# Patient Record
Sex: FEMALE | Race: BLACK OR AFRICAN AMERICAN | Employment: OTHER
[De-identification: names, ages, dates, MRNs, and addresses within clinical notes are randomized per-mention and may not be internally consistent; named-entity substitution may affect disease eponyms.]

---

## 2017-01-18 ENCOUNTER — OFFICE VISIT (OUTPATIENT)
Dept: ORTHOPEDIC SURGERY | Facility: CLINIC | Age: 57
End: 2017-01-18

## 2017-01-18 VITALS — WEIGHT: 198.41 LBS | HEIGHT: 66 IN | BODY MASS INDEX: 31.89 KG/M2

## 2017-01-18 DIAGNOSIS — M17.11 PRIMARY OSTEOARTHRITIS OF RIGHT KNEE: Primary | ICD-10-CM

## 2017-01-19 ENCOUNTER — TELEPHONE (OUTPATIENT)
Dept: ORTHOPEDIC SURGERY | Facility: CLINIC | Age: 57
End: 2017-01-19

## 2017-01-23 DIAGNOSIS — M17.0 PRIMARY OSTEOARTHRITIS OF BOTH KNEES: ICD-10-CM

## 2017-01-23 RX ORDER — MELOXICAM 15 MG/1
15 TABLET ORAL DAILY
Qty: 30 TABLET | Refills: 0 | Status: SHIPPED | OUTPATIENT
Start: 2017-01-26 | End: 2017-02-24 | Stop reason: SDUPTHER

## 2017-01-24 ENCOUNTER — TELEPHONE (OUTPATIENT)
Dept: INTERNAL MEDICINE | Facility: CLINIC | Age: 57
End: 2017-01-24

## 2017-01-25 ENCOUNTER — OFFICE VISIT (OUTPATIENT)
Dept: ORTHOPEDIC SURGERY | Facility: CLINIC | Age: 57
End: 2017-01-25

## 2017-01-25 VITALS — HEIGHT: 66 IN | WEIGHT: 202.6 LBS | BODY MASS INDEX: 32.56 KG/M2

## 2017-01-25 DIAGNOSIS — M17.11 PRIMARY OSTEOARTHRITIS OF RIGHT KNEE: Primary | ICD-10-CM

## 2017-01-25 PROCEDURE — 3014F SCREEN MAMMO DOC REV: CPT | Performed by: ORTHOPAEDIC SURGERY

## 2017-01-25 PROCEDURE — 3017F COLORECTAL CA SCREEN DOC REV: CPT | Performed by: ORTHOPAEDIC SURGERY

## 2017-01-25 PROCEDURE — G8427 DOCREV CUR MEDS BY ELIG CLIN: HCPCS | Performed by: ORTHOPAEDIC SURGERY

## 2017-01-25 PROCEDURE — G8484 FLU IMMUNIZE NO ADMIN: HCPCS | Performed by: ORTHOPAEDIC SURGERY

## 2017-01-25 PROCEDURE — 99213 OFFICE O/P EST LOW 20 MIN: CPT | Performed by: ORTHOPAEDIC SURGERY

## 2017-01-25 PROCEDURE — G8419 CALC BMI OUT NRM PARAM NOF/U: HCPCS | Performed by: ORTHOPAEDIC SURGERY

## 2017-01-25 PROCEDURE — 4004F PT TOBACCO SCREEN RCVD TLK: CPT | Performed by: ORTHOPAEDIC SURGERY

## 2017-01-25 PROCEDURE — 20610 DRAIN/INJ JOINT/BURSA W/O US: CPT | Performed by: ORTHOPAEDIC SURGERY

## 2017-01-31 ENCOUNTER — CARE COORDINATION (OUTPATIENT)
Dept: CARE COORDINATION | Facility: CLINIC | Age: 57
End: 2017-01-31

## 2017-01-31 DIAGNOSIS — J30.9 CHRONIC ALLERGIC RHINITIS: ICD-10-CM

## 2017-02-01 ENCOUNTER — OFFICE VISIT (OUTPATIENT)
Dept: ORTHOPEDIC SURGERY | Facility: CLINIC | Age: 57
End: 2017-02-01

## 2017-02-01 VITALS — WEIGHT: 202 LBS | BODY MASS INDEX: 32.47 KG/M2 | HEIGHT: 66 IN

## 2017-02-01 DIAGNOSIS — G89.29 CHRONIC PAIN OF RIGHT KNEE: Primary | ICD-10-CM

## 2017-02-01 DIAGNOSIS — M25.561 CHRONIC PAIN OF RIGHT KNEE: Primary | ICD-10-CM

## 2017-02-01 PROCEDURE — 3017F COLORECTAL CA SCREEN DOC REV: CPT | Performed by: ORTHOPAEDIC SURGERY

## 2017-02-01 PROCEDURE — G8484 FLU IMMUNIZE NO ADMIN: HCPCS | Performed by: ORTHOPAEDIC SURGERY

## 2017-02-01 PROCEDURE — 4004F PT TOBACCO SCREEN RCVD TLK: CPT | Performed by: ORTHOPAEDIC SURGERY

## 2017-02-01 PROCEDURE — 99213 OFFICE O/P EST LOW 20 MIN: CPT | Performed by: ORTHOPAEDIC SURGERY

## 2017-02-01 PROCEDURE — G8419 CALC BMI OUT NRM PARAM NOF/U: HCPCS | Performed by: ORTHOPAEDIC SURGERY

## 2017-02-01 PROCEDURE — 3014F SCREEN MAMMO DOC REV: CPT | Performed by: ORTHOPAEDIC SURGERY

## 2017-02-01 PROCEDURE — G8427 DOCREV CUR MEDS BY ELIG CLIN: HCPCS | Performed by: ORTHOPAEDIC SURGERY

## 2017-02-01 RX ORDER — CETIRIZINE HYDROCHLORIDE 10 MG/1
TABLET ORAL
Qty: 30 TABLET | Refills: 3 | Status: SHIPPED | OUTPATIENT
Start: 2017-02-01 | End: 2017-07-03 | Stop reason: SDUPTHER

## 2017-02-14 ENCOUNTER — TELEPHONE (OUTPATIENT)
Dept: ORTHOPEDIC SURGERY | Facility: CLINIC | Age: 57
End: 2017-02-14

## 2017-02-22 ENCOUNTER — OFFICE VISIT (OUTPATIENT)
Dept: ORTHOPEDIC SURGERY | Facility: CLINIC | Age: 57
End: 2017-02-22

## 2017-02-22 DIAGNOSIS — M17.11 PRIMARY OSTEOARTHRITIS OF RIGHT KNEE: Primary | ICD-10-CM

## 2017-02-22 PROCEDURE — 3017F COLORECTAL CA SCREEN DOC REV: CPT | Performed by: ORTHOPAEDIC SURGERY

## 2017-02-22 PROCEDURE — 99213 OFFICE O/P EST LOW 20 MIN: CPT | Performed by: ORTHOPAEDIC SURGERY

## 2017-02-22 PROCEDURE — G8428 CUR MEDS NOT DOCUMENT: HCPCS | Performed by: ORTHOPAEDIC SURGERY

## 2017-02-22 PROCEDURE — 4004F PT TOBACCO SCREEN RCVD TLK: CPT | Performed by: ORTHOPAEDIC SURGERY

## 2017-02-22 PROCEDURE — G8419 CALC BMI OUT NRM PARAM NOF/U: HCPCS | Performed by: ORTHOPAEDIC SURGERY

## 2017-02-22 PROCEDURE — 3014F SCREEN MAMMO DOC REV: CPT | Performed by: ORTHOPAEDIC SURGERY

## 2017-02-22 PROCEDURE — G8484 FLU IMMUNIZE NO ADMIN: HCPCS | Performed by: ORTHOPAEDIC SURGERY

## 2017-02-24 ENCOUNTER — HOSPITAL ENCOUNTER (OUTPATIENT)
Dept: PAIN MANAGEMENT | Age: 57
Discharge: HOME | End: 2017-02-24
Payer: MEDICARE

## 2017-02-24 VITALS — DIASTOLIC BLOOD PRESSURE: 60 MMHG | SYSTOLIC BLOOD PRESSURE: 101 MMHG | RESPIRATION RATE: 16 BRPM | HEART RATE: 91 BPM

## 2017-02-24 DIAGNOSIS — M48.061 SPINAL STENOSIS, LUMBAR REGION, WITHOUT NEUROGENIC CLAUDICATION: Chronic | ICD-10-CM

## 2017-02-24 DIAGNOSIS — M17.0 PRIMARY OSTEOARTHRITIS OF BOTH KNEES: ICD-10-CM

## 2017-02-24 PROCEDURE — 99214 OFFICE O/P EST MOD 30 MIN: CPT

## 2017-02-24 RX ORDER — HYDROCODONE BITARTRATE AND ACETAMINOPHEN 5; 325 MG/1; MG/1
1 TABLET ORAL EVERY 8 HOURS PRN
Qty: 42 TABLET | Refills: 0 | Status: SHIPPED | OUTPATIENT
Start: 2017-03-04 | End: 2017-03-14 | Stop reason: SDUPTHER

## 2017-02-24 RX ORDER — MELOXICAM 15 MG/1
15 TABLET ORAL DAILY
Qty: 30 TABLET | Refills: 0 | Status: SHIPPED | OUTPATIENT
Start: 2017-02-24 | End: 2017-03-14 | Stop reason: SDUPTHER

## 2017-02-24 ASSESSMENT — PAIN DESCRIPTION - DESCRIPTORS: DESCRIPTORS: ACHING;CONSTANT;DISCOMFORT;DULL

## 2017-02-24 ASSESSMENT — PAIN DESCRIPTION - FREQUENCY: FREQUENCY: CONTINUOUS

## 2017-02-24 ASSESSMENT — PAIN DESCRIPTION - PAIN TYPE: TYPE: CHRONIC PAIN

## 2017-02-24 ASSESSMENT — PAIN DESCRIPTION - LOCATION: LOCATION: BACK;KNEE

## 2017-02-24 ASSESSMENT — PAIN SCALES - GENERAL: PAINLEVEL_OUTOF10: 7

## 2017-02-24 ASSESSMENT — PAIN DESCRIPTION - ONSET: ONSET: ON-GOING

## 2017-02-24 ASSESSMENT — PAIN DESCRIPTION - PROGRESSION: CLINICAL_PROGRESSION: GRADUALLY WORSENING

## 2017-02-24 ASSESSMENT — PAIN DESCRIPTION - ORIENTATION: ORIENTATION: RIGHT;LOWER

## 2017-02-27 ENCOUNTER — CARE COORDINATION (OUTPATIENT)
Dept: CARE COORDINATION | Facility: CLINIC | Age: 57
End: 2017-02-27

## 2017-03-06 ENCOUNTER — HOSPITAL ENCOUNTER (OUTPATIENT)
Age: 57
Discharge: HOME | End: 2017-03-06
Payer: MEDICARE

## 2017-03-06 ENCOUNTER — OFFICE VISIT (OUTPATIENT)
Dept: INTERNAL MEDICINE | Facility: CLINIC | Age: 57
End: 2017-03-06

## 2017-03-06 ENCOUNTER — TELEPHONE (OUTPATIENT)
Dept: ORTHOPEDIC SURGERY | Facility: CLINIC | Age: 57
End: 2017-03-06

## 2017-03-06 ENCOUNTER — CARE COORDINATOR VISIT (OUTPATIENT)
Dept: CARE COORDINATION | Facility: CLINIC | Age: 57
End: 2017-03-06

## 2017-03-06 VITALS
BODY MASS INDEX: 32.12 KG/M2 | HEART RATE: 83 BPM | SYSTOLIC BLOOD PRESSURE: 127 MMHG | DIASTOLIC BLOOD PRESSURE: 77 MMHG | WEIGHT: 196 LBS

## 2017-03-06 DIAGNOSIS — M17.11 PRIMARY OSTEOARTHRITIS OF RIGHT KNEE: ICD-10-CM

## 2017-03-06 DIAGNOSIS — J43.9 PULMONARY EMPHYSEMA, UNSPECIFIED EMPHYSEMA TYPE (HCC): ICD-10-CM

## 2017-03-06 DIAGNOSIS — E11.9 TYPE 2 DIABETES MELLITUS WITHOUT COMPLICATION, WITHOUT LONG-TERM CURRENT USE OF INSULIN (HCC): Primary | ICD-10-CM

## 2017-03-06 DIAGNOSIS — J43.8 OTHER EMPHYSEMA (HCC): ICD-10-CM

## 2017-03-06 DIAGNOSIS — I10 ESSENTIAL HYPERTENSION: ICD-10-CM

## 2017-03-06 DIAGNOSIS — Z71.6 ENCOUNTER FOR SMOKING CESSATION COUNSELING: ICD-10-CM

## 2017-03-06 DIAGNOSIS — M47.816 SPONDYLOSIS OF LUMBAR REGION WITHOUT MYELOPATHY OR RADICULOPATHY: ICD-10-CM

## 2017-03-06 LAB — HBA1C MFR BLD: 6.1 %

## 2017-03-06 PROCEDURE — 99214 OFFICE O/P EST MOD 30 MIN: CPT | Performed by: INTERNAL MEDICINE

## 2017-03-06 PROCEDURE — G8926 SPIRO NO PERF OR DOC: HCPCS | Performed by: INTERNAL MEDICINE

## 2017-03-06 PROCEDURE — 3014F SCREEN MAMMO DOC REV: CPT | Performed by: INTERNAL MEDICINE

## 2017-03-06 PROCEDURE — 83036 HEMOGLOBIN GLYCOSYLATED A1C: CPT | Performed by: INTERNAL MEDICINE

## 2017-03-06 PROCEDURE — G0444 DEPRESSION SCREEN ANNUAL: HCPCS | Performed by: INTERNAL MEDICINE

## 2017-03-06 PROCEDURE — G8417 CALC BMI ABV UP PARAM F/U: HCPCS | Performed by: INTERNAL MEDICINE

## 2017-03-06 PROCEDURE — G8427 DOCREV CUR MEDS BY ELIG CLIN: HCPCS | Performed by: INTERNAL MEDICINE

## 2017-03-06 PROCEDURE — 3023F SPIROM DOC REV: CPT | Performed by: INTERNAL MEDICINE

## 2017-03-06 PROCEDURE — G8484 FLU IMMUNIZE NO ADMIN: HCPCS | Performed by: INTERNAL MEDICINE

## 2017-03-06 PROCEDURE — 3017F COLORECTAL CA SCREEN DOC REV: CPT | Performed by: INTERNAL MEDICINE

## 2017-03-06 PROCEDURE — 3044F HG A1C LEVEL LT 7.0%: CPT | Performed by: INTERNAL MEDICINE

## 2017-03-06 PROCEDURE — 99213 OFFICE O/P EST LOW 20 MIN: CPT | Performed by: INTERNAL MEDICINE

## 2017-03-06 PROCEDURE — 4004F PT TOBACCO SCREEN RCVD TLK: CPT | Performed by: INTERNAL MEDICINE

## 2017-03-06 RX ORDER — NICOTINE 21 MG/24HR
1 PATCH, TRANSDERMAL 24 HOURS TRANSDERMAL EVERY 24 HOURS
Qty: 30 PATCH | Refills: 0 | Status: SHIPPED | OUTPATIENT
Start: 2017-03-06 | End: 2017-10-02

## 2017-03-06 RX ORDER — GABAPENTIN 300 MG/1
CAPSULE ORAL
Qty: 120 CAPSULE | Refills: 6 | Status: SHIPPED | OUTPATIENT
Start: 2017-03-06 | End: 2018-05-01 | Stop reason: SDUPTHER

## 2017-03-06 RX ORDER — TIZANIDINE 4 MG/1
TABLET ORAL
Qty: 60 TABLET | Refills: 3 | Status: SHIPPED | OUTPATIENT
Start: 2017-03-06 | End: 2017-08-31 | Stop reason: SDUPTHER

## 2017-03-06 RX ORDER — ALBUTEROL SULFATE 90 UG/1
2 AEROSOL, METERED RESPIRATORY (INHALATION) EVERY 6 HOURS PRN
Qty: 1 INHALER | Refills: 3 | Status: SHIPPED | OUTPATIENT
Start: 2017-03-06 | End: 2017-10-02 | Stop reason: SDUPTHER

## 2017-03-06 ASSESSMENT — ENCOUNTER SYMPTOMS
SORE THROAT: 0
CONSTIPATION: 0
COUGH: 1
EYE REDNESS: 0
SHORTNESS OF BREATH: 0
ABDOMINAL PAIN: 0
BLOOD IN STOOL: 0
SPUTUM PRODUCTION: 0
BACK PAIN: 1
BLURRED VISION: 0
NAUSEA: 0
WHEEZING: 0

## 2017-03-06 ASSESSMENT — PATIENT HEALTH QUESTIONNAIRE - PHQ9
7. TROUBLE CONCENTRATING ON THINGS, SUCH AS READING THE NEWSPAPER OR WATCHING TELEVISION: 0
10. IF YOU CHECKED OFF ANY PROBLEMS, HOW DIFFICULT HAVE THESE PROBLEMS MADE IT FOR YOU TO DO YOUR WORK, TAKE CARE OF THINGS AT HOME, OR GET ALONG WITH OTHER PEOPLE: 1
SUM OF ALL RESPONSES TO PHQ9 QUESTIONS 1 & 2: 3
5. POOR APPETITE OR OVEREATING: 0
4. FEELING TIRED OR HAVING LITTLE ENERGY: 0
9. THOUGHTS THAT YOU WOULD BE BETTER OFF DEAD, OR OF HURTING YOURSELF: 0
3. TROUBLE FALLING OR STAYING ASLEEP: 1
1. LITTLE INTEREST OR PLEASURE IN DOING THINGS: 1
2. FEELING DOWN, DEPRESSED OR HOPELESS: 2
SUM OF ALL RESPONSES TO PHQ QUESTIONS 1-9: 5
8. MOVING OR SPEAKING SO SLOWLY THAT OTHER PEOPLE COULD HAVE NOTICED. OR THE OPPOSITE, BEING SO FIGETY OR RESTLESS THAT YOU HAVE BEEN MOVING AROUND A LOT MORE THAN USUAL: 0
6. FEELING BAD ABOUT YOURSELF - OR THAT YOU ARE A FAILURE OR HAVE LET YOURSELF OR YOUR FAMILY DOWN: 1

## 2017-03-17 ENCOUNTER — HOSPITAL ENCOUNTER (OUTPATIENT)
Dept: PREADMISSION TESTING | Age: 57
Discharge: HOME | End: 2017-03-17
Payer: MEDICARE

## 2017-03-17 VITALS
OXYGEN SATURATION: 95 % | HEART RATE: 92 BPM | HEIGHT: 66 IN | TEMPERATURE: 97.7 F | SYSTOLIC BLOOD PRESSURE: 113 MMHG | BODY MASS INDEX: 30.98 KG/M2 | WEIGHT: 192.8 LBS | RESPIRATION RATE: 16 BRPM | DIASTOLIC BLOOD PRESSURE: 73 MMHG

## 2017-03-17 LAB
ANION GAP SERPL CALCULATED.3IONS-SCNC: 14 MMOL/L (ref 9–17)
BUN BLDV-MCNC: 13 MG/DL (ref 6–20)
CHLORIDE BLD-SCNC: 103 MMOL/L (ref 98–107)
CO2: 24 MMOL/L (ref 20–31)
CREAT SERPL-MCNC: 0.86 MG/DL (ref 0.5–0.9)
GFR AFRICAN AMERICAN: >60 ML/MIN
GFR NON-AFRICAN AMERICAN: >60 ML/MIN
GFR SERPL CREATININE-BSD FRML MDRD: NORMAL ML/MIN/{1.73_M2}
GFR SERPL CREATININE-BSD FRML MDRD: NORMAL ML/MIN/{1.73_M2}
GLUCOSE BLD-MCNC: 104 MG/DL (ref 70–99)
POTASSIUM SERPL-SCNC: 3.6 MMOL/L (ref 3.7–5.3)
SODIUM BLD-SCNC: 141 MMOL/L (ref 135–144)

## 2017-03-17 PROCEDURE — 36415 COLL VENOUS BLD VENIPUNCTURE: CPT

## 2017-03-17 PROCEDURE — 93005 ELECTROCARDIOGRAM TRACING: CPT

## 2017-03-17 PROCEDURE — 82565 ASSAY OF CREATININE: CPT

## 2017-03-17 PROCEDURE — 82947 ASSAY GLUCOSE BLOOD QUANT: CPT

## 2017-03-17 PROCEDURE — 84520 ASSAY OF UREA NITROGEN: CPT

## 2017-03-17 PROCEDURE — 80051 ELECTROLYTE PANEL: CPT

## 2017-03-17 RX ORDER — SODIUM CHLORIDE, SODIUM LACTATE, POTASSIUM CHLORIDE, CALCIUM CHLORIDE 600; 310; 30; 20 MG/100ML; MG/100ML; MG/100ML; MG/100ML
1000 INJECTION, SOLUTION INTRAVENOUS CONTINUOUS
Status: CANCELLED | OUTPATIENT
Start: 2017-03-17

## 2017-03-18 LAB
EKG ATRIAL RATE: 85 BPM
EKG P AXIS: 59 DEGREES
EKG P-R INTERVAL: 168 MS
EKG Q-T INTERVAL: 398 MS
EKG QRS DURATION: 82 MS
EKG QTC CALCULATION (BAZETT): 473 MS
EKG R AXIS: 13 DEGREES
EKG T AXIS: 55 DEGREES
EKG VENTRICULAR RATE: 85 BPM

## 2017-03-22 ENCOUNTER — CARE COORDINATION (OUTPATIENT)
Dept: CARE COORDINATION | Age: 57
End: 2017-03-22

## 2017-03-23 ENCOUNTER — TELEPHONE (OUTPATIENT)
Dept: INTERNAL MEDICINE | Age: 57
End: 2017-03-23

## 2017-03-24 ENCOUNTER — HOSPITAL ENCOUNTER (OUTPATIENT)
Age: 57
Setting detail: OUTPATIENT SURGERY
Discharge: HOME | End: 2017-03-24
Attending: ORTHOPAEDIC SURGERY | Admitting: ORTHOPAEDIC SURGERY
Payer: MEDICARE

## 2017-03-24 ENCOUNTER — ANESTHESIA EVENT (OUTPATIENT)
Dept: OPERATING ROOM | Age: 57
End: 2017-03-24
Payer: MEDICARE

## 2017-03-24 ENCOUNTER — ANESTHESIA (OUTPATIENT)
Dept: OPERATING ROOM | Age: 57
End: 2017-03-24
Payer: MEDICARE

## 2017-03-24 VITALS
RESPIRATION RATE: 19 BRPM | BODY MASS INDEX: 31.36 KG/M2 | DIASTOLIC BLOOD PRESSURE: 77 MMHG | TEMPERATURE: 97 F | HEIGHT: 66 IN | HEART RATE: 90 BPM | WEIGHT: 195.11 LBS | OXYGEN SATURATION: 99 % | SYSTOLIC BLOOD PRESSURE: 134 MMHG

## 2017-03-24 VITALS — OXYGEN SATURATION: 94 % | TEMPERATURE: 96.2 F | SYSTOLIC BLOOD PRESSURE: 114 MMHG | DIASTOLIC BLOOD PRESSURE: 80 MMHG

## 2017-03-24 LAB
GLUCOSE BLD-MCNC: 108 MG/DL (ref 74–106)
GLUCOSE BLD-MCNC: 157 MG/DL (ref 65–105)
POC POTASSIUM: 4.1 MMOL/L (ref 3.5–5.1)

## 2017-03-24 PROCEDURE — 2580000003 HC RX 258: Performed by: ORTHOPAEDIC SURGERY

## 2017-03-24 PROCEDURE — 2580000003 HC RX 258: Performed by: ANESTHESIOLOGY

## 2017-03-24 PROCEDURE — 6360000002 HC RX W HCPCS: Performed by: ORTHOPAEDIC SURGERY

## 2017-03-24 PROCEDURE — 2720000010 HC SURG SUPPLY STERILE: Performed by: ORTHOPAEDIC SURGERY

## 2017-03-24 PROCEDURE — 3700000001 HC ADD 15 MINUTES (ANESTHESIA): Performed by: ORTHOPAEDIC SURGERY

## 2017-03-24 PROCEDURE — 3600000004 HC SURGERY LEVEL 4 BASE: Performed by: ORTHOPAEDIC SURGERY

## 2017-03-24 PROCEDURE — 7100000001 HC PACU RECOVERY - ADDTL 15 MIN: Performed by: ORTHOPAEDIC SURGERY

## 2017-03-24 PROCEDURE — 7100000000 HC PACU RECOVERY - FIRST 15 MIN: Performed by: ORTHOPAEDIC SURGERY

## 2017-03-24 PROCEDURE — 82947 ASSAY GLUCOSE BLOOD QUANT: CPT

## 2017-03-24 PROCEDURE — 84132 ASSAY OF SERUM POTASSIUM: CPT

## 2017-03-24 PROCEDURE — 6360000002 HC RX W HCPCS: Performed by: NURSE ANESTHETIST, CERTIFIED REGISTERED

## 2017-03-24 PROCEDURE — 6360000002 HC RX W HCPCS: Performed by: ANESTHESIOLOGY

## 2017-03-24 PROCEDURE — 6370000000 HC RX 637 (ALT 250 FOR IP): Performed by: ANESTHESIOLOGY

## 2017-03-24 PROCEDURE — 2500000003 HC RX 250 WO HCPCS: Performed by: NURSE ANESTHETIST, CERTIFIED REGISTERED

## 2017-03-24 PROCEDURE — 3700000000 HC ANESTHESIA ATTENDED CARE: Performed by: ORTHOPAEDIC SURGERY

## 2017-03-24 PROCEDURE — 2500000003 HC RX 250 WO HCPCS: Performed by: ORTHOPAEDIC SURGERY

## 2017-03-24 PROCEDURE — 3600000014 HC SURGERY LEVEL 4 ADDTL 15MIN: Performed by: ORTHOPAEDIC SURGERY

## 2017-03-24 PROCEDURE — 7100000010 HC PHASE II RECOVERY - FIRST 15 MIN: Performed by: ORTHOPAEDIC SURGERY

## 2017-03-24 RX ORDER — OXYCODONE HYDROCHLORIDE AND ACETAMINOPHEN 5; 325 MG/1; MG/1
2 TABLET ORAL PRN
Status: DISCONTINUED | OUTPATIENT
Start: 2017-03-24 | End: 2017-03-24 | Stop reason: HOSPADM

## 2017-03-24 RX ORDER — LIDOCAINE HYDROCHLORIDE 10 MG/ML
INJECTION, SOLUTION INFILTRATION; PERINEURAL PRN
Status: DISCONTINUED | OUTPATIENT
Start: 2017-03-24 | End: 2017-03-24 | Stop reason: SDUPTHER

## 2017-03-24 RX ORDER — MORPHINE SULFATE 2 MG/ML
2 INJECTION, SOLUTION INTRAMUSCULAR; INTRAVENOUS EVERY 5 MIN PRN
Status: DISCONTINUED | OUTPATIENT
Start: 2017-03-24 | End: 2017-03-24 | Stop reason: HOSPADM

## 2017-03-24 RX ORDER — LABETALOL HYDROCHLORIDE 5 MG/ML
5 INJECTION, SOLUTION INTRAVENOUS EVERY 10 MIN PRN
Status: DISCONTINUED | OUTPATIENT
Start: 2017-03-24 | End: 2017-03-24 | Stop reason: HOSPADM

## 2017-03-24 RX ORDER — MAGNESIUM HYDROXIDE 1200 MG/15ML
LIQUID ORAL CONTINUOUS PRN
Status: DISCONTINUED | OUTPATIENT
Start: 2017-03-24 | End: 2017-03-24 | Stop reason: HOSPADM

## 2017-03-24 RX ORDER — ONDANSETRON 2 MG/ML
INJECTION INTRAMUSCULAR; INTRAVENOUS PRN
Status: DISCONTINUED | OUTPATIENT
Start: 2017-03-24 | End: 2017-03-24 | Stop reason: SDUPTHER

## 2017-03-24 RX ORDER — OXYCODONE HYDROCHLORIDE AND ACETAMINOPHEN 5; 325 MG/1; MG/1
1 TABLET ORAL PRN
Status: DISCONTINUED | OUTPATIENT
Start: 2017-03-24 | End: 2017-03-24 | Stop reason: HOSPADM

## 2017-03-24 RX ORDER — FENTANYL CITRATE 50 UG/ML
25 INJECTION, SOLUTION INTRAMUSCULAR; INTRAVENOUS EVERY 5 MIN PRN
Status: COMPLETED | OUTPATIENT
Start: 2017-03-24 | End: 2017-03-24

## 2017-03-24 RX ORDER — HYDROCODONE BITARTRATE AND ACETAMINOPHEN 5; 325 MG/1; MG/1
1 TABLET ORAL EVERY 4 HOURS PRN
Qty: 45 TABLET | Refills: 0 | Status: SHIPPED | OUTPATIENT
Start: 2017-03-24 | End: 2017-03-31

## 2017-03-24 RX ORDER — DIPHENHYDRAMINE HYDROCHLORIDE 50 MG/ML
12.5 INJECTION INTRAMUSCULAR; INTRAVENOUS
Status: DISCONTINUED | OUTPATIENT
Start: 2017-03-24 | End: 2017-03-24 | Stop reason: HOSPADM

## 2017-03-24 RX ORDER — ONDANSETRON 2 MG/ML
4 INJECTION INTRAMUSCULAR; INTRAVENOUS
Status: DISCONTINUED | OUTPATIENT
Start: 2017-03-24 | End: 2017-03-24 | Stop reason: HOSPADM

## 2017-03-24 RX ORDER — PROPOFOL 10 MG/ML
INJECTION, EMULSION INTRAVENOUS PRN
Status: DISCONTINUED | OUTPATIENT
Start: 2017-03-24 | End: 2017-03-24 | Stop reason: SDUPTHER

## 2017-03-24 RX ORDER — MIDAZOLAM HYDROCHLORIDE 1 MG/ML
INJECTION INTRAMUSCULAR; INTRAVENOUS PRN
Status: DISCONTINUED | OUTPATIENT
Start: 2017-03-24 | End: 2017-03-24 | Stop reason: SDUPTHER

## 2017-03-24 RX ORDER — BUPIVACAINE HYDROCHLORIDE AND EPINEPHRINE 5; 5 MG/ML; UG/ML
INJECTION, SOLUTION PERINEURAL PRN
Status: DISCONTINUED | OUTPATIENT
Start: 2017-03-24 | End: 2017-03-24 | Stop reason: HOSPADM

## 2017-03-24 RX ORDER — SODIUM CHLORIDE, SODIUM LACTATE, POTASSIUM CHLORIDE, CALCIUM CHLORIDE 600; 310; 30; 20 MG/100ML; MG/100ML; MG/100ML; MG/100ML
1000 INJECTION, SOLUTION INTRAVENOUS CONTINUOUS
Status: DISCONTINUED | OUTPATIENT
Start: 2017-03-24 | End: 2017-03-24 | Stop reason: HOSPADM

## 2017-03-24 RX ORDER — HYDROCODONE BITARTRATE AND ACETAMINOPHEN 5; 325 MG/1; MG/1
1 TABLET ORAL ONCE
Status: COMPLETED | OUTPATIENT
Start: 2017-03-24 | End: 2017-03-24

## 2017-03-24 RX ORDER — HYDROCODONE BITARTRATE AND ACETAMINOPHEN 5; 325 MG/1; MG/1
2 TABLET ORAL ONCE
Status: COMPLETED | OUTPATIENT
Start: 2017-03-24 | End: 2017-03-24

## 2017-03-24 RX ORDER — KETOROLAC TROMETHAMINE 30 MG/ML
INJECTION, SOLUTION INTRAMUSCULAR; INTRAVENOUS PRN
Status: DISCONTINUED | OUTPATIENT
Start: 2017-03-24 | End: 2017-03-24 | Stop reason: SDUPTHER

## 2017-03-24 RX ADMIN — LIDOCAINE HYDROCHLORIDE 50 MG: 10 INJECTION, SOLUTION INFILTRATION; PERINEURAL at 11:58

## 2017-03-24 RX ADMIN — MIDAZOLAM HYDROCHLORIDE 300 MG: 1 INJECTION, SOLUTION INTRAMUSCULAR; INTRAVENOUS at 12:30

## 2017-03-24 RX ADMIN — Medication 2 G: at 11:58

## 2017-03-24 RX ADMIN — FENTANYL CITRATE 50 MCG: 50 INJECTION INTRAMUSCULAR; INTRAVENOUS at 11:58

## 2017-03-24 RX ADMIN — FENTANYL CITRATE 50 MCG: 50 INJECTION INTRAMUSCULAR; INTRAVENOUS at 12:08

## 2017-03-24 RX ADMIN — ONDANSETRON 4 MG: 2 INJECTION, SOLUTION INTRAMUSCULAR; INTRAVENOUS at 13:24

## 2017-03-24 RX ADMIN — FENTANYL CITRATE 25 MCG: 50 INJECTION INTRAMUSCULAR; INTRAVENOUS at 13:47

## 2017-03-24 RX ADMIN — FENTANYL CITRATE 50 MCG: 50 INJECTION INTRAMUSCULAR; INTRAVENOUS at 12:30

## 2017-03-24 RX ADMIN — HYDROCODONE BITARTRATE AND ACETAMINOPHEN 2 TABLET: 5; 325 TABLET ORAL at 14:26

## 2017-03-24 RX ADMIN — MIDAZOLAM HYDROCHLORIDE 2 MG: 1 INJECTION, SOLUTION INTRAMUSCULAR; INTRAVENOUS at 12:08

## 2017-03-24 RX ADMIN — SODIUM CHLORIDE, POTASSIUM CHLORIDE, SODIUM LACTATE AND CALCIUM CHLORIDE 1000 ML: 600; 310; 30; 20 INJECTION, SOLUTION INTRAVENOUS at 10:46

## 2017-03-24 RX ADMIN — SODIUM CHLORIDE, POTASSIUM CHLORIDE, SODIUM LACTATE AND CALCIUM CHLORIDE: 600; 310; 30; 20 INJECTION, SOLUTION INTRAVENOUS at 12:55

## 2017-03-24 RX ADMIN — PROPOFOL 150 MG: 10 INJECTION, EMULSION INTRAVENOUS at 11:58

## 2017-03-24 RX ADMIN — KETOROLAC TROMETHAMINE 30 MG: 30 INJECTION, SOLUTION INTRAMUSCULAR; INTRAVENOUS at 12:58

## 2017-03-24 RX ADMIN — FENTANYL CITRATE 25 MCG: 50 INJECTION INTRAMUSCULAR; INTRAVENOUS at 13:30

## 2017-03-24 RX ADMIN — FENTANYL CITRATE 50 MCG: 50 INJECTION INTRAMUSCULAR; INTRAVENOUS at 12:35

## 2017-03-24 RX ADMIN — FENTANYL CITRATE 50 MCG: 50 INJECTION INTRAMUSCULAR; INTRAVENOUS at 12:54

## 2017-03-24 ASSESSMENT — PAIN SCALES - WONG BAKER
WONGBAKER_NUMERICALRESPONSE: 0

## 2017-03-24 ASSESSMENT — COPD QUESTIONNAIRES: CAT_SEVERITY: MODERATE

## 2017-03-24 ASSESSMENT — PAIN SCALES - GENERAL
PAINLEVEL_OUTOF10: 7

## 2017-03-24 ASSESSMENT — PAIN - FUNCTIONAL ASSESSMENT: PAIN_FUNCTIONAL_ASSESSMENT: 0-10

## 2017-03-29 PROBLEM — M94.261 CHONDROMALACIA OF MEDIAL CONDYLE OF RIGHT FEMUR: Status: ACTIVE | Noted: 2017-03-24

## 2017-03-30 ENCOUNTER — CARE COORDINATION (OUTPATIENT)
Dept: CARE COORDINATION | Age: 57
End: 2017-03-30

## 2017-03-30 ENCOUNTER — HOSPITAL ENCOUNTER (OUTPATIENT)
Dept: PAIN MANAGEMENT | Age: 57
Discharge: HOME | End: 2017-03-30
Payer: MEDICARE

## 2017-03-30 VITALS
HEIGHT: 66 IN | OXYGEN SATURATION: 98 % | RESPIRATION RATE: 20 BRPM | BODY MASS INDEX: 31.34 KG/M2 | SYSTOLIC BLOOD PRESSURE: 131 MMHG | WEIGHT: 195 LBS | TEMPERATURE: 98 F | DIASTOLIC BLOOD PRESSURE: 85 MMHG | HEART RATE: 89 BPM

## 2017-03-30 PROCEDURE — 99214 OFFICE O/P EST MOD 30 MIN: CPT

## 2017-03-30 PROCEDURE — 99213 OFFICE O/P EST LOW 20 MIN: CPT

## 2017-03-30 ASSESSMENT — PAIN DESCRIPTION - LOCATION: LOCATION: BACK;BUTTOCKS;HIP;KNEE

## 2017-03-30 ASSESSMENT — PAIN DESCRIPTION - DESCRIPTORS: DESCRIPTORS: ACHING;CONSTANT;DULL

## 2017-03-30 ASSESSMENT — ENCOUNTER SYMPTOMS
BLURRED VISION: 0
EYE DISCHARGE: 0
SUSPICIOUS LESIONS: 0
UNUSUAL HAIR DISTRIBUTION: 0

## 2017-03-30 ASSESSMENT — PAIN DESCRIPTION - ORIENTATION: ORIENTATION: RIGHT;LEFT;LOWER

## 2017-03-30 ASSESSMENT — PAIN DESCRIPTION - FREQUENCY: FREQUENCY: CONTINUOUS

## 2017-03-30 ASSESSMENT — PAIN DESCRIPTION - PROGRESSION: CLINICAL_PROGRESSION: NOT CHANGED

## 2017-03-30 ASSESSMENT — PAIN SCALES - GENERAL: PAINLEVEL_OUTOF10: 6

## 2017-03-30 ASSESSMENT — PAIN DESCRIPTION - PAIN TYPE: TYPE: CHRONIC PAIN

## 2017-04-05 ENCOUNTER — OFFICE VISIT (OUTPATIENT)
Dept: ORTHOPEDIC SURGERY | Age: 57
End: 2017-04-05

## 2017-04-05 VITALS — BODY MASS INDEX: 32.51 KG/M2 | HEIGHT: 65 IN | WEIGHT: 195.11 LBS

## 2017-04-05 DIAGNOSIS — M17.11 PRIMARY OSTEOARTHRITIS OF RIGHT KNEE: Primary | ICD-10-CM

## 2017-04-05 PROCEDURE — 99024 POSTOP FOLLOW-UP VISIT: CPT | Performed by: ORTHOPAEDIC SURGERY

## 2017-04-05 RX ORDER — HYDROCODONE BITARTRATE AND ACETAMINOPHEN 5; 325 MG/1; MG/1
1 TABLET ORAL EVERY 6 HOURS PRN
Qty: 40 TABLET | Refills: 0 | Status: SHIPPED | OUTPATIENT
Start: 2017-04-05 | End: 2017-04-28

## 2017-04-05 RX ORDER — HYDROCODONE BITARTRATE AND ACETAMINOPHEN 5; 325 MG/1; MG/1
1 TABLET ORAL EVERY 6 HOURS PRN
COMMUNITY
End: 2017-04-05 | Stop reason: SDUPTHER

## 2017-04-17 ENCOUNTER — TELEPHONE (OUTPATIENT)
Dept: INTERNAL MEDICINE | Age: 57
End: 2017-04-17

## 2017-04-19 ENCOUNTER — OFFICE VISIT (OUTPATIENT)
Dept: ORTHOPEDIC SURGERY | Age: 57
End: 2017-04-19
Payer: MEDICARE

## 2017-04-19 VITALS — BODY MASS INDEX: 30.98 KG/M2 | HEIGHT: 66 IN | WEIGHT: 192.79 LBS

## 2017-04-19 DIAGNOSIS — S83.411A SPRAIN OF MEDIAL COLLATERAL LIGAMENT OF RIGHT KNEE, INITIAL ENCOUNTER: Primary | ICD-10-CM

## 2017-04-19 PROCEDURE — 3014F SCREEN MAMMO DOC REV: CPT | Performed by: ORTHOPAEDIC SURGERY

## 2017-04-19 PROCEDURE — 99212 OFFICE O/P EST SF 10 MIN: CPT | Performed by: ORTHOPAEDIC SURGERY

## 2017-04-19 PROCEDURE — 3017F COLORECTAL CA SCREEN DOC REV: CPT | Performed by: ORTHOPAEDIC SURGERY

## 2017-04-19 PROCEDURE — 4004F PT TOBACCO SCREEN RCVD TLK: CPT | Performed by: ORTHOPAEDIC SURGERY

## 2017-04-19 PROCEDURE — G8417 CALC BMI ABV UP PARAM F/U: HCPCS | Performed by: ORTHOPAEDIC SURGERY

## 2017-04-19 PROCEDURE — G8427 DOCREV CUR MEDS BY ELIG CLIN: HCPCS | Performed by: ORTHOPAEDIC SURGERY

## 2017-04-19 RX ORDER — HYDROCODONE BITARTRATE AND ACETAMINOPHEN 5; 325 MG/1; MG/1
1 TABLET ORAL EVERY 8 HOURS PRN
Qty: 40 TABLET | Refills: 0 | Status: SHIPPED | OUTPATIENT
Start: 2017-04-19 | End: 2017-04-28 | Stop reason: SDUPTHER

## 2017-04-28 ENCOUNTER — HOSPITAL ENCOUNTER (OUTPATIENT)
Dept: PAIN MANAGEMENT | Age: 57
Discharge: HOME | End: 2017-04-28
Payer: MEDICARE

## 2017-04-28 VITALS — RESPIRATION RATE: 16 BRPM | HEART RATE: 83 BPM | TEMPERATURE: 97.2 F

## 2017-04-28 DIAGNOSIS — M47.26 OSTEOARTHRITIS OF SPINE WITH RADICULOPATHY, LUMBAR REGION: Primary | ICD-10-CM

## 2017-04-28 DIAGNOSIS — K21.9 GASTROESOPHAGEAL REFLUX DISEASE, ESOPHAGITIS PRESENCE NOT SPECIFIED: ICD-10-CM

## 2017-04-28 DIAGNOSIS — M17.0 PRIMARY OSTEOARTHRITIS OF BOTH KNEES: ICD-10-CM

## 2017-04-28 PROCEDURE — 80307 DRUG TEST PRSMV CHEM ANLYZR: CPT

## 2017-04-28 PROCEDURE — 99214 OFFICE O/P EST MOD 30 MIN: CPT

## 2017-04-28 RX ORDER — MELOXICAM 15 MG/1
15 TABLET ORAL DAILY
Qty: 30 TABLET | Refills: 0 | Status: SHIPPED | OUTPATIENT
Start: 2017-04-28 | End: 2017-05-30 | Stop reason: SDUPTHER

## 2017-04-28 RX ORDER — HYDROCODONE BITARTRATE AND ACETAMINOPHEN 5; 325 MG/1; MG/1
1 TABLET ORAL EVERY 8 HOURS PRN
Qty: 90 TABLET | Refills: 0 | Status: SHIPPED | OUTPATIENT
Start: 2017-05-02 | End: 2017-05-30 | Stop reason: SDUPTHER

## 2017-04-28 RX ORDER — OMEPRAZOLE 20 MG/1
20 CAPSULE, DELAYED RELEASE ORAL DAILY
Qty: 30 CAPSULE | Refills: 11 | Status: SHIPPED | OUTPATIENT
Start: 2017-04-28 | End: 2018-01-05 | Stop reason: SDUPTHER

## 2017-04-28 ASSESSMENT — ENCOUNTER SYMPTOMS
COUGH: 0
CONSTIPATION: 0
SHORTNESS OF BREATH: 0
BACK PAIN: 1

## 2017-04-28 ASSESSMENT — PAIN DESCRIPTION - FREQUENCY: FREQUENCY: CONTINUOUS

## 2017-04-28 ASSESSMENT — PAIN SCALES - GENERAL: PAINLEVEL_OUTOF10: 7

## 2017-04-28 ASSESSMENT — PAIN DESCRIPTION - ORIENTATION: ORIENTATION: RIGHT;LOWER

## 2017-04-28 ASSESSMENT — PAIN DESCRIPTION - PAIN TYPE: TYPE: CHRONIC PAIN

## 2017-04-28 ASSESSMENT — PAIN DESCRIPTION - PROGRESSION: CLINICAL_PROGRESSION: GRADUALLY IMPROVING

## 2017-04-28 ASSESSMENT — PAIN DESCRIPTION - DESCRIPTORS: DESCRIPTORS: ACHING;DISCOMFORT;DULL

## 2017-04-28 ASSESSMENT — PAIN DESCRIPTION - LOCATION: LOCATION: BACK;KNEE

## 2017-05-01 LAB
6-ACETYLMORPHINE, UR: NOT DETECTED
7-AMINOCLONAZEPAM, URINE: NOT DETECTED
ALPHA-OH-ALPRAZ, URINE: NOT DETECTED
ALPRAZOLAM, URINE: NOT DETECTED
AMPHETAMINES, URINE: NOT DETECTED
BARBITURATES, URINE: NOT DETECTED
BENZOYLECGONINE, UR: NOT DETECTED
BUPRENORPHINE URINE: NOT DETECTED
CARISOPRODOL, UR: NOT DETECTED
CLONAZEPAM, URINE: NOT DETECTED
CODEINE, URINE: NOT DETECTED
CREATININE URINE: 48.3 MG/DL (ref 20–400)
DIAZEPAM, URINE: NOT DETECTED
EER PAIN MGT DRUG PANEL, HIGH RES/EMIT U: NORMAL
ETHYL GLUCURONIDE UR: NOT DETECTED
FENTANYL URINE: NOT DETECTED
HYDROCODONE, URINE: PRESENT
HYDROMORPHONE, URINE: NOT DETECTED
LORAZEPAM, URINE: NOT DETECTED
MARIJUANA METAB, UR: NOT DETECTED
MDA, UR: NOT DETECTED
MDEA, EVE, UR: NOT DETECTED
MDMA URINE: NOT DETECTED
MEPERIDINE METAB, UR: NOT DETECTED
METHADONE, URINE: NOT DETECTED
METHAMPHETAMINE, URINE: NOT DETECTED
METHYLPHENIDATE: NOT DETECTED
MIDAZOLAM, URINE: NOT DETECTED
MORPHINE URINE: NOT DETECTED
NORBUPRENORPHINE, URINE: NOT DETECTED
NORDIAZEPAM, URINE: NOT DETECTED
NORFENTANYL, URINE: NOT DETECTED
NORHYDROCODONE, URINE: PRESENT
NOROXYCODONE, URINE: NOT DETECTED
NOROXYMORPHONE, URINE: NOT DETECTED
OXAZEPAM, URINE: NOT DETECTED
OXYCODONE URINE: NOT DETECTED
OXYMORPHONE, URINE: NOT DETECTED
PAIN MGT DRUG PANEL, HI RES, UR: NORMAL
PCP,URINE: NOT DETECTED
PHENTERMINE, UR: NOT DETECTED
PROPOXYPHENE, URINE: NOT DETECTED
TAPENTADOL, URINE: NOT DETECTED
TAPENTADOL-O-SULFATE, URINE: NOT DETECTED
TEMAZEPAM, URINE: NOT DETECTED
TRAMADOL, URINE: NOT DETECTED
ZOLPIDEM, URINE: NOT DETECTED

## 2017-05-17 ENCOUNTER — TELEPHONE (OUTPATIENT)
Dept: INTERNAL MEDICINE | Age: 57
End: 2017-05-17

## 2017-05-17 ENCOUNTER — OFFICE VISIT (OUTPATIENT)
Dept: ORTHOPEDIC SURGERY | Age: 57
End: 2017-05-17

## 2017-05-17 VITALS — HEIGHT: 64 IN | WEIGHT: 198 LBS | BODY MASS INDEX: 33.8 KG/M2

## 2017-05-17 DIAGNOSIS — M17.11 PRIMARY OSTEOARTHRITIS OF RIGHT KNEE: Primary | ICD-10-CM

## 2017-05-17 PROCEDURE — 99024 POSTOP FOLLOW-UP VISIT: CPT | Performed by: ORTHOPAEDIC SURGERY

## 2017-05-18 RX ORDER — AZITHROMYCIN 250 MG/1
TABLET, FILM COATED ORAL
Qty: 1 PACKET | Refills: 0 | Status: SHIPPED | OUTPATIENT
Start: 2017-05-18 | End: 2017-05-28

## 2017-05-26 DIAGNOSIS — E11.9 TYPE 2 DIABETES MELLITUS WITHOUT COMPLICATION, WITHOUT LONG-TERM CURRENT USE OF INSULIN (HCC): ICD-10-CM

## 2017-05-26 DIAGNOSIS — E78.00 PURE HYPERCHOLESTEROLEMIA: ICD-10-CM

## 2017-05-30 ENCOUNTER — HOSPITAL ENCOUNTER (OUTPATIENT)
Dept: PAIN MANAGEMENT | Age: 57
Discharge: HOME | End: 2017-05-30
Payer: MEDICARE

## 2017-05-30 VITALS
HEIGHT: 64 IN | SYSTOLIC BLOOD PRESSURE: 130 MMHG | BODY MASS INDEX: 32.78 KG/M2 | DIASTOLIC BLOOD PRESSURE: 59 MMHG | WEIGHT: 192 LBS | HEART RATE: 83 BPM | TEMPERATURE: 98.1 F | RESPIRATION RATE: 16 BRPM

## 2017-05-30 DIAGNOSIS — M47.896 OTHER OSTEOARTHRITIS OF SPINE, LUMBAR REGION: Primary | ICD-10-CM

## 2017-05-30 DIAGNOSIS — M17.0 PRIMARY OSTEOARTHRITIS OF BOTH KNEES: ICD-10-CM

## 2017-05-30 PROCEDURE — 99214 OFFICE O/P EST MOD 30 MIN: CPT

## 2017-05-30 RX ORDER — HYDROCODONE BITARTRATE AND ACETAMINOPHEN 5; 325 MG/1; MG/1
1 TABLET ORAL EVERY 8 HOURS PRN
Qty: 90 TABLET | Refills: 0 | Status: SHIPPED | OUTPATIENT
Start: 2017-06-01 | End: 2017-06-30 | Stop reason: SDUPTHER

## 2017-05-30 RX ORDER — MELOXICAM 15 MG/1
15 TABLET ORAL DAILY
Qty: 30 TABLET | Refills: 0 | Status: SHIPPED | OUTPATIENT
Start: 2017-06-01 | End: 2017-06-30 | Stop reason: SDUPTHER

## 2017-05-30 ASSESSMENT — PAIN DESCRIPTION - PAIN TYPE: TYPE: CHRONIC PAIN

## 2017-05-30 ASSESSMENT — PAIN DESCRIPTION - DESCRIPTORS: DESCRIPTORS: ACHING;CONSTANT;BURNING

## 2017-05-30 ASSESSMENT — ENCOUNTER SYMPTOMS
BACK PAIN: 1
CONSTIPATION: 0
COUGH: 0
SHORTNESS OF BREATH: 0

## 2017-05-30 ASSESSMENT — PAIN DESCRIPTION - FREQUENCY: FREQUENCY: CONTINUOUS

## 2017-05-30 ASSESSMENT — PAIN SCALES - GENERAL: PAINLEVEL_OUTOF10: 7

## 2017-05-30 ASSESSMENT — PAIN DESCRIPTION - PROGRESSION: CLINICAL_PROGRESSION: NOT CHANGED

## 2017-05-30 ASSESSMENT — PAIN DESCRIPTION - LOCATION: LOCATION: BACK;KNEE

## 2017-05-30 ASSESSMENT — PAIN DESCRIPTION - ORIENTATION: ORIENTATION: RIGHT;LOWER

## 2017-05-31 ENCOUNTER — OFFICE VISIT (OUTPATIENT)
Dept: ORTHOPEDIC SURGERY | Age: 57
End: 2017-05-31

## 2017-05-31 VITALS — WEIGHT: 192.02 LBS | HEIGHT: 64 IN | BODY MASS INDEX: 32.78 KG/M2

## 2017-05-31 DIAGNOSIS — M17.11 PRIMARY OSTEOARTHRITIS OF RIGHT KNEE: Primary | ICD-10-CM

## 2017-05-31 PROCEDURE — 99024 POSTOP FOLLOW-UP VISIT: CPT | Performed by: ORTHOPAEDIC SURGERY

## 2017-05-31 RX ORDER — ATORVASTATIN CALCIUM 40 MG/1
40 TABLET, FILM COATED ORAL DAILY
Qty: 90 TABLET | Refills: 1 | Status: SHIPPED | OUTPATIENT
Start: 2017-05-31 | End: 2017-10-02 | Stop reason: SDUPTHER

## 2017-06-06 ENCOUNTER — OFFICE VISIT (OUTPATIENT)
Dept: INTERNAL MEDICINE | Age: 57
End: 2017-06-06
Payer: MEDICARE

## 2017-06-06 VITALS
SYSTOLIC BLOOD PRESSURE: 114 MMHG | DIASTOLIC BLOOD PRESSURE: 78 MMHG | HEART RATE: 77 BPM | HEIGHT: 64 IN | WEIGHT: 197.6 LBS | BODY MASS INDEX: 33.73 KG/M2

## 2017-06-06 DIAGNOSIS — M17.11 PRIMARY OSTEOARTHRITIS OF RIGHT KNEE: ICD-10-CM

## 2017-06-06 DIAGNOSIS — M47.816 SPONDYLOSIS OF LUMBAR REGION WITHOUT MYELOPATHY OR RADICULOPATHY: ICD-10-CM

## 2017-06-06 DIAGNOSIS — N39.3 STRESS INCONTINENCE: ICD-10-CM

## 2017-06-06 DIAGNOSIS — J43.8 OTHER EMPHYSEMA (HCC): ICD-10-CM

## 2017-06-06 DIAGNOSIS — E11.9 TYPE 2 DIABETES MELLITUS WITHOUT COMPLICATION, WITHOUT LONG-TERM CURRENT USE OF INSULIN (HCC): Primary | ICD-10-CM

## 2017-06-06 DIAGNOSIS — I10 ESSENTIAL HYPERTENSION: ICD-10-CM

## 2017-06-06 PROCEDURE — 99213 OFFICE O/P EST LOW 20 MIN: CPT | Performed by: INTERNAL MEDICINE

## 2017-06-06 ASSESSMENT — ENCOUNTER SYMPTOMS
CONSTIPATION: 0
ABDOMINAL PAIN: 0
EYE REDNESS: 0
SHORTNESS OF BREATH: 0
COUGH: 0
SORE THROAT: 0
BLURRED VISION: 0
BACK PAIN: 1
HEARTBURN: 0
NAUSEA: 0

## 2017-06-30 ENCOUNTER — HOSPITAL ENCOUNTER (OUTPATIENT)
Dept: PAIN MANAGEMENT | Age: 57
Discharge: HOME | End: 2017-06-30
Payer: MEDICARE

## 2017-06-30 VITALS
RESPIRATION RATE: 18 BRPM | WEIGHT: 197 LBS | TEMPERATURE: 98.1 F | HEART RATE: 91 BPM | SYSTOLIC BLOOD PRESSURE: 130 MMHG | HEIGHT: 64 IN | DIASTOLIC BLOOD PRESSURE: 77 MMHG | BODY MASS INDEX: 33.63 KG/M2

## 2017-06-30 DIAGNOSIS — M47.26 OSTEOARTHRITIS OF SPINE WITH RADICULOPATHY, LUMBAR REGION: Primary | ICD-10-CM

## 2017-06-30 DIAGNOSIS — M17.0 PRIMARY OSTEOARTHRITIS OF BOTH KNEES: ICD-10-CM

## 2017-06-30 PROCEDURE — 99213 OFFICE O/P EST LOW 20 MIN: CPT

## 2017-06-30 PROCEDURE — 99214 OFFICE O/P EST MOD 30 MIN: CPT

## 2017-06-30 PROCEDURE — G0463 HOSPITAL OUTPT CLINIC VISIT: HCPCS

## 2017-06-30 RX ORDER — MELOXICAM 15 MG/1
15 TABLET ORAL DAILY
Qty: 30 TABLET | Refills: 0 | Status: SHIPPED | OUTPATIENT
Start: 2017-07-01 | End: 2017-07-28 | Stop reason: SDUPTHER

## 2017-06-30 RX ORDER — HYDROCODONE BITARTRATE AND ACETAMINOPHEN 5; 325 MG/1; MG/1
1 TABLET ORAL EVERY 8 HOURS PRN
Qty: 90 TABLET | Refills: 0 | Status: SHIPPED | OUTPATIENT
Start: 2017-07-01 | End: 2017-07-28 | Stop reason: SDUPTHER

## 2017-06-30 ASSESSMENT — ENCOUNTER SYMPTOMS
CONSTIPATION: 0
COUGH: 0
SHORTNESS OF BREATH: 0
BACK PAIN: 1

## 2017-06-30 ASSESSMENT — PAIN SCALES - GENERAL: PAINLEVEL_OUTOF10: 7

## 2017-06-30 ASSESSMENT — PAIN DESCRIPTION - ORIENTATION: ORIENTATION: RIGHT;LOWER

## 2017-06-30 ASSESSMENT — PAIN DESCRIPTION - PROGRESSION: CLINICAL_PROGRESSION: NOT CHANGED

## 2017-06-30 ASSESSMENT — PAIN DESCRIPTION - FREQUENCY: FREQUENCY: CONTINUOUS

## 2017-06-30 ASSESSMENT — PAIN DESCRIPTION - LOCATION: LOCATION: BACK;KNEE

## 2017-06-30 ASSESSMENT — PAIN DESCRIPTION - DESCRIPTORS: DESCRIPTORS: ACHING;BURNING

## 2017-06-30 ASSESSMENT — PAIN DESCRIPTION - PAIN TYPE: TYPE: CHRONIC PAIN

## 2017-07-03 DIAGNOSIS — J30.9 CHRONIC ALLERGIC RHINITIS: ICD-10-CM

## 2017-07-03 DIAGNOSIS — E11.9 TYPE 2 DIABETES MELLITUS WITHOUT COMPLICATION, WITHOUT LONG-TERM CURRENT USE OF INSULIN (HCC): Primary | ICD-10-CM

## 2017-07-03 RX ORDER — LANCETS 30 GAUGE
1 EACH MISCELLANEOUS DAILY
Qty: 100 EACH | Refills: 11 | Status: SHIPPED | OUTPATIENT
Start: 2017-07-03 | End: 2018-01-05 | Stop reason: SDUPTHER

## 2017-07-03 RX ORDER — CETIRIZINE HYDROCHLORIDE 10 MG/1
TABLET ORAL
Qty: 30 TABLET | Refills: 3 | Status: SHIPPED | OUTPATIENT
Start: 2017-07-03 | End: 2017-10-02 | Stop reason: SDUPTHER

## 2017-07-19 ENCOUNTER — HOSPITAL ENCOUNTER (OUTPATIENT)
Age: 57
Setting detail: SPECIMEN
Discharge: HOME | End: 2017-07-19
Payer: MEDICARE

## 2017-07-19 LAB
-: ABNORMAL
ABSOLUTE EOS #: 0.2 K/UL (ref 0–0.4)
ABSOLUTE LYMPH #: 2.1 K/UL (ref 1–4.8)
ABSOLUTE MONO #: 0.4 K/UL (ref 0.1–1.2)
ALBUMIN SERPL-MCNC: 4.3 G/DL (ref 3.5–5.2)
ALBUMIN/GLOBULIN RATIO: 1.2 (ref 1–2.5)
ALP BLD-CCNC: 68 U/L (ref 35–104)
ALT SERPL-CCNC: 14 U/L (ref 5–33)
AMORPHOUS: ABNORMAL
ANION GAP SERPL CALCULATED.3IONS-SCNC: 15 MMOL/L (ref 9–17)
AST SERPL-CCNC: 16 U/L
BACTERIA: ABNORMAL
BASOPHILS # BLD: 1 %
BASOPHILS ABSOLUTE: 0.1 K/UL (ref 0–0.2)
BILIRUB SERPL-MCNC: 0.36 MG/DL (ref 0.3–1.2)
BILIRUBIN URINE: ABNORMAL
BUN BLDV-MCNC: 16 MG/DL (ref 6–20)
BUN/CREAT BLD: ABNORMAL (ref 9–20)
CALCIUM SERPL-MCNC: 9.2 MG/DL (ref 8.6–10.4)
CASTS UA: ABNORMAL /LPF (ref 0–2)
CHLORIDE BLD-SCNC: 100 MMOL/L (ref 98–107)
CHOLESTEROL/HDL RATIO: 3.6
CHOLESTEROL: 157 MG/DL
CO2: 25 MMOL/L (ref 20–31)
COLOR: ABNORMAL
COMMENT UA: ABNORMAL
CREAT SERPL-MCNC: 0.83 MG/DL (ref 0.5–0.9)
CRYSTALS, UA: ABNORMAL /HPF
DIFFERENTIAL TYPE: ABNORMAL
EOSINOPHILS RELATIVE PERCENT: 4 %
EPITHELIAL CELLS UA: ABNORMAL /HPF (ref 0–5)
ESTIMATED AVERAGE GLUCOSE: 120 MG/DL
GFR AFRICAN AMERICAN: >60 ML/MIN
GFR NON-AFRICAN AMERICAN: >60 ML/MIN
GFR SERPL CREATININE-BSD FRML MDRD: ABNORMAL ML/MIN/{1.73_M2}
GFR SERPL CREATININE-BSD FRML MDRD: ABNORMAL ML/MIN/{1.73_M2}
GLUCOSE BLD-MCNC: 100 MG/DL (ref 70–99)
GLUCOSE URINE: NEGATIVE
HBA1C MFR BLD: 5.8 % (ref 4–6)
HCT VFR BLD CALC: 44.6 % (ref 36–46)
HDLC SERPL-MCNC: 44 MG/DL
HEMOGLOBIN: 14.9 G/DL (ref 12–16)
KETONES, URINE: ABNORMAL
LDL CHOLESTEROL: 84 MG/DL (ref 0–130)
LEUKOCYTE ESTERASE, URINE: ABNORMAL
LYMPHOCYTES # BLD: 38 %
MCH RBC QN AUTO: 28.6 PG (ref 26–34)
MCHC RBC AUTO-ENTMCNC: 33.5 G/DL (ref 31–37)
MCV RBC AUTO: 85.4 FL (ref 80–100)
MONOCYTES # BLD: 7 %
MUCUS: ABNORMAL
NITRITE, URINE: NEGATIVE
OTHER OBSERVATIONS UA: ABNORMAL
PDW BLD-RTO: 14.7 % (ref 12.5–15.4)
PH UA: 5.5 (ref 5–8)
PLATELET # BLD: 247 K/UL (ref 140–450)
PLATELET ESTIMATE: ABNORMAL
PMV BLD AUTO: 9.8 FL (ref 6–12)
POTASSIUM SERPL-SCNC: 4 MMOL/L (ref 3.7–5.3)
PROTEIN UA: ABNORMAL
RBC # BLD: 5.22 M/UL (ref 4–5.2)
RBC # BLD: ABNORMAL 10*6/UL
RBC UA: ABNORMAL /HPF (ref 0–2)
RENAL EPITHELIAL, UA: ABNORMAL /HPF
SEG NEUTROPHILS: 50 %
SEGMENTED NEUTROPHILS ABSOLUTE COUNT: 2.7 K/UL (ref 1.8–7.7)
SODIUM BLD-SCNC: 140 MMOL/L (ref 135–144)
SPECIFIC GRAVITY UA: 1.03 (ref 1–1.03)
TOTAL PROTEIN: 7.8 G/DL (ref 6.4–8.3)
TRICHOMONAS: ABNORMAL
TRIGL SERPL-MCNC: 145 MG/DL
TSH SERPL DL<=0.05 MIU/L-ACNC: 0.94 MIU/L (ref 0.3–5)
TURBIDITY: ABNORMAL
URINE HGB: NEGATIVE
UROBILINOGEN, URINE: NORMAL
VLDLC SERPL CALC-MCNC: NORMAL MG/DL (ref 1–30)
WBC # BLD: 5.5 K/UL (ref 3.5–11)
WBC # BLD: ABNORMAL 10*3/UL
WBC UA: ABNORMAL /HPF (ref 0–5)
YEAST: ABNORMAL

## 2017-07-19 PROCEDURE — 84443 ASSAY THYROID STIM HORMONE: CPT

## 2017-07-19 PROCEDURE — 80053 COMPREHEN METABOLIC PANEL: CPT

## 2017-07-19 PROCEDURE — 83036 HEMOGLOBIN GLYCOSYLATED A1C: CPT

## 2017-07-19 PROCEDURE — 81001 URINALYSIS AUTO W/SCOPE: CPT

## 2017-07-19 PROCEDURE — 85025 COMPLETE CBC W/AUTO DIFF WBC: CPT

## 2017-07-19 PROCEDURE — 80061 LIPID PANEL: CPT

## 2017-07-19 PROCEDURE — 36415 COLL VENOUS BLD VENIPUNCTURE: CPT

## 2017-07-25 ENCOUNTER — TELEPHONE (OUTPATIENT)
Dept: INTERNAL MEDICINE | Age: 57
End: 2017-07-25

## 2017-07-26 ENCOUNTER — NURSE ONLY (OUTPATIENT)
Dept: INTERNAL MEDICINE | Age: 57
End: 2017-07-26
Payer: MEDICARE

## 2017-07-26 DIAGNOSIS — R30.0 DYSURIA: Primary | ICD-10-CM

## 2017-07-26 DIAGNOSIS — R82.90 ABNORMAL URINALYSIS: Primary | ICD-10-CM

## 2017-07-26 LAB
BILIRUBIN, POC: NEGATIVE
BLOOD URINE, POC: ABNORMAL
CLARITY, POC: CLEAR
COLOR, POC: YELLOW
GLUCOSE URINE, POC: NEGATIVE
KETONES, POC: NEGATIVE
LEUKOCYTE EST, POC: NEGATIVE
NITRITE, POC: NEGATIVE
PH, POC: 5
PROTEIN, POC: NEGATIVE
SPECIFIC GRAVITY, POC: 1.01
UROBILINOGEN, POC: NEGATIVE

## 2017-07-26 PROCEDURE — 81002 URINALYSIS NONAUTO W/O SCOPE: CPT | Performed by: INTERNAL MEDICINE

## 2017-07-26 PROCEDURE — 99211 OFF/OP EST MAY X REQ PHY/QHP: CPT | Performed by: INTERNAL MEDICINE

## 2017-07-28 ENCOUNTER — TELEPHONE (OUTPATIENT)
Dept: INTERNAL MEDICINE | Age: 57
End: 2017-07-28

## 2017-07-28 ENCOUNTER — HOSPITAL ENCOUNTER (OUTPATIENT)
Dept: PAIN MANAGEMENT | Age: 57
Discharge: HOME | End: 2017-07-28
Payer: MEDICARE

## 2017-07-28 VITALS
RESPIRATION RATE: 20 BRPM | TEMPERATURE: 98 F | WEIGHT: 197 LBS | SYSTOLIC BLOOD PRESSURE: 124 MMHG | HEART RATE: 87 BPM | BODY MASS INDEX: 33.63 KG/M2 | DIASTOLIC BLOOD PRESSURE: 78 MMHG | HEIGHT: 64 IN

## 2017-07-28 DIAGNOSIS — M17.0 PRIMARY OSTEOARTHRITIS OF BOTH KNEES: ICD-10-CM

## 2017-07-28 DIAGNOSIS — M47.816 SPONDYLOSIS OF LUMBAR REGION WITHOUT MYELOPATHY OR RADICULOPATHY: Primary | ICD-10-CM

## 2017-07-28 DIAGNOSIS — Z51.81 MEDICATION MONITORING ENCOUNTER: ICD-10-CM

## 2017-07-28 PROCEDURE — 99214 OFFICE O/P EST MOD 30 MIN: CPT

## 2017-07-28 RX ORDER — MELOXICAM 15 MG/1
15 TABLET ORAL DAILY
Qty: 30 TABLET | Refills: 0 | Status: SHIPPED | OUTPATIENT
Start: 2017-07-28 | End: 2017-08-24 | Stop reason: SDUPTHER

## 2017-07-28 RX ORDER — HYDROCODONE BITARTRATE AND ACETAMINOPHEN 5; 325 MG/1; MG/1
1 TABLET ORAL EVERY 8 HOURS PRN
Qty: 90 TABLET | Refills: 0 | Status: SHIPPED | OUTPATIENT
Start: 2017-07-31 | End: 2017-08-24 | Stop reason: SDUPTHER

## 2017-07-28 ASSESSMENT — PAIN DESCRIPTION - FREQUENCY: FREQUENCY: CONTINUOUS

## 2017-07-28 ASSESSMENT — ENCOUNTER SYMPTOMS
BACK PAIN: 1
COUGH: 0
CONSTIPATION: 0
SHORTNESS OF BREATH: 0

## 2017-07-28 ASSESSMENT — PAIN DESCRIPTION - PROGRESSION: CLINICAL_PROGRESSION: NOT CHANGED

## 2017-07-28 ASSESSMENT — PAIN DESCRIPTION - ORIENTATION: ORIENTATION: RIGHT;LOWER

## 2017-07-28 ASSESSMENT — PAIN DESCRIPTION - DESCRIPTORS: DESCRIPTORS: ACHING;CONSTANT;BURNING

## 2017-07-28 ASSESSMENT — PAIN DESCRIPTION - PAIN TYPE: TYPE: CHRONIC PAIN

## 2017-07-28 ASSESSMENT — PAIN SCALES - GENERAL: PAINLEVEL_OUTOF10: 8

## 2017-07-28 ASSESSMENT — PAIN DESCRIPTION - ONSET: ONSET: ON-GOING

## 2017-07-28 ASSESSMENT — PAIN DESCRIPTION - LOCATION: LOCATION: BACK;KNEE

## 2017-08-08 RX ORDER — DIAPER,BRIEF,ADULT, DISPOSABLE
EACH MISCELLANEOUS
Qty: 50 STRIP | Refills: 11 | Status: SHIPPED | OUTPATIENT
Start: 2017-08-08 | End: 2017-10-02 | Stop reason: SDUPTHER

## 2017-08-24 ENCOUNTER — HOSPITAL ENCOUNTER (OUTPATIENT)
Dept: PAIN MANAGEMENT | Age: 57
Discharge: HOME | End: 2017-08-24
Payer: MEDICARE

## 2017-08-24 VITALS
HEIGHT: 64 IN | HEART RATE: 80 BPM | DIASTOLIC BLOOD PRESSURE: 85 MMHG | TEMPERATURE: 97.9 F | WEIGHT: 201 LBS | BODY MASS INDEX: 34.31 KG/M2 | RESPIRATION RATE: 18 BRPM | SYSTOLIC BLOOD PRESSURE: 153 MMHG

## 2017-08-24 DIAGNOSIS — G89.29 CHRONIC MIDLINE LOW BACK PAIN WITHOUT SCIATICA: ICD-10-CM

## 2017-08-24 DIAGNOSIS — M54.17 LUMBOSACRAL NEURITIS: ICD-10-CM

## 2017-08-24 DIAGNOSIS — M96.1 POSTLAMINECTOMY SYNDROME, UNSPECIFIED REGION: Primary | ICD-10-CM

## 2017-08-24 DIAGNOSIS — M17.0 PRIMARY OSTEOARTHRITIS OF BOTH KNEES: ICD-10-CM

## 2017-08-24 DIAGNOSIS — M54.50 CHRONIC MIDLINE LOW BACK PAIN WITHOUT SCIATICA: ICD-10-CM

## 2017-08-24 PROCEDURE — 99214 OFFICE O/P EST MOD 30 MIN: CPT

## 2017-08-24 RX ORDER — MELOXICAM 15 MG/1
15 TABLET ORAL DAILY
Qty: 30 TABLET | Refills: 0 | Status: SHIPPED | OUTPATIENT
Start: 2017-08-27 | End: 2017-09-22 | Stop reason: SDUPTHER

## 2017-08-24 RX ORDER — HYDROCODONE BITARTRATE AND ACETAMINOPHEN 5; 325 MG/1; MG/1
1 TABLET ORAL EVERY 8 HOURS PRN
Qty: 90 TABLET | Refills: 0 | Status: SHIPPED | OUTPATIENT
Start: 2017-08-30 | End: 2017-09-22 | Stop reason: SDUPTHER

## 2017-08-24 ASSESSMENT — PAIN SCALES - GENERAL: PAINLEVEL_OUTOF10: 8

## 2017-08-24 ASSESSMENT — ENCOUNTER SYMPTOMS
BACK PAIN: 1
UNUSUAL HAIR DISTRIBUTION: 0
HEMOPTYSIS: 0
SUSPICIOUS LESIONS: 0
BLURRED VISION: 0
WHEEZING: 0
EYE DISCHARGE: 0

## 2017-08-24 ASSESSMENT — PAIN DESCRIPTION - ORIENTATION: ORIENTATION: LOWER

## 2017-08-24 ASSESSMENT — PAIN DESCRIPTION - PAIN TYPE: TYPE: CHRONIC PAIN

## 2017-08-24 ASSESSMENT — PAIN DESCRIPTION - DIRECTION: RADIATING_TOWARDS: LOW BACK AND RIGHT KNEE

## 2017-08-24 ASSESSMENT — PAIN DESCRIPTION - FREQUENCY: FREQUENCY: CONTINUOUS

## 2017-08-24 ASSESSMENT — PAIN DESCRIPTION - PROGRESSION: CLINICAL_PROGRESSION: NOT CHANGED

## 2017-08-24 ASSESSMENT — PAIN DESCRIPTION - LOCATION: LOCATION: BACK;KNEE

## 2017-08-24 ASSESSMENT — PAIN DESCRIPTION - ONSET: ONSET: ON-GOING

## 2017-08-24 ASSESSMENT — PAIN DESCRIPTION - DESCRIPTORS: DESCRIPTORS: ACHING;CONSTANT;BURNING

## 2017-08-31 DIAGNOSIS — M47.816 SPONDYLOSIS OF LUMBAR REGION WITHOUT MYELOPATHY OR RADICULOPATHY: ICD-10-CM

## 2017-08-31 RX ORDER — TIZANIDINE 4 MG/1
TABLET ORAL
Qty: 60 TABLET | Refills: 3 | Status: SHIPPED | OUTPATIENT
Start: 2017-08-31 | End: 2018-02-17 | Stop reason: SDUPTHER

## 2017-09-06 DIAGNOSIS — J43.2 CENTRILOBULAR EMPHYSEMA (HCC): ICD-10-CM

## 2017-09-06 DIAGNOSIS — J44.1 CHRONIC OBSTRUCTIVE PULMONARY DISEASE WITH ACUTE EXACERBATION (HCC): ICD-10-CM

## 2017-09-06 RX ORDER — FLUTICASONE PROPIONATE AND SALMETEROL XINAFOATE 115; 21 UG/1; UG/1
AEROSOL, METERED RESPIRATORY (INHALATION)
Qty: 12 G | Refills: 11 | Status: SHIPPED | OUTPATIENT
Start: 2017-09-06 | End: 2018-06-28 | Stop reason: ALTCHOICE

## 2017-09-06 RX ORDER — TIOTROPIUM BROMIDE 18 UG/1
CAPSULE ORAL; RESPIRATORY (INHALATION)
Qty: 30 CAPSULE | Refills: 11 | Status: SHIPPED | OUTPATIENT
Start: 2017-09-06 | End: 2018-01-05 | Stop reason: SDUPTHER

## 2017-09-22 ENCOUNTER — HOSPITAL ENCOUNTER (OUTPATIENT)
Dept: PAIN MANAGEMENT | Age: 57
Discharge: HOME | End: 2017-09-22
Payer: MEDICARE

## 2017-09-22 VITALS
SYSTOLIC BLOOD PRESSURE: 115 MMHG | RESPIRATION RATE: 16 BRPM | DIASTOLIC BLOOD PRESSURE: 70 MMHG | HEART RATE: 91 BPM | TEMPERATURE: 98.3 F

## 2017-09-22 DIAGNOSIS — M17.0 PRIMARY OSTEOARTHRITIS OF BOTH KNEES: ICD-10-CM

## 2017-09-22 DIAGNOSIS — M47.26 OSTEOARTHRITIS OF SPINE WITH RADICULOPATHY, LUMBAR REGION: Primary | ICD-10-CM

## 2017-09-22 PROCEDURE — 80307 DRUG TEST PRSMV CHEM ANLYZR: CPT

## 2017-09-22 PROCEDURE — 99213 OFFICE O/P EST LOW 20 MIN: CPT

## 2017-09-22 PROCEDURE — 99214 OFFICE O/P EST MOD 30 MIN: CPT

## 2017-09-22 PROCEDURE — 99214 OFFICE O/P EST MOD 30 MIN: CPT | Performed by: NURSE PRACTITIONER

## 2017-09-22 RX ORDER — MELOXICAM 15 MG/1
15 TABLET ORAL DAILY
Qty: 30 TABLET | Refills: 0 | Status: SHIPPED | OUTPATIENT
Start: 2017-09-30 | End: 2017-10-02 | Stop reason: SDUPTHER

## 2017-09-22 RX ORDER — HYDROCODONE BITARTRATE AND ACETAMINOPHEN 5; 325 MG/1; MG/1
1 TABLET ORAL EVERY 8 HOURS PRN
Qty: 90 TABLET | Refills: 0 | Status: SHIPPED | OUTPATIENT
Start: 2017-09-30 | End: 2017-10-20 | Stop reason: SDUPTHER

## 2017-09-22 ASSESSMENT — PAIN DESCRIPTION - PAIN TYPE: TYPE: CHRONIC PAIN

## 2017-09-22 ASSESSMENT — PAIN DESCRIPTION - LOCATION: LOCATION: BACK

## 2017-09-22 ASSESSMENT — PAIN DESCRIPTION - DESCRIPTORS: DESCRIPTORS: ACHING;CONSTANT;BURNING

## 2017-09-22 ASSESSMENT — ENCOUNTER SYMPTOMS
COUGH: 0
SHORTNESS OF BREATH: 0
BACK PAIN: 1
BOWEL INCONTINENCE: 0
CONSTIPATION: 0

## 2017-09-22 ASSESSMENT — PAIN DESCRIPTION - FREQUENCY: FREQUENCY: CONTINUOUS

## 2017-09-22 ASSESSMENT — PAIN DESCRIPTION - ONSET: ONSET: ON-GOING

## 2017-09-22 ASSESSMENT — PAIN SCALES - GENERAL: PAINLEVEL_OUTOF10: 8

## 2017-09-22 ASSESSMENT — PAIN DESCRIPTION - ORIENTATION: ORIENTATION: LOWER;RIGHT

## 2017-09-22 ASSESSMENT — PAIN DESCRIPTION - PROGRESSION: CLINICAL_PROGRESSION: NOT CHANGED

## 2017-09-25 LAB
6-ACETYLMORPHINE, UR: NOT DETECTED
7-AMINOCLONAZEPAM, URINE: NOT DETECTED
ALPHA-OH-ALPRAZ, URINE: NOT DETECTED
ALPRAZOLAM, URINE: NOT DETECTED
AMPHETAMINES, URINE: NOT DETECTED
BARBITURATES, URINE: NOT DETECTED
BENZOYLECGONINE, UR: NOT DETECTED
BUPRENORPHINE URINE: NOT DETECTED
CARISOPRODOL, UR: NOT DETECTED
CLONAZEPAM, URINE: NOT DETECTED
CODEINE, URINE: NOT DETECTED
CREATININE URINE: 153.5 MG/DL (ref 20–400)
DIAZEPAM, URINE: NOT DETECTED
EER PAIN MGT DRUG PANEL, HIGH RES/EMIT U: NORMAL
ETHYL GLUCURONIDE UR: NOT DETECTED
FENTANYL URINE: NOT DETECTED
HYDROCODONE, URINE: PRESENT
HYDROMORPHONE, URINE: NOT DETECTED
LORAZEPAM, URINE: NOT DETECTED
MARIJUANA METAB, UR: NOT DETECTED
MDA, UR: NOT DETECTED
MDEA, EVE, UR: NOT DETECTED
MDMA URINE: NOT DETECTED
MEPERIDINE METAB, UR: NOT DETECTED
METHADONE, URINE: NOT DETECTED
METHAMPHETAMINE, URINE: NOT DETECTED
METHYLPHENIDATE: NOT DETECTED
MIDAZOLAM, URINE: NOT DETECTED
MORPHINE URINE: NOT DETECTED
NORBUPRENORPHINE, URINE: NOT DETECTED
NORDIAZEPAM, URINE: NOT DETECTED
NORFENTANYL, URINE: NOT DETECTED
NORHYDROCODONE, URINE: PRESENT
NOROXYCODONE, URINE: NOT DETECTED
NOROXYMORPHONE, URINE: NOT DETECTED
OXAZEPAM, URINE: NOT DETECTED
OXYCODONE URINE: NOT DETECTED
OXYMORPHONE, URINE: NOT DETECTED
PAIN MGT DRUG PANEL, HI RES, UR: NORMAL
PCP,URINE: NOT DETECTED
PHENTERMINE, UR: NOT DETECTED
PROPOXYPHENE, URINE: NOT DETECTED
TAPENTADOL, URINE: NOT DETECTED
TAPENTADOL-O-SULFATE, URINE: NOT DETECTED
TEMAZEPAM, URINE: NOT DETECTED
TRAMADOL, URINE: NOT DETECTED
ZOLPIDEM, URINE: NOT DETECTED

## 2017-10-02 ENCOUNTER — OFFICE VISIT (OUTPATIENT)
Dept: INTERNAL MEDICINE | Age: 57
End: 2017-10-02
Payer: MEDICARE

## 2017-10-02 VITALS
SYSTOLIC BLOOD PRESSURE: 114 MMHG | HEART RATE: 89 BPM | WEIGHT: 199 LBS | BODY MASS INDEX: 34.16 KG/M2 | DIASTOLIC BLOOD PRESSURE: 72 MMHG

## 2017-10-02 DIAGNOSIS — Z01.419 WELL WOMAN EXAM WITH ROUTINE GYNECOLOGICAL EXAM: Primary | ICD-10-CM

## 2017-10-02 DIAGNOSIS — M17.0 PRIMARY OSTEOARTHRITIS OF BOTH KNEES: ICD-10-CM

## 2017-10-02 DIAGNOSIS — E11.9 TYPE 2 DIABETES MELLITUS WITHOUT COMPLICATION, WITHOUT LONG-TERM CURRENT USE OF INSULIN (HCC): ICD-10-CM

## 2017-10-02 DIAGNOSIS — Z12.39 BREAST CANCER SCREENING: ICD-10-CM

## 2017-10-02 DIAGNOSIS — Z23 NEEDS FLU SHOT: ICD-10-CM

## 2017-10-02 DIAGNOSIS — I10 ESSENTIAL HYPERTENSION: ICD-10-CM

## 2017-10-02 DIAGNOSIS — J30.9 CHRONIC ALLERGIC RHINITIS: ICD-10-CM

## 2017-10-02 DIAGNOSIS — J43.8 OTHER EMPHYSEMA (HCC): ICD-10-CM

## 2017-10-02 DIAGNOSIS — E78.00 PURE HYPERCHOLESTEROLEMIA: ICD-10-CM

## 2017-10-02 DIAGNOSIS — N32.81 OAB (OVERACTIVE BLADDER): ICD-10-CM

## 2017-10-02 PROCEDURE — 90688 IIV4 VACCINE SPLT 0.5 ML IM: CPT | Performed by: INTERNAL MEDICINE

## 2017-10-02 PROCEDURE — G0438 PPPS, INITIAL VISIT: HCPCS | Performed by: INTERNAL MEDICINE

## 2017-10-02 PROCEDURE — G0008 ADMIN INFLUENZA VIRUS VAC: HCPCS | Performed by: INTERNAL MEDICINE

## 2017-10-02 RX ORDER — DULOXETIN HYDROCHLORIDE 60 MG/1
60 CAPSULE, DELAYED RELEASE ORAL DAILY
Qty: 30 CAPSULE | Refills: 3 | Status: SHIPPED | OUTPATIENT
Start: 2017-10-02 | End: 2018-02-01 | Stop reason: SDUPTHER

## 2017-10-02 RX ORDER — ALBUTEROL SULFATE 90 UG/1
2 AEROSOL, METERED RESPIRATORY (INHALATION) EVERY 6 HOURS PRN
Qty: 1 INHALER | Refills: 3 | Status: SHIPPED | OUTPATIENT
Start: 2017-10-02 | End: 2018-03-16 | Stop reason: SDUPTHER

## 2017-10-02 RX ORDER — MELOXICAM 15 MG/1
15 TABLET ORAL DAILY
Qty: 30 TABLET | Refills: 3 | Status: SHIPPED | OUTPATIENT
Start: 2017-10-02 | End: 2017-10-20 | Stop reason: SDUPTHER

## 2017-10-02 RX ORDER — AMLODIPINE BESYLATE 10 MG/1
10 TABLET ORAL DAILY
Qty: 90 TABLET | Refills: 3 | Status: SHIPPED | OUTPATIENT
Start: 2017-10-02 | End: 2018-03-16 | Stop reason: SDUPTHER

## 2017-10-02 RX ORDER — ATORVASTATIN CALCIUM 40 MG/1
40 TABLET, FILM COATED ORAL DAILY
Qty: 90 TABLET | Refills: 1 | Status: SHIPPED | OUTPATIENT
Start: 2017-10-02 | End: 2018-03-16 | Stop reason: SDUPTHER

## 2017-10-02 RX ORDER — LISINOPRIL AND HYDROCHLOROTHIAZIDE 25; 20 MG/1; MG/1
TABLET ORAL
Qty: 90 TABLET | Refills: 3 | Status: SHIPPED | OUTPATIENT
Start: 2017-10-02 | End: 2018-01-05 | Stop reason: SDUPTHER

## 2017-10-02 RX ORDER — MIRTAZAPINE 45 MG/1
45 TABLET, FILM COATED ORAL NIGHTLY
Qty: 30 TABLET | Refills: 3 | Status: SHIPPED | OUTPATIENT
Start: 2017-10-02 | End: 2018-02-01 | Stop reason: SDUPTHER

## 2017-10-02 RX ORDER — CETIRIZINE HYDROCHLORIDE 10 MG/1
TABLET ORAL
Qty: 30 TABLET | Refills: 3 | Status: SHIPPED | OUTPATIENT
Start: 2017-10-02 | End: 2017-11-27

## 2017-10-02 RX ORDER — NICOTINE 21 MG/24HR
1 PATCH, TRANSDERMAL 24 HOURS TRANSDERMAL EVERY 24 HOURS
Qty: 30 PATCH | Refills: 3 | Status: CANCELLED | OUTPATIENT
Start: 2017-10-02 | End: 2018-10-02

## 2017-10-02 RX ORDER — SOLIFENACIN SUCCINATE 5 MG/1
5 TABLET, FILM COATED ORAL DAILY
Qty: 30 TABLET | Refills: 3 | Status: SHIPPED | OUTPATIENT
Start: 2017-10-02 | End: 2018-05-24 | Stop reason: SDUPTHER

## 2017-10-02 ASSESSMENT — ENCOUNTER SYMPTOMS
BACK PAIN: 1
SPUTUM PRODUCTION: 1
COUGH: 1

## 2017-10-02 NOTE — PATIENT INSTRUCTIONS
-Follow-up appointment scheduled for 1/5/18, AVS given to patient.     It is very important that you keep your appointments, please contact us if you are unable to keep your scheduled appt     -Order for mammogram faxed to scheduling, they will contact patient with an appt original order given to patient along with scheduling phone number   -PAP Specimen sent to lab----Gisele

## 2017-10-02 NOTE — MR AVS SNAPSHOT
estimate of body fat, calculated from your height and weight. The higher your BMI, the greater your risk of heart disease, high blood pressure, type 2 diabetes, stroke, gallstones, arthritis, sleep apnea, and certain cancers. BMI is not perfect. It may overestimate body fat in athletes and people who are more muscular. Even a small weight loss (between 5 and 10 percent of your current weight) by decreasing your calorie intake and becoming more physically active will help lower your risk of developing or worsening diseases associated with obesity. Learn more at: [employer]          Instructions    -Follow-up appointment scheduled for 1/5/18, AVS given to patient. It is very important that you keep your appointments, please contact us if you are unable to keep your scheduled appt     -Order for mammogram faxed to scheduling, they will contact patient with an appt original order given to patient along with scheduling phone number   -PAP Specimen sent to lab----Gisele              Today's Medication Changes          These changes are accurate as of: 10/2/17 10:28 AM.  If you have any questions, ask your nurse or doctor. START taking these medications           solifenacin 5 MG tablet   Commonly known as:  VESICARE   Instructions: Take 1 tablet by mouth daily   Quantity:  30 tablet   Refills:  3         CHANGE how you take these medications           cetirizine 10 MG tablet   Commonly known as:  ZYRTEC   Instructions:  TAKE 1 TABLET BY MOUTH DAILY   Quantity:  30 tablet   Refills:  3   What changed:  See the new instructions. glucose blood VI test strips strip   Commonly known as:  TRUETRACK TEST   Instructions:  USE 1 DAILY NON-INSULIN DEPENDENT   Quantity:  50 strip   Refills:  11   What changed:  See the new instructions.          STOP taking these medications           ammonium lactate 12 % cream   Commonly known as:  AMLACTIN DAILY USING HANDIHALER DEVICE    tiZANidine (ZANAFLEX) 4 MG tablet TAKE 1 TABLET TWO TIMES DAILY    Lancets MISC 1 each by Does not apply route daily    omeprazole (PRILOSEC) 20 MG delayed release capsule Take 1 capsule by mouth daily    gabapentin (NEURONTIN) 300 MG capsule TAKE 1 CAPSULE in morning and afternoon and 2 at night     MG capsule TAKE 1 CAPSULE EVERY DAY    diphenhydrAMINE (BENADRYL) 25 MG tablet Take 25 mg by mouth every 6 hours as needed for Itching    Alcohol Swabs (EASY TOUCH ALCOHOL PREP MEDIUM) 70 % PADS     azelastine (ASTELIN) 0.1 % nasal spray 2 sprays by Nasal route 2 times daily Use in each nostril as directed    ipratropium-albuterol (DUONEB) 0.5-2.5 (3) MG/3ML SOLN nebulizer solution Inhale 3 mLs into the lungs every 6 hours as needed for Shortness of Breath      Allergies              Aspirin     Claritin [Loratadine] Other (See Comments)    coughing    Flonase [Fluticasone Propionate]       We Ordered/Performed the following           INFLUENZA, QUADV, 6 MO AND OLDER, IM, MDV, 0.5ML (FLULAVAL QUADV)     PAP Smear     Comments:    Patient History:    Patient's last menstrual period was 04/19/2003.   OBGYN Status: Postmenopausal  Past Surgical History:  2/12/2009: COLONOSCOPY      Comment: normal  No date: DILATION AND CURETTAGE OF UTERUS  No date: GASTRECTOMY      Comment: partial  4/30/13: NERVE BLOCK Right      Comment: Lumbar Diagnostic Block,  Kenalog 40 mg  5/23/13: NERVE BLOCK      Comment: Lumbar Radiofrequency, Kenalog 40mg  8/12/13: NERVE BLOCK      Comment: Lt MBNB  celestone 6mg  8-28-13: NERVE BLOCK Left      Comment: left lumbar diagnostic block #2 decadron 10 mg  9-24-13: NERVE BLOCK Left      Comment: left lumbar median branch radiofrequency  07-02-14: NERVE BLOCK      Comment: caudal, celestone 9 mg  7-16-14: NERVE BLOCK      Comment: caudal epidural #2, celestone 9mg, fentanyl                25mcg  7/30/14: NERVE BLOCK      Comment: caudal #3 decadron 10mg 11-6-14: NERVE BLOCK      Comment: duramorph epidural steroid block  duramorph 1                mg celestone 9 mg  11/20/15: NERVE BLOCK      Comment: TENS- Empi Select  3/24/2017: WA KNEE SCOPE,DIAGNOSTIC Right      Comment: KNEE ARTHROSCOPY WITH PARTIAL MEDIAL MENISECAL               DEBRIDMENT  [facility]  01/2015: [address]: lumbar diskectomy  9 20 2007: UPPER GASTROINTESTINAL ENDOSCOPY  4 21 2009,04/2011: UPPER GASTROINTESTINAL ENDOSCOPY      Comment: gastritis, esophagitis      Smoking status: Current Every Day Smoker                                                   Packs/day: 0.25      Years: 36.00        Types: Cigarettes     Last attempt to quit: 8/2/2015  Smokeless status: Never Used                      Comment: pt currently using nicotine patches      WA ADMIN INFLUENZA VIRUS VAC          Additional Information        Basic Information     Date Of Birth Sex Race Ethnicity Preferred Language    1960 Female Black Non-/Non  English      Problem List as of 10/2/2017  Date Reviewed: 9/22/2017                DM (diabetes mellitus)    COPD (chronic obstructive pulmonary disease)    HTN (hypertension)    DJD (degenerative joint disease) of knee    Osteoarthritis of spine with radiculopathy, lumbar region    GERD (gastroesophageal reflux disease)    Allergic rhinitis    Lipoma of shoulder s/p excision right posterior 11 17 2008    Primary osteoarthritis of both knees    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      Your Goals as of 10/2/2017 at 10:28 AM                 General    Care Coordination Self Management     Notes    CC Self Management Goal  Patient Goal (What steps will patient take to achieve goal?): medications as prescribed- montior blood sugar at least daily  Patient is able to discuss self-management of condition(s):  Pt demonstrates adherence to medications  Pt demonstrates understanding of self-monitoring Patient is able to identify Red Flags:  Alert to potential adverse drug reactions(s) or side effects and actions to take should they arise  Discuss target symptoms and actions to take should they arise  Identify problems that require immediate PCP or specialist visit  Patient demonstrates understanding of access to PCP/Specialist:  Understands about scheduling routine Follow Up appointments   Understands about sick day appointment options for worsening of symptoms/progression (Same Day, E Visits)         Lifestyle    Stop Cigarette/Tobacco use       Immunizations as of 10/2/2017     Name Date    Influenza, Bienvenido Buttery, 3 Years and older, IM 10/6/2016    Influenza, Quadv, 6 mo and older, IM (FLULAVAL QUADV) 10/2/2017    Pneumococcal Polysaccharide ([id number]) 1/26/2016      Preventive Care        Date Due    Pap Smear 4/19/2016    Eye Exam By An Eye Doctor 9/26/2017    Urine Check For Kidney Problems 10/6/2017    Tetanus Combination Vaccine (1 - Tdap) 12/14/2017 (Originally 7/8/1977)    Diabetic Foot Exam 5/1/2018    Hemoglobin A1C (Test For Long-Term Glucose Control) 7/19/2018    Cholesterol Screening 7/19/2018    Mammograms are recommended every 2 years for low/average risk patients aged 48 - 69, and every year for high risk patients per updated national guidelines. However these guidelines can be individualized by your provider. 9/19/2018    Colonoscopy 2/12/2019            MyChart Signup           Our records indicate that you have declined MyChart signup.

## 2017-10-02 NOTE — PROGRESS NOTES
Visit Information    Have you changed or started any medications since your last visit including any over-the-counter medicines, vitamins, or herbal medicines? no   Are you having any side effects from any of your medications? -  no  Have you stopped taking any of your medications? Is so, why? -  no    Have you seen any other physician or provider since your last visit? No  Have you had any other diagnostic tests since your last visit? No  Have you been seen in the emergency room and/or had an admission to a hospital since we last saw you? No  Have you had your routine dental cleaning in the past 6 months? yes -     Have you activated your [employer] account? If not, what are your barriers?  No: declined      Patient Care Team:  Brittney Almeida MD as PCP - General (Internal Medicine)  Gia Cisneros DPM as Consulting Physician (Podiatry)  Umang Roberto ([address])  Herrera Navarro MD as Consulting Physician (Orthopedic Surgery)  Sue Zepeda MD as Anesthesiologist (Pain Management)  Ruth Runner, OD (Optometry)    Medical History Review  Past Medical, Family, and Social History reviewed and does contribute to the patient presenting condition    Health Maintenance   Topic Date Due    Cervical cancer screen  04/19/2016    Flu vaccine (1) 09/01/2017    Diabetic retinal exam  09/26/2017    Diabetic microalbuminuria test  10/06/2017    DTaP/Tdap/Td vaccine (1 - Tdap) 12/14/2017 (Originally 7/8/1977)    Diabetic foot exam  05/01/2018    Diabetic hemoglobin A1C test  07/19/2018    Lipid screen  07/19/2018    Breast cancer screen  09/19/2018    Colon cancer screen colonoscopy  02/12/2019    Pneumococcal med risk  Completed    Hepatitis C screen  Completed    HIV screen  Completed

## 2017-10-02 NOTE — PROGRESS NOTES
depression. Negative for substance abuse and suicidal ideas. The patient is not nervous/anxious and does not have insomnia. PHYSICAL EXAM:      Vitals:    10/02/17 0943   BP: 114/72   Site: Right Arm   Position: Sitting   Cuff Size: Large Adult   Pulse: 89   Weight: 199 lb (90.3 kg)     Body mass index is 34.16 kg/(m^2). BP Readings from Last 3 Encounters:   10/02/17 114/72   09/22/17 115/70   08/24/17 (!) 153/85        Wt Readings from Last 3 Encounters:   10/02/17 199 lb (90.3 kg)   08/24/17 201 lb (91.2 kg)   07/28/17 197 lb (89.4 kg)       Physical Exam   Constitutional: She is oriented to person, place, and time and well-developed, well-nourished, and in no distress. No distress. HENT:   Head: Normocephalic and atraumatic. Mouth/Throat: Oropharynx is clear and moist.   Eyes: Conjunctivae and EOM are normal. Pupils are equal, round, and reactive to light. No scleral icterus. Neck: Normal range of motion. Neck supple. No thyromegaly present. Cardiovascular: Normal rate, regular rhythm and normal heart sounds. Pulmonary/Chest: Effort normal and breath sounds normal. She has no wheezes. Abdominal: Soft. Bowel sounds are normal. There is no tenderness. Genitourinary: Vagina normal, cervix normal, right adnexa normal, left adnexa normal and vulva normal. Cervix exhibits no tenderness. No vaginal discharge found. Musculoskeletal: She exhibits no edema or tenderness. Neurological: She is alert and oriented to person, place, and time. Skin: She is not diaphoretic. Nursing note and vitals reviewed.             LABORATORY FINDINGS:    CBC:  Lab Results   Component Value Date    WBC 5.5 07/19/2017    HGB 14.9 07/19/2017     07/19/2017     02/16/2012     BMP:    Lab Results   Component Value Date     07/19/2017    K 4.0 07/19/2017     07/19/2017    CO2 25 07/19/2017    BUN 16 07/19/2017    CREATININE 0.83 07/19/2017    GLUCOSE 100 07/19/2017     HEMOGLOBIN A1C: Lab Results   Component Value Date    LABA1C 5.8 07/19/2017     MICROALBUMIN URINE:   Lab Results   Component Value Date    MICROALBUR 14 10/06/2016     FASTING LIPID PANEL:  Lab Results   Component Value Date    CHOL 157 07/19/2017    HDL 44 07/19/2017    TRIG 145 07/19/2017     Lab Results   Component Value Date    LDLCHOLESTEROL 84 07/19/2017       LIVER PROFILE:  Lab Results   Component Value Date    ALT 14 07/19/2017    AST 16 07/19/2017    PROT 7.8 07/19/2017    BILITOT 0.36 07/19/2017    BILIDIR <0.20 12/23/2014    LABALBU 4.3 07/19/2017      THYROID FUNCTION:   Lab Results   Component Value Date    TSH 0.94 07/19/2017      URINE ANALYSIS: No results found for: LABURIN  ASSESSMENT AND PLAN:    1. Well woman exam with routine gynecological exam    - PAP Smear  - GE Screen Routine; Future    2. Primary osteoarthritis of both knees    - meloxicam (MOBIC) 15 MG tablet; Take 1 tablet by mouth daily  Dispense: 30 tablet; Refill: 3    3. Type 2 diabetes mellitus without complication, without long-term current use of insulin (HCC)    - metFORMIN (GLUCOPHAGE) 500 MG tablet; Take 1 tablet by mouth 2 times daily (with meals)  Dispense: 180 tablet; Refill: 1  - atorvastatin (LIPITOR) 40 MG tablet; Take 1 tablet by mouth daily  Dispense: 90 tablet; Refill: 1    4. Essential hypertension    - amLODIPine (NORVASC) 10 MG tablet; Take 1 tablet by mouth daily  Dispense: 90 tablet; Refill: 3  - lisinopril-hydrochlorothiazide (PRINZIDE;ZESTORETIC) 20-25 MG per tablet; TAKE 1 TABLET EVERY DAY  Dispense: 90 tablet; Refill: 3    5. Pure hypercholesterolemia    - atorvastatin (LIPITOR) 40 MG tablet; Take 1 tablet by mouth daily  Dispense: 90 tablet; Refill: 1    6. Other emphysema (HCC)    - albuterol sulfate HFA (VENTOLIN HFA) 108 (90 Base) MCG/ACT inhaler; Inhale 2 puffs into the lungs every 6 hours as needed for Wheezing  Dispense: 1 Inhaler; Refill: 3    7.  OAB (overactive bladder)    - solifenacin (VESICARE) 5 MG tablet;

## 2017-10-06 ENCOUNTER — TELEPHONE (OUTPATIENT)
Dept: INTERNAL MEDICINE | Age: 57
End: 2017-10-06

## 2017-10-06 NOTE — TELEPHONE ENCOUNTER
[address] calling wanting clarification on cymbalta dosage and direction / saul advise roland pharmacy tech

## 2017-10-08 ASSESSMENT — ENCOUNTER SYMPTOMS
BLURRED VISION: 0
CONSTIPATION: 0
BLOOD IN STOOL: 0
ABDOMINAL PAIN: 0
EYE PAIN: 0
SHORTNESS OF BREATH: 0
NAUSEA: 0
PHOTOPHOBIA: 0
SINUS PAIN: 0
SORE THROAT: 0
WHEEZING: 0

## 2017-10-10 ENCOUNTER — TELEPHONE (OUTPATIENT)
Dept: INTERNAL MEDICINE | Age: 57
End: 2017-10-10

## 2017-10-10 RX ORDER — AZITHROMYCIN 250 MG/1
TABLET, FILM COATED ORAL
Qty: 1 PACKET | Refills: 0 | Status: SHIPPED | OUTPATIENT
Start: 2017-10-10 | End: 2017-10-20

## 2017-10-20 ENCOUNTER — HOSPITAL ENCOUNTER (OUTPATIENT)
Dept: PAIN MANAGEMENT | Age: 57
Discharge: HOME | End: 2017-10-20
Payer: MEDICARE

## 2017-10-20 VITALS — HEART RATE: 93 BPM | TEMPERATURE: 98.1 F

## 2017-10-20 DIAGNOSIS — M17.0 PRIMARY OSTEOARTHRITIS OF BOTH KNEES: ICD-10-CM

## 2017-10-20 DIAGNOSIS — M47.26 OSTEOARTHRITIS OF SPINE WITH RADICULOPATHY, LUMBAR REGION: Primary | ICD-10-CM

## 2017-10-20 PROCEDURE — 99213 OFFICE O/P EST LOW 20 MIN: CPT | Performed by: NURSE PRACTITIONER

## 2017-10-20 PROCEDURE — 99214 OFFICE O/P EST MOD 30 MIN: CPT

## 2017-10-20 RX ORDER — HYDROCODONE BITARTRATE AND ACETAMINOPHEN 5; 325 MG/1; MG/1
1 TABLET ORAL EVERY 8 HOURS PRN
Qty: 90 TABLET | Refills: 0 | Status: SHIPPED | OUTPATIENT
Start: 2017-10-30 | End: 2017-11-28 | Stop reason: SDUPTHER

## 2017-10-20 RX ORDER — MELOXICAM 15 MG/1
15 TABLET ORAL DAILY
Qty: 30 TABLET | Refills: 3 | Status: SHIPPED | OUTPATIENT
Start: 2017-10-30 | End: 2017-11-28 | Stop reason: SDUPTHER

## 2017-10-20 ASSESSMENT — PAIN DESCRIPTION - PAIN TYPE: TYPE: CHRONIC PAIN

## 2017-10-20 ASSESSMENT — ENCOUNTER SYMPTOMS
COUGH: 0
CONSTIPATION: 0
BOWEL INCONTINENCE: 0
BACK PAIN: 1
SHORTNESS OF BREATH: 0

## 2017-10-20 ASSESSMENT — PAIN SCALES - GENERAL: PAINLEVEL_OUTOF10: 8

## 2017-10-20 ASSESSMENT — PAIN DESCRIPTION - PROGRESSION: CLINICAL_PROGRESSION: NOT CHANGED

## 2017-10-20 ASSESSMENT — PAIN DESCRIPTION - ORIENTATION: ORIENTATION: RIGHT

## 2017-10-20 ASSESSMENT — PAIN DESCRIPTION - LOCATION: LOCATION: BACK

## 2017-10-20 ASSESSMENT — PAIN DESCRIPTION - ONSET: ONSET: ON-GOING

## 2017-10-20 ASSESSMENT — PAIN DESCRIPTION - FREQUENCY: FREQUENCY: CONTINUOUS

## 2017-10-20 ASSESSMENT — PAIN DESCRIPTION - DESCRIPTORS: DESCRIPTORS: ACHING;CONSTANT;SHOOTING;SHARP

## 2017-10-20 NOTE — PROGRESS NOTES
Patient denies any new neurological symptoms. No bowel or bladder incontinence, no weakness, and no falling. Pill count: appointment    Morphine equivalent: 15    Controlled Substances Monitoring:     Attestation: The Prescription Monitoring Report for this patient was reviewed today.  (Oscar Matias CNP)  Documentation: Possible medication side effects, risk of tolerance and/or dependence, and alternative treatments discussed., No signs of potential drug abuse or diversion identified., Existing medication contract. Oscar Matias CNP)    Past Medical History:   Diagnosis Date    Allergic rhinitis     Arthropathy, unspecified, other specified sites 4/30/2013    Bronchitis     Chronic back pain     [facility]     Depression     DM (diabetes mellitus) ([facility] 12/18/2012    [facility]     GERD (gastroesophageal reflux disease)     Hyperlipidemia     Hypertension     Knee pain     right knee mostly    Leg pain, right     Obesity     Osteoarthritis     Peripheral vascular disease (HCC)     Radicular pain of lumbosacral region     Spinal stenosis, lumbar region, without neurogenic claudication 4/30/2013    Type II or unspecified type diabetes mellitus without mention of complication, not stated as uncontrolled     Unspecified sleep apnea     URI (upper respiratory infection)        Past Surgical History:   Procedure Laterality Date    COLONOSCOPY  2/12/2009    normal    DILATION AND CURETTAGE OF UTERUS      GASTRECTOMY      partial    NERVE BLOCK Right 4/30/13    Lumbar Diagnostic Block,  Kenalog 40 mg    NERVE BLOCK  5/23/13    Lumbar Radiofrequency, Kenalog 40mg    NERVE BLOCK  8/12/13    Lt MBNB  celestone 6mg    NERVE BLOCK Left 8-28-13    left lumbar diagnostic block #2 decadron 10 mg    NERVE BLOCK Left 9-24-13    left lumbar median branch radiofrequency    NERVE BLOCK  07-02-14    caudal, celestone 9 mg    Spont Abortions Neg Hx     Stroke Neg Hx        Social History     Social History    Marital status: Single     Spouse name: N/A    Number of children: N/A    Years of education: N/A     Occupational History    Not on file. Social History Main Topics    Smoking status: Current Every Day Smoker     Packs/day: 0.25     Years: 36.00     Types: Cigarettes     Last attempt to quit: 8/2/2015    Smokeless tobacco: Never Used      Comment: pt currently using nicotine patches    Alcohol use No    Drug use: No      Comment: 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 7 yrs    Sexual activity: No     Other Topics Concern    Not on file     Social History Narrative    No narrative on file       Review of Systems:  Review of Systems   Constitution: Negative for chills and fever. Cardiovascular: Negative for chest pain and irregular heartbeat. Respiratory: Negative for cough and shortness of breath. Musculoskeletal: Positive for back pain. Negative for falls. Gastrointestinal: Negative for bowel incontinence and constipation. Genitourinary: Negative for bladder incontinence. Neurological: Negative for disturbances in coordination, loss of balance, numbness and tingling. Physical Exam:  Pulse 93   Temp 98.1 °F (36.7 °C)   LMP 04/19/2003 /80    Physical Exam   Constitutional: She is oriented to person, place, and time and well-developed, well-nourished, and in no distress. HENT:   Head: Normocephalic. Eyes: EOM are normal.   Neck: Normal range of motion. Pulmonary/Chest: Effort normal.   Musculoskeletal:        Right knee: She exhibits decreased range of motion. Tenderness found. Left knee: She exhibits decreased range of motion. Tenderness found. Lumbar back: She exhibits decreased range of motion, tenderness and pain. Neurological: She is alert and oriented to person, place, and time. Skin: Skin is warm and dry.    Psychiatric: Affect normal. Medications    meloxicam (MOBIC) 15 MG tablet (Start on 10/30/2017)      Other Visit Diagnoses    None. Treatment Plan:  DISCUSSION: Treatment options discussed with patient and all questions answered to patient's satisfaction. .  TREATMENT OPTIONS:   Continue PT  Continue current medication  Contract compliance requirements are met. Satisfactory pain management plan. Medications help with personal goals and self-care needs. Follow up appointment scheduled.

## 2017-11-02 ENCOUNTER — TELEPHONE (OUTPATIENT)
Dept: INTERNAL MEDICINE | Age: 57
End: 2017-11-02

## 2017-11-02 NOTE — TELEPHONE ENCOUNTER
Naomy with [facility] home care calling for pt. Pt is starting to cough again. Lungs sound clear but she sounds wheezy. Acute respiratory issue     Patient complains of Cough  Symptoms for how long couple of  days  What meds has pt tried using inhalers, robitussin,    Does patient have asthma No COPD  Is patient on inhalers Yes  Other symptoms include wheezing but lungs are clear   Is this sinus, cold or cough related No    *This condition is eligible for an eVisit. If not already active, sign patient up for MyChart to improve access and communication with the provider. *

## 2017-11-03 ENCOUNTER — OFFICE VISIT (OUTPATIENT)
Dept: INTERNAL MEDICINE | Age: 57
End: 2017-11-03
Payer: MEDICARE

## 2017-11-03 VITALS
BODY MASS INDEX: 34.66 KG/M2 | WEIGHT: 203 LBS | DIASTOLIC BLOOD PRESSURE: 86 MMHG | HEART RATE: 91 BPM | TEMPERATURE: 97.9 F | SYSTOLIC BLOOD PRESSURE: 120 MMHG | HEIGHT: 64 IN

## 2017-11-03 DIAGNOSIS — F17.200 SMOKING: ICD-10-CM

## 2017-11-03 DIAGNOSIS — J01.00 ACUTE MAXILLARY SINUSITIS, RECURRENCE NOT SPECIFIED: Primary | ICD-10-CM

## 2017-11-03 DIAGNOSIS — J43.8 OTHER EMPHYSEMA (HCC): ICD-10-CM

## 2017-11-03 PROCEDURE — 99213 OFFICE O/P EST LOW 20 MIN: CPT | Performed by: INTERNAL MEDICINE

## 2017-11-03 RX ORDER — AMOXICILLIN AND CLAVULANATE POTASSIUM 500; 125 MG/1; MG/1
1 TABLET, FILM COATED ORAL 3 TIMES DAILY
Qty: 21 TABLET | Refills: 0 | Status: SHIPPED | OUTPATIENT
Start: 2017-11-03 | End: 2017-11-10

## 2017-11-03 NOTE — PROGRESS NOTES
Visit Information    Have you changed or started any medications since your last visit including any over-the-counter medicines, vitamins, or herbal medicines? no   Are you having any side effects from any of your medications? -  no  Have you stopped taking any of your medications? Is so, why? -  no    Have you seen any other physician or provider since your last visit? No  Have you had any other diagnostic tests since your last visit? No  Have you been seen in the emergency room and/or had an admission to a hospital since we last saw you? No  Have you had your routine dental cleaning in the past 6 months? no    Have you activated your [employer] account? If not, what are your barriers?  No: declined     Patient Care Team:  Robby Lagos MD as PCP - General (Internal Medicine)  Alyce Villaseñor DPM as Consulting Physician (Podiatry)  Kj Hassan ([address])  Luna Cabrera MD as Consulting Physician (Orthopedic Surgery)  Terry Cortez MD as Anesthesiologist (Pain Management)  Adri Mata OD (Optometry)    Medical History Review  Past Medical, Family, and Social History reviewed and does contribute to the patient presenting condition    Health Maintenance   Topic Date Due    Cervical cancer screen  04/19/2016    Diabetic retinal exam  09/26/2017    Diabetic microalbuminuria test  10/06/2017    DTaP/Tdap/Td vaccine (1 - Tdap) 12/14/2017 (Originally 7/8/1977)    Diabetic foot exam  05/01/2018    Diabetic hemoglobin A1C test  07/19/2018    Lipid screen  07/19/2018    Breast cancer screen  09/19/2018    Colon cancer screen colonoscopy  02/12/2019    Flu vaccine  Completed    Pneumococcal med risk  Completed    Hepatitis C screen  Completed    HIV screen  Completed

## 2017-11-03 NOTE — PROGRESS NOTES
Attending Physician Statement (Obi Seo)  Internal Medicine Clinic Progress Note      I have discussed the care of Bonnie Husbands, including pertinent history and exam findings,  with the resident. I have reviewed the key elements of all parts of the encounter with the resident. I agree with the assessment, plan and orders as documented by the resident.      Mbonu Dante Booth MD, SKY  Attending, Internal Medicine  [address] [place] [address]  11/3/2017, 11:48 AM

## 2017-11-03 NOTE — PROGRESS NOTES
[facility]    Progress Note    Date of patient's visit: 11/3/2017  YOB: 1960  Patient's Name:  Janiya Galaviz    Patient Care Team:  Charity Agosto MD as PCP - General (Internal Medicine)  Johann Mccain DPM as Consulting Physician (Podiatry)  Gabino Haro (Nayla Shell)  Marina Esparza MD as Consulting Physician (Orthopedic Surgery)  Alicja Thomas MD as Anesthesiologist (Pain Management)  Ming Mott OD (Optometry)    REASON FOR VISIT: Routine outpatient follow     HISTORY OF PRESENT ILLNESS:    History was obtained from the patient. Janiya Galaviz is a 59 y.o. is here for a  Cough and congestion. Pt says it started 1 week ago. She was treated with Zithromax. No relief. Pt has history of sinus infection. Currenlty smoking. Sinus tenderness present with rhinorrhea and headaches. BP stable. Patient Active Problem List   Diagnosis    DJD (degenerative joint disease) of knee    Osteoarthritis of spine with radiculopathy, lumbar region    GERD (gastroesophageal reflux disease)    COPD (chronic obstructive pulmonary disease)    HTN (hypertension)    Allergic rhinitis    Lipoma of shoulder s/p excision right posterior 11 17 2008    DM (diabetes mellitus)    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    Primary osteoarthritis of both knees       ALLERGIES      Allergies   Allergen Reactions    Aspirin     Claritin [Loratadine] Other (See Comments)     coughing    Flonase [Fluticasone Propionate]          MEDICATIONS:      Current Outpatient Prescriptions on File Prior to Visit   Medication Sig Dispense Refill    HYDROcodone-acetaminophen (NORCO) 5-325 MG per tablet Take 1 tablet by mouth every 8 hours as needed for Pain .  Earliest Fill Date: 10/30/17 90 tablet 0    meloxicam (MOBIC) 15 MG tablet Take 1 tablet by mouth daily 30 tablet 3    DULoxetine (CYMBALTA) 60 MG extended release capsule Take 1 capsule by mouth daily Indications: 2 tab daily 30 capsule 3    mirtazapine (REMERON) 45 MG tablet Take 1 tablet by mouth nightly 30 tablet 3    metFORMIN (GLUCOPHAGE) 500 MG tablet Take 1 tablet by mouth 2 times daily (with meals) 180 tablet 1    cetirizine (ZYRTEC) 10 MG tablet TAKE 1 TABLET BY MOUTH DAILY 30 tablet 3    atorvastatin (LIPITOR) 40 MG tablet Take 1 tablet by mouth daily 90 tablet 1    amLODIPine (NORVASC) 10 MG tablet Take 1 tablet by mouth daily 90 tablet 3    lisinopril-hydrochlorothiazide (PRINZIDE;ZESTORETIC) 20-25 MG per tablet TAKE 1 TABLET EVERY DAY 90 tablet 3    albuterol sulfate HFA (VENTOLIN HFA) 108 (90 Base) MCG/ACT inhaler Inhale 2 puffs into the lungs every 6 hours as needed for Wheezing 1 Inhaler 3    solifenacin (VESICARE) 5 MG tablet Take 1 tablet by mouth daily 30 tablet 3    glucose blood VI test strips (TRUETRACK TEST) strip USE 1 DAILY NON-INSULIN DEPENDENT 50 strip 11    ADVAIR -21 MCG/ACT inhaler INHALE 2 PUFFS INTO THE LUNGS 2 TIMES DAILY 12 g 11    SPIRIVA HANDIHALER 18 MCG inhalation capsule INHALE CONTENTS OF 1 CAPSULE DAILY USING HANDIHALER DEVICE 30 capsule 11    tiZANidine (ZANAFLEX) 4 MG tablet TAKE 1 TABLET TWO TIMES DAILY 60 tablet 3    Lancets MISC 1 each by Does not apply route daily 100 each 11    omeprazole (PRILOSEC) 20 MG delayed release capsule Take 1 capsule by mouth daily 30 capsule 11    gabapentin (NEURONTIN) 300 MG capsule TAKE 1 CAPSULE in morning and afternoon and 2 at night 120 capsule 6     MG capsule TAKE 1 CAPSULE EVERY DAY 30 capsule 11    diphenhydrAMINE (BENADRYL) 25 MG tablet Take 25 mg by mouth every 6 hours as needed for Itching      Alcohol Swabs (EASY TOUCH ALCOHOL PREP MEDIUM) 70 % PADS       azelastine (ASTELIN) 0.1 % nasal spray 2 sprays by Nasal route 2 times daily Use in each nostril as directed 1 Bottle 3    ipratropium-albuterol (DUONEB) 0.5-2.5 (3) MG/3ML SOLN nebulizer solution Inhale 3 mLs into the lungs

## 2017-11-22 ENCOUNTER — TELEPHONE (OUTPATIENT)
Dept: INTERNAL MEDICINE | Age: 57
End: 2017-11-22

## 2017-11-24 ENCOUNTER — TELEPHONE (OUTPATIENT)
Dept: INTERNAL MEDICINE | Age: 57
End: 2017-11-24

## 2017-11-24 NOTE — TELEPHONE ENCOUNTER
Pt asking for her allergy medication to be switched back to allegra, pt stated that zyrtec is not working / please advise

## 2017-11-27 RX ORDER — FEXOFENADINE HCL 180 MG/1
180 TABLET ORAL DAILY
Qty: 30 TABLET | Refills: 1 | Status: SHIPPED | OUTPATIENT
Start: 2017-11-27 | End: 2018-06-18

## 2017-11-28 ENCOUNTER — HOSPITAL ENCOUNTER (OUTPATIENT)
Dept: PAIN MANAGEMENT | Age: 57
Discharge: HOME | End: 2017-11-28
Payer: MEDICARE

## 2017-11-28 VITALS
RESPIRATION RATE: 16 BRPM | TEMPERATURE: 97.9 F | DIASTOLIC BLOOD PRESSURE: 77 MMHG | HEART RATE: 88 BPM | SYSTOLIC BLOOD PRESSURE: 112 MMHG

## 2017-11-28 DIAGNOSIS — M47.26 OSTEOARTHRITIS OF SPINE WITH RADICULOPATHY, LUMBAR REGION: Primary | ICD-10-CM

## 2017-11-28 DIAGNOSIS — M17.0 PRIMARY OSTEOARTHRITIS OF BOTH KNEES: ICD-10-CM

## 2017-11-28 DIAGNOSIS — Z79.899 ENCOUNTER FOR MEDICATION MANAGEMENT: ICD-10-CM

## 2017-11-28 PROCEDURE — 99213 OFFICE O/P EST LOW 20 MIN: CPT | Performed by: NURSE PRACTITIONER

## 2017-11-28 PROCEDURE — 99214 OFFICE O/P EST MOD 30 MIN: CPT

## 2017-11-28 RX ORDER — HYDROCODONE BITARTRATE AND ACETAMINOPHEN 5; 325 MG/1; MG/1
1 TABLET ORAL EVERY 8 HOURS PRN
Qty: 90 TABLET | Refills: 0 | Status: SHIPPED | OUTPATIENT
Start: 2017-11-29 | End: 2017-12-20 | Stop reason: SDUPTHER

## 2017-11-28 RX ORDER — MELOXICAM 15 MG/1
15 TABLET ORAL DAILY
Qty: 30 TABLET | Refills: 3 | Status: SHIPPED | OUTPATIENT
Start: 2017-11-29 | End: 2018-02-22 | Stop reason: SDUPTHER

## 2017-11-28 ASSESSMENT — ENCOUNTER SYMPTOMS
BACK PAIN: 1
COUGH: 0
SHORTNESS OF BREATH: 0
CONSTIPATION: 0

## 2017-11-28 ASSESSMENT — PAIN - FUNCTIONAL ASSESSMENT: PAIN_FUNCTIONAL_ASSESSMENT: 0-10

## 2017-11-28 NOTE — PROGRESS NOTES
James Queen, CNP)    Past Medical History:   Diagnosis Date    Allergic rhinitis     Arthropathy, unspecified, other specified sites 4/30/2013    Bronchitis     Chronic back pain     [facility]ch[facility] obstructive [facility] disease) ([facility])     Depression     [facility]di[facility] 12/18/2012    Emphysema of lung ([facility])     GERD (gastroesophageal reflux disease)     Hyperlipidemia     Hypertension     Knee pain     right knee mostly    Leg pain, right     Obesity     Osteoarthritis     Peripheral vascular disease ([facility])     Radicular pain of lumbosacral region     Spinal stenosis, lumbar region, without neurogenic claudication 4/30/2013    Type II or unspecified type diabetes mellitus without mention of complication, not stated as uncontrolled     Unspecified sleep apnea     URI (upper respiratory infection)        Past Surgical History:   Procedure Laterality Date    COLONOSCOPY  2/12/2009    normal    DILATION AND CURETTAGE OF UTERUS      GASTRECTOMY      partial    NERVE BLOCK Right 4/30/13    Lumbar Diagnostic Block,  Kenalog 40 mg    NERVE BLOCK  5/23/13    Lumbar Radiofrequency, Kenalog 40mg    NERVE BLOCK  8/12/13    Lt MBNB  celestone 6mg    NERVE BLOCK Left 8-28-13    left lumbar diagnostic block #2 decadron 10 mg    NERVE BLOCK Left 9-24-13    left lumbar median branch radiofrequency    NERVE BLOCK  07-02-14    caudal, celestone 9 mg    NERVE BLOCK  7-16-14    caudal epidural #2, celestone 9mg, fentanyl 25mcg    NERVE BLOCK  7/30/14    caudal #3 decadron 10mg    NERVE BLOCK  11-6-14    duramorph epidural steroid block  duramorph 1 mg celestone 9 mg    NERVE BLOCK  11/20/15    TENS- Empi Select    [place] KNEE SCOPE,DIAGNOSTIC Right 3/24/2017    KNEE ARTHROSCOPY WITH PARTIAL MEDIAL MENISECAL DEBRIDMENT  performed by Chloé Aguayo MD at [address]  01/2015    lumbar diskectomy    UPPER GASTROINTESTINAL ENDOSCOPY  9 20 2007    UPPER GASTROINTESTINAL DAILY, Disp: 60 tablet, Rfl: 3    Lancets MISC, 1 each by Does not apply route daily, Disp: 100 each, Rfl: 11    omeprazole (PRILOSEC) 20 MG delayed release capsule, Take 1 capsule by mouth daily, Disp: 30 capsule, Rfl: 11    gabapentin (NEURONTIN) 300 MG capsule, TAKE 1 CAPSULE in morning and afternoon and 2 at night, Disp: 120 capsule, Rfl: 6     MG capsule, TAKE 1 CAPSULE EVERY DAY, Disp: 30 capsule, Rfl: 11    diphenhydrAMINE (BENADRYL) 25 MG tablet, Take 25 mg by mouth every 6 hours as needed for Itching, Disp: , Rfl:     Alcohol Swabs (EASY TOUCH ALCOHOL PREP MEDIUM) 70 % PADS, , Disp: , Rfl:     azelastine (ASTELIN) 0.1 % nasal spray, 2 sprays by Nasal route 2 times daily Use in each nostril as directed, Disp: 1 Bottle, Rfl: 3    ipratropium-albuterol (DUONEB) 0.5-2.5 (3) MG/3ML SOLN nebulizer solution, Inhale 3 mLs into the lungs every 6 hours as needed for Shortness of Breath, Disp: 360 mL, Rfl: 11    Family History   Problem Relation Age of Onset    Diabetes Mother     Heart Disease Mother     Cirrhosis Father     Breast Cancer Neg Hx     Cancer Neg Hx     Colon Cancer Neg Hx     Eclampsia Neg Hx     Hypertension Neg Hx     Ovarian Cancer Neg Hx      Labor Neg Hx     Spont Abortions Neg Hx     Stroke Neg Hx        Social History     Social History    Marital status: Single     Spouse name: N/A    Number of children: N/A    Years of education: N/A     Occupational History    Not on file.      Social History Main Topics    Smoking status: Current Every Day Smoker     Packs/day: 0.25     Years: 36.00     Types: Cigarettes     Last attempt to quit: 2015    Smokeless tobacco: Never Used      Comment: pt currently using nicotine patches    Alcohol use No    Drug use: No      Comment: 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 7 yrs    Sexual activity: No     Other Topics Concern    Not on file     Social History Narrative    No narrative on file       Review of Systems:  Review of Systems   Constitution: Negative for chills and fever. Cardiovascular: Negative for chest pain and irregular heartbeat. Respiratory: Negative for cough and shortness of breath. Musculoskeletal: Positive for back pain. Gastrointestinal: Negative for constipation. Neurological: Negative for disturbances in coordination and loss of balance. Physical Exam:  /77   Pulse 88   Temp 97.9 °F (36.6 °C) (Oral)   Resp 16   LMP 04/19/2003     Physical Exam   Constitutional: She is oriented to person, place, and time and well-developed, well-nourished, and in no distress. HENT:   Head: Normocephalic. Eyes: EOM are normal.   Neck: Normal range of motion. Pulmonary/Chest: Effort normal.   Musculoskeletal: Normal range of motion. Lumbar back: She exhibits tenderness and pain. Neurological: She is alert and oriented to person, place, and time. Skin: Skin is warm and dry. Psychiatric: Affect normal.       Record/Diagnostics Review:    MRI Lumbar 2017 -     Findings:  There is 3 mm anterolisthesis L4 on L5 as well as disc space narrowing at this level.  Alignment is otherwise within normal limits.  Marrow signal is within normal limits.  Vertebral body heights are maintained.  The conus terminates at the L1-2 level. L1-2: Mild circumferential disc bulge without central canal stenosis.  Neural foramina are patent. L2-3: Broad-based disc bulge with flattening of anterior thecal sac and mild central canal narrowing.  Facet and ligamentum flavum hypertrophic changes.  Neural foramina are patent. L3-4: Circumferential disc bulge without central canal stenosis.  Facet and ligamentum flavum hypertrophic changes.  Neural foramina are patent.     L4-5: Broad-based disc bulge without significant central canal stenosis.  Disc material abuts the exiting right L4 nerve root on the right.  Evidence of prior right hemilaminectomy.  Severe facet hypertrophic changes on the left

## 2017-11-29 NOTE — TELEPHONE ENCOUNTER
Allegra PA denied, phone call to patient she states that she will buy a generic brand over the counter.

## 2017-12-08 RX ORDER — DOCUSATE SODIUM 100 MG/1
CAPSULE, LIQUID FILLED ORAL
Qty: 30 CAPSULE | Refills: 11 | Status: SHIPPED | OUTPATIENT
Start: 2017-12-08 | End: 2018-12-10 | Stop reason: SDUPTHER

## 2017-12-08 NOTE — TELEPHONE ENCOUNTER
E-scribe request for DOCUSATE SOD 100MG SOFTGEL AYE. Please review and e-scribe if applicable. Last Visit Date: 11/3/17  Next Visit Date:  Future Appointments  Date Time Provider Claudy Schaeffer   12/20/2017 8:20 AM Jose Manuel Samayoa CNP [facility]   12/20/2017 9:00 AM Lizbet Stark PhD [facility]   1/5/2018 1:45 PM Joyce Harrell MD [facility]   Topic Date Due    Cervical cancer screen  04/19/2016    Diabetic retinal exam  09/26/2017    Diabetic microalbuminuria test  10/06/2017    DTaP/Tdap/Td vaccine (1 - Tdap) 12/14/2017 (Originally 7/8/1977)    Diabetic foot exam  05/01/2018    Diabetic hemoglobin A1C test  07/19/2018    Lipid screen  07/19/2018    Breast cancer screen  09/19/2018    Colon cancer screen colonoscopy  02/12/2019    Flu vaccine  Completed    Pneumococcal med risk  Completed    Hepatitis C screen  Completed    HIV screen  Completed               (applicable per patient's age: Cancer Screenings, Depression Screening, Fall Risk Screening, Immunizations)    Hemoglobin A1C (%)   Date Value   07/19/2017 5.8   03/06/2017 6.1   09/15/2016 5.7     Microalb/Crt.  Ratio (mcg/mg creat)   Date Value   10/06/2016 6     LDL Cholesterol (mg/dL)   Date Value   07/19/2017 84     AST (U/L)   Date Value   07/19/2017 16     ALT (U/L)   Date Value   07/19/2017 14     BUN (mg/dL)   Date Value   07/19/2017 16      (goal A1C is < 7)   (goal LDL is <100) need 30-50% reduction from baseline     BP Readings from Last 3 Encounters:   11/28/17 112/77   11/03/17 120/86   10/02/17 114/72    (goal /80)      All Future Testing planned in [facility]:  Lab Frequency Next Occurrence   MRI Lumbar Spine Wo Contrast Once 08/24/2017   GE Screen Routine Once 01/01/2018            Patient Active Problem List:     DJD (degenerative joint disease) of knee     Osteoarthritis of spine with radiculopathy, lumbar region     GERD (gastroesophageal reflux

## 2017-12-20 ENCOUNTER — HOSPITAL ENCOUNTER (OUTPATIENT)
Dept: PAIN MANAGEMENT | Age: 57
Discharge: HOME | End: 2017-12-20
Payer: MEDICARE

## 2017-12-20 VITALS
SYSTOLIC BLOOD PRESSURE: 115 MMHG | RESPIRATION RATE: 16 BRPM | TEMPERATURE: 97.9 F | DIASTOLIC BLOOD PRESSURE: 70 MMHG | HEART RATE: 96 BPM

## 2017-12-20 DIAGNOSIS — M47.26 OSTEOARTHRITIS OF SPINE WITH RADICULOPATHY, LUMBAR REGION: ICD-10-CM

## 2017-12-20 DIAGNOSIS — Z79.899 ENCOUNTER FOR MEDICATION MANAGEMENT: Primary | ICD-10-CM

## 2017-12-20 DIAGNOSIS — M17.0 PRIMARY OSTEOARTHRITIS OF BOTH KNEES: ICD-10-CM

## 2017-12-20 PROCEDURE — 99214 OFFICE O/P EST MOD 30 MIN: CPT

## 2017-12-20 PROCEDURE — 99213 OFFICE O/P EST LOW 20 MIN: CPT

## 2017-12-20 PROCEDURE — 99213 OFFICE O/P EST LOW 20 MIN: CPT | Performed by: NURSE PRACTITIONER

## 2017-12-20 RX ORDER — HYDROCODONE BITARTRATE AND ACETAMINOPHEN 5; 325 MG/1; MG/1
1 TABLET ORAL EVERY 8 HOURS PRN
Qty: 90 TABLET | Refills: 0 | Status: SHIPPED | OUTPATIENT
Start: 2017-12-29 | End: 2018-01-25 | Stop reason: SDUPTHER

## 2017-12-20 ASSESSMENT — ENCOUNTER SYMPTOMS
COUGH: 0
BACK PAIN: 1
SHORTNESS OF BREATH: 0
CONSTIPATION: 0

## 2017-12-20 ASSESSMENT — PAIN - FUNCTIONAL ASSESSMENT: PAIN_FUNCTIONAL_ASSESSMENT: 0-10

## 2017-12-20 NOTE — BH NOTE
Charlotte King was seen today for a follow-up appointment as part of the protocol [facility] for [facility]. The focus of today's session was an evaluation of mood disorder symptoms, potential for suicide and potential for chemical dependency or abuse. Diagnosis code: M47.26    Psych code: F32.9    FINDINGS:     1) The patient arrived on time for appointment and was cooperative and good eye contact. Overall affect was euthymic and she was talkative and hard to redirect at times. The patient did demonstrate pain behaviors during the session. She was A & O x3.      2) Patient reports current pain level to be 9/10 pain . Patient reports usual pain level is 9/10. Pain is exacerbated on an ascending scale of 0-10, by cold weather, walking, bending. She states pain is relieved by : heat, mild activity. 3) Patient states current mood is:  Variable, has had some recent losses and \"I am going through menopause\". Patient reported fleeting feelings of helplessness, hopelessness, and worthlessness; \"but then I get up. \" Patient denied current suicidal thoughts and does not suicidal plan or intent. She does not report issues related to sleep, has improved. The patient does not report issues related to appetite. Further stated that they receive social support from family and aide and that they find enjoyment in \"on the resident [place]\", staying active, socializing. Pain medication is  helpful. Patient reports functioning is improved by medication. 4) Current Medications:   Prior to Admission medications    Medication Sig Start Date End Date Taking? Authorizing Provider    MG capsule TAKE 1 CAPSULE EVERY DAY 12/8/17   Santiago Gutiérrez MD   HYDROcodone-acetaminophen (NORCO) 5-325 MG per tablet Take 1 tablet by mouth every 8 hours as needed for Pain .  Earliest Fill Date: 11/29/17 11/29/17 12/29/17  Portia Prado CNP   meloxicam (MOBIC) 15 MG tablet Take 1 tablet by mouth daily 11/29/17 12/29/17  Milo Enciso CNP   fexofenadine (ALLEGRA) 180 MG tablet Take 1 tablet by mouth daily 11/27/17   Maggy Allen MD   DULoxetine (CYMBALTA) 60 MG extended release capsule Take 1 capsule by mouth daily Indications: 2 tab daily 10/2/17   Maggy Allen MD   mirtazapine (REMERON) 45 MG tablet Take 1 tablet by mouth nightly 10/2/17   Maggy Allen MD   metFORMIN (GLUCOPHAGE) 500 MG tablet Take 1 tablet by mouth 2 times daily (with meals) 10/2/17   Maggy Allen MD   atorvastatin (LIPITOR) 40 MG tablet Take 1 tablet by mouth daily 10/2/17   Maggy Allen MD   amLODIPine (NORVASC) 10 MG tablet Take 1 tablet by mouth daily 10/2/17   Maggy Allen MD   lisinopril-hydrochlorothiazide (PRINZIDE;ZESTORETIC) 20-25 MG per tablet TAKE 1 TABLET EVERY DAY 10/2/17   Maggy Allen MD   albuterol sulfate HFA (VENTOLIN HFA) 108 (90 Base) MCG/ACT inhaler Inhale 2 puffs into the lungs every 6 hours as needed for Wheezing 10/2/17   Maggy Allen MD   solifenacin (VESICARE) 5 MG tablet Take 1 tablet by mouth daily 10/2/17 10/2/18  Maggy Allen MD   glucose blood VI test strips (TRUETRACK TEST) strip USE 1 DAILY NON-INSULIN DEPENDENT 10/2/17   Maggy Allen MD   [facility] 115-21 MCG/ACT inhaler INHALE 2 PUFFS INTO THE LUNGS 2 TIMES DAILY 9/6/17   Maggy Allen MD   SPIRIVA HANDIHALER 18 MCG inhalation capsule INHALE CONTENTS OF 1 CAPSULE DAILY USING HANDIHALER DEVICE 9/6/17   Maggy Allen MD   tiZANidine (ZANAFLEX) 4 MG tablet TAKE 1 TABLET TWO TIMES DAILY 8/31/17   Maggy Allen MD   Lancets MISC 1 each by Does not apply route daily 7/3/17   Maggy Allen MD   omeprazole (PRILOSEC) 20 MG delayed release capsule Take 1 capsule by mouth daily 4/28/17   Maggy Allen MD   gabapentin (NEURONTIN) 300 MG capsule TAKE 1 CAPSULE in morning and afternoon and 2 at night 3/6/17   Maggy Allen MD   diphenhydrAMINE (BENADRYL) 25 MG tablet Take 25 mg by mouth every 6 hours as needed for Itching    Historical Provider, MD   Alcohol Swabs (EASY TOUCH ALCOHOL PREP MEDIUM) 70 % PADS  6/22/16   Historical Provider, MD   azelastine (ASTELIN) 0.1 % nasal spray 2 sprays by Nasal route 2 times daily Use in each nostril as directed 6/15/16   Santiago Gutiérrez MD   ipratropium-albuterol (DUONEB) 0.5-2.5 (3) MG/3ML SOLN nebulizer solution Inhale 3 mLs into the lungs every 6 hours as needed for Shortness of Breath 8/4/15   Santiago Gutiérrez MD       5) Other substance Use:  Use in file:  reports that she has been smoking Cigarettes. She has a 9.00 pack-year smoking history. She has never used smokeless tobacco. She reports that she does not drink alcohol or use drugs. History   Alcohol Use No     History   Smoking Status    Current Every Day Smoker    Packs/day: 0.25    Years: 36.00    Types: Cigarettes    Last attempt to quit: 8/2/2015   Smokeless Tobacco    Never Used     Comment: pt currently using nicotine patches     History   Drug Use No     Comment: 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 7 yrs     Reported today: Described alcohol consumption as occurring never. Reported that last alcohol consumption was 10-15 years ago. Patient reported use of nicotine products, current use is: has significantly decreased, 7 per day. Caffeine use is 2 cups per day. Patient denied current illicit drug use. Patient reported history of alcohol or drug-related issues. Stated she has been clean from drugs for 15 years. Patient denied history of alcohol or drug-related treatment. The patient states that they do not run out of pain medication early at the end of the month. Patient does not report a desire to take medication in higher doses or more frequently than prescribed, compulsive use, loss of control, and continued use despite harm. Review of file shows two short pill counts (febuary and august) and appropriate RYAN for the past 12 months.      6) She reports that they do currently engage in counseling services, goes to [facility] and finds it helpful. Goes there monthly for medication and therapy. Further they reported a history of mental health treatment and denied a history of psychiatric hospitalization. 7) Given the information at hand and it should be noted that this evaluation is based only on the patient's self-report. She will be assigned at a care level of medium (yellow) due to remote history of drug use and some short pill counts. She has been stable/compliant for several months. If compliance continues, consider lowering level of care. It should be noted that the above is provided by Pt. Self report. RECOMMENDATIONS:      1)  She should be scheduled for their next followup appointment in 3 months.   2) Continue at [employer] signed by Vimal Nowak, PhD on 12/20/2017 at 8:33 AM

## 2017-12-20 NOTE — PLAN OF CARE
Yadiel Alvarado was seen for  a follow-up evaluation to determine level of care and assess for mental health concerns. 1. Level of Care will  remain at medium (yellow) due to remote history of drug use and some short pill counts. She has been stable/compliant for several months. If compliance continues, consider lowering level of care. 2.She should be scheduled for their next followup appointment in 3 months.   3. Continue at [facility]

## 2017-12-20 NOTE — PROGRESS NOTES
Patient is here today to review medication contract. Chief Complaint: back and knee pain    PMH: Patient has had low back with no known injury and bilateral knee pain for many years. Lately her right knee has been worse with swelling noted with prolonged standing, walking. She had a knee arthroscopy in March and currently in PT with no plans for replacement at this time. She sees Dr. Means Early and has injections with some benefit. Genicular blocks discussed but patient not interested at this time. She is s/p lumbar diskectomy in 2015. She has had PENNY and RF in the past with some benefit. The last injection was a duramorph 2014. She also uses TENS daily with some relief. Patient is not interested in repeating injections - feels they don't work. She states she would only consider another back surgery if she was \"unable to walk\". She is stable and compliant on norco 5/325 TID- also taking mobic with benefit. She is trying to quit smoking. Back Pain   This is a chronic problem. The current episode started more than 1 year ago. The problem occurs constantly. The problem is unchanged. The pain is present in the lumbar spine and thoracic spine. The quality of the pain is described as aching, burning and stabbing. The pain radiates to the left knee and right knee. The pain is at a severity of 7/10. The pain is moderate. The symptoms are aggravated by sitting, standing, position and bending. Stiffness is present in the morning. Pertinent negatives include no chest pain or fever. She has tried analgesics, heat, bed rest, muscle relaxant and ice for the symptoms. The treatment provided mild relief. Knee Pain    Incident onset: chronic. There was no injury mechanism. The pain is present in the right knee and left knee. The quality of the pain is described as aching. The pain is at a severity of 9/10. The pain is moderate. The pain has been constant since onset. The symptoms are aggravated by weight bearing and movement. She has tried rest, NSAIDs and ice for the symptoms. The treatment provided mild relief. Patient denies any new neurological symptoms. No bowel or bladder incontinence, no weakness, and no falling. Pill count: appropriate    Morphine equivalent: 15    Controlled Substances Monitoring:     Attestation: The Prescription Monitoring Report for this patient was reviewed today.  (Jarred Churchill CNP)  Documentation: Possible medication side effects, risk of tolerance and/or dependence, and alternative treatments discussed., No signs of potential drug abuse or diversion identified., Existing medication contract. Jarred Churchill CNP)    Past Medical History:   Diagnosis Date    Allergic rhinitis     Arthropathy, unspecified, other specified sites 4/30/2013    Bronchitis     Chronic back pain     [facility]     Depression     DM (diabetes mellitus) ([facility] 12/18/2012    Emphysema of lung ([facility]     GERD (gastroesophageal reflux disease)     Hyperlipidemia     Hypertension     Knee pain     right knee mostly    Leg pain, right     Obesity     Osteoarthritis     Peripheral vascular disease (HCC)     Primary osteoarthritis of both knees     Radicular pain of lumbosacral region     Spinal stenosis, lumbar region, without neurogenic claudication 4/30/2013    Type II or unspecified type diabetes mellitus without mention of complication, not stated as uncontrolled     Unspecified sleep apnea     URI (upper respiratory infection)        Past Surgical History:   Procedure Laterality Date    COLONOSCOPY  2/12/2009    normal    DILATION AND CURETTAGE OF UTERUS      GASTRECTOMY      partial    NERVE BLOCK Right 4/30/13    Lumbar Diagnostic Block,  Kenalog 40 mg    NERVE BLOCK  5/23/13    Lumbar Radiofrequency, Kenalog 40mg    NERVE BLOCK  8/12/13    Lt MBNB  celestone 6mg    NERVE BLOCK Left 8-28-13    left lumbar diagnostic block #2 decadron 10 mg  NERVE BLOCK Left 9-24-13    left lumbar median branch radiofrequency    NERVE BLOCK  07-02-14    caudal, celestone 9 mg    NERVE BLOCK  7-16-14    caudal epidural #2, celestone 9mg, fentanyl 25mcg    NERVE BLOCK  7/30/14    caudal #3 decadron 10mg    NERVE BLOCK  11-6-14    duramorph epidural steroid block  duramorph 1 mg celestone 9 mg    NERVE BLOCK  11/20/15    TENS- Empi Select    [place] KNEE SCOPE,DIAGNOSTIC Right 3/24/2017    KNEE ARTHROSCOPY WITH PARTIAL MEDIAL MENISECAL DEBRIDMENT  performed by Antoinette Mcmahan MD at [address]  01/2015    lumbar diskectomy    UPPER GASTROINTESTINAL ENDOSCOPY  9 20 2007    UPPER GASTROINTESTINAL ENDOSCOPY  4 21 2009,04/2011    gastritis, esophagitis       Allergies   Allergen Reactions    Aspirin     Claritin [Loratadine] Other (See Comments)     coughing    Flonase [Fluticasone Propionate]          Current Outpatient Prescriptions:      MG capsule, TAKE 1 CAPSULE EVERY DAY, Disp: 30 capsule, Rfl: 11    HYDROcodone-acetaminophen (NORCO) 5-325 MG per tablet, Take 1 tablet by mouth every 8 hours as needed for Pain .  Earliest Fill Date: 11/29/17, Disp: 90 tablet, Rfl: 0    meloxicam (MOBIC) 15 MG tablet, Take 1 tablet by mouth daily, Disp: 30 tablet, Rfl: 3    fexofenadine (ALLEGRA) 180 MG tablet, Take 1 tablet by mouth daily, Disp: 30 tablet, Rfl: 1    DULoxetine (CYMBALTA) 60 MG extended release capsule, Take 1 capsule by mouth daily Indications: 2 tab daily, Disp: 30 capsule, Rfl: 3    mirtazapine (REMERON) 45 MG tablet, Take 1 tablet by mouth nightly, Disp: 30 tablet, Rfl: 3    metFORMIN (GLUCOPHAGE) 500 MG tablet, Take 1 tablet by mouth 2 times daily (with meals), Disp: 180 tablet, Rfl: 1    atorvastatin (LIPITOR) 40 MG tablet, Take 1 tablet by mouth daily, Disp: 90 tablet, Rfl: 1    amLODIPine (NORVASC) 10 MG tablet, Take 1 tablet by mouth daily, Disp: 90 tablet, Rfl: 3    lisinopril-hydrochlorothiazide (PRINZIDE;ZESTORETIC) 20-25 MG History     Social History    Marital status: Single     Spouse name: N/A    Number of children: N/A    Years of education: N/A     Occupational History    Not on file. Social History Main Topics    Smoking status: Current Every Day Smoker     Packs/day: 0.25     Years: 36.00     Types: Cigarettes     Last attempt to quit: 8/2/2015    Smokeless tobacco: Never Used      Comment: pt currently using nicotine patches    Alcohol use No    Drug use: No      Comment: 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 7 yrs    Sexual activity: No     Other Topics Concern    Not on file     Social History Narrative    No narrative on file       Review of Systems:  Review of Systems   Constitution: Negative for chills and fever. Cardiovascular: Negative for chest pain and irregular heartbeat. Respiratory: Negative for cough and shortness of breath. Musculoskeletal: Positive for back pain, joint pain and stiffness. Gastrointestinal: Negative for constipation. Neurological: Negative for disturbances in coordination and loss of balance. Using cane    Physical Exam:  /70   Pulse 96   Temp 97.9 °F (36.6 °C) (Oral)   Resp 16   LMP 04/19/2003     Physical Exam   Constitutional: She is oriented to person, place, and time and well-developed, well-nourished, and in no distress. HENT:   Head: Normocephalic. Eyes: EOM are normal.   Neck: Normal range of motion. Pulmonary/Chest: Effort normal.   Musculoskeletal:        Right knee: She exhibits decreased range of motion. Tenderness found. Left knee: Tenderness found. Lumbar back: She exhibits tenderness and pain. Neurological: She is alert and oriented to person, place, and time. Skin: Skin is warm and dry.    Psychiatric: Affect normal.       Record/Diagnostics Review:    MRI Lumbar 2017 -     Findings:  There is 3 mm anterolisthesis L4 on L5 as well as disc space narrowing at this level.  Alignment is otherwise within normal limits.  Marrow signal is within normal limits.  Vertebral body heights are maintained.  The conus terminates at the L1-2 level. L1-2: Mild circumferential disc bulge without central canal stenosis.  Neural foramina are patent. L2-3: Broad-based disc bulge with flattening of anterior thecal sac and mild central canal narrowing.  Facet and ligamentum flavum hypertrophic changes.  Neural foramina are patent. L3-4: Circumferential disc bulge without central canal stenosis.  Facet and ligamentum flavum hypertrophic changes.  Neural foramina are patent. L4-5: Broad-based disc bulge without significant central canal stenosis.  Disc material abuts the exiting right L4 nerve root on the right.  Evidence of prior right hemilaminectomy.  Severe facet hypertrophic changes on the left with mild to moderate left neural foraminal narrowing. L5-S1 broad-based disc bulge with small central protrusion without central canal stenosis.  Facet hypertrophic changes, greater on the left.  Mild left neural foraminal narrowing. IMPRESSION:    *  Status post left hemilaminectomy at L4-5 with mild anterolisthesis, disc space narrowing and broad-based disc bulge.  Moderate neural foraminal narrowing at this level with disc material abutting the exiting right L4 nerve. *  Multilevel degenerative changes without significant central canal stenosis or other neural foraminal narrowing. MRI right knee 2017 -     1. Radial type tear involving the free edge and both articular surfaces in  the posterior horn and junction of the body/posterior horn of the medial  meniscus with outward extrusion of a degenerated medial meniscus body. Moderate to large joint effusion. 2. Moderate to severe chondromalacia of the medial patellofemoral compartment  as detailed above. 3. Moderate chondromalacia of the outer weight-bearing surface of the medial  femoral condyle. 4. Grade 1 MCL sprain in the setting of trauma.   5. Small debris containing Baker's cyst.  6. Mild nonspecific edema in the subcutaneous fat anterior to the patellar  tendon. Assessment:  Problem List Items Addressed This Visit     Osteoarthritis of spine with radiculopathy, lumbar region    Relevant Medications    HYDROcodone-acetaminophen ([address]) 5-325 MG per tablet (Start on 12/29/2017)    Primary osteoarthritis of both knees    Encounter for medication management - Primary      Other Visit Diagnoses    None. Treatment Plan:  DISCUSSION: Treatment options discussed with patient and all questions answered to patient's satisfaction. TREATMENT OPTIONS:   Continue current medication  Continue PT for right knee  Encouraged to continue smoking cessation efforts  Contract compliance requirements are met. Satisfactory pain management plan. Medications help with personal goals and self-care needs. Follow up appointment scheduled.

## 2018-01-05 ENCOUNTER — HOSPITAL ENCOUNTER (OUTPATIENT)
Age: 58
Setting detail: SPECIMEN
Discharge: HOME | End: 2018-01-05
Payer: MEDICARE

## 2018-01-05 ENCOUNTER — OFFICE VISIT (OUTPATIENT)
Dept: INTERNAL MEDICINE | Age: 58
End: 2018-01-05
Payer: MEDICARE

## 2018-01-05 VITALS
SYSTOLIC BLOOD PRESSURE: 130 MMHG | DIASTOLIC BLOOD PRESSURE: 80 MMHG | BODY MASS INDEX: 39.85 KG/M2 | WEIGHT: 203 LBS | HEART RATE: 85 BPM | HEIGHT: 60 IN

## 2018-01-05 DIAGNOSIS — E11.9 TYPE 2 DIABETES MELLITUS WITHOUT COMPLICATION, WITHOUT LONG-TERM CURRENT USE OF INSULIN (HCC): Primary | ICD-10-CM

## 2018-01-05 DIAGNOSIS — J43.9 PULMONARY EMPHYSEMA, UNSPECIFIED EMPHYSEMA TYPE (HCC): ICD-10-CM

## 2018-01-05 DIAGNOSIS — K21.9 GASTROESOPHAGEAL REFLUX DISEASE, ESOPHAGITIS PRESENCE NOT SPECIFIED: ICD-10-CM

## 2018-01-05 DIAGNOSIS — I10 ESSENTIAL HYPERTENSION: ICD-10-CM

## 2018-01-05 LAB
CREATININE URINE: 92.2 MG/DL (ref 28–217)
HBA1C MFR BLD: 5.9 %
MICROALBUMIN/CREAT 24H UR: <12 MG/L
MICROALBUMIN/CREAT UR-RTO: NORMAL MCG/MG CREAT

## 2018-01-05 PROCEDURE — 82570 ASSAY OF URINE CREATININE: CPT

## 2018-01-05 PROCEDURE — 99214 OFFICE O/P EST MOD 30 MIN: CPT | Performed by: INTERNAL MEDICINE

## 2018-01-05 PROCEDURE — 82043 UR ALBUMIN QUANTITATIVE: CPT

## 2018-01-05 PROCEDURE — 83036 HEMOGLOBIN GLYCOSYLATED A1C: CPT | Performed by: INTERNAL MEDICINE

## 2018-01-05 RX ORDER — OMEPRAZOLE 20 MG/1
20 CAPSULE, DELAYED RELEASE ORAL DAILY
Qty: 30 CAPSULE | Refills: 11 | Status: SHIPPED | OUTPATIENT
Start: 2018-01-05 | End: 2019-01-08 | Stop reason: SDUPTHER

## 2018-01-05 RX ORDER — LISINOPRIL AND HYDROCHLOROTHIAZIDE 25; 20 MG/1; MG/1
TABLET ORAL
Qty: 90 TABLET | Refills: 3 | Status: SHIPPED | OUTPATIENT
Start: 2018-01-05 | End: 2018-03-16 | Stop reason: SDUPTHER

## 2018-01-05 RX ORDER — LANCETS 30 GAUGE
1 EACH MISCELLANEOUS DAILY
Qty: 100 EACH | Refills: 11 | Status: SHIPPED | OUTPATIENT
Start: 2018-01-05 | End: 2021-03-10

## 2018-01-05 NOTE — PROGRESS NOTES
[facility]    Progress Note    Date of patient's visit: 1/5/2018    Patient's Name:  Valerie Padron    YOB: 1960            Patient Care Team:  Kinsey Nation MD as PCP - General (Internal Medicine)  Reilly Ray DPM as Consulting Physician (Podiatry)  Tonya Hoffman (Reymundo Boykin)  Nayely Perdomo MD as Consulting Physician (Orthopedic Surgery)  Brandon Casarez MD as Anesthesiologist (Pain Management)  Francisca Pendleton, OD (Optometry)    REASON FOR VISIT: Routine outpatient follow     Chief Complaint   Patient presents with    Hypertension     pt states no concerns    Diabetes     pt states she has no concerns   [facility] Maintenance     pt states she got eye exam in September 2017         HISTORY OF PRESENT ILLNESS:    History was obtained from the patient. Valerie Padron is a 59 y.o. is here for Follow-up on her chronic medical problems. She would like to donate plasma. She needs a letter for it. She has well-controlled hypertension and diabetes. She has COPD. She has chronic nasal congestion and rhinitis. She had a Pap smear done last time. Unfortunately results are not to be found. Lab has been called but they have no record of the Pap smear. This will need to be repeated again. She has chronic low back pain. She is following up with pain management. She had another MRI done. She was recently treated for acute exacerbation of COPD. Is feeling better today.     Results for POC orders placed in visit on 01/05/18   POCT glycosylated hemoglobin (Hb A1C)   Result Value Ref Range    Hemoglobin A1C 5.9 %         Procedure Note   Interface - Rad Results/Orders In 1 - 10/23/2017 9:15 AM EDT      - MAMM SCREENING BILATERAL W CAD  BILATERAL DIGITAL SCREENING MAMMOGRAM TOMOSYNTHESIS AND 2D C-VIEW WITH  CAD: 10/16/2017  CLINICAL: Routine Screening Exam.     Current study was also evaluated with a Computer Aided Detection stenosis.  Facet hypertrophic changes, greater on the left.  Mild left neural foraminal narrowing. IMPRESSION:    *  Status post left hemilaminectomy at L4-5 with mild anterolisthesis, disc space narrowing and broad-based disc bulge.  Moderate neural foraminal narrowing at this level with disc material abutting the exiting right L4 nerve. *  Multilevel degenerative changes without significant central canal stenosis or other neural foraminal narrowing.        Past Medical History:   Diagnosis Date    Allergic rhinitis     Arthropathy, unspecified, other specified sites 4/30/2013    Bronchitis     Chronic back pain     [facility]     Depression     [facility]di[facility] 12[facility]2012    [facility] lung [facility]     GERD (gastroesophageal reflux disease)     Hyperlipidemia     Hypertension     Knee pain     right knee mostly    Leg pain, right     Obesity     Osteoarthritis     Peripheral vascular disease ([facility]     Primary osteoarthritis of both knees     Radicular pain of lumbosacral region     Spinal stenosis, lumbar region, without neurogenic claudication 4/30/2013    Type II or unspecified type diabetes mellitus without mention of complication, not stated as uncontrolled     Unspecified sleep apnea     URI (upper respiratory infection)        Past Surgical History:   Procedure Laterality Date    COLONOSCOPY  2/12/2009    normal    DILATION AND CURETTAGE OF UTERUS      GASTRECTOMY      partial    NERVE BLOCK Right 4/30/13    Lumbar Diagnostic Block,  Kenalog 40 mg    NERVE BLOCK  5/23/13    Lumbar Radiofrequency, Kenalog 40mg    NERVE BLOCK  8/12/13    Lt MBNB  celestone 6mg    NERVE BLOCK Left 8-28-13    left lumbar diagnostic block #2 decadron 10 mg    NERVE BLOCK Left 9-24-13    left lumbar median branch radiofrequency    NERVE BLOCK  07-02-14    caudal, celestone 9 mg    NERVE BLOCK  7-16-14    caudal epidural #2, celestone 9mg, fentanyl 25mcg    NERVE BLOCK  7/30/14    caudal #3 decadron 10mg    NERVE BLOCK  11-6-14    duramorph epidural steroid block  duramorph 1 mg celestone 9 mg    NERVE BLOCK  11/20/15    TENS- Empi Select    [place] KNEE SCOPE,DIAGNOSTIC Right 3/24/2017    KNEE ARTHROSCOPY WITH PARTIAL MEDIAL MENISECAL DEBRIDMENT  performed by Marylen Cowman, MD at [address]  01/2015    lumbar diskectomy    UPPER GASTROINTESTINAL ENDOSCOPY  9 20 2007    UPPER GASTROINTESTINAL ENDOSCOPY  4 21 2009,04/2011    gastritis, esophagitis         ALLERGIES      Allergies   Allergen Reactions    Aspirin     Claritin [Loratadine] Other (See Comments)     coughing    Flonase [Fluticasone Propionate]        MEDICATIONS:      Current Outpatient Prescriptions on File Prior to Visit   Medication Sig Dispense Refill    HYDROcodone-acetaminophen (NORCO) 5-325 MG per tablet Take 1 tablet by mouth every 8 hours as needed for Pain .  Earliest Fill Date: 12/29/17 90 tablet 0     MG capsule TAKE 1 CAPSULE EVERY DAY 30 capsule 11    DULoxetine (CYMBALTA) 60 MG extended release capsule Take 1 capsule by mouth daily Indications: 2 tab daily 30 capsule 3    mirtazapine (REMERON) 45 MG tablet Take 1 tablet by mouth nightly 30 tablet 3    metFORMIN (GLUCOPHAGE) 500 MG tablet Take 1 tablet by mouth 2 times daily (with meals) 180 tablet 1    atorvastatin (LIPITOR) 40 MG tablet Take 1 tablet by mouth daily 90 tablet 1    amLODIPine (NORVASC) 10 MG tablet Take 1 tablet by mouth daily 90 tablet 3    lisinopril-hydrochlorothiazide (PRINZIDE;ZESTORETIC) 20-25 MG per tablet TAKE 1 TABLET EVERY DAY 90 tablet 3    albuterol sulfate HFA (VENTOLIN HFA) 108 (90 Base) MCG/ACT inhaler Inhale 2 puffs into the lungs every 6 hours as needed for Wheezing 1 Inhaler 3    solifenacin (VESICARE) 5 MG tablet Take 1 tablet by mouth daily 30 tablet 3    glucose blood VI test strips (TRUETRACK TEST) strip USE 1 DAILY NON-INSULIN DEPENDENT 50 strip 11    Eyes: Negative for blurred vision and redness. Respiratory: Positive for cough. Negative for sputum production, shortness of breath and wheezing. Cardiovascular: Negative for chest pain, palpitations and leg swelling. Gastrointestinal: Positive for heartburn. Negative for abdominal pain, constipation and nausea. Musculoskeletal: Positive for back pain and joint pain. Neurological: Negative for dizziness, seizures, loss of consciousness and headaches. Endo/Heme/Allergies: Positive for environmental allergies. Negative for polydipsia. Does not bruise/bleed easily. Psychiatric/Behavioral: Negative for depression. The patient is nervous/anxious. PHYSICAL EXAM:      Vitals:    01/05/18 1432 01/05/18 1524   BP: (!) 140/82 130/80   Site: Left Arm    Position: Sitting    Cuff Size: Medium Adult    Pulse: 85    Weight: 203 lb (92.1 kg)    Height: 5' (1.524 m)      Body mass index is 39.65 kg/m². BP Readings from Last 3 Encounters:   01/05/18 130/80   12/20/17 115/70   11/28/17 112/77        Wt Readings from Last 3 Encounters:   01/05/18 203 lb (92.1 kg)   11/03/17 203 lb (92.1 kg)   10/02/17 199 lb (90.3 kg)       Physical Exam      HENT:  Normocephalic, Atraumatic, Bilateral external ears normal, Oropharynx moist, No oral exudates, Nose congested mucosa. Neck- Normal range of motion, No tenderness, Supple, No stridor. Eyes:  PERRL, EOMI, Conjunctiva normal, No discharge. Respiratory:  Normal breath sounds, No respiratory distress, No wheezing, No chest tenderness. Cardiovascular:  Normal heart rate, Normal rhythm, No murmurs, No rubs, No gallops. GI:  Bowel sounds normal, Soft, No tenderness, No masses,  No CVA tenderness. Musculoskeletal:  Intact distal pulses, No edema, No tenderness  Integument:  Warm, Dry, No erythema, No rash. Lymphatic:  No lymphadenopathy noted. Neurologic:  Alert & oriented x 3, Normal motor function, Normal sensory function, No focal deficits noted.

## 2018-01-05 NOTE — PATIENT INSTRUCTIONS
You have been given a lab order no need to schedule no need to fast   Your note to donate plasma was printed and given to you. You have been given an order and instructions for a Mammogram. The order was faxed to the scheduling department and they will contact you with an appointment. Please bring order with you to that appointment. The scheduling number is [phone] if you have scheduling problems. Your medications for this visit were escribed to your preferred pharmacy. Avs was given and reviewed appt card given with next appt.  MM

## 2018-01-07 ASSESSMENT — ENCOUNTER SYMPTOMS
NAUSEA: 0
SORE THROAT: 0
BACK PAIN: 1
BLURRED VISION: 0
SPUTUM PRODUCTION: 0
COUGH: 1
ABDOMINAL PAIN: 0
EYE REDNESS: 0
HEARTBURN: 1
WHEEZING: 0
CONSTIPATION: 0
SHORTNESS OF BREATH: 0
SINUS PAIN: 0

## 2018-01-12 ENCOUNTER — OFFICE VISIT (OUTPATIENT)
Dept: INTERNAL MEDICINE | Age: 58
End: 2018-01-12
Payer: MEDICARE

## 2018-01-12 VITALS
WEIGHT: 197 LBS | HEART RATE: 86 BPM | DIASTOLIC BLOOD PRESSURE: 61 MMHG | BODY MASS INDEX: 32.82 KG/M2 | TEMPERATURE: 98.3 F | SYSTOLIC BLOOD PRESSURE: 89 MMHG | HEIGHT: 65 IN | OXYGEN SATURATION: 95 %

## 2018-01-12 DIAGNOSIS — R68.89 FLU-LIKE SYMPTOMS: Primary | ICD-10-CM

## 2018-01-12 DIAGNOSIS — J06.9 VIRAL UPPER RESPIRATORY ILLNESS: ICD-10-CM

## 2018-01-12 DIAGNOSIS — Z72.0 TOBACCO ABUSE: ICD-10-CM

## 2018-01-12 LAB
INFLUENZA A ANTIBODY: NORMAL
INFLUENZA B ANTIBODY: NORMAL

## 2018-01-12 PROCEDURE — 87804 INFLUENZA ASSAY W/OPTIC: CPT | Performed by: INTERNAL MEDICINE

## 2018-01-12 PROCEDURE — 99213 OFFICE O/P EST LOW 20 MIN: CPT | Performed by: INTERNAL MEDICINE

## 2018-01-12 RX ORDER — NICOTINE 21 MG/24HR
1 PATCH, TRANSDERMAL 24 HOURS TRANSDERMAL EVERY 24 HOURS
Qty: 30 PATCH | Refills: 3 | Status: SHIPPED | OUTPATIENT
Start: 2018-01-12 | End: 2019-01-12

## 2018-01-12 RX ORDER — BENZONATATE 100 MG/1
100 CAPSULE ORAL 3 TIMES DAILY PRN
Qty: 30 CAPSULE | Refills: 0 | Status: SHIPPED | OUTPATIENT
Start: 2018-01-12 | End: 2018-01-19

## 2018-01-12 NOTE — PATIENT INSTRUCTIONS
Patient Education        Viral Infections: Care Instructions  Your Care Instructions    You don't feel well, but it's not clear what's causing it. You may have a viral infection. Viruses cause many illnesses, such as the common cold, influenza, fever, rashes, and the diarrhea, nausea, and vomiting that are often called \"stomach flu. \" You may wonder if antibiotic medicines could make you feel better. But antibiotics only treat infections caused by bacteria. They don't work on viruses. The good news is that viral infections usually aren't serious. Most will go away in a few days without medical treatment. In the meantime, there are a few things you can do to make yourself more comfortable. Follow-up care is a key part of your treatment and safety. Be sure to make and go to all appointments, and call your doctor if you are having problems. It's also a good idea to know your test results and keep a list of the medicines you take. How can you care for yourself at home? · Get plenty of rest if you feel tired. · Take an over-the-counter pain medicine if needed, such as acetaminophen (Tylenol), ibuprofen (Advil, Motrin), or naproxen (Aleve). Read and follow all instructions on the label. · Be careful when taking over-the-counter cold or flu medicines and Tylenol at the same time. Many of these medicines have acetaminophen, which is Tylenol. Read the labels to make sure that you are not taking more than the recommended dose. Too much acetaminophen (Tylenol) can be harmful. · Drink plenty of fluids, enough so that your urine is light yellow or clear like water. If you have kidney, heart, or liver disease and have to limit fluids, talk with your doctor before you increase the amount of fluids you drink. · Stay home from work, school, and other public places while you have a fever. When should you call for help? Call 911 anytime you think you may need emergency care.  For example, call if:  ? · You have severe trouble breathing. ? · You passed out (lost consciousness). ?Call your doctor now or seek immediate medical care if:  ? · You seem to be getting much sicker. ? · You have a new or higher fever. ? · You have blood in your stools. ? · You have new belly pain, or your pain gets worse. ? · You have a new rash. ? Watch closely for changes in your health, and be sure to contact your doctor if:  ? · You start to get better and then get worse. ? · You do not get better as expected. Where can you learn more? Go to [email or web address] and sign in to your [employer] account. Enter Z427 in the mapp2link box to learn more about \"Viral Infections: Care Instructions. \"     If you do not have an account, please click on the \"Sign Up Now\" link. Current as of: March 3, 2017  Content Version: 11.5  © 9611-1901 [employer]. Care instructions adapted under license by [facility] ([facility]). If you have questions about a medical condition or this instruction, always ask your healthcare professional. [place] [address] any warranty or liability for your use of this information. Your medications for this visit were escribed to your preferred pharmacy. Today we ran a rapid flu test on you which came back negative. Avs was given and reviewed appt card given with future appointment scheduled with Dr. Ceci Victoria on 4/10/18 .      MS

## 2018-01-12 NOTE — PROGRESS NOTES
DEPENDENT [address]     No current facility-administered medications on file prior to visit. SOCIAL HISTORY    Reviewed and no change from previous record. Carolina  reports that she has been smoking Cigarettes. She has a 9.00 pack-year smoking history.  She has never used smokeless tobacco.    FAMILY HISTORY:    Reviewed and No change from previous visit    REVIEW OF SYSTEMS:    Constitutional: generalized fatigue    ENT: negative  Respiratory: +congestion, dry cough, no shortness of breath, or wheezing  Cardiovascular: no chest pain or dyspnea on exertion  Gastrointestinal: no abdominal pain, black or bloody stools  Neurological: no TIA or stroke symptoms  Endocrine: negative  Genito-Urinary: no dysuria, trouble voiding, or hematuria  Musculoskeletal: + myalgias  Dermatological: negative    PHYSICAL EXAM:      Vitals:    01/12/18 1106   BP: 92/63   Pulse: 88   Temp: 98.3 °F (36.8 °C)   SpO2: 95%     General appearance - alert, well appearing, and in no distress  Mental status - alert, oriented to person, place, and time, normal mood, behavior, speech, dress, motor activity, and thought processes  Chest - clear to auscultation, no wheezes, rales or rhonchi, symmetric air entry  Heart - normal rate and regular rhythm, S1 and S2 normal  Skin - normal coloration and turgor, no rashes, no suspicious skin lesions noted    LABORATORY FINDINGS:    CBC:  Lab Results   Component Value Date    WBC 5.5 07/19/2017    HGB 14.9 07/19/2017     07/19/2017     02/16/2012     BMP:    Lab Results   Component Value Date     07/19/2017    K 4.0 07/19/2017     07/19/2017    CO2 25 07/19/2017    BUN 16 07/19/2017    CREATININE 0.83 07/19/2017    GLUCOSE 100 07/19/2017     HEMOGLOBIN A1C:   Lab Results   Component Value Date    LABA1C 5.9 01/05/2018     MICROALBUMIN URINE:   Lab Results   Component Value Date    MICROALBUR <12 01/05/2018     FASTING LIPID PANEL:  Lab Results   Component Value Date    CHOL 157 07/19/2017    HDL 44 07/19/2017    TRIG 145 07/19/2017     LIVER PROFILE:  Lab Results   Component Value Date    ALT 14 07/19/2017    AST 16 07/19/2017    PROT 7.8 07/19/2017    BILITOT 0.36 07/19/2017    BILIDIR <0.20 12/23/2014    LABALBU 4.3 07/19/2017      THYROID FUNCTION:   Lab Results   Component Value Date    TSH 0.94 07/19/2017      URINE ANALYSIS: No results found for: LABURIN  ASSESSMENT AND PLAN:    1. Flu-like symptoms  - POCT Influenza A/B negative    2. Viral upper respiratory illness  - symptomatic management  - encourage fluid intake  - benzonatate (TESSALON PERLES) 100 MG capsule; Take 1 capsule by mouth 3 times daily as needed for Cough  Dispense: 15 capsule; Refill: 0  - hypotension--Advised to not take antihypertensives if SBP under 308 or diastolic blood pressure under 60    3. Tobacco abuse  Smoking cessation    FOLLOW UP:   RV per regular scheduled follow up or if persistent/worsening symptoms    1. Carolina received counseling on the following healthy behaviors: encourage fluid intake   2. Patient given educational materials - see patient instructions  3. Discussed use, benefit, and side effects of prescribed medications. Barriers to medication compliance addressed. All patient questions answered. Pt voiced understanding. 4.  Reviewed prior labs and health maintenance  5. Continue current medications, diet and exercise.       Yue Vincent MD  Internal Medicine, PGY2

## 2018-01-12 NOTE — PROGRESS NOTES
HYPERTENSION visit     BP Readings from Last 3 Encounters:   01/05/18 130/80   12/20/17 115/70   11/28/17 112/77       LDL Cholesterol (mg/dL)   Date Value   07/19/2017 84     HDL (mg/dL)   Date Value   07/19/2017 44     BUN (mg/dL)   Date Value   07/19/2017 16     CREATININE (mg/dL)   Date Value   07/19/2017 0.83     Glucose (mg/dL)   Date Value   07/19/2017 100 (H)              Have you changed or started any medications since your last visit including any over-the-counter medicines, vitamins, or herbal medicines? no   Have you stopped taking any of your medications? Is so, why? -  no  Are you having any side effects from any of your medications? - no    Have you seen any other physician or provider since your last visit?  no   Have you had any other diagnostic tests since your last visit?  no   Have you been seen in the emergency room and/or had an admission in a hospital since we last saw you?  no   Have you had your routine dental cleaning in the past 6 months?  no     Do you have an active MyChart account? If no, what is the barrier?   No:     Patient Care Team:  Cecil Denton MD as PCP - General (Internal Medicine)  Mele Flor DPM as Consulting Physician (Podiatry)  Kern Hashimoto ([facility])  Lafonda Leyden, MD as Consulting Physician (Orthopedic Surgery)  Randa Jane MD as Anesthesiologist (Pain Management)  William Doe OD (Optometry)    Medical History Review  Past Medical, Family, and Social History reviewed and does not contribute to the patient presenting condition    Health Maintenance   Topic Date Due    Cervical cancer screen  04/19/2016    Diabetic retinal exam  09/26/2017    DTaP/Tdap/Td vaccine (1 - Tdap) 07/05/2018 (Originally 7/8/1977)    Diabetic foot exam  05/01/2018    Lipid screen  07/19/2018    Potassium monitoring  07/19/2018    Creatinine monitoring  07/19/2018    Breast cancer screen  09/19/2018    A1C test (Diabetic or Prediabetic)  01/05/2019   

## 2018-01-19 ENCOUNTER — TELEPHONE (OUTPATIENT)
Dept: INTERNAL MEDICINE | Age: 58
End: 2018-01-19

## 2018-01-25 ENCOUNTER — HOSPITAL ENCOUNTER (OUTPATIENT)
Dept: PAIN MANAGEMENT | Age: 58
Discharge: HOME | End: 2018-01-25
Payer: MEDICARE

## 2018-01-25 VITALS
RESPIRATION RATE: 16 BRPM | SYSTOLIC BLOOD PRESSURE: 109 MMHG | OXYGEN SATURATION: 92 % | HEART RATE: 72 BPM | DIASTOLIC BLOOD PRESSURE: 89 MMHG | TEMPERATURE: 97.6 F

## 2018-01-25 DIAGNOSIS — M54.5 CHRONIC LOW BACK PAIN, UNSPECIFIED BACK PAIN LATERALITY, WITH SCIATICA PRESENCE UNSPECIFIED: Primary | ICD-10-CM

## 2018-01-25 DIAGNOSIS — G89.29 CHRONIC LOW BACK PAIN, UNSPECIFIED BACK PAIN LATERALITY, WITH SCIATICA PRESENCE UNSPECIFIED: Primary | ICD-10-CM

## 2018-01-25 PROCEDURE — 99214 OFFICE O/P EST MOD 30 MIN: CPT

## 2018-01-25 PROCEDURE — 99214 OFFICE O/P EST MOD 30 MIN: CPT | Performed by: PAIN MEDICINE

## 2018-01-25 RX ORDER — HYDROCODONE BITARTRATE AND ACETAMINOPHEN 5; 325 MG/1; MG/1
1 TABLET ORAL EVERY 8 HOURS PRN
Qty: 90 TABLET | Refills: 0 | Status: SHIPPED | OUTPATIENT
Start: 2018-01-28 | End: 2018-02-22 | Stop reason: SDUPTHER

## 2018-01-25 ASSESSMENT — ENCOUNTER SYMPTOMS
GASTROINTESTINAL NEGATIVE: 1
COUGH: 1
BACK PAIN: 1
EYES NEGATIVE: 1

## 2018-01-25 NOTE — PROGRESS NOTES
INHALE 2 PUFFS INTO THE LUNGS 2 TIMES DAILY, Disp: 12 g, Rfl: 11    tiZANidine (ZANAFLEX) 4 MG tablet, TAKE 1 TABLET TWO TIMES DAILY, Disp: 60 tablet, Rfl: 3    gabapentin (NEURONTIN) 300 MG capsule, TAKE 1 CAPSULE in morning and afternoon and 2 at night, Disp: 120 capsule, Rfl: 6    diphenhydrAMINE (BENADRYL) 25 MG tablet, Take by mouth every 6 hours as needed for Itching , Disp: , Rfl:     Alcohol Swabs (EASY TOUCH ALCOHOL PREP MEDIUM) 70 % PADS, , Disp: , Rfl:     azelastine (ASTELIN) 0.1 % nasal spray, 2 sprays by Nasal route 2 times daily Use in each nostril as directed, Disp: 1 Bottle, Rfl: 3    ipratropium-albuterol (DUONEB) 0.5-2.5 (3) MG/3ML SOLN nebulizer solution, Inhale 3 mLs into the lungs every 6 hours as needed for Shortness of Breath, Disp: 360 mL, Rfl: 11    Family History   Problem Relation Age of Onset    Diabetes Mother     Heart Disease Mother     Cirrhosis Father     Breast Cancer Neg Hx     Cancer Neg Hx     Colon Cancer Neg Hx     Eclampsia Neg Hx     Hypertension Neg Hx     Ovarian Cancer Neg Hx      Labor Neg Hx     Spont Abortions Neg Hx     Stroke Neg Hx        Social History     Social History    Marital status: Single     Spouse name: N/A    Number of children: N/A    Years of education: N/A     Occupational History    Not on file. Social History Main Topics    Smoking status: Current Every Day Smoker     Packs/day: 0.25     Years: 36.00     Types: Cigarettes     Last attempt to quit: 2015    Smokeless tobacco: Never Used      Comment: pt currently using nicotine patches    Alcohol use No    Drug use: No      Comment: 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 7 yrs    Sexual activity: No     Other Topics Concern    Not on file     Social History Narrative    No narrative on file       Review of Systems:  Review of Systems   Constitution: Positive for weakness. Eyes: Negative. Cardiovascular: Positive for dyspnea on exertion. Respiratory: Positive for cough. Musculoskeletal: Positive for back pain. Gastrointestinal: Negative. Genitourinary: Negative. Neurological: Positive for tingling. Physical Exam:  /89   Pulse 72   Temp 97.6 °F (36.4 °C) (Oral)   Resp 16   LMP 04/19/2003   SpO2 92%     Physical Exam   Constitutional: She is well-developed, well-nourished, and in no distress. Musculoskeletal:        Lumbar back: She exhibits tenderness. She exhibits no edema and no deformity. Nursing note and vitals reviewed. Record/Diagnostics Review:    As above, I did review the imaging    Assessment:  Low back pain  Lumbar Radiculopathy  More postlaminectomy syndrome  Right knee pain  Chronic opioid therapy      Treatment Plan:  DISCUSSION: Treatment options discussed with patient and all questions answered to patient's satisfaction. OARRS Review: Reviewed and acceptable for medications prescribed. TREATMENT OPTIONS:     Continue current medication management, has been stable and compliant. Norco 3 times a day when necessary, morphine equivalent dose 15, low dose opioid therapy. Continues to have low back pain MRI with no significant stenosis with previous laminectomy. Has had multiple interventions with no improvement, maybe candidate for neuromodulation the form of spinal cord stimulation and information was provided.       Lilly Merlin, M.D.

## 2018-01-29 ENCOUNTER — OFFICE VISIT (OUTPATIENT)
Dept: INTERNAL MEDICINE | Age: 58
End: 2018-01-29
Payer: MEDICARE

## 2018-01-29 VITALS
BODY MASS INDEX: 32.49 KG/M2 | HEIGHT: 65 IN | DIASTOLIC BLOOD PRESSURE: 88 MMHG | WEIGHT: 195 LBS | TEMPERATURE: 98.7 F | OXYGEN SATURATION: 96 % | SYSTOLIC BLOOD PRESSURE: 142 MMHG | HEART RATE: 84 BPM

## 2018-01-29 DIAGNOSIS — G47.33 OSA (OBSTRUCTIVE SLEEP APNEA): ICD-10-CM

## 2018-01-29 DIAGNOSIS — J96.01 ACUTE RESPIRATORY FAILURE WITH HYPOXEMIA (HCC): ICD-10-CM

## 2018-01-29 DIAGNOSIS — I10 ESSENTIAL HYPERTENSION: ICD-10-CM

## 2018-01-29 DIAGNOSIS — J18.9 COMMUNITY ACQUIRED PNEUMONIA OF LEFT LOWER LOBE OF LUNG: Primary | ICD-10-CM

## 2018-01-29 DIAGNOSIS — J43.8 OTHER EMPHYSEMA (HCC): ICD-10-CM

## 2018-01-29 PROCEDURE — 99214 OFFICE O/P EST MOD 30 MIN: CPT | Performed by: INTERNAL MEDICINE

## 2018-01-29 NOTE — PROGRESS NOTES
[facility]    Progress Note    Date of patient's visit: 1/29/2018    Patient's Name:  Everette Quiroga    YOB: 1960            Patient Care Team:  Alexander Arnold MD as PCP - General (Internal Medicine)  Efrain Joe DPM as Consulting Physician (Podiatry)  Johnny Daniel ([address])  Wendy Lockhart MD as Consulting Physician (Orthopedic Surgery)  Sean Lowry MD as Anesthesiologist (Pain Management)  Jewel Rodriges OD (Optometry)    REASON FOR VISIT: Routine outpatient follow     Chief Complaint   Patient presents with    Follow-Up from Hospital     patient was seen at [address] 1/19-1/23     Pneumonia     patient states that she feels better was sent home on oxygen was told to ask for sleep study and pulmnologist    Health Maintenance     patient states that she had eye exam back in oct due for pap     Hypertension     was told to stop lisinopril with htcz and take plain lisinopril patient has not stopped meds waiting for ok from pcp         HISTORY OF PRESENT ILLNESS:    History was obtained from the patient. Everette Quiroga is a 59 y.o. is here for Follow-up after hospitalization at [facility] for left lower lobe community-acquired pneumonia. She was also in acute respiratory failure. She was sent home on oxygen. She says she is currently not using it in the day. Cough is better. She has symptoms of obstructive sleep apnea including daytime fatigue. She has headaches. She has poor sleep pattern. In the hospital she was dehydrated initially and her lisinopril and hydrochlorothiazide were held. She was advised to restart it after leaving the hospital.  She has not taken it yet. Her creatinine had improved on discharge. Today her pulse ox is 96% on room air. She is not using oxygen currently.     CXR:  2018 January  X-ray chest 1 view1/19/2018  [address]  Result Narrative   History: 53-year-old female, cough    Comparison: Chest x-ray on 1/10/2015    Findings: Portable AP upright view of the chest was obtained.  Cardiac silhouette is within normal limits.  Left lower lobe airspace disease with likely associated pleural effusion.  Pulmonary vasculature is mildly prominent.  No pneumothorax. IMPRESSION:  *  Left lower lobe airspace disease most likely representing pneumonia. *  Small left pleural effusion. PFT  INTERPRETATION:  This study demonstrates a very mild restrictive ventilatory defect. No  significant bronchospastic component is identified. Lung volumes  demonstrate an increase in RV. FRC and TLC are normal.  Airway resistance  is increased.   Diffusion capacity is normal.     IMPRESSION:  This study is most consistent with a restrictive defect of the  extrapulmonic type and clinical correlation is required.       Past Medical History:   Diagnosis Date    Allergic rhinitis     Arthropathy, unspecified, other specified sites 4/30/2013    Bronchitis     Chronic back pain     [facility]     Depression     DM (diabetes mellitus) [facility] 12/18/2012    Emphysema of lung (HCC)     GERD (gastroesophageal reflux disease)     Hyperlipidemia     Hypertension     Knee pain     right knee mostly    Leg pain, right     Obesity     Osteoarthritis     Peripheral vascular disease (HCC)     Primary osteoarthritis of both knees     Radicular pain of lumbosacral region     Spinal stenosis, lumbar region, without neurogenic claudication 4/30/2013    Type II or unspecified type diabetes mellitus without mention of complication, not stated as uncontrolled     Unspecified sleep apnea     URI (upper respiratory infection)        Past Surgical History:   Procedure Laterality Date    COLONOSCOPY  2/12/2009    normal    DILATION AND CURETTAGE OF UTERUS      GASTRECTOMY      partial    NERVE BLOCK Right 4/30/13    Lumbar Diagnostic Block, 2 times daily (with meals) 180 tablet 1    omeprazole (PRILOSEC) 20 MG delayed release capsule Take 1 capsule by mouth daily 30 capsule 11     MG capsule TAKE 1 CAPSULE EVERY DAY 30 capsule 11    fexofenadine (ALLEGRA) 180 MG tablet Take 1 tablet by mouth daily 30 tablet 1    DULoxetine (CYMBALTA) 60 MG extended release capsule Take 1 capsule by mouth daily Indications: 2 tab daily 30 capsule 3    mirtazapine (REMERON) 45 MG tablet Take 1 tablet by mouth nightly 30 tablet 3    atorvastatin (LIPITOR) 40 MG tablet Take 1 tablet by mouth daily 90 tablet 1    amLODIPine (NORVASC) 10 MG tablet Take 1 tablet by mouth daily 90 tablet 3    albuterol sulfate HFA (VENTOLIN HFA) 108 (90 Base) MCG/ACT inhaler Inhale 2 puffs into the lungs every 6 hours as needed for Wheezing 1 Inhaler 3    solifenacin (VESICARE) 5 MG tablet Take 1 tablet by mouth daily 30 tablet 3    ADVAIR -21 MCG/ACT inhaler INHALE 2 PUFFS INTO THE LUNGS 2 TIMES DAILY 12 g 11    tiZANidine (ZANAFLEX) 4 MG tablet TAKE 1 TABLET TWO TIMES DAILY 60 tablet 3    gabapentin (NEURONTIN) 300 MG capsule TAKE 1 CAPSULE in morning and afternoon and 2 at night 120 capsule 6    diphenhydrAMINE (BENADRYL) 25 MG tablet Take by mouth every 6 hours as needed for Itching       Alcohol Swabs (EASY TOUCH ALCOHOL PREP MEDIUM) 70 % PADS       azelastine (ASTELIN) 0.1 % nasal spray 2 sprays by Nasal route 2 times daily Use in each nostril as directed 1 Bottle 3    ipratropium-albuterol (DUONEB) 0.5-2.5 (3) MG/3ML SOLN nebulizer solution Inhale 3 mLs into the lungs every 6 hours as needed for Shortness of Breath 360 mL 11     No current facility-administered medications on file prior to visit. SOCIAL HISTORY    Reviewed and no change from previous record. Carolina  reports that she has been smoking Cigarettes. She has a 9.00 pack-year smoking history.  She has never used smokeless tobacco.    FAMILY HISTORY:    Reviewed and No change from previous visit    HEALTH MAINTENANCE DUE:      Health Maintenance Due   Topic Date Due    Cervical cancer screen  04/19/2016    Diabetic retinal exam  09/26/2017       REVIEW OF SYSTEMS:    12 point review of symptoms completed and found to be normal except noted in the HPI    Review of Systems   Constitutional: Positive for malaise/fatigue. Negative for chills, diaphoresis and fever. HENT: Positive for congestion. Negative for ear pain and sore throat. Eyes: Negative for blurred vision and redness. Respiratory: Positive for cough. Negative for sputum production, shortness of breath and wheezing. Cardiovascular: Negative for chest pain, palpitations and leg swelling. Gastrointestinal: Negative for abdominal pain, constipation and nausea. Genitourinary: Negative for dysuria and frequency. Skin: Negative for itching and rash. Neurological: Positive for headaches. Negative for dizziness, tremors and loss of consciousness. Endo/Heme/Allergies: Negative for polydipsia. Does not bruise/bleed easily. PHYSICAL EXAM:      Vitals:    01/29/18 1442 01/29/18 1449   BP: (!) 151/135 (!) 142/88   Site: Left Arm    Position: Sitting    Cuff Size: Large Adult    Pulse: 84    Temp: 98.7 °F (37.1 °C)    SpO2: 96%    Weight: 195 lb (88.5 kg)    Height: 5' 5\" (1.651 m)      Body mass index is 32.45 kg/m². BP Readings from Last 3 Encounters:   01/29/18 (!) 142/88   01/25/18 109/89   01/12/18 89/61        Wt Readings from Last 3 Encounters:   01/29/18 195 lb (88.5 kg)   01/12/18 197 lb (89.4 kg)   01/05/18 203 lb (92.1 kg)       Physical Exam      HENT:  Normocephalic, Atraumatic, Bilateral TM normal, Oropharynx moist, No oral exudates, Nose congested. Neck- Normal range of motion, No tenderness, Supple, No stridor. Eyes:  PERRL, EOMI, Conjunctiva normal, No discharge. Respiratory:  distant breath sounds, No respiratory distress, No wheezing, No chest tenderness.    Cardiovascular:  Normal heart rate, Normal oxygen eval again after acute process better    5. JOAN (obstructive sleep apnea)    - Baseline Diagnostic Sleep Study; Future  - Sleep Study with PAP Titration; Future          FOLLOW UP AND INSTRUCTIONS:   Return in about 2 weeks (around 2/12/2018). 1. Carolina received counseling on the following healthy behaviors: nutrition, exercise and medication adherence    2. Reviewed prior labs and health maintenance. 3. Discussed use, benefit, and side effects of prescribed medications. Barriers to medication compliance addressed. All patient questions answered. Pt voiced understanding.      4. Patient given educational materials - see patient instructions    Keyla Moss  Attending Physician, [address] [place] [address], Internal Medicine Residency Program  [address] [place] [address]  1/29/2018, 2:57 PM

## 2018-01-31 ENCOUNTER — HOSPITAL ENCOUNTER (OUTPATIENT)
Age: 58
Discharge: HOME | End: 2018-02-02
Payer: MEDICARE

## 2018-01-31 ENCOUNTER — HOSPITAL ENCOUNTER (OUTPATIENT)
Dept: GENERAL RADIOLOGY | Age: 58
Discharge: HOME | End: 2018-02-02
Payer: MEDICARE

## 2018-01-31 ENCOUNTER — HOSPITAL ENCOUNTER (OUTPATIENT)
Dept: PULMONOLOGY | Age: 58
Discharge: HOME | End: 2018-01-31
Payer: MEDICARE

## 2018-01-31 DIAGNOSIS — J18.9 COMMUNITY ACQUIRED PNEUMONIA OF LEFT LOWER LOBE OF LUNG: ICD-10-CM

## 2018-01-31 DIAGNOSIS — J43.8 OTHER EMPHYSEMA (HCC): ICD-10-CM

## 2018-01-31 PROCEDURE — 94729 DIFFUSING CAPACITY: CPT

## 2018-01-31 PROCEDURE — 71046 X-RAY EXAM CHEST 2 VIEWS: CPT

## 2018-01-31 PROCEDURE — 94760 N-INVAS EAR/PLS OXIMETRY 1: CPT

## 2018-01-31 PROCEDURE — 94618 PULMONARY STRESS TESTING: CPT

## 2018-01-31 PROCEDURE — 94664 DEMO&/EVAL PT USE INHALER: CPT

## 2018-01-31 PROCEDURE — 94060 EVALUATION OF WHEEZING: CPT

## 2018-01-31 PROCEDURE — 88738 HGB QUANT TRANSCUTANEOUS: CPT

## 2018-01-31 PROCEDURE — 94640 AIRWAY INHALATION TREATMENT: CPT

## 2018-01-31 PROCEDURE — 94726 PLETHYSMOGRAPHY LUNG VOLUMES: CPT

## 2018-02-01 RX ORDER — MIRTAZAPINE 45 MG/1
45 TABLET, FILM COATED ORAL NIGHTLY
Qty: 30 TABLET | Refills: 3 | Status: SHIPPED | OUTPATIENT
Start: 2018-02-01 | End: 2018-06-18 | Stop reason: SDUPTHER

## 2018-02-01 RX ORDER — DULOXETIN HYDROCHLORIDE 60 MG/1
60 CAPSULE, DELAYED RELEASE ORAL DAILY
Qty: 30 CAPSULE | Refills: 3 | Status: SHIPPED | OUTPATIENT
Start: 2018-02-01

## 2018-02-05 ASSESSMENT — ENCOUNTER SYMPTOMS
NAUSEA: 0
ABDOMINAL PAIN: 0
SPUTUM PRODUCTION: 0
EYE REDNESS: 0
COUGH: 1
SORE THROAT: 0
SHORTNESS OF BREATH: 0
CONSTIPATION: 0
BLURRED VISION: 0
WHEEZING: 0

## 2018-02-09 ENCOUNTER — TELEPHONE (OUTPATIENT)
Dept: INTERNAL MEDICINE | Age: 58
End: 2018-02-09

## 2018-02-15 ENCOUNTER — OFFICE VISIT (OUTPATIENT)
Dept: INTERNAL MEDICINE | Age: 58
End: 2018-02-15
Payer: MEDICARE

## 2018-02-15 ENCOUNTER — HOSPITAL ENCOUNTER (OUTPATIENT)
Age: 58
Setting detail: SPECIMEN
Discharge: HOME | End: 2018-02-15
Payer: MEDICARE

## 2018-02-15 VITALS
SYSTOLIC BLOOD PRESSURE: 154 MMHG | OXYGEN SATURATION: 96 % | HEART RATE: 104 BPM | BODY MASS INDEX: 32.99 KG/M2 | WEIGHT: 198 LBS | DIASTOLIC BLOOD PRESSURE: 82 MMHG | HEIGHT: 65 IN

## 2018-02-15 DIAGNOSIS — Z12.4 CERVICAL CANCER SCREENING: ICD-10-CM

## 2018-02-15 DIAGNOSIS — E11.9 TYPE 2 DIABETES MELLITUS WITHOUT COMPLICATION, WITHOUT LONG-TERM CURRENT USE OF INSULIN (HCC): ICD-10-CM

## 2018-02-15 DIAGNOSIS — E78.00 PURE HYPERCHOLESTEROLEMIA: ICD-10-CM

## 2018-02-15 DIAGNOSIS — J43.8 OTHER EMPHYSEMA (HCC): ICD-10-CM

## 2018-02-15 DIAGNOSIS — I10 ESSENTIAL HYPERTENSION: Primary | ICD-10-CM

## 2018-02-15 PROCEDURE — 99213 OFFICE O/P EST LOW 20 MIN: CPT | Performed by: INTERNAL MEDICINE

## 2018-02-15 RX ORDER — CARVEDILOL 6.25 MG/1
6.25 TABLET ORAL 2 TIMES DAILY
Qty: 60 TABLET | Refills: 3 | Status: SHIPPED | OUTPATIENT
Start: 2018-02-15 | End: 2018-06-18 | Stop reason: SDUPTHER

## 2018-02-15 NOTE — PROGRESS NOTES
[facility]    Progress Note    Date of patient's visit: 2/15/2018    Patient's Name:  Tiffanie Ho    YOB: 1960            Patient Care Team:  Jake Snider MD as PCP - General (Internal Medicine)  Soham Troncoso DPM as Consulting Physician (Podiatry)  Fantasma Reason ([address])  Marry Bertrand MD as Consulting Physician (Orthopedic Surgery)  Jennifer Vázquez MD as Anesthesiologist (Pain Management)  Noel Alanis OD (Optometry)    REASON FOR VISIT: Routine outpatient follow     Chief Complaint   Patient presents with    Gynecologic Exam     Pt is here for PAP         HISTORY OF PRESENT ILLNESS:    History was obtained from the patient. Tiffanie Ho is a 59 y.o. is here for Follow-up on her hypertension and diabetes. She was recently admitted for pneumonia. She was sent home on oxygen. She is not sure if she needs to keep using it. She did not bring it with her today. She also needs a repeat Pap smear. She had one done last time but the specimen was lost  Blood pressure is elevated today. It was also elevated last visit. She says she's taking her lisinopril/hydrochlorothiazide and Norvasc. She did not bring her medication bottles. Her blood pressure medications were decreased in the hospital as she was hypotensive. Advised her to start a beta blocker and to come back in a month with all her blood pressure medications. Blood sugars have been well controlled.         Past Medical History:   Diagnosis Date    Allergic rhinitis     Arthropathy, unspecified, other specified sites 4/30/2013    Bronchitis     Chronic back pain     [facility]     Depression     [facility]di[facility] 12/18/2012    Emphysema of lung ([facility])     GERD (gastroesophageal reflux disease)     Hyperlipidemia     Hypertension     Knee pain     right knee mostly    Leg pain, right     Obesity     Osteoarthritis     Peripheral vascular disease (HCC)     Primary osteoarthritis of both knees     Radicular pain of lumbosacral region     Spinal stenosis, lumbar region, without neurogenic claudication 4/30/2013    Type II or unspecified type diabetes mellitus without mention of complication, not stated as uncontrolled     Unspecified sleep apnea     URI (upper respiratory infection)        Past Surgical History:   Procedure Laterality Date    COLONOSCOPY  2/12/2009    normal    DILATION AND CURETTAGE OF UTERUS      GASTRECTOMY      partial    NERVE BLOCK Right 4/30/13    Lumbar Diagnostic Block,  Kenalog 40 mg    NERVE BLOCK  5/23/13    Lumbar Radiofrequency, Kenalog 40mg    NERVE BLOCK  8/12/13    Lt MBNB  celestone 6mg    NERVE BLOCK Left 8-28-13    left lumbar diagnostic block #2 decadron 10 mg    NERVE BLOCK Left 9-24-13    left lumbar median branch radiofrequency    NERVE BLOCK  07-02-14    caudal, celestone 9 mg    NERVE BLOCK  7-16-14    caudal epidural #2, celestone 9mg, fentanyl 25mcg    NERVE BLOCK  7/30/14    caudal #3 decadron 10mg    NERVE BLOCK  11-6-14    duramorph epidural steroid block  duramorph 1 mg celestone 9 mg    NERVE BLOCK  11/20/15    TENS- Empi Select    [place] KNEE SCOPE,DIAGNOSTIC Right 3/24/2017    KNEE ARTHROSCOPY WITH PARTIAL MEDIAL MENISECAL DEBRIDMENT  performed by Pop Gonzalez MD at [address] [place] [address]  01/2015    lumbar diskectomy    UPPER GASTROINTESTINAL ENDOSCOPY  9 20 2007    UPPER GASTROINTESTINAL ENDOSCOPY  4 21 2009,04/2011    gastritis, esophagitis         ALLERGIES      Allergies   Allergen Reactions    Aspirin     Claritin [Loratadine] Other (See Comments)     coughing    Flonase [Fluticasone Propionate]        MEDICATIONS:      Current Outpatient Prescriptions on File Prior to Visit   Medication Sig Dispense Refill    DULoxetine (CYMBALTA) 60 MG extended release capsule Take 1 capsule by mouth daily 30 capsule 3    mirtazapine 07/19/2017    BILITOT 0.36 07/19/2017    BILIDIR <0.20 12/23/2014    LABALBU 4.3 07/19/2017      THYROID FUNCTION:   Lab Results   Component Value Date    TSH 0.94 07/19/2017      URINE ANALYSIS: No results found for: LABURIN  ASSESSMENT AND PLAN:    1. Essential hypertension  Add Coreg  Continue Lisinopril/HCTZ  norvasc    - carvedilol (COREG) 6.25 MG tablet; Take 1 tablet by mouth 2 times daily  Dispense: 60 tablet; Refill: 3    2. Cervical cancer screening    - PAP Smear; Future    3. Other emphysema (HCC)  Stable  inhalers    4. Type 2 diabetes mellitus without complication, without long-term current use of insulin (HCC)  Controlled  Metformin  asa  Statins      5. Pure hypercholesterolemia            FOLLOW UP AND INSTRUCTIONS:   1. Return in about 3 months (around 5/15/2018). 2. Carolina received counseling on the following healthy behaviors: nutrition, exercise and medication adherence    3. Reviewed prior labs and health maintenance. 4. Discussed use, benefit, and side effects of prescribed medications. Barriers to medication compliance addressed. All patient questions answered. Pt voiced understanding.          Zachary Jamil  Attending Physician, [facility], Internal Medicine Residency Program  [address] [place] [address]  2/15/2018, 3:51 PM

## 2018-02-17 DIAGNOSIS — M47.816 SPONDYLOSIS OF LUMBAR REGION WITHOUT MYELOPATHY OR RADICULOPATHY: ICD-10-CM

## 2018-02-18 ASSESSMENT — ENCOUNTER SYMPTOMS
SINUS PAIN: 0
NAUSEA: 0
CONSTIPATION: 0
BACK PAIN: 0
SHORTNESS OF BREATH: 0
COUGH: 1
SPUTUM PRODUCTION: 0
ABDOMINAL PAIN: 0

## 2018-02-19 RX ORDER — TIZANIDINE 4 MG/1
TABLET ORAL
Qty: 60 TABLET | Refills: 3 | Status: SHIPPED | OUTPATIENT
Start: 2018-02-19 | End: 2018-07-24 | Stop reason: SDUPTHER

## 2018-02-20 ENCOUNTER — HOSPITAL ENCOUNTER (OUTPATIENT)
Dept: SLEEP CENTER | Age: 58
Discharge: HOME | End: 2018-02-22
Payer: MEDICARE

## 2018-02-20 DIAGNOSIS — G47.33 OSA (OBSTRUCTIVE SLEEP APNEA): ICD-10-CM

## 2018-02-20 PROCEDURE — 95810 POLYSOM 6/> YRS 4/> PARAM: CPT

## 2018-02-21 VITALS — BODY MASS INDEX: 31.34 KG/M2 | WEIGHT: 195 LBS | HEIGHT: 66 IN | RESPIRATION RATE: 22 BRPM | HEART RATE: 99 BPM

## 2018-02-22 ENCOUNTER — HOSPITAL ENCOUNTER (OUTPATIENT)
Dept: PAIN MANAGEMENT | Age: 58
Discharge: HOME | End: 2018-02-22
Payer: MEDICARE

## 2018-02-22 VITALS
TEMPERATURE: 98.1 F | SYSTOLIC BLOOD PRESSURE: 131 MMHG | RESPIRATION RATE: 16 BRPM | HEART RATE: 94 BPM | DIASTOLIC BLOOD PRESSURE: 100 MMHG

## 2018-02-22 DIAGNOSIS — G89.29 CHRONIC LOW BACK PAIN, UNSPECIFIED BACK PAIN LATERALITY, WITH SCIATICA PRESENCE UNSPECIFIED: ICD-10-CM

## 2018-02-22 DIAGNOSIS — Z79.899 ENCOUNTER FOR MEDICATION MANAGEMENT: Primary | ICD-10-CM

## 2018-02-22 DIAGNOSIS — M54.5 CHRONIC LOW BACK PAIN, UNSPECIFIED BACK PAIN LATERALITY, WITH SCIATICA PRESENCE UNSPECIFIED: ICD-10-CM

## 2018-02-22 DIAGNOSIS — M47.26 OSTEOARTHRITIS OF SPINE WITH RADICULOPATHY, LUMBAR REGION: ICD-10-CM

## 2018-02-22 DIAGNOSIS — M17.0 PRIMARY OSTEOARTHRITIS OF BOTH KNEES: ICD-10-CM

## 2018-02-22 LAB — CYTOLOGY REPORT: NORMAL

## 2018-02-22 PROCEDURE — 99213 OFFICE O/P EST LOW 20 MIN: CPT | Performed by: NURSE PRACTITIONER

## 2018-02-22 PROCEDURE — 99214 OFFICE O/P EST MOD 30 MIN: CPT

## 2018-02-22 RX ORDER — MELOXICAM 15 MG/1
15 TABLET ORAL DAILY
Qty: 30 TABLET | Refills: 3 | Status: SHIPPED | OUTPATIENT
Start: 2018-02-22 | End: 2018-05-29 | Stop reason: SDUPTHER

## 2018-02-22 RX ORDER — HYDROCODONE BITARTRATE AND ACETAMINOPHEN 5; 325 MG/1; MG/1
1 TABLET ORAL EVERY 8 HOURS PRN
Qty: 90 TABLET | Refills: 0 | Status: SHIPPED | OUTPATIENT
Start: 2018-02-28 | End: 2018-03-20 | Stop reason: SDUPTHER

## 2018-02-22 ASSESSMENT — ENCOUNTER SYMPTOMS
BACK PAIN: 1
BOWEL INCONTINENCE: 0

## 2018-02-22 NOTE — PROGRESS NOTES
Patient is here today to review medication contract. Chief Complaint:  Low back pain    [facility]    Patient has had low back with no known injury and bilateral knee pain for many years. She had a knee arthroscopy in March and currently in PT with no plans for replacement at this time. She sees Dr. Delaney Day and has injections with some benefit. Genicular blocks discussed but patient not interested at this time. She is s/p lumbar diskectomy in 2015. She has had PENNY and RF in the past with some benefit. The last injection was a duramorph 2014. She also uses TENS daily with some relief.  Patient is not interested in repeating injections - feels they don't work. She states she would only consider another back surgery if she was \"unable to walk\". She is stable and compliant on norco 5/325 TID- also taking mobic with benefit. She is trying to quit smoking currently on O2 ATC due to exacerbation of COPD and awaiting sleep study results.     HPI:     Back Pain   This is a chronic problem. The current episode started more than 1 year ago. The problem occurs constantly. The problem is unchanged. The pain is present in the lumbar spine (pain low back and knees, doesnt radiate from back). The quality of the pain is described as stabbing (throbbing). The pain does not radiate. The pain is at a severity of 7/10. The pain is moderate. The symptoms are aggravated by bending and standing. Pertinent negatives include no bladder incontinence, bowel incontinence, chest pain, dysuria, leg pain, numbness or tingling. Risk factors include lack of exercise and sedentary lifestyle. She has tried analgesics, chiropractic manipulation, heat, muscle relaxant and walking for the symptoms. The treatment provided moderate relief. Patient denies any new neurological symptoms. No bowel or bladder incontinence, no weakness, and no falling.     Pill count: appropriate    Morphine equivalent dose as reported on [place]:15    Controlled Substances (PRINZIDE;ZESTORETIC) 20-25 MG per tablet, TAKE 1 TABLET EVERY DAY, Disp: 90 tablet, Rfl: 3    metFORMIN (GLUCOPHAGE) 500 MG tablet, Take 1 tablet by mouth 2 times daily (with meals), Disp: 180 tablet, Rfl: 1    omeprazole (PRILOSEC) 20 MG delayed release capsule, Take 1 capsule by mouth daily, Disp: 30 capsule, Rfl: 11     MG capsule, TAKE 1 CAPSULE EVERY DAY, Disp: 30 capsule, Rfl: 11    fexofenadine (ALLEGRA) 180 MG tablet, Take 1 tablet by mouth daily, Disp: 30 tablet, Rfl: 1    atorvastatin (LIPITOR) 40 MG tablet, Take 1 tablet by mouth daily, Disp: 90 tablet, Rfl: 1    amLODIPine (NORVASC) 10 MG tablet, Take 1 tablet by mouth daily, Disp: 90 tablet, Rfl: 3    albuterol sulfate HFA (VENTOLIN HFA) 108 (90 Base) MCG/ACT inhaler, Inhale 2 puffs into the lungs every 6 hours as needed for Wheezing, Disp: 1 Inhaler, Rfl: 3    solifenacin (VESICARE) 5 MG tablet, Take 1 tablet by mouth daily, Disp: 30 tablet, Rfl: 3    ADVAIR -21 MCG/ACT inhaler, INHALE 2 PUFFS INTO THE LUNGS 2 TIMES DAILY, Disp: 12 g, Rfl: 11    gabapentin (NEURONTIN) 300 MG capsule, TAKE 1 CAPSULE in morning and afternoon and 2 at night, Disp: 120 capsule, Rfl: 6    diphenhydrAMINE (BENADRYL) 25 MG tablet, Take by mouth every 6 hours as needed for Itching , Disp: , Rfl:     Alcohol Swabs (EASY TOUCH ALCOHOL PREP MEDIUM) 70 % PADS, , Disp: , Rfl:     azelastine (ASTELIN) 0.1 % nasal spray, 2 sprays by Nasal route 2 times daily Use in each nostril as directed, Disp: 1 Bottle, Rfl: 3    ipratropium-albuterol (DUONEB) 0.5-2.5 (3) MG/3ML SOLN nebulizer solution, Inhale 3 mLs into the lungs every 6 hours as needed for Shortness of Breath, Disp: 360 mL, Rfl: 11    Family History   Problem Relation Age of Onset    Diabetes Mother     Heart Disease Mother     Cirrhosis Father     Breast Cancer Neg Hx     Cancer Neg Hx     Colon Cancer Neg Hx     Eclampsia Neg Hx     Hypertension Neg Hx     Ovarian Cancer Neg Hx    

## 2018-03-02 ENCOUNTER — TELEPHONE (OUTPATIENT)
Dept: INTERNAL MEDICINE | Age: 58
End: 2018-03-02

## 2018-03-02 NOTE — TELEPHONE ENCOUNTER
PC to [facility], they stated that per the preliminary report the patient does not need CPAP and does not need to complete 2nd night of sleep study. Spoke with [address] [place] [address] they are not sure who called the patient because they have no records since 2009 for her. Pc to pt-- spoke with her and she states that she just assumed that it was [facility] but it was actually the sleep center that called to let her know that she does not have to complete her 2nd night.   She voiced understanding    Preliminary results of sleep study being sent to office will scan into chart once we receive them

## 2018-03-16 ENCOUNTER — OFFICE VISIT (OUTPATIENT)
Dept: INTERNAL MEDICINE | Age: 58
End: 2018-03-16
Payer: MEDICARE

## 2018-03-16 VITALS
HEART RATE: 82 BPM | WEIGHT: 202 LBS | OXYGEN SATURATION: 95 % | HEIGHT: 66 IN | BODY MASS INDEX: 32.47 KG/M2 | DIASTOLIC BLOOD PRESSURE: 80 MMHG | SYSTOLIC BLOOD PRESSURE: 124 MMHG

## 2018-03-16 DIAGNOSIS — J43.2 CENTRILOBULAR EMPHYSEMA (HCC): ICD-10-CM

## 2018-03-16 DIAGNOSIS — E78.00 PURE HYPERCHOLESTEROLEMIA: ICD-10-CM

## 2018-03-16 DIAGNOSIS — E11.9 TYPE 2 DIABETES MELLITUS WITHOUT COMPLICATION, WITHOUT LONG-TERM CURRENT USE OF INSULIN (HCC): ICD-10-CM

## 2018-03-16 DIAGNOSIS — I10 ESSENTIAL HYPERTENSION: Primary | ICD-10-CM

## 2018-03-16 PROCEDURE — 99213 OFFICE O/P EST LOW 20 MIN: CPT

## 2018-03-16 PROCEDURE — 99214 OFFICE O/P EST MOD 30 MIN: CPT | Performed by: INTERNAL MEDICINE

## 2018-03-16 RX ORDER — ATORVASTATIN CALCIUM 40 MG/1
40 TABLET, FILM COATED ORAL DAILY
Qty: 90 TABLET | Refills: 1 | Status: SHIPPED | OUTPATIENT
Start: 2018-03-16 | End: 2018-06-18 | Stop reason: SDUPTHER

## 2018-03-16 RX ORDER — AMLODIPINE BESYLATE 10 MG/1
10 TABLET ORAL DAILY
Qty: 90 TABLET | Refills: 3 | Status: SHIPPED | OUTPATIENT
Start: 2018-03-16 | End: 2019-03-05 | Stop reason: SDUPTHER

## 2018-03-16 RX ORDER — ALBUTEROL SULFATE 90 UG/1
2 AEROSOL, METERED RESPIRATORY (INHALATION) EVERY 6 HOURS PRN
Qty: 1 INHALER | Refills: 3 | Status: SHIPPED | OUTPATIENT
Start: 2018-03-16 | End: 2018-11-20 | Stop reason: SDUPTHER

## 2018-03-16 RX ORDER — LISINOPRIL AND HYDROCHLOROTHIAZIDE 25; 20 MG/1; MG/1
TABLET ORAL
Qty: 90 TABLET | Refills: 3 | Status: SHIPPED | OUTPATIENT
Start: 2018-03-16 | End: 2018-10-29 | Stop reason: SDUPTHER

## 2018-03-16 ASSESSMENT — PATIENT HEALTH QUESTIONNAIRE - PHQ9
2. FEELING DOWN, DEPRESSED OR HOPELESS: 1
SUM OF ALL RESPONSES TO PHQ9 QUESTIONS 1 & 2: 1
SUM OF ALL RESPONSES TO PHQ QUESTIONS 1-9: 1
1. LITTLE INTEREST OR PLEASURE IN DOING THINGS: 0

## 2018-03-16 NOTE — PROGRESS NOTES
Dose Series) 07/08/2008    Diabetic retinal exam  09/26/2017       REVIEW OF SYSTEMS:    12 point review of symptoms completed and found to be normal except noted in the HPI    Review of Systems   Constitutional: Negative for fever, malaise/fatigue and weight loss. HENT: Positive for congestion. Negative for hearing loss and sinus pain. Eyes: Negative for blurred vision and redness. Respiratory: Positive for cough and shortness of breath. Negative for sputum production. Cardiovascular: Negative for chest pain, palpitations and leg swelling. Gastrointestinal: Negative for abdominal pain, constipation and nausea. Musculoskeletal: Positive for back pain. Negative for falls and joint pain. Skin: Negative for itching and rash. Neurological: Negative for dizziness, focal weakness and loss of consciousness. Psychiatric/Behavioral: Positive for depression. Negative for substance abuse. The patient is not nervous/anxious. PHYSICAL EXAM:      Vitals:    03/16/18 1357 03/16/18 1411   BP: 124/80    Site: Right Arm    Position: Sitting    Cuff Size: Medium Adult    Pulse: 82    SpO2: 90% 95%   Weight: 202 lb (91.6 kg)    Height: 5' 6\" (1.676 m)      Body mass index is 32.6 kg/m². BP Readings from Last 3 Encounters:   03/16/18 124/80   02/22/18 (!) 131/100   02/15/18 (!) 154/82        Wt Readings from Last 3 Encounters:   03/16/18 202 lb (91.6 kg)   02/20/18 195 lb (88.5 kg)   02/15/18 198 lb (89.8 kg)       Physical Exam      HENT:  Normocephalic, Atraumatic,  Neck- Normal range of motion, No tenderness, Supple, No stridor. Eyes:  PERRL, EOMI, Conjunctiva normal, No discharge. Respiratory:  Distant breath sounds, No respiratory distress, No wheezing, No chest tenderness. Cardiovascular:  Normal heart rate, Normal rhythm, No murmurs, No rubs, No gallops. GI:  Bowel sounds normal, Soft, No tenderness, No masses,  No CVA tenderness.   Musculoskeletal:  Intact distal pulses, No edema, No tenderness  Integument:  Warm, Dry, No erythema, No rash. Lymphatic:  No lymphadenopathy noted. Neurologic:  Alert & oriented x 3, Normal motor function, Normal sensory function, No focal deficits noted. Psychiatric:  Affect normal    LABORATORY FINDINGS:    CBC:  Lab Results   Component Value Date    WBC 5.5 07/19/2017    HGB 14.9 07/19/2017     07/19/2017     02/16/2012     BMP:    Lab Results   Component Value Date     07/19/2017    K 4.0 07/19/2017     07/19/2017    CO2 25 07/19/2017    BUN 16 07/19/2017    CREATININE 0.83 07/19/2017    GLUCOSE 100 07/19/2017     HEMOGLOBIN A1C:   Lab Results   Component Value Date    LABA1C 5.9 01/05/2018     MICROALBUMIN URINE:   Lab Results   Component Value Date    MICROALBUR <12 01/05/2018     FASTING LIPID PANEL:  Lab Results   Component Value Date    CHOL 157 07/19/2017    HDL 44 07/19/2017    TRIG 145 07/19/2017     Lab Results   Component Value Date    LDLCHOLESTEROL 84 07/19/2017       LIVER PROFILE:  Lab Results   Component Value Date    ALT 14 07/19/2017    AST 16 07/19/2017    PROT 7.8 07/19/2017    BILITOT 0.36 07/19/2017    BILIDIR <0.20 12/23/2014    LABALBU 4.3 07/19/2017      THYROID FUNCTION:   Lab Results   Component Value Date    TSH 0.94 07/19/2017      URINE ANALYSIS: No results found for: LABURIN  ASSESSMENT AND PLAN:    1. Essential hypertension    - lisinopril-hydrochlorothiazide (PRINZIDE;ZESTORETIC) 20-25 MG per tablet; TAKE 1 TABLET EVERY DAY  Dispense: 90 tablet; Refill: 3  - amLODIPine (NORVASC) 10 MG tablet; Take 1 tablet by mouth daily  Dispense: 90 tablet; Refill: 3    2. Centrilobular emphysema ([address]    - [facility], Carlos Alberto Lynne MD  - albuterol sulfate HFA (VENTOLIN HFA) 108 (90 Base) MCG/ACT inhaler; Inhale 2 puffs into the lungs every 6 hours as needed for Wheezing  Dispense: 1 Inhaler; Refill: 3    3.  Type 2 diabetes mellitus without complication,

## 2018-03-18 ASSESSMENT — ENCOUNTER SYMPTOMS
EYE REDNESS: 0
BLURRED VISION: 0
ABDOMINAL PAIN: 0
SPUTUM PRODUCTION: 0
SHORTNESS OF BREATH: 1
SINUS PAIN: 0
BACK PAIN: 1
COUGH: 1
CONSTIPATION: 0
NAUSEA: 0

## 2018-03-20 ENCOUNTER — HOSPITAL ENCOUNTER (OUTPATIENT)
Dept: PAIN MANAGEMENT | Age: 58
Discharge: HOME | End: 2018-03-20
Payer: MEDICARE

## 2018-03-20 VITALS
DIASTOLIC BLOOD PRESSURE: 65 MMHG | SYSTOLIC BLOOD PRESSURE: 126 MMHG | RESPIRATION RATE: 16 BRPM | HEART RATE: 84 BPM | TEMPERATURE: 97.8 F

## 2018-03-20 DIAGNOSIS — M54.5 CHRONIC LOW BACK PAIN, UNSPECIFIED BACK PAIN LATERALITY, WITH SCIATICA PRESENCE UNSPECIFIED: ICD-10-CM

## 2018-03-20 DIAGNOSIS — M47.26 OSTEOARTHRITIS OF SPINE WITH RADICULOPATHY, LUMBAR REGION: ICD-10-CM

## 2018-03-20 DIAGNOSIS — Z79.899 ENCOUNTER FOR MEDICATION MANAGEMENT: Primary | ICD-10-CM

## 2018-03-20 DIAGNOSIS — G89.29 CHRONIC LOW BACK PAIN, UNSPECIFIED BACK PAIN LATERALITY, WITH SCIATICA PRESENCE UNSPECIFIED: ICD-10-CM

## 2018-03-20 PROCEDURE — 99213 OFFICE O/P EST LOW 20 MIN: CPT

## 2018-03-20 PROCEDURE — 99213 OFFICE O/P EST LOW 20 MIN: CPT | Performed by: NURSE PRACTITIONER

## 2018-03-20 PROCEDURE — 99214 OFFICE O/P EST MOD 30 MIN: CPT

## 2018-03-20 RX ORDER — HYDROCODONE BITARTRATE AND ACETAMINOPHEN 5; 325 MG/1; MG/1
1 TABLET ORAL EVERY 8 HOURS PRN
Qty: 90 TABLET | Refills: 0 | Status: SHIPPED | OUTPATIENT
Start: 2018-03-30 | End: 2018-04-27 | Stop reason: SDUPTHER

## 2018-03-20 ASSESSMENT — ENCOUNTER SYMPTOMS
CONSTIPATION: 0
BACK PAIN: 1
SHORTNESS OF BREATH: 0
COUGH: 0
BOWEL INCONTINENCE: 0

## 2018-03-20 NOTE — PROGRESS NOTES
18 MCG inhalation capsule, INHALE CONTENTS OF 1 CAPSULE DAILY USING HANDIHALER DEVICE, Disp: 30 capsule, Rfl: 11    glucose blood VI test strips (TRUETRACK TEST) strip, USE 1 DAILY NON-INSULIN DEPENDENT, Disp: 50 strip, Rfl: 11    omeprazole (PRILOSEC) 20 MG delayed release capsule, Take 1 capsule by mouth daily, Disp: 30 capsule, Rfl: 11     MG capsule, TAKE 1 CAPSULE EVERY DAY, Disp: 30 capsule, Rfl: 11    fexofenadine (ALLEGRA) 180 MG tablet, Take 1 tablet by mouth daily, Disp: 30 tablet, Rfl: 1    solifenacin (VESICARE) 5 MG tablet, Take 1 tablet by mouth daily, Disp: 30 tablet, Rfl: 3    ADVAIR -21 MCG/ACT inhaler, INHALE 2 PUFFS INTO THE LUNGS 2 TIMES DAILY, Disp: 12 g, Rfl: 11    gabapentin (NEURONTIN) 300 MG capsule, TAKE 1 CAPSULE in morning and afternoon and 2 at night, Disp: 120 capsule, Rfl: 6    diphenhydrAMINE (BENADRYL) 25 MG tablet, Take by mouth every 6 hours as needed for Itching , Disp: , Rfl:     Alcohol Swabs (EASY TOUCH ALCOHOL PREP MEDIUM) 70 % PADS, , Disp: , Rfl:     azelastine (ASTELIN) 0.1 % nasal spray, 2 sprays by Nasal route 2 times daily Use in each nostril as directed, Disp: 1 Bottle, Rfl: 3    ipratropium-albuterol (DUONEB) 0.5-2.5 (3) MG/3ML SOLN nebulizer solution, Inhale 3 mLs into the lungs every 6 hours as needed for Shortness of Breath, Disp: 360 mL, Rfl: 11    Family History   Problem Relation Age of Onset    Diabetes Mother     Heart Disease Mother     Cirrhosis Father     Breast Cancer Neg Hx     Cancer Neg Hx     Colon Cancer Neg Hx     Eclampsia Neg Hx     Hypertension Neg Hx     Ovarian Cancer Neg Hx      Labor Neg Hx     Spont Abortions Neg Hx     Stroke Neg Hx        Social History     Social History    Marital status: Single     Spouse name: N/A    Number of children: N/A    Years of education: N/A     Occupational History    Not on file.      Social History Main Topics    Smoking status: Current Every Day Smoker Osteoarthritis of spine with radiculopathy, lumbar region    Relevant Medications    HYDROcodone-acetaminophen (NORCO) 5-325 MG per tablet (Start on 3/30/2018)    Encounter for medication management - Primary    Chronic low back pain    Relevant Medications    HYDROcodone-acetaminophen (NORCO) 5-325 MG per tablet (Start on 3/30/2018)            Treatment Plan:  DISCUSSION: Treatment options discussed with patient and all questions answered to patient's satisfaction. TREATMENT OPTIONS:   Contract requirements met. Continue opioid therapy. Script written for tramadol  Patient is stable on current regimen of meds and medication is effectively managing pain, we will continue current medications without changes. As always, we encourage daily stretching and strengthening exercises, and recommend minimizing use of pain medications unless patient cannot get through daily activities due to pain.   Follow up appointment made for 4 weeks

## 2018-04-05 ENCOUNTER — TELEPHONE (OUTPATIENT)
Dept: INTERNAL MEDICINE | Age: 58
End: 2018-04-05

## 2018-04-12 LAB — STATUS: NORMAL

## 2018-04-25 DIAGNOSIS — J30.9 CHRONIC ALLERGIC RHINITIS: ICD-10-CM

## 2018-04-25 RX ORDER — CETIRIZINE HYDROCHLORIDE 10 MG/1
TABLET ORAL
Qty: 30 TABLET | Refills: 3 | Status: SHIPPED | OUTPATIENT
Start: 2018-04-25 | End: 2018-09-04 | Stop reason: SDUPTHER

## 2018-04-26 ENCOUNTER — TELEPHONE (OUTPATIENT)
Dept: INTERNAL MEDICINE | Age: 58
End: 2018-04-26

## 2018-04-27 ENCOUNTER — HOSPITAL ENCOUNTER (OUTPATIENT)
Dept: PAIN MANAGEMENT | Age: 58
Discharge: HOME | End: 2018-04-27
Payer: MEDICARE

## 2018-04-27 VITALS
BODY MASS INDEX: 34.15 KG/M2 | RESPIRATION RATE: 16 BRPM | WEIGHT: 200 LBS | HEIGHT: 64 IN | SYSTOLIC BLOOD PRESSURE: 142 MMHG | HEART RATE: 77 BPM | DIASTOLIC BLOOD PRESSURE: 92 MMHG

## 2018-04-27 DIAGNOSIS — Z79.899 ENCOUNTER FOR MEDICATION MANAGEMENT: ICD-10-CM

## 2018-04-27 DIAGNOSIS — M47.26 OSTEOARTHRITIS OF SPINE WITH RADICULOPATHY, LUMBAR REGION: Primary | ICD-10-CM

## 2018-04-27 DIAGNOSIS — M17.0 PRIMARY OSTEOARTHRITIS OF BOTH KNEES: ICD-10-CM

## 2018-04-27 DIAGNOSIS — G89.29 CHRONIC LOW BACK PAIN, UNSPECIFIED BACK PAIN LATERALITY, WITH SCIATICA PRESENCE UNSPECIFIED: ICD-10-CM

## 2018-04-27 DIAGNOSIS — M54.5 CHRONIC LOW BACK PAIN, UNSPECIFIED BACK PAIN LATERALITY, WITH SCIATICA PRESENCE UNSPECIFIED: ICD-10-CM

## 2018-04-27 PROCEDURE — 99214 OFFICE O/P EST MOD 30 MIN: CPT

## 2018-04-27 PROCEDURE — G0480 DRUG TEST DEF 1-7 CLASSES: HCPCS

## 2018-04-27 PROCEDURE — 99214 OFFICE O/P EST MOD 30 MIN: CPT | Performed by: NURSE PRACTITIONER

## 2018-04-27 PROCEDURE — 80307 DRUG TEST PRSMV CHEM ANLYZR: CPT

## 2018-04-27 RX ORDER — HYDROCODONE BITARTRATE AND ACETAMINOPHEN 5; 325 MG/1; MG/1
1 TABLET ORAL EVERY 8 HOURS PRN
Qty: 90 TABLET | Refills: 0 | Status: SHIPPED | OUTPATIENT
Start: 2018-04-30 | End: 2018-05-29 | Stop reason: SDUPTHER

## 2018-04-27 ASSESSMENT — ENCOUNTER SYMPTOMS
BACK PAIN: 1
SHORTNESS OF BREATH: 1
CONSTIPATION: 0
COUGH: 0

## 2018-05-01 DIAGNOSIS — M47.816 SPONDYLOSIS OF LUMBAR REGION WITHOUT MYELOPATHY OR RADICULOPATHY: ICD-10-CM

## 2018-05-01 RX ORDER — GABAPENTIN 300 MG/1
CAPSULE ORAL
Qty: 120 CAPSULE | Refills: 6 | Status: SHIPPED | OUTPATIENT
Start: 2018-05-01 | End: 2018-06-01

## 2018-05-02 ENCOUNTER — HOSPITAL ENCOUNTER (OUTPATIENT)
Dept: NON INVASIVE DIAGNOSTICS | Age: 58
Discharge: HOME | End: 2018-05-02
Payer: MEDICARE

## 2018-05-02 LAB
LV EF: 65 %
LVEF MODALITY: NORMAL

## 2018-05-02 PROCEDURE — 93306 TTE W/DOPPLER COMPLETE: CPT

## 2018-05-04 LAB
6-ACETYLMORPHINE, UR: NOT DETECTED
7-AMINOCLONAZEPAM, URINE: NOT DETECTED
ALPHA-OH-ALPRAZ, URINE: NOT DETECTED
ALPRAZOLAM, URINE: NOT DETECTED
AMPHETAMINES, URINE: NOT DETECTED
BARBITURATES, URINE: NOT DETECTED
BENZOYLECGONINE, UR: NOT DETECTED
BUPRENORPHINE URINE: NOT DETECTED
CARISOPRODOL, UR: NOT DETECTED
CLONAZEPAM, URINE: NOT DETECTED
CODEINE, URINE: NOT DETECTED
CREATININE URINE: <20 MG/DL (ref 20–400)
DIAZEPAM, URINE: NOT DETECTED
EER PAIN MGT DRUG PANEL, HIGH RES/EMIT U: ABNORMAL
ETHYL GLUCURONIDE UR: PRESENT
FENTANYL URINE: NOT DETECTED
HYDROCODONE, URINE: PRESENT
HYDROMORPHONE, URINE: NOT DETECTED
LORAZEPAM, URINE: NOT DETECTED
MARIJUANA METAB, UR: NOT DETECTED
MDA, UR: NOT DETECTED
MDEA, EVE, UR: NOT DETECTED
MDMA URINE: NOT DETECTED
MEPERIDINE METAB, UR: NOT DETECTED
METHADONE, URINE: NOT DETECTED
METHAMPHETAMINE, URINE: NOT DETECTED
METHYLPHENIDATE: NOT DETECTED
MIDAZOLAM, URINE: NOT DETECTED
MORPHINE URINE: NOT DETECTED
NORBUPRENORPHINE, URINE: NOT DETECTED
NORDIAZEPAM, URINE: NOT DETECTED
NORFENTANYL, URINE: NOT DETECTED
NORHYDROCODONE, URINE: PRESENT
NOROXYCODONE, URINE: NOT DETECTED
NOROXYMORPHONE, URINE: NOT DETECTED
OXAZEPAM, URINE: NOT DETECTED
OXYCODONE URINE: NOT DETECTED
OXYMORPHONE, URINE: NOT DETECTED
PAIN MGT DRUG PANEL, HI RES, UR: ABNORMAL
PCP,URINE: NOT DETECTED
PHENTERMINE, UR: NOT DETECTED
PROPOXYPHENE, URINE: NOT DETECTED
TAPENTADOL, URINE: NOT DETECTED
TAPENTADOL-O-SULFATE, URINE: NOT DETECTED
TEMAZEPAM, URINE: NOT DETECTED
TRAMADOL, URINE: NOT DETECTED
ZOLPIDEM, URINE: NOT DETECTED

## 2018-05-08 LAB — ALCOHOL CONFIRMATION URINE: <5 MG/DL

## 2018-05-24 DIAGNOSIS — N32.81 OAB (OVERACTIVE BLADDER): ICD-10-CM

## 2018-05-24 RX ORDER — SOLIFENACIN SUCCINATE 5 MG/1
5 TABLET, FILM COATED ORAL DAILY
Qty: 30 TABLET | Refills: 3 | Status: SHIPPED | OUTPATIENT
Start: 2018-05-24 | End: 2018-06-18 | Stop reason: SDUPTHER

## 2018-05-29 ENCOUNTER — HOSPITAL ENCOUNTER (OUTPATIENT)
Dept: PAIN MANAGEMENT | Age: 58
Discharge: HOME | End: 2018-05-29
Payer: MEDICARE

## 2018-05-29 VITALS
SYSTOLIC BLOOD PRESSURE: 133 MMHG | DIASTOLIC BLOOD PRESSURE: 64 MMHG | RESPIRATION RATE: 16 BRPM | TEMPERATURE: 97.8 F | HEART RATE: 78 BPM

## 2018-05-29 DIAGNOSIS — Z79.899 ENCOUNTER FOR MEDICATION MANAGEMENT: Primary | ICD-10-CM

## 2018-05-29 DIAGNOSIS — M17.0 PRIMARY OSTEOARTHRITIS OF BOTH KNEES: ICD-10-CM

## 2018-05-29 DIAGNOSIS — M54.5 CHRONIC LOW BACK PAIN, UNSPECIFIED BACK PAIN LATERALITY, WITH SCIATICA PRESENCE UNSPECIFIED: ICD-10-CM

## 2018-05-29 DIAGNOSIS — M47.26 OSTEOARTHRITIS OF SPINE WITH RADICULOPATHY, LUMBAR REGION: ICD-10-CM

## 2018-05-29 DIAGNOSIS — G89.29 CHRONIC LOW BACK PAIN, UNSPECIFIED BACK PAIN LATERALITY, WITH SCIATICA PRESENCE UNSPECIFIED: ICD-10-CM

## 2018-05-29 PROCEDURE — 99213 OFFICE O/P EST LOW 20 MIN: CPT | Performed by: NURSE PRACTITIONER

## 2018-05-29 PROCEDURE — 99214 OFFICE O/P EST MOD 30 MIN: CPT

## 2018-05-29 RX ORDER — MELOXICAM 15 MG/1
15 TABLET ORAL DAILY
Qty: 30 TABLET | Refills: 3 | Status: SHIPPED | OUTPATIENT
Start: 2018-05-30 | End: 2018-06-28 | Stop reason: SDUPTHER

## 2018-05-29 RX ORDER — HYDROCODONE BITARTRATE AND ACETAMINOPHEN 5; 325 MG/1; MG/1
1 TABLET ORAL EVERY 8 HOURS PRN
Qty: 90 TABLET | Refills: 0 | Status: SHIPPED | OUTPATIENT
Start: 2018-05-30 | End: 2018-06-28 | Stop reason: SDUPTHER

## 2018-05-29 ASSESSMENT — ENCOUNTER SYMPTOMS
BACK PAIN: 1
SHORTNESS OF BREATH: 0
COUGH: 0
CONSTIPATION: 0

## 2018-05-29 ASSESSMENT — PAIN - FUNCTIONAL ASSESSMENT: PAIN_FUNCTIONAL_ASSESSMENT: 0-10

## 2018-06-18 ENCOUNTER — OFFICE VISIT (OUTPATIENT)
Dept: INTERNAL MEDICINE | Age: 58
End: 2018-06-18
Payer: MEDICARE

## 2018-06-18 ENCOUNTER — HOSPITAL ENCOUNTER (OUTPATIENT)
Age: 58
Setting detail: SPECIMEN
Discharge: HOME | End: 2018-06-18
Payer: MEDICARE

## 2018-06-18 VITALS
SYSTOLIC BLOOD PRESSURE: 109 MMHG | BODY MASS INDEX: 31.02 KG/M2 | WEIGHT: 193 LBS | OXYGEN SATURATION: 94 % | HEIGHT: 66 IN | HEART RATE: 93 BPM | DIASTOLIC BLOOD PRESSURE: 75 MMHG

## 2018-06-18 DIAGNOSIS — J30.89 CHRONIC NONSEASONAL ALLERGIC RHINITIS DUE TO OTHER ALLERGEN: ICD-10-CM

## 2018-06-18 DIAGNOSIS — F32.9 MAJOR DEPRESSION, CHRONIC: ICD-10-CM

## 2018-06-18 DIAGNOSIS — M54.41 CHRONIC RIGHT-SIDED LOW BACK PAIN WITH RIGHT-SIDED SCIATICA: ICD-10-CM

## 2018-06-18 DIAGNOSIS — J43.2 CENTRILOBULAR EMPHYSEMA (HCC): ICD-10-CM

## 2018-06-18 DIAGNOSIS — G89.29 CHRONIC RIGHT-SIDED LOW BACK PAIN WITH RIGHT-SIDED SCIATICA: ICD-10-CM

## 2018-06-18 DIAGNOSIS — E11.9 TYPE 2 DIABETES MELLITUS WITHOUT COMPLICATION, WITHOUT LONG-TERM CURRENT USE OF INSULIN (HCC): Primary | ICD-10-CM

## 2018-06-18 DIAGNOSIS — E78.00 PURE HYPERCHOLESTEROLEMIA: ICD-10-CM

## 2018-06-18 DIAGNOSIS — N32.81 OAB (OVERACTIVE BLADDER): ICD-10-CM

## 2018-06-18 DIAGNOSIS — I10 ESSENTIAL HYPERTENSION: ICD-10-CM

## 2018-06-18 DIAGNOSIS — J96.11 CHRONIC RESPIRATORY FAILURE WITH HYPOXIA (HCC): ICD-10-CM

## 2018-06-18 DIAGNOSIS — E11.9 TYPE 2 DIABETES MELLITUS WITHOUT COMPLICATION, WITHOUT LONG-TERM CURRENT USE OF INSULIN (HCC): ICD-10-CM

## 2018-06-18 PROBLEM — Z79.899 ENCOUNTER FOR MEDICATION MANAGEMENT: Status: RESOLVED | Noted: 2017-11-28 | Resolved: 2018-06-18

## 2018-06-18 LAB
ALBUMIN SERPL-MCNC: 4.3 G/DL (ref 3.5–5.2)
ALBUMIN/GLOBULIN RATIO: 1.4 (ref 1–2.5)
ALP BLD-CCNC: 66 U/L (ref 35–104)
ALT SERPL-CCNC: 13 U/L (ref 5–33)
ANION GAP SERPL CALCULATED.3IONS-SCNC: 15 MMOL/L (ref 9–17)
AST SERPL-CCNC: 15 U/L
BILIRUB SERPL-MCNC: 0.44 MG/DL (ref 0.3–1.2)
BILIRUBIN DIRECT: 0.11 MG/DL
BILIRUBIN, INDIRECT: 0.33 MG/DL (ref 0–1)
BUN BLDV-MCNC: 21 MG/DL (ref 6–20)
BUN/CREAT BLD: ABNORMAL (ref 9–20)
CALCIUM SERPL-MCNC: 9.1 MG/DL (ref 8.6–10.4)
CHLORIDE BLD-SCNC: 102 MMOL/L (ref 98–107)
CHOLESTEROL/HDL RATIO: 3.8
CHOLESTEROL: 150 MG/DL
CO2: 24 MMOL/L (ref 20–31)
CREAT SERPL-MCNC: 0.9 MG/DL (ref 0.5–0.9)
ESTIMATED AVERAGE GLUCOSE: 120 MG/DL
GFR AFRICAN AMERICAN: >60 ML/MIN
GFR NON-AFRICAN AMERICAN: >60 ML/MIN
GFR SERPL CREATININE-BSD FRML MDRD: ABNORMAL ML/MIN/{1.73_M2}
GFR SERPL CREATININE-BSD FRML MDRD: ABNORMAL ML/MIN/{1.73_M2}
GLOBULIN: NORMAL G/DL (ref 1.5–3.8)
GLUCOSE BLD-MCNC: 116 MG/DL (ref 70–99)
HBA1C MFR BLD: 5.8 % (ref 4–6)
HCT VFR BLD CALC: 44.6 % (ref 36.3–47.1)
HDLC SERPL-MCNC: 40 MG/DL
HEMOGLOBIN: 14.4 G/DL (ref 11.9–15.1)
LDL CHOLESTEROL: 84 MG/DL (ref 0–130)
MCH RBC QN AUTO: 28.4 PG (ref 25.2–33.5)
MCHC RBC AUTO-ENTMCNC: 32.3 G/DL (ref 28.4–34.8)
MCV RBC AUTO: 88 FL (ref 82.6–102.9)
NRBC AUTOMATED: 0 PER 100 WBC
PDW BLD-RTO: 13.3 % (ref 11.8–14.4)
PLATELET # BLD: 242 K/UL (ref 138–453)
PMV BLD AUTO: 11.3 FL (ref 8.1–13.5)
POTASSIUM SERPL-SCNC: 3.4 MMOL/L (ref 3.7–5.3)
RBC # BLD: 5.07 M/UL (ref 3.95–5.11)
SODIUM BLD-SCNC: 141 MMOL/L (ref 135–144)
TOTAL PROTEIN: 7.3 G/DL (ref 6.4–8.3)
TRIGL SERPL-MCNC: 132 MG/DL
VLDLC SERPL CALC-MCNC: ABNORMAL MG/DL (ref 1–30)
WBC # BLD: 5.5 K/UL (ref 3.5–11.3)

## 2018-06-18 PROCEDURE — 99213 OFFICE O/P EST LOW 20 MIN: CPT | Performed by: INTERNAL MEDICINE

## 2018-06-18 PROCEDURE — 80048 BASIC METABOLIC PNL TOTAL CA: CPT

## 2018-06-18 PROCEDURE — 85027 COMPLETE CBC AUTOMATED: CPT

## 2018-06-18 PROCEDURE — 36415 COLL VENOUS BLD VENIPUNCTURE: CPT

## 2018-06-18 PROCEDURE — 80061 LIPID PANEL: CPT

## 2018-06-18 PROCEDURE — 99214 OFFICE O/P EST MOD 30 MIN: CPT | Performed by: INTERNAL MEDICINE

## 2018-06-18 PROCEDURE — 80076 HEPATIC FUNCTION PANEL: CPT

## 2018-06-18 PROCEDURE — 83036 HEMOGLOBIN GLYCOSYLATED A1C: CPT

## 2018-06-18 RX ORDER — MIRTAZAPINE 15 MG/1
15 TABLET, FILM COATED ORAL NIGHTLY
COMMUNITY
Start: 2018-05-22

## 2018-06-18 RX ORDER — MIRTAZAPINE 30 MG/1
30 TABLET, FILM COATED ORAL NIGHTLY
COMMUNITY
Start: 2018-05-22 | End: 2021-10-26

## 2018-06-18 RX ORDER — ATORVASTATIN CALCIUM 40 MG/1
40 TABLET, FILM COATED ORAL DAILY
Qty: 90 TABLET | Refills: 1 | Status: SHIPPED | OUTPATIENT
Start: 2018-06-18 | End: 2018-10-29 | Stop reason: SDUPTHER

## 2018-06-18 RX ORDER — SOLIFENACIN SUCCINATE 5 MG/1
5 TABLET, FILM COATED ORAL DAILY
Qty: 90 TABLET | Refills: 1 | Status: SHIPPED | OUTPATIENT
Start: 2018-06-18 | End: 2019-06-10 | Stop reason: SDUPTHER

## 2018-06-18 RX ORDER — CARVEDILOL 6.25 MG/1
6.25 TABLET ORAL 2 TIMES DAILY
Qty: 180 TABLET | Refills: 1 | Status: SHIPPED | OUTPATIENT
Start: 2018-06-18 | End: 2018-10-29 | Stop reason: SDUPTHER

## 2018-06-18 ASSESSMENT — ENCOUNTER SYMPTOMS
SHORTNESS OF BREATH: 1
ABDOMINAL PAIN: 0
EYE REDNESS: 0
COUGH: 1
BACK PAIN: 1
CONSTIPATION: 0
SINUS PAIN: 0
BLURRED VISION: 0
NAUSEA: 0
SPUTUM PRODUCTION: 0

## 2018-06-20 RX ORDER — POTASSIUM CHLORIDE 750 MG/1
20 TABLET, EXTENDED RELEASE ORAL DAILY
Qty: 60 TABLET | Refills: 3 | Status: SHIPPED | OUTPATIENT
Start: 2018-06-20 | End: 2018-10-29 | Stop reason: SDUPTHER

## 2018-06-28 ENCOUNTER — HOSPITAL ENCOUNTER (OUTPATIENT)
Dept: PAIN MANAGEMENT | Age: 58
Discharge: HOME | End: 2018-06-28
Payer: MEDICARE

## 2018-06-28 VITALS
HEART RATE: 83 BPM | TEMPERATURE: 97.8 F | RESPIRATION RATE: 18 BRPM | SYSTOLIC BLOOD PRESSURE: 124 MMHG | DIASTOLIC BLOOD PRESSURE: 93 MMHG | OXYGEN SATURATION: 95 %

## 2018-06-28 DIAGNOSIS — M54.5 CHRONIC LOW BACK PAIN, UNSPECIFIED BACK PAIN LATERALITY, WITH SCIATICA PRESENCE UNSPECIFIED: ICD-10-CM

## 2018-06-28 DIAGNOSIS — M17.0 PRIMARY OSTEOARTHRITIS OF BOTH KNEES: ICD-10-CM

## 2018-06-28 DIAGNOSIS — G89.29 CHRONIC LOW BACK PAIN, UNSPECIFIED BACK PAIN LATERALITY, WITH SCIATICA PRESENCE UNSPECIFIED: ICD-10-CM

## 2018-06-28 DIAGNOSIS — M17.11 PRIMARY OSTEOARTHRITIS OF RIGHT KNEE: Primary | ICD-10-CM

## 2018-06-28 PROCEDURE — 80307 DRUG TEST PRSMV CHEM ANLYZR: CPT

## 2018-06-28 PROCEDURE — 99215 OFFICE O/P EST HI 40 MIN: CPT

## 2018-06-28 PROCEDURE — 99214 OFFICE O/P EST MOD 30 MIN: CPT | Performed by: PAIN MEDICINE

## 2018-06-28 RX ORDER — MELOXICAM 15 MG/1
15 TABLET ORAL DAILY
Qty: 30 TABLET | Refills: 0 | Status: SHIPPED | OUTPATIENT
Start: 2018-06-30 | End: 2018-07-27 | Stop reason: SDUPTHER

## 2018-06-28 RX ORDER — GABAPENTIN 300 MG/1
300 CAPSULE ORAL 4 TIMES DAILY
COMMUNITY
End: 2019-06-18 | Stop reason: SDUPTHER

## 2018-06-28 RX ORDER — HYDROCODONE BITARTRATE AND ACETAMINOPHEN 5; 325 MG/1; MG/1
1 TABLET ORAL EVERY 8 HOURS PRN
Qty: 90 TABLET | Refills: 0 | Status: SHIPPED | OUTPATIENT
Start: 2018-06-30 | End: 2018-07-27 | Stop reason: SDUPTHER

## 2018-06-28 ASSESSMENT — ENCOUNTER SYMPTOMS
BOWEL INCONTINENCE: 0
BACK PAIN: 1

## 2018-07-01 LAB
6-ACETYLMORPHINE, UR: NOT DETECTED
7-AMINOCLONAZEPAM, URINE: NOT DETECTED
ALPHA-OH-ALPRAZ, URINE: NOT DETECTED
ALPRAZOLAM, URINE: NOT DETECTED
AMPHETAMINES, URINE: NOT DETECTED
BARBITURATES, URINE: NOT DETECTED
BENZOYLECGONINE, UR: NOT DETECTED
BUPRENORPHINE URINE: NOT DETECTED
CARISOPRODOL, UR: NOT DETECTED
CLONAZEPAM, URINE: NOT DETECTED
CODEINE, URINE: NOT DETECTED
CREATININE URINE: 239.9 MG/DL (ref 20–400)
DIAZEPAM, URINE: NOT DETECTED
EER PAIN MGT DRUG PANEL, HIGH RES/EMIT U: NORMAL
ETHYL GLUCURONIDE UR: NOT DETECTED
FENTANYL URINE: NOT DETECTED
HYDROCODONE, URINE: PRESENT
HYDROMORPHONE, URINE: NOT DETECTED
LORAZEPAM, URINE: NOT DETECTED
MARIJUANA METAB, UR: NOT DETECTED
MDA, UR: NOT DETECTED
MDEA, EVE, UR: NOT DETECTED
MDMA URINE: NOT DETECTED
MEPERIDINE METAB, UR: NOT DETECTED
METHADONE, URINE: NOT DETECTED
METHAMPHETAMINE, URINE: NOT DETECTED
METHYLPHENIDATE: NOT DETECTED
MIDAZOLAM, URINE: NOT DETECTED
MORPHINE URINE: NOT DETECTED
NORBUPRENORPHINE, URINE: NOT DETECTED
NORDIAZEPAM, URINE: NOT DETECTED
NORFENTANYL, URINE: NOT DETECTED
NORHYDROCODONE, URINE: PRESENT
NOROXYCODONE, URINE: NOT DETECTED
NOROXYMORPHONE, URINE: NOT DETECTED
OXAZEPAM, URINE: NOT DETECTED
OXYCODONE URINE: NOT DETECTED
OXYMORPHONE, URINE: NOT DETECTED
PAIN MGT DRUG PANEL, HI RES, UR: NORMAL
PCP,URINE: NOT DETECTED
PHENTERMINE, UR: NOT DETECTED
PROPOXYPHENE, URINE: NOT DETECTED
TAPENTADOL, URINE: NOT DETECTED
TAPENTADOL-O-SULFATE, URINE: NOT DETECTED
TEMAZEPAM, URINE: NOT DETECTED
TRAMADOL, URINE: NOT DETECTED
ZOLPIDEM, URINE: NOT DETECTED

## 2018-07-11 ENCOUNTER — HOSPITAL ENCOUNTER (OUTPATIENT)
Age: 58
Discharge: HOME | End: 2018-07-13
Payer: MEDICARE

## 2018-07-11 ENCOUNTER — HOSPITAL ENCOUNTER (OUTPATIENT)
Dept: GENERAL RADIOLOGY | Age: 58
Discharge: HOME | End: 2018-07-13
Payer: MEDICARE

## 2018-07-11 DIAGNOSIS — M17.11 PRIMARY OSTEOARTHRITIS OF RIGHT KNEE: ICD-10-CM

## 2018-07-11 PROCEDURE — 73560 X-RAY EXAM OF KNEE 1 OR 2: CPT

## 2018-07-20 ENCOUNTER — HOSPITAL ENCOUNTER (OUTPATIENT)
Dept: PAIN MANAGEMENT | Age: 58
Discharge: HOME | End: 2018-07-20
Payer: MEDICARE

## 2018-07-20 VITALS
SYSTOLIC BLOOD PRESSURE: 111 MMHG | DIASTOLIC BLOOD PRESSURE: 66 MMHG | TEMPERATURE: 98 F | HEART RATE: 84 BPM | OXYGEN SATURATION: 94 % | RESPIRATION RATE: 16 BRPM

## 2018-07-20 LAB — GLUCOSE BLD-MCNC: 104 MG/DL (ref 65–105)

## 2018-07-20 PROCEDURE — 64484 NJX AA&/STRD TFRM EPI L/S EA: CPT | Performed by: PAIN MEDICINE

## 2018-07-20 PROCEDURE — 64484 NJX AA&/STRD TFRM EPI L/S EA: CPT

## 2018-07-20 PROCEDURE — 82947 ASSAY GLUCOSE BLOOD QUANT: CPT

## 2018-07-20 PROCEDURE — 6360000002 HC RX W HCPCS: Performed by: PAIN MEDICINE

## 2018-07-20 PROCEDURE — 64483 NJX AA&/STRD TFRM EPI L/S 1: CPT

## 2018-07-20 PROCEDURE — 64483 NJX AA&/STRD TFRM EPI L/S 1: CPT | Performed by: PAIN MEDICINE

## 2018-07-20 PROCEDURE — 2580000003 HC RX 258: Performed by: PAIN MEDICINE

## 2018-07-20 PROCEDURE — 6360000002 HC RX W HCPCS

## 2018-07-20 PROCEDURE — 6360000004 HC RX CONTRAST MEDICATION

## 2018-07-20 RX ORDER — MIDAZOLAM HYDROCHLORIDE 1 MG/ML
INJECTION INTRAMUSCULAR; INTRAVENOUS
Status: COMPLETED | OUTPATIENT
Start: 2018-07-20 | End: 2018-07-20

## 2018-07-20 RX ORDER — BUPIVACAINE HYDROCHLORIDE 2.5 MG/ML
30 INJECTION, SOLUTION EPIDURAL; INFILTRATION; INTRACAUDAL
Status: ACTIVE | OUTPATIENT
Start: 2018-07-20 | End: 2018-07-20

## 2018-07-20 RX ORDER — SODIUM CHLORIDE, SODIUM LACTATE, POTASSIUM CHLORIDE, CALCIUM CHLORIDE 600; 310; 30; 20 MG/100ML; MG/100ML; MG/100ML; MG/100ML
INJECTION, SOLUTION INTRAVENOUS CONTINUOUS
Status: DISCONTINUED | OUTPATIENT
Start: 2018-07-20 | End: 2018-07-21 | Stop reason: HOSPADM

## 2018-07-20 RX ORDER — FENTANYL CITRATE 50 UG/ML
INJECTION, SOLUTION INTRAMUSCULAR; INTRAVENOUS
Status: COMPLETED | OUTPATIENT
Start: 2018-07-20 | End: 2018-07-20

## 2018-07-20 RX ORDER — FENTANYL CITRATE 50 UG/ML
100 INJECTION, SOLUTION INTRAMUSCULAR; INTRAVENOUS
Status: ACTIVE | OUTPATIENT
Start: 2018-07-20 | End: 2018-07-20

## 2018-07-20 RX ORDER — MIDAZOLAM HYDROCHLORIDE 1 MG/ML
2 INJECTION INTRAMUSCULAR; INTRAVENOUS
Status: ACTIVE | OUTPATIENT
Start: 2018-07-20 | End: 2018-07-20

## 2018-07-20 RX ADMIN — SODIUM CHLORIDE, POTASSIUM CHLORIDE, SODIUM LACTATE AND CALCIUM CHLORIDE: 600; 310; 30; 20 INJECTION, SOLUTION INTRAVENOUS at 09:56

## 2018-07-20 RX ADMIN — FENTANYL CITRATE 50 MCG: 50 INJECTION, SOLUTION INTRAMUSCULAR; INTRAVENOUS at 10:08

## 2018-07-20 RX ADMIN — MIDAZOLAM HYDROCHLORIDE 2 MG: 1 INJECTION, SOLUTION INTRAMUSCULAR; INTRAVENOUS at 10:08

## 2018-07-20 ASSESSMENT — PAIN DESCRIPTION - DESCRIPTORS: DESCRIPTORS: THROBBING

## 2018-07-20 ASSESSMENT — PAIN - FUNCTIONAL ASSESSMENT: PAIN_FUNCTIONAL_ASSESSMENT: 0-10

## 2018-07-20 NOTE — H&P
Pain Pre-Op H&P Note    Bety Mortensen    HPI: Sudeep Hogue is a 59 y.o. female that presents with low back and right leg pain, here for epidural steroid injection.     Past Medical History:   Diagnosis Date    Allergic rhinitis     Arthropathy, unspecified, other specified sites 4/30/2013    Bronchitis     Chronic back pain     [facility] obstructive [facility]) [facility]     Depression     DM (diabetes mellitus) ([facility] 12/18/2012    Emphysema of lung ([facility])     GERD (gastroesophageal reflux disease)     Hyperlipidemia     Hypertension     Knee pain     right knee mostly    Leg pain, right     Obesity     Osteoarthritis     Peripheral vascular disease ([facility])     Primary osteoarthritis of both knees     Radicular pain of lumbosacral region     Spinal stenosis, lumbar region, without neurogenic claudication 4/30/2013    Type II or unspecified type diabetes mellitus without mention of complication, not stated as uncontrolled     Unspecified sleep apnea     URI (upper respiratory infection)        Past Surgical History:   Procedure Laterality Date    COLONOSCOPY  2/12/2009    normal    DILATION AND CURETTAGE OF UTERUS      GASTRECTOMY      partial    NERVE BLOCK Right 4/30/13    Lumbar Diagnostic Block,  Kenalog 40 mg    NERVE BLOCK  5/23/13    Lumbar Radiofrequency, Kenalog 40mg    NERVE BLOCK  8/12/13    Lt MBNB  celestone 6mg    NERVE BLOCK Left 8-28-13    left lumbar diagnostic block #2 decadron 10 mg    NERVE BLOCK Left 9-24-13    left lumbar median branch radiofrequency    NERVE BLOCK  07-02-14    caudal, celestone 9 mg    NERVE BLOCK  7-16-14    caudal epidural #2, celestone 9mg, fentanyl 25mcg    NERVE BLOCK  7/30/14    caudal #3 decadron 10mg    NERVE BLOCK  11-6-14    duramorph epidural steroid block  duramorph 1 mg celestone 9 mg    NERVE BLOCK  11/20/15    TENS- Empi Select    MA KNEE SCOPE,DIAGNOSTIC Right 3/24/2017    KNEE ARTHROSCOPY WITH PARTIAL MEDIAL 2 puffs into the lungs every 6 hours as needed for Wheezing, Disp: 1 Inhaler, Rfl: 3    tiZANidine (ZANAFLEX) 4 MG tablet, TAKE 1 TABLET TWICE DAILY, Disp: 60 tablet, Rfl: 3    DULoxetine (CYMBALTA) 60 MG extended release capsule, Take 1 capsule by mouth daily, Disp: 30 capsule, Rfl: 3    nicotine (NICODERM CQ) 21 MG/24HR, Place 1 patch onto the skin every 24 hours, Disp: 30 patch, Rfl: 3    Lancets MISC, 1 each by Does not apply route daily, Disp: 100 each, Rfl: 11    glucose blood VI test strips (TRUETRACK TEST) strip, USE 1 DAILY NON-INSULIN DEPENDENT, Disp: 50 strip, Rfl: 11    omeprazole (PRILOSEC) 20 MG delayed release capsule, Take 1 capsule by mouth daily, Disp: 30 capsule, Rfl: 11     MG capsule, TAKE 1 CAPSULE EVERY DAY, Disp: 30 capsule, Rfl: 11    Alcohol Swabs (EASY TOUCH ALCOHOL PREP MEDIUM) 70 % PADS, , Disp: , Rfl:     azelastine (ASTELIN) 0.1 % nasal spray, 2 sprays by Nasal route 2 times daily Use in each nostril as directed, Disp: 1 Bottle, Rfl: 3    ipratropium-albuterol (DUONEB) 0.5-2.5 (3) MG/3ML SOLN nebulizer solution, Inhale 3 mLs into the lungs every 6 hours as needed for Shortness of Breath, Disp: 360 mL, Rfl: 11    Social History   Substance Use Topics    Smoking status: Current Every Day Smoker     Packs/day: 0.25     Years: 36.00     Types: Cigarettes     Last attempt to quit: 8/2/2015    Smokeless tobacco: Never Used      Comment: pt currently using nicotine patches    Alcohol use No       Review of Systems:   Focused review of systems was performed, and negative as pertinent to diagnosis, except as stated in HPI. Physical Exam:     LMP 04/19/2003     Physical Exam   Constitutional: She is oriented to person, place, and time and well-developed, well-nourished, and in no distress. Musculoskeletal:        Lumbar back: She exhibits tenderness. She exhibits no edema and no deformity. Neurological: She is alert and oriented to person, place, and time.  She has normal strength. She displays no weakness. Gait normal.   Vitals reviewed. Patient's current physical status, medications, medical history, and HPI have been reviewed and updated as appropriate on this date: 07/20/18    Risk/Benefit(s): The risks, benefits, alternatives, and potential complications have been discussed with the patient/family and informed consent has been obtained for the procedure/sedation.     Diagnosis:   Lumbar Radiculopathy  Low back pain      Plan: Right L4 and L5 transforaminal epidural steroid injection        [address]

## 2018-07-24 DIAGNOSIS — M47.816 SPONDYLOSIS OF LUMBAR REGION WITHOUT MYELOPATHY OR RADICULOPATHY: ICD-10-CM

## 2018-07-25 RX ORDER — TIZANIDINE 4 MG/1
TABLET ORAL
Qty: 60 TABLET | Refills: 3 | Status: SHIPPED | OUTPATIENT
Start: 2018-07-25 | End: 2019-01-15 | Stop reason: SDUPTHER

## 2018-07-27 ENCOUNTER — TELEPHONE (OUTPATIENT)
Dept: INTERNAL MEDICINE | Age: 58
End: 2018-07-27

## 2018-07-27 ENCOUNTER — HOSPITAL ENCOUNTER (OUTPATIENT)
Dept: PAIN MANAGEMENT | Age: 58
Discharge: HOME | End: 2018-07-27
Payer: MEDICARE

## 2018-07-27 DIAGNOSIS — M54.5 CHRONIC LOW BACK PAIN, UNSPECIFIED BACK PAIN LATERALITY, WITH SCIATICA PRESENCE UNSPECIFIED: ICD-10-CM

## 2018-07-27 DIAGNOSIS — M54.40 CHRONIC MIDLINE LOW BACK PAIN WITH SCIATICA, SCIATICA LATERALITY UNSPECIFIED: Primary | ICD-10-CM

## 2018-07-27 DIAGNOSIS — G89.29 CHRONIC LOW BACK PAIN, UNSPECIFIED BACK PAIN LATERALITY, WITH SCIATICA PRESENCE UNSPECIFIED: ICD-10-CM

## 2018-07-27 DIAGNOSIS — G89.29 CHRONIC MIDLINE LOW BACK PAIN WITH SCIATICA, SCIATICA LATERALITY UNSPECIFIED: Primary | ICD-10-CM

## 2018-07-27 DIAGNOSIS — Z51.81 MEDICATION MONITORING ENCOUNTER: ICD-10-CM

## 2018-07-27 DIAGNOSIS — M47.26 OSTEOARTHRITIS OF SPINE WITH RADICULOPATHY, LUMBAR REGION: ICD-10-CM

## 2018-07-27 DIAGNOSIS — M17.0 PRIMARY OSTEOARTHRITIS OF BOTH KNEES: ICD-10-CM

## 2018-07-27 PROCEDURE — 99214 OFFICE O/P EST MOD 30 MIN: CPT

## 2018-07-27 PROCEDURE — 99213 OFFICE O/P EST LOW 20 MIN: CPT | Performed by: NURSE PRACTITIONER

## 2018-07-27 RX ORDER — HYDROCODONE BITARTRATE AND ACETAMINOPHEN 5; 325 MG/1; MG/1
1 TABLET ORAL EVERY 8 HOURS PRN
Qty: 90 TABLET | Refills: 0 | Status: SHIPPED | OUTPATIENT
Start: 2018-07-30 | End: 2018-08-27 | Stop reason: SDUPTHER

## 2018-07-27 RX ORDER — MELOXICAM 15 MG/1
15 TABLET ORAL DAILY
Qty: 30 TABLET | Refills: 0 | Status: SHIPPED | OUTPATIENT
Start: 2018-07-30 | End: 2018-09-19 | Stop reason: SDUPTHER

## 2018-07-27 ASSESSMENT — ENCOUNTER SYMPTOMS: BACK PAIN: 1

## 2018-07-27 NOTE — PROGRESS NOTES
Patient is here today to review medication contract. Chief Complaint: back pain    PMH: Patient has had low back pain with no known injury and bilateral knee pain for many years. Neha Patel had a knee arthroscopy in March and currently in PT with no plans for replacement at this time. She sees Dr. Franca Palm and has injections with mild benefit. She is s/p lumbar diskectomy in 2015. She has had PENNY and RF in the past with some benefit. She had Right L4 and L5  Lumbar Transforaminal PENNY selective nerve root block on 7/20/18 and reports an increase in muscle spasms since the injection. She states her blood glucose was \"out of control\" until yesterday and she hasn't felt well since the injection. BS was 165-175 and today is 121. PS is 5/10 today, before injection it was 7/10. Discussed knee and facet injections. She states she is not interested in more injections at this time. She also uses TENS daily with some relief. She is stable and compliant on norco 5/325 TID- also taking mobic with benefit. She is trying to quit smoking. Currently on O2 at night and on humid days due to exacerbation of COPD. Back Pain   This is a chronic problem. The current episode started more than 1 year ago. The problem occurs constantly. The problem is unchanged. The pain is present in the lumbar spine. The quality of the pain is described as aching. Radiates to: down right leg to the knee. The pain is at a severity of 5/10. The pain is moderate. The symptoms are aggravated by position and standing. She has tried analgesics, bed rest, heat and ice for the symptoms. The treatment provided mild relief. Patient denies any new neurological symptoms. No bowel or bladder incontinence, no weakness, and no falling. Pill count: appropriate    Morphine equivalent: 15    Attestation: The Prescription Monitoring Report for this patient was reviewed today.  ELVIRA Negrete - CNP)  Documentation: Possible medication side effects, lumbar region    Relevant Medications    HYDROcodone-acetaminophen (NORCO) 5-325 MG per tablet (Start on 7/30/2018)    meloxicam (MOBIC) 15 MG tablet (Start on 7/30/2018)    Primary osteoarthritis of both knees    Relevant Medications    meloxicam (MOBIC) 15 MG tablet (Start on 7/30/2018)    Medication monitoring encounter    Chronic low back pain - Primary    Relevant Medications    HYDROcodone-acetaminophen (NORCO) 5-325 MG per tablet (Start on 7/30/2018)    meloxicam (MOBIC) 15 MG tablet (Start on 7/30/2018)            Treatment Plan:  DISCUSSION: Treatment options discussed with patient and all questions answered to patient's satisfaction. Patient relates current medications are helping the pain. Patient reports taking pain medications as prescribed, denies obtaining medications from different sources and denies use of illegal drugs. Patient denies side effects from medications like nausea, vomiting, constipation or drowsiness. Patient reports current activities of daily living are possible due to medications and would like to continue them. As always, we encourage daily stretching and strengthening exercises, and recommend minimizing use of pain medications unless patient cannot get through daily activities due to pain. TREATMENT OPTIONS:   Contract requirements met. Continue opioid therapy. Script written for norco  Pt is low risk with stress care.  Due 3/2019 for next evaluation  Follow up appointment made for 4 weeks

## 2018-07-27 NOTE — TELEPHONE ENCOUNTER
Received call from Ciara Morrison 89 with [facility] requesting a verbal order for a prn visit as patient had an epidural injection on 07/20/18 at the pain clinic and has reported since then her BS has been up and down. Also states she becomes SOB and diaphoretic with ambulation. VSS:  128/90   HR  92 SPo2 90%  . Patient is reporting muscle spasms in the sternum as well that used to be in the back. Verbal given for the prn visit. Per Pierre Alvarez, she has tried to reach the pain clinic regarding these issues as they did not start until after her injection but was only able to leave voice mail. Advised Naomy to have patient go to ER to r/o any cardiac etiology.

## 2018-07-30 ENCOUNTER — OFFICE VISIT (OUTPATIENT)
Dept: INTERNAL MEDICINE | Age: 58
End: 2018-07-30
Payer: MEDICARE

## 2018-07-30 ENCOUNTER — TELEPHONE (OUTPATIENT)
Dept: PAIN MANAGEMENT | Age: 58
End: 2018-07-30

## 2018-07-30 VITALS
DIASTOLIC BLOOD PRESSURE: 80 MMHG | HEART RATE: 86 BPM | BODY MASS INDEX: 31.5 KG/M2 | WEIGHT: 196 LBS | HEIGHT: 66 IN | OXYGEN SATURATION: 95 % | SYSTOLIC BLOOD PRESSURE: 124 MMHG

## 2018-07-30 DIAGNOSIS — J96.11 CHRONIC RESPIRATORY FAILURE WITH HYPOXIA (HCC): ICD-10-CM

## 2018-07-30 DIAGNOSIS — F32.9 MAJOR DEPRESSION, CHRONIC: ICD-10-CM

## 2018-07-30 DIAGNOSIS — I10 ESSENTIAL HYPERTENSION: ICD-10-CM

## 2018-07-30 DIAGNOSIS — M47.26 OSTEOARTHRITIS OF SPINE WITH RADICULOPATHY, LUMBAR REGION: ICD-10-CM

## 2018-07-30 DIAGNOSIS — F17.200 SMOKER: ICD-10-CM

## 2018-07-30 DIAGNOSIS — I08.0 MITRAL AND AORTIC INSUFFICIENCY: ICD-10-CM

## 2018-07-30 DIAGNOSIS — J43.2 CENTRILOBULAR EMPHYSEMA (HCC): Primary | ICD-10-CM

## 2018-07-30 DIAGNOSIS — E11.9 TYPE 2 DIABETES MELLITUS WITHOUT COMPLICATION, WITHOUT LONG-TERM CURRENT USE OF INSULIN (HCC): ICD-10-CM

## 2018-07-30 PROCEDURE — 99214 OFFICE O/P EST MOD 30 MIN: CPT | Performed by: INTERNAL MEDICINE

## 2018-07-30 PROCEDURE — 99212 OFFICE O/P EST SF 10 MIN: CPT | Performed by: INTERNAL MEDICINE

## 2018-07-30 RX ORDER — BLOOD PRESSURE TEST KIT
KIT MISCELLANEOUS
Qty: 1 DEVICE | Refills: 0 | Status: SHIPPED | OUTPATIENT
Start: 2018-07-30 | End: 2018-10-29

## 2018-07-30 ASSESSMENT — ENCOUNTER SYMPTOMS
SPUTUM PRODUCTION: 1
SHORTNESS OF BREATH: 0
COUGH: 1
NAUSEA: 0
CONSTIPATION: 0
BACK PAIN: 1
ABDOMINAL PAIN: 0
HEARTBURN: 0
WHEEZING: 0
BLURRED VISION: 0

## 2018-07-30 NOTE — PATIENT INSTRUCTIONS
RTC on 10/30/18  Canceled appt on 8/29/18. Printed script for BP kit given to pt with signed office notes   An After Visit Summary was printed and given to the patient.  CB

## 2018-07-30 NOTE — PROGRESS NOTES
ventricular systolic function is normal. Estimated ejection  fraction is 65 % . Mild to moderate aortic insufficiency. Mild mitral regurgitation. Mild to moderate tricuspid regurgitation. No pulmonary hypertension. No pericardial effusion seen. Normal aortic root dimension. NM stress test 2018  Result Narrative   · There is no ST segment deviation noted during stress. · Overall, the patient's functional capacity was below average. · The calculated Duke Treadmill Score of 6 represents a low risk only with   regards to the exercise findings. · Nuclear stress results reported separately. Nuc stress exercise7/28/2018 [address]  Result Narrative   Clinical indication    Chest pain, assess for cardiac ischemia. An exercise stress radionuclide cardiac imaging study was performed in the routine fashion.  Resting images were obtained utilizing 19.4 mCi of technetium 99m sestamibi intravenously.  Patient was exercised for 6 minutes reaching 98% of the patient's predicted maximum heart rate at this point the   patient received an additional 35 mCi of technetium 99m sestamibi intravenously. FINDINGS: There is no convincing scintigraphic evidence of stress-induced ischemia.  There is normal left ventricular ejection fraction calculated at 66%. Some CT images obtained for the purposes of attenuation correction are reviewed.  There are no coronary artery calcification seen.  No other significant pathology is seen on the CT images. IMPRESSION: Unremarkable examination, this is a low probability for ischemia study. [id number]     CXR  X-ray chest 1 view7/27/2018 [address]  Result Narrative     History:  Chest pain and shortness of breath    Exam/Technique:  Single AP view of the chest was obtained    Comparison:  Chest x-ray 3/29/2018    Findings:  There is mild degree of diffuse interstitial and septal thickening with no focal area of consolidation, pleural ng/mL     Troponin I (07/27/2018 11:41 PM)   Specimen Performing Laboratory     [address], [place] [address]     Back to top of Lab Results      Bedside glucose (07/27/2018 8:45 PM)  Bedside glucose (07/27/2018 8:45 PM)   Component Value Ref Range   Bedside glucose 153 (H) 65 - 99 mg/dL     Bedside glucose (07/27/2018 8:45 PM)   Specimen Performing Laboratory     [facility]     Back to top of Lab Results      Bedside glucose (07/27/2018 6:23 PM)  Bedside glucose (07/27/2018 6:23 PM)   Component Value Ref Range   Bedside glucose 90 65 - 99 mg/dL     Bedside glucose (07/27/2018 6:23 PM)   Specimen Performing Laboratory     [facility]     Back to top of Lab Results      POCT Troponin (07/27/2018 3:06 PM)  POCT Troponin (07/27/2018 3:06 PM)   Component Value Ref Range   Portable troponin <0.01 0.00 - 0.09 ng/mL     POCT Troponin (07/27/2018 3:06 PM)   Specimen Performing Laboratory     [facility]     Back to top of Lab Results      CBC auto differential (07/27/2018 2:48 PM)  CBC auto differential (07/27/2018 2:48 PM)   Component Value Ref Range   White Blood Cells 5.8 4.8 - 10.8 X10E9/L   RBC count 5.23 (H) 3.80 - 5.20 X10E12/L   Hemoglobin 15.0 11.7 - 16.0 g/dL   Hematocrit 44.4 35 - 47 %   MCV 85 81 - 100 fL   MCH 28.7 26 - 33.5 pg   MCHC 33.8 32 - 36 g/dL   RDW 13.6 11.5 - 14.7 %   Platelets 036 311 - 775 X10E9/L   MPV 8.9 7 - 12 fL   % neutrophils 50.7 %   % lymphocytes 31.4 %   % monocytes 10.0 %   % eosinophils 5.7 %   % basophils 2.2 %   Neutrophils Absolute 3.0 1.5 - 6.6 X10E9/L   Lymphocytes Absolute 1.8 1.0 - 3.5 X10E9/L   Monocytes Absolute 0.6 0 - 0.9 X10E9/L   Eosinophils Absolute 0.3 0.0 - 0.4 X10E9/L   Basophils Absolute 0.1 0 - 0.9 X10E9/L     CBC auto differential (07/27/2018 2:48 PM)   Specimen Performing Laboratory   Blood [facility] [address]   [address], [place] [address]     Back to top of Lab Results      Basic metabolic panel (07/27/2018 2:48 PM)  Basic metabolic panel (42/45/9246 2:48 PM)   Component Value Ref Range   Sodium 139 134 - 146 mmol/L   Potassium, Bld 3.7 3.5 - 5.0 mmol/L   Chloride 104 98 - 109 mmol/L   CO2 25 22 - 32 mmol/L   Anion gap 10  Comment:   ANION GAP CALCULATION DOES NOT  INCLUDE K, NORMAL RANGES  REFLECT THIS CHANGE   4 - 12 mmol/L   BUN, Bld 16 5 - 23 mg/dL   Creatinine 0.87Comment: METHOD TRACEABLE TO IDMS STANDARD 0.40 - 1.00 mg/dL   Glucose 120 (H) 65 - 99 mg/dL   Calcium 9.6 8.5 - 10.5 mg/dL   GFR MDRD Non Af Amer >60 >59 ml/min/1.73sq. m   GFR MDRD Af Amer >60 >59 ml/min/1.73sq. m           Past Medical History:   Diagnosis Date    Allergic rhinitis     Arthropathy, unspecified, other specified sites 4/30/2013    Bronchitis     Chronic back pain     [facility]     Depression     DM (diabetes mellitus) ([facility] 12/18/2012    Emphysema of lung ([facility])     GERD (gastroesophageal reflux disease)     Hyperlipidemia     Hypertension     Knee pain     right knee mostly    Leg pain, right     Obesity     Osteoarthritis     Peripheral vascular disease (HCC)     Primary osteoarthritis of both knees     Radicular pain of lumbosacral region     Spinal stenosis, lumbar region, without neurogenic claudication 4/30/2013    Type II or unspecified type diabetes mellitus without mention of complication, not stated as uncontrolled     Unspecified sleep apnea     URI (upper respiratory infection)        Past Surgical History:   Procedure Laterality Date    COLONOSCOPY  2/12/2009    normal    DILATION AND CURETTAGE OF UTERUS      GASTRECTOMY      partial    NERVE BLOCK Right 4/30/13    Lumbar Diagnostic Block,  Kenalog 40 mg    NERVE BLOCK  5/23/13    Lumbar Radiofrequency, Kenalog 40mg    NERVE BLOCK  8/12/13    Lt MBNB  celestone 6mg    NERVE BLOCK Left 8-28-13    left lumbar diagnostic block #2 decadron 10 mg    NERVE BLOCK Left 9-24-13    left lumbar median exam  09/26/2017       REVIEW OF SYSTEMS:    12 point review of symptoms completed and found to be normal except noted in the HPI    Review of Systems   Constitutional: Negative for chills, fever, malaise/fatigue and weight loss. HENT: Positive for congestion. Negative for ear pain and sinus pain. Eyes: Negative for blurred vision. Respiratory: Positive for cough and sputum production. Negative for shortness of breath and wheezing. Cardiovascular: Negative for chest pain, palpitations and leg swelling. Gastrointestinal: Negative for abdominal pain, constipation, heartburn and nausea. Genitourinary: Negative for dysuria, frequency and urgency. Musculoskeletal: Positive for back pain and joint pain. Skin: Negative for itching and rash. Neurological: Negative for dizziness, seizures, loss of consciousness and headaches. Endo/Heme/Allergies: Positive for environmental allergies. Negative for polydipsia. Does not bruise/bleed easily. Psychiatric/Behavioral: Positive for depression. Negative for substance abuse and suicidal ideas. The patient is nervous/anxious. The patient does not have insomnia. PHYSICAL EXAM:      Vitals:    07/30/18 0920   BP: 124/80   Site: Right Arm   Position: Sitting   Cuff Size: Medium Adult   Pulse: 86   SpO2: 95%   Weight: 196 lb (88.9 kg)   Height: 5' 6\" (1.676 m)     Body mass index is 31.64 kg/m². BP Readings from Last 3 Encounters:   07/30/18 124/80   07/20/18 111/66   06/28/18 (!) 124/93        Wt Readings from Last 3 Encounters:   07/30/18 196 lb (88.9 kg)   06/18/18 193 lb (87.5 kg)   04/27/18 200 lb (90.7 kg)       Physical Exam   Constitutional: She is oriented to person, place, and time and well-developed, well-nourished, and in no distress. No distress. HENT:   Head: Normocephalic and atraumatic. Mouth/Throat: Oropharynx is clear and moist.   Eyes: Conjunctivae and EOM are normal. Pupils are equal, round, and reactive to light.  No scleral

## 2018-07-31 ASSESSMENT — ENCOUNTER SYMPTOMS: SINUS PAIN: 0

## 2018-08-27 ENCOUNTER — HOSPITAL ENCOUNTER (OUTPATIENT)
Dept: PAIN MANAGEMENT | Age: 58
Discharge: HOME | End: 2018-08-27
Payer: MEDICARE

## 2018-08-27 VITALS
RESPIRATION RATE: 20 BRPM | SYSTOLIC BLOOD PRESSURE: 105 MMHG | HEART RATE: 75 BPM | WEIGHT: 196 LBS | DIASTOLIC BLOOD PRESSURE: 57 MMHG | TEMPERATURE: 98 F | HEIGHT: 66 IN | BODY MASS INDEX: 31.5 KG/M2

## 2018-08-27 DIAGNOSIS — G89.29 CHRONIC MIDLINE LOW BACK PAIN WITH SCIATICA, SCIATICA LATERALITY UNSPECIFIED: ICD-10-CM

## 2018-08-27 DIAGNOSIS — M54.40 CHRONIC MIDLINE LOW BACK PAIN WITH SCIATICA, SCIATICA LATERALITY UNSPECIFIED: ICD-10-CM

## 2018-08-27 DIAGNOSIS — M54.5 CHRONIC LOW BACK PAIN, UNSPECIFIED BACK PAIN LATERALITY, WITH SCIATICA PRESENCE UNSPECIFIED: ICD-10-CM

## 2018-08-27 DIAGNOSIS — Z51.81 MEDICATION MONITORING ENCOUNTER: ICD-10-CM

## 2018-08-27 DIAGNOSIS — M47.26 OSTEOARTHRITIS OF SPINE WITH RADICULOPATHY, LUMBAR REGION: Primary | ICD-10-CM

## 2018-08-27 DIAGNOSIS — G89.29 CHRONIC LOW BACK PAIN, UNSPECIFIED BACK PAIN LATERALITY, WITH SCIATICA PRESENCE UNSPECIFIED: ICD-10-CM

## 2018-08-27 PROCEDURE — 99214 OFFICE O/P EST MOD 30 MIN: CPT

## 2018-08-27 PROCEDURE — 99213 OFFICE O/P EST LOW 20 MIN: CPT | Performed by: NURSE PRACTITIONER

## 2018-08-27 RX ORDER — HYDROCODONE BITARTRATE AND ACETAMINOPHEN 5; 325 MG/1; MG/1
1 TABLET ORAL EVERY 8 HOURS PRN
Qty: 90 TABLET | Refills: 0 | Status: SHIPPED | OUTPATIENT
Start: 2018-08-29 | End: 2018-09-19 | Stop reason: SDUPTHER

## 2018-08-27 ASSESSMENT — ENCOUNTER SYMPTOMS
BACK PAIN: 1
CONSTIPATION: 0
SHORTNESS OF BREATH: 1
COUGH: 0

## 2018-08-27 NOTE — PROGRESS NOTES
and no falling. Pill count: appropriate    Morphine equivalent dose as reported on OARRS:15    Attestation: The Prescription Monitoring Report for this patient was reviewed today. ELVIRA Rosenberg CNP)  Documentation: Possible medication side effects, risk of tolerance/dependence & alternative treatments discussed., Obtaining appropriate analgesic effect of treatment., No signs of potential drug abuse or diversion identified. ELVIRA Rosenberg CNP)  Medication Contracts: Existing medication contract. ELVIRA Rosenberg CNP)  Review of OARRS does not show any aberrant prescription behavior. Medication is helping the patient stay active. Patient denies any side effects and reports adequate analgesia. No sign of misuse/abuse.     Past Medical History:   Diagnosis Date    Allergic rhinitis     Arthropathy, unspecified, other specified sites 4/30/2013    Bronchitis     Chronic back pain     [facility]     Depression     [facility] 12[facility]18/2012    [facility]physema [facility] lung ([facility])     GERD (gastroesophageal reflux disease)     Hyperlipidemia     Hypertension     Knee pain     right knee mostly    Leg pain, right     Obesity     Osteoarthritis     Peripheral vascular disease ([facility])     Primary osteoarthritis of both knees     Radicular pain of lumbosacral region     Spinal stenosis, lumbar region, without neurogenic claudication 4/30/2013    Type II or unspecified type diabetes mellitus without mention of complication, not stated as uncontrolled     Unspecified sleep apnea     URI (upper respiratory infection)        Past Surgical History:   Procedure Laterality Date    COLONOSCOPY  2/12/2009    normal    DILATION AND CURETTAGE OF UTERUS      GASTRECTOMY      partial    NERVE BLOCK Right 4/30/13    Lumbar Diagnostic Block,  Kenalog 40 mg    NERVE BLOCK  5/23/13    Lumbar Radiofrequency, Kenalog 40mg    NERVE BLOCK  8/12/13    Lt MBNB  Diabetes Mother     Heart Disease Mother     Cirrhosis Father     Breast Cancer Neg Hx     Cancer Neg Hx     Colon Cancer Neg Hx     Eclampsia Neg Hx     Hypertension Neg Hx     Ovarian Cancer Neg Hx      Labor Neg Hx     Spont Abortions Neg Hx     Stroke Neg Hx        Social History     Social History    Marital status: Single     Spouse name: N/A    Number of children: N/A    Years of education: N/A     Occupational History    Not on file. Social History Main Topics    Smoking status: Current Every Day Smoker     Packs/day: 0.25     Years: 36.00     Types: Cigarettes     Last attempt to quit: 2015    Smokeless tobacco: Never Used      Comment: pt currently using nicotine patches    Alcohol use No    Drug use: No      Comment: 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 7 yrs    Sexual activity: No     Other Topics Concern    Not on file     Social History Narrative    No narrative on file       Review of Systems:  Review of Systems   Constitution: Positive for weakness. Negative for chills and fever. Cardiovascular: Negative for chest pain. Respiratory: Positive for shortness of breath. Negative for cough. With activity due to warm weather   Musculoskeletal: Positive for back pain. Gastrointestinal: Negative for constipation. Physical Exam:  BP (!) 105/57   Pulse 75   Temp 98 °F (36.7 °C) (Oral)   Resp 20   Ht 5' 6\" (1.676 m)   Wt 196 lb (88.9 kg)   LMP 2003   BMI 31.64 kg/m²     Physical Exam   Constitutional: She is oriented to person, place, and time and well-developed, well-nourished, and in no distress. Cardiovascular: Normal rate. Pulmonary/Chest: Effort normal.   Wearing O2   Musculoskeletal: Normal range of motion. Neurological: She is alert and oriented to person, place, and time. Gait normal.   Skin: Skin is warm and dry.    Psychiatric: Affect normal.       Record/Diagnostics Review:    Last  and was appropriate    XR Right knee 2018 -      FINDINGS:  Moderate degenerative changes present in the medial compartment with  subchondral sclerosis and marginal spurring.  There is mild degenerative  change in the medial and patellofemoral compartments.  No joint effusion or  acute osseous abnormality.     Lumbar MRI 2014 -      IMPRESSION:        L4-L5 large right paracentric extruded disc with marked compromise of   right lateral recess and right neuroforamina. There is mild to moderate   spinal canal stenosis present as well.     L3-L4 annular disc bulging accompanied with facet arthropathy and   ligamentum flavum hypertrophy. There is mild to moderate bilateral   neuroforaminal stenosis. The spinal canal is relatively patent.       Assessment:  Problem List Items Addressed This Visit     Osteoarthritis of spine with radiculopathy, lumbar region - Primary    Medication monitoring encounter    Chronic low back pain            Treatment Plan:  DISCUSSION: Treatment options discussed with patient and all questions answered to patient's satisfaction. Patient relates current medications are helping the pain. Patient reports taking pain medications as prescribed, denies obtaining medications from different sources and denies use of illegal drugs. Patient denies side effects from medications like nausea, vomiting, constipation or drowsiness. Patient reports current activities of daily living are possible due to medications and would like to continue them. As always, we encourage daily stretching and strengthening exercises, and recommend minimizing use of pain medications unless patient cannot get through daily activities due to pain. Discussed with the patient the effect the patients medical condition and opioid medication may have on the patients ability to safely operate a vehicle. Pt verbalized understanding. TREATMENT OPTIONS:     Contract requirements met. Continue opioid therapy.  Script written for

## 2018-09-04 DIAGNOSIS — J30.9 CHRONIC ALLERGIC RHINITIS: ICD-10-CM

## 2018-09-04 RX ORDER — CETIRIZINE HYDROCHLORIDE 10 MG/1
TABLET ORAL
Qty: 30 TABLET | Refills: 3 | Status: SHIPPED | OUTPATIENT
Start: 2018-09-04 | End: 2019-01-08 | Stop reason: SDUPTHER

## 2018-09-04 NOTE — TELEPHONE ENCOUNTER
E-scribe request for *CETIRIZINE HCL 10 MG TAB. Please review and e-scribe if applicable. Last Visit Date:  7/30/18  Next Visit Date:  10/29/18    Hemoglobin A1C (%)   Date Value   06/18/2018 5.8   01/05/2018 5.9   07/19/2017 5.8             ( goal A1C is < 7)   Microalb/Crt.  Ratio (mcg/mg creat)   Date Value   01/05/2018 CANNOT BE CALCULATED     LDL Cholesterol (mg/dL)   Date Value   06/18/2018 84       (goal LDL is <100)   AST (U/L)   Date Value   06/18/2018 15     ALT (U/L)   Date Value   06/18/2018 13     BUN (mg/dL)   Date Value   06/18/2018 21 (H)     BP Readings from Last 3 Encounters:   08/27/18 (!) 105/57   07/30/18 124/80   07/20/18 111/66          (goal 120/80)        Patient Active Problem List:     DJD (degenerative joint disease) of knee     Osteoarthritis of spine with radiculopathy, lumbar region     GERD (gastroesophageal reflux disease)     COPD (chronic obstructive pulmonary disease)     HTN (hypertension)     Allergic rhinitis     Lipoma of shoulder s/p excision right posterior 11 17 2008     DM (diabetes mellitus)     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     Primary osteoarthritis of both knees     Medication monitoring encounter     Chronic low back pain     Major depression, chronic     Chronic respiratory failure with hypoxia (HCC)     Mitral and aortic insufficiency      ----JF

## 2018-09-19 ENCOUNTER — HOSPITAL ENCOUNTER (OUTPATIENT)
Dept: PAIN MANAGEMENT | Age: 58
Discharge: HOME | End: 2018-09-19
Payer: MEDICARE

## 2018-09-19 DIAGNOSIS — G89.29 CHRONIC LOW BACK PAIN, UNSPECIFIED BACK PAIN LATERALITY, WITH SCIATICA PRESENCE UNSPECIFIED: ICD-10-CM

## 2018-09-19 DIAGNOSIS — M17.0 PRIMARY OSTEOARTHRITIS OF BOTH KNEES: ICD-10-CM

## 2018-09-19 DIAGNOSIS — M54.5 CHRONIC LOW BACK PAIN, UNSPECIFIED BACK PAIN LATERALITY, WITH SCIATICA PRESENCE UNSPECIFIED: ICD-10-CM

## 2018-09-19 PROCEDURE — 99213 OFFICE O/P EST LOW 20 MIN: CPT | Performed by: ANESTHESIOLOGY

## 2018-09-19 PROCEDURE — 99214 OFFICE O/P EST MOD 30 MIN: CPT

## 2018-09-19 RX ORDER — HYDROCODONE BITARTRATE AND ACETAMINOPHEN 5; 325 MG/1; MG/1
1 TABLET ORAL EVERY 8 HOURS PRN
Qty: 90 TABLET | Refills: 0 | Status: SHIPPED | OUTPATIENT
Start: 2018-09-30 | End: 2018-10-23 | Stop reason: SDUPTHER

## 2018-09-19 RX ORDER — MELOXICAM 15 MG/1
15 TABLET ORAL DAILY
Qty: 30 TABLET | Refills: 0 | Status: SHIPPED | OUTPATIENT
Start: 2018-09-19 | End: 2018-10-23 | Stop reason: SDUPTHER

## 2018-09-19 NOTE — PROGRESS NOTES
LMP 04/19/2003      Controlled Substances Monitoring:     RX Monitoring 8/27/2018   Attestation The Prescription Monitoring Report for this patient was reviewed today. Documentation Possible medication side effects, risk of tolerance/dependence & alternative treatments discussed. ;Obtaining appropriate analgesic effect of treatment. ;No signs of potential drug abuse or diversion identified. Chronic Pain -   Medication Contracts Existing medication contract. General Appearance: in no acute distress  Musculoskeletal: no joint instability  Neurologic: gait and coordination normal and speech normal  Mood and affect: alert    Impression  Patient Active Problem List   Diagnosis    DJD (degenerative joint disease) of knee    Osteoarthritis of spine with radiculopathy, lumbar region    GERD (gastroesophageal reflux disease)    COPD (chronic obstructive pulmonary disease)    HTN (hypertension)    Allergic rhinitis    Lipoma of shoulder s/p excision right posterior 11 17 2008    DM (diabetes mellitus)    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    Primary osteoarthritis of both knees    Medication monitoring encounter    Chronic low back pain    Major depression, chronic    Chronic respiratory failure with hypoxia (HCC)    Mitral and aortic insufficiency       Plan of Care    Maintain, on home O2 at night 99%  Sat on RA today      I have made any additions and/or corrections, if necessary, to the above scribed information. I have reviewed and agree with the above information. Patient is here today to review medication contract.     The patient is a [facility] patient : No     The [facility] allowed levels are: na    Type of last Diagnostics: lumbar MRI    Date of last diagnostics: 2017         RYAN  Today  No    OARRS today  Yes     Result of OARRS - reviewed  No outside fills    Morphine equivalent dose as reported on OARRS: 15    Pill count today   Yes  Comments compliant    Last dose taken : this am    Sleep Pattern, 5 hours per night   generally restful sleep    ER visits related to Pain? No  Date of ER visit   N/A    Other Hospitalization  No    Reason for Hospitalization na    Date of last Pain Intervention  July / 2018    Type of Pain Intervention Rt TF    Was there pain relief from Pain Intervention: Yes    How long was your pain relief from the Pain Intervention 2 days    What percentage of your pain was relieved  20%    But caused BS to go up and felt SOB    Working  disability    Home Exercises daily walking   And stretches    When was your last Physical Therapy:   PT therapist comes to her house 2x a week        Physical Therapy Results helpful for short time     Medication Side Effects:  Constipation  No      Sedation  No    Urinary Retention  No  Other Medication Side Effect no    Are you on a Bowel Regime  :  Diet  No   Medication No    Are you having any of these Emotional Issues? anxiety/ nervousness  depression    Are you seeing a Psychiatrist or Psychologist  Yes if yes [facility]   And stress care level green    Have you ever had thoughts of hurting yourself  No    Outpatient Prescriptions Marked as Taking for the 9/19/18 encounter [facility] Encounter) with [facility] PAIN FLUORO RM 1   Medication Sig Dispense Refill    cetirizine (ZYRTEC) 10 MG tablet TAKE 1 TABLET BY MOUTH DAILY 30 tablet 3    HYDROcodone-acetaminophen (NORCO) 5-325 MG per tablet Take 1 tablet by mouth every 8 hours as needed for Pain for up to 30 days. . 90 tablet 0    Blood Pressure Monitoring (3 SERIES BP MONITOR/UPPER ARM) SIMEON Check daily 1 Device 0    meloxicam (MOBIC) 15 MG tablet Take 1 tablet by mouth daily 30 tablet 0    tiZANidine (ZANAFLEX) 4 MG tablet TAKE 1 TABLET TWICE DAILY 60 tablet 3    gabapentin (NEURONTIN) 300 MG capsule Take 300 mg by mouth 4 times daily. 1 in AM, 1 in PM, 2 at HS .       potassium chloride (KLOR-CON M) 10 MEQ extended release tablet Take 2 tablets by mouth daily 60 tablet 3    mirtazapine

## 2018-10-23 ENCOUNTER — HOSPITAL ENCOUNTER (OUTPATIENT)
Dept: PAIN MANAGEMENT | Age: 58
Discharge: HOME | End: 2018-10-23
Payer: MEDICARE

## 2018-10-23 DIAGNOSIS — Z51.81 MEDICATION MONITORING ENCOUNTER: ICD-10-CM

## 2018-10-23 DIAGNOSIS — M54.5 CHRONIC LOW BACK PAIN, UNSPECIFIED BACK PAIN LATERALITY, WITH SCIATICA PRESENCE UNSPECIFIED: ICD-10-CM

## 2018-10-23 DIAGNOSIS — M17.0 PRIMARY OSTEOARTHRITIS OF BOTH KNEES: Primary | ICD-10-CM

## 2018-10-23 DIAGNOSIS — M47.26 OSTEOARTHRITIS OF SPINE WITH RADICULOPATHY, LUMBAR REGION: ICD-10-CM

## 2018-10-23 DIAGNOSIS — G89.29 CHRONIC LOW BACK PAIN, UNSPECIFIED BACK PAIN LATERALITY, WITH SCIATICA PRESENCE UNSPECIFIED: ICD-10-CM

## 2018-10-23 PROCEDURE — 99213 OFFICE O/P EST LOW 20 MIN: CPT | Performed by: NURSE PRACTITIONER

## 2018-10-23 PROCEDURE — 99214 OFFICE O/P EST MOD 30 MIN: CPT

## 2018-10-23 RX ORDER — MELOXICAM 15 MG/1
15 TABLET ORAL DAILY
Qty: 30 TABLET | Refills: 0 | Status: SHIPPED | OUTPATIENT
Start: 2018-10-29 | End: 2018-11-27 | Stop reason: SDUPTHER

## 2018-10-23 RX ORDER — HYDROCODONE BITARTRATE AND ACETAMINOPHEN 5; 325 MG/1; MG/1
1 TABLET ORAL EVERY 8 HOURS PRN
Qty: 90 TABLET | Refills: 0 | Status: SHIPPED | OUTPATIENT
Start: 2018-10-29 | End: 2018-10-23 | Stop reason: SDUPTHER

## 2018-10-23 RX ORDER — HYDROCODONE BITARTRATE AND ACETAMINOPHEN 5; 325 MG/1; MG/1
1 TABLET ORAL EVERY 8 HOURS PRN
Qty: 90 TABLET | Refills: 0 | Status: SHIPPED | OUTPATIENT
Start: 2018-10-29 | End: 2018-11-27 | Stop reason: SDUPTHER

## 2018-10-23 ASSESSMENT — ENCOUNTER SYMPTOMS
COUGH: 0
SHORTNESS OF BREATH: 0
CONSTIPATION: 0
BACK PAIN: 1

## 2018-10-23 NOTE — PROGRESS NOTES
tablet, Rfl: 1    solifenacin (VESICARE) 5 MG tablet, Take 1 tablet by mouth daily, Disp: 90 tablet, Rfl: 1    lisinopril-hydrochlorothiazide (PRINZIDE;ZESTORETIC) 20-25 MG per tablet, TAKE 1 TABLET EVERY DAY, Disp: 90 tablet, Rfl: 3    amLODIPine (NORVASC) 10 MG tablet, Take 1 tablet by mouth daily, Disp: 90 tablet, Rfl: 3    albuterol sulfate HFA (VENTOLIN HFA) 108 (90 Base) MCG/ACT inhaler, Inhale 2 puffs into the lungs every 6 hours as needed for Wheezing, Disp: 1 Inhaler, Rfl: 3    DULoxetine (CYMBALTA) 60 MG extended release capsule, Take 1 capsule by mouth daily, Disp: 30 capsule, Rfl: 3    nicotine (NICODERM CQ) 21 MG/24HR, Place 1 patch onto the skin every 24 hours, Disp: 30 patch, Rfl: 3    Lancets MISC, 1 each by Does not apply route daily, Disp: 100 each, Rfl: 11    glucose blood VI test strips (TRUETRACK TEST) strip, USE 1 DAILY NON-INSULIN DEPENDENT, Disp: 50 strip, Rfl: 11    omeprazole (PRILOSEC) 20 MG delayed release capsule, Take 1 capsule by mouth daily, Disp: 30 capsule, Rfl: 11     MG capsule, TAKE 1 CAPSULE EVERY DAY, Disp: 30 capsule, Rfl: 11    Alcohol Swabs (EASY TOUCH ALCOHOL PREP MEDIUM) 70 % PADS, , Disp: , Rfl:     azelastine (ASTELIN) 0.1 % nasal spray, 2 sprays by Nasal route 2 times daily Use in each nostril as directed, Disp: 1 Bottle, Rfl: 3    ipratropium-albuterol (DUONEB) 0.5-2.5 (3) MG/3ML SOLN nebulizer solution, Inhale 3 mLs into the lungs every 6 hours as needed for Shortness of Breath, Disp: 360 mL, Rfl: 11    Family History   Problem Relation Age of Onset    Diabetes Mother     Heart Disease Mother     Cirrhosis Father     Breast Cancer Neg Hx     Cancer Neg Hx     Colon Cancer Neg Hx     Eclampsia Neg Hx     Hypertension Neg Hx     Ovarian Cancer Neg Hx      Labor Neg Hx     Spont Abortions Neg Hx     Stroke Neg Hx        Social History     Social History    Marital status: Single     Spouse name: N/A    Number of children: N/A   

## 2018-10-29 ENCOUNTER — OFFICE VISIT (OUTPATIENT)
Dept: INTERNAL MEDICINE | Age: 58
End: 2018-10-29
Payer: MEDICARE

## 2018-10-29 VITALS
SYSTOLIC BLOOD PRESSURE: 119 MMHG | HEART RATE: 86 BPM | BODY MASS INDEX: 31.8 KG/M2 | WEIGHT: 197 LBS | DIASTOLIC BLOOD PRESSURE: 76 MMHG

## 2018-10-29 DIAGNOSIS — F32.9 MAJOR DEPRESSION, CHRONIC: ICD-10-CM

## 2018-10-29 DIAGNOSIS — J96.11 CHRONIC RESPIRATORY FAILURE WITH HYPOXIA (HCC): ICD-10-CM

## 2018-10-29 DIAGNOSIS — Z23 NEED FOR PROPHYLACTIC VACCINATION AND INOCULATION AGAINST VARICELLA: ICD-10-CM

## 2018-10-29 DIAGNOSIS — E78.00 PURE HYPERCHOLESTEROLEMIA: ICD-10-CM

## 2018-10-29 DIAGNOSIS — Z23 NEEDS FLU SHOT: ICD-10-CM

## 2018-10-29 DIAGNOSIS — I08.0 MITRAL AND AORTIC INSUFFICIENCY: ICD-10-CM

## 2018-10-29 DIAGNOSIS — Z12.39 BREAST CANCER SCREENING: ICD-10-CM

## 2018-10-29 DIAGNOSIS — E11.9 TYPE 2 DIABETES MELLITUS WITHOUT COMPLICATION, WITHOUT LONG-TERM CURRENT USE OF INSULIN (HCC): Primary | ICD-10-CM

## 2018-10-29 DIAGNOSIS — M17.0 PRIMARY OSTEOARTHRITIS OF BOTH KNEES: ICD-10-CM

## 2018-10-29 DIAGNOSIS — J43.2 CENTRILOBULAR EMPHYSEMA (HCC): ICD-10-CM

## 2018-10-29 DIAGNOSIS — I10 ESSENTIAL HYPERTENSION: ICD-10-CM

## 2018-10-29 LAB — GLUCOSE BLD-MCNC: 100 MG/DL

## 2018-10-29 PROCEDURE — 99211 OFF/OP EST MAY X REQ PHY/QHP: CPT | Performed by: INTERNAL MEDICINE

## 2018-10-29 PROCEDURE — 90686 IIV4 VACC NO PRSV 0.5 ML IM: CPT | Performed by: INTERNAL MEDICINE

## 2018-10-29 PROCEDURE — 99214 OFFICE O/P EST MOD 30 MIN: CPT | Performed by: INTERNAL MEDICINE

## 2018-10-29 PROCEDURE — 82962 GLUCOSE BLOOD TEST: CPT | Performed by: INTERNAL MEDICINE

## 2018-10-29 RX ORDER — POTASSIUM CHLORIDE 750 MG/1
20 TABLET, EXTENDED RELEASE ORAL DAILY
Qty: 60 TABLET | Refills: 3 | Status: SHIPPED | OUTPATIENT
Start: 2018-10-29 | End: 2019-12-10 | Stop reason: SDUPTHER

## 2018-10-29 RX ORDER — ATORVASTATIN CALCIUM 40 MG/1
40 TABLET, FILM COATED ORAL DAILY
Qty: 90 TABLET | Refills: 1 | Status: SHIPPED | OUTPATIENT
Start: 2018-10-29 | End: 2019-03-05 | Stop reason: SDUPTHER

## 2018-10-29 RX ORDER — CARVEDILOL 6.25 MG/1
6.25 TABLET ORAL 2 TIMES DAILY
Qty: 180 TABLET | Refills: 1 | Status: SHIPPED | OUTPATIENT
Start: 2018-10-29 | End: 2019-03-05 | Stop reason: SDUPTHER

## 2018-10-29 RX ORDER — LISINOPRIL AND HYDROCHLOROTHIAZIDE 25; 20 MG/1; MG/1
TABLET ORAL
Qty: 90 TABLET | Refills: 3 | Status: SHIPPED | OUTPATIENT
Start: 2018-10-29 | End: 2019-09-17 | Stop reason: SDUPTHER

## 2018-10-29 ASSESSMENT — ENCOUNTER SYMPTOMS
SHORTNESS OF BREATH: 0
DIARRHEA: 0
CONSTIPATION: 0
ABDOMINAL PAIN: 0
WHEEZING: 0
PHOTOPHOBIA: 0
BACK PAIN: 1
COUGH: 1
BLOOD IN STOOL: 0

## 2018-10-29 NOTE — PROGRESS NOTES
FVC both were normal without evidence of  obstruction. Lung volumes are consistent with hyperinflation and airway  trapping, and there is moderate reduction in diffusion capacity, which is  likely secondary to emphysema. Other etiology like pulmonary vascular  disease or anemia could be considered. Clinical correlation is  Recommended. Echo 2018  Summary  Left ventricle is normal in size  Global left ventricular systolic function is normal. Estimated ejection  fraction is 65 % . Mild to moderate aortic insufficiency. Mild mitral regurgitation. Mild to moderate tricuspid regurgitation. No pulmonary hypertension. No pericardial effusion seen. Normal aortic root dimension.     NM stress test 2018  Result Narrative   · There is no ST segment deviation noted during stress. · Overall, the patient's functional capacity was below average. · The calculated Duke Treadmill Score of 6 represents a low risk only with   regards to the exercise findings. · Nuclear stress results reported separately.      Nuc stress exercise7/28/2018  [address]  Result Narrative   Clinical indication    Chest pain, assess for cardiac ischemia. An exercise stress radionuclide cardiac imaging study was performed in the routine fashion.  Resting images were obtained utilizing 19.4 mCi of technetium 99m sestamibi intravenously.  Patient was exercised for 6 minutes reaching 98% of the patient's predicted maximum heart rate at this point the   patient received an additional 35 mCi of technetium 99m sestamibi intravenously. FINDINGS: There is no convincing scintigraphic evidence of stress-induced ischemia.  There is normal left ventricular ejection fraction calculated at 66%. Some CT images obtained for the purposes of attenuation correction are reviewed.  There are no coronary artery calcification seen.  No other significant pathology is seen on the CT images.     IMPRESSION: Unremarkable examination, this is a low capsule by mouth daily 30 capsule 11     MG capsule TAKE 1 CAPSULE EVERY DAY 30 capsule 11    Alcohol Swabs (EASY TOUCH ALCOHOL PREP MEDIUM) 70 % PADS       azelastine (ASTELIN) 0.1 % nasal spray 2 sprays by Nasal route 2 times daily Use in each nostril as directed 1 Bottle 3    ipratropium-albuterol (DUONEB) 0.5-2.5 (3) MG/3ML SOLN nebulizer solution Inhale 3 mLs into the lungs every 6 hours as needed for Shortness of Breath 360 mL 11    atorvastatin (LIPITOR) 40 MG tablet Take 1 tablet by mouth daily 90 tablet 1     No current facility-administered medications on file prior to visit. SOCIAL HISTORY    Reviewed and no change from previous record. Carolina  reports that she has been smoking Cigarettes. She has a 9.00 pack-year smoking history. She has never used smokeless tobacco.    FAMILY HISTORY:    Reviewed and No change from previous visit    HEALTH MAINTENANCE DUE:      Health Maintenance Due   Topic Date Due    DTaP/Tdap/Td vaccine (1 - Tdap) 07/08/1977    Shingles Vaccine (1 of 2 - 2 Dose Series) 07/08/2008    Diabetic retinal exam  09/26/2017    Flu vaccine (1) 09/01/2018       REVIEW OF SYSTEMS:    12 point review of symptoms completed and found to be normal except noted in the HPI    Review of Systems   Constitutional: Negative for chills, fatigue and fever. HENT: Positive for congestion. Eyes: Negative for photophobia and visual disturbance. Respiratory: Positive for cough. Negative for shortness of breath and wheezing. Cardiovascular: Negative for chest pain, palpitations and leg swelling. Gastrointestinal: Negative for abdominal pain, blood in stool, constipation and diarrhea. Endocrine: Negative for polydipsia and polyuria. Genitourinary: Positive for urgency. Negative for dysuria and frequency. Musculoskeletal: Positive for arthralgias, back pain and neck pain. Negative for neck stiffness. Allergic/Immunologic: Positive for environmental allergies. 3 months (around 1/29/2019). 1. Carolina received counseling on the following healthy behaviors: nutrition, exercise and medication adherence    2. Reviewed prior labs and health maintenance. 3. Discussed use, benefit, and side effects of prescribed medications. Barriers to medication compliance addressed. All patient questions answered. Pt voiced understanding.        Asher Henning  Attending Physician, [address] [place] [address], [facility]  [address] [place] [address]  10/29/2018, 9:44 AM

## 2018-10-30 ENCOUNTER — TELEPHONE (OUTPATIENT)
Dept: INTERNAL MEDICINE | Age: 58
End: 2018-10-30

## 2018-11-15 ENCOUNTER — TELEPHONE (OUTPATIENT)
Dept: INTERNAL MEDICINE | Age: 58
End: 2018-11-15

## 2018-11-16 RX ORDER — GUAIFENESIN 100 MG/5ML
10 SYRUP ORAL EVERY 4 HOURS PRN
Qty: 120 ML | Refills: 1 | Status: SHIPPED | OUTPATIENT
Start: 2018-11-16 | End: 2019-03-05

## 2018-11-20 ENCOUNTER — OFFICE VISIT (OUTPATIENT)
Dept: INTERNAL MEDICINE | Age: 58
End: 2018-11-20
Payer: MEDICARE

## 2018-11-20 ENCOUNTER — HOSPITAL ENCOUNTER (OUTPATIENT)
Dept: MAMMOGRAPHY | Age: 58
Discharge: HOME | End: 2018-11-22
Payer: MEDICARE

## 2018-11-20 VITALS
DIASTOLIC BLOOD PRESSURE: 78 MMHG | TEMPERATURE: 97.9 F | OXYGEN SATURATION: 97 % | SYSTOLIC BLOOD PRESSURE: 123 MMHG | WEIGHT: 196 LBS | HEART RATE: 79 BPM | BODY MASS INDEX: 31.64 KG/M2

## 2018-11-20 DIAGNOSIS — J06.9 VIRAL URI WITH COUGH: Primary | ICD-10-CM

## 2018-11-20 DIAGNOSIS — J43.2 CENTRILOBULAR EMPHYSEMA (HCC): ICD-10-CM

## 2018-11-20 DIAGNOSIS — Z12.39 BREAST CANCER SCREENING: ICD-10-CM

## 2018-11-20 PROCEDURE — 99213 OFFICE O/P EST LOW 20 MIN: CPT | Performed by: INTERNAL MEDICINE

## 2018-11-20 PROCEDURE — 77067 SCR MAMMO BI INCL CAD: CPT

## 2018-11-20 PROCEDURE — 99211 OFF/OP EST MAY X REQ PHY/QHP: CPT | Performed by: INTERNAL MEDICINE

## 2018-11-20 RX ORDER — ALBUTEROL SULFATE 90 UG/1
2 AEROSOL, METERED RESPIRATORY (INHALATION) EVERY 6 HOURS PRN
Qty: 1 INHALER | Refills: 3 | Status: SHIPPED | OUTPATIENT
Start: 2018-11-20 | End: 2020-04-02 | Stop reason: SDUPTHER

## 2018-11-20 ASSESSMENT — ENCOUNTER SYMPTOMS
WHEEZING: 0
SINUS PAIN: 0
COUGH: 1
RHINORRHEA: 1
SORE THROAT: 0
SHORTNESS OF BREATH: 0
SINUS PRESSURE: 1
VOICE CHANGE: 0

## 2018-11-20 NOTE — PROGRESS NOTES
CURETTAGE OF UTERUS      GASTRECTOMY      partial    NERVE BLOCK Right 4/30/13    Lumbar Diagnostic Block,  Kenalog 40 mg    NERVE BLOCK  5/23/13    Lumbar Radiofrequency, Kenalog 40mg    NERVE BLOCK  8/12/13    Lt MBNB  celestone 6mg    NERVE BLOCK Left 8-28-13    left lumbar diagnostic block #2 decadron 10 mg    NERVE BLOCK Left 9-24-13    left lumbar median branch radiofrequency    NERVE BLOCK  07-02-14    caudal, celestone 9 mg    NERVE BLOCK  7-16-14    caudal epidural #2, celestone 9mg, fentanyl 25mcg    NERVE BLOCK  7/30/14    caudal #3 decadron 10mg    NERVE BLOCK  11-6-14    duramorph epidural steroid block  duramorph 1 mg celestone 9 mg    NERVE BLOCK  11/20/15    TENS- Empi Select    NERVE BLOCK  07/20/2018    right transforminal # 1 decadron 10mg,isovue    SD KNEE SCOPE,DIAGNOSTIC Right 3/24/2017    KNEE ARTHROSCOPY WITH PARTIAL MEDIAL MENISECAL DEBRIDMENT  performed by Ro Alcazar MD at [address]  01/2015    lumbar diskectomy    UPPER GASTROINTESTINAL ENDOSCOPY  9 20 2007    UPPER GASTROINTESTINAL ENDOSCOPY  4 21 2009,04/2011    gastritis, esophagitis         ALLERGIES      Allergies   Allergen Reactions    Aspirin      DUE TO ULCER    Claritin [Loratadine] Other (See Comments)     coughing    Flonase [Fluticasone Propionate]        MEDICATIONS:      Current Outpatient Prescriptions on File Prior to Visit   Medication Sig Dispense Refill    guaiFENesin (ALTARUSSIN) 100 MG/5ML syrup Take 10 mLs by mouth every 4 hours as needed for Cough 120 mL 1    potassium chloride (KLOR-CON M) 10 MEQ extended release tablet Take 2 tablets by mouth daily 60 tablet 3    carvedilol (COREG) 6.25 MG tablet Take 1 tablet by mouth 2 times daily 180 tablet 1    lisinopril-hydrochlorothiazide (PRINZIDE;ZESTORETIC) 20-25 MG per tablet TAKE 1 TABLET EVERY DAY 90 tablet 3    atorvastatin (LIPITOR) 40 MG tablet Take 1 tablet by mouth daily 90 tablet 1    meloxicam (MOBIC) 15 MG tablet Take 1 emphysema (HCC)  No worsening  Continue inhalers    - albuterol sulfate HFA (VENTOLIN HFA) 108 (90 Base) MCG/ACT inhaler; Inhale 2 puffs into the lungs every 6 hours as needed for Wheezing  Dispense: 1 Inhaler; Refill: 3          FOLLOW UP AND INSTRUCTIONS:   Return if symptoms worsen or fail to improve. 1. Carolina received counseling on the following healthy behaviors: nutrition, exercise and medication adherence    2. Reviewed prior labs and health maintenance. 3. Discussed use, benefit, and side effects of prescribed medications. Barriers to medication compliance addressed. All patient questions answered. Pt voiced understanding.          Miguel Kothari  Attending Physician, [address] [place] [address], [facility]  [address] [place] [address]  11/20/2018, 1:06 PM

## 2018-11-26 ASSESSMENT — ENCOUNTER SYMPTOMS
ABDOMINAL PAIN: 0
DIARRHEA: 0
FACIAL SWELLING: 0
EYE DISCHARGE: 0
EYE REDNESS: 0

## 2018-11-27 ENCOUNTER — HOSPITAL ENCOUNTER (OUTPATIENT)
Dept: PAIN MANAGEMENT | Age: 58
Discharge: HOME | End: 2018-11-27
Payer: MEDICARE

## 2018-11-27 VITALS
DIASTOLIC BLOOD PRESSURE: 61 MMHG | SYSTOLIC BLOOD PRESSURE: 130 MMHG | TEMPERATURE: 97.8 F | HEART RATE: 90 BPM | RESPIRATION RATE: 14 BRPM

## 2018-11-27 DIAGNOSIS — M17.0 PRIMARY OSTEOARTHRITIS OF BOTH KNEES: ICD-10-CM

## 2018-11-27 DIAGNOSIS — Z51.81 MEDICATION MONITORING ENCOUNTER: ICD-10-CM

## 2018-11-27 DIAGNOSIS — G89.29 CHRONIC LOW BACK PAIN, UNSPECIFIED BACK PAIN LATERALITY, WITH SCIATICA PRESENCE UNSPECIFIED: ICD-10-CM

## 2018-11-27 DIAGNOSIS — M47.26 OSTEOARTHRITIS OF SPINE WITH RADICULOPATHY, LUMBAR REGION: Primary | ICD-10-CM

## 2018-11-27 DIAGNOSIS — M54.5 CHRONIC LOW BACK PAIN, UNSPECIFIED BACK PAIN LATERALITY, WITH SCIATICA PRESENCE UNSPECIFIED: ICD-10-CM

## 2018-11-27 PROCEDURE — 99213 OFFICE O/P EST LOW 20 MIN: CPT | Performed by: NURSE PRACTITIONER

## 2018-11-27 PROCEDURE — 99214 OFFICE O/P EST MOD 30 MIN: CPT

## 2018-11-27 RX ORDER — MELOXICAM 15 MG/1
15 TABLET ORAL DAILY
Qty: 30 TABLET | Refills: 0 | Status: SHIPPED | OUTPATIENT
Start: 2018-11-27 | End: 2019-01-08 | Stop reason: SDUPTHER

## 2018-11-27 RX ORDER — HYDROCODONE BITARTRATE AND ACETAMINOPHEN 5; 325 MG/1; MG/1
1 TABLET ORAL EVERY 8 HOURS PRN
Qty: 90 TABLET | Refills: 0 | Status: SHIPPED | OUTPATIENT
Start: 2018-11-28 | End: 2018-12-21 | Stop reason: SDUPTHER

## 2018-11-27 ASSESSMENT — ENCOUNTER SYMPTOMS
BOWEL INCONTINENCE: 0
COUGH: 0
BACK PAIN: 1
CONSTIPATION: 0
SHORTNESS OF BREATH: 0

## 2018-11-27 NOTE — PROGRESS NOTES
without mention of complication, not stated as uncontrolled     Unspecified sleep apnea     URI (upper respiratory infection)        Past Surgical History:   Procedure Laterality Date    COLONOSCOPY  2/12/2009    normal    DILATION AND CURETTAGE OF UTERUS      GASTRECTOMY      partial    NERVE BLOCK Right 4/30/13    Lumbar Diagnostic Block,  Kenalog 40 mg    NERVE BLOCK  5/23/13    Lumbar Radiofrequency, Kenalog 40mg    NERVE BLOCK  8/12/13    Lt MBNB  celestone 6mg    NERVE BLOCK Left 8-28-13    left lumbar diagnostic block #2 decadron 10 mg    NERVE BLOCK Left 9-24-13    left lumbar median branch radiofrequency    NERVE BLOCK  07-02-14    caudal, celestone 9 mg    NERVE BLOCK  7-16-14    caudal epidural #2, celestone 9mg, fentanyl 25mcg    NERVE BLOCK  7/30/14    caudal #3 decadron 10mg    NERVE BLOCK  11-6-14    duramorph epidural steroid block  duramorph 1 mg celestone 9 mg    NERVE BLOCK  11/20/15    TENS- Empi Select    NERVE BLOCK  07/20/2018    right transforminal # 1 decadron 10mg,isovue    AL KNEE SCOPE,DIAGNOSTIC Right 3/24/2017    KNEE ARTHROSCOPY WITH PARTIAL MEDIAL MENISECAL DEBRIDMENT  performed by Gatito Raymond MD at [facility]  01/2015    lumbar diskectomy    UPPER GASTROINTESTINAL ENDOSCOPY  9 20 2007    UPPER GASTROINTESTINAL ENDOSCOPY  4 21 2009,04/2011    gastritis, esophagitis       Allergies   Allergen Reactions    Aspirin      DUE TO ULCER    Claritin [Loratadine] Other (See Comments)     coughing    Flonase [Fluticasone Propionate]          Current Outpatient Prescriptions:     albuterol sulfate HFA (VENTOLIN HFA) 108 (90 Base) MCG/ACT inhaler, Inhale 2 puffs into the lungs every 6 hours as needed for Wheezing, Disp: 1 Inhaler, Rfl: 3    guaiFENesin (ALTARUSSIN) 100 MG/5ML syrup, Take 10 mLs by mouth every 4 hours as needed for Cough, Disp: 120 mL, Rfl: 1    potassium chloride (KLOR-CON M) 10 MEQ extended release tablet, Take 2 tablets by mouth daily, Physical Exam   Constitutional: She is oriented to person, place, and time. She appears well-developed. Cardiovascular: Normal rate. Pulmonary/Chest: Effort normal.   Abdominal: Normal appearance. Neurological: She is alert and oriented to person, place, and time. Skin: Skin is warm. Psychiatric: She has a normal mood and affect. Her behavior is normal. Thought content normal.       Record/Diagnostics Review:    Last óscar June  and was appropriate    MRI Lumbar 2017 -      IMPRESSION:    *  Status post left hemilaminectomy at L4-5 with mild anterolisthesis, disc space narrowing and broad-based disc bulge.  Moderate neural foraminal narrowing at this level with disc material abutting the exiting right L4 nerve. *  Multilevel degenerative changes without significant central canal stenosis or other neural foraminal narrowing.     Assessment:  Problem List Items Addressed This Visit     Osteoarthritis of spine with radiculopathy, lumbar region - Primary    Primary osteoarthritis of both knees    Medication monitoring encounter            Treatment Plan:  DISCUSSION: Treatment options discussed with patient and allquestions answered to patient's satisfaction. Patient relates current medications are helping the pain. Patient reports taking pain medications as prescribed, denies obtaining medications from different sources and denies use of illegal drugs. Patient denies side effects from medications like nausea, vomiting, constipation or drowsiness. Patient reports current activities of daily living are possible due to medications and would like to continue them. As always, we encourage daily stretching and strengthening exercises, and recommend minimizing use of pain medications unless patient cannot get through daily activities due to pain. Discussed with the patient the effect the patients medical condition and opioid medication may have on the patients ability to safely operate a vehicle.  Pt

## 2018-12-10 RX ORDER — DOCUSATE SODIUM 100 MG/1
CAPSULE, LIQUID FILLED ORAL
Qty: 30 CAPSULE | Refills: 11 | Status: SHIPPED | OUTPATIENT
Start: 2018-12-10 | End: 2019-06-18 | Stop reason: SDUPTHER

## 2018-12-21 ENCOUNTER — HOSPITAL ENCOUNTER (OUTPATIENT)
Dept: PAIN MANAGEMENT | Age: 58
Discharge: HOME | End: 2018-12-21
Payer: MEDICARE

## 2018-12-21 VITALS
DIASTOLIC BLOOD PRESSURE: 71 MMHG | RESPIRATION RATE: 18 BRPM | TEMPERATURE: 97.7 F | SYSTOLIC BLOOD PRESSURE: 134 MMHG | HEART RATE: 87 BPM

## 2018-12-21 DIAGNOSIS — M17.0 PRIMARY OSTEOARTHRITIS OF BOTH KNEES: ICD-10-CM

## 2018-12-21 DIAGNOSIS — M47.26 OSTEOARTHRITIS OF SPINE WITH RADICULOPATHY, LUMBAR REGION: Primary | ICD-10-CM

## 2018-12-21 DIAGNOSIS — Z51.81 MEDICATION MONITORING ENCOUNTER: ICD-10-CM

## 2018-12-21 DIAGNOSIS — G89.29 CHRONIC LOW BACK PAIN, UNSPECIFIED BACK PAIN LATERALITY, WITH SCIATICA PRESENCE UNSPECIFIED: ICD-10-CM

## 2018-12-21 DIAGNOSIS — M54.5 CHRONIC LOW BACK PAIN, UNSPECIFIED BACK PAIN LATERALITY, WITH SCIATICA PRESENCE UNSPECIFIED: ICD-10-CM

## 2018-12-21 PROCEDURE — 99213 OFFICE O/P EST LOW 20 MIN: CPT | Performed by: NURSE PRACTITIONER

## 2018-12-21 PROCEDURE — 99214 OFFICE O/P EST MOD 30 MIN: CPT

## 2018-12-21 RX ORDER — HYDROCODONE BITARTRATE AND ACETAMINOPHEN 5; 325 MG/1; MG/1
1 TABLET ORAL EVERY 8 HOURS PRN
Qty: 90 TABLET | Refills: 0 | Status: SHIPPED | OUTPATIENT
Start: 2018-12-28 | End: 2019-01-22 | Stop reason: SDUPTHER

## 2018-12-21 ASSESSMENT — PAIN SCALES - GENERAL: PAINLEVEL_OUTOF10: 7

## 2018-12-21 ASSESSMENT — ENCOUNTER SYMPTOMS
COUGH: 0
BACK PAIN: 1
CONSTIPATION: 0
SHORTNESS OF BREATH: 0

## 2018-12-21 ASSESSMENT — PAIN DESCRIPTION - ORIENTATION: ORIENTATION: LOWER

## 2018-12-21 ASSESSMENT — PAIN DESCRIPTION - PAIN TYPE: TYPE: CHRONIC PAIN

## 2018-12-21 ASSESSMENT — PAIN DESCRIPTION - PROGRESSION: CLINICAL_PROGRESSION: NOT CHANGED

## 2018-12-21 ASSESSMENT — PAIN DESCRIPTION - LOCATION: LOCATION: BACK

## 2018-12-21 ASSESSMENT — PAIN DESCRIPTION - DESCRIPTORS: DESCRIPTORS: THROBBING;CONSTANT

## 2018-12-21 ASSESSMENT — PAIN DESCRIPTION - FREQUENCY: FREQUENCY: CONTINUOUS

## 2018-12-21 ASSESSMENT — PAIN DESCRIPTION - ONSET: ONSET: ON-GOING

## 2018-12-21 NOTE — PROGRESS NOTES
Patient is here today to review medication contract. Chief Complaint: back pain    PMH: Patient has had low back pain with no known injury and bilateral knee pain for many years. Ileana Aguilar had a knee arthroscopy in March and currently in PT with no plans for replacement at this time. She sees Dr. Kenna Homans and has injections with mild benefit. She is s/p lumbar diskectomy in 2015. She has had PENNY and RF in the past with some benefit. She had Right L4 and L5  Lumbar Transforaminal PENNY selective nerve root block on 7/20/18 and reported an increase in muscle spasms after the injection. She stated her blood glucose was \"out of control\"  After the injection. Discussed knee and facet injections. She states she is not interested in more injections at this time, states had CP after last injection and is too scared to repeat. Stress test was done and pt told she had \"leaky valve\" and start baby ASA. Ileana Aguilar is trying to quit smoking. Currently on O2 at night due to exacerbation of COPD. She also uses TENS daily with some relief. She is stable and compliant on norco 5/325 TID- also taking mobic with benefit. Back Pain   The current episode started more than 1 year ago. The problem occurs constantly. The problem is unchanged. The pain is present in the lumbar spine. The pain radiates to the right knee. The pain is at a severity of 7/10. The pain is moderate. The pain is worse during the day. The symptoms are aggravated by standing (walking, sitting too long). Stiffness is present in the morning. Pertinent negatives include no chest pain or fever. Risk factors include sedentary lifestyle. She has tried analgesics, NSAIDs and heat for the symptoms. The treatment provided significant relief. Patient denies any new neurological symptoms. No bowel or bladder incontinence, no weakness, and no falling.     Pill count: appropriate    Morphine equivalent: 15    Controlled Substances Monitoring:     RX Monitoring 12/21/2018 N/A    Years of education: N/A     Occupational History    Not on file. Social History Main Topics    Smoking status: Current Every Day Smoker     Packs/day: 0.25     Years: 36.00     Types: Cigarettes     Last attempt to quit: 8/2/2015    Smokeless tobacco: Never Used      Comment: pt currently using nicotine patches    Alcohol use No    Drug use: No      Comment: 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 7 yrs    Sexual activity: No     Other Topics Concern    Not on file     Social History Narrative    No narrative on file       Review of Systems:  Review of Systems   Constitution: Negative for chills and fever. Cardiovascular: Negative for chest pain and irregular heartbeat. Respiratory: Negative for cough and shortness of breath. Musculoskeletal: Positive for back pain. Gastrointestinal: Negative for constipation. Neurological: Negative for disturbances in coordination and loss of balance. Physical Exam:  /71   Pulse 87   Temp 97.7 °F (36.5 °C) (Oral)   Resp 18   LMP 04/19/2003     Physical Exam   Constitutional: She is oriented to person, place, and time. HENT:   Head: Normocephalic. Eyes: EOM are normal.   Neck: Normal range of motion. Pulmonary/Chest: Effort normal.   Musculoskeletal: Normal range of motion. Lumbar back: She exhibits tenderness and pain. Neurological: She is alert and oriented to person, place, and time. Skin: Skin is warm and dry. Record/Diagnostics Review:    MRI Lumbar 2017 -      IMPRESSION:    *  Status post left hemilaminectomy at L4-5 with mild anterolisthesis, disc space narrowing and broad-based disc bulge.  Moderate neural foraminal narrowing at this level with disc material abutting the exiting right L4 nerve. *  Multilevel degenerative changes without significant central canal stenosis or other neural foraminal narrowing.     Last RYAN 6/2018 - appropriate    Assessment:  Problem List Items Addressed This Visit

## 2019-01-08 DIAGNOSIS — J30.9 CHRONIC ALLERGIC RHINITIS: ICD-10-CM

## 2019-01-08 DIAGNOSIS — K21.9 GASTROESOPHAGEAL REFLUX DISEASE, ESOPHAGITIS PRESENCE NOT SPECIFIED: ICD-10-CM

## 2019-01-08 RX ORDER — CETIRIZINE HYDROCHLORIDE 10 MG/1
TABLET ORAL
Qty: 30 TABLET | Refills: 3 | Status: SHIPPED | OUTPATIENT
Start: 2019-01-08 | End: 2019-05-17 | Stop reason: SDUPTHER

## 2019-01-08 RX ORDER — OMEPRAZOLE 20 MG/1
CAPSULE, DELAYED RELEASE ORAL
Qty: 30 CAPSULE | Refills: 11 | Status: SHIPPED | OUTPATIENT
Start: 2019-01-08 | End: 2020-01-10

## 2019-01-15 DIAGNOSIS — M47.816 SPONDYLOSIS OF LUMBAR REGION WITHOUT MYELOPATHY OR RADICULOPATHY: ICD-10-CM

## 2019-01-16 RX ORDER — TIZANIDINE 4 MG/1
TABLET ORAL
Qty: 60 TABLET | Refills: 3 | Status: SHIPPED | OUTPATIENT
Start: 2019-01-16 | End: 2019-06-10 | Stop reason: SDUPTHER

## 2019-01-17 ENCOUNTER — HOSPITAL ENCOUNTER (OUTPATIENT)
Dept: PAIN MANAGEMENT | Age: 59
Discharge: HOME | End: 2019-01-17
Payer: MEDICARE

## 2019-01-17 VITALS
TEMPERATURE: 98 F | HEART RATE: 89 BPM | RESPIRATION RATE: 20 BRPM | SYSTOLIC BLOOD PRESSURE: 114 MMHG | HEIGHT: 66 IN | BODY MASS INDEX: 31.5 KG/M2 | DIASTOLIC BLOOD PRESSURE: 79 MMHG | WEIGHT: 196 LBS

## 2019-01-17 DIAGNOSIS — M54.5 CHRONIC LOW BACK PAIN, UNSPECIFIED BACK PAIN LATERALITY, WITH SCIATICA PRESENCE UNSPECIFIED: ICD-10-CM

## 2019-01-17 DIAGNOSIS — G89.29 CHRONIC MIDLINE LOW BACK PAIN WITH SCIATICA, SCIATICA LATERALITY UNSPECIFIED: Primary | ICD-10-CM

## 2019-01-17 DIAGNOSIS — G89.29 CHRONIC LOW BACK PAIN, UNSPECIFIED BACK PAIN LATERALITY, WITH SCIATICA PRESENCE UNSPECIFIED: ICD-10-CM

## 2019-01-17 DIAGNOSIS — M54.40 CHRONIC MIDLINE LOW BACK PAIN WITH SCIATICA, SCIATICA LATERALITY UNSPECIFIED: Primary | ICD-10-CM

## 2019-01-17 PROCEDURE — 99215 OFFICE O/P EST HI 40 MIN: CPT

## 2019-01-17 PROCEDURE — 80307 DRUG TEST PRSMV CHEM ANLYZR: CPT

## 2019-01-17 PROCEDURE — 99214 OFFICE O/P EST MOD 30 MIN: CPT | Performed by: PAIN MEDICINE

## 2019-01-17 ASSESSMENT — ENCOUNTER SYMPTOMS
BOWEL INCONTINENCE: 1
BACK PAIN: 1

## 2019-01-20 LAB
6-ACETYLMORPHINE, UR: NOT DETECTED
7-AMINOCLONAZEPAM, URINE: NOT DETECTED
ALPHA-OH-ALPRAZ, URINE: NOT DETECTED
ALPRAZOLAM, URINE: NOT DETECTED
AMPHETAMINES, URINE: NOT DETECTED
BARBITURATES, URINE: NOT DETECTED
BENZOYLECGONINE, UR: NOT DETECTED
BUPRENORPHINE URINE: NOT DETECTED
CARISOPRODOL, UR: NOT DETECTED
CLONAZEPAM, URINE: NOT DETECTED
CODEINE, URINE: NOT DETECTED
CREATININE URINE: 235.6 MG/DL (ref 20–400)
DIAZEPAM, URINE: NOT DETECTED
EER PAIN MGT DRUG PANEL, HIGH RES/EMIT U: NORMAL
ETHYL GLUCURONIDE UR: NOT DETECTED
FENTANYL URINE: NOT DETECTED
HYDROCODONE, URINE: PRESENT
HYDROMORPHONE, URINE: NOT DETECTED
LORAZEPAM, URINE: NOT DETECTED
MARIJUANA METAB, UR: NOT DETECTED
MDA, UR: NOT DETECTED
MDEA, EVE, UR: NOT DETECTED
MDMA URINE: NOT DETECTED
MEPERIDINE METAB, UR: NOT DETECTED
METHADONE, URINE: NOT DETECTED
METHAMPHETAMINE, URINE: NOT DETECTED
METHYLPHENIDATE: NOT DETECTED
MIDAZOLAM, URINE: NOT DETECTED
MORPHINE URINE: NOT DETECTED
NORBUPRENORPHINE, URINE: NOT DETECTED
NORDIAZEPAM, URINE: NOT DETECTED
NORFENTANYL, URINE: NOT DETECTED
NORHYDROCODONE, URINE: PRESENT
NOROXYCODONE, URINE: NOT DETECTED
NOROXYMORPHONE, URINE: NOT DETECTED
OXAZEPAM, URINE: NOT DETECTED
OXYCODONE URINE: NOT DETECTED
OXYMORPHONE, URINE: NOT DETECTED
PAIN MGT DRUG PANEL, HI RES, UR: NORMAL
PCP,URINE: NOT DETECTED
PHENTERMINE, UR: NOT DETECTED
PROPOXYPHENE, URINE: NOT DETECTED
TAPENTADOL, URINE: NOT DETECTED
TAPENTADOL-O-SULFATE, URINE: NOT DETECTED
TEMAZEPAM, URINE: NOT DETECTED
TRAMADOL, URINE: NOT DETECTED
ZOLPIDEM, URINE: NOT DETECTED

## 2019-01-22 RX ORDER — HYDROCODONE BITARTRATE AND ACETAMINOPHEN 5; 325 MG/1; MG/1
1 TABLET ORAL EVERY 8 HOURS PRN
Qty: 87 TABLET | Refills: 0 | Status: SHIPPED | OUTPATIENT
Start: 2019-01-26 | End: 2019-02-15 | Stop reason: SDUPTHER

## 2019-02-01 ENCOUNTER — HOSPITAL ENCOUNTER (OUTPATIENT)
Dept: PAIN MANAGEMENT | Age: 59
Discharge: HOME | End: 2019-02-01
Payer: MEDICARE

## 2019-02-01 VITALS
SYSTOLIC BLOOD PRESSURE: 119 MMHG | DIASTOLIC BLOOD PRESSURE: 74 MMHG | TEMPERATURE: 98 F | BODY MASS INDEX: 31.5 KG/M2 | HEART RATE: 75 BPM | OXYGEN SATURATION: 94 % | RESPIRATION RATE: 14 BRPM | WEIGHT: 196 LBS | HEIGHT: 66 IN

## 2019-02-01 LAB — GLUCOSE BLD-MCNC: 149 MG/DL (ref 65–105)

## 2019-02-01 PROCEDURE — 64494 INJ PARAVERT F JNT L/S 2 LEV: CPT

## 2019-02-01 PROCEDURE — 2500000003 HC RX 250 WO HCPCS

## 2019-02-01 PROCEDURE — 64493 INJ PARAVERT F JNT L/S 1 LEV: CPT | Performed by: PAIN MEDICINE

## 2019-02-01 PROCEDURE — 82947 ASSAY GLUCOSE BLOOD QUANT: CPT

## 2019-02-01 PROCEDURE — 64494 INJ PARAVERT F JNT L/S 2 LEV: CPT | Performed by: PAIN MEDICINE

## 2019-02-01 PROCEDURE — 64493 INJ PARAVERT F JNT L/S 1 LEV: CPT

## 2019-02-01 PROCEDURE — 2580000003 HC RX 258: Performed by: PAIN MEDICINE

## 2019-02-01 PROCEDURE — 6360000002 HC RX W HCPCS: Performed by: PAIN MEDICINE

## 2019-02-01 RX ORDER — SODIUM CHLORIDE, SODIUM LACTATE, POTASSIUM CHLORIDE, CALCIUM CHLORIDE 600; 310; 30; 20 MG/100ML; MG/100ML; MG/100ML; MG/100ML
INJECTION, SOLUTION INTRAVENOUS CONTINUOUS
Status: DISCONTINUED | OUTPATIENT
Start: 2019-02-01 | End: 2019-02-02 | Stop reason: HOSPADM

## 2019-02-01 RX ORDER — BUPIVACAINE HYDROCHLORIDE 2.5 MG/ML
30 INJECTION, SOLUTION EPIDURAL; INFILTRATION; INTRACAUDAL
Status: ACTIVE | OUTPATIENT
Start: 2019-02-01 | End: 2019-02-01

## 2019-02-01 RX ORDER — MIDAZOLAM HYDROCHLORIDE 1 MG/ML
2 INJECTION INTRAMUSCULAR; INTRAVENOUS
Status: ACTIVE | OUTPATIENT
Start: 2019-02-01 | End: 2019-02-01

## 2019-02-01 RX ORDER — FENTANYL CITRATE 50 UG/ML
INJECTION, SOLUTION INTRAMUSCULAR; INTRAVENOUS
Status: COMPLETED | OUTPATIENT
Start: 2019-02-01 | End: 2019-02-01

## 2019-02-01 RX ORDER — MIDAZOLAM HYDROCHLORIDE 1 MG/ML
INJECTION INTRAMUSCULAR; INTRAVENOUS
Status: COMPLETED | OUTPATIENT
Start: 2019-02-01 | End: 2019-02-01

## 2019-02-01 RX ORDER — FENTANYL CITRATE 50 UG/ML
100 INJECTION, SOLUTION INTRAMUSCULAR; INTRAVENOUS
Status: ACTIVE | OUTPATIENT
Start: 2019-02-01 | End: 2019-02-01

## 2019-02-01 RX ADMIN — FENTANYL CITRATE 50 MCG: 50 INJECTION INTRAMUSCULAR; INTRAVENOUS at 09:04

## 2019-02-01 RX ADMIN — MIDAZOLAM HYDROCHLORIDE 2 MG: 1 INJECTION, SOLUTION INTRAMUSCULAR; INTRAVENOUS at 09:02

## 2019-02-01 RX ADMIN — FENTANYL CITRATE 50 MCG: 50 INJECTION INTRAMUSCULAR; INTRAVENOUS at 09:05

## 2019-02-01 RX ADMIN — SODIUM CHLORIDE, POTASSIUM CHLORIDE, SODIUM LACTATE AND CALCIUM CHLORIDE: 600; 310; 30; 20 INJECTION, SOLUTION INTRAVENOUS at 08:58

## 2019-02-01 ASSESSMENT — PAIN SCALES - GENERAL: PAINLEVEL_OUTOF10: 6

## 2019-02-01 ASSESSMENT — PAIN - FUNCTIONAL ASSESSMENT
PAIN_FUNCTIONAL_ASSESSMENT: 0-10
PAIN_FUNCTIONAL_ASSESSMENT: 0-10

## 2019-02-01 ASSESSMENT — PAIN DESCRIPTION - DESCRIPTORS: DESCRIPTORS: CONSTANT

## 2019-02-08 ENCOUNTER — HOSPITAL ENCOUNTER (OUTPATIENT)
Dept: PAIN MANAGEMENT | Age: 59
Discharge: HOME | End: 2019-02-08
Payer: MEDICARE

## 2019-02-08 VITALS
RESPIRATION RATE: 20 BRPM | DIASTOLIC BLOOD PRESSURE: 74 MMHG | SYSTOLIC BLOOD PRESSURE: 134 MMHG | HEART RATE: 75 BPM | TEMPERATURE: 97.7 F | OXYGEN SATURATION: 94 %

## 2019-02-08 LAB — GLUCOSE BLD-MCNC: 130 MG/DL (ref 65–105)

## 2019-02-08 PROCEDURE — 6360000002 HC RX W HCPCS: Performed by: PAIN MEDICINE

## 2019-02-08 PROCEDURE — 2500000003 HC RX 250 WO HCPCS

## 2019-02-08 PROCEDURE — 64494 INJ PARAVERT F JNT L/S 2 LEV: CPT | Performed by: PAIN MEDICINE

## 2019-02-08 PROCEDURE — 82947 ASSAY GLUCOSE BLOOD QUANT: CPT

## 2019-02-08 PROCEDURE — 2580000003 HC RX 258: Performed by: PAIN MEDICINE

## 2019-02-08 PROCEDURE — 64494 INJ PARAVERT F JNT L/S 2 LEV: CPT

## 2019-02-08 PROCEDURE — 64493 INJ PARAVERT F JNT L/S 1 LEV: CPT | Performed by: PAIN MEDICINE

## 2019-02-08 PROCEDURE — 64493 INJ PARAVERT F JNT L/S 1 LEV: CPT

## 2019-02-08 RX ORDER — DEXAMETHASONE SODIUM PHOSPHATE 10 MG/ML
10 INJECTION, SOLUTION INTRAMUSCULAR; INTRAVENOUS
Status: ACTIVE | OUTPATIENT
Start: 2019-02-08 | End: 2019-02-08

## 2019-02-08 RX ORDER — FENTANYL CITRATE 50 UG/ML
INJECTION, SOLUTION INTRAMUSCULAR; INTRAVENOUS
Status: COMPLETED | OUTPATIENT
Start: 2019-02-08 | End: 2019-02-08

## 2019-02-08 RX ORDER — FENTANYL CITRATE 50 UG/ML
100 INJECTION, SOLUTION INTRAMUSCULAR; INTRAVENOUS
Status: ACTIVE | OUTPATIENT
Start: 2019-02-08 | End: 2019-02-08

## 2019-02-08 RX ORDER — MIDAZOLAM HYDROCHLORIDE 1 MG/ML
2 INJECTION INTRAMUSCULAR; INTRAVENOUS
Status: ACTIVE | OUTPATIENT
Start: 2019-02-08 | End: 2019-02-08

## 2019-02-08 RX ORDER — BUPIVACAINE HYDROCHLORIDE 2.5 MG/ML
30 INJECTION, SOLUTION EPIDURAL; INFILTRATION; INTRACAUDAL
Status: ACTIVE | OUTPATIENT
Start: 2019-02-08 | End: 2019-02-08

## 2019-02-08 RX ORDER — MIDAZOLAM HYDROCHLORIDE 1 MG/ML
INJECTION INTRAMUSCULAR; INTRAVENOUS
Status: COMPLETED | OUTPATIENT
Start: 2019-02-08 | End: 2019-02-08

## 2019-02-08 RX ORDER — SODIUM CHLORIDE, SODIUM LACTATE, POTASSIUM CHLORIDE, CALCIUM CHLORIDE 600; 310; 30; 20 MG/100ML; MG/100ML; MG/100ML; MG/100ML
INJECTION, SOLUTION INTRAVENOUS CONTINUOUS
Status: DISCONTINUED | OUTPATIENT
Start: 2019-02-08 | End: 2019-02-09 | Stop reason: HOSPADM

## 2019-02-08 RX ADMIN — MIDAZOLAM HYDROCHLORIDE 2 MG: 1 INJECTION, SOLUTION INTRAMUSCULAR; INTRAVENOUS at 08:58

## 2019-02-08 RX ADMIN — FENTANYL CITRATE 50 MCG: 50 INJECTION INTRAMUSCULAR; INTRAVENOUS at 09:00

## 2019-02-08 RX ADMIN — SODIUM CHLORIDE, POTASSIUM CHLORIDE, SODIUM LACTATE AND CALCIUM CHLORIDE: 600; 310; 30; 20 INJECTION, SOLUTION INTRAVENOUS at 08:50

## 2019-02-08 ASSESSMENT — PAIN - FUNCTIONAL ASSESSMENT: PAIN_FUNCTIONAL_ASSESSMENT: 0-10

## 2019-02-08 ASSESSMENT — PAIN DESCRIPTION - DESCRIPTORS: DESCRIPTORS: CONSTANT

## 2019-02-12 DIAGNOSIS — M17.0 PRIMARY OSTEOARTHRITIS OF BOTH KNEES: ICD-10-CM

## 2019-02-12 RX ORDER — MELOXICAM 15 MG/1
15 TABLET ORAL DAILY
Qty: 30 TABLET | Refills: 0 | Status: SHIPPED | OUTPATIENT
Start: 2019-02-12 | End: 2019-03-19 | Stop reason: SDUPTHER

## 2019-02-15 ENCOUNTER — HOSPITAL ENCOUNTER (OUTPATIENT)
Dept: PAIN MANAGEMENT | Age: 59
Discharge: HOME | End: 2019-02-15
Payer: MEDICARE

## 2019-02-15 VITALS
DIASTOLIC BLOOD PRESSURE: 71 MMHG | TEMPERATURE: 97.8 F | RESPIRATION RATE: 14 BRPM | SYSTOLIC BLOOD PRESSURE: 114 MMHG | HEART RATE: 98 BPM

## 2019-02-15 DIAGNOSIS — G89.29 CHRONIC LOW BACK PAIN, UNSPECIFIED BACK PAIN LATERALITY, WITH SCIATICA PRESENCE UNSPECIFIED: ICD-10-CM

## 2019-02-15 DIAGNOSIS — M47.26 OSTEOARTHRITIS OF SPINE WITH RADICULOPATHY, LUMBAR REGION: ICD-10-CM

## 2019-02-15 DIAGNOSIS — Z51.81 MEDICATION MONITORING ENCOUNTER: Primary | ICD-10-CM

## 2019-02-15 DIAGNOSIS — M54.5 CHRONIC LOW BACK PAIN, UNSPECIFIED BACK PAIN LATERALITY, WITH SCIATICA PRESENCE UNSPECIFIED: ICD-10-CM

## 2019-02-15 DIAGNOSIS — M17.0 PRIMARY OSTEOARTHRITIS OF BOTH KNEES: ICD-10-CM

## 2019-02-15 PROCEDURE — 99213 OFFICE O/P EST LOW 20 MIN: CPT | Performed by: NURSE PRACTITIONER

## 2019-02-15 PROCEDURE — 99214 OFFICE O/P EST MOD 30 MIN: CPT

## 2019-02-15 RX ORDER — HYDROCODONE BITARTRATE AND ACETAMINOPHEN 5; 325 MG/1; MG/1
1 TABLET ORAL EVERY 8 HOURS PRN
Qty: 90 TABLET | Refills: 0 | Status: SHIPPED | OUTPATIENT
Start: 2019-02-25 | End: 2019-03-21 | Stop reason: SDUPTHER

## 2019-02-15 ASSESSMENT — ENCOUNTER SYMPTOMS
SHORTNESS OF BREATH: 0
CONSTIPATION: 0
BOWEL INCONTINENCE: 0
COUGH: 0
BACK PAIN: 1

## 2019-03-04 DIAGNOSIS — I10 ESSENTIAL HYPERTENSION: ICD-10-CM

## 2019-03-05 ENCOUNTER — HOSPITAL ENCOUNTER (OUTPATIENT)
Age: 59
Setting detail: SPECIMEN
Discharge: HOME | End: 2019-03-05
Payer: MEDICARE

## 2019-03-05 ENCOUNTER — OFFICE VISIT (OUTPATIENT)
Dept: INTERNAL MEDICINE | Age: 59
End: 2019-03-05
Payer: MEDICARE

## 2019-03-05 VITALS
DIASTOLIC BLOOD PRESSURE: 74 MMHG | HEART RATE: 85 BPM | SYSTOLIC BLOOD PRESSURE: 120 MMHG | WEIGHT: 197.8 LBS | BODY MASS INDEX: 31.93 KG/M2

## 2019-03-05 DIAGNOSIS — G89.29 CHRONIC MIDLINE LOW BACK PAIN WITH SCIATICA, SCIATICA LATERALITY UNSPECIFIED: ICD-10-CM

## 2019-03-05 DIAGNOSIS — E11.9 TYPE 2 DIABETES MELLITUS WITHOUT COMPLICATION, WITHOUT LONG-TERM CURRENT USE OF INSULIN (HCC): Primary | ICD-10-CM

## 2019-03-05 DIAGNOSIS — M54.40 CHRONIC MIDLINE LOW BACK PAIN WITH SCIATICA, SCIATICA LATERALITY UNSPECIFIED: ICD-10-CM

## 2019-03-05 DIAGNOSIS — E78.00 PURE HYPERCHOLESTEROLEMIA: ICD-10-CM

## 2019-03-05 DIAGNOSIS — I08.0 MITRAL AND AORTIC INSUFFICIENCY: ICD-10-CM

## 2019-03-05 DIAGNOSIS — E11.9 TYPE 2 DIABETES MELLITUS WITHOUT COMPLICATION, WITHOUT LONG-TERM CURRENT USE OF INSULIN (HCC): ICD-10-CM

## 2019-03-05 DIAGNOSIS — J96.11 CHRONIC RESPIRATORY FAILURE WITH HYPOXIA (HCC): ICD-10-CM

## 2019-03-05 DIAGNOSIS — I10 ESSENTIAL HYPERTENSION: ICD-10-CM

## 2019-03-05 DIAGNOSIS — J43.2 CENTRILOBULAR EMPHYSEMA (HCC): ICD-10-CM

## 2019-03-05 LAB
CREATININE URINE: 136.9 MG/DL (ref 28–217)
HBA1C MFR BLD: 5.8 %
MICROALBUMIN/CREAT 24H UR: <12 MG/L
MICROALBUMIN/CREAT UR-RTO: NORMAL MCG/MG CREAT

## 2019-03-05 PROCEDURE — 99214 OFFICE O/P EST MOD 30 MIN: CPT | Performed by: INTERNAL MEDICINE

## 2019-03-05 PROCEDURE — 83036 HEMOGLOBIN GLYCOSYLATED A1C: CPT | Performed by: INTERNAL MEDICINE

## 2019-03-05 PROCEDURE — 82570 ASSAY OF URINE CREATININE: CPT

## 2019-03-05 PROCEDURE — 99211 OFF/OP EST MAY X REQ PHY/QHP: CPT | Performed by: INTERNAL MEDICINE

## 2019-03-05 PROCEDURE — 82043 UR ALBUMIN QUANTITATIVE: CPT

## 2019-03-05 RX ORDER — CARVEDILOL 6.25 MG/1
6.25 TABLET ORAL 2 TIMES DAILY
Qty: 180 TABLET | Refills: 1 | Status: SHIPPED | OUTPATIENT
Start: 2019-03-05 | End: 2019-04-24

## 2019-03-05 RX ORDER — ATORVASTATIN CALCIUM 40 MG/1
40 TABLET, FILM COATED ORAL DAILY
Qty: 90 TABLET | Refills: 1 | Status: SHIPPED | OUTPATIENT
Start: 2019-03-05 | End: 2019-09-17 | Stop reason: SDUPTHER

## 2019-03-05 RX ORDER — AMLODIPINE BESYLATE 10 MG/1
10 TABLET ORAL DAILY
Qty: 90 TABLET | Refills: 3 | Status: SHIPPED | OUTPATIENT
Start: 2019-03-05 | End: 2019-05-17 | Stop reason: SDUPTHER

## 2019-03-05 RX ORDER — CARVEDILOL 6.25 MG/1
6.25 TABLET ORAL 2 TIMES DAILY
Qty: 180 TABLET | Refills: 1 | Status: SHIPPED | OUTPATIENT
Start: 2019-03-05 | End: 2019-09-17 | Stop reason: SDUPTHER

## 2019-03-19 DIAGNOSIS — M17.0 PRIMARY OSTEOARTHRITIS OF BOTH KNEES: ICD-10-CM

## 2019-03-19 RX ORDER — MELOXICAM 15 MG/1
15 TABLET ORAL DAILY
Qty: 30 TABLET | Refills: 0 | Status: SHIPPED | OUTPATIENT
Start: 2019-03-19 | End: 2019-04-24 | Stop reason: SDUPTHER

## 2019-03-21 ENCOUNTER — HOSPITAL ENCOUNTER (OUTPATIENT)
Dept: PAIN MANAGEMENT | Age: 59
Discharge: HOME | End: 2019-03-21
Payer: MEDICARE

## 2019-03-21 VITALS
BODY MASS INDEX: 31.66 KG/M2 | HEIGHT: 66 IN | WEIGHT: 197 LBS | RESPIRATION RATE: 16 BRPM | DIASTOLIC BLOOD PRESSURE: 78 MMHG | SYSTOLIC BLOOD PRESSURE: 121 MMHG | HEART RATE: 88 BPM

## 2019-03-21 DIAGNOSIS — Z51.81 MEDICATION MONITORING ENCOUNTER: ICD-10-CM

## 2019-03-21 DIAGNOSIS — M47.26 OSTEOARTHRITIS OF SPINE WITH RADICULOPATHY, LUMBAR REGION: Primary | ICD-10-CM

## 2019-03-21 DIAGNOSIS — M54.5 CHRONIC LOW BACK PAIN, UNSPECIFIED BACK PAIN LATERALITY, WITH SCIATICA PRESENCE UNSPECIFIED: ICD-10-CM

## 2019-03-21 DIAGNOSIS — G89.29 CHRONIC LOW BACK PAIN, UNSPECIFIED BACK PAIN LATERALITY, WITH SCIATICA PRESENCE UNSPECIFIED: ICD-10-CM

## 2019-03-21 PROCEDURE — 99213 OFFICE O/P EST LOW 20 MIN: CPT | Performed by: NURSE PRACTITIONER

## 2019-03-21 PROCEDURE — 99214 OFFICE O/P EST MOD 30 MIN: CPT

## 2019-03-21 RX ORDER — HYDROCODONE BITARTRATE AND ACETAMINOPHEN 5; 325 MG/1; MG/1
1 TABLET ORAL EVERY 8 HOURS PRN
Qty: 90 TABLET | Refills: 0 | Status: SHIPPED | OUTPATIENT
Start: 2019-03-27 | End: 2019-04-24 | Stop reason: SDUPTHER

## 2019-03-21 ASSESSMENT — ENCOUNTER SYMPTOMS
CONSTIPATION: 0
COUGH: 0
SHORTNESS OF BREATH: 0
BACK PAIN: 1

## 2019-04-22 ENCOUNTER — HOSPITAL ENCOUNTER (OUTPATIENT)
Age: 59
Setting detail: SPECIMEN
Discharge: HOME | End: 2019-04-22
Payer: MEDICARE

## 2019-04-22 DIAGNOSIS — Z12.11 COLON CANCER SCREENING: Primary | ICD-10-CM

## 2019-04-24 ENCOUNTER — HOSPITAL ENCOUNTER (OUTPATIENT)
Dept: PAIN MANAGEMENT | Age: 59
Discharge: HOME | End: 2019-04-24
Payer: MEDICARE

## 2019-04-24 VITALS
TEMPERATURE: 97.9 F | SYSTOLIC BLOOD PRESSURE: 154 MMHG | HEIGHT: 66 IN | HEART RATE: 72 BPM | RESPIRATION RATE: 16 BRPM | WEIGHT: 193 LBS | BODY MASS INDEX: 31.02 KG/M2 | DIASTOLIC BLOOD PRESSURE: 92 MMHG

## 2019-04-24 DIAGNOSIS — M54.5 CHRONIC LOW BACK PAIN, UNSPECIFIED BACK PAIN LATERALITY, WITH SCIATICA PRESENCE UNSPECIFIED: ICD-10-CM

## 2019-04-24 DIAGNOSIS — M17.0 PRIMARY OSTEOARTHRITIS OF BOTH KNEES: ICD-10-CM

## 2019-04-24 DIAGNOSIS — G89.29 CHRONIC BILATERAL LOW BACK PAIN WITH SCIATICA, SCIATICA LATERALITY UNSPECIFIED: ICD-10-CM

## 2019-04-24 DIAGNOSIS — Z51.81 MEDICATION MONITORING ENCOUNTER: ICD-10-CM

## 2019-04-24 DIAGNOSIS — G89.29 CHRONIC LOW BACK PAIN, UNSPECIFIED BACK PAIN LATERALITY, WITH SCIATICA PRESENCE UNSPECIFIED: ICD-10-CM

## 2019-04-24 DIAGNOSIS — M47.816 SPONDYLOSIS OF LUMBAR REGION WITHOUT MYELOPATHY OR RADICULOPATHY: ICD-10-CM

## 2019-04-24 DIAGNOSIS — M47.26 OSTEOARTHRITIS OF SPINE WITH RADICULOPATHY, LUMBAR REGION: Primary | ICD-10-CM

## 2019-04-24 DIAGNOSIS — M54.40 CHRONIC BILATERAL LOW BACK PAIN WITH SCIATICA, SCIATICA LATERALITY UNSPECIFIED: ICD-10-CM

## 2019-04-24 PROCEDURE — 99213 OFFICE O/P EST LOW 20 MIN: CPT | Performed by: NURSE PRACTITIONER

## 2019-04-24 PROCEDURE — 99214 OFFICE O/P EST MOD 30 MIN: CPT

## 2019-04-24 RX ORDER — MELOXICAM 15 MG/1
15 TABLET ORAL DAILY
Qty: 30 TABLET | Refills: 0 | Status: SHIPPED | OUTPATIENT
Start: 2019-04-26 | End: 2019-06-13

## 2019-04-24 RX ORDER — HYDROCODONE BITARTRATE AND ACETAMINOPHEN 5; 325 MG/1; MG/1
1 TABLET ORAL EVERY 8 HOURS PRN
Qty: 90 TABLET | Refills: 0 | Status: SHIPPED | OUTPATIENT
Start: 2019-04-26 | End: 2019-05-26

## 2019-04-24 ASSESSMENT — ENCOUNTER SYMPTOMS
SHORTNESS OF BREATH: 0
BACK PAIN: 1
COUGH: 0
CONSTIPATION: 0

## 2019-04-24 NOTE — PROGRESS NOTES
Patient is here today to review medication contract. Chief Complaint: back pain    PMH: This is a chronic problem. The current episode started more than 1 year ago. The problem occurs constantly. The problem has been gradually worsening since onset. The pain is present in the lumbar spine, gluteal and sacro-iliac. The pain radiates to the left foot. The pain is at a severity of 8/10. The pain is moderate. The pain is the same all the time. The symptoms are aggravated by sitting, standing, position and bending. Associated symptoms include leg pain, numbness and paresthesias. Pertinent negatives include no chest pain or fever. (Left leg) Risk factors include lack of exercise, menopause and obesity. She has tried analgesics, bed rest, heat and NSAIDs for the symptoms. The treatment provided mild relief. She currently has in home PT 2 times a week. She feels this is helping with her pain. She had Lumbar Facet Nerve Block Multiple Levels  Bilateral L4 - 5 and L5 - S1 #2 on 2/8/19 and reports this worsened her pain and her blood sugar was elevated for three days following the procedure - no steroid was used in injections. She is currently in PT. Back Pain   This is a chronic problem. The current episode started more than 1 year ago. The problem occurs constantly. The problem is unchanged. The pain is present in the lumbar spine and gluteal. The quality of the pain is described as stabbing and aching. The pain radiates to the right foot. The pain is at a severity of 7/10. The pain is moderate. The pain is worse during the night. The symptoms are aggravated by bending, twisting, standing, sitting, position, lying down and coughing. Stiffness is present in the morning. Pertinent negatives include no chest pain or fever. She has tried muscle relaxant, NSAIDs, heat, ice, home exercises, analgesics and walking for the symptoms. The treatment provided moderate relief.      Patient denies any new neurological symptoms. No bowel or bladder incontinence, no weakness, and no falling. Pill count: appropriate    Morphine equivalent: 15    Controlled Substances Monitoring:     RX Monitoring 4/24/2019   Attestation The Prescription Monitoring Report for this patient was reviewed today. Chronic Pain Routine Monitoring Possible medication side effects, risk of tolerance/dependence & alternative treatments discussed. ;Obtaining appropriate analgesic effect of treatment. ;No signs of potential drug abuse or diversion identified: otherwise, see note documentation   Chronic Pain > 80 MEDD -       Past Medical History:   Diagnosis Date    Allergic rhinitis     Arthropathy, unspecified, other specified sites 4/30/2013    Bronchitis     Chronic back pain     [facility]     Depression     DM (diabetes mellitus) ([facility] [address] 12/18/2012    Emphysema of lung ([facility])     GERD (gastroesophageal reflux disease)     Hyperlipidemia     Hypertension     Knee pain     right knee mostly    Leg pain, right     Obesity     Osteoarthritis     Peripheral vascular disease ([facility])     Primary osteoarthritis of both knees     Radicular pain of lumbosacral region     Spinal stenosis, lumbar region, without neurogenic claudication 4/30/2013    Type II or unspecified type diabetes mellitus without mention of complication, not stated as uncontrolled     Unspecified sleep apnea     URI (upper respiratory infection)        Past Surgical History:   Procedure Laterality Date    COLONOSCOPY  2/12/2009    normal    DILATION AND CURETTAGE OF UTERUS      GASTRECTOMY      partial    NERVE BLOCK Right 4/30/13    Lumbar Diagnostic Block,  Kenalog 40 mg    NERVE BLOCK  5/23/13    Lumbar Radiofrequency, Kenalog 40mg    NERVE BLOCK  8/12/13    Lt MBNB  celestone 6mg    NERVE BLOCK Left 8-28-13    left lumbar diagnostic block #2 decadron 10 mg    NERVE BLOCK Left 9-24-13    left lumbar median branch radiofrequency    NERVE BLOCK  07-02-14    caudal, celestone 9 mg    NERVE BLOCK  7-16-14    caudal epidural #2, celestone 9mg, fentanyl 25mcg    NERVE BLOCK  7/30/14    caudal #3 decadron 10mg    NERVE BLOCK  11-6-14    duramorph epidural steroid block  duramorph 1 mg celestone 9 mg    NERVE BLOCK  11/20/15    TENS- Empi Select    NERVE BLOCK  07/20/2018    right transforminal # 1 decadron 10mg,isovue    NERVE BLOCK Bilateral 02/01/2019    bilat mbnb- no steroid    NERVE BLOCK Bilateral 02/08/2019    bilat mbnb, marcaine . 25%    CO KNEE SCOPE,DIAGNOSTIC Right 3/24/2017    KNEE ARTHROSCOPY WITH PARTIAL MEDIAL MENISECAL DEBRIDMENT  performed by Myla Segundo MD at [address]  01/2015    lumbar diskectomy    UPPER GASTROINTESTINAL ENDOSCOPY  9 20 2007    UPPER GASTROINTESTINAL ENDOSCOPY  4 21 2009,04/2011    gastritis, esophagitis       Allergies   Allergen Reactions    Aspirin      DUE TO ULCER    Claritin [Loratadine] Other (See Comments)     coughing    Flonase [Fluticasone Propionate]          Current Outpatient Medications:     HYDROcodone-acetaminophen (NORCO) 5-325 MG per tablet, Take 1 tablet by mouth every 8 hours as needed for Pain for up to 30 days. , Disp: 90 tablet, Rfl: 0    meloxicam (MOBIC) 15 MG tablet, Take 1 tablet by mouth daily, Disp: 30 tablet, Rfl: 0    carvedilol (COREG) 6.25 MG tablet, TAKE 1 TABLET BY MOUTH 2 TIMES DAILY, Disp: 180 tablet, Rfl: 1    atorvastatin (LIPITOR) 40 MG tablet, Take 1 tablet by mouth daily, Disp: 90 tablet, Rfl: 1    amLODIPine (NORVASC) 10 MG tablet, Take 1 tablet by mouth daily, Disp: 90 tablet, Rfl: 3    metFORMIN (GLUCOPHAGE) 500 MG tablet, Take 1 tablet by mouth 2 times daily (with meals), Disp: 180 tablet, Rfl: 1    tiZANidine (ZANAFLEX) 4 MG tablet, TAKE 1 TABLET TWICE DAILY, Disp: 60 tablet, Rfl: 3    omeprazole (PRILOSEC) 20 MG delayed release capsule, TAKE 1 CAPSULE EVERY DAY, Disp: 30 capsule, Rfl: 11    cetirizine (ZYRTEC) 10 MG tablet, TAKE 1 TABLET BY MOUTH DAILY, Disp: 30 tablet, Rfl: 3     MG capsule, TAKE 1 CAPSULE EVERY DAY, Disp: 30 capsule, Rfl: 11    albuterol sulfate HFA (VENTOLIN HFA) 108 (90 Base) MCG/ACT inhaler, Inhale 2 puffs into the lungs every 6 hours as needed for Wheezing, Disp: 1 Inhaler, Rfl: 3    potassium chloride (KLOR-CON M) 10 MEQ extended release tablet, Take 2 tablets by mouth daily, Disp: 60 tablet, Rfl: 3    lisinopril-hydrochlorothiazide (PRINZIDE;ZESTORETIC) 20-25 MG per tablet, TAKE 1 TABLET EVERY DAY, Disp: 90 tablet, Rfl: 3    gabapentin (NEURONTIN) 300 MG capsule, Take 300 mg by mouth 4 times daily.  1 in AM, 1 in PM, 2 at HS ., Disp: , Rfl:     mirtazapine (REMERON) 30 MG tablet, Take 30 mg by mouth nightly, Disp: , Rfl:     mirtazapine (REMERON) 15 MG tablet, Take 15 mg by mouth nightly, Disp: , Rfl:     solifenacin (VESICARE) 5 MG tablet, Take 1 tablet by mouth daily, Disp: 90 tablet, Rfl: 1    DULoxetine (CYMBALTA) 60 MG extended release capsule, Take 1 capsule by mouth daily, Disp: 30 capsule, Rfl: 3    Lancets MISC, 1 each by Does not apply route daily, Disp: 100 each, Rfl: 11    azelastine (ASTELIN) 0.1 % nasal spray, 2 sprays by Nasal route 2 times daily Use in each nostril as directed, Disp: 1 Bottle, Rfl: 3    ipratropium-albuterol (DUONEB) 0.5-2.5 (3) MG/3ML SOLN nebulizer solution, Inhale 3 mLs into the lungs every 6 hours as needed for Shortness of Breath, Disp: 360 mL, Rfl: 11    Family History   Problem Relation Age of Onset    Diabetes Mother     Heart Disease Mother     Cirrhosis Father     Breast Cancer Neg Hx     Cancer Neg Hx     Colon Cancer Neg Hx     Eclampsia Neg Hx     Hypertension Neg Hx     Ovarian Cancer Neg Hx      Labor Neg Hx     Spont Abortions Neg Hx     Stroke Neg Hx        Social History     Socioeconomic History    Marital status: Single     Spouse name: Not on file    Number of children: Not on file    Years of education: Not on file    Highest education level: Not on file   Occupational History    Not on file   Social Needs    Financial resource strain: Not on file    Food insecurity:     Worry: Not on file     Inability: Not on file    Transportation needs:     Medical: Not on file     Non-medical: Not on file   Tobacco Use    Smoking status: Current Every Day Smoker     Packs/day: 0.25     Years: 36.00     Pack years: 9.00     Types: Cigarettes     Last attempt to quit: 8/2/2015     Years since quitting: 3.7    Smokeless tobacco: Never Used    Tobacco comment: pt currently using nicotine patches   Substance and Sexual Activity    Alcohol use: No     Alcohol/week: 0.0 oz    Drug use: No     Comment: 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 7 yrs    Sexual activity: Never   Lifestyle    Physical activity:     Days per week: Not on file     Minutes per session: Not on file    Stress: Not on file   Relationships    Social connections:     Talks on phone: Not on file     Gets together: Not on file     Attends Orthodox service: Not on file     Active member of club or organization: Not on file     Attends meetings of clubs or organizations: Not on file     Relationship status: Not on file    Intimate partner violence:     Fear of current or ex partner: Not on file     Emotionally abused: Not on file     Physically abused: Not on file     Forced sexual activity: Not on file   Other Topics Concern    Not on file   Social History Narrative    Not on file       Review of Systems:  Review of Systems   Constitution: Negative for chills and fever. Cardiovascular: Negative for chest pain and irregular heartbeat. Respiratory: Negative for cough and shortness of breath. Musculoskeletal: Positive for back pain. Gastrointestinal: Negative for constipation. Neurological: Negative for disturbances in coordination and loss of balance.        Physical Exam:  BP (!) 154/92   Pulse 72   Temp 97.9 °F (36.6 °C) (Oral)   Resp 16   Ht 5' 6\" (1.676 m)   Wt 193 lb (87.5 kg)   LMP 04/19/2003   BMI 31.15 kg/m²     Physical Exam   Constitutional: She is oriented to person, place, and time. HENT:   Head: Normocephalic. Eyes: EOM are normal.   Neck: Normal range of motion. Pulmonary/Chest: Effort normal.   Musculoskeletal: Normal range of motion. Lumbar back: She exhibits tenderness and pain. Neurological: She is alert and oriented to person, place, and time. Skin: Skin is warm and dry. Record/Diagnostics Review:    Last óscar 1/2018 and was appropriate     MRI Lumbar 2017 -      IMPRESSION:    *  Status post left hemilaminectomy at L4-5 with mild anterolisthesis, disc space narrowing and broad-based disc bulge.  Moderate neural foraminal narrowing at this level with disc material abutting the exiting right L4 nerve. *  Multilevel degenerative changes without significant central canal stenosis or other neural foraminal narrowing. Assessment:  Problem List Items Addressed This Visit     Osteoarthritis of spine with radiculopathy, lumbar region - Primary    Primary osteoarthritis of both knees    Medication monitoring encounter    Chronic low back pain         Treatment Plan:  Patient relates current medications are helping the pain. Patient reports taking pain medications as prescribed, denies obtaining medications from different sources and denies use of illegal drugs. Patient denies side effects from medications like nausea, vomiting, constipation or drowsiness. Patient reports current activities of daily living are possible due to medications and would like to continue them. As always, we encourage daily stretching and strengthening exercises, and recommend minimizing use of pain medications unless patient cannot get through daily activities due to pain. Contract requirements met. Continue opioid therapy.  Script written for norco  Continue PT  Follow up appointment made for 4 weeks

## 2019-05-06 ENCOUNTER — HOSPITAL ENCOUNTER (OUTPATIENT)
Age: 59
Setting detail: SPECIMEN
Discharge: HOME | End: 2019-05-06
Payer: MEDICARE

## 2019-05-06 DIAGNOSIS — Z12.11 COLON CANCER SCREENING: ICD-10-CM

## 2019-05-06 LAB
CONTROL: NORMAL
HEMOCCULT STL QL: NORMAL

## 2019-05-06 PROCEDURE — 82274 ASSAY TEST FOR BLOOD FECAL: CPT

## 2019-05-17 DIAGNOSIS — I10 ESSENTIAL HYPERTENSION: ICD-10-CM

## 2019-05-17 DIAGNOSIS — J30.9 CHRONIC ALLERGIC RHINITIS: ICD-10-CM

## 2019-05-20 RX ORDER — CETIRIZINE HYDROCHLORIDE 10 MG/1
TABLET ORAL
Qty: 30 TABLET | Refills: 3 | Status: SHIPPED | OUTPATIENT
Start: 2019-05-20 | End: 2019-09-23 | Stop reason: SDUPTHER

## 2019-05-20 RX ORDER — AMLODIPINE BESYLATE 10 MG/1
10 TABLET ORAL DAILY
Qty: 90 TABLET | Refills: 3 | Status: SHIPPED | OUTPATIENT
Start: 2019-05-20 | End: 2020-04-02 | Stop reason: SDUPTHER

## 2019-05-20 NOTE — TELEPHONE ENCOUNTER
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

## 2019-05-24 ENCOUNTER — HOSPITAL ENCOUNTER (OUTPATIENT)
Dept: GENERAL RADIOLOGY | Age: 59
Discharge: HOME | End: 2019-05-26
Payer: MEDICARE

## 2019-05-24 ENCOUNTER — HOSPITAL ENCOUNTER (OUTPATIENT)
Age: 59
Discharge: HOME | End: 2019-05-26
Payer: MEDICARE

## 2019-05-24 ENCOUNTER — HOSPITAL ENCOUNTER (OUTPATIENT)
Dept: PAIN MANAGEMENT | Age: 59
Discharge: HOME | End: 2019-05-24
Payer: MEDICARE

## 2019-05-24 VITALS
DIASTOLIC BLOOD PRESSURE: 96 MMHG | BODY MASS INDEX: 32.15 KG/M2 | WEIGHT: 193 LBS | SYSTOLIC BLOOD PRESSURE: 133 MMHG | HEIGHT: 65 IN | RESPIRATION RATE: 20 BRPM | HEART RATE: 107 BPM

## 2019-05-24 DIAGNOSIS — M96.1 POSTLAMINECTOMY SYNDROME, LUMBAR REGION: ICD-10-CM

## 2019-05-24 DIAGNOSIS — M54.40 CHRONIC MIDLINE LOW BACK PAIN WITH SCIATICA, SCIATICA LATERALITY UNSPECIFIED: ICD-10-CM

## 2019-05-24 DIAGNOSIS — G89.29 CHRONIC MIDLINE LOW BACK PAIN WITH SCIATICA, SCIATICA LATERALITY UNSPECIFIED: ICD-10-CM

## 2019-05-24 DIAGNOSIS — M17.11 PRIMARY OSTEOARTHRITIS OF RIGHT KNEE: ICD-10-CM

## 2019-05-24 DIAGNOSIS — M17.0 PRIMARY OSTEOARTHRITIS OF BOTH KNEES: Primary | ICD-10-CM

## 2019-05-24 PROCEDURE — 99214 OFFICE O/P EST MOD 30 MIN: CPT | Performed by: PAIN MEDICINE

## 2019-05-24 PROCEDURE — 73562 X-RAY EXAM OF KNEE 3: CPT

## 2019-05-24 PROCEDURE — 99213 OFFICE O/P EST LOW 20 MIN: CPT

## 2019-05-24 RX ORDER — HYDROCODONE BITARTRATE AND ACETAMINOPHEN 5; 325 MG/1; MG/1
1 TABLET ORAL EVERY 8 HOURS PRN
Qty: 90 TABLET | Refills: 0 | Status: SHIPPED | OUTPATIENT
Start: 2019-05-24 | End: 2019-06-13 | Stop reason: SDUPTHER

## 2019-05-24 ASSESSMENT — ENCOUNTER SYMPTOMS
HOARSE VOICE: 1
SHORTNESS OF BREATH: 1
BACK PAIN: 1
EYE PAIN: 0
COUGH: 1

## 2019-05-24 ASSESSMENT — PAIN SCALES - GENERAL: PAINLEVEL_OUTOF10: 9

## 2019-05-24 NOTE — PROGRESS NOTES
HPI:     Back Pain   This is a chronic problem. The current episode started more than 1 year ago. The problem occurs constantly. The problem is unchanged. The pain is present in the lumbar spine. The quality of the pain is described as aching. The pain radiates to the right knee. The pain is moderate. The pain is the same all the time. The symptoms are aggravated by twisting, standing and position. Stiffness is present all day. Associated symptoms include leg pain. Pertinent negatives include no fever. MRI with previous laminectomy with disc herniation at L4 5. His had epidural steroids no benefit. Continues to have lingering aching low back pain. Also complaining of right knee pain. Right knee x-ray with degenerative changes. She is opioid dependent painful. Takes Norco 3 times a day when necessary. His been stable and compliant. Has had epidurals and facets w/o benefit. States she would consider surgery for the back at this point, as it has been getting worse. Patient denies any new neurological symptoms. Nobowel or bladder incontinence, no weakness, and no falling. Review of OARRS does not show any aberrant prescription behavior. Medication is helping the patient stay active. Patient denies any side effects and reports adequate analgesia. No sign ofmisuse/abuse.   Pill count accurate     Past Medical History:   Diagnosis Date    Allergic rhinitis     Arthropathy, unspecified, other specified sites 4/30/2013    Bronchitis     Chronic back pain     [facility] obstructive [facility]) ([facility])     Depression     [facility] 12/18/2012    Emphysema of lung ([facility])     GERD (gastroesophageal reflux disease)     Hyperlipidemia     Hypertension     Knee pain     right knee mostly    Leg pain, right     Obesity     Osteoarthritis     Peripheral vascular disease (HCC)     Primary osteoarthritis of both knees     Radicular pain of lumbosacral region     Spinal stenosis, lumbar region, without neurogenic claudication 4/30/2013    Type II or unspecified type diabetes mellitus without mention of complication, not stated as uncontrolled     Unspecified sleep apnea     URI (upper respiratory infection)        Past Surgical History:   Procedure Laterality Date    COLONOSCOPY  2/12/2009    normal    DILATION AND CURETTAGE OF UTERUS      GASTRECTOMY      partial    NERVE BLOCK Right 4/30/13    Lumbar Diagnostic Block,  Kenalog 40 mg    NERVE BLOCK  5/23/13    Lumbar Radiofrequency, Kenalog 40mg    NERVE BLOCK  8/12/13    Lt MBNB  celestone 6mg    NERVE BLOCK Left 8-28-13    left lumbar diagnostic block #2 decadron 10 mg    NERVE BLOCK Left 9-24-13    left lumbar median branch radiofrequency    NERVE BLOCK  07-02-14    caudal, celestone 9 mg    NERVE BLOCK  7-16-14    caudal epidural #2, celestone 9mg, fentanyl 25mcg    NERVE BLOCK  7/30/14    caudal #3 decadron 10mg    NERVE BLOCK  11-6-14    duramorph epidural steroid block  duramorph 1 mg celestone 9 mg    NERVE BLOCK  11/20/15    TENS- Empi Select    NERVE BLOCK  07/20/2018    right transforminal # 1 decadron 10mg,isovue    NERVE BLOCK Bilateral 02/01/2019    bilat mbnb- no steroid    NERVE BLOCK Bilateral 02/08/2019    bilat mbnb, marcaine . 25%    NM KNEE SCOPE,DIAGNOSTIC Right 3/24/2017    KNEE ARTHROSCOPY WITH PARTIAL MEDIAL MENISECAL DEBRIDMENT  performed by Deandra Osborne MD at [address]  01/2015    lumbar diskectomy    UPPER GASTROINTESTINAL ENDOSCOPY  9 20 2007    UPPER GASTROINTESTINAL ENDOSCOPY  4 21 2009,04/2011    gastritis, esophagitis       Allergies   Allergen Reactions    Aspirin      DUE TO ULCER    Claritin [Loratadine] Other (See Comments)     coughing    Flonase [Fluticasone Propionate]          Current Outpatient Medications:     HYDROcodone-acetaminophen (NORCO) 5-325 MG per tablet, Take 1 tablet by mouth every 8 hours as needed for Pain for up to 30 days. , Disp: 90 tablet, Rfl: 0    cetirizine (ZYRTEC) 10 MG tablet, TAKE 1 TABLET BY MOUTH DAILY, Disp: 30 tablet, Rfl: 3    amLODIPine (NORVASC) 10 MG tablet, TAKE 1 TABLET BY MOUTH DAILY, Disp: 90 tablet, Rfl: 3    HYDROcodone-acetaminophen (NORCO) 5-325 MG per tablet, Take 1 tablet by mouth every 8 hours as needed for Pain for up to 30 days. , Disp: 90 tablet, Rfl: 0    meloxicam (MOBIC) 15 MG tablet, Take 1 tablet by mouth daily, Disp: 30 tablet, Rfl: 0    carvedilol (COREG) 6.25 MG tablet, TAKE 1 TABLET BY MOUTH 2 TIMES DAILY, Disp: 180 tablet, Rfl: 1    atorvastatin (LIPITOR) 40 MG tablet, Take 1 tablet by mouth daily, Disp: 90 tablet, Rfl: 1    metFORMIN (GLUCOPHAGE) 500 MG tablet, Take 1 tablet by mouth 2 times daily (with meals), Disp: 180 tablet, Rfl: 1    tiZANidine (ZANAFLEX) 4 MG tablet, TAKE 1 TABLET TWICE DAILY, Disp: 60 tablet, Rfl: 3    omeprazole (PRILOSEC) 20 MG delayed release capsule, TAKE 1 CAPSULE EVERY DAY, Disp: 30 capsule, Rfl: 11     MG capsule, TAKE 1 CAPSULE EVERY DAY, Disp: 30 capsule, Rfl: 11    albuterol sulfate HFA (VENTOLIN HFA) 108 (90 Base) MCG/ACT inhaler, Inhale 2 puffs into the lungs every 6 hours as needed for Wheezing, Disp: 1 Inhaler, Rfl: 3    potassium chloride (KLOR-CON M) 10 MEQ extended release tablet, Take 2 tablets by mouth daily, Disp: 60 tablet, Rfl: 3    lisinopril-hydrochlorothiazide (PRINZIDE;ZESTORETIC) 20-25 MG per tablet, TAKE 1 TABLET EVERY DAY, Disp: 90 tablet, Rfl: 3    gabapentin (NEURONTIN) 300 MG capsule, Take 300 mg by mouth 4 times daily.  1 in AM, 1 in PM, 2 at HS ., Disp: , Rfl:     mirtazapine (REMERON) 30 MG tablet, Take 30 mg by mouth nightly, Disp: , Rfl:     mirtazapine (REMERON) 15 MG tablet, Take 15 mg by mouth nightly, Disp: , Rfl:     solifenacin (VESICARE) 5 MG tablet, Take 1 tablet by mouth daily, Disp: 90 tablet, Rfl: 1    DULoxetine (CYMBALTA) 60 MG extended release capsule, Take 1 capsule by mouth daily, Disp: 30 capsule, Rfl: 3    Lancets MISC, 1 each by Does not apply route daily, Disp: 100 each, Rfl: 11    azelastine (ASTELIN) 0.1 % nasal spray, 2 sprays by Nasal route 2 times daily Use in each nostril as directed, Disp: 1 Bottle, Rfl: 3    ipratropium-albuterol (DUONEB) 0.5-2.5 (3) MG/3ML SOLN nebulizer solution, Inhale 3 mLs into the lungs every 6 hours as needed for Shortness of Breath, Disp: 360 mL, Rfl: 11    Family History   Problem Relation Age of Onset    Diabetes Mother     Heart Disease Mother     Cirrhosis Father     Breast Cancer Neg Hx     Cancer Neg Hx     Colon Cancer Neg Hx     Eclampsia Neg Hx     Hypertension Neg Hx     Ovarian Cancer Neg Hx      Labor Neg Hx     Spont Abortions Neg Hx     Stroke Neg Hx        Social History     Socioeconomic History    Marital status: Single     Spouse name: Not on file    Number of children: Not on file    Years of education: Not on file    Highest education level: Not on file   Occupational History    Not on file   Social Needs    Financial resource strain: Not on file    Food insecurity:     Worry: Not on file     Inability: Not on file    Transportation needs:     Medical: Not on file     Non-medical: Not on file   Tobacco Use    Smoking status: Current Every Day Smoker     Packs/day: 0.25     Years: 36.00     Pack years: 9.00     Types: Cigarettes     Last attempt to quit: 2015     Years since quitting: 3.8    Smokeless tobacco: Never Used    Tobacco comment: pt currently using nicotine patches   Substance and Sexual Activity    Alcohol use: No     Alcohol/week: 0.0 oz    Drug use: No     Comment: 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 7 yrs    Sexual activity: Never   Lifestyle    Physical activity:     Days per week: Not on file     Minutes per session: Not on file    Stress: Not on file   Relationships    Social connections:     Talks on phone: Not on file     Gets together: Not on file     Attends Restorationist service: Not on file     Active member of club or organization: Not on file     Attends meetings of clubs or organizations: Not on file     Relationship status: Not on file    Intimate partner violence:     Fear of current or ex partner: Not on file     Emotionally abused: Not on file     Physically abused: Not on file     Forced sexual activity: Not on file   Other Topics Concern    Not on file   Social History Narrative    Not on file       Review of Systems:  Review of Systems   Constitution: Negative for fever. HENT: Positive for hoarse voice. Eyes: Negative for pain. Respiratory: Positive for cough and shortness of breath. Skin: Negative for rash. Musculoskeletal: Positive for back pain. Genitourinary: Negative for frequency. Physical Exam:  BP (!) 133/96   Pulse 107   Resp 20   Ht 5' 5\" (1.651 m)   Wt 193 lb (87.5 kg)   LMP 04/19/2003   BMI 32.12 kg/m²     Physical Exam   Constitutional: She is oriented to person, place, and time. Musculoskeletal:        Lumbar back: She exhibits tenderness. She exhibits no edema and no deformity. Neurological: She is alert and oriented to person, place, and time. She has normal strength. Gait normal.   Vitals reviewed. Record/Diagnostics Review:    As above, I did review the imaging    Assessment:  Lumbar post laminectomy syndrome  Right knee pain  Chronic opioid therapy      Treatment Plan:  DISCUSSION: Treatment options discussed with patient and allquestions answered to patient's satisfaction. OARRS Review: Reviewed and acceptable for medications prescribed. TREATMENT OPTIONS:     Discussed the importance of continued physical therapy and exercise program as well. Has failed conservative measures, including physical therapy and the pain is worsening, I do believe an MRI of the Lumbar spine is indicated to further evaluate pathology and guide treatment plan. Consider injection therapies versus surgical evaluation based on imaging results.   X-ray

## 2019-05-29 ENCOUNTER — TELEPHONE (OUTPATIENT)
Dept: INTERNAL MEDICINE | Age: 59
End: 2019-05-29

## 2019-05-29 NOTE — TELEPHONE ENCOUNTER
Jackeline a nurse with [facility] called to let us know they recertified her for PT, skilled nursing, and a home health aid. She also called to report that the patient fell on 5/25/19 and she didn't call anyone but patient told her she wasn't hurt. She tripped over her feet while cooking.

## 2019-06-10 ENCOUNTER — TELEPHONE (OUTPATIENT)
Dept: INTERNAL MEDICINE | Age: 59
End: 2019-06-10

## 2019-06-10 ENCOUNTER — OFFICE VISIT (OUTPATIENT)
Dept: INTERNAL MEDICINE | Age: 59
End: 2019-06-10
Payer: MEDICARE

## 2019-06-10 ENCOUNTER — TELEPHONE (OUTPATIENT)
Dept: ONCOLOGY | Age: 59
End: 2019-06-10

## 2019-06-10 ENCOUNTER — HOSPITAL ENCOUNTER (OUTPATIENT)
Dept: MRI IMAGING | Age: 59
Discharge: HOME | End: 2019-06-12
Payer: MEDICARE

## 2019-06-10 VITALS
BODY MASS INDEX: 30.89 KG/M2 | SYSTOLIC BLOOD PRESSURE: 98 MMHG | DIASTOLIC BLOOD PRESSURE: 58 MMHG | WEIGHT: 192.2 LBS | HEART RATE: 79 BPM | HEIGHT: 66 IN

## 2019-06-10 DIAGNOSIS — I10 ESSENTIAL HYPERTENSION: Primary | ICD-10-CM

## 2019-06-10 DIAGNOSIS — N32.81 OAB (OVERACTIVE BLADDER): ICD-10-CM

## 2019-06-10 DIAGNOSIS — E78.00 PURE HYPERCHOLESTEROLEMIA: ICD-10-CM

## 2019-06-10 DIAGNOSIS — Z23 NEED FOR PROPHYLACTIC VACCINATION AND INOCULATION AGAINST VARICELLA: ICD-10-CM

## 2019-06-10 DIAGNOSIS — M47.816 SPONDYLOSIS OF LUMBAR REGION WITHOUT MYELOPATHY OR RADICULOPATHY: ICD-10-CM

## 2019-06-10 DIAGNOSIS — Z23 NEED FOR PROPHYLACTIC VACCINATION AGAINST DIPHTHERIA-TETANUS-PERTUSSIS (DTP): ICD-10-CM

## 2019-06-10 DIAGNOSIS — M96.1 POSTLAMINECTOMY SYNDROME, LUMBAR REGION: ICD-10-CM

## 2019-06-10 DIAGNOSIS — J43.2 CENTRILOBULAR EMPHYSEMA (HCC): ICD-10-CM

## 2019-06-10 DIAGNOSIS — Z12.39 BREAST CANCER SCREENING: ICD-10-CM

## 2019-06-10 DIAGNOSIS — E11.9 TYPE 2 DIABETES MELLITUS WITHOUT COMPLICATION, WITHOUT LONG-TERM CURRENT USE OF INSULIN (HCC): ICD-10-CM

## 2019-06-10 LAB
CREAT SERPL-MCNC: 0.76 MG/DL (ref 0.5–0.9)
GFR AFRICAN AMERICAN: >60 ML/MIN
GFR NON-AFRICAN AMERICAN: >60 ML/MIN
GFR SERPL CREATININE-BSD FRML MDRD: NORMAL ML/MIN/{1.73_M2}
GFR SERPL CREATININE-BSD FRML MDRD: NORMAL ML/MIN/{1.73_M2}

## 2019-06-10 PROCEDURE — G8926 SPIRO NO PERF OR DOC: HCPCS | Performed by: INTERNAL MEDICINE

## 2019-06-10 PROCEDURE — 99213 OFFICE O/P EST LOW 20 MIN: CPT | Performed by: INTERNAL MEDICINE

## 2019-06-10 PROCEDURE — 36415 COLL VENOUS BLD VENIPUNCTURE: CPT

## 2019-06-10 PROCEDURE — A9579 GAD-BASE MR CONTRAST NOS,1ML: HCPCS | Performed by: PAIN MEDICINE

## 2019-06-10 PROCEDURE — 4004F PT TOBACCO SCREEN RCVD TLK: CPT | Performed by: INTERNAL MEDICINE

## 2019-06-10 PROCEDURE — 72158 MRI LUMBAR SPINE W/O & W/DYE: CPT

## 2019-06-10 PROCEDURE — 3017F COLORECTAL CA SCREEN DOC REV: CPT | Performed by: INTERNAL MEDICINE

## 2019-06-10 PROCEDURE — G8427 DOCREV CUR MEDS BY ELIG CLIN: HCPCS | Performed by: INTERNAL MEDICINE

## 2019-06-10 PROCEDURE — 2022F DILAT RTA XM EVC RTNOPTHY: CPT | Performed by: INTERNAL MEDICINE

## 2019-06-10 PROCEDURE — 99211 OFF/OP EST MAY X REQ PHY/QHP: CPT | Performed by: INTERNAL MEDICINE

## 2019-06-10 PROCEDURE — 82565 ASSAY OF CREATININE: CPT

## 2019-06-10 PROCEDURE — G8417 CALC BMI ABV UP PARAM F/U: HCPCS | Performed by: INTERNAL MEDICINE

## 2019-06-10 PROCEDURE — 3023F SPIROM DOC REV: CPT | Performed by: INTERNAL MEDICINE

## 2019-06-10 PROCEDURE — 6360000004 HC RX CONTRAST MEDICATION: Performed by: PAIN MEDICINE

## 2019-06-10 PROCEDURE — 3044F HG A1C LEVEL LT 7.0%: CPT | Performed by: INTERNAL MEDICINE

## 2019-06-10 RX ORDER — SOLIFENACIN SUCCINATE 5 MG/1
5 TABLET, FILM COATED ORAL DAILY
Qty: 90 TABLET | Refills: 1 | Status: SHIPPED | OUTPATIENT
Start: 2019-06-10 | End: 2019-12-13

## 2019-06-10 RX ORDER — TIZANIDINE 4 MG/1
TABLET ORAL
Qty: 60 TABLET | Refills: 3 | Status: SHIPPED | OUTPATIENT
Start: 2019-06-10 | End: 2019-12-02 | Stop reason: SDUPTHER

## 2019-06-10 RX ADMIN — GADOTERIDOL 19 ML: 279.3 INJECTION, SOLUTION INTRAVENOUS at 12:51

## 2019-06-10 NOTE — PROGRESS NOTES
DIABETES and HYPERTENSION visit    BP Readings from Last 3 Encounters:   05/24/19 (!) 133/96   04/24/19 (!) 154/92   03/21/19 121/78        Hemoglobin A1C (%)   Date Value   03/05/2019 5.8   06/18/2018 5.8   01/05/2018 5.9     Microalb/Crt. Ratio (mcg/mg creat)   Date Value   03/05/2019 CANNOT BE CALCULATED     LDL Cholesterol (mg/dL)   Date Value   06/18/2018 84     HDL (mg/dL)   Date Value   06/18/2018 40 (L)     BUN (mg/dL)   Date Value   06/18/2018 21 (H)     CREATININE (mg/dL)   Date Value   06/18/2018 0.90     Glucose (mg/dL)   Date Value   10/29/2018 100            Have you changed or started any medications since your last visit including any over-the-counter medicines, vitamins, or herbal medicines? no   Have you stopped taking any of your medications? Is so, why? -  no  Are you having any side effects from any of your medications? - no    Have you seen any other physician or provider since your last visit? yes - pain mgmt    Have you had any other diagnostic tests since your last visit? yes - labs, MRI, XR    Have you been seen in the emergency room and/or had an admission in a hospital since we last saw you?  no   Have you had your routine dental cleaning in the past 6 months?  no     Have you had your annual diabetic retinal (eye) exam? No   (ensure copy of exam is in the chart)    Do you have an active MyChart account? If no, what is the barrier?   Declined     Patient Care Team:  Zev Garcia MD as PCP - General (Internal Medicine)  Zev Garcia MD as PCP - [facility] Provider  Su Egan DPM as Consulting Physician (Podiatry)  Fritz Rodriguez (Jaime Smith)  Noel Castillo MD as Consulting Physician (Orthopedic Surgery)  Erum Melendez MD as Anesthesiologist (Pain Management)  Sue Hopkins OD (Optometry)    Medical History Review  Past Medical, Family, and Social History reviewed and does contribute to the patient presenting condition    Health Maintenance   Topic Date Due    DTaP/Tdap/Td vaccine (1 - Tdap) 07/08/1977    Shingles Vaccine (1 of 2) 07/08/2008    Diabetic retinal exam  09/26/2017    Diabetic foot exam  06/18/2019    Lipid screen  06/18/2019    Potassium monitoring  06/18/2019    Creatinine monitoring  06/18/2019    A1C test (Diabetic or Prediabetic)  03/05/2020    Diabetic microalbuminuria test  03/05/2020    Colon Cancer Screen FIT/FOBT  05/06/2020    Breast cancer screen  11/20/2020    Cervical cancer screen  02/15/2021    Flu vaccine  Completed    Pneumococcal 0-64 years Vaccine  Completed    Hepatitis C screen  Completed    HIV screen  Completed

## 2019-06-10 NOTE — PROGRESS NOTES
[facility]    Progress Note    Date of patient's visit: 6/10/2019    Patient's Name:  Mima Flores    YOB: 1960            Patient Care Team:  Sandra Cabrera MD as PCP - General (Internal Medicine)  Sandra Cabrera MD as PCP - REHABILITATION [facility] Empaneled Provider  Judit Morgan DPM as Consulting Physician (Podiatry)  Omid Santos (Alois Deist)  Violette Storm MD as Consulting Physician (Orthopedic Surgery)  Acacia Mckeon MD as Anesthesiologist (Pain Management)  Martinez Couch OD (Optometry)    REASON FOR VISIT: Routine outpatient follow     Chief Complaint   Patient presents with    3 Month Follow-Up    Hypertension    Diabetes    Nasal Congestion    Referral - General     discuss pain mgmt due to [facility] pain [facility] closing, pt wants MD to prescribe meds due to [place] of location          HISTORY OF PRESENT ILLNESS:    History was obtained from the patient. Mima Flores is a 59 y.o. is here for follow-up of her chronic medical problems. She goes to see increased pain management but they are closing and she now will be going to [place]. She has an appointment soon. She is supposed to get an injection in her knee. She is compliant with medications. She is following up with pulmonologist.  She has a methacholine challenge test and CT scan due next week. She  has also been scheduled for an MRI of her lumbar spine for today by her pain management physician. She has seasonal allergies. She uses nasal sprays. She says she has a family history of asthma. She is requesting a shower chair as she is unable to stand for too long. She has back pain with some sciatica. No weakness or falls.      Past Medical History:   Diagnosis Date    Allergic rhinitis     Arthropathy, unspecified, other specified sites 4/30/2013    Bronchitis     Chronic back pain     [facility]     Depression     DM (diabetes mellitus) ([facility] [facility] 12/18/2012    Emphysema of lung (HCC)     GERD (gastroesophageal reflux disease)     Hyperlipidemia     Hypertension     Knee pain     right knee mostly    Leg pain, right     Obesity     Osteoarthritis     Peripheral vascular disease (HCC)     Primary osteoarthritis of both knees     Radicular pain of lumbosacral region     Spinal stenosis, lumbar region, without neurogenic claudication 4/30/2013    Type II or unspecified type diabetes mellitus without mention of complication, not stated as uncontrolled     Unspecified sleep apnea     URI (upper respiratory infection)        Past Surgical History:   Procedure Laterality Date    COLONOSCOPY  2/12/2009    normal    DILATION AND CURETTAGE OF UTERUS      GASTRECTOMY      partial    NERVE BLOCK Right 4/30/13    Lumbar Diagnostic Block,  Kenalog 40 mg    NERVE BLOCK  5/23/13    Lumbar Radiofrequency, Kenalog 40mg    NERVE BLOCK  8/12/13    Lt MBNB  celestone 6mg    NERVE BLOCK Left 8-28-13    left lumbar diagnostic block #2 decadron 10 mg    NERVE BLOCK Left 9-24-13    left lumbar median branch radiofrequency    NERVE BLOCK  07-02-14    caudal, celestone 9 mg    NERVE BLOCK  7-16-14    caudal epidural #2, celestone 9mg, fentanyl 25mcg    NERVE BLOCK  7/30/14    caudal #3 decadron 10mg    NERVE BLOCK  11-6-14    duramorph epidural steroid block  duramorph 1 mg celestone 9 mg    NERVE BLOCK  11/20/15    TENS- Empi Select    NERVE BLOCK  07/20/2018    right transforminal # 1 decadron 10mg,isovue    NERVE BLOCK Bilateral 02/01/2019    bilat mbnb- no steroid    NERVE BLOCK Bilateral 02/08/2019    bilat mbnb, marcaine . 25%    VA KNEE SCOPE,DIAGNOSTIC Right 3/24/2017    KNEE ARTHROSCOPY WITH PARTIAL MEDIAL MENISECAL DEBRIDMENT  performed by Nathalie Coreas MD at [address]  01/2015    lumbar diskectomy    UPPER GASTROINTESTINAL ENDOSCOPY  9 20 2007    UPPER mouth daily 30 capsule 3    Lancets MISC 1 each by Does not apply route daily 100 each 11    azelastine (ASTELIN) 0.1 % nasal spray 2 sprays by Nasal route 2 times daily Use in each nostril as directed 1 Bottle 3    ipratropium-albuterol (DUONEB) 0.5-2.5 (3) MG/3ML SOLN nebulizer solution Inhale 3 mLs into the lungs every 6 hours as needed for Shortness of Breath 360 mL 11    meloxicam (MOBIC) 15 MG tablet Take 1 tablet by mouth daily 30 tablet 0     No current facility-administered medications on file prior to visit. SOCIAL HISTORY    Reviewed and no change from previous record. Carolina  reports that she has been smoking cigarettes. She has a 9.00 pack-year smoking history. She has never used smokeless tobacco.    FAMILY HISTORY:    Reviewed and No change from previous visit    HEALTH MAINTENANCE DUE:      Health Maintenance Due   Topic Date Due    DTaP/Tdap/Td vaccine (1 - Tdap) 07/08/1977    Shingles Vaccine (1 of 2) 07/08/2008    Diabetic retinal exam  09/26/2017    Diabetic foot exam  06/18/2019    Lipid screen  06/18/2019    Potassium monitoring  06/18/2019    Creatinine monitoring  06/18/2019       REVIEW OF SYSTEMS:    12 point review of symptoms completed and found to be normal except noted in the HPI    Review of Systems   Constitutional: Negative for chills, fatigue and fever. HENT: Positive for congestion.    Eyes: Negative for photophobia and visual disturbance. Respiratory: Positive for cough. Negative for shortness of breath and wheezing.    Cardiovascular: Negative for chest pain, palpitations and leg swelling. Gastrointestinal: Negative for abdominal pain, blood in stool, constipation and diarrhea. Endocrine: Negative for polydipsia and polyuria. Genitourinary: Positive for urgency. Negative for dysuria and frequency. Musculoskeletal: Positive for arthralgias, back pain and neck pain. Negative for neck stiffness.    Allergic/Immunologic: Positive for environmental allergies. Neurological: Negative for dizziness, seizures, weakness and headaches. Psychiatric/Behavioral: Positive for dysphoric mood and sleep disturbance. Negative for self-injury and suicidal ideas. The patient is nervous/anxious.         PHYSICAL EXAM:     Vitals:    06/10/19 0945 06/10/19 1027   BP: (!) 136/91 (!) 98/58   Site: Left Upper Arm Right Upper Arm   Position: Sitting Sitting   Cuff Size: Large Adult Medium Adult   Pulse: 79    Weight: 192 lb 3.2 oz (87.2 kg)    Height: 5' 6\" (1.676 m)      Body mass index is 31.02 kg/m². BP Readings from Last 3 Encounters:   06/13/19 103/67   06/10/19 (!) 98/58   05/24/19 (!) 133/96        Wt Readings from Last 3 Encounters:   06/10/19 192 lb 3.2 oz (87.2 kg)   05/24/19 193 lb (87.5 kg)   04/24/19 193 lb (87.5 kg)       Physical Exam    HENT: Normocephalic, Atraumatic,  Neck- Normal range of motion, No tenderness, Supple, No stridor. Eyes:  PERRL, EOMI, Conjunctiva normal, No discharge. Respiratory:  Distant breath sounds, No respiratory distress, No wheezing. Cardiovascular:  Normal heart rate, Normal rhythm, systolic  Murmur heard. GI:  Bowel sounds normal, Soft, No tenderness, No mass  :   No CVA tenderness. Musculoskeletal:  Intact distal pulses, No edema, No tenderness,  Back- Notenderness. Integument:  Warm, Dry, No erythema, No rash. Lymphatic:  No lymphadenopathy noted. Neurologic:  Alert & oriented x 3, Normal motor function, Normal sensory function, No focal deficits noted.    Psychiatric:  Affect normal      LABORATORY FINDINGS:    CBC:  Lab Results   Component Value Date    WBC 5.5 06/18/2018    HGB 14.4 06/18/2018     06/18/2018     02/16/2012     BMP:    Lab Results   Component Value Date     06/18/2018    K 3.4 06/18/2018     06/18/2018    CO2 24 06/18/2018    BUN 21 06/18/2018    CREATININE 0.90 06/18/2018    GLUCOSE 100 10/29/2018     HEMOGLOBIN A1C:   Lab Results   Component Value Date    LABA1C 5.8 03/05/2019     MICROALBUMIN URINE:   Lab Results   Component Value Date    MICROALBUR <12 03/05/2019     FASTING LIPID [email or web address]  Lab Results   Component Value Date    LDLCHOLESTEROL 84 06/18/2018       LIVER PROFILE:  Lab Results   Component Value Date    ALT 13 06/18/2018    AST 15 06/18/2018    PROT 7.3 06/18/2018    BILITOT 0.44 06/18/2018    BILIDIR 0.11 06/18/2018    LABALBU 4.3 06/18/2018      THYROID FUNCTION:   Lab Results   Component Value Date    TSH 0.94 07/19/2017      URINEANALYSIS: No results found for: LABURIN  ASSESSMENT AND PLAN:    1. Essential hypertension  Controlled  On amlodipine/coreg    - Comprehensive Metabolic Panel; Future    2. Type 2 diabetes mellitus without complication, without long-term current use of insulin (HCC)  On metformin    - Comprehensive Metabolic Panel; Future  - Lipid Panel; Future    3. Spondylosis of lumbar region without myelopathy or radiculopathy    - tiZANidine (ZANAFLEX) 4 MG tablet; TAKE 1 TABLET TWICE DAILY  Dispense: 60 tablet; Refill: 3  - DME Order for Bath/Shower Seat as OP    4. OAB (overactive bladder)    - solifenacin (VESICARE) 5 MG tablet; Take 1 tablet by mouth daily  Dispense: 90 tablet; Refill: 1    5. Centrilobular emphysema ([address]  Same inhalers  Follow up with pulmonology    6. Pure hypercholesterolemia  On lipitor    - Comprehensive Metabolic Panel; Future  - Lipid Panel; Future    7. Breast cancer screening    - [facility] DIGITAL SCREEN W CAD BILATERAL; Future    8. Need for prophylactic vaccination against diphtheria-tetanus-pertussis (DTP)    - Tdap (ADACEL) 5-2-15.5 LF-MCG/0.5 injection; Inject 0.5 mLs into the muscle once for 1 dose  Dispense: 0.5 mL; Refill: 0    9. Need for prophylactic vaccination and inoculation against varicella    - zoster recombinant adjuvanted vaccine [facility]) 50 MCG/0.5ML SUSR injection; Inject 0.5 mLs into the muscle once for 1 dose 50 MCG IM then repeat 2-6 months.   Dispense: 1

## 2019-06-10 NOTE — TELEPHONE ENCOUNTER
Pt called said dr David Smith was going to send over a z-pack to pharmacy for her , please advise            Next Visit Date:  Future Appointments   Date Time Provider Claudy Schaeffer   6/17/2019 11:45 AM ST CT RM 1 [facility] CT SCAN ST Radiolog   6/17/2019  1:30 PM [facility] RESP THERAPY  [facility] PFT [facility] Maintenance   Topic Date Due    DTaP/Tdap/Td vaccine (1 - Tdap) 07/08/1977    Shingles Vaccine (1 of 2) 07/08/2008    Low dose CT lung screening  07/08/2013    Diabetic retinal exam  09/26/2017    Diabetic foot exam  06/18/2019    Lipid screen  06/18/2019    Potassium monitoring  06/18/2019    Creatinine monitoring  06/18/2019    A1C test (Diabetic or Prediabetic)  03/05/2020    Diabetic microalbuminuria test  03/05/2020    Colon Cancer Screen FIT/FOBT  05/06/2020    Breast cancer screen  11/20/2020    Cervical cancer screen  02/15/2021    Flu vaccine  Completed    Pneumococcal 0-64 years Vaccine  Completed    Hepatitis C screen  Completed    HIV screen  Completed       Hemoglobin A1C (%)   Date Value   03/05/2019 5.8   06/18/2018 5.8   01/05/2018 5.9             ( goal A1C is < 7)   Microalb/Crt.  Ratio (mcg/mg creat)   Date Value   03/05/2019 CANNOT BE CALCULATED     LDL Cholesterol (mg/dL)   Date Value   06/18/2018 84   07/19/2017 84       (goal LDL is <100)   AST (U/L)   Date Value   06/18/2018 15     ALT (U/L)   Date Value   06/18/2018 13     BUN (mg/dL)   Date Value   06/18/2018 21 (H)     BP Readings from Last 3 Encounters:   06/10/19 (!) 98/58   05/24/19 (!) 133/96   04/24/19 (!) 154/92          (goal 120/80)    All Future Testing planned in [facility]  Lab Frequency Next Occurrence   Pain Management Drug Screen Once 01/17/2019   Comprehensive Metabolic Panel Once 77/35/7214   Lipid Panel Once 09/11/2019   GE DIGITAL SCREEN W CAD BILATERAL Once 06/10/2019               Patient Active Problem List:     DJD (degenerative joint disease) of knee     Osteoarthritis of spine with radiculopathy, lumbar region     GERD (gastroesophageal reflux disease)     COPD (chronic obstructive pulmonary disease)     HTN (hypertension)     Allergic rhinitis     Lipoma of shoulder s/p excision right posterior 11 17 2008     DM (diabetes mellitus)     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     Primary osteoarthritis of both knees     Medication monitoring encounter     Chronic low back pain     Major depression, chronic     Chronic respiratory failure with hypoxia (HCC)     Mitral and aortic insufficiency     Pure hypercholesterolemia     Spondylosis of lumbar region without myelopathy or radiculopathy

## 2019-06-10 NOTE — PATIENT INSTRUCTIONS
Medications e-scribe to pharmacy of pt's choice. Patient was given a mammogram order with instructions. Patient needs to call [phone] to set up the mammogram appointment date and time. Labs given to patient, they will have them done before their next visit. Your doctor has ordered fasting blood work. It can be done at [facility] or at the hospital. Sarah Bashir will need to fast for 12 hours and bring order with you to registration department. Patient was given a printed script for [employer] along with a printed list of places that the script may be filled at. Printed script for Tdap Vaccination signed and given to pt    Printed script for Shingles Vaccination signed and given to pt    Patient will be contacted to schedule their next appointment.

## 2019-06-12 ENCOUNTER — TELEPHONE (OUTPATIENT)
Dept: INTERNAL MEDICINE | Age: 59
End: 2019-06-12

## 2019-06-12 DIAGNOSIS — M47.26 OSTEOARTHRITIS OF SPINE WITH RADICULOPATHY, LUMBAR REGION: Primary | ICD-10-CM

## 2019-06-12 NOTE — TELEPHONE ENCOUNTER
Patient is requesting a new referral for  [facility]. She is unable to go to [address] [place] [address] is too far away per patient.       Please see 6/10/19 encounter regarding zpak

## 2019-06-13 ENCOUNTER — HOSPITAL ENCOUNTER (OUTPATIENT)
Dept: PAIN MANAGEMENT | Age: 59
Discharge: HOME | End: 2019-06-13
Payer: MEDICARE

## 2019-06-13 ENCOUNTER — TELEPHONE (OUTPATIENT)
Dept: INTERNAL MEDICINE | Age: 59
End: 2019-06-13

## 2019-06-13 VITALS — TEMPERATURE: 99 F | SYSTOLIC BLOOD PRESSURE: 103 MMHG | HEART RATE: 88 BPM | DIASTOLIC BLOOD PRESSURE: 67 MMHG

## 2019-06-13 DIAGNOSIS — N32.81 OAB (OVERACTIVE BLADDER): ICD-10-CM

## 2019-06-13 DIAGNOSIS — G89.29 CHRONIC MIDLINE LOW BACK PAIN WITH SCIATICA, SCIATICA LATERALITY UNSPECIFIED: ICD-10-CM

## 2019-06-13 DIAGNOSIS — M47.26 OSTEOARTHRITIS OF SPINE WITH RADICULOPATHY, LUMBAR REGION: Primary | ICD-10-CM

## 2019-06-13 DIAGNOSIS — M47.816 SPONDYLOSIS OF LUMBAR REGION WITHOUT MYELOPATHY OR RADICULOPATHY: ICD-10-CM

## 2019-06-13 DIAGNOSIS — Z51.81 MEDICATION MONITORING ENCOUNTER: ICD-10-CM

## 2019-06-13 DIAGNOSIS — M17.0 PRIMARY OSTEOARTHRITIS OF BOTH KNEES: ICD-10-CM

## 2019-06-13 DIAGNOSIS — M54.40 CHRONIC MIDLINE LOW BACK PAIN WITH SCIATICA, SCIATICA LATERALITY UNSPECIFIED: ICD-10-CM

## 2019-06-13 PROCEDURE — 99214 OFFICE O/P EST MOD 30 MIN: CPT

## 2019-06-13 PROCEDURE — 99214 OFFICE O/P EST MOD 30 MIN: CPT | Performed by: NURSE PRACTITIONER

## 2019-06-13 RX ORDER — HYDROCODONE BITARTRATE AND ACETAMINOPHEN 5; 325 MG/1; MG/1
1 TABLET ORAL EVERY 8 HOURS PRN
Qty: 84 TABLET | Refills: 0 | Status: SHIPPED | OUTPATIENT
Start: 2019-06-13 | End: 2019-07-11 | Stop reason: SDUPTHER

## 2019-06-13 RX ORDER — MELOXICAM 15 MG/1
15 TABLET ORAL DAILY
Qty: 30 TABLET | Refills: 3 | Status: SHIPPED | OUTPATIENT
Start: 2019-06-13 | End: 2019-07-11 | Stop reason: SDUPTHER

## 2019-06-13 ASSESSMENT — ENCOUNTER SYMPTOMS
COUGH: 0
ABDOMINAL PAIN: 0
BACK PAIN: 1
CONSTIPATION: 0
SHORTNESS OF BREATH: 0

## 2019-06-13 NOTE — PROGRESS NOTES
Patient is here today to review medication contract. Chief Complaint:  Back and knee pain    [facility]      Patient has had low back pain with no known injury and bilateral knee pain for many years. Dalton Oliva had a knee arthroscopy in March and currently in PT with no plans for replacement at this time. She sees Dr. Blanka Dodd and has injections with mild benefit. Genicular block was discussed at last visit but pt would like to wait. She is s/p lumbar diskectomy in 2015.  She had Lumbar Facet Nerve Block Multiple Levels  Bilateral L4 - 5 and L5 - S1 #2 on 2/8/19 and reports this worsened her pain and her blood sugar was elevated for three days following the procedure - no steroid was used in injections.   She is trying to quit smoking. Currently on O2 at night due to exacerbation of COPD. She also uses TENS daily with some relief. She is stable and compliant on norco 5/325 TID- also taking mobic with benefit.  MRI was ordered at last visit and results reviewed today. NS consult discussed but pt would like to wait and see where her next pain clinic visit will be. Back Pain   This is a chronic problem. The current episode started more than 1 year ago. The problem occurs constantly. The problem has been gradually worsening (rt. side getting worse) since onset. The pain is present in the lumbar spine and gluteal. The quality of the pain is described as aching, burning and stabbing. The pain radiates to the right thigh and right knee. The pain is at a severity of 7/10. The pain is moderate. Worse during: worse in the morning. The symptoms are aggravated by twisting, standing, bending, position and sitting. Stiffness is present in the morning. Associated symptoms include leg pain and weakness. Pertinent negatives include no abdominal pain, chest pain, fever or numbness. Risk factors include obesity. She has tried NSAIDs, muscle relaxant, heat and walking for the symptoms. The treatment provided moderate relief. Patient denies any new neurological symptoms. No bowel or bladder incontinence, no weakness, and no falling. Pill count: appropriate    Morphine equivalent: 15    Periodic Controlled Substance Monitoring: Possible medication side effects, risk of tolerance/dependence & alternative treatments discussed., No signs of potential drug abuse or diversion identified. , Assessed functional status., Obtaining appropriate analgesic effect of treatment.  Kvng Escobar, APRN - CNP)      Past Medical History:   Diagnosis Date    Allergic rhinitis     Arthropathy, unspecified, other specified sites 4/30/2013    Bronchitis     Chronic back pain     COPD [facility]chronic obstructive [facility] disease) [facility] [address]     Depression     DM (diabetes mellitus) ([facility] [address] 12/18/2012    Emphysema of lung (HCC)     GERD (gastroesophageal reflux disease)     Hyperlipidemia     Hypertension     Knee pain     right knee mostly    Leg pain, right     Obesity     Osteoarthritis     Peripheral vascular disease (HCC)     Primary osteoarthritis of both knees     Radicular pain of lumbosacral region     Spinal stenosis, lumbar region, without neurogenic claudication 4/30/2013    Type II or unspecified type diabetes mellitus without mention of complication, not stated as uncontrolled     Unspecified sleep apnea     URI (upper respiratory infection)        Past Surgical History:   Procedure Laterality Date    COLONOSCOPY  2/12/2009    normal    DILATION AND CURETTAGE OF UTERUS      GASTRECTOMY      partial    NERVE BLOCK Right 4/30/13    Lumbar Diagnostic Block,  Kenalog 40 mg    NERVE BLOCK  5/23/13    Lumbar Radiofrequency, Kenalog 40mg    NERVE BLOCK  8/12/13    Lt MBNB  celestone 6mg    NERVE BLOCK Left 8-28-13    left lumbar diagnostic block #2 decadron 10 mg    NERVE BLOCK Left 9-24-13    left lumbar median branch radiofrequency    NERVE BLOCK  07-02-14    caudal, celestone 9 mg    NERVE BLOCK  7-16-14    caudal epidural #2, celestone 9mg, fentanyl 25mcg    NERVE BLOCK  7/30/14    caudal #3 decadron 10mg    NERVE BLOCK  11-6-14    duramorph epidural steroid block  duramorph 1 mg celestone 9 mg    NERVE BLOCK  11/20/15    TENS- [employer]    NERVE BLOCK  07/20/2018    right transforminal # 1 decadron 10mg,isovue    NERVE BLOCK Bilateral 02/01/2019    bilat mbnb- no steroid    NERVE BLOCK Bilateral 02/08/2019    bilat mbnb, marcaine . 25%    NH KNEE SCOPE,DIAGNOSTIC Right 3/24/2017    KNEE ARTHROSCOPY WITH PARTIAL MEDIAL MENISECAL DEBRIDMENT  performed by Deandra Osborne MD at [address]  01/2015    lumbar diskectomy    UPPER GASTROINTESTINAL ENDOSCOPY  9 20 2007    UPPER GASTROINTESTINAL ENDOSCOPY  4 21 2009,04/2011    gastritis, esophagitis       Allergies   Allergen Reactions    Aspirin      DUE TO ULCER    Claritin [Loratadine] Other (See Comments)     coughing    Flonase [Fluticasone Propionate]          Current Outpatient Medications:     HYDROcodone-acetaminophen (NORCO) 5-325 MG per tablet, Take 1 tablet by mouth every 8 hours as needed for Pain for up to 28 days. , Disp: 84 tablet, Rfl: 0    meloxicam (MOBIC) 15 MG tablet, Take 1 tablet by mouth daily, Disp: 30 tablet, Rfl: 3    tiZANidine (ZANAFLEX) 4 MG tablet, TAKE 1 TABLET TWICE DAILY, Disp: 60 tablet, Rfl: 3    solifenacin (VESICARE) 5 MG tablet, Take 1 tablet by mouth daily, Disp: 90 tablet, Rfl: 1    triamcinolone (KENALOG) 0.1 % ointment, Apply topically 2 times daily for 7 days, Disp: 30 g, Rfl: 0    cetirizine (ZYRTEC) 10 MG tablet, TAKE 1 TABLET BY MOUTH DAILY, Disp: 30 tablet, Rfl: 3    amLODIPine (NORVASC) 10 MG tablet, TAKE 1 TABLET BY MOUTH DAILY, Disp: 90 tablet, Rfl: 3    carvedilol (COREG) 6.25 MG tablet, TAKE 1 TABLET BY MOUTH 2 TIMES DAILY, Disp: 180 tablet, Rfl: 1    atorvastatin (LIPITOR) 40 MG tablet, Take 1 tablet by mouth daily, Disp: 90 tablet, Rfl: 1    metFORMIN (GLUCOPHAGE) 500 MG tablet, Take 1 tablet by mouth 2 times daily (with meals), Disp: 180 tablet, Rfl: 1    omeprazole (PRILOSEC) 20 MG delayed release capsule, TAKE 1 CAPSULE EVERY DAY, Disp: 30 capsule, Rfl: 11     MG capsule, TAKE 1 CAPSULE EVERY DAY, Disp: 30 capsule, Rfl: 11    albuterol sulfate HFA (VENTOLIN HFA) 108 (90 Base) MCG/ACT inhaler, Inhale 2 puffs into the lungs every 6 hours as needed for Wheezing, Disp: 1 Inhaler, Rfl: 3    potassium chloride (KLOR-CON M) 10 MEQ extended release tablet, Take 2 tablets by mouth daily, Disp: 60 tablet, Rfl: 3    lisinopril-hydrochlorothiazide (PRINZIDE;ZESTORETIC) 20-25 MG per tablet, TAKE 1 TABLET EVERY DAY, Disp: 90 tablet, Rfl: 3    gabapentin (NEURONTIN) 300 MG capsule, Take 300 mg by mouth 4 times daily.  1 in AM, 1 in PM, 2 at HS ., Disp: , Rfl:     mirtazapine (REMERON) 30 MG tablet, Take 30 mg by mouth nightly, Disp: , Rfl:     mirtazapine (REMERON) 15 MG tablet, Take 15 mg by mouth nightly, Disp: , Rfl:     DULoxetine (CYMBALTA) 60 MG extended release capsule, Take 1 capsule by mouth daily, Disp: 30 capsule, Rfl: 3    Lancets MISC, 1 each by Does not apply route daily, Disp: 100 each, Rfl: 11    azelastine (ASTELIN) 0.1 % nasal spray, 2 sprays by Nasal route 2 times daily Use in each nostril as directed, Disp: 1 Bottle, Rfl: 3    ipratropium-albuterol (DUONEB) 0.5-2.5 (3) MG/3ML SOLN nebulizer solution, Inhale 3 mLs into the lungs every 6 hours as needed for Shortness of Breath, Disp: 360 mL, Rfl: 11    Family History   Problem Relation Age of Onset    Diabetes Mother     Heart Disease Mother     Cirrhosis Father     Breast Cancer Neg Hx     Cancer Neg Hx     Colon Cancer Neg Hx     Eclampsia Neg Hx     Hypertension Neg Hx     Ovarian Cancer Neg Hx      Labor Neg Hx     Spont Abortions Neg Hx     Stroke Neg Hx        Social History     Socioeconomic History    Marital status: Single     Spouse name: Not on file    Number of children: Not on file    Years of education: Not on file    Highest education level: Not on file   Occupational History    Not on file   Social Needs    Financial resource strain: Not on file    Food insecurity:     Worry: Not on file     Inability: Not on file    Transportation needs:     Medical: Not on file     Non-medical: Not on file   Tobacco Use    Smoking status: Current Every Day Smoker     Packs/day: 1.00     Years: 36.00     Pack years: 36.00     Types: Cigarettes     Last attempt to quit: 8/2/2015     Years since quitting: 3.8    Smokeless tobacco: Never Used    Tobacco comment: pt currently using nicotine patches   Substance and Sexual Activity    Alcohol use: No     Alcohol/week: 0.0 oz    Drug use: No     Comment: 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 7 yrs    Sexual activity: Never   Lifestyle    Physical activity:     Days per week: Not on file     Minutes per session: Not on file    Stress: Not on file   Relationships    Social connections:     Talks on phone: Not on file     Gets together: Not on file     Attends Congregational service: Not on file     Active member of club or organization: Not on file     Attends meetings of clubs or organizations: Not on file     Relationship status: Not on file    Intimate partner violence:     Fear of current or ex partner: Not on file     Emotionally abused: Not on file     Physically abused: Not on file     Forced sexual activity: Not on file   Other Topics Concern    Not on file   Social History Narrative    Not on file       Review of Systems:  Review of Systems   Constitution: Negative for chills and fever. Cardiovascular: Negative for chest pain. Respiratory: Negative for cough and shortness of breath. Musculoskeletal: Positive for back pain and joint pain. Gastrointestinal: Negative for abdominal pain and constipation. Neurological: Positive for weakness. Negative for numbness.        Physical Exam:  /67   Pulse 88   Temp 99 °F (37.2 °C) (Oral)   LMP 04/19/2003 Physical Exam   Constitutional: She is oriented to person, place, and time. Cardiovascular: Normal rate. Pulmonary/Chest: Effort normal.   Musculoskeletal:        Lumbar back: She exhibits decreased range of motion and tenderness. Neurological: She is alert and oriented to person, place, and time. Skin: Skin is warm and dry. Record/Diagnostics Review:    Last dau Jan and was appropriate     FINDINGS:   BONES/ALIGNMENT: Grade 1 anterolisthesis of L4 on L5 measures 3 mm.  Lumbar   vertebral bodies are normal in height.  No acute lumbar spine fracture. Minimal bone marrow edema about L4-L5.  Remaining marrow signal pattern is   within normal limits.       SPINAL CORD:  The conus terminates normally.       SOFT TISSUES: No abnormal enhancement is seen of the lumbar spine.  No   paraspinal mass identified.       L1-L2: Mild DDD.  Posterior disc bulge measures 3 mm with central annular   fissure.  No significant spinal canal stenosis or significant neural   foraminal stenosis.       L2-L3: Mild DDD.  Disc bulge eccentric to the left measures 5 mm.  Effacement   of the left ventral thecal sac.  Mild spinal canal stenosis.  Bilateral facet   joint DJD and ligamentum flavum thickening.  Mild left neural foraminal   stenosis.       L3-L4: Mild DDD.  Disc bulge measures 4 mm.  Bilateral facet joint DJD.  Mild   spinal canal stenosis.  Mild left neural foraminal stenosis.       L4-L5: Moderate DDD.  Status post right hemilaminectomy.  Uncovered disc   material secondary to anterolisthesis of L4 on L5.  Superimposed diffuse disc   bulge measures 3 mm.  No significant spinal canal stenosis.  Flattening of   the ventral thecal sac.  Severe right neural foraminal stenosis with   compression of the exiting right L4 nerve root.  Mild left neural foraminal   stenosis.       L5-S1: Mild DDD.  Central/left paracentral disc bulge measures 3 mm.    Bilateral facet joint DJD.  Disc bulge contacts the traversing left S1 nerve   root in the lateral recess without sally nerve root compression.  Mild   bilateral neural foraminal stenosis.         Impression   1. Status post interval right hemilaminectomy at L4-L5.  Severe right neural   foraminal stenosis at this level with compression of the exiting right L4   nerve root. 2. At L5-S1, central/left paracentral disc bulge contacts the traversing left   S1 nerve root in the lateral recess. 3. Multilevel mild bilateral neural foraminal stenosis, as detailed above.               Assessment:  Problem List Items Addressed This Visit     Osteoarthritis of spine with radiculopathy, lumbar region - Primary    Relevant Medications    HYDROcodone-acetaminophen (NORCO) 5-325 MG per tablet    meloxicam (MOBIC) 15 MG tablet    Primary osteoarthritis of both knees    Relevant Medications    HYDROcodone-acetaminophen (NORCO) 5-325 MG per tablet    meloxicam (MOBIC) 15 MG tablet    Medication monitoring encounter    Chronic low back pain    Relevant Medications    HYDROcodone-acetaminophen (NORCO) 5-325 MG per tablet    meloxicam (MOBIC) 15 MG tablet    Spondylosis of lumbar region without myelopathy or radiculopathy    Relevant Medications    HYDROcodone-acetaminophen (NORCO) 5-325 MG per tablet    meloxicam (MOBIC) 15 MG tablet             Treatment Plan:  Patient relates current medications are helping the pain. Patient reports taking pain medications as prescribed, denies obtaining medications from different sources and denies use of illegal drugs. Patient denies side effects from medications like nausea, vomiting, constipation or drowsiness. Patient reports current activities of daily living are possible due to medications and would like to continue them. As always, we encourage daily stretching and strengthening exercises, and recommend minimizing use of pain medications unless patient cannot get through daily activities due to pain. Contract requirements met.   Continue opioid

## 2019-06-13 NOTE — TELEPHONE ENCOUNTER
Patient notified that zpak was not ordered because Dr. Abhijeet Gerard feels her symptoms are from allergies. [facility] referral placed and they should contact patient with appt.   She was also given the number for scheduling

## 2019-06-13 NOTE — TELEPHONE ENCOUNTER
PA request for Solifenacin Succ.     PA processed and submitted to pt insurance, waiting for response in regards to coverage

## 2019-06-17 ENCOUNTER — HOSPITAL ENCOUNTER (OUTPATIENT)
Dept: PULMONOLOGY | Age: 59
Discharge: HOME | End: 2019-06-17
Payer: MEDICARE

## 2019-06-17 ENCOUNTER — APPOINTMENT (OUTPATIENT)
Dept: CT IMAGING | Age: 59
End: 2019-06-17
Payer: MEDICARE

## 2019-06-17 PROCEDURE — 94761 N-INVAS EAR/PLS OXIMETRY MLT: CPT

## 2019-06-17 PROCEDURE — 94060 EVALUATION OF WHEEZING: CPT

## 2019-06-17 PROCEDURE — 94070 EVALUATION OF WHEEZING: CPT

## 2019-06-17 PROCEDURE — 6360000002 HC RX W HCPCS: Performed by: INTERNAL MEDICINE

## 2019-06-17 PROCEDURE — 6370000000 HC RX 637 (ALT 250 FOR IP): Performed by: INTERNAL MEDICINE

## 2019-06-17 PROCEDURE — 94664 DEMO&/EVAL PT USE INHALER: CPT

## 2019-06-17 RX ORDER — IPRATROPIUM BROMIDE AND ALBUTEROL SULFATE 2.5; .5 MG/3ML; MG/3ML
1 SOLUTION RESPIRATORY (INHALATION) ONCE
Status: COMPLETED | OUTPATIENT
Start: 2019-06-17 | End: 2019-06-17

## 2019-06-17 RX ORDER — ALBUTEROL SULFATE 2.5 MG/3ML
2.5 SOLUTION RESPIRATORY (INHALATION) ONCE
Status: COMPLETED | OUTPATIENT
Start: 2019-06-17 | End: 2019-06-17

## 2019-06-17 RX ADMIN — IPRATROPIUM BROMIDE AND ALBUTEROL SULFATE 1 AMPULE: .5; 3 SOLUTION RESPIRATORY (INHALATION) at 14:01

## 2019-06-17 RX ADMIN — ALBUTEROL SULFATE 2.5 MG: 2.5 SOLUTION RESPIRATORY (INHALATION) at 14:34

## 2019-06-17 RX ADMIN — METHACHOLINE CHLORIDE 100 MG: 100 POWDER, FOR SOLUTION RESPIRATORY (INHALATION) at 13:39

## 2019-06-19 RX ORDER — TROSPIUM CHLORIDE 20 MG/1
20 TABLET, FILM COATED ORAL 2 TIMES DAILY
Qty: 60 TABLET | Refills: 3 | Status: SHIPPED | OUTPATIENT
Start: 2019-06-19 | End: 2020-06-26

## 2019-06-19 NOTE — TELEPHONE ENCOUNTER
LM for patient to notify her that medication change was sent to her pharmacy and to call the office with any questions

## 2019-06-24 ENCOUNTER — HOSPITAL ENCOUNTER (OUTPATIENT)
Age: 59
Setting detail: SPECIMEN
Discharge: HOME | End: 2019-06-24
Payer: MEDICARE

## 2019-06-24 ENCOUNTER — HOSPITAL ENCOUNTER (OUTPATIENT)
Dept: CT IMAGING | Age: 59
Discharge: HOME | End: 2019-06-26
Payer: MEDICARE

## 2019-06-24 DIAGNOSIS — Z87.891 HISTORY OF TOBACCO USE: ICD-10-CM

## 2019-06-24 DIAGNOSIS — E78.00 PURE HYPERCHOLESTEROLEMIA: ICD-10-CM

## 2019-06-24 DIAGNOSIS — E11.9 TYPE 2 DIABETES MELLITUS WITHOUT COMPLICATION, WITHOUT LONG-TERM CURRENT USE OF INSULIN (HCC): ICD-10-CM

## 2019-06-24 DIAGNOSIS — I10 ESSENTIAL HYPERTENSION: ICD-10-CM

## 2019-06-24 LAB
ALBUMIN SERPL-MCNC: 4.1 G/DL (ref 3.5–5.2)
ALBUMIN/GLOBULIN RATIO: 1.1 (ref 1–2.5)
ALP BLD-CCNC: 62 U/L (ref 35–104)
ALT SERPL-CCNC: 12 U/L (ref 5–33)
ANION GAP SERPL CALCULATED.3IONS-SCNC: 14 MMOL/L (ref 9–17)
AST SERPL-CCNC: 14 U/L
BILIRUB SERPL-MCNC: 0.45 MG/DL (ref 0.3–1.2)
BUN BLDV-MCNC: 14 MG/DL (ref 8–23)
BUN/CREAT BLD: ABNORMAL (ref 9–20)
CALCIUM SERPL-MCNC: 9.1 MG/DL (ref 8.6–10.4)
CHLORIDE BLD-SCNC: 107 MMOL/L (ref 98–107)
CHOLESTEROL/HDL RATIO: 3.8
CHOLESTEROL: 173 MG/DL
CO2: 22 MMOL/L (ref 20–31)
CREAT SERPL-MCNC: 0.55 MG/DL (ref 0.5–0.9)
GFR AFRICAN AMERICAN: >60 ML/MIN
GFR NON-AFRICAN AMERICAN: >60 ML/MIN
GFR SERPL CREATININE-BSD FRML MDRD: ABNORMAL ML/MIN/{1.73_M2}
GFR SERPL CREATININE-BSD FRML MDRD: ABNORMAL ML/MIN/{1.73_M2}
GLUCOSE BLD-MCNC: 108 MG/DL (ref 70–99)
HDLC SERPL-MCNC: 45 MG/DL
LDL CHOLESTEROL: 111 MG/DL (ref 0–130)
POTASSIUM SERPL-SCNC: 3.9 MMOL/L (ref 3.7–5.3)
SODIUM BLD-SCNC: 143 MMOL/L (ref 135–144)
TOTAL PROTEIN: 7.8 G/DL (ref 6.4–8.3)
TRIGL SERPL-MCNC: 87 MG/DL
VLDLC SERPL CALC-MCNC: NORMAL MG/DL (ref 1–30)

## 2019-06-24 PROCEDURE — 80053 COMPREHEN METABOLIC PANEL: CPT

## 2019-06-24 PROCEDURE — G0297 LDCT FOR LUNG CA SCREEN: HCPCS

## 2019-06-24 PROCEDURE — 80061 LIPID PANEL: CPT

## 2019-06-24 PROCEDURE — 36415 COLL VENOUS BLD VENIPUNCTURE: CPT

## 2019-07-11 ENCOUNTER — HOSPITAL ENCOUNTER (OUTPATIENT)
Dept: PAIN MANAGEMENT | Age: 59
Discharge: HOME | End: 2019-07-11
Payer: MEDICARE

## 2019-07-11 VITALS
OXYGEN SATURATION: 95 % | TEMPERATURE: 98.4 F | BODY MASS INDEX: 31.99 KG/M2 | HEIGHT: 65 IN | WEIGHT: 192 LBS | DIASTOLIC BLOOD PRESSURE: 71 MMHG | HEART RATE: 77 BPM | SYSTOLIC BLOOD PRESSURE: 144 MMHG | RESPIRATION RATE: 18 BRPM

## 2019-07-11 DIAGNOSIS — M54.40 CHRONIC MIDLINE LOW BACK PAIN WITH SCIATICA, SCIATICA LATERALITY UNSPECIFIED: ICD-10-CM

## 2019-07-11 DIAGNOSIS — Z51.81 MEDICATION MONITORING ENCOUNTER: ICD-10-CM

## 2019-07-11 DIAGNOSIS — M51.36 DDD (DEGENERATIVE DISC DISEASE), LUMBAR: ICD-10-CM

## 2019-07-11 DIAGNOSIS — M96.1 POSTLAMINECTOMY SYNDROME, LUMBAR REGION: ICD-10-CM

## 2019-07-11 DIAGNOSIS — G89.29 CHRONIC MIDLINE LOW BACK PAIN WITH SCIATICA, SCIATICA LATERALITY UNSPECIFIED: ICD-10-CM

## 2019-07-11 DIAGNOSIS — M47.26 OSTEOARTHRITIS OF SPINE WITH RADICULOPATHY, LUMBAR REGION: ICD-10-CM

## 2019-07-11 DIAGNOSIS — M47.816 SPONDYLOSIS OF LUMBAR REGION WITHOUT MYELOPATHY OR RADICULOPATHY: ICD-10-CM

## 2019-07-11 DIAGNOSIS — M17.0 PRIMARY OSTEOARTHRITIS OF BOTH KNEES: Primary | ICD-10-CM

## 2019-07-11 PROCEDURE — 99215 OFFICE O/P EST HI 40 MIN: CPT | Performed by: PAIN MEDICINE

## 2019-07-11 PROCEDURE — 99215 OFFICE O/P EST HI 40 MIN: CPT

## 2019-07-11 RX ORDER — HYDROCODONE BITARTRATE AND ACETAMINOPHEN 5; 325 MG/1; MG/1
1 TABLET ORAL EVERY 8 HOURS PRN
Qty: 90 TABLET | Refills: 0 | Status: SHIPPED | OUTPATIENT
Start: 2019-07-21 | End: 2019-08-16 | Stop reason: SDUPTHER

## 2019-07-11 RX ORDER — MELOXICAM 15 MG/1
15 TABLET ORAL DAILY
Qty: 30 TABLET | Refills: 3 | Status: SHIPPED | OUTPATIENT
Start: 2019-07-11 | End: 2019-12-13 | Stop reason: SDUPTHER

## 2019-07-11 ASSESSMENT — PAIN DESCRIPTION - FREQUENCY: FREQUENCY: CONTINUOUS

## 2019-07-11 ASSESSMENT — ENCOUNTER SYMPTOMS
ALLERGIC/IMMUNOLOGIC NEGATIVE: 1
BACK PAIN: 1
EYES NEGATIVE: 1
BOWEL INCONTINENCE: 0
GASTROINTESTINAL NEGATIVE: 1
RESPIRATORY NEGATIVE: 1

## 2019-07-11 ASSESSMENT — PAIN SCALES - GENERAL: PAINLEVEL_OUTOF10: 8

## 2019-07-11 ASSESSMENT — PAIN DESCRIPTION - LOCATION: LOCATION: BACK;KNEE

## 2019-07-11 ASSESSMENT — PAIN DESCRIPTION - DESCRIPTORS: DESCRIPTORS: ACHING;SHOOTING;SHARP;BURNING

## 2019-07-11 ASSESSMENT — PAIN DESCRIPTION - ONSET: ONSET: ON-GOING

## 2019-07-11 ASSESSMENT — PAIN DESCRIPTION - ORIENTATION: ORIENTATION: LOWER;RIGHT

## 2019-07-11 ASSESSMENT — PAIN - FUNCTIONAL ASSESSMENT: PAIN_FUNCTIONAL_ASSESSMENT: PREVENTS OR INTERFERES WITH MANY ACTIVE NOT PASSIVE ACTIVITIES

## 2019-07-11 ASSESSMENT — PAIN DESCRIPTION - DIRECTION: RADIATING_TOWARDS: DOWN THE RIGHT LEG

## 2019-07-11 ASSESSMENT — PAIN DESCRIPTION - PAIN TYPE: TYPE: CHRONIC PAIN

## 2019-07-11 ASSESSMENT — PAIN DESCRIPTION - PROGRESSION: CLINICAL_PROGRESSION: NOT CHANGED

## 2019-07-11 NOTE — PROGRESS NOTES
Pain    Incident onset: 1 yr since [facility]. Injury mechanism: Worse since arthroscopic surgery. The pain is present in the right knee. The quality of the pain is described as burning, aching and stabbing. The pain is at a severity of 7/10. The pain is severe. The pain has been constant since onset. Associated symptoms include an inability to bear weight, numbness and tingling. The symptoms are aggravated by movement and weight bearing. RX Monitoring 7/11/2019   Attestation -   Acute Pain Prescriptions Severe pain not adequately treated with lower dose. Periodic Controlled Substance Monitoring Possible medication side effects, risk of tolerance/dependence & alternative treatments discussed. ;No signs of potential drug abuse or diversion identified. ;Assessed functional status. ;Obtaining appropriate analgesic effect of treatment. Chronic Pain > 50 MEDD Re-evaluated the status of the patient's underlying condition causing pain.;Obtained or confirmed \"Consent for Opioid Use\" on file.    Chronic Pain > 80 MEDD -       Current Pain Assessment  Pain Assessment  Pain Assessment: 0-10  Pain Level: 8  Patient's Stated Pain Goal: 2(to decrease pain with increased activity)  Pain Type: Chronic pain  Pain Location: Back, Knee  Pain Orientation: Lower, Right  Pain Radiating Towards: down the right leg  Pain Descriptors: Aching, Shooting, Sharp, Burning  Pain Frequency: Continuous  Pain Onset: On-going  Clinical Progression: Not changed  Functional Pain Assessment: Prevents or interferes with many active not passive activities  Non-Pharmaceutical Pain Intervention(s): Rest, Cold applied, Shower, Prayer, Heat applied                    ADVERSE MEDICATION EFFECTS:   Constipation: no  Bowel Regimen: Yes  Diet: common adult  Appetite:  ok  Sedation:  no  Urinary Retention: no    FOCUSED PAINSCALE:  Highest : 10  Lowest :5  Average: Range-7  When and What  was your last procedure:    2/8/19, Lumbar Facet Nerve Block Multiple Levels  Bilateral L4 - 5 and L5 - S1. Dr. Mejia Mcconnell    Was your procedure effective:  no    ACTIVITY/SOCIAL/EMOTIONAL:  Sleep Pattern: 4 hours per night. difficulty falling asleep, nightime awakenings and difficulty falling back asleep if awakened  Energy Level:  Normal  Currently attending Physical Therapy:  Yes- wants more  Home Exercises: daily  stretching  Mobility: painful to walk  Do you use assistive devices? Yes, cane  Have you fallen in the last 30 days?:  No  Currently seeing a Psychiatrist or Psychologist:  Yes, [facility]   Emotional Issues: anxiety/ nervousness, depression  Mood: appropriate     ABERRANT BEHAVIORS SINCE LAST VISIT:  Have you ever been treated in another Pain Clinic yes, [facility]  Refills for prescriptions appropriate: yes  Lost rx/pills:no  Taking more medication than prescribed:  yes  Are you receiving PAIN medications from  other doctors: Dr. Mejia Powell Block- ended  Last Urine/Serum Drug Screen : 1/17/19  Was Serum/UDS as anticipated?   yes  Brought pill bottles in :yes   Was Pill count appropriate? :yes   Are currently pregnant?not applicable  Recent ER visits: No             Past Medical History      Diagnosis Date    Allergic rhinitis     Arthropathy, unspecified, other specified sites 4/30/2013    Bronchitis     Chronic back pain     COPD (chronic obstructive pulmonary disease) (HCC)     Depression     DM (diabetes mellitus) ([facility] [address] 12/18/2012    [facility]physema of lung ([facility])     GERD (gastroesophageal reflux disease)     Hyperlipidemia     Hypertension     Knee pain     right knee mostly    Leg pain, right     Obesity     Osteoarthritis     Peripheral vascular disease (HCC)     Primary osteoarthritis of both knees     Radicular pain of lumbosacral region     Spinal stenosis, lumbar region, without neurogenic claudication 4/30/2013    Type II or unspecified type diabetes mellitus without mention of complication, not stated as uncontrolled     Unspecified sleep apnea     tablet by mouth 2 times daily 60 tablet 3    tiZANidine (ZANAFLEX) 4 MG tablet TAKE 1 TABLET TWICE DAILY 60 tablet 3    solifenacin (VESICARE) 5 MG tablet Take 1 tablet by mouth daily 90 tablet 1    cetirizine (ZYRTEC) 10 MG tablet TAKE 1 TABLET BY MOUTH DAILY 30 tablet 3    amLODIPine (NORVASC) 10 MG tablet TAKE 1 TABLET BY MOUTH DAILY 90 tablet 3    carvedilol (COREG) 6.25 MG tablet TAKE 1 TABLET BY MOUTH 2 TIMES DAILY 180 tablet 1    atorvastatin (LIPITOR) 40 MG tablet Take 1 tablet by mouth daily 90 tablet 1    metFORMIN (GLUCOPHAGE) 500 MG tablet Take 1 tablet by mouth 2 times daily (with meals) 180 tablet 1    omeprazole (PRILOSEC) 20 MG delayed release capsule TAKE 1 CAPSULE EVERY DAY 30 capsule 11    albuterol sulfate HFA (VENTOLIN HFA) 108 (90 Base) MCG/ACT inhaler Inhale 2 puffs into the lungs every 6 hours as needed for Wheezing 1 Inhaler 3    potassium chloride (KLOR-CON M) 10 MEQ extended release tablet Take 2 tablets by mouth daily 60 tablet 3    lisinopril-hydrochlorothiazide (PRINZIDE;ZESTORETIC) 20-25 MG per tablet TAKE 1 TABLET EVERY DAY 90 tablet 3    mirtazapine (REMERON) 30 MG tablet Take 30 mg by mouth nightly      mirtazapine (REMERON) 15 MG tablet Take 15 mg by mouth nightly      DULoxetine (CYMBALTA) 60 MG extended release capsule Take 1 capsule by mouth daily 30 capsule 3    Lancets MISC 1 each by Does not apply route daily 100 each 11    azelastine (ASTELIN) 0.1 % nasal spray 2 sprays by Nasal route 2 times daily Use in each nostril as directed 1 Bottle 3    ipratropium-albuterol (DUONEB) 0.5-2.5 (3) MG/3ML SOLN nebulizer solution Inhale 3 mLs into the lungs every 6 hours as needed for Shortness of Breath 360 mL 11     No current facility-administered medications for this encounter.         Allergies  Aspirin; Claritin [loratadine]; and Flonase [fluticasone propionate]    Family History  family history includes Cirrhosis in her father; Diabetes in her mother; Heart Disease Range Status   07/31/2014 NEGATIVE NEG Final     Comment:           (Positive cutoff 200 ng/mL)                    1/20/2019  3:29 AM - Juan, Lbiby Incoming Lab Results From [facility]     Component Value Ref Range & Units Status Collected Lab   6-Acetylmorphine, Ur Not Detected   Final 01/17/2019  4:27 PM [facility]   7-Aminoclonazepam, Urine Not Detected   Final 01/17/2019  4:27 PM [facility]   Alpha-OH-Alpraz, Urine Not Detected   Final 01/17/2019  4:27 PM [facility]   Alprazolam, Urine Not Detected   Final 01/17/2019  4:27 PM [facility]   Amphetamines, urine Not Detected   Final 01/17/2019  4:27 PM [facility]   Barbiturates, Ur Not Detected   Final 01/17/2019  4:27 PM [facility]   Benzoylecgonine, Ur Not Detected   Final 01/17/2019  4:27 PM [facility]   Buprenorphine Urine Not Detected   Final 01/17/2019  4:27 PM [facility]   Carisoprodol, Ur Not Detected   Final 01/17/2019  4:27 PM [facility]   (NOTE)   The carisoprodol immunoassay has cross-reactivity to carisoprodol   and meprobamate.     Clonazepam, Urine Not Detected   Final 01/17/2019  4:27 PM [facility]   Codeine, Urine Not Detected   Final 01/17/2019  4:27 PM [facility]   MDA, Ur Not Detected   Final 01/17/2019  4:27 PM [facility]   Diazepam, Urine Not Detected   Final 01/17/2019  4:27 PM [facility]   Ethyl Glucuronide Ur Not Detected   Final 01/17/2019  4:27 PM [facility]   Fentanyl, Ur Not Detected   Final 01/17/2019  4:27 PM [facility]   Hydrocodone, Urine Present   Final 01/17/2019  4:27 PM [facility]   Hydromorphone, Urine Not Detected   Final 01/17/2019  4:27 PM [facility]   Lorazepam, Urine Not Detected   Final 01/17/2019  4:27 PM [facility]   Marijuana Metab, Ur Not Detected   Final 01/17/2019  4:27 PM [facility]   MDEA, JOSE, Ur Not Detected   Final 01/17/2019  4:27 PM [facility]   MDMA, Urine Not Detected   Final 01/17/2019  4:27 PM [facility]   Meperidine Metab, Ur Not Detected   Final 01/17/2019  4:27 PM [facility]   Methadone, Urine Not Detected   Final 01/17/2019  4:27 PM [facility]   Methamphetamine, Urine Not Detected   Final 01/17/2019  4:27 PM [facility]   Methylphenidate Not reduce their risk of having a low birth weight baby. Reduced risk of bone and joint damage. Methods to Quit Smoking  The majority of cigarette smokers quit without using evidence-based   Counseling and medication are both effective for treating tobacco dependence, and using them together is more effective than using either one alone. Patient is advised to follow up with his physician for further management. Patient is experiencing severe pain in her right knee which is not responding well to the current medications. She is participating in physical therapy. She previously had undergone left knee joint injection with great improvement in her pain patient is requesting a right knee joint injection. Hence at this time we will try to do right knee joint injection to help her pain so that she can participate better in physical therapy and doing home exercises. We will do intra-articular injections initially and then depending on the results plan further. The procedure risks and alternative therapies were discussed with the patient and patient would like to proceed with the injection once we have approval from her insurance company. Orders Placed This Encounter   Procedures    Fluoro For Surgical Procedures     Standing Status:   Future     Standing Expiration Date:   7/11/2020    PT eval and treat     TBD by Pain Clinic MD     Standing Status:   Future     Standing Expiration Date:   7/10/2020    PT aquatic therapy     TBD by Pain Clinic MD     Standing Status:   Future     Standing Expiration Date:   7/10/2020    ID ARTHROCENTESIS ASPIR&/INJ MAJOR JT/BURSA W/O US       Decision Making Process : Patient's health history and referral records thoroughly reviewed before focused physical examination and discussion with patient. Over 50% of today's visit is spent on examining the patient and counseling. Level of complexity of date to be reviewed is Moderate.  The chart date reviewed include the following: Imaging Reports. Summary of Care. Time spent reviewing with patient the below reports:   Medication safety, Treatment options. Level of diagnosis and management options of this case is multiple: involving the following management options: Interventions as needed, medication management as appropriate, future visits, activity modification, heat/ice as needed, Urine drug screen as required. [x]The patient's questions were answered to the best of my abilities. Return in  4 weeks  with CNP  for further plan of treatment. This note was created using voice recognition software. There may be inaccuracies of transcription  that are inadvertently overlooked prior to the signature. There is any questions about the transcription please contact me.     Electronically signed by Edith Beckford MD on 7/11/2019 at 11:46 AM               Electronically signed by Edith Beckford MD on 7/11/2019 at 11:46 AM

## 2019-08-16 ENCOUNTER — HOSPITAL ENCOUNTER (OUTPATIENT)
Dept: PAIN MANAGEMENT | Age: 59
Discharge: HOME | End: 2019-08-16
Payer: MEDICARE

## 2019-08-16 VITALS
RESPIRATION RATE: 18 BRPM | SYSTOLIC BLOOD PRESSURE: 111 MMHG | TEMPERATURE: 97.9 F | OXYGEN SATURATION: 96 % | DIASTOLIC BLOOD PRESSURE: 80 MMHG | HEART RATE: 87 BPM

## 2019-08-16 DIAGNOSIS — M47.816 SPONDYLOSIS OF LUMBAR REGION WITHOUT MYELOPATHY OR RADICULOPATHY: ICD-10-CM

## 2019-08-16 DIAGNOSIS — M47.26 OSTEOARTHRITIS OF SPINE WITH RADICULOPATHY, LUMBAR REGION: ICD-10-CM

## 2019-08-16 DIAGNOSIS — M54.40 CHRONIC MIDLINE LOW BACK PAIN WITH SCIATICA, SCIATICA LATERALITY UNSPECIFIED: ICD-10-CM

## 2019-08-16 DIAGNOSIS — M17.0 PRIMARY OSTEOARTHRITIS OF BOTH KNEES: Primary | ICD-10-CM

## 2019-08-16 DIAGNOSIS — G89.29 CHRONIC MIDLINE LOW BACK PAIN WITH SCIATICA, SCIATICA LATERALITY UNSPECIFIED: ICD-10-CM

## 2019-08-16 DIAGNOSIS — Z51.81 MEDICATION MONITORING ENCOUNTER: ICD-10-CM

## 2019-08-16 PROCEDURE — 99213 OFFICE O/P EST LOW 20 MIN: CPT

## 2019-08-16 PROCEDURE — 99213 OFFICE O/P EST LOW 20 MIN: CPT | Performed by: NURSE PRACTITIONER

## 2019-08-16 RX ORDER — HYDROCODONE BITARTRATE AND ACETAMINOPHEN 5; 325 MG/1; MG/1
1 TABLET ORAL EVERY 8 HOURS PRN
Qty: 90 TABLET | Refills: 0 | Status: SHIPPED | OUTPATIENT
Start: 2019-08-21 | End: 2019-09-16 | Stop reason: SDUPTHER

## 2019-08-16 ASSESSMENT — ENCOUNTER SYMPTOMS
BACK PAIN: 1
CONSTIPATION: 0
COUGH: 0
SHORTNESS OF BREATH: 0

## 2019-08-16 NOTE — PROGRESS NOTES
Patient is here today to review medication contract. Chief Complaint: back pain    PMH: Patient has had low back pain with no known injury and bilateral knee pain for many years. Susanna Ang had a knee arthroscopy in March. Genicular block was discussed but pt would like to wait. She is s/p lumbar diskectomy in 2015.  She had Lumbar Facet Nerve Block Multiple Levels  Bilateral L4 - 5 and L5 - S1 #2 on 2/8/19 and reports this worsened her pain and her blood sugar was elevated for three days following the procedure - no steroid was used in injections.   She is trying to quit smoking. Currently on O2 at night due to exacerbation of COPD. She also uses TENS daily with some relief. She is stable and compliant on norco 5/325 TID- also taking mobic with benefit.  MRI reviewed at recent visit. NS consult discussed but pt would like to wait - she does not want surgery. Back Pain   This is a chronic problem. The current episode started more than 1 year ago. The problem occurs constantly. The problem is unchanged. The pain is present in the lumbar spine. The quality of the pain is described as aching. Radiates to: down right leg to ankle. The pain is at a severity of 8/10. The pain is moderate. The symptoms are aggravated by position, standing and sitting (walking). Pertinent negatives include no chest pain or fever. She has tried analgesics, bed rest, heat and NSAIDs for the symptoms. The treatment provided mild relief. Patient denies any new neurological symptoms. No bowel or bladder incontinence, no weakness, and no falling. Pill count: appropriate    Morphine equivalent: 15    Periodic Controlled Substance Monitoring: Possible medication side effects, risk of tolerance/dependence & alternative treatments discussed., No signs of potential drug abuse or diversion identified. , Assessed functional status., Obtaining appropriate analgesic effect of treatment.  Moy Garcia, APRN - CNP)      Past Medical Inhaler, Rfl: 3    potassium chloride (KLOR-CON M) 10 MEQ extended release tablet, Take 2 tablets by mouth daily, Disp: 60 tablet, Rfl: 3    lisinopril-hydrochlorothiazide (PRINZIDE;ZESTORETIC) 20-25 MG per tablet, TAKE 1 TABLET EVERY DAY, Disp: 90 tablet, Rfl: 3    mirtazapine (REMERON) 30 MG tablet, Take 30 mg by mouth nightly, Disp: , Rfl:     mirtazapine (REMERON) 15 MG tablet, Take 15 mg by mouth nightly, Disp: , Rfl:     DULoxetine (CYMBALTA) 60 MG extended release capsule, Take 1 capsule by mouth daily, Disp: 30 capsule, Rfl: 3    Lancets MISC, 1 each by Does not apply route daily, Disp: 100 each, Rfl: 11    azelastine (ASTELIN) 0.1 % nasal spray, 2 sprays by Nasal route 2 times daily Use in each nostril as directed, Disp: 1 Bottle, Rfl: 3    ipratropium-albuterol (DUONEB) 0.5-2.5 (3) MG/3ML SOLN nebulizer solution, Inhale 3 mLs into the lungs every 6 hours as needed for Shortness of Breath, Disp: 360 mL, Rfl: 11    Family History   Problem Relation Age of Onset    Diabetes Mother     Heart Disease Mother     Cirrhosis Father     Breast Cancer Neg Hx     Cancer Neg Hx     Colon Cancer Neg Hx     Eclampsia Neg Hx     Hypertension Neg Hx     Ovarian Cancer Neg Hx      Labor Neg Hx     Spont Abortions Neg Hx     Stroke Neg Hx        Social History     Socioeconomic History    Marital status: Single     Spouse name: Not on file    Number of children: Not on file    Years of education: Not on file    Highest education level: Not on file   Occupational History    Not on file   Social Needs    Financial resource strain: Not on file    Food insecurity:     Worry: Not on file     Inability: Not on file    Transportation needs:     Medical: Not on file     Non-medical: Not on file   Tobacco Use    Smoking status: Current Every Day Smoker     Packs/day: 1.00     Years: 36.00     Pack years: 36.00     Types: Cigarettes     Last attempt to quit: 2015     Years since quittin.0

## 2019-08-23 ENCOUNTER — TELEPHONE (OUTPATIENT)
Dept: INTERNAL MEDICINE | Age: 59
End: 2019-08-23

## 2019-08-28 ENCOUNTER — TELEPHONE (OUTPATIENT)
Dept: INTERNAL MEDICINE | Age: 59
End: 2019-08-28

## 2019-09-12 ENCOUNTER — HOSPITAL ENCOUNTER (EMERGENCY)
Age: 59
Discharge: HOME | End: 2019-09-12
Attending: EMERGENCY MEDICINE
Payer: MEDICARE

## 2019-09-12 VITALS
WEIGHT: 189 LBS | BODY MASS INDEX: 31.49 KG/M2 | HEART RATE: 78 BPM | TEMPERATURE: 98.1 F | DIASTOLIC BLOOD PRESSURE: 80 MMHG | RESPIRATION RATE: 18 BRPM | HEIGHT: 65 IN | OXYGEN SATURATION: 94 % | SYSTOLIC BLOOD PRESSURE: 155 MMHG

## 2019-09-12 DIAGNOSIS — M79.604 CHRONIC PAIN OF RIGHT LOWER EXTREMITY: Primary | ICD-10-CM

## 2019-09-12 DIAGNOSIS — G89.29 CHRONIC PAIN OF RIGHT LOWER EXTREMITY: Primary | ICD-10-CM

## 2019-09-12 PROCEDURE — 6370000000 HC RX 637 (ALT 250 FOR IP): Performed by: NURSE PRACTITIONER

## 2019-09-12 PROCEDURE — 99282 EMERGENCY DEPT VISIT SF MDM: CPT

## 2019-09-12 RX ORDER — ACETAMINOPHEN 500 MG
500 TABLET ORAL 4 TIMES DAILY PRN
Qty: 30 TABLET | Refills: 0 | Status: SHIPPED | OUTPATIENT
Start: 2019-09-12 | End: 2021-10-26 | Stop reason: SDUPTHER

## 2019-09-12 RX ORDER — OXYCODONE HYDROCHLORIDE AND ACETAMINOPHEN 5; 325 MG/1; MG/1
2 TABLET ORAL ONCE
Status: COMPLETED | OUTPATIENT
Start: 2019-09-12 | End: 2019-09-12

## 2019-09-12 RX ADMIN — OXYCODONE HYDROCHLORIDE AND ACETAMINOPHEN 2 TABLET: 5; 325 TABLET ORAL at 20:12

## 2019-09-12 ASSESSMENT — ENCOUNTER SYMPTOMS
SHORTNESS OF BREATH: 0
CHEST TIGHTNESS: 0
NAUSEA: 0
VOICE CHANGE: 0
PHOTOPHOBIA: 0
VOMITING: 0
DIARRHEA: 0
CONSTIPATION: 0
FACIAL SWELLING: 0
BACK PAIN: 1
ABDOMINAL PAIN: 0
TROUBLE SWALLOWING: 0

## 2019-09-12 ASSESSMENT — PAIN DESCRIPTION - LOCATION: LOCATION: LEG

## 2019-09-12 ASSESSMENT — PAIN SCALES - GENERAL
PAINLEVEL_OUTOF10: 10
PAINLEVEL_OUTOF10: 10

## 2019-09-12 ASSESSMENT — PAIN DESCRIPTION - PAIN TYPE: TYPE: CHRONIC PAIN

## 2019-09-12 ASSESSMENT — PAIN DESCRIPTION - ORIENTATION: ORIENTATION: RIGHT

## 2019-09-12 NOTE — ED PROVIDER NOTES
of Breath 8/4/15   [place] MD Chris       patient's medication list has been reviewed as entered by the nursing staff. REVIEW OF SYSTEMS    (2-9 systems for level 4, 10 or more for level 5)      Review of Systems   Constitutional: Negative for chills, diaphoresis, fatigue and fever. HENT: Negative for facial swelling, trouble swallowing and voice change. Eyes: Negative for photophobia and visual disturbance. Respiratory: Negative for chest tightness and shortness of breath. Cardiovascular: Negative for chest pain, palpitations and leg swelling. Gastrointestinal: Negative for abdominal pain, constipation, diarrhea, nausea and vomiting. Genitourinary: Negative for flank pain and pelvic pain. Musculoskeletal: Positive for arthralgias, back pain and myalgias. Negative for joint swelling, neck pain and neck stiffness. Skin: Negative for pallor, rash and wound. Allergic/Immunologic: Negative for immunocompromised state. Neurological: Negative for dizziness, syncope, weakness, light-headedness, numbness and headaches. Hematological: Negative for adenopathy. Psychiatric/Behavioral: Negative for confusion. PHYSICAL EXAM  (up to 7 for level 4, 8 or more for level 5)      INITIAL VITALS:  height is 5' 5\" (1.651 m) and weight is 189 lb (85.7 kg). Her oral temperature is 98.1 °F (36.7 °C). Her blood pressure is 155/80 (abnormal) and her pulse is 78. Her respiration is 18 and oxygen saturation is 94%. Physical Exam   Constitutional: Vital signs are normal. She appears well-developed and well-nourished. She is active and cooperative. She is easily aroused. Non-toxic appearance. She does not have a sickly appearance. She does not appear ill. No distress. HENT:   Head: Normocephalic and atraumatic.    Right Ear: Hearing normal.   Left Ear: Hearing normal.   Mouth/Throat: Uvula is midline and oropharynx is clear and moist.   Eyes: Lids are normal.   Neck: Trachea normal, normal range of motion and full passive range of motion without pain. Neck supple. No spinous process tenderness and no muscular tenderness present. No tracheal deviation and normal range of motion present. Cardiovascular: Normal rate and normal pulses. Pulmonary/Chest: Effort normal and breath sounds normal.   Abdominal: Soft. Normal appearance. Musculoskeletal:        Left knee: She exhibits normal range of motion, no swelling, no effusion and no deformity. No tenderness found. Lumbar back: She exhibits tenderness, pain and spasm. She exhibits normal range of motion and no bony tenderness. Right foot: There is tenderness. There is normal range of motion, no bony tenderness and normal capillary refill. Neurological: She is alert and easily aroused. She has normal strength. She displays no atrophy. No sensory deficit. She exhibits normal muscle tone. Coordination and gait normal.   Skin: Skin is warm, dry and intact. Capillary refill takes less than 2 seconds. No bruising, no ecchymosis and no rash noted. She is not diaphoretic. Psychiatric: Her mood appears anxious. Nursing note and vitals reviewed. PLAN (LABS / IMAGING / EKG):  No orders of the defined types were placed in this encounter. MEDICATIONS ORDERED:  Orders Placed This Encounter   Medications    oxyCODONE-acetaminophen (PERCOCET) 5-325 MG per tablet 2 tablet    acetaminophen (TYLENOL) 500 MG tablet     Sig: Take 1 tablet by mouth 4 times daily as needed for Pain     Dispense:  30 tablet     Refill:  0       Controlled Substances Monitoring:       Differential Diagnoses:  Chronic Back Pain  Chronic Knee Pain  Sciatica  Cauda Equina  Epidural Abscess    DIAGNOSTIC RESULTS / EMERGENCY DEPARTMENT COURSE / MDM     After speaking with the patient and assessment her pain today is all chronic. She is already on Norco for this pain. She states she cannot take NSAIDs due to an ulcer.   I told her due to her contract with pain management we

## 2019-09-13 NOTE — ED NOTES
Pt presents to the ED c/o right leg pain. Pt reports pain to the entire right leg. Pt states that this pain has been chronic since February. Pt denies any recent injury or trauma. Pt states that pain is worse when she is up and moving around, states that she was walking a lot today. Pt states that she took her Gabapentin at home today and other medications, without relief of pain.   On exam, pt ambulatory with steady gait, pt awake and oriented x 4, pt with full ROM and PMS intact to the RLE- no edema or errythema noted      Abdirahman Aparicio RN  09/12/19 2015

## 2019-09-16 ENCOUNTER — HOSPITAL ENCOUNTER (OUTPATIENT)
Dept: PAIN MANAGEMENT | Age: 59
Discharge: HOME | End: 2019-09-16
Payer: MEDICARE

## 2019-09-16 VITALS
DIASTOLIC BLOOD PRESSURE: 74 MMHG | TEMPERATURE: 98.5 F | RESPIRATION RATE: 16 BRPM | SYSTOLIC BLOOD PRESSURE: 116 MMHG | OXYGEN SATURATION: 95 % | HEART RATE: 87 BPM

## 2019-09-16 DIAGNOSIS — M17.0 PRIMARY OSTEOARTHRITIS OF BOTH KNEES: ICD-10-CM

## 2019-09-16 DIAGNOSIS — Z51.81 MEDICATION MONITORING ENCOUNTER: ICD-10-CM

## 2019-09-16 DIAGNOSIS — M54.40 CHRONIC MIDLINE LOW BACK PAIN WITH SCIATICA, SCIATICA LATERALITY UNSPECIFIED: ICD-10-CM

## 2019-09-16 DIAGNOSIS — M47.26 OSTEOARTHRITIS OF SPINE WITH RADICULOPATHY, LUMBAR REGION: ICD-10-CM

## 2019-09-16 DIAGNOSIS — M47.816 SPONDYLOSIS OF LUMBAR REGION WITHOUT MYELOPATHY OR RADICULOPATHY: Primary | ICD-10-CM

## 2019-09-16 DIAGNOSIS — G89.29 CHRONIC MIDLINE LOW BACK PAIN WITH SCIATICA, SCIATICA LATERALITY UNSPECIFIED: ICD-10-CM

## 2019-09-16 PROCEDURE — 99213 OFFICE O/P EST LOW 20 MIN: CPT | Performed by: NURSE PRACTITIONER

## 2019-09-16 PROCEDURE — 99213 OFFICE O/P EST LOW 20 MIN: CPT

## 2019-09-16 RX ORDER — HYDROCODONE BITARTRATE AND ACETAMINOPHEN 5; 325 MG/1; MG/1
1 TABLET ORAL EVERY 8 HOURS PRN
Qty: 90 TABLET | Refills: 0 | Status: SHIPPED | OUTPATIENT
Start: 2019-09-20 | End: 2019-10-15 | Stop reason: SDUPTHER

## 2019-09-16 ASSESSMENT — ENCOUNTER SYMPTOMS
COUGH: 0
SHORTNESS OF BREATH: 0
CONSTIPATION: 0
BACK PAIN: 1

## 2019-09-16 NOTE — PROGRESS NOTES
Lumbar back: She exhibits tenderness and pain. Neurological: She is alert and oriented to person, place, and time. Skin: Skin is warm and dry. Record/Diagnostics Review:    Last óscar 1/2019 and was appropriate     Assessment:  Problem List Items Addressed This Visit     Osteoarthritis of spine with radiculopathy, lumbar region    Primary osteoarthritis of both knees    Medication monitoring encounter    Spondylosis of lumbar region without myelopathy or radiculopathy - Primary             Treatment Plan:  Patient relates current medications are helping the pain. Patient reports taking pain medications as prescribed, denies obtaining medications from different sources and denies use of illegal drugs. Patient denies side effects from medications like nausea, vomiting, constipation or drowsiness. Patient reports current activities of daily living are possible due to medications and would like to continue them. As always, we encourage daily stretching and strengthening exercises, and recommend minimizing use of pain medications unless patient cannot get through daily activities due to pain. Contract requirements met. Continue opioid therapy.  Script written for norco  Follow up appointment made for 4 weeks

## 2019-09-17 ENCOUNTER — OFFICE VISIT (OUTPATIENT)
Dept: INTERNAL MEDICINE | Age: 59
End: 2019-09-17
Payer: MEDICARE

## 2019-09-17 VITALS
SYSTOLIC BLOOD PRESSURE: 115 MMHG | WEIGHT: 188 LBS | DIASTOLIC BLOOD PRESSURE: 71 MMHG | HEART RATE: 86 BPM | BODY MASS INDEX: 31.28 KG/M2

## 2019-09-17 DIAGNOSIS — M17.11 PRIMARY OSTEOARTHRITIS OF RIGHT KNEE: ICD-10-CM

## 2019-09-17 DIAGNOSIS — I10 ESSENTIAL HYPERTENSION: Primary | ICD-10-CM

## 2019-09-17 DIAGNOSIS — E11.9 TYPE 2 DIABETES MELLITUS WITHOUT COMPLICATION, WITHOUT LONG-TERM CURRENT USE OF INSULIN (HCC): ICD-10-CM

## 2019-09-17 DIAGNOSIS — Z23 NEEDS FLU SHOT: ICD-10-CM

## 2019-09-17 DIAGNOSIS — G89.29 CHRONIC MIDLINE LOW BACK PAIN WITH SCIATICA, SCIATICA LATERALITY UNSPECIFIED: ICD-10-CM

## 2019-09-17 DIAGNOSIS — M54.40 CHRONIC MIDLINE LOW BACK PAIN WITH SCIATICA, SCIATICA LATERALITY UNSPECIFIED: ICD-10-CM

## 2019-09-17 DIAGNOSIS — E78.00 PURE HYPERCHOLESTEROLEMIA: ICD-10-CM

## 2019-09-17 DIAGNOSIS — J43.2 CENTRILOBULAR EMPHYSEMA (HCC): ICD-10-CM

## 2019-09-17 PROCEDURE — G8926 SPIRO NO PERF OR DOC: HCPCS | Performed by: INTERNAL MEDICINE

## 2019-09-17 PROCEDURE — G8417 CALC BMI ABV UP PARAM F/U: HCPCS | Performed by: INTERNAL MEDICINE

## 2019-09-17 PROCEDURE — 3044F HG A1C LEVEL LT 7.0%: CPT | Performed by: INTERNAL MEDICINE

## 2019-09-17 PROCEDURE — 3023F SPIROM DOC REV: CPT | Performed by: INTERNAL MEDICINE

## 2019-09-17 PROCEDURE — G8427 DOCREV CUR MEDS BY ELIG CLIN: HCPCS | Performed by: INTERNAL MEDICINE

## 2019-09-17 PROCEDURE — 3017F COLORECTAL CA SCREEN DOC REV: CPT | Performed by: INTERNAL MEDICINE

## 2019-09-17 PROCEDURE — 99211 OFF/OP EST MAY X REQ PHY/QHP: CPT | Performed by: INTERNAL MEDICINE

## 2019-09-17 PROCEDURE — 4004F PT TOBACCO SCREEN RCVD TLK: CPT | Performed by: INTERNAL MEDICINE

## 2019-09-17 PROCEDURE — 2022F DILAT RTA XM EVC RTNOPTHY: CPT | Performed by: INTERNAL MEDICINE

## 2019-09-17 PROCEDURE — 90688 IIV4 VACCINE SPLT 0.5 ML IM: CPT | Performed by: INTERNAL MEDICINE

## 2019-09-17 PROCEDURE — 99213 OFFICE O/P EST LOW 20 MIN: CPT | Performed by: INTERNAL MEDICINE

## 2019-09-17 RX ORDER — GLUCOSAMINE HCL/CHONDROITIN SU 500-400 MG
CAPSULE ORAL
Qty: 50 STRIP | Refills: 11 | Status: CANCELLED | OUTPATIENT
Start: 2019-09-17

## 2019-09-17 RX ORDER — CARVEDILOL 6.25 MG/1
6.25 TABLET ORAL 2 TIMES DAILY
Qty: 180 TABLET | Refills: 1 | Status: SHIPPED | OUTPATIENT
Start: 2019-09-17 | End: 2020-04-02 | Stop reason: SDUPTHER

## 2019-09-17 RX ORDER — LISINOPRIL AND HYDROCHLOROTHIAZIDE 25; 20 MG/1; MG/1
TABLET ORAL
Qty: 90 TABLET | Refills: 3 | Status: SHIPPED | OUTPATIENT
Start: 2019-09-17 | End: 2020-04-02 | Stop reason: SDUPTHER

## 2019-09-17 RX ORDER — GABAPENTIN 300 MG/1
CAPSULE ORAL
Qty: 120 CAPSULE | Refills: 2 | Status: SHIPPED | OUTPATIENT
Start: 2019-09-17 | End: 2020-03-13

## 2019-09-17 RX ORDER — ATORVASTATIN CALCIUM 40 MG/1
40 TABLET, FILM COATED ORAL DAILY
Qty: 90 TABLET | Refills: 1 | Status: SHIPPED | OUTPATIENT
Start: 2019-09-17 | End: 2020-04-02 | Stop reason: SDUPTHER

## 2019-09-17 ASSESSMENT — ENCOUNTER SYMPTOMS
PHOTOPHOBIA: 0
RHINORRHEA: 0
BLOOD IN STOOL: 0
SORE THROAT: 0
WHEEZING: 0
COUGH: 1
CONSTIPATION: 0
SHORTNESS OF BREATH: 0
ABDOMINAL PAIN: 0
BACK PAIN: 1

## 2019-09-17 NOTE — PROGRESS NOTES
Visit Information    Have you changed or started any medications since your last visit including any over-the-counter medicines, vitamins, or herbal medicines? no   Are you having any side effects from any of your medications? -  no  Have you stopped taking any of your medications? Is so, why? -  no    Have you seen any other physician or provider since your last visit? Yes - Records Obtained  Have you had any other diagnostic tests since your last visit? Yes - Records Obtained  Have you been seen in the emergency room and/or had an admission to a hospital since we last saw you? No  Have you had your routine dental cleaning in the past 6 months? no    Have you activated your [employer] account? If not, what are your barriers?  Yes     Patient Care Team:  Melinda Rosario MD as PCP - General (Internal Medicine)  Melinda Rosario MD as PCP - [facility]  Sharita Rodriguez DPM as Consulting Physician (Podiatry)  Kameron High ([address]  Bhaskar Matos MD as Consulting Physician (Orthopedic Surgery)  Zoë Bowman MD as Anesthesiologist (Pain Management)  Cyndie Grajeda OD (Optometry)  Gildardo Goff RN as Nurse Navigator    Medical History Review  Past Medical, Family, and Social History reviewed and does contribute to the patient presenting condition    Health Maintenance   Topic Date Due    Diabetic retinal exam  09/26/2017    Annual Wellness Visit (AWV)  05/29/2019    Diabetic foot exam  06/18/2019    Shingles Vaccine (2 of 2) 08/19/2019    Flu vaccine (1) 09/01/2019    A1C test (Diabetic or Prediabetic)  03/05/2020    Diabetic microalbuminuria test  03/05/2020    Colon Cancer Screen FIT/FOBT  05/06/2020    Lipid screen  06/24/2020    Potassium monitoring  06/24/2020    Creatinine monitoring  06/24/2020    Low dose CT lung screening  06/24/2020    Breast cancer screen  11/20/2020    Cervical cancer screen  02/15/2021    DTaP/Tdap/Td vaccine (2 - Td) 06/24/2029    Pneumococcal

## 2019-09-17 NOTE — PROGRESS NOTES
emphysema. 2. No lung nodules or masses. 3. Thick and thin bandlike parenchymal densities noted in the medial segment   middle lobe, bilateral posterior basal lower lobes most pronounced in the   left lower lobe.  Some show underlying bronchiolectasis.  Findings most   indicative of scarring plus or minus atelectasis.  In the absence of previous   studies a 3 to six-month follow-up is suggested to confirm stability or   resolution.       LUNG RADS:   Per ACR Lung-RADS Version 1.0       Category 1, Negative (No nodules and definitely benign   nodules). Management:Continue annual lung screening with LDCT in 12   months.(probability of malignancy <1%).       Lung-RADs S       Findings: Thick and thin bandlike parenchymal densities in the middle lobe,   bilateral lower lobes likely scarring plus/minus atelectasis.       Management: 3 to six-month follow-up.           MRI Lumbar spine 2019  Impression   1. Status post interval right hemilaminectomy at L4-L5.  Severe right neural   foraminal stenosis at this level with compression of the exiting right L4   nerve root. 2. At L5-S1, central/left paracentral disc bulge contacts the traversing left   S1 nerve root in the lateral recess. 3. Multilevel mild bilateral neural foraminal stenosis, as detailed above.         R.Knee MRI 2017  Impression   1. Radial type tear involving the free edge and both articular surfaces in   the posterior horn and junction of the body/posterior horn of the medial   meniscus with outward extrusion of a degenerated medial meniscus body. Moderate to large joint effusion. 2. Moderate to severe chondromalacia of the medial patellofemoral compartment   as detailed above. 3. Moderate chondromalacia of the outer weight-bearing surface of the medial   femoral condyle. 4. Grade 1 MCL sprain in the setting of trauma.    5. Small debris containing Baker's cyst.   6. Mild nonspecific edema in the subcutaneous fat anterior to the patellar brice         RJacksonKnee xray 2018  COMPARISON:   November 16, 2016       HISTORY:   ORDERING SYSTEM PROVIDED HISTORY: Primary osteoarthritis of right knee       FINDINGS:   Moderate degenerative changes present in the medial compartment with   subchondral sclerosis and marginal spurring.  There is mild degenerative   change in the medial and patellofemoral compartments.  No joint effusion or   acute osseous abnormality.           Impression   Moderately severe medial osteoarthropathy, minimally progressed since 2016         PFT;'s  DATE OF PROCEDURE:  06/17/2019     PROCEDURE:  Methacholine challenge.     RESULTS:  Pre-methacholine spirometry showed a decreased FVC and FEV1. The ratio of FEV1/FVC was normal.  The patient was given methacholine  per protocol. At level 3, the patient had a 23% decline in FEV1.    Bronchodilators were then given and the patient went back to her normal  baseline.     IMPRESSION:  This is a positive methacholine challenge test consistent  with someone who has obstructive lung disease.        Past Medical History:   Diagnosis Date    Allergic rhinitis     Arthropathy, unspecified, other specified sites 4/30/2013    Bronchitis     Chronic back pain     COPD (chronic obstructive pulmonary disease) ([address]     Depression     DM (diabetes mellitus) ([address] 12/18/2012    Emphysema of lung ([address]     GERD (gastroesophageal reflux disease)     Hyperlipidemia     Hypertension     Knee pain     right knee mostly    Leg pain, right     Obesity     Osteoarthritis     Peripheral vascular disease ([address]     Primary osteoarthritis of both knees     Radicular pain of lumbosacral region     Spinal stenosis, lumbar region, without neurogenic claudication 4/30/2013    Type II or unspecified type diabetes mellitus without mention of complication, not stated as uncontrolled     Unspecified sleep apnea     URI (upper respiratory infection)        Past Surgical History:   Procedure

## 2019-09-23 DIAGNOSIS — J30.9 CHRONIC ALLERGIC RHINITIS: ICD-10-CM

## 2019-09-24 RX ORDER — CETIRIZINE HYDROCHLORIDE 10 MG/1
TABLET ORAL
Qty: 30 TABLET | Refills: 3 | Status: SHIPPED | OUTPATIENT
Start: 2019-09-24 | End: 2020-02-10

## 2019-10-09 ENCOUNTER — OFFICE VISIT (OUTPATIENT)
Dept: ORTHOPEDIC SURGERY | Age: 59
End: 2019-10-09
Payer: MEDICARE

## 2019-10-09 VITALS — BODY MASS INDEX: 29.41 KG/M2 | HEIGHT: 66 IN | WEIGHT: 183 LBS

## 2019-10-09 DIAGNOSIS — M70.61 TROCHANTERIC BURSITIS OF RIGHT HIP: Primary | ICD-10-CM

## 2019-10-09 PROCEDURE — G8482 FLU IMMUNIZE ORDER/ADMIN: HCPCS | Performed by: ORTHOPAEDIC SURGERY

## 2019-10-09 PROCEDURE — G8417 CALC BMI ABV UP PARAM F/U: HCPCS | Performed by: ORTHOPAEDIC SURGERY

## 2019-10-09 PROCEDURE — 3017F COLORECTAL CA SCREEN DOC REV: CPT | Performed by: ORTHOPAEDIC SURGERY

## 2019-10-09 PROCEDURE — 4004F PT TOBACCO SCREEN RCVD TLK: CPT | Performed by: ORTHOPAEDIC SURGERY

## 2019-10-09 PROCEDURE — 99203 OFFICE O/P NEW LOW 30 MIN: CPT | Performed by: ORTHOPAEDIC SURGERY

## 2019-10-09 PROCEDURE — G8427 DOCREV CUR MEDS BY ELIG CLIN: HCPCS | Performed by: ORTHOPAEDIC SURGERY

## 2019-10-09 PROCEDURE — 20610 DRAIN/INJ JOINT/BURSA W/O US: CPT | Performed by: ORTHOPAEDIC SURGERY

## 2019-10-09 RX ORDER — LIDOCAINE HYDROCHLORIDE 10 MG/ML
5 INJECTION, SOLUTION INFILTRATION; PERINEURAL ONCE
Status: COMPLETED | OUTPATIENT
Start: 2019-10-09 | End: 2019-10-09

## 2019-10-09 RX ORDER — METHYLPREDNISOLONE ACETATE 40 MG/ML
40 INJECTION, SUSPENSION INTRA-ARTICULAR; INTRALESIONAL; INTRAMUSCULAR; SOFT TISSUE ONCE
Status: COMPLETED | OUTPATIENT
Start: 2019-10-09 | End: 2019-10-09

## 2019-10-09 RX ADMIN — METHYLPREDNISOLONE ACETATE 40 MG: 40 INJECTION, SUSPENSION INTRA-ARTICULAR; INTRALESIONAL; INTRAMUSCULAR; SOFT TISSUE at 15:35

## 2019-10-09 RX ADMIN — LIDOCAINE HYDROCHLORIDE 5 ML: 10 INJECTION, SOLUTION INFILTRATION; PERINEURAL at 15:35

## 2019-10-15 ENCOUNTER — HOSPITAL ENCOUNTER (OUTPATIENT)
Dept: PAIN MANAGEMENT | Age: 59
Discharge: HOME | End: 2019-10-15
Payer: MEDICARE

## 2019-10-15 VITALS
HEIGHT: 66 IN | RESPIRATION RATE: 16 BRPM | DIASTOLIC BLOOD PRESSURE: 89 MMHG | WEIGHT: 183 LBS | HEART RATE: 90 BPM | SYSTOLIC BLOOD PRESSURE: 148 MMHG | BODY MASS INDEX: 29.41 KG/M2

## 2019-10-15 DIAGNOSIS — G89.29 CHRONIC MIDLINE LOW BACK PAIN WITH SCIATICA, SCIATICA LATERALITY UNSPECIFIED: ICD-10-CM

## 2019-10-15 DIAGNOSIS — M54.41 CHRONIC MIDLINE LOW BACK PAIN WITH RIGHT-SIDED SCIATICA: ICD-10-CM

## 2019-10-15 DIAGNOSIS — M47.816 SPONDYLOSIS OF LUMBAR REGION WITHOUT MYELOPATHY OR RADICULOPATHY: ICD-10-CM

## 2019-10-15 DIAGNOSIS — Z51.81 MEDICATION MONITORING ENCOUNTER: ICD-10-CM

## 2019-10-15 DIAGNOSIS — M54.40 CHRONIC MIDLINE LOW BACK PAIN WITH SCIATICA, SCIATICA LATERALITY UNSPECIFIED: ICD-10-CM

## 2019-10-15 DIAGNOSIS — G89.29 CHRONIC MIDLINE LOW BACK PAIN WITH RIGHT-SIDED SCIATICA: ICD-10-CM

## 2019-10-15 DIAGNOSIS — M47.26 OSTEOARTHRITIS OF SPINE WITH RADICULOPATHY, LUMBAR REGION: ICD-10-CM

## 2019-10-15 DIAGNOSIS — M17.0 PRIMARY OSTEOARTHRITIS OF BOTH KNEES: Primary | ICD-10-CM

## 2019-10-15 PROCEDURE — 99214 OFFICE O/P EST MOD 30 MIN: CPT | Performed by: NURSE PRACTITIONER

## 2019-10-15 PROCEDURE — 99213 OFFICE O/P EST LOW 20 MIN: CPT

## 2019-10-15 RX ORDER — POTASSIUM CHLORIDE 750 MG/1
TABLET, FILM COATED, EXTENDED RELEASE ORAL
COMMUNITY
Start: 2019-08-12 | End: 2019-11-12 | Stop reason: SDUPTHER

## 2019-10-15 RX ORDER — HYDROCODONE BITARTRATE AND ACETAMINOPHEN 5; 325 MG/1; MG/1
1 TABLET ORAL EVERY 8 HOURS PRN
Qty: 84 TABLET | Refills: 0 | Status: SHIPPED | OUTPATIENT
Start: 2019-10-18 | End: 2019-11-15 | Stop reason: SDUPTHER

## 2019-10-15 ASSESSMENT — ENCOUNTER SYMPTOMS
COUGH: 0
SHORTNESS OF BREATH: 0
CONSTIPATION: 0
BACK PAIN: 1

## 2019-11-12 ENCOUNTER — OFFICE VISIT (OUTPATIENT)
Dept: NEUROSURGERY | Age: 59
End: 2019-11-12
Payer: MEDICARE

## 2019-11-12 VITALS
DIASTOLIC BLOOD PRESSURE: 82 MMHG | HEART RATE: 93 BPM | BODY MASS INDEX: 30.25 KG/M2 | SYSTOLIC BLOOD PRESSURE: 118 MMHG | WEIGHT: 184.6 LBS

## 2019-11-12 DIAGNOSIS — Z98.890 S/P LUMBAR LAMINECTOMY: ICD-10-CM

## 2019-11-12 DIAGNOSIS — M51.16 LUMBAR DISC HERNIATION WITH RADICULOPATHY: Primary | ICD-10-CM

## 2019-11-12 PROCEDURE — G8427 DOCREV CUR MEDS BY ELIG CLIN: HCPCS | Performed by: NURSE PRACTITIONER

## 2019-11-12 PROCEDURE — 4004F PT TOBACCO SCREEN RCVD TLK: CPT | Performed by: NURSE PRACTITIONER

## 2019-11-12 PROCEDURE — 3017F COLORECTAL CA SCREEN DOC REV: CPT | Performed by: NURSE PRACTITIONER

## 2019-11-12 PROCEDURE — G8482 FLU IMMUNIZE ORDER/ADMIN: HCPCS | Performed by: NURSE PRACTITIONER

## 2019-11-12 PROCEDURE — 99213 OFFICE O/P EST LOW 20 MIN: CPT | Performed by: NURSE PRACTITIONER

## 2019-11-12 PROCEDURE — G8417 CALC BMI ABV UP PARAM F/U: HCPCS | Performed by: NURSE PRACTITIONER

## 2019-11-12 RX ORDER — TIOTROPIUM BROMIDE 18 UG/1
CAPSULE ORAL; RESPIRATORY (INHALATION)
COMMUNITY
Start: 2019-10-16 | End: 2020-12-22 | Stop reason: SDUPTHER

## 2019-11-15 ENCOUNTER — HOSPITAL ENCOUNTER (OUTPATIENT)
Dept: PAIN MANAGEMENT | Age: 59
Discharge: HOME | End: 2019-11-15
Payer: MEDICARE

## 2019-11-15 VITALS
DIASTOLIC BLOOD PRESSURE: 77 MMHG | RESPIRATION RATE: 16 BRPM | SYSTOLIC BLOOD PRESSURE: 132 MMHG | OXYGEN SATURATION: 95 % | HEART RATE: 78 BPM

## 2019-11-15 DIAGNOSIS — M54.40 CHRONIC MIDLINE LOW BACK PAIN WITH SCIATICA, SCIATICA LATERALITY UNSPECIFIED: ICD-10-CM

## 2019-11-15 DIAGNOSIS — G89.29 CHRONIC MIDLINE LOW BACK PAIN WITH SCIATICA, SCIATICA LATERALITY UNSPECIFIED: ICD-10-CM

## 2019-11-15 DIAGNOSIS — Z51.81 MEDICATION MONITORING ENCOUNTER: ICD-10-CM

## 2019-11-15 DIAGNOSIS — M47.26 OSTEOARTHRITIS OF SPINE WITH RADICULOPATHY, LUMBAR REGION: ICD-10-CM

## 2019-11-15 DIAGNOSIS — M47.816 SPONDYLOSIS OF LUMBAR REGION WITHOUT MYELOPATHY OR RADICULOPATHY: Primary | ICD-10-CM

## 2019-11-15 DIAGNOSIS — M17.0 PRIMARY OSTEOARTHRITIS OF BOTH KNEES: ICD-10-CM

## 2019-11-15 PROCEDURE — 99213 OFFICE O/P EST LOW 20 MIN: CPT

## 2019-11-15 PROCEDURE — 99213 OFFICE O/P EST LOW 20 MIN: CPT | Performed by: NURSE PRACTITIONER

## 2019-11-15 RX ORDER — HYDROCODONE BITARTRATE AND ACETAMINOPHEN 5; 325 MG/1; MG/1
1 TABLET ORAL EVERY 8 HOURS PRN
Qty: 84 TABLET | Refills: 0 | Status: SHIPPED | OUTPATIENT
Start: 2019-11-20 | End: 2019-12-13 | Stop reason: SDUPTHER

## 2019-11-15 ASSESSMENT — ENCOUNTER SYMPTOMS
COUGH: 0
BACK PAIN: 1
SHORTNESS OF BREATH: 0
CONSTIPATION: 0

## 2019-11-22 ENCOUNTER — HOSPITAL ENCOUNTER (OUTPATIENT)
Dept: MAMMOGRAPHY | Age: 59
Discharge: HOME | End: 2019-11-24
Payer: MEDICARE

## 2019-11-22 DIAGNOSIS — Z12.39 BREAST CANCER SCREENING: ICD-10-CM

## 2019-11-22 PROCEDURE — 77067 SCR MAMMO BI INCL CAD: CPT

## 2019-11-27 ENCOUNTER — OFFICE VISIT (OUTPATIENT)
Dept: ORTHOPEDIC SURGERY | Age: 59
End: 2019-11-27
Payer: MEDICARE

## 2019-11-27 VITALS — WEIGHT: 183 LBS | BODY MASS INDEX: 30.49 KG/M2 | HEIGHT: 65 IN

## 2019-11-27 DIAGNOSIS — M17.11 LOCALIZED OSTEOARTHRITIS OF RIGHT KNEE: ICD-10-CM

## 2019-11-27 DIAGNOSIS — M70.61 TROCHANTERIC BURSITIS OF RIGHT HIP: Primary | ICD-10-CM

## 2019-11-27 PROCEDURE — 99213 OFFICE O/P EST LOW 20 MIN: CPT | Performed by: STUDENT IN AN ORGANIZED HEALTH CARE EDUCATION/TRAINING PROGRAM

## 2019-11-27 PROCEDURE — G8427 DOCREV CUR MEDS BY ELIG CLIN: HCPCS | Performed by: STUDENT IN AN ORGANIZED HEALTH CARE EDUCATION/TRAINING PROGRAM

## 2019-11-27 PROCEDURE — G8482 FLU IMMUNIZE ORDER/ADMIN: HCPCS | Performed by: STUDENT IN AN ORGANIZED HEALTH CARE EDUCATION/TRAINING PROGRAM

## 2019-11-27 PROCEDURE — 3017F COLORECTAL CA SCREEN DOC REV: CPT | Performed by: STUDENT IN AN ORGANIZED HEALTH CARE EDUCATION/TRAINING PROGRAM

## 2019-11-27 PROCEDURE — 4004F PT TOBACCO SCREEN RCVD TLK: CPT | Performed by: STUDENT IN AN ORGANIZED HEALTH CARE EDUCATION/TRAINING PROGRAM

## 2019-11-27 PROCEDURE — G8417 CALC BMI ABV UP PARAM F/U: HCPCS | Performed by: STUDENT IN AN ORGANIZED HEALTH CARE EDUCATION/TRAINING PROGRAM

## 2019-12-02 DIAGNOSIS — M47.816 SPONDYLOSIS OF LUMBAR REGION WITHOUT MYELOPATHY OR RADICULOPATHY: ICD-10-CM

## 2019-12-03 RX ORDER — TIZANIDINE 4 MG/1
TABLET ORAL
Qty: 60 TABLET | Refills: 3 | Status: SHIPPED | OUTPATIENT
Start: 2019-12-03 | End: 2020-05-12

## 2019-12-10 RX ORDER — POTASSIUM CHLORIDE 750 MG/1
20 TABLET, EXTENDED RELEASE ORAL DAILY
Qty: 60 TABLET | Refills: 3 | Status: SHIPPED | OUTPATIENT
Start: 2019-12-10 | End: 2020-12-08

## 2019-12-13 ENCOUNTER — HOSPITAL ENCOUNTER (OUTPATIENT)
Dept: PAIN MANAGEMENT | Age: 59
Discharge: HOME | End: 2019-12-13
Payer: MEDICARE

## 2019-12-13 VITALS
HEIGHT: 65 IN | WEIGHT: 183 LBS | SYSTOLIC BLOOD PRESSURE: 141 MMHG | BODY MASS INDEX: 30.49 KG/M2 | OXYGEN SATURATION: 94 % | DIASTOLIC BLOOD PRESSURE: 73 MMHG | HEART RATE: 75 BPM | RESPIRATION RATE: 16 BRPM | TEMPERATURE: 97.9 F

## 2019-12-13 DIAGNOSIS — G89.29 CHRONIC MIDLINE LOW BACK PAIN WITH SCIATICA, SCIATICA LATERALITY UNSPECIFIED: ICD-10-CM

## 2019-12-13 DIAGNOSIS — Z51.81 MEDICATION MONITORING ENCOUNTER: ICD-10-CM

## 2019-12-13 DIAGNOSIS — M47.26 OSTEOARTHRITIS OF SPINE WITH RADICULOPATHY, LUMBAR REGION: ICD-10-CM

## 2019-12-13 DIAGNOSIS — M17.0 PRIMARY OSTEOARTHRITIS OF BOTH KNEES: ICD-10-CM

## 2019-12-13 DIAGNOSIS — M54.40 CHRONIC MIDLINE LOW BACK PAIN WITH SCIATICA, SCIATICA LATERALITY UNSPECIFIED: ICD-10-CM

## 2019-12-13 DIAGNOSIS — M47.816 SPONDYLOSIS OF LUMBAR REGION WITHOUT MYELOPATHY OR RADICULOPATHY: Primary | ICD-10-CM

## 2019-12-13 PROCEDURE — 99214 OFFICE O/P EST MOD 30 MIN: CPT | Performed by: PAIN MEDICINE

## 2019-12-13 PROCEDURE — 99213 OFFICE O/P EST LOW 20 MIN: CPT

## 2019-12-13 RX ORDER — HYDROCODONE BITARTRATE AND ACETAMINOPHEN 5; 325 MG/1; MG/1
1 TABLET ORAL EVERY 8 HOURS PRN
Qty: 84 TABLET | Refills: 0 | Status: SHIPPED | OUTPATIENT
Start: 2019-12-18 | End: 2020-01-10 | Stop reason: SDUPTHER

## 2019-12-13 RX ORDER — MELOXICAM 15 MG/1
15 TABLET ORAL DAILY
Qty: 30 TABLET | Refills: 3 | Status: SHIPPED | OUTPATIENT
Start: 2019-12-13 | End: 2020-05-06 | Stop reason: SDUPTHER

## 2019-12-13 ASSESSMENT — PAIN DESCRIPTION - LOCATION: LOCATION: BACK;KNEE

## 2019-12-13 ASSESSMENT — PAIN DESCRIPTION - DESCRIPTORS: DESCRIPTORS: ACHING;BURNING;SHARP;SHOOTING

## 2019-12-13 ASSESSMENT — PAIN DESCRIPTION - PAIN TYPE: TYPE: CHRONIC PAIN

## 2019-12-13 ASSESSMENT — PAIN DESCRIPTION - ONSET: ONSET: ON-GOING

## 2019-12-13 ASSESSMENT — ENCOUNTER SYMPTOMS
BACK PAIN: 1
RESPIRATORY NEGATIVE: 1
GASTROINTESTINAL NEGATIVE: 1
EYES NEGATIVE: 1
ALLERGIC/IMMUNOLOGIC NEGATIVE: 1

## 2019-12-13 ASSESSMENT — PAIN SCALES - GENERAL: PAINLEVEL_OUTOF10: 9

## 2019-12-13 ASSESSMENT — PAIN DESCRIPTION - DIRECTION: RADIATING_TOWARDS: DOWN THE RIGHT LEG

## 2019-12-13 ASSESSMENT — PAIN DESCRIPTION - PROGRESSION: CLINICAL_PROGRESSION: GRADUALLY WORSENING

## 2019-12-13 ASSESSMENT — PAIN - FUNCTIONAL ASSESSMENT: PAIN_FUNCTIONAL_ASSESSMENT: PREVENTS OR INTERFERES SOME ACTIVE ACTIVITIES AND ADLS

## 2019-12-13 ASSESSMENT — PAIN DESCRIPTION - ORIENTATION: ORIENTATION: RIGHT;LOWER

## 2019-12-13 ASSESSMENT — PAIN DESCRIPTION - FREQUENCY: FREQUENCY: CONTINUOUS

## 2019-12-21 ASSESSMENT — ENCOUNTER SYMPTOMS
ABDOMINAL PAIN: 0
NAUSEA: 0
CHOKING: 0
BOWEL INCONTINENCE: 0
COLOR CHANGE: 0
WHEEZING: 0
VOMITING: 0
CONSTIPATION: 0
SHORTNESS OF BREATH: 0

## 2019-12-26 ENCOUNTER — HOSPITAL ENCOUNTER (OUTPATIENT)
Dept: CT IMAGING | Age: 59
Discharge: HOME | End: 2019-12-28
Payer: MEDICARE

## 2019-12-26 DIAGNOSIS — J47.9 BRONCHIECTASIS WITHOUT ACUTE EXACERBATION (HCC): ICD-10-CM

## 2019-12-26 PROCEDURE — 71250 CT THORAX DX C-: CPT

## 2020-01-06 ENCOUNTER — OFFICE VISIT (OUTPATIENT)
Dept: NEUROSURGERY | Age: 60
End: 2020-01-06
Payer: MEDICARE

## 2020-01-06 VITALS — SYSTOLIC BLOOD PRESSURE: 156 MMHG | HEART RATE: 86 BPM | RESPIRATION RATE: 16 BRPM | DIASTOLIC BLOOD PRESSURE: 94 MMHG

## 2020-01-06 PROCEDURE — 3017F COLORECTAL CA SCREEN DOC REV: CPT | Performed by: NURSE PRACTITIONER

## 2020-01-06 PROCEDURE — 99213 OFFICE O/P EST LOW 20 MIN: CPT | Performed by: NURSE PRACTITIONER

## 2020-01-06 PROCEDURE — G8482 FLU IMMUNIZE ORDER/ADMIN: HCPCS | Performed by: NURSE PRACTITIONER

## 2020-01-06 PROCEDURE — 4004F PT TOBACCO SCREEN RCVD TLK: CPT | Performed by: NURSE PRACTITIONER

## 2020-01-06 PROCEDURE — G8417 CALC BMI ABV UP PARAM F/U: HCPCS | Performed by: NURSE PRACTITIONER

## 2020-01-06 PROCEDURE — G8427 DOCREV CUR MEDS BY ELIG CLIN: HCPCS | Performed by: NURSE PRACTITIONER

## 2020-01-06 NOTE — PROGRESS NOTES
[address] [address]  Dept: [phone]    Patient:  Ben Kent  YOB: 1960  Date: 20    The patient is a 62 y.o. female who presents today for consult of the following problems:     Chief Complaint   Patient presents with    Follow-up         HPI:     Ben Kent is a 62 y.o. female on whom neurosurgical consultation was requested by Gardenia Garcia MD for management of back and leg pain. Pt has had low back pain for quite some time. Patient does have a history of multiple injections as well as previous right sided laminectomy in . Does report some improvement in symptoms at that time, however right radicular symptoms have returned in the last several years, worsening in the last few months. She has done multiple rounds of physical therapy including aqua therapy most recently last year. Has also had numerous rounds of injections. Continues to struggle with low back pain and right leg pain. Pain does extend down into right foot. Does follow with pain management, takes pain medication on a daily basis to help manage symptoms. Does require use of a cane for ambulation. Pain is 9/10, mostly in the back at the moment, described as sharp, stabbing, shooting. Pain will wrap around to the front of her thigh, down front of her shin and into the top of her foot. Is not experiencing any bowel or bladder changes, saddle anesthesia. Feels symptoms are progressing. Groin pain: No  Hip Pain: No  Saddle anesthesia: No  Bowel/bladder incontinence: No  Constipation or Urinary retention: No    LIMITATIONS    Pain significantly limiting from a daily standpoint. Assistive devices: cane  Daily pharmacologic pain control include: pain medication, cymbalta  Back versus le/20    MANAGEMENT     Prior Surgery: Yes  Prior to 1yr ago:   In the last year:    Injections: Yes  Improvement: [place], marcaine . 25%    RI KNEE SCOPE,DIAGNOSTIC Right 3/24/2017    KNEE ARTHROSCOPY WITH PARTIAL MEDIAL MENISECAL DEBRIDMENT  performed by Yamel Daniel MD at [address]  2015    lumbar diskectomy    UPPER GASTROINTESTINAL ENDOSCOPY  9 2007    UPPER GASTROINTESTINAL ENDOSCOPY  2009,2011    gastritis, esophagitis     Family History   Problem Relation Age of Onset    Diabetes Mother     Heart Disease Mother     Cirrhosis Father     Breast Cancer Neg Hx     Cancer Neg Hx     Colon Cancer Neg Hx     Eclampsia Neg Hx     Hypertension Neg Hx     Ovarian Cancer Neg Hx      Labor Neg Hx     Spont Abortions Neg Hx     Stroke Neg Hx      Current Outpatient Medications on File Prior to Visit   Medication Sig Dispense Refill    HYDROcodone-acetaminophen (NORCO) 5-325 MG per tablet Take 1 tablet by mouth every 8 hours as needed for Pain for up to 28 days. 84 tablet 0    meloxicam (MOBIC) 15 MG tablet Take 1 tablet by mouth daily 30 tablet 3    potassium chloride (KLOR-CON M) 10 MEQ extended release tablet Take 2 tablets by mouth daily 60 tablet 3    tiZANidine (ZANAFLEX) 4 MG tablet TAKE 1 TABLET TWICE DAILY 60 tablet 3    SPIRIVA HANDIHALER 18 MCG inhalation capsule       cetirizine (ZYRTEC) 10 MG tablet TAKE 1 TABLET BY MOUTH DAILY 30 tablet 3    carvedilol (COREG) 6.25 MG tablet Take 1 tablet by mouth 2 times daily 180 tablet 1    atorvastatin (LIPITOR) 40 MG tablet Take 1 tablet by mouth daily 90 tablet 1    metFORMIN (GLUCOPHAGE) 500 MG tablet Take 1 tablet by mouth 2 times daily (with meals) 180 tablet 1    lisinopril-hydrochlorothiazide (PRINZIDE;ZESTORETIC) 20-25 MG per tablet TAKE 1 TABLET EVERY DAY 90 tablet 3    blood glucose test strips (EXACTECH TEST) strip 1 each by In Vitro route daily As needed.  50 each 11    acetaminophen (TYLENOL) 500 MG tablet Take 1 tablet by mouth 4 times daily as needed for Pain 30 tablet 0     MG capsule TAKE 1 CAPSULE EVERY DAY 30 capsule 11    trospium (SANCTURA) 20 MG tablet Take 1 tablet by mouth 2 times daily 60 tablet 3    amLODIPine (NORVASC) 10 MG tablet TAKE 1 TABLET BY MOUTH DAILY 90 tablet 3    omeprazole (PRILOSEC) 20 MG delayed release capsule TAKE 1 CAPSULE EVERY DAY 30 capsule 11    albuterol sulfate HFA (VENTOLIN HFA) 108 (90 Base) MCG/ACT inhaler Inhale 2 puffs into the lungs every 6 hours as needed for Wheezing 1 Inhaler 3    mirtazapine (REMERON) 30 MG tablet Take 30 mg by mouth nightly      mirtazapine (REMERON) 15 MG tablet Take 15 mg by mouth nightly      DULoxetine (CYMBALTA) 60 MG extended release capsule Take 1 capsule by mouth daily 30 capsule 3    Lancets MISC 1 each by Does not apply route daily 100 each 11    azelastine (ASTELIN) 0.1 % nasal spray 2 sprays by Nasal route 2 times daily Use in each nostril as directed 1 Bottle 3    ipratropium-albuterol (DUONEB) 0.5-2.5 (3) MG/3ML SOLN nebulizer solution Inhale 3 mLs into the lungs every 6 hours as needed for Shortness of Breath 360 mL 11    gabapentin (NEURONTIN) 300 MG capsule TAKE 1 CAPSULE IN MORNINGAND AFTERNOON AND 2 AT NIGHT 120 capsule 2     No current facility-administered medications on file prior to visit.       Social History     Tobacco Use    Smoking status: Current Every Day Smoker     Packs/day: 1.00     Years: 36.00     Pack years: 36.00     Types: Cigarettes     Last attempt to quit: 2015     Years since quittin.4    Smokeless tobacco: Never Used    Tobacco comment: pt currently using nicotine patches   Substance Use Topics    Alcohol use: No     Alcohol/week: 0.0 standard drinks    Drug use: No     Comment: 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 7 yrs       Allergies   Allergen Reactions    Aspirin      DUE TO ULCER    Claritin [Loratadine] Other (See Comments)     coughing    Flonase [Fluticasone Propionate]     Morphine And Related      GI Upset       Review of Systems  Constitutional: Negative for Answer:   evaluate for instability    EMG     Standing Status:   Future     Standing Expiration Date:   3/6/2020     Order Specific Question:   Which body part? Answer:   bilateral lower extremities        Electronically signed by ELVIRA Cheek CNP on 1/6/2020 at 1:06 PM    Please note that this chart was generated using voice recognition Dragon dictation software. Although every effort was made to ensure the accuracy of this automated transcription, some errors in transcription may have occurred.

## 2020-01-09 NOTE — TELEPHONE ENCOUNTER
Request for omeprazole - medication pended. Please fill if appropriate. Next Visit Date:  Future Appointments   Date Time Provider Claudy Schaeffer   1/10/2020 11:00 AM ELVIRA Gutierrez - CNP [facility] [id number]   2/12/2020  2:00 PM Lorie De La Vega Shankar Kang       Health Maintenance   Topic Date Due    Diabetic retinal exam  09/26/2017    Annual Wellness Visit (AWV)  05/29/2019    Diabetic foot exam  06/18/2019    Shingles Vaccine (2 of 2) 08/19/2019    A1C test (Diabetic or Prediabetic)  03/05/2020    Diabetic microalbuminuria test  03/05/2020    Colon Cancer Screen FIT/FOBT  05/06/2020    Lipid screen  06/24/2020    Potassium monitoring  06/24/2020    Creatinine monitoring  06/24/2020    Low dose CT lung screening  12/26/2020    Cervical cancer screen  02/15/2021    Breast cancer screen  11/22/2021    DTaP/Tdap/Td vaccine (2 - Td) 06/24/2029    Flu vaccine  Completed    Pneumococcal 0-64 years Vaccine  Completed    Hepatitis C screen  Completed    HIV screen  Completed       Hemoglobin A1C (%)   Date Value   03/05/2019 5.8   06/18/2018 5.8   01/05/2018 5.9             ( goal A1C is < 7)   Microalb/Crt.  Ratio (mcg/mg creat)   Date Value   03/05/2019 CANNOT BE CALCULATED     LDL Cholesterol (mg/dL)   Date Value   06/24/2019 111       (goal LDL is <100)   AST (U/L)   Date Value   06/24/2019 14     ALT (U/L)   Date Value   06/24/2019 12     BUN (mg/dL)   Date Value   06/24/2019 14     BP Readings from Last 3 Encounters:   01/06/20 (!) 156/94   12/13/19 (!) 141/73   11/15/19 132/77          (goal 120/80)    All Future Testing planned in [facility]  Lab Frequency Next Occurrence   Pain Management Drug Screen Once 01/17/2019   PT eval and treat Once 07/11/2019   PT aquatic therapy Once 07/11/2019   Fluoro For Surgical Procedures Once 01/11/2020   PT eval and treat Once 12/13/2019   PT aquatic therapy Once 12/13/2019   EMG Once 01/06/2020   XR Lumbar Spine Flex and Ext Only Once

## 2020-01-10 ENCOUNTER — HOSPITAL ENCOUNTER (OUTPATIENT)
Dept: PAIN MANAGEMENT | Age: 60
Discharge: HOME | End: 2020-01-10
Payer: MEDICARE

## 2020-01-10 VITALS
DIASTOLIC BLOOD PRESSURE: 76 MMHG | HEIGHT: 65 IN | HEART RATE: 80 BPM | OXYGEN SATURATION: 98 % | BODY MASS INDEX: 30.49 KG/M2 | SYSTOLIC BLOOD PRESSURE: 123 MMHG | RESPIRATION RATE: 20 BRPM | WEIGHT: 183 LBS | TEMPERATURE: 98.2 F

## 2020-01-10 PROCEDURE — 99213 OFFICE O/P EST LOW 20 MIN: CPT

## 2020-01-10 PROCEDURE — 99213 OFFICE O/P EST LOW 20 MIN: CPT | Performed by: NURSE PRACTITIONER

## 2020-01-10 RX ORDER — OMEPRAZOLE 20 MG/1
CAPSULE, DELAYED RELEASE ORAL
Qty: 30 CAPSULE | Refills: 11 | Status: SHIPPED | OUTPATIENT
Start: 2020-01-10 | End: 2021-01-15

## 2020-01-10 RX ORDER — NICOTINE 21 MG/24HR
1 PATCH, TRANSDERMAL 24 HOURS TRANSDERMAL EVERY 24 HOURS
COMMUNITY
End: 2020-12-11 | Stop reason: SDUPTHER

## 2020-01-10 RX ORDER — DIPHENHYDRAMINE HCL 25 MG
25 TABLET ORAL EVERY 6 HOURS PRN
COMMUNITY

## 2020-01-10 RX ORDER — HYDROCODONE BITARTRATE AND ACETAMINOPHEN 5; 325 MG/1; MG/1
1 TABLET ORAL EVERY 8 HOURS PRN
Qty: 90 TABLET | Refills: 0 | Status: SHIPPED | OUTPATIENT
Start: 2020-01-17 | End: 2020-02-13 | Stop reason: SDUPTHER

## 2020-01-10 ASSESSMENT — ENCOUNTER SYMPTOMS
BACK PAIN: 1
GASTROINTESTINAL NEGATIVE: 1
RESPIRATORY NEGATIVE: 1

## 2020-01-10 NOTE — PROGRESS NOTES
imaging    1/20/2019  3:29 AM - Libby Martinez Incoming Lab Results From [facility]     Component Value Ref Range & Units Status Collected Lab   6-Acetylmorphine, Ur Not Detected   Final 01/17/2019  4:27 PM [facility]   7-Aminoclonazepam, Urine Not Detected   Final 01/17/2019  4:27 PM [facility]   Alpha-OH-Alpraz, Urine Not Detected   Final 01/17/2019  4:27 PM [facility]   Alprazolam, Urine Not Detected   Final 01/17/2019  4:27 PM [facility]   Amphetamines, urine Not Detected   Final 01/17/2019  4:27 PM [facility]   Barbiturates, Ur Not Detected   Final 01/17/2019  4:27 PM [facility]   Benzoylecgonine, Ur Not Detected   Final 01/17/2019  4:27 PM [facility]   Buprenorphine Urine Not Detected   Final 01/17/2019  4:27 PM [facility]   Carisoprodol, Ur Not Detected   Final 01/17/2019  4:27 PM [facility]   (NOTE)   The carisoprodol immunoassay has cross-reactivity to carisoprodol   and meprobamate.     Clonazepam, Urine Not Detected   Final 01/17/2019  4:27 PM [facility]   Codeine, Urine Not Detected   Final 01/17/2019  4:27 PM [facility]   MDA, Ur Not Detected   Final 01/17/2019  4:27 PM [facility]   Diazepam, Urine Not Detected   Final 01/17/2019  4:27 PM [facility]   Ethyl Glucuronide Ur Not Detected   Final 01/17/2019  4:27 PM [facility]   Fentanyl, Ur Not Detected   Final 01/17/2019  4:27 PM [facility]   Hydrocodone, Urine Present   Final 01/17/2019  4:27 PM [facility]   Hydromorphone, Urine Not Detected   Final 01/17/2019  4:27 PM [facility]   Lorazepam, Urine Not Detected   Final 01/17/2019  4:27 PM [facility]   Marijuana Metab, Ur Not Detected   Final 01/17/2019  4:27 PM [facility]   MDEA, JOSE, Ur Not Detected   Final 01/17/2019  4:27 PM [facility]   MDMA, Urine Not Detected   Final 01/17/2019  4:27 PM [facility]   Meperidine Metab, Ur Not Detected   Final 01/17/2019  4:27 PM [facility]   Methadone, Urine Not Detected   Final 01/17/2019  4:27 PM [facility]   Methamphetamine, Urine Not Detected   Final 01/17/2019  4:27 PM [facility]   Methylphenidate Not Detected   Final 01/17/2019  4:27 PM [facility]   Midazolam, Urine Not Detected   Final 01/17/2019  4:27 PM [facility] Morphine Urine Not Detected   Final 01/17/2019  4:27 PM [facility]   Norbuprenorphine, Urine Not Detected   Final 01/17/2019  4:27 PM [facility]   Nordiazepam, Urine Not Detected   Final 01/17/2019  4:27 PM [facility]   Norfentanyl, Urine Not Detected   Final 01/17/2019  4:27 PM [facility]   NORHYDROCODONE, URINE Present   Final 01/17/2019  4:27 PM [facility]   Noroxycodone, Urine Not Detected   Final 01/17/2019  4:27 PM [facility]   NOROXYMORPHONE, URINE Not Detected   Final 01/17/2019  4:27 PM [facility]   Oxazepam, Urine Not Detected   Final 01/17/2019  4:27 PM [facility]   Oxycodone Urine Not Detected   Final 01/17/2019  4:27 PM [facility]   Oxymorphone, Urine Not Detected   Final 01/17/2019  4:27 PM [facility]   PCP, Urine Not Detected   Final 01/17/2019  4:27 PM [facility]   Phentermine, Ur Not Detected   Final 01/17/2019  4:27 PM [facility]   Propoxyphene, Urine Not Detected   Final 01/17/2019  4:27 PM [facility]   Tapentadol-O-Sulfate, Urine Not Detected   Final 01/17/2019  4:27 PM [facility]   Tapentadol, Urine Not Detected   Final 01/17/2019  4:27 PM [facility]   Temazepam, Urine Not Detected   Final 01/17/2019  4:27 PM [facility]   Tramadol, Urine Not Detected   Final 01/17/2019  4:27 PM [facility]   Zolpidem, Urine Not Detected   Final 01/17/2019  4:27 PM [facility]   Creatinine, Ur 235.6  20.0 - 400.0 mg/dL Final 01/17/2019  4:27 PM [facility]   Pain Mgt Drug Panel, Hi Res, Ur See Below   Final 01/17/2019  4:27 PM [facility]   (NOTE)   Methodology: Qualitative Enzyme Immunoassay and Qualitative Liquid   Chromatography-Time of Flight-Mass Spectrometry or Tandem Mass   Spectrometry, Quantitative Spectrophotometry   The absence of expected drug(s) and/or drug metabolite(s) may   indicate non-compliance, inappropriate timing of specimen   collection relative to drug administration, poor drug absorption,   diluted/adulterated urine, or limitations of testing.  The   concentration must be greater than or equal to the cutoff to be   reported as present.  If specific drug concentrations are   required, contact the laboratory pain with exertional activities. I advised patient not to self-escalate painmedications without consulting with us. At each of patient's future visits we will try to taper pain medications, while adjusting the adjunct medications, and re-evaluating for Physical Therapy to improve spinal andjoint strength. We will continue to have discussions to decrease pain medications as tolerated. Counseled patient on effects their pain medication and /or their medical condition mayhave on their  ability to drive or operate machinery. Instructed not to drive or operate machinery if drowsy     I also discussed with the patient regarding the dangers of combining narcotic pain medication with tranquilizers, alcohol or illegal drugs or taking the medication any way other than prescribed. The dangers were discussed  including respiratory depression and death. Patient was told to tell  all  physicians regarding the medications he is getting from pain clinic. Patient is warned not to take any unprescribed medications over-the-countermedications that can depress breathing . Patient is advised to talk to the pharmacist or physicians if planning to take any over-the-counter medications before  takeing them. Patient is strongly advised to avoid tranquilizers or  relaxants, illegal drugs  or any medications that can depress breathing  Patient is also advised to tell us if there is any changes in their medications from other physicians.         TREATMENT OPTIONS:     Return in 4 weeks  Medication Agreement Requirements Met  Continue Opioid therapy  Script written for  hydrocodone  Follow up appointment made

## 2020-01-10 NOTE — DISCHARGE INSTR - COC
Continuity of Care Form    Patient Name: Ben Kent   :  1960  MRN:  [id number]    Admit date:  1/10/2020  Discharge date:  ***    Code Status Order: No Order   Advance Directives:     Admitting Physician:  No admitting provider for patient encounter. PCP: Gardenia Garcia MD    Discharging Nurse: [facility] Unit/Room#: No information available for this encounter. Discharging Unit Phone Number: ***    Emergency Contact:   Extended Emergency Contact Information  Primary Emergency Contact: [facility] Phone: [phone]  Work Phone: [phone]  Mobile Phone: [phone]  Relation: Parent  Secondary Emergency Contact: Andrew Cheng  Address: N/A   [address] Phone: [phone]  Work Phone: [phone]  Mobile Phone: [phone]  Relation: Brother/Sister  Hearing or visual needs: None  Other needs: None  Preferred language: English   needed?  No    Past Surgical History:  Past Surgical History:   Procedure Laterality Date    COLONOSCOPY  2009    normal    DILATION AND CURETTAGE OF UTERUS      GASTRECTOMY      partial    NERVE BLOCK Right 13    Lumbar Diagnostic Block,  Kenalog 40 mg    NERVE BLOCK  13    Lumbar Radiofrequency, Kenalog 40mg    NERVE BLOCK  13    Lt MBNB  celestone 6mg    NERVE BLOCK Left 13    left lumbar diagnostic block #2 decadron 10 mg    NERVE BLOCK Left 13    left lumbar median branch radiofrequency    NERVE BLOCK  14    caudal, celestone 9 mg    NERVE BLOCK  14    caudal epidural #2, celestone 9mg, fentanyl 25mcg    NERVE BLOCK  14    caudal #3 decadron 10mg    NERVE BLOCK  14    duramorph epidural steroid block  duramorph 1 mg celestone 9 mg    NERVE BLOCK  11/20/15    TENS- Empi Select    NERVE BLOCK  2018    right transforminal # 1 decadron 10mg,isovue    NERVE BLOCK Bilateral 2019    bilat mbnb- no steroid    NERVE BLOCK Bilateral 2019

## 2020-02-10 RX ORDER — CETIRIZINE HYDROCHLORIDE 10 MG/1
TABLET ORAL
Qty: 30 TABLET | Refills: 2 | Status: SHIPPED | OUTPATIENT
Start: 2020-02-10 | End: 2020-06-11

## 2020-02-10 NOTE — TELEPHONE ENCOUNTER
Request for cetirizine - medication pended. Please fill if appropriate. Next Visit Date:  Future Appointments   Date Time Provider Claudy Schaeffer   2/12/2020  2:00 PM Aida Skelton [facility]   2/13/2020  1:30 PM Danie Zuleta, APRN - CNP [address] Maintenance   Topic Date Due    Hepatitis B vaccine (1 of 3 - Risk 3-dose series) 07/08/1977    Diabetic retinal exam  09/26/2017    Annual Wellness Visit (AWV)  05/29/2019    Diabetic foot exam  06/18/2019    Shingles Vaccine (2 of 2) 08/19/2019    A1C test (Diabetic or Prediabetic)  03/05/2020    Diabetic microalbuminuria test  03/05/2020    Colon Cancer Screen FIT/FOBT  05/06/2020    Lipid screen  06/24/2020    Potassium monitoring  06/24/2020    Creatinine monitoring  06/24/2020    Low dose CT lung screening  12/26/2020    Cervical cancer screen  02/15/2021    Breast cancer screen  11/22/2021    DTaP/Tdap/Td vaccine (2 - Td) 06/24/2029    Flu vaccine  Completed    Pneumococcal 0-64 years Vaccine  Completed    Hepatitis C screen  Completed    HIV screen  Completed    Hepatitis A vaccine  Aged Out    Hib vaccine  Aged Out    Meningococcal (ACWY) vaccine  Aged Out       Hemoglobin A1C (%)   Date Value   03/05/2019 5.8   06/18/2018 5.8   01/05/2018 5.9             ( goal A1C is < 7)   Microalb/Crt.  Ratio (mcg/mg creat)   Date Value   03/05/2019 CANNOT BE CALCULATED     LDL Cholesterol (mg/dL)   Date Value   06/24/2019 111       (goal LDL is <100)   AST (U/L)   Date Value   06/24/2019 14     ALT (U/L)   Date Value   06/24/2019 12     BUN (mg/dL)   Date Value   06/24/2019 14     BP Readings from Last 3 Encounters:   01/10/20 123/76   01/06/20 (!) 156/94   12/13/19 (!) 141/73          (goal 120/80)    All Future Testing planned in [facility]  Lab Frequency Next Occurrence   PT eval and treat Once 07/11/2019   PT aquatic therapy Once 07/11/2019   Fluoro For Surgical Procedures Once 01/11/2020   PT eval and treat Once 12/13/2019   PT aquatic therapy Once 12/13/2019   EMG Once 03/06/2020   XR Lumbar Spine Flex and Ext Only Once 01/06/2021         Patient Active Problem List:     DJD (degenerative joint disease) of knee     Osteoarthritis of spine with radiculopathy, lumbar region     GERD (gastroesophageal reflux disease)     COPD (chronic obstructive pulmonary disease)     HTN (hypertension)     Allergic rhinitis     Lipoma of shoulder s/p excision right posterior 11 17 2008     DM (diabetes mellitus)     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     Primary osteoarthritis of both knees     Medication monitoring encounter     Chronic low back pain     Major depression, chronic     Chronic respiratory failure with hypoxia (HCC)     Mitral and aortic insufficiency     Pure hypercholesterolemia     Spondylosis of lumbar region without myelopathy or radiculopathy

## 2020-02-13 ENCOUNTER — HOSPITAL ENCOUNTER (OUTPATIENT)
Dept: PAIN MANAGEMENT | Age: 60
Discharge: HOME | End: 2020-02-13
Payer: MEDICARE

## 2020-02-13 VITALS — RESPIRATION RATE: 18 BRPM | SYSTOLIC BLOOD PRESSURE: 136 MMHG | HEART RATE: 78 BPM | DIASTOLIC BLOOD PRESSURE: 78 MMHG

## 2020-02-13 PROCEDURE — 99214 OFFICE O/P EST MOD 30 MIN: CPT | Performed by: NURSE PRACTITIONER

## 2020-02-13 PROCEDURE — 99213 OFFICE O/P EST LOW 20 MIN: CPT

## 2020-02-13 PROCEDURE — 80307 DRUG TEST PRSMV CHEM ANLYZR: CPT

## 2020-02-13 RX ORDER — HYDROCODONE BITARTRATE AND ACETAMINOPHEN 5; 325 MG/1; MG/1
1 TABLET ORAL EVERY 8 HOURS PRN
Qty: 84 TABLET | Refills: 0 | Status: SHIPPED | OUTPATIENT
Start: 2020-02-14 | End: 2020-03-13 | Stop reason: SDUPTHER

## 2020-02-13 ASSESSMENT — ENCOUNTER SYMPTOMS
CONSTIPATION: 0
COUGH: 0
SHORTNESS OF BREATH: 0
BACK PAIN: 1

## 2020-02-13 NOTE — PROGRESS NOTES
Patient is here today to review medication contract. Chief Complaint:  Knee and back pain    [facility]     Knee    Patient has had low back pain with no known injury and bilateral knee pain for many years. Dalia Pruett had a knee arthroscopy in March 2017 Genicular block was discussed but pt would like to wait.      Back  She is s/p lumbar diskectomy in 2015.  She had bilat  Lumbar Facet Nerve Block  L4 - 5 and L5 - S1 x2  2/2019 and reported this worsened her pain and her blood sugar was elevated for three days following the procedure - though no steroid was used in injections. And she is not intereseted in following with RFA even after explanation of procedure given.  MRI reviewed with pt at previous visit and. NS consult ordered. f/u with NS in Jan for c/o back pain with EMG and XR ordered and return to discuss results  She is trying to quit smoking. Currently on O2 at night due to exacerbation of COPD. She also uses TENS daily with some relief. She is stable and compliant on norco 5/325 TID- also taking mobic with benefit. Gabapentin prescribed by PCP for neuropathic pain in feet    [facility] ordered at last visit but pt would like to go closer to home - order reprinted today      HPI:     Back Pain   This is a chronic problem. The current episode started more than 1 year ago. The problem occurs constantly. The problem has been gradually worsening since onset. The pain is present in the lumbar spine. The pain radiates to the right foot. The pain is at a severity of 7/10. The pain is moderate. The symptoms are aggravated by bending, position, lying down and standing. Associated symptoms include leg pain, numbness and paresthesias. Pertinent negatives include no chest pain or fever. Risk factors include lack of exercise, menopause and obesity. She has tried analgesics for the symptoms. The treatment provided mild relief. Patient denies any new neurological symptoms.  No bowel or bladder incontinence, no weakness, and no falling. Pill count: appropriate 2/16    Morphine equivalent: 15    Periodic Controlled Substance Monitoring: Possible medication side effects, risk of tolerance/dependence & alternative treatments discussed., No signs of potential drug abuse or diversion identified. , Assessed functional status., Obtaining appropriate analgesic effect of treatment.  Meera Treadwell, APRN - CNP)      Past Medical History:   Diagnosis Date    Allergic rhinitis     Arthropathy, unspecified, other specified sites 4/30/2013    Bronchitis     Chronic back pain     COPD [facility] obstructive [facility]) [facility] [address]     Depression     [facility] 12/18/2012    Emphysema of lung (HCC)     GERD (gastroesophageal reflux disease)     Hyperlipidemia     Hypertension     Knee pain     right knee mostly    Leg pain, right     Obesity     Osteoarthritis     Peripheral vascular disease (HCC)     Primary osteoarthritis of both knees     Radicular pain of lumbosacral region     Spinal stenosis, lumbar region, without neurogenic claudication 4/30/2013    Type II or unspecified type diabetes mellitus without mention of complication, not stated as uncontrolled     Unspecified sleep apnea     URI (upper respiratory infection)        Past Surgical History:   Procedure Laterality Date    COLONOSCOPY  2/12/2009    normal    DILATION AND CURETTAGE OF UTERUS      GASTRECTOMY      partial    NERVE BLOCK Right 4/30/13    Lumbar Diagnostic Block,  Kenalog 40 mg    NERVE BLOCK  5/23/13    Lumbar Radiofrequency, Kenalog 40mg    NERVE BLOCK  8/12/13    Lt MBNB  celestone 6mg    NERVE BLOCK Left 8-28-13    left lumbar diagnostic block #2 decadron 10 mg    NERVE BLOCK Left 9-24-13    left lumbar median branch radiofrequency    NERVE BLOCK  07-02-14    caudal, celestone 9 mg    NERVE BLOCK  7-16-14    caudal epidural #2, celestone 9mg, fentanyl 25mcg    NERVE BLOCK  7/30/14    caudal #3 decadron 10mg    Disp: 180 tablet, Rfl: 1    atorvastatin (LIPITOR) 40 MG tablet, Take 1 tablet by mouth daily, Disp: 90 tablet, Rfl: 1    metFORMIN (GLUCOPHAGE) 500 MG tablet, Take 1 tablet by mouth 2 times daily (with meals), Disp: 180 tablet, Rfl: 1    lisinopril-hydrochlorothiazide (PRINZIDE;ZESTORETIC) 20-25 MG per tablet, TAKE 1 TABLET EVERY DAY, Disp: 90 tablet, Rfl: 3    gabapentin (NEURONTIN) 300 MG capsule, TAKE 1 CAPSULE IN MORNINGAND AFTERNOON AND 2 AT NIGHT, Disp: 120 capsule, Rfl: 2    blood glucose test strips (EXACTECH TEST) strip, 1 each by In Vitro route daily As needed. , Disp: 50 each, Rfl: 11    acetaminophen (TYLENOL) 500 MG tablet, Take 1 tablet by mouth 4 times daily as needed for Pain, Disp: 30 tablet, Rfl: 0     MG capsule, TAKE 1 CAPSULE EVERY DAY, Disp: 30 capsule, Rfl: 11    trospium (SANCTURA) 20 MG tablet, Take 1 tablet by mouth 2 times daily, Disp: 60 tablet, Rfl: 3    amLODIPine (NORVASC) 10 MG tablet, TAKE 1 TABLET BY MOUTH DAILY, Disp: 90 tablet, Rfl: 3    albuterol sulfate HFA (VENTOLIN HFA) 108 (90 Base) MCG/ACT inhaler, Inhale 2 puffs into the lungs every 6 hours as needed for Wheezing, Disp: 1 Inhaler, Rfl: 3    mirtazapine (REMERON) 30 MG tablet, Take 30 mg by mouth nightly, Disp: , Rfl:     mirtazapine (REMERON) 15 MG tablet, Take 15 mg by mouth nightly, Disp: , Rfl:     DULoxetine (CYMBALTA) 60 MG extended release capsule, Take 1 capsule by mouth daily, Disp: 30 capsule, Rfl: 3    Lancets MISC, 1 each by Does not apply route daily, Disp: 100 each, Rfl: 11    azelastine (ASTELIN) 0.1 % nasal spray, 2 sprays by Nasal route 2 times daily Use in each nostril as directed, Disp: 1 Bottle, Rfl: 3    ipratropium-albuterol (DUONEB) 0.5-2.5 (3) MG/3ML SOLN nebulizer solution, Inhale 3 mLs into the lungs every 6 hours as needed for Shortness of Breath, Disp: 360 mL, Rfl: 11    Family History   Problem Relation Age of Onset    Diabetes Mother     Heart Disease Mother    [employer]   Constitution: Negative for chills and fever. Cardiovascular: Negative for chest pain. Respiratory: Negative for cough and shortness of breath. Musculoskeletal: Positive for arthritis, back pain, muscle cramps, muscle weakness, myalgias and stiffness. Negative for falls. Gastrointestinal: Negative for constipation. Neurological: Positive for numbness and paresthesias. Physical Exam:  /78   Pulse 78   Resp 18   LMP 04/19/2003     Physical Exam  Cardiovascular:      Rate and Rhythm: Normal rate. Pulmonary:      Effort: Pulmonary effort is normal.   Musculoskeletal:      Lumbar back: She exhibits decreased range of motion. Comments: Antalgic gait using cane   Skin:     General: Skin is warm and dry. Neurological:      Mental Status: She is alert and oriented to person, place, and time. Record/Diagnostics Review:    Last óscar 1/2019 and was appropriate     Assessment:  Problem List Items Addressed This Visit     Osteoarthritis of spine with radiculopathy, lumbar region - Primary    Relevant Medications    HYDROcodone-acetaminophen ([address]) 5-325 MG per tablet (Start on 2/14/2020)    Other Relevant Orders    Amb External Referral To Physical Therapy    Primary osteoarthritis of both knees    Relevant Medications    HYDROcodone-acetaminophen (NORCO) 5-325 MG per tablet (Start on 2/14/2020)    Chronic low back pain    Relevant Medications    HYDROcodone-acetaminophen (NORCO) 5-325 MG per tablet (Start on 2/14/2020)    Other Relevant Orders    Amb External Referral To Physical Therapy    Spondylosis of lumbar region without myelopathy or radiculopathy    Relevant Medications    HYDROcodone-acetaminophen (NORCO) 5-325 MG per tablet (Start on 2/14/2020)    Other Relevant Orders    Amb External Referral To Physical Therapy             Treatment Plan:  Patient relates current medications are helping the pain.  Patient reports taking pain medications as prescribed, denies

## 2020-02-17 LAB
6-ACETYLMORPHINE, UR: NOT DETECTED
7-AMINOCLONAZEPAM, URINE: NOT DETECTED
ALPHA-OH-ALPRAZ, URINE: NOT DETECTED
ALPRAZOLAM, URINE: NOT DETECTED
AMPHETAMINES, URINE: NOT DETECTED
BARBITURATES, URINE: NOT DETECTED
BENZOYLECGONINE, UR: NOT DETECTED
BUPRENORPHINE URINE: NOT DETECTED
CARISOPRODOL, UR: NOT DETECTED
CLONAZEPAM, URINE: NOT DETECTED
CODEINE, URINE: NOT DETECTED
CREATININE URINE: 54.6 MG/DL (ref 20–400)
DIAZEPAM, URINE: NOT DETECTED
DRUGS EXPECTED, UR: NORMAL
EER HI RES INTERP UR: NORMAL
ETHYL GLUCURONIDE UR: NOT DETECTED
FENTANYL URINE: NOT DETECTED
HYDROCODONE, URINE: NOT DETECTED
HYDROMORPHONE, URINE: NOT DETECTED
LORAZEPAM, URINE: NOT DETECTED
MARIJUANA METAB, UR: NOT DETECTED
MDA, UR: NOT DETECTED
MDEA, EVE, UR: NOT DETECTED
MDMA URINE: NOT DETECTED
MEPERIDINE METAB, UR: NOT DETECTED
METHADONE, URINE: NOT DETECTED
METHAMPHETAMINE, URINE: NOT DETECTED
METHYLPHENIDATE: NOT DETECTED
MIDAZOLAM, URINE: NOT DETECTED
MORPHINE URINE: NOT DETECTED
NORBUPRENORPHINE, URINE: NOT DETECTED
NORDIAZEPAM, URINE: NOT DETECTED
NORFENTANYL, URINE: NOT DETECTED
NORHYDROCODONE, URINE: NOT DETECTED
NOROXYCODONE, URINE: NOT DETECTED
NOROXYMORPHONE, URINE: NOT DETECTED
OXAZEPAM, URINE: NOT DETECTED
OXYCODONE URINE: NOT DETECTED
OXYMORPHONE, URINE: NOT DETECTED
PAIN MANAGEMENT DRUG PANEL INTERP, URINE: NORMAL
PAIN MGT DRUG PANEL, HI RES, UR: NORMAL
PCP,URINE: NOT DETECTED
PHENTERMINE, UR: NOT DETECTED
PROPOXYPHENE, URINE: NOT DETECTED
TAPENTADOL, URINE: NOT DETECTED
TAPENTADOL-O-SULFATE, URINE: NOT DETECTED
TEMAZEPAM, URINE: NOT DETECTED
TRAMADOL, URINE: NOT DETECTED
ZOLPIDEM, URINE: NOT DETECTED

## 2020-03-13 ENCOUNTER — HOSPITAL ENCOUNTER (OUTPATIENT)
Dept: PAIN MANAGEMENT | Age: 60
Discharge: HOME | End: 2020-03-13
Payer: MEDICARE

## 2020-03-13 VITALS
WEIGHT: 183 LBS | DIASTOLIC BLOOD PRESSURE: 110 MMHG | OXYGEN SATURATION: 95 % | HEIGHT: 66 IN | TEMPERATURE: 98.4 F | BODY MASS INDEX: 29.41 KG/M2 | SYSTOLIC BLOOD PRESSURE: 138 MMHG | RESPIRATION RATE: 16 BRPM | HEART RATE: 76 BPM

## 2020-03-13 PROCEDURE — 99213 OFFICE O/P EST LOW 20 MIN: CPT

## 2020-03-13 PROCEDURE — 99213 OFFICE O/P EST LOW 20 MIN: CPT | Performed by: NURSE PRACTITIONER

## 2020-03-13 RX ORDER — HYDROCODONE BITARTRATE AND ACETAMINOPHEN 5; 325 MG/1; MG/1
1 TABLET ORAL EVERY 8 HOURS PRN
Qty: 84 TABLET | Refills: 0 | Status: SHIPPED | OUTPATIENT
Start: 2020-03-13 | End: 2020-04-10 | Stop reason: SDUPTHER

## 2020-03-13 ASSESSMENT — ENCOUNTER SYMPTOMS
ROS SKIN COMMENTS: ECZEMA
BACK PAIN: 1
GASTROINTESTINAL NEGATIVE: 1

## 2020-03-13 NOTE — PROGRESS NOTES
appropriate:  2-    Record/Diagnostics Review:      As above, I did review the imaging     2/17/2020  7:21 PM - Juan, Libby Incoming Lab Results From [facility]     Component Value Ref Range & Units Status Collected Lab   Pain Management Drug Panel Interp, Urine Consistent   Final 02/13/2020  1:30 PM [facility]   (NOTE)   ________________________________________________________________   DRUGS EXPECTED:   [address] (HYDROCODONE) [2/13/20]   ________________________________________________________________   CONSISTENT with medications provided:   [address] (HYDROCODONE) : based on hydrocodone and metabolite detected   below cutoff   ________________________________________________________________   Drugs Not Included in this Assay:   Acetaminophen   ________________________________________________________________   INTERPRETIVE INFORMATION: Pain Mgt [place], [place] Spec/EMIT, Ur,                            Interp   Interpretation depends on accuracy and completeness of patient   medication information submitted by client. 6-Acetylmorphine, Ur Not Detected   Final 02/13/2020  1:30 PM [facility]   7-Aminoclonazepam, Urine Not Detected   Final 02/13/2020  1:30 PM [facility]   Alpha-OH-Alpraz, Urine Not Detected   Final 02/13/2020  1:30 PM [facility]   Alprazolam, Urine Not Detected   Final 02/13/2020  1:30 PM [facility]   Amphetamines, urine Not Detected   Final 02/13/2020  1:30 PM [facility]   Barbiturates, Ur Not Detected   Final 02/13/2020  1:30 PM [facility]   Benzoylecgonine, Ur Not Detected   Final 02/13/2020  1:30 PM [facility]   Buprenorphine Urine Not Detected   Final 02/13/2020  1:30 PM [facility]   Carisoprodol, Ur Not Detected   Final 02/13/2020  1:30 PM [facility]   (NOTE)   The carisoprodol immunoassay has cross-reactivity to carisoprodol   and meprobamate.     Clonazepam, Urine Not Detected   Final 02/13/2020  1:30 PM [facility]   Codeine, Urine Not Detected   Final 02/13/2020  1:30 PM [facility]   MDA, Ur Not Detected   Final 02/13/2020  1:30 PM [facility]   Diazepam, Urine Not Detected Final 02/13/2020  1:30 PM [facility]   Ethyl Glucuronide Ur Not Detected   Final 02/13/2020  1:30 PM [facility]   Fentanyl, Ur Not Detected   Final 02/13/2020  1:30 PM [facility]   Hydrocodone, Urine Not Detected   Final 02/13/2020  1:30 PM [facility]   Hydromorphone, Urine Not Detected   Final 02/13/2020  1:30 PM [facility]   Lorazepam, Urine Not Detected   Final 02/13/2020  1:30 PM [facility]   Marijuana Metab, Ur Not Detected   Final 02/13/2020  1:30 PM [facility]   MDEA, JOSE, Ur Not Detected   Final 02/13/2020  1:30 PM [facility]   MDMA, Urine Not Detected   Final 02/13/2020  1:30 PM [facility]   Meperidine Metab, Ur Not Detected   Final 02/13/2020  1:30 PM [facility]   Methadone, Urine Not Detected   Final 02/13/2020  1:30 PM [facility]   Methamphetamine, Urine Not Detected   Final 02/13/2020  1:30 PM [facility]   Methylphenidate Not Detected   Final 02/13/2020  1:30 PM [facility]   Midazolam, Urine Not Detected   Final 02/13/2020  1:30 PM [facility]   Morphine Urine Not Detected   Final 02/13/2020  1:30 PM [facility]   Norbuprenorphine, Urine Not Detected   Final 02/13/2020  1:30 PM [facility]   Nordiazepam, Urine Not Detected   Final 02/13/2020  1:30 PM [facility]   Norfentanyl, Urine Not Detected   Final 02/13/2020  1:30 PM [facility]   NORHYDROCODONE, URINE Not Detected   Final 02/13/2020  1:30 PM [facility]   Noroxycodone, Urine Not Detected   Final 02/13/2020  1:30 PM [facility]   NOROXYMORPHONE, URINE Not Detected   Final 02/13/2020  1:30 PM [facility]   Oxazepam, Urine Not Detected   Final 02/13/2020  1:30 PM [facility]   Oxycodone Urine Not Detected   Final 02/13/2020  1:30 PM [facility]   Oxymorphone, Urine Not Detected   Final 02/13/2020  1:30 PM [facility]   PCP, Urine Not Detected   Final 02/13/2020  1:30 PM [facility]   Phentermine, Ur Not Detected   Final 02/13/2020  1:30 PM [facility]   Propoxyphene, Urine Not Detected   Final 02/13/2020  1:30 PM [facility]   Tapentadol-O-Sulfate, Urine Not Detected   Final 02/13/2020  1:30 PM [facility]   Tapentadol, Urine Not Detected   Final 02/13/2020  1:30 PM [facility]   Temazepam, Urine Not Detected   Final 02/13/2020  1:30 Cancer Neg Hx     Cancer Neg Hx     Colon Cancer Neg Hx     Eclampsia Neg Hx     Hypertension Neg Hx     Ovarian Cancer Neg Hx      Labor Neg Hx     Spont Abortions Neg Hx     Stroke Neg Hx        Social History     Socioeconomic History    Marital status: Single     Spouse name: Not on file    Number of children: Not on file    Years of education: Not on file    Highest education level: Not on file   Occupational History    Not on file   Social Needs    Financial resource strain: Not on file    Food insecurity     Worry: Not on file     Inability: Not on file    Transportation needs     Medical: Not on file     Non-medical: Not on file   Tobacco Use    Smoking status: Current Every Day Smoker     Packs/day: 1.00     Years: 36.00     Pack years: 36.00     Types: Cigarettes     Last attempt to quit: 2015     Years since quittin.6    Smokeless tobacco: Never Used    Tobacco comment: pt currently using nicotine patches   2 CIGARETTES A DAY   Substance and Sexual Activity    Alcohol use: No     Alcohol/week: 0.0 standard drinks    Drug use: No     Comment: 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 7 yrs    Sexual activity: Never   Lifestyle    Physical activity     Days per week: Not on file     Minutes per session: Not on file    Stress: Not on file   Relationships    Social connections     Talks on phone: Not on file     Gets together: Not on file     Attends [employer] service: Not on file     Active member of club or organization: Not on file     Attends meetings of clubs or organizations: Not on file     Relationship status: Not on file    Intimate partner violence     Fear of current or ex partner: Not on file     Emotionally abused: Not on file     Physically abused: Not on file     Forced sexual activity: Not on file   Other Topics Concern    Not on file   Social History Narrative    Not on file       Travel Screen    Have you been in contact with someone who was normal.         Speech: Speech normal.         Behavior: Behavior normal.         Thought Content: Thought content normal.         Cognition and Memory: Cognition normal.         Judgment: Judgment normal.           Assessment:    Problem List Items Addressed This Visit     Spondylosis of lumbar region without myelopathy or radiculopathy    Relevant Medications    HYDROcodone-acetaminophen (NORCO) 5-325 MG per tablet    Primary osteoarthritis of both knees    Relevant Medications    HYDROcodone-acetaminophen (NORCO) 5-325 MG per tablet    Osteoarthritis of spine with radiculopathy, lumbar region - Primary    Relevant Medications    HYDROcodone-acetaminophen (NORCO) 5-325 MG per tablet    Medication monitoring encounter    Chronic low back pain    Relevant Medications    HYDROcodone-acetaminophen (NORCO) 5-325 MG per tablet              Treatment Plan:  DISCUSSION: Treatment options discussed withpatient and all questions answered to patient's satisfaction. Possible side effects, risk of tolerance and or dependence and alternative treatments discussed    Obtaining appropriate analgesic effect of treatment   No signs of potential drug abuse or diversion identified    [x] Ill effects of being on chronic pain medications such as sleep disturbances, respiratory depression, hormonal changes, withdrawal symptoms, chronic opioid dependence and tolerance as well as risk of taking opioids with Benzodiazepines and taking opioids along with alcohol,  werediscussed with patient. I had asked the patient to minimize medication use and utilize pain medications only for uncontrolled rest pain or pain with exertional activities. I advised patient not to self-escalate painmedications without consulting with us. At each of patient's future visits we will try to taper pain medications, while adjusting the adjunct medications, and re-evaluating for Physical Therapy to improve spinal andjoint strength.  We will continue to have

## 2020-04-02 ENCOUNTER — VIRTUAL VISIT (OUTPATIENT)
Dept: INTERNAL MEDICINE | Age: 60
End: 2020-04-02
Payer: MEDICARE

## 2020-04-02 PROCEDURE — 99442 PR PHYS/QHP TELEPHONE EVALUATION 11-20 MIN: CPT | Performed by: INTERNAL MEDICINE

## 2020-04-02 RX ORDER — CARVEDILOL 6.25 MG/1
6.25 TABLET ORAL 2 TIMES DAILY
Qty: 180 TABLET | Refills: 1 | Status: SHIPPED | OUTPATIENT
Start: 2020-04-02 | End: 2020-09-22 | Stop reason: SDUPTHER

## 2020-04-02 RX ORDER — LISINOPRIL AND HYDROCHLOROTHIAZIDE 25; 20 MG/1; MG/1
TABLET ORAL
Qty: 90 TABLET | Refills: 3 | Status: SHIPPED | OUTPATIENT
Start: 2020-04-02 | End: 2020-09-22 | Stop reason: SDUPTHER

## 2020-04-02 RX ORDER — AMLODIPINE BESYLATE 10 MG/1
10 TABLET ORAL DAILY
Qty: 90 TABLET | Refills: 3 | Status: ON HOLD | OUTPATIENT
Start: 2020-04-02 | End: 2020-12-02 | Stop reason: HOSPADM

## 2020-04-02 RX ORDER — ATORVASTATIN CALCIUM 40 MG/1
40 TABLET, FILM COATED ORAL DAILY
Qty: 90 TABLET | Refills: 1 | Status: SHIPPED | OUTPATIENT
Start: 2020-04-02 | End: 2020-09-22 | Stop reason: SDUPTHER

## 2020-04-02 RX ORDER — ALBUTEROL SULFATE 90 UG/1
2 AEROSOL, METERED RESPIRATORY (INHALATION) EVERY 6 HOURS PRN
Qty: 1 INHALER | Refills: 3 | Status: SHIPPED | OUTPATIENT
Start: 2020-04-02 | End: 2021-10-26 | Stop reason: SDUPTHER

## 2020-04-02 NOTE — PROGRESS NOTES
next 24 hours.     Total Time: minutes: 5-10 minutes    Note: not billable if this call serves to triage the patient into an appointment for the relevant concern      Amy Romero

## 2020-04-10 RX ORDER — HYDROCODONE BITARTRATE AND ACETAMINOPHEN 5; 325 MG/1; MG/1
1 TABLET ORAL EVERY 8 HOURS PRN
Qty: 90 TABLET | Refills: 0 | Status: SHIPPED | OUTPATIENT
Start: 2020-04-10 | End: 2020-05-06 | Stop reason: SDUPTHER

## 2020-04-21 ENCOUNTER — HOSPITAL ENCOUNTER (OUTPATIENT)
Age: 60
Setting detail: SPECIMEN
Discharge: HOME | End: 2020-04-21
Payer: MEDICARE

## 2020-04-21 LAB
ALBUMIN SERPL-MCNC: 4 G/DL (ref 3.5–5.2)
ALBUMIN/GLOBULIN RATIO: 1.1 (ref 1–2.5)
ALP BLD-CCNC: 66 U/L (ref 35–104)
ALT SERPL-CCNC: 21 U/L (ref 5–33)
ANION GAP SERPL CALCULATED.3IONS-SCNC: 12 MMOL/L (ref 9–17)
AST SERPL-CCNC: 18 U/L
BILIRUB SERPL-MCNC: 0.31 MG/DL (ref 0.3–1.2)
BUN BLDV-MCNC: 15 MG/DL (ref 8–23)
BUN/CREAT BLD: NORMAL (ref 9–20)
CALCIUM SERPL-MCNC: 9.5 MG/DL (ref 8.6–10.4)
CHLORIDE BLD-SCNC: 102 MMOL/L (ref 98–107)
CHOLESTEROL/HDL RATIO: 3.7
CHOLESTEROL: 201 MG/DL
CO2: 23 MMOL/L (ref 20–31)
CREAT SERPL-MCNC: 0.59 MG/DL (ref 0.5–0.9)
CREATININE URINE: 57.8 MG/DL (ref 28–217)
GFR AFRICAN AMERICAN: >60 ML/MIN
GFR NON-AFRICAN AMERICAN: >60 ML/MIN
GFR SERPL CREATININE-BSD FRML MDRD: NORMAL ML/MIN/{1.73_M2}
GFR SERPL CREATININE-BSD FRML MDRD: NORMAL ML/MIN/{1.73_M2}
GLUCOSE BLD-MCNC: 91 MG/DL (ref 70–99)
HDLC SERPL-MCNC: 54 MG/DL
LDL CHOLESTEROL: 116 MG/DL (ref 0–130)
MICROALBUMIN/CREAT 24H UR: <12 MG/L
MICROALBUMIN/CREAT UR-RTO: NORMAL MCG/MG CREAT
POTASSIUM SERPL-SCNC: 4.1 MMOL/L (ref 3.7–5.3)
SODIUM BLD-SCNC: 137 MMOL/L (ref 135–144)
TOTAL PROTEIN: 7.7 G/DL (ref 6.4–8.3)
TRIGL SERPL-MCNC: 155 MG/DL
VLDLC SERPL CALC-MCNC: ABNORMAL MG/DL (ref 1–30)

## 2020-04-22 LAB
ESTIMATED AVERAGE GLUCOSE: 120 MG/DL
HBA1C MFR BLD: 5.8 % (ref 4–6)

## 2020-04-30 ENCOUNTER — TELEPHONE (OUTPATIENT)
Dept: PAIN MANAGEMENT | Age: 60
End: 2020-04-30

## 2020-04-30 NOTE — TELEPHONE ENCOUNTER
Left message requesting return call to discuss Virtual Visit instructions and change of appointment time on 5/6/2020

## 2020-05-06 ENCOUNTER — HOSPITAL ENCOUNTER (OUTPATIENT)
Dept: PAIN MANAGEMENT | Age: 60
Discharge: HOME | End: 2020-05-06
Payer: MEDICARE

## 2020-05-06 PROCEDURE — 99442 PR PHYS/QHP TELEPHONE EVALUATION 11-20 MIN: CPT | Performed by: NURSE PRACTITIONER

## 2020-05-06 PROCEDURE — 99213 OFFICE O/P EST LOW 20 MIN: CPT

## 2020-05-06 RX ORDER — MELOXICAM 15 MG/1
15 TABLET ORAL DAILY
Qty: 30 TABLET | Refills: 3 | Status: ON HOLD | OUTPATIENT
Start: 2020-05-06 | End: 2020-12-02 | Stop reason: HOSPADM

## 2020-05-06 RX ORDER — HYDROCODONE BITARTRATE AND ACETAMINOPHEN 5; 325 MG/1; MG/1
1 TABLET ORAL EVERY 8 HOURS PRN
Qty: 90 TABLET | Refills: 0 | Status: SHIPPED | OUTPATIENT
Start: 2020-05-08 | End: 2020-06-05 | Stop reason: SDUPTHER

## 2020-05-06 ASSESSMENT — ENCOUNTER SYMPTOMS
SHORTNESS OF BREATH: 0
CONSTIPATION: 0
COUGH: 0
BACK PAIN: 1

## 2020-05-06 NOTE — PROGRESS NOTES
include no chest pain or fever. She has tried analgesics and bed rest for the symptoms. The treatment provided mild relief. Leg Pain    The incident occurred more than 1 week ago. There was no injury mechanism. The pain is present in the right knee and right thigh. The quality of the pain is described as aching. The pain is at a severity of 8/10. The pain is moderate. The pain has been constant since onset. Associated symptoms include tingling. The symptoms are aggravated by movement and weight bearing. She has tried non-weight bearing and rest for the symptoms. The treatment provided mild relief. Patient denies any new neurological symptoms. No bowel or bladder incontinence, no weakness, and no falling. Pill count: appropriate    Morphine equivalent: 15     Periodic Controlled Substance Monitoring: Possible medication side effects, risk of tolerance/dependence & alternative treatments discussed., No signs of potential drug abuse or diversion identified. , Assessed functional status., Obtaining appropriate analgesic effect of treatment.  Masha Weldon, APRN - CNP)      Past Medical History:   Diagnosis Date    Allergic rhinitis     Arthropathy, unspecified, other specified sites 4/30/2013    Bronchitis     Chronic back pain     [facility] obstructive [facility] disease) ([facility]     Depression     DM (diabetes mellitus) ([facility] [address] 12/18/2012    Emphysema of lung (HCC)     GERD (gastroesophageal reflux disease)     Hyperlipidemia     Hypertension     Knee pain     right knee mostly    Leg pain, right     Obesity     Osteoarthritis     Peripheral vascular disease (HCC)     Primary osteoarthritis of both knees     Radicular pain of lumbosacral region     Spinal stenosis, lumbar region, without neurogenic claudication 4/30/2013    Type II or unspecified type diabetes mellitus without mention of complication, not stated as uncontrolled     Unspecified sleep apnea     URI (upper respiratory infection)        Past Surgical History:   Procedure Laterality Date    COLONOSCOPY  2/12/2009    normal    DILATION AND CURETTAGE OF UTERUS      GASTRECTOMY      partial    NERVE BLOCK Right 4/30/13    Lumbar Diagnostic Block,  Kenalog 40 mg    NERVE BLOCK  5/23/13    Lumbar Radiofrequency, Kenalog 40mg    NERVE BLOCK  8/12/13    Lt MBNB  celestone 6mg    NERVE BLOCK Left 8-28-13    left lumbar diagnostic block #2 decadron 10 mg    NERVE BLOCK Left 9-24-13    left lumbar median branch radiofrequency    NERVE BLOCK  07-02-14    caudal, celestone 9 mg    NERVE BLOCK  7-16-14    caudal epidural #2, celestone 9mg, fentanyl 25mcg    NERVE BLOCK  7/30/14    caudal #3 decadron 10mg    NERVE BLOCK  11-6-14    duramorph epidural steroid block  duramorph 1 mg celestone 9 mg    NERVE BLOCK  11/20/15    TENS- [employer]    NERVE BLOCK  07/20/2018    right transforminal # 1 decadron 10mg,isovue    NERVE BLOCK Bilateral 02/01/2019    bilat mbnb- no steroid    NERVE BLOCK Bilateral 02/08/2019    bilat mbnb, marcaine . 25%    OH KNEE SCOPE,DIAGNOSTIC Right 3/24/2017    KNEE ARTHROSCOPY WITH PARTIAL MEDIAL MENISECAL DEBRIDMENT  performed by Yash Evans MD at [address]  01/2015    lumbar diskectomy    UPPER GASTROINTESTINAL ENDOSCOPY  9 20 2007    UPPER GASTROINTESTINAL ENDOSCOPY  4 21 2009,04/2011    gastritis, esophagitis       Allergies   Allergen Reactions    Aspirin      DUE TO ULCER    Claritin [Loratadine] Other (See Comments)     coughing    Flonase [Fluticasone Propionate]     Morphine And Related      GI Upset         Current Outpatient Medications:     HYDROcodone-acetaminophen (NORCO) 5-325 MG per tablet, Take 1 tablet by mouth every 8 hours as needed for Pain for up to 30 days. , Disp: 90 tablet, Rfl: 0    carvedilol (COREG) 6.25 MG tablet, Take 1 tablet by mouth 2 times daily, Disp: 180 tablet, Rfl: 1    atorvastatin (LIPITOR) 40 MG tablet, Take 1 tablet by mouth by Does not apply route daily, Disp: 100 each, Rfl: 11    azelastine (ASTELIN) 0.1 % nasal spray, 2 sprays by Nasal route 2 times daily Use in each nostril as directed, Disp: 1 Bottle, Rfl: 3    ipratropium-albuterol (DUONEB) 0.5-2.5 (3) MG/3ML SOLN nebulizer solution, Inhale 3 mLs into the lungs every 6 hours as needed for Shortness of Breath, Disp: 360 mL, Rfl: 11    Family History   Problem Relation Age of Onset    Diabetes Mother     Heart Disease Mother     Cirrhosis Father     Breast Cancer Neg Hx     Cancer Neg Hx     Colon Cancer Neg Hx     Eclampsia Neg Hx     Hypertension Neg Hx     Ovarian Cancer Neg Hx      Labor Neg Hx     Spont Abortions Neg Hx     Stroke Neg Hx        Social History     Socioeconomic History    Marital status: Single     Spouse name: Not on file    Number of children: Not on file    Years of education: Not on file    Highest education level: Not on file   Occupational History    Not on file   Social Needs    Financial resource strain: Not on file    Food insecurity     Worry: Not on file     Inability: Not on file    Transportation needs     Medical: Not on file     Non-medical: Not on file   Tobacco Use    Smoking status: Current Every Day Smoker     Packs/day: 1.00     Years: 36.00     Pack years: 36.00     Types: Cigarettes     Last attempt to quit: 2015     Years since quittin.7    Smokeless tobacco: Never Used    Tobacco comment: pt currently using nicotine patches   2 CIGARETTES A DAY   Substance and Sexual Activity    Alcohol use: No     Alcohol/week: 0.0 standard drinks    Drug use: No     Comment: 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 7 yrs    Sexual activity: Never   Lifestyle    Physical activity     Days per week: Not on file     Minutes per session: Not on file    Stress: Not on file   Relationships    Social connections     Talks on phone: Not on file     Gets together: Not on file     Attends [employer] service: Not

## 2020-05-12 RX ORDER — TIZANIDINE 4 MG/1
TABLET ORAL
Qty: 60 TABLET | Refills: 2 | Status: SHIPPED | OUTPATIENT
Start: 2020-05-12 | End: 2020-10-27

## 2020-05-29 ENCOUNTER — VIRTUAL VISIT (OUTPATIENT)
Dept: INTERNAL MEDICINE | Age: 60
End: 2020-05-29
Payer: MEDICARE

## 2020-05-29 PROCEDURE — 99213 OFFICE O/P EST LOW 20 MIN: CPT | Performed by: INTERNAL MEDICINE

## 2020-05-29 NOTE — PROGRESS NOTES
Eric Mckeon is a 62 y.o. female evaluated via telephone on 5/29/2020. Consent:  She and/or health care decision maker is aware that that she may receive a bill for this telephone service, depending on her insurance coverage, and has provided verbal consent to proceed: Yes      Documentation:  I communicated with the patient and/or health care decision maker about her increasing low back pain. She has been following up with pain management. She is on Norco 3 times a day and gabapentin 4 times a day. She says she is looking into getting a new power wheelchair as her last power scooter is not working anymore. She had lumbar surgery a few years ago but pain is getting worse again. She had hemilaminectomy done. She always has pain that goes down to the right leg. She is not able to get physical therapy or referral to neurosurgery at this point because of the COVID crisis. She did have an EMG done. Her last MRI was a year ago. Recent x-rays did not show any acute fractures. She says she has been getting some headaches. She has not had a blood pressure checked as her nurse has not been coming to her. Have advised her to check it at her next physician office or to check it at the pharmacy. I can send her a prescription also for a blood pressure cuff if it is covered by her insurance. If headache persist she is to advised to come in here to have it checked as well. .   Details of this discussion including any medical advice provided:   Patient to continue current medications. Scooter prescription printed for her. She is going to get a wheelchair evaluation next week. BP check in office if headache persists    MRI lumbar 2019      Impression   1. Status post interval right hemilaminectomy at L4-L5.  Severe right neural   foraminal stenosis at this level with compression of the exiting right L4   nerve root.    2. At L5-S1, central/left paracentral disc bulge contacts the traversing left   S1

## 2020-06-01 ENCOUNTER — TELEPHONE (OUTPATIENT)
Dept: INTERNAL MEDICINE | Age: 60
End: 2020-06-01

## 2020-06-01 NOTE — TELEPHONE ENCOUNTER
Dread Corey from [employer] is calling regarding patients wheelchair. It is no longer serviceable as it has been over 5 years & she needs a new one. He has arranged for her to meet with a therapist Thursday at 4 & he is requesting an order be placed for PT/OT Wheelchair Evaluation. He states after that step is completed, the patient will call us to schedule a F2F.       Riya Arce,  at [employer]: [phone]  Fax: [phone]

## 2020-06-05 ENCOUNTER — HOSPITAL ENCOUNTER (OUTPATIENT)
Dept: PAIN MANAGEMENT | Age: 60
Discharge: HOME | End: 2020-06-05
Payer: MEDICARE

## 2020-06-05 PROCEDURE — 99213 OFFICE O/P EST LOW 20 MIN: CPT

## 2020-06-05 PROCEDURE — 99442 PR PHYS/QHP TELEPHONE EVALUATION 11-20 MIN: CPT | Performed by: NURSE PRACTITIONER

## 2020-06-05 RX ORDER — HYDROCODONE BITARTRATE AND ACETAMINOPHEN 5; 325 MG/1; MG/1
1 TABLET ORAL EVERY 8 HOURS PRN
Qty: 90 TABLET | Refills: 0 | Status: SHIPPED | OUTPATIENT
Start: 2020-06-05 | End: 2020-07-02 | Stop reason: SDUPTHER

## 2020-06-05 ASSESSMENT — ENCOUNTER SYMPTOMS
SHORTNESS OF BREATH: 0
BACK PAIN: 1
CONSTIPATION: 0
COUGH: 0

## 2020-06-05 NOTE — PROGRESS NOTES
Patient completed a telephone viisit today to review medication contract. Chief Complaint: Back and leg pain    PMH: Patient has had low back pain with no known injury and bilateral knee pain for many years. Darrel Connors had a knee arthroscopy in March 2017 Genicular block was discussed but pt would like to wait. She is s/p lumbar diskectomy in 2015.  She had bilat  Lumbar Facet Nerve Block  L4 - 5 and L5 - S1 x2  2/2019 and reported this worsened her pain and her blood sugar was elevated for three days following the procedure - though no steroid was used in injections. And she is not intereseted in following with RFA even after explanation of procedure given. She is trying to quit smoking. Currently on O2 at night due to exacerbation of COPD. She also uses TENS daily with some relief. She is stable and compliant on norco 5/325 TID- also taking mobic with benefit. Gabapentin prescribed by PCP for neuropathic pain in feet. Follows with Dr. Osito Bell for knees and plans to have injection in her right knee.     EMG was completed and shows Abnormal study of the right lower extremity and Normal study of the left lower extremity. Cristal Urbina is electrodiagnostic evidence of a very mild chronic right S1 radiculopathy without active denervation.  There is no electrodiagnostic evidence of a generalized large fiber peripheral   Polyneuropathy. She saw NS in January and will follow up in July. Back Pain   This is a chronic problem. The current episode started more than 1 year ago. The problem occurs constantly. The problem is unchanged. The pain is present in the lumbar spine. The quality of the pain is described as aching. The pain does not radiate. The pain is at a severity of 5/10. The pain is moderate. The symptoms are aggravated by position and standing (walking). Pertinent negatives include no chest pain, fever, numbness or tingling. She has tried analgesics and bed rest for the symptoms.  The treatment provided mild tablet, Take 1 tablet by mouth daily, Disp: 90 tablet, Rfl: 1    metFORMIN (GLUCOPHAGE) 500 MG tablet, Take 1 tablet by mouth 2 times daily (with meals), Disp: 180 tablet, Rfl: 1    lisinopril-hydroCHLOROthiazide (PRINZIDE;ZESTORETIC) 20-25 MG per tablet, TAKE 1 TABLET EVERY DAY, Disp: 90 tablet, Rfl: 3    amLODIPine (NORVASC) 10 MG tablet, Take 1 tablet by mouth daily, Disp: 90 tablet, Rfl: 3    albuterol sulfate HFA (VENTOLIN HFA) 108 (90 Base) MCG/ACT inhaler, Inhale 2 puffs into the lungs every 6 hours as needed for Wheezing, Disp: 1 Inhaler, Rfl: 3    cetirizine (ZYRTEC) 10 MG tablet, TAKE 1 TABLET BY MOUTH DAILY, Disp: 30 tablet, Rfl: 2    omeprazole (PRILOSEC) 20 MG delayed release capsule, TAKE 1 CAPSULE EVERY DAY, Disp: 30 capsule, Rfl: 11    diphenhydrAMINE (BENADRYL) 25 MG tablet, Take 25 mg by mouth every 6 hours as needed for Itching, Disp: , Rfl:     nicotine (NICODERM CQ) 21 MG/24HR, Place 1 patch onto the skin every 24 hours, Disp: , Rfl:     potassium chloride (KLOR-CON M) 10 MEQ extended release tablet, Take 2 tablets by mouth daily, Disp: 60 tablet, Rfl: 3    SPIRIVA HANDIHALER 18 MCG inhalation capsule, , Disp: , Rfl:     blood glucose test strips (EXACTECH TEST) strip, 1 each by In Vitro route daily As needed. , Disp: 50 each, Rfl: 11    acetaminophen (TYLENOL) 500 MG tablet, Take 1 tablet by mouth 4 times daily as needed for Pain, Disp: 30 tablet, Rfl: 0     MG capsule, TAKE 1 CAPSULE EVERY DAY, Disp: 30 capsule, Rfl: 11    trospium (SANCTURA) 20 MG tablet, Take 1 tablet by mouth 2 times daily, Disp: 60 tablet, Rfl: 3    mirtazapine (REMERON) 30 MG tablet, Take 30 mg by mouth nightly, Disp: , Rfl:     mirtazapine (REMERON) 15 MG tablet, Take 15 mg by mouth nightly, Disp: , Rfl:     DULoxetine (CYMBALTA) 60 MG extended release capsule, Take 1 capsule by mouth daily, Disp: 30 capsule, Rfl: 3    Lancets MISC, 1 each by Does not apply route daily, Disp: 100 each, Rfl: 11  

## 2020-06-11 RX ORDER — CETIRIZINE HYDROCHLORIDE 10 MG/1
TABLET ORAL
Qty: 30 TABLET | Refills: 2 | Status: SHIPPED | OUTPATIENT
Start: 2020-06-11 | End: 2020-09-18

## 2020-06-11 NOTE — TELEPHONE ENCOUNTER
(degenerative joint disease) of knee     Osteoarthritis of spine with radiculopathy, lumbar region     GERD (gastroesophageal reflux disease)     COPD (chronic obstructive pulmonary disease)     HTN (hypertension)     Allergic rhinitis     Lipoma of shoulder s/p excision right posterior 11 17 2008     DM (diabetes mellitus)     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     Primary osteoarthritis of both knees     Medication monitoring encounter     Chronic low back pain     Major depression, chronic     Chronic respiratory failure with hypoxia (HCC)     Mitral and aortic insufficiency     Pure hypercholesterolemia     Spondylosis of lumbar region without myelopathy or radiculopathy

## 2020-06-26 RX ORDER — TROSPIUM CHLORIDE 20 MG/1
20 TABLET, FILM COATED ORAL 2 TIMES DAILY
Qty: 60 TABLET | Refills: 2 | Status: SHIPPED | OUTPATIENT
Start: 2020-06-26 | End: 2020-10-27

## 2020-06-26 NOTE — TELEPHONE ENCOUNTER
Patient Active Problem List:     DJD (degenerative joint disease) of knee     Osteoarthritis of spine with radiculopathy, lumbar region     GERD (gastroesophageal reflux disease)     COPD (chronic obstructive pulmonary disease)     HTN (hypertension)     Allergic rhinitis     Lipoma of shoulder s/p excision right posterior 11 17 2008     DM (diabetes mellitus)     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     Primary osteoarthritis of both knees     Medication monitoring encounter     Chronic low back pain     Major depression, chronic     Chronic respiratory failure with hypoxia (HCC)     Mitral and aortic insufficiency     Pure hypercholesterolemia     Spondylosis of lumbar region without myelopathy or radiculopathy

## 2020-06-30 ASSESSMENT — ENCOUNTER SYMPTOMS
COLOR CHANGE: 0
RESPIRATORY NEGATIVE: 1
GASTROINTESTINAL NEGATIVE: 1
VOMITING: 0
BOWEL INCONTINENCE: 0
SHORTNESS OF BREATH: 0
EYES NEGATIVE: 1
CHOKING: 0
ABDOMINAL PAIN: 0
WHEEZING: 0
BACK PAIN: 1
CONSTIPATION: 0
NAUSEA: 0
ALLERGIC/IMMUNOLOGIC NEGATIVE: 1

## 2020-06-30 NOTE — PROGRESS NOTES
Coleen Bailey is a 62 y.o. female evaluated on 7/2/2020. Modality of virtual service provided -via  telephone   Consent:  Patient and/or health care decision maker is aware that that patient may receive a bill for this telephone service, depending on one's insurance coverage, and has provided verbal consent to proceed: Yes    Patient identification was verified at the start of the visit: Yes    Chief complaint: Coleen Bailey is 62 y.o., [place] American female, with  No chief complaint on file. .    Pain is not getting better cancelled surg din march can not stand up for 5 mins  Patient is complaining of pain involving the low back as well as in the knees bilaterally. Patient reports that her insurance company denied the injections in the knee joint. Patient reports her pain in the knee is getting worse and the right leg feels like \"breaking off. She also having severe back pain and had surgery about 5 years ago without much improvement. She was scheduled for surgery in March but was canceled due to [place] situation. Patient has not called the surgeon to try to reschedule it. Back Pain   This is a chronic problem. The current episode started more than 1 year ago. The problem occurs constantly. The problem has been gradually worsening since onset. The pain is present in the lumbar spine, sacro-iliac and gluteal. The quality of the pain is described as aching, burning and shooting (sharp, throbbing). The pain radiates to the right foot, right thigh and right knee. The pain is at a severity of 9/10 (8-10). The pain is severe. The pain is worse during the day. The symptoms are aggravated by standing, sitting, position and bending (walking, lifting , ADLs). Pertinent negatives include no abdominal pain, bladder incontinence, bowel incontinence, chest pain, dysuria, fever, numbness, paresis, tingling or weakness. Risk factors include lack of exercise and sedentary lifestyle (smoking).  She has tried ice for the symptoms. Knee Pain    Incident [facility] Injury mechanism: Worse since arthroscopic surgery. The pain is present in the right knee. The quality of the pain is described as burning, aching and stabbing. The pain is at a severity of 8/10. The pain is severe. The pain has been constant since onset. Associated symptoms include an inability to bear weight. Pertinent negatives include no numbness or tingling. Possible foreign bodies include wood. The symptoms are aggravated by movement and weight bearing. Alleviating factors:nothing   Lifestyle changes experienced with pain: Wakes from sleep, Prevents or limits ADLs, Increases w/activity.  , Increases w/prolonged sitting/standing/walking  Mood changes,anxious  Patient currently unemployed. Physical therapy did not help the pain. Are you under psychological counseling at present: No  Goals for treatment include:  Decrease in pain  Enjoy daily and recreational activities, return to previous status. Last procedure was      Patient relates current medications are helping the pain. Patient reports taking pain medications as prescribed, denies obtaining medications from different sources and denies use of illegal drugs. Patient denies side effects from medications like nausea, vomiting, constipation or drowsiness. Patient reports current activities of daily living ar possible due to medications and would like to continue them.       ADVERSE MEDICATION EFFECTS:   Nausea and vomiting: no   Constipation: no-Undercontrol-: yes  Dizziness/drowsy/sleepy--no  Urinary Retention: no    ACTIVITY/SOCIAL/EMOTIONAL:  Sleep Pattern: 7 hours per night. nightime awakenings  Home Exercises: - none  Activity:not significantly changed  Emotional Issues: normal.   Currently seeing a Psychiatrist or Psychologist:  No      ABERRANT BEHAVIORS SINCE LAST VISIT  Lost rx/pills:------------------------------------------ no  Taking  medication as prescribed: ----------- yes  Urine Drug Screen ---------------------------------  yes  Recent ER visits: -------------------------------------No  Pill count is appropriate: ---------------------------yes   Refills for prescriptions appropriate:---------- yes      Past Medical History:   Diagnosis Date    Allergic rhinitis     Arthropathy, unspecified, other specified sites 4/30/2013    Bronchitis     Chronic back pain     [facility]     Depression     [facility]di[facility] 12/18/2012    Emphysema of lung [facility]     GERD (gastroesophageal reflux disease)     Hyperlipidemia     Hypertension     Knee pain     right knee mostly    Leg pain, right     Obesity     Osteoarthritis     Peripheral vascular disease ([facility]     Primary osteoarthritis of both knees     Radicular pain of lumbosacral region     Spinal stenosis, lumbar region, without neurogenic claudication 4/30/2013    Type II or unspecified type diabetes mellitus without mention of complication, not stated as uncontrolled     Unspecified sleep apnea     URI (upper respiratory infection)        Past Surgical History:   Procedure Laterality Date    COLONOSCOPY  2/12/2009    normal    DILATION AND CURETTAGE OF UTERUS      GASTRECTOMY      partial    NERVE BLOCK Right 4/30/13    Lumbar Diagnostic Block,  Kenalog 40 mg    NERVE BLOCK  5/23/13    Lumbar Radiofrequency, Kenalog 40mg    NERVE BLOCK  8/12/13    Lt MBNB  celestone 6mg    NERVE BLOCK Left 8-28-13    left lumbar diagnostic block #2 decadron 10 mg    NERVE BLOCK Left 9-24-13    left lumbar median branch radiofrequency    NERVE BLOCK  07-02-14    caudal, celestone 9 mg    NERVE BLOCK  7-16-14    caudal epidural #2, celestone 9mg, fentanyl 25mcg    NERVE BLOCK  7/30/14    caudal #3 decadron 10mg    NERVE BLOCK  11-6-14    duramorph epidural steroid block  duramorph 1 mg celestone 9 mg    NERVE BLOCK  11/20/15    TENS- Empi Select  NERVE BLOCK  2018    right transforminal # 1 decadron 10mg,isovue    NERVE BLOCK Bilateral 2019    bilat mbnb- no steroid    NERVE BLOCK Bilateral 2019    bilat mbnb, marcaine . 25%    TX KNEE SCOPE,DIAGNOSTIC Right 3/24/2017    KNEE ARTHROSCOPY WITH PARTIAL MEDIAL MENISECAL DEBRIDMENT  performed by Abdiel Riley MD at [address]  2015    lumbar diskectomy    UPPER GASTROINTESTINAL ENDOSCOPY  9 2007    UPPER GASTROINTESTINAL ENDOSCOPY  2009,2011    gastritis, esophagitis       Family History   Problem Relation Age of Onset    Diabetes Mother     Heart Disease Mother     Cirrhosis Father     Breast Cancer Neg Hx     Cancer Neg Hx     Colon Cancer Neg Hx     Eclampsia Neg Hx     Hypertension Neg Hx     Ovarian Cancer Neg Hx      Labor Neg Hx     Spont Abortions Neg Hx     Stroke Neg Hx        Social History     Socioeconomic History    Marital status: Single     Spouse name: None    Number of children: None    Years of education: None    Highest education level: None   Occupational History    None   Social Needs    Financial resource strain: None    Food insecurity     Worry: None     Inability: None    Transportation needs     Medical: None     Non-medical: None   Tobacco Use    Smoking status: Current Every Day Smoker     Packs/day: 1.00     Years: 36.00     Pack years: 36.00     Types: Cigarettes     Last attempt to quit: 2015     Years since quittin.9    Smokeless tobacco: Never Used    Tobacco comment: pt currently using nicotine patches   2 CIGARETTES A DAY   Substance and Sexual Activity    Alcohol use: No     Alcohol/week: 0.0 standard drinks    Drug use: No     Comment: 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 7 yrs    Sexual activity: Never   Lifestyle    Physical activity     Days per week: None     Minutes per session: None    Stress: None   Relationships    Social connections     Talks on phone: None Gets together: None     Attends [employer] service: None     Active member of club or organization: None     Attends meetings of clubs or organizations: None     Relationship status: None    Intimate partner violence     Fear of current or ex partner: None     Emotionally abused: None     Physically abused: None     Forced sexual activity: None   Other Topics Concern    None   Social History Narrative    None       Allergies   Allergen Reactions    Aspirin      DUE TO ULCER    Claritin [Loratadine] Other (See Comments)     coughing    Flonase [Fluticasone Propionate]     Morphine And Related      GI Upset       Current Outpatient Medications on File Prior to Encounter   Medication Sig Dispense Refill    trospium (SANCTURA) 20 MG tablet TAKE 1 TABLET BY MOUTH 2 TIMES DAILY 60 tablet 2    cetirizine (ZYRTEC) 10 MG tablet TAKE 1 TABLET BY MOUTH DAILY 30 tablet 2    tiZANidine (ZANAFLEX) 4 MG tablet TAKE 1 TABLET TWICE DAILY 60 tablet 2    meloxicam (MOBIC) 15 MG tablet Take 1 tablet by mouth daily 30 tablet 3    carvedilol (COREG) 6.25 MG tablet Take 1 tablet by mouth 2 times daily 180 tablet 1    atorvastatin (LIPITOR) 40 MG tablet Take 1 tablet by mouth daily 90 tablet 1    metFORMIN (GLUCOPHAGE) 500 MG tablet Take 1 tablet by mouth 2 times daily (with meals) 180 tablet 1    lisinopril-hydroCHLOROthiazide (PRINZIDE;ZESTORETIC) 20-25 MG per tablet TAKE 1 TABLET EVERY DAY 90 tablet 3    amLODIPine (NORVASC) 10 MG tablet Take 1 tablet by mouth daily 90 tablet 3    albuterol sulfate HFA (VENTOLIN HFA) 108 (90 Base) MCG/ACT inhaler Inhale 2 puffs into the lungs every 6 hours as needed for Wheezing 1 Inhaler 3    omeprazole (PRILOSEC) 20 MG delayed release capsule TAKE 1 CAPSULE EVERY DAY 30 capsule 11    diphenhydrAMINE (BENADRYL) 25 MG tablet Take 25 mg by mouth every 6 hours as needed for Itching      nicotine (NICODERM CQ) 21 MG/24HR Place 1 patch onto the skin every 24 hours      potassium chloride (KLOR-CON M) 10 MEQ extended release tablet Take 2 tablets by mouth daily 60 tablet 3    SPIRIVA HANDIHALER 18 MCG inhalation capsule       blood glucose test strips (EXACTECH TEST) strip 1 each by In Vitro route daily As needed. 50 each 11    acetaminophen (TYLENOL) 500 MG tablet Take 1 tablet by mouth 4 times daily as needed for Pain 30 tablet 0     MG capsule TAKE 1 CAPSULE EVERY DAY 30 capsule 11    mirtazapine (REMERON) 30 MG tablet Take 30 mg by mouth nightly      mirtazapine (REMERON) 15 MG tablet Take 15 mg by mouth nightly      DULoxetine (CYMBALTA) 60 MG extended release capsule Take 1 capsule by mouth daily 30 capsule 3    Lancets MISC 1 each by Does not apply route daily 100 each 11    azelastine (ASTELIN) 0.1 % nasal spray 2 sprays by Nasal route 2 times daily Use in each nostril as directed 1 Bottle 3    ipratropium-albuterol (DUONEB) 0.5-2.5 (3) MG/3ML SOLN nebulizer solution Inhale 3 mLs into the lungs every 6 hours as needed for Shortness of Breath 360 mL 11     No current facility-administered medications on file prior to encounter. Review of Systems   Constitutional: Negative. Negative for activity change, appetite change, fatigue, fever and unexpected weight change. HENT: Negative. Negative for congestion and hearing loss. Eyes: Negative. Negative for photophobia, redness and visual disturbance. Respiratory: Negative. Negative for choking, shortness of breath and wheezing. Cardiovascular: Negative. Negative for chest pain and palpitations. Gastrointestinal: Negative. Negative for abdominal distention, abdominal pain, bowel incontinence, constipation, nausea and vomiting. Endocrine: Negative. Negative for cold intolerance and polyuria. Genitourinary: Negative. Negative for bladder incontinence, dysuria and hematuria. Musculoskeletal: Positive for arthralgias and back pain. Skin: Negative. Negative for color change and rash. There is no electrodiagnostic evidence of a generalized large fiber peripheral polyneuropathy. Clinical correlation is required. Please see full EMG report. Status       X-Ray reports:    EXAMINATION:   MRI OF THE LUMBAR SPINE WITHOUT AND WITH CONTRAST  6/10/2019 12:04 pm       TECHNIQUE:   Multiplanar multisequence MRI of the lumbar spine was performed without and   with the administration of intravenous contrast.       COMPARISON:   06/12/2014       HISTORY:   ORDERING SYSTEM PROVIDED HISTORY: Postlaminectomy syndrome, lumbar region   TECHNOLOGIST PROVIDED HISTORY:   Pain, previous back surgery   Ordering Physician Provided Reason for Exam: chronic low back pain   Acuity: Chronic   Type of Exam: Subsequent/Follow-up   Additional signs and symptoms: right leg pain and numbness to r foot   Relevant Medical/Surgical History: injury in 2006       FINDINGS:   BONES/ALIGNMENT: Grade 1 anterolisthesis of L4 on L5 measures 3 mm.  Lumbar   vertebral bodies are normal in height.  No acute lumbar spine fracture.    Minimal bone marrow edema about L4-L5.  Remaining marrow signal pattern is   within normal limits.       SPINAL CORD:  The conus terminates normally.       SOFT TISSUES: No abnormal enhancement is seen of the lumbar spine.  No   paraspinal mass identified.       L1-L2: Mild DDD.  Posterior disc bulge measures 3 mm with central annular   fissure.  No significant spinal canal stenosis or significant neural   foraminal stenosis.       L2-L3: Mild DDD.  Disc bulge eccentric to the left measures 5 mm.  Effacement   of the left ventral thecal sac.  Mild spinal canal stenosis.  Bilateral facet   joint DJD and ligamentum flavum thickening.  Mild left neural foraminal   stenosis.       L3-L4: Mild DDD.  Disc bulge measures 4 mm.  Bilateral facet joint DJD.  Mild   spinal canal stenosis.  Mild left neural foraminal stenosis.       L4-L5: Moderate DDD.  Status post right hemilaminectomy.  Uncovered disc   material secondary to anterolisthesis of L4 on L5.  Superimposed diffuse disc   bulge measures 3 mm.  No significant spinal canal stenosis.  Flattening of   the ventral thecal sac.  Severe right neural foraminal stenosis with   compression of the exiting right L4 nerve root.  Mild left neural foraminal   stenosis.       L5-S1: Mild DDD.  Central/left paracentral disc bulge measures 3 mm. Bilateral facet joint DJD.  Disc bulge contacts the traversing left S1 nerve   root in the lateral recess without sally nerve root compression.  Mild   bilateral neural foraminal stenosis.         Impression   1. Status post interval right hemilaminectomy at L4-L5.  Severe right neural   foraminal stenosis at this level with compression of the exiting right L4   nerve root. 2. At L5-S1, central/left paracentral disc bulge contacts the traversing left   S1 nerve root in the lateral recess. 3. Multilevel mild bilateral neural foraminal stenosis, as detailed above.           Clinical  impression:  1. Spondylosis of lumbar region without myelopathy or radiculopathy    2. Osteoarthritis of spine with radiculopathy, lumbar region    3. Postlaminectomy syndrome, lumbar region    4. DDD (degenerative disc disease), lumbar    5. Primary osteoarthritis of both knees    6. Chronic obstructive pulmonary disease, unspecified COPD type ([facility]    7. Medication monitoring encounter    8. 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 Chronic midline low back pain with sciatica, sciatica laterality unspecified        Plan of care:  Early refill was given for her medications as her pharmacy is closed on Saturdays and holidays  We will continue current pain medications  Current medications are being tolerated without any Adverse side effects. Orders Placed This Encounter   Medications    HYDROcodone-acetaminophen (NORCO) 5-325 MG per tablet     Sig: Take 1 tablet by mouth every 8 hours as needed for Pain for up to 30 days.  Early refill due to holidays     Dispense:  90 tablet     Refill:  0     Reduce doses taken as pain becomes manageable     Urine drug screens have been appropriate. No aberrant activity noted. Analgesia is achieved. Activities of daily living are possible because of medications. Safe use of medications explained to patient. PDMP Monitoring:    Last PDMP [facility] SYSTEM as Reviewed [facility]):  Review User Review Instant Review Result   Shahzad Bryant 6/30/2020  3:02 PM Reviewed PDMP [1]     Counselling/Preventive measures for pain  Control:    [x]  Spine strengthening exercises are discussed with patient in detail. [x] Ill effects of being on chronic pain medications such as sleep disturbances, hormonal changes, withdrawal symptoms,  chronic opioid dependence and tolerance were discussed with patient. I had asked the patient to minimize medication use and utilize pain medications only for uncontrolled rest pain or pain with exertional activities. I advised patient not to self escalate pain medications without consulting with us. At each of patient's future visits we will try to taper pain medications, while adjusting the adjunct medications, and re-evaluating for Physical Therapy to improve spinal and joint strength. We will continue to have discussions to decrease pain medications as tolerated. I also discussed with the patient regarding the dangers of combining narcotic pain medication with tranquilizers, alcohol or illegal drugs or taking the medication any other than prescribed. The dangers including the respiratory depression and death. Patient was told to tell  to all  physicians regarding the medications he is getting from pain clinic. Patient is warned not to take any unprescribed medications over-the-counter medications that can depress breathing . Patient is advised to talk to the pharmacist or physicians if planning to take any over-the-counter medications before  takeing them.  Patient is strongly advised to avoid Wylene Dessert Act and [facility], 09 waiver authority and the [employer] Act, this Virtual Visit was conducted, with patient's consent, to reduce the patient's risk of exposure to COVID-19 and provide continuity of care for an established patient. Services were provided through a video synchronous discussion virtually to substitute for in-person appointment. \"  Documentation:  I communicated with the patient and/or health care decision maker about plan of care  Details of this discussion including any medical advice provided: Total Time: minutes: 21-30 minutes    I affirm this is a Patient Initiated Episode with an Established Patient who has not had a related appointment within my department in the past 7 days or scheduled within the next 24 hours.     Electronically signed by Jaison Stokes MD on 7/2/2020 at 10:13 AM

## 2020-07-02 ENCOUNTER — HOSPITAL ENCOUNTER (OUTPATIENT)
Dept: PAIN MANAGEMENT | Age: 60
Discharge: HOME | End: 2020-07-02
Payer: MEDICARE

## 2020-07-02 PROCEDURE — 99443 PR PHYS/QHP TELEPHONE EVALUATION 21-30 MIN: CPT | Performed by: PAIN MEDICINE

## 2020-07-02 PROCEDURE — 99213 OFFICE O/P EST LOW 20 MIN: CPT

## 2020-07-02 RX ORDER — HYDROCODONE BITARTRATE AND ACETAMINOPHEN 5; 325 MG/1; MG/1
1 TABLET ORAL EVERY 8 HOURS PRN
Qty: 90 TABLET | Refills: 0 | Status: SHIPPED | OUTPATIENT
Start: 2020-07-03 | End: 2020-08-03 | Stop reason: SDUPTHER

## 2020-07-03 ASSESSMENT — ENCOUNTER SYMPTOMS
ABDOMINAL DISTENTION: 0
EYE REDNESS: 0
PHOTOPHOBIA: 0

## 2020-07-06 NOTE — TELEPHONE ENCOUNTER
Request for gabapentin, docusate - meds pended. Please fill if appropriate. Next Visit Date:  Future Appointments   Date Time Provider Claudy Schaeffer   8/3/2020  9:15 AM Cornelius Zhu MD [address]   8/5/2020  2:20 PM DO India Delgado [id number]       Health Maintenance   Topic Date Due    Diabetic retinal exam  09/26/2017    Annual Wellness Visit (AWV)  05/29/2019    Diabetic foot exam  06/18/2019    Shingles Vaccine (2 of 2) 08/19/2019    Colon Cancer Screen FIT/FOBT  05/06/2020    Flu vaccine (1) 09/01/2020    Low dose CT lung screening  12/26/2020    Cervical cancer screen  02/15/2021    A1C test (Diabetic or Prediabetic)  04/21/2021    Diabetic microalbuminuria test  04/21/2021    Lipid screen  04/21/2021    Potassium monitoring  04/21/2021    Creatinine monitoring  04/21/2021    Breast cancer screen  11/22/2021    DTaP/Tdap/Td vaccine (2 - Td) 06/24/2029    Pneumococcal 0-64 years Vaccine  Completed    Hepatitis C screen  Completed    HIV screen  Completed    Hepatitis A vaccine  Aged Out    Hib vaccine  Aged Out    Meningococcal (ACWY) vaccine  Aged Out       Hemoglobin A1C (%)   Date Value   04/21/2020 5.8   03/05/2019 5.8   06/18/2018 5.8             ( goal A1C is < 7)   Microalb/Crt.  Ratio (mcg/mg creat)   Date Value   04/21/2020 CANNOT BE CALCULATED     LDL Cholesterol (mg/dL)   Date Value   04/21/2020 116       (goal LDL is <100)   AST (U/L)   Date Value   04/21/2020 18     ALT (U/L)   Date Value   04/21/2020 21     BUN (mg/dL)   Date Value   04/21/2020 15     BP Readings from Last 3 Encounters:   03/13/20 (!) 138/110   02/13/20 136/78   01/10/20 123/76          (goal 120/80)    All Future Testing planned in [facility]  Lab Frequency Next Occurrence   PT eval and treat Once 07/11/2019   PT aquatic therapy Once 07/11/2019   Fluoro For Surgical Procedures Once 01/11/2020   PT eval and treat Once 12/13/2019   PT aquatic therapy Once 12/13/2019   EMG

## 2020-07-07 RX ORDER — GABAPENTIN 300 MG/1
CAPSULE ORAL
Qty: 120 CAPSULE | Refills: 1 | Status: SHIPPED | OUTPATIENT
Start: 2020-07-07 | End: 2020-10-09 | Stop reason: SDUPTHER

## 2020-07-07 RX ORDER — DOCUSATE SODIUM 100 MG/1
CAPSULE, LIQUID FILLED ORAL
Qty: 30 CAPSULE | Refills: 10 | Status: SHIPPED | OUTPATIENT
Start: 2020-07-07 | End: 2021-07-20

## 2020-07-14 ENCOUNTER — VIRTUAL VISIT (OUTPATIENT)
Dept: INTERNAL MEDICINE | Age: 60
End: 2020-07-14
Payer: MEDICARE

## 2020-07-14 PROCEDURE — 99441 PR PHYS/QHP TELEPHONE EVALUATION 5-10 MIN: CPT | Performed by: INTERNAL MEDICINE

## 2020-07-14 RX ORDER — AZITHROMYCIN 250 MG/1
250 TABLET, FILM COATED ORAL SEE ADMIN INSTRUCTIONS
Qty: 6 TABLET | Refills: 0 | Status: SHIPPED | OUTPATIENT
Start: 2020-07-14 | End: 2020-07-19

## 2020-07-14 NOTE — PROGRESS NOTES
Visit Information    Have you changed or started any medications since your last visit including any over-the-counter medicines, vitamins, or herbal medicines? no   Are you having any side effects from any of your medications? -  no  Have you stopped taking any of your medications? Is so, why? -  no    Have you seen any other physician or provider since your last visit? No  Have you had any other diagnostic tests since your last visit? No  Have you been seen in the emergency room and/or had an admission to a hospital since we last saw you? No  Have you had your routine dental cleaning in the past 6 months? yes -     Have you activated your [employer] account? If not, what are your barriers?  Yes     Patient Care Team:  Ana Alvarado MD as PCP - General (Internal Medicine)  Ana Alvarado MD as PCP - [facility] Provider  Yoana Hernandez DPM as Consulting Physician (Podiatry)  Suzi Reynoldselor ([address])  Noemi Loza MD as Consulting Physician (Orthopedic Surgery)  Jacquelyn St MD as Anesthesiologist (Pain Management)  Beverly Barbosa OD (Optometry)  Jong Yusuf RN as Nurse Navigator  Delilah Mota RN as Ambulatory Care Manager    Medical History Review  Past Medical, Family, and Social History reviewed and does contribute to the patient presenting condition    Health Maintenance   Topic Date Due    Diabetic retinal exam  09/26/2017    Annual Wellness Visit (AWV)  05/29/2019    Diabetic foot exam  06/18/2019    Shingles Vaccine (2 of 2) 08/19/2019    Colon Cancer Screen FIT/FOBT  05/06/2020    Flu vaccine (1) 09/01/2020    Low dose CT lung screening  12/26/2020    Cervical cancer screen  02/15/2021    A1C test (Diabetic or Prediabetic)  04/21/2021    Diabetic microalbuminuria test  04/21/2021    Lipid screen  04/21/2021    Potassium monitoring  04/21/2021    Creatinine monitoring  04/21/2021    Breast cancer screen  11/22/2021    DTaP/Tdap/Td vaccine (2 - Td) 06/24/2029  Pneumococcal 0-64 years Vaccine  Completed    Hepatitis C screen  Completed    HIV screen  Completed    Hepatitis A vaccine  Aged Out    Hib vaccine  Aged Out    Meningococcal (ACWY) vaccine  Aged Out

## 2020-07-14 NOTE — PROGRESS NOTES
Ana Odom is a 56 y.o. female evaluated via telephone on 7/14/2020. Consent:  She and/or health care decision maker is aware that that she may receive a bill for this telephone service, depending on her insurance coverage, and has provided verbal consent to proceed: Yes      Documentation:  I communicated with the patient and/or health care decision maker about worsening cough. She states she has had a cough with greenish expectoration for the last 6 to 7 days. No fever or chills. She has nasal congestion which is chronic. Her mouth has been feeling very dry but she is having some sinus pressure. She has history of COPD. She is a smoker. She says she has been using her inhalers and nebulizers but use is not any worse than before. No wheezing. She sees a pulmonologist regularly. No wheezing or stridor. No fever or chills. Details of this discussion including any medical advice provided:   Because of her history of COPD will treat her for bronchitis with Zithromax. It does not appear she needs any steroids at this point. Call if worsening. I affirm this is a Patient Initiated Episode with a Patient who has not had a related appointment within my department in the past 7 days or scheduled within the next 24 hours.     Patient identification was verified at the start of the visit: Yes    Total Time: minutes: 5-10 minutes    Note: not billable if this call serves to triage the patient into an appointment for the relevant concern      Christa Bean

## 2020-08-03 ENCOUNTER — HOSPITAL ENCOUNTER (OUTPATIENT)
Dept: PAIN MANAGEMENT | Age: 60
Discharge: HOME | End: 2020-08-03
Payer: COMMERCIAL

## 2020-08-03 PROCEDURE — 99213 OFFICE O/P EST LOW 20 MIN: CPT | Performed by: NURSE PRACTITIONER

## 2020-08-03 PROCEDURE — 99213 OFFICE O/P EST LOW 20 MIN: CPT

## 2020-08-03 RX ORDER — HYDROCODONE BITARTRATE AND ACETAMINOPHEN 5; 325 MG/1; MG/1
1 TABLET ORAL EVERY 8 HOURS PRN
Qty: 90 TABLET | Refills: 0 | Status: SHIPPED | OUTPATIENT
Start: 2020-08-03 | End: 2020-09-01 | Stop reason: SDUPTHER

## 2020-08-03 ASSESSMENT — ENCOUNTER SYMPTOMS
SHORTNESS OF BREATH: 0
COUGH: 0
CONSTIPATION: 0
BACK PAIN: 1

## 2020-08-03 NOTE — PROGRESS NOTES
provided mild relief. Patient denies any new neurological symptoms. No bowel or bladder incontinence, no weakness, and no falling. Pill count: appropriate past due to fill    Morphine equivalent: 15    Periodic Controlled Substance Monitoring: Possible medication side effects, risk of tolerance/dependence & alternative treatments discussed., No signs of potential drug abuse or diversion identified., Obtaining appropriate analgesic effect of treatment.  Laurence Tucker, APRN - CNP)      Past Medical History:   Diagnosis Date    Allergic rhinitis     Arthropathy, unspecified, other specified sites 4/30/2013    Bronchitis     Chronic back pain     [facility]     Depression     DM (diabetes mellitus) ([facility] 12/18/2012    Emphysema of lung (HCC)     GERD (gastroesophageal reflux disease)     Hyperlipidemia     Hypertension     Knee pain     right knee mostly    Leg pain, right     Obesity     Osteoarthritis     Peripheral vascular disease (HCC)     Primary osteoarthritis of both knees     Radicular pain of lumbosacral region     Spinal stenosis, lumbar region, without neurogenic claudication 4/30/2013    Type II or unspecified type diabetes mellitus without mention of complication, not stated as uncontrolled     Unspecified sleep apnea     URI (upper respiratory infection)        Past Surgical History:   Procedure Laterality Date    COLONOSCOPY  2/12/2009    normal    DILATION AND CURETTAGE OF UTERUS      GASTRECTOMY      partial    NERVE BLOCK Right 4/30/13    Lumbar Diagnostic Block,  Kenalog 40 mg    NERVE BLOCK  5/23/13    Lumbar Radiofrequency, Kenalog 40mg    NERVE BLOCK  8/12/13    Lt MBNB  celestone 6mg    NERVE BLOCK Left 8-28-13    left lumbar diagnostic block #2 decadron 10 mg    NERVE BLOCK Left 9-24-13    left lumbar median branch radiofrequency    NERVE BLOCK  07-02-14    caudal, celestone 9 mg    NERVE BLOCK  7-16-14 caudal epidural #2, celestone 9mg, fentanyl 25mcg    NERVE BLOCK  7/30/14    caudal #3 decadron 10mg    NERVE BLOCK  11-6-14    duramorph epidural steroid block  duramorph 1 mg celestone 9 mg    NERVE BLOCK  11/20/15    TENS- Empi Select    NERVE BLOCK  07/20/2018    right transforminal # 1 decadron 10mg,isovue    NERVE BLOCK Bilateral 02/01/2019    bilat mbnb- no steroid    NERVE BLOCK Bilateral 02/08/2019    bilat mbnb, marcaine . 25%    [place] KNEE SCOPE,DIAGNOSTIC Right 3/24/2017    KNEE ARTHROSCOPY WITH PARTIAL MEDIAL MENISECAL DEBRIDMENT  performed by Dolores Pierce MD at [address]  01/2015    lumbar diskectomy    UPPER GASTROINTESTINAL ENDOSCOPY  9 20 2007    UPPER GASTROINTESTINAL ENDOSCOPY  4 21 2009,04/2011    gastritis, esophagitis       Allergies   Allergen Reactions    Aspirin      DUE TO ULCER    Claritin [Loratadine] Other (See Comments)     coughing    Flonase [Fluticasone Propionate]     Morphine And Related      GI Upset         Current Outpatient Medications:      MG capsule, TAKE 1 CAPSULE EVERY DAY, Disp: 30 capsule, Rfl: 10    gabapentin (NEURONTIN) 300 MG capsule, TAKE 1 CAPSULE IN MORNING AND AFTERNOON AND 2 CAPSULES AT NIGHT, Disp: 120 capsule, Rfl: 1    trospium (SANCTURA) 20 MG tablet, TAKE 1 TABLET BY MOUTH 2 TIMES DAILY, Disp: 60 tablet, Rfl: 2    cetirizine (ZYRTEC) 10 MG tablet, TAKE 1 TABLET BY MOUTH DAILY, Disp: 30 tablet, Rfl: 2    tiZANidine (ZANAFLEX) 4 MG tablet, TAKE 1 TABLET TWICE DAILY, Disp: 60 tablet, Rfl: 2    meloxicam (MOBIC) 15 MG tablet, Take 1 tablet by mouth daily, Disp: 30 tablet, Rfl: 3    carvedilol (COREG) 6.25 MG tablet, Take 1 tablet by mouth 2 times daily, Disp: 180 tablet, Rfl: 1    atorvastatin (LIPITOR) 40 MG tablet, Take 1 tablet by mouth daily, Disp: 90 tablet, Rfl: 1    metFORMIN (GLUCOPHAGE) 500 MG tablet, Take 1 tablet by mouth 2 times daily (with meals), Disp: 180 tablet, Rfl: 1    lisinopril-hydroCHLOROthiazide (PRINZIDE;ZESTORETIC) 20-25 MG per tablet, TAKE 1 TABLET EVERY DAY, Disp: 90 tablet, Rfl: 3    amLODIPine (NORVASC) 10 MG tablet, Take 1 tablet by mouth daily, Disp: 90 tablet, Rfl: 3    albuterol sulfate HFA (VENTOLIN HFA) 108 (90 Base) MCG/ACT inhaler, Inhale 2 puffs into the lungs every 6 hours as needed for Wheezing, Disp: 1 Inhaler, Rfl: 3    omeprazole (PRILOSEC) 20 MG delayed release capsule, TAKE 1 CAPSULE EVERY DAY, Disp: 30 capsule, Rfl: 11    diphenhydrAMINE (BENADRYL) 25 MG tablet, Take 25 mg by mouth every 6 hours as needed for Itching, Disp: , Rfl:     nicotine (NICODERM CQ) 21 MG/24HR, Place 1 patch onto the skin every 24 hours, Disp: , Rfl:     potassium chloride (KLOR-CON M) 10 MEQ extended release tablet, Take 2 tablets by mouth daily, Disp: 60 tablet, Rfl: 3    SPIRIVA HANDIHALER 18 MCG inhalation capsule, , Disp: , Rfl:     blood glucose test strips (EXACTECH TEST) strip, 1 each by In Vitro route daily As needed. , Disp: 50 each, Rfl: 11    acetaminophen (TYLENOL) 500 MG tablet, Take 1 tablet by mouth 4 times daily as needed for Pain, Disp: 30 tablet, Rfl: 0    mirtazapine (REMERON) 30 MG tablet, Take 30 mg by mouth nightly, Disp: , Rfl:     mirtazapine (REMERON) 15 MG tablet, Take 15 mg by mouth nightly, Disp: , Rfl:     DULoxetine (CYMBALTA) 60 MG extended release capsule, Take 1 capsule by mouth daily, Disp: 30 capsule, Rfl: 3    Lancets MISC, 1 each by Does not apply route daily, Disp: 100 each, Rfl: 11    azelastine (ASTELIN) 0.1 % nasal spray, 2 sprays by Nasal route 2 times daily Use in each nostril as directed, Disp: 1 Bottle, Rfl: 3    ipratropium-albuterol (DUONEB) 0.5-2.5 (3) MG/3ML SOLN nebulizer solution, Inhale 3 mLs into the lungs every 6 hours as needed for Shortness of Breath, Disp: 360 mL, Rfl: 11    Family History   Problem Relation Age of Onset    Diabetes Mother     Heart Disease Mother     Cirrhosis Father     Breast Cancer Neg Hx     Cancer Neg Hx     Colon Cancer Neg Hx     Eclampsia Neg Hx     Hypertension Neg Hx     Ovarian Cancer Neg Hx      Labor Neg Hx     Spont Abortions Neg Hx     Stroke Neg Hx        Social History     Socioeconomic History    Marital status: Single     Spouse name: Not on file    Number of children: Not on file    Years of education: Not on file    Highest education level: Not on file   Occupational History    Not on file   Social Needs    Financial resource strain: Not on file    Food insecurity     Worry: Not on file     Inability: Not on file    Transportation needs     Medical: Not on file     Non-medical: Not on file   Tobacco Use    Smoking status: Current Every Day Smoker     Packs/day: 1.00     Years: 36.00     Pack years: 36.00     Types: Cigarettes     Last attempt to quit: 2015     Years since quittin.0    Smokeless tobacco: Never Used    Tobacco comment: pt currently using nicotine patches   2 CIGARETTES A DAY   Substance and Sexual Activity    Alcohol use: No     Alcohol/week: 0.0 standard drinks    Drug use: No     Comment: 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 7 yrs    Sexual activity: Never   Lifestyle    Physical activity     Days per week: Not on file     Minutes per session: Not on file    Stress: Not on file   Relationships    Social connections     Talks on phone: Not on file     Gets together: Not on file     Attends [employer] service: Not on file     Active member of club or organization: Not on file     Attends meetings of clubs or organizations: Not on file     Relationship status: Not on file    Intimate partner violence     Fear of current or ex partner: Not on file     Emotionally abused: Not on file     Physically abused: Not on file     Forced sexual activity: Not on file   Other Topics Concern    Not on file   Social History Narrative    Not on file       Review of Systems:  Review of Systems   Constitution: Negative for chills and fever.    Cardiovascular: Negative for chest pain and palpitations. Respiratory: Negative for cough and shortness of breath. Musculoskeletal: Positive for back pain. Gastrointestinal: Negative for constipation. Neurological: Positive for paresthesias. Negative for disturbances in coordination, loss of balance, numbness and tingling. Physical Exam:  LMP 04/19/2003     Physical Exam  Neurological:      Mental Status: She is alert. Psychiatric:         Mood and Affect: Mood normal.         Record/Diagnostics Review:    Last óscar 2/2020 and was appropriate     Assessment:  Problem List Items Addressed This Visit     Osteoarthritis of spine with radiculopathy, lumbar region    Relevant Medications    HYDROcodone-acetaminophen ([address]) 5-325 MG per tablet    Primary osteoarthritis of both knees    Relevant Medications    HYDROcodone-acetaminophen ([address]) 5-325 MG per tablet    Medication monitoring encounter    Chronic low back pain    Relevant Medications    HYDROcodone-acetaminophen (NORCO) 5-325 MG per tablet    Spondylosis of lumbar region without myelopathy or radiculopathy - Primary    Relevant Medications    HYDROcodone-acetaminophen (NORCO) 5-325 MG per tablet             Treatment Plan:  Patient relates current medications are helping the pain. Patient reports taking pain medications as prescribed, denies obtaining medications from different sources and denies use of illegal drugs. Patient denies side effects from medications like nausea, vomiting, constipation or drowsiness. Patient reports current activities of daily living are possible due to medications and would like to continue them. As always, we encourage daily stretching and strengthening exercises, and recommend minimizing use of pain medications unless patient cannot get through daily activities due to pain. Contract requirements met. Continue opioid therapy.  Script written for norco  Follow up appointment made for 4 weeks    Marleen Mcclnedon is a 56 y.o. female being evaluated by a Virtual Visit (telephone visit) encounter to address concerns as mentioned above. A caregiver was present when appropriate. Due to this being a TeleHealth encounter (During HVUCX-22 public health emergency), evaluation of the following organ systems was limited: Vitals/Constitutional/EENT/Resp/CV/GI//MS/Neuro/Skin/Heme-Lymph-Imm. Pursuant to the emergency declaration under the [address] [place] [address] authority and the [employer] Act, this Virtual Visit was conducted with patient's (and/or legal guardian's) consent, to reduce the patient's risk of exposure to COVID-19 and provide necessary medical care. The patient (and/or legal guardian) has also been advised to contact this office for worsening conditions or problems, and seek emergency medical treatment and/or call 911 if deemed necessary. Total time spent for this encounter: 10 minutes    Services were provided through a telephone discussion virtually to substitute for in-person clinic visit. Patient and provider were located at their individual homes. --Frankie Duane, APRN - CNP on 8/3/2020 at 11:47 AM    An electronic signature was used to authenticate this note.

## 2020-08-05 ENCOUNTER — OFFICE VISIT (OUTPATIENT)
Dept: NEUROSURGERY | Age: 60
End: 2020-08-05
Payer: MEDICARE

## 2020-08-05 VITALS
WEIGHT: 189 LBS | HEART RATE: 102 BPM | OXYGEN SATURATION: 90 % | BODY MASS INDEX: 31.49 KG/M2 | SYSTOLIC BLOOD PRESSURE: 157 MMHG | TEMPERATURE: 97.5 F | HEIGHT: 65 IN | DIASTOLIC BLOOD PRESSURE: 87 MMHG

## 2020-08-05 PROCEDURE — 99213 OFFICE O/P EST LOW 20 MIN: CPT | Performed by: NEUROLOGICAL SURGERY

## 2020-08-05 NOTE — PROGRESS NOTES
Joann Gandhi  [facility] [address]  [id number]  Dept: [phone]    Patient:  Konrad Huertas  YOB: 1960  Date: 8/5/20    The patient is a 56 y.o. female who presents today for consult of the following problems:     Chief Complaint   Patient presents with    Follow-up     lumbar herniation             HPI:     Konrad Huertas is a 56 y.o. female on whom neurosurgical consultation was requested by Sheldon Sam MD for management of lumbar spondylosis with radiculopathy. Patient with severe right-sided leg pain in L4 distribution along the anteromedial aspect of the lower leg into the plantar surface. Patient significant debilitated with severe right lower extremity radiating pain on a daily basis preventing her from ambulating moving appropriately and even performing ADLs. Pain appears to be dynamic worsen with standing and she is in position improved slightly with laying down and rest.  No significant left leg pain saddle anesthesia or bowel bladder incontinence. She has been through multiple rounds physical therapy as well as facet blocks and transforaminal injections with minimal to no relief. .      History:     Past Medical History:   Diagnosis Date    Allergic rhinitis     Arthropathy, unspecified, other specified sites 4/30/2013    Bronchitis     Chronic back pain     COPD [facility]     Depression     [facility]di[facility] 12/18/2012    Emphysema of lung ([facility])     GERD (gastroesophageal reflux disease)     Hyperlipidemia     Hypertension     Knee pain     right knee mostly    Leg pain, right     Obesity     Osteoarthritis     Peripheral vascular disease (HCC)     Primary osteoarthritis of both knees     Radicular pain of lumbosacral region     Spinal stenosis, lumbar region, without neurogenic claudication 4/30/2013    Type II or unspecified type diabetes mellitus without mention of complication, not stated as uncontrolled     Unspecified sleep apnea     URI (upper respiratory infection)      Past Surgical History:   Procedure Laterality Date    COLONOSCOPY  2009    normal    DILATION AND CURETTAGE OF UTERUS      GASTRECTOMY      partial    NERVE BLOCK Right 13    Lumbar Diagnostic Block,  Kenalog 40 mg    NERVE BLOCK  13    Lumbar Radiofrequency, Kenalog 40mg    NERVE BLOCK  13    Lt MBNB  celestone 6mg    NERVE BLOCK Left 13    left lumbar diagnostic block #2 decadron 10 mg    NERVE BLOCK Left 13    left lumbar median branch radiofrequency    NERVE BLOCK  14    caudal, celestone 9 mg    NERVE BLOCK  14    caudal epidural #2, celestone 9mg, fentanyl 25mcg    NERVE BLOCK  14    caudal #3 decadron 10mg    NERVE BLOCK  14    duramorph epidural steroid block  duramorph 1 mg celestone 9 mg    NERVE BLOCK  11/20/15    TENS- Empi Select    NERVE BLOCK  2018    right transforminal # 1 decadron 10mg,isovue    NERVE BLOCK Bilateral 2019    bilat mbnb- no steroid    NERVE BLOCK Bilateral 2019    bilat mbnb, marcaine . 25%    [place] KNEE SCOPE,DIAGNOSTIC Right 3/24/2017    KNEE ARTHROSCOPY WITH PARTIAL MEDIAL MENISECAL DEBRIDMENT  performed by Vanna Burden MD at [address] [place] [address]  2015    lumbar diskectomy    UPPER GASTROINTESTINAL ENDOSCOPY  9 20     UPPER GASTROINTESTINAL ENDOSCOPY  4 2009,2011    gastritis, esophagitis     Family History   Problem Relation Age of Onset    Diabetes Mother     Heart Disease Mother     Cirrhosis Father     Breast Cancer Neg Hx     Cancer Neg Hx     Colon Cancer Neg Hx     Eclampsia Neg Hx     Hypertension Neg Hx     Ovarian Cancer Neg Hx      Labor Neg Hx     Spont Abortions Neg Hx     Stroke Neg Hx      Current Outpatient Medications on File Prior to Visit   Medication Sig Dispense Refill    HYDROcodone-acetaminophen (NORCO) 5-325 MG per tablet Take 1 tablet by mouth every 8 hours as needed for Pain for up to 30 days. Early refill due to holidays 90 tablet 0     MG capsule TAKE 1 CAPSULE EVERY DAY 30 capsule 10    gabapentin (NEURONTIN) 300 MG capsule TAKE 1 CAPSULE IN MORNING AND AFTERNOON AND 2 CAPSULES AT NIGHT 120 capsule 1    trospium (SANCTURA) 20 MG tablet TAKE 1 TABLET BY MOUTH 2 TIMES DAILY 60 tablet 2    cetirizine (ZYRTEC) 10 MG tablet TAKE 1 TABLET BY MOUTH DAILY 30 tablet 2    tiZANidine (ZANAFLEX) 4 MG tablet TAKE 1 TABLET TWICE DAILY 60 tablet 2    meloxicam (MOBIC) 15 MG tablet Take 1 tablet by mouth daily 30 tablet 3    carvedilol (COREG) 6.25 MG tablet Take 1 tablet by mouth 2 times daily 180 tablet 1    atorvastatin (LIPITOR) 40 MG tablet Take 1 tablet by mouth daily 90 tablet 1    metFORMIN (GLUCOPHAGE) 500 MG tablet Take 1 tablet by mouth 2 times daily (with meals) 180 tablet 1    lisinopril-hydroCHLOROthiazide (PRINZIDE;ZESTORETIC) 20-25 MG per tablet TAKE 1 TABLET EVERY DAY 90 tablet 3    amLODIPine (NORVASC) 10 MG tablet Take 1 tablet by mouth daily 90 tablet 3    albuterol sulfate HFA (VENTOLIN HFA) 108 (90 Base) MCG/ACT inhaler Inhale 2 puffs into the lungs every 6 hours as needed for Wheezing 1 Inhaler 3    omeprazole (PRILOSEC) 20 MG delayed release capsule TAKE 1 CAPSULE EVERY DAY 30 capsule 11    diphenhydrAMINE (BENADRYL) 25 MG tablet Take 25 mg by mouth every 6 hours as needed for Itching      nicotine (NICODERM CQ) 21 MG/24HR Place 1 patch onto the skin every 24 hours      potassium chloride (KLOR-CON M) 10 MEQ extended release tablet Take 2 tablets by mouth daily 60 tablet 3    SPIRIVA HANDIHALER 18 MCG inhalation capsule       blood glucose test strips (EXACTECH TEST) strip 1 each by In Vitro route daily As needed.  50 each 11    acetaminophen (TYLENOL) 500 MG tablet Take 1 tablet by mouth 4 times daily as needed for Pain 30 tablet 0    mirtazapine (REMERON) 30 MG tablet Take 30 mg by mouth nightly      mirtazapine (REMERON) 15 MG tablet Take 15 mg by mouth nightly      DULoxetine (CYMBALTA) 60 MG extended release capsule Take 1 capsule by mouth daily 30 capsule 3    Lancets MISC 1 each by Does not apply route daily 100 each 11    azelastine (ASTELIN) 0.1 % nasal spray 2 sprays by Nasal route 2 times daily Use in each nostril as directed 1 Bottle 3    ipratropium-albuterol (DUONEB) 0.5-2.5 (3) MG/3ML SOLN nebulizer solution Inhale 3 mLs into the lungs every 6 hours as needed for Shortness of Breath 360 mL 11     No current facility-administered medications on file prior to visit. Social History     Tobacco Use    Smoking status: Current Every Day Smoker     Packs/day: 1.00     Years: 36.00     Pack years: 36.00     Types: Cigarettes     Last attempt to quit: 2015     Years since quittin.0    Smokeless tobacco: Never Used    Tobacco comment: pt currently using nicotine patches   2 CIGARETTES A DAY   Substance Use Topics    Alcohol use: No     Alcohol/week: 0.0 standard drinks    Drug use: No     Comment: 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 7 yrs       Allergies   Allergen Reactions    Aspirin      DUE TO ULCER    Claritin [Loratadine] Other (See Comments)     coughing    Flonase [Fluticasone Propionate]     Morphine And Related      GI Upset       Review of Systems  Constitutional: Negative for activity change and appetite change. HENT: Negative for ear pain and facial swelling. Eyes: Negative for discharge and itching. Respiratory: Negative for choking and chest tightness. Cardiovascular: Negative for chest pain and leg swelling. Gastrointestinal: Negative for nausea and abdominal pain. Endocrine: Negative for cold intolerance and heat intolerance. Genitourinary: Negative for frequency and flank pain. Musculoskeletal: Negative for myalgias and joint swelling.    Skin: Negative for rash and wound. Allergic/Immunologic: Negative for environmental allergies and food allergies. Hematological: Negative for adenopathy. Does not bruise/bleed easily. Psychiatric/Behavioral: Negative for self-injury. The patient is not nervous/anxious. Physical Exam:      BP (!) 157/87 (Site: Left Upper Arm, Position: Sitting, Cuff Size: Medium Adult)   Pulse 102   Temp 97.5 °F (36.4 °C) (Temporal)   Ht 5' 5\" (1.651 m)   Wt 189 lb (85.7 kg)   LMP 04/19/2003   SpO2 90%   BMI 31.45 kg/m²   Estimated body mass index is 31.45 kg/m² as calculated from the following:    Height as of this encounter: 5' 5\" (1.651 m). Weight as of this encounter: 189 lb (85.7 kg). General:  Jessica Reynolds is a 56y.o. year old female who appears her stated age. HEENT: Normocephalic atraumatic. Neck supple. Chest: regular rate; pulses equal  Abdomen: Soft nontender nondistended. Normoactive bowel sounds.   Ext: DP and PT pulses 2+, good cap refill  Neuro    Mentation  Appropriate affect  Registration intact  Orientation intact  3 item recall intact  Judgement intact to situation    Cranial Nerves:   Pupils equal and reactive to light  Extraocular motion intact  Face and shrug symmetric  Tongue midline  No dysarthria  v1-3 sensation symmetric, masseter tone symmetric  Hearing symmetric and intact to finger rub    Sensation:   Diminished right L4    Motor  L deltoid 5/5; R deltoid 5/5  L biceps 5/5; R biceps 5/5  L triceps 5/5; R triceps 5/5  L wrist extension 5/5; R wrist extension 5/5  L intrinsics 5/5; R intrinsics 5/5     L iliopsoas 5/5 , R iliopsoas 5/5  L quadriceps 5/5; R quadriceps 5/5  L Dorsiflexion 5/5; R dorsiflexion 5/5  L Plantarflexion 5/5; R plantarflexion 5/5  L EHL 5/5; R EHL 5/5    Reflexes  L Brachioradialis 2+/4; R brachioradialis 2+/4  L Biceps 2+/4; R Biceps 2+/4  L Triceps 2+/4; R Triceps 2+/4  L Patellar 2+/4: R Patellar 2+/4  L Achilles 2+/4; R Achilles 2+/4    justin L: neg  justin R: neg  Clonus L: neg  Clonus R: neg  Babinski L: up  Babinski R; up    Studies Review:     Lumbar spine MRI reveals anterolisthesis at L4-5 with significant right-sided foraminal stenosis at L4. Left extension x-rays with grade 1 to grade 2 dynamic listhesis on flexion-extension. Assessment and Plan:      1. Spondylolisthesis, lumbar region    2. Lumbar radiculopathy, chronic          Plan: Patient with failed conservative measures including physical therapy and multiple rounds of injection. Currently with significant right-sided radiculopathy at the L4 nerve root with dynamic listhesis on flexion-extension x-rays. Counseled patient on appropriate intervention to include complete facetectomy decompression of the right L4 nerve root with fusion fixation posteriorly. Patient is currently smoking advised her to completely cease tobacco use prior to surgery. Will reinitiate PT and obtain nicotine test in 1 month time to reevaluate. In the interim if she is feeling better we can cancel surgery but we will see her back in that time  Followup: No follow-ups on file. Prescriptions Ordered:  No orders of the defined types were placed in this encounter. Orders Placed:  No orders of the defined types were placed in this encounter. Electronically signed by Kalia Carson DO on 8/5/2020 at 3:35 PM    Please note that this chart was generated using voice recognition Dragon dictation software. Although every effort was made to ensure the accuracy of this automated transcription, some errors in transcription may have occurred.

## 2020-08-17 ENCOUNTER — HOSPITAL ENCOUNTER (OUTPATIENT)
Dept: PHYSICAL THERAPY | Age: 60
Setting detail: THERAPIES SERIES
Discharge: HOME | End: 2020-08-17
Payer: COMMERCIAL

## 2020-08-17 PROCEDURE — 97161 PT EVAL LOW COMPLEX 20 MIN: CPT

## 2020-08-17 NOTE — CONSULTS
[x] [facility]  Outpatient Physical Therapy  [address]  Phone: [phone]  Fax: [phone] [] [facility] at [address] [place] [address]  Phone: [phone]   Fax: [phone]     Physical Therapy Evaluation    Date:  2020  Patient: Artie Smith  : 1960  MRN: [id number]  Physician: Carmelina Estevez DO   Insurance: [employer] Need Cinda Filler after Eval   Medical Diagnosis:  Spondylolisthesis, lumbar region M43.16  Lumbar radiculopathy, chronic M54.16    Rehab Codes: M54.16  Onset Date: 20                                 Next 's appt: 20    Subjective:   CC:Patient reports ongoing low back pain with right sided deficits   HPI: Patient reports she has had ongoing low back pain since  when she was lifting a box and injured her low back. Patient has prior episodes of PT, received several pain injections, and had lumbar discectomy. May need complete facetectomy decompression of the right L4 nerve root with fusion fixation posteriorly per Neurosurgeon. PMHx: [] Unremarkable [x] Diabetes [x] HTN  [] Pacemaker   [] MI/Heart Problems [] Cancer [x] Arthritis [] Asthma                         [x] refer to full medical chart  In [facility]  [] Other:         Comorbidities:   [] Obesity [] Dialysis  [] Other:   [x] Asthma/COPD [] Dementia [] Other:   [] Stroke [] Sleep apnea [] Other:   [] Vascular disease [] Rheumatic disease [] Other:     Tests: [] X-Ray: [x] MRI:  [] Other:  1. Status post interval right hemilaminectomy at L4-L5.  Severe right neural    foraminal stenosis at this level with compression of the exiting right L4    nerve root. 2. At L5-S1, central/left paracentral disc bulge contacts the traversing left    S1 nerve root in the lateral recess. 3. Multilevel mild bilateral neural foraminal stenosis, as detailed above.         Medications: [x] Refer to full medical record [] None [] Other:  Allergies:      [x] Refer to full medical record  [] None [] Other:    Function:    Patient lives with: Alone    In what type of home [x]  One story   [] Two story   [] Split level   Number of stairs to enter Elevator   With handrail on the []  Right to enter   [] Left to enter   Bathroom has a []  Tub only  [x] Tub/shower combo   [] Walk in shower    [x]  Grab bars   Washing machine is on []  Main level   [] Second level   [] Basement     ADL/IADL Previous level of function Current level of function Who currently assists the patient with task   Bathing  [x] Independent  [] Assist [x] Independent  [] Assist    Dress/grooming [x] Independent  [] Assist [x] Independent  [] Assist    Transfer/mobility [x] Independent  [] Assist [x] Independent  [] Assist    Feeding [x] Independent  [] Assist [x] Independent  [] Assist    Toileting [x] Independent  [] Assist [x] Independent  [] Assist    Driving [x] Independent  [] Assist [x] Independent  [] Assist    Housekeeping [x] Independent  [] Assist [x] Independent  [] Assist    Grocery shop/meal prep [x] Independent  [] Assist [x] Independent  [] Assist      Gait Prior level of function Current level of function    [x] Independent  [] Assist [x] Independent  [] Assist   Device: [x] Independent [x] Independent    [] Straight Cane [x] Quad cane [] Straight Cane [x] Quad cane    [] Standard walker [] Rolling walker   [] 4 wheeled walker [] Standard walker [] Rolling walker   [] 4 wheeled walker    [] Wheelchair [] Wheelchair     Working:  [] Full-time  [] Part-time  [] Off d/t condition  [] Retired  [x] Disability  [] N/A  Job/Employer:    Pain:  [x] Yes  [] No Location: low back, R LE  Pain Rating: (0-10 scale) 9/10  Pain altered Tx:  [] Yes  [x] No  Action:    Objective: p = pain, L = Lacks      ROM  ° A/P STRENGTH  ROM    Left Right Left Right Cervical    Shoulder Flex     Flexion    Abduction     Extension    ER     Rotation L R    IR     Side bending L R   Elbow Flex          Ext          Wrist Flex [x] Demo  [x] Written  Comprehension of Education:  [x] Verbalizes understanding. [x] Demonstrates understanding. [x] Needs Review. [] Demonstrates/verbalizes understanding of HEP/Ed previously given. Treatment Plan:  [x] Therapeutic Exercise   07738  [] Vasopneumatic cold with compression  38868    [x] Therapeutic Activity  84207 [x] Cold/hotpack    [x] Gait Training   95126 [] Lumbar/Cervical Traction  [id number]   [x] Neuromuscular Re-education  15096 [x] Electrical Stimulation Unattended  27323   [x] Manual Therapy    32791 [x] Electrical Stimulation Attended  [id number]   [] Iontophoresis: 4 mg/mL Dexamethasone Sodium Phosphate  mAmin  73382 []  Medication allergies reviewed for use of   Dexamethasone Sodium Phosphate 4mg/ml with iontophoresis treatments. Pt is not allergic.        Frequency:  2 x/week for 12 visits    Specific Instructions for next treatment[de-identified] Lumbar ROM and core strengthening       Evaluation Complexity:  History (Personal factors, comorbidities) [] 0 [x] 1-2 [] 3+   Exam (limitations, restrictions) [] 1-2 [x] 3 [] 4+   Clinical presentation (progression) [] Stable [x] Evolving  [] Unstable   Decision Making [x] Low [] Moderate [] High    [x] Low Complexity [] Moderate Complexity [] High Complexity       Treatment Charges: Mins Units   [x] Evaluation       [x]  Low       []  Moderate       []  High 20 1   []  Modalities     []  Ther Exercise     []  Manual Therapy     []  Ther Activities     []  Aquatics     []  Vasocompression     []  Other       TOTAL TREATMENT TIME: 20    Time in:1300   Time out:1325    Electronically signed by: Genet Adams, PT

## 2020-09-01 ENCOUNTER — HOSPITAL ENCOUNTER (OUTPATIENT)
Dept: PAIN MANAGEMENT | Age: 60
Discharge: HOME | End: 2020-09-01
Payer: COMMERCIAL

## 2020-09-01 PROCEDURE — 99443 PR PHYS/QHP TELEPHONE EVALUATION 21-30 MIN: CPT | Performed by: PAIN MEDICINE

## 2020-09-01 PROCEDURE — 99213 OFFICE O/P EST LOW 20 MIN: CPT

## 2020-09-01 RX ORDER — HYDROCODONE BITARTRATE AND ACETAMINOPHEN 5; 325 MG/1; MG/1
1 TABLET ORAL EVERY 8 HOURS PRN
Qty: 90 TABLET | Refills: 0 | Status: SHIPPED | OUTPATIENT
Start: 2020-09-02 | End: 2020-09-30 | Stop reason: SDUPTHER

## 2020-09-01 ASSESSMENT — ENCOUNTER SYMPTOMS
ALLERGIC/IMMUNOLOGIC NEGATIVE: 1
SHORTNESS OF BREATH: 0
ABDOMINAL PAIN: 0
EYE REDNESS: 0
BOWEL INCONTINENCE: 0
BACK PAIN: 1
PHOTOPHOBIA: 0
NAUSEA: 0
COLOR CHANGE: 0
COUGH: 0
CONSTIPATION: 0

## 2020-09-01 NOTE — PROGRESS NOTES
Jessica Reynolds is a 56 y.o. female evaluated on 9/1/2020. Modality of virtual service provided -via  telephone   Consent:  Patient and/or health care decision maker is aware that that patient may receive a bill for this telephone service, depending on one's insurance coverage, and has provided verbal consent to proceed: Yes    Patient identification was verified at the start of the visit: Yes    Chief complaint: Jessica Reynolds is 56 y.o., - female, with complaint of pain involving the low back and knees. Patient is complaining of pain involving the low back as well as in the knees. Patient has history of osteoarthritis of the knees bilaterally. Patient reports her insurance company had denied injections and that she is on oral medication. Patient's pain is at times very severe. She reports her back and leg pain is somewhat better today compared to yesterday. Patient reports she is going to see a neurosurgeon on 16th for possible fusion surgery. She has not started physical therapy yet. She previously had surgery in her low back about 5 years ago without improvement. Back Pain   This is a chronic problem. The current episode started more than 1 year ago. The problem occurs constantly. The problem has been gradually worsening since onset. The pain is present in the lumbar spine, sacro-iliac and gluteal. The quality of the pain is described as aching, burning and shooting (sharp, throbbing). The pain radiates to the right foot, right thigh and right knee. The pain is at a severity of 8/10 (7-10). The pain is severe. The pain is worse during the day. The symptoms are aggravated by standing, sitting, position and bending (walking, lifting , ADLs). Pertinent negatives include no abdominal pain, bladder incontinence, bowel incontinence, chest pain, dysuria, numbness, paresis, tingling or weakness. Risk factors include lack of exercise and sedentary lifestyle (smoking).  She has tried ice for the symptoms. Alleviating factors:rest   Lifestyle changes experienced with pain: Wakes from sleep, Prevents or limits ADLs, Increases w/activity. Mood changes,anxious  Patient currently unemployed. Physical therapy did not start PT    Are you under psychological counseling at present: No  Goals for treatment include:  Decrease in pain  Enjoy daily and recreational activities, return to previous status. Patient relates current medications are helping the pain. Patient reports taking pain medications as prescribed, denies obtaining medications from different sources and denies use of illegal drugs. Patient denies side effects from medications like nausea, vomiting, constipation or drowsiness. Patient reports current activities of daily living ar possible due to medications and would like to continue them. ACTIVITY/SOCIAL/EMOTIONAL:  Sleep Pattern: 6 hours per night. nightime awakenings  Home Exercises:  none  Activity:not significantly changed  Emotional Issues: anxiety/ nervousness.    Currently seeing a Psychiatrist or Psychologist:  No     ADVERSE MEDICATION EFFECTS:   Nausea and vomiting: no   Constipation: no-Undercontrol-: yes  Dizziness/drowsy/sleepy--no  Urinary Retention: no    ABERRANT BEHAVIORS SINCE LAST VISIT  Lost rx/pills:------------------------------------------ no  Taking  medication as prescribed: ----------- yes  Urine Drug Screen ---------------------------------  no  Recent ER visits: -------------------------------------No  Pill count is appropriate: ---------------------------yes   Refills for prescriptions appropriate:---------- yes      Past Medical History:   Diagnosis Date    Allergic rhinitis     Arthropathy, unspecified, other specified sites 4/30/2013    Bronchitis     Chronic back pain     COPD [facility]chronic obstructive pulmonary disease) ([facility]     Depression     DM (diabetes mellitus) ([facility] 12/18/2012    Emphysema of lung ([facility]     GERD (gastroesophageal reflux disease)     Hyperlipidemia     Hypertension     Knee pain     right knee mostly    Leg pain, right     Obesity     Osteoarthritis     Peripheral vascular disease (HCC)     Primary osteoarthritis of both knees     Radicular pain of lumbosacral region     Spinal stenosis, lumbar region, without neurogenic claudication 4/30/2013    Type II or unspecified type diabetes mellitus without mention of complication, not stated as uncontrolled     Unspecified sleep apnea     URI (upper respiratory infection)        Past Surgical History:   Procedure Laterality Date    COLONOSCOPY  2/12/2009    normal    DILATION AND CURETTAGE OF UTERUS      GASTRECTOMY      partial    NERVE BLOCK Right 4/30/13    Lumbar Diagnostic Block,  Kenalog 40 mg    NERVE BLOCK  5/23/13    Lumbar Radiofrequency, Kenalog 40mg    NERVE BLOCK  8/12/13    Lt MBNB  celestone 6mg    NERVE BLOCK Left 8-28-13    left lumbar diagnostic block #2 decadron 10 mg    NERVE BLOCK Left 9-24-13    left lumbar median branch radiofrequency    NERVE BLOCK  07-02-14    caudal, celestone 9 mg    NERVE BLOCK  7-16-14    caudal epidural #2, celestone 9mg, fentanyl 25mcg    NERVE BLOCK  7/30/14    caudal #3 decadron 10mg    NERVE BLOCK  11-6-14    duramorph epidural steroid block  duramorph 1 mg celestone 9 mg    NERVE BLOCK  11/20/15    TENS- Empi Select    NERVE BLOCK  07/20/2018    right transforminal # 1 decadron 10mg,isovue    NERVE BLOCK Bilateral 02/01/2019    bilat mbnb- no steroid    NERVE BLOCK Bilateral 02/08/2019    bilat mbnb, marcaine . 25%    IN KNEE SCOPE,DIAGNOSTIC Right 3/24/2017    KNEE ARTHROSCOPY WITH PARTIAL MEDIAL MENISECAL DEBRIDMENT  performed by Keyanna Nicole MD at [address] [place] [address]  01/2015    lumbar diskectomy    UPPER GASTROINTESTINAL ENDOSCOPY  9 20 2007    UPPER GASTROINTESTINAL ENDOSCOPY  4 21 2009,04/2011    gastritis, esophagitis       Family History   Problem Relation Age of Onset    Diabetes Mother    Magi Arana Heart Disease Mother     Cirrhosis Father     Breast Cancer Neg Hx     Cancer Neg Hx     Colon Cancer Neg Hx     Eclampsia Neg Hx     Hypertension Neg Hx     Ovarian Cancer Neg Hx      Labor Neg Hx     Spont Abortions Neg Hx     Stroke Neg Hx        Social History     Socioeconomic History    Marital status: Single     Spouse name: None    Number of children: None    Years of education: None    Highest education level: None   Occupational History    None   Social Needs    Financial resource strain: None    Food insecurity     Worry: None     Inability: None    Transportation needs     Medical: None     Non-medical: None   Tobacco Use    Smoking status: Current Every Day Smoker     Packs/day: 1.00     Years: 36.00     Pack years: 36.00     Types: Cigarettes     Last attempt to quit: 2015     Years since quittin.0    Smokeless tobacco: Never Used    Tobacco comment: pt currently using nicotine patches   2 CIGARETTES A DAY   Substance and Sexual Activity    Alcohol use: No     Alcohol/week: 0.0 standard drinks    Drug use: No     Comment: 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 7 yrs    Sexual activity: Never   Lifestyle    Physical activity     Days per week: None     Minutes per session: None    Stress: None   Relationships    Social connections     Talks on phone: None     Gets together: None     Attends Taoism service: None     Active member of club or organization: None     Attends meetings of clubs or organizations: None     Relationship status: None    Intimate partner violence     Fear of current or ex partner: None     Emotionally abused: None     Physically abused: None     Forced sexual activity: None   Other Topics Concern    None   Social History Narrative    None       Allergies   Allergen Reactions    Aspirin      DUE TO ULCER    Claritin [Loratadine] Other (See Comments)     coughing    Flonase [Fluticasone Propionate]     Morphine And Related GI Upset       Current Outpatient Medications on File Prior to Encounter   Medication Sig Dispense Refill     MG capsule TAKE 1 CAPSULE EVERY DAY 30 capsule 10    trospium (SANCTURA) 20 MG tablet TAKE 1 TABLET BY MOUTH 2 TIMES DAILY 60 tablet 2    cetirizine (ZYRTEC) 10 MG tablet TAKE 1 TABLET BY MOUTH DAILY 30 tablet 2    tiZANidine (ZANAFLEX) 4 MG tablet TAKE 1 TABLET TWICE DAILY 60 tablet 2    meloxicam (MOBIC) 15 MG tablet Take 1 tablet by mouth daily 30 tablet 3    carvedilol (COREG) 6.25 MG tablet Take 1 tablet by mouth 2 times daily 180 tablet 1    atorvastatin (LIPITOR) 40 MG tablet Take 1 tablet by mouth daily 90 tablet 1    metFORMIN (GLUCOPHAGE) 500 MG tablet Take 1 tablet by mouth 2 times daily (with meals) 180 tablet 1    lisinopril-hydroCHLOROthiazide (PRINZIDE;ZESTORETIC) 20-25 MG per tablet TAKE 1 TABLET EVERY DAY 90 tablet 3    amLODIPine (NORVASC) 10 MG tablet Take 1 tablet by mouth daily 90 tablet 3    albuterol sulfate HFA (VENTOLIN HFA) 108 (90 Base) MCG/ACT inhaler Inhale 2 puffs into the lungs every 6 hours as needed for Wheezing 1 Inhaler 3    omeprazole (PRILOSEC) 20 MG delayed release capsule TAKE 1 CAPSULE EVERY DAY 30 capsule 11    diphenhydrAMINE (BENADRYL) 25 MG tablet Take 25 mg by mouth every 6 hours as needed for Itching      nicotine (NICODERM CQ) 21 MG/24HR Place 1 patch onto the skin every 24 hours      potassium chloride (KLOR-CON M) 10 MEQ extended release tablet Take 2 tablets by mouth daily 60 tablet 3    SPIRIVA HANDIHALER 18 MCG inhalation capsule       blood glucose test strips (EXACTECH TEST) strip 1 each by In Vitro route daily As needed.  50 each 11    acetaminophen (TYLENOL) 500 MG tablet Take 1 tablet by mouth 4 times daily as needed for Pain 30 tablet 0    mirtazapine (REMERON) 30 MG tablet Take 30 mg by mouth nightly      mirtazapine (REMERON) 15 MG tablet Take 15 mg by mouth nightly      DULoxetine (CYMBALTA) 60 MG extended release capsule Take 1 capsule by mouth daily 30 capsule 3    Lancets MISC 1 each by Does not apply route daily 100 each 11    azelastine (ASTELIN) 0.1 % nasal spray 2 sprays by Nasal route 2 times daily Use in each nostril as directed 1 Bottle 3    ipratropium-albuterol (DUONEB) 0.5-2.5 (3) MG/3ML SOLN nebulizer solution Inhale 3 mLs into the lungs every 6 hours as needed for Shortness of Breath 360 mL 11     No current facility-administered medications on file prior to encounter. Review of Systems   Constitutional: Negative. Negative for activity change, appetite change, fatigue and unexpected weight change. HENT: Negative for congestion, ear pain and mouth sores. Eyes: Negative for photophobia, redness and visual disturbance. Respiratory: Negative for cough and shortness of breath. Cardiovascular: Negative for chest pain and palpitations. Gastrointestinal: Negative for abdominal pain, bowel incontinence, constipation and nausea. Endocrine: Negative for cold intolerance and polyuria. Genitourinary: Negative for bladder incontinence, dysuria and vaginal bleeding. Musculoskeletal: Positive for arthralgias and back pain. Skin: Negative for color change and wound. Allergic/Immunologic: Negative. Neurological: Negative for tingling, weakness and numbness. Hematological: Does not bruise/bleed easily. Psychiatric/Behavioral: Negative for sleep disturbance. The patient is not nervous/anxious and is not hyperactive. Physical Exam  Skin:         Neurological:      Mental Status: She is alert and oriented to person, place, and time.    Psychiatric:         Mood and Affect: Mood normal.            DATA:  LAB.:  2/17/2020  7:21 PM - Juan, [facility] Incoming Lab Results From [facility]     Component  Value  Ref Range & Units  Status  Collected  Lab    Pain Management Drug Panel Interp, Urine  Consistent   Final  02/13/2020  1:30 PM  [facility]    (NOTE) ________________________________________________________________   DRUGS EXPECTED:   NORCO (HYDROCODONE) [2/13/20]   ________________________________________________________________   CONSISTENT with medications provided:   [address] (HYDROCODONE) : based on hydrocodone and metabolite detected   below cutoff   ________________________________________________________________       X-Ray reports:  EXAMINATION:   MRI OF THE LUMBAR SPINE WITHOUT AND WITH CONTRAST  6/10/2019 12:04 pm       TECHNIQUE:   Multiplanar multisequence MRI of the lumbar spine was performed without and   with the administration of intravenous contrast.       COMPARISON:   06/12/2014       HISTORY:   ORDERING SYSTEM PROVIDED HISTORY: Postlaminectomy syndrome, lumbar region   TECHNOLOGIST PROVIDED HISTORY:   Pain, previous back surgery   Ordering Physician Provided Reason for Exam: chronic low back pain   Acuity: Chronic   Type of Exam: Subsequent/Follow-up   Additional signs and symptoms: right leg pain and numbness to r foot   Relevant Medical/Surgical History: injury in 2006       FINDINGS:   BONES/ALIGNMENT: Grade 1 anterolisthesis of L4 on L5 measures 3 mm. Lumbar   vertebral bodies are normal in height. No acute lumbar spine fracture. Minimal bone marrow edema about L4-L5. Remaining marrow signal pattern is   within normal limits. SPINAL CORD:  The conus terminates normally. SOFT TISSUES: No abnormal enhancement is seen of the lumbar spine. No   paraspinal mass identified. L1-L2: Mild DDD. Posterior disc bulge measures 3 mm with central annular   fissure. No significant spinal canal stenosis or significant neural   foraminal stenosis. L2-L3: Mild DDD. Disc bulge eccentric to the left measures 5 mm. Effacement   of the left ventral thecal sac. Mild spinal canal stenosis. Bilateral facet   joint DJD and ligamentum flavum thickening. Mild left neural foraminal   stenosis. L3-L4: Mild DDD.   Disc bulge measures 4 mm. Bilateral facet joint DJD. Mild   spinal canal stenosis. Mild left neural foraminal stenosis. L4-L5: Moderate DDD. Status post right hemilaminectomy. Uncovered disc   material secondary to anterolisthesis of L4 on L5. Superimposed diffuse disc   bulge measures 3 mm. No significant spinal canal stenosis. Flattening of   the ventral thecal sac. Severe right neural foraminal stenosis with   compression of the exiting right L4 nerve root. Mild left neural foraminal   stenosis. L5-S1: Mild DDD. Central/left paracentral disc bulge measures 3 mm. Bilateral facet joint DJD. Disc bulge contacts the traversing left S1 nerve   root in the lateral recess without sally nerve root compression. Mild   bilateral neural foraminal stenosis. Impression   1. Status post interval right hemilaminectomy at L4-L5. Severe right neural   foraminal stenosis at this level with compression of the exiting right L4   nerve root. 2. At L5-S1, central/left paracentral disc bulge contacts the traversing left   S1 nerve root in the lateral recess. 3. Multilevel mild bilateral neural foraminal stenosis, as detailed above. Clinical  impression:  1. Spondylosis of lumbar region without myelopathy or radiculopathy    2. Chronic midline low back pain with sciatica, sciatica laterality unspecified    3. Primary osteoarthritis of both knees    4. Postlaminectomy syndrome, lumbar region    5. DDD (degenerative disc disease), lumbar    6. 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 Medication monitoring encounter        Plan of care: We will continue current pain medications  Current medications are being tolerated without any Adverse side effects. Orders Placed This Encounter   Medications    HYDROcodone-acetaminophen (NORCO) 5-325 MG per tablet     Sig: Take 1 tablet by mouth every 8 hours as needed for Pain for up to 30 days.  Early refill due to holidays     Dispense:  90 tablet     Refill:  0     Reduce doses taken as pain becomes manageable     Urine drug screens have been appropriate. No aberrant activity noted. Analgesia is achieved. Activities of daily living are possible because of medications. Safe use of medications explained to patient. PDMP Monitoring:    Last PDMP Mejia Gomezanup as Reviewed [facility]):  Review User Review Instant Review Result   Libby Benavidez 9/1/2020  8:49 PM Reviewed PDMP [1]     Counselling/Preventive measures for pain  Control:    [x]  Spine strengthening exercises are discussed with patient in detail. [x] Ill effects of being on chronic pain medications such as sleep disturbances, hormonal changes, withdrawal symptoms,  chronic opioid dependence and tolerance were discussed with patient. I had asked the patient to minimize medication use and utilize pain medications only for uncontrolled rest pain or pain with exertional activities. I advised patient not to self escalate pain medications without consulting with us. At each of patient's future visits we will try to taper pain medications, while adjusting the adjunct medications, and re-evaluating for Physical Therapy to improve spinal and joint strength. We will continue to have discussions to decrease pain medications as tolerated. I also discussed with the patient regarding the dangers of combining narcotic pain medication with tranquilizers, alcohol or illegal drugs or taking the medication any other than prescribed. The dangers including the respiratory depression and death. Patient was told to tell  to all  physicians regarding the medications he is getting from pain clinic. Patient is warned not to take any unprescribed medications over-the-counter medications that can depress breathing . Patient is advised to talk to the pharmacist or physicians if planning to take any over-the-counter medications before  takeing them.  Patient is strongly advised to avoid tranquilizers or  Relaxants for any medications that can depress breathing or recreational drugs. Patient is also advised to tell us if there is any changes in his medications from other physicians. We discussed the same at today's visit and have not been to implement it, as the patient's pain is not under control with current medications. Decision Making Process : Patient's health history and referral records thoroughly reviewed before focused physical examination and discussion with patient. I have spent 25 mins. Over 50% of today's visit is spent on examining the patient and counseling and coordinating the care. Level of complexity of date to be reviewed is Moderate. The chart date reviewed include the following: Imaging Reports. Summary of Care. Time spent reviewing with patient the below reports:   Medication safety, Treatment options. Level of diagnosis and management options of this case is multiple: involving the following management options: Interventions as needed, medication management as appropriate, future visits, activity modification, heat/ice as needed, Urine drug screen as required. [x]The patient's questions were answered to the best of my abilities. This note was created using voice recognition software. There may be inaccuracies of transcription  that are inadvertently overlooked prior to the signature. There is any questions about the transcription please contact me. Return in  4 weeks  with physician / CNP  for further plan of treatment. Due to the COVID-19 pandemic and the appropriate interventions by Jessy Rocha, our non-urgent pain management patients will not be seen in the office at this time for their protection and the protection of our staff.  To offer continuity of care, their prescriptions will be escribed this month after a careful chart review and review of their OARRS report  Pursuant to the emergency declaration under the [employer] and [employer] Act, 1135 waiver authority and the Coronavirus Preparedness and Response Supplemental Appropriations Act, this Virtual Visit was conducted, with patient's consent, to reduce the patient's risk of exposure to COVID-19 and provide continuity of care for an established patient. Services were provided through a video synchronous discussion virtually to substitute for in-person appointment. \"  Documentation:  I communicated with the patient and/or health care decision maker about plan of care  Details of this discussion including any medical advice provided: Total Time: minutes: 21-30 minutes    I affirm this is a Patient Initiated Episode with an Established Patient who has not had a related appointment within my department in the past 7 days or scheduled within the next 24 hours.     Electronically signed by Rolando De La Rosa MD on 9/1/2020 at 9:15 PM

## 2020-09-02 ENCOUNTER — HOSPITAL ENCOUNTER (OUTPATIENT)
Age: 60
Setting detail: SPECIMEN
Discharge: HOME | End: 2020-09-02
Payer: COMMERCIAL

## 2020-09-05 LAB
3-OH-COTININE: 80 NG/ML
COTININE: 343 NG/ML
NICOTINE: 4 NG/ML

## 2020-09-10 ENCOUNTER — HOSPITAL ENCOUNTER (OUTPATIENT)
Dept: PHYSICAL THERAPY | Age: 60
Setting detail: THERAPIES SERIES
Discharge: HOME | End: 2020-09-10
Payer: COMMERCIAL

## 2020-09-10 PROCEDURE — G0283 ELEC STIM OTHER THAN WOUND: HCPCS

## 2020-09-10 PROCEDURE — 97110 THERAPEUTIC EXERCISES: CPT

## 2020-09-10 NOTE — FLOWSHEET NOTE
[] [facility]  [address]  [phone]  F: [phone] [] [address]  [facility]   [address]  P: [phone]  F: [phone] [] [place]  [address] [facility]  P: [phone]  F: [phone] [] [address]  [facility]   [address]   [place]: [phone]  F: [phone]      Physical Therapy Daily Treatment Note    Date:  9/10/2020  Patient Name:  Ricki Roth  \"Dot\"  :  1960  MRN: [id number]  Physician: Akin Méndez DO                Insurance: Artem Chatterjeeanup Velez after Eval   Medical Diagnosis:  Spondylolisthesis, lumbar region M43.16  Lumbar radiculopathy, chronic M54.16                        Rehab Codes: M54.16  Onset Date: 20                                 Next 's appt: 20  Visit# / total visits: 2/12     Cancels/No Shows: 0/0    Subjective:    Pain:  [x] Yes  [] No Location: Spondylolisthesis, lumbar region  Pain Rating: (0-10 scale) 10/10  Pain altered Tx:  [] No  [] Yes  Action:  Comments: Pain increases with walking. Pain in B knees. LB pain on R side down the R leg and into the shin of the foot.       Objective:  Modalities: IFC/HP to low back in prone x20 min  Precautions:  Exercises:  Exercise Reps/ Time Weight/ Level Comments   NuStep 5' L4          Seated      Hamstring stretch 3x20\"     Marches 10x           Supine      SKTC 3x20\"  Towel   Piriformis 3x20\"     Butterfly stretch 3x20\"     LTR 10x5\"     Iso abd 10x     Marches 10x     Walk outs 10x                             Other: Additional time required for slow movement    Treatment Charges: Mins Units   []  Modalities IFC/HP 20 1   []  Ther Exercise 40 3   []  Manual Therapy     []  Ther Activities     []  Aquatics     []  Vasocompression     []  Other     Total Treatment time 60 4       Assessment: [x] Progressing toward goals. Patient reports 10/10 pain yet is able to complete exercise log with good tolerance. Initiated supine DLS ex with fair carry over from previous physical therapy. Mod cueing for iso abd. Ended with IFC/HP to low back to decrease pain with relief noted by patient. [] No change. [] Other:   [x] Patient would continue to benefit from skilled physical therapy services in order to: decrease pain and reduce radicular symptoms, increase hamstring length and increase strength to LE, hips and core. Problems:    [x]? ? Pain: 9/10 R low back and LE pain   [x]? ? Flexibility: R hamstrings  [x]? ? Strength: R hip flexion, abduction, and extension, Abs  [x]? ? Function:Oswestry score to 84% impaired  []? Other:     STG: (to be met in 8 treatments)  1. ? Pain: 9/10 R LE and low back pain. 2. ? Strength: 4+/5 R hip flexion, abduction, extension to improve mobility. 3. ? Function: Oswestry score to 64% impaired or better to improve ADLs. 4. Independent with Home Exercise Programs     LTG: (to be met in 12 treatments)  1. Patient will be able to ambulate community distances without cane. 2. Patient will report decreased frequency of R LE radicular pain.      Patient goals:  Reduce R LE pain and improve function.      Rehab Potential:  [x]? Good  []? Fair  []? Poor              Suggested Professional Referral:  [x]? No  []? Yes:  Barriers to Goal Achievement:  [x]? No  []? Yes:  Domestic Concerns:  [x]? No  []? Yes:    Pt. Education:  [x] Yes  [] No  [x] Reviewed Prior HEP/Ed  Method of Education: [x] Verbal  [x] Demo  [x] Written  Comprehension of Education:  [x] Verbalizes understanding. [x] Demonstrates understanding. [x] Needs review. [x] Demonstrates/verbalizes HEP/Ed previously given. 9.10.20:   [employer] hamstring, ER, LTR, piriformis stretch, supine bridges, iso abd     Plan: [x] Continue current frequency toward long and short term

## 2020-09-15 ENCOUNTER — HOSPITAL ENCOUNTER (OUTPATIENT)
Dept: PHYSICAL THERAPY | Age: 60
Setting detail: THERAPIES SERIES
Discharge: HOME | End: 2020-09-15
Payer: COMMERCIAL

## 2020-09-15 PROCEDURE — 97110 THERAPEUTIC EXERCISES: CPT

## 2020-09-15 PROCEDURE — G0283 ELEC STIM OTHER THAN WOUND: HCPCS

## 2020-09-15 NOTE — FLOWSHEET NOTE
[] [facility]) [facility] CENTER &  Therapy  [address]  [phone]  F: [phone] [] [address]  [place] [address]   [address]  P: [phone]  F: [phone] [] [address]  Therapy  [address]  P: [phone]  F: [phone] [] [address]  [facility]   [address]   [place]: [phone]  F: [phone]      Physical Therapy Daily Treatment Note    Date:  9/15/2020  Patient Name:  Roycear Punch  \"Dot\"  :  1960  MRN: [id number]  Physician: Nette Hernandez DO                Insurance: [employer] (12 visits cherie'd 20-10.22.20 Aut# [id number])  Medical Diagnosis:  Spondylolisthesis, lumbar region M43.16  Lumbar radiculopathy, chronic M54.16                        Rehab Codes: M54.16  Onset Date: 20                                 Next 's appt: 20  Visit# / total visits: 3/12     Cancels/No Shows: 0/0    Subjective:    Pain:  [x] Yes  [] No Location: Spondylolisthesis, lumbar region  Pain Rating: (0-10 scale) 8/10  Pain altered Tx:  [] No  [] Yes  Action:  Comments: Patient reports a decrease in pain today after taking a pain pill 2 hrs ago. Notes she had a lot of pain after last therapy session. Pain increases with standing and immediately after she stops walking.        Objective:  Modalities: IFC/HP to low back in prone x20 min  Precautions:  Exercises:  Exercise Reps/ Time Weight/ Level Comments   NuStep 5' L4          Seated      Hamstring stretch 3x20\"     Marches 10x           Supine      SKTC 3x20\"  Towel   Piriformis 3x20\"     Butterfly stretch 3x20\"     Fabers 3x20\"     LTR 10x5\"     Iso abd 10x  Max cueing   Marches 10x     Walk outs 10x     Bridges 10x                       Other: Additional time required for slow movement    Treatment Charges: Mins Units hamstring, ER, LTR, piriformis stretch, supine bridges, iso abd     Plan: [x] Continue current frequency toward long and short term goals.      [x] Specific Instructions for subsequent treatments: Lumbar ROM and core strengthening          Time In: 1100           Time Out: 3153    Electronically signed by:  Sary Jaquez PTA

## 2020-09-16 ENCOUNTER — OFFICE VISIT (OUTPATIENT)
Dept: NEUROSURGERY | Age: 60
End: 2020-09-16
Payer: COMMERCIAL

## 2020-09-16 VITALS
RESPIRATION RATE: 16 BRPM | SYSTOLIC BLOOD PRESSURE: 137 MMHG | HEART RATE: 79 BPM | DIASTOLIC BLOOD PRESSURE: 80 MMHG | TEMPERATURE: 97.1 F

## 2020-09-16 PROCEDURE — G8417 CALC BMI ABV UP PARAM F/U: HCPCS | Performed by: NEUROLOGICAL SURGERY

## 2020-09-16 PROCEDURE — 3017F COLORECTAL CA SCREEN DOC REV: CPT | Performed by: NEUROLOGICAL SURGERY

## 2020-09-16 PROCEDURE — 4004F PT TOBACCO SCREEN RCVD TLK: CPT | Performed by: NEUROLOGICAL SURGERY

## 2020-09-16 PROCEDURE — G8427 DOCREV CUR MEDS BY ELIG CLIN: HCPCS | Performed by: NEUROLOGICAL SURGERY

## 2020-09-16 PROCEDURE — 99214 OFFICE O/P EST MOD 30 MIN: CPT | Performed by: NEUROLOGICAL SURGERY

## 2020-09-16 NOTE — PROGRESS NOTES
Joann Gandhi  [facility] [address] [facility] [address]  Dept: [phone]    Patient:  eJnnifer Chun  YOB: 1960  Date: 9/16/20    The patient is a 56 y.o. female who presents today for consult of the following problems:     Chief Complaint   Patient presents with    Follow-up     nicotine test             HPI:     Jennifer Chun is a 56 y.o. female on whom neurosurgical consultation was requested by Shailesh Frost MD for management of significant radiculopathy right side L4 as well as axial back pain ranging anywhere from 7-10 out of 10 axial and spasmodic in nature. Symptoms are worsened with ambulation as well as extension of the legs and leaning forward. No significant finding factors. She did have physical therapy with some heat and did get some mild to moderate relief with the symptoms completely come back every time she stops. This is been her third overall bout of physical therapy in the last 2 years and she has had some relief in the past but nothing appears to be sustained. Injections of action made her worse. She has cut down her cigarette use to approximately half a pack per day for the last few weeks. Denies any bowel bladder incontinence saddle anesthesia of any kind. .      History:     Past Medical History:   Diagnosis Date    Allergic rhinitis     Arthropathy, unspecified, other specified sites 4/30/2013    Bronchitis     Chronic back pain     COPD (chronic obstructive pulmonary disease) ([facility])     Depression     DM (diabetes mellitus) ([facility] 12/18/2012    Emphysema of lung (formerly [facility])     GERD (gastroesophageal reflux disease)     Hyperlipidemia     Hypertension     Knee pain     right knee mostly    Leg pain, right     Obesity     Osteoarthritis     Peripheral vascular disease (formerly [facility])     Primary osteoarthritis of both knees     Radicular pain of lumbosacral region     Spinal stenosis, lumbar Medications on File Prior to Visit   Medication Sig Dispense Refill    HYDROcodone-acetaminophen (NORCO) 5-325 MG per tablet Take 1 tablet by mouth every 8 hours as needed for Pain for up to 30 days. Early refill due to holidays 90 tablet 0     MG capsule TAKE 1 CAPSULE EVERY DAY 30 capsule 10    trospium (SANCTURA) 20 MG tablet TAKE 1 TABLET BY MOUTH 2 TIMES DAILY 60 tablet 2    cetirizine (ZYRTEC) 10 MG tablet TAKE 1 TABLET BY MOUTH DAILY 30 tablet 2    tiZANidine (ZANAFLEX) 4 MG tablet TAKE 1 TABLET TWICE DAILY 60 tablet 2    meloxicam (MOBIC) 15 MG tablet Take 1 tablet by mouth daily 30 tablet 3    carvedilol (COREG) 6.25 MG tablet Take 1 tablet by mouth 2 times daily 180 tablet 1    atorvastatin (LIPITOR) 40 MG tablet Take 1 tablet by mouth daily 90 tablet 1    metFORMIN (GLUCOPHAGE) 500 MG tablet Take 1 tablet by mouth 2 times daily (with meals) 180 tablet 1    lisinopril-hydroCHLOROthiazide (PRINZIDE;ZESTORETIC) 20-25 MG per tablet TAKE 1 TABLET EVERY DAY 90 tablet 3    amLODIPine (NORVASC) 10 MG tablet Take 1 tablet by mouth daily 90 tablet 3    albuterol sulfate HFA (VENTOLIN HFA) 108 (90 Base) MCG/ACT inhaler Inhale 2 puffs into the lungs every 6 hours as needed for Wheezing 1 Inhaler 3    omeprazole (PRILOSEC) 20 MG delayed release capsule TAKE 1 CAPSULE EVERY DAY 30 capsule 11    diphenhydrAMINE (BENADRYL) 25 MG tablet Take 25 mg by mouth every 6 hours as needed for Itching      nicotine (NICODERM CQ) 21 MG/24HR Place 1 patch onto the skin every 24 hours      potassium chloride (KLOR-CON M) 10 MEQ extended release tablet Take 2 tablets by mouth daily 60 tablet 3    SPIRIVA HANDIHALER 18 MCG inhalation capsule       blood glucose test strips (EXACTECH TEST) strip 1 each by In Vitro route daily As needed.  50 each 11    acetaminophen (TYLENOL) 500 MG tablet Take 1 tablet by mouth 4 times daily as needed for Pain 30 tablet 0    mirtazapine (REMERON) 30 MG tablet Take 30 mg by mouth nightly      mirtazapine (REMERON) 15 MG tablet Take 15 mg by mouth nightly      DULoxetine (CYMBALTA) 60 MG extended release capsule Take 1 capsule by mouth daily 30 capsule 3    Lancets MISC 1 each by Does not apply route daily 100 each 11    azelastine (ASTELIN) 0.1 % nasal spray 2 sprays by Nasal route 2 times daily Use in each nostril as directed 1 Bottle 3    ipratropium-albuterol (DUONEB) 0.5-2.5 (3) MG/3ML SOLN nebulizer solution Inhale 3 mLs into the lungs every 6 hours as needed for Shortness of Breath 360 mL 11     No current facility-administered medications on file prior to visit. Social History     Tobacco Use    Smoking status: Current Every Day Smoker     Packs/day: 1.00     Years: 36.00     Pack years: 36.00     Types: Cigarettes     Last attempt to quit: 2015     Years since quittin.1    Smokeless tobacco: Never Used    Tobacco comment: pt currently using nicotine patches   2 CIGARETTES A DAY   Substance Use Topics    Alcohol use: No     Alcohol/week: 0.0 standard drinks    Drug use: No     Comment: 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 7 yrs       Allergies   Allergen Reactions    Aspirin      DUE TO ULCER    Claritin [Loratadine] Other (See Comments)     coughing    Flonase [Fluticasone Propionate]     Morphine And Related      GI Upset       Review of Systems  Constitutional: Negative for activity change and appetite change. HENT: Negative for ear pain and facial swelling. Eyes: Negative for discharge and itching. Respiratory: Negative for choking and chest tightness. Cardiovascular: Negative for chest pain and leg swelling. Gastrointestinal: Negative for nausea and abdominal pain. Endocrine: Negative for cold intolerance and heat intolerance. Genitourinary: Negative for frequency and flank pain. Musculoskeletal: Negative for myalgias and joint swelling. Skin: Negative for rash and wound.    Allergic/Immunologic: Negative for environmental allergies and food allergies. Hematological: Negative for adenopathy. Does not bruise/bleed easily. Psychiatric/Behavioral: Negative for self-injury. The patient is not nervous/anxious. Physical Exam:      /80 (Site: Left Upper Arm, Position: Sitting, Cuff Size: Large Adult)   Pulse 79   Temp 97.1 °F (36.2 °C)   Resp 16   LMP 04/19/2003   Estimated body mass index is 31.45 kg/m² as calculated from the following:    Height as of 8/5/20: 5' 5\" (1.651 m). Weight as of 8/5/20: 189 lb (85.7 kg). General:  Betito Rose is a 56y.o. year old female who appears her stated age. HEENT: Normocephalic atraumatic. Neck supple. Chest: regular rate; pulses equal  Abdomen: Soft nontender nondistended. Normoactive bowel sounds. Ext: DP and PT pulses 2+, good cap refill  Neuro    Mentation  Appropriate affect  Registration intact  Orientation intact  3 item recall intact  Judgement intact to situation    Cranial Nerves:   Pupils equal and reactive to light  Extraocular motion intact  Face and shrug symmetric  Tongue midline  No dysarthria  v1-3 sensation symmetric, masseter tone symmetric  Hearing symmetric and intact to finger rub    Sensation:   Diminished sensation right L4 dermatome.     Motor  L deltoid 5/5; R deltoid 5/5  L biceps 5/5; R biceps 5/5  L triceps 5/5; R triceps 5/5  L wrist extension 5/5; R wrist extension 5/5  L intrinsics 5/5; R intrinsics 5/5     L iliopsoas 5/5 , R iliopsoas 5/5  L quadriceps 5/5; R quadriceps 5/5  L Dorsiflexion 5/5; R dorsiflexion 5/5  L Plantarflexion 5/5; R plantarflexion 5/5  L EHL 5/5; R EHL 5/5    Reflexes  L Brachioradialis 2+/4; R brachioradialis 2+/4  L Biceps 2+/4; R Biceps 2+/4  L Triceps 2+/4; R Triceps 2+/4  L Patellar 2+/4: R Patellar 2+/4  L Achilles 2+/4; R Achilles 2+/4    hoffmans L: neg  hoffmans R: neg  Clonus L: neg  Clonus R: neg  Babinski L: up  Babinski R; up    Studies Review:     MRI lumbar spine reveals significant foraminal stenosis on the right side at L4. In addition prior scarring with evidence of right-sided hemilaminotomy of the right L4-5. Flexion Extension x-rays show gross instability at the right L4-5 level    Assessment and Plan:      1. Spondylolisthesis of lumbar region    2. Lumbosacral radiculopathy at L4    3. Tobacco use          Plan: Extensive discussion with the patient stating that she is had failure of multimodal therapy including injections physical therapy and home exercise program.  At this point I do believe that she warrants decompression with complete facetectomy on the right side at L4-5 with fixation fusion due to dynamic instability. Explained the patient that surgery takes approximate 4 hours with 2 to 3-day hospital stay and risks including CSF leak nerve injury bleeding infection and hardware failure. I did explain in detail that there is no strong indication for axial back pain treatment with surgical intervention. However she does have dynamic instability in the setting of axial back pain and concomitant radiculopathy which is a clear-cut surgical indication in the setting of failed conservative measures    Followup: No follow-ups on file. Prescriptions Ordered:  No orders of the defined types were placed in this encounter. Orders Placed:  No orders of the defined types were placed in this encounter. Electronically signed by Akin Méndez DO on 9/16/2020 at 1:41 PM    Please note that this chart was generated using voice recognition Dragon dictation software. Although every effort was made to ensure the accuracy of this automated transcription, some errors in transcription may have occurred.

## 2020-09-17 ENCOUNTER — HOSPITAL ENCOUNTER (OUTPATIENT)
Dept: PHYSICAL THERAPY | Age: 60
Setting detail: THERAPIES SERIES
Discharge: HOME | End: 2020-09-17
Payer: COMMERCIAL

## 2020-09-17 PROCEDURE — 97110 THERAPEUTIC EXERCISES: CPT

## 2020-09-17 PROCEDURE — G0283 ELEC STIM OTHER THAN WOUND: HCPCS

## 2020-09-17 NOTE — FLOWSHEET NOTE
[] [facility]  [address]  [phone]  F: [phone] [] [address]  [facility]   [address]  P: [phone]  F: [phone] [] [place]  [address] [facility]  P: [phone]  F: [phone] [] [address]  [facility]   [address]   [place]: [phone]  F: [phone]      Physical Therapy Daily Treatment Note    Date:  2020  Patient Name:  Octavio Bhakta  \"Dot\"  :  1960  MRN: [id number]  Physician: Dianna Rose DO                Insurance: [employer] (12 visits anila'd 20-10.22.20 Aut# [id number])  Medical Diagnosis:  Spondylolisthesis, lumbar region M43.16  Lumbar radiculopathy, chronic M54.16                        Rehab Codes: M54.16  Onset Date: 20                                 Next 's appt: 20  Visit# / total visits: 4/12     Cancels/No Shows: 0/0    Subjective:    Pain:  [x] Yes  [] No Location: Spondylolisthesis, lumbar region  Pain Rating: (0-10 scale) 8.5/10  Pain altered Tx:  [] No  [] Yes  Action:  Comments:  Patient reports most of her pain on the R side low back into the buttocks. Scheduled for surgery 20 for lumbar fusion L4-5.     Objective:  Modalities: IFC/HP to low back in prone x20 min  Precautions:  Exercises:  Exercise Reps/ Time Weight/ Level Comments   NuStep 5' L4          Seated      Hamstring stretch 3x20\"     Incline stretch 3x20\"  Standing   Marches 10x     Heel/toe raises 10x     LAQ 10x Ruddy          Supine      SKTC 3x20\"  Towel   Piriformis 3x20\"     Butterfly stretch 3x20\"     Fabers 3x20\"     LTR 10x5\"     Iso abd 10x  Max cueing   Marches 10x     Walk outs 10x     Bridges 10x                       Other: Additional time required for cautious movement    Treatment Charges: Mins Units [x]  Modalities IFC/HP 20 1   [x]  Ther Exercise 39 3   []  Manual Therapy     []  Ther Activities     []  Aquatics     []  Vasocompression     []  Other     Total Treatment time 59 4       Assessment: [] Progressing toward goals. [x] No change:  Posterior R leg pain with N/T increased during incline stretch. Added seated exercises to increase LE strength with good tolerance. Pain centralized with supine exercises. Ended with IFC/HP at end of treatment. [] Other:   [x] Patient would continue to benefit from skilled physical therapy services in order to: decrease pain and reduce radicular symptoms, increase hamstring length and increase strength to LE, hips and core. Problems:    [x]? ? Pain: 9/10 R low back and LE pain   [x]? ? Flexibility: R hamstrings  [x]? ? Strength: R hip flexion, abduction, and extension, Abs  [x]? ? Function:Oswestry score to 84% impaired  []? Other:     STG: (to be met in 8 treatments)  1. ? Pain: 9/10 R LE and low back pain. 2. ? Strength: 4+/5 R hip flexion, abduction, extension to improve mobility. 3. ? Function: Oswestry score to 64% impaired or better to improve ADLs. 4. Independent with Home Exercise Programs     LTG: (to be met in 12 treatments)  1. Patient will be able to ambulate community distances without cane. 2. Patient will report decreased frequency of R LE radicular pain.      Patient goals:  Reduce R LE pain and improve function.      Rehab Potential:  [x]? Good  []? Fair  []? Poor              Suggested Professional Referral:  [x]? No  []? Yes:  Barriers to Goal Achievement:  [x]? No  []? Yes:  Domestic Concerns:  [x]? No  []? Yes:    Pt. Education:  [x] Yes  [] No  [x] Reviewed Prior HEP/Ed  Method of Education: [x] Verbal  [x] Demo  [] Written  Comprehension of Education: Heel/toe raises/LAQ, incline stretch  [x] Verbalizes understanding. [x] Demonstrates understanding. [x] Needs review.    [] Demonstrates/verbalizes HEP/Ed previously given.  9.10.20: Medbridge hamstring, ER, LTR, piriformis stretch, supine bridges, iso abd     Plan: [x] Continue current frequency toward long and short term goals.      [x] Specific Instructions for subsequent treatments: Lumbar ROM and core strengthening          Time In: 1600           Time Out: 8709    Electronically signed by:  Mg Casanova PTA

## 2020-09-18 RX ORDER — CETIRIZINE HYDROCHLORIDE 10 MG/1
TABLET ORAL
Qty: 30 TABLET | Refills: 1 | Status: SHIPPED | OUTPATIENT
Start: 2020-09-18 | End: 2020-11-18

## 2020-09-18 NOTE — TELEPHONE ENCOUNTER
Request for cetirizine - med pended. Please fill if appropriate. Next Visit Date:  Future Appointments   Date Time Provider Claudy Schaeffer   9/21/2020  9:00 AM Maria Del Carmen Million, PTA [facility]   9/22/2020  3:30 PM Ajit Salas MD [facility] IM [facility]   9/24/2020  9:00 AM Maria Del Carmen Million, PTA [facility]   9/28/2020  9:00 AM Maria Del Carmen Million, PTA [facility]   9/30/2020  9:00 AM Tino William, APRN - CNP [facility]   10/1/2020  1:00 PM Maria Del Carmen Million, PTA [facility] [address] Maintenance   Topic Date Due    Diabetic retinal exam  09/26/2017    Annual Wellness Visit (AWV)  05/29/2019    Diabetic foot exam  06/18/2019    Shingles Vaccine (2 of 2) 08/19/2019    Colon Cancer Screen FIT/FOBT  05/06/2020    Flu vaccine (1) 09/01/2020    Low dose CT lung screening  12/26/2020    Cervical cancer screen  02/15/2021    A1C test (Diabetic or Prediabetic)  04/21/2021    Diabetic microalbuminuria test  04/21/2021    Lipid screen  04/21/2021    Potassium monitoring  04/21/2021    Creatinine monitoring  04/21/2021    Breast cancer screen  11/22/2021    DTaP/Tdap/Td vaccine (2 - Td) 06/24/2029    Pneumococcal 0-64 years Vaccine  Completed    Hepatitis C screen  Completed    HIV screen  Completed    Hepatitis A vaccine  Aged Out    Hib vaccine  Aged Out    Meningococcal (ACWY) vaccine  Aged Out       Hemoglobin A1C (%)   Date Value   04/21/2020 5.8   03/05/2019 5.8   06/18/2018 5.8             ( goal A1C is < 7)   Microalb/Crt.  Ratio (mcg/mg creat)   Date Value   04/21/2020 CANNOT BE CALCULATED     LDL Cholesterol (mg/dL)   Date Value   04/21/2020 116       (goal LDL is <100)   AST (U/L)   Date Value   04/21/2020 18     ALT (U/L)   Date Value   04/21/2020 21     BUN (mg/dL)   Date Value   04/21/2020 15     BP Readings from Last 3 Encounters:   09/16/20 137/80   08/05/20 (!) 157/87   03/13/20 (!) 138/110          (goal 120/80)    All Future Testing planned in [facility]  Lab Frequency Next Occurrence   PT eval and treat Once 12/13/2019   PT aquatic therapy Once 12/13/2019   XR Lumbar Spine Flex and Ext Only Once 01/06/2021         Patient Active Problem List:     DJD (degenerative joint disease) of knee     Osteoarthritis of spine with radiculopathy, lumbar region     GERD (gastroesophageal reflux disease)     COPD (chronic obstructive pulmonary disease)     HTN (hypertension)     Allergic rhinitis     Lipoma of shoulder s/p excision right posterior 11 17 2008     DM (diabetes mellitus)     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     Primary osteoarthritis of both knees     Medication monitoring encounter     Chronic low back pain     Major depression, chronic     Chronic respiratory failure with hypoxia (HCC)     Mitral and aortic insufficiency     Pure hypercholesterolemia     Spondylosis of lumbar region without myelopathy or radiculopathy

## 2020-09-21 ENCOUNTER — HOSPITAL ENCOUNTER (OUTPATIENT)
Dept: PHYSICAL THERAPY | Age: 60
Setting detail: THERAPIES SERIES
Discharge: HOME | End: 2020-09-21
Payer: COMMERCIAL

## 2020-09-21 PROCEDURE — G0283 ELEC STIM OTHER THAN WOUND: HCPCS

## 2020-09-21 PROCEDURE — 97110 THERAPEUTIC EXERCISES: CPT

## 2020-09-21 NOTE — FLOWSHEET NOTE
[] [facility]) - [facility] CENTER &  Therapy  [address]  [phone]  F: [phone] [] [address]  [place] [address]   [address]  P: [phone]  F: [phone] [] [address]  Therapy  [address] [facility]  P: [phone]  F: [phone] [] [address]  [facility]   [address]   [place]: [phone]  F: [phone]      Physical Therapy Daily Treatment Note    Date:  2020  Patient Name:  Eduardo Jin  \"Dot\"  :  1960  MRN: [id number]  Physician: Zakia Mercado DO                Insurance: [employer] (12 visits cherie'd 20-10.22.20 Aut# [id number])  Medical Diagnosis:  Spondylolisthesis, lumbar region M43.16  Lumbar radiculopathy, chronic M54.16                        Rehab Codes: M54.16  Onset Date: 20                                 Next 's appt: 20  Visit# / total visits:      Cancels/No Shows: 0/0    Subjective:    Pain:  [x] Yes  [] No Location: Spondylolisthesis, lumbar region  Pain Rating: (0-10 scale) 7/10  Pain altered Tx:  [] No  [] Yes  Action:  Comments: Pt reported that she is at 10/10 when she wakes up due to stiffness. Pt was able to take it easy this weekend, which she attributes to decrease pain level this date.      Objective:  Modalities: IFC/HP to low back in prone x20 min  Precautions:  Exercises:  Exercise Reps/ Time Weight/ Level Comments   NuStep 5' L4          Seated      Hamstring stretch 3x20\"     Calf stretch 3x20\"  Standing with board   Marches 10x ea     Heel/toe raises 10x     LAQ 10x Ruddy          Supine      SKTC 3x20\"  Towel   Piriformis 3x20\"     Butterfly stretch 3x20\"     Fabers 3x20\"     LTR 10x5\"     Iso abd 10x  Max cueing   Marches 10x     Walk outs 10x     Bridges 10x                       Other: Additional time required for cautious movement    Treatment Charges: Mins Units   [x]  Modalities IFC/HP 20 1   [x]  Ther Exercise 38 3   []  Manual Therapy     []  Ther Activities     []  Aquatics     []  Vasocompression     []  Other     Total Treatment time 58 4       Assessment: [x] Progressing toward goals. Pt performed all activities noted above with no additional pain. Pt noted that she really felt the stretch and it was helping. Pt in need of verbal cues for deeper stretch. Pt received HP/IFC to decrease pain and muscle tension. [x] No change: No progressions this date. [] Other:   [x] Patient would continue to benefit from skilled physical therapy services in order to: decrease pain and reduce radicular symptoms, increase hamstring length and increase strength to LE, hips and core. Problems:    [x]? ? Pain: 9/10 R low back and LE pain   [x]? ? Flexibility: R hamstrings  [x]? ? Strength: R hip flexion, abduction, and extension, Abs  [x]? ? Function:Oswestry score to 84% impaired  []? Other:     STG: (to be met in 8 treatments)  1. ? Pain: 9/10 R LE and low back pain. 2. ? Strength: 4+/5 R hip flexion, abduction, extension to improve mobility. 3. ? Function: Oswestry score to 64% impaired or better to improve ADLs. 4. Independent with Home Exercise Programs     LTG: (to be met in 12 treatments)  1. Patient will be able to ambulate community distances without cane. 2. Patient will report decreased frequency of R LE radicular pain.      Patient goals:  Reduce R LE pain and improve function.      Rehab Potential:  [x]? Good  []? Fair  []? Poor              Suggested Professional Referral:  [x]? No  []? Yes:  Barriers to Goal Achievement:  [x]? No  []? Yes:  Domestic Concerns:  [x]? No  []? Yes:    Pt. Education:  [x] Yes  [] No  [x] Reviewed Prior HEP/Ed  Method of Education: [x] Verbal  [x] Demo  [] Written  Comprehension of Education: Heel/toe raises/LAQ, incline stretch  [x] Verbalizes understanding.    [x] Demonstrates understanding. [x] Needs review. [] Demonstrates/verbalizes HEP/Ed previously given. 9.10.20: Medbridge hamstring, ER, LTR, piriformis stretch, supine bridges, iso abd     Plan: [x] Continue current frequency toward long and short term goals.      [x] Specific Instructions for subsequent treatments: Lumbar ROM and core strengthening          Time In: 6672           Time Out: 1007    Electronically signed by:  Chloe Hassan PTA

## 2020-09-22 ENCOUNTER — OFFICE VISIT (OUTPATIENT)
Dept: INTERNAL MEDICINE | Age: 60
End: 2020-09-22
Payer: COMMERCIAL

## 2020-09-22 VITALS
DIASTOLIC BLOOD PRESSURE: 75 MMHG | WEIGHT: 188 LBS | SYSTOLIC BLOOD PRESSURE: 131 MMHG | HEART RATE: 98 BPM | BODY MASS INDEX: 31.28 KG/M2

## 2020-09-22 PROCEDURE — 3017F COLORECTAL CA SCREEN DOC REV: CPT | Performed by: INTERNAL MEDICINE

## 2020-09-22 PROCEDURE — 2022F DILAT RTA XM EVC RTNOPTHY: CPT | Performed by: INTERNAL MEDICINE

## 2020-09-22 PROCEDURE — 99213 OFFICE O/P EST LOW 20 MIN: CPT | Performed by: INTERNAL MEDICINE

## 2020-09-22 PROCEDURE — G8427 DOCREV CUR MEDS BY ELIG CLIN: HCPCS | Performed by: INTERNAL MEDICINE

## 2020-09-22 PROCEDURE — G8926 SPIRO NO PERF OR DOC: HCPCS | Performed by: INTERNAL MEDICINE

## 2020-09-22 PROCEDURE — 3023F SPIROM DOC REV: CPT | Performed by: INTERNAL MEDICINE

## 2020-09-22 PROCEDURE — 1036F TOBACCO NON-USER: CPT | Performed by: INTERNAL MEDICINE

## 2020-09-22 PROCEDURE — 3044F HG A1C LEVEL LT 7.0%: CPT | Performed by: INTERNAL MEDICINE

## 2020-09-22 PROCEDURE — G8417 CALC BMI ABV UP PARAM F/U: HCPCS | Performed by: INTERNAL MEDICINE

## 2020-09-22 RX ORDER — LISINOPRIL AND HYDROCHLOROTHIAZIDE 25; 20 MG/1; MG/1
TABLET ORAL
Qty: 90 TABLET | Refills: 3 | Status: ON HOLD | OUTPATIENT
Start: 2020-09-22 | End: 2020-12-02 | Stop reason: HOSPADM

## 2020-09-22 RX ORDER — CARVEDILOL 6.25 MG/1
6.25 TABLET ORAL 2 TIMES DAILY
Qty: 180 TABLET | Refills: 1 | Status: SHIPPED | OUTPATIENT
Start: 2020-09-22 | End: 2020-11-18

## 2020-09-22 RX ORDER — ATORVASTATIN CALCIUM 40 MG/1
40 TABLET, FILM COATED ORAL DAILY
Qty: 90 TABLET | Refills: 1 | Status: SHIPPED | OUTPATIENT
Start: 2020-09-22 | End: 2021-06-24 | Stop reason: SDUPTHER

## 2020-09-22 ASSESSMENT — ENCOUNTER SYMPTOMS
COUGH: 0
PHOTOPHOBIA: 0
WHEEZING: 0
EYE REDNESS: 0
SHORTNESS OF BREATH: 1
RHINORRHEA: 0
SINUS PAIN: 0
BACK PAIN: 1
ABDOMINAL PAIN: 0
SINUS PRESSURE: 0
CONSTIPATION: 0

## 2020-09-22 NOTE — PROGRESS NOTES
[facility]    Progress Note    Date of patient's visit: 9/22/2020    Patient's Name:  Moo Alfaro    YOB: 1960            Patient Care Team:  Christel Moss MD as PCP - General (Internal Medicine)  Christel Moss MD as PCP - [facility]  Ciarra Bhagat DPM as Consulting Physician (Podiatry)  Sukumar Ruiz ([facility]  Isamar Swartz MD as Consulting Physician (Orthopedic Surgery)  Alyce Jackson MD as Anesthesiologist (Pain Management)  Dolores Viramontes OD (Optometry)  Obi Boston, RN as Nurse Navigator  Samantha Terry RN as Ambulatory Care Manager    REASON FOR VISIT: Routine outpatient follow     Chief Complaint   Patient presents with    Equipment Request     Pt is here for F2F for power wheel chair     Health Maintenance     seen podiatry (dr Juares December), seen [facility] [address] in June , AWV scheduled          HISTORY OF PRESENT ILLNESS:    History was obtained from the patient. Moo Alfaro is a 56 y.o. is here for evaluation for a power wheelchair. She has severe radicular lumbar pain going down her right leg. She has spondylolisthesis. She had an MRI done last year. She has been going to pain management and physical therapy but has failed conservative measures. She has been told she will may require surgery. Plan is to do L4-5 fixation fusion. She is using a 4 pronged cane for support. Pain shoots down her leg and she sometimes feels stiff and feels like the leg is giving out. She used to have a power wheelchair in the past but it is no longer working. She has been through physical therapy and evaluation. She has no pain with her neck or upper extremity weakness or radiculopathy. No shoulder pains. She is trying to quit smoking and is down to 5 cigarettes a day now. She has been told she needs to quit smoking before surgery and she is willing.   She continues with pain management. No results found for this visit on 09/22/20.        Past Medical History:   Diagnosis Date    Allergic rhinitis     Arthropathy, unspecified, other specified sites 4/30/2013    Bronchitis     Chronic back pain     COPD (chronic obstructive pulmonary disease) ([facility])     Depression     DM (diabetes mellitus) ([facility] [address] 12/18/2012    Emphysema of lung ([facility])     GERD (gastroesophageal reflux disease)     Hyperlipidemia     Hypertension     Knee pain     right knee mostly    Leg pain, right     Obesity     Osteoarthritis     Peripheral vascular disease ([facility])     Primary osteoarthritis of both knees     Radicular pain of lumbosacral region     Spinal stenosis, lumbar region, without neurogenic claudication 4/30/2013    Type II or unspecified type diabetes mellitus without mention of complication, not stated as uncontrolled     Unspecified sleep apnea     URI (upper respiratory infection)        Past Surgical History:   Procedure Laterality Date    COLONOSCOPY  2/12/2009    normal    DILATION AND CURETTAGE OF UTERUS      GASTRECTOMY      partial    NERVE BLOCK Right 4/30/13    Lumbar Diagnostic Block,  Kenalog 40 mg    NERVE BLOCK  5/23/13    Lumbar Radiofrequency, Kenalog 40mg    NERVE BLOCK  8/12/13    Lt MBNB  celestone 6mg    NERVE BLOCK Left 8-28-13    left lumbar diagnostic block #2 decadron 10 mg    NERVE BLOCK Left 9-24-13    left lumbar median branch radiofrequency    NERVE BLOCK  07-02-14    caudal, celestone 9 mg    NERVE BLOCK  7-16-14    caudal epidural #2, celestone 9mg, fentanyl 25mcg    NERVE BLOCK  7/30/14    caudal #3 decadron 10mg    NERVE BLOCK  11-6-14    duramorph epidural steroid block  duramorph 1 mg celestone 9 mg    NERVE BLOCK  11/20/15    TENS- Empi Select    NERVE BLOCK  07/20/2018    right transforminal # 1 decadron 10mg,isovue    NERVE BLOCK Bilateral 02/01/2019    bilat mbnb- no steroid    NERVE BLOCK Bilateral 02/08/2019    bilat mbnb, marcaine . 25%    ID KNEE SCOPE,DIAGNOSTIC Right 3/24/2017    KNEE ARTHROSCOPY WITH PARTIAL MEDIAL MENISECAL DEBRIDMENT  performed by Mansoor Garcia MD at [address]  01/2015    lumbar diskectomy    UPPER GASTROINTESTINAL ENDOSCOPY  9 20 2007    UPPER GASTROINTESTINAL ENDOSCOPY  4 21 2009,04/2011    gastritis, esophagitis         ALLERGIES      Allergies   Allergen Reactions    Aspirin      DUE TO ULCER    Claritin [Loratadine] Other (See Comments)     coughing    Flonase [Fluticasone Propionate]     Morphine And Related      GI Upset       MEDICATIONS:      Current Outpatient Medications on File Prior to Visit   Medication Sig Dispense Refill    cetirizine (ZYRTEC) 10 MG tablet TAKE 1 TABLET BY MOUTH DAILY 30 tablet 1    HYDROcodone-acetaminophen (NORCO) 5-325 MG per tablet Take 1 tablet by mouth every 8 hours as needed for Pain for up to 30 days.  Early refill due to holidays 90 tablet 0     MG capsule TAKE 1 CAPSULE EVERY DAY 30 capsule 10    trospium (SANCTURA) 20 MG tablet TAKE 1 TABLET BY MOUTH 2 TIMES DAILY 60 tablet 2    tiZANidine (ZANAFLEX) 4 MG tablet TAKE 1 TABLET TWICE DAILY 60 tablet 2    meloxicam (MOBIC) 15 MG tablet Take 1 tablet by mouth daily 30 tablet 3    amLODIPine (NORVASC) 10 MG tablet Take 1 tablet by mouth daily 90 tablet 3    albuterol sulfate HFA (VENTOLIN HFA) 108 (90 Base) MCG/ACT inhaler Inhale 2 puffs into the lungs every 6 hours as needed for Wheezing 1 Inhaler 3    omeprazole (PRILOSEC) 20 MG delayed release capsule TAKE 1 CAPSULE EVERY DAY 30 capsule 11    diphenhydrAMINE (BENADRYL) 25 MG tablet Take 25 mg by mouth every 6 hours as needed for Itching      nicotine (NICODERM CQ) 21 MG/24HR Place 1 patch onto the skin every 24 hours      potassium chloride (KLOR-CON M) 10 MEQ extended release tablet Take 2 tablets by mouth daily 60 tablet 3    SPIRIVA HANDIHALER 18 MCG inhalation capsule       blood glucose test strips (EXACTECH TEST) strip 1 each by In Vitro route daily As needed. 50 each 11    acetaminophen (TYLENOL) 500 MG tablet Take 1 tablet by mouth 4 times daily as needed for Pain 30 tablet 0    mirtazapine (REMERON) 30 MG tablet Take 30 mg by mouth nightly      mirtazapine (REMERON) 15 MG tablet Take 15 mg by mouth nightly      DULoxetine (CYMBALTA) 60 MG extended release capsule Take 1 capsule by mouth daily 30 capsule 3    Lancets MISC 1 each by Does not apply route daily 100 each 11    azelastine (ASTELIN) 0.1 % nasal spray 2 sprays by Nasal route 2 times daily Use in each nostril as directed 1 Bottle 3    ipratropium-albuterol (DUONEB) 0.5-2.5 (3) MG/3ML SOLN nebulizer solution Inhale 3 mLs into the lungs every 6 hours as needed for Shortness of Breath 360 mL 11     No current facility-administered medications on file prior to visit. SOCIAL HISTORY    Reviewed and no change from previous record. Carolina  reports that she has been smoking cigarettes. She has a 36.00 pack-year smoking history. She has never used smokeless tobacco.    FAMILY HISTORY:    Reviewed and No change from previous visit    HEALTH MAINTENANCE DUE:      Health Maintenance Due   Topic Date Due    Diabetic retinal exam  09/26/2017    Annual Wellness Visit (AWV)  05/29/2019    Diabetic foot exam  06/18/2019    Colon Cancer Screen FIT/FOBT  05/06/2020    Flu vaccine (1) 09/01/2020       REVIEW OF SYSTEMS:    12 point review of symptoms completed and found to be normal except noted in the HPI    Review of Systems   Constitutional: Negative for chills, fatigue and fever. HENT: Positive for congestion. Negative for rhinorrhea, sinus pressure and sinus pain. Eyes: Negative for photophobia, redness and visual disturbance. Respiratory: Positive for shortness of breath. Negative for cough and wheezing. Cardiovascular: Negative for chest pain, palpitations and leg swelling. Gastrointestinal: Negative for abdominal pain and constipation.    Endocrine: Negative for polydipsia and polyuria. Musculoskeletal: Positive for back pain. Negative for arthralgias. Allergic/Immunologic: Positive for environmental allergies. Negative for immunocompromised state. Neurological: Positive for numbness. Negative for dizziness, syncope, weakness and headaches. Hematological: Negative for adenopathy. Does not bruise/bleed easily. PHYSICAL EXAM:     Vitals:    09/22/20 1537   BP: 131/75   Site: Right Upper Arm   Position: Sitting   Cuff Size: Medium Adult   Pulse: 98   Weight: 188 lb (85.3 kg)     Body mass index is 31.28 kg/m². BP Readings from Last 3 Encounters:   09/22/20 131/75   09/16/20 137/80   08/05/20 (!) 157/87        Wt Readings from Last 3 Encounters:   09/22/20 188 lb (85.3 kg)   08/05/20 189 lb (85.7 kg)   03/13/20 183 lb (83 kg)       Physical Exam  Vitals signs and nursing note reviewed. Constitutional:       Appearance: Normal appearance. HENT:      Head: Normocephalic and atraumatic. Eyes:      General: No scleral icterus. Extraocular Movements: Extraocular movements intact. Conjunctiva/sclera: Conjunctivae normal.      Pupils: Pupils are equal, round, and reactive to light. Neck:      Musculoskeletal: Normal range of motion and neck supple. Cardiovascular:      Rate and Rhythm: Normal rate and regular rhythm. Heart sounds: No murmur. Pulmonary:      Effort: Pulmonary effort is normal.      Breath sounds: Normal breath sounds. No wheezing. Musculoskeletal:      Right lower leg: No edema. Left lower leg: No edema. Skin:     General: Skin is warm and dry. Neurological:      General: No focal deficit present. Mental Status: She is alert and oriented to person, place, and time. Cranial Nerves: No cranial nerve deficit. Motor: No weakness.       Deep Tendon Reflexes: Reflexes normal.               LABORATORY FINDINGS:    CBC:  Lab Results   Component Value Date    WBC 5.5 06/18/2018    HGB 14.4 06/18/2018     06/18/2018     02/16/2012     BMP:    Lab Results   Component Value Date     04/21/2020    K 4.1 04/21/2020     04/21/2020    CO2 23 04/21/2020    BUN 15 04/21/2020    CREATININE 0.59 04/21/2020    GLUCOSE 91 04/21/2020     HEMOGLOBIN A1C:   Lab Results   Component Value Date    LABA1C 5.8 04/21/2020     MICROALBUMIN URINE:   Lab Results   Component Value Date    MICROALBUR <12 04/21/2020     FASTING LIPID [employer]  Lab Results   Component Value Date    LDLCHOLESTEROL 116 04/21/2020       LIVER PROFILE:  Lab Results   Component Value Date    ALT 21 04/21/2020    AST 18 04/21/2020    PROT 7.7 04/21/2020    BILITOT 0.31 04/21/2020    BILIDIR 0.11 06/18/2018    LABALBU 4.0 04/21/2020      THYROID FUNCTION:   Lab Results   Component Value Date    TSH 0.94 07/19/2017      URINEANALYSIS: No results found for: LABURIN  ASSESSMENT AND PLAN:    1. Chronic radicular lumbar pain    - DME Order for (Specify) as OP    2. Spondylolisthesis of lumbar region    - DME Order for [facility]) as OP    3. Centrilobular emphysema ([facility] [address]      4. Type 2 diabetes mellitus without complication, without long-term current use of insulin (HCC)    - atorvastatin (LIPITOR) 40 MG tablet; Take 1 tablet by mouth daily  Dispense: 90 tablet; Refill: 1  - metFORMIN (GLUCOPHAGE) 500 MG tablet; Take 1 tablet by mouth 2 times daily (with meals)  Dispense: 180 tablet; Refill: 1    5. Essential hypertension    - carvedilol (COREG) 6.25 MG tablet; Take 1 tablet by mouth 2 times daily  Dispense: 180 tablet; Refill: 1  - lisinopril-hydroCHLOROthiazide (PRINZIDE;ZESTORETIC) 20-25 MG per tablet; TAKE 1 TABLET EVERY DAY  Dispense: 90 tablet; Refill: 3    6. Pure hypercholesterolemia    - atorvastatin (LIPITOR) 40 MG tablet; Take 1 tablet by mouth daily  Dispense: 90 tablet; Refill: 1    7. Colon cancer screening    - Cologuard;  Future          FOLLOW UP AND INSTRUCTIONS:   Return in

## 2020-09-22 NOTE — PATIENT INSTRUCTIONS
-Pt due for 6 month f/u in March-- pt to call in February to set up an appt--reminder in [facility] to contact patient as well--AVS given to patient    -Cologuard ordered for patient    -Power wheel chair ordered for patient, will contact Gardenia Ruby at [place] [phone].    -JANEE Levine

## 2020-09-24 ENCOUNTER — HOSPITAL ENCOUNTER (OUTPATIENT)
Dept: PHYSICAL THERAPY | Age: 60
Setting detail: THERAPIES SERIES
Discharge: HOME | End: 2020-09-24
Payer: COMMERCIAL

## 2020-09-24 PROCEDURE — G0283 ELEC STIM OTHER THAN WOUND: HCPCS

## 2020-09-24 NOTE — FLOWSHEET NOTE
[x] [facility]) [facility] &  Therapy  [address]  [phone]  F: [phone] [] [address]  [address] [place] [facility]\"" [address]   [address]  P: [phone]  F: [phone] [] [address] &  Therapy  [address] [facility]  P: [phone]  F: [phone] [] [address]  [facility]   [address]   [place]: [phone]  F: [phone]      Physical Therapy Daily Treatment Note    Date:  2020  Patient Name:  Melissa Bertrand  \"Dot\"  :  1960  MRN: [id number]  Physician: Fabio Servin DO                Insurance: [employer] (12 visits cherie'd 20-10.22.20 Aut# [id number])  Medical Diagnosis:  Spondylolisthesis, lumbar region M43.16  Lumbar radiculopathy, chronic M54.16                        Rehab Codes: M54.16   Onset Date: 20                                 Next 's appt: 20  Visit# / total visits: 612     Cancels/No Shows: 0/0    Subjective:    Pain:  [x] Yes  [] No Location: Spondylolisthesis, lumbar region  Pain Rating: (0-10 scale) 9/10  Pain altered Tx:  [] No  [x] Yes  Action:  Comments: Patient stated she had to sit down immediately from walking to front lobby to PT clinic. Stated she had tears with 10/10 pain but denies need for ED. After sitting in the lobby, pain decreased a little to 9/10. Unable to take pain pill at 3pm scheduled cherie't. Just took pain pill prior to arrival.  Pain is now in R buttock and down the thigh. Patient agreed to complete physical therapy.       Objective:  Modalities: IFC/HP to low back in prone x20 min  Precautions:  Exercises:  Exercise Reps/ Time Weight/ Level Comments   NuStep 5' L4          Seated      Hamstring stretch 3x20\"     Calf stretch 3x20\"  Standing with board   Marches 10x ea     Heel/toe raises 10x     LAQ 10x Ruddy          Supine      SKTC Heel/toe raises/LAQ, incline stretch  [x] Verbalizes understanding. [] Demonstrates understanding. [x] Needs review. [] Demonstrates/verbalizes HEP/Ed previously given. 9.10.20: Medbridge hamstring, ER, LTR, piriformis stretch, supine bridges, iso abd     Plan: [x] Continue current frequency toward long and short term goals.      [x] Specific Instructions for subsequent treatments: Lumbar ROM and core strengthening          Time In: 1640           Time Out: 9144    Electronically signed by:  Maxime Heredia PTA

## 2020-09-28 ENCOUNTER — HOSPITAL ENCOUNTER (OUTPATIENT)
Dept: PHYSICAL THERAPY | Age: 60
Setting detail: THERAPIES SERIES
Discharge: HOME | End: 2020-09-28
Payer: COMMERCIAL

## 2020-09-28 PROCEDURE — 97110 THERAPEUTIC EXERCISES: CPT

## 2020-09-28 NOTE — FLOWSHEET NOTE
[x] [facility])  CENTER &  Therapy  [address]  [phone]  F: [phone] [] [address]  [facility]   [address]  P: [phone]  F: [phone] [] [place]  [address] [facility]  P: [phone]  F: [phone] [] [address]  [facility]   [address]   [place]: [phone]  F: [phone]      Physical Therapy Daily Treatment Note    Date:  2020  Patient Name:  Betito Rose  \"Dot\"  :  1960  MRN: [id number]  Physician: Wilmer Roman DO                Insurance: [employer] (12 visits cherie'd 20-10.22.20 Aut# [id number])  Medical Diagnosis:  Spondylolisthesis, lumbar region M43.16  Lumbar radiculopathy, chronic M54.16                        Rehab Codes: M54.16   Onset Date: 20                                 Next 's appt: 20  Visit# / total visits:      Cancels/No Shows: 0/0    Subjective:    Pain:  [x] Yes  [] No Location: Spondylolisthesis, lumbar region  Pain Rating: (0-10 scale) 7/10  Pain altered Tx:  [x] No  [] Yes  Action:  Comments:  Patient reports a decrease in pain from last session. Stated she rested over the weekend. Notes pain only in the back today and nothing into the buttocks and legs.      Objective:  Modalities: IFC/HP to low back in prone x20 min  Precautions:  Exercises:  Exercise Reps/ Time Weight/ Level Comments   NuStep  L4    SciFit 5' L3.0          Standing      Heel raises 10x     Marches 20x     Hip abd 10x                 Seated      Hamstring stretch 3x20\"     Calf stretch 3x20\"  Standing with board   Marches 10x ea 2 kb    Heel/toe raises 10x     LAQ 10x 2 lb          Supine      SKTC 3x20\"  Towel   Piriformis 3x20\"     Butterfly stretch 3x20\"     Fabers 3x20\"     LTR 10x5\"     Iso abd 10x  Mod cueing   [facility] Written  Comprehension of Education:   [x] Verbalizes understanding. [] Demonstrates understanding. [x] Needs review. [x] Demonstrates/verbalizes HEP/Ed previously given. 9.10.20: Medbridge hamstring, ER, LTR, piriformis stretch, supine bridges, iso abd     9.28.20 Access Code: [email or web address]   URL: [email or web address]   Date: 09/28/2020   Prepared by: Rick Devine     Exercises   Small Range Straight Leg Raise - 10 reps - 3 sets - 1x daily - 7x weekly   Standing Hip Abduction with Counter Support - 10 reps - 3 sets - 1x daily - 7x weekly   Standing March with Counter Support - 10 reps - 3 sets - 1x daily - 7x weekly   Heel rises with counter support - 10 reps - 3 sets - 1x daily - 7x weekly   Supine Bent Leg Lift with Arm Extension - 10 reps - 3 sets - 1x daily - 7x weekly     Plan: [x] Continue current frequency toward long and short term goals.      [x] Specific Instructions for subsequent treatments: Lumbar ROM and core strengthening           Time In: 2176           Time Out: 3890    Electronically signed by:  Rick Devine PTA

## 2020-09-29 ENCOUNTER — VIRTUAL VISIT (OUTPATIENT)
Dept: INTERNAL MEDICINE | Age: 60
End: 2020-09-29
Payer: COMMERCIAL

## 2020-09-29 PROCEDURE — G0444 DEPRESSION SCREEN ANNUAL: HCPCS | Performed by: NURSE PRACTITIONER

## 2020-09-29 PROCEDURE — G0439 PPPS, SUBSEQ VISIT: HCPCS | Performed by: NURSE PRACTITIONER

## 2020-09-29 PROCEDURE — 99497 ADVNCD CARE PLAN 30 MIN: CPT | Performed by: NURSE PRACTITIONER

## 2020-09-29 ASSESSMENT — PATIENT HEALTH QUESTIONNAIRE - PHQ9
SUM OF ALL RESPONSES TO PHQ9 QUESTIONS 1 & 2: 1
2. FEELING DOWN, DEPRESSED OR HOPELESS: 1
1. LITTLE INTEREST OR PLEASURE IN DOING THINGS: 0
SUM OF ALL RESPONSES TO PHQ QUESTIONS 1-9: 1
SUM OF ALL RESPONSES TO PHQ QUESTIONS 1-9: 1

## 2020-09-29 ASSESSMENT — LIFESTYLE VARIABLES: HOW OFTEN DO YOU HAVE A DRINK CONTAINING ALCOHOL: 0

## 2020-09-29 NOTE — PATIENT INSTRUCTIONS
Advance Directives: Care Instructions  Overview  An advance directive is a legal way to state your wishes at the end of your life. It tells your family and your doctor what to do if you can't say what you want. There are two main types of advance directives. You can change them any time your wishes change. Living will. This form tells your family and your doctor your wishes about life support and other treatment. The form is also called a declaration. Medical power of . This form lets you name a person to make treatment decisions for you when you can't speak for yourself. This person is called a health care agent (health care proxy, health care surrogate). The form is also called a durable power of  for health care. If you do not have an advance directive, decisions about your medical care may be made by a family member, or by a doctor or a  who doesn't know you. It may help to think of an advance directive as a gift to the people who care for you. If you have one, they won't have to make tough decisions by themselves. Follow-up care is a key part of your treatment and safety. Be sure to make and go to all appointments, and call your doctor if you are having problems. It's also a good idea to know your test results and keep a list of the medicines you take. What should you include in an advance directive? Many states have a unique advance directive form. (It may ask you to address specific issues.) Or you might use a universal form that's approved by many states. If your form doesn't tell you what to address, it may be hard to know what to include in your advance directive. Use the questions below to help you get started. · Who do you want to make decisions about your medical care if you are not able to? · What life-support measures do you want if you have a serious illness that gets worse over time or can't be cured? · What are you most afraid of that might happen? understands what makes life meaningful for you. · Understands your Gnosticism and moral values. · Will do what you want, not what he or she wants. · Will be able to make difficult choices at a stressful time. · Will be able to refuse or stop treatment, if that is what you would want, even if you could die. · Will be firm and confident with health professionals if needed. · Will ask questions to get needed information. · Lives near you or agrees to travel to you if needed. Your family may help you make medical decisions while you can still be part of that process. But it's important to choose one person to be your health care agent in case you aren't able to make decisions for yourself. If you don't fill out the legal form and name a health care agent, the decisions your family can make may be limited. A health care agent may be called something else in your state. Who will make decisions for you if you don't have a health care agent? If you don't have a health care agent or a living will, you may not get the care you want. Decisions may be made by family members who disagree about your medical care. Or decisions may be made by a medical professional who doesn't know you well. In some cases, a  makes the decisions. When you name a health care agent, it is very clear who has the power to make health decisions for you. How do you name a health care agent? You name your health care agent on a legal form. This form is usually called a medical power of . Ask your hospital, state bar association, or office on aging where to find these forms. You must sign the form to make it legal. Some states require you to get the form notarized. This means that a person called a  watches you sign the form and then he or she signs the form. Some states also require that two or more witnesses sign the form. Be sure to tell your family members and doctors who your health care agent is.   Where can you learn more? Go to https://chpepiceweb.Acousticeye. org and sign in to your Echograph account. Enter [id number] in the [facility] box to learn more about \"Learning About [address]. \"     If you do not have an account, please click on the \"Sign Up Now\" link. Current as of: December 9, 2019               Content Version: 12.5  © 9558-7269 [employer]. Care instructions adapted under license by [facility] ([facility]). If you have questions about a medical condition or this instruction, always ask your healthcare professional. [address] any warranty or liability for your use of this information. Learning About Living Perroy  What is a living will? A living will, also called a declaration, is a legal form. It tells your family and your doctor your wishes when you can't speak for yourself. It's used by the health professionals who will treat you as you near the end of your life or if you get seriously hurt or ill. If you put your wishes in writing, your loved ones and others will know what kind of care you want. They won't need to guess. This can ease your mind and be helpful to others. And you can change or cancel your living will at any time. A living will is not the same as an estate or property will. An estate will explains what you want to happen with your money and property after you die. How do you use it? A living will is used to describe the kinds of treatment or life support you want as you near the end of your life or if you get seriously hurt or ill. Keep these facts in mind about living littlejohn. · Your living will is used only if you can't speak or make decisions for yourself. Most often, one or more doctors must certify that you can't speak or decide for yourself before your living will takes effect. · If you get better and can speak for yourself again, you can accept or refuse any treatment.  It doesn't matter what you said in your living will. · Some states may limit your right to refuse treatment in certain cases. For example, you may need to clearly state in your living will that you don't want artificial hydration and nutrition, such as being fed through a tube. Is a living will a legal document? A living will is a legal document. Each state has its own laws about living littlejohn. And a living will may be called something else in your state. Here are some things to know about living littlejohn. · You don't need an  to complete a living will. But legal advice can be helpful if your state's laws are unclear. It can also help if your health history is complicated or your family can't agree on what should be in your living will. · You can change your living will at any time. Some people find that their wishes about end-of-life care change as their health changes. If you make big changes to your living will, complete a new form. · If you move to another state, make sure that your living will is legal in the state where you now live. In most cases, doctors will respect your wishes even if you have a form from a different state. · You might use a universal form that has been approved by many states. This kind of form can sometimes be filled out and stored online. Your digital copy will then be available wherever you have a connection to the internet. The doctors and nurses who need to treat you can find it right away. · Your state may offer an online registry. This is another place where you can store your living will online. · It's a good idea to get your living will notarized. This means using a person called a  to watch two people sign, or witness, your living will. What should you know when you create a living will? Here are some questions to ask yourself as you make your living will:  · Do you know enough about life support methods that might be used?  If not, talk to your doctor so you know what might be done if you can't breathe on your own, your heart stops, or you can't swallow. · What things would you still want to be able to do after you receive life-support methods? Would you want to be able to walk? To speak? To eat on your own? To live without the help of machines? · Do you want certain Baptism practices performed if you become very ill? · If you have a choice, where do you want to be cared for? In your home? At a hospital or nursing home? · If you have a choice at the end of your life, where would you prefer to die? At home? In a hospital or nursing home? Somewhere else? · Would you prefer to be buried or cremated? · Do you want your organs to be donated after you die? What should you do with your living will? · Make sure that your family members and your health care agent have copies of your living will (also called a declaration). · Give your doctor a copy of your living will. Ask him or her to keep it as part of your medical record. If you have more than one doctor, make sure that each one has a copy. · Put a copy of your living will where it can be easily found. For example, some people may put a copy on their refrigerator door. If you are using a digital copy, be sure your doctor, family members, and health care agent know how to find and access it. Where can you learn more? Go to https://Talismapepiceweb.Vmedia Research. org and sign in to your Levanta account. Enter C585 in the [facility] box to learn more about \"Learning About Living [place]ami Mont[place]. \"     If you do not have an account, please click on the \"Sign Up Now\" link. Current as of: December 9, 2019               Content Version: 12.5  © 4153-3981 Healthwise, Incorporated. Care instructions adapted under license by [facility] ([facility]). If you have questions about a medical condition or this instruction, always ask your healthcare professional. [address] any warranty or liability for your use of this information. Stopping Smoking: Care Instructions  Your Care Instructions     Cigarette smokers crave the nicotine in cigarettes. Giving it up is much harder than simply changing a habit. Your body has to stop craving the nicotine. It is hard to quit, but you can do it. There are many tools that people use to quit smoking. You may find that combining tools works best for you. There are several steps to quitting. First you get ready to quit. Then you get support to help you. After that, you learn new skills and behaviors to become a nonsmoker. For many people, a necessary step is getting and using medicine. Your doctor will help you set up the plan that best meets your needs. You may want to attend a smoking cessation program to help you quit smoking. When you choose a program, look for one that has proven success. Ask your doctor for ideas. You will greatly increase your chances of success if you take medicine as well as get counseling or join a cessation program.  Some of the changes you feel when you first quit tobacco are uncomfortable. Your body will miss the nicotine at first, and you may feel short-tempered and grumpy. You may have trouble sleeping or concentrating. Medicine can help you deal with these symptoms. You may struggle with changing your smoking habits and rituals. The last step is the tricky one: Be prepared for the smoking urge to continue for a time. This is a lot to deal with, but keep at it. You will feel better. Follow-up care is a key part of your treatment and safety. Be sure to make and go to all appointments, and call your doctor if you are having problems. It's also a good idea to know your test results and keep a list of the medicines you take. How can you care for yourself at home? · Ask your family, friends, and coworkers for support. You have a better chance of quitting if you have help and support.   · Join a support group, such as Nicotine Anonymous, for people who are trying to quit smoking. · Consider signing up for a smoking cessation program, such as the American Lung Association's Freedom from Smoking program.  · Get text messaging support. Go to the website at www.smokefree. gov to sign up for the [facility] program.  · Set a quit date. Pick your date carefully so that it is not right in the middle of a big deadline or stressful time. Once you quit, do not even take a puff. Get rid of all ashtrays and lighters after your last cigarette. Clean your house and your clothes so that they do not smell of smoke. · Learn how to be a nonsmoker. Think about ways you can avoid those things that make you reach for a cigarette. ? Avoid situations that put you at greatest risk for smoking. For some people, it is hard to have a drink with friends without smoking. For others, they might skip a coffee break with coworkers who smoke. ? Change your daily routine. Take a different route to work or eat a meal in a different place. · Cut down on stress. Calm yourself or release tension by doing an activity you enjoy, such as reading a book, taking a hot bath, or gardening. · Talk to your doctor or pharmacist about nicotine replacement therapy, which replaces the nicotine in your body. You still get nicotine but you do not use tobacco. Nicotine replacement products help you slowly reduce the amount of nicotine you need. These products come in several forms, many of them available over-the-counter:  ? Nicotine patches  ? Nicotine gum and lozenges  ? Nicotine inhaler  · Ask your doctor about bupropion (Wellbutrin) or varenicline (Chantix), which are prescription medicines. They do not contain nicotine. They help you by reducing withdrawal symptoms, such as stress and anxiety. · Some people find hypnosis, acupuncture, and massage helpful for ending the smoking habit. · Eat a healthy diet and get regular exercise. Having healthy habits will help your body move past its craving for nicotine.   · Be prepared to keep trying. Most people are not successful the first few times they try to quit. Do not get mad at yourself if you smoke again. Make a list of things you learned and think about when you want to try again, such as next week, next month, or next year. Where can you learn more? Go to https://chpepicewivette.healthKiveda. org and sign in to your Image Space Media account. Enter L375 in the [facility] box to learn more about \"Stopping Smoking: Care Instructions. \"     If you do not have an account, please click on the \"Sign Up Now\" link. Current as of: March 12, 2020               Content Version: 12.5  © 2006-2020 Healthwise, Incorporated. Care instructions adapted under license by [facility] ([facility]). If you have questions about a medical condition or this instruction, always ask your healthcare professional. [place] [address] any warranty or liability for your use of this information. Learning About Benefits From Quitting Smoking  How does quitting smoking make you healthier? If you're thinking about quitting smoking, you may have a few reasons to be smoke-free. Your health may be one of them. · When you quit smoking, you lower your risks for cancer, lung disease, heart attack, stroke, blood vessel disease, and blindness from macular degeneration. · When you're smoke-free, you get sick less often, and you heal faster. You are less likely to get colds, flu, bronchitis, and pneumonia. · As a nonsmoker, you may find that your mood is better and you are less stressed. When and how will you feel healthier? Quitting has real health benefits that start from day 1 of being smoke-free. And the longer you stay smoke-free, the healthier you get and the better you feel. The first hours  · After just 20 minutes, your blood pressure and heart rate go down. That means there's less stress on your heart and blood vessels.   · Within 12 hours, the level of carbon monoxide in your blood drops effects. Why might you choose not to use medicine? · You want to try quitting on your own by stopping all at once (\"cold turkey\"). · You want to cut back slowly on the number of cigarettes you smoke. · You smoke fewer than 5 cigarettes a day. · You do not like using medicine. · You feel the side effects of medicines outweigh the benefits. · You are worried about the cost of medicines. Your decision  Thinking about the facts and your feelings can help you make a decision that is right for you. Be sure you understand the benefits and risks of your options, and think about what else you need to do before you make the decision. Where can you learn more? Go to https://BioBeatspeBattlefyeb.BusyLife Software. org and sign in to your KlickThru account. Enter I734 in the [employer] box to learn more about \"Deciding About Using Medicines To Quit Smoking. \"     If you do not have an account, please click on the \"Sign Up Now\" link. Current as of: March 12, 2020               Content Version: 12.5  © 6445-2127 Healthwise, Incorporated. Care instructions adapted under license by [facility] ([facility]). If you have questions about a medical condition or this instruction, always ask your healthcare professional. [place] [address] any warranty or liability for your use of this information. Personalized Preventive Plan for Paula Love - 9/29/2020  Medicare offers a range of preventive health benefits. Some of the tests and screenings are paid in full while other may be subject to a deductible, co-insurance, and/or copay. Some of these benefits include a comprehensive review of your medical history including lifestyle, illnesses that may run in your family, and various assessments and screenings as appropriate. After reviewing your medical record and screening and assessments performed today your provider may have ordered immunizations, labs, imaging, and/or referrals for you.   A list of these orders (if applicable) as well as your Preventive Care list are included within your After Visit Summary for your review. Other Preventive Recommendations:    · A preventive eye exam performed by an eye specialist is recommended every 1-2 years to screen for glaucoma; cataracts, macular degeneration, and other eye disorders. · A preventive dental visit is recommended every 6 months. · Try to get at least 150 minutes of exercise per week or 10,000 steps per day on a pedometer . · Order or download the FREE \"Exercise & Physical Activity: Your Everyday Guide\" from [employer]. Call 4-185.259.1146 or search [employer] online. · You need 2062-9029 mg of calcium and 9964-1709 IU of vitamin D per day. It is possible to meet your calcium requirement with diet alone, but a vitamin D supplement is usually necessary to meet this goal.  · When exposed to the sun, use a sunscreen that protects against both UVA and UVB radiation with an SPF of 30 or greater. Reapply every 2 to 3 hours or after sweating, drying off with a towel, or swimming. · Always wear a seat belt when traveling in a car. Always wear a helmet when riding a bicycle or motorcycle. Patient was put on a wait list and will be contacted to schedule their next follow up appointment once the schedule is available. If the patient is in need of an appointment before their next visit please call the office at [phone]. After Visit Summary  mailed to patient.     DOLORES

## 2020-09-29 NOTE — PROGRESS NOTES
Medicare Annual Wellness Visit  Are Name: Yisel Pillai Date: 2020   MRN: [id number] Sex: Female   Age: 56 y.o. Ethnicity: Non-/Non    : 1960 Race: Black      Carolina Agee is here for Medicare AWV and Health Maintenance ([facility] on [address]- eye exam, cologuard ordered at last visit)    Screenings for behavioral, psychosocial and functional/safety risks, and cognitive dysfunction are all negative except as indicated below. These results, as well as other patient data from the [address] form, are documented in Flowsheets linked to this Encounter. Allergies   Allergen Reactions    Aspirin      DUE TO ULCER    Claritin [Loratadine] Other (See Comments)     coughing    Flonase [Fluticasone Propionate]     Morphine And Related      GI Upset         Prior to Visit Medications    Medication Sig Taking? Authorizing Provider   carvedilol (COREG) 6.25 MG tablet Take 1 tablet by mouth 2 times daily Yes Celestina Rosales MD   atorvastatin (LIPITOR) 40 MG tablet Take 1 tablet by mouth daily Yes Celestina Rosales MD   metFORMIN (GLUCOPHAGE) 500 MG tablet Take 1 tablet by mouth 2 times daily (with meals) Yes Celestina Rosales MD   lisinopril-hydroCHLOROthiazide (PRINZIDE;ZESTORETIC) 20-25 MG per tablet TAKE 1 TABLET EVERY DAY Yes Celestina Rosales MD   cetirizine (ZYRTEC) 10 MG tablet TAKE 1 TABLET BY MOUTH DAILY Yes Celestina Rosales MD   HYDROcodone-acetaminophen (NORCO) 5-325 MG per tablet Take 1 tablet by mouth every 8 hours as needed for Pain for up to 30 days.  Early refill due to holidays Yes Nikos Pollack MD    MG capsule TAKE 1 CAPSULE EVERY DAY Yes Celestina Rosales MD   trospium (SANCTURA) 20 MG tablet TAKE 1 TABLET BY MOUTH 2 TIMES DAILY Yes Celestina Rosales MD   tiZANidine (ZANAFLEX) 4 MG tablet TAKE 1 TABLET TWICE DAILY Yes Celestina Rosales MD   meloxicam (MOBIC) 15 MG tablet Take 1 tablet by mouth daily Yes Gretel Jade, APRN - CNP amLODIPine (NORVASC) 10 MG tablet Take 1 tablet by mouth daily Yes Kianna Hodge MD   albuterol sulfate HFA (VENTOLIN HFA) 108 (90 Base) MCG/ACT inhaler Inhale 2 puffs into the lungs every 6 hours as needed for Wheezing Yes Kianna Hodge MD   omeprazole (PRILOSEC) 20 MG delayed release capsule TAKE 1 CAPSULE EVERY DAY Yes Kianna Hodge MD   diphenhydrAMINE (BENADRYL) 25 MG tablet Take 25 mg by mouth every 6 hours as needed for Itching Yes Historical Provider, MD   nicotine (NICODERM CQ) 21 MG/24HR Place 1 patch onto the skin every 24 hours Yes Historical Provider, MD   potassium chloride (KLOR-CON M) 10 MEQ extended release tablet Take 2 tablets by mouth daily Yes Kianna Hodge MD   Adwoa Bombard 18 MCG inhalation capsule  Yes Historical Provider, MD   blood glucose test strips (EXACTECH TEST) strip 1 each by In Vitro route daily As needed.  Yes Kianna Hodge MD   acetaminophen (TYLENOL) 500 MG tablet Take 1 tablet by mouth 4 times daily as needed for Pain Yes Edilma Hernandez, APRN - CNP   mirtazapine (REMERON) 30 MG tablet Take 30 mg by mouth nightly Yes Historical Provider, MD   mirtazapine (REMERON) 15 MG tablet Take 15 mg by mouth nightly Yes Historical Provider, MD   DULoxetine (CYMBALTA) 60 MG extended release capsule Take 1 capsule by mouth daily Yes Kianna Hodge MD   Lancets MISC 1 each by Does not apply route daily Yes Kianna Hodge MD   azelastine (ASTELIN) 0.1 % nasal spray 2 sprays by Nasal route 2 times daily Use in each nostril as directed Yes Kianna Hodge MD   ipratropium-albuterol (DUONEB) 0.5-2.5 (3) MG/3ML SOLN nebulizer solution Inhale 3 mLs into the lungs every 6 hours as needed for Shortness of Breath Yes Kianna Hodge MD         Past Medical History:   Diagnosis Date    Allergic rhinitis     Arthropathy, unspecified, other specified sites 4/30/2013    Bronchitis     Chronic back pain     [facility]     Depression  DM [facility]di[facility] 12/18/2012    Emphysema of lung (HCC)     GERD (gastroesophageal reflux disease)     Hyperlipidemia     Hypertension     Knee pain     right knee mostly    Leg pain, right     Obesity     Osteoarthritis     Peripheral vascular disease (HCC)     Primary osteoarthritis of both knees     Radicular pain of lumbosacral region     Spinal stenosis, lumbar region, without neurogenic claudication 4/30/2013    Type II or unspecified type diabetes mellitus without mention of complication, not stated as uncontrolled     Unspecified sleep apnea     URI (upper respiratory infection)        Past Surgical History:   Procedure Laterality Date    COLONOSCOPY  2/12/2009    normal    DILATION AND CURETTAGE OF UTERUS      GASTRECTOMY      partial    NERVE BLOCK Right 4/30/13    Lumbar Diagnostic Block,  Kenalog 40 mg    NERVE BLOCK  5/23/13    Lumbar Radiofrequency, Kenalog 40mg    NERVE BLOCK  8/12/13    Lt MBNB  celestone 6mg    NERVE BLOCK Left 8-28-13    left lumbar diagnostic block #2 decadron 10 mg    NERVE BLOCK Left 9-24-13    left lumbar median branch radiofrequency    NERVE BLOCK  07-02-14    caudal, celestone 9 mg    NERVE BLOCK  7-16-14    caudal epidural #2, celestone 9mg, fentanyl 25mcg    NERVE BLOCK  7/30/14    caudal #3 decadron 10mg    NERVE BLOCK  11-6-14    duramorph epidural steroid block  duramorph 1 mg celestone 9 mg    NERVE BLOCK  11/20/15    TENS- Empi Select    NERVE BLOCK  07/20/2018    right transforminal # 1 decadron 10mg,isovue    NERVE BLOCK Bilateral 02/01/2019    bilat mbnb- no steroid    NERVE BLOCK Bilateral 02/08/2019    bilat mbnb, marcaine . 25%    SD KNEE SCOPE,DIAGNOSTIC Right 3/24/2017    KNEE ARTHROSCOPY WITH PARTIAL MEDIAL MENISECAL DEBRIDMENT  performed by Richard Santana MD at [place] [address]  01/2015    lumbar diskectomy    UPPER GASTROINTESTINAL ENDOSCOPY  9 20 2007    UPPER GASTROINTESTINAL ENDOSCOPY  4 21 ,2011    gastritis, esophagitis         Family History   Problem Relation Age of Onset    Diabetes Mother     Heart Disease Mother     Cirrhosis Father     Breast Cancer Neg Hx     Cancer Neg Hx     Colon Cancer Neg Hx     Eclampsia Neg Hx     Hypertension Neg Hx     Ovarian Cancer Neg Hx      Labor Neg Hx     Spont Abortions Neg Hx     Stroke Neg Hx        CareTeam (Including outside providers/suppliers regularly involved in providing care):   Patient Care Team:  Nora Barry MD as PCP - General (Internal Medicine)  Nora Barry MD as PCP - [facility] Empaneled Provider  Marcia Charles DPM as Consulting Physician (Podiatry)  Madison Mensah ([employer])  Nilay Kan MD as Consulting Physician (Orthopedic Surgery)  Rehana Burnette MD as Anesthesiologist (Pain Management)  Gilbert Hopkins OD (Optometry)  Anupama Stanley RN as Nurse Navigator  Mario Jaime RN as Ambulatory Care Manager    Wt Readings from Last 3 Encounters:   20 188 lb (85.3 kg)   20 189 lb (85.7 kg)   20 183 lb (83 kg)      No flowsheet data found. There is no height or weight on file to calculate BMI. Based upon direct observation of the patient, evaluation of cognition reveals recent and remote memory intact. Wt Readings from Last 3 Encounters:   20 188 lb (85.3 kg)   20 189 lb (85.7 kg)   20 183 lb (83 kg)     Temp Readings from Last 3 Encounters:   20 97.1 °F (36.2 °C)   20 97.5 °F (36.4 °C) (Temporal)   20 98.4 °F (36.9 °C) (Oral)     BP Readings from Last 3 Encounters:   20 131/75   20 137/80   20 (!) 157/87     Pulse Readings from Last 3 Encounters:   20 98   20 79   20 102       Patient's complete Health Risk Assessment and screening values have been reviewed and are found in Flowsheets.  The following problems were reviewed today and where indicated follow up appointments were made and/or referrals weight on file to calculate BMI. Health Habits/Nutrition Interventions:  · Inadequate physical activity:  patient is not ready to increase his/her physical activity level at this time  · Dental exam overdue:  dentures  · n    ADL:  ADLs  In the past 7 days, did you need help from others to perform any of the following everyday activities? Eating, dressing, grooming, bathing, toileting, or walking/balance?: None  In the past 7 days, did you need help from others to take care of any of the following?  Laundry, housekeeping, banking/finances, shopping, telephone use, food preparation, transportation, or taking medications?: (!) Laundry, Housekeeping, Food Preparation, Taking Medications(HAD [place] TO RESTART)  ADL Interventions:  · Patient declines any further evaluation/treatment for this issue    Personalized Preventive Plan   Current Health Maintenance Status  Immunization History   Administered Date(s) Administered    Influenza, Quadv, 6 mo and older, IM (Fluzone, Flulaval) 10/02/2017    Influenza, Quadv, IM, (6 mo and older Fluzone, Flulaval, Fluarix and 3 yrs and older Afluria) 10/06/2016, 09/17/2019    Influenza, Quadv, IM, PF (6 mo and older Fluzone, Flulaval, Fluarix, and 3 yrs and older Afluria) 10/29/2018    Pneumococcal Polysaccharide ([id number]) 01/26/2016    Tdap (Boostrix, Adacel) 06/24/2019    Zoster Recombinant (Shingrix) 06/24/2019        Health Maintenance   Topic Date Due    Diabetic retinal exam  09/26/2017    Annual Wellness Visit (AWV)  05/29/2019    Diabetic foot exam  06/18/2019    Colon Cancer Screen FIT/FOBT  05/06/2020    Flu vaccine (1) 09/01/2020    Shingles Vaccine (2 of 2) 09/22/2021 (Originally 8/19/2019)    Low dose CT lung screening  12/26/2020    Cervical cancer screen  02/15/2021    A1C test (Diabetic or Prediabetic)  04/21/2021    Diabetic microalbuminuria test  04/21/2021    Lipid screen  04/21/2021    Potassium monitoring  04/21/2021    Creatinine

## 2020-09-30 ENCOUNTER — HOSPITAL ENCOUNTER (OUTPATIENT)
Dept: PAIN MANAGEMENT | Age: 60
Discharge: HOME | End: 2020-09-30
Payer: COMMERCIAL

## 2020-09-30 PROCEDURE — 99213 OFFICE O/P EST LOW 20 MIN: CPT

## 2020-09-30 PROCEDURE — 99442 PR PHYS/QHP TELEPHONE EVALUATION 11-20 MIN: CPT | Performed by: NURSE PRACTITIONER

## 2020-09-30 RX ORDER — HYDROCODONE BITARTRATE AND ACETAMINOPHEN 5; 325 MG/1; MG/1
1 TABLET ORAL EVERY 8 HOURS PRN
Qty: 90 TABLET | Refills: 0 | Status: SHIPPED | OUTPATIENT
Start: 2020-10-02 | End: 2020-10-29 | Stop reason: SDUPTHER

## 2020-09-30 ASSESSMENT — ENCOUNTER SYMPTOMS
GASTROINTESTINAL NEGATIVE: 1
EYES NEGATIVE: 1
ROS SKIN COMMENTS: ECZEMA
COUGH: 1
BACK PAIN: 1

## 2020-09-30 NOTE — PROGRESS NOTES
[facility] PROGRESS NOTE      Patient phone call to   review Medication Agreement    Chief Complaint: back pain     She c/o low back pain  Which radiates down her legs. Her pain is unchanged. She has history of lumbar discectomy. She currently is in Physical Therapy which helps while doing it. She saw Dr Jessica Pinedo \nd is scheduled  for lumbar fusion in November. She states has weakness in right leg. She can not be very active due to the pain. . She states she toss and turns at night. She has had no ED visits. She is on a nicoderm patch and is down to 5 cigarettes a day. Back Pain   This is a chronic problem. The problem occurs constantly. The problem is unchanged. The pain is present in the lumbar spine. The quality of the pain is described as aching. Radiates to: legs. The pain is at a severity of 8/10. The pain is severe. The pain is the same all the time. The symptoms are aggravated by standing. Associated symptoms include weakness. (Weakness right leg) Risk factors include menopause and sedentary lifestyle. She has tried analgesics, muscle relaxant and heat for the symptoms. The treatment provided mild relief. Patient denies any new neurological symptoms. No bowel or bladder incontinence, no weakness, and no falling.     Any new diagnostic workup: [x] no  [] yes [] Xray [] CT scan [] MRI [] DEXA scan     [] Other            EXAMINATION:    MRI OF THE LUMBAR SPINE WITHOUT AND WITH CONTRAST  6/10/2019 12:04 pm         TECHNIQUE:    Multiplanar multisequence MRI of the lumbar spine was performed without and    with the administration of intravenous contrast.         COMPARISON:    06/12/2014         HISTORY:    ORDERING SYSTEM PROVIDED HISTORY: Postlaminectomy syndrome, lumbar region    TECHNOLOGIST PROVIDED HISTORY:    Pain, previous back surgery    Ordering Physician Provided Reason for Exam: chronic low back pain    Acuity: Chronic    Type of Exam: Subsequent/Follow-up Additional signs and symptoms: right leg pain and numbness to r foot    Relevant Medical/Surgical History: injury in 2006         FINDINGS:    BONES/ALIGNMENT: Grade 1 anterolisthesis of L4 on L5 measures 3 mm.  Lumbar    vertebral bodies are normal in height.  No acute lumbar spine fracture. Minimal bone marrow edema about L4-L5.  Remaining marrow signal pattern is    within normal limits.         SPINAL CORD:  The conus terminates normally.         SOFT TISSUES: No abnormal enhancement is seen of the lumbar spine.  No    paraspinal mass identified.         L1-L2: Mild DDD.  Posterior disc bulge measures 3 mm with central annular    fissure.  No significant spinal canal stenosis or significant neural    foraminal stenosis.         L2-L3: Mild DDD.  Disc bulge eccentric to the left measures 5 mm.  Effacement    of the left ventral thecal sac.  Mild spinal canal stenosis.  Bilateral facet    joint DJD and ligamentum flavum thickening.  Mild left neural foraminal    stenosis.         L3-L4: Mild DDD.  Disc bulge measures 4 mm.  Bilateral facet joint DJD.  Mild    spinal canal stenosis.  Mild left neural foraminal stenosis.         L4-L5: Moderate DDD.  Status post right hemilaminectomy.  Uncovered disc    material secondary to anterolisthesis of L4 on L5.  Superimposed diffuse disc    bulge measures 3 mm.  No significant spinal canal stenosis.  Flattening of    the ventral thecal sac.  Severe right neural foraminal stenosis with    compression of the exiting right L4 nerve root.  Mild left neural foraminal    stenosis.         L5-S1: Mild DDD.  Central/left paracentral disc bulge measures 3 mm. Bilateral facet joint DJD.  Disc bulge contacts the traversing left S1 nerve    root in the lateral recess without sally nerve root compression.  Mild    bilateral neural foraminal stenosis.           Impression    1.  Status post interval right hemilaminectomy at L4-L5.  Severe right neural    foraminal stenosis at this level with compression of the exiting right L4    nerve root. 2. At L5-S1, central/left paracentral disc bulge contacts the traversing left    S1 nerve root in the lateral recess. 3. Multilevel mild bilateral neural foraminal stenosis, as detailed above.                        Treatment goals:  Functional status: quit smoking, have back surgery      Aberrancy:   Any alcoholic beverages   no    Any illegal drugs  no       Analgesia:8    Adverse  Effects :none  ADL;s :in PT,not active        Pill count: appropriate fill date 10-2-2020 states # 5 hydrocodone tabs left    Morphine equivalent dose as reported on [place]ARRS: 15  Periodic Controlled Substance Monitoring: Possible medication side effects, risk of tolerance/dependence & alternative treatments discussed., No signs of potential drug abuse or diversion identified. , Assessed functional status., Obtaining appropriate analgesic effect of treatment. ELVIRA Hollis - CNP)  Review of[place]ARRS does not show any aberrant prescription behavior. Medication is helping the patient stay active. Patient denies any side effects and reports adequate analgesia. No sign of misuse/abuse.         When was thelast UDS:  2-           Was the UDS appropriate:  Detected below cutoff    Record/Diagnostics Review:      As above, I did review the imaging    2/17/2020  7:21 PM - Juan, Mhpn Incoming Lab Results From [email or web address]     Component  Value  Ref Range & Units  Status  Collected  Lab    Pain Management Drug Panel Interp, Urine  Consistent   Final  02/13/2020  1:30 PM  [facility]    (NOTE)   ________________________________________________________________   DRUGS EXPECTED:   Kris Clark (HYDROCODONE) [2/13/20]   ________________________________________________________________   CONSISTENT with medications provided:   NORCO (HYDROCODONE) : based on hydrocodone and metabolite detected   below cutoff   ________________________________________________________________   Drugs Not Included in this Assay:   Acetaminophen   ________________________________________________________________   INTERPRETIVE INFORMATION: Pain Mgt [place], [place] Spec/EMIT, Ur,                            Interp   Interpretation depends on accuracy and completeness of patient   medication information submitted by client.             Past Medical History:   Diagnosis Date    Allergic rhinitis     Arthropathy, unspecified, other specified sites 4/30/2013    Bronchitis     Chronic back pain     [facility]     Depression     DM [facility]di[facility] 12/18/2012    Emphysema of lung ([facility])     GERD (gastroesophageal reflux disease)     Hyperlipidemia     Hypertension     Knee pain     right knee mostly    Leg pain, right     Obesity     Osteoarthritis     Peripheral vascular disease ([facility])     Primary osteoarthritis of both knees     Radicular pain of lumbosacral region     Spinal stenosis, lumbar region, without neurogenic claudication 4/30/2013    Type II or unspecified type diabetes mellitus without mention of complication, not stated as uncontrolled     Unspecified sleep apnea     URI (upper respiratory infection)        Past Surgical History:   Procedure Laterality Date    COLONOSCOPY  2/12/2009    normal    DILATION AND CURETTAGE OF UTERUS      GASTRECTOMY      partial    NERVE BLOCK Right 4/30/13    Lumbar Diagnostic Block,  Kenalog 40 mg    NERVE BLOCK  5/23/13    Lumbar Radiofrequency, Kenalog 40mg    NERVE BLOCK  8/12/13    Lt MBNB  celestone 6mg    NERVE BLOCK Left 8-28-13    left lumbar diagnostic block #2 decadron 10 mg    NERVE BLOCK Left 9-24-13    left lumbar median branch radiofrequency    NERVE BLOCK  07-02-14    caudal, celestone 9 mg    NERVE BLOCK  7-16-14    caudal epidural #2, celestone 9mg, fentanyl 25mcg    NERVE BLOCK  7/30/14    caudal #3 decadron 10mg    NERVE BLOCK  11-6-14    duramorph epidural steroid block  duramorph 1 mg celestone 9 mg tablet by mouth daily, Disp: 90 tablet, Rfl: 3    omeprazole (PRILOSEC) 20 MG delayed release capsule, TAKE 1 CAPSULE EVERY DAY, Disp: 30 capsule, Rfl: 11    nicotine (NICODERM CQ) 21 MG/24HR, Place 1 patch onto the skin every 24 hours, Disp: , Rfl:     potassium chloride (KLOR-CON M) 10 MEQ extended release tablet, Take 2 tablets by mouth daily, Disp: 60 tablet, Rfl: 3    SPIRIVA HANDIHALER 18 MCG inhalation capsule, , Disp: , Rfl:     mirtazapine (REMERON) 30 MG tablet, Take 30 mg by mouth nightly, Disp: , Rfl:     mirtazapine (REMERON) 15 MG tablet, Take 15 mg by mouth nightly, Disp: , Rfl:     DULoxetine (CYMBALTA) 60 MG extended release capsule, Take 1 capsule by mouth daily, Disp: 30 capsule, Rfl: 3    azelastine (ASTELIN) 0.1 % nasal spray, 2 sprays by Nasal route 2 times daily Use in each nostril as directed, Disp: 1 Bottle, Rfl: 3    albuterol sulfate HFA (VENTOLIN HFA) 108 (90 Base) MCG/ACT inhaler, Inhale 2 puffs into the lungs every 6 hours as needed for Wheezing, Disp: 1 Inhaler, Rfl: 3    diphenhydrAMINE (BENADRYL) 25 MG tablet, Take 25 mg by mouth every 6 hours as needed for Itching, Disp: , Rfl:     blood glucose test strips (EXACTECH TEST) strip, 1 each by In Vitro route daily As needed. , Disp: 50 each, Rfl: 11    acetaminophen (TYLENOL) 500 MG tablet, Take 1 tablet by mouth 4 times daily as needed for Pain, Disp: 30 tablet, Rfl: 0    Lancets MISC, 1 each by Does not apply route daily, Disp: 100 each, Rfl: 11    ipratropium-albuterol (DUONEB) 0.5-2.5 (3) MG/3ML SOLN nebulizer solution, Inhale 3 mLs into the lungs every 6 hours as needed for Shortness of Breath, Disp: 360 mL, Rfl: 11    Family History   Problem Relation Age of Onset    Diabetes Mother     Heart Disease Mother     Cirrhosis Father     Breast Cancer Neg Hx     Cancer Neg Hx     Colon Cancer Neg Hx     Eclampsia Neg Hx     Hypertension Neg Hx     Ovarian Cancer Neg Hx      Labor Neg Hx     Spont Abortions Neg Hx     Stroke Neg Hx        Social History     Socioeconomic History    Marital status: Single     Spouse name: Not on file    Number of children: Not on file    Years of education: Not on file    Highest education level: Not on file   Occupational History    Not on file   Social Needs    Financial resource strain: Not on file    Food insecurity     Worry: Not on file     Inability: Not on file    Transportation needs     Medical: Not on file     Non-medical: Not on file   Tobacco Use    Smoking status: Current Every Day Smoker     Packs/day: 0.25     Years: 36.00     Pack years: 9.00     Types: Cigarettes     Last attempt to quit: 2015     Years since quittin.1    Smokeless tobacco: Never Used    Tobacco comment: pt currently using nicotine patches   2 CIGARETTES A DAY   Substance and Sexual Activity    Alcohol use: No     Alcohol/week: 0.0 standard drinks    Drug use: No     Comment: 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 7 yrs    Sexual activity: Never   Lifestyle    Physical activity     Days per week: Not on file     Minutes per session: Not on file    Stress: Not on file   Relationships    Social connections     Talks on phone: Not on file     Gets together: Not on file     Attends Anabaptism service: Not on file     Active member of club or organization: Not on file     Attends meetings of clubs or organizations: Not on file     Relationship status: Not on file    Intimate partner violence     Fear of current or ex partner: Not on file     Emotionally abused: Not on file     Physically abused: Not on file     Forced sexual activity: Not on file   Other Topics Concern    Not on file   Social History Narrative    Not on file         Review of Systems:  Review of Systems   Constitution: Negative. HENT: Negative. Eyes: Negative. Cardiovascular: Negative. Respiratory: Positive for cough.          Smokes   Endocrine:        Diabetic under control

## 2020-10-01 ENCOUNTER — HOSPITAL ENCOUNTER (OUTPATIENT)
Dept: PHYSICAL THERAPY | Age: 60
Setting detail: THERAPIES SERIES
Discharge: HOME | End: 2020-10-01
Payer: COMMERCIAL

## 2020-10-01 NOTE — FLOWSHEET NOTE
[x] [facility]) [facility] &  Therapy  [address]    [phone]  F: [phone]   [] [address]   [address]  P: [phone]  F: [phone]  [] [address]  [address] [facility]  P: [phone]  F: [phone]  [] [address]  [facility]   [address]   [place]: [phone]  F: [phone]   [] [place] [id number] [facility] [address]  [place]: [phone]   F: [phone]     Physical Therapy Cancel/No Show note    Date: 10/1/2020  Patient: Noemí Rodriguez  : 1960  MRN: [id number]    Cancels/No Shows to date:     For today's appointment patient:    [x]  Cancelled    [] Rescheduled appointment    [] No-show     Reason given by patient:    [x]  Patient ill    []  Conflicting appointment    [] No transportation      [] Conflict with work    [] No reason given    [] Weather related    [] COVID-19    [x] Other:      Comments: Pt is to sore. Confirmed next appt.         [x] Next appointment was confirmed    Electronically signed by: Cristy Joshua PTA

## 2020-10-08 ENCOUNTER — HOSPITAL ENCOUNTER (OUTPATIENT)
Dept: PHYSICAL THERAPY | Age: 60
Setting detail: THERAPIES SERIES
Discharge: HOME | End: 2020-10-08
Payer: COMMERCIAL

## 2020-10-08 PROCEDURE — G0283 ELEC STIM OTHER THAN WOUND: HCPCS

## 2020-10-08 PROCEDURE — 97110 THERAPEUTIC EXERCISES: CPT

## 2020-10-08 NOTE — FLOWSHEET NOTE
[x] [facility]) [facility] &  Therapy  [address]  [phone]  F: [phone] [] [address]  [place]   [address]  P: [phone]  F: [phone] [] [address]  Therapy  [address] [facility]  P: [phone]  F: [phone] [] [address]  [facility]   [address]   [place]: [phone]  F: [phone]      Physical Therapy Daily Treatment Note    Date:  10/8/2020  Patient Name:  Daisy Adan  \"Dot\"  :  1960  MRN: [id number]  Physician: Dayday Day DO                Insurance: Anthony Samuel (12 visits cherie'd 20-10.22.20 Aut# [id number])  Medical Diagnosis:  Spondylolisthesis, lumbar region M43.16  Lumbar radiculopathy, chronic M54.16                        Rehab Codes: M54.16   Onset Date: 20                                 Next 's appt: 20  Visit# / total visits:      Cancels/No Shows:     Subjective:    Pain:  [x] Yes  [] No Location: Spondylolisthesis, lumbar region  Pain Rating: (0-10 scale) 8/10  Pain altered Tx:  [x] No  [] Yes  Action:  Comments:  Patient reported slight increase in pain this date, has had difficulty falling asleep and staying asleep.      Objective:  Modalities: IFC/HP to low back in prone x20 min  Precautions:  Exercises:  Exercise Reps/ Time Weight/ Level Comments   NuStep  L4    SciFit 5' L3.0          Standing      Calf stretch 3x20\"     Heel raises 15x     Marches 10x ea     Hip abd 10x ea     Hip Extension 10x ea     Hamstring curls 10x ea           Seated      Hamstring stretch 3x20\" ea     Marches 10x ea 2 lb    LAQ 10x 2 lb          Supine      SKTC 5x20\"  Towel   Piriformis 5x20\"     Butterfly stretch 3x20\"     Fabers 3x20\"     LTR 10x5\"     Iso abd 10x  Mod cueing   Marches 20x 2 lb    Walk outs 10x 2 lb    UE raises 10x ea 2 lb    Opp arm/opp leg 10x 2 lb    Bridges 20x     SLR 10x ea 2 lb                Other:     Treatment Charges: Mins Units   [x]  Modalities IFC/HP 20 1   [x]  Ther Exercise 50 4   []  Manual Therapy     []  Ther Activities     []  Aquatics     []  Vasocompression     []  Other     Total Treatment time 70 5       Assessment: [x] Progressing toward goals. Added hip extension and hamstring curls to strengthen the hips and low back. Increased reps for heel raises, SKTC and Piriformis stretches to improve the strength and flexibility of the lower extremities. Pt received IFC/HP at the end of the session to decrease pain and muscular tension with positive results noted by pt. Goal check completed this date, results listed below. [] Other:      [x] Patient would continue to benefit from skilled physical therapy services in order to: decrease pain and reduce radicular symptoms, increase hamstring length and increase strength to LE, hips and core. Problems:    [x]? ? Pain: 9/10 R low back and LE pain   [x]? ? Flexibility: R hamstrings  [x]? ? Strength: R hip flexion, abduction, and extension, Abs  [x]? ? Function:Oswestry score to 84% impaired  []? Other:     STG: (to be met in 8 treatments)  1. ? Pain: 9/10 R LE and low back pain. 8/10  2. ? Strength: 4+/5 R hip flexion, abduction, extension to improve mobility. Abduction 5/5, Flexion L 4/5, R4-/5, Extension L 4-/5, R 4/5  3. ? Function: Oswestry score to 64% impaired or better to improve ADLs. 72% impaired   4. Independent with Home Exercise Programs     LTG: (to be met in 12 treatments)  1. Patient will be able to ambulate community distances without cane. 2. Patient will report decreased frequency of R LE radicular pain.      Patient goals:  Reduce R LE pain and improve function.      Rehab Potential:  [x]? Good  []? Fair  []? Poor              Suggested Professional Referral:  [x]? No  []? Yes:  Barriers to Goal Achievement:  [x]? No  []? Yes:  Domestic Concerns:  [x]? No  []? Yes:    Pt. Education:  [x] Yes  [] No  [x] Reviewed Prior HEP/Ed  Method of Education: [x] Verbal  [x] Demo  [x] Written  Comprehension of Education:   [x] Verbalizes understanding. [] Demonstrates understanding. [x] Needs review. [x] Demonstrates/verbalizes HEP/Ed previously given. 9.10.20: Med[employer] hamstring, ER, LTR, piriformis stretch, supine bridges, iso abd     9.28.20 Access Code: [email or web address]   URL: [email or web address]   Date: 09/28/2020   Prepared by: Aye Zamora     Exercises   Small Range Straight Leg Raise - 10 reps - 3 sets - 1x daily - 7x weekly   Standing Hip Abduction with Counter Support - 10 reps - 3 sets - 1x daily - 7x weekly   Standing March with Counter Support - 10 reps - 3 sets - 1x daily - 7x weekly   Heel rises with counter support - 10 reps - 3 sets - 1x daily - 7x weekly   Supine Bent Leg Lift with Arm Extension - 10 reps - 3 sets - 1x daily - 7x weekly     Plan: [x] Continue current frequency toward long and short term goals.      [x] Specific Instructions for subsequent treatments: Lumbar ROM and core strengthening           Time In: 0906           Time Out: 1021    Electronically signed by:  Luanne Bashir PTA

## 2020-10-09 ENCOUNTER — CARE COORDINATION (OUTPATIENT)
Dept: CARE COORDINATION | Age: 60
End: 2020-10-09

## 2020-10-09 SDOH — SOCIAL STABILITY: SOCIAL NETWORK: HOW OFTEN DO YOU ATTEND CHURCH OR RELIGIOUS SERVICES?: MORE THAN 4 TIMES PER YEAR

## 2020-10-09 SDOH — ECONOMIC STABILITY: INCOME INSECURITY: HOW HARD IS IT FOR YOU TO PAY FOR THE VERY BASICS LIKE FOOD, HOUSING, MEDICAL CARE, AND HEATING?: NOT VERY HARD

## 2020-10-09 SDOH — SOCIAL STABILITY: SOCIAL NETWORK: HOW OFTEN DO YOU GET TOGETHER WITH FRIENDS OR RELATIVES?: ONCE A WEEK

## 2020-10-09 SDOH — HEALTH STABILITY: PHYSICAL HEALTH: ON AVERAGE, HOW MANY DAYS PER WEEK DO YOU ENGAGE IN MODERATE TO STRENUOUS EXERCISE (LIKE A BRISK WALK)?: 0 DAYS

## 2020-10-09 SDOH — SOCIAL STABILITY: SOCIAL NETWORK: HOW OFTEN DO YOU ATTENT MEETINGS OF THE CLUB OR ORGANIZATION YOU BELONG TO?: NEVER

## 2020-10-09 SDOH — ECONOMIC STABILITY: FOOD INSECURITY: WITHIN THE PAST 12 MONTHS, THE FOOD YOU BOUGHT JUST DIDN'T LAST AND YOU DIDN'T HAVE MONEY TO GET MORE.: NEVER TRUE

## 2020-10-09 SDOH — ECONOMIC STABILITY: TRANSPORTATION INSECURITY
IN THE PAST 12 MONTHS, HAS LACK OF TRANSPORTATION KEPT YOU FROM MEETINGS, WORK, OR FROM GETTING THINGS NEEDED FOR DAILY LIVING?: NO

## 2020-10-09 SDOH — HEALTH STABILITY: MENTAL HEALTH
STRESS IS WHEN SOMEONE FEELS TENSE, NERVOUS, ANXIOUS, OR CAN'T SLEEP AT NIGHT BECAUSE THEIR MIND IS TROUBLED. HOW STRESSED ARE YOU?: ONLY A LITTLE

## 2020-10-09 SDOH — SOCIAL STABILITY: SOCIAL NETWORK
IN A TYPICAL WEEK, HOW MANY TIMES DO YOU TALK ON THE PHONE WITH FAMILY, FRIENDS, OR NEIGHBORS?: MORE THAN THREE TIMES A WEEK

## 2020-10-09 SDOH — ECONOMIC STABILITY: TRANSPORTATION INSECURITY
IN THE PAST 12 MONTHS, HAS THE LACK OF TRANSPORTATION KEPT YOU FROM MEDICAL APPOINTMENTS OR FROM GETTING MEDICATIONS?: NO

## 2020-10-09 SDOH — SOCIAL STABILITY: SOCIAL NETWORK: ARE YOU MARRIED, WIDOWED, DIVORCED, SEPARATED, NEVER MARRIED, OR LIVING WITH A PARTNER?: NEVER MARRIED

## 2020-10-09 SDOH — ECONOMIC STABILITY: FOOD INSECURITY: WITHIN THE PAST 12 MONTHS, YOU WORRIED THAT YOUR FOOD WOULD RUN OUT BEFORE YOU GOT MONEY TO BUY MORE.: NEVER TRUE

## 2020-10-09 SDOH — HEALTH STABILITY: MENTAL HEALTH: HOW OFTEN DO YOU HAVE A DRINK CONTAINING ALCOHOL?: NEVER

## 2020-10-09 SDOH — SOCIAL STABILITY: SOCIAL NETWORK
DO YOU BELONG TO ANY CLUBS OR ORGANIZATIONS SUCH AS CHURCH GROUPS UNIONS, FRATERNAL OR ATHLETIC GROUPS, OR SCHOOL GROUPS?: NO

## 2020-10-09 SDOH — HEALTH STABILITY: PHYSICAL HEALTH: ON AVERAGE, HOW MANY MINUTES DO YOU ENGAGE IN EXERCISE AT THIS LEVEL?: 0 MIN

## 2020-10-09 ASSESSMENT — PATIENT HEALTH QUESTIONNAIRE - PHQ9
SUM OF ALL RESPONSES TO PHQ QUESTIONS 1-9: 2
SUM OF ALL RESPONSES TO PHQ QUESTIONS 1-9: 2

## 2020-10-09 ASSESSMENT — ENCOUNTER SYMPTOMS: DYSPNEA ASSOCIATED WITH: EXERTION

## 2020-10-09 NOTE — CARE COORDINATION
Ambulatory Care Coordination Note  10/9/2020  CM Risk Score: 6  Charlson 10 Year Mortality Risk Score: 47%     ACC: Kuldip Hester, RN    Summary Note: spoke with patient. She states that her biggest concerns right now are the pain she is in, activity intolerance, motorized scooter and smoking cessation. Plan  COPD  -Review activities that increase SOB  - rest in between activities  -do more strenuous activity in the am to reduce fatigue  Diabetes  - continue to monitor  - report Sx of hypo or hyper glycemia  - maintain log of BS and bring to next PCP appointment  Pain  - continue with pain management  - continue OT and PT  Activity  - encourage increasing activity slowly  Falls  Remove obstacles that increase the risk of falls   Smoking cessation  - Patient agrees to reduce the number of cigarettes smoked to less than 1 per day by 10/23/20  Motorized scooter  Check with company about timeline to receiving. Patient has already been measured for scooter  Will call in about 7 days      Ambulatory Care Coordination Assessment    Care Coordination Protocol  Program Enrollment:  Complex Care  Referral from Primary Care Provider:  No  Week 1 - Initial Assessment     Do you have all of your prescriptions and are they filled?:  Yes  Barriers to medication adherence:  None  How often do you have trouble taking your medications the way you have been told to take them?:  I always take them as prescribed. Do you have Home O2 Therapy?:  No      Ability to seek help/take action for Emergent Urgent situations i.e. fire, crime, inclement weather or health crisis. :  Independent  Ability to ambulate to restroom:  Independent  Ability handle personal hygeine needs (bathing/dressing/grooming):  Needs Assistance  Ability to manage Medications:   Independent  Ability to prepare Food Preparation:  Needs Assistance  Ability to maintain home (clean home, laundry):  Needs Assistance  Ability to drive and/or has transportation: Independent  Ability to do shopping:  Needs Assistance  Ability to manage finances: Independent  Is patient able to live independently?:  Yes     Current Housing:  Apartment        Per the Fall Risk Screening, did the patient have 2 or more falls or 1 fall with injury in the past year?:  Yes  How often do you think you are about to fall and you do NOT fall? For example, you grab something to stabilize yourself or hold onto a wall/furniture?:  Occasionally  Use of a Mobility Aid:  Yes  Difficulty walking/impaired gait:  Yes  Issues with feet or shoes like numbness, edema, shoes not fitting:  No  Changes in vision, poor vision or poor lighting in environment:  No  Dizziness:  No  Other Fall Risk:  Yes  What other fall risks does the patient have?:  Osteoarthritis, Fibromyalgia, DJD     Frequent urination at night?:  No  Do you use rails/bars?:  Yes  Do you have a non-slip tub mat?:  No     Are you experiencing loss of meaning?:  No  Are you experiencing loss of hope and peace?:  No     Thinking about your patient's physical health needs, are there any symptoms or problems (risk indicators) you are unsure about that require further investigation?:  Moderate to severe symptoms or problems that impact on daily life   Are the patients physical health problems impacting on their mental well-being?:  Moderate to severe impact upon mental well-being and preventing enjoyment of usual activities   Are there any problems with your patients lifestyle behaviors (alcohol, drugs, diet, exercise) that are impacting on physical or mental well-being?:  Moderate to severe impact on well-being, preventing enjoyment of usual activities   Do you have any other concerns about your patients mental well-being?  How would you rate their severity and impact on the patient?:  Mild problems - don't interfere with function   How would you rate their home environment in terms of safety and stability (including domestic violence, insecure housing, neighbor harassment)?:  Safety/stability questionable   How do daily activities impact on the patient's well-being? (include current or anticipated unemployment, work, caregiving, access to transportation or other): Contributes to low mood or stress at times   How would you rate their social network (family, work, friends)?:  Good participation with social networks   How would you rate their financial resources (including ability to afford all required medical care)?:  Financially secure, some resource challenges   How wells does the patient now understand their health and well-being (symptoms, signs or risk factors) and what they need to do to manage their health?:  Reasonable to good understanding but do not feel able to engage with advice at this time   How well do you think your patient can engage in healthcare discussions? (Barriers include language, deafness, aphasia, alcohol or drug problems, learning difficulties, concentration): Adequate communication, with or without minor barriers   Do other services need to be involved to help this patient?:  Other care/services not in place and required   Are current services involved with this patient well-coordinated? (Include coordination with other services you are now recommendation):  Required care/services in place and adequately coordinated   Suggested Interventions and [employer] [facility]: In Process    Fall Risk Prevention: In Process Home Health Services: In Process   Other Services or Interventions:  Walk in clinic informaton   Occupational Therapy: In Process   Physical Therapy: In Process   Other Therapy Services:  Declined   Zone Management Tools: In Process         Set up/Review an Education Plan, Set up/Review Goals              Prior to Admission medications    Medication Sig Start Date End Date Taking?  Authorizing Provider   HYDROcodone-acetaminophen (NORCO) 5-325 MG per tablet Take 1 tablet by mouth every 8 hours as needed for Pain for up to 30 days.  Early refill due to holidays 10/2/20 11/1/20 Yes ELVIRA Carrera CNP   carvedilol (COREG) 6.25 MG tablet Take 1 tablet by mouth 2 times daily 9/22/20  Yes Shannen Ordonez MD   atorvastatin (LIPITOR) 40 MG tablet Take 1 tablet by mouth daily 9/22/20  Yes Shannen Ordonez MD   metFORMIN (GLUCOPHAGE) 500 MG tablet Take 1 tablet by mouth 2 times daily (with meals) 9/22/20  Yes Shannen Ordonez MD   lisinopril-hydroCHLOROthiazide (PRINZIDE;ZESTORETIC) 20-25 MG per tablet TAKE 1 TABLET EVERY DAY 9/22/20  Yes Shannen Ordonez MD   cetirizine (ZYRTEC) 10 MG tablet TAKE 1 TABLET BY MOUTH DAILY 9/18/20  Yes Shannen Ordonez MD    MG capsule TAKE 1 CAPSULE EVERY DAY 7/7/20  Yes Shannen Ordonez MD   trospium (SANCTURA) 20 MG tablet TAKE 1 TABLET BY MOUTH 2 TIMES DAILY 6/26/20  Yes Shannen Ordonez MD   tiZANidine (ZANAFLEX) 4 MG tablet TAKE 1 TABLET TWICE DAILY 5/12/20  Yes Shannen Ordonez MD   meloxicam DILMA HICKS [facility]) 15 MG tablet Take 1 tablet by mouth daily 5/6/20 10/14/20 Yes ELVIRA Thompson CNP   amLODIPine (NORVASC) 10 MG tablet Take 1 tablet by mouth daily 4/2/20  Yes Shannen Ordonez MD   albuterol sulfate HFA (VENTOLIN HFA) 108 (90 Base) MCG/ACT inhaler Inhale 2 puffs into the lungs every 6 hours as needed for Wheezing 4/2/20  Yes Shannen Ordonez MD   omeprazole (PRILOSEC) 20 MG delayed release capsule TAKE 1 CAPSULE EVERY DAY 1/10/20  Yes Shannen Ordonez MD   diphenhydrAMINE (BENADRYL) 25 MG tablet Take 25 mg by mouth every 6 hours as needed for Itching   Yes Historical Provider, MD   nicotine (NICODERM CQ) 21 MG/24HR Place 1 patch onto the skin every 24 hours   Yes Historical Provider, MD   potassium chloride (KLOR-CON M) 10 MEQ extended release tablet Take 2 tablets by mouth daily 12/10/19  Yes MD Jose Lopez [place] 18 MCG inhalation capsule  10/16/19  Yes Historical Provider, MD   blood glucose test strips (EXACTECH TEST) strip 1 each by In Vitro route daily As needed. 9/17/19  Yes Hadley Padilla MD   DULoxetine (CYMBALTA) 60 MG extended release capsule Take 1 capsule by mouth daily 2/1/18  Yes Hadley Padilla MD   Lancets MISC 1 each by Does not apply route daily 1/5/18  Yes Hadley Padilla MD   azelastine (ASTELIN) 0.1 % nasal spray 2 sprays by Nasal route 2 times daily Use in each nostril as directed 6/15/16  Yes Hadley Padilla MD   ipratropium-albuterol (DUONEB) 0.5-2.5 (3) MG/3ML SOLN nebulizer solution Inhale 3 mLs into the lungs every 6 hours as needed for Shortness of Breath 8/4/15  Yes Hadley Padilla MD   acetaminophen (TYLENOL) 500 MG tablet Take 1 tablet by mouth 4 times daily as needed for Pain 9/12/19   ELVIRA Ruelas CNP   mirtazapine (REMERON) 30 MG tablet Take 30 mg by mouth nightly 5/22/18   Historical Provider, MD   mirtazapine (REMERON) 15 MG tablet Take 15 mg by mouth nightly 5/22/18   Historical Provider, MD       Future Appointments   Date Time Provider Claudy Schaeffer   10/12/2020  9:00 AM Kiara Finley PTA [facility]   10/15/2020 11:00 AM Kiara Finley PTA [facility]   10/29/2020 10:00 AM ELVIRA Alvarez CNP [facility] [address]   11/5/2020 11:00 AM ST[facility] PAT RM 1 [facility] PAT [facility]   11/15/2020 11:20 AM STA PAT RM 4 [facility] PAT [facility]   12/3/2020  2:00 PM ELVIRA Torres CNP [facility] [address] [facility]   1/13/2021 11:30 AM DO [place] Tam [facility]     ,   Diabetes Assessment    Medic Alert ID:  No  Meal Planning:  Avoidance of concentrated sweets   How often do you test your blood sugar?:  Daily   Do you have barriers with adherence to non-pharmacologic self-management interventions?  (Nutrition/Exercise/Self-Monitoring):  Yes   Have you ever had to go to the ED for symptoms of low blood sugar?:  No       No patient-reported symptoms       and   COPD Assessment    Does the patient understand envrionmental exposure?:  Yes  Is the patient able to verbalize Rescue vs. Long Acting medications?:  Yes  Does the patient have a nebulizer?:  No  Does the patient use a space with inhaled medications?:  No     No patient-reported symptoms         Symptoms:      Breathlessness:  exertion  Increase use of rapid acting/rescue inhaled medications?:  No  Change in chronic cough?:  No/At Baseline  Change in sputum?:  No/At Baseline  Self Monitoring - SaO2:  No  Have you had a recent diagnosis of pneumonia either by PCP or at a hospital?:  No

## 2020-10-09 NOTE — TELEPHONE ENCOUNTER
Request for gabapentin - med pended. Please fill if appropriate. Next Visit Date:  Future Appointments   Date Time Provider Claudy Laury   10/12/2020  9:00 AM Urmila Ceron, PTA [facility]   10/15/2020 11:00 AM [place] Osmany, PTA [facility]   10/29/2020 10:00 AM Ayad Rehman APRN - CNP [facility] [place]   11/5/2020 11:00 AM ST[facility] PAT RM 1 [facility] PAT [facility]   11/15/2020 11:20 AM STA PAT RM 4 [facility] PAT [facility]   12/3/2020  2:00 PM Lazara Wetzel APRN - CNP [place] [facility]   1/13/2021 11:30 AM Savita Kelly [facility]       Health Maintenance   Topic Date Due    Diabetic retinal exam  09/26/2017    Diabetic foot exam  06/18/2019    Colon Cancer Screen FIT/FOBT  05/06/2020    Flu vaccine (1) 09/01/2020    Shingles Vaccine (2 of 2) 09/22/2021 (Originally 8/19/2019)    Cervical cancer screen  02/15/2021    A1C test (Diabetic or Prediabetic)  04/21/2021    Diabetic microalbuminuria test  04/21/2021    Lipid screen  04/21/2021    Potassium monitoring  04/21/2021    Creatinine monitoring  04/21/2021    Annual Wellness Visit (AWV)  09/30/2021    Breast cancer screen  11/22/2021    DTaP/Tdap/Td vaccine (2 - Td) 06/24/2029    Pneumococcal 0-64 years Vaccine  Completed    Hepatitis C screen  Completed    HIV screen  Completed    Hepatitis A vaccine  Aged Out    Hib vaccine  Aged Out    Meningococcal (ACWY) vaccine  Aged Out       Hemoglobin A1C (%)   Date Value   04/21/2020 5.8   03/05/2019 5.8   06/18/2018 5.8             ( goal A1C is < 7)   Microalb/Crt.  Ratio (mcg/mg creat)   Date Value   04/21/2020 CANNOT BE CALCULATED     LDL Cholesterol (mg/dL)   Date Value   04/21/2020 116       (goal LDL is <100)   AST (U/L)   Date Value   04/21/2020 18     ALT (U/L)   Date Value   04/21/2020 21     BUN (mg/dL)   Date Value   04/21/2020 15     BP Readings from Last 3 Encounters:   09/22/20 131/75   09/16/20 137/80   08/05/20 (!) 157/87          (goal 120/80)    All Future Testing planned in [facility]  Lab Frequency Next Occurrence   PT eval and treat Once 12/13/2019   PT aquatic therapy Once 12/13/2019   XR Lumbar Spine Flex and Ext Only Once 01/06/2021   Cologuard Once 12/22/2020         Patient Active Problem List:     DJD (degenerative joint disease) of knee     Osteoarthritis of spine with radiculopathy, lumbar region     GERD (gastroesophageal reflux disease)     COPD (chronic obstructive pulmonary disease)     HTN (hypertension)     Allergic rhinitis     Lipoma of shoulder s/p excision right posterior 11 17 2008     DM (diabetes mellitus)     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     Primary osteoarthritis of both knees     Medication monitoring encounter     Chronic low back pain     Major depression, chronic     Chronic respiratory failure with hypoxia (HCC)     Mitral and aortic insufficiency     Pure hypercholesterolemia     Spondylosis of lumbar region without myelopathy or radiculopathy

## 2020-10-12 RX ORDER — GABAPENTIN 300 MG/1
CAPSULE ORAL
Qty: 120 CAPSULE | Refills: 1 | Status: SHIPPED | OUTPATIENT
Start: 2020-10-12 | End: 2021-02-04

## 2020-10-13 NOTE — TELEPHONE ENCOUNTER
Request for test strips. Next Visit Date:  Future Appointments   Date Time Provider Claudy Schaeffer   10/15/2020 11:00 AM Michel BABIN, PTA [facility]   10/29/2020 10:00 AM ELVIRA Morrow CNP [facility] [address]   11/5/2020 11:00 AM ST[facility] PAT RM 1 [facility] PAT [facility]   11/15/2020 11:20 AM STA PAT RM 4 [facility] PAT [facility]   12/3/2020  2:00 PM Janine Schulte APRN - CNP [place] [facility]   1/13/2021 11:30 AM [place] Ajit Laury Cos[place] [facility]       Health Maintenance   Topic Date Due    Diabetic retinal exam  09/26/2017    Diabetic foot exam  06/18/2019    Colon Cancer Screen FIT/FOBT  05/06/2020    Flu vaccine (1) 09/01/2020    Shingles Vaccine (2 of 2) 09/22/2021 (Originally 8/19/2019)    Cervical cancer screen  02/15/2021    A1C test (Diabetic or Prediabetic)  04/21/2021    Diabetic microalbuminuria test  04/21/2021    Lipid screen  04/21/2021    Potassium monitoring  04/21/2021    Creatinine monitoring  04/21/2021    Annual Wellness Visit (AWV)  09/30/2021    Breast cancer screen  11/22/2021    DTaP/Tdap/Td vaccine (2 - Td) 06/24/2029    Pneumococcal 0-64 years Vaccine  Completed    Hepatitis C screen  Completed    HIV screen  Completed    Hepatitis A vaccine  Aged Out    Hib vaccine  Aged Out    Meningococcal (ACWY) vaccine  Aged Out       Hemoglobin A1C (%)   Date Value   04/21/2020 5.8   03/05/2019 5.8   06/18/2018 5.8             ( goal A1C is < 7)   Microalb/Crt.  Ratio (mcg/mg creat)   Date Value   04/21/2020 CANNOT BE CALCULATED     LDL Cholesterol (mg/dL)   Date Value   04/21/2020 116       (goal LDL is <100)   AST (U/L)   Date Value   04/21/2020 18     ALT (U/L)   Date Value   04/21/2020 21     BUN (mg/dL)   Date Value   04/21/2020 15     BP Readings from Last 3 Encounters:   09/22/20 131/75   09/16/20 137/80   08/05/20 (!) 157/87          (goal 120/80)    All Future Testing planned in [facility]  Lab Frequency Next Occurrence   PT eval and treat Once 12/13/2019 PT aquatic therapy Once 12/13/2019   XR Lumbar Spine Flex and Ext Only Once 01/06/2021   Cologuard Once 12/22/2020         Patient Active Problem List:     DJD (degenerative joint disease) of knee     Osteoarthritis of spine with radiculopathy, lumbar region     GERD (gastroesophageal reflux disease)     COPD (chronic obstructive pulmonary disease)     HTN (hypertension)     Allergic rhinitis     Lipoma of shoulder s/p excision right posterior 11 17 2008     DM (diabetes mellitus)     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     Primary osteoarthritis of both knees     Medication monitoring encounter     Chronic low back pain     Major depression, chronic     Chronic respiratory failure with hypoxia (HCC)     Mitral and aortic insufficiency     Pure hypercholesterolemia     Spondylosis of lumbar region without myelopathy or radiculopathy

## 2020-10-14 ENCOUNTER — CARE COORDINATION (OUTPATIENT)
Dept: CARE COORDINATION | Age: 60
End: 2020-10-14

## 2020-10-14 RX ORDER — BLOOD SUGAR DIAGNOSTIC
1 STRIP MISCELLANEOUS DAILY
Qty: 50 EACH | Refills: 11 | Status: SHIPPED | OUTPATIENT
Start: 2020-10-14 | End: 2022-04-29 | Stop reason: ALTCHOICE

## 2020-10-14 NOTE — CARE COORDINATION
DM, COPD Zone Tools, My Chart Info, Walk In, Right Care, Intro Letter, Smoking Cessation and Fall Precaution info all mailed to patient.

## 2020-10-15 ENCOUNTER — HOSPITAL ENCOUNTER (OUTPATIENT)
Dept: PHYSICAL THERAPY | Age: 60
Setting detail: THERAPIES SERIES
Discharge: HOME | End: 2020-10-15
Payer: COMMERCIAL

## 2020-10-15 PROCEDURE — G0283 ELEC STIM OTHER THAN WOUND: HCPCS

## 2020-10-15 PROCEDURE — 97110 THERAPEUTIC EXERCISES: CPT

## 2020-10-15 NOTE — FLOWSHEET NOTE
[x] [facility]) [facility] CENTER &  Therapy  [address]  [phone]  F: [phone] [] [address]  [place] [address]   [address]  P: [phone]  F: [phone] [] [address]  Therapy  [address]  P: [phone]  F: [phone] [] [address]  [facility]   [address]   [place]: [phone]  F: [phone]      Physical Therapy Daily Treatment Note    Date:  10/15/2020  Patient Name:  Anneliese Duke  \"Dot\"  :  1960  MRN: [id number]  Physician: Devonte Carroll DO                Insurance: [employer] (12 visits cherie'd 20-10.22.20 Aut# [id number])  Medical Diagnosis:  Spondylolisthesis, lumbar region M43.16  Lumbar radiculopathy, chronic M54.16                        Rehab Codes: M54.16   Onset Date: 20                                 Next 's appt: 20  Visit# / total visits:      Cancels/No Shows:     Subjective:    Pain:  [x] Yes  [] No Location: Spondylolisthesis, lumbar region  Pain Rating: (0-10 scale) 9/10  Pain altered Tx:  [x] No  [] Yes  Action:  Comments:  Patient reported another increase in pain this date. Noted she was feeling ok until she got to the hospital.     Objective:  Modalities: IFC/HP to low back in prone x20 min  Precautions:  Exercises:  Add back as much as able  Exercise Reps/ Time Weight/ Level Comments   NuStep  L4    SciFit 5' L3.0          Standing      Calf stretch 3x20\"     Heel raises 15x  10x this date   Marches 10x ea     Hip abd 10x ea     Hip Extension 10x ea  RLE only this date   Hamstring curls 10x ea  RLE only this date         Seated      Hamstring stretch 3x20\" ea     Marches 10x ea 2 lb HELD   LAQ 10x 2 lb          Supine      SKTC 5x20\"  Towel, HELD   Piriformis 5x20\"     Butterfly stretch 3x20\"     Fabers 3x20\"     LTR 10x5\"     Iso abd 10x  Mod cueing   Marches 20x 2 lb    Walk outs 10x 2 lb    UE raises 10x ea 2 lb    Opp arm/opp leg 10x 2 lb    Bridges 20x  10x this date   SLR 10x ea 2 lb                Other:     Treatment Charges: Mins Units   [x]  Modalities IFC/HP 20 1   [x]  Ther Exercise 34 3   []  Manual Therapy     []  Ther Activities     []  Aquatics     []  Vasocompression     []  Other     Total Treatment time 54 4       Assessment: [x] Progressing toward goals. No progressions this date due to high (9/10) pain level. Some activities reduced or held this date due to patient pain tolerance. Held all weights due to pain level. Pt received HP/IFC to low back to decrease pain and muscle tension. [] Other:      [x] Patient would continue to benefit from skilled physical therapy services in order to: decrease pain and reduce radicular symptoms, increase hamstring length and increase strength to LE, hips and core. Problems:    [x]? ? Pain: 9/10 R low back and LE pain   [x]? ? Flexibility: R hamstrings  [x]? ? Strength: R hip flexion, abduction, and extension, Abs  [x]? ? Function:Oswestry score to 84% impaired  []? Other:     STG: (to be met in 8 treatments)  1. ? Pain: 9/10 R LE and low back pain. 8/10  2. ? Strength: 4+/5 R hip flexion, abduction, extension to improve mobility. Abduction 5/5, Flexion L 4/5, R4-/5, Extension L 4-/5, R 4/5  3. ? Function: Oswestry score to 64% impaired or better to improve ADLs. 72% impaired   4. Independent with Home Exercise Programs     LTG: (to be met in 12 treatments)  1. Patient will be able to ambulate community distances without cane. 2. Patient will report decreased frequency of R LE radicular pain.      Patient goals:  Reduce R LE pain and improve function.      Rehab Potential:  [x]? Good  []? Fair  []? Poor              Suggested Professional Referral:  [x]? No  []? Yes:  Barriers to Goal Achievement:  [x]? No  []? Yes:  Domestic Concerns:  [x]? No  []? Yes: Pt. Education:  [x] Yes  [] No  [x] Reviewed Prior HEP/Ed  Method of Education: [x] Verbal  [x] Demo  [x] Written  Comprehension of Education:   [x] Verbalizes understanding. [] Demonstrates understanding. [x] Needs review. [x] Demonstrates/verbalizes HEP/Ed previously given. 9.10.20: Medbridge hamstring, ER, LTR, piriformis stretch, supine bridges, iso abd     9.28.20 Access Code: [email or web address]   URL: [email or web address]   Date: 09/28/2020   Prepared by: Isabel Wright     Exercises   Small Range Straight Leg Raise - 10 reps - 3 sets - 1x daily - 7x weekly   Standing Hip Abduction with Counter Support - 10 reps - 3 sets - 1x daily - 7x weekly   Standing March with Counter Support - 10 reps - 3 sets - 1x daily - 7x weekly   Heel rises with counter support - 10 reps - 3 sets - 1x daily - 7x weekly   Supine Bent Leg Lift with Arm Extension - 10 reps - 3 sets - 1x daily - 7x weekly     Plan: [x] Continue current frequency toward long and short term goals.      [x] Specific Instructions for subsequent treatments: Lumbar ROM and core strengthening           Time In: 0194           Time Out: 3414    Electronically signed by:  Vishal Steinberg PTA

## 2020-10-17 ENCOUNTER — CARE COORDINATION (OUTPATIENT)
Dept: CARE COORDINATION | Age: 60
End: 2020-10-17

## 2020-10-17 ASSESSMENT — PATIENT HEALTH QUESTIONNAIRE - PHQ9
SUM OF ALL RESPONSES TO PHQ QUESTIONS 1-9: 2

## 2020-10-17 NOTE — CARE COORDINATION
ACC: Kuldip Hester RN  CM Risk Score: 6  Charlson 10 Year Mortality Risk Score: 47%     Care Coordination Interventions    Program Enrollment:  Complex Care  Referral from Primary Care Provider:  No  Suggested Interventions and Community Resources  Behavorial Health: In Process  Fall Risk Prevention: In Process  Home Health Services: In Process  Occupational Therapy: In Process  Physical Therapy: In Process  Other Therapy Services:  Declined  Zone Management Tools: In Process (Comment: COPD)  Other Services or Interventions:  Walk in clinic informaton       , Ambulatory Care Coordination Note  10/17/2020  CM Risk Score: 6  Charlson 10 Year Mortality Risk Score: 47%     ACC: Kuldip Hester RN    Summary Note: Spoke with patient who denies any SOB. Using inhalers daily. BS testing once in am. BS running less than 150. Patient's only complain is pain. She states that she feels better after physical therapy but when she gets home the pain returns. Plan  COPD  -Review activities that increase SOB  - rest in between activities  -do more strenuous activity in the am to reduce fatigue  Diabetes  - continue to monitor  - report Sx of hypo or hyper glycemia  - maintain log of BS and bring to next PCP appointment  Pain  - continue with pain management  - continue OT and PT  Activity  - encourage increasing activity slowly  Falls  Remove obstacles that increase the risk of falls   Smoking cessation  - Patient agrees to reduce the number of cigarettes smoked to less than 1 per day by 10/23/20  call in 7-10 days    Care Coordination Interventions    Program Enrollment:  Complex Care  Referral from Primary Care Provider:  No  Suggested Interventions and [facility]: In Process  Fall Risk Prevention: In Process  Home Health Services: In Process  Occupational Therapy: In Process  Physical Therapy: In Process  Other Therapy Services:  Declined  Zone Management Tools:   In Process (Comment: COPD)  Other Services or Interventions:  Walk in clinic informaton         Goals Addressed                 This Visit's Progress    [address]   On track     I will work towards the following [facility] goals: I will continue to follow up with my psychologist /counselor and/or psychiatrist., I will schedule my follow up appointment with my primary care physician when I leave the office today., I will take my medications daily as prescribed. , I will work on improving sleep habits to have consistent sleep/awake time. , and I will decrease caffeine intake. Barriers: lack of support, stress, and lack of education  Plan for overcoming my barriers: PCP, MARILYN, [address] [place] [address] counselor  Confidence: 8/10  Anticipated Goal Completion Date: 1/31/2021      [facility] Coordination Self Management   No change     CC Self Management Goal  Patient Goal (What steps will patient take to achieve goal?): medications as prescribed- montior blood sugar at least daily  Patient is able to discuss self-management of condition(s):  Pt demonstrates adherence to medications  Pt demonstrates understanding of self-monitoring  Patient is able to identify Red Flags:  Alert to potential adverse drug reactions(s) or side effects and actions to take should they arise  Discuss target symptoms and actions to take should they arise  Identify problems that require immediate PCP or specialist visit  Patient demonstrates understanding of access to PCP/Specialist:  Understands about scheduling routine Follow Up appointments   Understands about sick day appointment options for worsening of symptoms/progression (Same Day, E Visits)       Conditions and Symptoms   On track     I will schedule office visits, as directed by my provider. I will keep my appointment or reschedule if I have to cancel. I will notify my provider of any barriers to my plan of care. I will follow my Zone Management tool to seek urgent or emergent care.   I will notify my provider of any symptoms that indicate a worsening of my condition. Barriers: lack of support, stress, and lack of education  Plan for overcoming my barriers: ACM, PCP  Confidence: 9/10  Anticipated Goal Completion Date: 1/31/2021         Reduce Falls    On track     I will reduce my risk of falls by the following: Remove rugs or use non slip rugs  Use walking aids like cane or walker  Follow through on orders for PT    Barriers: lack of motivation, stress, and lack of education  Plan for overcoming my barriers: PT, OT, ACM  Confidence: 8/10  Anticipated Goal Completion Date: 1/31/2021       Stop Cigarette/Tobacco use   On track     10/9/20 I will continue to decrease my smoking weekly. Currently at 5 cigarettes per day  10/9/20 I will stop smoking by 11/1/20            Prior to Admission medications    Medication Sig Start Date End Date Taking? Authorizing Provider   blood glucose test strips (EXACTECH TEST) strip 1 each by In Vitro route daily As needed. 10/14/20  Yes Kvng Choi MD   gabapentin (NEURONTIN) 300 MG capsule TAKE 1 CAPSULE IN MORNING AND AFTERNOON AND 2 CAPSULES AT NIGHT 10/12/20 11/12/20 Yes Kvng Choi MD   HYDROcodone-acetaminophen (NORCO) 5-325 MG per tablet Take 1 tablet by mouth every 8 hours as needed for Pain for up to 30 days.  Early refill due to holidays 10/2/20 11/1/20 Yes ELVIRA Weber - CNP   carvedilol (COREG) 6.25 MG tablet Take 1 tablet by mouth 2 times daily 9/22/20  Yes Kvng Choi MD   atorvastatin (LIPITOR) 40 MG tablet Take 1 tablet by mouth daily 9/22/20  Yes Knvg Choi MD   metFORMIN (GLUCOPHAGE) 500 MG tablet Take 1 tablet by mouth 2 times daily (with meals) 9/22/20  Yes Kvng Choi MD   lisinopril-hydroCHLOROthiazide (PRINZIDE;ZESTORETIC) 20-25 MG per tablet TAKE 1 TABLET EVERY DAY 9/22/20  Yes Kvng Choi MD   cetirizine (ZYRTEC) 10 MG tablet TAKE 1 TABLET BY MOUTH DAILY 9/18/20  Yes Kvng Choi MD    MG capsule TAKE 1 CAPSULE EVERY DAY 7/7/20  Yes Viraj Keller MD   trospium (SANCTURA) 20 MG tablet TAKE 1 TABLET BY MOUTH 2 TIMES DAILY 6/26/20  Yes Viraj Keller MD   tiZANidine (ZANAFLEX) 4 MG tablet TAKE 1 TABLET TWICE DAILY 5/12/20  Yes Viraj Keller MD   amLODIPine (NORVASC) 10 MG tablet Take 1 tablet by mouth daily 4/2/20  Yes Viraj Keller MD   albuterol sulfate HFA (VENTOLIN HFA) 108 (90 Base) MCG/ACT inhaler Inhale 2 puffs into the lungs every 6 hours as needed for Wheezing 4/2/20  Yes Viraj Keller MD   omeprazole (PRILOSEC) 20 MG delayed release capsule TAKE 1 CAPSULE EVERY DAY 1/10/20  Yes Viraj Keller MD   diphenhydrAMINE (BENADRYL) 25 MG tablet Take 25 mg by mouth every 6 hours as needed for Itching   Yes Historical Provider, MD   nicotine (NICODERM CQ) 21 MG/24HR Place 1 patch onto the skin every 24 hours   Yes Historical Provider, MD   potassium chloride (KLOR-CON M) 10 MEQ extended release tablet Take 2 tablets by mouth daily 12/10/19  Yes Viraj Keller MD   Glendy Amass 18 MCG inhalation capsule  10/16/19  Yes Historical Provider, MD   DULoxetine (CYMBALTA) 60 MG extended release capsule Take 1 capsule by mouth daily 2/1/18  Yes Viraj Keller MD   Lancets MISC 1 each by Does not apply route daily 1/5/18  Yes Viraj Keller MD   azelastine (ASTELIN) 0.1 % nasal spray 2 sprays by Nasal route 2 times daily Use in each nostril as directed 6/15/16  Yes Viraj Keller MD   ipratropium-albuterol (DUONEB) 0.5-2.5 (3) MG/3ML SOLN nebulizer solution Inhale 3 mLs into the lungs every 6 hours as needed for Shortness of Breath 8/4/15  Yes Viraj Keller MD   acetaminophen (TYLENOL) 500 MG tablet Take 1 tablet by mouth 4 times daily as needed for Pain  Patient not taking: Reported on 10/17/2020 9/12/19   Sushma Hernandez, APRN - CNP   mirtazapine (REMERON) 30 MG tablet Take 30 mg by mouth nightly 5/22/18   Historical Provider, MD   mirtazapine (REMERON) 15 MG tablet Take 15 mg by mouth

## 2020-10-19 ENCOUNTER — HOSPITAL ENCOUNTER (OUTPATIENT)
Dept: PHYSICAL THERAPY | Age: 60
Setting detail: THERAPIES SERIES
Discharge: HOME | End: 2020-10-19
Payer: COMMERCIAL

## 2020-10-19 PROCEDURE — G0283 ELEC STIM OTHER THAN WOUND: HCPCS

## 2020-10-19 PROCEDURE — 97110 THERAPEUTIC EXERCISES: CPT

## 2020-10-19 NOTE — FLOWSHEET NOTE
[x] [facility]) [facility] &  Therapy  [address]  [phone]  F: [phone] [] [address]  [place] [address]   [address]  P: [phone]  F: [phone] [] [address]  Therapy  [address] [place] [address]  P: [phone]  F: [phone] [] [address]  [facility]   [address]   [place]: [phone]  F: [phone]      Physical Therapy Daily Treatment Note    Date:  10/19/2020  Patient Name:  Radha Lyon  \"Dot\"  :  1960  MRN: [id number]  Physician: Nneka Thorpe DO                Insurance: [facility] (12 visits cherie'd 8.24.20-10.22.20 Aut# [id number])  Medical Diagnosis:  Spondylolisthesis, lumbar region M43.16  Lumbar radiculopathy, chronic M54.16                        Rehab Codes: M54.16   Onset Date: 20                                 Next 's appt: 20  Visit# / total visits: 10/12     Cancels/No Shows:     Subjective:    Pain:  [x] Yes  [] No Location: Spondylolisthesis, lumbar region  Pain Rating: (0-10 scale) 7/10  Pain altered Tx:  [x] No  [] Yes  Action:  Comments:  Patient reported that she is feeling better this date, took pain medication an hour before coming to therapy.      Objective:  Modalities: IFC/HP to low back in prone x20 min  Precautions:  Exercises:  Exercise Reps/ Time Weight/ Level Comments   NuStep  L4    SciFit 5' L3.0          Standing      Calf stretch 3x20\"     Heel raises 20x  10x this date   Marches 10x ea     Hip abd 15x ea     Hip Extension 15x ea  RLE only this date   Hamstring curls 15x ea  RLE only this date         Seated      Hamstring stretch 3x20\" ea     Marches      LAQ 15x 2 lb          Supine      SKTC 5x10\"  Towel,    Piriformis 5x10\"     Butterfly stretch 5x10\"     Fabers 5x10\"     LTR 10x5\"     Iso abd 10x  Mod cueing   [address] 20x 2 lb    Walk outs 10x 2 lb    UE raises 10x ea 2 lb    Opp arm/opp leg 10x 2 lb    Bridges 20x     SLR 15x ea 2 lb          Prone      Press ups 10x     Prone lying 1 min     Hip extension 10x ea     Other:     Treatment Charges: Mins Units   [x]  Modalities IFC/HP 20 1   [x]  Ther Exercise 48 4   []  Manual Therapy     []  Ther Activities     []  Aquatics     []  Vasocompression     []  Other     Total Treatment time 68 4       Assessment: [x] Progressing toward goals. Increased reps for heel raises, hip abduction, hip extension, hamstring curls, LAQ and SLR. Added prone activities to further progress lumbar stability. Pt received HP/IFC at the end of treatment to decrease pain level. [] Other:      [x] Patient would continue to benefit from skilled physical therapy services in order to: decrease pain and reduce radicular symptoms, increase hamstring length and increase strength to LE, hips and core. Problems:    [x]? ? Pain: 9/10 R low back and LE pain   [x]? ? Flexibility: R hamstrings  [x]? ? Strength: R hip flexion, abduction, and extension, Abs  [x]? ? Function:Oswestry score to 84% impaired  []? Other:     STG: (to be met in 8 treatments)  1. ? Pain: 9/10 R LE and low back pain. 8/10  2. ? Strength: 4+/5 R hip flexion, abduction, extension to improve mobility. Abduction 5/5, Flexion L 4/5, R4-/5, Extension L 4-/5, R 4/5  3. ? Function: Oswestry score to 64% impaired or better to improve ADLs. 72% impaired   4. Independent with Home Exercise Programs     LTG: (to be met in 12 treatments)  1. Patient will be able to ambulate community distances without cane. 2. Patient will report decreased frequency of R LE radicular pain.      Patient goals:  Reduce R LE pain and improve function.      Rehab Potential:  [x]? Good  []? Fair  []? Poor              Suggested Professional Referral:  [x]? No  []? Yes:  Barriers to Goal Achievement:  [x]? No  []? Yes:  Domestic Concerns:  [x]? No  []? Yes: Pt. Education:  [x] Yes  [] No  [x] Reviewed Prior HEP/Ed  Method of Education: [x] Verbal  [x] Demo  [x] Written  Comprehension of Education:   [x] Verbalizes understanding. [] Demonstrates understanding. [x] Needs review. [x] Demonstrates/verbalizes HEP/Ed previously given. 9.10.20: Medbridge hamstring, ER, LTR, piriformis stretch, supine bridges, iso abd     9.28.20 Access Code: [email or web address]   URL: [email or web address]   Date: 09/28/2020   Prepared by: Suzie Menezes     Exercises   Small Range Straight Leg Raise - 10 reps - 3 sets - 1x daily - 7x weekly   Standing Hip Abduction with Counter Support - 10 reps - 3 sets - 1x daily - 7x weekly   Standing March with Counter Support - 10 reps - 3 sets - 1x daily - 7x weekly   Heel rises with counter support - 10 reps - 3 sets - 1x daily - 7x weekly   Supine Bent Leg Lift with Arm Extension - 10 reps - 3 sets - 1x daily - 7x weekly     Plan: [x] Continue current frequency toward long and short term goals.      [x] Specific Instructions for subsequent treatments: Lumbar ROM and core strengthening           Time In: 0902           Time Out: 0071    Electronically signed by:  Bj Rosales PTA

## 2020-10-22 ENCOUNTER — HOSPITAL ENCOUNTER (OUTPATIENT)
Dept: PHYSICAL THERAPY | Age: 60
Setting detail: THERAPIES SERIES
Discharge: HOME | End: 2020-10-22
Payer: COMMERCIAL

## 2020-10-22 PROCEDURE — 97110 THERAPEUTIC EXERCISES: CPT

## 2020-10-22 PROCEDURE — G0283 ELEC STIM OTHER THAN WOUND: HCPCS

## 2020-10-22 NOTE — DISCHARGE SUMMARY
[x] [facility]) [facility] &  Therapy  [address]  [phone]  F: [phone] [] [address]  [facility]   [address]  P: [phone]  F: [phone] [] [address] &  Therapy  [address] [facility] [address]  P: [phone]  F: [phone] [] [address]  P: [phone]  F: [phone] [] [address]  [facility]   [address]   [place]: [phone]  F: [phone]      Physical Therapy Discharge Note    Date: 10/22/2020      Patient: Emma Paul  : 1960  MRN: [id number]    [facility]           Insurance: [facility] (12 visits cherie'd 20-10.22.20 Auth# [id number])  Medical Diagnosis:  Spondylolisthesis, lumbar region M43.16  Lumbar radiculopathy, chronic M54.16                        Rehab Codes: M54.16   Onset Date: 20                                 Next 's appt: 20  Visit# / total visits:                                 Cancels/No Shows:   Date of initial visit:  20               Date of final visit: 10/22/20    Subjective:    Pain:  [x]? Yes  []? No   Location: Spondylolisthesis, lumbar region    Pain Rating: (0-10 scale) 8/10  Pain altered Tx:  [x]? No  []? Yes  Action:  Comments:  Patient reported that it is raining this date so it makes her pain worse. Objective:  Test Measurements:   Abduction 5/5, Flexion L 4/5, R4-/5, Extension L 4-/5, R 4/5      Function: Oswestry: 72% impaired     Assessment:   Patient still reporting severe back pain that is impairing her daily function. Lumbar fusion has been scheduled for next month Will discharge to [facility] at this time.      STG: (to be met in 8 treatments)  1. ? Pain: 9/10 R LE and low back pain. 8/10  2. ? Strength: 4+/5 R hip flexion, abduction, extension to improve mobility. Abduction 5/5, Flexion L 4/5, R4-/5, Extension L 4-/5, R 4/5  3. ? Function: Oswestry score to 64% impaired or better to improve ADLs. Met  4. Independent with Home Exercise Programs     LTG: (to be met in 12 treatments)  1. Patient will be able to ambulate community distances without cane. Met  2. Patient will report decreased frequency of R LE radicular pain. Not Met    Treatment to Date:  [x] Therapeutic Exercise    [] Modalities:  [] Therapeutic Activity     [] Ultrasound  [x] Electrical Stimulation  [] Gait Training     [] Massage       [] Lumbar/Cervical Traction  [] Neuromuscular Re-education [x] Cold/hotpack [] Iontophoresis: 4 mg/mL  [] Instruction in Home Exercise Program                     Dexamethasone Sodium  [] Manual Therapy             Phosphate 40-80 mAmin  [] Aquatic Therapy                   [] Vasocompression/    [] Other:             Game Ready    Discharge Status:     [] Pt recovered from conditions. Treatment goals were met. [x] Pt received maximum benefit. No further therapy indicated at this time. [x] Pt to continue exercise/home instructions independently. [] Therapy interrupted due to:    [] Pt has 2 or more no shows/cancels, is discontinued per our policy. [] Pt has completed prescribed number of treatment sessions. [] Other:         Electronically signed by Ana Benton PT on 10/22/2020 at 11:13 AM      If you have any questions or concerns, please don't hesitate to call.   Thank you for your referral.

## 2020-10-22 NOTE — FLOWSHEET NOTE
[x] [facility]) [facility] [place], [place] &  Therapy  [address]  [phone]  F: [phone] [] [address]  [place] [address]   [address]  P: [phone]  F: [phone] [] [address]  Therapy  [address]  P: [phone]  F: [phone] [] [address]  [facility]   [address]   [place]: [phone]  F: [phone]      Physical Therapy Daily Treatment Note    Date:  10/22/2020  Patient Name:  Jaiden Brito  \"Dot\"  :  1960  MRN: [id number]  Physician: Caitlin Resendez DO                Insurance: Ceci Carbo (12 visits cherie'd 20-10.22.20 Aut# [id number])  Medical Diagnosis:  Spondylolisthesis, lumbar region M43.16  Lumbar radiculopathy, chronic M54.16                        Rehab Codes: M54.16   Onset Date: 20                                 Next 's appt: 20  Visit# / total visits:      Cancels/No Shows:     Subjective:    Pain:  [x] Yes  [] No Location: Spondylolisthesis, lumbar region  Pain Rating: (0-10 scale) 8/10  Pain altered Tx:  [x] No  [] Yes  Action:  Comments:  Patient reported that it is raining this date so it makes her pain worse.      Objective:  Modalities: IFC/HP to low back in prone x20 min  Precautions:  Exercises:  Exercise Reps/ Time Weight/ Level Comments   NuStep  L4    SciFit 5' L3.0          Standing      Calf stretch 3x20\"     Heel raises 20x  10x this date   Marches 10x ea     Hip abd 15x ea     Hip Extension 15x ea     Hamstring curls 15x ea           Seated      Hamstring stretch 3x20\" ea     Marches      LAQ 15x 2 lb          Supine      SKTC 3x20\"  Towel,    Piriformis 3x20\"     Butterfly stretch 3x20\"     Fabers 5x10\"     LTR 10x5\"     Iso abd 10x  Mod cueing   Marches 20x 2 lb    Walk outs 10x 2 lb    UE raises 10x ea 2 lb    Opp arm/opp Written  Comprehension of Education:   [x] Verbalizes understanding. [] Demonstrates understanding. [x] Needs review. [x] Demonstrates/verbalizes HEP/Ed previously given. 9.10.20: Medbridge hamstring, ER, LTR, piriformis stretch, supine bridges, iso abd     9.28.20 Access Code: [email or web address]   URL: [email or web address]   Date: 09/28/2020   Prepared by: Zander Martínez     Exercises   Small Range Straight Leg Raise - 10 reps - 3 sets - 1x daily - 7x weekly   Standing Hip Abduction with Counter Support - 10 reps - 3 sets - 1x daily - 7x weekly   Standing March with Counter Support - 10 reps - 3 sets - 1x daily - 7x weekly   Heel rises with counter support - 10 reps - 3 sets - 1x daily - 7x weekly   Supine Bent Leg Lift with Arm Extension - 10 reps - 3 sets - 1x daily - 7x weekly     Plan: [x] Continue current frequency toward long and short term goals.      [x] Specific Instructions for subsequent treatments: Lumbar ROM and core strengthening           Time In: 1110           Time Out: 7047    Electronically signed by:  Tejas Garcia PTA

## 2020-10-27 RX ORDER — TROSPIUM CHLORIDE 20 MG/1
20 TABLET, FILM COATED ORAL 2 TIMES DAILY
Qty: 60 TABLET | Refills: 1 | Status: SHIPPED | OUTPATIENT
Start: 2020-10-27 | End: 2021-04-22

## 2020-10-27 RX ORDER — TIZANIDINE 4 MG/1
TABLET ORAL
Qty: 60 TABLET | Refills: 2 | Status: ON HOLD | OUTPATIENT
Start: 2020-10-27 | End: 2020-12-02 | Stop reason: HOSPADM

## 2020-10-27 NOTE — TELEPHONE ENCOUNTER
Request for tizanidine, trospium - meds pended. Please fill if appropriate. Next Visit Date:  Future Appointments   Date Time Provider Claudy Schaeffer   10/29/2020 10:00 AM ELVIRA Cochran CNP [facility]   11/5/2020 11:00 AM STVZ PAT RM 1 STVZ PAT [facility]   11/15/2020 11:20 AM STA PAT RM 4 STAZ PAT [facility]   12/3/2020  2:00 PM ELVIRA Graham CNP [facility]   1/13/2021 11:30 AM Kimberlycece Freyn [facility]       Health Maintenance   Topic Date Due    Diabetic retinal exam  09/26/2017    Diabetic foot exam  06/18/2019    Flu vaccine (1) 09/01/2020    Shingles Vaccine (2 of 2) 09/22/2021 (Originally 8/19/2019)    Cervical cancer screen  02/15/2021    A1C test (Diabetic or Prediabetic)  04/21/2021    Diabetic microalbuminuria test  04/21/2021    Lipid screen  04/21/2021    Potassium monitoring  04/21/2021    Creatinine monitoring  04/21/2021    Annual Wellness Visit (AWV)  09/30/2021    Breast cancer screen  11/22/2021    Colon cancer screen fecal DNA test (Cologuard)  10/05/2023    DTaP/Tdap/Td vaccine (2 - Td) 06/24/2029    Pneumococcal 0-64 years Vaccine  Completed    Hepatitis C screen  Completed    HIV screen  Completed    Hepatitis A vaccine  Aged Out    Hib vaccine  Aged Out    Meningococcal (ACWY) vaccine  Aged Out       Hemoglobin A1C (%)   Date Value   04/21/2020 5.8   03/05/2019 5.8   06/18/2018 5.8             ( goal A1C is < 7)   Microalb/Crt.  Ratio (mcg/mg creat)   Date Value   04/21/2020 CANNOT BE CALCULATED     LDL Cholesterol (mg/dL)   Date Value   04/21/2020 116       (goal LDL is <100)   AST (U/L)   Date Value   04/21/2020 18     ALT (U/L)   Date Value   04/21/2020 21     BUN (mg/dL)   Date Value   04/21/2020 15     BP Readings from Last 3 Encounters:   09/22/20 131/75   09/16/20 137/80 08/05/20 (!) 157/87          (goal 120/80)    All Future Testing planned in [facility]  Lab Frequency Next Occurrence   PT eval and treat Once 12/13/2019   PT aquatic therapy Once 12/13/2019   XR Lumbar Spine Flex and Ext Only Once 01/06/2021         Patient Active Problem List:     DJD (degenerative joint disease) of knee     Osteoarthritis of spine with radiculopathy, lumbar region     GERD (gastroesophageal reflux disease)     COPD (chronic obstructive pulmonary disease)     HTN (hypertension)     Allergic rhinitis     Lipoma of shoulder s/p excision right posterior 11 17 2008     DM (diabetes mellitus)     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     Primary osteoarthritis of both knees     Medication monitoring encounter     Chronic low back pain     Major depression, chronic     Chronic respiratory failure with hypoxia (HCC)     Mitral and aortic insufficiency     Pure hypercholesterolemia     Spondylosis of lumbar region without myelopathy or radiculopathy

## 2020-10-29 ENCOUNTER — CARE COORDINATION (OUTPATIENT)
Dept: CARE COORDINATION | Age: 60
End: 2020-10-29

## 2020-10-29 ENCOUNTER — HOSPITAL ENCOUNTER (OUTPATIENT)
Dept: PAIN MANAGEMENT | Age: 60
Discharge: HOME | End: 2020-10-29
Payer: COMMERCIAL

## 2020-10-29 PROCEDURE — 99442 PR PHYS/QHP TELEPHONE EVALUATION 11-20 MIN: CPT | Performed by: NURSE PRACTITIONER

## 2020-10-29 PROCEDURE — 99213 OFFICE O/P EST LOW 20 MIN: CPT

## 2020-10-29 RX ORDER — HYDROCODONE BITARTRATE AND ACETAMINOPHEN 5; 325 MG/1; MG/1
1 TABLET ORAL EVERY 8 HOURS PRN
Qty: 90 TABLET | Refills: 0 | Status: ON HOLD | OUTPATIENT
Start: 2020-10-30 | End: 2020-12-02 | Stop reason: HOSPADM

## 2020-10-29 RX ORDER — MELOXICAM 15 MG/1
15 TABLET ORAL DAILY
COMMUNITY
End: 2020-12-11 | Stop reason: SDUPTHER

## 2020-10-29 ASSESSMENT — ENCOUNTER SYMPTOMS
GASTROINTESTINAL NEGATIVE: 1
EYES NEGATIVE: 1
BACK PAIN: 1

## 2020-10-29 ASSESSMENT — PATIENT HEALTH QUESTIONNAIRE - PHQ9
SUM OF ALL RESPONSES TO PHQ QUESTIONS 1-9: 2

## 2020-10-29 NOTE — PROGRESS NOTES
[place] [facility] PROGRESS NOTE      Patient  Phone call to  review Medication Agreement    Chief Complaint:  Back pain    She c/o low back pain radiating down her legs, She can not stand over 5 minutes. She has history of lumbar surgery, She states PT only helped while doing it  And injections did not help, Her sleep varies,  She states will be having back surgery next month by Dr Allie Parry. She has had no Ed visits. Back Pain   This is a chronic problem. The current episode started more than 1 year ago. The problem occurs constantly. The problem is unchanged. The pain is present in the lumbar spine. The quality of the pain is described as aching (stiff). Radiates to: legs. The pain is at a severity of 8/10. The pain is moderate. The pain is the same all the time. The symptoms are aggravated by standing. Associated symptoms include weakness. (Right leg) Risk factors include menopause. She has tried analgesics and heat for the symptoms. The treatment provided mild relief. Patient denies any new neurological symptoms. No bowel or bladder incontinence, no weakness, and no falling.     Any new diagnostic workup: [x] no  [] yes [] Xray [] CT scan [] MRI [] DEXA scan     [] Other            EXAMINATION:    MRI OF THE LUMBAR SPINE WITHOUT AND WITH CONTRAST  6/10/2019 12:04 pm         TECHNIQUE:    Multiplanar multisequence MRI of the lumbar spine was performed without and    with the administration of intravenous contrast.         COMPARISON:    06/12/2014         HISTORY:    ORDERING SYSTEM PROVIDED HISTORY: Postlaminectomy syndrome, lumbar region    TECHNOLOGIST PROVIDED HISTORY:    Pain, previous back surgery    Ordering Physician Provided Reason for Exam: chronic low back pain    Acuity: Chronic    Type of Exam: Subsequent/Follow-up    Additional signs and symptoms: right leg pain and numbness to r foot    Relevant Medical/Surgical History: injury in 2006         FINDINGS:    BONES/ALIGNMENT: Grade 1 anterolisthesis of L4 on L5 measures 3 mm.  Lumbar    vertebral bodies are normal in height.  No acute lumbar spine fracture. Minimal bone marrow edema about L4-L5.  Remaining marrow signal pattern is    within normal limits.         SPINAL CORD:  The conus terminates normally.         SOFT TISSUES: No abnormal enhancement is seen of the lumbar spine.  No    paraspinal mass identified.         L1-L2: Mild DDD.  Posterior disc bulge measures 3 mm with central annular    fissure.  No significant spinal canal stenosis or significant neural    foraminal stenosis.         L2-L3: Mild DDD.  Disc bulge eccentric to the left measures 5 mm.  Effacement    of the left ventral thecal sac.  Mild spinal canal stenosis.  Bilateral facet    joint DJD and ligamentum flavum thickening.  Mild left neural foraminal    stenosis.         L3-L4: Mild DDD.  Disc bulge measures 4 mm.  Bilateral facet joint DJD.  Mild    spinal canal stenosis.  Mild left neural foraminal stenosis.         L4-L5: Moderate DDD.  Status post right hemilaminectomy.  Uncovered disc    material secondary to anterolisthesis of L4 on L5.  Superimposed diffuse disc    bulge measures 3 mm.  No significant spinal canal stenosis.  Flattening of    the ventral thecal sac.  Severe right neural foraminal stenosis with    compression of the exiting right L4 nerve root.  Mild left neural foraminal    stenosis.         L5-S1: Mild DDD.  Central/left paracentral disc bulge measures 3 mm. Bilateral facet joint DJD.  Disc bulge contacts the traversing left S1 nerve    root in the lateral recess without sally nerve root compression.  Mild    bilateral neural foraminal stenosis.           Impression    1. Status post interval right hemilaminectomy at L4-L5.  Severe right neural    foraminal stenosis at this level with compression of the exiting right L4    nerve root.     2. At L5-S1, central/left paracentral disc bulge contacts the traversing left    S1 nerve root in the lateral recess. 3. Multilevel mild bilateral neural foraminal stenosis, as detailed              Treatment goals:  Functional status: walk and stand better      Aberrancy:   Any alcoholic beverages  no     Any illegal drugs   no      Analgesia:8      Adverse  Effects :none  ADL;s :not able to be active        Pill count: appropriate fill date 11-1-2020    Morphine equivalent dose as reported on [place]ARRS:5  Periodic Controlled Substance Monitoring: Possible medication side effects, risk of tolerance/dependence & alternative treatments discussed., No signs of potential drug abuse or diversion identified., Obtaining appropriate analgesic effect of treatment., Assessed functional status. Pollo Hernandez, APRN - CNP)  Review ofOARRS does not show any aberrant prescription behavior. Medication is helping the patient stay active. Patient denies any side effects and reports adequate analgesia. No sign of misuse/abuse.         When was thelast UDS: 2-            Was the UDS appropriate:yes      Record/Diagnostics Review:      As above, I did review the imaging  2/17/2020  7:21 PM - Juan, [facility] Incoming Lab Results From [facility]     Component  Value  Ref Range & Units  Status  Collected  Lab    Pain Management Drug Panel Interp, Urine  Consistent   Final  02/13/2020  1:30 PM  [facility]    (NOTE)   ________________________________________________________________   DRUGS EXPECTED:   Alireza Carne (HYDROCODONE) [2/13/20]   ________________________________________________________________   CONSISTENT with medications provided:   Alireza Carne (HYDROCODONE) : based on hydrocodone and metabolite detected   below cutoff   ________________________________________________________________   Drugs Not Included in this Assay:   Acetaminophen   ________________________________________________________________   INTERPRETIVE INFORMATION: Pain Mgt Bolanos, Mass Spec/EMIT, Ur,                            Interp   Interpretation depends on accuracy and completeness of patient   medication information submitted by c             Past Medical History:   Diagnosis Date    Allergic rhinitis     Arthropathy, unspecified, other specified sites 4/30/2013    Bronchitis     Chronic back pain     COPD [facility] disease) ([facility])     Depression     [facility]di[facility] 12/18/2012    Emphysema of lung ([facility])     GERD (gastroesophageal reflux disease)     Hyperlipidemia     Hypertension     Knee pain     right knee mostly    Leg pain, right     Obesity     Osteoarthritis     Peripheral vascular disease ([facility])     Primary osteoarthritis of both knees     Radicular pain of lumbosacral region     Spinal stenosis, lumbar region, without neurogenic claudication 4/30/2013    Type II or unspecified type diabetes mellitus without mention of complication, not stated as uncontrolled     Unspecified sleep apnea     URI (upper respiratory infection)        Past Surgical History:   Procedure Laterality Date    COLONOSCOPY  2/12/2009    normal    DILATION AND CURETTAGE OF UTERUS      GASTRECTOMY      partial    NERVE BLOCK Right 4/30/13    Lumbar Diagnostic Block,  Kenalog 40 mg    NERVE BLOCK  5/23/13    Lumbar Radiofrequency, Kenalog 40mg    NERVE BLOCK  8/12/13    Lt MBNB  celestone 6mg    NERVE BLOCK Left 8-28-13    left lumbar diagnostic block #2 decadron 10 mg    NERVE BLOCK Left 9-24-13    left lumbar median branch radiofrequency    NERVE BLOCK  07-02-14    caudal, celestone 9 mg    NERVE BLOCK  7-16-14    caudal epidural #2, celestone 9mg, fentanyl 25mcg    NERVE BLOCK  7/30/14    caudal #3 decadron 10mg    NERVE BLOCK  11-6-14    duramorph epidural steroid block  duramorph 1 mg celestone 9 mg    NERVE BLOCK  11/20/15    TENS- Empi Select    NERVE BLOCK  07/20/2018    right transforminal # 1 decadron 10mg,isovue    NERVE BLOCK Bilateral 02/01/2019    bilat mbnb- no steroid    NERVE BLOCK Bilateral 02/08/2019    bilat mbnb, marcaine . 25%    TN KNEE SCOPE,DIAGNOSTIC Right 3/24/2017    KNEE ARTHROSCOPY WITH PARTIAL MEDIAL MENISECAL DEBRIDMENT  performed by Isamar Swartz MD at [address]  01/2015    lumbar diskectomy    UPPER GASTROINTESTINAL ENDOSCOPY  9 20 2007    UPPER GASTROINTESTINAL ENDOSCOPY  4 21 2009,04/2011    gastritis, esophagitis       Allergies   Allergen Reactions    Aspirin      DUE TO ULCER    Claritin [Loratadine] Other (See Comments)     coughing    Flonase [Fluticasone Propionate]     Morphine And Related      GI Upset         Current Outpatient Medications:     meloxicam (MOBIC) 15 MG tablet, Take 15 mg by mouth daily, Disp: , Rfl:     trospium (SANCTURA) 20 MG tablet, TAKE 1 TABLET BY MOUTH 2 TIMES DAILY, Disp: 60 tablet, Rfl: 1    tiZANidine (ZANAFLEX) 4 MG tablet, TAKE 1 TABLET TWICE DAILY, Disp: 60 tablet, Rfl: 2    gabapentin (NEURONTIN) 300 MG capsule, TAKE 1 CAPSULE IN MORNING AND AFTERNOON AND 2 CAPSULES AT NIGHT, Disp: 120 capsule, Rfl: 1    HYDROcodone-acetaminophen (NORCO) 5-325 MG per tablet, Take 1 tablet by mouth every 8 hours as needed for Pain for up to 30 days.  Early refill due to holidays, Disp: 90 tablet, Rfl: 0    carvedilol (COREG) 6.25 MG tablet, Take 1 tablet by mouth 2 times daily, Disp: 180 tablet, Rfl: 1    atorvastatin (LIPITOR) 40 MG tablet, Take 1 tablet by mouth daily, Disp: 90 tablet, Rfl: 1    metFORMIN (GLUCOPHAGE) 500 MG tablet, Take 1 tablet by mouth 2 times daily (with meals), Disp: 180 tablet, Rfl: 1    lisinopril-hydroCHLOROthiazide (PRINZIDE;ZESTORETIC) 20-25 MG per tablet, TAKE 1 TABLET EVERY DAY, Disp: 90 tablet, Rfl: 3    cetirizine (ZYRTEC) 10 MG tablet, TAKE 1 TABLET BY MOUTH DAILY, Disp: 30 tablet, Rfl: 1     MG capsule, TAKE 1 CAPSULE EVERY DAY, Disp: 30 capsule, Rfl: 10    amLODIPine (NORVASC) 10 MG tablet, Take 1 tablet by mouth daily, Disp: 90 tablet, Rfl: 3    omeprazole (PRILOSEC) 20 MG delayed release capsule, TAKE 1 CAPSULE EVERY DAY, Disp: 30 capsule, Rfl: 11    nicotine (NICODERM CQ) 21 MG/24HR, Place 1 patch onto the skin every 24 hours, Disp: , Rfl:     potassium chloride (KLOR-CON M) 10 MEQ extended release tablet, Take 2 tablets by mouth daily, Disp: 60 tablet, Rfl: 3    SPIRIVA HANDIHALER 18 MCG inhalation capsule, , Disp: , Rfl:     mirtazapine (REMERON) 30 MG tablet, Take 30 mg by mouth nightly, Disp: , Rfl:     mirtazapine (REMERON) 15 MG tablet, Take 15 mg by mouth nightly, Disp: , Rfl:     DULoxetine (CYMBALTA) 60 MG extended release capsule, Take 1 capsule by mouth daily, Disp: 30 capsule, Rfl: 3    blood glucose test strips (EXACTECH TEST) strip, 1 each by In Vitro route daily As needed. , Disp: 50 each, Rfl: 11    albuterol sulfate HFA (VENTOLIN HFA) 108 (90 Base) MCG/ACT inhaler, Inhale 2 puffs into the lungs every 6 hours as needed for Wheezing, Disp: 1 Inhaler, Rfl: 3    diphenhydrAMINE (BENADRYL) 25 MG tablet, Take 25 mg by mouth every 6 hours as needed for Itching, Disp: , Rfl:     acetaminophen (TYLENOL) 500 MG tablet, Take 1 tablet by mouth 4 times daily as needed for Pain (Patient not taking: Reported on 10/17/2020), Disp: 30 tablet, Rfl: 0    Lancets MISC, 1 each by Does not apply route daily, Disp: 100 each, Rfl: 11    azelastine (ASTELIN) 0.1 % nasal spray, 2 sprays by Nasal route 2 times daily Use in each nostril as directed, Disp: 1 Bottle, Rfl: 3    ipratropium-albuterol (DUONEB) 0.5-2.5 (3) MG/3ML SOLN nebulizer solution, Inhale 3 mLs into the lungs every 6 hours as needed for Shortness of Breath, Disp: 360 mL, Rfl: 11    Family History   Problem Relation Age of Onset    Diabetes Mother     Heart Disease Mother     Cirrhosis Father     Breast Cancer Neg Hx     Cancer Neg Hx     Colon Cancer Neg Hx     Eclampsia Neg Hx     Hypertension Neg Hx     Ovarian Cancer Neg Hx      Labor Neg Hx     Spont Abortions Neg Hx     Stroke Neg Hx        Social History     Socioeconomic History  Marital status: Single     Spouse name: Not on file    Number of children: Not on file    Years of education: Not on file    Highest education level: Not on file   Occupational History     Employer: DISABLED   Social Needs    Financial resource strain: Not very hard    Food insecurity     Worry: Never true     Inability: Never true    Transportation needs     Medical: No     Non-medical: No   Tobacco Use    Smoking status: Current Every Day Smoker     Packs/day: 0.25     Years: 36.00     Pack years: 9.00     Types: Cigarettes     Last attempt to quit: 2015     Years since quittin.2    Smokeless tobacco: Never Used    Tobacco comment: pt currently using nicotine patches   5 CIGARETTES A DAY   Substance and Sexual Activity    Alcohol use: No     Alcohol/week: 0.0 standard drinks     Frequency: Never     Binge frequency: Never    Drug use: No     Comment: 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 7 yrs    Sexual activity: Never   Lifestyle    Physical activity     Days per week: 0 days     Minutes per session: 0 min    Stress: Only a little   Relationships    Social connections     Talks on phone: More than three times a week     Gets together: Once a week     Attends Voodoo service: More than 4 times per year     Active member of club or organization: No     Attends meetings of clubs or organizations: Never     Relationship status: Never     Intimate partner violence     Fear of current or ex partner: Not on file     Emotionally abused: Not on file     Physically abused: Not on file     Forced sexual activity: Not on file   Other Topics Concern    Not on file   Social History Narrative    10/9/20 Patient keeping contact with others to a minimum due to [employer] Pandemic. 10/9/20 Patient has difficulty with ambulation due to DJD, stenosis and fibromyalgia         Review of Systems:  Review of Systems   Constitution: Negative. HENT: Negative. Eyes: Negative. Respiratory:        Smokes   Endocrine:        Diabetic   Hematologic/Lymphatic: Negative. Musculoskeletal: Positive for back pain and joint pain. Gastrointestinal: Negative. Genitourinary: Negative. Neurological: Positive for weakness. Uses a cane   Psychiatric/Behavioral: Positive for depression. Goes to [place]         Physical Exam:  [facility] 04/19/2003     Physical Exam      Assessment:    Problem List Items Addressed This Visit     Primary osteoarthritis of both knees - Primary    Relevant Medications    meloxicam (MOBIC) 15 MG tablet    HYDROcodone-acetaminophen (NORCO) 5-325 MG per tablet (Start on 10/30/2020)    Osteoarthritis of spine with radiculopathy, lumbar region    Relevant Medications    meloxicam (MOBIC) 15 MG tablet    HYDROcodone-acetaminophen (NORCO) 5-325 MG per tablet (Start on 10/30/2020)    Medication monitoring encounter    Chronic low back pain    Relevant Medications    meloxicam (MOBIC) 15 MG tablet    HYDROcodone-acetaminophen (NORCO) 5-325 MG per tablet (Start on 10/30/2020)            Treatment Plan:  DISCUSSION: Treatment options discussed withpatient and all questions answered to patient's satisfaction. Possible side effects, risk of tolerance and or dependence and alternative treatments discussed    Obtaining appropriate analgesic effect of treatment   No signs of potential drug abuse or diversion identified    [x] Ill effects of being on chronic pain medications such as sleep disturbances, respiratory depression, hormonal changes, withdrawal symptoms, chronic opioid dependence and tolerance as well as risk of taking opioids with Benzodiazepines and taking opioids along with alcohol,  werediscussed with patient. I had asked the patient to minimize medication use and utilize pain medications only for uncontrolled rest pain or pain with exertional activities. I advised patient not to self-escalate painmedications without consulting with us.   At each of patient's future visits we will try to taper pain medications, while adjusting the adjunct medications, and re-evaluating for Physical Therapy to improve spinal andjoint strength. We will continue to have discussions to decrease pain medications as tolerated. Counseled patient on effects their pain medication and /or their medical condition mayhave on their  ability to drive or operate machinery. Instructed not to drive or operate machinery if drowsy     I also discussed with the patient regarding the dangers of combining narcotic pain medication with tranquilizers, alcohol or illegal drugs or taking the medication any way other than prescribed. The dangers were discussed  including respiratory depression and death. Patient was told to tell  all  physicians regarding the medications he is getting from pain clinic. Patient is warned not to take any unprescribed medications over-the-countermedications that can depress breathing . Patient is advised to talk to the pharmacist or physicians if planning to take any over-the-counter medications before  takeing them. Patient is strongly advised to avoid tranquilizers or  relaxants, illegal drugs  or any medications that can depress breathing  Patient is also advised to tell us if there is any changes in their medications from other physicians.     Location:  patient  at    home ,provider working from home  Phone call: 12 minutes    TREATMENT OPTIONS:       Medication Agreement Requirements Met  Continue Opioid therapy  Script written for hydrocodone  Follow up appointment made

## 2020-10-30 ENCOUNTER — TELEPHONE (OUTPATIENT)
Dept: PAIN MANAGEMENT | Age: 60
End: 2020-10-30

## 2020-10-30 NOTE — CARE COORDINATION
Ambulatory Care Coordination Note  10/29/2020  CM Risk Score: 6  Charlson 10 Year Mortality Risk Score: 47%     ACC: Damien Lobato, RN    Summary Note: spoke with patient. She is very upset that no one has gotten back to her about Mobic for her pain. She states that she called yesterday afternoon. Checked pending orders, explained that the order is there and just waiting on approval. Patient states that she is still in a great deal of pain right now. Plan  COPD  -Review activities that increase SOB  - rest in between activities  -do more strenuous activity in the am to reduce fatigue  Diabetes  - continue to monitor  - report Sx of hypo or hyper glycemia  - maintain log of BS and bring to next PCP appointment  Pain  - continue with pain management  - continue OT and PT  Activity  - encourage increasing activity slowly  Falls  Remove obstacles that increase the risk of falls   Smoking cessation  - Patient agrees to reduce the number of cigarettes smoked to less than 1 per day by 11/5/2020  Patient scheduled for back surgery 11/19/20   Call in 1 to 2 weeks        Care Coordination Interventions    Program Enrollment:  Complex Care  Referral from Primary Care Provider:  No  Suggested Interventions and Community Resources  [facility]: In Process  Fall Risk Prevention:  Completed  Home Health Services: In Process  Occupational Therapy: In Process  Physical Therapy: In Process  Smoking Cessation: In Process  Other Therapy Services:  Declined  Zone Management Tools: In Process (Comment: COPD)  Other Services or Interventions:  Walk in clinic informaton         Goals Addressed    None         Prior to Admission medications    Medication Sig Start Date End Date Taking? Authorizing Provider   meloxicam (MOBIC) 15 MG tablet Take 15 mg by mouth daily    Historical Provider, MD   HYDROcodone-acetaminophen (NORCO) 5-325 MG per tablet Take 1 tablet by mouth every 8 hours as needed for Pain for up to 30 days. Early refill due to holidays 10/30/20 11/29/20  ELVIRA Pierce CNP   trospium (SANCTURA) 20 MG tablet TAKE 1 TABLET BY MOUTH 2 TIMES DAILY 10/27/20   Shelbie Parra MD   tiZANidine (ZANAFLEX) 4 MG tablet TAKE 1 TABLET TWICE DAILY 10/27/20   Shelbie Parra MD   blood glucose test strips (EXACTECH TEST) strip 1 each by In Vitro route daily As needed.  10/14/20   Shelbie Parra MD   gabapentin (NEURONTIN) 300 MG capsule TAKE 1 CAPSULE IN MORNING AND AFTERNOON AND 2 CAPSULES AT NIGHT 10/12/20 11/12/20  Shelbie Parra MD   carvedilol (COREG) 6.25 MG tablet Take 1 tablet by mouth 2 times daily 9/22/20   Shelbie Parra MD   atorvastatin (LIPITOR) 40 MG tablet Take 1 tablet by mouth daily 9/22/20   Shelbie Parra MD   metFORMIN (GLUCOPHAGE) 500 MG tablet Take 1 tablet by mouth 2 times daily (with meals) 9/22/20   Shelbie Parra MD   lisinopril-hydroCHLOROthiazide (PRINZIDE;ZESTORETIC) 20-25 MG per tablet TAKE 1 TABLET EVERY DAY 9/22/20   Shelbie Parra MD   cetirizine (ZYRTEC) 10 MG tablet TAKE 1 TABLET BY MOUTH DAILY 9/18/20   Shelbie Parra MD    MG capsule TAKE 1 CAPSULE EVERY DAY 7/7/20   Shelbie Parra MD   meloxicam [facility]ILMA [facility]) 15 MG tablet Take 1 tablet by mouth daily 5/6/20 10/29/21  Juventino Ormond Vriezelaar, APRN - CNP   amLODIPine (NORVASC) 10 MG tablet Take 1 tablet by mouth daily 4/2/20   Shelbie Parra MD   albuterol sulfate HFA (VENTOLIN HFA) 108 (90 Base) MCG/ACT inhaler Inhale 2 puffs into the lungs every 6 hours as needed for Wheezing 4/2/20   Shelbie Parra MD   omeprazole (PRILOSEC) 20 MG delayed release capsule TAKE 1 CAPSULE EVERY DAY 1/10/20   Shelbie Parra MD   diphenhydrAMINE (BENADRYL) 25 MG tablet Take 25 mg by mouth every 6 hours as needed for Itching    Historical Provider, MD   nicotine (NICODERM CQ) 21 MG/24HR Place 1 patch onto the skin every 24 hours    Historical Provider, MD   potassium chloride (KLOR-CON M) 10 MEQ extended release tablet Take 2 tablets by mouth daily 12/10/19   Terry Segovia MD   Jet Zach 18 MCG inhalation capsule  10/16/19   Historical Provider, MD   acetaminophen (TYLENOL) 500 MG tablet Take 1 tablet by mouth 4 times daily as needed for Pain  Patient not taking: Reported on 10/17/2020 9/12/19   ELVIRA Harvey - CNP   mirtazapine (REMERON) 30 MG tablet Take 30 mg by mouth nightly 5/22/18   Historical Provider, MD   mirtazapine (REMERON) 15 MG tablet Take 15 mg by mouth nightly 5/22/18   Historical Provider, MD   DULoxetine (CYMBALTA) 60 MG extended release capsule Take 1 capsule by mouth daily 2/1/18   Terry Segovia MD   Lancets MISC 1 each by Does not apply route daily 1/5/18   Terry Segovia MD   azelastine (ASTELIN) 0.1 % nasal spray 2 sprays by Nasal route 2 times daily Use in each nostril as directed 6/15/16   Terry Segovia MD   ipratropium-albuterol (DUONEB) 0.5-2.5 (3) MG/3ML SOLN nebulizer solution Inhale 3 mLs into the lungs every 6 hours as needed for Shortness of Breath 8/4/15   Terry Segovia MD       Future Appointments   Date Time Provider Claudy Laury   11/5/2020 11:00 AM STVZ PAT RM 1 [facility]VZ PAT [facility]   11/15/2020 11:20 AM STA PAT RM 4 STAZ PAT [facility]   12/1/2020  2:20 PM MD Ricarda Sauceda   12/2/2020  2:00 PM Albaro Mohr APRN - CNP [facility]   1/13/2021 11:30 AM Ramesh Winslow DO [facility]     ,   Diabetes Assessment    Medic Alert ID:  No  Meal Planning:  Avoidance of concentrated sweets   How often do you test your blood sugar?:  Daily   Do you have barriers with adherence to non-pharmacologic self-management interventions?  (Nutrition/Exercise/Self-Monitoring):  No   Have you ever had to go to the ED for symptoms of low blood sugar?:  No           and   COPD Assessment    Does the patient understand envrionmental exposure?:  Yes  Is the patient able to verbalize Rescue vs. Long Acting medications?:  Yes  Does the patient have a

## 2020-11-04 RX ORDER — SODIUM CHLORIDE, SODIUM LACTATE, POTASSIUM CHLORIDE, CALCIUM CHLORIDE 600; 310; 30; 20 MG/100ML; MG/100ML; MG/100ML; MG/100ML
1000 INJECTION, SOLUTION INTRAVENOUS CONTINUOUS
Status: CANCELLED | OUTPATIENT
Start: 2020-11-04

## 2020-11-05 ENCOUNTER — HOSPITAL ENCOUNTER (OUTPATIENT)
Dept: PREADMISSION TESTING | Age: 60
Discharge: HOME | End: 2020-11-09
Payer: COMMERCIAL

## 2020-11-05 VITALS
DIASTOLIC BLOOD PRESSURE: 73 MMHG | HEIGHT: 65 IN | HEART RATE: 80 BPM | RESPIRATION RATE: 24 BRPM | SYSTOLIC BLOOD PRESSURE: 120 MMHG | WEIGHT: 185 LBS | OXYGEN SATURATION: 95 % | TEMPERATURE: 97.4 F | BODY MASS INDEX: 30.82 KG/M2

## 2020-11-05 LAB
ABO/RH: NORMAL
ANION GAP SERPL CALCULATED.3IONS-SCNC: 13 MMOL/L (ref 9–17)
ANTIBODY SCREEN: NEGATIVE
ARM BAND NUMBER: NORMAL
BUN BLDV-MCNC: 15 MG/DL (ref 8–23)
CHLORIDE BLD-SCNC: 102 MMOL/L (ref 98–107)
CO2: 25 MMOL/L (ref 20–31)
CREAT SERPL-MCNC: 0.84 MG/DL (ref 0.5–0.9)
EXPIRATION DATE: NORMAL
GFR AFRICAN AMERICAN: >60 ML/MIN
GFR NON-AFRICAN AMERICAN: >60 ML/MIN
GFR SERPL CREATININE-BSD FRML MDRD: NORMAL ML/MIN/{1.73_M2}
GFR SERPL CREATININE-BSD FRML MDRD: NORMAL ML/MIN/{1.73_M2}
GLUCOSE BLD-MCNC: 81 MG/DL (ref 70–99)
HCT VFR BLD CALC: 48.4 % (ref 36.3–47.1)
HEMOGLOBIN: 15.3 G/DL (ref 11.9–15.1)
MCH RBC QN AUTO: 27.6 PG (ref 25.2–33.5)
MCHC RBC AUTO-ENTMCNC: 31.6 G/DL (ref 28.4–34.8)
MCV RBC AUTO: 87.2 FL (ref 82.6–102.9)
NRBC AUTOMATED: 0 PER 100 WBC
PDW BLD-RTO: 13.6 % (ref 11.8–14.4)
PLATELET # BLD: 268 K/UL (ref 138–453)
PMV BLD AUTO: 10.2 FL (ref 8.1–13.5)
POTASSIUM SERPL-SCNC: 4 MMOL/L (ref 3.7–5.3)
RBC # BLD: 5.55 M/UL (ref 3.95–5.11)
SODIUM BLD-SCNC: 140 MMOL/L (ref 135–144)
WBC # BLD: 4.6 K/UL (ref 3.5–11.3)

## 2020-11-05 PROCEDURE — 86850 RBC ANTIBODY SCREEN: CPT

## 2020-11-05 PROCEDURE — 86900 BLOOD TYPING SEROLOGIC ABO: CPT

## 2020-11-05 PROCEDURE — 84520 ASSAY OF UREA NITROGEN: CPT

## 2020-11-05 PROCEDURE — 85027 COMPLETE CBC AUTOMATED: CPT

## 2020-11-05 PROCEDURE — 82947 ASSAY GLUCOSE BLOOD QUANT: CPT

## 2020-11-05 PROCEDURE — 36415 COLL VENOUS BLD VENIPUNCTURE: CPT

## 2020-11-05 PROCEDURE — 86901 BLOOD TYPING SEROLOGIC RH(D): CPT

## 2020-11-05 PROCEDURE — 93005 ELECTROCARDIOGRAM TRACING: CPT | Performed by: ANESTHESIOLOGY

## 2020-11-05 PROCEDURE — 80051 ELECTROLYTE PANEL: CPT

## 2020-11-05 PROCEDURE — 82565 ASSAY OF CREATININE: CPT

## 2020-11-05 ASSESSMENT — PAIN DESCRIPTION - FREQUENCY: FREQUENCY: CONTINUOUS

## 2020-11-05 ASSESSMENT — PAIN DESCRIPTION - ONSET: ONSET: AWAKENED FROM SLEEP

## 2020-11-05 ASSESSMENT — PAIN - FUNCTIONAL ASSESSMENT: PAIN_FUNCTIONAL_ASSESSMENT: PREVENTS OR INTERFERES SOME ACTIVE ACTIVITIES AND ADLS

## 2020-11-05 ASSESSMENT — PAIN DESCRIPTION - PROGRESSION: CLINICAL_PROGRESSION: GRADUALLY WORSENING

## 2020-11-05 ASSESSMENT — PAIN SCALES - GENERAL: PAINLEVEL_OUTOF10: 7

## 2020-11-05 ASSESSMENT — PAIN DESCRIPTION - LOCATION: LOCATION: BACK

## 2020-11-05 NOTE — H&P (VIEW-ONLY)
History and Physical    Pt Name: Savita Paula  MRN: [id number]  YOB: 1960  Date of evaluation: 11/5/2020    SUBJECTIVE:   History of Chief Complaint:    Patient complains of right lower extremity pain, symptoms from the foot up towards the hip. She occasionally will also have symptoms into the lumbar spine. She has had lumbar spine surgery in the past, as well as nerve blocks at pain management. She has symptoms despite conservative treatment. Patient has been scheduled for lumbar fusion. Past Medical History    has a past medical history of Allergic rhinitis, Aortic insufficiency, Bronchitis, Chronic back pain, COPD (chronic obstructive pulmonary disease) ([facility] [address] Depression, DM (diabetes mellitus) ([facility] [address] GERD (gastroesophageal reflux disease), History of echocardiogram, Hyperlipidemia, Hypertension, Obesity, Osteoarthritis, Peripheral vascular disease ([facility] [address] Primary osteoarthritis of both knees, Radicular pain of lumbosacral region, Spinal stenosis, lumbar region, without neurogenic claudication, Tricuspid regurgitation, Type II or unspecified type diabetes mellitus without mention of complication, not stated as uncontrolled, Unspecified sleep apnea, URI (upper respiratory infection), Wears dentures, Wears glasses, and Wellness examination. Past Surgical History   has a past surgical history that includes Dilation and curettage of uterus; gastrectomy; Nerve Block (Right, 4/30/13); Nerve Block (5/23/13); Colonoscopy (2/12/2009); Upper gastrointestinal endoscopy (9 20 2007); Upper gastrointestinal endoscopy (4 21 2009,04/2011); Nerve Block (8/12/13); Nerve Block (Left, 8-28-13); Nerve Block (Left, 9-24-13); Nerve Block (07-02-14); Nerve Block (7-16-14); Nerve Block (7/30/14); Nerve Block (11-6-14); Nerve Block (11/20/15); pr knee scope,diagnostic (Right, 3/24/2017); Nerve Block (07/20/2018); Nerve Block (Bilateral, 02/01/2019);  Nerve Block (Bilateral, 02/08/2019); and lumbar discectomy (01/2015). Medications  Prior to Admission medications    Medication Sig Start Date End Date Taking? Authorizing Provider   HYDROcodone-acetaminophen (NORCO) 5-325 MG per tablet Take 1 tablet by mouth every 8 hours as needed for Pain for up to 30 days. Early refill due to holidays 10/30/20 11/29/20 Yes ELVIRA Lancaster CNP   trospium (SANCTURA) 20 MG tablet TAKE 1 TABLET BY MOUTH 2 TIMES DAILY 10/27/20  Yes Efrem Tucker MD   tiZANidine (ZANAFLEX) 4 MG tablet TAKE 1 TABLET TWICE DAILY 10/27/20  Yes Efrem Tucker MD   blood glucose test strips (EXACTECH TEST) strip 1 each by In Vitro route daily As needed.  10/14/20  Yes Efrem Tucker MD   gabapentin (NEURONTIN) 300 MG capsule TAKE 1 CAPSULE IN MORNING AND AFTERNOON AND 2 CAPSULES AT NIGHT 10/12/20 11/12/20 Yes Efrem Tucker MD   carvedilol (COREG) 6.25 MG tablet Take 1 tablet by mouth 2 times daily 9/22/20  Yes Efrem Tucker MD   atorvastatin (LIPITOR) 40 MG tablet Take 1 tablet by mouth daily 9/22/20  Yes Efrem Tucker MD   metFORMIN (GLUCOPHAGE) 500 MG tablet Take 1 tablet by mouth 2 times daily (with meals) 9/22/20  Yes Efrem Tucker MD   lisinopril-hydroCHLOROthiazide (PRINZIDE;ZESTORETIC) 20-25 MG per tablet TAKE 1 TABLET EVERY DAY 9/22/20  Yes Efrem Tucker MD   cetirizine (ZYRTEC) 10 MG tablet TAKE 1 TABLET BY MOUTH DAILY 9/18/20  Yes Efrem Tucker MD   amLODIPine (NORVASC) 10 MG tablet Take 1 tablet by mouth daily 4/2/20  Yes Efrem Tucker MD   albuterol sulfate HFA (VENTOLIN HFA) 108 (90 Base) MCG/ACT inhaler Inhale 2 puffs into the lungs every 6 hours as needed for Wheezing 4/2/20  Yes Efrem Tucker MD   omeprazole (PRILOSEC) 20 MG delayed release capsule TAKE 1 CAPSULE EVERY DAY 1/10/20  Yes Efrem Tucker MD   diphenhydrAMINE (BENADRYL) 25 MG tablet Take 25 mg by mouth every 6 hours as needed for Itching   Yes Historical Provider, MD   nicotine (NICODERM CQ) 21 MG/24HR Place 1 patch onto the skin every 24 hours Yes Historical Provider, MD Ivy Zamora 18 MCG inhalation capsule  10/16/19  Yes Historical Provider, MD   acetaminophen (TYLENOL) 500 MG tablet Take 1 tablet by mouth 4 times daily as needed for Pain 9/12/19  Yes ELVIRA Allen CNP   mirtazapine (REMERON) 15 MG tablet Take 15 mg by mouth nightly 5/22/18  Yes Historical Provider, MD   DULoxetine (CYMBALTA) 60 MG extended release capsule Take 1 capsule by mouth daily 2/1/18  Yes Hadley Padilla MD   Lancets MISC 1 each by Does not apply route daily 1/5/18  Yes Hadley Padilla MD   azelastine (ASTELIN) 0.1 % nasal spray 2 sprays by Nasal route 2 times daily Use in each nostril as directed 6/15/16  Yes Hadley Padilla MD   ipratropium-albuterol (DUONEB) 0.5-2.5 (3) MG/3ML SOLN nebulizer solution Inhale 3 mLs into the lungs every 6 hours as needed for Shortness of Breath 8/4/15  Yes Hadley Padilla MD   meloxicam (MOBIC) 15 MG tablet Take 15 mg by mouth daily    Historical Provider, MD    MG capsule TAKE 1 CAPSULE EVERY DAY 7/7/20   Hadley Padilla MD   [facility] 15 MG tablet Take 1 tablet by mouth daily 5/6/20 10/29/21  ELVIRA Ybarra CNP   potassium chloride (KLOR-CON M) 10 MEQ extended release tablet Take 2 tablets by mouth daily 12/10/19   Hadley Padilla MD   mirtazapine (REMERON) 30 MG tablet Take 30 mg by mouth nightly 5/22/18   Historical Provider, MD     Allergies  is allergic to aspirin; claritin [loratadine]; flonase [fluticasone propionate]; and morphine and related. Family History  family history includes Cirrhosis in her father; Diabetes in her mother; Heart Disease in her mother. Social History   reports that she quit smoking 6 days ago. Her smoking use included cigarettes. She has a 9.00 pack-year smoking history. She has never used smokeless tobacco.  1/4 ppd for 36 years. reports no history of alcohol use. reports no history of drug use.   Marital Status single  Occupation disabled    OBJECTIVE:   VITALS:  height is 5' 5\" (1.651 m) and weight is 185 lb (83.9 kg). Her temporal temperature is 97.4 °F (36.3 °C). Her blood pressure is 120/73 and her pulse is 80. Her respiration is 24 and oxygen saturation is 95%. CONSTITUTIONAL:alert & oriented x 3, no acute distress. Very pleasant. Antalgic gait, uses a cane. SKIN:  Warm and dry, no rashes on exposed areas of skin. HEAD:  Normocephalic, atraumatic. EYES: PERRL. EOMs intact. Wearing glasses. EARS:  Hearing grossly WNL. NOSE:  Nares patent. No rhinorrhea   MOUTH/THROAT:  benign  NECK:supple, no lymphadenopathy  LUNGS: Clear to auscultation bilaterally, no wheezes. CARDIOVASCULAR: Heart sounds are normal.  Regular rate and rhythm without murmur. ABDOMEN: soft, non tender, non distended. EXTREMITIES: no edema bilateral lower extremities. Testing:   EK20  Labs pending: drawn 2020     IMPRESSIONS:   Lumbar disc disease   has a past medical history of Allergic rhinitis, Aortic insufficiency (), Bronchitis, Chronic back pain, COPD (chronic obstructive pulmonary disease) ([address] Depression, DM (diabetes mellitus) ([address] (2012), GERD (gastroesophageal reflux disease), History of echocardiogram (2018), Hyperlipidemia, Hypertension, Obesity, Osteoarthritis, Peripheral vascular disease ([address] Primary osteoarthritis of both knees, Radicular pain of lumbosacral region, Spinal stenosis, lumbar region, without neurogenic claudication (2013), Tricuspid regurgitation (), Type II or unspecified type diabetes mellitus without mention of complication, not stated as uncontrolled, Unspecified sleep apnea, URI (upper respiratory infection), Wears dentures, Wears glasses, and Wellness examination.    PLANS:   Lumbar fusion    MISHEL GUTIÉRREZ PA-C  Electronically signed 2020 at 1:04 PM

## 2020-11-05 NOTE — H&P
History and Physical    Pt Name: Nandini Avendano  MRN: [id number]  YOB: 1960  Date of evaluation: 11/5/2020    SUBJECTIVE:   History of Chief Complaint:    Patient complains of right lower extremity pain, symptoms from the foot up towards the hip. She occasionally will also have symptoms into the lumbar spine. She has had lumbar spine surgery in the past, as well as nerve blocks at pain management. She has symptoms despite conservative treatment. Patient has been scheduled for lumbar fusion. Past Medical History    has a past medical history of Allergic rhinitis, Aortic insufficiency, Bronchitis, Chronic back pain, COPD (chronic obstructive pulmonary disease) ([facility] [address] Depression, DM (diabetes mellitus) ([facility] [address] GERD (gastroesophageal reflux disease), History of echocardiogram, Hyperlipidemia, Hypertension, Obesity, Osteoarthritis, Peripheral vascular disease ([facility] [address] Primary osteoarthritis of both knees, Radicular pain of lumbosacral region, Spinal stenosis, lumbar region, without neurogenic claudication, Tricuspid regurgitation, Type II or unspecified type diabetes mellitus without mention of complication, not stated as uncontrolled, Unspecified sleep apnea, URI (upper respiratory infection), Wears dentures, Wears glasses, and Wellness examination. Past Surgical History   has a past surgical history that includes Dilation and curettage of uterus; gastrectomy; Nerve Block (Right, 4/30/13); Nerve Block (5/23/13); Colonoscopy (2/12/2009); Upper gastrointestinal endoscopy (9 20 2007); Upper gastrointestinal endoscopy (4 21 2009,04/2011); Nerve Block (8/12/13); Nerve Block (Left, 8-28-13); Nerve Block (Left, 9-24-13); Nerve Block (07-02-14); Nerve Block (7-16-14); Nerve Block (7/30/14); Nerve Block (11-6-14); Nerve Block (11/20/15); pr knee scope,diagnostic (Right, 3/24/2017); Nerve Block (07/20/2018); Nerve Block (Bilateral, 02/01/2019);  Nerve Block (Bilateral, 02/08/2019); and lumbar discectomy (01/2015). Medications  Prior to Admission medications    Medication Sig Start Date End Date Taking? Authorizing Provider   HYDROcodone-acetaminophen (NORCO) 5-325 MG per tablet Take 1 tablet by mouth every 8 hours as needed for Pain for up to 30 days. Early refill due to holidays 10/30/20 11/29/20 Yes ELVIRA Snell CNP   trospium (SANCTURA) 20 MG tablet TAKE 1 TABLET BY MOUTH 2 TIMES DAILY 10/27/20  Yes Oseas Coombs MD   tiZANidine (ZANAFLEX) 4 MG tablet TAKE 1 TABLET TWICE DAILY 10/27/20  Yes Oseas Coombs MD   blood glucose test strips (EXACTECH TEST) strip 1 each by In Vitro route daily As needed.  10/14/20  Yes Oseas Coombs MD   gabapentin (NEURONTIN) 300 MG capsule TAKE 1 CAPSULE IN MORNING AND AFTERNOON AND 2 CAPSULES AT NIGHT 10/12/20 11/12/20 Yes Oseas Coombs MD   carvedilol (COREG) 6.25 MG tablet Take 1 tablet by mouth 2 times daily 9/22/20  Yes Oseas Coombs MD   atorvastatin (LIPITOR) 40 MG tablet Take 1 tablet by mouth daily 9/22/20  Yes Oseas Coombs MD   metFORMIN (GLUCOPHAGE) 500 MG tablet Take 1 tablet by mouth 2 times daily (with meals) 9/22/20  Yes Oseas Coombs MD   lisinopril-hydroCHLOROthiazide (PRINZIDE;ZESTORETIC) 20-25 MG per tablet TAKE 1 TABLET EVERY DAY 9/22/20  Yes Oseas Coombs MD   cetirizine (ZYRTEC) 10 MG tablet TAKE 1 TABLET BY MOUTH DAILY 9/18/20  Yes Oseas Coombs MD   amLODIPine (NORVASC) 10 MG tablet Take 1 tablet by mouth daily 4/2/20  Yes Oseas Coombs MD   albuterol sulfate HFA (VENTOLIN HFA) 108 (90 Base) MCG/ACT inhaler Inhale 2 puffs into the lungs every 6 hours as needed for Wheezing 4/2/20  Yes Oseas Coombs MD   omeprazole (PRILOSEC) 20 MG delayed release capsule TAKE 1 CAPSULE EVERY DAY 1/10/20  Yes Oseas Coombs MD   diphenhydrAMINE (BENADRYL) 25 MG tablet Take 25 mg by mouth every 6 hours as needed for Itching   Yes Historical Provider, MD   nicotine (NICODERM CQ) 21 MG/24HR Place 1 patch onto the skin every 24 hours Yes Historical Provider, MD Ruddy Domingo 18 MCG inhalation capsule  10/16/19  Yes Historical Provider, MD   acetaminophen (TYLENOL) 500 MG tablet Take 1 tablet by mouth 4 times daily as needed for Pain 9/12/19  Yes ELVIRA Allen CNP   mirtazapine (REMERON) 15 MG tablet Take 15 mg by mouth nightly 5/22/18  Yes Historical Provider, MD   DULoxetine (CYMBALTA) 60 MG extended release capsule Take 1 capsule by mouth daily 2/1/18  Yes Elba Cho MD   Lancets MISC 1 each by Does not apply route daily 1/5/18  Yes Elba Cho MD   azelastine (ASTELIN) 0.1 % nasal spray 2 sprays by Nasal route 2 times daily Use in each nostril as directed 6/15/16  Yes Elba Cho MD   ipratropium-albuterol (DUONEB) 0.5-2.5 (3) MG/3ML SOLN nebulizer solution Inhale 3 mLs into the lungs every 6 hours as needed for Shortness of Breath 8/4/15  Yes Elba Cho MD   meloxicam (MOBIC) 15 MG tablet Take 15 mg by mouth daily    Historical Provider, MD    MG capsule TAKE 1 CAPSULE EVERY DAY 7/7/20   Elba Cho MD   meloxicam [facility]) 15 MG tablet Take 1 tablet by mouth daily 5/6/20 10/29/21  ELVIRA Grijalva CNP   potassium chloride (KLOR-CON M) 10 MEQ extended release tablet Take 2 tablets by mouth daily 12/10/19   Elba Cho MD   mirtazapine (REMERON) 30 MG tablet Take 30 mg by mouth nightly 5/22/18   Historical Provider, MD     Allergies  is allergic to aspirin; claritin [loratadine]; flonase [fluticasone propionate]; and morphine and related. Family History  family history includes Cirrhosis in her father; Diabetes in her mother; Heart Disease in her mother. Social History   reports that she quit smoking 6 days ago. Her smoking use included cigarettes. She has a 9.00 pack-year smoking history. She has never used smokeless tobacco.  1/4 ppd for 36 years. reports no history of alcohol use. reports no history of drug use.   Marital Status single  Occupation disabled    OBJECTIVE:   VITALS:  height is 5' 5\" (1.651 m) and weight is 185 lb (83.9 kg). Her temporal temperature is 97.4 °F (36.3 °C). Her blood pressure is 120/73 and her pulse is 80. Her respiration is 24 and oxygen saturation is 95%. CONSTITUTIONAL:alert & oriented x 3, no acute distress. Very pleasant. Antalgic gait, uses a cane. SKIN:  Warm and dry, no rashes on exposed areas of skin. HEAD:  Normocephalic, atraumatic. EYES: PERRL. EOMs intact. Wearing glasses. EARS:  Hearing grossly WNL. NOSE:  Nares patent. No rhinorrhea   MOUTH/THROAT:  benign  NECK:supple, no lymphadenopathy  LUNGS: Clear to auscultation bilaterally, no wheezes. CARDIOVASCULAR: Heart sounds are normal.  Regular rate and rhythm without murmur. ABDOMEN: soft, non tender, non distended. EXTREMITIES: no edema bilateral lower extremities. Testing:   EK20  Labs pending: drawn 2020     IMPRESSIONS:   Lumbar disc disease   has a past medical history of Allergic rhinitis, Aortic insufficiency (), Bronchitis, Chronic back pain, COPD (chronic obstructive pulmonary disease) ([address] Depression, DM (diabetes mellitus) ([address] (2012), GERD (gastroesophageal reflux disease), History of echocardiogram (2018), Hyperlipidemia, Hypertension, Obesity, Osteoarthritis, Peripheral vascular disease ([address] Primary osteoarthritis of both knees, Radicular pain of lumbosacral region, Spinal stenosis, lumbar region, without neurogenic claudication (2013), Tricuspid regurgitation (), Type II or unspecified type diabetes mellitus without mention of complication, not stated as uncontrolled, Unspecified sleep apnea, URI (upper respiratory infection), Wears dentures, Wears glasses, and Wellness examination.    PLANS:   Lumbar fusion    MISHEL GUTIÉRREZ PA-C  Electronically signed 2020 at 1:04 PM

## 2020-11-05 NOTE — PROGRESS NOTES
Anesthesia Focused Assessment    STOP-BANG Sleep Apnea Questionnaire    SNORE loudly (heard through closed doors)? Yes  TIRED, fatigued, sleepy during daytime? Yes  OBSERVED stopping breathing during sleep? Yes  High blood PRESSURE being treated? Yes    BMI over 35? No  AGE over 48? Yes  NECK circumference over 16\"? No  GENDER (male)? No             Total 5  High risk 5-8  Intermediate risk 3-4  Low risk 0-2    Obstructive Sleep Apnea: yes, used to use a cpap but no longer uses one. If YES, machine used: no cpap used     Type 1 DM:   no  T2DM:  yes    Coronary Artery Disease:  denies  Hypertension:  yes    Active smoker:  1/4 ppd for 36 years. Drinks Alcohol:  no    Dentition: upper and lower full dentures    Defib / AICD / Pacemaker: no      Renal Failure/dialysis:  no    Patient was evaluated in PAT & anesthesia guidelines were applied. NPO guidelines, medication instructions and scheduled arrival time were reviewed with patient. Hx of anesthesia complications:  no  Family hx of anesthesia complications:  no                                                                                                                     Anesthesia contacted:     Medical or cardiac clearance ordered: no    Patient was seen by cardiology in 2018, had had an echo and stress test prior to visit, was discharged to an \"as needed\" basis. She denies any cardiac complaints, records present in Care Everywhere and paper chart.     Delroy Falk PA-C  11/5/20  8:31 AM

## 2020-11-06 LAB
EKG ATRIAL RATE: 70 BPM
EKG P AXIS: 67 DEGREES
EKG P-R INTERVAL: 194 MS
EKG Q-T INTERVAL: 416 MS
EKG QRS DURATION: 84 MS
EKG QTC CALCULATION (BAZETT): 449 MS
EKG R AXIS: 2 DEGREES
EKG T AXIS: 51 DEGREES
EKG VENTRICULAR RATE: 70 BPM

## 2020-11-06 PROCEDURE — 93010 ELECTROCARDIOGRAM REPORT: CPT | Performed by: INTERNAL MEDICINE

## 2020-11-12 ENCOUNTER — TELEPHONE (OUTPATIENT)
Dept: INTERNAL MEDICINE | Age: 60
End: 2020-11-12

## 2020-11-12 ENCOUNTER — NURSE ONLY (OUTPATIENT)
Dept: INTERNAL MEDICINE | Age: 60
End: 2020-11-12
Payer: COMMERCIAL

## 2020-11-12 PROCEDURE — 90688 IIV4 VACCINE SPLT 0.5 ML IM: CPT

## 2020-11-12 NOTE — TELEPHONE ENCOUNTER
Patient requesting order for mammogram. Patient is due as of 11/19/2020 and wants to get it scheduled. Order pended. Please mail order to patient upon completion.

## 2020-11-12 NOTE — PROGRESS NOTES
Pt is here for annual Flu vaccination. Pt presents with no complaints. Flu vaccine given in Right Deltoid. Tolerated immunization well, VIS given to patient. Vaccine Information Sheet, \"Influenza - Inactivated\"  given to Kenyatta Fowler, or parent/legal guardian of  Kenyatta Fowler and verbalized understanding. Patient responses:    Is the person being vaccinated sick today? No    Does the person to be vaccinated have an allergy to a component of the vaccine? No    Has the person to be vaccinated ever had a reaction to the flu vaccine in the past?  No    Have you ever had Guillian China Syndrome? No    Flu vaccine given per order. Please see immunization tab.

## 2020-11-15 ENCOUNTER — HOSPITAL ENCOUNTER (OUTPATIENT)
Dept: PREADMISSION TESTING | Age: 60
Setting detail: SPECIMEN
Discharge: HOME | End: 2020-11-19
Payer: COMMERCIAL

## 2020-11-15 PROCEDURE — U0003 INFECTIOUS AGENT DETECTION BY NUCLEIC ACID (DNA OR RNA); SEVERE ACUTE RESPIRATORY SYNDROME CORONAVIRUS 2 (SARS-COV-2) (CORONAVIRUS DISEASE [COVID-19]), AMPLIFIED PROBE TECHNIQUE, MAKING USE OF HIGH THROUGHPUT TECHNOLOGIES AS DESCRIBED BY CMS-2020-01-R: HCPCS

## 2020-11-18 ENCOUNTER — ANESTHESIA EVENT (OUTPATIENT)
Dept: OPERATING ROOM | Age: 60
DRG: 459 | End: 2020-11-18
Payer: COMMERCIAL

## 2020-11-18 LAB — SARS-COV-2, NAA: NOT DETECTED

## 2020-11-18 RX ORDER — CETIRIZINE HYDROCHLORIDE 10 MG/1
TABLET ORAL
Qty: 30 TABLET | Refills: 0 | Status: SHIPPED | OUTPATIENT
Start: 2020-11-18 | End: 2020-12-08

## 2020-11-18 RX ORDER — CARVEDILOL 6.25 MG/1
6.25 TABLET ORAL 2 TIMES DAILY
Qty: 180 TABLET | Refills: 0 | Status: SHIPPED | OUTPATIENT
Start: 2020-11-18 | End: 2021-10-26 | Stop reason: SDUPTHER

## 2020-11-18 NOTE — TELEPHONE ENCOUNTER
Request for carvedilol - med pended. Please fill if appropriate. Next Visit Date:  Future Appointments   Date Time Provider Claudy Marlowi   12/1/2020  2:20 PM 30 Rachna Merrill MD [address]   12/2/2020  2:00 PM ELVIRA Koch - CNP [place] [facility]   1/13/2021  9:50 AM Griffith Cogan [facility] [address] Maintenance   Topic Date Due    Diabetic retinal exam  09/26/2017    Diabetic foot exam  06/18/2019    Shingles Vaccine (2 of 2) 09/22/2021 (Originally 8/19/2019)    Cervical cancer screen  02/15/2021    A1C test (Diabetic or Prediabetic)  04/21/2021    Diabetic microalbuminuria test  04/21/2021    Lipid screen  04/21/2021    Annual Wellness Visit (AWV)  09/30/2021    Potassium monitoring  11/05/2021    Creatinine monitoring  11/05/2021    Breast cancer screen  11/22/2021    Colon cancer screen fecal DNA test (Cologuard)  10/05/2023    DTaP/Tdap/Td vaccine (2 - Td) 06/24/2029    Flu vaccine  Completed    Pneumococcal 0-64 years Vaccine  Completed    Hepatitis C screen  Completed    HIV screen  Completed    Hepatitis A vaccine  Aged Out    Hib vaccine  Aged Out    Meningococcal (ACWY) vaccine  Aged Out       Hemoglobin A1C (%)   Date Value   04/21/2020 5.8   03/05/2019 5.8   06/18/2018 5.8             ( goal A1C is < 7)   Microalb/Crt.  Ratio (mcg/mg creat)   Date Value   04/21/2020 CANNOT BE CALCULATED     LDL Cholesterol (mg/dL)   Date Value   04/21/2020 116       (goal LDL is <100)   AST (U/L)   Date Value   04/21/2020 18     ALT (U/L)   Date Value   04/21/2020 21     BUN (mg/dL)   Date Value   11/05/2020 15     BP Readings from Last 3 Encounters:   11/05/20 120/73   09/22/20 131/75   09/16/20 137/80          (goal 120/80)    All Future Testing planned in [facility]  Lab Frequency Next Occurrence   PT eval and treat Once 12/13/2019   PT aquatic therapy Once 12/13/2019   XR Lumbar Spine Flex and Ext Only Once 01/06/2021   GE DIGITAL SCREEN W OR WO CAD BILATERAL Once 02/20/2021         Patient Active Problem List:     DJD (degenerative joint disease) of knee     Osteoarthritis of spine with radiculopathy, lumbar region     GERD (gastroesophageal reflux disease)     COPD (chronic obstructive pulmonary disease)     HTN (hypertension)     Allergic rhinitis     Lipoma of shoulder s/p excision right posterior 11 17 2008     DM (diabetes mellitus)     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     Primary osteoarthritis of both knees     Medication monitoring encounter     Chronic low back pain     Major depression, chronic     Chronic respiratory failure with hypoxia (HCC)     Mitral and aortic insufficiency     Pure hypercholesterolemia     Spondylosis of lumbar region without myelopathy or radiculopathy

## 2020-11-18 NOTE — TELEPHONE ENCOUNTER
CAD BILATERAL Once 02/20/2021         Patient Active Problem List:     DJD (degenerative joint disease) of knee     Osteoarthritis of spine with radiculopathy, lumbar region     GERD (gastroesophageal reflux disease)     COPD (chronic obstructive pulmonary disease)     HTN (hypertension)     Allergic rhinitis     Lipoma of shoulder s/p excision right posterior 11 17 2008     DM (diabetes mellitus)     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     Primary osteoarthritis of both knees     Medication monitoring encounter     Chronic low back pain     Major depression, chronic     Chronic respiratory failure with hypoxia (HCC)     Mitral and aortic insufficiency     Pure hypercholesterolemia     Spondylosis of lumbar region without myelopathy or radiculopathy

## 2020-11-19 ENCOUNTER — ANESTHESIA (OUTPATIENT)
Dept: OPERATING ROOM | Age: 60
DRG: 459 | End: 2020-11-19
Payer: COMMERCIAL

## 2020-11-19 ENCOUNTER — APPOINTMENT (OUTPATIENT)
Dept: GENERAL RADIOLOGY | Age: 60
DRG: 459 | End: 2020-11-19
Attending: NEUROLOGICAL SURGERY
Payer: COMMERCIAL

## 2020-11-19 ENCOUNTER — HOSPITAL ENCOUNTER (INPATIENT)
Age: 60
LOS: 13 days | Discharge: HOME HEALTH | DRG: 459 | End: 2020-12-02
Attending: NEUROLOGICAL SURGERY | Admitting: NEUROLOGICAL SURGERY
Payer: COMMERCIAL

## 2020-11-19 VITALS
SYSTOLIC BLOOD PRESSURE: 109 MMHG | OXYGEN SATURATION: 100 % | TEMPERATURE: 97.6 F | DIASTOLIC BLOOD PRESSURE: 73 MMHG | RESPIRATION RATE: 12 BRPM

## 2020-11-19 PROBLEM — M51.9 LUMBAR DISC DISEASE: Status: ACTIVE | Noted: 2020-11-19

## 2020-11-19 LAB
GLUCOSE BLD-MCNC: 133 MG/DL (ref 65–105)
GLUCOSE BLD-MCNC: 151 MG/DL (ref 65–105)
GLUCOSE BLD-MCNC: 190 MG/DL (ref 65–105)
GLUCOSE BLD-MCNC: 95 MG/DL (ref 74–100)
POC POTASSIUM: 3.5 MMOL/L (ref 3.5–4.5)

## 2020-11-19 PROCEDURE — 2500000003 HC RX 250 WO HCPCS: Performed by: NEUROLOGICAL SURGERY

## 2020-11-19 PROCEDURE — 3209999900 FLUORO FOR SURGICAL PROCEDURES

## 2020-11-19 PROCEDURE — 22840 INSERT SPINE FIXATION DEVICE: CPT | Performed by: NEUROLOGICAL SURGERY

## 2020-11-19 PROCEDURE — 6360000002 HC RX W HCPCS

## 2020-11-19 PROCEDURE — 2500000003 HC RX 250 WO HCPCS

## 2020-11-19 PROCEDURE — 7100000000 HC PACU RECOVERY - FIRST 15 MIN: Performed by: NEUROLOGICAL SURGERY

## 2020-11-19 PROCEDURE — C9359 IMPLNT,BON VOID FILLER-PUTTY: HCPCS | Performed by: NEUROLOGICAL SURGERY

## 2020-11-19 PROCEDURE — 82947 ASSAY GLUCOSE BLOOD QUANT: CPT

## 2020-11-19 PROCEDURE — 6370000000 HC RX 637 (ALT 250 FOR IP): Performed by: NURSE PRACTITIONER

## 2020-11-19 PROCEDURE — 87086 URINE CULTURE/COLONY COUNT: CPT

## 2020-11-19 PROCEDURE — 84132 ASSAY OF SERUM POTASSIUM: CPT

## 2020-11-19 PROCEDURE — C1889 IMPLANT/INSERT DEVICE, NOC: HCPCS | Performed by: NEUROLOGICAL SURGERY

## 2020-11-19 PROCEDURE — 3600000014 HC SURGERY LEVEL 4 ADDTL 15MIN: Performed by: NEUROLOGICAL SURGERY

## 2020-11-19 PROCEDURE — 3600000004 HC SURGERY LEVEL 4 BASE: Performed by: NEUROLOGICAL SURGERY

## 2020-11-19 PROCEDURE — C1713 ANCHOR/SCREW BN/BN,TIS/BN: HCPCS | Performed by: NEUROLOGICAL SURGERY

## 2020-11-19 PROCEDURE — 2580000003 HC RX 258: Performed by: ANESTHESIOLOGY

## 2020-11-19 PROCEDURE — 2580000003 HC RX 258: Performed by: NEUROLOGICAL SURGERY

## 2020-11-19 PROCEDURE — 3700000000 HC ANESTHESIA ATTENDED CARE: Performed by: NEUROLOGICAL SURGERY

## 2020-11-19 PROCEDURE — APPSS15 APP SPLIT SHARED TIME 0-15 MINUTES: Performed by: NURSE PRACTITIONER

## 2020-11-19 PROCEDURE — 2580000003 HC RX 258

## 2020-11-19 PROCEDURE — 2709999900 HC NON-CHARGEABLE SUPPLY: Performed by: NEUROLOGICAL SURGERY

## 2020-11-19 PROCEDURE — 6370000000 HC RX 637 (ALT 250 FOR IP): Performed by: NEUROLOGICAL SURGERY

## 2020-11-19 PROCEDURE — 7100000001 HC PACU RECOVERY - ADDTL 15 MIN: Performed by: NEUROLOGICAL SURGERY

## 2020-11-19 PROCEDURE — 01NB0ZZ RELEASE LUMBAR NERVE, OPEN APPROACH: ICD-10-PCS | Performed by: NEUROLOGICAL SURGERY

## 2020-11-19 PROCEDURE — 0SG00AJ FUSION OF LUMBAR VERTEBRAL JOINT WITH INTERBODY FUSION DEVICE, POSTERIOR APPROACH, ANTERIOR COLUMN, OPEN APPROACH: ICD-10-PCS | Performed by: NEUROLOGICAL SURGERY

## 2020-11-19 PROCEDURE — 3700000001 HC ADD 15 MINUTES (ANESTHESIA): Performed by: NEUROLOGICAL SURGERY

## 2020-11-19 PROCEDURE — 6360000002 HC RX W HCPCS: Performed by: ANESTHESIOLOGY

## 2020-11-19 PROCEDURE — C1762 CONN TISS, HUMAN(INC FASCIA): HCPCS | Performed by: NEUROLOGICAL SURGERY

## 2020-11-19 PROCEDURE — 2720000010 HC SURG SUPPLY STERILE: Performed by: NEUROLOGICAL SURGERY

## 2020-11-19 PROCEDURE — 0ST20ZZ RESECTION OF LUMBAR VERTEBRAL DISC, OPEN APPROACH: ICD-10-PCS | Performed by: NEUROLOGICAL SURGERY

## 2020-11-19 PROCEDURE — 22630 ARTHRD PST TQ 1NTRSPC LUM: CPT | Performed by: NEUROLOGICAL SURGERY

## 2020-11-19 PROCEDURE — 22853 INSJ BIOMECHANICAL DEVICE: CPT | Performed by: NEUROLOGICAL SURGERY

## 2020-11-19 PROCEDURE — 2780000010 HC IMPLANT OTHER: Performed by: NEUROLOGICAL SURGERY

## 2020-11-19 PROCEDURE — 6360000002 HC RX W HCPCS: Performed by: NURSE PRACTITIONER

## 2020-11-19 PROCEDURE — 1200000000 HC SEMI PRIVATE

## 2020-11-19 PROCEDURE — 2580000003 HC RX 258: Performed by: NURSE PRACTITIONER

## 2020-11-19 DEVICE — MAS 54850018550 4.75 EXT TAB CANN 8.5X50
Type: IMPLANTABLE DEVICE | Site: SPINE LUMBAR | Status: FUNCTIONAL
Brand: CD HORIZON® SOLERA® SPINAL SYSTEM

## 2020-11-19 DEVICE — SPACER 56301110 12W 30MM X 11MM 10 DG TI
Type: IMPLANTABLE DEVICE | Site: SPINE LUMBAR | Status: FUNCTIONAL
Brand: ARTIC-L™ 3D TI SPINAL SYSTEM WITH TIONIC™ TECHNOLOGY

## 2020-11-19 DEVICE — GRAFT BNE VIABLE 1 CC TIM MTRX FIBERCEL: Type: IMPLANTABLE DEVICE | Site: SPINE LUMBAR | Status: FUNCTIONAL

## 2020-11-19 DEVICE — DBM 006005 PROGENIX PLUS 5CC
Type: IMPLANTABLE DEVICE | Site: SPINE LUMBAR | Status: FUNCTIONAL
Brand: PROGENIX® PUTTY AND PROGENIX® PLUS

## 2020-11-19 DEVICE — SET SCR SPNL DIA4.75MM POST THORLUM SACR TI PERC APPRCH CDH: Type: IMPLANTABLE DEVICE | Site: SPINE LUMBAR | Status: FUNCTIONAL

## 2020-11-19 RX ORDER — FENTANYL CITRATE 50 UG/ML
25 INJECTION, SOLUTION INTRAMUSCULAR; INTRAVENOUS EVERY 5 MIN PRN
Status: DISCONTINUED | OUTPATIENT
Start: 2020-11-19 | End: 2020-11-19 | Stop reason: HOSPADM

## 2020-11-19 RX ORDER — PROPOFOL 10 MG/ML
INJECTION, EMULSION INTRAVENOUS CONTINUOUS PRN
Status: DISCONTINUED | OUTPATIENT
Start: 2020-11-19 | End: 2020-11-19 | Stop reason: SDUPTHER

## 2020-11-19 RX ORDER — MIRTAZAPINE 15 MG/1
15 TABLET, FILM COATED ORAL NIGHTLY
Status: DISCONTINUED | OUTPATIENT
Start: 2020-11-19 | End: 2020-12-02 | Stop reason: HOSPADM

## 2020-11-19 RX ORDER — OXYCODONE HYDROCHLORIDE AND ACETAMINOPHEN 5; 325 MG/1; MG/1
1 TABLET ORAL PRN
Status: DISCONTINUED | OUTPATIENT
Start: 2020-11-19 | End: 2020-11-19 | Stop reason: HOSPADM

## 2020-11-19 RX ORDER — DULOXETIN HYDROCHLORIDE 30 MG/1
60 CAPSULE, DELAYED RELEASE ORAL DAILY
Status: DISCONTINUED | OUTPATIENT
Start: 2020-11-19 | End: 2020-12-02 | Stop reason: HOSPADM

## 2020-11-19 RX ORDER — MORPHINE SULFATE 2 MG/ML
2 INJECTION, SOLUTION INTRAMUSCULAR; INTRAVENOUS
Status: DISCONTINUED | OUTPATIENT
Start: 2020-11-19 | End: 2020-12-02 | Stop reason: HOSPADM

## 2020-11-19 RX ORDER — PROPOFOL 10 MG/ML
INJECTION, EMULSION INTRAVENOUS PRN
Status: DISCONTINUED | OUTPATIENT
Start: 2020-11-19 | End: 2020-11-19 | Stop reason: SDUPTHER

## 2020-11-19 RX ORDER — IPRATROPIUM BROMIDE AND ALBUTEROL SULFATE 2.5; .5 MG/3ML; MG/3ML
1 SOLUTION RESPIRATORY (INHALATION) EVERY 6 HOURS PRN
Status: DISCONTINUED | OUTPATIENT
Start: 2020-11-19 | End: 2020-11-23

## 2020-11-19 RX ORDER — ROCURONIUM BROMIDE 10 MG/ML
INJECTION, SOLUTION INTRAVENOUS PRN
Status: DISCONTINUED | OUTPATIENT
Start: 2020-11-19 | End: 2020-11-19 | Stop reason: SDUPTHER

## 2020-11-19 RX ORDER — MORPHINE SULFATE 4 MG/ML
4 INJECTION, SOLUTION INTRAMUSCULAR; INTRAVENOUS
Status: DISCONTINUED | OUTPATIENT
Start: 2020-11-19 | End: 2020-11-19 | Stop reason: SDUPTHER

## 2020-11-19 RX ORDER — POTASSIUM CHLORIDE 20 MEQ/1
20 TABLET, EXTENDED RELEASE ORAL DAILY
Status: DISCONTINUED | OUTPATIENT
Start: 2020-11-19 | End: 2020-12-02 | Stop reason: HOSPADM

## 2020-11-19 RX ORDER — LIDOCAINE HYDROCHLORIDE 10 MG/ML
INJECTION, SOLUTION EPIDURAL; INFILTRATION; INTRACAUDAL; PERINEURAL PRN
Status: DISCONTINUED | OUTPATIENT
Start: 2020-11-19 | End: 2020-11-19 | Stop reason: SDUPTHER

## 2020-11-19 RX ORDER — SENNA PLUS 8.6 MG/1
1 TABLET ORAL DAILY PRN
Status: DISCONTINUED | OUTPATIENT
Start: 2020-11-19 | End: 2020-12-02 | Stop reason: HOSPADM

## 2020-11-19 RX ORDER — TROSPIUM CHLORIDE 20 MG/1
20 TABLET, FILM COATED ORAL 2 TIMES DAILY
Status: DISCONTINUED | OUTPATIENT
Start: 2020-11-19 | End: 2020-12-02 | Stop reason: HOSPADM

## 2020-11-19 RX ORDER — AZELASTINE 1 MG/ML
2 SPRAY, METERED NASAL 2 TIMES DAILY
Status: DISCONTINUED | OUTPATIENT
Start: 2020-11-19 | End: 2020-12-02 | Stop reason: HOSPADM

## 2020-11-19 RX ORDER — DIPHENHYDRAMINE HCL 25 MG
25 TABLET ORAL EVERY 6 HOURS PRN
Status: DISCONTINUED | OUTPATIENT
Start: 2020-11-19 | End: 2020-12-02 | Stop reason: HOSPADM

## 2020-11-19 RX ORDER — TIZANIDINE 4 MG/1
4 TABLET ORAL 2 TIMES DAILY
Status: DISCONTINUED | OUTPATIENT
Start: 2020-11-19 | End: 2020-12-02 | Stop reason: HOSPADM

## 2020-11-19 RX ORDER — CEFAZOLIN SODIUM 1 G/3ML
INJECTION, POWDER, FOR SOLUTION INTRAMUSCULAR; INTRAVENOUS PRN
Status: DISCONTINUED | OUTPATIENT
Start: 2020-11-19 | End: 2020-11-19 | Stop reason: SDUPTHER

## 2020-11-19 RX ORDER — AMLODIPINE BESYLATE 10 MG/1
10 TABLET ORAL DAILY
Status: DISCONTINUED | OUTPATIENT
Start: 2020-11-19 | End: 2020-12-02 | Stop reason: HOSPADM

## 2020-11-19 RX ORDER — MEPERIDINE HYDROCHLORIDE 50 MG/ML
12.5 INJECTION INTRAMUSCULAR; INTRAVENOUS; SUBCUTANEOUS EVERY 5 MIN PRN
Status: DISCONTINUED | OUTPATIENT
Start: 2020-11-19 | End: 2020-11-19 | Stop reason: HOSPADM

## 2020-11-19 RX ORDER — MAGNESIUM HYDROXIDE 1200 MG/15ML
LIQUID ORAL CONTINUOUS PRN
Status: COMPLETED | OUTPATIENT
Start: 2020-11-19 | End: 2020-11-19

## 2020-11-19 RX ORDER — ALBUTEROL SULFATE 90 UG/1
2 AEROSOL, METERED RESPIRATORY (INHALATION) EVERY 6 HOURS PRN
Status: DISCONTINUED | OUTPATIENT
Start: 2020-11-19 | End: 2020-11-23

## 2020-11-19 RX ORDER — OXYCODONE HYDROCHLORIDE 5 MG/1
10 TABLET ORAL EVERY 4 HOURS PRN
Status: DISCONTINUED | OUTPATIENT
Start: 2020-11-19 | End: 2020-12-02 | Stop reason: HOSPADM

## 2020-11-19 RX ORDER — DEXTROSE MONOHYDRATE 50 MG/ML
100 INJECTION, SOLUTION INTRAVENOUS PRN
Status: DISCONTINUED | OUTPATIENT
Start: 2020-11-19 | End: 2020-12-02 | Stop reason: HOSPADM

## 2020-11-19 RX ORDER — NICOTINE POLACRILEX 4 MG
15 LOZENGE BUCCAL PRN
Status: DISCONTINUED | OUTPATIENT
Start: 2020-11-19 | End: 2020-12-02 | Stop reason: HOSPADM

## 2020-11-19 RX ORDER — OMEPRAZOLE 20 MG/1
20 CAPSULE, DELAYED RELEASE ORAL EVERY MORNING
Status: DISCONTINUED | OUTPATIENT
Start: 2020-11-20 | End: 2020-12-02 | Stop reason: HOSPADM

## 2020-11-19 RX ORDER — SODIUM CHLORIDE, SODIUM LACTATE, POTASSIUM CHLORIDE, CALCIUM CHLORIDE 600; 310; 30; 20 MG/100ML; MG/100ML; MG/100ML; MG/100ML
INJECTION, SOLUTION INTRAVENOUS CONTINUOUS PRN
Status: DISCONTINUED | OUTPATIENT
Start: 2020-11-19 | End: 2020-11-19 | Stop reason: SDUPTHER

## 2020-11-19 RX ORDER — SODIUM CHLORIDE 9 MG/ML
INJECTION INTRAVENOUS PRN
Status: DISCONTINUED | OUTPATIENT
Start: 2020-11-19 | End: 2020-11-19 | Stop reason: SDUPTHER

## 2020-11-19 RX ORDER — MIDAZOLAM HYDROCHLORIDE 1 MG/ML
INJECTION INTRAMUSCULAR; INTRAVENOUS PRN
Status: DISCONTINUED | OUTPATIENT
Start: 2020-11-19 | End: 2020-11-19 | Stop reason: SDUPTHER

## 2020-11-19 RX ORDER — DEXAMETHASONE SODIUM PHOSPHATE 4 MG/ML
INJECTION, SOLUTION INTRA-ARTICULAR; INTRALESIONAL; INTRAMUSCULAR; INTRAVENOUS; SOFT TISSUE PRN
Status: DISCONTINUED | OUTPATIENT
Start: 2020-11-19 | End: 2020-11-19 | Stop reason: SDUPTHER

## 2020-11-19 RX ORDER — SODIUM CHLORIDE 0.9 % (FLUSH) 0.9 %
10 SYRINGE (ML) INJECTION PRN
Status: DISCONTINUED | OUTPATIENT
Start: 2020-11-19 | End: 2020-12-02 | Stop reason: HOSPADM

## 2020-11-19 RX ORDER — LISINOPRIL AND HYDROCHLOROTHIAZIDE 25; 20 MG/1; MG/1
1 TABLET ORAL DAILY
Status: DISCONTINUED | OUTPATIENT
Start: 2020-11-19 | End: 2020-12-02 | Stop reason: HOSPADM

## 2020-11-19 RX ORDER — SODIUM CHLORIDE 9 MG/ML
INJECTION, SOLUTION INTRAVENOUS CONTINUOUS
Status: DISCONTINUED | OUTPATIENT
Start: 2020-11-19 | End: 2020-11-21

## 2020-11-19 RX ORDER — ONDANSETRON 2 MG/ML
INJECTION INTRAMUSCULAR; INTRAVENOUS PRN
Status: DISCONTINUED | OUTPATIENT
Start: 2020-11-19 | End: 2020-11-19 | Stop reason: SDUPTHER

## 2020-11-19 RX ORDER — DIPHENHYDRAMINE HYDROCHLORIDE 50 MG/ML
12.5 INJECTION INTRAMUSCULAR; INTRAVENOUS
Status: DISCONTINUED | OUTPATIENT
Start: 2020-11-19 | End: 2020-11-19 | Stop reason: HOSPADM

## 2020-11-19 RX ORDER — GABAPENTIN 300 MG/1
600 CAPSULE ORAL NIGHTLY
Status: DISCONTINUED | OUTPATIENT
Start: 2020-11-19 | End: 2020-12-02 | Stop reason: HOSPADM

## 2020-11-19 RX ORDER — PHENYLEPHRINE HYDROCHLORIDE 10 MG/ML
INJECTION INTRAVENOUS PRN
Status: DISCONTINUED | OUTPATIENT
Start: 2020-11-19 | End: 2020-11-19 | Stop reason: SDUPTHER

## 2020-11-19 RX ORDER — MORPHINE SULFATE 2 MG/ML
2 INJECTION, SOLUTION INTRAMUSCULAR; INTRAVENOUS
Status: DISCONTINUED | OUTPATIENT
Start: 2020-11-19 | End: 2020-11-19 | Stop reason: SDUPTHER

## 2020-11-19 RX ORDER — CYCLOBENZAPRINE HCL 10 MG
10 TABLET ORAL 3 TIMES DAILY PRN
Status: DISCONTINUED | OUTPATIENT
Start: 2020-11-19 | End: 2020-11-19

## 2020-11-19 RX ORDER — GABAPENTIN 300 MG/1
300 CAPSULE ORAL 2 TIMES DAILY
Status: DISCONTINUED | OUTPATIENT
Start: 2020-11-19 | End: 2020-12-02 | Stop reason: HOSPADM

## 2020-11-19 RX ORDER — CARVEDILOL 6.25 MG/1
6.25 TABLET ORAL 2 TIMES DAILY
Status: DISCONTINUED | OUTPATIENT
Start: 2020-11-19 | End: 2020-12-02 | Stop reason: HOSPADM

## 2020-11-19 RX ORDER — OXYCODONE HYDROCHLORIDE AND ACETAMINOPHEN 5; 325 MG/1; MG/1
2 TABLET ORAL PRN
Status: DISCONTINUED | OUTPATIENT
Start: 2020-11-19 | End: 2020-11-19 | Stop reason: HOSPADM

## 2020-11-19 RX ORDER — OXYCODONE HYDROCHLORIDE 5 MG/1
5 TABLET ORAL EVERY 4 HOURS PRN
Status: DISCONTINUED | OUTPATIENT
Start: 2020-11-19 | End: 2020-12-02 | Stop reason: HOSPADM

## 2020-11-19 RX ORDER — DOCUSATE SODIUM 100 MG/1
100 CAPSULE, LIQUID FILLED ORAL DAILY
Status: DISCONTINUED | OUTPATIENT
Start: 2020-11-19 | End: 2020-12-02 | Stop reason: HOSPADM

## 2020-11-19 RX ORDER — DEXTROSE MONOHYDRATE 25 G/50ML
12.5 INJECTION, SOLUTION INTRAVENOUS PRN
Status: DISCONTINUED | OUTPATIENT
Start: 2020-11-19 | End: 2020-12-02 | Stop reason: HOSPADM

## 2020-11-19 RX ORDER — LIDOCAINE HYDROCHLORIDE AND EPINEPHRINE 10; 10 MG/ML; UG/ML
INJECTION, SOLUTION INFILTRATION; PERINEURAL PRN
Status: DISCONTINUED | OUTPATIENT
Start: 2020-11-19 | End: 2020-11-19 | Stop reason: ALTCHOICE

## 2020-11-19 RX ORDER — SODIUM CHLORIDE 0.9 % (FLUSH) 0.9 %
10 SYRINGE (ML) INJECTION EVERY 12 HOURS SCHEDULED
Status: DISCONTINUED | OUTPATIENT
Start: 2020-11-19 | End: 2020-11-22

## 2020-11-19 RX ORDER — CETIRIZINE HYDROCHLORIDE 10 MG/1
1 TABLET ORAL DAILY
Status: CANCELLED | OUTPATIENT
Start: 2020-11-19

## 2020-11-19 RX ORDER — ONDANSETRON 2 MG/ML
4 INJECTION INTRAMUSCULAR; INTRAVENOUS
Status: DISCONTINUED | OUTPATIENT
Start: 2020-11-19 | End: 2020-11-19 | Stop reason: HOSPADM

## 2020-11-19 RX ORDER — SUFENTANIL CITRATE 50 UG/ML
INJECTION EPIDURAL; INTRAVENOUS PRN
Status: DISCONTINUED | OUTPATIENT
Start: 2020-11-19 | End: 2020-11-19 | Stop reason: SDUPTHER

## 2020-11-19 RX ORDER — MORPHINE SULFATE 4 MG/ML
4 INJECTION, SOLUTION INTRAMUSCULAR; INTRAVENOUS
Status: DISCONTINUED | OUTPATIENT
Start: 2020-11-19 | End: 2020-11-24

## 2020-11-19 RX ORDER — MIRTAZAPINE 30 MG/1
30 TABLET, FILM COATED ORAL NIGHTLY
Status: DISCONTINUED | OUTPATIENT
Start: 2020-11-19 | End: 2020-12-02 | Stop reason: HOSPADM

## 2020-11-19 RX ORDER — HYDRALAZINE HYDROCHLORIDE 20 MG/ML
5 INJECTION INTRAMUSCULAR; INTRAVENOUS EVERY 10 MIN PRN
Status: DISCONTINUED | OUTPATIENT
Start: 2020-11-19 | End: 2020-11-19 | Stop reason: HOSPADM

## 2020-11-19 RX ORDER — ACETAMINOPHEN 325 MG/1
650 TABLET ORAL EVERY 6 HOURS
Status: DISCONTINUED | OUTPATIENT
Start: 2020-11-19 | End: 2020-11-24

## 2020-11-19 RX ORDER — LABETALOL HYDROCHLORIDE 5 MG/ML
5 INJECTION, SOLUTION INTRAVENOUS EVERY 10 MIN PRN
Status: DISCONTINUED | OUTPATIENT
Start: 2020-11-19 | End: 2020-11-19 | Stop reason: HOSPADM

## 2020-11-19 RX ORDER — POLYETHYLENE GLYCOL 3350 17 G/17G
17 POWDER, FOR SOLUTION ORAL DAILY
Status: DISCONTINUED | OUTPATIENT
Start: 2020-11-19 | End: 2020-12-02 | Stop reason: HOSPADM

## 2020-11-19 RX ORDER — SODIUM CHLORIDE, SODIUM LACTATE, POTASSIUM CHLORIDE, CALCIUM CHLORIDE 600; 310; 30; 20 MG/100ML; MG/100ML; MG/100ML; MG/100ML
1000 INJECTION, SOLUTION INTRAVENOUS CONTINUOUS
Status: DISCONTINUED | OUTPATIENT
Start: 2020-11-19 | End: 2020-11-19

## 2020-11-19 RX ORDER — CETIRIZINE HYDROCHLORIDE 10 MG/1
10 TABLET ORAL DAILY
Status: DISCONTINUED | OUTPATIENT
Start: 2020-11-19 | End: 2020-12-02 | Stop reason: HOSPADM

## 2020-11-19 RX ORDER — ATORVASTATIN CALCIUM 40 MG/1
40 TABLET, FILM COATED ORAL DAILY
Status: DISCONTINUED | OUTPATIENT
Start: 2020-11-19 | End: 2020-12-02 | Stop reason: HOSPADM

## 2020-11-19 RX ADMIN — DOCUSATE SODIUM 100 MG: 100 CAPSULE, LIQUID FILLED ORAL at 17:05

## 2020-11-19 RX ADMIN — LIDOCAINE HYDROCHLORIDE 50 MG: 10 INJECTION, SOLUTION EPIDURAL; INFILTRATION; INTRACAUDAL; PERINEURAL at 07:24

## 2020-11-19 RX ADMIN — SUFENTANIL CITRATE 5 MCG: 50 INJECTION EPIDURAL; INTRAVENOUS at 07:24

## 2020-11-19 RX ADMIN — SUFENTANIL CITRATE 5 MCG: 50 INJECTION EPIDURAL; INTRAVENOUS at 12:46

## 2020-11-19 RX ADMIN — MIRTAZAPINE 15 MG: 15 TABLET, FILM COATED ORAL at 20:29

## 2020-11-19 RX ADMIN — PROPOFOL: 10 INJECTION, EMULSION INTRAVENOUS at 10:12

## 2020-11-19 RX ADMIN — ROCURONIUM BROMIDE 25 MG: 10 INJECTION, SOLUTION INTRAVENOUS at 07:24

## 2020-11-19 RX ADMIN — CEFAZOLIN 2000 MG: 1 INJECTION, POWDER, FOR SOLUTION INTRAMUSCULAR; INTRAVENOUS at 07:30

## 2020-11-19 RX ADMIN — PROPOFOL 150 MG: 10 INJECTION, EMULSION INTRAVENOUS at 07:24

## 2020-11-19 RX ADMIN — DEXTROSE MONOHYDRATE 2 G: 50 INJECTION, SOLUTION INTRAVENOUS at 20:30

## 2020-11-19 RX ADMIN — PROPOFOL 50 MG: 10 INJECTION, EMULSION INTRAVENOUS at 07:27

## 2020-11-19 RX ADMIN — ONDANSETRON 4 MG: 2 INJECTION INTRAMUSCULAR; INTRAVENOUS at 12:19

## 2020-11-19 RX ADMIN — PHENYLEPHRINE HYDROCHLORIDE 100 MCG: 10 INJECTION INTRAVENOUS at 08:24

## 2020-11-19 RX ADMIN — TROSPIUM CHLORIDE 20 MG: 20 TABLET, FILM COATED ORAL at 20:27

## 2020-11-19 RX ADMIN — SODIUM CHLORIDE, POTASSIUM CHLORIDE, SODIUM LACTATE AND CALCIUM CHLORIDE 1000 ML: 600; 310; 30; 20 INJECTION, SOLUTION INTRAVENOUS at 06:39

## 2020-11-19 RX ADMIN — SODIUM CHLORIDE, POTASSIUM CHLORIDE, SODIUM LACTATE AND CALCIUM CHLORIDE: 600; 310; 30; 20 INJECTION, SOLUTION INTRAVENOUS at 07:19

## 2020-11-19 RX ADMIN — ACETAMINOPHEN 650 MG: 325 TABLET ORAL at 20:28

## 2020-11-19 RX ADMIN — HYDROMORPHONE HYDROCHLORIDE 0.5 MG: 1 INJECTION, SOLUTION INTRAMUSCULAR; INTRAVENOUS; SUBCUTANEOUS at 13:05

## 2020-11-19 RX ADMIN — DULOXETINE HYDROCHLORIDE 60 MG: 30 CAPSULE, DELAYED RELEASE ORAL at 20:28

## 2020-11-19 RX ADMIN — CEFAZOLIN 2000 MG: 1 INJECTION, POWDER, FOR SOLUTION INTRAMUSCULAR; INTRAVENOUS at 11:26

## 2020-11-19 RX ADMIN — PHENYLEPHRINE HYDROCHLORIDE 100 MCG: 10 INJECTION INTRAVENOUS at 08:08

## 2020-11-19 RX ADMIN — TIZANIDINE 4 MG: 4 TABLET ORAL at 17:07

## 2020-11-19 RX ADMIN — DESMOPRESSIN ACETATE 40 MG: 0.2 TABLET ORAL at 20:28

## 2020-11-19 RX ADMIN — HYDROMORPHONE HYDROCHLORIDE 0.5 MG: 1 INJECTION, SOLUTION INTRAMUSCULAR; INTRAVENOUS; SUBCUTANEOUS at 13:35

## 2020-11-19 RX ADMIN — METFORMIN HYDROCHLORIDE 500 MG: 500 TABLET ORAL at 17:08

## 2020-11-19 RX ADMIN — PHENYLEPHRINE HYDROCHLORIDE 200 MCG: 10 INJECTION INTRAVENOUS at 08:05

## 2020-11-19 RX ADMIN — SUFENTANIL CITRATE 10 MCG: 50 INJECTION EPIDURAL; INTRAVENOUS at 07:54

## 2020-11-19 RX ADMIN — MIRTAZAPINE 30 MG: 15 TABLET, FILM COATED ORAL at 20:29

## 2020-11-19 RX ADMIN — PHENYLEPHRINE HYDROCHLORIDE 100 MCG: 10 INJECTION INTRAVENOUS at 08:11

## 2020-11-19 RX ADMIN — CYCLOBENZAPRINE 10 MG: 10 TABLET, FILM COATED ORAL at 13:17

## 2020-11-19 RX ADMIN — SUGAMMADEX 100 MG: 100 INJECTION, SOLUTION INTRAVENOUS at 12:14

## 2020-11-19 RX ADMIN — SUGAMMADEX 100 MG: 100 INJECTION, SOLUTION INTRAVENOUS at 10:09

## 2020-11-19 RX ADMIN — CARVEDILOL 6.25 MG: 6.25 TABLET, FILM COATED ORAL at 20:28

## 2020-11-19 RX ADMIN — MIDAZOLAM HYDROCHLORIDE 2 MG: 1 INJECTION, SOLUTION INTRAMUSCULAR; INTRAVENOUS at 07:33

## 2020-11-19 RX ADMIN — PHENYLEPHRINE HYDROCHLORIDE 200 MCG: 10 INJECTION INTRAVENOUS at 08:03

## 2020-11-19 RX ADMIN — SODIUM CHLORIDE, POTASSIUM CHLORIDE, SODIUM LACTATE AND CALCIUM CHLORIDE: 600; 310; 30; 20 INJECTION, SOLUTION INTRAVENOUS at 10:34

## 2020-11-19 RX ADMIN — SODIUM CHLORIDE 9 ML: 9 INJECTION INTRAMUSCULAR; INTRAVENOUS; SUBCUTANEOUS at 07:20

## 2020-11-19 RX ADMIN — SODIUM CHLORIDE: 9 INJECTION, SOLUTION INTRAVENOUS at 17:09

## 2020-11-19 RX ADMIN — ONDANSETRON 4 MG: 2 INJECTION INTRAMUSCULAR; INTRAVENOUS at 09:07

## 2020-11-19 RX ADMIN — HYDROMORPHONE HYDROCHLORIDE 0.5 MG: 1 INJECTION, SOLUTION INTRAMUSCULAR; INTRAVENOUS; SUBCUTANEOUS at 13:30

## 2020-11-19 RX ADMIN — DEXAMETHASONE SODIUM PHOSPHATE 10 MG: 4 INJECTION, SOLUTION INTRAMUSCULAR; INTRAVENOUS at 07:27

## 2020-11-19 RX ADMIN — OXYCODONE HYDROCHLORIDE 10 MG: 5 TABLET ORAL at 18:27

## 2020-11-19 RX ADMIN — PHENYLEPHRINE HYDROCHLORIDE 25 MCG/MIN: 10 INJECTION INTRAVENOUS at 08:32

## 2020-11-19 RX ADMIN — SUFENTANIL CITRATE 5 MCG: 50 INJECTION EPIDURAL; INTRAVENOUS at 12:50

## 2020-11-19 RX ADMIN — ROCURONIUM BROMIDE 30 MG: 10 INJECTION, SOLUTION INTRAVENOUS at 08:28

## 2020-11-19 RX ADMIN — HYDROMORPHONE HYDROCHLORIDE 0.5 MG: 1 INJECTION, SOLUTION INTRAMUSCULAR; INTRAVENOUS; SUBCUTANEOUS at 13:00

## 2020-11-19 RX ADMIN — PROPOFOL 100 MCG/KG/MIN: 10 INJECTION, EMULSION INTRAVENOUS at 07:30

## 2020-11-19 RX ADMIN — GABAPENTIN 600 MG: 300 CAPSULE ORAL at 20:27

## 2020-11-19 RX ADMIN — GABAPENTIN 300 MG: 300 CAPSULE ORAL at 17:10

## 2020-11-19 RX ADMIN — SODIUM CHLORIDE 0.5 MCG: 9 INJECTION, SOLUTION INTRAVENOUS at 08:01

## 2020-11-19 RX ADMIN — OXYCODONE HYDROCHLORIDE 10 MG: 5 TABLET ORAL at 13:17

## 2020-11-19 ASSESSMENT — PULMONARY FUNCTION TESTS
PIF_VALUE: 20
PIF_VALUE: 23
PIF_VALUE: 22
PIF_VALUE: 21
PIF_VALUE: 21
PIF_VALUE: 22
PIF_VALUE: 21
PIF_VALUE: 22
PIF_VALUE: 21
PIF_VALUE: 22
PIF_VALUE: 20
PIF_VALUE: 22
PIF_VALUE: 21
PIF_VALUE: 22
PIF_VALUE: 21
PIF_VALUE: 21
PIF_VALUE: 22
PIF_VALUE: 23
PIF_VALUE: 22
PIF_VALUE: 20
PIF_VALUE: 21
PIF_VALUE: 22
PIF_VALUE: 23
PIF_VALUE: 23
PIF_VALUE: 21
PIF_VALUE: 22
PIF_VALUE: 20
PIF_VALUE: 26
PIF_VALUE: 22
PIF_VALUE: 22
PIF_VALUE: 21
PIF_VALUE: 1
PIF_VALUE: 21
PIF_VALUE: 22
PIF_VALUE: 21
PIF_VALUE: 22
PIF_VALUE: 19
PIF_VALUE: 22
PIF_VALUE: 22
PIF_VALUE: 20
PIF_VALUE: 22
PIF_VALUE: 20
PIF_VALUE: 7
PIF_VALUE: 21
PIF_VALUE: 22
PIF_VALUE: 20
PIF_VALUE: 22
PIF_VALUE: 21
PIF_VALUE: 22
PIF_VALUE: 21
PIF_VALUE: 22
PIF_VALUE: 22
PIF_VALUE: 21
PIF_VALUE: 20
PIF_VALUE: 22
PIF_VALUE: 23
PIF_VALUE: 20
PIF_VALUE: 22
PIF_VALUE: 16
PIF_VALUE: 22
PIF_VALUE: 22
PIF_VALUE: 25
PIF_VALUE: 23
PIF_VALUE: 3
PIF_VALUE: 21
PIF_VALUE: 23
PIF_VALUE: 23
PIF_VALUE: 22
PIF_VALUE: 16
PIF_VALUE: 21
PIF_VALUE: 21
PIF_VALUE: 22
PIF_VALUE: 21
PIF_VALUE: 23
PIF_VALUE: 20
PIF_VALUE: 20
PIF_VALUE: 22
PIF_VALUE: 22
PIF_VALUE: 21
PIF_VALUE: 22
PIF_VALUE: 23
PIF_VALUE: 22
PIF_VALUE: 21
PIF_VALUE: 19
PIF_VALUE: 22
PIF_VALUE: 19
PIF_VALUE: 19
PIF_VALUE: 2
PIF_VALUE: 20
PIF_VALUE: 22
PIF_VALUE: 21
PIF_VALUE: 22
PIF_VALUE: 21
PIF_VALUE: 23
PIF_VALUE: 22
PIF_VALUE: 21
PIF_VALUE: 21
PIF_VALUE: 23
PIF_VALUE: 21
PIF_VALUE: 20
PIF_VALUE: 22
PIF_VALUE: 22
PIF_VALUE: 23
PIF_VALUE: 22
PIF_VALUE: 22
PIF_VALUE: 20
PIF_VALUE: 22
PIF_VALUE: 20
PIF_VALUE: 22
PIF_VALUE: 21
PIF_VALUE: 22
PIF_VALUE: 21
PIF_VALUE: 21
PIF_VALUE: 22
PIF_VALUE: 20
PIF_VALUE: 22
PIF_VALUE: 22
PIF_VALUE: 23
PIF_VALUE: 19
PIF_VALUE: 23
PIF_VALUE: 22
PIF_VALUE: 22
PIF_VALUE: 1
PIF_VALUE: 21
PIF_VALUE: 22
PIF_VALUE: 4
PIF_VALUE: 22
PIF_VALUE: 21
PIF_VALUE: 24
PIF_VALUE: 21
PIF_VALUE: 23
PIF_VALUE: 21
PIF_VALUE: 19
PIF_VALUE: 20
PIF_VALUE: 22
PIF_VALUE: 23
PIF_VALUE: 21
PIF_VALUE: 23
PIF_VALUE: 20
PIF_VALUE: 22
PIF_VALUE: 20
PIF_VALUE: 22
PIF_VALUE: 22
PIF_VALUE: 21
PIF_VALUE: 21
PIF_VALUE: 22
PIF_VALUE: 23
PIF_VALUE: 11
PIF_VALUE: 22
PIF_VALUE: 22
PIF_VALUE: 20
PIF_VALUE: 21
PIF_VALUE: 23
PIF_VALUE: 20
PIF_VALUE: 20
PIF_VALUE: 22
PIF_VALUE: 20
PIF_VALUE: 22
PIF_VALUE: 23
PIF_VALUE: 20
PIF_VALUE: 22
PIF_VALUE: 21
PIF_VALUE: 21
PIF_VALUE: 22
PIF_VALUE: 3
PIF_VALUE: 22
PIF_VALUE: 21
PIF_VALUE: 23
PIF_VALUE: 23
PIF_VALUE: 22
PIF_VALUE: 22
PIF_VALUE: 23
PIF_VALUE: 20
PIF_VALUE: 20
PIF_VALUE: 22
PIF_VALUE: 16
PIF_VALUE: 20
PIF_VALUE: 0
PIF_VALUE: 22
PIF_VALUE: 1
PIF_VALUE: 21
PIF_VALUE: 22
PIF_VALUE: 22
PIF_VALUE: 21
PIF_VALUE: 22
PIF_VALUE: 22
PIF_VALUE: 16
PIF_VALUE: 22
PIF_VALUE: 16
PIF_VALUE: 19
PIF_VALUE: 21
PIF_VALUE: 20
PIF_VALUE: 22
PIF_VALUE: 18
PIF_VALUE: 23
PIF_VALUE: 21
PIF_VALUE: 23
PIF_VALUE: 21
PIF_VALUE: 21
PIF_VALUE: 22
PIF_VALUE: 23
PIF_VALUE: 23
PIF_VALUE: 22
PIF_VALUE: 22
PIF_VALUE: 21
PIF_VALUE: 22
PIF_VALUE: 20
PIF_VALUE: 20
PIF_VALUE: 21
PIF_VALUE: 22
PIF_VALUE: 21
PIF_VALUE: 22
PIF_VALUE: 22
PIF_VALUE: 23
PIF_VALUE: 17
PIF_VALUE: 20
PIF_VALUE: 21
PIF_VALUE: 21
PIF_VALUE: 22
PIF_VALUE: 9
PIF_VALUE: 20
PIF_VALUE: 21
PIF_VALUE: 21
PIF_VALUE: 22
PIF_VALUE: 22
PIF_VALUE: 23
PIF_VALUE: 21
PIF_VALUE: 19
PIF_VALUE: 22
PIF_VALUE: 20
PIF_VALUE: 22
PIF_VALUE: 21
PIF_VALUE: 1
PIF_VALUE: 21
PIF_VALUE: 20
PIF_VALUE: 19
PIF_VALUE: 22
PIF_VALUE: 21
PIF_VALUE: 23
PIF_VALUE: 21
PIF_VALUE: 20
PIF_VALUE: 22
PIF_VALUE: 21
PIF_VALUE: 20
PIF_VALUE: 21
PIF_VALUE: 22
PIF_VALUE: 23
PIF_VALUE: 21
PIF_VALUE: 21
PIF_VALUE: 22
PIF_VALUE: 20
PIF_VALUE: 23
PIF_VALUE: 22
PIF_VALUE: 21
PIF_VALUE: 1
PIF_VALUE: 16
PIF_VALUE: 22
PIF_VALUE: 22
PIF_VALUE: 21
PIF_VALUE: 21
PIF_VALUE: 22
PIF_VALUE: 19
PIF_VALUE: 22
PIF_VALUE: 7
PIF_VALUE: 22
PIF_VALUE: 22
PIF_VALUE: 19
PIF_VALUE: 21
PIF_VALUE: 25
PIF_VALUE: 22
PIF_VALUE: 26

## 2020-11-19 ASSESSMENT — PAIN SCALES - GENERAL
PAINLEVEL_OUTOF10: 10
PAINLEVEL_OUTOF10: 9
PAINLEVEL_OUTOF10: 10
PAINLEVEL_OUTOF10: 10
PAINLEVEL_OUTOF10: 8
PAINLEVEL_OUTOF10: 6

## 2020-11-19 ASSESSMENT — PAIN DESCRIPTION - PAIN TYPE
TYPE: SURGICAL PAIN
TYPE: SURGICAL PAIN

## 2020-11-19 ASSESSMENT — PAIN - FUNCTIONAL ASSESSMENT: PAIN_FUNCTIONAL_ASSESSMENT: PREVENTS OR INTERFERES SOME ACTIVE ACTIVITIES AND ADLS

## 2020-11-19 ASSESSMENT — PAIN DESCRIPTION - LOCATION
LOCATION: BACK
LOCATION: BACK

## 2020-11-19 NOTE — CARE COORDINATION
Patient is sitting in chair on non re breather at this time . Will return when patient is able to assist in transition plan.

## 2020-11-19 NOTE — ANESTHESIA PROCEDURE NOTES
Arterial Line:    An arterial line was placed using surface landmarks, in the OR for the following indication(s): continuous blood pressure monitoring. A 20 gauge (size), (length), (type) catheter was placed, Seldinger technique used, into the right radial artery, secured by tape and Tegaderm. Events:  patient tolerated procedure well with no complications. 7/30/2020 7:47 AM  Anesthesiologist: Shweta Arzola MD  Resident/CRNA: ELVIRA Rivera - CRNA  Other anesthesia staff: Duong Roberts  Performed:  Other anesthesia staff   Preanesthetic Checklist  Completed: patient identified, site marked, surgical consent, pre-op evaluation, timeout performed, IV checked, risks and benefits discussed, monitors and equipment checked, anesthesia consent given, oxygen available and patient being monitored

## 2020-11-19 NOTE — ANESTHESIA PRE PROCEDURE
Department of Anesthesiology  Preprocedure Note       Name:  Maggi Mast   Age:  56 y.o.  :  1960                                          MRN:  [id number]         Date:  2020      Surgeon: Judith Ashton):  Angel Griffin DO    Procedure: Procedure(s):  POSTERIOR FUSION L4/5, MEDJUAREZ, Refugio Zavala, EVOKES #[id number] AMINA    Medications prior to admission:   Prior to Admission medications    Medication Sig Start Date End Date Taking? Authorizing Provider   carvedilol (COREG) 6.25 MG tablet TAKE 1 TABLET BY MOUTH 2 TIMES DAILY 20  Yes Dilip Carreon MD   cetirizine (ZYRTEC) 10 MG tablet TAKE 1 TABLET BY MOUTH DAILY 20  Yes Dilip Carreon MD   meloxicam (MOBIC) 15 MG tablet Take 15 mg by mouth daily   Yes Historical Provider, MD   HYDROcodone-acetaminophen (NORCO) 5-325 MG per tablet Take 1 tablet by mouth every 8 hours as needed for Pain for up to 30 days. Early refill due to holidays 10/30/20 11/29/20 Yes ELVIRA Packer - CNP   trospium (SANCTURA) 20 MG tablet TAKE 1 TABLET BY MOUTH 2 TIMES DAILY 10/27/20  Yes Dilip Carreon MD   tiZANidine (ZANAFLEX) 4 MG tablet TAKE 1 TABLET TWICE DAILY 10/27/20  Yes Dilip Carreon MD   blood glucose test strips (EXACTECH TEST) strip 1 each by In Vitro route daily As needed.  10/14/20  Yes Dilip Carreon MD   gabapentin (NEURONTIN) 300 MG capsule TAKE 1 CAPSULE IN MORNING AND AFTERNOON AND 2 CAPSULES AT NIGHT 10/12/20 11/19/20 Yes Dilip Carreon MD   atorvastatin (LIPITOR) 40 MG tablet Take 1 tablet by mouth daily 20  Yes Dilip Carreon MD   metFORMIN (GLUCOPHAGE) 500 MG tablet Take 1 tablet by mouth 2 times daily (with meals) 20  Yes Dilip Carreon MD   lisinopril-hydroCHLOROthiazide (PRINZIDE;ZESTORETIC) 20-25 MG per tablet TAKE 1 TABLET EVERY DAY 20  Yes Dilip Carreon MD    MG capsule TAKE 1 CAPSULE EVERY DAY 20  Yes Dilip Carreon MD   meloxicam (MOBIC) 15 MG tablet Take 1 tablet by mouth daily 5/6/20 10/29/21 Yes Sandor Prado, APRN - CNP   amLODIPine (NORVASC) 10 MG tablet Take 1 tablet by mouth daily 4/2/20  Yes Catrina Scanlon MD   albuterol sulfate HFA (VENTOLIN HFA) 108 (90 Base) MCG/ACT inhaler Inhale 2 puffs into the lungs every 6 hours as needed for Wheezing 4/2/20  Yes Catrina Scanlon MD   omeprazole (PRILOSEC) 20 MG delayed release capsule TAKE 1 CAPSULE EVERY DAY 1/10/20  Yes Catrina Scanlon MD   diphenhydrAMINE (BENADRYL) 25 MG tablet Take 25 mg by mouth every 6 hours as needed for Itching   Yes Historical Provider, MD   nicotine (NICODERM CQ) 21 MG/24HR Place 1 patch onto the skin every 24 hours   Yes Historical Provider, MD   potassium chloride (KLOR-CON M) 10 MEQ extended release tablet Take 2 tablets by mouth daily 12/10/19  Yes MD Aye Perez Hidalgo 18 MCG inhalation capsule  10/16/19  Yes Historical Provider, MD   acetaminophen (TYLENOL) 500 MG tablet Take 1 tablet by mouth 4 times daily as needed for Pain 9/12/19  Yes Edilma Hernandez, APRN - CNP   mirtazapine (REMERON) 30 MG tablet Take 30 mg by mouth nightly 5/22/18  Yes Historical Provider, MD   mirtazapine (REMERON) 15 MG tablet Take 15 mg by mouth nightly 5/22/18  Yes Historical Provider, MD   DULoxetine (CYMBALTA) 60 MG extended release capsule Take 1 capsule by mouth daily 2/1/18  Yes Catrina Scanlon MD   Lancets MISC 1 each by Does not apply route daily 1/5/18  Yes Catrina Scanlon MD   azelastine (ASTELIN) 0.1 % nasal spray 2 sprays by Nasal route 2 times daily Use in each nostril as directed 6/15/16  Yes Catrina Scanlon MD   ipratropium-albuterol (DUONEB) 0.5-2.5 (3) MG/3ML SOLN nebulizer solution Inhale 3 mLs into the lungs every 6 hours as needed for Shortness of Breath 8/4/15   Catrina Scanlon MD       Current medications:    Current Facility-Administered Medications   Medication Dose Route Frequency Provider Last Rate Last Dose    lactated ringers infusion 1,000 mL  1,000 mL Intravenous Continuous Marcos Ordaz MD        SUFentanil citrate 100 mcg in sodium chloride 0.9 % 100 mL infusion  0.25 mcg/kg/hr Intravenous Once Kiara Ash DO           Allergies:     Allergies   Allergen Reactions    Aspirin      DUE TO ULCER    Claritin [Loratadine] Other (See Comments)     coughing    Flonase [Fluticasone Propionate]     Morphine And Related      GI Upset       Problem List:    Patient Active Problem List   Diagnosis Code    DJD (degenerative joint disease) of knee M17.10    Osteoarthritis of spine with radiculopathy, lumbar region M47.26    GERD (gastroesophageal reflux disease) K21.9    COPD (chronic obstructive pulmonary disease) J44.9    HTN (hypertension) I10    Allergic rhinitis J30.9    Lipoma of shoulder s/p excision right posterior 11 17 2008 D17.20    DM (diabetes mellitus) E11.9    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 M94.261    Primary osteoarthritis of both knees M17.0    Medication monitoring encounter Z51.81    Chronic low back pain M54.5, G89.29    Major depression, chronic F32.9    Chronic respiratory failure with hypoxia (HCC) J96.11    Mitral and aortic insufficiency I08.0    Pure hypercholesterolemia E78.00    Spondylosis of lumbar region without myelopathy or radiculopathy M47.816       Past Medical History:        Diagnosis Date    Allergic rhinitis     Aortic insufficiency 2018    mild-moderate on echo (was seen and [facility])    Bronchitis     Chronic back pain     [facility] at [facility]. COPD (chronic obstructive pulmonary disease) ([facility] [address]     Dr. Ada Haynes to see 11/09/2020    Depression     DM (diabetes mellitus) ([facility] [address] 12/18/2012    GERD (gastroesophageal reflux disease)     History of echocardiogram 05/2018    EF 65%, mild-moderate AI and TR    Hyperlipidemia     Dr. Kenyatta Akins Hypertension     Dr. Kenyatta Akins Obesity     Osteoarthritis     Peripheral vascular disease ([facility] [address]  Primary osteoarthritis of both knees     Radicular pain of lumbosacral region     Spinal stenosis, lumbar region, without neurogenic claudication 04/30/2013    Tricuspid regurgitation 2018    mild-moderate on echo    Type II or unspecified type diabetes mellitus without mention of complication, not stated as uncontrolled     Dr. Pura Monae Unspecified sleep apnea     no cpap used anymore    URI (upper respiratory infection)     Wears dentures     upper and lower full dentures    Wears glasses     Wellness examination     Dr. Ciara Gordon -PCP last visit in early Oct. 2020       Past Surgical History:        Procedure Laterality Date    COLONOSCOPY  2/12/2009    normal    DILATION AND CURETTAGE OF UTERUS      GASTRECTOMY      partial    LUMBAR DISCECTOMY  01/2015    lumbar diskectomy    NERVE BLOCK Right 4/30/13    Lumbar Diagnostic Block,  Kenalog 40 mg    NERVE BLOCK  5/23/13    Lumbar Radiofrequency, Kenalog 40mg    NERVE BLOCK  8/12/13    Lt MBNB  celestone 6mg    NERVE BLOCK Left 8-28-13    left lumbar diagnostic block #2 decadron 10 mg    NERVE BLOCK Left 9-24-13    left lumbar median branch radiofrequency    NERVE BLOCK  07-02-14    caudal, celestone 9 mg    NERVE BLOCK  7-16-14    caudal epidural #2, celestone 9mg, fentanyl 25mcg    NERVE BLOCK  7/30/14    caudal #3 decadron 10mg    NERVE BLOCK  11-6-14    duramorph epidural steroid block  duramorph 1 mg celestone 9 mg    NERVE BLOCK  11/20/15    TENS- Empi Select    NERVE BLOCK  07/20/2018    right transforminal # 1 decadron 10mg,isovue    NERVE BLOCK Bilateral 02/01/2019    bilat mbnb- no steroid    NERVE BLOCK Bilateral 02/08/2019    bilat mbnb, marcaine . 25%    RI KNEE SCOPE,DIAGNOSTIC Right 3/24/2017    KNEE ARTHROSCOPY WITH PARTIAL MEDIAL MENISECAL DEBRIDMENT  performed by Isamar Swartz MD at [address]  9 20 2007    UPPER GASTROINTESTINAL ENDOSCOPY  4 21 2009,04/2011    gastritis, esophagitis Social History:    Social History     Tobacco Use    Smoking status: Former Smoker     Packs/day: 0.25     Years: 36.00     Pack years: 9.00     Types: Cigarettes     Last attempt to quit: 10/30/2020     Years since quittin.0    Smokeless tobacco: Never Used    Tobacco comment: pt currently using nicotine patches   5 CIGARETTES A DAY   Substance Use Topics    Alcohol use: No     Alcohol/week: 0.0 standard drinks     Frequency: Never     Binge frequency: Never                                Counseling given: Not Answered  Comment: pt currently using nicotine patches   5 CIGARETTES A DAY      Vital Signs (Current):   Vitals:    20 0605 20 0626   BP:  136/77   Pulse:  90   Resp:  16   Temp:  97 °F (36.1 °C)   TempSrc:  Temporal   SpO2:  96%   Weight: 188 lb (85.3 kg)    Height: 5' 6\" (1.676 m)                                               BP Readings from Last 3 Encounters:   20 136/77   20 120/73   20 131/75       NPO Status: Time of last liquid consumption:                         Time of last solid consumption:                         Date of last liquid consumption: 20                        Date of last solid food consumption: 20    BMI:   Wt Readings from Last 3 Encounters:   20 188 lb (85.3 kg)   20 185 lb (83.9 kg)   20 188 lb (85.3 kg)     Body mass index is 30.34 kg/m².     CBC:   Lab Results   Component Value Date    WBC 4.6 2020    RBC 5.55 2020    RBC 4.57 2012    HGB 15.3 2020    HCT 48.4 2020    MCV 87.2 2020    RDW 13.6 2020     2020     2012       CMP:   Lab Results   Component Value Date     2020    K 4.0 2020     2020    CO2 25 2020    BUN 15 2020    CREATININE 0.84 2020    GFRAA >60 2020    LABGLOM >60 2020    GLUCOSE 81 2020    PROT 7.7 2020    CALCIUM 9.5 2020    BILITOT 0.31 04/21/2020    ALKPHOS 66 04/21/2020    AST 18 04/21/2020    ALT 21 04/21/2020       POC Tests: No results for input(s): POCGLU, POCNA, POCK, POCCL, POCBUN, POCHEMO, POCHCT in the last 72 hours. Coags: No results found for: PROTIME, INR, APTT    HCG (If Applicable): No results found for: PREGTESTUR, PREGSERUM, HCG, HCGQUANT     ABGs: No results found for: PHART, PO2ART, LLC4XHP, JJP7GFJ, BEART, Z7PNRUQH     Type & Screen (If Applicable):  No results found for: LABABO, LABRH    Drug/Infectious Status (If Applicable):  Lab Results   Component Value Date    HEPCAB NONREACTIVE 02/16/2012       COVID-19 Screening (If Applicable):   Lab Results   Component Value Date    COVID19 Not Detected 11/15/2020         Anesthesia Evaluation  Patient summary reviewed and Nursing notes reviewed  Airway: Mallampati: II  TM distance: >3 FB   Neck ROM: full  Mouth opening: > = 3 FB Dental: normal exam         Pulmonary:normal exam  breath sounds clear to auscultation                             Cardiovascular:            Rhythm: regular  Rate: normal                    Neuro/Psych:               GI/Hepatic/Renal:             Endo/Other:                     Abdominal:       Abdomen: soft. Vascular:                                        Anesthesia Plan      general     ASA 3       Induction: intravenous. MIPS: Postoperative opioids intended and Prophylactic antiemetics administered. Anesthetic plan and risks discussed with patient. Use of blood products discussed with patient whom consented to blood products. Plan discussed with attending and CRNA.     Attending anesthesiologist reviewed and agrees with Sae Sage MD   11/19/2020

## 2020-11-19 NOTE — OP NOTE
Operative Note      Patient: Alex Vera  YOB: 1960  MRN: [id number]    Date of Procedure: 11/19/2020    Pre-Op Diagnosis: SPONDYLOLISTHESIS OF LUMBAR REGION, lumbar radiculopathy    Post-Op Diagnosis: Same       Procedure:   L4-5 right-sided laminectomy with Rosas facetectomy of L4  L4-5 discectomy and anterior arthrodesis  Implantation of titanium Medtronic banana cage at L4-5  Use of morselized autograft via same incision  Use of morselized allograft  Segmental pedicle screw fixation at L4 and L5 Medtronic  Use of spinal neuro navigation  Use of fluoroscopy  Use of neuro monitoring  Right L4 and L5 neural lysis and lysis of scar adhesion  Open reduction of spondylolisthesis    Surgeon(s):  Bridget Lucas DO    Assistant:   First Assistant: Tiny Read RN; Jacquelyn Wright    Anesthesia: General    Estimated Blood Loss (mL): 861     Complications: None    Specimens:   ID Type Source Tests Collected by Time Destination   1 : URINE; INITIAL GARCIA INSERTION Urine Urine, indwelling catheter CULTURE, URINE Kiara Ash DO 11/19/2020 0905        Implants:  Implant Name Type Inv.  Item Serial No.  Lot No. LRB No. Used Action   GRAFT BNE VIABLE 1 CC YOUNG MTRX FIBERCEL - [id number]  GRAFT BNE VIABLE 1 CC YOUNG MTRX FIBERCEL 150 [employer]-WD [id number] N/A 1 Implanted   SPACER [id number] 12W 30MM X 11MM 10 DG TI  SPACER [id number] 12W 30MM X 11MM 10 DG TI  [employer]-WD [id number] N/A 1 Implanted   GRAFT BNE SUB 5CC DEMIN BNE MTRX FRZ DRY PROGENIX + - SN/A  GRAFT BNE SUB 5CC DEMIN BNE MTRX FRZ DRY PROGENIX + N/A [employer] SPINALGRAFT TECH-WD [id number] N/A 1 Implanted   SCREW SPNL L45MM OD75MM POST THORACOLUMBOSACRAL SRI  SCREW SPNL L45MM OD75MM POST THORACOLUMBOSACRAL SRI  [employer] [place] [employer]-WD  N/A 1 Implanted   SCREW SPNL L50MM [id number] POST THORACOLUMBOSACRAL MULTIAXIAL  SCREW SPNL L50MM [id number] POST THORACOLUMBOSACRAL MULTIAXIAL  [employer] [place] [employer]  N/A 1 Implanted   SCREW SPNL L40MM OD85MM POST THORACOLUMBOSACRAL SRI  SCREW SPNL L40MM OD85MM POST THORACOLUMBOSACRAL SRI  [employer] [place] [employer]-WD  N/A 1 Implanted   SCREW SPNL L50MM OD85MM POST THORACOLUMBOSACRAL SRI  SCREW SPNL L50MM OD85MM POST THORACOLUMBOSACRAL SRI  [employer] [place] [employer]-WD  N/A 1 Implanted   JODY SPNL L40MM [id number] POST THORACOLUMBOSACRAL CO CHROME  JODY SPNL L40MM [id number] POST THORACOLUMBOSACRAL CO CHROME  [employer] [place] [employer]-WD  N/A 1 Implanted   JODY SPNL L45MM [id number] THORACOLUMBOSACRAL CHROMALOY PERC  JODY SPNL L45MM [id number] THORACOLUMBOSACRAL CHROMALOY PERC  [employer] [place] [employer]-WD  N/A 1 Implanted   SET SCR SPNL DIA4. 75MM POST THORLUM SACR TI PERC APPRCH [address]  SET SCR SPNL DIA4. 75MM POST THORLUM SACR TI PERC APPRCH [address]  MEDTR[employer]IC SOFAMOR DANEK-WD  N/A 4 Implanted   SPACER [id number] 12W 30MM X 11MM 10 DG TI  SPACER [id number] 12W 30MM X 11MM 10 DG TI  [employer]TR[employer] SOFAMOR DANEK-WD  N/A 1 Implanted         Drains:   Closed/Suction Drain Right;Lateral Back Bulb 7 Slov[place] (Active)   Site Description Unable to view 11/19/20 1345   Dressing Status Clean;Dry; Intact 11/19/20 1345   Drainage Appearance Bloody 11/19/20 1345   Status To bulb suction 11/19/20 1345   Output (ml) 20 ml 11/19/20 1337       Urethral Catheter Double-lumen 16 fr (Active)   Catheter Indications Perioperative use in selected surgeries including but not limited to urologic, pelvic or need for intraoperative monitoring of urinary output due to prolonged surgery, large volume infusion or need for diuretic therapy in surgery 11/19/20 1345   Site Assessment No urethral drainage 11/19/20 1345   Urine Color Yellow 11/19/20 1345       Findings: Significant foraminal stenosis    Detailed Description of Procedure:   Patient was consented preoperatively and brought into the operative room. She was placed under general anesthesia and flipped prone onto the Oscar table all pressure points padded arms placed on the arm boards.   Prior midline lumbar incision was marked and performed with DBM. The cage of been filled with a combination of morselized autograft bone as well as cellular graft. I slightly countersunk the L4 screws and withdrew the L5 screws. Rods were placed in MIS technique on the left side and open technique on the right side. Sequential reduction using towers was used to perform a complete reduction of the grade 1 anterolisthesis at L4-5. AP and lateral x-rays showed good alignment on anterior and complete reduction of the listhesis on lateral.  Final tightening of all caps was performed. Retractors removed and AP and lateral fluoroscopy showed good placement of all hardware with erin poking out on either and cephalad and caudad on both sides. Wound was thoroughly irrigated with Betadine and multiple rounds of saline. Final motor and sensory potentials were stable. Hemostasis was obtained with Dorlene Embs as well as bipolar cautery. 7 flat REGINE drain was tunneled subfascial and secured with a drain stitch. Wound was closed with no strategies for fascia and muscle followed by inverted 2-0 Vicryl's for subcutaneous tissue and staples for skin. Left sided percutaneous incisions were made for the L4-5 pedicle screws were closed using a combination of inverted 0 Vicryl followed by 2-0 Vicryl followed by staples for the skin.   Bacitracin island were placed over this wound patient was flipped back supine onto the regular bed extubated in stable condition and returned to the PACU    Electronically signed by Sharyle Bays, DO on 11/19/2020 at 3:47 PM

## 2020-11-19 NOTE — PROGRESS NOTES
Neurosurgery Post op Progress Note      POD# 0    s/p lumbar fusion    SUBJECTIVE:      Patient presents with right lower extremity pain from her foot up towards the hip, came in for elective surgery       OBJECTIVE      Physical exam   VITALS:    Vitals:    11/19/20 1449   BP: (!) 128/92   Pulse: 87   Resp: 20   Temp:    SpO2: 90%     INTAKE:      Intake/Output Summary (Last 24 hours) at 11/19/2020 1538  Last data filed at 11/19/2020 1337  Gross per 24 hour   Intake 1450 ml   Output 720 ml   Net 730 ml            Neurological exam   alert, oriented x3, affect appropriate, no focal neurological deficits, moves all extremities well and no involuntary movements  alert, oriented, normal speech, no focal findings or movement disorder noted, normal muscle tone, no tremors, strength 5/5.   A little drowsy  On 5L nasal cannula oxygen saturations 90      Wound   Post op wound:  Intact, covered with postop dressing   Drain:bloody drainage     ASSESSMENT AND PLAN    56 y.o. female status post lumbar fusion post op day # 0    - Analgesia: Morphine 2-4mg q2hrs PRN and Roxicodone 5-10mg every 4 hours PRN  - Periop Antibiotics: Ancef 2g q8hrs for 3 doses   - Activity: As tolerated  - DVT prophylaxis: SCDs, will start Lovenox tomorrow 11/20  - Diet: clear liquid at this time, Advance as tolerated to carb control   - encourage I.S  - PT and OT   - wean oxygen as patient tolerates   - will follow up with lumbar xray imagine   - monitor REGINE output and record     Electronically signed by ELVIRA Ashton - NP on 11/19/2020 at 3:38 PM

## 2020-11-19 NOTE — INTERVAL H&P NOTE
Pt Name: Radha Lyon  MRN: [id number]  YOB: 1960  Date of evaluation: 11/19/2020    I have reviewed the patient's history and physical examination completed in pre-admission testing on 11/5/20    No changes to history or on examination today, unless noted below. Received flu vaccine on 11/12/20. Negative covid-19 screening on 11/15/20.     RUSTY JOAQUIN APRN-CNP  11/19/20  6:43 AM

## 2020-11-20 PROBLEM — J47.9 BRONCHIECTASIS (HCC): Status: ACTIVE | Noted: 2020-11-20

## 2020-11-20 PROBLEM — E66.9 OBESITY (BMI 30-39.9): Status: ACTIVE | Noted: 2020-11-20

## 2020-11-20 PROBLEM — Z99.81 OXYGEN DEPENDENT: Status: ACTIVE | Noted: 2020-11-20

## 2020-11-20 PROBLEM — J43.2 CENTRILOBULAR EMPHYSEMA (HCC): Status: ACTIVE | Noted: 2020-11-20

## 2020-11-20 PROBLEM — D62 ACUTE POSTOPERATIVE ANEMIA DUE TO EXPECTED BLOOD LOSS: Status: ACTIVE | Noted: 2020-11-20

## 2020-11-20 PROBLEM — R06.89 ALVEOLAR HYPOVENTILATION: Status: ACTIVE | Noted: 2020-11-20

## 2020-11-20 LAB
CULTURE: NO GROWTH
GLUCOSE BLD-MCNC: 108 MG/DL (ref 65–105)
GLUCOSE BLD-MCNC: 131 MG/DL (ref 65–105)
GLUCOSE BLD-MCNC: 142 MG/DL (ref 65–105)
GLUCOSE BLD-MCNC: 159 MG/DL (ref 65–105)
HCT VFR BLD CALC: 37.2 % (ref 36.3–47.1)
HEMOGLOBIN: 11.6 G/DL (ref 11.9–15.1)
Lab: NORMAL
MCH RBC QN AUTO: 27.4 PG (ref 25.2–33.5)
MCHC RBC AUTO-ENTMCNC: 31.2 G/DL (ref 28.4–34.8)
MCV RBC AUTO: 87.7 FL (ref 82.6–102.9)
NRBC AUTOMATED: 0 PER 100 WBC
PDW BLD-RTO: 13.2 % (ref 11.8–14.4)
PLATELET # BLD: 263 K/UL (ref 138–453)
PMV BLD AUTO: 10.4 FL (ref 8.1–13.5)
RBC # BLD: 4.24 M/UL (ref 3.95–5.11)
SPECIMEN DESCRIPTION: NORMAL
WBC # BLD: 9 K/UL (ref 3.5–11.3)

## 2020-11-20 PROCEDURE — 85027 COMPLETE CBC AUTOMATED: CPT

## 2020-11-20 PROCEDURE — 97166 OT EVAL MOD COMPLEX 45 MIN: CPT | Performed by: OCCUPATIONAL THERAPIST

## 2020-11-20 PROCEDURE — 97162 PT EVAL MOD COMPLEX 30 MIN: CPT

## 2020-11-20 PROCEDURE — 97530 THERAPEUTIC ACTIVITIES: CPT

## 2020-11-20 PROCEDURE — 36415 COLL VENOUS BLD VENIPUNCTURE: CPT

## 2020-11-20 PROCEDURE — 94660 CPAP INITIATION&MGMT: CPT

## 2020-11-20 PROCEDURE — APPSS15 APP SPLIT SHARED TIME 0-15 MINUTES: Performed by: NURSE PRACTITIONER

## 2020-11-20 PROCEDURE — 82947 ASSAY GLUCOSE BLOOD QUANT: CPT

## 2020-11-20 PROCEDURE — 2580000003 HC RX 258: Performed by: NURSE PRACTITIONER

## 2020-11-20 PROCEDURE — 6370000000 HC RX 637 (ALT 250 FOR IP): Performed by: NURSE PRACTITIONER

## 2020-11-20 PROCEDURE — 6360000002 HC RX W HCPCS: Performed by: NURSE PRACTITIONER

## 2020-11-20 PROCEDURE — 99221 1ST HOSP IP/OBS SF/LOW 40: CPT | Performed by: INTERNAL MEDICINE

## 2020-11-20 PROCEDURE — 6370000000 HC RX 637 (ALT 250 FOR IP): Performed by: STUDENT IN AN ORGANIZED HEALTH CARE EDUCATION/TRAINING PROGRAM

## 2020-11-20 PROCEDURE — 1200000000 HC SEMI PRIVATE

## 2020-11-20 RX ADMIN — CARVEDILOL 6.25 MG: 6.25 TABLET, FILM COATED ORAL at 09:27

## 2020-11-20 RX ADMIN — SODIUM CHLORIDE: 9 INJECTION, SOLUTION INTRAVENOUS at 09:08

## 2020-11-20 RX ADMIN — ACETAMINOPHEN 650 MG: 325 TABLET ORAL at 09:28

## 2020-11-20 RX ADMIN — METFORMIN HYDROCHLORIDE 500 MG: 500 TABLET ORAL at 09:27

## 2020-11-20 RX ADMIN — GABAPENTIN 300 MG: 300 CAPSULE ORAL at 09:27

## 2020-11-20 RX ADMIN — OMEPRAZOLE 20 MG: 20 CAPSULE, DELAYED RELEASE ORAL at 09:26

## 2020-11-20 RX ADMIN — TROSPIUM CHLORIDE 20 MG: 20 TABLET, FILM COATED ORAL at 20:26

## 2020-11-20 RX ADMIN — ACETAMINOPHEN 650 MG: 325 TABLET ORAL at 15:06

## 2020-11-20 RX ADMIN — ACETAMINOPHEN 650 MG: 325 TABLET ORAL at 04:47

## 2020-11-20 RX ADMIN — METFORMIN HYDROCHLORIDE 500 MG: 500 TABLET ORAL at 17:45

## 2020-11-20 RX ADMIN — ENOXAPARIN SODIUM 40 MG: 40 INJECTION SUBCUTANEOUS at 09:25

## 2020-11-20 RX ADMIN — MIRTAZAPINE 15 MG: 15 TABLET, FILM COATED ORAL at 20:26

## 2020-11-20 RX ADMIN — GABAPENTIN 300 MG: 300 CAPSULE ORAL at 15:06

## 2020-11-20 RX ADMIN — DEXTROSE MONOHYDRATE 2 G: 50 INJECTION, SOLUTION INTRAVENOUS at 06:33

## 2020-11-20 RX ADMIN — DOCUSATE SODIUM 100 MG: 100 CAPSULE, LIQUID FILLED ORAL at 09:27

## 2020-11-20 RX ADMIN — OXYCODONE HYDROCHLORIDE 10 MG: 5 TABLET ORAL at 20:25

## 2020-11-20 RX ADMIN — TROSPIUM CHLORIDE 20 MG: 20 TABLET, FILM COATED ORAL at 09:27

## 2020-11-20 RX ADMIN — POTASSIUM CHLORIDE 20 MEQ: 1500 TABLET, EXTENDED RELEASE ORAL at 09:26

## 2020-11-20 RX ADMIN — DULOXETINE HYDROCHLORIDE 60 MG: 30 CAPSULE, DELAYED RELEASE ORAL at 09:27

## 2020-11-20 RX ADMIN — LISINOPRIL AND HYDROCHLOROTHIAZIDE 1 TABLET: 25; 20 TABLET ORAL at 09:27

## 2020-11-20 RX ADMIN — CARVEDILOL 6.25 MG: 6.25 TABLET, FILM COATED ORAL at 20:26

## 2020-11-20 RX ADMIN — TIZANIDINE 4 MG: 4 TABLET ORAL at 09:26

## 2020-11-20 RX ADMIN — MIRTAZAPINE 30 MG: 15 TABLET, FILM COATED ORAL at 20:27

## 2020-11-20 RX ADMIN — TIZANIDINE 4 MG: 4 TABLET ORAL at 20:26

## 2020-11-20 RX ADMIN — ACETAMINOPHEN 650 MG: 325 TABLET ORAL at 20:25

## 2020-11-20 RX ADMIN — CETIRIZINE HYDROCHLORIDE 10 MG: 10 TABLET ORAL at 09:27

## 2020-11-20 RX ADMIN — AMLODIPINE BESYLATE 10 MG: 10 TABLET ORAL at 09:28

## 2020-11-20 RX ADMIN — GABAPENTIN 600 MG: 300 CAPSULE ORAL at 20:26

## 2020-11-20 RX ADMIN — DESMOPRESSIN ACETATE 40 MG: 0.2 TABLET ORAL at 09:27

## 2020-11-20 RX ADMIN — INSULIN LISPRO 1 UNITS: 100 INJECTION, SOLUTION INTRAVENOUS; SUBCUTANEOUS at 12:59

## 2020-11-20 RX ADMIN — SODIUM CHLORIDE, PRESERVATIVE FREE 10 ML: 5 INJECTION INTRAVENOUS at 09:25

## 2020-11-20 RX ADMIN — INSULIN LISPRO 1 UNITS: 100 INJECTION, SOLUTION INTRAVENOUS; SUBCUTANEOUS at 17:46

## 2020-11-20 ASSESSMENT — PAIN SCALES - GENERAL
PAINLEVEL_OUTOF10: 8
PAINLEVEL_OUTOF10: 10
PAINLEVEL_OUTOF10: 8
PAINLEVEL_OUTOF10: 9
PAINLEVEL_OUTOF10: 8
PAINLEVEL_OUTOF10: 7
PAINLEVEL_OUTOF10: 9
PAINLEVEL_OUTOF10: 9
PAINLEVEL_OUTOF10: 8

## 2020-11-20 ASSESSMENT — PAIN DESCRIPTION - PROGRESSION
CLINICAL_PROGRESSION: GRADUALLY IMPROVING

## 2020-11-20 ASSESSMENT — PAIN DESCRIPTION - LOCATION
LOCATION: BACK

## 2020-11-20 ASSESSMENT — PAIN - FUNCTIONAL ASSESSMENT
PAIN_FUNCTIONAL_ASSESSMENT: PREVENTS OR INTERFERES SOME ACTIVE ACTIVITIES AND ADLS
PAIN_FUNCTIONAL_ASSESSMENT: PREVENTS OR INTERFERES SOME ACTIVE ACTIVITIES AND ADLS
PAIN_FUNCTIONAL_ASSESSMENT: ACTIVITIES ARE NOT PREVENTED

## 2020-11-20 ASSESSMENT — ENCOUNTER SYMPTOMS
PHOTOPHOBIA: 0
APNEA: 1
COUGH: 1
BACK PAIN: 1
VOMITING: 0
NAUSEA: 0
BLOOD IN STOOL: 0
SHORTNESS OF BREATH: 1
WHEEZING: 0
ABDOMINAL DISTENTION: 0

## 2020-11-20 ASSESSMENT — PAIN DESCRIPTION - ONSET
ONSET: ON-GOING

## 2020-11-20 ASSESSMENT — PAIN DESCRIPTION - FREQUENCY
FREQUENCY: INTERMITTENT

## 2020-11-20 ASSESSMENT — PAIN DESCRIPTION - DESCRIPTORS
DESCRIPTORS: ACHING

## 2020-11-20 ASSESSMENT — PAIN DESCRIPTION - PAIN TYPE
TYPE: SURGICAL PAIN

## 2020-11-20 NOTE — PROGRESS NOTES
Nerve Block (Right, 4/30/13); Nerve Block (5/23/13); Colonoscopy (2/12/2009); Upper gastrointestinal endoscopy (9 20 2007); Upper gastrointestinal endoscopy (4 21 2009,04/2011); Nerve Block (8/12/13); Nerve Block (Left, 8-28-13); Nerve Block (Left, 9-24-13); Nerve Block (07-02-14); Nerve Block (7-16-14); Nerve Block (7/30/14); Nerve Block (11-6-14); Nerve Block (11/20/15); pr knee scope,diagnostic (Right, 3/24/2017); Nerve Block (07/20/2018); Nerve Block (Bilateral, 02/01/2019); Nerve Block (Bilateral, 02/08/2019); lumbar discectomy (01/2015); lumbar fusion (11/19/2020); and lumbar fusion (N/A, 11/19/2020).       Restrictions  Restrictions/Precautions  Restrictions/Precautions: General Precautions, Fall Risk, Up as Tolerated  Required Braces or Orthoses?: No  Position Activity Restriction  Other position/activity restrictions: up with assist; Olive Spencer 80  Patient assessed for rehabilitation services?: Yes  Family / Caregiver Present: No  Patient Currently in Pain: Yes  Pain Assessment  Pain Assessment: 0-10  Pain Level: 8  Pain Type: Surgical pain  Pain Location: Back  Non-Pharmaceutical Pain Intervention(s): Ambulation/Increased Activity  Response to Pain Intervention: Patient Satisfied  Vital Signs  Patient Currently in Pain: Yes     Social/Functional History  Social/Functional History  Lives With: Alone(neighbors visit frequently)  Type of Home: Apartment(4th floor apartment)  Home Layout: One level  Home Access: Elevator, Level entry  Bathroom Shower/Tub: Tub/Shower unit  Bathroom Toilet: Standard  Bathroom Equipment: Shower chair, Grab bars in shower, Grab bars around toilet  Home Equipment: Quad cane, Electric scooter, Oxygen(quad cane within home; scooter for out of house activities; 2L O2 typically only at night)  Receives Help From: Home health, Neighbor(used to have an aide who came to help her, but prior to surgery she was not coming anymore--looking to have the aide start coming again)  ADL Assistance: Independent  Homemaking Assistance: Independent  Homemaking Responsibilities: Yes  Ambulation Assistance: Independent  Transfer Assistance: Independent  Active : Yes  Mode of Transportation: Car  Occupation: On [address]: puzzles; pool     Objective   Vision: Impaired  Vision Exceptions: Wears glasses at all times  Hearing: Within functional limits       Observation/Palpation  Posture: Fair  Balance  Sitting Balance: Contact guard assistance  Standing Balance: Contact guard assistance  Standing Balance  Time: ~1 minute  Activity: static standing, functional mobility  Functional Mobility  Functional - Mobility Device: Other(hand held assist)  Activity: Other  Assist Level: Contact guard assistance  ADL  Feeding: Independent;Setup  Grooming: Setup;Minimal assistance; Increased time to complete  UE Bathing: Setup; Moderate assistance; Increased time to complete  LE Bathing: Setup;Maximum assistance; Increased time to complete  UE Dressing: Setup; Moderate assistance; Increased time to complete  LE Dressing: Setup;Maximum assistance; Increased time to complete  Toileting: Setup; Moderate assistance  Tone RUE  RUE Tone: Normotonic  Tone LUE  LUE Tone: Normotonic  Coordination  Movements Are Fluid And Coordinated: Yes     Bed mobility  Supine to Sit: Minimal assistance  Sit to Supine: Stand by assistance  Comment: log roll technique completed post verbal cueing with fair return  Transfers  Sit to stand: Minimal assistance  Stand to sit: Minimal assistance     Perception  Overall Perceptual Status: WFL     Sensation  Overall Sensation Status: WFL      LUE AROM (degrees)  LUE AROM : WFL  Left Hand AROM (degrees)  Left Hand AROM: WFL  RUE AROM (degrees)  RUE AROM : WFL  Right Hand AROM (degrees)  Right Hand AROM: WFL  LUE Strength  Gross LUE Strength: WFL  RUE Strength  Gross RUE Strength: WFL      Plan   Plan  Times per week: 4-5x    AM-PAC Score   AM-PAC Inpatient Daily Activity Raw Score: 14 (11/20/20 1514)  AM-PAC Inpatient ADL T-Scale Score : 33.39 (11/20/20 1514)  ADL Inpatient CMS 0-100% Score: 59.67 (11/20/20 1514)  ADL Inpatient CMS G-Code Modifier : CK (11/20/20 1514)    Goals  Short term goals  Time Frame for Short term goals: by discharge pt will. Gurjit Arce term goal 1: demo mod I for bed mobility  Short term goal 2: demo 5+ minutes standing tolerance to increase participation in ADLs with supervision  Short term goal 3: demo functional transfers with supervision  Short term goal 4: complete UB ADL tasks with SBA  Short term goal 5: complete LB ADL tasks with min A and use of AE prn       Therapy Time   Individual Concurrent Group Co-treatment   Time In 1001         Time Out 1015         Minutes 14         Timed Code Treatment Minutes: 0 Minutes     Loli Carpenter OTR/L

## 2020-11-20 NOTE — PROGRESS NOTES
Physical Therapy    Facility/Department: [facility] NEURO  Initial Assessment    NAME: Kylah Cano  : 1960  MRN: [id number]    Date of Service: 2020  Patient complains of right lower extremity pain, symptoms from the foot up towards the hip. She occasionally will also have symptoms into the lumbar spine. She has had lumbar spine surgery in the past, as well as nerve blocks at pain management. She has symptoms despite conservative treatment. Lumbar fusion on 2020. Discharge Recommendations:    Further therapy recommended at discharge. PT Equipment Recommendations  Other: TBD    Assessment   Body structures, Functions, Activity limitations: Decreased functional mobility ; Decreased safe awareness;Decreased endurance;Decreased balance; Increased pain  Assessment: Patient cooperative to work with Pt during evaluation but was signficantly limited in functional mobility d/t post-op pain. Pt would benefit from continued therapy to increase independence. Prognosis: Good  Decision Making: Medium Complexity  PT Education: Goals; General Safety;PT Role;Plan of Care;Transfer Training  Patient Education: Pt was educated on log-rolling technique. REQUIRES PT FOLLOW UP: Yes  Activity Tolerance  Activity Tolerance: Patient limited by pain       Patient Diagnosis(es): There were no encounter diagnoses.      has a past medical history of Allergic rhinitis, Aortic insufficiency, Bronchitis, Chronic back pain, COPD (chronic obstructive pulmonary disease) ([address] Depression, DM (diabetes mellitus) ([address] GERD (gastroesophageal reflux disease), History of echocardiogram, Hyperlipidemia, Hypertension, Obesity, Osteoarthritis, Peripheral vascular disease ([address] Primary osteoarthritis of both knees, Radicular pain of lumbosacral region, Spinal stenosis, lumbar region, without neurogenic claudication, Tricuspid regurgitation, Type II or unspecified type diabetes mellitus without mention of complication, not stated as Grab bars in shower, Grab bars around toilet  Home Equipment: Quad cane, Electric scooter, Oxygen(quad cane within home; scooter for out of house activities; 2L O2 typically only at night)  Receives Help From: Home health, Neighbor(used to have an aide who came to help her, but prior to surgery she was not coming anymore--looking to have the aide start coming again)  ADL Assistance: [facility]: Independent  Homemaking Responsibilities: Yes  Ambulation Assistance: Independent  Transfer Assistance: Independent  Active : Yes  Mode of Transportation: Car  Occupation: On disability  Leisure & Hobbies: puzzles; pool    Objective     Observation/Palpation  Posture: Fair    AROM RLE (degrees)  RLE AROM: WNL  AROM LLE (degrees)  LLE AROM : WNL  AROM RUE (degrees)  RUE AROM : WFL  AROM LUE (degrees)  LUE AROM : WFL  Strength RLE  Comment: not formally assessed d/t pain--moves antigravity  Strength LLE  Comment: not formally assessed d/t pain--moves antigravity  Strength RUE  Strength RUE: WFL  Strength LUE  Strength LUE: WFL  Tone RLE  RLE Tone: Normotonic  Tone LLE  LLE Tone: Normotonic  Sensation  Overall Sensation Status: WFL  Bed mobility  Supine to Sit: Minimal assistance  Sit to Supine: Stand by assistance  Transfers  Sit to Stand: Contact guard assistance  Stand to sit: Contact guard assistance  Ambulation  Ambulation?: Yes  Ambulation 1  Surface: level tile  Device: No Device  Assistance: Contact guard assistance  Quality of Gait: small steps  Distance: 3 steps to R     Balance  Posture: Fair  Sitting - Static: Good  Sitting - Dynamic: Fair;+  Standing - Static: Fair;+  Standing - Dynamic: Fair;-  Comments: standing balance assessed without AD        Plan   Plan  Times per week: 5-6 visits per week  Times per day: Daily  Current Treatment Recommendations: Balance Training, Strengthening, Functional Mobility Training, Transfer Training, Gait Training, Stair training  Safety Devices  Type of devices: All fall risk precautions in place, Call light within reach, Gait belt, Left in bed, Nurse notified  Restraints  Initially in place: No    AM-PAC Score  AM-PAC Inpatient Mobility Raw Score : 18 (11/20/20 1257)  AM-PAC Inpatient T-Scale Score : 43.63 (11/20/20 1257)  Mobility Inpatient CMS 0-100% Score: 46.58 (11/20/20 1257)  Mobility Inpatient CMS G-Code Modifier : CK (11/20/20 1257)    Goals  Short term goals  Time Frame for Short term goals: 12 visits  Short term goal 1: pt will be independent with bed mobility  Short term goal 2: pt will perform transfers independently  Short term goal 3: pt will ambulate 200' with least restrictive AD mod (I)  Short term goal 4: pt will ascend/descend 2 steps without handrails independently  Patient Goals   Patient goals : to decrease pain       Therapy Time   Individual Concurrent Group Co-treatment   Time In 1001         Time Out 1016         Minutes 15         Timed Code Treatment Minutes: 8 Minutes    This treatment/evaluation completed by signing SPT. Signing PT agrees with treatment and documentation.     Jaja Payton, Student Physical Therapist

## 2020-11-20 NOTE — CARE COORDINATION
Case Management Initial Discharge Plan  Duong Wolfe,             Met with:patient to discuss discharge plans. Information verified: address, contacts, phone number, , insurance Yes    Emergency Contact/Next of Kin name & number: Nathan Mancilla [place] [phone]    PCP: Viraj Keller MD  Date of last visit: Virtual     Insurance Provider: Josefina Taylor    Discharge Planning    Living Arrangements:    alone  Support Systems:   Family    Home has 2 stories  stairs to climb to get into front door, flightstairs to climb to reach second floor  Location of bedroom/bathroom in home main floor     Patient able to perform ADL's:Independent    Current Services (outpatient & in home) none   DME equipment: O2 wears 2 liters, cane and power chair  DME provider:     Receiving oral anticoagulation therapy? No    If indicated:   Physician managing anticoagulation treatment:   Where does patient obtain lab work for ATC treatment? Potential Assistance Needed:       Patient agreeable to home care: Yes  Freedom of choice provided:  yes    Prior SNF/Rehab Placement and Facility:   Agreeable to SNF/Rehab: yes  [place] of choice provided: yes     Evaluation: no    Expected Discharge date:       Patient expects to be discharged to: Follow Up Appointment: Best Day/ Time:      Transportation provider: self/family  Transportation arrangements needed for discharge: Yes    Readmission Risk              Risk of Unplanned Readmission:        10             Does patient have a readmission risk score greater than 14?: No  If yes, follow-up appointment must be made within 7 days of discharge.      Goals of Care:  Safety and pain control      Discharge Plan: home with possible [facility] pending therapy input          Electronically signed by Weston RN on 20 at 1:06 PM EST

## 2020-11-20 NOTE — DISCHARGE INSTR - COC
Continuity of Care Form    Patient Name: Sharri Flor   :  1960  MRN:  [id number]    Admit date:  2020  Discharge date:  2020    Code Status Order: Full Code   Advance Directives:      Admitting Physician:  Christel Cooper DO  PCP: Juany Blankenship MD    Discharging Nurse: [facility] Unit/Room#: 0854/6588-77  Discharging Unit Phone Number: [phone]    Emergency Contact:   Extended Emergency Contact Information  Primary Emergency Contact: [facility] Phone: [phone]  Mobile Phone: [phone]  Relation: Child  Secondary Emergency Contact: Andrew Cheng  Address: N/A   [address] Phone: [phone]  Work Phone: [phone]  Mobile Phone: [phone]  Relation: Brother/Sister  Hearing or visual needs: None  Other needs: None  Preferred language: English   needed?  No    Past Surgical History:  Past Surgical History:   Procedure Laterality Date    COLONOSCOPY  2009    normal    DILATION AND CURETTAGE OF UTERUS      GASTRECTOMY      partial    LUMBAR DISCECTOMY  2015    lumbar diskectomy    LUMBAR FUSION  2020     POSTERIOR FUSION L4/5,     NERVE BLOCK Right 13    Lumbar Diagnostic Block,  Kenalog 40 mg    NERVE BLOCK  13    Lumbar Radiofrequency, Kenalog 40mg    NERVE BLOCK  13    Lt MBNB  celestone 6mg    NERVE BLOCK Left 13    left lumbar diagnostic block #2 decadron 10 mg    NERVE BLOCK Left 13    left lumbar median branch radiofrequency    NERVE BLOCK  14    caudal, celestone 9 mg    NERVE BLOCK  14    caudal epidural #2, celestone 9mg, fentanyl 25mcg    NERVE BLOCK  14    caudal #3 decadron 10mg    NERVE BLOCK  14    duramorph epidural steroid block  duramorph 1 mg celestone 9 mg    NERVE BLOCK  11/20/15    TENS- Empi Select    NERVE BLOCK  2018    right transforminal # 1 decadron 10mg,isovue    NERVE BLOCK Bilateral 2019    bilat mbnb- no steroid    NERVE BLOCK Bilateral 02/08/2019    bilat mbnb, marcaine . 25%    DC KNEE SCOPE,DIAGNOSTIC Right 3/24/2017    KNEE ARTHROSCOPY WITH PARTIAL MEDIAL MENISECAL DEBRIDMENT  performed by Suzanne Ibarra MD at [address]  9 20 2007    UPPER GASTROINTESTINAL ENDOSCOPY  4 21 2009,04/2011    gastritis, esophagitis       Immunization History:   Immunization History   Administered Date(s) Administered    Influenza, Quadv, 6 mo and older, IM (Fluzone, Flulaval) 10/02/2017    Influenza, Quadv, IM, (6 mo and older Fluzone, Flulaval, Fluarix and 3 yrs and older Afluria) 10/06/2016, 09/17/2019, 11/12/2020    Influenza, Eugena Jose D, IM, PF (6 mo and older Fluzone, Flulaval, Fluarix, and 3 yrs and older Afluria) 10/29/2018    Pneumococcal Polysaccharide (Mlodbarfc15) 01/26/2016    Tdap (Boostrix, Adacel) 06/24/2019    Zoster Recombinant (Shingrix) 06/24/2019       Active Problems:  Patient Active Problem List   Diagnosis Code    DJD (degenerative joint disease) of knee M17.10    Osteoarthritis of spine with radiculopathy, lumbar region M47.26    GERD (gastroesophageal reflux disease) K21.9    COPD (chronic obstructive pulmonary disease) J44.9    HTN (hypertension) I10    Allergic rhinitis J30.9    Lipoma of shoulder s/p excision right posterior 11 17 2008 D17.20    DM (diabetes mellitus) E11.9    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 M94.261    Primary osteoarthritis of both knees M17.0    Medication monitoring encounter Z51.81    Chronic low back pain M54.5, G89.29    Major depression, chronic F32.9    Chronic respiratory failure with hypoxia (HCC) J96.11    Mitral and aortic insufficiency I08.0    Pure hypercholesterolemia E78.00    Spondylosis of lumbar region without myelopathy or radiculopathy M47.816    Lumbar disc disease M51.9       Isolation/Infection:   Isolation            No Isolation          Patient Infection Status       Infection Onset Medical Equipment (for information only, NOT a DME order):  cane  Other Treatments: ***    Patient's personal belongings (please select all that are sent with patient):  Glasses, clothing    RN SIGNATURE:  Electronically signed by Patt Villagran RN on 12/2/20 at 3:43 PM EST    CASE MANAGEMENT/SOCIAL WORK SECTION    Inpatient Status Date: 11/19/2020    Readmission Risk Assessment Score:  Readmission Risk              Risk of Unplanned Readmission:        10           Discharging to Facility/ Agency   [facility] [address] [facility]         [address], [address] [place] [address]       Phone: [phone]       Fax: [phone]        ·     Dialysis Facility (if applicable)   · Name:  · Address:  · Dialysis Schedule:  · Phone:  · Fax:    / signature: Electronically signed by Rosi Carrasco RN on 12/2/20 at 5:10 PM EST    PHYSICIAN SECTION    Prognosis: Good    Condition at Discharge: Stable    Rehab Potential (if transferring to Rehab): Good    Recommended Labs or Other Treatments After Discharge: Please see attached discharge instructions    Physician Certification: I certify the above information and transfer of Evelio Holcomb  is necessary for the continuing treatment of the diagnosis listed and that she requires Home Care for greater 30 days.      Update Admission H&P: No change in H&P    PHYSICIAN SIGNATURE:  Electronically signed by Lew Rodarte DO on 12/2/20 at 3:37 PM EST

## 2020-11-20 NOTE — PROGRESS NOTES
Neurosurgery TIAN/Resident    Daily Progress Note   CC:No chief complaint on file. 11/20/2020  7:01 AM    Chart reviewed. No acute events overnight. No new complaints. Afebrile, denies pain in upper extremities and lower extremities. Denies numbness and tingling to BUE.  A little drowsy this morning, when bipap was off patient did drop her oxygen saturations to 70%    Vitals:    11/20/20 0327 11/20/20 0330 11/20/20 0341 11/20/20 0505   BP: (!) 160/88 (!) 160/88     Pulse: 85 84     Resp: 15 19 22 16   Temp: 97.6 °F (36.4 °C)      TempSrc: Axillary      SpO2: 100% 100%     Weight:       Height:           PE:   AOx3     Motor   L deltoid 5/5; R deltoid 5/5  L biceps 5/5; R biceps 5/5  L triceps 5/5; R triceps 5/5  L wrist extension 5/5; R wrist extension 5/5  L intrinsics 5/5; R intrinsics 5/5      L iliopsoas 5/5 , R iliopsoas 5/5  L quadriceps 5/5; R quadriceps 5/5  L Dorsiflexion 5/5; R dorsiflexion 5/5  L Plantarflexion 5/5; R plantarflexion 5/5  L EHL 5/5; R EHL 5/5      Sensation: intact    Drain output: 60ml/ 12 hours   Incision: intact       Lab Results   Component Value Date    WBC 9.0 11/20/2020    HGB 11.6 (L) 11/20/2020    HCT 37.2 11/20/2020     11/20/2020    CHOL 201 (H) 04/21/2020    TRIG 155 (H) 04/21/2020    HDL 54 04/21/2020    ALT 21 04/21/2020    AST 18 04/21/2020     11/05/2020    K 4.0 11/05/2020     11/05/2020    CREATININE 0.84 11/05/2020    BUN 15 11/05/2020    CO2 25 11/05/2020    TSH 0.94 07/19/2017    LABA1C 5.8 04/21/2020    LABMICR CANNOT BE CALCULATED 04/21/2020       Radiology   XR lumbar pending      A/P  56 y.o. female who presents with lumbar radiculopathy   POD#1 s/p L4-5 right sided laminectomy, L4-5 discectomy sand anterior arthrodesis     - PT and OT for mobilization  - Keep REGINE drain at this time  - medicine consulted for hypoxia   - continue current pain and bowel regimen  - eating a little and tolerating regular diet   - started on lovenox this morning  -

## 2020-11-20 NOTE — CONSULTS
[facility]                                                                                                                                                                          Office: 1 Karan Lopez DO, Elise Melendez, DO, Adwoa Regalado DO, Madhavi Gonzalez, DO, Chica Bello MD, Danilo Aparicio MD, Sabina Saeed MD, Elin Bird MD, Zoila Perales MD, Jane Manzano MD, Jeannie Connelly MD, Shoshana Verma MD, Franklin Jean MD, Julianne Browning DO, Alcon Vallejo MD, Irwin David MD, Darwin Escoto, DO, Va Braden MD,  Abelino White, DO, Gamal Bell MD, Neris Mcghee MD, Cody Adhikari, CNP, [facility], CNP, Deana Cho, CNP, Meredith Sabillon, CNS, Homero Miner, CNP, Júnior Avendano, CNP, Nimisha Pérez, CNP, Dionicia Frankel, CNP, Carlos Piedra, CNP, Sharon Beach PA-C, Emmanuel Romero, DNP, Harlan Graves, CNP, Timothy Perkins, CNP, Wally Holt, CNP, Chadd Sellers, CNP, Pankaj Mock, [address]  Internal Medicine Consultation  [facility]      Carlin Lockhart  [id number]  11/20/2020  Reason for Consult:  Medical Management  Hypoxia   COPD    Requesting Physician:  Swathi Vogel DO    PCP - Chris Guillen MD    The patient has been examined and the chart was reviewed.     CHIEF COMPLAINT: Back pain    History Obtained From:  patient, electronic medical record    HISTORY OF PRESENT ILLNESS:   Location: Low back pain  Severity:  Moderate to severe    Duration:  Chronic   Timing:  Progressive   Context:  Known Lumbar DDD  Modifiers:  Has not responded to conservative treatment  Associations:  Pain, stiffness, decreased function    The patient has not responded well to conservative therapy  They have elected surgical treatment  They are now postop:  Procedure:   L4-5 right-sided laminectomy with Rosas facetectomy of L4  L4-5 discectomy and anterior arthrodesis  Implantation of titanium Medtronic banana cage at L4-5  Use of morselized autograft via same incision  Use of morselized allograft  Segmental pedicle screw fixation at L4 and L5 Medtronic  Use of spinal neuro navigation  Use of fluoroscopy  Use of neuro monitoring  Right L4 and L5 neural lysis and lysis of scar adhesion  Open reduction of spondylolisthesis    Postoperatively the patient has been hypoxic  O2 sats in the 80s  She has required BiPAP    Patient has a known history of COPD  She is oxygen dependent  The patient generally uses 2 L of oxygen via nasal cannula  Her pulmonologist is Dr. Elpidio Devries    Glucose range from 108-159  Records indicates a history of diabetes:    Results for Florencia Quick (MRN [id number]) as of 11/20/2020 15:29   Ref. Range 7/19/2017 10:31 1/5/2018 14:57 6/18/2018 15:10 3/5/2019 11:21 4/21/2020 12:15   Hemoglobin A1C Latest Ref Range: 4.0 - 6.0 % 5.8 5.9 5.8 5.8 5.8       Preop hemoglobin 15.3  Postop hemoglobin 11.6    CXR 2018  Generalized interstitial prominence suggestive of underlying chronic lung    disease.  No focal airspace consolidation.        Past Medical History:    Past Medical History:   Diagnosis Date    Allergic rhinitis     Alveolar hypoventilation 11/20/2020    Aortic insufficiency 2018    mild-moderate on echo (was seen and discharged from cardiology-[facility])    Bronchiectasis ([address] 11/20/2020    Bronchitis     Chronic back pain     [facility] at [facility]. COPD (chronic obstructive pulmonary disease) ([facility]     Dr. Maged Martinez to see 11/09/2020    Depression     DM (diabetes mellitus) ([address] 12/18/2012    GERD (gastroesophageal reflux disease)     History of echocardiogram 05/2018    EF 65%, mild-moderate AI and TR    Hyperlipidemia     Dr. Del Schmidt Hypertension     Dr. Del Schmidt Obesity     Osteoarthritis     Peripheral vascular disease ([address]     Primary osteoarthritis of both knees     Radicular pain of lumbosacral region     Spinal stenosis, lumbar region, without neurogenic claudication 9 20 2007    UPPER GASTROINTESTINAL ENDOSCOPY  4 21 2009,04/2011    gastritis, esophagitis       Current Medications:    Current Facility-Administered Medications: SUFentanil citrate 200 mcg in sodium chloride 0.9 % 200 mL infusion, 0.25 mcg/kg/hr, Intravenous, Once  albuterol sulfate  (90 Base) MCG/ACT inhaler 2 puff, 2 puff, Inhalation, Q6H PRN  amLODIPine (NORVASC) tablet 10 mg, 10 mg, Oral, Daily  atorvastatin (LIPITOR) tablet 40 mg, 40 mg, Oral, Daily  azelastine (ASTELIN) 0.1 % nasal spray 2 spray, 2 spray, Each Nostril, BID  carvedilol (COREG) tablet 6.25 mg, 6.25 mg, Oral, BID  docusate sodium (COLACE) capsule 100 mg, 100 mg, Oral, Daily  DULoxetine (CYMBALTA) extended release capsule 60 mg, 60 mg, Oral, Daily  gabapentin (NEURONTIN) capsule 300 mg, 300 mg, Oral, BID  diphenhydrAMINE (BENADRYL) tablet 25 mg, 25 mg, Oral, Q6H PRN  ipratropium-albuterol (DUONEB) nebulizer solution 3 mL, 1 vial, Inhalation, Q6H PRN  lisinopril-hydroCHLOROthiazide (PRINZIDE;ZESTORETIC) 20-25 MG per tablet 1 tablet, 1 tablet, Oral, Daily  metFORMIN (GLUCOPHAGE) tablet 500 mg, 500 mg, Oral, BID WC  mirtazapine (REMERON) tablet 15 mg, 15 mg, Oral, Nightly  mirtazapine (REMERON) tablet 30 mg, 30 mg, Oral, Nightly  omeprazole (PRILOSEC) delayed release capsule 20 mg, 20 mg, Oral, QAM  potassium chloride (KLOR-CON M) extended release tablet 20 mEq, 20 mEq, Oral, Daily  tiZANidine (ZANAFLEX) tablet 4 mg, 4 mg, Oral, BID  trospium (SANCTURA) tablet 20 mg, 20 mg, Oral, BID  sodium chloride flush 0.9 % injection 10 mL, 10 mL, Intravenous, 2 times per day  sodium chloride flush 0.9 % injection 10 mL, 10 mL, Intravenous, PRN  0.9 % sodium chloride infusion, , Intravenous, Continuous  acetaminophen (TYLENOL) tablet 650 mg, 650 mg, Oral, Q6H  oxyCODONE (ROXICODONE) immediate release tablet 5 mg, 5 mg, Oral, Q4H PRN **OR** oxyCODONE (ROXICODONE) immediate release tablet 10 mg, 10 mg, Oral, Q4H PRN  morphine (PF) injection 2 mg, 2 mg, Intravenous, Q2H PRN **OR** morphine injection 4 mg, 4 mg, Intravenous, Q2H PRN  polyethylene glycol (GLYCOLAX) packet 17 g, 17 g, Oral, Daily  magnesium hydroxide (MILK OF MAGNESIA) 400 MG/5ML suspension 30 mL, 30 mL, Oral, Daily  senna (SENOKOT) tablet 8.6 mg, 1 tablet, Oral, Daily PRN  enoxaparin (LOVENOX) injection 40 mg, 40 mg, Subcutaneous, Daily  cetirizine (ZYRTEC) tablet 10 mg, 10 mg, Oral, Daily  gabapentin (NEURONTIN) capsule 600 mg, 600 mg, Oral, Nightly  tiotropium (SPIRIVA RESPIMAT) 2.5 MCG/ACT inhaler 2 puff, 2 puff, Inhalation, Daily  insulin lispro (HUMALOG) injection vial 0-6 Units, 0-6 Units, Subcutaneous, TID WC  insulin lispro (HUMALOG) injection vial 0-3 Units, 0-3 Units, Subcutaneous, Nightly  glucose (GLUTOSE) 40 % oral gel 15 g, 15 g, Oral, PRN  dextrose 50 % IV solution, 12.5 g, Intravenous, PRN  glucagon (rDNA) injection 1 mg, 1 mg, Intramuscular, PRN  dextrose 5 % solution, 100 mL/hr, Intravenous, PRN  nicotine (NICODERM CQ) 7 MG/24HR 1 patch, 1 patch, Transdermal, Daily    Allergies:  Aspirin; Claritin [loratadine]; Flonase [fluticasone propionate]; and Morphine and related    Social History:    TOBACCO:   reports that she quit smoking about 3 weeks ago. Her smoking use included cigarettes. She has a 9.00 pack-year smoking history. She has never used smokeless tobacco.  ETOH:   reports no history of alcohol use. FamilyHistory:       Problem Relation Age of Onset    Diabetes Mother     Heart Disease Mother     Cirrhosis Father        REVIEW OF SYSTEMS:    Review of Systems   Constitutional: Negative for activity change (Diminished due to back pain), appetite change, chills, diaphoresis, fatigue and fever. HENT: Negative for congestion and nosebleeds. Eyes: Negative for photophobia and visual disturbance.    Respiratory: Positive for apnea (Has had CPAP at home but it was discontinued due to lack of compliance), cough (Chronic white sputum production) and shortness of breath (Dyspnea on exertion). Negative for wheezing. Oxygen dependent   Cardiovascular: Negative for chest pain and palpitations. Gastrointestinal: Negative for abdominal distention, blood in stool, nausea and vomiting. Genitourinary: Negative for flank pain and hematuria. Musculoskeletal: Positive for back pain (See chief complaint) and gait problem (due to back pain). Negative for arthralgias and myalgias. Skin: Negative for rash and wound. Neurological: Negative for dizziness and light-headedness. PHYSICAL EXAM:   Vitals:    BP (!) 113/57   Pulse 80   Temp 97.8 °F (36.6 °C) (Oral)   Resp 16   Ht 5' 6\" (1.676 m)   Wt 188 lb (85.3 kg)   LMP 04/19/2003   SpO2 91%   BMI 30.34 kg/m²   Physical Exam  Vitals signs reviewed. Constitutional:       General: She is not in acute distress. Appearance: She is not diaphoretic. HENT:      Head: Normocephalic. Nose: Nose normal.   Eyes:      General: No scleral icterus. Conjunctiva/sclera: Conjunctivae normal.   Neck:      Musculoskeletal: Neck supple. Trachea: No tracheal deviation. Cardiovascular:      Rate and Rhythm: Normal rate and regular rhythm. Pulmonary:      Effort: No respiratory distress. Breath sounds: Rhonchi present. No wheezing or rales. Comments: Airflow reduced  No retractions  No accessory muscle use  No cyanosis    Chest:      Chest wall: No tenderness. Abdominal:      General: Bowel sounds are normal. There is no distension. Palpations: Abdomen is soft. Tenderness: There is no abdominal tenderness. Musculoskeletal:         General: No tenderness. Skin:     General: Skin is warm and dry. Comments: Wound is dressed, dry and clean    Neurological:      General: No focal deficit present. Mental Status: She is alert and oriented to person, place, and time.          DATA:    Recent Results (from the past 24 hour(s))   POC Glucose Fingerstick    Collection Time: 11/19/20  5:29 PM Result Value Ref Range    POC Glucose 151 (H) 65 - 105 mg/dL   POC Glucose Fingerstick    Collection Time: 11/19/20  8:33 PM   Result Value Ref Range    POC Glucose 133 (H) 65 - 105 mg/dL   CBC    Collection Time: 11/20/20  4:51 AM   Result Value Ref Range    WBC 9.0 3.5 - 11.3 k/uL    RBC 4.24 3.95 - 5.11 m/uL    Hemoglobin 11.6 (L) 11.9 - 15.1 g/dL    Hematocrit 37.2 36.3 - 47.1 %    MCV 87.7 82.6 - 102.9 fL    MCH 27.4 25.2 - 33.5 pg    MCHC 31.2 28.4 - 34.8 g/dL    RDW 13.2 11.8 - 14.4 %    Platelets 463 156 - 611 k/uL    MPV 10.4 8.1 - 13.5 fL    NRBC Automated 0.0 0.0 per 100 WBC   POC Glucose Fingerstick    Collection Time: 11/20/20  8:31 AM   Result Value Ref Range    POC Glucose 108 (H) 65 - 105 mg/dL   POC Glucose Fingerstick    Collection Time: 11/20/20 12:00 PM   Result Value Ref Range    POC Glucose 159 (H) 65 - 105 mg/dL       Findings:   Centrilobular emphysema [facility])  Active Hospital Problems    Diagnosis Date Noted    History of tobacco use [Z87.891] 02/06/2012     Priority: High    HTN (hypertension) [I10] 09/24/2011     Priority: High    GERD (gastroesophageal reflux disease) [K21.9] 09/24/2011     Priority: Low    Alveolar hypoventilation [R06.89] 11/20/2020    Obesity (BMI 30-39. 9) [E66.9] 11/20/2020    Bronchiectasis ([address] [J47.9] 11/20/2020    Centrilobular emphysema ([facility] [address] [J43.2] 11/20/2020    Oxygen dependent [Z99.81] 11/20/2020    Acute postoperative anemia due to expected blood loss [D62] 11/20/2020    Lumbar disc disease [M51.9] 11/19/2020    Spondylosis of lumbar region without myelopathy or radiculopathy [M47.816]     Chronic respiratory failure with hypoxia [facility]) [J96.11] 06/18/2018     Recommendations:  Neurosurgical evaluation  Physical therapy / rehab  DVT prophylaxis per surgery  Pain management, minimize opioids and respiratory depressants  Encourage activity as tolerated  Respiratory Therapy and Bronchodilators prn  Supplemental oxygen as needed  BiPAP as needed  Continued tobacco abstinence  NicoDerm  Blood products prn - monitor H&H   Blood Pressure - Monitor and control  Risk factor management / weight loss    Reflux precautions   Pulmonary follow-up Dr. Parra     See orders  Will follow    IP CONSULT TO INTERNAL MEDICINE    Electronically signed by Jennifer Hernandez DO on 11/20/2020 at 3:32 PM  Copy of H&P to Klaus Talbert MD

## 2020-11-21 ENCOUNTER — APPOINTMENT (OUTPATIENT)
Dept: GENERAL RADIOLOGY | Age: 60
DRG: 459 | End: 2020-11-21
Attending: NEUROLOGICAL SURGERY
Payer: COMMERCIAL

## 2020-11-21 LAB
GLUCOSE BLD-MCNC: 100 MG/DL (ref 65–105)
GLUCOSE BLD-MCNC: 104 MG/DL (ref 65–105)
GLUCOSE BLD-MCNC: 114 MG/DL (ref 65–105)

## 2020-11-21 PROCEDURE — 97116 GAIT TRAINING THERAPY: CPT

## 2020-11-21 PROCEDURE — APPSS15 APP SPLIT SHARED TIME 0-15 MINUTES: Performed by: NURSE PRACTITIONER

## 2020-11-21 PROCEDURE — 6370000000 HC RX 637 (ALT 250 FOR IP): Performed by: STUDENT IN AN ORGANIZED HEALTH CARE EDUCATION/TRAINING PROGRAM

## 2020-11-21 PROCEDURE — 6370000000 HC RX 637 (ALT 250 FOR IP): Performed by: NURSE PRACTITIONER

## 2020-11-21 PROCEDURE — 2500000003 HC RX 250 WO HCPCS

## 2020-11-21 PROCEDURE — 1200000000 HC SEMI PRIVATE

## 2020-11-21 PROCEDURE — 94640 AIRWAY INHALATION TREATMENT: CPT

## 2020-11-21 PROCEDURE — 6360000002 HC RX W HCPCS: Performed by: NURSE PRACTITIONER

## 2020-11-21 PROCEDURE — 97530 THERAPEUTIC ACTIVITIES: CPT

## 2020-11-21 PROCEDURE — 94660 CPAP INITIATION&MGMT: CPT

## 2020-11-21 PROCEDURE — 99232 SBSQ HOSP IP/OBS MODERATE 35: CPT | Performed by: INTERNAL MEDICINE

## 2020-11-21 PROCEDURE — 2580000003 HC RX 258: Performed by: NURSE PRACTITIONER

## 2020-11-21 PROCEDURE — 2700000000 HC OXYGEN THERAPY PER DAY

## 2020-11-21 PROCEDURE — 82947 ASSAY GLUCOSE BLOOD QUANT: CPT

## 2020-11-21 PROCEDURE — 6370000000 HC RX 637 (ALT 250 FOR IP): Performed by: NEUROLOGICAL SURGERY

## 2020-11-21 PROCEDURE — 72100 X-RAY EXAM L-S SPINE 2/3 VWS: CPT

## 2020-11-21 PROCEDURE — 71045 X-RAY EXAM CHEST 1 VIEW: CPT

## 2020-11-21 RX ORDER — LIDOCAINE HYDROCHLORIDE 10 MG/ML
INJECTION, SOLUTION INFILTRATION; PERINEURAL
Status: COMPLETED
Start: 2020-11-21 | End: 2020-11-21

## 2020-11-21 RX ORDER — BENZONATATE 100 MG/1
100 CAPSULE ORAL 3 TIMES DAILY PRN
Status: DISCONTINUED | OUTPATIENT
Start: 2020-11-21 | End: 2020-12-02 | Stop reason: HOSPADM

## 2020-11-21 RX ADMIN — POTASSIUM CHLORIDE 20 MEQ: 1500 TABLET, EXTENDED RELEASE ORAL at 08:48

## 2020-11-21 RX ADMIN — MAGNESIUM HYDROXIDE 30 ML: 400 SUSPENSION ORAL at 08:50

## 2020-11-21 RX ADMIN — TROSPIUM CHLORIDE 20 MG: 20 TABLET, FILM COATED ORAL at 08:50

## 2020-11-21 RX ADMIN — OXYCODONE HYDROCHLORIDE 10 MG: 5 TABLET ORAL at 14:20

## 2020-11-21 RX ADMIN — ACETAMINOPHEN 650 MG: 325 TABLET ORAL at 14:20

## 2020-11-21 RX ADMIN — TROSPIUM CHLORIDE 20 MG: 20 TABLET, FILM COATED ORAL at 20:58

## 2020-11-21 RX ADMIN — OXYCODONE HYDROCHLORIDE 10 MG: 5 TABLET ORAL at 08:48

## 2020-11-21 RX ADMIN — AMLODIPINE BESYLATE 10 MG: 10 TABLET ORAL at 08:48

## 2020-11-21 RX ADMIN — METFORMIN HYDROCHLORIDE 500 MG: 500 TABLET ORAL at 08:49

## 2020-11-21 RX ADMIN — OMEPRAZOLE 20 MG: 20 CAPSULE, DELAYED RELEASE ORAL at 08:50

## 2020-11-21 RX ADMIN — CETIRIZINE HYDROCHLORIDE 10 MG: 10 TABLET ORAL at 08:49

## 2020-11-21 RX ADMIN — CARVEDILOL 6.25 MG: 6.25 TABLET, FILM COATED ORAL at 08:48

## 2020-11-21 RX ADMIN — OXYCODONE HYDROCHLORIDE 10 MG: 5 TABLET ORAL at 18:20

## 2020-11-21 RX ADMIN — METFORMIN HYDROCHLORIDE 500 MG: 500 TABLET ORAL at 18:20

## 2020-11-21 RX ADMIN — GABAPENTIN 600 MG: 300 CAPSULE ORAL at 20:58

## 2020-11-21 RX ADMIN — CARVEDILOL 6.25 MG: 6.25 TABLET, FILM COATED ORAL at 20:59

## 2020-11-21 RX ADMIN — SODIUM CHLORIDE, PRESERVATIVE FREE 10 ML: 5 INJECTION INTRAVENOUS at 21:01

## 2020-11-21 RX ADMIN — DESMOPRESSIN ACETATE 40 MG: 0.2 TABLET ORAL at 08:48

## 2020-11-21 RX ADMIN — TIZANIDINE 4 MG: 4 TABLET ORAL at 20:58

## 2020-11-21 RX ADMIN — DULOXETINE HYDROCHLORIDE 60 MG: 30 CAPSULE, DELAYED RELEASE ORAL at 08:48

## 2020-11-21 RX ADMIN — ENOXAPARIN SODIUM 40 MG: 40 INJECTION SUBCUTANEOUS at 08:48

## 2020-11-21 RX ADMIN — TIOTROPIUM BROMIDE INHALATION SPRAY 2 PUFF: 3.12 SPRAY, METERED RESPIRATORY (INHALATION) at 09:40

## 2020-11-21 RX ADMIN — MIRTAZAPINE 15 MG: 15 TABLET, FILM COATED ORAL at 21:01

## 2020-11-21 RX ADMIN — GABAPENTIN 300 MG: 300 CAPSULE ORAL at 08:48

## 2020-11-21 RX ADMIN — MIRTAZAPINE 30 MG: 15 TABLET, FILM COATED ORAL at 21:01

## 2020-11-21 RX ADMIN — LIDOCAINE HYDROCHLORIDE: 10 INJECTION, SOLUTION INFILTRATION; PERINEURAL at 14:00

## 2020-11-21 RX ADMIN — ACETAMINOPHEN 650 MG: 325 TABLET ORAL at 08:47

## 2020-11-21 RX ADMIN — ACETAMINOPHEN 650 MG: 325 TABLET ORAL at 20:58

## 2020-11-21 RX ADMIN — LISINOPRIL AND HYDROCHLOROTHIAZIDE 1 TABLET: 25; 20 TABLET ORAL at 08:48

## 2020-11-21 RX ADMIN — GABAPENTIN 300 MG: 300 CAPSULE ORAL at 14:17

## 2020-11-21 RX ADMIN — DOCUSATE SODIUM 100 MG: 100 CAPSULE, LIQUID FILLED ORAL at 08:48

## 2020-11-21 RX ADMIN — TIZANIDINE 4 MG: 4 TABLET ORAL at 08:48

## 2020-11-21 ASSESSMENT — PAIN DESCRIPTION - PAIN TYPE
TYPE: SURGICAL PAIN

## 2020-11-21 ASSESSMENT — PAIN SCALES - GENERAL
PAINLEVEL_OUTOF10: 10
PAINLEVEL_OUTOF10: 8
PAINLEVEL_OUTOF10: 10
PAINLEVEL_OUTOF10: 9
PAINLEVEL_OUTOF10: 8
PAINLEVEL_OUTOF10: 4

## 2020-11-21 ASSESSMENT — PAIN DESCRIPTION - LOCATION
LOCATION: BACK

## 2020-11-21 ASSESSMENT — PAIN DESCRIPTION - FREQUENCY: FREQUENCY: INTERMITTENT

## 2020-11-21 ASSESSMENT — ENCOUNTER SYMPTOMS
COUGH: 1
NAUSEA: 0
ABDOMINAL PAIN: 0
VOMITING: 0
SHORTNESS OF BREATH: 1

## 2020-11-21 ASSESSMENT — PAIN - FUNCTIONAL ASSESSMENT
PAIN_FUNCTIONAL_ASSESSMENT: PREVENTS OR INTERFERES SOME ACTIVE ACTIVITIES AND ADLS
PAIN_FUNCTIONAL_ASSESSMENT: PREVENTS OR INTERFERES SOME ACTIVE ACTIVITIES AND ADLS

## 2020-11-21 ASSESSMENT — PAIN DESCRIPTION - DESCRIPTORS: DESCRIPTORS: ACHING

## 2020-11-21 ASSESSMENT — PAIN DESCRIPTION - ONSET: ONSET: ON-GOING

## 2020-11-21 ASSESSMENT — PAIN DESCRIPTION - DIRECTION: RADIATING_TOWARDS: RIGHT LEG TO FOOT

## 2020-11-21 ASSESSMENT — PAIN DESCRIPTION - PROGRESSION: CLINICAL_PROGRESSION: GRADUALLY IMPROVING

## 2020-11-21 NOTE — PROGRESS NOTES
Physical Therapy  Facility/Department: [facility] NEURO  Daily Treatment Note  NAME: Carlin Lockhart  : 1960  MRN: [id number]    Date of Service: 2020    Discharge Recommendations:  Further therapy recommended at discharge and the patient should be able to tolerate at least 3 hours per day over 5 days or 15 hours over 7 days. PT Equipment Recommendations  Equipment Needed: No    Assessment   Body structures, Functions, Activity limitations: Decreased functional mobility ; Decreased safe awareness;Decreased endurance;Decreased balance; Increased pain  Assessment: Pt amb 3 ft without device and CGA. At this time, pt high fall risk and unsafe to return to prior living, recommend aggressive PT after d/c to address deficits  Prognosis: Good  REQUIRES PT FOLLOW UP: Yes  Activity Tolerance  Activity Tolerance: Patient limited by pain     Patient Diagnosis(es): There were no encounter diagnoses. has a past medical history of Allergic rhinitis, Alveolar hypoventilation, Aortic insufficiency, Bronchiectasis (HCC), Bronchitis, Chronic back pain, COPD (chronic obstructive pulmonary disease) ([address] Depression, DM (diabetes mellitus) ([address] GERD (gastroesophageal reflux disease), History of echocardiogram, Hyperlipidemia, Hypertension, Obesity, Osteoarthritis, Peripheral vascular disease ([address] Primary osteoarthritis of both knees, Radicular pain of lumbosacral region, Spinal stenosis, lumbar region, without neurogenic claudication, Tricuspid regurgitation, Type II or unspecified type diabetes mellitus without mention of complication, not stated as uncontrolled, Unspecified sleep apnea, URI (upper respiratory infection), Wears dentures, Wears glasses, and Wellness examination. has a past surgical history that includes Dilation and curettage of uterus; gastrectomy; Nerve Block (Right, 13); Nerve Block (13); Colonoscopy (2009); Upper gastrointestinal endoscopy (2007);  Upper gastrointestinal endoscopy (4 21 2009,04/2011); Nerve Block (8/12/13); Nerve Block (Left, 8-28-13); Nerve Block (Left, 9-24-13); Nerve Block (07-02-14); Nerve Block (7-16-14); Nerve Block (7/30/14); Nerve Block (11-6-14); Nerve Block (11/20/15); pr knee scope,diagnostic (Right, 3/24/2017); Nerve Block (07/20/2018); Nerve Block (Bilateral, 02/01/2019); Nerve Block (Bilateral, 02/08/2019); lumbar discectomy (01/2015); lumbar fusion (11/19/2020); and lumbar fusion (N/A, 11/19/2020). Restrictions  Restrictions/Precautions  Restrictions/Precautions: General Precautions, Fall Risk, Up as Tolerated  Required Braces or Orthoses?: No  Position Activity Restriction  Other position/activity restrictions: up with assist; JPdrain  Subjective   General  Response To Previous Treatment: Patient with no complaints from previous session. Family / Caregiver Present: No  Subjective  Subjective: Pt resting in bed upon arrival, agreeable to PT with MAX encouragment.   Pain Screening  Patient Currently in Pain: Yes  Pain Assessment  Pain Assessment: 0-10  Pain Level: 4  Pain Type: Surgical pain  Pain Location: Back  Functional Pain Assessment: Prevents or interferes some active activities and ADLs  Response to Pain Intervention: Patient Satisfied  Vital Signs  Patient Currently in Pain: Yes       Orientation  Orientation  Overall Orientation Status: Within Functional Limits  Cognition      Objective   Bed mobility  Rolling to Right: Stand by assistance  Supine to Sit: Stand by assistance  Scooting: Stand by assistance  Comment: cues for log rolling tech with fair carryover  Transfers  Sit to Stand: Contact guard assistance  Stand to sit: Contact guard assistance  Bed to Chair: Contact guard assistance  Comment: poor safety awarness  Ambulation  Ambulation?: Yes  Ambulation 1  Surface: level tile  Device: No Device  Assistance: Contact guard assistance  Quality of Gait: forward flexed posture, with short, choppy steps,  Distance: 3 ft bed to chair  Comments: Pt adamantly refuing further amb due to pain, despite edu on risks of decreased mobility  Stairs/Curb  Stairs?: No     Balance  Posture: Good  Sitting - Static: Good  Sitting - Dynamic: Good;-  Standing - Static: Fair  Standing - Dynamic: Fair;-  Comments: standing balance assessed without AD, limited by poor safety awarness    Exercise  Reviewed B LE AROM  In all planes in recliner x 10 reps. Pt demo understanding        Goals  Short term goals  Time Frame for Short term goals: 12 visits  Short term goal 1: pt will be independent with bed mobility  Short term goal 2: pt will perform transfers independently  Short term goal 3: pt will ambulate 200' with least restrictive AD mod (I)  Short term goal 4: pt will ascend/descend 2 steps without handrails independently  Patient Goals   Patient goals : to decrease pain    Plan    Plan  Times per week: 5-6 visits per week  Times per day: Daily  Current Treatment Recommendations: Balance Training, Strengthening, Functional Mobility Training, Transfer Training, Gait Training, Stair training  Safety Devices  Type of devices:  All fall risk precautions in place, Call light within reach, Gait belt, Nurse notified, Left in chair  Restraints  Initially in place: No     Therapy Time   Individual Concurrent Group Co-treatment   Time In 1240         Time Out 1304         Minutes 24         Timed Code Treatment Minutes: 4101 Sherri Lin, KALIE

## 2020-11-21 NOTE — PROGRESS NOTES
improving- ok to wean oxygen as patient tolerates it- she does wear 2L at home  - tolerating diet well  - passing flatus  - pain well controlled  - encourage I.S    - on Lovenox for DVT ppx    Please contact neurosurgery with any changes in patients neurologic status.        Peterson Levin CNP  11/21/20  1:57 PM

## 2020-11-21 NOTE — PLAN OF CARE
Drain Removal Note    [x]Subfascial Drain   []Subgaleal Drain  []Ventriculostomy Drain    Drain removed from suction, prepped with betadine and sterile towels placed to create sterile field. Drain suture cut and drain removed. A simple suture suture placed with 3.0 nylon over drain hole with hemostasis. No complications, patient tolerated procedure well.     --  Aidee Naranjo CNP  1:56 PM EST

## 2020-11-21 NOTE — PLAN OF CARE
Problem: Pain:  Goal: Pain level will decrease  Description: Pain level will decrease  11/21/2020 1601 by Stephen Sierra RN  Outcome: Ongoing  11/21/2020 0414 by Musa Bronson RN  Outcome: Ongoing  Goal: Control of acute pain  Description: Control of acute pain  11/21/2020 1601 by Stephen Sierra RN  Outcome: Ongoing  11/21/2020 0414 by Musa Bronson RN  Outcome: Ongoing  Goal: Control of chronic pain  Description: Control of chronic pain  11/21/2020 1601 by Stephen Sierra RN  Outcome: Ongoing  11/21/2020 0414 by Msua Bronson RN  Outcome: Ongoing   Pain level assessment complete. Pt rated pain at 10/10. Pt educated on pain scale and control interventions. PRN pain medication given per pt request. Pt instructed to call out with new onset of pain or unrelieved pain. Will continue to monitor. Problem: Falls - Risk of:  Goal: Will remain free from falls  Description: Will remain free from falls  11/21/2020 1601 by Stephen Sierra RN  Outcome: Ongoing  11/21/2020 0414 by Musa Bronson RN  Outcome: Ongoing  Goal: Absence of physical injury  Description: Absence of physical injury  11/21/2020 1601 by Stephen Sierra RN  Outcome: Ongoing  11/21/2020 0414 by Musa Bronson RN  Outcome: Ongoing   Patient remained free from injury. Patient verbalized understanding of need for the safety precautions. Demonstrates proper use of assistive devices. Bed remains in the lowest position. Call light remains within reach. [employer] [place] [employer] in use.      Problem: Skin Integrity:  Goal: Will show no infection signs and symptoms  Description: Will show no infection signs and symptoms  11/21/2020 1601 by Stephen Sierra RN  Outcome: Ongoing  11/21/2020 0414 by Musa Bronson RN  Outcome: Ongoing  Goal: Absence of new skin breakdown  Description: Absence of new skin breakdown  11/21/2020 1601 by Stephen Sierra RN  Outcome: Ongoing  11/21/2020 0414 by Musa Bronson RN  Outcome: Ongoing

## 2020-11-21 NOTE — PROGRESS NOTES
[facility]    Office: José Miguel Garcia, DO, Ethel Simple, DO, Roberto Lopez, DO, Mari Juarez Blood, DO, Isaac Hess MD, Nora Everett MD, Nixon Veliz MD, Elidia Faye MD, Jaky Stroud MD, Gilbert Keys MD, Mikey Pinto MD, Martinez Sainz MD, Franklin Reynolds MD, Sebas Edwards DO, Sergio Mcdonnell MD, Lucas Chavez MD, Aye Thao DO, Tato Taylor MD,  Leesa Johnson, DO, Ramona Lau MD, Aaron Bailey MD, Shweta Dougherty, [facility], [facility], CNP, Natalya Aparicio, CNP, Merline Galvan, CNS, Park Sylvester, CNP, Shasha Jenkins, CNP, Zeus Chen, CNP, Nash Kramer, CNP, Francesco Renner, CNP, Mandy Pena PA-C, Suzanne Trejo, [facility], Margareth Gutierres, CNP, Vanessa Roberts, CNP, Ebenezer Alfaro, CNP, Pastor Lezama, CNP, Carla Albarado, CNP    [facility]   [address]    Progress Note    11/21/2020    4:40 PM    Name:   Tanika Luna  MRN:     [id number]     Acct:      [de-identified]   Room:   [address] [place] [address] Day:  2  Admit Date:  11/19/2020  5:32 AM    PCP:   Delilah Puente MD  Code Status:  Full Code    Subjective:     C/C:   Back pain    Interval History Status:  improved  Looks more comfortable  Ambulating to the bathroom  Rating her pain at 10/10? Did use BiPAP last night  Nonproductive cough, patient requesting cough suppressant    Data Base Updates:  O2 sats in the 90s    Glucose 104     Follow-up back x-ray:  Impression:  New transpedicular hardware fixation L4-L5 without definitive radiographic   evidence of complication.        Brief History:     CHIEF COMPLAINT: Back pain     History Obtained From:  patient, electronic medical record     HISTORY OF PRESENT ILLNESS:   Location: Low back pain  Severity:  Moderate to severe    Duration:  Chronic   Timing:  Progressive   Context:  Known Lumbar DDD  Modifiers:  Has not responded to conservative treatment  Associations:  Pain, stiffness, decreased function     The patient has not responded well to conservative therapy  They have elected surgical treatment  They are now postop:  Procedure:   L4-5 right-sided laminectomy with Rosas facetectomy of L4  L4-5 discectomy and anterior arthrodesis  Implantation of titanium Medtronic banana cage at L4-5  Use of morselized autograft via same incision  Use of morselized allograft  Segmental pedicle screw fixation at L4 and L5 Med[employer]onic  Use of spinal neuro navigation  Use of fluoroscopy  Use of neuro monitoring  Right L4 and L5 neural lysis and lysis of scar adhesion  Open reduction of spondylolisthesis     Postoperatively the patient has been hypoxic  O2 sats in the 80s  She has required BiPAP     Patient has a known history of COPD  She is oxygen dependent  The patient generally uses 2 L of oxygen via nasal cannula  Her pulmonologist is Dr. Nora Trivedi     Glucose range from 108-159  Records indicates a history of diabetes:     Results for Priscilla Osborne (MRN [id number]) as of 11/20/2020 15:29    Ref. Range 7/19/2017 10:31 1/5/2018 14:57 6/18/2018 15:10 3/5/2019 11:21 4/21/2020 12:15   Hemoglobin A1C Latest Ref Range: 4.0 - 6.0 % 5.8 5.9 5.8 5.8 5.8         Preop hemoglobin 15.3  Postop hemoglobin 11.6     CXR 2018  Generalized interstitial prominence suggestive of underlying chronic lung    disease.  No focal airspace consolidation.          Medications: Allergies:     Allergies   Allergen Reactions    Aspirin      DUE TO ULCER    Claritin [Loratadine] Other (See Comments)     coughing    Flonase [Fluticasone Propionate]     Morphine And Related      GI Upset       Current Meds:   Scheduled Meds:    sufentanil (SUFENTA) 1 mcg/mL infusion  0.25 mcg/kg/hr Intravenous Once    amLODIPine  10 mg Oral Daily    atorvastatin  40 mg Oral Daily    azelastine  2 spray Each Nostril BID    carvedilol  6.25 mg Oral BID    docusate sodium  100 mg Oral Daily    DULoxetine  60 mg Oral Daily    gabapentin  300 mg Oral BID    lisinopril-hydroCHLOROthiazide  1 tablet Oral Daily    metFORMIN  500 mg Oral BID WC    mirtazapine  15 mg Oral Nightly    mirtazapine  30 mg Oral Nightly    omeprazole  20 mg Oral QAM    potassium chloride  20 mEq Oral Daily    tiZANidine  4 mg Oral BID    trospium  20 mg Oral BID    sodium chloride flush  10 mL Intravenous 2 times per day    acetaminophen  650 mg Oral Q6H    polyethylene glycol  17 g Oral Daily    magnesium hydroxide  30 mL Oral Daily    enoxaparin  40 mg Subcutaneous Daily    cetirizine  10 mg Oral Daily    gabapentin  600 mg Oral Nightly    tiotropium  2 puff Inhalation Daily    insulin lispro  0-6 Units Subcutaneous TID WC    insulin lispro  0-3 Units Subcutaneous Nightly    nicotine  1 patch Transdermal Daily     Continuous Infusions:    dextrose       PRN Meds: albuterol sulfate HFA, diphenhydrAMINE, ipratropium-albuterol, sodium chloride flush, oxyCODONE **OR** oxyCODONE, morphine **OR** morphine, senna, glucose, dextrose, glucagon (rDNA), dextrose    Data:     Past Medical History:   has a past medical history of Allergic rhinitis, Alveolar hypoventilation, Aortic insufficiency, Bronchiectasis (HCC), Bronchitis, Chronic back pain, COPD (chronic obstructive pulmonary disease) ([facility] [address] Depression, DM (diabetes mellitus) ([facility] [address] GERD (gastroesophageal reflux disease), History of echocardiogram, Hyperlipidemia, Hypertension, Obesity, Osteoarthritis, Peripheral vascular disease ([facility] [address] Primary osteoarthritis of both knees, Radicular pain of lumbosacral region, Spinal stenosis, lumbar region, without neurogenic claudication, Tricuspid regurgitation, Type II or unspecified type diabetes mellitus without mention of complication, not stated as uncontrolled, Unspecified sleep apnea, URI (upper respiratory infection), Wears dentures, Wears glasses, and Wellness examination. Social History:   reports that she quit smoking about 3 weeks ago. Her smoking use included cigarettes.  She has a 9.00 pack-year smoking history. She has never used smokeless tobacco. She reports that she does not drink alcohol or use drugs. Family History:   Family History   Problem Relation Age of Onset    Diabetes Mother     Heart Disease Mother     Cirrhosis Father        Review of Systems:     Review of Systems   Constitutional: Positive for activity change (Improving) and fatigue. Respiratory: Positive for cough (Nonproductive) and shortness of breath (Dyspnea on exertion). Cardiovascular: Negative for chest pain and palpitations. Gastrointestinal: Negative for abdominal pain, nausea and vomiting. Genitourinary: Negative for flank pain and hematuria. Physical Examination:        Physical Exam  Vitals signs reviewed. Constitutional:       General: She is not in acute distress. Appearance: She is not diaphoretic. HENT:      Head: Normocephalic. Nose: Nose normal.   Eyes:      General: No scleral icterus. Conjunctiva/sclera: Conjunctivae normal.   Neck:      Musculoskeletal: Neck supple. Trachea: No tracheal deviation. Cardiovascular:      Rate and Rhythm: Normal rate and regular rhythm. Pulmonary:      Effort: No respiratory distress. Breath sounds: Rhonchi and rales present. No wheezing. Comments: Airflow reduced  No retractions  No accessory muscle use  No cyanosis    Chest:      Chest wall: No tenderness. Abdominal:      General: Bowel sounds are normal. There is no distension. Palpations: Abdomen is soft. Tenderness: There is no abdominal tenderness. Musculoskeletal:         General: No tenderness. Skin:     General: Skin is warm and dry. Comments: Wound is dressed, dry and clean    Neurological:      Mental Status: She is alert and oriented to person, place, and time.          Vitals:  BP (!) 143/76   Pulse 103   Temp 99.6 °F (37.6 °C) (Oral)   Resp 23   Ht 5' 6\" (1.676 m)   Wt 188 lb (85.3 kg)   LMP 04/19/2003   SpO2 97%   BMI needed  Incentive spirometry  Continued tobacco abstinence  NicoDerm  Blood products prn - monitor H&H   Blood Pressure - Monitor and control  Risk factor management / weight loss    Reflux precautions   Pulmonary follow-up Dr. Geovanna Zee    Assessment:        Principal Problem:    Centrilobular emphysema ([address]  Active Problems:    HTN (hypertension)    History of tobacco use    GERD (gastroesophageal reflux disease)    Chronic respiratory failure with hypoxia (HCC)    Spondylosis of lumbar region without myelopathy or radiculopathy    Lumbar disc disease    Alveolar hypoventilation    Obesity (BMI 30-39. 9)    Bronchiectasis ([address]    Oxygen dependent    Acute postoperative anemia due to expected blood loss  Resolved Problems:    * No resolved hospital problems.  *      Plan:        Neurosurgical evaluation  Physical therapy / rehab  DVT prophylaxis per surgery  Pain management, minimize opioids and respiratory depressants  Encourage activity as tolerated  Respiratory Therapy and Bronchodilators prn  Supplemental oxygen as needed  BiPAP as needed  Incentive spirometry  Continued tobacco abstinence  NicoDerm  Tessalon as needed  Chest x-ray  Blood products prn - monitor H&H   Blood Pressure - Monitor and control  Risk factor management / weight loss    Reflux precautions   Pulmonary follow-up Dr. Juan Olivia,   11/21/2020  4:40 PM

## 2020-11-22 ENCOUNTER — APPOINTMENT (OUTPATIENT)
Dept: GENERAL RADIOLOGY | Age: 60
DRG: 459 | End: 2020-11-22
Attending: NEUROLOGICAL SURGERY
Payer: COMMERCIAL

## 2020-11-22 ENCOUNTER — APPOINTMENT (OUTPATIENT)
Dept: CT IMAGING | Age: 60
DRG: 459 | End: 2020-11-22
Attending: NEUROLOGICAL SURGERY
Payer: COMMERCIAL

## 2020-11-22 LAB
-: NORMAL
ABSOLUTE EOS #: 0.07 K/UL (ref 0–0.44)
ABSOLUTE IMMATURE GRANULOCYTE: 0.06 K/UL (ref 0–0.3)
ABSOLUTE LYMPH #: 0.98 K/UL (ref 1.1–3.7)
ABSOLUTE MONO #: 0.71 K/UL (ref 0.1–1.2)
ACTION: NORMAL
ACTION: NORMAL
ALLEN TEST: ABNORMAL
ALLEN TEST: POSITIVE
ANION GAP SERPL CALCULATED.3IONS-SCNC: 13 MMOL/L (ref 9–17)
BASOPHILS # BLD: 1 % (ref 0–2)
BASOPHILS ABSOLUTE: 0.07 K/UL (ref 0–0.2)
BILIRUBIN URINE: NEGATIVE
BUN BLDV-MCNC: 12 MG/DL (ref 8–23)
BUN/CREAT BLD: ABNORMAL (ref 9–20)
CALCIUM SERPL-MCNC: 8.4 MG/DL (ref 8.6–10.4)
CHLORIDE BLD-SCNC: 96 MMOL/L (ref 98–107)
CO2: 26 MMOL/L (ref 20–31)
COLOR: YELLOW
COMMENT UA: ABNORMAL
CREAT SERPL-MCNC: 0.62 MG/DL (ref 0.5–0.9)
DATE AND TIME: NORMAL
DATE AND TIME: NORMAL
DIFFERENTIAL TYPE: ABNORMAL
EOSINOPHILS RELATIVE PERCENT: 1 % (ref 1–4)
FIO2: 40
FIO2: 40
GFR AFRICAN AMERICAN: >60 ML/MIN
GFR NON-AFRICAN AMERICAN: >60 ML/MIN
GFR SERPL CREATININE-BSD FRML MDRD: ABNORMAL ML/MIN/{1.73_M2}
GFR SERPL CREATININE-BSD FRML MDRD: ABNORMAL ML/MIN/{1.73_M2}
GLUCOSE BLD-MCNC: 119 MG/DL (ref 65–105)
GLUCOSE BLD-MCNC: 122 MG/DL (ref 70–99)
GLUCOSE BLD-MCNC: 134 MG/DL (ref 65–105)
GLUCOSE BLD-MCNC: 134 MG/DL (ref 65–105)
GLUCOSE URINE: NEGATIVE
HCT VFR BLD CALC: 40.1 % (ref 36.3–47.1)
HEMOGLOBIN: 12.3 G/DL (ref 11.9–15.1)
IMMATURE GRANULOCYTES: 1 %
KETONES, URINE: ABNORMAL
LACTIC ACID, SEPSIS WHOLE BLOOD: 1.6 MMOL/L (ref 0.5–1.9)
LACTIC ACID, SEPSIS WHOLE BLOOD: 1.9 MMOL/L (ref 0.5–1.9)
LACTIC ACID, SEPSIS: NORMAL MMOL/L (ref 0.5–1.9)
LACTIC ACID, SEPSIS: NORMAL MMOL/L (ref 0.5–1.9)
LEUKOCYTE ESTERASE, URINE: NEGATIVE
LYMPHOCYTES # BLD: 8 % (ref 24–43)
MCH RBC QN AUTO: 27.7 PG (ref 25.2–33.5)
MCHC RBC AUTO-ENTMCNC: 30.7 G/DL (ref 28.4–34.8)
MCV RBC AUTO: 90.3 FL (ref 82.6–102.9)
MODE: ABNORMAL
MODE: ABNORMAL
MONOCYTES # BLD: 5 % (ref 3–12)
NEGATIVE BASE EXCESS, ART: ABNORMAL (ref 0–2)
NEGATIVE BASE EXCESS, ART: ABNORMAL (ref 0–2)
NITRITE, URINE: NEGATIVE
NOTIFY: NORMAL
NOTIFY: NORMAL
NRBC AUTOMATED: 0 PER 100 WBC
O2 DEVICE/FLOW/%: ABNORMAL
O2 DEVICE/FLOW/%: ABNORMAL
PATIENT TEMP: ABNORMAL
PATIENT TEMP: ABNORMAL
PDW BLD-RTO: 13.3 % (ref 11.8–14.4)
PH UA: 6.5 (ref 5–8)
PLATELET # BLD: ABNORMAL K/UL (ref 138–453)
PLATELET ESTIMATE: ABNORMAL
PLATELET, FLUORESCENCE: NORMAL K/UL (ref 138–453)
PLATELET, IMMATURE FRACTION: NORMAL % (ref 1.1–10.3)
PMV BLD AUTO: ABNORMAL FL (ref 8.1–13.5)
POC HCO3: 27.4 MMOL/L (ref 21–28)
POC HCO3: 27.4 MMOL/L (ref 21–28)
POC O2 SATURATION: 76 % (ref 94–98)
POC O2 SATURATION: 76 % (ref 94–98)
POC PCO2 TEMP: ABNORMAL MM HG
POC PCO2 TEMP: ABNORMAL MM HG
POC PCO2: 39 MM HG (ref 35–48)
POC PCO2: 39.4 MM HG (ref 35–48)
POC PH TEMP: ABNORMAL
POC PH TEMP: ABNORMAL
POC PH: 7.45 (ref 7.35–7.45)
POC PH: 7.45 (ref 7.35–7.45)
POC PO2 TEMP: ABNORMAL MM HG
POC PO2 TEMP: ABNORMAL MM HG
POC PO2: 38.5 MM HG (ref 83–108)
POC PO2: 39.1 MM HG (ref 83–108)
POSITIVE BASE EXCESS, ART: 3 (ref 0–3)
POSITIVE BASE EXCESS, ART: 3 (ref 0–3)
POTASSIUM SERPL-SCNC: 3.5 MMOL/L (ref 3.7–5.3)
PROCALCITONIN: 0.91 NG/ML
PROCALCITONIN: 10.66 NG/ML
PROTEIN UA: NEGATIVE
RBC # BLD: 4.44 M/UL (ref 3.95–5.11)
RBC # BLD: ABNORMAL 10*6/UL
READ BACK: YES
READ BACK: YES
REASON FOR REJECTION: NORMAL
SAMPLE SITE: ABNORMAL
SAMPLE SITE: ABNORMAL
SEG NEUTROPHILS: 86 % (ref 36–65)
SEGMENTED NEUTROPHILS ABSOLUTE COUNT: 11.25 K/UL (ref 1.5–8.1)
SODIUM BLD-SCNC: 135 MMOL/L (ref 135–144)
SPECIFIC GRAVITY UA: 1.01 (ref 1–1.03)
TCO2 (CALC), ART: 29 MMOL/L (ref 22–29)
TCO2 (CALC), ART: 29 MMOL/L (ref 22–29)
TURBIDITY: CLEAR
URINE HGB: NEGATIVE
UROBILINOGEN, URINE: NORMAL
WBC # BLD: 13.1 K/UL (ref 3.5–11.3)
WBC # BLD: ABNORMAL 10*3/UL
ZZ NTE CLEAN UP: ORDERED TEST: NORMAL
ZZ NTE WITH NAME CLEAN UP: SPECIMEN SOURCE: NORMAL

## 2020-11-22 PROCEDURE — 85025 COMPLETE CBC W/AUTO DIFF WBC: CPT

## 2020-11-22 PROCEDURE — 6360000002 HC RX W HCPCS: Performed by: STUDENT IN AN ORGANIZED HEALTH CARE EDUCATION/TRAINING PROGRAM

## 2020-11-22 PROCEDURE — 6360000002 HC RX W HCPCS: Performed by: INTERNAL MEDICINE

## 2020-11-22 PROCEDURE — APPSS15 APP SPLIT SHARED TIME 0-15 MINUTES: Performed by: NURSE PRACTITIONER

## 2020-11-22 PROCEDURE — 6370000000 HC RX 637 (ALT 250 FOR IP): Performed by: NURSE PRACTITIONER

## 2020-11-22 PROCEDURE — 6360000004 HC RX CONTRAST MEDICATION: Performed by: NURSE PRACTITIONER

## 2020-11-22 PROCEDURE — 6360000002 HC RX W HCPCS: Performed by: NURSE PRACTITIONER

## 2020-11-22 PROCEDURE — 6370000000 HC RX 637 (ALT 250 FOR IP): Performed by: STUDENT IN AN ORGANIZED HEALTH CARE EDUCATION/TRAINING PROGRAM

## 2020-11-22 PROCEDURE — 94640 AIRWAY INHALATION TREATMENT: CPT

## 2020-11-22 PROCEDURE — 83605 ASSAY OF LACTIC ACID: CPT

## 2020-11-22 PROCEDURE — 2580000003 HC RX 258: Performed by: STUDENT IN AN ORGANIZED HEALTH CARE EDUCATION/TRAINING PROGRAM

## 2020-11-22 PROCEDURE — 2700000000 HC OXYGEN THERAPY PER DAY

## 2020-11-22 PROCEDURE — 2500000003 HC RX 250 WO HCPCS: Performed by: NURSE PRACTITIONER

## 2020-11-22 PROCEDURE — 1200000000 HC SEMI PRIVATE

## 2020-11-22 PROCEDURE — 99223 1ST HOSP IP/OBS HIGH 75: CPT | Performed by: INTERNAL MEDICINE

## 2020-11-22 PROCEDURE — 84145 PROCALCITONIN (PCT): CPT

## 2020-11-22 PROCEDURE — 36415 COLL VENOUS BLD VENIPUNCTURE: CPT

## 2020-11-22 PROCEDURE — 81003 URINALYSIS AUTO W/O SCOPE: CPT

## 2020-11-22 PROCEDURE — 85055 RETICULATED PLATELET ASSAY: CPT

## 2020-11-22 PROCEDURE — 94660 CPAP INITIATION&MGMT: CPT

## 2020-11-22 PROCEDURE — 71260 CT THORAX DX C+: CPT

## 2020-11-22 PROCEDURE — 80048 BASIC METABOLIC PNL TOTAL CA: CPT

## 2020-11-22 PROCEDURE — 71045 X-RAY EXAM CHEST 1 VIEW: CPT

## 2020-11-22 PROCEDURE — 82947 ASSAY GLUCOSE BLOOD QUANT: CPT

## 2020-11-22 PROCEDURE — 2580000003 HC RX 258: Performed by: NURSE PRACTITIONER

## 2020-11-22 PROCEDURE — 93005 ELECTROCARDIOGRAM TRACING: CPT | Performed by: NURSE PRACTITIONER

## 2020-11-22 PROCEDURE — 87086 URINE CULTURE/COLONY COUNT: CPT

## 2020-11-22 PROCEDURE — 2580000003 HC RX 258: Performed by: INTERNAL MEDICINE

## 2020-11-22 PROCEDURE — 36600 WITHDRAWAL OF ARTERIAL BLOOD: CPT

## 2020-11-22 PROCEDURE — 99233 SBSQ HOSP IP/OBS HIGH 50: CPT | Performed by: INTERNAL MEDICINE

## 2020-11-22 PROCEDURE — 82803 BLOOD GASES ANY COMBINATION: CPT

## 2020-11-22 PROCEDURE — 87040 BLOOD CULTURE FOR BACTERIA: CPT

## 2020-11-22 PROCEDURE — 94761 N-INVAS EAR/PLS OXIMETRY MLT: CPT

## 2020-11-22 RX ORDER — SODIUM CHLORIDE 0.9 % (FLUSH) 0.9 %
10 SYRINGE (ML) INJECTION EVERY 12 HOURS SCHEDULED
Status: DISCONTINUED | OUTPATIENT
Start: 2020-11-22 | End: 2020-12-02 | Stop reason: HOSPADM

## 2020-11-22 RX ORDER — ACETAMINOPHEN 650 MG/1
650 SUPPOSITORY RECTAL EVERY 4 HOURS PRN
Status: DISCONTINUED | OUTPATIENT
Start: 2020-11-22 | End: 2020-12-02 | Stop reason: HOSPADM

## 2020-11-22 RX ORDER — FUROSEMIDE 10 MG/ML
20 INJECTION INTRAMUSCULAR; INTRAVENOUS ONCE
Status: DISCONTINUED | OUTPATIENT
Start: 2020-11-22 | End: 2020-11-22

## 2020-11-22 RX ORDER — SODIUM CHLORIDE 0.9 % (FLUSH) 0.9 %
10 SYRINGE (ML) INJECTION EVERY 12 HOURS SCHEDULED
Status: DISCONTINUED | OUTPATIENT
Start: 2020-11-22 | End: 2020-11-22

## 2020-11-22 RX ORDER — FUROSEMIDE 10 MG/ML
40 INJECTION INTRAMUSCULAR; INTRAVENOUS ONCE
Status: DISCONTINUED | OUTPATIENT
Start: 2020-11-22 | End: 2020-11-22

## 2020-11-22 RX ORDER — METOPROLOL TARTRATE 5 MG/5ML
5 INJECTION INTRAVENOUS ONCE
Status: COMPLETED | OUTPATIENT
Start: 2020-11-22 | End: 2020-11-22

## 2020-11-22 RX ORDER — FUROSEMIDE 10 MG/ML
40 INJECTION INTRAMUSCULAR; INTRAVENOUS ONCE
Status: COMPLETED | OUTPATIENT
Start: 2020-11-22 | End: 2020-11-22

## 2020-11-22 RX ORDER — SODIUM CHLORIDE 9 MG/ML
INJECTION, SOLUTION INTRAVENOUS CONTINUOUS
Status: DISCONTINUED | OUTPATIENT
Start: 2020-11-22 | End: 2020-11-24

## 2020-11-22 RX ADMIN — METFORMIN HYDROCHLORIDE 500 MG: 500 TABLET ORAL at 08:45

## 2020-11-22 RX ADMIN — POTASSIUM CHLORIDE 20 MEQ: 1500 TABLET, EXTENDED RELEASE ORAL at 08:45

## 2020-11-22 RX ADMIN — CARVEDILOL 6.25 MG: 6.25 TABLET, FILM COATED ORAL at 08:48

## 2020-11-22 RX ADMIN — CETIRIZINE HYDROCHLORIDE 10 MG: 10 TABLET ORAL at 08:48

## 2020-11-22 RX ADMIN — DESMOPRESSIN ACETATE 40 MG: 0.2 TABLET ORAL at 08:48

## 2020-11-22 RX ADMIN — OMEPRAZOLE 20 MG: 20 CAPSULE, DELAYED RELEASE ORAL at 08:45

## 2020-11-22 RX ADMIN — DULOXETINE HYDROCHLORIDE 60 MG: 30 CAPSULE, DELAYED RELEASE ORAL at 08:48

## 2020-11-22 RX ADMIN — LISINOPRIL AND HYDROCHLOROTHIAZIDE 1 TABLET: 25; 20 TABLET ORAL at 08:45

## 2020-11-22 RX ADMIN — AMLODIPINE BESYLATE 10 MG: 10 TABLET ORAL at 08:48

## 2020-11-22 RX ADMIN — ENOXAPARIN SODIUM 40 MG: 40 INJECTION SUBCUTANEOUS at 08:45

## 2020-11-22 RX ADMIN — IOPAMIDOL 75 ML: 755 INJECTION, SOLUTION INTRAVENOUS at 16:44

## 2020-11-22 RX ADMIN — TIZANIDINE 4 MG: 4 TABLET ORAL at 08:45

## 2020-11-22 RX ADMIN — CEFEPIME 2 G: 2 INJECTION, POWDER, FOR SOLUTION INTRAVENOUS at 14:32

## 2020-11-22 RX ADMIN — POLYETHYLENE GLYCOL 3350 17 G: 17 POWDER, FOR SOLUTION ORAL at 09:05

## 2020-11-22 RX ADMIN — GABAPENTIN 300 MG: 300 CAPSULE ORAL at 08:45

## 2020-11-22 RX ADMIN — DOCUSATE SODIUM 100 MG: 100 CAPSULE, LIQUID FILLED ORAL at 08:48

## 2020-11-22 RX ADMIN — OXYCODONE HYDROCHLORIDE 10 MG: 5 TABLET ORAL at 08:00

## 2020-11-22 RX ADMIN — ACETAMINOPHEN 650 MG: 325 TABLET ORAL at 20:07

## 2020-11-22 RX ADMIN — TROSPIUM CHLORIDE 20 MG: 20 TABLET, FILM COATED ORAL at 08:45

## 2020-11-22 RX ADMIN — ACETAMINOPHEN 650 MG: 325 TABLET ORAL at 03:42

## 2020-11-22 RX ADMIN — SODIUM CHLORIDE, PRESERVATIVE FREE 10 ML: 5 INJECTION INTRAVENOUS at 08:45

## 2020-11-22 RX ADMIN — TIOTROPIUM BROMIDE INHALATION SPRAY 2 PUFF: 3.12 SPRAY, METERED RESPIRATORY (INHALATION) at 09:49

## 2020-11-22 RX ADMIN — ACETAMINOPHEN 650 MG: 325 TABLET ORAL at 09:05

## 2020-11-22 RX ADMIN — FUROSEMIDE 40 MG: 10 INJECTION, SOLUTION INTRAMUSCULAR; INTRAVENOUS at 12:48

## 2020-11-22 RX ADMIN — METOPROLOL TARTRATE 5 MG: 5 INJECTION, SOLUTION INTRAVENOUS at 20:20

## 2020-11-22 RX ADMIN — CEFTRIAXONE SODIUM 1 G: 1 INJECTION, POWDER, FOR SOLUTION INTRAMUSCULAR; INTRAVENOUS at 17:41

## 2020-11-22 ASSESSMENT — PAIN SCALES - WONG BAKER
WONGBAKER_NUMERICALRESPONSE: 0
WONGBAKER_NUMERICALRESPONSE: 0
WONGBAKER_NUMERICALRESPONSE: 4
WONGBAKER_NUMERICALRESPONSE: 0
WONGBAKER_NUMERICALRESPONSE: 0

## 2020-11-22 ASSESSMENT — ENCOUNTER SYMPTOMS
CHEST TIGHTNESS: 0
SHORTNESS OF BREATH: 1
NAUSEA: 0
VOMITING: 0
COUGH: 1
ABDOMINAL PAIN: 0

## 2020-11-22 ASSESSMENT — PAIN DESCRIPTION - ONSET
ONSET: ON-GOING

## 2020-11-22 ASSESSMENT — PAIN DESCRIPTION - LOCATION
LOCATION: BACK
LOCATION: BACK

## 2020-11-22 ASSESSMENT — PAIN SCALES - GENERAL
PAINLEVEL_OUTOF10: 8
PAINLEVEL_OUTOF10: 9
PAINLEVEL_OUTOF10: 8
PAINLEVEL_OUTOF10: 0
PAINLEVEL_OUTOF10: 8
PAINLEVEL_OUTOF10: 10

## 2020-11-22 ASSESSMENT — PAIN DESCRIPTION - FREQUENCY
FREQUENCY: INTERMITTENT
FREQUENCY: INTERMITTENT

## 2020-11-22 ASSESSMENT — PAIN DESCRIPTION - PAIN TYPE
TYPE: SURGICAL PAIN
TYPE: SURGICAL PAIN

## 2020-11-22 ASSESSMENT — PAIN DESCRIPTION - DESCRIPTORS
DESCRIPTORS: ACHING
DESCRIPTORS: ACHING

## 2020-11-22 ASSESSMENT — PAIN DESCRIPTION - PROGRESSION
CLINICAL_PROGRESSION: GRADUALLY IMPROVING

## 2020-11-22 NOTE — PROGRESS NOTES
PATIENT REFUSES TO WEAR BIPAP     [x] Risks and benefits explained to patient   [x] Patient refuses to wear Bipap stating  Fernando Cazares' want to wear it tonight if I don't need it. [x] Patient verbalizes understanding of information presented. RN aware.

## 2020-11-22 NOTE — PROGRESS NOTES
Neurosurgery TIAN/Resident    Daily Progress Note   CC:No chief complaint on file. 11/22/2020  6:59 AM    Chart reviewed. No acute events overnight. Resting in bed this morning. Reporting some right lower extremity pain this morning in back of leg that \"comes and goes. \" oxygen turned down to 3L this morning and patient is sating well. REGINE drain removed yesterday. Dressing changed this morning. She is not ready to be discharged she reports due to having pain. Denies chest pain and SOB, she is having a cough and does sound a little congested.     Vitals:    11/22/20 0000 11/22/20 0027 11/22/20 0400 11/22/20 0427   BP:  (!) 94/51  (!) 120/53   Pulse: 101 99 101 101   Resp:  17 20   Temp:  97.8 °F (36.6 °C)  98.7 °F (37.1 °C)   TempSrc:  Oral  Axillary   SpO2:  92%  98%   Weight:       Height:           PE:   AOx3   Motor   L iliopsoas 5/5 , R iliopsoas 5/5  L quadriceps 5/5; R quadriceps 5/5  L Dorsiflexion 5/5; R dorsiflexion 5/5  L Plantarflexion 5/5; R plantarflexion 5/5  L EHL 5/5; R EHL 5/5    Sensation: intact, pain to RLE posterior side       Incision: intact closed with staples dressing changed this morning       Lab Results   Component Value Date    WBC 9.0 11/20/2020    HGB 11.6 (L) 11/20/2020    HCT 37.2 11/20/2020     11/20/2020    CHOL 201 (H) 04/21/2020    TRIG 155 (H) 04/21/2020    HDL 54 04/21/2020    ALT 21 04/21/2020    AST 18 04/21/2020     11/05/2020    K 4.0 11/05/2020     11/05/2020    CREATININE 0.84 11/05/2020    BUN 15 11/05/2020    CO2 25 11/05/2020    TSH 0.94 07/19/2017    LABA1C 5.8 04/21/2020    LABMICR CANNOT BE CALCULATED 04/21/2020         A/P  56 y.o. female who presents with   lumbar radiculopathy   POD#3 s/p L4-5 right sided laminectomy, L4-5 discectomy sand anterior arthrodesis     - continue MS and OT for mobilization  - morphine and Roxicodone for pain though the MAR showing she is only taking Roxicodone   - continue to weal oxygen as patient tolerates  -

## 2020-11-22 NOTE — PROGRESS NOTES
At 11:18, pt had increased SOB, fatigue, and became lethargic, and crackles throughout. Kelli Jaime CNP for neurosurgery notified and Dr. Malcolm Keller notified, orders received, placed pt on Bi-pap. Pulmonology consult ordered along with blood cultures and urine cultures. Pt continues to remain tachypnec and HR in the 130's. Dr. Malcolm Keller and primary aware, no orders received for HR, per Dr. Malcolm Keller need to find the underlying cause of increased HR. Pt remains stable at this time, resting in bed with bi-pap on at this time.  Electronically signed by Ralph Biggs RN on 11/22/2020 at 7:52 PM

## 2020-11-22 NOTE — PLAN OF CARE
Problem: Pain:  Goal: Pain level will decrease  Description: Pain level will decrease  11/22/2020 0317 by Deisy Paez RN  Outcome: Met This Shift  Note: Pain level assessment complete. Pt rated pain at 8/10. Pt educated on pain scale and control interventions. PRN pain medication given per pt request. Pt instructed to call out with new onset of pain or unrelieved pain. Will continue to monitor. 11/21/2020 1601 by Mario Perdomo RN  Outcome: Ongoing     Problem: Falls - Risk of:  Goal: Will remain free from falls  Description: Will remain free from falls  11/22/2020 0317 by Deisy Paez RN  Outcome: Met This Shift  Note: Patient remained free from injury. Patient verbalized understanding of need for the safety precautions. Demonstrates proper use of assistive devices. Bed remains in the lowest position. Call light remains within reach. [employer] [facility] in use.      11/21/2020 1601 by Mario Perdomo RN  Outcome: Ongoing

## 2020-11-22 NOTE — PROGRESS NOTES
[facility]    Office: [address], DO, Pito Shadow, DO, Almazeus Jang, DO, Salina Romeroeliecer Blood, DO, Vitaliy Gerardo MD, Uche Solorio MD, Frieda Yoder MD, Ally Be MD, Pancho George MD, Steven Epps MD, Astrid Degroot MD, Jennifer Timmons MD, Franklin Cool MD, Jessy Velez, DO, Gilbert Santa MD, Rick Pritchard MD, Eldon Dupree DO, Dimitri Michelle MD,  Jesús Huerta DO, Anitha Martínez MD, Haroon Pyle MD, Alondra Philippe, [facility], [facility], CNP, Matt Aguilera, CNP, Maury Gr, CNS, Eda Pastjohn, CNP, Joyce Holguin, CNP, Xiao Jean, CNP, Siria Concepcion, CNP, Isabel Hernandez, CNP, [place] PA-C, Conrad Bahena, BRITTNI, Isis Al, CNP, Ed Rosario, CNP, Jorge Pace, CNP, Veena Reyes, CNP, Amando Alfred, CNP    [facility]   [address]    Progress Note    11/22/2020    12:07 PM    Name:   Nandini Avendano  MRN:     [id number]     Acct:      [de-identified]   Room:   [address] [place], [place] [address] Day:  3  Admit Date:  11/19/2020  5:32 AM    PCP:   Donta Andrew MD  Code Status:  Full Code    Subjective:     C/C:   Back pain    Interval History Status:  improved  Back pain persists  Has ambulated to bathroom  Temps to 102 noted  The patient has been refusing BiPAP? O2 sat as low as 89% on cannula  Now complaining of right calf pain    Data Base Updates:  Glucose 119High     EKG:  Sinus tachycardia   ST & T wave abnormality, consider lateral ischemia   Abnormal ECG   When compared with ECG of 05-NOV-2020 11:48,   Vent. rate has increased BY  63 BPM   ST now depressed in Lateral leads   T wave inversion now evident in Lateral leads     WBC 13. 1High k/uL RBC 4.44 m/uL Hemoglobin 12.3     Follow-up chest x-ray:  Impression:  Bibasilar opacities compatible with atelectasis. In the appropriate clinical   setting pneumonia is not excluded.        Brief History:     CHIEF COMPLAINT: Back pain     History Obtained From:  patient, mcg/mL infusion  0.25 mcg/kg/hr Intravenous Once    amLODIPine  10 mg Oral Daily    atorvastatin  40 mg Oral Daily    azelastine  2 spray Each Nostril BID    carvedilol  6.25 mg Oral BID    docusate sodium  100 mg Oral Daily    DULoxetine  60 mg Oral Daily    gabapentin  300 mg Oral BID    lisinopril-hydroCHLOROthiazide  1 tablet Oral Daily    metFORMIN  500 mg Oral BID WC    mirtazapine  15 mg Oral Nightly    mirtazapine  30 mg Oral Nightly    omeprazole  20 mg Oral QAM    potassium chloride  20 mEq Oral Daily    tiZANidine  4 mg Oral BID    trospium  20 mg Oral BID    sodium chloride flush  10 mL Intravenous 2 times per day    acetaminophen  650 mg Oral Q6H    polyethylene glycol  17 g Oral Daily    magnesium hydroxide  30 mL Oral Daily    enoxaparin  40 mg Subcutaneous Daily    cetirizine  10 mg Oral Daily    gabapentin  600 mg Oral Nightly    tiotropium  2 puff Inhalation Daily    insulin lispro  0-6 Units Subcutaneous TID WC    insulin lispro  0-3 Units Subcutaneous Nightly    nicotine  1 patch Transdermal Daily     Continuous Infusions:    dextrose       PRN Meds: benzonatate, albuterol sulfate HFA, diphenhydrAMINE, ipratropium-albuterol, sodium chloride flush, oxyCODONE **OR** oxyCODONE, morphine **OR** morphine, senna, glucose, dextrose, glucagon (rDNA), dextrose    Data:     Past Medical History:   has a past medical history of Allergic rhinitis, Alveolar hypoventilation, Aortic insufficiency, Bronchiectasis (HCC), Bronchitis, Chronic back pain, COPD (chronic obstructive pulmonary disease) ([facility]), Depression, DM (diabetes mellitus) ([facility]), GERD (gastroesophageal reflux disease), History of echocardiogram, Hyperlipidemia, Hypertension, Obesity, Osteoarthritis, Peripheral vascular disease ([facility]), Primary osteoarthritis of both knees, Radicular pain of lumbosacral region, Spinal stenosis, lumbar region, without neurogenic claudication, Tricuspid regurgitation, Type II or unspecified There is no abdominal tenderness. Musculoskeletal:         General: No tenderness. Skin:     General: Skin is warm and dry. Comments: Wound is dressed, dry and clean    Neurological:      Mental Status: She is alert and oriented to person, place, and time. Vitals:  BP (!) 141/74   Pulse 117   Temp 102 °F (38.9 °C) (Oral)   Resp 24   Ht 5' 6\" (1.676 m)   Wt 188 lb (85.3 kg)   LMP 2003   SpO2 (!) 89%   BMI 30.34 kg/m²   Temp (24hrs), Av.9 °F (37.2 °C), Min:97.5 °F (36.4 °C), Max:102 °F (38.9 °C)    Recent Labs     20  0723   POCGLU 100 104 114* 119*       I/O (24Hr): Intake/Output Summary (Last 24 hours) at 2020 1207  Last data filed at 2020 0433  Gross per 24 hour   Intake 240 ml   Output 300 ml   Net -60 ml       Labs:  Hematology:  Recent Labs     20  0451 20  1150   WBC 9.0 13.1*   RBC 4.24 4.44   HGB 11.6* 12.3   HCT 37.2 40.1   MCV 87.7 90.3   MCH 27.4 27.7   MCHC 31.2 30.7   RDW 13.2 13.3    See Reflexed IPF Result   MPV 10.4 NOT REPORTED     Chemistry:No results for input(s): NA, K, CL, CO2, GLUCOSE, BUN, CREATININE, MG, ANIONGAP, LABGLOM, GFRAA, CALCIUM, CAION, PHOS, PSA, PROBNP, TROPHS, CKTOTAL, CKMB, CKMBINDEX, MYOGLOBIN, DIGOXIN, LACTACIDWB in the last 72 hours.   Recent Labs     20  1608 20  15320  0723   POCGLU 142* 131* 100 104 114* 119*     ABG:No results found for: POCPH, PHART, PH, POCPCO2, KSB3NXO, PCO2, POCPO2, PO2ART, PO2, POCHCO3, XWX5ABC, HCO3, NBEA, PBEA, BEART, BE, THGBART, THB, BWF8DWB, EFYR6QEE, D9NCNZKQ, O2SAT, FIO2  Lab Results   Component Value Date/Time    SPECIAL NOT REPORTED 2020 09:05 AM     Lab Results   Component Value Date/Time    CULTURE NO GROWTH 2020 09:05 AM       Radiology:  Allie Jean-Baptiste Lumbar Spine (2-3 Views)    Result Date: 2020  New transpedicular hardware fixation L4-L5 without definitive radiographic evidence of complication. Fluoro For Surgical Procedures    Result Date: 11/19/2020  Intraprocedural fluoroscopic spot images as above. See separate procedure report for more information. Initial recommendations included:  Neurosurgical evaluation  Physical therapy / rehab  DVT prophylaxis per surgery  Pain management, minimize opioids and respiratory depressants  Encourage activity as tolerated  Respiratory Therapy and Bronchodilators prn  Supplemental oxygen as needed  BiPAP as needed  Incentive spirometry  Continued tobacco abstinence  NicoDerm  Blood products prn - monitor H&H   Blood Pressure - Monitor and control  Risk factor management / weight loss    Reflux precautions   Pulmonary follow-up Dr. Ronit German    Assessment:        Principal Problem:    Centrilobular emphysema ([address]  Active Problems:    HTN (hypertension)    History of tobacco use    GERD (gastroesophageal reflux disease)    Chronic respiratory failure with hypoxia (HCC)    Spondylosis of lumbar region without myelopathy or radiculopathy    Lumbar disc disease    Alveolar hypoventilation    Obesity (BMI 30-39. 9)    Bronchiectasis ([address]    Oxygen dependent    Acute postoperative anemia due to expected blood loss  Resolved Problems:    * No resolved hospital problems.  *      Plan:        Antibiotics per C&S  Sepsis order set  Cultures ordered  Cefepime for hospital-acquired pneumonia initiated  Serial chest x-rays  Check procalcitonin  Pulmonary consultation (follows with Dr. Ronit German)  Neurosurgical evaluation  Physical therapy / rehab  Doppler right leg rule out DVT  Pain management, minimize opioids and respiratory depressants  Encourage activity as tolerated  Respiratory Therapy and Bronchodilators prn  Supplemental oxygen as needed  BiPAP as needed, patient has been refusing  Incentive spirometry  Continued tobacco abstinence  NicoDerm  Tessalon as needed  Blood products prn - monitor H&H   Blood Pressure - Monitor and control  Risk factor management / weight loss    Reflux precautions       IP CONSULT TO INTERNAL MEDICINE  IP CONSULT TO PULMONOLOGY    Ilir Hagan DO  11/22/2020  12:07 PM

## 2020-11-22 NOTE — PLAN OF CARE
PATIENT REFUSES TO WEAR BIPAP     [x] Risks and benefits explained to patient   [x] Patient refuses to wear Bipap stating DO'NT NEED IT TONIGHT  [x] Patient verbalizes understanding of information presented.

## 2020-11-23 ENCOUNTER — APPOINTMENT (OUTPATIENT)
Dept: GENERAL RADIOLOGY | Age: 60
DRG: 459 | End: 2020-11-23
Attending: NEUROLOGICAL SURGERY
Payer: COMMERCIAL

## 2020-11-23 PROBLEM — J18.9 MULTIFOCAL PNEUMONIA: Status: ACTIVE | Noted: 2020-11-23

## 2020-11-23 LAB
BNP INTERPRETATION: ABNORMAL
EKG ATRIAL RATE: 133 BPM
EKG P AXIS: 37 DEGREES
EKG P-R INTERVAL: 162 MS
EKG Q-T INTERVAL: 278 MS
EKG QRS DURATION: 76 MS
EKG QTC CALCULATION (BAZETT): 413 MS
EKG R AXIS: 8 DEGREES
EKG T AXIS: 119 DEGREES
EKG VENTRICULAR RATE: 133 BPM
GLUCOSE BLD-MCNC: 109 MG/DL (ref 65–105)
GLUCOSE BLD-MCNC: 119 MG/DL (ref 65–105)
GLUCOSE BLD-MCNC: 124 MG/DL (ref 65–105)
GLUCOSE BLD-MCNC: 129 MG/DL (ref 65–105)
HCT VFR BLD CALC: 34.2 % (ref 36.3–47.1)
HEMOGLOBIN: 11.3 G/DL (ref 11.9–15.1)
MCH RBC QN AUTO: 27.7 PG (ref 25.2–33.5)
MCHC RBC AUTO-ENTMCNC: 33 G/DL (ref 28.4–34.8)
MCV RBC AUTO: 83.8 FL (ref 82.6–102.9)
NRBC AUTOMATED: 0 PER 100 WBC
PDW BLD-RTO: 13.2 % (ref 11.8–14.4)
PLATELET # BLD: 255 K/UL (ref 138–453)
PMV BLD AUTO: 10.3 FL (ref 8.1–13.5)
PRO-BNP: 386 PG/ML
RBC # BLD: 4.08 M/UL (ref 3.95–5.11)
WBC # BLD: 11.8 K/UL (ref 3.5–11.3)

## 2020-11-23 PROCEDURE — 74230 X-RAY XM SWLNG FUNCJ C+: CPT

## 2020-11-23 PROCEDURE — 99221 1ST HOSP IP/OBS SF/LOW 40: CPT | Performed by: STUDENT IN AN ORGANIZED HEALTH CARE EDUCATION/TRAINING PROGRAM

## 2020-11-23 PROCEDURE — 6370000000 HC RX 637 (ALT 250 FOR IP): Performed by: INTERNAL MEDICINE

## 2020-11-23 PROCEDURE — 94640 AIRWAY INHALATION TREATMENT: CPT

## 2020-11-23 PROCEDURE — 93971 EXTREMITY STUDY: CPT

## 2020-11-23 PROCEDURE — 6360000002 HC RX W HCPCS: Performed by: STUDENT IN AN ORGANIZED HEALTH CARE EDUCATION/TRAINING PROGRAM

## 2020-11-23 PROCEDURE — 85027 COMPLETE CBC AUTOMATED: CPT

## 2020-11-23 PROCEDURE — 99233 SBSQ HOSP IP/OBS HIGH 50: CPT | Performed by: INTERNAL MEDICINE

## 2020-11-23 PROCEDURE — 6370000000 HC RX 637 (ALT 250 FOR IP): Performed by: STUDENT IN AN ORGANIZED HEALTH CARE EDUCATION/TRAINING PROGRAM

## 2020-11-23 PROCEDURE — 92611 MOTION FLUOROSCOPY/SWALLOW: CPT

## 2020-11-23 PROCEDURE — 1200000000 HC SEMI PRIVATE

## 2020-11-23 PROCEDURE — 36415 COLL VENOUS BLD VENIPUNCTURE: CPT

## 2020-11-23 PROCEDURE — 2580000003 HC RX 258: Performed by: INTERNAL MEDICINE

## 2020-11-23 PROCEDURE — 94660 CPAP INITIATION&MGMT: CPT

## 2020-11-23 PROCEDURE — 6360000002 HC RX W HCPCS: Performed by: NURSE PRACTITIONER

## 2020-11-23 PROCEDURE — 83880 ASSAY OF NATRIURETIC PEPTIDE: CPT

## 2020-11-23 PROCEDURE — 2580000003 HC RX 258: Performed by: STUDENT IN AN ORGANIZED HEALTH CARE EDUCATION/TRAINING PROGRAM

## 2020-11-23 PROCEDURE — 92610 EVALUATE SWALLOWING FUNCTION: CPT

## 2020-11-23 PROCEDURE — 6370000000 HC RX 637 (ALT 250 FOR IP): Performed by: NURSE PRACTITIONER

## 2020-11-23 PROCEDURE — 94664 DEMO&/EVAL PT USE INHALER: CPT

## 2020-11-23 PROCEDURE — 82947 ASSAY GLUCOSE BLOOD QUANT: CPT

## 2020-11-23 PROCEDURE — 93010 ELECTROCARDIOGRAM REPORT: CPT | Performed by: INTERNAL MEDICINE

## 2020-11-23 PROCEDURE — APPSS15 APP SPLIT SHARED TIME 0-15 MINUTES: Performed by: NURSE PRACTITIONER

## 2020-11-23 PROCEDURE — 94761 N-INVAS EAR/PLS OXIMETRY MLT: CPT

## 2020-11-23 PROCEDURE — 71045 X-RAY EXAM CHEST 1 VIEW: CPT

## 2020-11-23 RX ORDER — IPRATROPIUM BROMIDE AND ALBUTEROL SULFATE 2.5; .5 MG/3ML; MG/3ML
1 SOLUTION RESPIRATORY (INHALATION) 4 TIMES DAILY
Status: DISCONTINUED | OUTPATIENT
Start: 2020-11-23 | End: 2020-11-24

## 2020-11-23 RX ORDER — ALBUTEROL SULFATE 2.5 MG/3ML
2.5 SOLUTION RESPIRATORY (INHALATION) EVERY 6 HOURS PRN
Status: DISCONTINUED | OUTPATIENT
Start: 2020-11-23 | End: 2020-12-02 | Stop reason: HOSPADM

## 2020-11-23 RX ORDER — GUAIFENESIN 600 MG/1
600 TABLET, EXTENDED RELEASE ORAL 2 TIMES DAILY
Status: DISCONTINUED | OUTPATIENT
Start: 2020-11-23 | End: 2020-11-24

## 2020-11-23 RX ADMIN — OMEPRAZOLE 20 MG: 20 CAPSULE, DELAYED RELEASE ORAL at 16:48

## 2020-11-23 RX ADMIN — LISINOPRIL AND HYDROCHLOROTHIAZIDE 1 TABLET: 25; 20 TABLET ORAL at 16:42

## 2020-11-23 RX ADMIN — AMLODIPINE BESYLATE 10 MG: 10 TABLET ORAL at 16:42

## 2020-11-23 RX ADMIN — GUAIFENESIN 600 MG: 600 TABLET, EXTENDED RELEASE ORAL at 20:54

## 2020-11-23 RX ADMIN — TIZANIDINE 4 MG: 4 TABLET ORAL at 16:51

## 2020-11-23 RX ADMIN — CETIRIZINE HYDROCHLORIDE 10 MG: 10 TABLET ORAL at 16:41

## 2020-11-23 RX ADMIN — IPRATROPIUM BROMIDE AND ALBUTEROL SULFATE 1 AMPULE: .5; 3 SOLUTION RESPIRATORY (INHALATION) at 22:25

## 2020-11-23 RX ADMIN — GUAIFENESIN 600 MG: 600 TABLET, EXTENDED RELEASE ORAL at 16:37

## 2020-11-23 RX ADMIN — GABAPENTIN 300 MG: 300 CAPSULE ORAL at 16:45

## 2020-11-23 RX ADMIN — GABAPENTIN 600 MG: 300 CAPSULE ORAL at 20:54

## 2020-11-23 RX ADMIN — METFORMIN HYDROCHLORIDE 500 MG: 500 TABLET ORAL at 16:41

## 2020-11-23 RX ADMIN — CEFTRIAXONE SODIUM 1 G: 1 INJECTION, POWDER, FOR SOLUTION INTRAMUSCULAR; INTRAVENOUS at 16:37

## 2020-11-23 RX ADMIN — ACETAMINOPHEN 650 MG: 325 TABLET ORAL at 20:54

## 2020-11-23 RX ADMIN — SODIUM CHLORIDE, PRESERVATIVE FREE 10 ML: 5 INJECTION INTRAVENOUS at 17:04

## 2020-11-23 RX ADMIN — ENOXAPARIN SODIUM 40 MG: 40 INJECTION SUBCUTANEOUS at 16:43

## 2020-11-23 RX ADMIN — POTASSIUM CHLORIDE 20 MEQ: 1500 TABLET, EXTENDED RELEASE ORAL at 16:42

## 2020-11-23 RX ADMIN — BENZONATATE 100 MG: 100 CAPSULE ORAL at 16:39

## 2020-11-23 RX ADMIN — CARVEDILOL 6.25 MG: 6.25 TABLET, FILM COATED ORAL at 16:41

## 2020-11-23 RX ADMIN — DULOXETINE HYDROCHLORIDE 60 MG: 30 CAPSULE, DELAYED RELEASE ORAL at 16:40

## 2020-11-23 RX ADMIN — MIRTAZAPINE 30 MG: 15 TABLET, FILM COATED ORAL at 20:55

## 2020-11-23 RX ADMIN — MIRTAZAPINE 15 MG: 15 TABLET, FILM COATED ORAL at 20:54

## 2020-11-23 RX ADMIN — SODIUM CHLORIDE, PRESERVATIVE FREE 10 ML: 5 INJECTION INTRAVENOUS at 20:55

## 2020-11-23 RX ADMIN — TROSPIUM CHLORIDE 20 MG: 20 TABLET, FILM COATED ORAL at 16:42

## 2020-11-23 RX ADMIN — DESMOPRESSIN ACETATE 40 MG: 0.2 TABLET ORAL at 16:42

## 2020-11-23 RX ADMIN — DOCUSATE SODIUM 100 MG: 100 CAPSULE, LIQUID FILLED ORAL at 16:39

## 2020-11-23 RX ADMIN — IPRATROPIUM BROMIDE AND ALBUTEROL SULFATE 1 AMPULE: .5; 3 SOLUTION RESPIRATORY (INHALATION) at 12:01

## 2020-11-23 RX ADMIN — ACETAMINOPHEN 650 MG: 325 TABLET ORAL at 16:50

## 2020-11-23 ASSESSMENT — PAIN SCALES - WONG BAKER

## 2020-11-23 ASSESSMENT — ENCOUNTER SYMPTOMS
SHORTNESS OF BREATH: 1
VOMITING: 0
ABDOMINAL PAIN: 0
NAUSEA: 0
CHEST TIGHTNESS: 0
BACK PAIN: 1
COUGH: 1

## 2020-11-23 ASSESSMENT — PAIN SCALES - GENERAL
PAINLEVEL_OUTOF10: 0
PAINLEVEL_OUTOF10: 8
PAINLEVEL_OUTOF10: 0
PAINLEVEL_OUTOF10: 8
PAINLEVEL_OUTOF10: 0

## 2020-11-23 NOTE — PROGRESS NOTES
Occupational Therapy    Occupational Therapy Not Seen Note    DATE: 2020  Name: Mejia Baeza  : 1960  MRN: [id number]    Patient not available for Occupational Therapy due to:    Testing Awaiting dopplers.      Next Scheduled Treatment: 20    Electronically signed by STEFAN Hoffman on 2020 at 4:24 PM

## 2020-11-23 NOTE — PROGRESS NOTES
Speech Language Pathology  Facility/Department: [facility]   CLINICAL BEDSIDE SWALLOW EVALUATION    NAME: Bharati Stern  : 1960  MRN: [id number]    ADMISSION DATE: 2020             Past Medical History:  has a past medical history of Allergic rhinitis, Alveolar hypoventilation, Aortic insufficiency, Bronchiectasis (HCC), Bronchitis, Chronic back pain, COPD (chronic obstructive pulmonary disease) ([facility] Depression, DM (diabetes mellitus) ([facility] GERD (gastroesophageal reflux disease), History of echocardiogram, Hyperlipidemia, Hypertension, Obesity, Osteoarthritis, Peripheral vascular disease ([facility] Primary osteoarthritis of both knees, Radicular pain of lumbosacral region, Spinal stenosis, lumbar region, without neurogenic claudication, Tricuspid regurgitation, Type II or unspecified type diabetes mellitus without mention of complication, not stated as uncontrolled, Unspecified sleep apnea, URI (upper respiratory infection), Wears dentures, Wears glasses, and Wellness examination. Past Surgical History:  has a past surgical history that includes Dilation and curettage of uterus; gastrectomy; Nerve Block (Right, 13); Nerve Block (13); Colonoscopy (2009); Upper gastrointestinal endoscopy (2007); Upper gastrointestinal endoscopy (2009,2011); Nerve Block (13); Nerve Block (Left, 13); Nerve Block (Left, 13); Nerve Block (14); Nerve Block (14); Nerve Block (14); Nerve Block (14); Nerve Block (11/20/15); pr knee scope,diagnostic (Right, 3/24/2017); Nerve Block (2018); Nerve Block (Bilateral, 2019); Nerve Block (Bilateral, 2019); lumbar discectomy (2015); lumbar fusion (2020); and lumbar fusion (N/A, 2020).         Date of Eval: 2020  Evaluating Therapist: Elvia Schafer    Current Diet level:  Current Diet : NPO  Current Liquid Diet : NPO    Primary Complaint: Patient complains of right lower extremity pain, symptoms from the foot up towards the hip. She occasionally will also have symptoms into the lumbar spine. She has had lumbar spine surgery in the past, as well as nerve blocks at pain management. She has symptoms despite conservative treatment. Patient has been scheduled for lumbar fusion. Pain:   Pain Assessment  Pain Assessment: 0-10  Pain Level: 0  Mahoney-Baker Pain Rating: No hurt  Patient's Stated Pain Goal: No pain  Pain Type: Surgical pain  Pain Location: Back  Pain Radiating Towards: (right leg to foot)  Pain Descriptors: Aching  Pain Frequency: Intermittent  Pain Onset: On-going  Clinical Progression: Gradually improving  Functional Pain Assessment: Prevents or interferes some active activities and ADLs  Non-Pharmaceutical Pain Intervention(s): Relaxation techniques  Response to Pain Intervention: Asleep with RR greater than 10  RASS Score: Drowsy - Patient awakens with sustained eye opening and eye contact  Pain Assessment/FLACC  Pain Rating: FLACC (rest) - Face: no particular expression or smile  Pain Rating: FLACC (rest) - Legs: normal position or relaxed  Pain Rating: FLACC (rest) - Activity: lying quietly, normal position, moves easily  Pain Rating: FLACC (rest) - Cry: no cry (awake or asleep)  Pain Rating: FLACC (rest) - Consolability: content, relaxed  Score: FLACC (rest): 0    Reason for Referral  Kylah Cano was referred for a bedside swallow evaluation to assess the efficiency of her swallow function, identify signs and symptoms of aspiration, and make recommendations regarding safe dietary consistencies, effective compensatory strategies, and safe eating environment. Impression     Patient was administered trials of thin liquid by spoon and straw, nectar thick liquid by spoon and straw, puree, and dental soft. Pt refused regular solid. Pt with cough on one trail of thin liquid, however, pt with no cough with several additional trails of thin liquid.   Pt presents with probable safe swallow for dental soft diet with thin liquids. Recommend small sips and bites, only feed when alert and awake and upright at 90 degrees for all PO intake. ST cannot rule out silent aspiration at this time. If questioning silent aspiration, ST recommends MBSS to rule out aspiration. Results and recommendations reported to RN. Dysphagia Outcome Severity Scale: Level 4: Mild moderate dysphagia- Intermittent supervision/cueing. One - two diet consistencies restricted    Treatment Plan  Requires SLP Intervention: No    D/C Recommendations: No therapy recommended at discharge. Recommended Diet and Intervention  Solid: Diet Solids Recommendation: Dysphagia Soft and Bite-Sized (Dysphagia III)  Liquid: Liquid Consistency Recommendation: Thin  Medication:Recommended Form of Meds: PO       Compensatory Swallowing Strategies Attempted  Compensatory Swallowing Strategies: Alternate solids and liquids;Eat/Feed slowly;Upright as possible for all oral intake;Small bites/sips    General  Chart Reviewed: Yes  Behavior/Cognition  Behavior/Cognition: Alert; Cooperative;Agitated  Respiratory Status: Room air  O2 Device: None (Room air)  Follows Directions: Simple  Patient Positioning: Upright in bed  Baseline Vocal Quality: Normal  Consistencies Administered: Dysphagia Soft and Bite-Sized (Dysphagia III); Dysphagia Pureed (Dysphagia I); Thin - teaspoon; Thin - straw;Nectar - straw;Nectar - teaspoon         Vision/Hearing  Vision  Vision: Within Functional Limits  Hearing  Hearing: Within functional limits    Oral Motor Deficits  Oral/Motor  Oral Motor:  Within functional limits    Oral Phase Dysfunction  Oral Phase  Oral Phase: WNL     Indicators of Pharyngeal Phase Dysfunction   Pharyngeal Phase  Pharyngeal Phase: WFL    Prognosis  Prognosis  Prognosis for safe diet advancement: good  Individuals consulted  Consulted and agree with results and recommendations: Patient;RN    Education  Patient

## 2020-11-23 NOTE — PROGRESS NOTES
[facility]    Office: José Miguel Garcia, DO, Pallavi Grant, DO, Teresa Vasquez, DO, Rubens Brock Blood, DO, Ariella Gorman MD, Effie Sherman MD, Bridget Rodriguez MD, Guy Brannon MD, Milagros Coombs MD, Hina Tiwari MD, Twila Duckworth MD, Lupillo Gimenez MD, Mbonu Dusty Babinski, MD, Faby Escoto DO, Julianna Gonzalez MD, Lenka Rainey MD, Wendie Wilson DO, Cassi Villa MD,  Baltazar Shannon DO, Bryce Best MD, Farooq Martel MD, Cordell Abad, [facility], [facility], CNP, Kiara Jin, CNP, Jayleen Mohr, CNS, Chery Arnold, CNP, Clinton Raymundo, [facility], Leticia Lloyd, CNP, Navya Le, CNP, Nixon Singletary, CNP, ANABEL CoelloC, Ramona Ayon, [facility], James Monsivais, CNP, Marek Wells, CNP, Jessica Bauer, CNP, Bennett Freeman, CNP, Fouzia Pierre, [address]    Progress Note    11/23/2020    12:58 PM    Name:   Radha Lyon  MRN:     [id number]     [place]:      [de-identified]   Room:   [address] [place] [address] Day:  4  Admit Date:  11/19/2020  5:32 AM    PCP:   Neto Ny MD  Code Status:  Full Code    Subjective:     C/C:   Back pain    Interval History Status:    Feeling better this morning  Developed sepsis-like symptoms last night  Still has cough now productive of some white sputum  Back pain unchanged    T-max today 99.8  She is less tachycardic  O2 sats in the 90s on nasal cannula  She did agree to use BiPAP last night    Data Base Updates:   Pro-BNP 386High     POC pH 7.449 POC pCO2 39.4 mm Hg POC PO2 39.1Low Panic mm Hg POC HCO3 27.4 mmol/L TCO2 (calc), Art 29     Procalcitonin 0. 91High   11/22  Procalcitonin 10. 66High 11/23    Lactic Acid, Sepsis, Whole Blood 1.9     Follow-up chest x-ray:  Improvement in now mild residual streaky right basilar atelectasis     WBC 11.8High k/uL RBC 4.08 m/uL Hemoglobin 11.3Low     CT :  Impression:  1. No clear evidence for central pulmonary embolus.   Evaluation of the   remainder of the pulmonary arterial vasculature is severely limited to   nondiagnostic as detailed above. 2. Bilateral lower lobe consolidation likely pneumonia. Consolidation of the   proximal right middle lobe and lingula to a lesser degree. Findings favored   to represent multifocal pneumonia. Follow-up recommended to document   resolution. 3. Prominent mediastinal lymph nodes. 4. Fatty liver. 5. Severe emphysema. ABGs  POC pH 7.449   POC pCO2 39.4 mm Hg   POC PO2 39.1Low Panic mm Hg   POC HCO3 27.4 mmol/L   TCO2 (calc), Art 29     Brief History:     CHIEF COMPLAINT: Back pain     History Obtained From:  patient, electronic medical record     HISTORY OF PRESENT ILLNESS:   Location: Low back pain  Severity:  Moderate to severe    Duration:  Chronic   Timing:  Progressive   Context:  Known Lumbar DDD  Modifiers:  Has not responded to conservative treatment  Associations:  Pain, stiffness, decreased function     The patient has not responded well to conservative therapy  They have elected surgical treatment  They are now postop:  Procedure:   L4-5 right-sided laminectomy with Rosas facetectomy of L4  L4-5 discectomy and anterior arthrodesis  Implantation of titanium Medtronic banana cage at L4-5  Use of morselized autograft via same incision  Use of morselized allograft  Segmental pedicle screw fixation at L4 and L5 Medtronic  Use of spinal neuro navigation  Use of fluoroscopy  Use of neuro monitoring  Right L4 and L5 neural lysis and lysis of scar adhesion  Open reduction of spondylolisthesis     Postoperatively the patient has been hypoxic  O2 sats in the 80s  She has required BiPAP     Patient has a known history of COPD  She is oxygen dependent  The patient generally uses 2 L of oxygen via nasal cannula  Her pulmonologist is Dr. Gissel Carvajal     Glucose range from 108-159  Records indicates a history of diabetes:     Results for Sara George (MRN [id number]) as of 11/20/2020 15:29    Ref.  Range 7/19/2017 10:31 1/5/2018 14:57 6/18/2018 15:10 3/5/2019 11:21 4/21/2020 12:15   Hemoglobin A1C Latest Ref Range: 4.0 - 6.0 % 5.8 5.9 5.8 5.8 5.8       Preop hemoglobin 15.3  Postop hemoglobin 11.6     CXR 2018  Generalized interstitial prominence suggestive of underlying chronic lung    disease.  No focal airspace consolidation.      On 11/22 the patient developed symptoms concerning for sepsis  She had increasing tachycardia  Fevers to 102  Cefepime was initiated  Pulmonary was consulted    Database has revealed:  Pro-BNP 386High     POC pH 7.449 POC pCO2 39.4 mm Hg POC PO2 39.1Low Panic mm Hg POC HCO3 27.4 mmol/L TCO2 (calc), Art 29     Procalcitonin 0. 91High   11/22  Procalcitonin 10. 66High 11/23    Lactic Acid, Sepsis, Whole Blood 1.9     Follow-up chest x-ray:  Improvement in now mild residual streaky right basilar atelectasis     WBC 11.8High k/uL RBC 4.08 m/uL Hemoglobin 11.3Low     CT :  Impression:  1. No clear evidence for central pulmonary embolus. Evaluation of the   remainder of the pulmonary arterial vasculature is severely limited to   nondiagnostic as detailed above. 2. Bilateral lower lobe consolidation likely pneumonia. Consolidation of the   proximal right middle lobe and lingula to a lesser degree. Findings favored   to represent multifocal pneumonia. Follow-up recommended to document   resolution. 3. Prominent mediastinal lymph nodes. 4. Fatty liver. 5. Severe emphysema. ABGs  POC pH 7.449   POC pCO2 39.4 mm Hg   POC PO2 39.1Low Panic mm Hg   POC HCO3 27.4 mmol/L   TCO2 (calc), Art 29     Medications: Allergies:     Allergies   Allergen Reactions    Aspirin      DUE TO ULCER    Claritin [Loratadine] Other (See Comments)     coughing    Flonase [Fluticasone Propionate]     Morphine And Related      GI Upset       Current Meds:   Scheduled Meds:    ipratropium-albuterol  1 ampule Inhalation 4x daily    guaiFENesin  600 mg Oral BID    cefTRIAXone (ROCEPHIN) IV  1 g Intravenous Q24H    unspecified type diabetes mellitus without mention of complication, not stated as uncontrolled, Unspecified sleep apnea, URI (upper respiratory infection), Wears dentures, Wears glasses, and Wellness examination. Social History:   reports that she quit smoking about 3 weeks ago. Her smoking use included cigarettes. She has a 9.00 pack-year smoking history. She has never used smokeless tobacco. She reports that she does not drink alcohol or use drugs. Family History:   Family History   Problem Relation Age of Onset    Diabetes Mother     Heart Disease Mother     Cirrhosis Father        Review of Systems:     Review of Systems   Constitutional: Positive for activity change (Still diminished), fatigue and fever (Resolving). Negative for chills and diaphoresis. HENT:        Patient requesting Rx for dry mouth   Respiratory: Positive for cough (Now productive of scant white sputum) and shortness of breath (Dyspnea on exertion). Negative for chest tightness. Cardiovascular: Negative for chest pain and palpitations. Gastrointestinal: Negative for abdominal pain, nausea and vomiting. Genitourinary: Negative for flank pain and hematuria. Musculoskeletal: Positive for back pain (Incisional pain unchanged). The patient is reporting right calf pain when she tried to ambulate       Physical Examination:        Physical Exam  Vitals signs reviewed. Constitutional:       General: She is not in acute distress. Appearance: She is not diaphoretic. HENT:      Head: Normocephalic. Nose: Nose normal.      Mouth/Throat:      Pharynx: No oropharyngeal exudate or posterior oropharyngeal erythema. Eyes:      General: No scleral icterus. Conjunctiva/sclera: Conjunctivae normal.   Neck:      Musculoskeletal: Neck supple. Trachea: No tracheal deviation. Cardiovascular:      Rate and Rhythm: Normal rate and regular rhythm. Pulmonary:      Effort: No respiratory distress.       Breath sounds: Rhonchi and rales present. No wheezing. Comments: Airflow reduced  No retractions  No accessory muscle use  No cyanosis    Chest:      Chest wall: No tenderness. Abdominal:      General: Bowel sounds are normal. There is no distension. Palpations: Abdomen is soft. Tenderness: There is no abdominal tenderness. Musculoskeletal:         General: No tenderness. Skin:     General: Skin is warm and dry. Comments: Wound is dressed, dry and clean    Neurological:      Mental Status: She is alert and oriented to person, place, and time. Vitals:  BP (!) 105/55   Pulse 114   Temp 99.8 °F (37.7 °C) (Oral)   Resp 20   Ht 5' 6\" (1.676 m)   Wt 188 lb (85.3 kg)   LMP 2003   SpO2 98%   BMI 30.34 kg/m²   Temp (24hrs), Av.8 °F (37.7 °C), Min:98.3 °F (36.8 °C), Max:102.8 °F (39.3 °C)    Recent Labs     20  1222 20  1756 20  0824 20  1207   POCGLU 134* 134* 119* 109*       I/O (24Hr):     Intake/Output Summary (Last 24 hours) at 2020 1258  Last data filed at 2020 0745  Gross per 24 hour   Intake 0 ml   Output 1500 ml   Net -1500 ml       Labs:  Hematology:  Recent Labs     20  1150 20  1022   WBC 13.1* 11.8*   RBC 4.44 4.08   HGB 12.3 11.3*   HCT 40.1 34.2*   MCV 90.3 83.8   MCH 27.7 27.7   MCHC 30.7 33.0   RDW 13.3 13.2   PLT See Reflexed IPF Result 255   MPV NOT REPORTED 10.3     Chemistry:  Recent Labs     20  1507 20  1022     --    K 3.5*  --    CL 96*  --    CO2 26  --    GLUCOSE 122*  --    BUN 12  --    CREATININE 0.62  --    ANIONGAP 13  --    LABGLOM >60  --    GFRAA >60  --    CALCIUM 8.4*  --    PROBNP  --  386*     Recent Labs     20  1957 20  0723 20  1222 20  1756 20  0824 20  1207   POCGLU 114* 119* 134* 134* 119* 109*     ABG:  Lab Results   Component Value Date    POCPH 7.449 2020    POCPCO2 39.4 2020    POCPO2 39.1 2020    POCHCO3 27.4 11/22/2020    NBEA NOT REPORTED 11/22/2020    PBEA 3 11/22/2020    AAJ5ZYR 29 11/22/2020    HMZH2PRR 76 11/22/2020    FIO2 40.0 11/22/2020     Lab Results   Component Value Date/Time    SPECIAL RT AC 5ML 11/22/2020 08:33 PM     Lab Results   Component Value Date/Time    CULTURE NO GROWTH 12 HOURS 11/22/2020 08:33 PM       Radiology:  Xr Lumbar Spine (2-3 Views)    Result Date: 11/21/2020  New transpedicular hardware fixation L4-L5 without definitive radiographic evidence of complication. Fluoro For Surgical Procedures    Result Date: 11/19/2020  Intraprocedural fluoroscopic spot images as above. See separate procedure report for more information. Initial recommendations included:  Neurosurgical evaluation  Physical therapy / rehab  DVT prophylaxis per surgery  Pain management, minimize opioids and respiratory depressants  Encourage activity as tolerated  Respiratory Therapy and Bronchodilators prn  Supplemental oxygen as needed  BiPAP as needed  Incentive spirometry  Continued tobacco abstinence  NicoDerm  Blood products prn - monitor H&H   Blood Pressure - Monitor and control  Risk factor management / weight loss    Reflux precautions   Pulmonary follow-up Dr. Car Zhong    Assessment:        Principal Problem:    Centrilobular emphysema ([address]  Active Problems:    HTN (hypertension)    History of tobacco use    GERD (gastroesophageal reflux disease)    Chronic respiratory failure with hypoxia (HCC)    Spondylosis of lumbar region without myelopathy or radiculopathy    Lumbar disc disease    Alveolar hypoventilation    Obesity (BMI 30-39. 9)    Bronchiectasis ([address]    Oxygen dependent    Acute postoperative anemia due to expected blood loss    Multifocal pneumonia  Resolved Problems:    * No resolved hospital problems.  *      Plan:        Antibiotics per C&S  Sepsis order set  Cultures ordered, revealing no growth  Cefepime for hospital-acquired pneumonia initiated  Pulmonary consultation (follows with  Ricki Will)  Neurosurgical evaluation  Physical therapy / rehab  Doppler right leg rule out DVT  Pain management, minimize opioids and respiratory depressants  Encourage activity as tolerated  Respiratory Therapy and Bronchodilators prn  Supplemental oxygen as needed  Aspiration precautions  BiPAP as needed, patient has been refusing  Incentive spirometry  Continued tobacco abstinence  NicoDerm  Tessalon as needed  Blood products prn - monitor H&H   Blood Pressure - Monitor and control  Risk factor management / weight loss    Reflux precautions   Magic Wash for dry mouth, observe for thrush      IP CONSULT TO INTERNAL MEDICINE  IP CONSULT TO PULMONOLOGY  IP CONSULT TO INFECTIOUS DISEASES  IP CONSULT TO PHYSICAL MEDICINE Brennen George DO  11/23/2020  12:58 PM

## 2020-11-23 NOTE — CONSULTS
Physical Medicine & Rehabilitation  Consult Note      Admitting Physician:  Malachi Martínez DO    Primary Care Provider:  Nicol Painting MD     Reason for Consult:  Acute Inpatient Rehabilitation    Chief Complaint:  Back pain with right lower limb pain    History of Present Illness:  Referring Provider is requesting an evaluation for appropriate placement upon discharge from acute care. History from chart review and patient. Colleen Jensen is a 56 y.o. female with past medical history of lumbar stenosis with chronic back pain, type 2 diabetes, PVD, HTN, HLD, GERD, and COPD (on oxygen at home) admitted to [facility] on 11/19/2020. She initially presented for planned lumbar fusion for back pain and right lower limb pain. She underwent L4-L5 right laminectomy with Rosas facetectomy of L4 and discectomy of L4-L5 as well as open reduction of spondylolisthesis and L4-L5 fusion on 11/19/2020 (Dr. Anjali Dennis). Hospital course has been complicated by hypoxia requiring bipap. She is currently on rocephin for possible pneumonia. She reports continued pain in the back as well as some weakness in the bilateral lower limbs. She denies any numbness/tingling in the bilateral lower limbs. She denies any other acute concerns at this time. Review of Systems:  Review of Systems   Constitutional: Positive for fever. HENT: Negative for hearing loss. Eyes: Negative for blurred vision. Respiratory: Negative for shortness of breath. Cardiovascular: Negative for chest pain. Gastrointestinal: Negative for abdominal pain and constipation. Genitourinary: Negative for dysuria. Musculoskeletal: Positive for back pain. Skin: Negative for rash. Neurological: Positive for weakness. Negative for dizziness, sensory change and headaches.         Premorbid function:  Independent    Current function:    PT:  Restrictions/Precautions: General Precautions, Fall Risk, Up as Tolerated  Other position/activity restrictions: up with assist; JPdrain   Transfers  Sit to Stand: Contact guard assistance  Stand to sit: Contact guard assistance  Bed to Chair: Contact guard assistance  Comment: poor safety awarness  Ambulation 1  Surface: level tile  Device: No Device  Assistance: Contact guard assistance  Quality of Gait: forward flexed posture, with short, choppy steps,  Distance: 3 ft bed to chair  Comments: Pt adamantly refuing further amb due to pain, despite edu on risks of decreased mobility    Transfers  Sit to Stand: Contact guard assistance  Stand to sit: Contact guard assistance  Bed to Chair: Contact guard assistance  Comment: poor safety awarness  Ambulation  Ambulation?: Yes  Ambulation 1  Surface: level tile  Device: No Device  Assistance: Contact guard assistance  Quality of Gait: forward flexed posture, with short, choppy steps,  Distance: 3 ft bed to chair  Comments: Pt adamantly refuing further amb due to pain, despite edu on risks of decreased mobility    Surface: level tile  Ambulation 1  Surface: level tile  Device: No Device  Assistance: Contact guard assistance  Quality of Gait: forward flexed posture, with short, choppy steps,  Distance: 3 ft bed to chair  Comments: Pt adamantly refuing further amb due to pain, despite edu on risks of decreased mobility      OT:   ADL  Feeding: Independent, Setup  Grooming: Setup, Minimal assistance, Increased time to complete  UE Bathing: Setup, Moderate assistance, Increased time to complete  LE Bathing: Setup, Maximum assistance, Increased time to complete  UE Dressing: Setup, Moderate assistance, Increased time to complete  LE Dressing: Setup, Maximum assistance, Increased time to complete  Toileting: Setup, Moderate assistance         Balance  Sitting Balance: Contact guard assistance  Standing Balance: Contact guard assistance   Standing Balance  Time: ~1 minute  Activity: static standing, functional mobility  Functional Mobility  Functional - Mobility Device: Other(hand held assist)  Activity: Other  Assist Level: Contact guard assistance     Bed mobility  Rolling to Right: Stand by assistance  Supine to Sit: Stand by assistance  Sit to Supine: Stand by assistance  Scooting: Stand by assistance  Comment: cues for log rolling tech with fair carryover  Transfers  Sit to stand: Minimal assistance  Stand to sit: Minimal assistance                 SLP:  Impressions:  Patient presents with safe swallow for Dental Soft diet with thin liquids as evidenced by no s/s of aspiration noted with consistencies tested. Osteophytes noted at C2, C3, C4, and C5. +flash penetration, no aspiration with thin liquids, +min penetration, no aspiration with nectar-thick liquids, +min penetration, no aspiration with regular solid. +min-mod extended mastication with regular solid. Recommend small sips and bites, only feed when alert and awake and upright at 90 degrees for all PO intake. Recommend close monitoring for overt/clinical s/s of aspiration and D/C PO intake and complete Modified Barium Swallow Study should they occur. Results and recommendations reported to RN.    Patient Position: Lateral and Patient Degrees: 80      Past Medical History:        Diagnosis Date    Allergic rhinitis     Alveolar hypoventilation 11/20/2020    Aortic insufficiency 2018    mild-moderate on echo [facility] [place]    Bronchiectasis ([facility] 11/20/2020    Bronchitis     Chronic back pain     Pain management at [facility]. COPD (chronic obstructive pulmonary disease) ([facility] [address]     Dr. Дмитрий Hurtado to see 11/09/2020    Depression     DM (diabetes mellitus) ([facility] [address] [facility] 12/18/2012    GERD (gastroesophageal reflux disease)     History of echocardiogram 05/2018    EF 65%, mild-moderate AI and TR    Hyperlipidemia     Dr. Laith Kincaid Hypertension     Dr. Laith Kincaid Obesity     Osteoarthritis     Peripheral vascular disease ([facility] [address]     Primary osteoarthritis of both knees     Radicular pain of lumbosacral region     Spinal stenosis, lumbar region, without neurogenic claudication 04/30/2013    Tricuspid regurgitation 2018    mild-moderate on echo    Type II or unspecified type diabetes mellitus without mention of complication, not stated as uncontrolled     Dr. Clarence Yung Unspecified sleep apnea     no cpap used anymore    URI (upper respiratory infection)     Wears dentures     upper and lower full dentures    Wears glasses     Wellness examination     Dr. Shanique Castillo -PCP last visit in early Oct. 2020       Past Surgical History:        Procedure Laterality Date    COLONOSCOPY  2/12/2009    normal    DILATION AND CURETTAGE OF UTERUS      GASTRECTOMY      partial    LUMBAR DISCECTOMY  01/2015    lumbar diskectomy    LUMBAR FUSION  11/19/2020     POSTERIOR FUSION L4/5,     LUMBAR FUSION N/A 11/19/2020    POSTERIOR FUSION L4/5, [employer], Joanie Kussmaul, FRENCHS #[id number] AMINA performed by Rafaela Clifton DO at [facility] Right 4/30/13    Lumbar Diagnostic Block,  Kenalog 40 mg    NERVE BLOCK  5/23/13    Lumbar Radiofrequency, Kenalog 40mg    NERVE BLOCK  8/12/13    Lt MBNB  celestone 6mg    NERVE BLOCK Left 8-28-13    left lumbar diagnostic block #2 decadron 10 mg    NERVE BLOCK Left 9-24-13    left lumbar median branch radiofrequency    NERVE BLOCK  07-02-14    caudal, celestone 9 mg    NERVE BLOCK  7-16-14    caudal epidural #2, celestone 9mg, fentanyl 25mcg    NERVE BLOCK  7/30/14    caudal #3 decadron 10mg    NERVE BLOCK  11-6-14    duramorph epidural steroid block  duramorph 1 mg celestone 9 mg    NERVE BLOCK  11/20/15    TENS- Empi Select    NERVE BLOCK  07/20/2018    right transforminal # 1 decadron 10mg,isovue    NERVE BLOCK Bilateral 02/01/2019    bilat mbnb- no steroid    NERVE BLOCK Bilateral 02/08/2019    bilat mbnb, marcaine . 25%    [place] KNEE SCOPE,DIAGNOSTIC Right 3/24/2017    KNEE ARTHROSCOPY WITH PARTIAL MEDIAL MENISECAL DEBRIDMENT  performed by Preston Ivey MD at [address]  9 20 2007    UPPER GASTROINTESTINAL ENDOSCOPY  4 21 2009,04/2011    gastritis, esophagitis       Allergies:     Allergies   Allergen Reactions    Aspirin      DUE TO ULCER    Claritin [Loratadine] Other (See Comments)     coughing    Flonase [Fluticasone Propionate]     Morphine And Related      GI Upset        Current Medications:   Current Facility-Administered Medications: ipratropium-albuterol (DUONEB) nebulizer solution 1 ampule, 1 ampule, Inhalation, 4x daily  albuterol (PROVENTIL) nebulizer solution 2.5 mg, 2.5 mg, Nebulization, Q6H PRN  magic (miracle) mouthwash, 5 mL, Swish & Spit, 4x Daily PRN  guaiFENesin (MUCINEX) extended release tablet 600 mg, 600 mg, Oral, BID  cefTRIAXone (ROCEPHIN) 1 g IVPB in 50 mL D5W minibag, 1 g, Intravenous, Q24H  sodium chloride flush 0.9 % injection 10 mL, 10 mL, Intravenous, 2 times per day  0.9 % sodium chloride infusion, , Intravenous, Continuous  acetaminophen (TYLENOL) suppository 650 mg, 650 mg, Rectal, Q4H PRN  benzonatate (TESSALON) capsule 100 mg, 100 mg, Oral, TID PRN  SUFentanil citrate 200 mcg in sodium chloride 0.9 % 200 mL infusion, 0.25 mcg/kg/hr, Intravenous, Once  amLODIPine (NORVASC) tablet 10 mg, 10 mg, Oral, Daily  atorvastatin (LIPITOR) tablet 40 mg, 40 mg, Oral, Daily  azelastine (ASTELIN) 0.1 % nasal spray 2 spray, 2 spray, Each Nostril, BID  carvedilol (COREG) tablet 6.25 mg, 6.25 mg, Oral, BID  docusate sodium (COLACE) capsule 100 mg, 100 mg, Oral, Daily  DULoxetine (CYMBALTA) extended release capsule 60 mg, 60 mg, Oral, Daily  gabapentin (NEURONTIN) capsule 300 mg, 300 mg, Oral, BID  diphenhydrAMINE (BENADRYL) tablet 25 mg, 25 mg, Oral, Q6H PRN  lisinopril-hydroCHLOROthiazide (PRINZIDE;ZESTORETIC) 20-25 MG per tablet 1 tablet, 1 tablet, Oral, Daily  metFORMIN (GLUCOPHAGE) tablet 500 mg, 500 mg, Oral, BID WC  mirtazapine (REMERON) tablet 15 mg, 15 mg, Oral, Nightly  mirtazapine (REMERON) tablet 30 mg, 30 mg, Oral, Nightly  omeprazole (PRILOSEC) delayed release capsule 20 mg, 20 mg, Oral, QAM  potassium chloride (KLOR-CON M) extended release tablet 20 mEq, 20 mEq, Oral, Daily  tiZANidine (ZANAFLEX) tablet 4 mg, 4 mg, Oral, BID  trospium (SANCTURA) tablet 20 mg, 20 mg, Oral, BID  sodium chloride flush 0.9 % injection 10 mL, 10 mL, Intravenous, PRN  acetaminophen (TYLENOL) tablet 650 mg, 650 mg, Oral, Q6H  oxyCODONE (ROXICODONE) immediate release tablet 5 mg, 5 mg, Oral, Q4H PRN **OR** oxyCODONE (ROXICODONE) immediate release tablet 10 mg, 10 mg, Oral, Q4H PRN  morphine (PF) injection 2 mg, 2 mg, Intravenous, Q2H PRN **OR** morphine injection 4 mg, 4 mg, Intravenous, Q2H PRN  polyethylene glycol (GLYCOLAX) packet 17 g, 17 g, Oral, Daily  magnesium hydroxide (MILK OF MAGNESIA) 400 MG/5ML suspension 30 mL, 30 mL, Oral, Daily  senna (SENOKOT) tablet 8.6 mg, 1 tablet, Oral, Daily PRN  enoxaparin (LOVENOX) injection 40 mg, 40 mg, Subcutaneous, Daily  cetirizine (ZYRTEC) tablet 10 mg, 10 mg, Oral, Daily  gabapentin (NEURONTIN) capsule 600 mg, 600 mg, Oral, Nightly  insulin lispro (HUMALOG) injection vial 0-6 Units, 0-6 Units, Subcutaneous, TID WC  insulin lispro (HUMALOG) injection vial 0-3 Units, 0-3 Units, Subcutaneous, Nightly  glucose (GLUTOSE) 40 % oral gel 15 g, 15 g, Oral, PRN  dextrose 50 % IV solution, 12.5 g, Intravenous, PRN  glucagon (rDNA) injection 1 mg, 1 mg, Intramuscular, PRN  dextrose 5 % solution, 100 mL/hr, Intravenous, PRN  nicotine (NICODERM CQ) 7 MG/24HR 1 patch, 1 patch, Transdermal, Daily    Family History:       Problem Relation Age of Onset    Diabetes Mother     Heart Disease Mother     Cirrhosis Father        Social History:  Lives With: Alone(neighbors visit frequently)  Type of Home: Apartment(4th floor apartment)  Home Layout: One level  Home Access: Elevator, Level entry  Bathroom Shower/Tub: Tub/Shower unit  Bathroom Toilet: Standard  Bathroom Equipment: Shower chair, Grab bars in shower, Grab bars around toilet  Home Equipment: Quad cane, Electric scooter, Oxygen(quad cane within home; scooter for out of house activities; 2L O2 typically only at night)  Receives Help From: Home health, Neighbor(used to have an aide who came to help her, but prior to surgery she was not coming anymore--looking to have the aide start coming again)  ADL Assistance: [address]: Independent  Homemaking Responsibilities: Yes  Ambulation Assistance: Independent  Transfer Assistance: Independent  Active : Yes  Mode of Transportation: Car  Occupation: On disability  Leisure & Hobbies: puzzles; pool  Social History     Socioeconomic History    Marital status: Single     Spouse name: None    Number of children: None    Years of education: None    Highest education level: None   Occupational History     Employer: DISABLED   Social Needs    Financial resource strain: Not very hard    Food insecurity     Worry: Never true     Inability: Never true    Transportation needs     Medical: No     Non-medical: No   Tobacco Use    Smoking status: Former Smoker     Packs/day: 0.25     Years: 36.00     Pack years: 9.00     Types: Cigarettes     Last attempt to quit: 10/30/2020     Years since quittin.0    Smokeless tobacco: Never Used    Tobacco comment: pt currently using nicotine patches   5 CIGARETTES A DAY   Substance and Sexual Activity    Alcohol use: No     Alcohol/week: 0.0 standard drinks     Frequency: Never     Binge frequency: Never    Drug use: No     Comment: 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 7 yrs    Sexual activity: Never   Lifestyle    Physical activity     Days per week: 0 days     Minutes per session: 0 min    Stress:  Only a little   Relationships    Social connections     Talks on phone: More than three times a week     Gets together: Once a week     Attends Yazidi service: More than 4 times per year     Active member of club or organization: No     Attends meetings of clubs or organizations: Never     Relationship status: Never     Intimate partner violence     Fear of current or ex partner: None     Emotionally abused: None     Physically abused: None     Forced sexual activity: None   Other Topics Concern    None   Social History Narrative    10/9/20 Patient keeping contact with others to a minimum due to [place] 19 Pandemic. 10/9/20 Patient has difficulty with ambulation due to DJD, stenosis and fibromyalgia       Physical Exam:  BP (!) 82/44   Pulse 101   Temp 98.9 °F (37.2 °C) (Oral)   Resp 22   Ht 5' 6\" (1.676 m)   Wt 188 lb (85.3 kg)   LMP 04/19/2003   SpO2 92%   BMI 30.34 kg/m²     GEN: Well developed, well nourished, no acute distress  HEENT: NCAT. Keeps eyes closed throughout most of evaluation. Hearing grossly intact. Mucous membranes pink and moist.  RESP: Normal breath sounds with no wheezing, rales, or rhonchi. Respirations WNL and unlabored. On nasal cannula oxygen. CV: Regular rate and rhythm. No murmurs, rubs, or gallops. ABD: Soft, non-distended, BS+ and equal.  NEURO: Alert but keeps eyes closed. Speech fluent. Sensation to light touch intact in all limbs. MSK: Functional ROM in all limbs. Muscle tone and bulk are normal bilaterally. Strength 4+/5 in bilateral upper limbs and with bilateral ADF, APF. LIMBS: No edema in bilateral lower limbs. SKIN: Warm and dry with good turgor. PSYCH: Mood WNL. Affect WNL. Appropriately interactive.     Diagnostics:    CBC:   Recent Labs     11/22/20  1150 11/23/20  1022   WBC 13.1* 11.8*   RBC 4.44 4.08   HGB 12.3 11.3*   HCT 40.1 34.2*   MCV 90.3 83.8   RDW 13.3 13.2   PLT See Reflexed IPF Result 255     BMP:   Recent Labs     11/22/20  1507      K 3.5*   CL 96*   CO2 26   BUN 12   CREATININE 0.62   GLUCOSE 122*      HbA1c:   Lab Results   Component Value Date    LABA1C 5.8 04/21/2020     BNP: No results for input(s): BNP in the last 72 hours. PT/INR: No results for input(s): PROTIME, INR in the last 72 hours. APTT: No results for input(s): APTT in the last 72 hours. CARDIAC ENZYMES: No results for input(s): CKMB, CKMBINDEX, TROPONINT in the last 72 hours. Invalid input(s): CKTOTAL;3  FASTING LIPID PANEL:  Lab Results   Component Value Date    CHOL 201 (H) 04/21/2020    HDL 54 04/21/2020    TRIG 155 (H) 04/21/2020     LIVER PROFILE: No results for input(s): AST, ALT, ALB, BILIDIR, BILITOT, ALKPHOS in the last 72 hours. Radiology:  FL MODIFIED BARIUM SWALLOW W VIDEO   Final Result   No evidence of significant penetration or aspiration. Please see separate speech pathology report for full discussion of findings   and recommendations. VL DUP LOWER EXTREMITY VENOUS RIGHT   Final Result      XR CHEST PORTABLE   Final Result   Stable moderate interstitial edema and moderate left basilar opacity related   to combined pleural-parenchymal process      Improvement in now mild residual streaky right basilar atelectasis         CT CHEST PULMONARY EMBOLISM W CONTRAST   Final Result   1. No clear evidence for central pulmonary embolus. Evaluation of the   remainder of the pulmonary arterial vasculature is severely limited to   nondiagnostic as detailed above. 2. Bilateral lower lobe consolidation likely pneumonia. Consolidation of the   proximal right middle lobe and lingula to a lesser degree. Findings favored   to represent multifocal pneumonia. Follow-up recommended to document   resolution. 3. Prominent mediastinal lymph nodes. 4. Fatty liver. 5. Severe emphysema. XR CHEST PORTABLE   Final Result   1. Lower zone predominant airspace disease, atelectasis and/or infiltrate. Follow-up is recommended to document resolution. 2. Stable mild cardiomegaly. Moderate pulmonary vascular congestion. Trace   bilateral pleural effusions.          XR CHEST PORTABLE   Final Result   Bibasilar opacities compatible with atelectasis. In the appropriate clinical   setting pneumonia is not excluded. XR LUMBAR SPINE (2-3 VIEWS)   Final Result   New transpedicular hardware fixation L4-L5 without definitive radiographic   evidence of complication. FLUORO FOR SURGICAL PROCEDURES   Final Result      FLUORO FOR SURGICAL PROCEDURES   Final Result   Intraprocedural fluoroscopic spot images as above. See separate procedure   report for more information. Impression:    1. Chronic low back pain with right lower limb pain s/p L4-L5 laminectomy and fusion  2. Acute respiratory failure requiring bipap  3. Multifocal pneumonia  4. COPD  5. Type 2 diabetes  6. HTN  7. HLD  8. PVD  9. GERD  10. Obesity    Recommendations:    1. Diagnosis:  Chronic low back pain with right lower limb pain s/p L4-L5 laminectomy and fusion  2. Therapy: Has PT/OT needs  3. Medical Necessity: As above  4. Support: Lives alone  5. Rehab Recommendation: The patient may benefit from inpatient rehab once medically stable. However, she is currently requiring intermittent bipap during the day. Will follow respiratory status and progress with therapies. 6. DVT Prophylaxis: Lovenox    It was my pleasure to evaluate Lisseth Montilla today. Please call with questions.     Windy Yu MD

## 2020-11-23 NOTE — PLAN OF CARE
Problem: Gas Exchange - Impaired:  Goal: Levels of oxygenation will improve  Description: Levels of oxygenation will improve  Outcome: Ongoing  Note:   PROVIDE ADEQUATE OXYGENATION WITH ACCEPTABLE SP02/ABG'S    [x]  IDENTIFY APPROPRIATE OXYGEN THERAPY  [x]   MONITOR SP02/ABG'S AS NEEDED   [x]   PATIENT EDUCATION AS NEEDED        Problem: Respiratory  Intervention: Administer medication as ordered  Note: BRONCHOSPASM/BRONCHOCONSTRICTION     [x]         IMPROVE AERATION/BREATH SOUNDS  [x]   ADMINISTER BRONCHODILATOR THERAPY AS APPROPRIATE  [x]   ASSESS BREATH SOUNDS  [x]   IMPLEMENT AEROSOL/MDI PROTOCOL  [x]   PATIENT EDUCATION AS NEEDED     Intervention: Continuous positive airway pressure  Note: NON INVASIVE VENTILATION  PROVIDE OPTIMAL VENTILATION/ACCEPTABLE SP02  IMPLEMENT NON INVASIVE VENTILATION PROTOCOL  ASSESSMENT SKIN INTEGRITY  PATIENT EDUCATION AS NEEDED  BIPAP AS NEEDED   Intervention: Respiratory assessment  Note: SARAH BUNNPPatient Assessment complete. Lumbar disc disease [M51.9] . Vitals:    11/23/20 1202   BP:    Pulse:    Resp:    Temp:    SpO2: 98%   . Patients home meds are   Prior to Admission medications    Medication Sig Start Date End Date Taking? Authorizing Provider   carvedilol (COREG) 6.25 MG tablet TAKE 1 TABLET BY MOUTH 2 TIMES DAILY 11/18/20  Yes Kvng Choi MD   cetirizine (ZYRTEC) 10 MG tablet TAKE 1 TABLET BY MOUTH DAILY 11/18/20  Yes Kvng Choi MD   meloxicam (MOBIC) 15 MG tablet Take 15 mg by mouth daily   Yes Historical Provider, MD   HYDROcodone-acetaminophen (NORCO) 5-325 MG per tablet Take 1 tablet by mouth every 8 hours as needed for Pain for up to 30 days.  Early refill due to holidays 10/30/20 11/29/20 Yes Tien Para, APRN - CNP   trospium (SANCTURA) 20 MG tablet TAKE 1 TABLET BY MOUTH 2 TIMES DAILY 10/27/20  Yes Kvng Choi MD   tiZANidine (ZANAFLEX) 4 MG tablet TAKE 1 TABLET TWICE DAILY 10/27/20  Yes Kvng Choi MD   blood glucose test strips 69%  []  Unable []  Above 60-69%  []  Unable []  Above 50-59%  []  Unable []  Above 35-49%  []  Unable []  Less than 35%  []  Unable

## 2020-11-23 NOTE — PROCEDURES
INSTRUMENTAL SWALLOW REPORT  MODIFIED BARIUM SWALLOW    NAME: Myla Mota   : 1960  MRN: [id number]       Date of Eval: 2020        Radiologist: Dr. Dandre Henry     Referring Diagnosis(es):      Past Medical History:  has a past medical history of Allergic rhinitis, Alveolar hypoventilation, Aortic insufficiency, Bronchiectasis ([address] Bronchitis, Chronic back pain, COPD (chronic obstructive pulmonary disease) ([address] Depression, DM (diabetes mellitus) ([address] GERD (gastroesophageal reflux disease), History of echocardiogram, Hyperlipidemia, Hypertension, Obesity, Osteoarthritis, Peripheral vascular disease ([address] Primary osteoarthritis of both knees, Radicular pain of lumbosacral region, Spinal stenosis, lumbar region, without neurogenic claudication, Tricuspid regurgitation, Type II or unspecified type diabetes mellitus without mention of complication, not stated as uncontrolled, Unspecified sleep apnea, URI (upper respiratory infection), Wears dentures, Wears glasses, and Wellness examination. Past Surgical History:  has a past surgical history that includes Dilation and curettage of uterus; gastrectomy; Nerve Block (Right, 13); Nerve Block (13); Colonoscopy (2009); Upper gastrointestinal endoscopy (2007); Upper gastrointestinal endoscopy (2009,2011); Nerve Block (13); Nerve Block (Left, -13); Nerve Block (Left, 13); Nerve Block (14); Nerve Block (14); Nerve Block (14); Nerve Block (14); Nerve Block (11/20/15); pr knee scope,diagnostic (Right, 3/24/2017); Nerve Block (2018); Nerve Block (Bilateral, 2019); Nerve Block (Bilateral, 2019); lumbar discectomy (2015); lumbar fusion (2020); and lumbar fusion (N/A, 2020). Current Diet Solid Consistency: Dental Soft  Current Diet Liquid Consistency:  Thin       Type of Study: Initial MBS       Recent CXR/CT of Chest:        Impression    Stable moderate interstitial edema and moderate left basilar opacity related    to combined pleural-parenchymal process         Improvement in now mild residual streaky right basilar atelectasis      Patient Complaints/Reason for Referral:  Courtney Goode was referred for a MBS to assess the efficiency of his/her swallow function, assess for aspiration, and to make recommendations regarding safe dietary consistencies, effective compensatory strategies, and safe eating environment. Onset of problem:   Patient complains of right lower extremity pain, symptoms from the foot up towards the hip.  She occasionally will also have symptoms into the lumbar spine.  She has had lumbar spine surgery in the past, as well as nerve blocks at pain management.  She has symptoms despite conservative treatment.  Patient has been scheduled for lumbar fusion. Behavior/Cognition/Vision/Hearing:  Behavior/Cognition: Alert; Cooperative  Vision: Within Functional Limits  Hearing: Within functional limits    Impressions:  Patient presents with safe swallow for Dental Soft diet with thin liquids as evidenced by no s/s of aspiration noted with consistencies tested. Osteophytes noted at C2, C3, C4, and C5. +flash penetration, no aspiration with thin liquids, +min penetration, no aspiration with nectar-thick liquids, +min penetration, no aspiration with regular solid. +min-mod extended mastication with regular solid. Recommend small sips and bites, only feed when alert and awake and upright at 90 degrees for all PO intake. Recommend close monitoring for overt/clinical s/s of aspiration and D/C PO intake and complete Modified Barium Swallow Study should they occur. Results and recommendations reported to RN. Patient Position: Lateral and Patient Degrees: 90    Consistencies Administered: Dysphagia Soft and Bite-Sized (Dysphagia III); Reg solid; Dysphagia Pureed (Dysphagia I); Nectar straw;Nectar  teaspoon; Thin straw    Dysphagia Outcome Severity Scale: Level 6: Within functional limits/Modified independence  Penetration-Aspiration Scale (PAS): 2 - Material enters the airway, remains above the vocal folds, and is ejected from the airway    Recommended Diet:  Solid consistency: Dental Soft  Liquid consistency: Thin  Medication administration: PO    Safe Swallow Protocol:  Compensatory Swallowing Strategies: Upright as possible for all oral intake;Small bites/sips    Recommendations/Treatment  Requires SLP Intervention: Yes  D/C Recommendations: Further therapy recommended at discharge. Recommended Exercises:    Therapeutic Interventions: Diet tolerance monitoring;Patient/Family education;Oral motor exercises    Education: Images and recommendations were reviewed with pt and RN following this exam.   Patient Education: yes  Patient Education Response: Verbalizes understanding    Prognosis  Prognosis for safe diet advancement: fair  Duration/Frequency of Treatment  Duration/Frequency of Treatment: 1-2x per week      Goals:    Long Term:   To Maximize safety with intake, optimize nutrition/hydration and minimize risk for aspiration. Short Term:  Dysphagia Goals: The patient will tolerate recommended diet without observed clinical signs of aspiration      Oral Preparation / Oral Phase  Oral Phase: WFL  Oral Phase: +premature spill to vallecuale with soft solid and regular solid consistency, +min-mod extended mastication with regular solid, however bolus manipulation and oral transit WFL for all consistencies tested    Pharyngeal Phase  Pharyngeal Phase: WFL  Pharyngeal:   Puree: No penetration, no aspiration, +min residual in valleculae. Soft solid: No penetration, no aspiration, min residual in vallecuale and pyriforms. Regular solid: +min penetration, no aspiration   Thick by spoon: +trace penetration, no aspiration. Thick by straw: No penetration, no aspiration, min residual in valleculae.    Thin by straw: +flash penetration, no aspiration, +min residual in valleculae.   ** +latent cough at end of exam.    Esophageal Phase  Esophageal Screen: [facility]    Pain   Patient Currently in Pain: No  Pain Level: 0  Pain Type: Surgical pain  Pain Location: Back      Therapy Time:   Individual Concurrent Group Co-treatment   Time In 1530         Time Out 1540         Minutes 10                   Lilliana Keller, 11/23/2020, 3:51 PM

## 2020-11-23 NOTE — ADT AUTH CERT
[address] NEURO  [address] [address]  [id number]  [id number]    MRN: [id number]   PRIMARY PAYOR:  AMPARO Patel  number: [id number] ( Upgraded to Inpatient [id number] )    Marialuisa Cm     [phone]  Fax [phone]   Last edited by Marialuisa Paobn on 11/23/20 at [phone]     Inpatient started on 11/19/2020 instead of 11/20/2020    Boaz Newton DO    Physician    Neurosurgery    Op Note    Signed    Date of Service:  11/19/2020  7:19 AM             Procedure: POSTERIOR FUSION L4/5, [employer], [place] Delacruz #[id number] AMINA  Case Time: 11/19/2020  7:19 AM  Surgeon: Boaz Newton DO              Signed              Show:Clear all  [x]Manual[x]Template[]Copied    Added by:  [x]Kiara Ash DO    []Hover for details  Operative Note        Patient: Meghana Elkins  YOB: 1960  MRN: [id number]     Date of Procedure: 11/19/2020     Pre-Op Diagnosis: SPONDYLOLISTHESIS OF LUMBAR REGION, lumbar radiculopathy     Post-Op Diagnosis: Same       Procedure:   L4-5 right-sided laminectomy with Rosas facetectomy of L4  L4-5 discectomy and anterior arthrodesis  Implantation of titanium Medtr[employer] banana cage at L4-5  Use of morselized autograft via same incision  Use of morselized allograft  Segmental pedicle screw fixation at L4 and L5 Med[employer]  Use of spinal neuro navigation  Use of fluoroscopy  Use of neuro monitoring  Right L4 and L5 neural lysis and lysis of scar adhesion  Open reduction of spondylolisthesis     Surgeon(s):  Boaz Newton DO     Assistant:   First Assistant: Jessy De La Fuente RN; Marchelle Aschoff     Anesthesia: General     Estimated Blood Loss (mL): 168      Complications: None     Specimens:   ID Type Source Tests Collected by Time Destination   1 : URINE; INITIAL GARCIA INSERTION Urine Urine, indwelling catheter CULTURE, URINE Kiara Ash DO 11/19/2020 3002           Implants:  Implant Name Type Inv. Item Serial No.  Lot No. LRB No. Used Action   GRAFT BNE VIABLE 1 CC YOUNG MTRX FIBERCEL - [id number]   GRAFT BNE VIABLE 1 CC YOUNG MTRX FIBERCEL [address] [facility] [id number] N/A 1 Implanted   SPACER [id number] 12W 30MM X 11MM 10 DG TI   SPACER [id number] 12W 30MM X 11MM 10 DG TI   [employer]- [id number] N/A 1 Implanted   GRAFT BNE SUB 5CC DEMIN BNE MTRX FRZ DRY PROGENIX + - SN/A   GRAFT BNE SUB 5CC DEMIN BNE MTRX FRZ DRY PROGENIX + N/A [employer] SPINALGRAFT TECH- [id number] N/A 1 Implanted   SCREW SPNL L45MM OD75MM POST THORACOLUMBOSACRAL SRI   SCREW SPNL L45MM OD75MM POST THORACOLUMBOSACRAL SRI   [employer] [place] [employer]-WD   N/A 1 Implanted   SCREW SPNL L50MM [id number] POST THORACOLUMBOSACRAL MULTIAXIAL   SCREW SPNL L50MM [id number] POST THORACOLUMBOSACRAL MULTIAXIAL   [employer] [place] [employer]-WD   N/A 1 Implanted   SCREW SPNL L40MM OD85MM POST THORACOLUMBOSACRAL SRI   SCREW SPNL L40MM OD85MM POST THORACOLUMBOSACRAL SRI   [employer] [place] [employer]WD   N/A 1 Implanted   SCREW SPNL L50MM OD85MM POST THORACOLUMBOSACRAL SRI   SCREW SPNL L50MM OD85MM POST THORACOLUMBOSACRAL SRI   [employer] [place] [employer]WD   N/A 1 Implanted   JODY SPNL L40MM [id number] POST THORACOLUMBOSACRAL CO CHROME   JODY SPNL L40MM [id number] POST THORACOLUMBOSACRAL CO CHROME   Sina   N/A 1 Implanted   JODY SPNL L45MM [id number] THORACOLUMBOSACRAL CHROMALOY PERC   JODY SPNL L45MM [id number] THORACOLUMBOSACRAL CHROMALOY PERC   [employer] [place] [employer]WD   N/A 1 Implanted   SET SCR SPNL DIA4. 75MM POST THORLUM SACR TI PERC APPRCH [address]   SET SCR SPNL DIA4. 75MM POST THORLUM SACR TI PERC APPRCH [address] [employer]   [employer] SOFAMOR DANEK-WD   N/A 4 Implanted   SPACER [id number] 12W 30MM X 11MM 10 DG TI   SPACER [id number] 12W 30MM X 11MM 10 DG TI   [employer] SOFAMOR DANEK-WD   N/A 1 Implanted          Drains:        Closed/Suction Drain Right;Lateral Back Bulb 7 Arabic (Active)   Site Description Unable to view 11/19/20 1345   Dressing Status Clean;Dry; Intact 11/19/20 1344 Drainage Appearance Bloody 11/19/20 1345   Status To bulb suction 11/19/20 1345   Output (ml) 20 ml 11/19/20 1337       Urethral Catheter Double-lumen 16 fr (Active)   Catheter Indications Perioperative use in selected surgeries including but not limited to urologic, pelvic or need for intraoperative monitoring of urinary output due to prolonged surgery, large volume infusion or need for diuretic therapy in surgery 11/19/20 1345   Site Assessment No urethral drainage 11/19/20 1345   Urine Color Yellow 11/19/20 1345         Findings: Significant foraminal stenosis     Detailed Description of Procedure:   Patient was consented preoperatively and brought into the operative room. She was placed under general anesthesia and flipped prone onto the [facility] table all pressure points padded arms placed on the arm boards. Prior midline lumbar incision was marked and extended slightly cephalad and caudad. After sterile prepping draping and timeout fluoroscopy was used to identify the L4 pedicle. Incision was made with a 10 blade followed by Bovie and Pena to perform a right-sided subperiosteal dissection ensuring preservation of the L3-4 facet and exposing the transverse process of L4 and L5. Daley retractor was then placed. Reference clamp was attached norm spine was completed. Using spinal neuro navigation with Stealth navigated Midas followed by navigated tap followed by navigated screw was placed at L4 and L5 on the right side. In similar fashion percutaneous technique was used to place screws on the patient's left side. On the left navigation was used to identify the skin incision followed by trajectory followed by drill followed by tap followed by placement of screws. After all 4 screws were placed O arm spin was completed in the left L4 screw was noted to be lateral.  I then removed the screw and then redirected more medially and capture the more medial trajectory.   I used an EMG probe to stimulate and noted that there was stimulation well above 20 L4 screw points. At this point navigated Midas was used to perform any right hemilaminectomy at L4-5 with complete facetectomy removing the right L4 medial facet. L4 exiting root along with the shoulder was identified and completely released. In similar fashion the L5 right pedicle was skeletonized and the L5 nerve root was identified and completely released all the way into the L5 foramen. Pedicle based distractor was used at L4-5 on the right side. 15 blade followed by karl all the way up to 10 followed by straight curette was used to complete a L4-5 discectomy along with arthrodesis of the endplates. Trial was placed with fluoroscopy followed by implantation of an 11 mm height banana cage that was articulated into the ventral most portion of the L4-5 disc space. This was deployed and backfilling was performed with DBM. The cage of been filled with a combination of morselized autograft bone as well as cellular graft. I slightly countersunk the L4 screws and withdrew the L5 screws. Rods were placed in MIS technique on the left side and open technique on the right side. Sequential reduction using towers was used to perform a complete reduction of the grade 1 anterolisthesis at L4-5. AP and lateral x-rays showed good alignment on anterior and complete reduction of the listhesis on lateral.  Final tightening of all caps was performed. Retractors removed and AP and lateral fluoroscopy showed good placement of all hardware with erin poking out on either and cephalad and caudad on both sides. Wound was thoroughly irrigated with Betadine and multiple rounds of saline. Final motor and sensory potentials were stable. Hemostasis was obtained with Sammye [place] as well as bipolar cautery. 7 flat REGINE drain was tunneled subfascial and secured with a drain stitch.   Wound was closed with no strategies for fascia and muscle followed by inverted 2-0 Vicryl's for subcutaneous tissue and staples for skin. Left sided percutaneous incisions were made for the L4-5 pedicle screws were closed using a combination of inverted 0 Vicryl followed by 2-0 Vicryl followed by staples for the skin.   Bacitracin island were placed over this wound patient was flipped back supine onto the regular bed extubated in stable condition and returned to the PACU     Electronically signed by Justino Roca DO on 11/19/2020 at 3:47 PM                Admission (Current) on 11/19/2020          Routing History          Detailed Report      Op Note Info     Author  Note Status  Last Update User    Justino Roca DO  Signed  Justino Roca DO    Last Update Date/Time: 11/19/2020  3:55 PM    Chart Review Routing History     Recipients  Sent On  Sent By     Catrina Scanlon MD    In Basket   Ph: [phone]           11/19/2020  3:55 PM  Justino Roca DO    Routed Reports:    Op Note by Justino Roca DO

## 2020-11-23 NOTE — PROGRESS NOTES
was started on Guaifenesin  - continue to monitor respiratory status   - PMR consulted for recommendations for discharge         Please contact neurosurgery with any changes in patients neurologic status.        Wilfred Hager CNP  11/23/20  6:41 AM

## 2020-11-23 NOTE — PROGRESS NOTES
Physical Therapy  DATE: 2020    NAME: Gladis Arce  MRN: [id number]   : 1960    Patient not seen this date for Physical Therapy due to:  [] Blood transfusion in progress  [] Hemodialysis  [] Patient Declined  [] Spine Precautions   [] Strict Bedrest  [] Surgery/ Procedure  [x] Testing--dopplers ordered-await results; check       [] Other        [] PT is being discontinued at this time. Patient independent. No further needs. [] PT is being discontinued at this time due to declining physical/ medical status. Therapy is not appropriate at this time.     Gi Foster, Student Physical Therapist

## 2020-11-23 NOTE — PROGRESS NOTES
PULMONARY & CRITICAL CARE MEDICINE PROGRESS  NOTE     Patient:  Sharri Flor  MRN: [id number]  Admit date: 11/19/2020    SUBJECTIVE     I personally interviewed/examined the patient, reviewed interval history and interpreted all available radiographic, laboratory data at the time of service. Chief Compliant/Reason for Initial Consult:   Acute on chronic hypoxic respiratory failure secondary to aspiration pneumonia  Underlying severe emphysema. Brief Hospital Course: The patient is a 56 y.o. female with history of morbid obesity, COPD, JOAN, chronic hypoxic respiratory failure on home oxygen was admitted for right lower extremity pain from foot up to his hip. Failed all conservative measures and opted for scheduled lumbar fusion surgery. She underwent posterior fusion L4/5 surgery on 11/19. Postoperative her respiratory status continued to worsen requiring NIV intermittently. Patient also started spiking fever. Chest x-ray and CT PE chest showed bilateral multifocal pneumonia secondary to aspiration likely with underlying severe emphysema. Patient improved with ceftriaxone, intermittent diuretics and NIV support. Interval History:  Patient seen and examined bedside. Currently saturating well on 3-4 liters oxygen nasal cannula. In no acute respiratory distress. Sleeping comfortably in bed laying flat. Used BiPAP overnight 12/5 FiO2 40%. Alert oriented x3. Mentation much better than yesterday. Continues to have cough. Denied any chest pain, shortness of breath or any other symptoms. VS-stable except T-max 100.8F yesterday evening. Pro-Chano-10.6, lactate 1.9. Blood culture showed no growth. BNP-pending. Urine output 1.3 L / 24 hours  Chest x-ray this morning showed stable bilateral opacities. No new infiltrate as compared to previous imaging. No other overnight issues.      Review of Systems:  General: negative for chills, fatigue or fever  ENT: negative for headaches, nasal congestion, sore throat or visual changes  Allergy and Immunology: negative for postnasal drip or seasonal allergies  Hematological and Lymphatic: negative for bleeding problems, swollen lymph nodes  Respiratory: positive for shortness of breath and cough negative for pleuritic pain, stridor or tachypnea  Cardiovascular: negative for edema or palpitations  Gastrointestinal: negative for abdominal pain, change in bowel habits or nausea/vomiting  Genito-Urinary: negative for dysuria or urinary frequency/urgency  Musculoskeletal: negative for joint pain or joint swelling  Neurological: negative for numbness/tingling, seizures or weakness  Dermatological: negative for pruritus or rash    OBJECTIVE     VITAL SIGNS:   LAST-  BP (!) 105/55   Pulse 114   Temp 99.8 °F (37.7 °C) (Oral)   Resp 20   Ht 5' 6\" (1.676 m)   Wt 188 lb (85.3 kg)   LMP 2003   SpO2 98%   BMI 30.34 kg/m²   8-24 HR RANGE-  TEMP Temp  Av.1 °F (37.8 °C)  Min: 98.3 °F (36.8 °C)  Max: 102.8 °F (84.8 °C)   BP Systolic (05FAL), FVV:144 , Min:89 , AJZ:339      Diastolic (81ZUD), EVL:04, Min:33, Max:78     PULSE Pulse  Av.2  Min: 114  Max: 139   RR Resp  Av  Min: 14  Max: 20   O2 SAT No data recorded   OXYGEN DELIVERY O2 Flow Rate (L/min)  Av L/min  Min: 4 L/min  Max: 4 L/min     Systemic Examination:   General appearance - looks comfortable on 4 L NC and in no acute distress  Mental status - alert, oriented to person, place, and time  Eyes - pupils equal and reactive, extraocular eye movements intact  Mouth - mucous membranes moist, pharynx normal without lesions  Neck -no JVD, supple, no significant adenopathy  Chest - Chest was symmetrical without dullness to percussion. Bronchial breath sounds present bilaterally. Bilateral crackles present on auscultation.    There is nouse of accessory muscles  Heart - normal rate, regular rhythm, normal S1, S2, no murmurs  Abdomen - soft, nontender, nondistended, no masses or organomegaly  Neurological - alert, oriented, normal speech, no focal findings or movement disorder noted  Extremities - peripheral pulses normal, no pedal edema, no clubbing or cyanosis  Skin - normal coloration and turgor, no rashes, no suspicious skin lesions noted     DATA REVIEW     Medications:  Scheduled Meds:   ipratropium-albuterol  1 ampule Inhalation 4x daily    cefTRIAXone (ROCEPHIN) IV  1 g Intravenous Q24H    sodium chloride flush  10 mL Intravenous 2 times per day    sufentanil (SUFENTA) 1 mcg/mL infusion  0.25 mcg/kg/hr Intravenous Once    amLODIPine  10 mg Oral Daily    atorvastatin  40 mg Oral Daily    azelastine  2 spray Each Nostril BID    carvedilol  6.25 mg Oral BID    docusate sodium  100 mg Oral Daily    DULoxetine  60 mg Oral Daily    gabapentin  300 mg Oral BID    lisinopril-hydroCHLOROthiazide  1 tablet Oral Daily    metFORMIN  500 mg Oral BID WC    mirtazapine  15 mg Oral Nightly    mirtazapine  30 mg Oral Nightly    omeprazole  20 mg Oral QAM    potassium chloride  20 mEq Oral Daily    tiZANidine  4 mg Oral BID    trospium  20 mg Oral BID    acetaminophen  650 mg Oral Q6H    polyethylene glycol  17 g Oral Daily    magnesium hydroxide  30 mL Oral Daily    enoxaparin  40 mg Subcutaneous Daily    cetirizine  10 mg Oral Daily    gabapentin  600 mg Oral Nightly    insulin lispro  0-6 Units Subcutaneous TID WC    insulin lispro  0-3 Units Subcutaneous Nightly    nicotine  1 patch Transdermal Daily     Continuous Infusions:   sodium chloride      dextrose       LABS:-  ABGs:   No results found for: PH, PCO2, PO2, HCO3, O2SAT  No results for input(s): PHART, PO2ART, LYW4DDI, ZMZ1CLZ, BEART, N1CDUNES in the last 72 hours.   Lab Results   Component Value Date    POCPH 7.449 11/22/2020    POCPCO2 39.4 11/22/2020    POCPO2 39.1 (LL) 11/22/2020    POCHCO3 27.4 11/22/2020    NWTL2EYM 76 (L) 11/22/2020     CBC: Recent Labs     11/22/20  1150   WBC 13.1*   HGB 12.3   HCT 40.1   MCV 90.3   PLT See Reflexed IPF Result   LYMPHOPCT 8*   RBC 4.44   MCH 27.7   MCHC 30.7   RDW 13.3     BMP:   Recent Labs     11/22/20  1507      K 3.5*   CL 96*   CO2 26   BUN 12   CREATININE 0.62   GLUCOSE 122*     Liver Function Test:   No results for input(s): PROT, LABALBU, ALT, AST, GGT, ALKPHOS, BILITOT in the last 72 hours. Amylase/Lipase:  No results for input(s): AMYLASE, LIPASE in the last 72 hours. Coagulation Profile:   No results for input(s): INR, PROTIME, APTT in the last 72 hours. Cardiac Enzymes:  No results for input(s): CKTOTAL, CKMB, CKMBINDEX, TROPONINI in the last 72 hours. Lactic Acid:  No results found for: LACTA  BNP:   No results found for: BNP  D-Dimer:  No results found for: DDIMER  Others:   Lab Results   Component Value Date    TSH 0.94 07/19/2017     No results found for: GRACE, RHEUMFACTOR, SEDRATE, CRP  No results found for: [place]  No results found for: IRON, TIBC, FERRITIN  No results found for: SPEP, UPEP  No results found for: PSA, CEA, , AE3922,     Input/Output:    Intake/Output Summary (Last 24 hours) at 11/23/2020 1000  Last data filed at 11/23/2020 0745  Gross per 24 hour   Intake 0 ml   Output 1500 ml   Net -1500 ml       Microbiology:    Pathology:    Radiology Reports:  XR CHEST PORTABLE   Final Result   Stable moderate interstitial edema and moderate left basilar opacity related   to combined pleural-parenchymal process      Improvement in now mild residual streaky right basilar atelectasis         CT CHEST PULMONARY EMBOLISM W CONTRAST   Final Result   1. No clear evidence for central pulmonary embolus. Evaluation of the   remainder of the pulmonary arterial vasculature is severely limited to   nondiagnostic as detailed above. 2. Bilateral lower lobe consolidation likely pneumonia. Consolidation of the   proximal right middle lobe and lingula to a lesser degree. Findings favored   to represent multifocal pneumonia. Follow-up recommended to document   resolution. 3. Prominent mediastinal lymph nodes. 4. Fatty liver. 5. Severe emphysema. XR CHEST PORTABLE   Final Result   1. Lower zone predominant airspace disease, atelectasis and/or infiltrate. Follow-up is recommended to document resolution. 2. Stable mild cardiomegaly. Moderate pulmonary vascular congestion. Trace   bilateral pleural effusions. XR CHEST PORTABLE   Final Result   Bibasilar opacities compatible with atelectasis. In the appropriate clinical   setting pneumonia is not excluded. XR LUMBAR SPINE (2-3 VIEWS)   Final Result   New transpedicular hardware fixation L4-L5 without definitive radiographic   evidence of complication. FLUORO FOR SURGICAL PROCEDURES   Final Result      FLUORO FOR SURGICAL PROCEDURES   Final Result   Intraprocedural fluoroscopic spot images as above. See separate procedure   report for more information.          VL DUP LOWER EXTREMITY VENOUS RIGHT    (Results Pending)       Echocardiogram:   Results for orders placed during the hospital encounter of 05/02/18   Echocardiogram 2D W M-Mode    Narrative [facility]    Transthoracic Echocardiography Report (TTE)     Patient Name Karlene   Date of Study               05/02/2018                Brittani Holt BEN      Date of      1960  Gender                      Female   Birth      Age          59 year(s)  Race                        Black      Room Number  OP          Height:                     64 inch, 162.56 cm      Corporate ID [id number]  Weight:                     200 pounds, 90.7 kg   #      Patient Acct [de-identified]   BSA:          1.96 m^2      BMI:      34.33   #                                                              kg/m^2      MR #         [id number]     Sonographer                 [email or web address]      Accession #  [id number]   Interpreting Physician      Silvestre Carter Fellow                   Referring Nurse                            Practitioner      Interpreting             Referring Physician         Kelly Hernandez     Type of Study      TTE procedure:2D Echocardiogram, M-Mode, Doppler, Color Doppler. Procedure Date  Date: 05/02/2018 Start: 08:48 AM    Study Location: [facility]  Technical Quality: Adequate visualization    Indications:Dyspnea/SOB. Patient Status: Outpatient    Height: 64 inches Weight: 200 pounds BSA: 1.96 m^2 BMI: 34.33 kg/m^2    CONCLUSIONS    Summary  Left ventricle is normal in size  Global left ventricular systolic function is normal. Estimated ejection  fraction is 65 % . Mild to moderate aortic insufficiency. Mild mitral regurgitation. Mild to moderate tricuspid regurgitation. No pulmonary hypertension. No pericardial effusion seen. Normal aortic root dimension. Signature  ----------------------------------------------------------------------------   Electronically signed by Benjamin Garza on 05/02/2018   09:19 AM  ----------------------------------------------------------------------------    ----------------------------------------------------------------------------   Electronically signed by Silvestre Carter(Interpreting physician) on   05/02/2018 09:59 PM  ----------------------------------------------------------------------------  FINDINGS  Left Atrium  Left atrium is normal in size. Left Ventricle  Left ventricle is normal in size  Global left ventricular systolic function is normal.  Estimated ejection fraction is 65 % . No regional wall motion abnormalities. Right Atrium  Right atrium is normal in size. Right Ventricle  Normal right ventricular size and function. Mitral Valve  Normal mitral valve structure. Mild mitral regurgitation. Aortic Valve  Normal aortic valve structure. Mild to moderate aortic insufficiency. Tricuspid Valve  Mild to moderate tricuspid regurgitation.   No pulmonary hypertension. Pulmonic Valve  Technically difficult visualization of the pulmonic valve, no abnormality  seen. Trivial pulmonic insufficiency. Pericardial Effusion  No pericardial effusion seen. Miscellaneous  Normal aortic root dimension. M-mode / 2D Measurements & Calculations:      LVIDd:4.82 cm(3.7 - 5.6 cm)      Diastolic UBFPDJ:498 ml   NKHPH:4.80 cm(2.2 - 4.0 cm)      Systolic TPZHKA:89.3 ml   IVSd:0.82 cm(0.6 - 1.1 cm)       Aortic Root:2.9 cm(2.0 - 3.7 cm)   LVPWd:0.98 cm(0.6 - 1.1 cm)      LA Dimension: 3.5 cm(1.9 - 4.0 cm)   Fractional Shortenin.48 %   Calculated LVEF (%): 73.12 %      Mitral:                                  Aortic      Valve Area (P1/2-Time): 5.95 cm^2        Peak Velocity: 1.34 m/s   Peak E-Wave: 0.82 m/s                    Peak Gradient: 7.18 mmHg   Peak A-Wave: 1.02 m/s   E/A Ratio: 0.8   Peak Gradient: 2.69 mmHg   Deceleration Time: 173 msec   P1/2t: 37 msec      Tricuspid:                               Pulmonic:      Estimated RVSP: 33 mmHg   Peak TR Velocity: 2.63 m/s   Peak TR Gradient: 27.6676 mmHg   Estimated RA Pressure: 5 mmHg                                            Estimated PASP: 32.67 mmHg          Cardiac Catheterization:   No results found for this or any previous visit. ASSESSMENT AND PLAN     Assessment:    //Acute on chronic hypoxic respiratory failure  secondary to aspiration pneumonia complicated by underlying severe emphysema  //Multifocal aspiration pneumonia   //Respiratory depression from narcotic use  //Sepsis  //Diastolic heart failure  //COPD  //Post posterior fusion L4/5 surgery on .   //Current smoker  //Obesity/JOAN     Plan:     Continued on 4 L oxygen nasal cannula. Continue supplemental oxygen review sats 88-92%  BiPAP(12/5 fio2-40%) as needed and at night. Continue Rocephin for 7 days   Continue bronchodilators. CT PE showed multifocal pneumonia. Negative for PE. Follow-up BNP.   Minimize IV fluids with underlying diastolic heart failure  Recommend to minimize pain medication to avoid further respiratory depression. Continue incentive spirometry, pulmonary toilet, aspiration precautions and bronchodilators  Continue to monitor I/O with a goal of even/negative fluid balance  DVT and stress ulcer prophylaxis  Physical/occupational therapy; increase activity as tolerated.      I updated the patient regarding the current clinical condition, provisional diagnosis and management plan. I addressed concerns and answered all questions to the best of my abilities. It was my pleasure to evaluate Radha Lyon today. We will continue to follow. I would like to thank you for allowing me to participate in the care of this patient. Please feel free to call with any further questions or concerns. Leanna Garber MD      Department of Internal Medicine  [facility]         11/23/2020, 10:12 AM    Please note that this chart was generated using voice recognition Dragon dictation software. Although every effort was made to ensure the accuracy of this automated transcription, some errors in transcription may have occurred. Attending Physician Statement  I have discussed the care of Radha Lyon, including pertinent history and exam findings,  with the resident. I have seen and examined the patient and the key elements of all parts of the encounter have been performed by me. I agree with the assessment, plan and orders as documented by the resident with additions . Xray chest persistent intertial and b/l basal consolidation   Aspiration precautions   Cont ceftriaxone   Keep sats >92 %  doesnot need covid -9 test    She has large shunt - as rapid shallow breathing due to poor compliance . Will use bipap 2 hrs on and 2 hrs off during day and cont at night - to prevent respiratory muscle fatigue   High risk for intubation .     Treatment plan Discussed with nursing staff in detail , all questions answered . Electronically signed by Luba Mann MD on   11/23/20 at 1:38 PM EST    Please note that this chart was generated using voice recognition Dragon dictation software. Although every effort was made to ensure the accuracy of this automated transcription, some errors in transcription may have occurred.

## 2020-11-24 ENCOUNTER — APPOINTMENT (OUTPATIENT)
Dept: GENERAL RADIOLOGY | Age: 60
DRG: 459 | End: 2020-11-24
Attending: NEUROLOGICAL SURGERY
Payer: COMMERCIAL

## 2020-11-24 LAB
-: NORMAL
ABSOLUTE EOS #: 0.36 K/UL (ref 0–0.44)
ABSOLUTE IMMATURE GRANULOCYTE: 0.08 K/UL (ref 0–0.3)
ABSOLUTE LYMPH #: 0.91 K/UL (ref 1.1–3.7)
ABSOLUTE MONO #: 0.71 K/UL (ref 0.1–1.2)
ALBUMIN SERPL-MCNC: 2.5 G/DL (ref 3.5–5.2)
ALLEN TEST: POSITIVE
AMORPHOUS: NORMAL
ANION GAP SERPL CALCULATED.3IONS-SCNC: 13 MMOL/L (ref 9–17)
BACTERIA: NORMAL
BASOPHILS # BLD: 1 % (ref 0–2)
BASOPHILS ABSOLUTE: 0.08 K/UL (ref 0–0.2)
BILIRUBIN URINE: NEGATIVE
BNP INTERPRETATION: NORMAL
BUN BLDV-MCNC: 26 MG/DL (ref 8–23)
BUN/CREAT BLD: ABNORMAL (ref 9–20)
CALCIUM SERPL-MCNC: 8.7 MG/DL (ref 8.6–10.4)
CASTS UA: NORMAL /LPF (ref 0–8)
CHLORIDE BLD-SCNC: 98 MMOL/L (ref 98–107)
CO2: 26 MMOL/L (ref 20–31)
COLOR: ABNORMAL
COMMENT UA: ABNORMAL
CREAT SERPL-MCNC: 0.64 MG/DL (ref 0.5–0.9)
CRYSTALS, UA: NORMAL /HPF
DIFFERENTIAL TYPE: ABNORMAL
EOSINOPHILS RELATIVE PERCENT: 3 % (ref 1–4)
EPITHELIAL CELLS UA: NORMAL /HPF (ref 0–5)
FIO2: 85
GFR AFRICAN AMERICAN: >60 ML/MIN
GFR NON-AFRICAN AMERICAN: >60 ML/MIN
GFR SERPL CREATININE-BSD FRML MDRD: ABNORMAL ML/MIN/{1.73_M2}
GFR SERPL CREATININE-BSD FRML MDRD: ABNORMAL ML/MIN/{1.73_M2}
GLUCOSE BLD-MCNC: 102 MG/DL (ref 65–105)
GLUCOSE BLD-MCNC: 102 MG/DL (ref 70–99)
GLUCOSE BLD-MCNC: 103 MG/DL (ref 65–105)
GLUCOSE BLD-MCNC: 106 MG/DL (ref 65–105)
GLUCOSE BLD-MCNC: 107 MG/DL (ref 65–105)
GLUCOSE BLD-MCNC: 122 MG/DL (ref 65–105)
GLUCOSE URINE: NEGATIVE
HCT VFR BLD CALC: 33 % (ref 36.3–47.1)
HEMOGLOBIN: 10.3 G/DL (ref 11.9–15.1)
IMMATURE GRANULOCYTES: 1 %
KETONES, URINE: ABNORMAL
LEUKOCYTE ESTERASE, URINE: ABNORMAL
LYMPHOCYTES # BLD: 7 % (ref 24–43)
MAGNESIUM: 2.5 MG/DL (ref 1.6–2.6)
MCH RBC QN AUTO: 27.5 PG (ref 25.2–33.5)
MCHC RBC AUTO-ENTMCNC: 31.2 G/DL (ref 28.4–34.8)
MCV RBC AUTO: 88 FL (ref 82.6–102.9)
MODE: ABNORMAL
MONOCYTES # BLD: 6 % (ref 3–12)
MUCUS: NORMAL
NEGATIVE BASE EXCESS, ART: ABNORMAL (ref 0–2)
NITRITE, URINE: NEGATIVE
NRBC AUTOMATED: 0 PER 100 WBC
O2 DEVICE/FLOW/%: ABNORMAL
OTHER OBSERVATIONS UA: NORMAL
PATIENT TEMP: ABNORMAL
PDW BLD-RTO: 13.2 % (ref 11.8–14.4)
PH UA: 6.5 (ref 5–8)
PHOSPHORUS: 1.7 MG/DL (ref 2.6–4.5)
PLATELET # BLD: 315 K/UL (ref 138–453)
PLATELET ESTIMATE: ABNORMAL
PMV BLD AUTO: 10.4 FL (ref 8.1–13.5)
POC HCO3: 30 MMOL/L (ref 21–28)
POC O2 SATURATION: 90 % (ref 94–98)
POC PCO2 TEMP: ABNORMAL MM HG
POC PCO2: 46 MM HG (ref 35–48)
POC PH TEMP: ABNORMAL
POC PH: 7.42 (ref 7.35–7.45)
POC PO2 TEMP: ABNORMAL MM HG
POC PO2: 58.6 MM HG (ref 83–108)
POSITIVE BASE EXCESS, ART: 5 (ref 0–3)
POTASSIUM SERPL-SCNC: 3.5 MMOL/L (ref 3.7–5.3)
PRO-BNP: 69 PG/ML
PROTEIN UA: ABNORMAL
RBC # BLD: 3.75 M/UL (ref 3.95–5.11)
RBC # BLD: ABNORMAL 10*6/UL
RBC UA: NORMAL /HPF (ref 0–4)
RENAL EPITHELIAL, UA: NORMAL /HPF
SAMPLE SITE: ABNORMAL
SEG NEUTROPHILS: 82 % (ref 36–65)
SEGMENTED NEUTROPHILS ABSOLUTE COUNT: 10.65 K/UL (ref 1.5–8.1)
SODIUM BLD-SCNC: 137 MMOL/L (ref 135–144)
SPECIFIC GRAVITY UA: 1.03 (ref 1–1.03)
TCO2 (CALC), ART: 31 MMOL/L (ref 22–29)
TRICHOMONAS: NORMAL
TURBIDITY: CLEAR
URINE HGB: NEGATIVE
UROBILINOGEN, URINE: NORMAL
WBC # BLD: 12.8 K/UL (ref 3.5–11.3)
WBC # BLD: ABNORMAL 10*3/UL
WBC UA: NORMAL /HPF (ref 0–5)
YEAST: NORMAL

## 2020-11-24 PROCEDURE — 36415 COLL VENOUS BLD VENIPUNCTURE: CPT

## 2020-11-24 PROCEDURE — 83880 ASSAY OF NATRIURETIC PEPTIDE: CPT

## 2020-11-24 PROCEDURE — 51702 INSERT TEMP BLADDER CATH: CPT

## 2020-11-24 PROCEDURE — 2000000000 HC ICU R&B

## 2020-11-24 PROCEDURE — 2700000000 HC OXYGEN THERAPY PER DAY

## 2020-11-24 PROCEDURE — 83735 ASSAY OF MAGNESIUM: CPT

## 2020-11-24 PROCEDURE — 6370000000 HC RX 637 (ALT 250 FOR IP): Performed by: INTERNAL MEDICINE

## 2020-11-24 PROCEDURE — 94761 N-INVAS EAR/PLS OXIMETRY MLT: CPT

## 2020-11-24 PROCEDURE — 2580000003 HC RX 258: Performed by: INTERNAL MEDICINE

## 2020-11-24 PROCEDURE — 6370000000 HC RX 637 (ALT 250 FOR IP): Performed by: NURSE PRACTITIONER

## 2020-11-24 PROCEDURE — 97110 THERAPEUTIC EXERCISES: CPT

## 2020-11-24 PROCEDURE — 82803 BLOOD GASES ANY COMBINATION: CPT

## 2020-11-24 PROCEDURE — 81001 URINALYSIS AUTO W/SCOPE: CPT

## 2020-11-24 PROCEDURE — 82947 ASSAY GLUCOSE BLOOD QUANT: CPT

## 2020-11-24 PROCEDURE — 85025 COMPLETE CBC W/AUTO DIFF WBC: CPT

## 2020-11-24 PROCEDURE — 99233 SBSQ HOSP IP/OBS HIGH 50: CPT | Performed by: INTERNAL MEDICINE

## 2020-11-24 PROCEDURE — 6360000002 HC RX W HCPCS: Performed by: INTERNAL MEDICINE

## 2020-11-24 PROCEDURE — 2500000003 HC RX 250 WO HCPCS: Performed by: STUDENT IN AN ORGANIZED HEALTH CARE EDUCATION/TRAINING PROGRAM

## 2020-11-24 PROCEDURE — 36600 WITHDRAWAL OF ARTERIAL BLOOD: CPT

## 2020-11-24 PROCEDURE — 6360000002 HC RX W HCPCS: Performed by: NURSE PRACTITIONER

## 2020-11-24 PROCEDURE — 97530 THERAPEUTIC ACTIVITIES: CPT

## 2020-11-24 PROCEDURE — 80069 RENAL FUNCTION PANEL: CPT

## 2020-11-24 PROCEDURE — 71045 X-RAY EXAM CHEST 1 VIEW: CPT

## 2020-11-24 PROCEDURE — 94640 AIRWAY INHALATION TREATMENT: CPT

## 2020-11-24 PROCEDURE — 87641 MR-STAPH DNA AMP PROBE: CPT

## 2020-11-24 PROCEDURE — 6360000002 HC RX W HCPCS: Performed by: STUDENT IN AN ORGANIZED HEALTH CARE EDUCATION/TRAINING PROGRAM

## 2020-11-24 PROCEDURE — 2580000003 HC RX 258: Performed by: STUDENT IN AN ORGANIZED HEALTH CARE EDUCATION/TRAINING PROGRAM

## 2020-11-24 PROCEDURE — 6370000000 HC RX 637 (ALT 250 FOR IP): Performed by: STUDENT IN AN ORGANIZED HEALTH CARE EDUCATION/TRAINING PROGRAM

## 2020-11-24 PROCEDURE — 99232 SBSQ HOSP IP/OBS MODERATE 35: CPT | Performed by: INTERNAL MEDICINE

## 2020-11-24 PROCEDURE — 94660 CPAP INITIATION&MGMT: CPT

## 2020-11-24 PROCEDURE — APPSS30 APP SPLIT SHARED TIME 16-30 MINUTES: Performed by: REGISTERED NURSE

## 2020-11-24 RX ORDER — IPRATROPIUM BROMIDE AND ALBUTEROL SULFATE 2.5; .5 MG/3ML; MG/3ML
1 SOLUTION RESPIRATORY (INHALATION)
Status: DISCONTINUED | OUTPATIENT
Start: 2020-11-24 | End: 2020-11-28

## 2020-11-24 RX ORDER — PREDNISONE 20 MG/1
40 TABLET ORAL DAILY
Status: DISCONTINUED | OUTPATIENT
Start: 2020-11-24 | End: 2020-11-24

## 2020-11-24 RX ORDER — FUROSEMIDE 10 MG/ML
20 INJECTION INTRAMUSCULAR; INTRAVENOUS ONCE
Status: COMPLETED | OUTPATIENT
Start: 2020-11-24 | End: 2020-11-24

## 2020-11-24 RX ADMIN — CARVEDILOL 6.25 MG: 6.25 TABLET, FILM COATED ORAL at 20:41

## 2020-11-24 RX ADMIN — MIRTAZAPINE 30 MG: 15 TABLET, FILM COATED ORAL at 20:40

## 2020-11-24 RX ADMIN — OMEPRAZOLE 20 MG: 20 CAPSULE, DELAYED RELEASE ORAL at 08:40

## 2020-11-24 RX ADMIN — TIZANIDINE 4 MG: 4 TABLET ORAL at 08:40

## 2020-11-24 RX ADMIN — POTASSIUM CHLORIDE 20 MEQ: 1500 TABLET, EXTENDED RELEASE ORAL at 08:41

## 2020-11-24 RX ADMIN — METFORMIN HYDROCHLORIDE 500 MG: 500 TABLET ORAL at 08:41

## 2020-11-24 RX ADMIN — GABAPENTIN 600 MG: 300 CAPSULE ORAL at 20:41

## 2020-11-24 RX ADMIN — DULOXETINE HYDROCHLORIDE 60 MG: 30 CAPSULE, DELAYED RELEASE ORAL at 08:40

## 2020-11-24 RX ADMIN — AMLODIPINE BESYLATE 10 MG: 10 TABLET ORAL at 08:41

## 2020-11-24 RX ADMIN — TROSPIUM CHLORIDE 20 MG: 20 TABLET, FILM COATED ORAL at 08:42

## 2020-11-24 RX ADMIN — TROSPIUM CHLORIDE 20 MG: 20 TABLET, FILM COATED ORAL at 20:40

## 2020-11-24 RX ADMIN — LISINOPRIL AND HYDROCHLOROTHIAZIDE 1 TABLET: 25; 20 TABLET ORAL at 08:41

## 2020-11-24 RX ADMIN — IPRATROPIUM BROMIDE AND ALBUTEROL SULFATE 1 AMPULE: .5; 3 SOLUTION RESPIRATORY (INHALATION) at 19:30

## 2020-11-24 RX ADMIN — GABAPENTIN 300 MG: 300 CAPSULE ORAL at 08:40

## 2020-11-24 RX ADMIN — ENOXAPARIN SODIUM 40 MG: 40 INJECTION SUBCUTANEOUS at 11:42

## 2020-11-24 RX ADMIN — MAGNESIUM HYDROXIDE 30 ML: 400 SUSPENSION ORAL at 08:43

## 2020-11-24 RX ADMIN — SODIUM CHLORIDE, PRESERVATIVE FREE 10 ML: 5 INJECTION INTRAVENOUS at 08:42

## 2020-11-24 RX ADMIN — POLYETHYLENE GLYCOL 3350 17 G: 17 POWDER, FOR SOLUTION ORAL at 08:49

## 2020-11-24 RX ADMIN — DESMOPRESSIN ACETATE 40 MG: 0.2 TABLET ORAL at 08:41

## 2020-11-24 RX ADMIN — GUAIFENESIN 600 MG: 600 TABLET, EXTENDED RELEASE ORAL at 08:39

## 2020-11-24 RX ADMIN — IPRATROPIUM BROMIDE AND ALBUTEROL SULFATE 1 AMPULE: .5; 3 SOLUTION RESPIRATORY (INHALATION) at 13:19

## 2020-11-24 RX ADMIN — FUROSEMIDE 20 MG: 10 INJECTION, SOLUTION INTRAMUSCULAR; INTRAVENOUS at 21:55

## 2020-11-24 RX ADMIN — CETIRIZINE HYDROCHLORIDE 10 MG: 10 TABLET ORAL at 08:40

## 2020-11-24 RX ADMIN — IPRATROPIUM BROMIDE AND ALBUTEROL SULFATE 1 AMPULE: .5; 3 SOLUTION RESPIRATORY (INHALATION) at 15:35

## 2020-11-24 RX ADMIN — POTASSIUM PHOSPHATE, MONOBASIC AND POTASSIUM PHOSPHATE, DIBASIC 15 MMOL: 224; 236 INJECTION, SOLUTION, CONCENTRATE INTRAVENOUS at 17:21

## 2020-11-24 RX ADMIN — PIPERACILLIN AND TAZOBACTAM 4.5 G: 4; .5 INJECTION, POWDER, LYOPHILIZED, FOR SOLUTION INTRAVENOUS; PARENTERAL at 14:58

## 2020-11-24 RX ADMIN — SODIUM CHLORIDE, PRESERVATIVE FREE 10 ML: 5 INJECTION INTRAVENOUS at 20:40

## 2020-11-24 RX ADMIN — CARVEDILOL 6.25 MG: 6.25 TABLET, FILM COATED ORAL at 08:41

## 2020-11-24 RX ADMIN — ACETAMINOPHEN 650 MG: 325 TABLET ORAL at 08:49

## 2020-11-24 RX ADMIN — IPRATROPIUM BROMIDE AND ALBUTEROL SULFATE 1 AMPULE: .5; 3 SOLUTION RESPIRATORY (INHALATION) at 09:05

## 2020-11-24 RX ADMIN — MIRTAZAPINE 15 MG: 15 TABLET, FILM COATED ORAL at 20:41

## 2020-11-24 RX ADMIN — DOCUSATE SODIUM 100 MG: 100 CAPSULE, LIQUID FILLED ORAL at 08:40

## 2020-11-24 RX ADMIN — TIZANIDINE 4 MG: 4 TABLET ORAL at 20:40

## 2020-11-24 RX ADMIN — PIPERACILLIN AND TAZOBACTAM 4.5 G: 4; .5 INJECTION, POWDER, LYOPHILIZED, FOR SOLUTION INTRAVENOUS; PARENTERAL at 22:57

## 2020-11-24 ASSESSMENT — PAIN SCALES - WONG BAKER
WONGBAKER_NUMERICALRESPONSE: 0

## 2020-11-24 ASSESSMENT — PAIN SCALES - GENERAL
PAINLEVEL_OUTOF10: 5
PAINLEVEL_OUTOF10: 8
PAINLEVEL_OUTOF10: 7
PAINLEVEL_OUTOF10: 5

## 2020-11-24 ASSESSMENT — ENCOUNTER SYMPTOMS
SHORTNESS OF BREATH: 0
ANAL BLEEDING: 0
SORE THROAT: 0
PHOTOPHOBIA: 0
COLOR CHANGE: 0
BLOOD IN STOOL: 0
DIARRHEA: 0
ABDOMINAL DISTENTION: 0
TROUBLE SWALLOWING: 0
COUGH: 0
SHORTNESS OF BREATH: 1
CHEST TIGHTNESS: 0
VOMITING: 0
NAUSEA: 0
CONSTIPATION: 0
ABDOMINAL PAIN: 0
BLURRED VISION: 0
BACK PAIN: 1
APNEA: 0

## 2020-11-24 ASSESSMENT — PAIN DESCRIPTION - LOCATION
LOCATION: BACK

## 2020-11-24 ASSESSMENT — PAIN DESCRIPTION - ORIENTATION
ORIENTATION: LOWER
ORIENTATION: LOWER

## 2020-11-24 ASSESSMENT — PAIN DESCRIPTION - PAIN TYPE
TYPE: SURGICAL PAIN

## 2020-11-24 NOTE — PROGRESS NOTES
Notified Dr. Vivek Sethi that the pt had been sitting up in chair before lunch and desat into th 70's on 4L NC, pt had been placed on bipap and respiratory therapy had increase 02 to 75%. Pt also currently hypotensive, asymptomatic, alert and orient x 4. Will cont to monitor.

## 2020-11-24 NOTE — PROGRESS NOTES
Notified DEVANTE Lieberman CM, that per Dr Bonnie Iqbal pt might benefit from inpatient rehab once medically stable, and ARU will follow progress.

## 2020-11-24 NOTE — PROGRESS NOTES
[facility]  Office: [phone]  Mook Miranda, DO, Daryl Streeter, DO, Michael Reagan, DO, Tarah Gonzalez, DO, Basil Alexander MD, Manuela Umaña MD, Karen Santana MD, Yogi Wilkerson MD, Robert Davidson MD, Beryle Marseille, MD, Ciro Pallas, MD, Huber Berman MD, Franklin Ellis MD, Fermin Nagel, DO, Dorothea Lala MD, Allie Hughes MD, Cheri Regan DO, Michael Lagunas MD,  Sherrie Costa DO, Victorino Egan MD, Franca Cavazos MD, [place], [facility], [facility], [facility], Kevin Pettit, CNP, Christiano Cortez, CNS, Renny Chawla, CNP, Arlene Finley, CNP, Libby Aparicio, CNP, Luis Buchanan, CNP, Oliverio Clements, CNP, [place] [place], Mortimer Guardian, [facility], Manolo Johnson, CNP, Lavonne Frankel, CNP, Renard Pinto, CNP, oKbi Houston, CNP, Lazaro Sherman, [facility]   [address] [facility]    Progress Note    11/24/2020    1:20 PM    Name:   Courtney Goode  MRN:     [id number]     Acct:      [de-identified]   Room:   [address] [place] [address] Day:  5  Admit Date:  11/19/2020  5:32 AM    PCP:   Duong Schuler MD  Code Status:  Full Code    Subjective:     C/C: back pain  Interval History Status: worsened. Patient seen and examined at bedside. This morning, patient with increasing hypoxia requiring BiPAP. Patient also found to be hypotensive with systolic blood pressure in the 80s that started shortly after BiPAP was initiated. She is currently oriented to person place and time and denies any acute complaints. Review of Systems:     Constitutional:  negative for chills, fevers, sweats  Respiratory: Admits to shortness of breath, wheezing, dyspnea with minimal exertion  Cardiovascular: Admits to palpitations denies, chest pressure/discomfort, no lower extremity edema  Gastrointestinal:  negative for abdominal pain, constipation, diarrhea, nausea, vomiting  Neurological:  negative for dizziness, headache.   Admits to lower extremity pain    Medications: Allergies:     Allergies   Allergen Reactions    Aspirin      DUE TO ULCER    Claritin [Loratadine] Other (See Comments)     coughing    Flonase [Fluticasone Propionate]     Morphine And Related      GI Upset       Current Meds:   Scheduled Meds:    ipratropium-albuterol  1 ampule Inhalation 4x daily    guaiFENesin  600 mg Oral BID    cefTRIAXone (ROCEPHIN) IV  1 g Intravenous Q24H    sodium chloride flush  10 mL Intravenous 2 times per day    sufentanil (SUFENTA) 1 mcg/mL infusion  0.25 mcg/kg/hr Intravenous Once    amLODIPine  10 mg Oral Daily    atorvastatin  40 mg Oral Daily    azelastine  2 spray Each Nostril BID    carvedilol  6.25 mg Oral BID    docusate sodium  100 mg Oral Daily    DULoxetine  60 mg Oral Daily    gabapentin  300 mg Oral BID    lisinopril-hydroCHLOROthiazide  1 tablet Oral Daily    metFORMIN  500 mg Oral BID WC    mirtazapine  15 mg Oral Nightly    mirtazapine  30 mg Oral Nightly    omeprazole  20 mg Oral QAM    potassium chloride  20 mEq Oral Daily    tiZANidine  4 mg Oral BID    trospium  20 mg Oral BID    acetaminophen  650 mg Oral Q6H    polyethylene glycol  17 g Oral Daily    magnesium hydroxide  30 mL Oral Daily    enoxaparin  40 mg Subcutaneous Daily    cetirizine  10 mg Oral Daily    gabapentin  600 mg Oral Nightly    insulin lispro  0-6 Units Subcutaneous TID WC    insulin lispro  0-3 Units Subcutaneous Nightly    nicotine  1 patch Transdermal Daily     Continuous Infusions:    dextrose       PRN Meds: albuterol, magic (miracle) mouthwash, acetaminophen, benzonatate, diphenhydrAMINE, sodium chloride flush, oxyCODONE **OR** oxyCODONE, morphine **OR** [DISCONTINUED] morphine, senna, glucose, dextrose, glucagon (rDNA), dextrose    Data:     Past Medical History:   has a past medical history of Allergic rhinitis, Alveolar hypoventilation, Aortic insufficiency, Bronchiectasis (HCC), Bronchitis, Chronic back pain, COPD (chronic obstructive pulmonary disease) ([facility] Depression, DM (diabetes mellitus) ([facility] GERD (gastroesophageal reflux disease), History of echocardiogram, Hyperlipidemia, Hypertension, Obesity, Osteoarthritis, Peripheral vascular disease ([facility] Primary osteoarthritis of both knees, Radicular pain of lumbosacral region, Spinal stenosis, lumbar region, without neurogenic claudication, Tricuspid regurgitation, Type II or unspecified type diabetes mellitus without mention of complication, not stated as uncontrolled, Unspecified sleep apnea, URI (upper respiratory infection), Wears dentures, Wears glasses, and Wellness examination. Social History:   reports that she quit smoking about 3 weeks ago. Her smoking use included cigarettes. She has a 9.00 pack-year smoking history. She has never used smokeless tobacco. She reports that she does not drink alcohol or use drugs. Family History:   Family History   Problem Relation Age of Onset    Diabetes Mother     Heart Disease Mother     Cirrhosis Father        Vitals:  BP (!) 80/47   Pulse 100   Temp 98.2 °F (36.8 °C) (Axillary)   Resp 19   Ht 5' 6\" (1.676 m)   Wt 188 lb (85.3 kg)   LMP 2003   SpO2 93%   BMI 30.34 kg/m²   Temp (24hrs), Av.1 °F (37.3 °C), Min:98.2 °F (36.8 °C), Max:100.1 °F (37.8 °C)    Recent Labs     20  1644 20  2053 20  0815 20  1216   POCGLU 129* 124* 103 107*       I/O (24Hr):     Intake/Output Summary (Last 24 hours) at 2020 1320  Last data filed at 2020 0709  Gross per 24 hour   Intake --   Output 200 ml   Net -200 ml       Labs:  Hematology:  Recent Labs     20  1150 20  1022 20  0956   WBC 13.1* 11.8* 12.8*   RBC 4.44 4.08 3.75*   HGB 12.3 11.3* 10.3*   HCT 40.1 34.2* 33.0*   MCV 90.3 83.8 88.0   MCH 27.7 27.7 27.5   MCHC 30.7 33.0 31.2   RDW 13.3 13.2 13.2   PLT See Reflexed IPF Result 255 315   MPV NOT REPORTED 10.3 10.4     Chemistry:  Recent Labs     20  1507 20  1022 20  0956   --  137   K 3.5*  --  3.5*   CL 96*  --  98   CO2 26  --  26   GLUCOSE 122*  --  102*   BUN 12  --  26*   CREATININE 0.62  --  0.64   MG  --   --  2.5   ANIONGAP 13  --  13   LABGLOM >60  --  >60   GFRAA >60  --  >60   CALCIUM 8.4*  --  8.7   PHOS  --   --  1.7*   PROBNP  --  386*  --      Recent Labs     11/23/20  0824 11/23/20  1207 11/23/20  1644 11/23/20  2053 11/24/20  0815 11/24/20  0956 11/24/20  1216   LABALBU  --   --   --   --   --  2.5*  --    POCGLU 119* 109* 129* 124* 103  --  107*     ABG:  Lab Results   Component Value Date    POCPH 7.449 11/22/2020    POCPCO2 39.4 11/22/2020    POCPO2 39.1 11/22/2020    POCHCO3 27.4 11/22/2020    NBEA NOT REPORTED 11/22/2020    PBEA 3 11/22/2020    RVC3BVO 29 11/22/2020    RJQN0QJZ 76 11/22/2020    FIO2 40.0 11/22/2020     Lab Results   Component Value Date/Time    SPECIAL RT AC 5ML 11/22/2020 08:33 PM     Lab Results   Component Value Date/Time    CULTURE NO GROWTH 2 DAYS 11/22/2020 08:33 PM       Radiology:  Xr Lumbar Spine (2-3 Views)    Result Date: 11/21/2020  New transpedicular hardware fixation L4-L5 without definitive radiographic evidence of complication. Xr Chest Portable    Result Date: 11/23/2020  Stable moderate interstitial edema and moderate left basilar opacity related to combined pleural-parenchymal process Improvement in now mild residual streaky right basilar atelectasis     Xr Chest Portable    Result Date: 11/22/2020  1. Lower zone predominant airspace disease, atelectasis and/or infiltrate. Follow-up is recommended to document resolution. 2. Stable mild cardiomegaly. Moderate pulmonary vascular congestion. Trace bilateral pleural effusions. Xr Chest Portable    Result Date: 11/21/2020  Bibasilar opacities compatible with atelectasis. In the appropriate clinical setting pneumonia is not excluded. Ct Chest Pulmonary Embolism W Contrast    Result Date: 11/22/2020  1. No clear evidence for central pulmonary embolus. Evaluation of the remainder of the pulmonary arterial vasculature is severely limited to nondiagnostic as detailed above. 2. Bilateral lower lobe consolidation likely pneumonia. Consolidation of the proximal right middle lobe and lingula to a lesser degree. Findings favored to represent multifocal pneumonia. Follow-up recommended to document resolution. 3. Prominent mediastinal lymph nodes. 4. Fatty liver. 5. Severe emphysema. Fluoro For Surgical Procedures    Result Date: 11/19/2020  Intraprocedural fluoroscopic spot images as above. See separate procedure report for more information. Fl Modified Barium Swallow W Video    Result Date: 11/23/2020  No evidence of significant penetration or aspiration. Please see separate speech pathology report for full discussion of findings and recommendations.        Physical Examination:        General appearance:  Uncomfortable appearing female on BIPAP  Mental Status:  oriented to person, place and time  Pulmonary: diminished breath sounds at the bases bilaterally with increasing rales and expiratory wheezing   Heart:  regular rate and rhythm, no murmur  Abdomen:  soft, nontender, nondistended, normal bowel sounds, no masses, hepatomegaly, splenomegaly  Extremities:  no edema, redness, tenderness in the calves  Skin:  no gross lesions, rashes, induration    Assessment:        Hospital Problems           Last Modified POA    * (Principal) Centrilobular emphysema ([address] 11/20/2020 Yes    HTN (hypertension) 11/20/2020 Yes    History of tobacco use 11/20/2020 Yes    GERD (gastroesophageal reflux disease) 11/20/2020 Yes    Chronic respiratory failure with hypoxia ([address] 11/20/2020 Yes    Spondylosis of lumbar region without myelopathy or radiculopathy 11/20/2020 Yes    Lumbar disc disease 11/19/2020 Yes    Alveolar hypoventilation 11/20/2020 Yes    Obesity (BMI 30-39.9) 11/20/2020 Yes    Bronchiectasis ([address] 11/20/2020 Yes    Oxygen dependent 11/20/2020 Yes    Acute postoperative anemia due to expected blood loss 11/20/2020 Yes    Multifocal pneumonia 11/23/2020 Yes          Plan:        Acute on chronic hypoxic respiratory failure 2/2 aspiration pneumonia complicated by underlying severe emphysema with exacerbation: Start Zosyn for anaerobic coverage, follow blood cultures. Continue DUO neb's. Continue BIPAP , pulmonary toilet. Pulmonology following. Decrease dose of Narcotics. Hypotension when using BIPAP : 2/2 PEEP and decreased venous return. Pt currently asymptomatic, consider midodrine if unimproved. Lumbar radiculopathy: POD #5. Primary team   Severe Pulmonary Emphysema : Continue breathing treatments, start prednisone 40 mg daily. Continue Abx. Pt at high risk for intubation  Acute Hypokalemia: Replace electrolytes. Normocytic normochromic anemia: monitor, transfuse PRN for symptomatic anemia or Hgb<7.    DM type II with hyperglycemia: hold metformin given hypotension. Start medium dose ISS  Protein calorie malnutrition: Ensure supplements. Obstructive sleep apnea: Continue BIPAP  GERD: Prilosec 20 mg daily   Essential Hypertension: now hypotensive, hold parameters added to medication regimen. Obesity with BMI of 30.34: recommend weight loss and lifestyle modification  Hyperlipidemia: continue statin   Depression : Continue Remeron.      Luann Stroud DO  11/24/2020  1:20 PM

## 2020-11-24 NOTE — PROGRESS NOTES
PULMONARY & CRITICAL CARE MEDICINE PROGRESS  NOTE     Patient:  Carlin Lockhart  MRN: [id number]  Admit date: 11/19/2020    SUBJECTIVE     I personally interviewed/examined the patient, reviewed interval history and interpreted all available radiographic, laboratory data at the time of service. Chief Compliant/Reason for Initial Consult:   Acute on chronic hypoxic respiratory failure secondary to aspiration pneumonia  Underlying severe emphysema. Brief Hospital Course: The patient is a 56 y.o. female with history of morbid obesity, COPD, JOAN, chronic hypoxic respiratory failure on home oxygen was admitted for right lower extremity pain from foot up to his hip. Failed all conservative measures and opted for scheduled lumbar fusion surgery. She underwent posterior fusion L4/5 surgery on 11/19. Postoperative her respiratory status continued to worsen requiring NIV intermittently. Patient also started spiking fever. Chest x-ray and CT PE chest showed bilateral multifocal pneumonia secondary to aspiration likely with underlying severe emphysema. Patient improved with ceftriaxone, intermittent diuretics and NIV support. Interval History:    Patient seen and examined bedside. Hemodynamically stable. Afebrile  Currently saturating well on 3-4 liters oxygen nasal cannula. In no acute respiratory distress. Sleeping comfortably in bed laying flat. Used BiPAP intermittently with nasal cannula yesterday whole day and at night.      200 pm-patient was desaturating, placed on BiPAP FiO2 70%. More drowsy and somnolent on BiPAP. Using accessory muscles of respiration. Concerns for respiratory fatigue with concomitant use of narcotics/muscle relaxant. .  High risk for intubation. Critical care called for evaluation and transferred to ICU for close monitoring.     Review of Systems:  General: negative for chills, fatigue or fever  ENT: negative for headaches, nasal congestion, sore throat or visual changes  Allergy and Immunology: negative for postnasal drip or seasonal allergies  Hematological and Lymphatic: negative for bleeding problems, swollen lymph nodes  Respiratory: positive for shortness of breath and cough negative for pleuritic pain, stridor or tachypnea  Cardiovascular: negative for edema or palpitations  Gastrointestinal: negative for abdominal pain, change in bowel habits or nausea/vomiting  Genito-Urinary: negative for dysuria or urinary frequency/urgency  Musculoskeletal: negative for joint pain or joint swelling  Neurological: negative for numbness/tingling, seizures or weakness  Dermatological: negative for pruritus or rash    OBJECTIVE     VITAL SIGNS:   LAST-  BP (!) 94/49   Pulse 97   Temp 98.2 °F (36.8 °C) (Axillary)   Resp 19   Ht 5' 6\" (1.676 m)   Wt 188 lb (85.3 kg)   LMP 2003   SpO2 93%   BMI 30.34 kg/m²   8-24 HR RANGE-  TEMP Temp  Av.1 °F (37.3 °C)  Min: 98.2 °F (36.8 °C)  Max: 100.1 °F (31.2 °C)   BP Systolic (19VRR), MZH:98 , Min:80 , RG      Diastolic (22CIX), FVX:84, Min:44, Max:62     PULSE Pulse  Av.7  Min: 97  Max: 122   RR Resp  Av  Min: 16  Max: 19   O2 SAT SpO2  Av.5 %  Min: 93 %  Max: 96 %   OXYGEN DELIVERY O2 Flow Rate (L/min)  Av L/min  Min: 4 L/min  Max: 4 L/min     Systemic Examination:   General appearance -somnolent, in acute distress on BiPAP. Mental status - alert, oriented to person, place, and time  Eyes - pupils equal and reactive, extraocular eye movements intact  Mouth - mucous membranes moist, pharynx normal without lesions  Neck -no JVD, supple, no significant adenopathy  Chest - Chest was symmetrical without dullness to percussion. Bronchial breath sounds present bilaterally. Decreased air entry bilaterally. Using accessory muscles.   Heart - normal rate, regular rhythm, normal S1, S2, no murmurs  Abdomen - soft, nontender, nondistended, no masses or organomegaly  Neurological - alert, oriented, normal speech, no focal findings or movement disorder noted  Extremities - peripheral pulses normal, no pedal edema, no clubbing or cyanosis  Skin - normal coloration and turgor, no rashes, no suspicious skin lesions noted     DATA REVIEW     Medications:  Scheduled Meds:   piperacillin-tazobactam  4.5 g Intravenous Q8H    ipratropium-albuterol  1 ampule Inhalation Q4H While awake    insulin lispro  0-12 Units Subcutaneous TID WC    insulin lispro  0-6 Units Subcutaneous Nightly    sodium chloride flush  10 mL Intravenous 2 times per day    sufentanil (SUFENTA) 1 mcg/mL infusion  0.25 mcg/kg/hr Intravenous Once    amLODIPine  10 mg Oral Daily    atorvastatin  40 mg Oral Daily    azelastine  2 spray Each Nostril BID    carvedilol  6.25 mg Oral BID    docusate sodium  100 mg Oral Daily    DULoxetine  60 mg Oral Daily    gabapentin  300 mg Oral BID    lisinopril-hydroCHLOROthiazide  1 tablet Oral Daily    [Held by provider] metFORMIN  500 mg Oral BID WC    mirtazapine  15 mg Oral Nightly    mirtazapine  30 mg Oral Nightly    omeprazole  20 mg Oral QAM    potassium chloride  20 mEq Oral Daily    tiZANidine  4 mg Oral BID    trospium  20 mg Oral BID    polyethylene glycol  17 g Oral Daily    magnesium hydroxide  30 mL Oral Daily    enoxaparin  40 mg Subcutaneous Daily    cetirizine  10 mg Oral Daily    gabapentin  600 mg Oral Nightly    nicotine  1 patch Transdermal Daily     Continuous Infusions:   dextrose       LABS:-  ABGs:   No results found for: PH, PCO2, PO2, HCO3, O2SAT  No results for input(s): PHART, PO2ART, UIA0SYE, AAQ7VVB, BEART, N6ETFSWZ in the last 72 hours.   Lab Results   Component Value Date    POCPH 7.449 11/22/2020    POCPCO2 39.4 11/22/2020    POCPO2 39.1 (LL) 11/22/2020    POCHCO3 27.4 11/22/2020    BGUM2JQZ 76 (L) 11/22/2020     CBC:   Recent Labs     11/22/20  1150 11/23/20  1022 11/24/20  0956   WBC 13.1* 11.8* 12.8* HGB 12.3 11.3* 10.3*   HCT 40.1 34.2* 33.0*   MCV 90.3 83.8 88.0   PLT See Reflexed IPF Result 255 315   LYMPHOPCT 8*  --  7*   RBC 4.44 4.08 3.75*   MCH 27.7 27.7 27.5   MCHC 30.7 33.0 31.2   RDW 13.3 13.2 13.2     BMP:   Recent Labs     11/22/20  1507 11/24/20  0956    137   K 3.5* 3.5*   CL 96* 98   CO2 26 26   BUN 12 26*   CREATININE 0.62 0.64   GLUCOSE 122* 102*   PHOS  --  1.7*     Liver Function Test:   Recent Labs     11/24/20  0956   LABALBU 2.5*     Amylase/Lipase:  No results for input(s): AMYLASE, LIPASE in the last 72 hours. Coagulation Profile:   No results for input(s): INR, PROTIME, APTT in the last 72 hours. Cardiac Enzymes:  No results for input(s): CKTOTAL, CKMB, CKMBINDEX, TROPONINI in the last 72 hours. Lactic Acid:  No results found for: LACTA  BNP:   No results found for: BNP  D-Dimer:  No results found for: DDIMER  Others:   Lab Results   Component Value Date    TSH 0.94 07/19/2017     No results found for: GRACE, RHEUMFACTOR, SEDRATE, CRP  No results found for: Willetta Mustafa  No results found for: IRON, TIBC, FERRITIN  No results found for: SPEP, UPEP  No results found for: PSA, CEA, , [id number],     Input/Output:    Intake/Output Summary (Last 24 hours) at 11/24/2020 1505  Last data filed at 11/24/2020 3596  Gross per 24 hour   Intake --   Output 200 ml   Net -200 ml       Microbiology:    Pathology:    Radiology Reports:  XR CHEST (SINGLE VIEW FRONTAL)   Final Result   Bilateral lower lobe predominant airspace disease similar to prior study with   slight improvement suspected on the left. FL MODIFIED BARIUM SWALLOW W VIDEO   Final Result   No evidence of significant penetration or aspiration. Please see separate speech pathology report for full discussion of findings   and recommendations.          VL DUP LOWER EXTREMITY VENOUS RIGHT   Final Result      XR CHEST PORTABLE   Final Result   Stable moderate interstitial edema and moderate left basilar opacity Number  OP          Height:                     64 inch, 162.56 cm      Corporate ID [id number]  Weight:                     200 pounds, 90.7 kg   #      Patient Acct [de-identified]   BSA:          1.96 m^2      BMI:      34.33   #                                                              kg/m^2      MR #         [id number]     Sonographer                 DULCE ARROYO      Accession #  [id number]   Interpreting Physician      Silvestre Carter      Fellow                   Referring Nurse                            Practitioner      Interpreting             Referring Physician         Kelly Pope     Type of Study      TTE procedure:2D Echocardiogram, M-Mode, Doppler, Color Doppler. Procedure Date  Date: 05/02/2018 Start: 08:48 AM    Study Location: [address] [place] [address]  Technical Quality: Adequate visualization    Indications:Dyspnea/SOB. Patient Status: Outpatient    Height: 64 inches Weight: 200 pounds BSA: 1.96 m^2 BMI: 34.33 kg/m^2    CONCLUSIONS    Summary  Left ventricle is normal in size  Global left ventricular systolic function is normal. Estimated ejection  fraction is 65 % . Mild to moderate aortic insufficiency. Mild mitral regurgitation. Mild to moderate tricuspid regurgitation. No pulmonary hypertension. No pericardial effusion seen. Normal aortic root dimension. Signature  ----------------------------------------------------------------------------   Electronically signed by Daniel Vazquez on 05/02/2018   09:19 AM  ----------------------------------------------------------------------------    ----------------------------------------------------------------------------   Electronically signed by Silvestre Carter(Interpreting physician) on   05/02/2018 09:59 PM  ----------------------------------------------------------------------------  FINDINGS  Left Atrium  Left atrium is normal in size.   Left Ventricle  Left ventricle is normal in size  Global left ventricular systolic function is normal.  Estimated ejection fraction is 65 % . No regional wall motion abnormalities. Right Atrium  Right atrium is normal in size. Right Ventricle  Normal right ventricular size and function. Mitral Valve  Normal mitral valve structure. Mild mitral regurgitation. Aortic Valve  Normal aortic valve structure. Mild to moderate aortic insufficiency. Tricuspid Valve  Mild to moderate tricuspid regurgitation. No pulmonary hypertension. Pulmonic Valve  Technically difficult visualization of the pulmonic valve, no abnormality  seen. Trivial pulmonic insufficiency. Pericardial Effusion  No pericardial effusion seen. Miscellaneous  Normal aortic root dimension. M-mode / 2D Measurements & Calculations:      LVIDd:4.82 cm(3.7 - 5.6 cm)      Diastolic HRTDRP:082 ml   PGWTS:6.93 cm(2.2 - 4.0 cm)      Systolic USAYNV:54.6 ml   IVSd:0.82 cm(0.6 - 1.1 cm)       Aortic Root:2.9 cm(2.0 - 3.7 cm)   LVPWd:0.98 cm(0.6 - 1.1 cm)      LA Dimension: 3.5 cm(1.9 - 4.0 cm)   Fractional Shortenin.48 %   Calculated LVEF (%): 73.12 %      Mitral:                                  Aortic      Valve Area (P1/2-Time): 5.95 cm^2        Peak Velocity: 1.34 m/s   Peak E-Wave: 0.82 m/s                    Peak Gradient: 7.18 mmHg   Peak A-Wave: 1.02 m/s   E/A Ratio: 0.8   Peak Gradient: 2.69 mmHg   Deceleration Time: 173 msec   P1/2t: 37 msec      Tricuspid:                               Pulmonic:      Estimated RVSP: 33 mmHg   Peak TR Velocity: 2.63 m/s   Peak TR Gradient: 27.6676 mmHg   Estimated RA Pressure: 5 mmHg                                            Estimated PASP: 32.67 mmHg          Cardiac Catheterization:   No results found for this or any previous visit.     ASSESSMENT AND PLAN     Assessment:    //Acute on chronic hypoxic respiratory failure  secondary to aspiration pneumonia complicated by underlying severe emphysema  //Multifocal aspiration pneumonia   //Respiratory depression from narcotic use  //Sepsis  //Diastolic heart failure  //COPD  //Post posterior fusion L4/5 surgery on 11/19.   //Current smoker  //Obesity/JOAN  //Mild to moderate aortic insufficiency     Plan:     Patient was desaturating, placed on BiPAP FiO2 70%. More drowsy and somnolent on BiPAP. Using accessory muscles of respiration. Concerns for respiratory fatigue with concomitant use of narcotics/muscle relaxant. High risk for intubation. Critical care called for evaluation and transferred to ICU for close monitoring. Follow-up echocardiogram  Recommend to minimize pain medication to avoid further respiratory depression. Critical care to follow-up. Fernando Giordano MD      Department of Internal Medicine  [facility]         11/24/2020, 3:05 PM    Please note that this chart was generated using voice recognition Dragon dictation software. Although every effort was made to ensure the accuracy of this automated transcription, some errors in transcription may have occurred. Attending Physician Statement  I have discussed the care of Roscoe Jeronimo, including pertinent history and exam findings,  with the resident. I have seen and examined the patient and the key elements of all parts of the encounter have been performed by me. I agree with the assessment, plan and orders as documented by the resident with additions . Aspiration pna leading to acute on chronic hypoxic respiratory failure   Worsening oxygenation - on bipap   Will transfer to icu   Escalate antibiotics   Dec sedative     Treatment plan Discussed with nursing staff in detail , all questions answered . Electronically signed by Tracy Madden MD on   11/24/20 at 6:56 PM EST    Please note that this chart was generated using voice recognition Dragon dictation software. Although every effort was made to ensure the accuracy of this automated transcription, some errors in transcription may have occurred.

## 2020-11-24 NOTE — H&P
showed leukocytosis ( WBC 12.8), abnormal electrolites ( Phos 1.7, Potassium 3.5, Alb 2.5, Glucose 102 ), abnormal kidney function tests ( BUN 26, Cr 0.64). Pro BMP 69. Xray showed Bilateral lower lobe predominant airspace disease similar to prior study with   slight improvement suspected on the left. Labs ordered and cultures sent. Pt was given Phos and potassium replacement.     PAST MEDICAL HISTORY:         Diagnosis Date    Allergic rhinitis     Alveolar hypoventilation 11/20/2020    Aortic insufficiency 2018    mild-moderate on echo (was seen and [facility])    Bronchiectasis ([facility] 11/20/2020    Bronchitis     Chronic back pain     Pain management at [place] [id number]. COPD (chronic obstructive pulmonary disease) ([facility]     Dr. Atul Roberts to see 11/09/2020    Depression     DM (diabetes mellitus) ([facility] 12/18/2012    GERD (gastroesophageal reflux disease)     History of echocardiogram 05/2018    EF 65%, mild-moderate AI and TR    Hyperlipidemia     Dr. Kevan Downs Hypertension     Dr. Kevan Downs Obesity     Osteoarthritis     Peripheral vascular disease ([facility]     Primary osteoarthritis of both knees     Radicular pain of lumbosacral region     Spinal stenosis, lumbar region, without neurogenic claudication 04/30/2013    Tricuspid regurgitation 2018    mild-moderate on echo    Type II or unspecified type diabetes mellitus without mention of complication, not stated as uncontrolled     Dr. Kevan Downs Unspecified sleep apnea     no cpap used anymore    URI (upper respiratory infection)     Wears dentures     upper and lower full dentures    Wears glasses     Wellness examination     Dr. John Painting -PCP last visit in early Oct. 2020         PAST SURGICAL HISTORY:         Procedure Laterality Date    COLONOSCOPY  2/12/2009    normal    DILATION AND CURETTAGE OF UTERUS      GASTRECTOMY      partial    LUMBAR DISCECTOMY  01/2015    lumbar diskectomy    LUMBAR FUSION  11/19/2020     POSTERIOR FUSION L4/5,     LUMBAR FUSION N/A 11/19/2020    POSTERIOR FUSION L4/5, [employer], Dulce Veras, EVOKES #[id number] AMINA performed by Donavan Barnhart DO at [facility] Right 4/30/13    Lumbar Diagnostic Block,  Kenalog 40 mg    NERVE BLOCK  5/23/13    Lumbar Radiofrequency, Kenalog 40mg    NERVE BLOCK  8/12/13    Lt MBNB  celestone 6mg    NERVE BLOCK Left 8-28-13    left lumbar diagnostic block #2 decadron 10 mg    NERVE BLOCK Left 9-24-13    left lumbar median branch radiofrequency    NERVE BLOCK  07-02-14    caudal, celestone 9 mg    NERVE BLOCK  7-16-14    caudal epidural #2, celestone 9mg, fentanyl 25mcg    NERVE BLOCK  7/30/14    caudal #3 decadron 10mg    NERVE BLOCK  11-6-14    duramorph epidural steroid block  duramorph 1 mg celestone 9 mg    NERVE BLOCK  11/20/15    TENS- Empi Select    NERVE BLOCK  07/20/2018    right transforminal # 1 decadron 10mg,isovue    NERVE BLOCK Bilateral 02/01/2019    bilat mbnb- no steroid    NERVE BLOCK Bilateral 02/08/2019    bilat mbnb, marcaine . 25%    CA KNEE SCOPE,DIAGNOSTIC Right 3/24/2017    KNEE ARTHROSCOPY WITH PARTIAL MEDIAL MENISECAL DEBRIDMENT  performed by Evens Navarro MD at [facility]  9 20 2007    UPPER GASTROINTESTINAL ENDOSCOPY  4 21 2009,04/2011    gastritis, esophagitis       ALLERGIES:      Allergies   Allergen Reactions    Aspirin      DUE TO ULCER    Claritin [Loratadine] Other (See Comments)     coughing    Flonase [Fluticasone Propionate]     Morphine And Related      GI Upset         HOME MEDS: :      Prior to Admission medications    Medication Sig Start Date End Date Taking?  Authorizing Provider   carvedilol (COREG) 6.25 MG tablet TAKE 1 TABLET BY MOUTH 2 TIMES DAILY 11/18/20  Yes Monica Campbell MD   cetirizine (ZYRTEC) 10 MG tablet TAKE 1 TABLET BY MOUTH DAILY 11/18/20  Yes Monica Campbell MD   meloxicam (MOBIC) 15 MG tablet Take 15 mg by mouth daily   Yes Historical Provider, MD   HYDROcodone-acetaminophen (NORCO) 5-325 MG per tablet Take 1 tablet by mouth every 8 hours as needed for Pain for up to 30 days. Early refill due to holidays 10/30/20 11/29/20 Yes ELVIRA Lancaster CNP   trospium (SANCTURA) 20 MG tablet TAKE 1 TABLET BY MOUTH 2 TIMES DAILY 10/27/20  Yes Efrem Tucker MD   tiZANidine (ZANAFLEX) 4 MG tablet TAKE 1 TABLET TWICE DAILY 10/27/20  Yes Efrem Tucker MD   blood glucose test strips (EXACTECH TEST) strip 1 each by In Vitro route daily As needed.  10/14/20  Yes Efrem Tucker MD   gabapentin (NEURONTIN) 300 MG capsule TAKE 1 CAPSULE IN MORNING AND AFTERNOON AND 2 CAPSULES AT NIGHT 10/12/20 11/19/20 Yes Efrem Tucker MD   atorvastatin (LIPITOR) 40 MG tablet Take 1 tablet by mouth daily 9/22/20  Yes Efrem Tucker MD   metFORMIN (GLUCOPHAGE) 500 MG tablet Take 1 tablet by mouth 2 times daily (with meals) 9/22/20  Yes Efrem Tucker MD   lisinopril-hydroCHLOROthiazide (PRINZIDE;ZESTORETIC) 20-25 MG per tablet TAKE 1 TABLET EVERY DAY 9/22/20  Yes Efrem Tucker MD    MG capsule TAKE 1 CAPSULE EVERY DAY 7/7/20  Yes fErem Tucker MD   meloxicam [facility]) 15 MG tablet Take 1 tablet by mouth daily 5/6/20 10/29/21 Yes ELVIRA Carvalho CNP   amLODIPine (NORVASC) 10 MG tablet Take 1 tablet by mouth daily 4/2/20  Yes Efrem Tucker MD   albuterol sulfate HFA (VENTOLIN HFA) 108 (90 Base) MCG/ACT inhaler Inhale 2 puffs into the lungs every 6 hours as needed for Wheezing 4/2/20  Yes Efrem Tucker MD   omeprazole (PRILOSEC) 20 MG delayed release capsule TAKE 1 CAPSULE EVERY DAY 1/10/20  Yes Efrem Tucker MD   diphenhydrAMINE (BENADRYL) 25 MG tablet Take 25 mg by mouth every 6 hours as needed for Itching   Yes Historical Provider, MD   nicotine (NICODERM CQ) 21 MG/24HR Place 1 patch onto the skin every 24 hours   Yes Historical Provider, MD   potassium chloride (KLOR-CON M) 10 MEQ extended release tablet Take 2 tablets by mouth daily 12/10/19  Yes Oseas Coombs MD   Rose Fahad 18 MCG inhalation capsule  10/16/19  Yes Historical Provider, MD   acetaminophen (TYLENOL) 500 MG tablet Take 1 tablet by mouth 4 times daily as needed for Pain 9/12/19  Yes Edilma Hernandez, APRN - CNP   mirtazapine (REMERON) 30 MG tablet Take 30 mg by mouth nightly 5/22/18  Yes Historical Provider, MD   mirtazapine (REMERON) 15 MG tablet Take 15 mg by mouth nightly 5/22/18  Yes Historical Provider, MD   DULoxetine (CYMBALTA) 60 MG extended release capsule Take 1 capsule by mouth daily 2/1/18  Yes Oseas Coombs MD   Lancets MISC 1 each by Does not apply route daily 1/5/18  Yes Oseas Coombs MD   azelastine (ASTELIN) 0.1 % nasal spray 2 sprays by Nasal route 2 times daily Use in each nostril as directed 6/15/16  Yes Oseas Coombs MD   ipratropium-albuterol (DUONEB) 0.5-2.5 (3) MG/3ML SOLN nebulizer solution Inhale 3 mLs into the lungs every 6 hours as needed for Shortness of Breath 8/4/15   Oseas Coombs MD       SOCIAL HISTORY:       TOBACCO:   reports that she quit smoking about 3 weeks ago. Her smoking use included cigarettes. She has a 9.00 pack-year smoking history. She has never used smokeless tobacco.  ETOH:   reports no history of alcohol use. DRUGS:  reports no history of drug use. FAMILY HISTORY:          Problem Relation Age of Onset    Diabetes Mother     Heart Disease Mother     Cirrhosis Father        REVIEW OF SYSTEMS (ROS):     Review of Systems   Constitutional: Positive for fever. Negative for activity change and chills. HENT: Negative for hearing loss, nosebleeds, sore throat and trouble swallowing. Eyes: Negative for photophobia and visual disturbance. Respiratory: Positive for shortness of breath. Negative for apnea, cough and chest tightness. Cardiovascular: Negative for chest pain, palpitations and leg swelling.    Gastrointestinal: Negative for abdominal distention, anal bleeding, blood in stool, constipation, diarrhea, nausea and vomiting. Genitourinary: Negative for difficulty urinating, dysuria and hematuria. Musculoskeletal: Negative for neck pain and neck stiffness. Right leg pain. Skin: Negative for color change and pallor. Neurological: Negative for dizziness, seizures, facial asymmetry, weakness and light-headedness. Psychiatric/Behavioral: Negative for agitation and confusion. OBJECTIVE:     VITAL SIGNS:  BP (!) 80/47   Pulse 100   Temp 98.2 °F (36.8 °C) (Axillary)   Resp 19   Ht 5' 6\" (1.676 m)   Wt 188 lb (85.3 kg)   LMP 2003   SpO2 93%   BMI 30.34 kg/m²   Tmax over 24 hours:  Temp (24hrs), Av.1 °F (37.3 °C), Min:98.2 °F (36.8 °C), Max:100.1 °F (37.8 °C)      Patient Vitals for the past 8 hrs:   BP Temp Temp src Pulse Resp SpO2   20 1214 (!) 80/47 98.2 °F (36.8 °C) Axillary 100 19 93 %   20 0813 (!) 112/53 99.4 °F (37.4 °C) Oral 104 16 96 %         Intake/Output Summary (Last 24 hours) at 2020 1439  Last data filed at 2020 0709  Gross per 24 hour   Intake --   Output 200 ml   Net -200 ml     Date 20 0000 - 20 2359   Shift 4470-5699 1694-7154 2442-8095 24 Hour Total   INTAKE   Shift Total(mL/kg)       OUTPUT   Urine(mL/kg/hr) 200(0.3)   200   Shift Total(mL/kg) 200(2.3)   200(2.3)   Weight (kg) 85.3 85.3 85.3 85.3     Wt Readings from Last 3 Encounters:   20 188 lb (85.3 kg)   20 185 lb (83.9 kg)   20 188 lb (85.3 kg)     Body mass index is 30.34 kg/m². PHYSICAL EXAM:  Physical Exam  Constitutional:       General: She is not in acute distress. Appearance: She is not ill-appearing. HENT:      Head: Normocephalic and atraumatic. Nose: Nose normal.   Eyes:      General: No scleral icterus. Extraocular Movements: Extraocular movements intact. Neck:      Musculoskeletal: Normal range of motion.    Cardiovascular:      Rate and Rhythm: Normal rate and regular rhythm. Pulses: Normal pulses. Heart sounds: Normal heart sounds. Pulmonary:      Effort: Respiratory distress present. Breath sounds: Normal breath sounds. Comments: Pt on BiPAP  Abdominal:      General: There is no distension. Palpations: Abdomen is soft. Skin:     General: Skin is warm. Neurological:      Mental Status: She is alert and oriented to person, place, and time.    Psychiatric:         Mood and Affect: Mood normal.         Behavior: Behavior normal.         MEDICATIONS:  Scheduled Meds:   piperacillin-tazobactam  4.5 g Intravenous Q8H    ipratropium-albuterol  1 ampule Inhalation Q4H While awake    insulin lispro  0-12 Units Subcutaneous TID WC    insulin lispro  0-6 Units Subcutaneous Nightly    sodium chloride flush  10 mL Intravenous 2 times per day    sufentanil (SUFENTA) 1 mcg/mL infusion  0.25 mcg/kg/hr Intravenous Once    amLODIPine  10 mg Oral Daily    atorvastatin  40 mg Oral Daily    azelastine  2 spray Each Nostril BID    carvedilol  6.25 mg Oral BID    docusate sodium  100 mg Oral Daily    DULoxetine  60 mg Oral Daily    gabapentin  300 mg Oral BID    lisinopril-hydroCHLOROthiazide  1 tablet Oral Daily    [Held by provider] metFORMIN  500 mg Oral BID WC    mirtazapine  15 mg Oral Nightly    mirtazapine  30 mg Oral Nightly    omeprazole  20 mg Oral QAM    potassium chloride  20 mEq Oral Daily    tiZANidine  4 mg Oral BID    trospium  20 mg Oral BID    polyethylene glycol  17 g Oral Daily    magnesium hydroxide  30 mL Oral Daily    enoxaparin  40 mg Subcutaneous Daily    cetirizine  10 mg Oral Daily    gabapentin  600 mg Oral Nightly    nicotine  1 patch Transdermal Daily     Continuous Infusions:   dextrose       PRN Meds:   albuterol, 2.5 mg, Q6H PRN  magic (miracle) mouthwash, 5 mL, 4x Daily PRN  acetaminophen, 650 mg, Q4H PRN  benzonatate, 100 mg, TID PRN  diphenhydrAMINE, 25 mg, Q6H PRN  sodium chloride flush, 10 mL, Date    LABA1C 5.8 04/21/2020     TSH:    Lab Results   Component Value Date    TSH 0.94 07/19/2017       Lactic Acid: No results found for: LACTA   Troponin: No results for input(s): TROPONINI in the last 72 hours. Radiological imaging  Xr Lumbar Spine (2-3 Views)    Result Date: 11/21/2020  EXAMINATION: THREE XRAY VIEWS OF THE LUMBAR SPINE 11/21/2020 11:03 am COMPARISON: Lumbar spine series October 25, 2010. HISTORY: ORDERING SYSTEM PROVIDED HISTORY: s/p fusion TECHNOLOGIST PROVIDED HISTORY: Standing AP and lateral s/p fusion Reason for Exam: s/p fusion FINDINGS: New transpedicular hardware fixation involving the level of L4-L5 with disc spacer in place. Skin staples and adjacent surgical drain are also seen. No definite radiographic evidence of complication. Vertebral body and remaining disc space heights appear relatively well maintained. Mild scattered endplate degenerative changes. A large calcification is seen within the mid pelvis likely related to an underlying fibroid. SI joints appear unremarkable. New transpedicular hardware fixation L4-L5 without definitive radiographic evidence of complication. Xr Chest Portable    Result Date: 11/23/2020  EXAMINATION: ONE XRAY VIEW OF THE CHEST 11/23/2020 7:03 am COMPARISON: November 22, 2020, chest examination HISTORY: ORDERING SYSTEM PROVIDED HISTORY: Fever TECHNOLOGIST PROVIDED HISTORY: Fever Reason for Exam: upright portablem fever FINDINGS: Limited rotated exam Stable cardiopericardial silhouette Persistent moderate diffuse bilateral interstitial infiltrates.  Interval improvement in now mild streaky right basilar opacity, persistent moderate left basilar opacity     Stable moderate interstitial edema and moderate left basilar opacity related to combined pleural-parenchymal process Improvement in now mild residual streaky right basilar atelectasis     Xr Chest Portable    Result Date: 11/22/2020  EXAMINATION: ONE XRAY VIEW OF THE CHEST 11/22/2020 11:36 am COMPARISON: 11/21/2020, 1714 hours HISTORY: ORDERING SYSTEM PROVIDED HISTORY: congestion, tachycardic, elevated temp TECHNOLOGIST PROVIDED HISTORY: congestion, tachycardic, elevated temp Reason for Exam: upright portable 68-year-old female with tachycardia, congestion elevated temperature FINDINGS: Portable upright view of the chest. Cardiac monitor leads overlie the chest. Stable mild cardiomegaly. Moderate pulmonary vascular congestion. Lower zone predominant airspace disease. Trace bilateral pleural effusions. No pneumothorax. Trachea midline. Visualized osseous structures remain unchanged. No mediastinal shift. 1. Lower zone predominant airspace disease, atelectasis and/or infiltrate. Follow-up is recommended to document resolution. 2. Stable mild cardiomegaly. Moderate pulmonary vascular congestion. Trace bilateral pleural effusions. Xr Chest Portable    Result Date: 11/21/2020  EXAMINATION: ONE XRAY VIEW OF THE CHEST 11/21/2020 5:54 pm COMPARISON: CT chest 12/26/2019. Chest radiograph 01/31/2018. HISTORY: ORDERING SYSTEM PROVIDED HISTORY: Hypoxia TECHNOLOGIST PROVIDED HISTORY: Hypoxia Reason for Exam: upr Acuity: Unknown Type of Exam: Unknown FINDINGS: The cardiomediastinal silhouette is unchanged. Mild bibasilar opacities and chronic bilateral interstitial markings. No pneumothorax or definite pleural effusion. No acute osseous abnormality. Bibasilar opacities compatible with atelectasis. In the appropriate clinical setting pneumonia is not excluded. Ct Chest Pulmonary Embolism W Contrast    Result Date: 11/22/2020  EXAMINATION: CTA OF THE CHEST 11/22/2020 4:37 pm TECHNIQUE: CTA of the chest was performed after the administration of intravenous contrast.  Multiplanar reformatted images are provided for review. MIP images are provided for review.  Dose modulation, iterative reconstruction, and/or weight based adjustment of the mA/kV was utilized to reduce the radiation dose to as low as reasonably achievable. COMPARISON: 12/26/2019 HISTORY: ORDERING SYSTEM PROVIDED HISTORY: tachycardia TECHNOLOGIST PROVIDED HISTORY: tachycardia Reason for Exam: tachycardia 26-year-old female with tachycardia FINDINGS: Pulmonary Arteries: No obvious filling defect in the main, right main, or left main pulmonary arteries. Evaluation of the remainder of the pulmonary arterial vasculature is severely limited to nondiagnostic due to respiratory motion and suboptimal bolus timing as well as streak artifact from the contrast bolus in the SVC. Mediastinum: Prominent mediastinal lymph nodes without overt mediastinal, axillary, or hilar lymphadenopathy. Visualized thyroid gland grossly unremarkable in appearance. No pericardial effusion. No periaortic or mediastinal hemorrhage. Lungs/pleura: Trachea and very proximal central airways appear patent. Consolidation of the bilateral lower lobes and partial consolidation of the lingula. Partial consolidation of the proximal right middle lobe. Severe emphysema. Respiratory motion throughout the lungs. No pneumothorax. Upper Abdomen: Fatty liver. Soft Tissues/Bones: Mild diffuse degenerative changes throughout the spine. 1. No clear evidence for central pulmonary embolus. Evaluation of the remainder of the pulmonary arterial vasculature is severely limited to nondiagnostic as detailed above. 2. Bilateral lower lobe consolidation likely pneumonia. Consolidation of the proximal right middle lobe and lingula to a lesser degree. Findings favored to represent multifocal pneumonia. Follow-up recommended to document resolution. 3. Prominent mediastinal lymph nodes. 4. Fatty liver. 5. Severe emphysema. Fluoro For Surgical Procedures    Result Date: 11/19/2020  EXAMINATION: SPOT FLUOROSCOPIC IMAGES 11/19/2020 2:08 pm TECHNIQUE: Fluoroscopy was provided by the radiology department for procedure. Radiologist was not present during examination. FLUOROSCOPY DOSE AND TYPE OR TIME AND EXPOSURES: Fluoro time 18.4 seconds. 2 spot images. COMPARISON: 02/11/2020 HISTORY: ORDERING SYSTEM PROVIDED HISTORY: intraop images lumbar fusion TECHNOLOGIST PROVIDED HISTORY: intraop images lumbar fusion Intraprocedural imaging. FINDINGS: 2 spot images of the lumbar spine were obtained. Intraprocedural fluoroscopic spot images as above. See separate procedure report for more information. Fluoro For Surgical Procedures    Result Date: 11/19/2020  Radiology exam is complete. No Radiologist dictation. Please follow up with ordering provider. Vl Dup Lower Extremity Venous Right    Result Date: 11/23/2020    [facility]  Vascular Lower Extremities DVT Study Procedure   Patient Name  Emily Pepper   Date of Study         11/23/2020                Hima Guard   Date of Birth 1960  Gender                Female   Age           56 year(s)  Race                  Black   Room Number   0492   Corporate ID  [id number]  #   Patient Troy Nicholsonsert  [de-identified]  #   MR #          [id number]     Sonographer           Kylah Huertas RVT, RDMS   Accession #   [id number]  Interpreting          Verónica Kirk                            Physician   Referring                 Referring Physician   Seven Chery,  Nurse  Practitioner  Procedure Type of Study:   Veins: Lower Extremities DVT Study, Venous Scan Lower Right. Indications for Study:R/O DVT. Patient Status: In Patient. Conclusions   Summary   Simultaneous real time imaging utilizing B-Mode, color doppler and  spectral waveform analysis was performed on the right lower extremity for  venous examination of the deep and superficial systems. Findings are:   Right:  No evidence of deep or superficial venous thrombosis.    Signature   ----------------------------------------------------------------  Electronically signed by Kylah Huertas RVT, RDMS(Sonographer) on 11/23/2020 02:22 PM !Yes       !Yes            ! None      ! +------------------------------------+----------+---------------+----------+ ! Peroneal                            !Yes       ! Yes            ! None      ! +------------------------------------+----------+---------------+----------+ ! Gastroc                             ! Yes       ! Yes            ! None      ! +------------------------------------+----------+---------------+----------+ ! GSV Thigh                           ! Yes       ! Yes            ! None      ! +------------------------------------+----------+---------------+----------+ ! GSV Knee                            ! Yes       ! Yes            ! None      ! +------------------------------------+----------+---------------+----------+ ! GSV Ankle                           ! Yes       ! Yes            ! None      ! +------------------------------------+----------+---------------+----------+ ! SSV                                 ! Yes       ! Yes            ! None      ! +------------------------------------+----------+---------------+----------+ Right Doppler Measurements +--------------------------+---------+------+------------------------------+ ! Location                  ! Signal   !Reflux! Reflux (msec)                 ! +--------------------------+---------+------+------------------------------+ ! Common Femoral            !Pulsatile!      !                              ! +--------------------------+---------+------+------------------------------+ ! Prox Femoral              !Pulsatile!      !                              ! +--------------------------+---------+------+------------------------------+ ! Popliteal                 !Phasic   !      !                              ! +--------------------------+---------+------+------------------------------+ Left Lower Extremities DVT Study Measurements Left Doppler Measurements +---------------------------+---------+------+-----------------------------+ ! Location !Signal   !Reflux! Reflux (msec)                ! +---------------------------+---------+------+-----------------------------+ ! Common Femoral             !Pulsatile!      !                             ! +---------------------------+---------+------+-----------------------------+    Fl Modified Barium Swallow W Video    Result Date: 11/23/2020  EXAMINATION: MODIFIED BARIUM SWALLOW WAS PERFORMED IN CONJUNCTION WITH SPEECH PATHOLOGY SERVICES TECHNIQUE: Fluoroscopic evaluation of the swallowing mechanism was performed with multiple consistency of barium product. FLUOROSCOPY DOSE AND TYPE OR TIME AND EXPOSURES: Fluoro time: 1.3 minutes DAP: 11.053 cGycm2 COMPARISON: None HISTORY: ORDERING SYSTEM PROVIDED HISTORY: r/o aspiration pneumonia TECHNOLOGIST PROVIDED HISTORY: r/o aspiration pneumonia Dysphagia FINDINGS: Oral phase of swallowing was within functional limits. Flash penetration seen with solid consistencies and liquid consistencies. No deep penetration or aspiration with any consistency. Mild residual within the vallecula and piriform sinuses with all consistencies. Significant anterior vertebral body spur formation noted in the upper cervical spine. No evidence of significant penetration or aspiration. Please see separate speech pathology report for full discussion of findings and recommendations. ASSESSMENT:     Principal Problem:    Centrilobular emphysema (HCC)  Active Problems:    HTN (hypertension)    History of tobacco use    GERD (gastroesophageal reflux disease)    Chronic respiratory failure with hypoxia (HCC)    Spondylosis of lumbar region without myelopathy or radiculopathy    Lumbar disc disease    Alveolar hypoventilation    Obesity (BMI 30-39. 9)    Bronchiectasis ([address]    Oxygen dependent    Acute postoperative anemia due to expected blood loss    Multifocal pneumonia  Resolved Problems:    * No resolved hospital problems.  *      PLAN:     ICU PROPHYLAXIS:  Stress ulcer:  [] PPI Agent  [] [id number] [] Sucralfate  [] Other:  VTE:   [] Enoxaparin  [] Unfract. Heparin Subcut  [] EPC Cuffs    NUTRITION:  [] NPO  [] Tube Feeding (Specify: ) [] TPN  [] PO    CONSULTATION NEEDED:  [] No   [] Yes     HOME MEDICATIONS RECONCILED: [] No  [] Yes    FAMILY UPDATED:    [] No   [] Yes     ADDITIONAL PLAN:      - Acute Respiratory distress impending intubation:   Likely secondary due to aspiration complicated by underlying severe emphysema. Pt on BiPAP with FiO2 85%, flow rate 4, sat 92%. ABG pH 7.422, pCO2 46, pO2 58.6, HCO3 30. Avoid narcotics/ muscle relaxant, minimize pain medication. Monitor O2 saturation. -Multifocal B/L Pneumonia:  Pt started on Zosyn 4.5  CT PE showed multifocal Pneumonia. Incentive spirometry. Monitor    -COPD:  Continue bronchodilator ( Duoneb)  BiPAP. - Posterior Fusion L4/5 s/p surgery on 11/19:  Pt c/o pain right leg ( below knee)  Minimize opoid pain medication  PT/OT     -Hypertension:  Pt was on Norvasc 10 mg,Lisinopril-HCTH Coreg 6.25 mg   Continue Coreg and Hold other antihypertensives and monitor BP.    -Diastolic heart failure:  Monitor I's & O's  Monitor for fluid excess. -DM:  Blood glucose- 106  Pt on Med dose corrective algorithm. DVT ppx  GI ppx. Marie Mo MD  ICU resident   [facility]  11/24/2020 2:39 PM    The critical care team assigned to the patient will be following up the patient in the intensive care unit. I have discussed the current plan with the critical care attending. The above mentioned assessment and plan will be reviewed again in detail by the critical care attending at bedside, and can be further changed or modified accordingly by the attending physician.

## 2020-11-24 NOTE — PROGRESS NOTES
Occupational Therapy Not Seen Note    DATE: 2020  Name: Duong Wolfe  : 1960  MRN: [id number]    Patient not available for Occupational Therapy due to: Attempted this am but pt too tired and continued to sleep when being encouraged to wake. Will attempt later if time allows.     Next Scheduled Treatment: 2020    Electronically signed by STEFAN Bonner on 2020 at 10:08 AM

## 2020-11-24 NOTE — PROGRESS NOTES
[facility] Pritchard[facility] Lumbar disc disease [M51.9] . Vitals:    11/24/20 0813   BP: (!) 112/53   Pulse: 104   Resp: 16   Temp: 99.4 °F (37.4 °C)   SpO2: 96%   . Patients home meds are   Prior to Admission medications    Medication Sig Start Date End Date Taking? Authorizing Provider   carvedilol (COREG) 6.25 MG tablet TAKE 1 TABLET BY MOUTH 2 TIMES DAILY 11/18/20  Yes Shelbie Parra MD   cetirizine (ZYRTEC) 10 MG tablet TAKE 1 TABLET BY MOUTH DAILY 11/18/20  Yes Shelbie Parra MD   meloxicam (MOBIC) 15 MG tablet Take 15 mg by mouth daily   Yes Historical Provider, MD   HYDROcodone-acetaminophen (NORCO) 5-325 MG per tablet Take 1 tablet by mouth every 8 hours as needed for Pain for up to 30 days. Early refill due to holidays 10/30/20 11/29/20 Yes ELVIRA Monique - CNP   trospium (SANCTURA) 20 MG tablet TAKE 1 TABLET BY MOUTH 2 TIMES DAILY 10/27/20  Yes Shelbie Parra MD   tiZANidine (ZANAFLEX) 4 MG tablet TAKE 1 TABLET TWICE DAILY 10/27/20  Yes Shelbie Parra MD   blood glucose test strips (EXACTECH TEST) strip 1 each by In Vitro route daily As needed.  10/14/20  Yes Shelbie Parra MD   gabapentin (NEURONTIN) 300 MG capsule TAKE 1 CAPSULE IN MORNING AND AFTERNOON AND 2 CAPSULES AT NIGHT 10/12/20 11/19/20 Yes Shelbie Parra MD   atorvastatin (LIPITOR) 40 MG tablet Take 1 tablet by mouth daily 9/22/20  Yes Shelbie Parra MD   metFORMIN (GLUCOPHAGE) 500 MG tablet Take 1 tablet by mouth 2 times daily (with meals) 9/22/20  Yes Shelbie Parra MD   lisinopril-hydroCHLOROthiazide (PRINZIDE;ZESTORETIC) 20-25 MG per tablet TAKE 1 TABLET EVERY DAY 9/22/20  Yes Shelbie Parra MD    MG capsule TAKE 1 CAPSULE EVERY DAY 7/7/20  Yes Shelbie Parra MD   meloxicam DILMA HICKS [facility]) 15 MG tablet Take 1 tablet by mouth daily 5/6/20 10/29/21 Yes Juventino Ormond Vriezelaar, APRN - CNP   amLODIPine (NORVASC) 10 MG tablet Take 1 tablet by mouth daily 4/2/20  Yes Shelbie Parra MD   albuterol sulfate HFA (VENTOLIN HFA) 108 (90 Base) MCG/ACT inhaler Inhale 2 puffs into the lungs every 6 hours as needed for Wheezing 4/2/20  Yes Kvng Choi MD   omeprazole (PRILOSEC) 20 MG delayed release capsule TAKE 1 CAPSULE EVERY DAY 1/10/20  Yes Kvng Choi MD   diphenhydrAMINE (BENADRYL) 25 MG tablet Take 25 mg by mouth every 6 hours as needed for Itching   Yes Historical Provider, MD   nicotine (NICODERM CQ) 21 MG/24HR Place 1 patch onto the skin every 24 hours   Yes Historical Provider, MD   potassium chloride (KLOR-CON M) 10 MEQ extended release tablet Take 2 tablets by mouth daily 12/10/19  Yes Kvng Choi MD   Classcece Anni 18 MCG inhalation capsule  10/16/19  Yes Historical Provider, MD   acetaminophen (TYLENOL) 500 MG tablet Take 1 tablet by mouth 4 times daily as needed for Pain 9/12/19  Yes Edilma Hernandez, APRN - CNP   mirtazapine (REMERON) 30 MG tablet Take 30 mg by mouth nightly 5/22/18  Yes Historical Provider, MD   mirtazapine (REMERON) 15 MG tablet Take 15 mg by mouth nightly 5/22/18  Yes Historical Provider, MD   DULoxetine (CYMBALTA) 60 MG extended release capsule Take 1 capsule by mouth daily 2/1/18  Yes Kvng Choi MD   Lancets MISC 1 each by Does not apply route daily 1/5/18  Yes Kvng Choi MD   azelastine (ASTELIN) 0.1 % nasal spray 2 sprays by Nasal route 2 times daily Use in each nostril as directed 6/15/16  Yes Kvng Choi MD   ipratropium-albuterol (DUONEB) 0.5-2.5 (3) MG/3ML SOLN nebulizer solution Inhale 3 mLs into the lungs every 6 hours as needed for Shortness of Breath 8/4/15   Kvng Choi MD   .      Assessment      Pt states she does wear O2 at home (2L)  Pt states she takes medication for breathing at home as needed  Pt ststes she does not wear an at home CPAP to sleep         RR 24  Breath Sounds: clear, diminished      Bronchodilator assessment at level  3 (home meds)   Hyperinflation assessment at level   Secretion Management assessment at level      [x]    Bronchodilator Assessment  BRONCHODILATOR ASSESSMENT SCORE  Score 0 1 2 3 4 5   Breath Sounds   []  Patient Baseline []  No Wheeze good aeration []  Faint, scattered wheezing, good aeration [x]  Expiratory Wheezing and or moderately diminished []  Insp/Exp wheeze and/or very diminished []  Insp/Exp and/ or marked distress   Respiratory Rate   [x]  Patient Baseline []  Less than 20 []  Less than 20 [x]  20-25 []  Greater than 25 []  Greater than 25   Peak flow % of Pred or PB []  NA   []  Greater than 90%  []  81-90% []  71-80% []  Less than or equal to 70%  or unable to perform []  Unable due to Respiratory Distress   Dyspnea re []  Patient Baseline [x]  No SOB [x]  No SOB []  SOB on exertion []  SOB min activity []  At rest/acute   e FEV% Predicted       [x]  NA []  Above 69%  []  Unable []  Above 60-69%  []  Unable []  Above 50-59%  []  Unable []  Above 35-49%  []  Unable []  Less than 35%  []  Unable                 []  Hyperinflation Assessment  Score 1 2 3   CXR and Breath Sounds   []  Clear []  No atelectasis  Basilar aeration []  Atelectasis or absent basilar breath sounds   Incentive Spirometry Volume  (Per IBW)   []  Greater than or equal to 15ml/Kg []  less than 15ml/Kg []  less than 15ml/Kg   Surgery within last 2 weeks []  None or general   []  Abdominal or thoracic surgery  []  Abdominal or thoracic   Chronic Pulmonary Historyre []  No []  Yes []  Yes     []  Secretion Management Assessment  Score 1 2 3   Bilateral Breath Sounds   []  Occasional Rhonchi []  Scattered Rhonchi []  Course Rhonchi and/or poor aeration   Sputum    []  Small amount of thin secretions []  Moderate amount of viscous secretions []  Copius, Viscious Yellow/ Secretions   CXR as reported by physician []  clear  []  Unavailable []  Infiltrates and/or consolidation  []  Unavailable []  Mucus Plugging and or lobar consolidation  []  Unavailable   Cough []  Strong, productive cough []  Weak productive cough []  No cough or weak non-productive cough   [facility]  9:10 AM                            FEMALE                                  MALE                            FEV1 Predicted Normal Values                        FEV1 Predicted Normal Values          Age                                     Height in Feet and Inches       Age                                     Height in Feet and Inches       4' 11\" 5' 1\" 5' 3\" 5' 5\" 5' 7\" 5' 9\" 5' 11\" 6' 1\"  4' 11\" 5' 1\" 5' 3\" 5' 5\" 5' 7\" 5' 9\" 5' 11\" 6' 1\"   42 - 45 2.49 2.66 2.84 3.03 3.22 3.42 3.62 3.83 42 - 45 2.82 3.03 3.26 3.49 3.72 3.96 4.22 4.47   46 - 49 2.40 2.57 2.76 2.94 3.14 3.33 3.54 3.75 46 - 49 2.70 2.92 3.14 3.37 3.61 3.85 4.10 4.36   50 - 53 2.31 2.48 2.66 2.85 3.04 3.24 3.45 3.66 50 - 53 2.58 2.80 3.02 3.25 3.49 3.73 3.98 4.24   54 - 57 2.21 2.38 2.57 2.75 2.95 3.14 3.35 3.56 54 - 57 2.46 2.67 2.89 3.12 3.36 3.60 3.85 4.11   58 - 61 2.10 2.28 2.46 2.65 2.84 3.04 3.24 3.45 58 - 61 2.32 2.54 2.76 2.99 3.23 3.47 3.72 3.98   62 - 65 1.99 2.17 2.35 2.54 2.73 2.93 3.13 3.34 62 - 65 2.19 2.40 2.62 2.85 3.09 3.33 3.58 3.84   66 - 69 1.88 2.05 2.23 2.42 2.61 2.81 3.02 3.23 66 - 69 2.04 2.26 2.48 2.71 2.95 3.19 3.44 3.70   70+ 1.82 1.99 2.17 2.36 2.55 2.75 2.95 3.16 70+ 1.97 2.19 2.41 2.64 2.87 3.12 3.37 3.62             Predicted Peak Expiratory Flow Rate                                       Height (in)  Female       Height (in) Male           Age 68 95 98 09 35 77 78 74 Age            21 344 357 372 387 402 417 432 446  60 62 64 66 68 70 72 74 76   25 337 352 366 381 396 411 426 441 25 447 476 505 533 [phone] 677   30 329 344 359 374 389 404 419 434 30 437 466 494 523 552 580 609 638 667   35 322 337 351 366 381 396 411 426 35 426 455 484 512 541 570 598 627 657   40 314 329 344 359 374 389 404 419 40 416 445 473 502 531 559 588 617 647   45 307 322 336 351 366 381 396 411 45 405 434 463 491 520 549 577 606 636   50 299 314 329 344 359 374 389 404 50 395 424 452 138 293 487 592 558 076   35 251 052 741 852 094 490 252 840 72 617 831 525 929 817 972 630 200 714   78 277 717 744 851 178 490 708 056 54 116 511 380 884 062 312 178 607 567   28 457 179 424 282 703 868 991 890 15 127 845 265 472 744 507 535 564 594   70 269 284 299 314 329 344 359 374 70 353 382 410 439 468 496 525 554 583   75 261 274 289 305 319 334 348 364 75 344 372 400 429 458 487 515 544 573   80 253 266 282 296 312 327 342 356 80 335 362 390 419 448 476 [phone]

## 2020-11-24 NOTE — PROGRESS NOTES
Neurosurgery TIAN/Resident    Daily Progress Note   No chief complaint on file. 11/24/2020  9:24 AM    Chart reviewed. No acute events overnight. No new complaints. Off bipap on 4L NC this morning. Normally on 2L NC at home. Tolerating oral diet. Max temp 99.4F overnight. Vitals:    11/23/20 2101 11/23/20 2227 11/24/20 0506 11/24/20 0813   BP: (!) 82/44  (!) 99/48 (!) 112/53   Pulse: 101  102 104   Resp:    16   Temp:   99 °F (37.2 °C) 99.4 °F (37.4 °C)   TempSrc:   Axillary Oral   SpO2: 95% 92% 95% 96%   Weight:       Height:           PE:   AOx3   Motor   L iliopsoas 5/5 , R iliopsoas 5/5  L quadriceps 5/5; R quadriceps 5/5  L Dorsiflexion 5/5; R dorsiflexion 5/5  L Plantarflexion 5/5; R plantarflexion 5/5  L EHL 5/5; R EHL 5/5    Sensation intact    Incision lumbar dressing dry and intact      Lab Results   Component Value Date    WBC 11.8 (H) 11/23/2020    HGB 11.3 (L) 11/23/2020    HCT 34.2 (L) 11/23/2020     11/23/2020    CHOL 201 (H) 04/21/2020    TRIG 155 (H) 04/21/2020    HDL 54 04/21/2020    ALT 21 04/21/2020    AST 18 04/21/2020     11/22/2020    K 3.5 (L) 11/22/2020    CL 96 (L) 11/22/2020    CREATININE 0.62 11/22/2020    BUN 12 11/22/2020    CO2 26 11/22/2020    TSH 0.94 07/19/2017    LABA1C 5.8 04/21/2020    LABMICR CANNOT BE CALCULATED 04/21/2020           A/P  56 y.o. female who presents with lumbar radiculopathy   POD#5 L4-5 right sided laminectomy, L4-5 discectomy sand anterior arthrodesis     - pulmonology following: Rocephin x7 days for pneumonia   - bipap as needed, on 4L NC  - MBSS: dental soft diet with thin liquids  - activity as tolerated, PT and OT for mobilization  - Lovenox and SCDs for DVT ppx  - Encourage IS  - Discharge planning: PM&R consulted for recommendations for discharge    Please contact neurosurgery with any changes in patients neurologic status.        Dionte Jon CNP  11/24/20  9:24 AM

## 2020-11-24 NOTE — PLAN OF CARE
Problem: Respiratory  Intervention: Administer medication as ordered  11/23/2020 2303 by Allan Rodgers RCP  Note: BRONCHOSPASM/BRONCHOCONSTRICTION     [x]         IMPROVE AERATION/BREATH SOUNDS  [x]   ADMINISTER BRONCHODILATOR THERAPY AS APPROPRIATE  [x]   ASSESS BREATH SOUNDS  [x]   IMPLEMENT AEROSOL/MDI PROTOCOL  [x]   PATIENT EDUCATION AS NEEDED      NON INVASIVE VENTILATION  PROVIDE OPTIMAL VENTILATION/ACCEPTABLE SP02  IMPLEMENT NON INVASIVE VENTILATION PROTOCOL  ASSESSMENT SKIN INTEGRITY  PATIENT EDUCATION AS NEEDED  BIPAP AS NEEDED

## 2020-11-24 NOTE — PROGRESS NOTES
RN called Maggie Rose Marie, pt's daughter to inform her of the transfer to MICU room 118, no answer, left message on voicemail.

## 2020-11-25 ENCOUNTER — TELEPHONE (OUTPATIENT)
Dept: INTERNAL MEDICINE | Age: 60
End: 2020-11-25

## 2020-11-25 LAB
ABSOLUTE EOS #: 0.44 K/UL (ref 0–0.44)
ABSOLUTE IMMATURE GRANULOCYTE: 0.17 K/UL (ref 0–0.3)
ABSOLUTE LYMPH #: 1.12 K/UL (ref 1.1–3.7)
ABSOLUTE MONO #: 0.68 K/UL (ref 0.1–1.2)
ALBUMIN SERPL-MCNC: 2.6 G/DL (ref 3.5–5.2)
ANION GAP SERPL CALCULATED.3IONS-SCNC: 10 MMOL/L (ref 9–17)
BASOPHILS # BLD: 1 % (ref 0–2)
BASOPHILS ABSOLUTE: 0.07 K/UL (ref 0–0.2)
BUN BLDV-MCNC: 27 MG/DL (ref 8–23)
BUN/CREAT BLD: ABNORMAL (ref 9–20)
CALCIUM SERPL-MCNC: 8.4 MG/DL (ref 8.6–10.4)
CHLORIDE BLD-SCNC: 95 MMOL/L (ref 98–107)
CO2: 27 MMOL/L (ref 20–31)
CREAT SERPL-MCNC: 0.75 MG/DL (ref 0.5–0.9)
CULTURE: NO GROWTH
CULTURE: NORMAL
DIFFERENTIAL TYPE: ABNORMAL
EOSINOPHILS RELATIVE PERCENT: 4 % (ref 1–4)
GFR AFRICAN AMERICAN: >60 ML/MIN
GFR NON-AFRICAN AMERICAN: >60 ML/MIN
GFR SERPL CREATININE-BSD FRML MDRD: ABNORMAL ML/MIN/{1.73_M2}
GFR SERPL CREATININE-BSD FRML MDRD: ABNORMAL ML/MIN/{1.73_M2}
GLUCOSE BLD-MCNC: 112 MG/DL (ref 65–105)
GLUCOSE BLD-MCNC: 117 MG/DL (ref 65–105)
GLUCOSE BLD-MCNC: 121 MG/DL (ref 70–99)
GLUCOSE BLD-MCNC: 133 MG/DL (ref 65–105)
GLUCOSE BLD-MCNC: 92 MG/DL (ref 65–105)
HCT VFR BLD CALC: 32.1 % (ref 36.3–47.1)
HEMOGLOBIN: 9.9 G/DL (ref 11.9–15.1)
IMMATURE GRANULOCYTES: 1 %
LYMPHOCYTES # BLD: 10 % (ref 24–43)
Lab: NORMAL
Lab: NORMAL
MAGNESIUM: 2.4 MG/DL (ref 1.6–2.6)
MCH RBC QN AUTO: 27.2 PG (ref 25.2–33.5)
MCHC RBC AUTO-ENTMCNC: 30.8 G/DL (ref 28.4–34.8)
MCV RBC AUTO: 88.2 FL (ref 82.6–102.9)
MONOCYTES # BLD: 6 % (ref 3–12)
MRSA, DNA, NASAL: NORMAL
NRBC AUTOMATED: 0 PER 100 WBC
PDW BLD-RTO: 13.2 % (ref 11.8–14.4)
PHOSPHORUS: 3.1 MG/DL (ref 2.6–4.5)
PLATELET # BLD: 308 K/UL (ref 138–453)
PLATELET ESTIMATE: ABNORMAL
PMV BLD AUTO: 10 FL (ref 8.1–13.5)
POTASSIUM SERPL-SCNC: 3.6 MMOL/L (ref 3.7–5.3)
RBC # BLD: 3.64 M/UL (ref 3.95–5.11)
RBC # BLD: ABNORMAL 10*6/UL
SEG NEUTROPHILS: 78 % (ref 36–65)
SEGMENTED NEUTROPHILS ABSOLUTE COUNT: 9.28 K/UL (ref 1.5–8.1)
SODIUM BLD-SCNC: 132 MMOL/L (ref 135–144)
SPECIMEN DESCRIPTION: NORMAL
WBC # BLD: 11.8 K/UL (ref 3.5–11.3)
WBC # BLD: ABNORMAL 10*3/UL

## 2020-11-25 PROCEDURE — 6360000002 HC RX W HCPCS: Performed by: INTERNAL MEDICINE

## 2020-11-25 PROCEDURE — 94640 AIRWAY INHALATION TREATMENT: CPT

## 2020-11-25 PROCEDURE — 85025 COMPLETE CBC W/AUTO DIFF WBC: CPT

## 2020-11-25 PROCEDURE — 6370000000 HC RX 637 (ALT 250 FOR IP): Performed by: NURSE PRACTITIONER

## 2020-11-25 PROCEDURE — 2700000000 HC OXYGEN THERAPY PER DAY

## 2020-11-25 PROCEDURE — 80069 RENAL FUNCTION PANEL: CPT

## 2020-11-25 PROCEDURE — 2580000003 HC RX 258: Performed by: INTERNAL MEDICINE

## 2020-11-25 PROCEDURE — APPSS30 APP SPLIT SHARED TIME 16-30 MINUTES: Performed by: REGISTERED NURSE

## 2020-11-25 PROCEDURE — 83735 ASSAY OF MAGNESIUM: CPT

## 2020-11-25 PROCEDURE — 2000000000 HC ICU R&B

## 2020-11-25 PROCEDURE — 2580000003 HC RX 258: Performed by: STUDENT IN AN ORGANIZED HEALTH CARE EDUCATION/TRAINING PROGRAM

## 2020-11-25 PROCEDURE — 99231 SBSQ HOSP IP/OBS SF/LOW 25: CPT | Performed by: STUDENT IN AN ORGANIZED HEALTH CARE EDUCATION/TRAINING PROGRAM

## 2020-11-25 PROCEDURE — 97530 THERAPEUTIC ACTIVITIES: CPT

## 2020-11-25 PROCEDURE — 99291 CRITICAL CARE FIRST HOUR: CPT | Performed by: INTERNAL MEDICINE

## 2020-11-25 PROCEDURE — 97535 SELF CARE MNGMENT TRAINING: CPT

## 2020-11-25 PROCEDURE — 6370000000 HC RX 637 (ALT 250 FOR IP): Performed by: STUDENT IN AN ORGANIZED HEALTH CARE EDUCATION/TRAINING PROGRAM

## 2020-11-25 PROCEDURE — 94660 CPAP INITIATION&MGMT: CPT

## 2020-11-25 PROCEDURE — 6360000002 HC RX W HCPCS: Performed by: NURSE PRACTITIONER

## 2020-11-25 PROCEDURE — 6370000000 HC RX 637 (ALT 250 FOR IP): Performed by: INTERNAL MEDICINE

## 2020-11-25 PROCEDURE — 36415 COLL VENOUS BLD VENIPUNCTURE: CPT

## 2020-11-25 PROCEDURE — 82947 ASSAY GLUCOSE BLOOD QUANT: CPT

## 2020-11-25 RX ORDER — 0.9 % SODIUM CHLORIDE 0.9 %
500 INTRAVENOUS SOLUTION INTRAVENOUS ONCE
Status: COMPLETED | OUTPATIENT
Start: 2020-11-25 | End: 2020-11-25

## 2020-11-25 RX ORDER — POTASSIUM CHLORIDE 20 MEQ/1
20 TABLET, EXTENDED RELEASE ORAL 2 TIMES DAILY WITH MEALS
Status: DISCONTINUED | OUTPATIENT
Start: 2020-11-25 | End: 2020-11-25

## 2020-11-25 RX ORDER — POTASSIUM CHLORIDE 20 MEQ/1
20 TABLET, EXTENDED RELEASE ORAL ONCE
Status: DISCONTINUED | OUTPATIENT
Start: 2020-11-25 | End: 2020-12-02 | Stop reason: HOSPADM

## 2020-11-25 RX ADMIN — IPRATROPIUM BROMIDE AND ALBUTEROL SULFATE 1 AMPULE: .5; 3 SOLUTION RESPIRATORY (INHALATION) at 08:13

## 2020-11-25 RX ADMIN — OXYCODONE HYDROCHLORIDE 10 MG: 5 TABLET ORAL at 09:17

## 2020-11-25 RX ADMIN — CARVEDILOL 6.25 MG: 6.25 TABLET, FILM COATED ORAL at 09:16

## 2020-11-25 RX ADMIN — DULOXETINE HYDROCHLORIDE 60 MG: 30 CAPSULE, DELAYED RELEASE ORAL at 09:16

## 2020-11-25 RX ADMIN — IPRATROPIUM BROMIDE AND ALBUTEROL SULFATE 1 AMPULE: .5; 3 SOLUTION RESPIRATORY (INHALATION) at 12:26

## 2020-11-25 RX ADMIN — OXYCODONE HYDROCHLORIDE 10 MG: 5 TABLET ORAL at 01:38

## 2020-11-25 RX ADMIN — DOCUSATE SODIUM 100 MG: 100 CAPSULE, LIQUID FILLED ORAL at 09:16

## 2020-11-25 RX ADMIN — GABAPENTIN 300 MG: 300 CAPSULE ORAL at 15:09

## 2020-11-25 RX ADMIN — SODIUM CHLORIDE 500 ML: 9 INJECTION, SOLUTION INTRAVENOUS at 09:21

## 2020-11-25 RX ADMIN — MIRTAZAPINE 30 MG: 15 TABLET, FILM COATED ORAL at 20:16

## 2020-11-25 RX ADMIN — MAGNESIUM HYDROXIDE 30 ML: 400 SUSPENSION ORAL at 09:31

## 2020-11-25 RX ADMIN — ENOXAPARIN SODIUM 40 MG: 40 INJECTION SUBCUTANEOUS at 09:17

## 2020-11-25 RX ADMIN — MIRTAZAPINE 15 MG: 15 TABLET, FILM COATED ORAL at 20:17

## 2020-11-25 RX ADMIN — POLYETHYLENE GLYCOL 3350 17 G: 17 POWDER, FOR SOLUTION ORAL at 09:17

## 2020-11-25 RX ADMIN — PIPERACILLIN AND TAZOBACTAM 4.5 G: 4; .5 INJECTION, POWDER, LYOPHILIZED, FOR SOLUTION INTRAVENOUS; PARENTERAL at 21:47

## 2020-11-25 RX ADMIN — OMEPRAZOLE 20 MG: 20 CAPSULE, DELAYED RELEASE ORAL at 09:15

## 2020-11-25 RX ADMIN — GABAPENTIN 300 MG: 300 CAPSULE ORAL at 09:16

## 2020-11-25 RX ADMIN — IPRATROPIUM BROMIDE AND ALBUTEROL SULFATE 1 AMPULE: .5; 3 SOLUTION RESPIRATORY (INHALATION) at 15:56

## 2020-11-25 RX ADMIN — DESMOPRESSIN ACETATE 40 MG: 0.2 TABLET ORAL at 09:16

## 2020-11-25 RX ADMIN — IPRATROPIUM BROMIDE AND ALBUTEROL SULFATE 1 AMPULE: .5; 3 SOLUTION RESPIRATORY (INHALATION) at 19:29

## 2020-11-25 RX ADMIN — OXYCODONE HYDROCHLORIDE 10 MG: 5 TABLET ORAL at 20:16

## 2020-11-25 RX ADMIN — GABAPENTIN 600 MG: 300 CAPSULE ORAL at 20:16

## 2020-11-25 RX ADMIN — CETIRIZINE HYDROCHLORIDE 10 MG: 10 TABLET ORAL at 09:16

## 2020-11-25 RX ADMIN — PIPERACILLIN AND TAZOBACTAM 4.5 G: 4; .5 INJECTION, POWDER, LYOPHILIZED, FOR SOLUTION INTRAVENOUS; PARENTERAL at 05:58

## 2020-11-25 RX ADMIN — TIZANIDINE 4 MG: 4 TABLET ORAL at 09:15

## 2020-11-25 RX ADMIN — SODIUM CHLORIDE, PRESERVATIVE FREE 10 ML: 5 INJECTION INTRAVENOUS at 20:16

## 2020-11-25 RX ADMIN — TROSPIUM CHLORIDE 20 MG: 20 TABLET, FILM COATED ORAL at 20:16

## 2020-11-25 RX ADMIN — TIZANIDINE 4 MG: 4 TABLET ORAL at 20:16

## 2020-11-25 RX ADMIN — TROSPIUM CHLORIDE 20 MG: 20 TABLET, FILM COATED ORAL at 09:15

## 2020-11-25 RX ADMIN — PIPERACILLIN AND TAZOBACTAM 4.5 G: 4; .5 INJECTION, POWDER, LYOPHILIZED, FOR SOLUTION INTRAVENOUS; PARENTERAL at 15:06

## 2020-11-25 RX ADMIN — SODIUM CHLORIDE, PRESERVATIVE FREE 10 ML: 5 INJECTION INTRAVENOUS at 09:32

## 2020-11-25 RX ADMIN — POTASSIUM CHLORIDE 20 MEQ: 1500 TABLET, EXTENDED RELEASE ORAL at 09:18

## 2020-11-25 RX ADMIN — CARVEDILOL 6.25 MG: 6.25 TABLET, FILM COATED ORAL at 20:15

## 2020-11-25 ASSESSMENT — PAIN DESCRIPTION - LOCATION
LOCATION: LEG
LOCATION: BACK
LOCATION: LEG

## 2020-11-25 ASSESSMENT — PAIN DESCRIPTION - PAIN TYPE
TYPE: CHRONIC PAIN;NEUROPATHIC PAIN
TYPE: CHRONIC PAIN
TYPE: NEUROPATHIC PAIN
TYPE: CHRONIC PAIN
TYPE: SURGICAL PAIN
TYPE: CHRONIC PAIN

## 2020-11-25 ASSESSMENT — PAIN SCALES - GENERAL
PAINLEVEL_OUTOF10: 9
PAINLEVEL_OUTOF10: 10
PAINLEVEL_OUTOF10: 6
PAINLEVEL_OUTOF10: 8
PAINLEVEL_OUTOF10: 8
PAINLEVEL_OUTOF10: 9
PAINLEVEL_OUTOF10: 8
PAINLEVEL_OUTOF10: 5
PAINLEVEL_OUTOF10: 8

## 2020-11-25 ASSESSMENT — PAIN DESCRIPTION - DIRECTION: RADIATING_TOWARDS: RT LEG

## 2020-11-25 ASSESSMENT — ENCOUNTER SYMPTOMS
BLOOD IN STOOL: 0
COUGH: 0
COLOR CHANGE: 0
ABDOMINAL PAIN: 0
BACK PAIN: 1
CHOKING: 0
FACIAL SWELLING: 0
SORE THROAT: 0
DIARRHEA: 0
SHORTNESS OF BREATH: 0
SHORTNESS OF BREATH: 1
CHEST TIGHTNESS: 0
VOMITING: 0
PHOTOPHOBIA: 0
ABDOMINAL DISTENTION: 0
NAUSEA: 0

## 2020-11-25 ASSESSMENT — PAIN DESCRIPTION - ORIENTATION
ORIENTATION: RIGHT
ORIENTATION: LOWER
ORIENTATION: RIGHT
ORIENTATION: LOWER

## 2020-11-25 ASSESSMENT — PAIN DESCRIPTION - DESCRIPTORS
DESCRIPTORS: ACHING
DESCRIPTORS: SHARP
DESCRIPTORS: SHOOTING;ACHING

## 2020-11-25 ASSESSMENT — PAIN DESCRIPTION - FREQUENCY
FREQUENCY: CONTINUOUS
FREQUENCY: CONTINUOUS

## 2020-11-25 ASSESSMENT — PAIN DESCRIPTION - ONSET: ONSET: ON-GOING

## 2020-11-25 NOTE — PROGRESS NOTES
Neurosurgery TIAN/Resident    Daily Progress Note   No chief complaint on file. 11/25/2020  9:04 AM    Chart reviewed. Transferred to ICU yesterday for hypotension and monitor respiratory status closely. On bipap this morning. Was on high flow O2 NC but states she couldn't breath with it on so transitioned to bipap. Appears comfortable on bipap. Given dose of lasix this AM per ICU team.   Pain well controlled. Continues to have pain over anterior portion of right lower leg. Kong cath for accurate I&O. Vitals:    11/25/20 0800 11/25/20 0813 11/25/20 0817 11/25/20 0818   BP:       Pulse:    96   Resp:  18 22 20   Temp: 98 °F (36.7 °C)      TempSrc: Oral      SpO2:   93% 94%   Weight:       Height:           PE:   AOx3   Motor   L iliopsoas 5/5 , R iliopsoas 5/5  L quadriceps 5/5; R quadriceps 5/5  L Dorsiflexion 5/5; R dorsiflexion 5/5  L Plantarflexion 5/5; R plantarflexion 5/5  L EHL 5/5; R EHL 5/5     Sensation intact     Incision lumbar dressing dry and intact-removed and left open to air    Lab Results   Component Value Date    WBC 11.8 (H) 11/25/2020    HGB 9.9 (L) 11/25/2020    HCT 32.1 (L) 11/25/2020     11/25/2020    CHOL 201 (H) 04/21/2020    TRIG 155 (H) 04/21/2020    HDL 54 04/21/2020    ALT 21 04/21/2020    AST 18 04/21/2020     (L) 11/25/2020    K 3.6 (L) 11/25/2020    CL 95 (L) 11/25/2020    CREATININE 0.75 11/25/2020    BUN 27 (H) 11/25/2020    CO2 27 11/25/2020    TSH 0.94 07/19/2017    LABA1C 5.8 04/21/2020    LABMICR CANNOT BE CALCULATED 04/21/2020       A/P  56 y.o. female who presents with lumbar radiculopathy   POD#6 L4-5 right sided laminectomy, L4-5 discectomy sand anterior arthrodesis      - pain control  - activity as tolerated, PT and OT for mobilization  - Lovenox and SCDs for DVT ppx  - Encourage IS  - supportive care per ICU team      Please contact neurosurgery with any changes in patients neurologic status.        Alesia Luna CNP  11/25/20  9:04 AM

## 2020-11-25 NOTE — PLAN OF CARE
Problem: Pain:  Goal: Pain level will decrease  Description: Pain level will decrease  Outcome: Ongoing  Goal: Control of acute pain  Description: Control of acute pain  Outcome: Ongoing  Goal: Control of chronic pain  Description: Control of chronic pain  Outcome: Ongoing     Problem: Falls - Risk of:  Goal: Will remain free from falls  Description: Will remain free from falls  Outcome: Ongoing  Goal: Absence of physical injury  Description: Absence of physical injury  Outcome: Ongoing     Problem: Skin Integrity:  Goal: Will show no infection signs and symptoms  Description: Will show no infection signs and symptoms  Outcome: Ongoing  Goal: Absence of new skin breakdown  Description: Absence of new skin breakdown  Outcome: Ongoing     Problem: Gas Exchange - Impaired:  Goal: Levels of oxygenation will improve  Description: Levels of oxygenation will improve  Outcome: Ongoing     Problem: Respiratory  Intervention: Respiratory assessment  11/24/2020 1950 by Delta Lefort, RCP  Note: BRONCHOSPASM/BRONCHOCONSTRICTION     []         IMPROVE AERATION/BREATH SOUNDS  []   ADMINISTER BRONCHODILATOR THERAPY AS APPROPRIATE  []   ASSESS BREATH SOUNDS  []   IMPLEMENT AEROSOL/MDI PROTOCOL  []   PATIENT EDUCATION AS NEEDED   PROVIDE ADEQUATE OXYGENATION WITH ACCEPTABLE SP02/ABG'S    []  IDENTIFY APPROPRIATE OXYGEN THERAPY  []   MONITOR SP02/ABG'S AS NEEDED   []   PATIENT EDUCATION AS NEEDED   NON INVASIVE VENTILATION  PROVIDE OPTIMAL VENTILATION/ACCEPTABLE SP02  IMPLEMENT NON INVASIVE VENTILATION PROTOCOL  ASSESSMENT SKIN INTEGRITY  PATIENT EDUCATION AS NEEDED  BIPAP AS NEEDED

## 2020-11-25 NOTE — PROGRESS NOTES
Comprehensive Nutrition Assessment    Type and Reason for Visit:  (LOS)    Nutrition Recommendations/Plan:   -Suggest remove carb restrictions from diet order. Start ONS. -Encourage PO /small frequent meals, as tolerated. -Will monitor tolerance to diet and adequacy of intake. Nutrition Assessment:  Chart reviewed d/t length of stay. Pt admitted with right lower extremity pain. S/p L4-5 right sided laminectomy, L4-5 discectomy sand anterior arthrodesis, POD #6. PMH includes: COPD, JOAN,Spondylosis of lumbar region,GERD, HTN. Noted pt was transferred to ICU yesterday d/t resp distress. Pt breathing better at present per nursing staff. Pt is currently on a Dental Soft, Carb Controlled diet. Pt states she has not received breakfast yet, awaiting tray. Pt states intake has been variable since admission d/t pain and fever, but usually eats well. Meds/Labs reviewed. Malnutrition Assessment:  Malnutrition Status:   At risk for malnutrition     Context:  Acute Illness     Findings of the 6 clinical characteristics of malnutrition:  Energy Intake:  Mild decrease in energy intake (variable intake x 6 days)  Weight Loss:  No significant weight loss     Body Fat Loss:  No significant body fat loss     Muscle Mass Loss:  No significant muscle mass loss    Fluid Accumulation:  No significant fluid accumulation     Strength:  Not Performed    Estimated Daily Nutrient Needs:  Energy (kcal):  2537-0514 kcal/day; Weight Used for Energy Requirements:  Current     Protein (g):  85 g pro/day; Weight Used for Protein Requirements:  Ideal(1.5)         Current Nutrition Therapies:    DIET DENTAL SOFT; Carb Control: 5 carb choices (75 gms)/meal  Meal Intake: variable intake this admission, per pt     Anthropometric Measures:  · Height: 5' 5\" (165.1 cm)  · Current Body Weight: 181 lb 7 oz (82.3 kg)   · Admission Body Weight: 188 lb (85.3 kg)    · Usual Body Weight: 183 lb (83 kg)(11/27/19, per EHR)     · Ideal Body Weight: 125 lbs; % Ideal Body Weight  99%   · BMI: 30.2  · BMI Categories: Obese Class 1 (BMI 30.0-34. 9)       Nutrition Diagnosis:   · Inadequate oral intake related to pain/ not feeling well, decreased appetite, resp status as evidenced by variable meal intake x 6 days    Nutrition Interventions:   Food and/or Nutrient Delivery:  Modify Diet, Start Oral Nutrition Supplement  Nutrition Education/Counseling:  No recommendation at this time   Coordination of Nutrition Care:  Continue to monitor while inpatient    Goals:  meet % of estimated nutrient needs (goal set)    Nutrition Monitoring and Evaluation:   Food/Nutrient Intake Outcomes:  Food and Nutrient Intake, Supplement Intake  Physical Signs/Symptoms Outcomes:  Biochemical Data, Chewing or Swallowing, Nutrition Focused Physical Findings, Skin, Weight     Discharge Planning:     Too soon to determine     Electronically signed by Hai Merino RD, LD on 11/25/20 at 1:32 PM EST    Contact: [phone]

## 2020-11-25 NOTE — PROGRESS NOTES
Critical Care Team - Daily Progress Note      Date and time: 11/25/2020 7:51 AM  Patient's name:  [facility] Record Number: [id number]  Patient's account/billing number: [de-identified]  Patient's YOB: 1960  Age: 56 y.o. Date of Admission: 11/19/2020  5:32 AM  Length of stay during current admission: 6      Primary Care Physician: Klaus Talbert MD  ICU Attending Physician: Dr. Franck Phipps Status: Full Code    Reason for ICU admission: Respiratory Failure, concerns for Intubation. Subjective:     OVERNIGHT EVENTS:      No acute overnight events, Pt febrile with temp 99.2.  /67, MAP 79. HR 94, pt on Coreg 6.25 mg. Norvasc and Prinzide are held for now. Pt did well on High flow FiO2 90 last evening. Overnight she was on BiPAP. FiO2 85%. Getting Duoneb every 4 hrly. Pt on Zosyn 4.5 gm every 8 hrs. UOP: 1709/ 24 hrs  Net -104    Sodium 132  Potassium 3.6, replaced  BUN 27  Cr 0.75  Glucose 121  Calcium 8.4    POC BG: pH 7.422, pCO2 46.0, pO2 58.6, HCO3 30.     WBC 11.8  Hb 9.9    AWAKE & FOLLOWING COMMANDS:  [] No   [x] Yes    CURRENT VENTILATION STATUS:     [] Ventilator  [x] BIPAP  [] Nasal Cannula [] Room Air      IF INTUBATED, ET TUBE MARKING AT LOWER LIP:       cms    SECRETIONS Amount:  [] Small [] Moderate  [] Large  [x] None  Color:     [] White [] Colored  [] Bloody    SEDATION:  RAAS Score:  [] Propofol gtt  [] Versed gtt  [] Ativan gtt   [x] No Sedation    PARALYZED:  [x] No    [] Yes    DIARRHEA:                [x] No                [] Yes  (C. Difficile status: [] positive                                                                                                                       [] negative                                                                                                                     [] pending)    VASOPRESSORS:  [x] No    [] Yes    If yes -   [] Levophed       [] Dopamine     [] Vasopressin       [] Dobutamine  [] Phenylephrine [] Epinephrine    CENTRAL LINES:     [x] No   [] Yes   (Date of Insertion:   )           If yes -     [] Right IJ     [] Left IJ [] Right Femoral [] Left Femoral                   [] Right Subclavian [] Left Subclavian       GARCIA'S CATHETER:   [] No   [x] Yes  (Date of Insertion:    )     URINE OUTPUT:            [] Good   [x] Low              [] Anuric    Review of Systems   Constitutional: Negative for activity change, appetite change, chills and fatigue. HENT: Negative for facial swelling and sore throat. Eyes: Negative for photophobia and visual disturbance. Respiratory: Positive for shortness of breath. Negative for cough, choking and chest tightness. Cardiovascular: Negative for chest pain, palpitations and leg swelling. Gastrointestinal: Negative for abdominal distention, abdominal pain, blood in stool, diarrhea, nausea and vomiting. Genitourinary: Negative for difficulty urinating and hematuria. Musculoskeletal: Negative for arthralgias and joint swelling. Skin: Negative for color change and pallor. Psychiatric/Behavioral: Negative for agitation, behavioral problems and confusion. The patient is not nervous/anxious.             OBJECTIVE:     VITAL SIGNS:  /65   Pulse 88   Temp 99.2 °F (37.3 °C) (Axillary)   Resp 20   Ht 5' 5\" (1.651 m)   Wt 181 lb 7 oz (82.3 kg)   LMP 2003   SpO2 94%   BMI 30.19 kg/m²   Tmax over 24 hours:  Temp (24hrs), Av.8 °F (37.1 °C), Min:98.2 °F (36.8 °C), Max:100.2 °F (37.9 °C)      Patient Vitals for the past 8 hrs:   BP Temp Temp src Pulse Resp SpO2 Weight   20 0700 100/65 -- -- 88 20 94 % --   20 0600 98/64 -- -- 88 17 96 % --   20 0500 (!) 103/58 -- -- 100 20 95 % --   20 0400 (!) 94/54 99.2 °F (37.3 °C) Axillary 95 18 94 % --   20 0306 -- -- -- -- 16 -- --   11/25/20 0300 110/70 -- -- 104 23 94 % --   20 0100 125/74 -- -- 101 24 95 % --   20 0000 115/67 100.2 °F (37.9 °C) Axillary 102 26 93 % 181 lb 7 oz (82.3 kg)         Intake/Output Summary (Last 24 hours) at 11/25/2020 0751  Last data filed at 11/25/2020 0600  Gross per 24 hour   Intake 1303 ml   Output 1509 ml   Net -206 ml     Date 11/25/20 0000 - 11/25/20 2359   Shift 3938-0423 6323-6369 5978-9493 24 Hour Total   INTAKE   P.O.(mL/kg/hr) 240   240   I. V.(mL/kg) 441(5.4)   441(5.4)   Shift Total(mL/kg) 681(8.3)   681(8.3)   OUTPUT   Urine(mL/kg/hr) 659   659   Shift Total(mL/kg) 659(8)   659(8)   Weight (kg) 82.3 82.3 82.3 82.3     Wt Readings from Last 3 Encounters:   11/25/20 181 lb 7 oz (82.3 kg)   11/05/20 185 lb (83.9 kg)   09/22/20 188 lb (85.3 kg)     Body mass index is 30.19 kg/m². PHYSICAL EXAM:  Physical Exam  Vitals signs reviewed. Constitutional:       Appearance: Normal appearance. Comments: Pt on BiPAP   HENT:      Head: Normocephalic. Nose: Nose normal.      Mouth/Throat:      Mouth: Mucous membranes are moist.   Eyes:      General: No scleral icterus. Extraocular Movements: Extraocular movements intact. Neck:      Musculoskeletal: Normal range of motion. Cardiovascular:      Rate and Rhythm: Normal rate and regular rhythm. Pulses: Normal pulses. Heart sounds: Normal heart sounds. Pulmonary:      Effort: Pulmonary effort is normal.      Breath sounds: Normal breath sounds. Abdominal:      General: There is no distension. Palpations: Abdomen is soft. Tenderness: There is no abdominal tenderness. There is no guarding. Comments: Scar marks on abdomen from previous surgeries. Musculoskeletal: Normal range of motion. General: No swelling, tenderness or signs of injury. Skin:     General: Skin is warm. Findings: No bruising. Neurological:      General: No focal deficit present. Mental Status: She is alert and oriented to person, place, and time.    Psychiatric:         Mood and Affect: Mood normal.         Behavior: Behavior normal. MEDICATIONS:  Scheduled Meds:   piperacillin-tazobactam  4.5 g Intravenous Q8H    ipratropium-albuterol  1 ampule Inhalation Q4H While awake    insulin lispro  0-12 Units Subcutaneous TID WC    insulin lispro  0-6 Units Subcutaneous Nightly    sodium chloride flush  10 mL Intravenous 2 times per day    [Held by provider] amLODIPine  10 mg Oral Daily    atorvastatin  40 mg Oral Daily    azelastine  2 spray Each Nostril BID    carvedilol  6.25 mg Oral BID    docusate sodium  100 mg Oral Daily    DULoxetine  60 mg Oral Daily    gabapentin  300 mg Oral BID    [Held by provider] lisinopril-hydroCHLOROthiazide  1 tablet Oral Daily    [Held by provider] metFORMIN  500 mg Oral BID WC    mirtazapine  15 mg Oral Nightly    mirtazapine  30 mg Oral Nightly    omeprazole  20 mg Oral QAM    potassium chloride  20 mEq Oral Daily    tiZANidine  4 mg Oral BID    trospium  20 mg Oral BID    polyethylene glycol  17 g Oral Daily    magnesium hydroxide  30 mL Oral Daily    enoxaparin  40 mg Subcutaneous Daily    cetirizine  10 mg Oral Daily    gabapentin  600 mg Oral Nightly    nicotine  1 patch Transdermal Daily     Continuous Infusions:   dextrose       PRN Meds:   albuterol, 2.5 mg, Q6H PRN  magic (miracle) mouthwash, 5 mL, 4x Daily PRN  acetaminophen, 650 mg, Q4H PRN  benzonatate, 100 mg, TID PRN  diphenhydrAMINE, 25 mg, Q6H PRN  sodium chloride flush, 10 mL, PRN  oxyCODONE, 5 mg, Q4H PRN    Or  oxyCODONE, 10 mg, Q4H PRN  morphine, 2 mg, Q2H PRN  senna, 1 tablet, Daily PRN  glucose, 15 g, PRN  dextrose, 12.5 g, PRN  glucagon (rDNA), 1 mg, PRN  dextrose, 100 mL/hr, PRN        SUPPORT DEVICES: [] Ventilator [x] BIPAP  [] Nasal Cannula [] Room Air    VENT SETTINGS (Comprehensive) (if applicable):  Vent Information  Skin Assessment: Clean, dry, & intact  Vt Ordered: 5 mL  FiO2 : 85 %  SpO2: 94 %  SpO2/FiO2 ratio: 109.41  I Time/ I Time %: 0.85 s  Mask Type: Full face mask  Mask Size: Medium  Additional Respiratory  Assessments  Pulse: 88  Resp: 20  SpO2: 94 %    ABGs:     Lab Results   Component Value Date    MMX3HNG 31 11/24/2020    FIO2 85.0 11/24/2020     Lactic Acid: No results found for: LACTA      DATA:  Complete Blood Count:   Recent Labs     11/23/20  1022 11/24/20  0956 11/25/20  0518   WBC 11.8* 12.8* 11.8*   HGB 11.3* 10.3* 9.9*   MCV 83.8 88.0 88.2    315 308   RBC 4.08 3.75* 3.64*   HCT 34.2* 33.0* 32.1*   MCH 27.7 27.5 27.2   MCHC 33.0 31.2 30.8   RDW 13.2 13.2 13.2   MPV 10.3 10.4 10.0        PT/INR:  No results found for: PROTIME, INR  PTT:  No results found for: APTT, PTT    Basal Metabolic Profile:   Recent Labs     11/22/20  1507 11/24/20  0956 11/25/20  0518    137 132*   K 3.5* 3.5* 3.6*   BUN 12 26* 27*   CREATININE 0.62 0.64 0.75   CL 96* 98 95*   CO2 26 26 27      Magnesium:   Lab Results   Component Value Date    MG 2.4 11/25/2020    MG 2.5 11/24/2020     Phosphorus:   Lab Results   Component Value Date    PHOS 3.1 11/25/2020    PHOS 1.7 11/24/2020     S. Calcium:  Recent Labs     11/25/20  0518   CALCIUM 8.4*     S. Ionized Calcium:No results for input(s): IONCA in the last 72 hours. Urinalysis:   Lab Results   Component Value Date    NITRU NEGATIVE 11/24/2020    COLORU DARK YELLOW 11/24/2020    PHUR 6.5 11/24/2020    WBCUA 0 TO 2 11/24/2020    RBCUA 2 TO 5 11/24/2020    MUCUS NOT REPORTED 11/24/2020    TRICHOMONAS NOT REPORTED 11/24/2020    YEAST NOT REPORTED 11/24/2020    BACTERIA NOT REPORTED 11/24/2020    CLARITYU clear 07/26/2017    SPECGRAV 1.027 11/24/2020    LEUKOCYTESUR TRACE 11/24/2020    UROBILINOGEN Normal 11/24/2020    BILIRUBINUR NEGATIVE 11/24/2020    BILIRUBINUR negative 07/26/2017    BLOODU trace 07/26/2017    GLUCOSEU NEGATIVE 11/24/2020    KETUA TRACE 11/24/2020    AMORPHOUS NOT REPORTED 11/24/2020       CARDIAC ENZYMES: No results for input(s): CKMB, CKMBINDEX, TROPONINI in the last 72 hours.     Invalid input(s): CKTOTAL;3  BNP: No results for input(s): BNP in the last 72 hours. LFTS  Recent Labs     11/24/20  0956 11/25/20  0518   LABALBU 2.5* 2.6*       AMYLASE/LIPASE/AMMONIA  No results for input(s): AMYLASE, LIPASE, AMMONIA in the last 72 hours. Last 3 Blood Glucose:   Recent Labs     11/22/20  1507 11/24/20  0956 11/25/20  0518   GLUCOSE 122* 102* 121*      HgBA1c:    Lab Results   Component Value Date    LABA1C 5.8 04/21/2020         TSH:    Lab Results   Component Value Date    TSH 0.94 07/19/2017     ANEMIA STUDIES  No results for input(s): LABIRON, TIBC, FERRITIN, OCXVDIRR75, FOLATE, OCCULTBLD in the last 72 hours. Cultures during this admission:     Blood cultures:                 [] None drawn      [x] Negative             []  Positive (Details:  )  Urine Culture:                   [] None drawn      [] Negative             []  Positive (Details:  )  Sputum Culture:               [] None drawn       [] Negative             []  Positive (Details:  )   Endotracheal aspirate:     [] None drawn       [] Negative             []  Positive (Details:  )        ASSESSMENT:     Principal Problem:    Centrilobular emphysema (HCC)  Active Problems:    HTN (hypertension)    History of tobacco use    GERD (gastroesophageal reflux disease)    Chronic respiratory failure with hypoxia (HCC)    Spondylosis of lumbar region without myelopathy or radiculopathy    Lumbar disc disease    Alveolar hypoventilation    Obesity (BMI 30-39. 9)    Bronchiectasis ([address]    Oxygen dependent    Acute postoperative anemia due to expected blood loss    Multifocal pneumonia  Resolved Problems:    * No resolved hospital problems. *        PLAN:       - Acute Respiratory failure impending intubation:   Likely secondary due to aspiration complicated by underlying severe emphysema. Pt on BiPAP with FiO2 85%, flow rate 4, sat 92%. ABG pH 7.422, pCO2 46, pO2 58.6, HCO3 30. Avoid narcotics/ muscle relaxant, minimize pain medication.   Monitor O2 saturation.     -Multifocal B/L Pneumonia:  Pt started on Zosyn 4.5  CT PE showed multifocal Pneumonia. Incentive spirometry. Monitor     -COPD:  Continue bronchodilator ( Duoneb)  BiPAP.    - Posterior Fusion L4/5 s/p surgery on 11/19:  Pt c/o pain right leg ( below knee)  Minimize opoid pain medication  PT/OT      -Hypertension:  Pt was on Norvasc 10 mg,Lisinopril-HCTH Coreg 6.25 mg   Continue Coreg and Hold other antihypertensives and monitor BP.     -Diastolic heart failure:  Monitor I's & O's  Monitor for fluid excess.     -DM:  Blood glucose- 106  Pt on Med dose corrective algorithm.     DVT ppx  GI ppx. Charleen Bradley M.D.              Department of Internal Medicine/ Critical care  [facility])             11/25/2020, 7:51 AM

## 2020-11-25 NOTE — PLAN OF CARE
Problem: Pain:  Goal: Pain level will decrease  Description: Pain level will decrease  11/25/2020 1118 by Christofer Silva  Outcome: Ongoing  11/24/2020 2240 by Marlene Knight RN  Outcome: Ongoing  Goal: Control of acute pain  Description: Control of acute pain  11/25/2020 1118 by Christofer Silva  Outcome: Ongoing  11/24/2020 2240 by Marlene Knight RN  Outcome: Ongoing  Goal: Control of chronic pain  Description: Control of chronic pain  11/25/2020 1118 by Christofer Silva  Outcome: Ongoing  11/24/2020 2240 by Marlene Knight RN  Outcome: Ongoing     Problem: Falls - Risk of:  Goal: Will remain free from falls  Description: Will remain free from falls  11/25/2020 1118 by Christofer Silva  Outcome: Ongoing  11/24/2020 2240 by Marlene Knight RN  Outcome: Ongoing  Goal: Absence of physical injury  Description: Absence of physical injury  11/25/2020 1118 by Christofer Silva  Outcome: Ongoing  11/24/2020 2240 by Marlene Knight RN  Outcome: Ongoing     Problem: Skin Integrity:  Goal: Will show no infection signs and symptoms  Description: Will show no infection signs and symptoms  11/25/2020 1118 by Christofer Silva  Outcome: Ongoing  11/24/2020 2240 by Marlene Knight RN  Outcome: Ongoing  Goal: Absence of new skin breakdown  Description: Absence of new skin breakdown  11/25/2020 1118 by Christofer Silva  Outcome: Ongoing  11/24/2020 2240 by Marlene Knight RN  Outcome: Ongoing     Problem: Gas Exchange - Impaired:  Goal: Levels of oxygenation will improve  Description: Levels of oxygenation will improve  11/25/2020 1118 by Christofer Silva  Outcome: Ongoing  11/24/2020 2240 by Marlene Knight RN  Outcome: Ongoing

## 2020-11-25 NOTE — PROGRESS NOTES
to 70's, while on 4L, RN present, placed bipap.  Respiratory caleld)  Stand to sit: Contact guard assistance  Bed to Chair: Contact guard assistance  Comment: poor safety awarness  Ambulation  Ambulation?: No  Ambulation 1  Surface: level tile  Device: No Device  Assistance: Contact guard assistance  Quality of Gait: forward flexed posture, with short, choppy steps,  Distance: 3 ft bed to chair  Comments: Pt adamantly refuing further amb due to pain, despite edu on risks of decreased mobility    Surface: level tile  Ambulation 1  Surface: level tile  Device: No Device  Assistance: Contact guard assistance  Quality of Gait: forward flexed posture, with short, choppy steps,  Distance: 3 ft bed to chair  Comments: Pt adamantly refuing further amb due to pain, despite edu on risks of decreased mobility    OT:  ADL  Feeding: Independent, Setup  Grooming: Setup, Minimal assistance, Increased time to complete  UE Bathing: Setup, Moderate assistance, Increased time to complete  LE Bathing: Setup, Maximum assistance, Increased time to complete  UE Dressing: Setup, Moderate assistance, Increased time to complete  LE Dressing: Setup, Maximum assistance, Increased time to complete  Toileting: Setup, Moderate assistance         Balance  Sitting Balance: Contact guard assistance  Standing Balance: Contact guard assistance   Standing Balance  Time: ~1 minute  Activity: static standing, functional mobility  Functional Mobility  Functional - Mobility Device: Other(hand held assist)  Activity: Other  Assist Level: Contact guard assistance     Bed mobility  Rolling to Right: Stand by assistance  Supine to Sit: Stand by assistance  Sit to Supine: Stand by assistance  Scooting: Stand by assistance  Comment: cues for log rolling tech with fair carryover  Transfers  Sit to stand: Minimal assistance  Stand to sit: Minimal assistance                 SLP:      Current Medications:   Current Facility-Administered Medications: potassium chloride (KLOR-CON M) extended release tablet 20 mEq, 20 mEq, Oral, Once  piperacillin-tazobactam (ZOSYN) 4.5 g in dextrose 5 % 100 mL IVPB (mini-bag), 4.5 g, Intravenous, Q8H  ipratropium-albuterol (DUONEB) nebulizer solution 1 ampule, 1 ampule, Inhalation, Q4H While awake  insulin lispro (HUMALOG) injection vial 0-12 Units, 0-12 Units, Subcutaneous, TID WC  insulin lispro (HUMALOG) injection vial 0-6 Units, 0-6 Units, Subcutaneous, Nightly  albuterol (PROVENTIL) nebulizer solution 2.5 mg, 2.5 mg, Nebulization, Q6H PRN  magic (miracle) mouthwash, 5 mL, Swish & Spit, 4x Daily PRN  sodium chloride flush 0.9 % injection 10 mL, 10 mL, Intravenous, 2 times per day  acetaminophen (TYLENOL) suppository 650 mg, 650 mg, Rectal, Q4H PRN  benzonatate (TESSALON) capsule 100 mg, 100 mg, Oral, TID PRN  [Held by provider] amLODIPine (NORVASC) tablet 10 mg, 10 mg, Oral, Daily  atorvastatin (LIPITOR) tablet 40 mg, 40 mg, Oral, Daily  azelastine (ASTELIN) 0.1 % nasal spray 2 spray, 2 spray, Each Nostril, BID  carvedilol (COREG) tablet 6.25 mg, 6.25 mg, Oral, BID  docusate sodium (COLACE) capsule 100 mg, 100 mg, Oral, Daily  DULoxetine (CYMBALTA) extended release capsule 60 mg, 60 mg, Oral, Daily  gabapentin (NEURONTIN) capsule 300 mg, 300 mg, Oral, BID  diphenhydrAMINE (BENADRYL) tablet 25 mg, 25 mg, Oral, Q6H PRN  [Held by provider] lisinopril-hydroCHLOROthiazide (PRINZIDE;ZESTORETIC) 20-25 MG per tablet 1 tablet, 1 tablet, Oral, Daily  [Held by provider] metFORMIN (GLUCOPHAGE) tablet 500 mg, 500 mg, Oral, BID WC  mirtazapine (REMERON) tablet 15 mg, 15 mg, Oral, Nightly  mirtazapine (REMERON) tablet 30 mg, 30 mg, Oral, Nightly  omeprazole (PRILOSEC) delayed release capsule 20 mg, 20 mg, Oral, QAM  potassium chloride (KLOR-CON M) extended release tablet 20 mEq, 20 mEq, Oral, Daily  tiZANidine (ZANAFLEX) tablet 4 mg, 4 mg, Oral, BID  trospium (SANCTURA) tablet 20 mg, 20 mg, Oral, BID  sodium chloride flush 0.9 % injection 10 mL, 10 mL, Intravenous, PRN  oxyCODONE (ROXICODONE) immediate release tablet 5 mg, 5 mg, Oral, Q4H PRN **OR** oxyCODONE (ROXICODONE) immediate release tablet 10 mg, 10 mg, Oral, Q4H PRN  morphine (PF) injection 2 mg, 2 mg, Intravenous, Q2H PRN **OR** [DISCONTINUED] morphine injection 4 mg, 4 mg, Intravenous, Q2H PRN  polyethylene glycol (GLYCOLAX) packet 17 g, 17 g, Oral, Daily  magnesium hydroxide (MILK OF MAGNESIA) 400 MG/5ML suspension 30 mL, 30 mL, Oral, Daily  senna (SENOKOT) tablet 8.6 mg, 1 tablet, Oral, Daily PRN  enoxaparin (LOVENOX) injection 40 mg, 40 mg, Subcutaneous, Daily  cetirizine (ZYRTEC) tablet 10 mg, 10 mg, Oral, Daily  gabapentin (NEURONTIN) capsule 600 mg, 600 mg, Oral, Nightly  glucose (GLUTOSE) 40 % oral gel 15 g, 15 g, Oral, PRN  dextrose 50 % IV solution, 12.5 g, Intravenous, PRN  glucagon (rDNA) injection 1 mg, 1 mg, Intramuscular, PRN  dextrose 5 % solution, 100 mL/hr, Intravenous, PRN  nicotine (NICODERM CQ) 7 MG/24HR 1 patch, 1 patch, Transdermal, Daily    Objective:  /75   Pulse 96   Temp 98 °F (36.7 °C) (Oral)   Resp 21   Ht 5' 5\" (1.651 m)   Wt 181 lb 7 oz (82.3 kg)   LMP 04/19/2003   SpO2 (!) 88%   BMI 30.19 kg/m²       GEN: Well developed, well nourished, no acute distress  HEENT: NCAT. EOMI. Hearing grossly intact. Mucous membranes pink and moist.  RESP: Normal breath sounds with no wheezing, rales, or rhonchi. Respirations WNL and unlabored. On high-flow nasal cannula oxygen. CV: Regular rate and rhythm. No murmurs, rubs, or gallops. ABD: Soft, non-distended, BS+ and equal.  NEURO: Alert. Speech fluent. Sensation to light touch intact in all limbs. MSK: Functional ROM in all limbs. Muscle tone and bulk are normal bilaterally. Strength 4+/5 in bilateral upper limbs and with bilateral ADF, APF. Able to lift both lower limbs off bed a little bit. LIMBS: No edema in bilateral lower limbs. SKIN: Warm and dry with good turgor. PSYCH: Mood WNL. Affect WNL.

## 2020-11-25 NOTE — PROGRESS NOTES
Occupational Therapy  Facility/Department: [address] MICU  Daily Treatment Note  NAME: Emma Paul  : 1960  MRN: [id number]    Date of Service: 2020    Discharge Recommendations:  Patient would benefit from continued therapy after discharge       Assessment   Performance deficits / Impairments: Decreased functional mobility ; Decreased ADL status; Decreased balance;Decreased high-level IADLs;Decreased endurance  Prognosis: Good  Decision Making: Medium Complexity  OT Education: OT Role;Plan of Care;IADL Safety;Transfer Training;Energy Conservation  REQUIRES OT FOLLOW UP: Yes  Activity Tolerance  Activity Tolerance: Patient Tolerated treatment well  Safety Devices  Safety Devices in place: Yes  Type of devices: Call light within reach; All fall risk precautions in place; Left in chair;Nurse notified(Left with RN in room)  Restraints  Initially in place: No         Patient Diagnosis(es): There were no encounter diagnoses. has a past medical history of Allergic rhinitis, Alveolar hypoventilation, Aortic insufficiency, Bronchiectasis (HCC), Bronchitis, Chronic back pain, COPD (chronic obstructive pulmonary disease) ([facility] [address] Depression, DM (diabetes mellitus) ([facility] [address] GERD (gastroesophageal reflux disease), History of echocardiogram, Hyperlipidemia, Hypertension, Obesity, Osteoarthritis, Peripheral vascular disease ([facility] [address] Primary osteoarthritis of both knees, Radicular pain of lumbosacral region, Spinal stenosis, lumbar region, without neurogenic claudication, Tricuspid regurgitation, Type II or unspecified type diabetes mellitus without mention of complication, not stated as uncontrolled, Unspecified sleep apnea, URI (upper respiratory infection), Wears dentures, Wears glasses, and Wellness examination. has a past surgical history that includes Dilation and curettage of uterus; gastrectomy; Nerve Block (Right, 13); Nerve Block (13); Colonoscopy (2009);  Upper gastrointestinal endoscopy ( use of RW. Respiratory therapist and RN in room to assist in monitoring o2 levels. Highflow 35% upon arrival, changed to 40% sitting EOB. Pt desaturated to low 80%. Rebounded with increased time. RN ok'd for mobility to chair. Toilet Transfers  Toilet - Technique: Ambulating  Equipment Used: Standard bedside commode  Toilet Transfer: Contact guard assistance  Toilet Transfers Comments: Use of bilateral grab bars    Bed mobility  Sit to Supine: Stand by assistance  Scooting: Stand by assistance  Comment: HOB rasied ~30 degrees     Transfers  Sit to stand: Stand by assistance  Stand to sit: Stand by assistance  Transfer Comments: Completed 3 sit<>stand transfers this date with CGA and use of RW. Cognition  Overall Cognitive Status: [facility]            Plan   Plan  Times per week: 4-5x  Current Treatment Recommendations: Endurance Training, Patient/Caregiver Education & Training, Equipment Evaluation, Education, & procurement, Self-Care / ADL, Balance Training, Home Management Training, Functional Mobility Training, Safety Education & Training     Goals  Short term goals  Time Frame for Short term goals: by discharge pt will. Cristóbal Barnett term goal 1: demo mod I for bed mobility  Short term goal 2: demo 5+ minutes standing tolerance to increase participation in ADLs with supervision  Short term goal 3: demo functional transfers with supervision  Short term goal 4: complete UB ADL tasks with SBA  Short term goal 5: complete LB ADL tasks with min A and use of AE prn       Therapy Time   Individual Concurrent Group Co-treatment   Time In 1510         Time Out 1541         Minutes 31         Timed Code Treatment Minutes: [address], OTR/L

## 2020-11-25 NOTE — CARE COORDINATION
Transitional planning:  Per 11/24 note, [facility] ARU is following pt. PM&R physician notes state pt. IP rehab.

## 2020-11-25 NOTE — PLAN OF CARE
Problem: Respiratory  Intervention: Respiratory assessment  Note: BRONCHOSPASM/BRONCHOCONSTRICTION     [x]         IMPROVE AERATION/BREATH SOUNDS  [x]   ADMINISTER BRONCHODILATOR THERAPY AS APPROPRIATE  [x]   ASSESS BREATH SOUNDS  [x]   IMPLEMENT AEROSOL/MDI PROTOCOL  [x]   PATIENT EDUCATION AS NEEDED   PROVIDE ADEQUATE OXYGENATION WITH ACCEPTABLE SP02/ABG'S    [x]  IDENTIFY APPROPRIATE OXYGEN THERAPY  [x]   MONITOR SP02/ABG'S AS NEEDED   [x]   PATIENT EDUCATION AS NEEDED   NON INVASIVE VENTILATION  PROVIDE OPTIMAL VENTILATION/ACCEPTABLE SP02  IMPLEMENT NON INVASIVE VENTILATION PROTOCOL  ASSESSMENT SKIN INTEGRITY  PATIENT EDUCATION AS NEEDED  BIPAP AS NEEDED

## 2020-11-25 NOTE — PLAN OF CARE
Nutrition Problem #1: Inadequate oral intake  Intervention: Food and/or Nutrient Delivery: Modify Diet(suggest removing carb restrictions), Start Oral Nutrition Supplement  Nutritional Goals: meet % of estimated nutrient needs

## 2020-11-26 ENCOUNTER — APPOINTMENT (OUTPATIENT)
Dept: GENERAL RADIOLOGY | Age: 60
DRG: 459 | End: 2020-11-26
Attending: NEUROLOGICAL SURGERY
Payer: COMMERCIAL

## 2020-11-26 LAB
ABSOLUTE EOS #: 0.13 K/UL (ref 0–0.4)
ABSOLUTE IMMATURE GRANULOCYTE: 0.13 K/UL (ref 0–0.3)
ABSOLUTE LYMPH #: 1.55 K/UL (ref 1–4.8)
ABSOLUTE MONO #: 1.03 K/UL (ref 0.1–0.8)
ALBUMIN SERPL-MCNC: 2.5 G/DL (ref 3.5–5.2)
ALLEN TEST: ABNORMAL
ANION GAP SERPL CALCULATED.3IONS-SCNC: 9 MMOL/L (ref 9–17)
BASOPHILS # BLD: 0 % (ref 0–2)
BASOPHILS ABSOLUTE: 0 K/UL (ref 0–0.2)
BUN BLDV-MCNC: 16 MG/DL (ref 8–23)
BUN/CREAT BLD: ABNORMAL (ref 9–20)
CALCIUM SERPL-MCNC: 8.4 MG/DL (ref 8.6–10.4)
CHLORIDE BLD-SCNC: 97 MMOL/L (ref 98–107)
CO2: 27 MMOL/L (ref 20–31)
CREAT SERPL-MCNC: 0.62 MG/DL (ref 0.5–0.9)
DIFFERENTIAL TYPE: ABNORMAL
EOSINOPHILS RELATIVE PERCENT: 1 % (ref 1–4)
FIO2: 100
GFR AFRICAN AMERICAN: >60 ML/MIN
GFR NON-AFRICAN AMERICAN: >60 ML/MIN
GFR SERPL CREATININE-BSD FRML MDRD: ABNORMAL ML/MIN/{1.73_M2}
GFR SERPL CREATININE-BSD FRML MDRD: ABNORMAL ML/MIN/{1.73_M2}
GLUCOSE BLD-MCNC: 112 MG/DL (ref 65–105)
GLUCOSE BLD-MCNC: 112 MG/DL (ref 74–100)
GLUCOSE BLD-MCNC: 162 MG/DL (ref 65–105)
GLUCOSE BLD-MCNC: 203 MG/DL (ref 65–105)
GLUCOSE BLD-MCNC: 97 MG/DL (ref 65–105)
GLUCOSE BLD-MCNC: 99 MG/DL (ref 70–99)
HCT VFR BLD CALC: 30.8 % (ref 36.3–47.1)
HEMOGLOBIN: 9.4 G/DL (ref 11.9–15.1)
IMMATURE GRANULOCYTES: 1 %
LYMPHOCYTES # BLD: 12 % (ref 24–44)
MAGNESIUM: 2.3 MG/DL (ref 1.6–2.6)
MCH RBC QN AUTO: 27.6 PG (ref 25.2–33.5)
MCHC RBC AUTO-ENTMCNC: 30.5 G/DL (ref 28.4–34.8)
MCV RBC AUTO: 90.3 FL (ref 82.6–102.9)
MODE: ABNORMAL
MONOCYTES # BLD: 8 % (ref 1–7)
MORPHOLOGY: NORMAL
NEGATIVE BASE EXCESS, ART: ABNORMAL (ref 0–2)
NRBC AUTOMATED: 0 PER 100 WBC
O2 DEVICE/FLOW/%: ABNORMAL
PATIENT TEMP: ABNORMAL
PDW BLD-RTO: 13.8 % (ref 11.8–14.4)
PHOSPHORUS: 3 MG/DL (ref 2.6–4.5)
PLATELET # BLD: 314 K/UL (ref 138–453)
PLATELET ESTIMATE: ABNORMAL
PMV BLD AUTO: 10.2 FL (ref 8.1–13.5)
POC HCO3: 32.1 MMOL/L (ref 21–28)
POC O2 SATURATION: 84 % (ref 94–98)
POC PCO2 TEMP: ABNORMAL MM HG
POC PCO2: 54 MM HG (ref 35–48)
POC PH TEMP: ABNORMAL
POC PH: 7.38 (ref 7.35–7.45)
POC PO2 TEMP: ABNORMAL MM HG
POC PO2: 51.2 MM HG (ref 83–108)
POSITIVE BASE EXCESS, ART: 6 (ref 0–3)
POTASSIUM SERPL-SCNC: 4 MMOL/L (ref 3.7–5.3)
RBC # BLD: 3.41 M/UL (ref 3.95–5.11)
RBC # BLD: ABNORMAL 10*6/UL
SAMPLE SITE: ABNORMAL
SEG NEUTROPHILS: 78 % (ref 36–66)
SEGMENTED NEUTROPHILS ABSOLUTE COUNT: 10.06 K/UL (ref 1.8–7.7)
SODIUM BLD-SCNC: 133 MMOL/L (ref 135–144)
TCO2 (CALC), ART: 34 MMOL/L (ref 22–29)
WBC # BLD: 12.9 K/UL (ref 3.5–11.3)
WBC # BLD: ABNORMAL 10*3/UL

## 2020-11-26 PROCEDURE — 6370000000 HC RX 637 (ALT 250 FOR IP): Performed by: INTERNAL MEDICINE

## 2020-11-26 PROCEDURE — 99291 CRITICAL CARE FIRST HOUR: CPT | Performed by: INTERNAL MEDICINE

## 2020-11-26 PROCEDURE — 94660 CPAP INITIATION&MGMT: CPT

## 2020-11-26 PROCEDURE — 82803 BLOOD GASES ANY COMBINATION: CPT

## 2020-11-26 PROCEDURE — 80069 RENAL FUNCTION PANEL: CPT

## 2020-11-26 PROCEDURE — 2700000000 HC OXYGEN THERAPY PER DAY

## 2020-11-26 PROCEDURE — 2580000003 HC RX 258: Performed by: INTERNAL MEDICINE

## 2020-11-26 PROCEDURE — 6370000000 HC RX 637 (ALT 250 FOR IP): Performed by: STUDENT IN AN ORGANIZED HEALTH CARE EDUCATION/TRAINING PROGRAM

## 2020-11-26 PROCEDURE — 71045 X-RAY EXAM CHEST 1 VIEW: CPT

## 2020-11-26 PROCEDURE — 85025 COMPLETE CBC W/AUTO DIFF WBC: CPT

## 2020-11-26 PROCEDURE — 2580000003 HC RX 258: Performed by: STUDENT IN AN ORGANIZED HEALTH CARE EDUCATION/TRAINING PROGRAM

## 2020-11-26 PROCEDURE — 6360000002 HC RX W HCPCS: Performed by: NURSE PRACTITIONER

## 2020-11-26 PROCEDURE — 6370000000 HC RX 637 (ALT 250 FOR IP): Performed by: NURSE PRACTITIONER

## 2020-11-26 PROCEDURE — 94640 AIRWAY INHALATION TREATMENT: CPT

## 2020-11-26 PROCEDURE — 6360000002 HC RX W HCPCS: Performed by: INTERNAL MEDICINE

## 2020-11-26 PROCEDURE — 36415 COLL VENOUS BLD VENIPUNCTURE: CPT

## 2020-11-26 PROCEDURE — 36600 WITHDRAWAL OF ARTERIAL BLOOD: CPT

## 2020-11-26 PROCEDURE — 82947 ASSAY GLUCOSE BLOOD QUANT: CPT

## 2020-11-26 PROCEDURE — 2000000000 HC ICU R&B

## 2020-11-26 PROCEDURE — 83735 ASSAY OF MAGNESIUM: CPT

## 2020-11-26 RX ORDER — SODIUM CHLORIDE, SODIUM LACTATE, POTASSIUM CHLORIDE, AND CALCIUM CHLORIDE .6; .31; .03; .02 G/100ML; G/100ML; G/100ML; G/100ML
500 INJECTION, SOLUTION INTRAVENOUS ONCE
Status: COMPLETED | OUTPATIENT
Start: 2020-11-26 | End: 2020-11-26

## 2020-11-26 RX ORDER — PREDNISONE 20 MG/1
40 TABLET ORAL DAILY
Status: DISCONTINUED | OUTPATIENT
Start: 2020-11-26 | End: 2020-11-30

## 2020-11-26 RX ORDER — BUDESONIDE AND FORMOTEROL FUMARATE DIHYDRATE 160; 4.5 UG/1; UG/1
2 AEROSOL RESPIRATORY (INHALATION) 2 TIMES DAILY
Status: DISCONTINUED | OUTPATIENT
Start: 2020-11-26 | End: 2020-12-02 | Stop reason: HOSPADM

## 2020-11-26 RX ORDER — SODIUM CHLORIDE, SODIUM LACTATE, POTASSIUM CHLORIDE, CALCIUM CHLORIDE 600; 310; 30; 20 MG/100ML; MG/100ML; MG/100ML; MG/100ML
INJECTION, SOLUTION INTRAVENOUS CONTINUOUS
Status: DISCONTINUED | OUTPATIENT
Start: 2020-11-26 | End: 2020-11-28

## 2020-11-26 RX ADMIN — OXYCODONE HYDROCHLORIDE 10 MG: 5 TABLET ORAL at 10:53

## 2020-11-26 RX ADMIN — GABAPENTIN 300 MG: 300 CAPSULE ORAL at 10:41

## 2020-11-26 RX ADMIN — PIPERACILLIN AND TAZOBACTAM 4.5 G: 4; .5 INJECTION, POWDER, LYOPHILIZED, FOR SOLUTION INTRAVENOUS; PARENTERAL at 07:29

## 2020-11-26 RX ADMIN — IPRATROPIUM BROMIDE AND ALBUTEROL SULFATE 1 AMPULE: .5; 3 SOLUTION RESPIRATORY (INHALATION) at 20:14

## 2020-11-26 RX ADMIN — IPRATROPIUM BROMIDE AND ALBUTEROL SULFATE 1 AMPULE: .5; 3 SOLUTION RESPIRATORY (INHALATION) at 15:19

## 2020-11-26 RX ADMIN — IPRATROPIUM BROMIDE AND ALBUTEROL SULFATE 1 AMPULE: .5; 3 SOLUTION RESPIRATORY (INHALATION) at 08:00

## 2020-11-26 RX ADMIN — PIPERACILLIN AND TAZOBACTAM 4.5 G: 4; .5 INJECTION, POWDER, LYOPHILIZED, FOR SOLUTION INTRAVENOUS; PARENTERAL at 21:45

## 2020-11-26 RX ADMIN — IPRATROPIUM BROMIDE AND ALBUTEROL SULFATE 1 AMPULE: .5; 3 SOLUTION RESPIRATORY (INHALATION) at 11:58

## 2020-11-26 RX ADMIN — PIPERACILLIN AND TAZOBACTAM 4.5 G: 4; .5 INJECTION, POWDER, LYOPHILIZED, FOR SOLUTION INTRAVENOUS; PARENTERAL at 14:42

## 2020-11-26 RX ADMIN — TIZANIDINE 4 MG: 4 TABLET ORAL at 10:40

## 2020-11-26 RX ADMIN — ALBUTEROL SULFATE 2.5 MG: 2.5 SOLUTION RESPIRATORY (INHALATION) at 01:06

## 2020-11-26 RX ADMIN — DOCUSATE SODIUM 100 MG: 100 CAPSULE, LIQUID FILLED ORAL at 10:41

## 2020-11-26 RX ADMIN — MAGNESIUM HYDROXIDE 30 ML: 400 SUSPENSION ORAL at 10:40

## 2020-11-26 RX ADMIN — TROSPIUM CHLORIDE 20 MG: 20 TABLET, FILM COATED ORAL at 20:23

## 2020-11-26 RX ADMIN — GABAPENTIN 600 MG: 300 CAPSULE ORAL at 20:24

## 2020-11-26 RX ADMIN — INSULIN LISPRO 2 UNITS: 100 INJECTION, SOLUTION INTRAVENOUS; SUBCUTANEOUS at 16:54

## 2020-11-26 RX ADMIN — TROSPIUM CHLORIDE 20 MG: 20 TABLET, FILM COATED ORAL at 10:40

## 2020-11-26 RX ADMIN — SODIUM CHLORIDE, PRESERVATIVE FREE 10 ML: 5 INJECTION INTRAVENOUS at 20:31

## 2020-11-26 RX ADMIN — BUDESONIDE AND FORMOTEROL FUMARATE DIHYDRATE 2 PUFF: 160; 4.5 AEROSOL RESPIRATORY (INHALATION) at 11:58

## 2020-11-26 RX ADMIN — INSULIN LISPRO 2 UNITS: 100 INJECTION, SOLUTION INTRAVENOUS; SUBCUTANEOUS at 20:46

## 2020-11-26 RX ADMIN — DULOXETINE HYDROCHLORIDE 60 MG: 30 CAPSULE, DELAYED RELEASE ORAL at 10:41

## 2020-11-26 RX ADMIN — OMEPRAZOLE 20 MG: 20 CAPSULE, DELAYED RELEASE ORAL at 10:41

## 2020-11-26 RX ADMIN — GABAPENTIN 300 MG: 300 CAPSULE ORAL at 13:32

## 2020-11-26 RX ADMIN — TIZANIDINE 4 MG: 4 TABLET ORAL at 20:24

## 2020-11-26 RX ADMIN — PREDNISONE 40 MG: 20 TABLET ORAL at 13:32

## 2020-11-26 RX ADMIN — SODIUM CHLORIDE, PRESERVATIVE FREE 10 ML: 5 INJECTION INTRAVENOUS at 10:40

## 2020-11-26 RX ADMIN — SODIUM CHLORIDE, POTASSIUM CHLORIDE, SODIUM LACTATE AND CALCIUM CHLORIDE: 600; 310; 30; 20 INJECTION, SOLUTION INTRAVENOUS at 12:26

## 2020-11-26 RX ADMIN — MIRTAZAPINE 30 MG: 15 TABLET, FILM COATED ORAL at 20:24

## 2020-11-26 RX ADMIN — ENOXAPARIN SODIUM 40 MG: 40 INJECTION SUBCUTANEOUS at 08:49

## 2020-11-26 RX ADMIN — DESMOPRESSIN ACETATE 40 MG: 0.2 TABLET ORAL at 10:41

## 2020-11-26 RX ADMIN — POTASSIUM CHLORIDE 20 MEQ: 1500 TABLET, EXTENDED RELEASE ORAL at 10:41

## 2020-11-26 RX ADMIN — CARVEDILOL 6.25 MG: 6.25 TABLET, FILM COATED ORAL at 10:41

## 2020-11-26 RX ADMIN — SODIUM CHLORIDE, POTASSIUM CHLORIDE, SODIUM LACTATE AND CALCIUM CHLORIDE 500 ML: 600; 310; 30; 20 INJECTION, SOLUTION INTRAVENOUS at 04:17

## 2020-11-26 RX ADMIN — MIRTAZAPINE 15 MG: 15 TABLET, FILM COATED ORAL at 20:23

## 2020-11-26 RX ADMIN — CARVEDILOL 6.25 MG: 6.25 TABLET, FILM COATED ORAL at 20:23

## 2020-11-26 RX ADMIN — ACETAMINOPHEN 650 MG: 650 SUPPOSITORY RECTAL at 04:27

## 2020-11-26 RX ADMIN — OXYCODONE HYDROCHLORIDE 10 MG: 5 TABLET ORAL at 16:54

## 2020-11-26 RX ADMIN — CETIRIZINE HYDROCHLORIDE 10 MG: 10 TABLET ORAL at 10:41

## 2020-11-26 RX ADMIN — BUDESONIDE AND FORMOTEROL FUMARATE DIHYDRATE 2 PUFF: 160; 4.5 AEROSOL RESPIRATORY (INHALATION) at 20:14

## 2020-11-26 ASSESSMENT — PAIN DESCRIPTION - ORIENTATION
ORIENTATION: RIGHT
ORIENTATION: RIGHT

## 2020-11-26 ASSESSMENT — PAIN SCALES - GENERAL
PAINLEVEL_OUTOF10: 10
PAINLEVEL_OUTOF10: 8
PAINLEVEL_OUTOF10: 7
PAINLEVEL_OUTOF10: 8

## 2020-11-26 ASSESSMENT — PAIN DESCRIPTION - LOCATION
LOCATION: LEG
LOCATION: LEG

## 2020-11-26 ASSESSMENT — ENCOUNTER SYMPTOMS
SHORTNESS OF BREATH: 1
BACK PAIN: 1

## 2020-11-26 ASSESSMENT — PAIN DESCRIPTION - PAIN TYPE
TYPE: CHRONIC PAIN
TYPE: CHRONIC PAIN

## 2020-11-26 NOTE — PLAN OF CARE
Problem: Pain:  Goal: Pain level will decrease  Description: Pain level will decrease  Outcome: Ongoing  Goal: Control of acute pain  Description: Control of acute pain  Outcome: Ongoing  Goal: Control of chronic pain  Description: Control of chronic pain  Outcome: Ongoing     Problem: Falls - Risk of:  Goal: Will remain free from falls  Description: Will remain free from falls  Outcome: Ongoing  Goal: Absence of physical injury  Description: Absence of physical injury  Outcome: Ongoing     Problem: Skin Integrity:  Goal: Will show no infection signs and symptoms  Description: Will show no infection signs and symptoms  Outcome: Ongoing  Goal: Absence of new skin breakdown  Description: Absence of new skin breakdown  Outcome: Ongoing     Problem: Nutrition  Goal: Optimal nutrition therapy  Description: Nutrition Problem #1: Inadequate oral intake  Intervention: Food and/or Nutrient Delivery: Continue Current Diet, Start Oral Nutrition Supplement  Nutritional Goals: meet % of estimated nutrient needs     Outcome: Ongoing     Problem: Gas Exchange - Impaired:  Goal: Levels of oxygenation will improve  Description: Levels of oxygenation will improve  Outcome: Ongoing     Problem: Respiratory  Intervention: Continuous positive airway pressure  11/25/2020 2038 by Erna Carpenter RCP  Note: BRONCHOSPASM/BRONCHOCONSTRICTION     []         IMPROVE AERATION/BREATH SOUNDS  []   ADMINISTER BRONCHODILATOR THERAPY AS APPROPRIATE  []   ASSESS BREATH SOUNDS  []   IMPLEMENT AEROSOL/MDI PROTOCOL  []   PATIENT EDUCATION AS NEEDED   PROVIDE ADEQUATE OXYGENATION WITH ACCEPTABLE SP02/ABG'S    []  IDENTIFY APPROPRIATE OXYGEN THERAPY  []   MONITOR SP02/ABG'S AS NEEDED   []   PATIENT EDUCATION AS NEEDED   NON INVASIVE VENTILATION  PROVIDE OPTIMAL VENTILATION/ACCEPTABLE SP02  IMPLEMENT NON INVASIVE VENTILATION PROTOCOL  ASSESSMENT SKIN INTEGRITY  PATIENT EDUCATION AS NEEDED  BIPAP AS NEEDED

## 2020-11-26 NOTE — PLAN OF CARE
Problem: Respiratory  Intervention: Continuous positive airway pressure  Note: BRONCHOSPASM/BRONCHOCONSTRICTION     [x]         IMPROVE AERATION/BREATH SOUNDS  [x]   ADMINISTER BRONCHODILATOR THERAPY AS APPROPRIATE  [x]   ASSESS BREATH SOUNDS  [x]   IMPLEMENT AEROSOL/MDI PROTOCOL  [x]   PATIENT EDUCATION AS NEEDED   PROVIDE ADEQUATE OXYGENATION WITH ACCEPTABLE SP02/ABG'S    [x]  IDENTIFY APPROPRIATE OXYGEN THERAPY  [x]   MONITOR SP02/ABG'S AS NEEDED   [x]   PATIENT EDUCATION AS NEEDED   NON INVASIVE VENTILATION  PROVIDE OPTIMAL VENTILATION/ACCEPTABLE SP02  IMPLEMENT NON INVASIVE VENTILATION PROTOCOL  ASSESSMENT SKIN INTEGRITY  PATIENT EDUCATION AS NEEDED  BIPAP AS NEEDED

## 2020-11-26 NOTE — PROGRESS NOTES
Critical Care Team - Daily Progress Note      Date and time: 11/26/2020 12:25 PM  Patient's name:  [facility] Record Number: [id number]  Patient's account/billing number: [de-identified]  Patient's YOB: 1960  Age: 56 y.o. Date of Admission: 11/19/2020  5:32 AM  Length of stay during current admission: 7    Primary Care Physician: Elina Wall MD  ICU Attending Physician: Dr. Nelda Amos Status: Full Code    Reason for ICU admission: Respiratory Failure    SUBJECTIVE:     OVERNIGHT EVENTS:       No acute episodes overnight. Patient is heme stable and afebrile. Patient was on high flow 35 liters but respiratory status deteriorated and patient was started on BiPAP at 100%. She was given a break from the BiPAP and was transitioned to High Flow 40. Patient was started on Prednisone 40 mg PO and Symbicort inhaler. Urine output was low as well and patient was started on maintenance fluids. Continue Zosyn 4.5 gm every 8 hrs due to concern of aspiration pneumonia. Neurosurgery has cleared the patient.     AWAKE & FOLLOWING COMMANDS:  [] No   [x] Yes    CURRENT VENTILATION STATUS:     [] Ventilator  [x] High Flow  [] Nasal Cannula [] Room Air      SECRETIONS Amount:  [] Small [] Moderate  [] Large  [x] None  Color:     [] White [] Colored  [] Bloody    SEDATION:  RAAS Score:  [] Propofol gtt  [] Versed gtt  [] Ativan gtt   [x] No Sedation    PARALYZED:  [x] No    [] Yes    DIARRHEA:                [x] No                [] Yes  (C. Difficile status: [] positive                                                                                                                       [] negative                                                                                                                     [] pending)  VASOPRESSORS:  [x] No    [] Yes    If yes -   [] Levophed       [] Dopamine     [] Vasopressin       [] Dobutamine  [] Phenylephrine         [] Epinephrine    CENTRAL LINES: [x] No   [] Yes           If yes -     [] Right IJ     [] Left IJ [] Right Femoral [] Left Femoral                   [] Right Subclavian [] Left Subclavian     GARCIA'S CATHETER:   [x] No   [] Yes  (Date of Insertion:   )     URINE OUTPUT:            [] Good   [x] Low              [] Anuric    OBJECTIVE:     VITAL SIGNS:  /71   Pulse 97   Temp 98.6 °F (37 °C) (Axillary)   Resp 15   Ht 5' 5\" (1.651 m)   Wt 181 lb 7 oz (82.3 kg)   LMP 2003   SpO2 90%   BMI 30.19 kg/m²   Tmax over 24 hours:  Temp (24hrs), Av.4 °F (37.4 °C), Min:98 °F (36.7 °C), Max:101.3 °F (38.5 °C)    Patient Vitals for the past 6 hrs:   BP Temp Temp src Pulse Resp SpO2   20 1208 -- -- -- -- 15 90 %   20 1100 108/71 -- -- 97 20 (!) 87 %   20 1000 109/64 -- -- 89 18 99 %   20 0900 (!) 97/57 98.6 °F (37 °C) Axillary 91 19 99 %   20 0801 -- -- -- -- 18 --   20 0800 (!) 92/56 -- -- 94 20 98 %       Intake/Output Summary (Last 24 hours) at 2020 1225  Last data filed at 2020 1100  Gross per 24 hour   Intake 983 ml   Output 872 ml   Net 111 ml     Wt Readings from Last 2 Encounters:   20 181 lb 7 oz (82.3 kg)   20 185 lb (83.9 kg)     Body mass index is 30.19 kg/m². PHYSICAL EXAMINATION:  Physical Exam  Vitals signs reviewed. Constitutional:       Appearance: Normal appearance. Comments: Pt transitioning between BiPAP and High Flow 40 liters  HENT:      Head: Normocephalic. Nose: Nose normal.      Mouth/Throat:      Mouth: Mucous membranes are moist.   Eyes:      General: No scleral icterus. Extraocular Movements: Extraocular movements intact. Neck:      Musculoskeletal: Normal range of motion. Cardiovascular:      Rate and Rhythm: Normal rate and regular rhythm. Pulses: Normal pulses. Heart sounds: Normal heart sounds. Pulmonary:      Effort: Pulmonary effort is normal.      Breath sounds: Normal breath sounds.    Abdominal: General: There is no distension. Palpations: Abdomen is soft. Tenderness: There is no abdominal tenderness. There is no guarding. Comments: Scar marks on abdomen from previous surgeries. Musculoskeletal: Normal range of motion. General: No swelling, tenderness or signs of injury. Skin:     General: Skin is warm. Findings: No bruising. Neurological:      General: No focal deficit present. Mental Status: She is alert and oriented to person, place, and time.    Psychiatric:         Mood and Affect: Mood normal.         Behavior: Behavior normal.     MEDICATIONS:  Scheduled Meds:   predniSONE  40 mg Oral Daily    budesonide-formoterol  2 puff Inhalation BID    potassium chloride  20 mEq Oral Once    piperacillin-tazobactam  4.5 g Intravenous Q8H    ipratropium-albuterol  1 ampule Inhalation Q4H While awake    insulin lispro  0-12 Units Subcutaneous TID WC    insulin lispro  0-6 Units Subcutaneous Nightly    sodium chloride flush  10 mL Intravenous 2 times per day    [Held by provider] amLODIPine  10 mg Oral Daily    atorvastatin  40 mg Oral Daily    azelastine  2 spray Each Nostril BID    carvedilol  6.25 mg Oral BID    docusate sodium  100 mg Oral Daily    DULoxetine  60 mg Oral Daily    gabapentin  300 mg Oral BID    [Held by provider] lisinopril-hydroCHLOROthiazide  1 tablet Oral Daily    [Held by provider] metFORMIN  500 mg Oral BID WC    mirtazapine  15 mg Oral Nightly    mirtazapine  30 mg Oral Nightly    omeprazole  20 mg Oral QAM    potassium chloride  20 mEq Oral Daily    tiZANidine  4 mg Oral BID    trospium  20 mg Oral BID    polyethylene glycol  17 g Oral Daily    magnesium hydroxide  30 mL Oral Daily    enoxaparin  40 mg Subcutaneous Daily    cetirizine  10 mg Oral Daily    gabapentin  600 mg Oral Nightly    nicotine  1 patch Transdermal Daily     Continuous Infusions:   lactated ringers      dextrose       PRN Meds:   albuterol, 2.5 mg, Q6H PRN  magic (miracle) mouthwash, 5 mL, 4x Daily PRN  acetaminophen, 650 mg, Q4H PRN  benzonatate, 100 mg, TID PRN  diphenhydrAMINE, 25 mg, Q6H PRN  sodium chloride flush, 10 mL, PRN  oxyCODONE, 5 mg, Q4H PRN    Or  oxyCODONE, 10 mg, Q4H PRN  morphine, 2 mg, Q2H PRN  senna, 1 tablet, Daily PRN  glucose, 15 g, PRN  dextrose, 12.5 g, PRN  glucagon (rDNA), 1 mg, PRN  dextrose, 100 mL/hr, PRN      ABGs: POC on 11/26 at 12:52 am   PH 7.38, pCO2 54, pHC03- 32.1     Laboratory findings:  Complete Blood Count:   Recent Labs     11/24/20  0956 11/25/20  0518 11/26/20  0522   WBC 12.8* 11.8* 12.9*   HGB 10.3* 9.9* 9.4*   HCT 33.0* 32.1* 30.8*    308 314      Last 3 Blood Glucose:   Recent Labs     11/24/20  0956 11/25/20  0518 11/26/20  0522   GLUCOSE 102* 121* 99      Comprehensive Metabolic Profile:   Recent Labs     11/24/20  0956 11/25/20  0518 11/26/20  0522    132* 133*   K 3.5* 3.6* 4.0   CL 98 95* 97*   CO2 26 27 27   BUN 26* 27* 16   CREATININE 0.64 0.75 0.62   GLUCOSE 102* 121* 99   CALCIUM 8.7 8.4* 8.4*   LABALBU 2.5* 2.6* 2.5*      Magnesium:   Lab Results   Component Value Date    MG 2.3 11/26/2020     Phosphorus:   Lab Results   Component Value Date    PHOS 3.0 11/26/2020     ASSESSMENT:     · Principal Problem:  ·   Centrilobular emphysema (HCC)  · Active Problems:  ·   COPD, severity to be determined (HCC)  ·   HTN (hypertension)  ·   History of tobacco use  ·   GERD (gastroesophageal reflux disease)  ·   Chronic respiratory failure with hypoxia (HCC)  ·   Spondylosis of lumbar region without myelopathy or radiculopathy  ·   Lumbar disc disease  ·   Alveolar hypoventilation  ·   Obesity (BMI 30-39. 9)  ·   Bronchiectasis ([address]  ·   Oxygen dependent  ·   Acute postoperative anemia due to expected blood loss  ·   Multifocal pneumonia  · Resolved Problems:  ·   * No resolved hospital problems.  *    - Acute Respiratory Failure:   Likely secondary due to aspiration complicated by underlying severe emphysema. Alternating between BiPAP and High Flow Oxygen  Avoid narcotics/ muscle relaxant, minimize pain medication. Monitor O2 saturation.     -Multifocal B/L Pneumonia:  Pt started on Zosyn 4.5  CT PE showed multifocal Pneumonia. Incentive spirometry. Continue to Monitor     -COPD:  Continue bronchodilator (Duoneb)  Symbicort inhaler  Prednisone 40 PO Daily      - Posterior Fusion L4/5 s/p surgery on 11/19:  Pt c/o pain right leg (below knee)  Minimize opoid pain medication  PT/OT working with patient as tolerated     -Hypertension:  Pt was on Norvasc 10 mg,Lisinopril-HCTH Coreg 6.25 mg   Continue Coreg and Hold other antihypertensives and monitor BP.     -Diastolic heart failure:  Monitor I's & O's   Monitor for fluid excess     -DM:  Blood glucose-99  Pt on Med dose corrective algorithm. PLAN:     WEAN PER PROTOCOL:  [] No   [] Yes  [x] N/A    DISCONTINUE ANY LABS:   [x] No   [] Yes    ICU PROPHYLAXIS:  Stress ulcer:  [x] PPI Agent  [] Q2Zslha [] Sucralfate  [] Other:  VTE:   [x] Enoxaparin  [] Unfract. Heparin Subcut  [] EPC Cuffs    NUTRITION:  [] NPO [] Tube Feeding (Specify: ) [] TPN  [x] PO (Diet: DIET DENTAL SOFT;  Dietary Nutrition Supplements: Standard High Calorie Oral Supplement)    HOME MEDICATIONS RECONCILED: [] No  [x] Yes    INSULIN DRIP:   [x] No   [] Yes    CONSULTATION NEEDED:  [x] No   [] Yes    FAMILY UPDATED:    [] No   [x] Yes    TRANSFER OUT OF ICU:   [x] No   [] Yes    ADDITIONAL PLAN:    1. Monitor Respiratory Status   2. Continue alternating between BiPAP and Hi-Flow     Elin Mcknight M.D.              Critical Care Resident,  Department of Internal Medicine/ Critical care  [facility])             11/26/2020, 12:25 PM

## 2020-11-26 NOTE — CONSULTS
[facility] - Initial Consult Note  Today's Date and Time: 11/26/2020, 2:43 PM    Impression :   Aspiration pneumonia  Bilateral bronchopneumonia  Acute hypoxic respiratory failure requiring high flow 02  S/P Lumbar fusion 11-19-20    Recommendations:   D/C Zosyn to avoid fluid overload  Cefoxitin  2 gm IV q 6 hr    Medical Decision Making/Summary/Discussion:11/26/2020       Infection Control Recommendations   [place] Precautions    Antimicrobial Stewardship Recommendations     Simplification of therapy  Targeted therapy  Coordination of Outpatient Care:   Estimated Length of IV antimicrobials: TBD  Patient will need Midline Catheter Insertion: no  Patient will need PICC line Insertion:no  Patient will need: Home IV , [place],  SNF,  LTAC: TBD  Patient will need outpatient wound care:No    Chief complaint/reason for consultation:   Pneumonia      History of Present Illness:   Gladis Arce is a 56y.o.-year-old  female who was initially admitted on 11/19/2020. Patient seen at the request of Osmani Green. INITIAL HISTORY:    The patient has a history of morbid obesity, COPD, JOAN, chronic hypoxic respiratory failure -on home oxygen. She was admitted because of right lower extremity pain extending from hip to foot.  She has known lumbar arthritis and had failed all conservative measures.  She underwent posterior fusion L4/5 surgery on 11/19/20.      During the postoperative period her respiratory status declined and she started spiking fevers. Her Chest x-ray and CT showed bilateral multifocal infiltrates suggestive of aspiration, areas of denser consolidation suggestive of bronchopneumonia and underlying severe emphysema. Patient received ceftriaxone, then zosyn since 12-24-20 with benefit. She is currently experiencing worsening of 02 requirements.     Plan:   D/C zosyn to avoid fluid and Na overload  Start cefoxitin       Labs, X rays reviewed: 11/26/2020    BUN: 16  Cr: 0.62    WBC: 12.9  Hb:9.4  Plat: 314    Cultures:  Urine:  11-22-20: No growth   11-19-20: No growth  Blood:  11-22-20: No growth    Sputum :    Wound:      MRSA screen: negative    Discussed with patient, RN, family. 11-26-20 11-22-20: I have personally reviewed the past medical history, past surgical history, medications, social history, and family history, and I have updated the database accordingly.   Past Medical History:     Past Medical History:   Diagnosis Date    Allergic rhinitis     Alveolar hypoventilation 11/20/2020    Aortic insufficiency 2018    mild-moderate on echo [facility]    Bronchiectasis ([facility] 11/20/2020    Bronchitis     Chronic back pain     [facility] management at [facility]. COPD (chronic obstructive pulmonary disease) ([facility]     Dr. Jarred Brito to see 11/09/2020    Depression     DM (diabetes mellitus) ([facility] 12/18/2012    GERD (gastroesophageal reflux disease)     History of echocardiogram 05/2018    EF 65%, mild-moderate AI and TR    Hyperlipidemia     Dr. Brice Robert Hypertension     Dr. Brice Robert Obesity     Osteoarthritis     Peripheral vascular disease ([facility]     Primary osteoarthritis of both knees     Radicular pain of lumbosacral region     Spinal stenosis, lumbar region, without neurogenic claudication 04/30/2013    Tricuspid regurgitation 2018    mild-moderate on echo    Type II or unspecified type diabetes mellitus without mention of complication, not stated as uncontrolled     Dr. Brice Robert Unspecified sleep apnea     no cpap used anymore    URI (upper respiratory infection)     Wears dentures     upper and lower full dentures    Wears glasses     Wellness examination     Dr. Ildefonso Hawkins -PCP last visit in early Oct. 2020       Past Surgical  History:     Past Surgical History:   Procedure Laterality Date    COLONOSCOPY  2/12/2009    normal    DILATION AND CURETTAGE OF UTERUS      GASTRECTOMY      partial    LUMBAR DISCECTOMY  01/2015    lumbar diskectomy    LUMBAR FUSION  11/19/2020     POSTERIOR FUSION L4/5,     LUMBAR FUSION N/A 11/19/2020    POSTERIOR FUSION L4/5, MEDJANESSAS, [place] Murdock, [place] #[id number] AMINA performed by Amanda Saleh DO at [facility] Right 4/30/13    Lumbar Diagnostic Block,  Kenalog 40 mg    NERVE BLOCK  5/23/13    Lumbar Radiofrequency, Kenalog 40mg    NERVE BLOCK  8/12/13    Lt MBNB  celestone 6mg    NERVE BLOCK Left 8-28-13    left lumbar diagnostic block #2 decadron 10 mg    NERVE BLOCK Left 9-24-13    left lumbar median branch radiofrequency    NERVE BLOCK  07-02-14    caudal, celestone 9 mg    NERVE BLOCK  7-16-14    caudal epidural #2, celestone 9mg, fentanyl 25mcg    NERVE BLOCK  7/30/14    caudal #3 decadron 10mg    NERVE BLOCK  11-6-14    duramorph epidural steroid block  duramorph 1 mg celestone 9 mg    NERVE BLOCK  11/20/15    TENS- Empi Select    NERVE BLOCK  07/20/2018    right transforminal # 1 decadron 10mg,isovue    NERVE BLOCK Bilateral 02/01/2019    bilat mbnb- no steroid    NERVE BLOCK Bilateral 02/08/2019    bilat mbnb, marcaine . 25%    [place] KNEE SCOPE,DIAGNOSTIC Right 3/24/2017    KNEE ARTHROSCOPY WITH PARTIAL MEDIAL MENISECAL DEBRIDMENT  performed by Jerson Meyer MD at [address] [facility]  9 20 2007    UPPER GASTROINTESTINAL ENDOSCOPY  4 21 2009,04/2011    gastritis, esophagitis       Medications:      predniSONE  40 mg Oral Daily    budesonide-formoterol  2 puff Inhalation BID    potassium chloride  20 mEq Oral Once    piperacillin-tazobactam  4.5 g Intravenous Q8H    ipratropium-albuterol  1 ampule Inhalation Q4H While awake    insulin lispro  0-12 Units Subcutaneous TID WC    insulin lispro  0-6 Units Subcutaneous Nightly    sodium chloride flush  10 mL Intravenous 2 times per day    [Held by provider] amLODIPine  10 mg Oral Daily    atorvastatin  40 mg Oral Daily    azelastine  2 spray Each Nostril BID    carvedilol  6.25 mg Oral BID    docusate sodium  100 mg Oral Daily    DULoxetine  60 mg Oral Daily    gabapentin  300 mg Oral BID    [Held by provider] lisinopril-hydroCHLOROthiazide  1 tablet Oral Daily    [Held by provider] metFORMIN  500 mg Oral BID WC    mirtazapine  15 mg Oral Nightly    mirtazapine  30 mg Oral Nightly    omeprazole  20 mg Oral QAM    potassium chloride  20 mEq Oral Daily    tiZANidine  4 mg Oral BID    trospium  20 mg Oral BID    polyethylene glycol  17 g Oral Daily    magnesium hydroxide  30 mL Oral Daily    enoxaparin  40 mg Subcutaneous Daily    cetirizine  10 mg Oral Daily    gabapentin  600 mg Oral Nightly    nicotine  1 patch Transdermal Daily       Social History:     Social History     Socioeconomic History    Marital status: Single     Spouse name: Not on file    Number of children: Not on file    Years of education: Not on file    Highest education level: Not on file   Occupational History     Employer: DISABLED   Social Needs    Financial resource strain: Not very hard    Food insecurity     Worry: Never true     Inability: Never true    Transportation needs     Medical: No     Non-medical: No   Tobacco Use    Smoking status: Former Smoker     Packs/day: 0.25     Years: 36.00     Pack years: 9.00     Types: Cigarettes     Last attempt to quit: 10/30/2020     Years since quittin.0    Smokeless tobacco: Never Used    Tobacco comment: pt currently using nicotine patches   5 CIGARETTES A DAY   Substance and Sexual Activity    Alcohol use: No     Alcohol/week: 0.0 standard drinks     Frequency: Never     Binge frequency: Never    Drug use: No     Comment: 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 7 yrs    Sexual activity: Never   Lifestyle    Physical activity     Days per week: 0 days     Minutes per session: 0 min    Stress:  Only a little   Relationships    Social connections     Talks on phone: More than three times a week     Gets together: Once a week     Attends Jewish service: More than 4 times per year     Active member of club or organization: No     Attends meetings of clubs or organizations: Never     Relationship status: Never     Intimate partner violence     Fear of current or ex partner: Not on file     Emotionally abused: Not on file     Physically abused: Not on file     Forced sexual activity: Not on file   Other Topics Concern    Not on file   Social History Narrative    10/9/20 Patient keeping contact with others to a minimum due to [place] Pandemic. 10/9/20 Patient has difficulty with ambulation due to DJD, stenosis and fibromyalgia       Family History:     Family History   Problem Relation Age of Onset    Diabetes Mother     Heart Disease Mother     Cirrhosis Father         Allergies:   Aspirin; Claritin [loratadine]; Flonase [fluticasone propionate]; and Morphine and related     Review of Systems:   Constitutional: Fevers, no chills. No systemic complaints  Head: No headaches  Eyes: No double vision or blurry vision. No conjunctival inflammation. ENT: No sore throat or runny nose. . No hearing loss, tinnitus or vertigo. Cardiovascular: No chest pain or palpitations. Shortness of breath. GOMEZ. Orthopnea  Lung: Shortness of breath, cough. No sputum production  Abdomen: No nausea, vomiting, diarrhea, or abdominal pain. Allyson Dye No cramps. Genitourinary: No increased urinary frequency, or dysuria. No hematuria. No suprapubic or CVA pain  Musculoskeletal: No muscle aches or pains. No joint effusions, swelling or deformities  Hematologic: No bleeding or bruising. Neurologic: No headache, weakness, numbness, or tingling. Integument: No rash, no ulcers. Psychiatric: No depression. Endocrine: No polyuria, no polydipsia, no polyphagia.     Physical Examination :     Patient Vitals for the past 8 hrs:   BP Temp Temp src Pulse Resp SpO2   11/26/20 1300 110/63 -- -- 92 20 90 %   11/26/20 1208 -- -- -- -- 15 90 %   11/26/20 1200 108/66 98.8 °F (37.1 °C) Oral 97 22 93 %   11/26/20 1100 108/71 -- -- 97 20 (!) 87 %   11/26/20 1000 109/64 -- -- 89 18 99 %   11/26/20 0900 (!) 97/57 98.6 °F (37 °C) Axillary 91 19 99 %   11/26/20 0801 -- -- -- -- 18 --   11/26/20 0800 (!) 92/56 -- -- 94 20 98 %     General Appearance: Awake, alert, and in apparent respiratory distress  Head:  Normocephalic, no trauma  Eyes: Pupils equal, round, reactive to light and accommodation; extraocular movements intact; sclera anicteric; conjunctivae pink. No embolic phenomena. ENT: Oropharynx clear, without erythema, exudate, or thrush. No tenderness of sinuses. Mouth/throat: mucosa pink and moist. No lesions. Dentition in good repair. Neck:Supple, without lymphadenopathy. Thyroid normal, No bruits. Pulmonary/Chest: Coarse breath sounds   Cardiovascular: Regular rate and rhythm without murmurs, rubs, or gallops. Abdomen: Soft, non tender. Bowel sounds normal. No organomegaly  All four Extremities: No cyanosis, clubbing, edema, or effusions. Neurologic: No gross sensory or motor deficits. Skin: Warm and dry with good turgor. No signs of peripheral arterial or venous insufficiency. No ulcerations. No open wounds. Medical Decision Making -Laboratory:   I have independently reviewed/ordered the following labs:    CBC with Differential:   Recent Labs     11/25/20 0518 11/26/20 0522   WBC 11.8* 12.9*   HGB 9.9* 9.4*   HCT 32.1* 30.8*    314   LYMPHOPCT 10* 12*   MONOPCT 6 8*     BMP:   Recent Labs     11/25/20 0518 11/26/20 0522   * 133*   K 3.6* 4.0   CL 95* 97*   CO2 27 27   BUN 27* 16   CREATININE 0.75 0.62   MG 2.4 2.3     Hepatic Function Panel:   Recent Labs     11/25/20 0518 11/26/20 0522   LABALBU 2.6* 2.5*     No results for input(s): RPR in the last 72 hours. No results for input(s): HIV in the last 72 hours. No results for input(s): BC in the last 72 hours.   Lab Results   Component Value Date    MUCUS NOT REPORTED 11/24/2020    RBC 3.41 11/26/2020    RBC 4.57 02/16/2012    TRICHOMONAS NOT REPORTED 11/24/2020    WBC 12.9 11/26/2020    YEAST NOT REPORTED 11/24/2020    TURBIDITY CLEAR 11/24/2020     Lab Results   Component Value Date    CREATININE 0.62 11/26/2020    GLUCOSE 99 11/26/2020       Medical Decision Making-Imaging:     EXAMINATION:    CTA OF THE CHEST 11/22/2020 4:37 pm         TECHNIQUE:    CTA of the chest was performed after the administration of intravenous    contrast.  Multiplanar reformatted images are provided for review.  MIP    images are provided for review. Dose modulation, iterative reconstruction,    and/or weight based adjustment of the mA/kV was utilized to reduce the    radiation dose to as low as reasonably achievable.         COMPARISON:    12/26/2019         HISTORY:    ORDERING SYSTEM PROVIDED HISTORY: tachycardia    TECHNOLOGIST PROVIDED HISTORY:    tachycardia    Reason for Exam: tachycardia         57-year-old female with tachycardia         FINDINGS:    Pulmonary Arteries: No obvious filling defect in the main, right main, or    left main pulmonary arteries.  Evaluation of the remainder of the pulmonary    arterial vasculature is severely limited to nondiagnostic due to respiratory    motion and suboptimal bolus timing as well as streak artifact from the    contrast bolus in the SVC.         Mediastinum: Prominent mediastinal lymph nodes without overt mediastinal,    axillary, or hilar lymphadenopathy.  Visualized thyroid gland grossly    unremarkable in appearance.  No pericardial effusion.  No periaortic or    mediastinal hemorrhage.         Lungs/pleura: Trachea and very proximal central airways appear patent.     Consolidation of the bilateral lower lobes and partial consolidation of the    lingula.  Partial consolidation of the proximal right middle lobe.  Severe    emphysema.  Respiratory motion throughout the lungs.  No pneumothorax.         Upper Abdomen: Fatty liver.         Soft Tissues/Bones: Mild diffuse degenerative changes throughout the spine.              Impression    1. No clear evidence for central pulmonary embolus.  Evaluation of the    remainder of the pulmonary arterial vasculature is severely limited to    nondiagnostic as detailed above. 2. Bilateral lower lobe consolidation likely pneumonia.  Consolidation of the    proximal right middle lobe and lingula to a lesser degree.  Findings favored    to represent multifocal pneumonia.  Follow-up recommended to document    resolution. 3. Prominent mediastinal lymph nodes. 4. Fatty liver. 5. Severe emphysema.           11/26/2020 1:22 am         COMPARISON:    November 24, 2020 November 24, 2020         HISTORY:    ORDERING SYSTEM PROVIDED HISTORY: hypoxia    TECHNOLOGIST PROVIDED HISTORY:    hypoxia    Reason for Exam: difficulty breathing   upright port         FINDINGS:    Marginal inspiration is noted.  Borderline cardiomegaly is present.  Improved    aeration is seen within the right lung base.  Airspace disease within the    left lung base is minimally improved compared to studies dating back to    November 23, 2020.  No pneumothorax is noted.  Osseous structures are stable.              Impression    1. Minimally improved aeration, right lung base, suggesting resolving    pneumonia/atelectasis    2. Persistent left lower lobe airspace disease, minimally improved        Medical Decision [id number]:       Medical Decision Making-Other:     Note:  Labs, medications, radiologic studies were reviewed with personal review of films  Large amounts of data were reviewed  Discussed with nursing Staff, Discharge planner  Infection Control and Prevention measures reviewed  All prior entries were reviewed  Administer medications as ordered  Prognosis: Guarded  Discharge planning reviewed  Follow up as outpatient.     Thank you for allowing us to participate in the care of this patient. Please call with questions.     Pastora Huerta MD  Pager: [phone] - Office: [phone]

## 2020-11-26 NOTE — PROGRESS NOTES
NEUROSURGERY INPATIENT PROGRESS NOTE    11/26/2020         Chart was reviewed. Fever spike overight, 101.3 F . On Bipap, FIO2 100%. No current facility-administered medications on file prior to encounter.       Current Outpatient Medications on File Prior to Encounter   Medication Sig Dispense Refill    atorvastatin (LIPITOR) 40 MG tablet Take 1 tablet by mouth daily 90 tablet 1    metFORMIN (GLUCOPHAGE) 500 MG tablet Take 1 tablet by mouth 2 times daily (with meals) 180 tablet 1    lisinopril-hydroCHLOROthiazide (PRINZIDE;ZESTORETIC) 20-25 MG per tablet TAKE 1 TABLET EVERY DAY 90 tablet 3     MG capsule TAKE 1 CAPSULE EVERY DAY 30 capsule 10    meloxicam (MOBIC) 15 MG tablet Take 1 tablet by mouth daily 30 tablet 3    amLODIPine (NORVASC) 10 MG tablet Take 1 tablet by mouth daily 90 tablet 3    albuterol sulfate HFA (VENTOLIN HFA) 108 (90 Base) MCG/ACT inhaler Inhale 2 puffs into the lungs every 6 hours as needed for Wheezing 1 Inhaler 3    omeprazole (PRILOSEC) 20 MG delayed release capsule TAKE 1 CAPSULE EVERY DAY 30 capsule 11    diphenhydrAMINE (BENADRYL) 25 MG tablet Take 25 mg by mouth every 6 hours as needed for Itching      nicotine (NICODERM CQ) 21 MG/24HR Place 1 patch onto the skin every 24 hours      potassium chloride (KLOR-CON M) 10 MEQ extended release tablet Take 2 tablets by mouth daily 60 tablet 3    SPIRIVA HANDIHALER 18 MCG inhalation capsule       acetaminophen (TYLENOL) 500 MG tablet Take 1 tablet by mouth 4 times daily as needed for Pain 30 tablet 0    mirtazapine (REMERON) 30 MG tablet Take 30 mg by mouth nightly      mirtazapine (REMERON) 15 MG tablet Take 15 mg by mouth nightly      DULoxetine (CYMBALTA) 60 MG extended release capsule Take 1 capsule by mouth daily 30 capsule 3    Lancets MISC 1 each by Does not apply route daily 100 each 11    azelastine (ASTELIN) 0.1 % nasal spray 2 sprays by Nasal route 2 times daily Use in each nostril as directed 1 Bottle 3  ipratropium-albuterol (DUONEB) 0.5-2.5 (3) MG/3ML SOLN nebulizer solution Inhale 3 mLs into the lungs every 6 hours as needed for Shortness of Breath 360 mL 11       Allergies: Carolina Singh is allergic to aspirin; claritin [loratadine]; flonase [fluticasone propionate]; and morphine and related.     Past Medical History:   Diagnosis Date    Allergic rhinitis     Alveolar hypoventilation 11/20/2020    Aortic insufficiency 2018    mild-moderate on echo (was seen and discharged [facility]ology[facility])    Bronchiectasis ([facility] 11/20/2020    Bronchitis     Chronic back pain     Pain management at [place] [address]. COPD (chronic obstructive pulmonary disease) ([facility]     Dr. Tika Costello to see 11/09/2020    Depression     DM (diabetes mellitus) ([facility] 12/18/2012    GERD (gastroesophageal reflux disease)     History of echocardiogram 05/2018    EF 65%, mild-moderate AI and TR    Hyperlipidemia     Dr. Alondra Bowles Hypertension     Dr. Alondra Bowles Obesity     Osteoarthritis     Peripheral vascular disease ([facility]     Primary osteoarthritis of both knees     Radicular pain of lumbosacral region     Spinal stenosis, lumbar region, without neurogenic claudication 04/30/2013    Tricuspid regurgitation 2018    mild-moderate on echo    Type II or unspecified type diabetes mellitus without mention of complication, not stated as uncontrolled     Dr. Alondra Bowles Unspecified sleep apnea     no cpap used anymore    URI (upper respiratory infection)     Wears dentures     upper and lower full dentures    Wears glasses     Wellness examination     Dr. Magaly Kim -PCP last visit in early Oct. 2020       Past Surgical History:   Procedure Laterality Date    COLONOSCOPY  2/12/2009    normal    DILATION AND CURETTAGE OF UTERUS      GASTRECTOMY      partial    LUMBAR DISCECTOMY  01/2015    lumbar diskectomy    LUMBAR FUSION  11/19/2020     POSTERIOR FUSION L4/5,     LUMBAR FUSION N/A 11/19/2020    POSTERIOR FUSION L4/5, [employer], Rosi Herbertam, EVOKES #[id number] AMINA performed by Caitlin Resendez DO at [facility] Right 4/30/13    Lumbar Diagnostic Block,  Kenalog 40 mg    NERVE BLOCK  5/23/13    Lumbar Radiofrequency, Kenalog 40mg    NERVE BLOCK  8/12/13    Lt MBNB  celestone 6mg    NERVE BLOCK Left 8-28-13    left lumbar diagnostic block #2 decadron 10 mg    NERVE BLOCK Left 9-24-13    left lumbar median branch radiofrequency    NERVE BLOCK  07-02-14    caudal, celestone 9 mg    NERVE BLOCK  7-16-14    caudal epidural #2, celestone 9mg, fentanyl 25mcg    NERVE BLOCK  7/30/14    caudal #3 decadron 10mg    NERVE BLOCK  11-6-14    duramorph epidural steroid block  duramorph 1 mg celestone 9 mg    NERVE BLOCK  11/20/15    TENS- Empi Select    NERVE BLOCK  07/20/2018    right transforminal # 1 decadron 10mg,isovue    NERVE BLOCK Bilateral 02/01/2019    bilat mbnb- no steroid    NERVE BLOCK Bilateral 02/08/2019    bilat mbnb, marcaine . 25%    ME KNEE SCOPE,DIAGNOSTIC Right 3/24/2017    KNEE ARTHROSCOPY WITH PARTIAL MEDIAL MENISECAL DEBRIDMENT  performed by Renny Alexander MD at [address] [place] [address] Rd  9 20 2007    UPPER GASTROINTESTINAL ENDOSCOPY  4 21 2009,04/2011    gastritis, esophagitis       Medications:     predniSONE  40 mg Oral Daily    budesonide-formoterol  2 puff Inhalation BID    potassium chloride  20 mEq Oral Once    piperacillin-tazobactam  4.5 g Intravenous Q8H    ipratropium-albuterol  1 ampule Inhalation Q4H While awake    insulin lispro  0-12 Units Subcutaneous TID     insulin lispro  0-6 Units Subcutaneous Nightly    sodium chloride flush  10 mL Intravenous 2 times per day    [Held by provider] amLODIPine  10 mg Oral Daily    atorvastatin  40 mg Oral Daily    azelastine  2 spray Each Nostril BID    carvedilol  6.25 mg Oral BID    docusate sodium  100 mg Oral Daily    DULoxetine  60 mg Oral Daily    gabapentin  300 mg Oral BID    [Held by provider] lisinopril-hydroCHLOROthiazide  1 tablet Oral Daily    [Held by provider] metFORMIN  500 mg Oral BID WC    mirtazapine  15 mg Oral Nightly    mirtazapine  30 mg Oral Nightly    omeprazole  20 mg Oral QAM    potassium chloride  20 mEq Oral Daily    tiZANidine  4 mg Oral BID    trospium  20 mg Oral BID    polyethylene glycol  17 g Oral Daily    magnesium hydroxide  30 mL Oral Daily    enoxaparin  40 mg Subcutaneous Daily    cetirizine  10 mg Oral Daily    gabapentin  600 mg Oral Nightly    nicotine  1 patch Transdermal Daily     PRN Meds include: albuterol, magic (miracle) mouthwash, acetaminophen, benzonatate, diphenhydrAMINE, sodium chloride flush, oxyCODONE **OR** oxyCODONE, morphine **OR** [DISCONTINUED] morphine, senna, glucose, dextrose, glucagon (rDNA), dextrose    Objective:   /68   Pulse 97   Temp 98.8 °F (37.1 °C) (Oral)   Resp 18   Ht 5' 5\" (1.651 m)   Wt 181 lb 7 oz (82.3 kg)   LMP 04/19/2003   SpO2 (!) 88%   BMI 30.19 kg/m²     Blood pressure range: Systolic (26DBP), CEI:730 , Min:92 , ZNX:188   ; Diastolic (30ZFI), EXJ:77, Min:52, Max:86      ROS:  Review of Systems   Respiratory: Positive for shortness of breath. Musculoskeletal: Positive for arthralgias, back pain and myalgias. Neurological: Negative for dizziness, seizures, facial asymmetry, weakness, light-headedness, numbness and headaches. NEUROLOGIC EXAMINATION  Physical Exam  Neck:      Musculoskeletal: Normal range of motion. Pulmonary:      Effort: Respiratory distress present. Breath sounds: No stridor. Abdominal:      General: Abdomen is flat. Palpations: Abdomen is soft. Neurological:      General: No focal deficit present. Mental Status: She is alert and oriented to person, place, and time. Cranial Nerves: No cranial nerve deficit. Sensory: No sensory deficit. Motor: No weakness.       Coordination: Coordination normal.      Deep Tendon Reflexes: Reflexes normal.           Lab Results:   CBC:   Recent Labs     11/24/20  0956 11/25/20  0518 11/26/20  0522   WBC 12.8* 11.8* 12.9*   HGB 10.3* 9.9* 9.4*    308 314     BMP:    Recent Labs     11/24/20  0956 11/25/20  0518 11/26/20  0522    132* 133*   K 3.5* 3.6* 4.0   CL 98 95* 97*   CO2 26 27 27   BUN 26* 27* 16   CREATININE 0.64 0.75 0.62   GLUCOSE 102* 121* 99         Lab Results   Component Value Date    CHOL 201 (H) 04/21/2020    LDLCHOLESTEROL 116 04/21/2020    HDL 54 04/21/2020    TRIG 155 (H) 04/21/2020    ALT 21 04/21/2020    AST 18 04/21/2020    TSH 0.94 07/19/2017    LABA1C 5.8 04/21/2020    LABMICR CANNOT BE CALCULATED 04/21/2020       No results found for: PHENYTOIN, PHENYTOIN, VALPROATE, CBMZ    IMAGING  No new imaging studies     ASSESSMENT  Radiculopathy status post L4-L5 right-sided laminectomy, L4-5 discectomy and anterior arthrodesis  Hypoxic respiratory failure due to pneumonia, COPD exacerbation    RECOMMENDATIONS   Continue management of pneumonia, respiratory failure by primary  Continue PT OT  Continue pain control    Araceli Matta MD  PGY-3 Neurology Resident

## 2020-11-27 LAB
ABSOLUTE EOS #: <0.03 K/UL (ref 0–0.44)
ABSOLUTE IMMATURE GRANULOCYTE: 0.39 K/UL (ref 0–0.3)
ABSOLUTE LYMPH #: 1.04 K/UL (ref 1.1–3.7)
ABSOLUTE MONO #: 0.61 K/UL (ref 0.1–1.2)
ALBUMIN SERPL-MCNC: 2.8 G/DL (ref 3.5–5.2)
ANION GAP SERPL CALCULATED.3IONS-SCNC: 11 MMOL/L (ref 9–17)
BASOPHILS # BLD: 0 % (ref 0–2)
BASOPHILS ABSOLUTE: 0.05 K/UL (ref 0–0.2)
BUN BLDV-MCNC: 12 MG/DL (ref 8–23)
BUN/CREAT BLD: ABNORMAL (ref 9–20)
CALCIUM SERPL-MCNC: 8.7 MG/DL (ref 8.6–10.4)
CHLORIDE BLD-SCNC: 100 MMOL/L (ref 98–107)
CO2: 26 MMOL/L (ref 20–31)
CREAT SERPL-MCNC: 0.48 MG/DL (ref 0.5–0.9)
DIFFERENTIAL TYPE: ABNORMAL
EOSINOPHILS RELATIVE PERCENT: 0 % (ref 1–4)
GFR AFRICAN AMERICAN: >60 ML/MIN
GFR NON-AFRICAN AMERICAN: >60 ML/MIN
GFR SERPL CREATININE-BSD FRML MDRD: ABNORMAL ML/MIN/{1.73_M2}
GFR SERPL CREATININE-BSD FRML MDRD: ABNORMAL ML/MIN/{1.73_M2}
GLUCOSE BLD-MCNC: 188 MG/DL (ref 65–105)
GLUCOSE BLD-MCNC: 194 MG/DL (ref 65–105)
GLUCOSE BLD-MCNC: 196 MG/DL (ref 70–99)
GLUCOSE BLD-MCNC: 214 MG/DL (ref 65–105)
GLUCOSE BLD-MCNC: 233 MG/DL (ref 65–105)
HCT VFR BLD CALC: 31.4 % (ref 36.3–47.1)
HEMOGLOBIN: 9.6 G/DL (ref 11.9–15.1)
IMMATURE GRANULOCYTES: 3 %
LV EF: 60 %
LVEF MODALITY: NORMAL
LYMPHOCYTES # BLD: 7 % (ref 24–43)
MAGNESIUM: 2.6 MG/DL (ref 1.6–2.6)
MCH RBC QN AUTO: 27.1 PG (ref 25.2–33.5)
MCHC RBC AUTO-ENTMCNC: 30.6 G/DL (ref 28.4–34.8)
MCV RBC AUTO: 88.7 FL (ref 82.6–102.9)
MONOCYTES # BLD: 4 % (ref 3–12)
NRBC AUTOMATED: 0 PER 100 WBC
PDW BLD-RTO: 13.2 % (ref 11.8–14.4)
PHOSPHORUS: 2.8 MG/DL (ref 2.6–4.5)
PLATELET # BLD: 339 K/UL (ref 138–453)
PLATELET ESTIMATE: ABNORMAL
PMV BLD AUTO: 9.6 FL (ref 8.1–13.5)
POTASSIUM SERPL-SCNC: 4.6 MMOL/L (ref 3.7–5.3)
RBC # BLD: 3.54 M/UL (ref 3.95–5.11)
RBC # BLD: ABNORMAL 10*6/UL
SEG NEUTROPHILS: 86 % (ref 36–65)
SEGMENTED NEUTROPHILS ABSOLUTE COUNT: 12.45 K/UL (ref 1.5–8.1)
SODIUM BLD-SCNC: 137 MMOL/L (ref 135–144)
WBC # BLD: 14.6 K/UL (ref 3.5–11.3)
WBC # BLD: ABNORMAL 10*3/UL

## 2020-11-27 PROCEDURE — 6360000002 HC RX W HCPCS: Performed by: INTERNAL MEDICINE

## 2020-11-27 PROCEDURE — 6370000000 HC RX 637 (ALT 250 FOR IP): Performed by: INTERNAL MEDICINE

## 2020-11-27 PROCEDURE — 82947 ASSAY GLUCOSE BLOOD QUANT: CPT

## 2020-11-27 PROCEDURE — 99233 SBSQ HOSP IP/OBS HIGH 50: CPT | Performed by: INTERNAL MEDICINE

## 2020-11-27 PROCEDURE — 2700000000 HC OXYGEN THERAPY PER DAY

## 2020-11-27 PROCEDURE — 6360000002 HC RX W HCPCS: Performed by: NURSE PRACTITIONER

## 2020-11-27 PROCEDURE — 93306 TTE W/DOPPLER COMPLETE: CPT

## 2020-11-27 PROCEDURE — 2580000003 HC RX 258: Performed by: STUDENT IN AN ORGANIZED HEALTH CARE EDUCATION/TRAINING PROGRAM

## 2020-11-27 PROCEDURE — 94660 CPAP INITIATION&MGMT: CPT

## 2020-11-27 PROCEDURE — 99291 CRITICAL CARE FIRST HOUR: CPT | Performed by: INTERNAL MEDICINE

## 2020-11-27 PROCEDURE — 94640 AIRWAY INHALATION TREATMENT: CPT

## 2020-11-27 PROCEDURE — 2580000003 HC RX 258: Performed by: INTERNAL MEDICINE

## 2020-11-27 PROCEDURE — 80069 RENAL FUNCTION PANEL: CPT

## 2020-11-27 PROCEDURE — 83735 ASSAY OF MAGNESIUM: CPT

## 2020-11-27 PROCEDURE — 6370000000 HC RX 637 (ALT 250 FOR IP): Performed by: STUDENT IN AN ORGANIZED HEALTH CARE EDUCATION/TRAINING PROGRAM

## 2020-11-27 PROCEDURE — 2060000000 HC ICU INTERMEDIATE R&B

## 2020-11-27 PROCEDURE — 6370000000 HC RX 637 (ALT 250 FOR IP): Performed by: NURSE PRACTITIONER

## 2020-11-27 PROCEDURE — 97110 THERAPEUTIC EXERCISES: CPT

## 2020-11-27 PROCEDURE — 85025 COMPLETE CBC W/AUTO DIFF WBC: CPT

## 2020-11-27 PROCEDURE — 97116 GAIT TRAINING THERAPY: CPT

## 2020-11-27 PROCEDURE — 6360000002 HC RX W HCPCS: Performed by: STUDENT IN AN ORGANIZED HEALTH CARE EDUCATION/TRAINING PROGRAM

## 2020-11-27 PROCEDURE — 36415 COLL VENOUS BLD VENIPUNCTURE: CPT

## 2020-11-27 RX ADMIN — CEFOXITIN SODIUM 2 G: 2 POWDER, FOR SOLUTION INTRAVENOUS at 17:59

## 2020-11-27 RX ADMIN — BUDESONIDE AND FORMOTEROL FUMARATE DIHYDRATE 2 PUFF: 160; 4.5 AEROSOL RESPIRATORY (INHALATION) at 20:26

## 2020-11-27 RX ADMIN — PIPERACILLIN AND TAZOBACTAM 4.5 G: 4; .5 INJECTION, POWDER, LYOPHILIZED, FOR SOLUTION INTRAVENOUS; PARENTERAL at 05:37

## 2020-11-27 RX ADMIN — IPRATROPIUM BROMIDE AND ALBUTEROL SULFATE 1 AMPULE: .5; 3 SOLUTION RESPIRATORY (INHALATION) at 20:26

## 2020-11-27 RX ADMIN — DESMOPRESSIN ACETATE 40 MG: 0.2 TABLET ORAL at 09:24

## 2020-11-27 RX ADMIN — GABAPENTIN 600 MG: 300 CAPSULE ORAL at 20:15

## 2020-11-27 RX ADMIN — CETIRIZINE HYDROCHLORIDE 10 MG: 10 TABLET ORAL at 09:25

## 2020-11-27 RX ADMIN — DULOXETINE HYDROCHLORIDE 60 MG: 30 CAPSULE, DELAYED RELEASE ORAL at 09:25

## 2020-11-27 RX ADMIN — CEFOXITIN SODIUM 2 G: 2 POWDER, FOR SOLUTION INTRAVENOUS at 12:13

## 2020-11-27 RX ADMIN — INSULIN LISPRO 2 UNITS: 100 INJECTION, SOLUTION INTRAVENOUS; SUBCUTANEOUS at 09:13

## 2020-11-27 RX ADMIN — SODIUM CHLORIDE, POTASSIUM CHLORIDE, SODIUM LACTATE AND CALCIUM CHLORIDE: 600; 310; 30; 20 INJECTION, SOLUTION INTRAVENOUS at 11:03

## 2020-11-27 RX ADMIN — ENOXAPARIN SODIUM 40 MG: 40 INJECTION SUBCUTANEOUS at 09:18

## 2020-11-27 RX ADMIN — TROSPIUM CHLORIDE 20 MG: 20 TABLET, FILM COATED ORAL at 20:14

## 2020-11-27 RX ADMIN — CARVEDILOL 6.25 MG: 6.25 TABLET, FILM COATED ORAL at 09:25

## 2020-11-27 RX ADMIN — INSULIN LISPRO 2 UNITS: 100 INJECTION, SOLUTION INTRAVENOUS; SUBCUTANEOUS at 20:26

## 2020-11-27 RX ADMIN — GABAPENTIN 300 MG: 300 CAPSULE ORAL at 13:47

## 2020-11-27 RX ADMIN — INSULIN LISPRO 2 UNITS: 100 INJECTION, SOLUTION INTRAVENOUS; SUBCUTANEOUS at 16:19

## 2020-11-27 RX ADMIN — CEFOXITIN SODIUM 2 G: 2 POWDER, FOR SOLUTION INTRAVENOUS at 09:10

## 2020-11-27 RX ADMIN — MIRTAZAPINE 15 MG: 15 TABLET, FILM COATED ORAL at 20:16

## 2020-11-27 RX ADMIN — CARVEDILOL 6.25 MG: 6.25 TABLET, FILM COATED ORAL at 20:14

## 2020-11-27 RX ADMIN — IPRATROPIUM BROMIDE AND ALBUTEROL SULFATE 1 AMPULE: .5; 3 SOLUTION RESPIRATORY (INHALATION) at 08:58

## 2020-11-27 RX ADMIN — TROSPIUM CHLORIDE 20 MG: 20 TABLET, FILM COATED ORAL at 09:26

## 2020-11-27 RX ADMIN — TIZANIDINE 4 MG: 4 TABLET ORAL at 09:24

## 2020-11-27 RX ADMIN — MIRTAZAPINE 30 MG: 15 TABLET, FILM COATED ORAL at 20:16

## 2020-11-27 RX ADMIN — OMEPRAZOLE 20 MG: 20 CAPSULE, DELAYED RELEASE ORAL at 09:24

## 2020-11-27 RX ADMIN — MORPHINE SULFATE 2 MG: 2 INJECTION, SOLUTION INTRAMUSCULAR; INTRAVENOUS at 20:14

## 2020-11-27 RX ADMIN — INSULIN LISPRO 4 UNITS: 100 INJECTION, SOLUTION INTRAVENOUS; SUBCUTANEOUS at 12:15

## 2020-11-27 RX ADMIN — OXYCODONE HYDROCHLORIDE 10 MG: 5 TABLET ORAL at 16:58

## 2020-11-27 RX ADMIN — IPRATROPIUM BROMIDE AND ALBUTEROL SULFATE 1 AMPULE: .5; 3 SOLUTION RESPIRATORY (INHALATION) at 16:52

## 2020-11-27 RX ADMIN — POTASSIUM CHLORIDE 20 MEQ: 1500 TABLET, EXTENDED RELEASE ORAL at 09:25

## 2020-11-27 RX ADMIN — BUDESONIDE AND FORMOTEROL FUMARATE DIHYDRATE 2 PUFF: 160; 4.5 AEROSOL RESPIRATORY (INHALATION) at 08:58

## 2020-11-27 RX ADMIN — GABAPENTIN 300 MG: 300 CAPSULE ORAL at 09:24

## 2020-11-27 RX ADMIN — PREDNISONE 40 MG: 20 TABLET ORAL at 09:59

## 2020-11-27 RX ADMIN — BENZONATATE 100 MG: 100 CAPSULE ORAL at 20:15

## 2020-11-27 RX ADMIN — IPRATROPIUM BROMIDE AND ALBUTEROL SULFATE 1 AMPULE: .5; 3 SOLUTION RESPIRATORY (INHALATION) at 12:14

## 2020-11-27 RX ADMIN — TIZANIDINE 4 MG: 4 TABLET ORAL at 20:15

## 2020-11-27 ASSESSMENT — PAIN DESCRIPTION - LOCATION
LOCATION: BACK
LOCATION: LEG;BACK
LOCATION: LEG
LOCATION: LEG;BACK;HIP

## 2020-11-27 ASSESSMENT — PAIN SCALES - GENERAL
PAINLEVEL_OUTOF10: 8
PAINLEVEL_OUTOF10: 6
PAINLEVEL_OUTOF10: 8
PAINLEVEL_OUTOF10: 3

## 2020-11-27 ASSESSMENT — PAIN DESCRIPTION - ORIENTATION: ORIENTATION: RIGHT

## 2020-11-27 NOTE — PLAN OF CARE
Problem: Pain:  Goal: Pain level will decrease  Description: Pain level will decrease  Outcome: Ongoing     Problem: Falls - Risk of:  Goal: Will remain free from falls  Description: Will remain free from falls  Outcome: Ongoing     Problem: Skin Integrity:  Goal: Will show no infection signs and symptoms  Description: Will show no infection signs and symptoms  Outcome: Ongoing     Problem: Gas Exchange - Impaired:  Goal: Levels of oxygenation will improve  Description: Levels of oxygenation will improve  Outcome: Ongoing     Problem: Nutrition  Goal: Optimal nutrition therapy  Description: Nutrition Problem #1: Inadequate oral intake  Intervention: Food and/or Nutrient Delivery: Continue Current Diet, Start Oral Nutrition Supplement  Nutritional Goals: meet % of estimated nutrient needs     Outcome: Ongoing

## 2020-11-27 NOTE — PROGRESS NOTES
Physical Therapy  DATE: 2020    NAME: Yuridia Mendez  MRN: [id number]   : 1960    Patient not seen this date for Physical Therapy due to:  [] Blood transfusion in progress  [] Hemodialysis  [x] Patient Declined  Pt just received her lunch and requested that writer return at a later time.  [] Spine Precautions   [] Strict Bedrest  [] Surgery/ Procedure  [] Testing      [] Other        [] PT is being discontinued at this time. Patient independent. No further needs. [] PT is being discontinued at this time due to declining physical/ medical status. Therapy is not appropriate at this time.     Terence Dc, PTA

## 2020-11-27 NOTE — PLAN OF CARE
Problem: Pain:  Goal: Pain level will decrease  Description: Pain level will decrease  11/26/2020 2138 by Catherine Waters RN  Outcome: Ongoing     Problem: Pain:  Goal: Control of acute pain  Description: Control of acute pain  11/26/2020 2138 by Catherine Waters RN  Outcome: Ongoing     Problem: Pain:  Goal: Control of chronic pain  Description: Control of chronic pain  11/26/2020 2138 by Jamil Kirk RN  Outcome: Ongoing     Problem: Falls - Risk of:  Goal: Will remain free from falls  Description: Will remain free from falls  11/26/2020 2138 by Jamil Kirk RN  Outcome: Ongoing     Problem: Falls - Risk of:  Goal: Absence of physical injury  Description: Absence of physical injury  11/26/2020 2138 by Jamil Kirk RN  Outcome: Ongoing     Problem: Skin Integrity:  Goal: Will show no infection signs and symptoms  Description: Will show no infection signs and symptoms  11/26/2020 2138 by Jamil Kirk RN  Outcome: Ongoing     Problem: Skin Integrity:  Goal: Absence of new skin breakdown  Description: Absence of new skin breakdown  11/26/2020 2138 by Jamil Kirk RN  Outcome: Ongoing     Problem: Gas Exchange - Impaired:  Goal: Levels of oxygenation will improve  Description: Levels of oxygenation will improve  11/26/2020 2138 by Jamil Kirk RN  Outcome: Ongoing     Problem: Nutrition  Goal: Optimal nutrition therapy  Description: Nutrition Problem #1: Inadequate oral intake  Intervention: Food and/or Nutrient Delivery: Continue Current Diet, Start Oral Nutrition Supplement  Nutritional Goals: meet % of estimated nutrient needs     11/26/2020 2138 by Jamil Kirk RN  Outcome: Ongoing

## 2020-11-27 NOTE — CARE COORDINATION
Transitional planning. Pt refuses SNF, she is acceptable to [facility] ARU, PM & R following. If she doesn't meet criteria for ARU, she would like to go home with [facility].- she has used [facility] in the past and would like to use their services again if needed. She does live alone, however she has family members and friends that could help her out.

## 2020-11-27 NOTE — PROGRESS NOTES
Physical Therapy  Facility/Department: [address] MICU  Daily Treatment Note  NAME: Duong Wolfe  : 1960  MRN: [id number]    Date of Service: 2020    Discharge Recommendations:  Patient would benefit from continued therapy after discharge        Assessment   Body structures, Functions, Activity limitations: Decreased functional mobility ; Decreased safe awareness;Decreased endurance;Decreased balance; Increased pain;Decreased strength  Assessment: Willing and cooperative with getting out of bed and several steps of gait, limited by length of high-flow tubing on wall. Pain 8/10. Oxygen saturation remaining at 95% or greater while upright. Will continue for mobility. PT Education: Goals; General Safety;PT Role;Plan of Care;Transfer Training;Precautions;Gait Training;Equipment  REQUIRES PT FOLLOW UP: Yes  Activity Tolerance  Activity Tolerance: Patient limited by fatigue;Patient limited by endurance     Patient Diagnosis(es): There were no encounter diagnoses. has a past medical history of Allergic rhinitis, Alveolar hypoventilation, Aortic insufficiency, Bronchiectasis (HCC), Bronchitis, Chronic back pain, COPD (chronic obstructive pulmonary disease) ([address] Depression, DM (diabetes mellitus) ([address] GERD (gastroesophageal reflux disease), History of echocardiogram, Hyperlipidemia, Hypertension, Obesity, Osteoarthritis, Peripheral vascular disease ([address] Primary osteoarthritis of both knees, Radicular pain of lumbosacral region, Spinal stenosis, lumbar region, without neurogenic claudication, Tricuspid regurgitation, Type II or unspecified type diabetes mellitus without mention of complication, not stated as uncontrolled, Unspecified sleep apnea, URI (upper respiratory infection), Wears dentures, Wears glasses, and Wellness examination. has a past surgical history that includes Dilation and curettage of uterus; gastrectomy; Nerve Block (Right, 13); Nerve Block (13);  Colonoscopy (2/12/2009); Upper gastrointestinal endoscopy (9 20 2007); Upper gastrointestinal endoscopy (4 21 2009,04/2011); Nerve Block (8/12/13); Nerve Block (Left, 8-28-13); Nerve Block (Left, 9-24-13); Nerve Block (07-02-14); Nerve Block (7-16-14); Nerve Block (7/30/14); Nerve Block (11-6-14); Nerve Block (11/20/15); pr knee scope,diagnostic (Right, 3/24/2017); Nerve Block (07/20/2018); Nerve Block (Bilateral, 02/01/2019); Nerve Block (Bilateral, 02/08/2019); lumbar discectomy (01/2015); lumbar fusion (11/19/2020); and lumbar fusion (N/A, 11/19/2020). Restrictions  Restrictions/Precautions  Restrictions/Precautions: Fall Risk, Up as Tolerated  Required Braces or Orthoses?: No  Position Activity Restriction  Other position/activity restrictions: Ambulate. On high flow. Lumbar fusion 11/19/20. Subjective   General  Chart Reviewed: Yes  Family / Caregiver Present: No  Subjective  Subjective: Cooperative and pleasant with supine exercise and getting out of bed  Pain Screening  Patient Currently in Pain: Yes  Pain Assessment  Pain Assessment: 0-10  Pain Level: 8  Pain Location: Leg;Back; Hip  Pain Orientation: Right  Vital Signs  Patient Currently in Pain: Yes       Orientation  Orientation  Overall Orientation Status: Within Functional Limits  Cognition   Cognition  Overall Cognitive Status: WFL  Objective   Bed mobility  Supine to Sit: Minimal assistance  Sit to Supine: Minimal assistance  Transfers  Sit to Stand: Contact guard assistance  Stand to sit: Contact guard assistance  Bed to Chair: Contact guard assistance  Ambulation  Ambulation?: Yes  Ambulation 1  Surface: level tile  Device: Rolling Walker  Assistance: Contact guard assistance  Quality of Gait: forward flexed posture  Gait Deviations: Slow Laura; Increased MARCOS  Distance: 6' from chair to sink and back several times, limited by high-flow tubes     Balance  Sitting - Static: Fair;+  Sitting - Dynamic: Fair;+  Standing - Static: Fair;-(Needs UE support on walker due to weakness in back)  Standing - Dynamic: Poor;+  Exercises  Straight Leg Raise: x10 reps  Heelslides: x10 reps  Knee Short Arc Quad: x10 reps               Goals  Short term goals  Time Frame for Short term goals: 12 visits  Short term goal 1: pt will be independent with bed mobility  Short term goal 2: pt will perform transfers independently  Short term goal 3: pt will ambulate 200' with least restrictive AD mod (I)  Short term goal 4: pt will ascend/descend 2 steps without handrails independently  Patient Goals   Patient goals : to decrease pain    Plan    Plan  Times per week: 5-6 visits per week  Times per day: Daily  Current Treatment Recommendations: Balance Training, Strengthening, Functional Mobility Training, Transfer Training, Gait Training, Stair training, Endurance Training, Safety Education & Training  Safety Devices  Type of devices:  All fall risk precautions in place, Call light within reach, Gait belt, Nurse notified, Left in chair, Chair alarm in place  Restraints  Initially in place: No     Therapy Time   Individual Concurrent Group Co-treatment   Time In 0915         Time Out 0940         Minutes 25         Timed Code Treatment Minutes: Belle 1047, PT

## 2020-11-27 NOTE — PROGRESS NOTES
[facility] - progress Note  Today's Date and Time: 11/27/2020, 7:48 AM    Impression :   Aspiration pneumonia  Bilateral bronchopneumonia  Acute hypoxic respiratory failure requiring high flow 02. S/P Lumbar fusion 11-19-20. Recommendations:   D/C Zosyn to avoid fluid overload. Cefoxitin  2 gm IV q 6 hr.    Medical Decision Making/Summary/Discussion:11/27/2020       Infection Control Recommendations   Center Precautions    Antimicrobial Stewardship Recommendations     Simplification of therapy  Targeted therapy  Coordination of Outpatient Care:   Estimated Length of IV antimicrobials: TBD  Patient will need Midline Catheter Insertion: no  Patient will need PICC line Insertion:no  Patient will need: Home IV , [place],  SNF,  LTAC: TBD  Patient will need outpatient wound care:No    Chief complaint/reason for consultation:   Pneumonia      History of Present Illness:   Kenyatta Fowler is a 56y.o.-year-old  female who was initially admitted on 11/19/2020. Patient seen at the request of García Wynn. INITIAL HISTORY:    The patient has a history of morbid obesity, COPD, JOAN, chronic hypoxic respiratory failure -on home oxygen. She was admitted because of right lower extremity pain extending from hip to foot.  She has known lumbar arthritis and had failed all conservative measures.  She underwent posterior fusion L4/5 surgery on 11/19/20.      During the postoperative period her respiratory status declined and she started spiking fevers. Her Chest x-ray and CT showed bilateral multifocal infiltrates suggestive of aspiration, areas of denser consolidation suggestive of bronchopneumonia and underlying severe emphysema. Patient received ceftriaxone, then zosyn since 11-24-20 with benefit. She is currently experiencing worsening of 02 requirements.     Plan:   D/C zosyn to avoid fluid and Na overload  Start cefoxitin       Current evaluation: 11/27/20    Pt examined in ICU, Vitals, labs and chart reviewed. Afebrile  Vitals stable    S/P L4-L5 right sided laminectomy, L4-L5 discectomy and anterior arthrodesis. Continued on heated high flow 35-->40L/min, FIO2 90-->50 and BIPAP for his Acute hypoxic respiratory failure secondary to aspiration pneumonia from recent surgery. Zosyn discontinued 11/27/20 and Cefoxitin 2gm IV q6 started 11/27/20. She is actively coughing and in mild pain from the surgery  Denies any other complaints. Worsening leucocytosis 12.9-->14.6 (on prednisone for COPD)  CXR bilateral multifocal pneumonia. Blood and urine Cultures no growth so far. Will monitor the patient for clinical improvement. Rest of the management of HTN, Diastolic heart failure, DM, COPD as per primary. Labs, X rays reviewed: 11/27/2020    BUN: 16-->12  Cr: 0.62-->0.48    WBC: 12.9-->14.6 (on prednisone)  Hb:9.4-->9.6  Plat: 314-->339    Cultures:  Urine:  11-22-20: No growth   11-19-20: No growth  Blood:  11-22-20: No growth    Sputum :    Wound:      MRSA screen: negative    Discussed with patient, RN, family. 11-26-20 11-22-20: I have personally reviewed the past medical history, past surgical history, medications, social history, and family history, and I have updated the database accordingly.   Past Medical History:     Past Medical History:   Diagnosis Date    Allergic rhinitis     Alveolar hypoventilation 11/20/2020    Aortic insufficiency 2018    mild-moderate on echo (was seen and [facility]    Bronchiectasis ([facility] [address] 11/20/2020    Bronchitis     Chronic back pain     [facility] at [facility]. COPD (chronic obstructive pulmonary disease) ([facility] [address]     Dr. Waqas Solano to see 11/09/2020    Depression     DM (diabetes mellitus) ([facility] [address] 12/18/2012    GERD (gastroesophageal reflux disease)     History of echocardiogram 05/2018    EF 65%, mild-moderate AI and TR    Hyperlipidemia Dr. Margo Reinoso Hypertension     Dr. Margo Reinoso Obesity     Osteoarthritis     Peripheral vascular disease ([facility] [address]     Primary osteoarthritis of both knees     Radicular pain of lumbosacral region     Spinal stenosis, lumbar region, without neurogenic claudication 04/30/2013    Tricuspid regurgitation 2018    mild-moderate on echo    Type II or unspecified type diabetes mellitus without mention of complication, not stated as uncontrolled     Dr. Margo Reinoso Unspecified sleep apnea     no cpap used anymore    URI (upper respiratory infection)     Wears dentures     upper and lower full dentures    Wears glasses     Wellness examination     Dr. Hien Qureshi -PCP last visit in early Oct. 2020       Past Surgical  History:     Past Surgical History:   Procedure Laterality Date    COLONOSCOPY  2/12/2009    normal    DILATION AND CURETTAGE OF UTERUS      GASTRECTOMY      partial    LUMBAR DISCECTOMY  01/2015    lumbar diskectomy    LUMBAR FUSION  11/19/2020     POSTERIOR FUSION L4/5,     LUMBAR FUSION N/A 11/19/2020    POSTERIOR FUSION L4/5, [employer], Kirby Harper, [facility]OKES #[id number] AMINA performed by Ramesh Winslow DO at [facility] Right 4/30/13    Lumbar Diagnostic Block,  Kenalog 40 mg    NERVE BLOCK  5/23/13    Lumbar Radiofrequency, Kenalog 40mg    NERVE BLOCK  8/12/13    Lt MBNB  celestone 6mg    NERVE BLOCK Left 8-28-13    left lumbar diagnostic block #2 decadron 10 mg    NERVE BLOCK Left 9-24-13    left lumbar median branch radiofrequency    NERVE BLOCK  07-02-14    caudal, celestone 9 mg    NERVE BLOCK  7-16-14    caudal epidural #2, celestone 9mg, fentanyl 25mcg    NERVE BLOCK  7/30/14    caudal #3 decadron 10mg    NERVE BLOCK  11-6-14    duramorph epidural steroid block  duramorph 1 mg celestone 9 mg    NERVE BLOCK  11/20/15    TENS- [employer]    NERVE BLOCK  07/20/2018    right transforminal # 1 decadron 10mg,isovue    NERVE BLOCK Bilateral 02/01/2019 bilat mbnb- no steroid    NERVE BLOCK Bilateral 02/08/2019    bilat mbnb, marcaine . 25%    WI KNEE SCOPE,DIAGNOSTIC Right 3/24/2017    KNEE ARTHROSCOPY WITH PARTIAL MEDIAL MENISECAL DEBRIDMENT  performed by Aaliyah Pang MD at [place] [address]  9 20 2007    UPPER GASTROINTESTINAL ENDOSCOPY  4 21 2009,04/2011    gastritis, esophagitis       Medications:      predniSONE  40 mg Oral Daily    budesonide-formoterol  2 puff Inhalation BID    potassium chloride  20 mEq Oral Once    piperacillin-tazobactam  4.5 g Intravenous Q8H    ipratropium-albuterol  1 ampule Inhalation Q4H While awake    insulin lispro  0-12 Units Subcutaneous TID WC    insulin lispro  0-6 Units Subcutaneous Nightly    sodium chloride flush  10 mL Intravenous 2 times per day    [Held by provider] amLODIPine  10 mg Oral Daily    atorvastatin  40 mg Oral Daily    azelastine  2 spray Each Nostril BID    carvedilol  6.25 mg Oral BID    docusate sodium  100 mg Oral Daily    DULoxetine  60 mg Oral Daily    gabapentin  300 mg Oral BID    [Held by provider] lisinopril-hydroCHLOROthiazide  1 tablet Oral Daily    [Held by provider] metFORMIN  500 mg Oral BID WC    mirtazapine  15 mg Oral Nightly    mirtazapine  30 mg Oral Nightly    omeprazole  20 mg Oral QAM    potassium chloride  20 mEq Oral Daily    tiZANidine  4 mg Oral BID    trospium  20 mg Oral BID    polyethylene glycol  17 g Oral Daily    magnesium hydroxide  30 mL Oral Daily    enoxaparin  40 mg Subcutaneous Daily    cetirizine  10 mg Oral Daily    gabapentin  600 mg Oral Nightly    nicotine  1 patch Transdermal Daily       Social History:     Social History     Socioeconomic History    Marital status: Single     Spouse name: Not on file    Number of children: Not on file    Years of education: Not on file    Highest education level: Not on file   Occupational History     Employer: DISABLED   Social Needs    Financial resource strain: Not very hard    Food insecurity     Worry: Never true     Inability: Never true    Transportation needs     Medical: No     Non-medical: No   Tobacco Use    Smoking status: Former Smoker     Packs/day: 0.25     Years: 36.00     Pack years: 9.00     Types: Cigarettes     Last attempt to quit: 10/30/2020     Years since quittin.0    Smokeless tobacco: Never Used    Tobacco comment: pt currently using nicotine patches   5 CIGARETTES A DAY   Substance and Sexual Activity    Alcohol use: No     Alcohol/week: 0.0 standard drinks     Frequency: Never     Binge frequency: Never    Drug use: No     Comment: 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 7 yrs    Sexual activity: Never   Lifestyle    Physical activity     Days per week: 0 days     Minutes per session: 0 min    Stress: Only a little   Relationships    Social connections     Talks on phone: More than three times a week     Gets together: Once a week     Attends Bahai service: More than 4 times per year     Active member of club or organization: No     Attends meetings of clubs or organizations: Never     Relationship status: Never     Intimate partner violence     Fear of current or ex partner: Not on file     Emotionally abused: Not on file     Physically abused: Not on file     Forced sexual activity: Not on file   Other Topics Concern    Not on file   Social History Narrative    10/9/20 Patient keeping contact with others to a minimum due to [place] Pandemic. 10/9/20 Patient has difficulty with ambulation due to DJD, stenosis and fibromyalgia       Family History:     Family History   Problem Relation Age of Onset    Diabetes Mother     Heart Disease Mother     Cirrhosis Father         Allergies:   Aspirin; Claritin [loratadine]; Flonase [fluticasone propionate]; and Morphine and related     Review of Systems:   Constitutional: Fevers, no chills.  No systemic complaints  Head: No headaches  Eyes: No double vision or blurry vision. No conjunctival inflammation. ENT: No sore throat or runny nose. . No hearing loss, tinnitus or vertigo. Cardiovascular: No chest pain or palpitations. Shortness of breath. GOMEZ. Orthopnea  Lung: Shortness of breath,positive for cough. No sputum production  Abdomen: No nausea, vomiting, diarrhea, or abdominal pain. Graylon Floyd No cramps. Genitourinary: No increased urinary frequency, or dysuria. No hematuria. No suprapubic or CVA pain  Musculoskeletal: No muscle aches or pains. No joint effusions, swelling or deformities  Hematologic: No bleeding or bruising. Neurologic: No headache, weakness, numbness, or tingling. Integument: No rash, no ulcers. Psychiatric: No depression. Endocrine: No polyuria, no polydipsia, no polyphagia. Physical Examination :     Patient Vitals for the past 8 hrs:   BP Temp Temp src Pulse Resp SpO2 Weight   11/27/20 0700 128/73 -- -- 83 17 100 % --   11/27/20 0600 133/75 -- -- 88 21 97 % --   11/27/20 0543 -- -- -- -- -- -- 182 lb 15.7 oz (83 kg)   11/27/20 0539 129/70 -- -- 81 16 -- --   11/27/20 0530 -- -- -- 92 19 99 % --   11/27/20 0500 -- -- -- 88 20 100 % --   11/27/20 0400 127/79 97.9 °F (36.6 °C) Oral 88 22 95 % --   11/27/20 0300 108/60 -- -- 78 19 100 % --   11/27/20 0200 111/67 -- -- 85 16 100 % --   11/27/20 0100 109/67 -- -- 86 16 100 % --   11/27/20 0000 115/71 98.1 °F (36.7 °C) Oral 92 20 100 % --     General Appearance: Awake, alert, and in apparent respiratory distress  Head:  Normocephalic, no trauma  Eyes: Pupils equal, round, reactive to light and accommodation; extraocular movements intact; sclera anicteric; conjunctivae pink. No embolic phenomena. ENT: Oropharynx clear, without erythema, exudate, or thrush. No tenderness of sinuses. Mouth/throat: mucosa pink and moist. No lesions. Dentition in good repair. Neck:Supple, without lymphadenopathy. Thyroid normal, No bruits. Pulmonary/Chest: Coarse breath sounds.    Cardiovascular: Regular rate and rhythm without murmurs, rubs, or gallops. Abdomen: Soft, non tender. Bowel sounds normal. No organomegaly  All four Extremities: No cyanosis, clubbing, edema, or effusions. Neurologic: No gross sensory or motor deficits. Skin: Warm and dry with good turgor. No signs of peripheral arterial or venous insufficiency. No ulcerations. No open wounds. Medical Decision Making -Laboratory:   I have independently reviewed/ordered the following labs:    CBC with Differential:   Recent Labs     11/26/20 0522 11/27/20 0410   WBC 12.9* 14.6*   HGB 9.4* 9.6*   HCT 30.8* 31.4*    339   LYMPHOPCT 12* 7*   MONOPCT 8* 4     BMP:   Recent Labs     11/26/20 0522 11/27/20 0410   * 137   K 4.0 4.6   CL 97* 100   CO2 27 26   BUN 16 12   CREATININE 0.62 0.48*   MG 2.3 2.6     Hepatic Function Panel:   Recent Labs     11/26/20 0522 11/27/20 0410   LABALBU 2.5* 2.8*     No results for input(s): RPR in the last 72 hours. No results for input(s): HIV in the last 72 hours. No results for input(s): BC in the last 72 hours. Lab Results   Component Value Date    MUCUS NOT REPORTED 11/24/2020    RBC 3.54 11/27/2020    RBC 4.57 02/16/2012    TRICHOMONAS NOT REPORTED 11/24/2020    WBC 14.6 11/27/2020    YEAST NOT REPORTED 11/24/2020    TURBIDITY CLEAR 11/24/2020     Lab Results   Component Value Date    CREATININE 0.48 11/27/2020    GLUCOSE 196 11/27/2020       Medical Decision Making-Imaging:     EXAMINATION:    CTA OF THE CHEST 11/22/2020 4:37 pm         TECHNIQUE:    CTA of the chest was performed after the administration of intravenous    contrast.  Multiplanar reformatted images are provided for review.  MIP    images are provided for review.  Dose modulation, iterative reconstruction,    and/or weight based adjustment of the mA/kV was utilized to reduce the    radiation dose to as low as reasonably achievable.         COMPARISON:    12/26/2019         HISTORY:    ORDERING SYSTEM PROVIDED HISTORY: tachycardia    TECHNOLOGIST PROVIDED HISTORY:    tachycardia    Reason for Exam: tachycardia         78-year-old female with tachycardia         FINDINGS:    Pulmonary Arteries: No obvious filling defect in the main, right main, or    left main pulmonary arteries.  Evaluation of the remainder of the pulmonary    arterial vasculature is severely limited to nondiagnostic due to respiratory    motion and suboptimal bolus timing as well as streak artifact from the    contrast bolus in the SVC.         Mediastinum: Prominent mediastinal lymph nodes without overt mediastinal,    axillary, or hilar lymphadenopathy.  Visualized thyroid gland grossly    unremarkable in appearance.  No pericardial effusion.  No periaortic or    mediastinal hemorrhage.         Lungs/pleura: Trachea and very proximal central airways appear patent. Consolidation of the bilateral lower lobes and partial consolidation of the    lingula.  Partial consolidation of the proximal right middle lobe.  Severe    emphysema.  Respiratory motion throughout the lungs.  No pneumothorax.         Upper Abdomen: Fatty liver.         Soft Tissues/Bones: Mild diffuse degenerative changes throughout the spine.              Impression    1. No clear evidence for central pulmonary embolus.  Evaluation of the    remainder of the pulmonary arterial vasculature is severely limited to    nondiagnostic as detailed above. 2. Bilateral lower lobe consolidation likely pneumonia.  Consolidation of the    proximal right middle lobe and lingula to a lesser degree.  Findings favored    to represent multifocal pneumonia.  Follow-up recommended to document    resolution. 3. Prominent mediastinal lymph nodes. 4. Fatty liver.     5. Severe emphysema.           11/26/2020 1:22 am         COMPARISON:    November 24, 2020 November 24, 2020         HISTORY:    ORDERING SYSTEM PROVIDED HISTORY: hypoxia    TECHNOLOGIST PROVIDED HISTORY:    hypoxia    Reason for Exam: difficulty breathing   upright port

## 2020-11-27 NOTE — CONSULTS
[facility] - Progress Note  Today's Date and Time: 11/27/2020, 11:26 AM    Impression :   Aspiration pneumonia  Bilateral bronchopneumonia  Acute hypoxic respiratory failure requiring high flow 02  S/P Lumbar fusion 11-19-20    Recommendations:   D/C Zosyn to avoid fluid overload  Cefoxitin  2 gm IV q 6 hr  Diuresis    Medical Decision Making/Summary/Discussion:11/27/2020       Infection Control Recommendations   [place] Precautions    Antimicrobial Stewardship Recommendations     Simplification of therapy  Targeted therapy  Coordination of Outpatient Care:   Estimated Length of IV antimicrobials: TBD  Patient will need Midline Catheter Insertion: no  Patient will need PICC line Insertion:no  Patient will need: Home IV , [place],  SNF,  LTAC: TBD  Patient will need outpatient wound care:No    Chief complaint/reason for consultation:   Pneumonia      History of Present Illness:   Savita Paula is a 56y.o.-year-old  female who was initially admitted on 11/19/2020. Patient seen at the request of Fernando Boo. INITIAL HISTORY:    The patient has a history of morbid obesity, COPD, JOAN, chronic hypoxic respiratory failure -on home oxygen. She was admitted because of right lower extremity pain extending from hip to foot.  She has known lumbar arthritis and had failed all conservative measures.  She underwent posterior fusion L4/5 surgery on 11/19/20.      During the postoperative period her respiratory status declined and she started spiking fevers. Her Chest x-ray and CT showed bilateral multifocal infiltrates suggestive of aspiration, areas of denser consolidation suggestive of bronchopneumonia and underlying severe emphysema. Patient received ceftriaxone, then zosyn since 12-24-20 with benefit. She is currently experiencing worsening of 02 requirements.     Plan:   D/C zosyn to avoid fluid and Na overload  Start cefoxitin     CURRENT EVALUATION : 11/27/2020    Patient evaluated and examined in the ICU. Afebrile  VS stable    Feeling better  Improvement respiratory wise. RR 20  Flow rate 3-  FIO2 100-->40 %  02 sat 94     Labs, X rays reviewed: 11/27/2020    BUN: 16-->12  Cr: 0.62-->0.48    WBC: 12.9-->14.6  Hb:9.4-->9.6  Plat: 314-->339    Cultures:  Urine:  11-22-20: No growth   11-19-20: No growth  Blood:  11-22-20: No growth    Sputum :    Wound:      MRSA screen: negative    Discussed with patient, RN, family. 11-26-20 11-22-20: I have personally reviewed the past medical history, past surgical history, medications, social history, and family history, and I have updated the database accordingly.   Past Medical History:     Past Medical History:   Diagnosis Date    Allergic rhinitis     Alveolar hypoventilation 11/20/2020    Aortic insufficiency 2018    mild-moderate on echo (was seen and [facility])    Bronchiectasis ([facility] [address] 11/20/2020    Bronchitis     Chronic back pain     [facility] at [facility]. COPD (chronic obstructive pulmonary disease) ([facility] [address]     Dr. Atul Roberts to see 11/09/2020    Depression     DM (diabetes mellitus) ([facility] [address] 12/18/2012    GERD (gastroesophageal reflux disease)     History of echocardiogram 05/2018    EF 65%, mild-moderate AI and TR    Hyperlipidemia     Dr. Kevan Downs Hypertension     Dr. Kevan Downs Obesity     Osteoarthritis     Peripheral vascular disease ([facility] [address]     Primary osteoarthritis of both knees     Radicular pain of lumbosacral region     Spinal stenosis, lumbar region, without neurogenic claudication 04/30/2013    Tricuspid regurgitation 2018    mild-moderate on echo    Type II or unspecified type diabetes mellitus without mention of complication, not stated as uncontrolled     Dr. Kevan Downs Unspecified sleep apnea     no cpap used anymore    URI (upper respiratory infection)     Wears dentures     upper and lower full dentures    Wears glasses     Wellness examination     Dr. Trudee Rinne -PCP last visit in early Oct. 2020       Past Surgical  History:     Past Surgical History:   Procedure Laterality Date    COLONOSCOPY  2/12/2009    normal    DILATION AND CURETTAGE OF UTERUS      GASTRECTOMY      partial    LUMBAR DISCECTOMY  01/2015    lumbar diskectomy    LUMBAR FUSION  11/19/2020     POSTERIOR FUSION L4/5,     LUMBAR FUSION N/A 11/19/2020    POSTERIOR FUSION L4/5, [employer], Xavier Doreen, EVOKES #[id number] AMINA performed by Justino Roca DO at [address] Right 4/30/13    Lumbar Diagnostic Block,  Kenalog 40 mg    NERVE BLOCK  5/23/13    Lumbar Radiofrequency, Kenalog 40mg    NERVE BLOCK  8/12/13    Lt MBNB  celestone 6mg    NERVE BLOCK Left 8-28-13    left lumbar diagnostic block #2 decadron 10 mg    NERVE BLOCK Left 9-24-13    left lumbar median branch radiofrequency    NERVE BLOCK  07-02-14    caudal, celestone 9 mg    NERVE BLOCK  7-16-14    caudal epidural #2, celestone 9mg, fentanyl 25mcg    NERVE BLOCK  7/30/14    caudal #3 decadron 10mg    NERVE BLOCK  11-6-14    duramorph epidural steroid block  duramorph 1 mg celestone 9 mg    NERVE BLOCK  11/20/15    TENS- Empi Select    NERVE BLOCK  07/20/2018    right transforminal # 1 decadron 10mg,isovue    NERVE BLOCK Bilateral 02/01/2019    bilat mbnb- no steroid    NERVE BLOCK Bilateral 02/08/2019    bilat mbnb, marcaine . 25%    CT KNEE SCOPE,DIAGNOSTIC Right 3/24/2017    KNEE ARTHROSCOPY WITH PARTIAL MEDIAL MENISECAL DEBRIDMENT  performed by Nirav Dee MD at [address] [facility]  9 20 2007    UPPER GASTROINTESTINAL ENDOSCOPY  4 21 2009,04/2011    gastritis, esophagitis       Medications:      cefOXitin  2 g Intravenous 4 times per day    predniSONE  40 mg Oral Daily    budesonide-formoterol  2 puff Inhalation BID    potassium chloride  20 mEq Oral Once    ipratropium-albuterol  1 ampule Inhalation Q4H While 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 7 yrs    Sexual activity: Never   Lifestyle    Physical activity     Days per week: 0 days     Minutes per session: 0 min    Stress: Only a little   Relationships    Social connections     Talks on phone: More than three times a week     Gets together: Once a week     Attends Anab[employer]ist service: More than 4 times per year     Active member of club or organization: No     Attends meetings of clubs or organizations: Never     Relationship status: Never     Intimate partner violence     Fear of current or ex partner: Not on file     Emotionally abused: Not on file     Physically abused: Not on file     Forced sexual activity: Not on file   Other Topics Concern    Not on file   Social History Narrative    10/9/20 Patient keeping contact with others to a minimum due to [place] 19 Pandemic. 10/9/20 Patient has difficulty with ambulation due to DJD, stenosis and fibromyalgia       Family History:     Family History   Problem Relation Age of Onset    Diabetes Mother     Heart Disease Mother     Cirrhosis Father         Allergies:   Aspirin; Claritin [loratadine]; Flonase [fluticasone propionate]; and Morphine and related     Review of Systems:   Constitutional: Fevers, no chills. No systemic complaints  Head: No headaches  Eyes: No double vision or blurry vision. No conjunctival inflammation. ENT: No sore throat or runny nose. . No hearing loss, tinnitus or vertigo. Cardiovascular: No chest pain or palpitations. Shortness of breath. GOMEZ. Orthopnea  Lung: Shortness of breath, cough. No sputum production  Abdomen: No nausea, vomiting, diarrhea, or abdominal pain. Benedetta Ou No cramps. Genitourinary: No increased urinary frequency, or dysuria. No hematuria. No suprapubic or CVA pain  Musculoskeletal: No muscle aches or pains. No joint effusions, swelling or deformities  Hematologic: No bleeding or bruising.   Neurologic: No headache, weakness, numbness, or tingling. Integument: No rash, no ulcers. Psychiatric: No depression. Endocrine: No polyuria, no polydipsia, no polyphagia. Physical Examination :     Patient Vitals for the past 8 hrs:   BP Temp Temp src Pulse Resp SpO2 Weight   11/27/20 1100 (!) 119/52 -- -- 90 20 94 % --   11/27/20 1057 -- -- -- -- 24 97 % --   11/27/20 1000 131/70 -- -- 90 20 99 % --   11/27/20 0900 128/64 -- -- 86 20 99 % --   11/27/20 0858 -- -- -- -- 17 97 % --   11/27/20 0800 125/64 97.8 °F (36.6 °C) Oral 83 17 98 % --   11/27/20 0700 128/73 -- -- 83 17 100 % --   11/27/20 0600 133/75 -- -- 88 21 97 % --   11/27/20 0543 -- -- -- -- -- -- 182 lb 15.7 oz (83 kg)   11/27/20 0539 129/70 -- -- 81 16 -- --   11/27/20 0530 -- -- -- 92 19 99 % --   11/27/20 0500 -- -- -- 88 20 100 % --   11/27/20 0400 127/79 97.9 °F (36.6 °C) Oral 88 22 95 % --     General Appearance: Awake, alert, and in apparent respiratory distress  Head:  Normocephalic, no trauma  Eyes: Pupils equal, round, reactive to light and accommodation; extraocular movements intact; sclera anicteric; conjunctivae pink. No embolic phenomena. ENT: Oropharynx clear, without erythema, exudate, or thrush. No tenderness of sinuses. Mouth/throat: mucosa pink and moist. No lesions. Dentition in good repair. Neck:Supple, without lymphadenopathy. Thyroid normal, No bruits. Pulmonary/Chest: Coarse breath sounds   Cardiovascular: Regular rate and rhythm without murmurs, rubs, or gallops. Abdomen: Soft, non tender. Bowel sounds normal. No organomegaly  All four Extremities: No cyanosis, clubbing, edema, or effusions. Neurologic: No gross sensory or motor deficits. Skin: Warm and dry with good turgor. No signs of peripheral arterial or venous insufficiency. No ulcerations. No open wounds.     Medical Decision Making -Laboratory:   I have independently reviewed/ordered the following labs:    CBC with Differential:   Recent Labs     11/26/20  0522 11/27/20  0410   WBC 12.9* 14.6*   HGB 9.4* 9.6*   HCT 30.8* 31.4*    339   LYMPHOPCT 12* 7*   MONOPCT 8* 4     BMP:   Recent Labs     11/26/20  0522 11/27/20  0410   * 137   K 4.0 4.6   CL 97* 100   CO2 27 26   BUN 16 12   CREATININE 0.62 0.48*   MG 2.3 2.6     Hepatic Function Panel:   Recent Labs     11/26/20  0522 11/27/20  0410   LABALBU 2.5* 2.8*     No results for input(s): RPR in the last 72 hours. No results for input(s): HIV in the last 72 hours. No results for input(s): BC in the last 72 hours. Lab Results   Component Value Date    MUCUS NOT REPORTED 11/24/2020    RBC 3.54 11/27/2020    RBC 4.57 02/16/2012    TRICHOMONAS NOT REPORTED 11/24/2020    WBC 14.6 11/27/2020    YEAST NOT REPORTED 11/24/2020    TURBIDITY CLEAR 11/24/2020     Lab Results   Component Value Date    CREATININE 0.48 11/27/2020    GLUCOSE 196 11/27/2020       Medical Decision Making-Imaging:     EXAMINATION:    CTA OF THE CHEST 11/22/2020 4:37 pm         TECHNIQUE:    CTA of the chest was performed after the administration of intravenous    contrast.  Multiplanar reformatted images are provided for review.  MIP    images are provided for review.  Dose modulation, iterative reconstruction,    and/or weight based adjustment of the mA/kV was utilized to reduce the    radiation dose to as low as reasonably achievable.         COMPARISON:    12/26/2019         HISTORY:    ORDERING SYSTEM PROVIDED HISTORY: tachycardia    TECHNOLOGIST PROVIDED HISTORY:    tachycardia    Reason for Exam: tachycardia         78-year-old female with tachycardia         FINDINGS:    Pulmonary Arteries: No obvious filling defect in the main, right main, or    left main pulmonary arteries.  Evaluation of the remainder of the pulmonary    arterial vasculature is severely limited to nondiagnostic due to respiratory    motion and suboptimal bolus timing as well as streak artifact from the    contrast bolus in the SVC.         Mediastinum: Prominent mediastinal lymph nodes without overt mediastinal,    axillary, or hilar lymphadenopathy.  Visualized thyroid gland grossly    unremarkable in appearance.  No pericardial effusion.  No periaortic or    mediastinal hemorrhage.         Lungs/pleura: Trachea and very proximal central airways appear patent. Consolidation of the bilateral lower lobes and partial consolidation of the    lingula.  Partial consolidation of the proximal right middle lobe.  Severe    emphysema.  Respiratory motion throughout the lungs.  No pneumothorax.         Upper Abdomen: Fatty liver.         Soft Tissues/Bones: Mild diffuse degenerative changes throughout the spine.              Impression    1. No clear evidence for central pulmonary embolus.  Evaluation of the    remainder of the pulmonary arterial vasculature is severely limited to    nondiagnostic as detailed above. 2. Bilateral lower lobe consolidation likely pneumonia.  Consolidation of the    proximal right middle lobe and lingula to a lesser degree.  Findings favored    to represent multifocal pneumonia.  Follow-up recommended to document    resolution. 3. Prominent mediastinal lymph nodes. 4. Fatty liver. 5. Severe emphysema.           11/26/2020 1:22 am         COMPARISON:    November 24, 2020 November 24, 2020         HISTORY:    ORDERING SYSTEM PROVIDED HISTORY: hypoxia    TECHNOLOGIST PROVIDED HISTORY:    hypoxia    Reason for Exam: difficulty breathing   upright port         FINDINGS:    Marginal inspiration is noted.  Borderline cardiomegaly is present.  Improved    aeration is seen within the right lung base.  Airspace disease within the    left lung base is minimally improved compared to studies dating back to    November 23, 2020.  No pneumothorax is noted.  Osseous structures are stable.              Impression    1. Minimally improved aeration, right lung base, suggesting resolving    pneumonia/atelectasis    2.  Persistent left lower lobe airspace disease, minimally improved        Medical Decision [id number]:       Medical Decision Making-Other:     Note:  Labs, medications, radiologic studies were reviewed with personal review of films  Large amounts of data were reviewed  Discussed with nursing Staff, Discharge planner  Infection Control and Prevention measures reviewed  All prior entries were reviewed  Administer medications as ordered  Prognosis: Guarded  Discharge planning reviewed  Follow up as outpatient. Thank you for allowing us to participate in the care of this patient. Please call with questions.     Katie Lezama MD  Pager: [phone] - Office: [phone]

## 2020-11-27 NOTE — PLAN OF CARE
(PRINZIDE;ZESTORETIC) 20-25 MG per tablet TAKE 1 TABLET EVERY DAY 9/22/20  Yes Neto Ny MD    MG capsule TAKE 1 CAPSULE EVERY DAY 7/7/20  Yes Neto Ny MD   meloxicam [facility]) 15 MG tablet Take 1 tablet by mouth daily 5/6/20 10/29/21 Yes Winnie Spurling, APRN - CNP   amLODIPine (NORVASC) 10 MG tablet Take 1 tablet by mouth daily 4/2/20  Yes Neto Ny MD   albuterol sulfate HFA (VENTOLIN HFA) 108 (90 Base) MCG/ACT inhaler Inhale 2 puffs into the lungs every 6 hours as needed for Wheezing 4/2/20  Yes Neto Ny MD   omeprazole (PRILOSEC) 20 MG delayed release capsule TAKE 1 CAPSULE EVERY DAY 1/10/20  Yes Neto Ny MD   diphenhydrAMINE (BENADRYL) 25 MG tablet Take 25 mg by mouth every 6 hours as needed for Itching   Yes Historical Provider, MD   nicotine (NICODERM CQ) 21 MG/24HR Place 1 patch onto the skin every 24 hours   Yes Historical Provider, MD   potassium chloride (KLOR-CON M) 10 MEQ extended release tablet Take 2 tablets by mouth daily 12/10/19  Yes MD Yesenia Mckenzie Passy 18 MCG inhalation capsule  10/16/19  Yes Historical Provider, MD   acetaminophen (TYLENOL) 500 MG tablet Take 1 tablet by mouth 4 times daily as needed for Pain 9/12/19  Yes Edilma Hernandez APRN - CNP   mirtazapine (REMERON) 30 MG tablet Take 30 mg by mouth nightly 5/22/18  Yes Historical Provider, MD   mirtazapine (REMERON) 15 MG tablet Take 15 mg by mouth nightly 5/22/18  Yes Historical Provider, MD   DULoxetine (CYMBALTA) 60 MG extended release capsule Take 1 capsule by mouth daily 2/1/18  Yes Neto Ny MD   Lancets MISC 1 each by Does not apply route daily 1/5/18  Yes Neto Ny MD   azelastine (ASTELIN) 0.1 % nasal spray 2 sprays by Nasal route 2 times daily Use in each nostril as directed 6/15/16  Yes Neto Ny MD   ipratropium-albuterol (DUONEB) 0.5-2.5 (3) MG/3ML SOLN nebulizer solution Inhale 3 mLs into the lungs every 6 hours as needed for Shortness of Breath 8/4/15   Hadley Padilla MD   .      Assessment         RR 22  Breath Sounds: diminished      Bronchodilator assessment at level  3  Hyperinflation assessment at level   Secretion Management assessment at level      []    Bronchodilator Assessment  BRONCHODILATOR ASSESSMENT SCORE  Score 0 1 2 3 4 5   Breath Sounds   []  Patient Baseline []  No Wheeze good aeration []  Faint, scattered wheezing, good aeration [x]  Expiratory Wheezing and or moderately diminished []  Insp/Exp wheeze and/or very diminished []  Insp/Exp and/ or marked distress   Respiratory Rate   []  Patient Baseline []  Less than 20 []  Less than 20 [x]  20-25 []  Greater than 25 []  Greater than 25   Peak flow % of Pred or PB [x]  NA   []  Greater than 90%  []  81-90% []  71-80% []  Less than or equal to 70%  or unable to perform []  Unable due to Respiratory Distress   Dyspnea re []  Patient Baseline []  No SOB []  No SOB [x]  SOB on exertion []  SOB min activity []  At rest/acute   e FEV% Predicted       [x]  NA []  Above 69%  []  Unable []  Above 60-69%  []  Unable []  Above 50-59%  []  Unable []  Above 35-49%  []  Unable []  Less than 35%  []  Unable

## 2020-11-27 NOTE — PROGRESS NOTES
Critical Care Team - Daily Progress Note      Date and time: 2020 7:16 AM  Patient's name:  [facility] Record Number: [id number]  Patient's account/billing number: [de-identified]  Patient's YOB: 1960  Age: 56 y.o. Date of Admission: 2020  5:32 AM  Length of stay during current admission: 8      Primary Care Physician: Viktor Sen MD  ICU Attending Physician: Dr. Matias Baeza    Code Status: Full Code    Reason for ICU admission: No chief complaint on file. Subjective:     OVERNIGHT EVENTS:       No acute events overnight. - Alert, oriented in time place person. - Overnight BiPap- FiO2: 50%, saturating 99%  - On highflow oxygen via NC- 35l with FiO2 8%. Will wean her down on FiO2  - vitally stable BP: 128/73, RR: 20, HR: 83 and afebrile  - worsening for neutrophilic leukocytosis could be steriods induced: 11.8> 12.9> 14.6  - reports of productive cough and pleuritic chest pain   - pain in the back 8/10 this am   - denies chest pain, difficulty breathing, fever, chills, abdominal pain   - last bowel moment yesterday       REVIEW OF SYSTEMS:  · Constitutional: Negative for Fever, chills  · Eyes: Negative for visual changes, diplopia  · ENT: Negative for mouth sores, sore throat. · Cardiovascular: Negative for lightheadedness ,chest pain, palpitations   · Respiratory: positive for Shortness of breath,cough. · Gastrointestinal: Negative for nausea/vomiting, change in bowel habits, abdominal pain  · Genitourinary:Negative for change in bladder habits, dysuria, hematuria.   · Musculoskeletal: Negative for joint pain   · Neurological: Negative for headache, change in muscle strength numbness/tingling      OBJECTIVE:     VITAL SIGNS:  /73   Pulse 83   Temp 97.9 °F (36.6 °C) (Oral)   Resp 17   Ht 5' 5\" (1.651 m)   Wt 182 lb 15.7 oz (83 kg)   LMP 2003   SpO2 100%   BMI 30.45 kg/m²   Tmax over 24 hours:  Temp (24hrs), Av.5 °F (36.9 °C), Min:97.9 °F (36.6 °C), Max:98.8 °F (37.1 °C)      Patient Vitals for the past 8 hrs:   BP Temp Temp src Pulse Resp SpO2 Weight   11/27/20 0700 128/73 -- -- 83 17 100 % --   11/27/20 0600 133/75 -- -- 88 21 97 % --   11/27/20 0543 -- -- -- -- -- -- 182 lb 15.7 oz (83 kg)   11/27/20 0539 129/70 -- -- 81 16 -- --   11/27/20 0530 -- -- -- 92 19 99 % --   11/27/20 0500 -- -- -- 88 20 100 % --   11/27/20 0400 127/79 97.9 °F (36.6 °C) Oral 88 22 95 % --   11/27/20 0300 108/60 -- -- 78 19 100 % --   11/27/20 0200 111/67 -- -- 85 16 100 % --   11/27/20 0100 109/67 -- -- 86 16 100 % --   11/27/20 0000 115/71 98.1 °F (36.7 °C) Oral 92 20 100 % --         Intake/Output Summary (Last 24 hours) at 11/27/2020 0716  Last data filed at 11/27/2020 0500  Gross per 24 hour   Intake 1712.52 ml   Output 2085 ml   Net -372.48 ml     Date 11/27/20 0000 - 11/27/20 2359   Shift 0909-3045 0551-2669 4043-1609 24 Hour Total   INTAKE   I.V.(mL/kg) 447. 5(5.4)   447. 5(5.4)   Shift Total(mL/kg) 447. 5(5.4)   447. 5(5.4)   OUTPUT   Urine(mL/kg/hr) 600   600   Shift Total(mL/kg) 600(7.2)   600(7.2)   Weight (kg) 83 83 83 83     Wt Readings from Last 3 Encounters:   11/27/20 182 lb 15.7 oz (83 kg)   11/05/20 185 lb (83.9 kg)   09/22/20 188 lb (85.3 kg)     Body mass index is 30.45 kg/m². PHYSICAL EXAM:  Constitutional: not in acute distress  HEENT: PERRLA, EOMI, sclera clear, anicteric  Respiratory: b/l rhonchi more on the right side. Cardiovascular: regular rate and rhythm, normal S1, S2,   Abdomen: soft, nontender, nondistended, no masses or organomegaly  NEUROLOGIC: Awake, alert, oriented to name, place and time. Cranial nerves II-XII are grossly intact. Motor is 5 out of 5 bilaterally. Sensory is intact. Extremities:  peripheral pulses normal, no pedal edema,.       Any additional physical findings:      MEDICATIONS:  Scheduled Meds:   predniSONE  40 mg Oral Daily    budesonide-formoterol  2 puff Inhalation BID    potassium chloride  20 mEq Oral Once  piperacillin-tazobactam  4.5 g Intravenous Q8H    ipratropium-albuterol  1 ampule Inhalation Q4H While awake    insulin lispro  0-12 Units Subcutaneous TID     insulin lispro  0-6 Units Subcutaneous Nightly    sodium chloride flush  10 mL Intravenous 2 times per day    [Held by provider] amLODIPine  10 mg Oral Daily    atorvastatin  40 mg Oral Daily    azelastine  2 spray Each Nostril BID    carvedilol  6.25 mg Oral BID    docusate sodium  100 mg Oral Daily    DULoxetine  60 mg Oral Daily    gabapentin  300 mg Oral BID    [Held by provider] lisinopril-hydroCHLOROthiazide  1 tablet Oral Daily    [Held by provider] metFORMIN  500 mg Oral BID     mirtazapine  15 mg Oral Nightly    mirtazapine  30 mg Oral Nightly    omeprazole  20 mg Oral QAM    potassium chloride  20 mEq Oral Daily    tiZANidine  4 mg Oral BID    trospium  20 mg Oral BID    polyethylene glycol  17 g Oral Daily    magnesium hydroxide  30 mL Oral Daily    enoxaparin  40 mg Subcutaneous Daily    cetirizine  10 mg Oral Daily    gabapentin  600 mg Oral Nightly    nicotine  1 patch Transdermal Daily     Continuous Infusions:   lactated ringers 50 mL/hr at 11/26/20 1226    dextrose       PRN Meds:   albuterol, 2.5 mg, Q6H PRN  magic (miracle) mouthwash, 5 mL, 4x Daily PRN  acetaminophen, 650 mg, Q4H PRN  benzonatate, 100 mg, TID PRN  diphenhydrAMINE, 25 mg, Q6H PRN  sodium chloride flush, 10 mL, PRN  oxyCODONE, 5 mg, Q4H PRN    Or  oxyCODONE, 10 mg, Q4H PRN  morphine, 2 mg, Q2H PRN  senna, 1 tablet, Daily PRN  glucose, 15 g, PRN  dextrose, 12.5 g, PRN  glucagon (rDNA), 1 mg, PRN  dextrose, 100 mL/hr, PRN      SUPPORT DEVICES: [] Ventilator [x] BIPAP  [] Nasal Cannula [] Room Air    VENT SETTINGS (Comprehensive) (if applicable):  Vent Information  Skin Assessment: Clean, dry, & intact  Vt Ordered: 570 mL  FiO2 : 50 %  SpO2: 100 %  SpO2/FiO2 ratio: 108.89  I Time/ I Time %: 0.85 s  Mask Type: Full face mask  Mask Size: Medium  Additional Respiratory  Assessments  Pulse: 83  Resp: 17  SpO2: 100 %    ABGs:     Lab Results   Component Value Date    BEJ3QOZ 34 11/26/2020    FIO2 100.0 11/26/2020       DATA:  Complete Blood Count:   Recent Labs     11/25/20 0518 11/26/20 0522 11/27/20  0410   WBC 11.8* 12.9* 14.6*   HGB 9.9* 9.4* 9.6*   MCV 88.2 90.3 88.7    314 339   RBC 3.64* 3.41* 3.54*   HCT 32.1* 30.8* 31.4*   MCH 27.2 27.6 27.1   MCHC 30.8 30.5 30.6   RDW 13.2 13.8 13.2   MPV 10.0 10.2 9.6        Basal Metabolic Profile:   Recent Labs     11/25/20 0518 11/26/20 0522 11/27/20  0410   * 133* 137   K 3.6* 4.0 4.6   BUN 27* 16 12   CREATININE 0.75 0.62 0.48*   CL 95* 97* 100   CO2 27 27 26      Magnesium:   Lab Results   Component Value Date    MG 2.6 11/27/2020    MG 2.3 11/26/2020    MG 2.4 11/25/2020     Phosphorus:   Lab Results   Component Value Date    PHOS 2.8 11/27/2020    PHOS 3.0 11/26/2020    PHOS 3.1 11/25/2020     S.  Calcium:  Recent Labs     11/27/20  0410   CALCIUM 8.7       Urinalysis:   Lab Results   Component Value Date    NITRU NEGATIVE 11/24/2020    COLORU DARK YELLOW 11/24/2020    PHUR 6.5 11/24/2020    WBCUA 0 TO 2 11/24/2020    RBCUA 2 TO 5 11/24/2020    MUCUS NOT REPORTED 11/24/2020    TRICHOMONAS NOT REPORTED 11/24/2020    YEAST NOT REPORTED 11/24/2020    BACTERIA NOT REPORTED 11/24/2020    CLARITYU clear 07/26/2017    SPECGRAV 1.027 11/24/2020    LEUKOCYTESUR TRACE 11/24/2020    UROBILINOGEN Normal 11/24/2020    BILIRUBINUR NEGATIVE 11/24/2020    BILIRUBINUR negative 07/26/2017    BLOODU trace 07/26/2017    GLUCOSEU NEGATIVE 11/24/2020    KETUA TRACE 11/24/2020    AMORPHOUS NOT REPORTED 11/24/2020       LFTS  Recent Labs     11/24/20  0956 11/25/20  0518 11/26/20  0522 11/27/20  0410   LABALBU 2.5* 2.6* 2.5* 2.8*       Last 3 Blood Glucose:   Recent Labs     11/24/20  0956 11/25/20  0518 11/26/20  0522 11/27/20  0410   GLUCOSE 102* 121* 99 196*      HgBA1c:    Lab Results   Component Value Date    LABA1C 5.8 04/21/2020         TSH:    Lab Results   Component Value Date    TSH 0.94 07/19/2017       Cultures during this admission:     Blood cultures:                 [] None drawn      [x] Negative             []  Positive (Details:  )  Urine Culture:                   [] None drawn      [x] Negative             []  Positive (Details:  )  Sputum Culture:               [] None drawn       [] Negative             []  Positive (Details:  )   Endotracheal aspirate:     [x] None drawn       [] Negative             []  Positive (Details:  )        ASSESSMENT:     Principal Problem:    Centrilobular emphysema (HCC)  Active Problems:    COPD, severity to be determined ([facility]    HTN (hypertension)    History of tobacco use    GERD (gastroesophageal reflux disease)    Chronic respiratory failure with hypoxia (HCC)    Spondylosis of lumbar region without myelopathy or radiculopathy    Lumbar disc disease    Alveolar hypoventilation    Obesity (BMI 30-39. 9)    Bronchiectasis ([facility]    Oxygen dependent    Acute postoperative anemia due to expected blood loss    Multifocal pneumonia  Resolved Problems:    * No resolved hospital problems. *        PLAN:       1. Aspiration Pneumonia vs Bacterial Pneumonia: Improving. B/L lower lobe consolidation, neutrophilic [id number] ID following. Switch zosyn to  Cefoxitin to avoid fluid overload. 2. Acute on chronic Hypoxic respiratory failure: due to #1 and #3. Improving  On high flow oxygen   3. COPD exacerbation: Precipitated by pneumonia. On symbicort, duo neb, cetirizine and PRN albuterol. Continue prednisolone 40 mg    4. Radiculopathy: s/p Lumbar fusion  L4/ L5 surgery on 11/19. Continue Cymbalta and Neurontin. Neurosurgery following. Recommend PT/OT and pain management   5. Post Ob Pain: continue PRN Roxicodone  6. Essential Hypertension: Held Norvasc and lisinopril-HCTZ due to low BP  7. HFpEF: EF:65% 05/2018.  stable. on Lipitor and coreg   8.  Hyperlipidemia: stable. on Lipitor  9. Diabetes Mellitus type II: controlled. HbA1C: 5.8% 04/2020 On medium dose ISS  10. Mood disorder: On mirtazapine   11. GERD: stable- continue Prilosec  12. Chronic Hypokalemia: On potassium chloride 20 meq daily  13. DVT Prophylaxis: On Lovenox 40 mg     PT/OT consulted  On dental soft diet with high calorie nutrition supplement          Sergio Lambert M.D.              Department of Internal Medicine/ Critical care  [facility])             11/27/2020, 7:16 AM

## 2020-11-28 PROBLEM — J96.21 ACUTE ON CHRONIC RESPIRATORY FAILURE WITH HYPOXIA (HCC): Status: ACTIVE | Noted: 2020-11-28

## 2020-11-28 LAB
ABSOLUTE EOS #: 0 K/UL (ref 0–0.4)
ABSOLUTE IMMATURE GRANULOCYTE: 1.58 K/UL (ref 0–0.3)
ABSOLUTE LYMPH #: 3.17 K/UL (ref 1–4.8)
ABSOLUTE MONO #: 1.85 K/UL (ref 0.1–0.8)
ALBUMIN SERPL-MCNC: 2.8 G/DL (ref 3.5–5.2)
ANION GAP SERPL CALCULATED.3IONS-SCNC: 11 MMOL/L (ref 9–17)
BASOPHILS # BLD: 0 % (ref 0–2)
BASOPHILS ABSOLUTE: 0 K/UL (ref 0–0.2)
BUN BLDV-MCNC: 11 MG/DL (ref 8–23)
BUN/CREAT BLD: ABNORMAL (ref 9–20)
CALCIUM SERPL-MCNC: 8.5 MG/DL (ref 8.6–10.4)
CHLORIDE BLD-SCNC: 102 MMOL/L (ref 98–107)
CO2: 27 MMOL/L (ref 20–31)
CREAT SERPL-MCNC: 0.53 MG/DL (ref 0.5–0.9)
CULTURE: NORMAL
CULTURE: NORMAL
DIFFERENTIAL TYPE: ABNORMAL
EOSINOPHILS RELATIVE PERCENT: 0 % (ref 1–4)
GFR AFRICAN AMERICAN: >60 ML/MIN
GFR NON-AFRICAN AMERICAN: >60 ML/MIN
GFR SERPL CREATININE-BSD FRML MDRD: ABNORMAL ML/MIN/{1.73_M2}
GFR SERPL CREATININE-BSD FRML MDRD: ABNORMAL ML/MIN/{1.73_M2}
GLUCOSE BLD-MCNC: 126 MG/DL (ref 65–105)
GLUCOSE BLD-MCNC: 137 MG/DL (ref 65–105)
GLUCOSE BLD-MCNC: 144 MG/DL (ref 65–105)
GLUCOSE BLD-MCNC: 159 MG/DL (ref 65–105)
GLUCOSE BLD-MCNC: 172 MG/DL (ref 70–99)
HCT VFR BLD CALC: 32.4 % (ref 36.3–47.1)
HEMOGLOBIN: 10 G/DL (ref 11.9–15.1)
IMMATURE GRANULOCYTES: 6 %
LYMPHOCYTES # BLD: 12 % (ref 24–44)
Lab: NORMAL
Lab: NORMAL
MAGNESIUM: 2 MG/DL (ref 1.6–2.6)
MCH RBC QN AUTO: 27.4 PG (ref 25.2–33.5)
MCHC RBC AUTO-ENTMCNC: 30.9 G/DL (ref 28.4–34.8)
MCV RBC AUTO: 88.8 FL (ref 82.6–102.9)
MONOCYTES # BLD: 7 % (ref 1–7)
MORPHOLOGY: NORMAL
NRBC AUTOMATED: 0.1 PER 100 WBC
PDW BLD-RTO: 13.4 % (ref 11.8–14.4)
PHOSPHORUS: 1.5 MG/DL (ref 2.6–4.5)
PLATELET # BLD: 449 K/UL (ref 138–453)
PLATELET ESTIMATE: ABNORMAL
PMV BLD AUTO: 9.9 FL (ref 8.1–13.5)
POTASSIUM SERPL-SCNC: 4 MMOL/L (ref 3.7–5.3)
RBC # BLD: 3.65 M/UL (ref 3.95–5.11)
RBC # BLD: ABNORMAL 10*6/UL
SEG NEUTROPHILS: 75 % (ref 36–66)
SEGMENTED NEUTROPHILS ABSOLUTE COUNT: 19.8 K/UL (ref 1.8–7.7)
SODIUM BLD-SCNC: 140 MMOL/L (ref 135–144)
SPECIMEN DESCRIPTION: NORMAL
SPECIMEN DESCRIPTION: NORMAL
WBC # BLD: 26.4 K/UL (ref 3.5–11.3)
WBC # BLD: ABNORMAL 10*3/UL

## 2020-11-28 PROCEDURE — 6370000000 HC RX 637 (ALT 250 FOR IP): Performed by: STUDENT IN AN ORGANIZED HEALTH CARE EDUCATION/TRAINING PROGRAM

## 2020-11-28 PROCEDURE — 97110 THERAPEUTIC EXERCISES: CPT

## 2020-11-28 PROCEDURE — 6370000000 HC RX 637 (ALT 250 FOR IP): Performed by: INTERNAL MEDICINE

## 2020-11-28 PROCEDURE — 6360000002 HC RX W HCPCS: Performed by: INTERNAL MEDICINE

## 2020-11-28 PROCEDURE — 83735 ASSAY OF MAGNESIUM: CPT

## 2020-11-28 PROCEDURE — 6360000002 HC RX W HCPCS: Performed by: STUDENT IN AN ORGANIZED HEALTH CARE EDUCATION/TRAINING PROGRAM

## 2020-11-28 PROCEDURE — 2580000003 HC RX 258: Performed by: STUDENT IN AN ORGANIZED HEALTH CARE EDUCATION/TRAINING PROGRAM

## 2020-11-28 PROCEDURE — 36415 COLL VENOUS BLD VENIPUNCTURE: CPT

## 2020-11-28 PROCEDURE — 99232 SBSQ HOSP IP/OBS MODERATE 35: CPT | Performed by: INTERNAL MEDICINE

## 2020-11-28 PROCEDURE — 99024 POSTOP FOLLOW-UP VISIT: CPT | Performed by: NEUROLOGICAL SURGERY

## 2020-11-28 PROCEDURE — 2700000000 HC OXYGEN THERAPY PER DAY

## 2020-11-28 PROCEDURE — 2580000003 HC RX 258: Performed by: INTERNAL MEDICINE

## 2020-11-28 PROCEDURE — 94640 AIRWAY INHALATION TREATMENT: CPT

## 2020-11-28 PROCEDURE — 82947 ASSAY GLUCOSE BLOOD QUANT: CPT

## 2020-11-28 PROCEDURE — 80069 RENAL FUNCTION PANEL: CPT

## 2020-11-28 PROCEDURE — 6370000000 HC RX 637 (ALT 250 FOR IP): Performed by: NURSE PRACTITIONER

## 2020-11-28 PROCEDURE — 2060000000 HC ICU INTERMEDIATE R&B

## 2020-11-28 PROCEDURE — 94761 N-INVAS EAR/PLS OXIMETRY MLT: CPT

## 2020-11-28 PROCEDURE — 6360000002 HC RX W HCPCS: Performed by: NURSE PRACTITIONER

## 2020-11-28 PROCEDURE — 85025 COMPLETE CBC W/AUTO DIFF WBC: CPT

## 2020-11-28 PROCEDURE — 97116 GAIT TRAINING THERAPY: CPT

## 2020-11-28 RX ORDER — IPRATROPIUM BROMIDE AND ALBUTEROL SULFATE 2.5; .5 MG/3ML; MG/3ML
1 SOLUTION RESPIRATORY (INHALATION)
Status: DISCONTINUED | OUTPATIENT
Start: 2020-11-28 | End: 2020-12-01

## 2020-11-28 RX ORDER — LACTOBACILLUS RHAMNOSUS GG 10B CELL
1 CAPSULE ORAL
Status: DISCONTINUED | OUTPATIENT
Start: 2020-11-29 | End: 2020-12-02 | Stop reason: HOSPADM

## 2020-11-28 RX ORDER — HYDROCHLOROTHIAZIDE 25 MG/1
25 TABLET ORAL DAILY
Status: DISCONTINUED | OUTPATIENT
Start: 2020-11-29 | End: 2020-12-02 | Stop reason: HOSPADM

## 2020-11-28 RX ADMIN — PREDNISONE 40 MG: 20 TABLET ORAL at 07:57

## 2020-11-28 RX ADMIN — TROSPIUM CHLORIDE 20 MG: 20 TABLET, FILM COATED ORAL at 21:53

## 2020-11-28 RX ADMIN — CETIRIZINE HYDROCHLORIDE 10 MG: 10 TABLET ORAL at 07:58

## 2020-11-28 RX ADMIN — IPRATROPIUM BROMIDE AND ALBUTEROL SULFATE 1 AMPULE: .5; 3 SOLUTION RESPIRATORY (INHALATION) at 11:32

## 2020-11-28 RX ADMIN — OXYCODONE HYDROCHLORIDE 10 MG: 5 TABLET ORAL at 21:59

## 2020-11-28 RX ADMIN — GABAPENTIN 600 MG: 300 CAPSULE ORAL at 21:53

## 2020-11-28 RX ADMIN — BUDESONIDE AND FORMOTEROL FUMARATE DIHYDRATE 2 PUFF: 160; 4.5 AEROSOL RESPIRATORY (INHALATION) at 08:02

## 2020-11-28 RX ADMIN — CEFOXITIN SODIUM 2 G: 2 POWDER, FOR SOLUTION INTRAVENOUS at 06:27

## 2020-11-28 RX ADMIN — CEFOXITIN SODIUM 2 G: 2 POWDER, FOR SOLUTION INTRAVENOUS at 11:52

## 2020-11-28 RX ADMIN — CARVEDILOL 6.25 MG: 6.25 TABLET, FILM COATED ORAL at 07:58

## 2020-11-28 RX ADMIN — IPRATROPIUM BROMIDE AND ALBUTEROL SULFATE 1 AMPULE: .5; 3 SOLUTION RESPIRATORY (INHALATION) at 07:55

## 2020-11-28 RX ADMIN — MAGNESIUM HYDROXIDE 30 ML: 400 SUSPENSION ORAL at 08:01

## 2020-11-28 RX ADMIN — IPRATROPIUM BROMIDE AND ALBUTEROL SULFATE 1 AMPULE: .5; 3 SOLUTION RESPIRATORY (INHALATION) at 15:34

## 2020-11-28 RX ADMIN — TIZANIDINE 4 MG: 4 TABLET ORAL at 07:57

## 2020-11-28 RX ADMIN — ENOXAPARIN SODIUM 40 MG: 40 INJECTION SUBCUTANEOUS at 08:01

## 2020-11-28 RX ADMIN — SODIUM CHLORIDE, PRESERVATIVE FREE 10 ML: 5 INJECTION INTRAVENOUS at 22:02

## 2020-11-28 RX ADMIN — ALBUTEROL SULFATE 2.5 MG: 2.5 SOLUTION RESPIRATORY (INHALATION) at 21:36

## 2020-11-28 RX ADMIN — OXYCODONE HYDROCHLORIDE 10 MG: 5 TABLET ORAL at 06:15

## 2020-11-28 RX ADMIN — OXYCODONE HYDROCHLORIDE 10 MG: 5 TABLET ORAL at 11:56

## 2020-11-28 RX ADMIN — CEFOXITIN SODIUM 2 G: 2 POWDER, FOR SOLUTION INTRAVENOUS at 17:18

## 2020-11-28 RX ADMIN — MIRTAZAPINE 15 MG: 15 TABLET, FILM COATED ORAL at 21:54

## 2020-11-28 RX ADMIN — CEFOXITIN SODIUM 2 G: 2 POWDER, FOR SOLUTION INTRAVENOUS at 00:30

## 2020-11-28 RX ADMIN — MIRTAZAPINE 30 MG: 15 TABLET, FILM COATED ORAL at 21:53

## 2020-11-28 RX ADMIN — CARVEDILOL 6.25 MG: 6.25 TABLET, FILM COATED ORAL at 21:52

## 2020-11-28 RX ADMIN — OMEPRAZOLE 20 MG: 20 CAPSULE, DELAYED RELEASE ORAL at 07:57

## 2020-11-28 RX ADMIN — POTASSIUM CHLORIDE 20 MEQ: 1500 TABLET, EXTENDED RELEASE ORAL at 07:57

## 2020-11-28 RX ADMIN — GABAPENTIN 300 MG: 300 CAPSULE ORAL at 07:57

## 2020-11-28 RX ADMIN — GABAPENTIN 300 MG: 300 CAPSULE ORAL at 14:23

## 2020-11-28 RX ADMIN — TIZANIDINE 4 MG: 4 TABLET ORAL at 21:53

## 2020-11-28 RX ADMIN — SODIUM CHLORIDE, POTASSIUM CHLORIDE, SODIUM LACTATE AND CALCIUM CHLORIDE: 600; 310; 30; 20 INJECTION, SOLUTION INTRAVENOUS at 07:14

## 2020-11-28 RX ADMIN — DESMOPRESSIN ACETATE 40 MG: 0.2 TABLET ORAL at 07:58

## 2020-11-28 RX ADMIN — DULOXETINE HYDROCHLORIDE 60 MG: 30 CAPSULE, DELAYED RELEASE ORAL at 07:57

## 2020-11-28 RX ADMIN — TROSPIUM CHLORIDE 20 MG: 20 TABLET, FILM COATED ORAL at 07:57

## 2020-11-28 RX ADMIN — INSULIN LISPRO 1 UNITS: 100 INJECTION, SOLUTION INTRAVENOUS; SUBCUTANEOUS at 21:54

## 2020-11-28 RX ADMIN — INSULIN LISPRO 2 UNITS: 100 INJECTION, SOLUTION INTRAVENOUS; SUBCUTANEOUS at 17:17

## 2020-11-28 ASSESSMENT — ENCOUNTER SYMPTOMS
SHORTNESS OF BREATH: 1
ABDOMINAL DISTENTION: 0
WHEEZING: 0
SORE THROAT: 0
BACK PAIN: 1
APNEA: 0
NAUSEA: 0
CHEST TIGHTNESS: 0
ABDOMINAL PAIN: 0
COUGH: 1
EYE DISCHARGE: 0
EYE REDNESS: 0
DIARRHEA: 0
VOMITING: 0
STRIDOR: 0
CONSTIPATION: 0

## 2020-11-28 ASSESSMENT — PAIN SCALES - GENERAL
PAINLEVEL_OUTOF10: 8
PAINLEVEL_OUTOF10: 10
PAINLEVEL_OUTOF10: 10
PAINLEVEL_OUTOF10: 6
PAINLEVEL_OUTOF10: 8

## 2020-11-28 ASSESSMENT — PAIN DESCRIPTION - ORIENTATION: ORIENTATION: LEFT;RIGHT

## 2020-11-28 ASSESSMENT — PAIN DESCRIPTION - LOCATION: LOCATION: BACK;LEG

## 2020-11-28 NOTE — PROGRESS NOTES
[facility]  Office: [address], DO, Escobar Saleh, DO, Jai Fatuo, DO, Steffen Crum Blood, DO, Mary Lowe MD, Dav Armijo MD, Prasanna Olivarez MD, Nilay Sarah MD, Maverick Summers MD, Richie Krause MD, Eduin Rogers MD, Yanci Astorga MD, Franklin Nickerson MD, Kana Raymundo, DO, Asim Frazier MD, Lis Quintanilla MD, Christin Trejo DO, Matt Cardozo MD,  Deepti Song, DO, Herb Simental MD, Christopher Marquez MD, Kade Keys, [facility], [facility], CNP, Kassandra Sharma, CNP, Jacqui Marquez, CNS, Carlos Alberto Martinez, CNP, Shayna Colon, CNP, Donald Solo, CNP, Kayla Marcano, CNP, Oliver Miranda, CNP, Catheryn Leventhal, PA-C, Leida Birmingham, [facility], Adán Huang, CNP, Cornelius Mendoza, CNP, Kimberlee Arzate, CNP, Teofilo Andre, CNP, Caitlin Hutton, [address] [place] [address]    ICU transfer note/progress Note    11/28/2020    2:39 PM    Name:   Roscoe Jeronimo  MRN:     [id number]     Acct:      [de-identified]   Room:   [address] [place] [address] Day:  9  Admit Date:  11/19/2020  5:32 AM    PCP:   Elba Cho MD  Code Status:  Full Code    Subjective:     C/C: Shortness of breath  Interval History Status:     Patient was transferred out of ICU last night. Currently feeling better. Still on nasal cannula oxygen. Chronically uses 2 L of nasal cannula at home. Having some diarrhea today. Otherwise denies any chest pain. Echocardiogram was reviewed and showed an ejection fraction of 60%. Brief History: This is 56years old female who was admitted to the ICU after lumbar fusion surgery. Patient has chronic respiratory failure due to COPD and is on home oxygen with 2 L of nasal cannula. Postoperatively patient developed aspiration pneumonia and atelectasis and she was transferred to ICU. She was placed on high flow oxygen. Infectious disease was consulted and she was placed on antibiotics which was then changed to cefoxitin.   She was also given steroids. She was then weaned down to 5 L of nasal cannula and then transferred to floor. Review of Systems:     Constitutional:  negative for chills, fevers, sweats  Respiratory: Positive for cough and some shortness of breath which is improving  Cardiovascular:  negative for chest pain, chest pressure/discomfort, lower extremity edema, palpitations  Gastrointestinal: Negative for abdominal pain, positive for diarrhea  Neurological:  negative for dizziness, headache    Medications: Allergies:     Allergies   Allergen Reactions    Aspirin      DUE TO ULCER    Claritin [Loratadine] Other (See Comments)     coughing    Flonase [Fluticasone Propionate]     Morphine And Related      GI Upset       Current Meds:   Scheduled Meds:    [START ON 11/29/2020] lactobacillus  1 capsule Oral Daily with breakfast    [START ON 11/29/2020] hydroCHLOROthiazide  25 mg Oral Daily    cefOXitin  2 g Intravenous 4 times per day    predniSONE  40 mg Oral Daily    budesonide-formoterol  2 puff Inhalation BID    potassium chloride  20 mEq Oral Once    ipratropium-albuterol  1 ampule Inhalation Q4H While awake    insulin lispro  0-12 Units Subcutaneous TID WC    insulin lispro  0-6 Units Subcutaneous Nightly    sodium chloride flush  10 mL Intravenous 2 times per day    [Held by provider] amLODIPine  10 mg Oral Daily    atorvastatin  40 mg Oral Daily    azelastine  2 spray Each Nostril BID    carvedilol  6.25 mg Oral BID    docusate sodium  100 mg Oral Daily    DULoxetine  60 mg Oral Daily    gabapentin  300 mg Oral BID    [Held by provider] lisinopril-hydroCHLOROthiazide  1 tablet Oral Daily    [Held by provider] metFORMIN  500 mg Oral BID     mirtazapine  15 mg Oral Nightly    mirtazapine  30 mg Oral Nightly    omeprazole  20 mg Oral QAM    potassium chloride  20 mEq Oral Daily    tiZANidine  4 mg Oral BID    trospium  20 mg Oral BID    polyethylene glycol  17 g Oral Daily    magnesium hydroxide  30 mL Oral Daily    enoxaparin  40 mg Subcutaneous Daily    cetirizine  10 mg Oral Daily    gabapentin  600 mg Oral Nightly    nicotine  1 patch Transdermal Daily     Continuous Infusions:    dextrose       PRN Meds: albuterol, magic (miracle) mouthwash, acetaminophen, benzonatate, diphenhydrAMINE, sodium chloride flush, oxyCODONE **OR** oxyCODONE, morphine **OR** [DISCONTINUED] morphine, senna, glucose, dextrose, glucagon (rDNA), dextrose    Data:     Past Medical History:   has a past medical history of Allergic rhinitis, Alveolar hypoventilation, Aortic insufficiency, Bronchiectasis (HCC), Bronchitis, Chronic back pain, COPD (chronic obstructive pulmonary disease) ([facility] Depression, DM (diabetes mellitus) [facility] GERD (gastroesophageal reflux disease), History of echocardiogram, Hyperlipidemia, Hypertension, Obesity, Osteoarthritis, Peripheral vascular disease ([facility] Primary osteoarthritis of both knees, Radicular pain of lumbosacral region, Spinal stenosis, lumbar region, without neurogenic claudication, Tricuspid regurgitation, Type II or unspecified type diabetes mellitus without mention of complication, not stated as uncontrolled, Unspecified sleep apnea, URI (upper respiratory infection), Wears dentures, Wears glasses, and Wellness examination. Social History:   reports that she quit smoking about 4 weeks ago. Her smoking use included cigarettes. She has a 9.00 pack-year smoking history. She has never used smokeless tobacco. She reports that she does not drink alcohol or use drugs.      Family History:   Family History   Problem Relation Age of Onset    Diabetes Mother     Heart Disease Mother     Cirrhosis Father        Vitals:  /63   Pulse 95   Temp 97.8 °F (36.6 °C) (Axillary) Comment (Src): Receiving breathing treatment   Resp 25   Ht 5' 5\" (1.651 m)   Wt 188 lb 0.8 oz (85.3 kg)   LMP 2003   SpO2 96%   BMI 31.29 kg/m²   Temp (24hrs), Av.6 °F (37 °C), Min:97.8 °F (36.6 °C), Max:99.4 °F (37.4 °C)    Recent Labs     11/27/20  1619 11/27/20 2023 11/28/20  0749 11/28/20  1150   POCGLU 188* 214* 126* 137*       I/O (24Hr): Intake/Output Summary (Last 24 hours) at 11/28/2020 1439  Last data filed at 11/28/2020 0617  Gross per 24 hour   Intake 978.55 ml   Output 2160 ml   Net -1181.45 ml       Labs:  Hematology:  Recent Labs     11/26/20  0522 11/27/20 0410 11/28/20  0612   WBC 12.9* 14.6* 26.4*   RBC 3.41* 3.54* 3.65*   HGB 9.4* 9.6* 10.0*   HCT 30.8* 31.4* 32.4*   MCV 90.3 88.7 88.8   MCH 27.6 27.1 27.4   MCHC 30.5 30.6 30.9   RDW 13.8 13.2 13.4    339 449   MPV 10.2 9.6 9.9     Chemistry:  Recent Labs     11/26/20  0522 11/27/20 0410 11/28/20  0612   * 137 140   K 4.0 4.6 4.0   CL 97* 100 102   CO2 27 26 27   GLUCOSE 99 196* 172*   BUN 16 12 11   CREATININE 0.62 0.48* 0.53   MG 2.3 2.6 2.0   ANIONGAP 9 11 11   LABGLOM >60 >60 >60   GFRAA >60 >60 >60   CALCIUM 8.4* 8.7 8.5*   PHOS 3.0 2.8 1.5*     Recent Labs     11/26/20  0522 11/27/20  0410 11/27/20  0913 11/27/20  1202 11/27/20 1619 11/27/20 2023 11/28/20  0612 11/28/20  0749 11/28/20  1150   LABALBU 2.5*  --  2.8*  --   --   --   --  2.8*  --   --    POCGLU  --    < >  --  194* 233* 188* 214*  --  126* 137*    < > = values in this interval not displayed. ABG:  Lab Results   Component Value Date    POCPH 7.381 11/26/2020    POCPCO2 54.0 11/26/2020    POCPO2 51.2 11/26/2020    POCHCO3 32.1 11/26/2020    NBEA NOT REPORTED 11/26/2020    PBEA 6 11/26/2020    PUS6KTQ 34 11/26/2020    DSJI3OHE 84 11/26/2020    FIO2 100.0 11/26/2020     Lab Results   Component Value Date/Time    SPECIAL RT AC 5ML 11/22/2020 08:33 PM     Lab Results   Component Value Date/Time    CULTURE NO GROWTH 6 DAYS 11/22/2020 08:33 PM       Radiology:  Xr Chest (single View Frontal)    Result Date: 11/24/2020  Bilateral lower lobe predominant airspace disease similar to prior study with slight improvement suspected on the left.

## 2020-11-28 NOTE — PROGRESS NOTES
Physical Therapy  Facility/Department: [address] STEPDOWN  Daily Treatment Note  NAME: Roscoe Jeronimo  : 1960  MRN: [id number]    Date of Service: 2020    Discharge Recommendations:  Patient would benefit from continued therapy after discharge        Assessment   Body structures, Functions, Activity limitations: Decreased functional mobility ; Decreased safe awareness;Decreased endurance;Decreased balance; Increased pain;Decreased strength  Assessment: Pt very willing and cooperative with getting out of bed. Pt ambulated 75ft x2 CGA with use of RW. Pt would benefit from continued PT services to address defecits. Prognosis: Good  PT Education: Goals; General Safety;PT Role;Plan of Care;Transfer Training;Precautions;Gait Training;Equipment  REQUIRES PT FOLLOW UP: Yes  Activity Tolerance  Activity Tolerance: Patient limited by fatigue;Patient limited by endurance     Patient Diagnosis(es): There were no encounter diagnoses. has a past medical history of Allergic rhinitis, Alveolar hypoventilation, Aortic insufficiency, Bronchiectasis (HCC), Bronchitis, Chronic back pain, COPD (chronic obstructive pulmonary disease) ([facility] Depression, DM (diabetes mellitus) ([facility] GERD (gastroesophageal reflux disease), History of echocardiogram, Hyperlipidemia, Hypertension, Obesity, Osteoarthritis, Peripheral vascular disease ([facility] Primary osteoarthritis of both knees, Radicular pain of lumbosacral region, Spinal stenosis, lumbar region, without neurogenic claudication, Tricuspid regurgitation, Type II or unspecified type diabetes mellitus without mention of complication, not stated as uncontrolled, Unspecified sleep apnea, URI (upper respiratory infection), Wears dentures, Wears glasses, and Wellness examination. has a past surgical history that includes Dilation and curettage of uterus; gastrectomy; Nerve Block (Right, 13); Nerve Block (13); Colonoscopy (2009);  Upper gastrointestinal endoscopy ( 2007); Upper gastrointestinal endoscopy (4 21 2009,04/2011); Nerve Block (8/12/13); Nerve Block (Left, 8-28-13); Nerve Block (Left, 9-24-13); Nerve Block (07-02-14); Nerve Block (7-16-14); Nerve Block (7/30/14); Nerve Block (11-6-14); Nerve Block (11/20/15); pr knee scope,diagnostic (Right, 3/24/2017); Nerve Block (07/20/2018); Nerve Block (Bilateral, 02/01/2019); Nerve Block (Bilateral, 02/08/2019); lumbar discectomy (01/2015); lumbar fusion (11/19/2020); and lumbar fusion (N/A, 11/19/2020). Restrictions  Restrictions/Precautions  Restrictions/Precautions: Fall Risk, Up as Tolerated  Required Braces or Orthoses?: No  Position Activity Restriction  Other position/activity restrictions: Ambulate. 5L O2. Lumbar fusion 11/19/20. Subjective   General  Chart Reviewed: Yes  Response To Previous Treatment: Patient with no complaints from previous session. Family / Caregiver Present: No  Subjective  Subjective: Pt and RN agreeable to therapy this afternoon. Pt supine in bed upon arrival and eager to get out of bed and go for a walk. Pain Screening  Patient Currently in Pain: Yes  Pain Assessment  Pain Assessment: 0-10  Pain Level: 8  Pain Location: Back;Leg  Pain Orientation: Left;Right  Vital Signs  Patient Currently in Pain: Yes       Orientation  Orientation  Overall Orientation Status: Within Functional Limits  Cognition   Cognition  Overall Cognitive Status: WFL  Objective   Bed mobility  Rolling to Right: Stand by assistance  Supine to Sit: Stand by assistance  Sit to Supine: Stand by assistance  Scooting: Stand by assistance  Transfers  Sit to Stand: Contact guard assistance  Stand to sit: Contact guard assistance  Ambulation  Ambulation?: Yes  Ambulation 1  Surface: level tile  Device: Rolling Walker  Other Apparatus: O2  Assistance: Contact guard assistance  Gait Deviations: Slow Laura; Increased MARCOS  Distance: 75ft x2  Stairs/Curb  Stairs?: No     Balance  Posture: Good  Sitting - Static: Fair;+  Sitting - Dynamic: Fair;+  Standing - Static: Fair;+  Standing - Dynamic: Fair;-         Seated LE exercise program: Long Arc Quads, hip abduction/adduction, heel/toe raises, and marches. Reps: 10x each LE    Goals  Short term goals  Time Frame for Short term goals: 12 visits  Short term goal 1: pt will be independent with bed mobility  Short term goal 2: pt will perform transfers independently  Short term goal 3: pt will ambulate 200' with least restrictive AD mod (I)  Short term goal 4: pt will ascend/descend 2 steps without handrails independently  Patient Goals   Patient goals : to decrease pain    Plan    Plan  Times per week: 5-6 visits per week  Times per day: Daily  Current Treatment Recommendations: Balance Training, Strengthening, Functional Mobility Training, Transfer Training, Gait Training, Stair training, Endurance Training, Safety Education & Training  Safety Devices  Type of devices:  All fall risk precautions in place, Call light within reach, Gait belt, Nurse notified, Left in bed  Restraints  Initially in place: No     Therapy Time   Individual Concurrent Group Co-treatment   Time In 1352         Time Out 1417         Minutes 25         Timed Code Treatment Minutes: Dangelo 28, PTA

## 2020-11-28 NOTE — PROGRESS NOTES
NEUROSURGERY INPATIENT PROGRESS NOTE    11/28/2020         Chart was reviewed. Afebrile since Sunday. Remains on NC. Moving x4 extremities. RLE 4/5, LLE 5/5. Sensation intact. Patient endorses improved strenght since surgery. Patient has a morales but states has full sensation in perineal region and is able to control urination. No current facility-administered medications on file prior to encounter.       Current Outpatient Medications on File Prior to Encounter   Medication Sig Dispense Refill    atorvastatin (LIPITOR) 40 MG tablet Take 1 tablet by mouth daily 90 tablet 1    metFORMIN (GLUCOPHAGE) 500 MG tablet Take 1 tablet by mouth 2 times daily (with meals) 180 tablet 1    lisinopril-hydroCHLOROthiazide (PRINZIDE;ZESTORETIC) 20-25 MG per tablet TAKE 1 TABLET EVERY DAY 90 tablet 3     MG capsule TAKE 1 CAPSULE EVERY DAY 30 capsule 10    meloxicam (MOBIC) 15 MG tablet Take 1 tablet by mouth daily 30 tablet 3    amLODIPine (NORVASC) 10 MG tablet Take 1 tablet by mouth daily 90 tablet 3    albuterol sulfate HFA (VENTOLIN HFA) 108 (90 Base) MCG/ACT inhaler Inhale 2 puffs into the lungs every 6 hours as needed for Wheezing 1 Inhaler 3    omeprazole (PRILOSEC) 20 MG delayed release capsule TAKE 1 CAPSULE EVERY DAY 30 capsule 11    diphenhydrAMINE (BENADRYL) 25 MG tablet Take 25 mg by mouth every 6 hours as needed for Itching      nicotine (NICODERM CQ) 21 MG/24HR Place 1 patch onto the skin every 24 hours      potassium chloride (KLOR-CON M) 10 MEQ extended release tablet Take 2 tablets by mouth daily 60 tablet 3    SPIRIVA HANDIHALER 18 MCG inhalation capsule       acetaminophen (TYLENOL) 500 MG tablet Take 1 tablet by mouth 4 times daily as needed for Pain 30 tablet 0    mirtazapine (REMERON) 30 MG tablet Take 30 mg by mouth nightly      mirtazapine (REMERON) 15 MG tablet Take 15 mg by mouth nightly      DULoxetine (CYMBALTA) 60 MG extended release capsule Take 1 capsule by mouth daily 30 capsule 3    Lancets MISC 1 each by Does not apply route daily 100 each 11    azelastine (ASTELIN) 0.1 % nasal spray 2 sprays by Nasal route 2 times daily Use in each nostril as directed 1 Bottle 3    ipratropium-albuterol (DUONEB) 0.5-2.5 (3) MG/3ML SOLN nebulizer solution Inhale 3 mLs into the lungs every 6 hours as needed for Shortness of Breath 360 mL 11       Allergies: Carolina Steele is allergic to aspirin; claritin [loratadine]; flonase [fluticasone propionate]; and morphine and related.     Past Medical History:   Diagnosis Date    Allergic rhinitis     Alveolar hypoventilation 11/20/2020    Aortic insufficiency 2018    mild-moderate on echo (was seen and discharged [facility])    Bronchiectasis ([facility] 11/20/2020    Bronchitis     Chronic back pain     Pain management at [place] [address]. COPD [facility]chronic obstructive [facility] disease[facility]     Dr. Jarred Brito to see 11/09/2020    Depression     DM (diabetes mellitus) ([facility] 12/18/2012    GERD (gastroesophageal reflux disease)     History of echocardiogram 05/2018    EF 65%, mild-moderate AI and TR    Hyperlipidemia     Dr. Brice Robert Hypertension     Dr. Brice Robret Obesity     Osteoarthritis     Peripheral vascular disease ([facility]     Primary osteoarthritis of both knees     Radicular pain of lumbosacral region     Spinal stenosis, lumbar region, without neurogenic claudication 04/30/2013    Tricuspid regurgitation 2018    mild-moderate on echo    Type II or unspecified type diabetes mellitus without mention of complication, not stated as uncontrolled     Dr. Brice Robert Unspecified sleep apnea     no cpap used anymore    URI (upper respiratory infection)     Wears dentures     upper and lower full dentures    Wears glasses     Wellness examination     Dr. Ildefonso Hawkins -PCP last visit in early Oct. 2020       Past Surgical History:   Procedure Laterality Date    COLONOSCOPY  2/12/2009    normal    DILATION AND CURETTAGE OF UTERUS      GASTRECTOMY      partial    LUMBAR DISCECTOMY  01/2015    lumbar diskectomy    LUMBAR FUSION  11/19/2020     POSTERIOR FUSION L4/5,     LUMBAR FUSION N/A 11/19/2020    POSTERIOR FUSION L4/5, MEDOlayinka PIZARRO Riparius, EVOKES #[id number] AMINA performed by Mannie Herron DO at [facility] Right 4/30/13    Lumbar Diagnostic Block,  Kenalog 40 mg    NERVE BLOCK  5/23/13    Lumbar Radiofrequency, Kenalog 40mg    NERVE BLOCK  8/12/13    Lt MBNB  celestone 6mg    NERVE BLOCK Left 8-28-13    left lumbar diagnostic block #2 decadron 10 mg    NERVE BLOCK Left 9-24-13    left lumbar median branch radiofrequency    NERVE BLOCK  07-02-14    caudal, celestone 9 mg    NERVE BLOCK  7-16-14    caudal epidural #2, celestone 9mg, fentanyl 25mcg    NERVE BLOCK  7/30/14    caudal #3 decadron 10mg    NERVE BLOCK  11-6-14    duramorph epidural steroid block  duramorph 1 mg celestone 9 mg    NERVE BLOCK  11/20/15    TENS- Empi Select    NERVE BLOCK  07/20/2018    right transforminal # 1 decadron 10mg,isovue    NERVE BLOCK Bilateral 02/01/2019    bilat mbnb- no steroid    NERVE BLOCK Bilateral 02/08/2019    bilat mbnb, marcaine . 25%    FL KNEE SCOPE,DIAGNOSTIC Right 3/24/2017    KNEE ARTHROSCOPY WITH PARTIAL MEDIAL MENISECAL DEBRIDMENT  performed by Chrissy Stanford MD at [address]  9 20 2007    UPPER GASTROINTESTINAL ENDOSCOPY  4 21 2009,04/2011    gastritis, esophagitis       Medications:     cefOXitin  2 g Intravenous 4 times per day    predniSONE  40 mg Oral Daily    budesonide-formoterol  2 puff Inhalation BID    potassium chloride  20 mEq Oral Once    ipratropium-albuterol  1 ampule Inhalation Q4H While awake    insulin lispro  0-12 Units Subcutaneous TID     insulin lispro  0-6 Units Subcutaneous Nightly    sodium chloride flush  10 mL Intravenous 2 times per day    [Held by provider] amLODIPine  10 mg Oral Daily    atorvastatin  40 mg 04/21/2020    AST 18 04/21/2020    TSH 0.94 07/19/2017    LABA1C 5.8 04/21/2020    LABMICR CANNOT BE CALCULATED 04/21/2020       No results found for: PHENYTOIN, PHENYTOIN, VALPROATE, CBMZ    IMAGING  No new imaging studies     ASSESSMENT  Radiculopathy status post L4-L5 right-sided laminectomy, L4-5 discectomy and anterior arthrodesis POD#9   Hypoxic respiratory failure due to pneumonia, COPD exacerbation    RECOMMENDATIONS   Continue management of pneumonia, respiratory failure by primary  Continue PT OT  Continue pain control      Santiago Patterson MD, [facility]  Neurosurgery Service  11/28/2020 at 9:37 AM         This note is created with the assistance of a speech recognition program.  While intending to generate a document that actually reflects the content of the visit, the document can still have some errors including those of syntax and sound like substitutions which may escape proof reading. In such instances, actual meaning can be extrapolated by contextual diversion.

## 2020-11-28 NOTE — PROGRESS NOTES
Patient was transferred to  on 5L NC and tele monitor, and the patient belongings. Electronically signed by Karmen Weeks RN on 11/27/2020 at 11:21 PM

## 2020-11-28 NOTE — PROGRESS NOTES
Writer RN called 4B floor nurse and give her report, all questions and concerns answered. Electronically signed by Rosi Graves RN on 11/27/2020 at 11:19 PM

## 2020-11-28 NOTE — PLAN OF CARE
Problem: Respiratory  Intervention: Administer medication as ordered  Note: BRONCHOSPASM/BRONCHOCONSTRICTION     [x]         IMPROVE AERATION/BREATH SOUNDS  [x]   ADMINISTER BRONCHODILATOR THERAPY AS APPROPRIATE  [x]   ASSESS BREATH SOUNDS  [x]   IMPLEMENT AEROSOL/MDI PROTOCOL  [x]   PATIENT EDUCATION AS NEEDED     Intervention: Continuous positive airway pressure  Note: NON INVASIVE VENTILATION  PROVIDE OPTIMAL VENTILATION/ACCEPTABLE SP02  IMPLEMENT NON INVASIVE VENTILATION PROTOCOL  ASSESSMENT SKIN INTEGRITY  PATIENT EDUCATION AS NEEDED  BIPAP AS NEEDED

## 2020-11-28 NOTE — CARE COORDINATION
ICU Transfer    Sign out received from Dr. Riki Mason, ICU resident    Full H&P to follow from our service. Briefly, this is a 56 yr old female who presented to the hospital for planned lumbar fusion on 11/19. Patient with underlying chronic hypoxic respiratory failure with COPD and home oxygen dependence at 2L NC. Post-operatively patient developed aspiration pneumonia and atelectasis requiring use of Hi-Rito and BiPAP. ID is following for pneumonia and patient currently being treated with cefoxitin. Remains on prednisone daily. Patient has been weaned to NC oxygen at 5L at time of sign out without evidence of respiratory distress. Notes, labs, MAR, and vital signs reviewed.     Patient stable at time of sign out-- plan to transfer to room 421

## 2020-11-28 NOTE — PROGRESS NOTES
Critical care team - Resident sign-out to medicine service      Date and time: 11/27/2020 8:40 PM  Patient's name:  [facility] Record Number: [id number]  Patient's account/billing number: [de-identified]  Patient's YOB: 1960  Age: 56 y.o. Date of Admission: 11/19/2020  5:32 AM  Length of stay during current admission: 8    Primary Care Physician: Duong Montero MD    Code Status: Full Code    Mode of physician to physician communication:        [x] Via telephone   [] In person     Date and time of sign-out: 11/27/2020 8:40 PM    Accepting Internal Medicine resident: ARMANDO Méndez    Accepting Medicine team: intermed    Accepting team's attending: Dr. Simon Fisher    Patient's current ICU Bed:  118    Patient's assigned bed on floor:  [address] [place] [address]        [] Med-Surg Monitored [x] Step-down       [] Psychiatry ICU       [] Psych floor     Reason for ICU admission:     Aspiration pneumonia versus bacterial pneumonia resulting in acute on chronic hypoxic respiratory failure    ICU course summary:     Patient originally admitted to the floor but had deteriorating condition which required ICU transfer. Patient required BiPAP and high flow oxygen nasal cannula. Consulted with infectious disease who started on appropriate antibiotics. Additionally COPD was treated with prednisone, oxygen, bronchodilators. high flow was able to be weaned, currently on 5 L nasal cannula SPO2 94%.     Procedures during patient's ICU stay:     None    Current Vitals:     BP (!) 146/75   Pulse 90   Temp 98.7 °F (37.1 °C) (Oral)   Resp 14   Ht 5' 5\" (1.651 m)   Wt 182 lb 15.7 oz (83 kg)   LMP 04/19/2003   SpO2 90%   BMI 30.45 kg/m²       Cultures:     Blood cultures:                 [] None drawn      [x] Negative             []  Positive (Details:  )  Urine Culture:                   [] None drawn      [x] Negative             []  Positive (Details:  )  Sputum Culture:               [x] None drawn       [] Negative []  Positive (Details:  )   Endotracheal aspirate:     [x] None drawn       [] Negative             []  Positive (Details:  )       Consults:     1.   Infectious disease    Assessment:     Patient Active Problem List    Diagnosis Date Noted    DM (diabetes mellitus) 12/18/2012     Priority: High    History of tobacco use 02/06/2012     Priority: High    COPD, severity to be determined ([address] 09/24/2011     Priority: High    HTN (hypertension) 09/24/2011     Priority: High    DJD (degenerative joint disease) of knee 09/24/2011     Priority: Low    Osteoarthritis of spine with radiculopathy, lumbar region 09/24/2011     Priority: Low    GERD (gastroesophageal reflux disease) 09/24/2011     Priority: Low    Allergic rhinitis 09/24/2011     Priority: Low    Lipoma of shoulder s/p excision right posterior 11 17 2008 09/24/2011     Priority: Low    Multifocal pneumonia 11/23/2020    Alveolar hypoventilation 11/20/2020    Obesity (BMI 30-39.9) 11/20/2020    Bronchiectasis ([address] 11/20/2020    Centrilobular emphysema ([address] 11/20/2020    Oxygen dependent 11/20/2020    Acute postoperative anemia due to expected blood loss 11/20/2020    Lumbar disc disease 11/19/2020    Spondylosis of lumbar region without myelopathy or radiculopathy     Pure hypercholesterolemia 03/05/2019    Mitral and aortic insufficiency 07/30/2018    Major depression, chronic 06/18/2018    Chronic respiratory failure with hypoxia ([address] 06/18/2018    Chronic low back pain     Medication monitoring encounter 11/28/2017    Primary osteoarthritis of both knees     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 03/24/2017       Additional assessment:    Principal Problem:    Centrilobular emphysema (HCC)  Active Problems:    COPD, severity to be determined ([address]    HTN (hypertension)    History of tobacco use    GERD (gastroesophageal reflux disease)    Chronic respiratory failure with hypoxia (HCC)    Spondylosis of lumbar region without myelopathy or radiculopathy    Lumbar disc disease    Alveolar hypoventilation    Obesity (BMI 30-39. 9)    Bronchiectasis ([address]    Oxygen dependent    Acute postoperative anemia due to expected blood loss    Multifocal pneumonia  Resolved Problems:    * No resolved hospital problems. *    Recommended Follow-up:     1. Aspiration Pneumonia vs Bacterial Pneumonia: Improving. B/L lower lobe consolidation, neutrophilic [id number] ID following. Switch zosyn to  Cefoxitin to avoid fluid overload. 2. Acute on chronic Hypoxic respiratory failure: due to #1 and #3. Improving  On high flow oxygen   3. COPD exacerbation: Precipitated by pneumonia. On symbicort, duo neb, cetirizine and PRN albuterol. Continue prednisolone 40 mg    4. Radiculopathy: s/p Lumbar fusion  L4/ L5 surgery on 11/19. Continue Cymbalta and Neurontin. Neurosurgery following. Recommend PT/OT and pain management   5. Post Ob Pain: continue PRN Roxicodone  6. Essential Hypertension: Held Norvasc and lisinopril-HCTZ due to low BP  7. HFpEF: EF:65% 05/2018.  stable. on Lipitor and coreg   8. Hyperlipidemia: stable. on Lipitor  9. Diabetes Mellitus type II: controlled. HbA1C: 5.8% 04/2020 On medium dose ISS  10. Mood disorder: On mirtazapine   11. GERD: stable- continue Prilosec  12. Chronic Hypokalemia: On potassium chloride 20 meq daily  13. DVT Prophylaxis: On Lovenox 40 mg      PT/OT consulted  On dental soft diet with high calorie nutrition supplement              Above mentioned assessment and plan was discussed by me with the admitting medicine resident. The medicine team assigned to the patient by medicine admitting resident will be following up the patient from now onwards on the floor.      Breanne Nicole M.D.  PGY-2 EM Resident   11/27/2020, 8:40 PM

## 2020-11-28 NOTE — PROGRESS NOTES
[phone] Message to on call NP Rodri Britt \"Patient came to unit from ICU w/ morales, should we keep that or d/c? Thanks\"    Instructions to keep until AM provided. Will continue to monitor.

## 2020-11-28 NOTE — PROGRESS NOTES
Sonia Resendez, [facility]. Lumbar disc disease [M51.9] . Vitals:    11/28/20 0820   BP: 135/82   Pulse: 105   Resp: 26   Temp: 98.3 °F (36.8 °C)   SpO2: 92%   . Patients home meds are   Prior to Admission medications    Medication Sig Start Date End Date Taking? Authorizing Provider   carvedilol (COREG) 6.25 MG tablet TAKE 1 TABLET BY MOUTH 2 TIMES DAILY 11/18/20  Yes Monica Campbell MD   cetirizine (ZYRTEC) 10 MG tablet TAKE 1 TABLET BY MOUTH DAILY 11/18/20  Yes Monica Campbell MD   meloxicam (MOBIC) 15 MG tablet Take 15 mg by mouth daily   Yes Historical Provider, MD   HYDROcodone-acetaminophen (NORCO) 5-325 MG per tablet Take 1 tablet by mouth every 8 hours as needed for Pain for up to 30 days. Early refill due to holidays 10/30/20 11/29/20 Yes ELVIRA Edward CNP   trospium (SANCTURA) 20 MG tablet TAKE 1 TABLET BY MOUTH 2 TIMES DAILY 10/27/20  Yes Monica Campbell MD   tiZANidine (ZANAFLEX) 4 MG tablet TAKE 1 TABLET TWICE DAILY 10/27/20  Yes Monica Campbell MD   blood glucose test strips (EXACTECH TEST) strip 1 each by In Vitro route daily As needed.  10/14/20  Yes Monica Campbell MD   gabapentin (NEURONTIN) 300 MG capsule TAKE 1 CAPSULE IN MORNING AND AFTERNOON AND 2 CAPSULES AT NIGHT 10/12/20 11/19/20 Yes Monica Campbell MD   atorvastatin (LIPITOR) 40 MG tablet Take 1 tablet by mouth daily 9/22/20  Yes Monica Campbell MD   metFORMIN (GLUCOPHAGE) 500 MG tablet Take 1 tablet by mouth 2 times daily (with meals) 9/22/20  Yes Monica Campbell MD   lisinopril-hydroCHLOROthiazide (PRINZIDE;ZESTORETIC) 20-25 MG per tablet TAKE 1 TABLET EVERY DAY 9/22/20  Yes Monica Campbell MD    MG capsule TAKE 1 CAPSULE EVERY DAY 7/7/20  Yes Monica Campbell MD   meloxicam [facility]) 15 MG tablet Take 1 tablet by mouth daily 5/6/20 10/29/21 Yes Carmen Prado APRN - CNP   amLODIPine (NORVASC) 10 MG tablet Take 1 tablet by mouth daily 4/2/20  Yes Monica Campbell MD   albuterol sulfate HFA (VENTOLIN HFA) 108 (90 Base) MCG/ACT inhaler Inhale 2 puffs into the lungs every 6 hours as needed for Wheezing 4/2/20  Yes Efrem Tucker MD   omeprazole (PRILOSEC) 20 MG delayed release capsule TAKE 1 CAPSULE EVERY DAY 1/10/20  Yes Efrem Tucker MD   diphenhydrAMINE (BENADRYL) 25 MG tablet Take 25 mg by mouth every 6 hours as needed for Itching   Yes Historical Provider, MD   nicotine (NICODERM CQ) 21 MG/24HR Place 1 patch onto the skin every 24 hours   Yes Historical Provider, MD   potassium chloride (KLOR-CON M) 10 MEQ extended release tablet Take 2 tablets by mouth daily 12/10/19  Yes Efrem Tucker MD   Starrucca Quinn 18 MCG inhalation capsule  10/16/19  Yes Historical Provider, MD   acetaminophen (TYLENOL) 500 MG tablet Take 1 tablet by mouth 4 times daily as needed for Pain 9/12/19  Yes Edilma Hernandez, APRN - CNP   mirtazapine (REMERON) 30 MG tablet Take 30 mg by mouth nightly 5/22/18  Yes Historical Provider, MD   mirtazapine (REMERON) 15 MG tablet Take 15 mg by mouth nightly 5/22/18  Yes Historical Provider, MD   DULoxetine (CYMBALTA) 60 MG extended release capsule Take 1 capsule by mouth daily 2/1/18  Yes Efrem Tucker MD   Lancets MISC 1 each by Does not apply route daily 1/5/18  Yes Efrem Tucker MD   azelastine (ASTELIN) 0.1 % nasal spray 2 sprays by Nasal route 2 times daily Use in each nostril as directed 6/15/16  Yes Efrem Tucker MD   ipratropium-albuterol (DUONEB) 0.5-2.5 (3) MG/3ML SOLN nebulizer solution Inhale 3 mLs into the lungs every 6 hours as needed for Shortness of Breath 8/4/15   Efrem Tucker MD   .      Assessment: Continue current treatment schedule        RR 26  Breath Sounds: Clear      Bronchodilator assessment at level  3  Hyperinflation assessment at level   Secretion Management assessment at level      [x]    Bronchodilator Assessment  BRONCHODILATOR ASSESSMENT SCORE  Score 0 1 2 3 4 5   Breath Sounds   []  Patient Baseline [x]  No Wheeze good aeration []  Faint, scattered wheezing, good aeration []  Expiratory Wheezing and or moderately diminished []  Insp/Exp wheeze and/or very diminished []  Insp/Exp and/ or marked distress   Respiratory Rate   []  Patient Baseline []  Less than 20 []  Less than 20 [x]  20-25 []  Greater than 25 []  Greater than 25   Peak flow % of Pred or PB []  NA   []  Greater than 90%  []  81-90% []  71-80% []  Less than or equal to 70%  or unable to perform []  Unable due to Respiratory Distress   Dyspnea re []  Patient Baseline []  No SOB []  No SOB [x]  SOB on exertion []  SOB min activity []  At rest/acute   e FEV% Predicted       []  NA []  Above 69%  []  Unable []  Above 60-69%  []  Unable []  Above 50-59%  []  Unable []  Above 35-49%  []  Unable []  Less than 35%  []  Unable                 []  Hyperinflation Assessment  Score 1 2 3   CXR and Breath Sounds   []  Clear []  No atelectasis  Basilar aeration []  Atelectasis or absent basilar breath sounds   Incentive Spirometry Volume  (Per IBW)   []  Greater than or equal to 15ml/Kg []  less than 15ml/Kg []  less than 15ml/Kg   Surgery within last 2 weeks []  None or general   []  Abdominal or thoracic surgery  []  Abdominal or thoracic   Chronic Pulmonary Historyre []  No []  Yes []  Yes     []  Secretion Management Assessment  Score 1 2 3   Bilateral Breath Sounds   []  Occasional Rhonchi []  Scattered Rhonchi []  Course Rhonchi and/or poor aeration   Sputum    []  Small amount of thin secretions []  Moderate amount of viscous secretions []  Copius, Viscious Yellow/ Secretions   CXR as reported by physician []  clear  []  Unavailable []  Infiltrates and/or consolidation  []  Unavailable []  Mucus Plugging and or lobar consolidation  []  Unavailable   Cough []  Strong, productive cough []  Weak productive cough []  No cough or weak non-productive cough   Evaristo Redella  11:36 AM                            FEMALE                                  MALE                            FEV1 Predicted Normal Values                        FEV1 Predicted Normal Values          Age                                     Height in Feet and Inches       Age                                     Height in Feet and Inches       4' 11\" 5' 1\" 5' 3\" 5' 5\" 5' 7\" 5' 9\" 5' 11\" 6' 1\"  4' 11\" 5' 1\" 5' 3\" 5' 5\" 5' 7\" 5' 9\" 5' 11\" 6' 1\"   42 - 45 2.49 2.66 2.84 3.03 3.22 3.42 3.62 3.83 42 - 45 2.82 3.03 3.26 3.49 3.72 3.96 4.22 4.47   46 - 49 2.40 2.57 2.76 2.94 3.14 3.33 3.54 3.75 46 - 49 2.70 2.92 3.14 3.37 3.61 3.85 4.10 4.36   50 - 53 2.31 2.48 2.66 2.85 3.04 3.24 3.45 3.66 50 - 53 2.58 2.80 3.02 3.25 3.49 3.73 3.98 4.24   54 - 57 2.21 2.38 2.57 2.75 2.95 3.14 3.35 3.56 54 - 57 2.46 2.67 2.89 3.12 3.36 3.60 3.85 4.11   58 - 61 2.10 2.28 2.46 2.65 2.84 3.04 3.24 3.45 58 - 61 2.32 2.54 2.76 2.99 3.23 3.47 3.72 3.98   62 - 65 1.99 2.17 2.35 2.54 2.73 2.93 3.13 3.34 62 - 65 2.19 2.40 2.62 2.85 3.09 3.33 3.58 3.84   66 - 69 1.88 2.05 2.23 2.42 2.61 2.81 3.02 3.23 66 - 69 2.04 2.26 2.48 2.71 2.95 3.19 3.44 3.70   70+ 1.82 1.99 2.17 2.36 2.55 2.75 2.95 3.16 70+ 1.97 2.19 2.41 2.64 2.87 3.12 3.37 3.62             Predicted Peak Expiratory Flow Rate                                       Height (in)  Female       Height (in) Male           Age 56 91 16 83 12 49 78 74 Age            20 454 926 764 061 667 623 195 361  56 34 19 78 30 07 53 92 00   25 337 352 366 381 396 411 426 441 25 447 476 505 533 562 591 619 648 677   30 329 344 359 374 389 404 419 434 30 437 466 494 523 552 580 609 638 667   35 322 337 351 366 381 396 411 426 35 426 455 484 512 541 570 598 627 657   40 314 329 344 359 374 389 404 419 40 416 445 473 502 531 559 588 617 647   45 307 322 336 351 366 381 396 411 45 405 434 463 491 520 549 577 606 636   50 299 314 329 344 359 374 389 404 50 395 424 452 481 510 538 567 596 625   55 292 307 321 336 351 366 381 396 55 384 413 442 470 499 528 556 585 615   60 284 299 314 329 344 359 374 389 60 374 403 431 460 489 517 546 575 605   65 277 292 306 321 336 351 366 381 65 363 392 421 449 478 507 535 564 594   70 269 284 299 314 329 344 359 374 70 353 382 410 439 468 496 525 554 583   75 261 274 289 305 319 334 348 364 75 344 372 400 429 458 487 515 544 573   80 253 266 282 296 312 327 342 356 80 335 362 390 419 448 [phone] 562

## 2020-11-28 NOTE — PROGRESS NOTES
Pulmonary Progress Note  Respiratory Specialists      Patient - Mary Delacruz,  Age - 56 y.o.    - 1960      Room Number - 5502/8152-30   MRN -  [id number]   [facility]  Date of Admission -  2020  5:32 AM    SUBJECTIVE  Transferred out of ICU to stepdown unit. Remains on high flow oxygen 60% with minimal oxygen desaturations. Overall improved. Crackles persist with bronchial breath sounds at left lung base. Patient 7 days post lumbar laminectomy complicated by bacterial pneumonia superimposed on stage III COPD. Uses home oxygen. Still smoking. OBJECTIVE    VITALS    height is 5' 5\" (1.651 m) and weight is 188 lb 0.8 oz (85.3 kg). Her oral temperature is 98.2 °F (36.8 °C). Her blood pressure is 146/83 (abnormal) and her pulse is 94. Her respiration is 18 and oxygen saturation is 88% (abnormal). Temperature Range: Temp: 98.2 °F (36.8 °C) Temp  Av.5 °F (36.9 °C)  Min: 97.8 °F (36.6 °C)  Max: 99.4 °F (37.4 °C)  BP Range:  Systolic (07QWX), DL , Min:130 , GPB:758     Diastolic (83RIS), FTL:18, Min:63, Max:83    Pulse Range: Pulse  Av  Min: 90  Max: 108  Respiration Range: Resp  Av.8  Min: 18  Max: 26  Current Pulse Ox[de-identified]  SpO2: (!) 88 %  24HR Pulse Ox Range:  SpO2  Av.9 %  Min: 88 %  Max: 96 %  Oxygen Amount and Delivery:  O2 Flow Rate (L/min): 6 L/min      Wt Readings from Last 3 Encounters:   20 188 lb 0.8 oz (85.3 kg)   20 185 lb (83.9 kg)   20 188 lb (85.3 kg)       I/O (24 Hours)    Intake/Output Summary (Last 24 hours) at 2020 1824  Last data filed at 2020 1510  Gross per 24 hour   Intake 2064.55 ml   Output 1595 ml   Net 469.55 ml     EXAM  General Appearance  Alert, Oriented, and Cooperative = 0rapid breathingoriented to person, place, time, and general circumstances  HEENT - Normal, Head is normocephalic, atraumatic.    Neck - Supple, symmetrical, while supinepositive findings: n/a N/A; measuring N/A cm by cm  Lungs - positive findings: prolonged expiration, rales L lower posterior, bronchophony L lower posterior  Cardiovascular - Cor RRR and No murmurs                                                                            \"}  Abdomen - soft, nontender, no HSM, no guarding, no rebound, no masses  Neurologic - There are no focal motor or sensory deficits  Skin - No bruising or bleeding  Extremities - No cyanosis, clubbing none    MEDS   [START ON 11/29/2020] lactobacillus  1 capsule Oral Daily with breakfast    [START ON 11/29/2020] hydroCHLOROthiazide  25 mg Oral Daily    cefOXitin  2 g Intravenous 4 times per day    predniSONE  40 mg Oral Daily    budesonide-formoterol  2 puff Inhalation BID    potassium chloride  20 mEq Oral Once    ipratropium-albuterol  1 ampule Inhalation Q4H While awake    insulin lispro  0-12 Units Subcutaneous TID WC    insulin lispro  0-6 Units Subcutaneous Nightly    sodium chloride flush  10 mL Intravenous 2 times per day    [Held by provider] amLODIPine  10 mg Oral Daily    atorvastatin  40 mg Oral Daily    azelastine  2 spray Each Nostril BID    carvedilol  6.25 mg Oral BID    docusate sodium  100 mg Oral Daily    DULoxetine  60 mg Oral Daily    gabapentin  300 mg Oral BID    [Held by provider] lisinopril-hydroCHLOROthiazide  1 tablet Oral Daily    [Held by provider] metFORMIN  500 mg Oral BID WC    mirtazapine  15 mg Oral Nightly    mirtazapine  30 mg Oral Nightly    omeprazole  20 mg Oral QAM    potassium chloride  20 mEq Oral Daily    tiZANidine  4 mg Oral BID    trospium  20 mg Oral BID    polyethylene glycol  17 g Oral Daily    magnesium hydroxide  30 mL Oral Daily    enoxaparin  40 mg Subcutaneous Daily    cetirizine  10 mg Oral Daily    gabapentin  600 mg Oral Nightly    nicotine  1 patch Transdermal Daily      dextrose       albuterol, magic (miracle) mouthwash, acetaminophen, benzonatate, diphenhydrAMINE, sodium chloride flush, oxyCODONE **OR** oxyCODONE, the right lung base.  Airspace disease within the    left lung base is minimally improved compared to studies dating back to    November 23, 2020.  No pneumothorax is noted.  Osseous structures are stable.              Impression    1. Minimally improved aeration, right lung base, suggesting resolving    pneumonia/atelectasis    2. Persistent left lower lobe airspace disease, minimally improved          (See actual reports for details)    ASSESSMENT  Patient Active Problem List   Diagnosis    DJD (degenerative joint disease) of knee    Osteoarthritis of spine with radiculopathy, lumbar region    GERD (gastroesophageal reflux disease)    COPD, severity to be determined ([address]    HTN (hypertension)    Allergic rhinitis    Lipoma of shoulder s/p excision right posterior 11 17 2008    History of tobacco use    DM (diabetes mellitus)    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    Primary osteoarthritis of both knees    Medication monitoring encounter    Chronic low back pain    Major depression, chronic    Chronic respiratory failure with hypoxia (HCC)    Mitral and aortic insufficiency    Pure hypercholesterolemia    Other spondylosis with radiculopathy, lumbar region    Lumbar disc disease    Alveolar hypoventilation    Obesity (BMI 30-39. 9)    Bronchiectasis ([address]    Centrilobular emphysema (HCC)    Oxygen dependent    Acute postoperative anemia due to expected blood loss    Multifocal pneumonia    Acute on chronic respiratory failure with hypoxia (HCC)       PLAN  Wean oxygen as tolerated. Keep O2 sat 90-92%  Continue antibiotics. Short course of steroids and bronchodilators. Smoking cessation. Home with oxygen. Outpatient follow-up for resolution of pneumonia and COPD treatment.       Electronically signed by Deedee Gutierres DO on 11/28/2020 at 6:24 PM

## 2020-11-29 LAB
ABSOLUTE EOS #: 0 K/UL (ref 0–0.4)
ABSOLUTE IMMATURE GRANULOCYTE: 1.02 K/UL (ref 0–0.3)
ABSOLUTE LYMPH #: 3.45 K/UL (ref 1–4.8)
ABSOLUTE MONO #: 1.83 K/UL (ref 0.1–0.8)
ALBUMIN SERPL-MCNC: 2.5 G/DL (ref 3.5–5.2)
ANION GAP SERPL CALCULATED.3IONS-SCNC: 10 MMOL/L (ref 9–17)
BASOPHILS # BLD: 0 % (ref 0–2)
BASOPHILS ABSOLUTE: 0 K/UL (ref 0–0.2)
BUN BLDV-MCNC: 12 MG/DL (ref 8–23)
BUN/CREAT BLD: ABNORMAL (ref 9–20)
CALCIUM SERPL-MCNC: 8.6 MG/DL (ref 8.6–10.4)
CHLORIDE BLD-SCNC: 102 MMOL/L (ref 98–107)
CO2: 26 MMOL/L (ref 20–31)
CREAT SERPL-MCNC: 0.62 MG/DL (ref 0.5–0.9)
DIFFERENTIAL TYPE: ABNORMAL
EOSINOPHILS RELATIVE PERCENT: 0 % (ref 1–4)
GFR AFRICAN AMERICAN: >60 ML/MIN
GFR NON-AFRICAN AMERICAN: >60 ML/MIN
GFR SERPL CREATININE-BSD FRML MDRD: ABNORMAL ML/MIN/{1.73_M2}
GFR SERPL CREATININE-BSD FRML MDRD: ABNORMAL ML/MIN/{1.73_M2}
GLUCOSE BLD-MCNC: 112 MG/DL (ref 65–105)
GLUCOSE BLD-MCNC: 228 MG/DL (ref 65–105)
GLUCOSE BLD-MCNC: 234 MG/DL (ref 65–105)
GLUCOSE BLD-MCNC: 71 MG/DL (ref 70–99)
GLUCOSE BLD-MCNC: 81 MG/DL (ref 65–105)
HCT VFR BLD CALC: 32.3 % (ref 36.3–47.1)
HEMOGLOBIN: 9.7 G/DL (ref 11.9–15.1)
IMMATURE GRANULOCYTES: 5 %
LACTIC ACID, SEPSIS WHOLE BLOOD: 2.4 MMOL/L (ref 0.5–1.9)
LACTIC ACID, SEPSIS: ABNORMAL MMOL/L (ref 0.5–1.9)
LYMPHOCYTES # BLD: 17 % (ref 24–44)
MAGNESIUM: 2.3 MG/DL (ref 1.6–2.6)
MCH RBC QN AUTO: 27.6 PG (ref 25.2–33.5)
MCHC RBC AUTO-ENTMCNC: 30 G/DL (ref 28.4–34.8)
MCV RBC AUTO: 92 FL (ref 82.6–102.9)
MONOCYTES # BLD: 9 % (ref 1–7)
MORPHOLOGY: NORMAL
NRBC AUTOMATED: 0.5 PER 100 WBC
NUCLEATED RED BLOOD CELLS: 3 PER 100 WBC
PDW BLD-RTO: 14.2 % (ref 11.8–14.4)
PHOSPHORUS: 3.6 MG/DL (ref 2.6–4.5)
PLATELET # BLD: 463 K/UL (ref 138–453)
PLATELET ESTIMATE: ABNORMAL
PMV BLD AUTO: 9.4 FL (ref 8.1–13.5)
POTASSIUM SERPL-SCNC: 4.1 MMOL/L (ref 3.7–5.3)
RBC # BLD: 3.51 M/UL (ref 3.95–5.11)
RBC # BLD: ABNORMAL 10*6/UL
SEG NEUTROPHILS: 69 % (ref 36–66)
SEGMENTED NEUTROPHILS ABSOLUTE COUNT: 14 K/UL (ref 1.8–7.7)
SODIUM BLD-SCNC: 138 MMOL/L (ref 135–144)
WBC # BLD: 20.3 K/UL (ref 3.5–11.3)
WBC # BLD: ABNORMAL 10*3/UL

## 2020-11-29 PROCEDURE — 6360000002 HC RX W HCPCS: Performed by: STUDENT IN AN ORGANIZED HEALTH CARE EDUCATION/TRAINING PROGRAM

## 2020-11-29 PROCEDURE — 94640 AIRWAY INHALATION TREATMENT: CPT

## 2020-11-29 PROCEDURE — 6370000000 HC RX 637 (ALT 250 FOR IP): Performed by: INTERNAL MEDICINE

## 2020-11-29 PROCEDURE — 80069 RENAL FUNCTION PANEL: CPT

## 2020-11-29 PROCEDURE — 83735 ASSAY OF MAGNESIUM: CPT

## 2020-11-29 PROCEDURE — 2580000003 HC RX 258: Performed by: STUDENT IN AN ORGANIZED HEALTH CARE EDUCATION/TRAINING PROGRAM

## 2020-11-29 PROCEDURE — 99232 SBSQ HOSP IP/OBS MODERATE 35: CPT | Performed by: INTERNAL MEDICINE

## 2020-11-29 PROCEDURE — 6370000000 HC RX 637 (ALT 250 FOR IP): Performed by: STUDENT IN AN ORGANIZED HEALTH CARE EDUCATION/TRAINING PROGRAM

## 2020-11-29 PROCEDURE — 6360000002 HC RX W HCPCS: Performed by: INTERNAL MEDICINE

## 2020-11-29 PROCEDURE — 6360000002 HC RX W HCPCS: Performed by: NURSE PRACTITIONER

## 2020-11-29 PROCEDURE — 2580000003 HC RX 258: Performed by: INTERNAL MEDICINE

## 2020-11-29 PROCEDURE — 94660 CPAP INITIATION&MGMT: CPT

## 2020-11-29 PROCEDURE — 36415 COLL VENOUS BLD VENIPUNCTURE: CPT

## 2020-11-29 PROCEDURE — 2060000000 HC ICU INTERMEDIATE R&B

## 2020-11-29 PROCEDURE — 85025 COMPLETE CBC W/AUTO DIFF WBC: CPT

## 2020-11-29 PROCEDURE — 6370000000 HC RX 637 (ALT 250 FOR IP): Performed by: NURSE PRACTITIONER

## 2020-11-29 PROCEDURE — 83605 ASSAY OF LACTIC ACID: CPT

## 2020-11-29 PROCEDURE — 99233 SBSQ HOSP IP/OBS HIGH 50: CPT | Performed by: INTERNAL MEDICINE

## 2020-11-29 PROCEDURE — 82947 ASSAY GLUCOSE BLOOD QUANT: CPT

## 2020-11-29 PROCEDURE — 2700000000 HC OXYGEN THERAPY PER DAY

## 2020-11-29 PROCEDURE — 99024 POSTOP FOLLOW-UP VISIT: CPT | Performed by: NEUROLOGICAL SURGERY

## 2020-11-29 PROCEDURE — 94761 N-INVAS EAR/PLS OXIMETRY MLT: CPT

## 2020-11-29 RX ORDER — FUROSEMIDE 10 MG/ML
10 INJECTION INTRAMUSCULAR; INTRAVENOUS ONCE
Status: COMPLETED | OUTPATIENT
Start: 2020-11-29 | End: 2020-11-29

## 2020-11-29 RX ADMIN — OXYCODONE HYDROCHLORIDE 10 MG: 5 TABLET ORAL at 09:29

## 2020-11-29 RX ADMIN — CEFOXITIN SODIUM 2 G: 2 POWDER, FOR SOLUTION INTRAVENOUS at 12:30

## 2020-11-29 RX ADMIN — GABAPENTIN 300 MG: 300 CAPSULE ORAL at 14:55

## 2020-11-29 RX ADMIN — GABAPENTIN 600 MG: 300 CAPSULE ORAL at 21:47

## 2020-11-29 RX ADMIN — SODIUM CHLORIDE, PRESERVATIVE FREE 10 ML: 5 INJECTION INTRAVENOUS at 09:23

## 2020-11-29 RX ADMIN — MIRTAZAPINE 15 MG: 15 TABLET, FILM COATED ORAL at 21:47

## 2020-11-29 RX ADMIN — INSULIN LISPRO 2 UNITS: 100 INJECTION, SOLUTION INTRAVENOUS; SUBCUTANEOUS at 21:48

## 2020-11-29 RX ADMIN — DESMOPRESSIN ACETATE 40 MG: 0.2 TABLET ORAL at 09:21

## 2020-11-29 RX ADMIN — MIRTAZAPINE 30 MG: 15 TABLET, FILM COATED ORAL at 21:46

## 2020-11-29 RX ADMIN — OXYCODONE HYDROCHLORIDE 10 MG: 5 TABLET ORAL at 14:55

## 2020-11-29 RX ADMIN — CARVEDILOL 6.25 MG: 6.25 TABLET, FILM COATED ORAL at 09:21

## 2020-11-29 RX ADMIN — TROSPIUM CHLORIDE 20 MG: 20 TABLET, FILM COATED ORAL at 09:20

## 2020-11-29 RX ADMIN — OMEPRAZOLE 20 MG: 20 CAPSULE, DELAYED RELEASE ORAL at 09:21

## 2020-11-29 RX ADMIN — CARVEDILOL 6.25 MG: 6.25 TABLET, FILM COATED ORAL at 21:46

## 2020-11-29 RX ADMIN — BUDESONIDE AND FORMOTEROL FUMARATE DIHYDRATE 2 PUFF: 160; 4.5 AEROSOL RESPIRATORY (INHALATION) at 07:56

## 2020-11-29 RX ADMIN — TIZANIDINE 4 MG: 4 TABLET ORAL at 21:47

## 2020-11-29 RX ADMIN — CEFOXITIN SODIUM 2 G: 2 POWDER, FOR SOLUTION INTRAVENOUS at 18:08

## 2020-11-29 RX ADMIN — DULOXETINE HYDROCHLORIDE 60 MG: 30 CAPSULE, DELAYED RELEASE ORAL at 09:21

## 2020-11-29 RX ADMIN — IPRATROPIUM BROMIDE AND ALBUTEROL SULFATE 1 AMPULE: .5; 3 SOLUTION RESPIRATORY (INHALATION) at 11:34

## 2020-11-29 RX ADMIN — PREDNISONE 40 MG: 20 TABLET ORAL at 09:19

## 2020-11-29 RX ADMIN — IPRATROPIUM BROMIDE AND ALBUTEROL SULFATE 1 AMPULE: .5; 3 SOLUTION RESPIRATORY (INHALATION) at 20:35

## 2020-11-29 RX ADMIN — IPRATROPIUM BROMIDE AND ALBUTEROL SULFATE 1 AMPULE: .5; 3 SOLUTION RESPIRATORY (INHALATION) at 07:56

## 2020-11-29 RX ADMIN — TROSPIUM CHLORIDE 20 MG: 20 TABLET, FILM COATED ORAL at 21:47

## 2020-11-29 RX ADMIN — IPRATROPIUM BROMIDE AND ALBUTEROL SULFATE 1 AMPULE: .5; 3 SOLUTION RESPIRATORY (INHALATION) at 15:33

## 2020-11-29 RX ADMIN — GABAPENTIN 300 MG: 300 CAPSULE ORAL at 09:20

## 2020-11-29 RX ADMIN — FUROSEMIDE 10 MG: 10 INJECTION, SOLUTION INTRAMUSCULAR; INTRAVENOUS at 14:56

## 2020-11-29 RX ADMIN — SODIUM CHLORIDE, PRESERVATIVE FREE 10 ML: 5 INJECTION INTRAVENOUS at 21:47

## 2020-11-29 RX ADMIN — INSULIN LISPRO 4 UNITS: 100 INJECTION, SOLUTION INTRAVENOUS; SUBCUTANEOUS at 18:08

## 2020-11-29 RX ADMIN — TIZANIDINE 4 MG: 4 TABLET ORAL at 09:21

## 2020-11-29 RX ADMIN — POTASSIUM CHLORIDE 20 MEQ: 1500 TABLET, EXTENDED RELEASE ORAL at 09:20

## 2020-11-29 RX ADMIN — CEFOXITIN SODIUM 2 G: 2 POWDER, FOR SOLUTION INTRAVENOUS at 05:37

## 2020-11-29 RX ADMIN — ENOXAPARIN SODIUM 40 MG: 40 INJECTION SUBCUTANEOUS at 09:19

## 2020-11-29 RX ADMIN — CEFOXITIN SODIUM 2 G: 2 POWDER, FOR SOLUTION INTRAVENOUS at 00:37

## 2020-11-29 RX ADMIN — CETIRIZINE HYDROCHLORIDE 10 MG: 10 TABLET ORAL at 09:22

## 2020-11-29 RX ADMIN — HYDROCHLOROTHIAZIDE 25 MG: 25 TABLET ORAL at 09:21

## 2020-11-29 RX ADMIN — Medication 1 CAPSULE: at 09:19

## 2020-11-29 RX ADMIN — BUDESONIDE AND FORMOTEROL FUMARATE DIHYDRATE 2 PUFF: 160; 4.5 AEROSOL RESPIRATORY (INHALATION) at 20:35

## 2020-11-29 ASSESSMENT — ENCOUNTER SYMPTOMS
DIARRHEA: 0
ABDOMINAL DISTENTION: 0
CHEST TIGHTNESS: 0
EYE REDNESS: 0
CONSTIPATION: 0
VOMITING: 0
APNEA: 0
STRIDOR: 0
NAUSEA: 0
BACK PAIN: 1
EYE DISCHARGE: 0
WHEEZING: 0
SHORTNESS OF BREATH: 1
SORE THROAT: 0
ABDOMINAL PAIN: 0
COUGH: 1

## 2020-11-29 ASSESSMENT — PAIN SCALES - GENERAL
PAINLEVEL_OUTOF10: 9
PAINLEVEL_OUTOF10: 6

## 2020-11-29 NOTE — PROGRESS NOTES
[facility] - Progress Note  Today's Date and Time: 11/29/2020, 4:20 PM    Impression :   Aspiration pneumonia  Bilateral bronchopneumonia  Acute hypoxic respiratory failure requiring high flow 02  S/P Lumbar fusion 11-19-20    Recommendations:   D/C Zosyn to avoid fluid overload  Cefoxitin  2 gm IV q 6 hr  Diuresis. Medical Decision Making/Summary/Discussion:11/29/2020       Infection Control Recommendations   [place] Precautions    Antimicrobial Stewardship Recommendations     Simplification of therapy  Targeted therapy  Coordination of Outpatient Care:   Estimated Length of IV antimicrobials: TBD  Patient will need Midline Catheter Insertion: no  Patient will need PICC line Insertion:no  Patient will need: Home IV , [place],  SNF,  LTAC: TBD  Patient will need outpatient wound care:No    Chief complaint/reason for consultation:   Pneumonia      History of Present Illness:   Noemí Rodriguez is a 56y.o.-year-old  female who was initially admitted on 11/19/2020. Patient seen at the request of Alida Loomis. INITIAL HISTORY:    The patient has a history of morbid obesity, COPD, JOAN, chronic hypoxic respiratory failure -on home oxygen. She was admitted because of right lower extremity pain extending from hip to foot.  She has known lumbar arthritis and had failed all conservative measures.  She underwent posterior fusion L4/5 surgery on 11/19/20.      During the postoperative period her respiratory status declined and she started spiking fevers. Her Chest x-ray and CT showed bilateral multifocal infiltrates suggestive of aspiration, areas of denser consolidation suggestive of bronchopneumonia and underlying severe emphysema. Patient received ceftriaxone, then zosyn since 12-24-20 with benefit. She is currently experiencing worsening of 02 requirements. Plan:   D/C zosyn to avoid fluid and Na overload  Start cefoxitin.     CURRENT EVALUATION : 11/29/2020    Patient evaluated and examined in the ICU. T-max of 97-->99.1  VS stable    Complains of mild shortness of breath. respiratory wise BiPAP --> heated high flow,   Patient unable to wean down on oxygen  Or respiratory status mild improvement. Diarrhea improved with probiotics  Monitoring to discharge rehab versus home therapy. Pt want to go home instead of Nursing home. Currently on cefoxitin day 3 2 g IV q6    RR 20-->15  Flow rate 5-->30-->35L  FIO2 100-->40--> 60-->85  02 sat 94 -->96    Labs, X rays reviewed: 11/29/2020    BUN: 16-->12  Cr: 0.62-->0.48    WBC: 12.9-->14.6-->26.4-->20.3  Hb:9.4-->9.6  Plat: 314-->339    Cultures:  Urine:  11-22-20: No growth   11-19-20: No growth  Blood:  11-22-20: No growth    Sputum :    Wound:      MRSA screen: negative    Discussed with patient, RN, family. 11-26-20 11-22-20: I have personally reviewed the past medical history, past surgical history, medications, social history, and family history, and I have updated the database accordingly.   Past Medical History:     Past Medical History:   Diagnosis Date    Allergic rhinitis     Alveolar hypoventilation 11/20/2020    Aortic insufficiency 2018    mild-moderate on echo (was seen and [facility])    Bronchiectasis ([facility] 11/20/2020    Bronchitis     Chronic back pain     [facility] at [facility]. COPD (chronic obstructive pulmonary disease) ([facility]     Dr. Norene Claude to see 11/09/2020    Depression     [facility]di[facility]) [facility] 12/18/2012    GERD (gastroesophageal reflux disease)     History of echocardiogram 05/2018    EF 65%, mild-moderate AI and TR    Hyperlipidemia     Dr. Oswald Knapp Hypertension     Dr. Oswald Knapp Obesity     Osteoarthritis     Peripheral vascular disease ([facility]     Primary osteoarthritis of both knees     Radicular pain of lumbosacral region     Spinal stenosis, lumbar region, without neurogenic claudication 04/30/2013    Tricuspid regurgitation 2018    mild-moderate on echo    Type II or unspecified type diabetes mellitus without mention of complication, not stated as uncontrolled     Dr. Joseph Anchors Unspecified sleep apnea     no cpap used anymore    URI (upper respiratory infection)     Wears dentures     upper and lower full dentures    Wears glasses     Wellness examination     Dr. Andrew Sanders -PCP last visit in early Oct. 2020       Past Surgical  History:     Past Surgical History:   Procedure Laterality Date    COLONOSCOPY  2/12/2009    normal    DILATION AND CURETTAGE OF UTERUS      GASTRECTOMY      partial    LUMBAR DISCECTOMY  01/2015    lumbar diskectomy    LUMBAR FUSION  11/19/2020     POSTERIOR FUSION L4/5,     LUMBAR FUSION N/A 11/19/2020    POSTERIOR FUSION L4/5, [employer], Paola Demarco, EVOKES #[id number] AMINA performed by Andre Lainez DO at [address] Right 4/30/13    Lumbar Diagnostic Block,  Kenalog 40 mg    NERVE BLOCK  5/23/13    Lumbar Radiofrequency, Kenalog 40mg    NERVE BLOCK  8/12/13    Lt MBNB  celestone 6mg    NERVE BLOCK Left 8-28-13    left lumbar diagnostic block #2 decadron 10 mg    NERVE BLOCK Left 9-24-13    left lumbar median branch radiofrequency    NERVE BLOCK  07-02-14    caudal, celestone 9 mg    NERVE BLOCK  7-16-14    caudal epidural #2, celestone 9mg, fentanyl 25mcg    NERVE BLOCK  7/30/14    caudal #3 decadron 10mg    NERVE BLOCK  11-6-14    duramorph epidural steroid block  duramorph 1 mg celestone 9 mg    NERVE BLOCK  11/20/15    TENS- Empi Select    NERVE BLOCK  07/20/2018    right transforminal # 1 decadron 10mg,isovue    NERVE BLOCK Bilateral 02/01/2019    bilat mbnb- no steroid    NERVE BLOCK Bilateral 02/08/2019    bilat mbnb, marcaine . 25%    VT KNEE SCOPE,DIAGNOSTIC Right 3/24/2017    KNEE ARTHROSCOPY WITH PARTIAL MEDIAL MENISECAL DEBRIDMENT  performed by Kimberly Navarrete MD at [facility]. [address] ENDOSCOPY  9 20 2007    UPPER GASTROINTESTINAL ENDOSCOPY  4 21 2009,04/2011    gastritis, esophagitis       Medications:      lactobacillus  1 capsule Oral Daily with breakfast    hydroCHLOROthiazide  25 mg Oral Daily    ipratropium-albuterol  1 ampule Inhalation Q4H WA    cefOXitin  2 g Intravenous 4 times per day    predniSONE  40 mg Oral Daily    budesonide-formoterol  2 puff Inhalation BID    potassium chloride  20 mEq Oral Once    insulin lispro  0-12 Units Subcutaneous TID WC    insulin lispro  0-6 Units Subcutaneous Nightly    sodium chloride flush  10 mL Intravenous 2 times per day    [Held by provider] amLODIPine  10 mg Oral Daily    atorvastatin  40 mg Oral Daily    azelastine  2 spray Each Nostril BID    carvedilol  6.25 mg Oral BID    docusate sodium  100 mg Oral Daily    DULoxetine  60 mg Oral Daily    gabapentin  300 mg Oral BID    [Held by provider] lisinopril-hydroCHLOROthiazide  1 tablet Oral Daily    [Held by provider] metFORMIN  500 mg Oral BID WC    mirtazapine  15 mg Oral Nightly    mirtazapine  30 mg Oral Nightly    omeprazole  20 mg Oral QAM    potassium chloride  20 mEq Oral Daily    tiZANidine  4 mg Oral BID    trospium  20 mg Oral BID    polyethylene glycol  17 g Oral Daily    magnesium hydroxide  30 mL Oral Daily    enoxaparin  40 mg Subcutaneous Daily    cetirizine  10 mg Oral Daily    gabapentin  600 mg Oral Nightly    nicotine  1 patch Transdermal Daily       Social History:     Social History     Socioeconomic History    Marital status: Single     Spouse name: Not on file    Number of children: Not on file    Years of education: Not on file    Highest education level: Not on file   Occupational History     Employer: DISABLED   Social Needs    Financial resource strain: Not very hard    Food insecurity     Worry: Never true     Inability: Never true    Transportation needs     Medical: No     Non-medical: No   Tobacco Use    Smoking status: Former GOMEZ. Belford Lykens  Lung: Shortness of breath, cough. No sputum production  Abdomen: No nausea, vomiting, diarrhea, or abdominal pain. Clenton Reas No cramps. Genitourinary: No increased urinary frequency, or dysuria. No hematuria. No suprapubic or CVA pain  Musculoskeletal: No muscle aches or pains. No joint effusions, swelling or deformities  Hematologic: No bleeding or bruising. Neurologic: No headache, weakness, numbness, or tingling. Integument: No rash, no ulcers. Psychiatric: No depression. Endocrine: No polyuria, no polydipsia, no polyphagia. Physical Examination :     Patient Vitals for the past 8 hrs:   BP Temp Temp src Pulse Resp SpO2   11/29/20 0915 122/78 99.1 °F (37.3 °C) Oral 98 15 94 %     General Appearance: Awake, alert, and in apparent respiratory distress  Head:  Normocephalic, no trauma  Eyes: Pupils equal, round, reactive to light and accommodation; extraocular movements intact; sclera anicteric; conjunctivae pink. No embolic phenomena. ENT: Oropharynx clear, without erythema, exudate, or thrush. No tenderness of sinuses. Mouth/throat: mucosa pink and moist. No lesions. Dentition in good repair. Neck:Supple, without lymphadenopathy. Thyroid normal, No bruits. Pulmonary/Chest: Coarse breath sounds. Cardiovascular: Regular rate and rhythm without murmurs, rubs, or gallops. Abdomen: Soft, non tender. Bowel sounds normal. No organomegaly  All four Extremities: No cyanosis, clubbing, edema, or effusions. Neurologic: No gross sensory or motor deficits. Skin: Warm and dry with good turgor. No signs of peripheral arterial or venous insufficiency. No ulcerations. No open wounds.     Medical Decision Making -Laboratory:   I have independently reviewed/ordered the following labs:    CBC with Differential:   Recent Labs     11/28/20 0612 11/29/20  0717   WBC 26.4* 20.3*   HGB 10.0* 9.7*   HCT 32.4* 32.3*    463*   LYMPHOPCT 12* 17*   MONOPCT 7 9*     BMP:   Recent Labs     11/28/20 0612 11/29/20  0717    138   K 4.0 4.1    102   CO2 27 26   BUN 11 12   CREATININE 0.53 0.62   MG 2.0 2.3     Hepatic Function Panel:   Recent Labs     11/28/20  0612 11/29/20  0717   LABALBU 2.8* 2.5*     No results for input(s): RPR in the last 72 hours. No results for input(s): HIV in the last 72 hours. No results for input(s): BC in the last 72 hours. Lab Results   Component Value Date    MUCUS NOT REPORTED 11/24/2020    RBC 3.51 11/29/2020    RBC 4.57 02/16/2012    TRICHOMONAS NOT REPORTED 11/24/2020    WBC 20.3 11/29/2020    YEAST NOT REPORTED 11/24/2020    TURBIDITY CLEAR 11/24/2020     Lab Results   Component Value Date    CREATININE 0.62 11/29/2020    GLUCOSE 71 11/29/2020       Medical Decision Making-Imaging:     EXAMINATION:    CTA OF THE CHEST 11/22/2020 4:37 pm         TECHNIQUE:    CTA of the chest was performed after the administration of intravenous    contrast.  Multiplanar reformatted images are provided for review.  MIP    images are provided for review.  Dose modulation, iterative reconstruction,    and/or weight based adjustment of the mA/kV was utilized to reduce the    radiation dose to as low as reasonably achievable.         COMPARISON:    12/26/2019         HISTORY:    ORDERING SYSTEM PROVIDED HISTORY: tachycardia    TECHNOLOGIST PROVIDED HISTORY:    tachycardia    Reason for Exam: tachycardia         64-year-old female with tachycardia         FINDINGS:    Pulmonary Arteries: No obvious filling defect in the main, right main, or    left main pulmonary arteries.  Evaluation of the remainder of the pulmonary    arterial vasculature is severely limited to nondiagnostic due to respiratory    motion and suboptimal bolus timing as well as streak artifact from the    contrast bolus in the SVC.         Mediastinum: Prominent mediastinal lymph nodes without overt mediastinal,    axillary, or hilar lymphadenopathy.  Visualized thyroid gland grossly    unremarkable in appearance.  No pericardial effusion.  No periaortic or    mediastinal hemorrhage.         Lungs/pleura: Trachea and very proximal central airways appear patent. Consolidation of the bilateral lower lobes and partial consolidation of the    lingula.  Partial consolidation of the proximal right middle lobe.  Severe    emphysema.  Respiratory motion throughout the lungs.  No pneumothorax.         Upper Abdomen: Fatty liver.         Soft Tissues/Bones: Mild diffuse degenerative changes throughout the spine.              Impression    1. No clear evidence for central pulmonary embolus.  Evaluation of the    remainder of the pulmonary arterial vasculature is severely limited to    nondiagnostic as detailed above. 2. Bilateral lower lobe consolidation likely pneumonia.  Consolidation of the    proximal right middle lobe and lingula to a lesser degree.  Findings favored    to represent multifocal pneumonia.  Follow-up recommended to document    resolution. 3. Prominent mediastinal lymph nodes. 4. Fatty liver. 5. Severe emphysema.           11/26/2020 1:22 am         COMPARISON:    November 24, 2020 November 24, 2020         HISTORY:    ORDERING SYSTEM PROVIDED HISTORY: hypoxia    TECHNOLOGIST PROVIDED HISTORY:    hypoxia    Reason for Exam: difficulty breathing   upright port         FINDINGS:    Marginal inspiration is noted.  Borderline cardiomegaly is present.  Improved    aeration is seen within the right lung base.  Airspace disease within the    left lung base is minimally improved compared to studies dating back to    November 23, 2020.  No pneumothorax is noted.  Osseous structures are stable.              Impression    1. Minimally improved aeration, right lung base, suggesting resolving    pneumonia/atelectasis    2.  Persistent left lower lobe airspace disease, minimally improved        Medical Decision [id number]:       Medical Decision Making-Other:     Note:  Labs, medications, radiologic studies were reviewed with personal review of films  Large amounts of data were reviewed  Discussed with nursing Staff, Discharge planner  Infection Control and Prevention measures reviewed  All prior entries were reviewed  Administer medications as ordered  Prognosis: Guarded  Discharge planning reviewed. Follow up as outpatient. Thank you for allowing us to participate in the care of this patient. Please call with questions. Abdiel Herrera MD     ATTESTATION:    I have discussed the case, including pertinent history and exam findings with the residents and students. I have seen and examined the patient and the key elements of the encounter have been performed by me. I was present when the student obtained his information or examined the patient. I have reviewed the laboratory data, other diagnostic studies and discussed them with the residents. I have updated the medical record where necessary. I agree with the assessment, plan and orders as documented by the resident/ student. Alexx Lawler MD.    Pager: [phone] - Office: [phone].

## 2020-11-29 NOTE — PROGRESS NOTES
[facility]  Office: [address], DO, Kingsley Charu, DO, Eufemia Asher, DO, Mikala Chen Blood, DO, Alexander Zepeda MD, Milly Fitzgerald MD, Darryle So, MD, Muna Donohue MD, Tyrone Pyle MD, José Hernandez MD, Jason Roche MD, Simin Aguilar MD, Franklin Pace MD, Diane Ohara, DO, Magan Knight MD, Alesha Clifton MD, Kelsey Roca DO, Annabelle Velez MD,  Gosia Ferguson DO, Johnny Johnson MD, Flor Gambino MD, Tangela Hidalgo, [facility], [facility], CNP, Delonte Delacruz, CNP, Maximus Monzon, CNS, Dirk Isabel, CNP, Higinio Duckworth, CNP, Meredith Freire, CNP, Neri Huynh, CNP, Loren Anton, CNP, ANABEL DuncanC, Kirby Stevenson, BRITTNI, Mike Zuniga, CNP, Yash Velazquez, CNP, Bruno Keating, CNP, Tawana Rubinstein, CNP, Pedro Son, CNP         [facility]   [address] [facility]    Progress Note    11/29/2020    2:19 PM    Name:   Jaiden Brito  MRN:     [id number]     Acct:      [de-identified]   Room:   [facility]/0421-02   Day:  10  Admit Date:  11/19/2020  5:32 AM    PCP:   Hadley Padilla MD  Code Status:  Full Code    Subjective:     C/C: Shortness of breath  Interval History Status:     Patient became hypoxic yesterday and had to be placed on high flow oxygen, initially she was on BiPAP as well, she continues to be on high flow oxygen this morning. Denies any chest pain. Her diarrhea has improved after starting probiotics      Brief History: This is 56years old female who was admitted to the ICU after lumbar fusion surgery. Patient has chronic respiratory failure due to COPD and is on home oxygen with 2 L of nasal cannula. Postoperatively patient developed aspiration pneumonia and atelectasis and she was transferred to ICU. She was placed on high flow oxygen. Infectious disease was consulted and she was placed on antibiotics which was then changed to cefoxitin. She was also given steroids.   She was then weaned down to 5 L of nasal cetirizine  10 mg Oral Daily    gabapentin  600 mg Oral Nightly    nicotine  1 patch Transdermal Daily     Continuous Infusions:    dextrose       PRN Meds: albuterol, magic (miracle) mouthwash, acetaminophen, benzonatate, diphenhydrAMINE, sodium chloride flush, oxyCODONE **OR** oxyCODONE, morphine **OR** [DISCONTINUED] morphine, senna, glucose, dextrose, glucagon (rDNA), dextrose    Data:     Past Medical History:   has a past medical history of Allergic rhinitis, Alveolar hypoventilation, Aortic insufficiency, Bronchiectasis (HCC), Bronchitis, Chronic back pain, COPD (chronic obstructive pulmonary disease) ([facility] [address] Depression, DM (diabetes mellitus) ([facility] [address] GERD (gastroesophageal reflux disease), History of echocardiogram, Hyperlipidemia, Hypertension, Obesity, Osteoarthritis, Peripheral vascular disease ([facility] [address] Primary osteoarthritis of both knees, Radicular pain of lumbosacral region, Spinal stenosis, lumbar region, without neurogenic claudication, Tricuspid regurgitation, Type II or unspecified type diabetes mellitus without mention of complication, not stated as uncontrolled, Unspecified sleep apnea, URI (upper respiratory infection), Wears dentures, Wears glasses, and Wellness examination. Social History:   reports that she quit smoking about 4 weeks ago. Her smoking use included cigarettes. She has a 9.00 pack-year smoking history. She has never used smokeless tobacco. She reports that she does not drink alcohol or use drugs.      Family History:   Family History   Problem Relation Age of Onset    Diabetes Mother     Heart Disease Mother     Cirrhosis Father        Vitals:  /78   Pulse 98   Temp 99.1 °F (37.3 °C) (Oral)   Resp 15   Ht 5' 5\" (1.651 m)   Wt 188 lb 4.4 oz (85.4 kg)   LMP 2003   SpO2 94%   BMI 31.33 kg/m²   Temp (24hrs), Av.1 °F (36.7 °C), Min:97.5 °F (36.4 °C), Max:99.1 °F (37.3 °C)    Recent Labs     20  1528 20  1936 20  0818 20  1204 POCGLU 159* 144* 81 112*       I/O (24Hr): Intake/Output Summary (Last 24 hours) at 11/29/2020 1419  Last data filed at 11/28/2020 1510  Gross per 24 hour   Intake 1310 ml   Output --   Net 1310 ml       Labs:  Hematology:  Recent Labs     11/27/20  0410 11/28/20  0612 11/29/20  0717   WBC 14.6* 26.4* 20.3*   RBC 3.54* 3.65* 3.51*   HGB 9.6* 10.0* 9.7*   HCT 31.4* 32.4* 32.3*   MCV 88.7 88.8 92.0   MCH 27.1 27.4 27.6   MCHC 30.6 30.9 30.0   RDW 13.2 13.4 14.2    449 463*   MPV 9.6 9.9 9.4     Chemistry:  Recent Labs     11/27/20  0410 11/28/20  0612 11/29/20  0717    140 138   K 4.6 4.0 4.1    102 102   CO2 26 27 26   GLUCOSE 196* 172* 71   BUN 12 11 12   CREATININE 0.48* 0.53 0.62   MG 2.6 2.0 2.3   ANIONGAP 11 11 10   LABGLOM >60 >60 >60   GFRAA >60 >60 >60   CALCIUM 8.7 8.5* 8.6   PHOS 2.8 1.5* 3.6     Recent Labs     11/27/20  0410  11/28/20  0612 11/28/20  0749 11/28/20  1150 11/28/20  1528 11/28/20  1936 11/29/20  0717 11/29/20  0818 11/29/20  1204   LABALBU 2.8*  --  2.8*  --   --   --   --  2.5*  --   --    POCGLU  --    < >  --  126* 137* 159* 144*  --  81 112*    < > = values in this interval not displayed. ABG:  Lab Results   Component Value Date    POCPH 7.381 11/26/2020    POCPCO2 54.0 11/26/2020    POCPO2 51.2 11/26/2020    POCHCO3 32.1 11/26/2020    NBEA NOT REPORTED 11/26/2020    PBEA 6 11/26/2020    YUQ8OEY 34 11/26/2020    YCWF1WPE 84 11/26/2020    FIO2 100.0 11/26/2020     Lab Results   Component Value Date/Time    SPECIAL RT AC 5ML 11/22/2020 08:33 PM     Lab Results   Component Value Date/Time    CULTURE NO GROWTH 6 DAYS 11/22/2020 08:33 PM       Radiology:  Xr Chest (single View Frontal)    Result Date: 11/24/2020  Bilateral lower lobe predominant airspace disease similar to prior study with slight improvement suspected on the left. Xr Chest Portable    Result Date: 11/26/2020  1.  Minimally improved aeration, right lung base, suggesting resolving pneumonia/atelectasis 2. Persistent left lower lobe airspace disease, minimally improved     Xr Chest Portable    Result Date: 11/23/2020  Stable moderate interstitial edema and moderate left basilar opacity related to combined pleural-parenchymal process Improvement in now mild residual streaky right basilar atelectasis     Xr Chest Portable    Result Date: 11/22/2020  1. Lower zone predominant airspace disease, atelectasis and/or infiltrate. Follow-up is recommended to document resolution. 2. Stable mild cardiomegaly. Moderate pulmonary vascular congestion. Trace bilateral pleural effusions. Xr Chest Portable    Result Date: 11/21/2020  Bibasilar opacities compatible with atelectasis. In the appropriate clinical setting pneumonia is not excluded. Ct Chest Pulmonary Embolism W Contrast    Result Date: 11/22/2020  1. No clear evidence for central pulmonary embolus. Evaluation of the remainder of the pulmonary arterial vasculature is severely limited to nondiagnostic as detailed above. 2. Bilateral lower lobe consolidation likely pneumonia. Consolidation of the proximal right middle lobe and lingula to a lesser degree. Findings favored to represent multifocal pneumonia. Follow-up recommended to document resolution. 3. Prominent mediastinal lymph nodes. 4. Fatty liver. 5. Severe emphysema. Fl Modified Barium Swallow W Video    Result Date: 11/23/2020  No evidence of significant penetration or aspiration. Please see separate speech pathology report for full discussion of findings and recommendations.        Physical Examination:        General appearance:  alert, cooperative and no distress  Mental Status:  oriented to person, place and time and normal affect  Lungs: Bilateral air entry, occasional cramps   heart:  regular rate and rhythm, no murmur  Abdomen:  soft, nontender, nondistended, normal bowel sounds, no masses, hepatomegaly, splenomegaly  Extremities:  no edema, redness, tenderness in the calves  Skin:  no gross lesions, rashes, induration    Assessment:        Hospital Problems           Last Modified POA    * (Principal) Acute on chronic respiratory failure with hypoxia ([address] 11/28/2020 Yes    COPD, severity to be determined ([address] 11/25/2020 Yes    HTN (hypertension) 11/20/2020 Yes    History of tobacco use 11/20/2020 Yes    GERD (gastroesophageal reflux disease) 11/20/2020 Yes    Chronic respiratory failure with hypoxia ([address] 11/20/2020 Yes    Other spondylosis with radiculopathy, lumbar region 11/28/2020 Yes    Lumbar disc disease 11/19/2020 Yes    Alveolar hypoventilation 11/20/2020 Yes    Obesity (BMI 30-39.9) 11/20/2020 Yes    Bronchiectasis ([address] 11/20/2020 Yes    Centrilobular emphysema ([address] 11/28/2020 Yes    Oxygen dependent 11/20/2020 Yes    Acute postoperative anemia due to expected blood loss 11/20/2020 Yes    Multifocal pneumonia 11/23/2020 Yes      Diabetes mellitus type 2    Plan:        Continue cefoxitin as per infectious disease,  For diarrhea continue her on probiotics  Continue high flow oxygen and wean as tolerated  On steroids with prednisone, continue DuoNeb, Symbicort  We will challenge with a small dose of Lasix  Continue incentive spirometry  Status post lumbar fusion, neurosurgery following  Reconsult PT and OT  Hold metformin and continue insulin sliding scale  Restart home HCTZ    is working on discharge planning to acute rehab    Suzette Denton MD  11/29/2020  2:19 PM

## 2020-11-29 NOTE — PROGRESS NOTES
Occupational Therapy    Occupational Therapy Not Seen Note    DATE: 2020  Name: Courtney Goode  : 1960  MRN: [id number]    Patient not available for Occupational Therapy due to:     Other: Pt not appropriate this PM. Pt SPO2 at 87% resting on 25L high flow    Next Scheduled Treatment: Attempt at later date    Electronically signed by STEFAN Abdalla on 2020 at 1:26 PM

## 2020-11-29 NOTE — PLAN OF CARE
BRONCHOSPASM/BRONCHOCONSTRICTION     [x]         IMPROVE AERATION/BREATH SOUNDS  [x]   ADMINISTER BRONCHODILATOR THERAPY AS APPROPRIATE  [x]   ASSESS BREATH SOUNDS  [x]   IMPLEMENT AEROSOL/MDI PROTOCOL  [x]   PATIENT EDUCATION AS NEEDED     PROVIDE ADEQUATE OXYGENATION WITH ACCEPTABLE SP02/ABG'S    [x]  IDENTIFY APPROPRIATE OXYGEN THERAPY  [x]   MONITOR SP02/ABG'S AS NEEDED   [x]   PATIENT EDUCATION AS NEEDED     NON INVASIVE VENTILATION    PROVIDE OPTIMAL VENTILATION/ACCEPTABLE SP02  IMPLEMENT NON INVASIVE VENTILATION PROTOCOL  ASSESSMENT SKIN INTEGRITY  PATIENT EDUCATION AS NEEDED  BIPAP AS NEEDED

## 2020-11-29 NOTE — CONSULTS
[facility] - Progress Note  Today's Date and Time: 11/28/2020, 9:50 PM    Impression :   Aspiration pneumonia  Bilateral bronchopneumonia  Acute hypoxic respiratory failure requiring high flow 02  S/P Lumbar fusion 11-19-20    Recommendations:   D/C Zosyn to avoid fluid overload  Cefoxitin  2 gm IV q 6 hr  Diuresis    Medical Decision Making/Summary/Discussion:11/28/2020       Infection Control Recommendations   [place] Precautions    Antimicrobial Stewardship Recommendations     Simplification of therapy  Targeted therapy  Coordination of Outpatient Care:   Estimated Length of IV antimicrobials: TBD  Patient will need Midline Catheter Insertion: no  Patient will need PICC line Insertion:no  Patient will need: Home IV , [place],  SNF,  LTAC: TBD  Patient will need outpatient wound care:No    Chief complaint/reason for consultation:   Pneumonia      History of Present Illness:   Mejia Baeza is a 56y.o.-year-old  female who was initially admitted on 11/19/2020. Patient seen at the request of Marques Barrera. INITIAL HISTORY:    The patient has a history of morbid obesity, COPD, JOAN, chronic hypoxic respiratory failure -on home oxygen. She was admitted because of right lower extremity pain extending from hip to foot.  She has known lumbar arthritis and had failed all conservative measures.  She underwent posterior fusion L4/5 surgery on 11/19/20.      During the postoperative period her respiratory status declined and she started spiking fevers. Her Chest x-ray and CT showed bilateral multifocal infiltrates suggestive of aspiration, areas of denser consolidation suggestive of bronchopneumonia and underlying severe emphysema. Patient received ceftriaxone, then zosyn since 12-24-20 with benefit. She is currently experiencing worsening of 02 requirements.     Plan:   D/C zosyn to avoid fluid and Na overload  Start cefoxitin     CURRENT EVALUATION : 11/28/2020    Patient evaluated and examined in the ICU. Afebrile  VS stable    Feeling better  Improvement respiratory wise. RR 20  Flow rate 3-  FIO2 100-->40 %  02 sat 94     Labs, X rays reviewed: 11/28/2020    BUN: 16-->12  Cr: 0.62-->0.48    WBC: 12.9-->14.6  Hb:9.4-->9.6  Plat: 314-->339    Cultures:  Urine:  11-22-20: No growth   11-19-20: No growth  Blood:  11-22-20: No growth    Sputum :    Wound:      MRSA screen: negative    Discussed with patient, RN, family. 11-26-20 11-22-20: I have personally reviewed the past medical history, past surgical history, medications, social history, and family history, and I have updated the database accordingly.   Past Medical History:     Past Medical History:   Diagnosis Date    Allergic rhinitis     Alveolar hypoventilation 11/20/2020    Aortic insufficiency 2018    mild-moderate on echo [facility]    Bronchiectasis ([facility] 11/20/2020    Bronchitis     Chronic back pain     Pain management at [place] [id number]. COPD (chronic obstructive pulmonary disease) ([facility] [address]     Dr. Blaze Smith to see 11/09/2020    Depression     DM (diabetes mellitus) ([facility] [address] 12/18/2012    GERD (gastroesophageal reflux disease)     History of echocardiogram 05/2018    EF 65%, mild-moderate AI and TR    Hyperlipidemia     Dr. Octavio Merlos Hypertension     Dr. Octavio Merlos Obesity     Osteoarthritis     Peripheral vascular disease ([facility] [address] [facility]     Primary osteoarthritis of both knees     Radicular pain of lumbosacral region     Spinal stenosis, lumbar region, without neurogenic claudication 04/30/2013    Tricuspid regurgitation 2018    mild-moderate on echo    Type II or unspecified type diabetes mellitus without mention of complication, not stated as uncontrolled     Dr. Octavio Merlos Unspecified sleep apnea     no cpap used anymore    URI (upper respiratory infection)     Wears dentures     upper and lower full dentures    Wears glasses     Wellness examination     Dr. hSauna Smith -PCP last visit in early Oct. 2020       Past Surgical  History:     Past Surgical History:   Procedure Laterality Date    COLONOSCOPY  2/12/2009    normal    DILATION AND CURETTAGE OF UTERUS      GASTRECTOMY      partial    LUMBAR DISCECTOMY  01/2015    lumbar diskectomy    LUMBAR FUSION  11/19/2020     POSTERIOR FUSION L4/5,     LUMBAR FUSION N/A 11/19/2020    POSTERIOR FUSION L4/5, [employer], Elvia Loradhay, [facility]ES #[id number] AMINA performed by Swathi Vogel DO at [address] Right 4/30/13    Lumbar Diagnostic Block,  Kenalog 40 mg    NERVE BLOCK  5/23/13    Lumbar Radiofrequency, Kenalog 40mg    NERVE BLOCK  8/12/13    Lt MBNB  celestone 6mg    NERVE BLOCK Left 8-28-13    left lumbar diagnostic block #2 decadron 10 mg    NERVE BLOCK Left 9-24-13    left lumbar median branch radiofrequency    NERVE BLOCK  07-02-14    caudal, celestone 9 mg    NERVE BLOCK  7-16-14    caudal epidural #2, celestone 9mg, fentanyl 25mcg    NERVE BLOCK  7/30/14    caudal #3 decadron 10mg    NERVE BLOCK  11-6-14    duramorph epidural steroid block  duramorph 1 mg celestone 9 mg    NERVE BLOCK  11/20/15    TENS- Empi Select    NERVE BLOCK  07/20/2018    right transforminal # 1 decadron 10mg,isovue    NERVE BLOCK Bilateral 02/01/2019    bilat mbnb- no steroid    NERVE BLOCK Bilateral 02/08/2019    bilat mbnb, marcaine . 25%    NH KNEE SCOPE,DIAGNOSTIC Right 3/24/2017    KNEE ARTHROSCOPY WITH PARTIAL MEDIAL MENISECAL DEBRIDMENT  performed by Elma Garcia MD at [address] [facility]  9 20 2007    UPPER GASTROINTESTINAL ENDOSCOPY  4 21 2009,04/2011    gastritis, esophagitis       Medications:      [START ON 11/29/2020] lactobacillus  1 capsule Oral Daily with breakfast    [START ON 11/29/2020] hydroCHLOROthiazide  25 mg Oral Daily    ipratropium-albuterol  1 ampule Inhalation Q4H WA    cefOXitin  2 g Intravenous 4 times per day    predniSONE  40 mg Oral Daily    budesonide-formoterol  2 puff Inhalation BID    potassium chloride  20 mEq Oral Once    insulin lispro  0-12 Units Subcutaneous TID WC    insulin lispro  0-6 Units Subcutaneous Nightly    sodium chloride flush  10 mL Intravenous 2 times per day    [Held by provider] amLODIPine  10 mg Oral Daily    atorvastatin  40 mg Oral Daily    azelastine  2 spray Each Nostril BID    carvedilol  6.25 mg Oral BID    docusate sodium  100 mg Oral Daily    DULoxetine  60 mg Oral Daily    gabapentin  300 mg Oral BID    [Held by provider] lisinopril-hydroCHLOROthiazide  1 tablet Oral Daily    [Held by provider] metFORMIN  500 mg Oral BID WC    mirtazapine  15 mg Oral Nightly    mirtazapine  30 mg Oral Nightly    omeprazole  20 mg Oral QAM    potassium chloride  20 mEq Oral Daily    tiZANidine  4 mg Oral BID    trospium  20 mg Oral BID    polyethylene glycol  17 g Oral Daily    magnesium hydroxide  30 mL Oral Daily    enoxaparin  40 mg Subcutaneous Daily    cetirizine  10 mg Oral Daily    gabapentin  600 mg Oral Nightly    nicotine  1 patch Transdermal Daily       Social History:     Social History     Socioeconomic History    Marital status: Single     Spouse name: Not on file    Number of children: Not on file    Years of education: Not on file    Highest education level: Not on file   Occupational History     Employer: DISABLED   Social Needs    Financial resource strain: Not very hard    Food insecurity     Worry: Never true     Inability: Never true    Transportation needs     Medical: No     Non-medical: No   Tobacco Use    Smoking status: Former Smoker     Packs/day: 0.25     Years: 36.00     Pack years: 9.00     Types: Cigarettes     Last attempt to quit: 10/30/2020     Years since quittin.0    Smokeless tobacco: Never Used    Tobacco comment: pt currently using nicotine patches   5 CIGARETTES A DAY   Substance and Sexual Activity    Alcohol use: No     Alcohol/week: 0.0 standard drinks     Frequency: Never     Binge frequency: Never    Drug use: No     Comment: 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 7 yrs    Sexual activity: Never   Lifestyle    Physical activity     Days per week: 0 days     Minutes per session: 0 min    Stress: Only a little   Relationships    Social connections     Talks on phone: More than three times a week     Gets together: Once a week     Attends Evangelical service: More than 4 times per year     Active member of club or organization: No     Attends meetings of clubs or organizations: Never     Relationship status: Never     Intimate partner violence     Fear of current or ex partner: Not on file     Emotionally abused: Not on file     Physically abused: Not on file     Forced sexual activity: Not on file   Other Topics Concern    Not on file   Social History Narrative    10/9/20 Patient keeping contact with others to a minimum due to [place] 19 Pandemic. 10/9/20 Patient has difficulty with ambulation due to DJD, stenosis and fibromyalgia       Family History:     Family History   Problem Relation Age of Onset    Diabetes Mother     Heart Disease Mother     Cirrhosis Father         Allergies:   Aspirin; Claritin [loratadine]; Flonase [fluticasone propionate]; and Morphine and related     Review of Systems:   Constitutional: Fevers, no chills. No systemic complaints  Head: No headaches  Eyes: No double vision or blurry vision. No conjunctival inflammation. ENT: No sore throat or runny nose. . No hearing loss, tinnitus or vertigo. Cardiovascular: No chest pain or palpitations. Shortness of breath. GOMEZ. Orthopnea  Lung: Shortness of breath, cough. No sputum production  Abdomen: No nausea, vomiting, diarrhea, or abdominal pain. [place] No cramps. Genitourinary: No increased urinary frequency, or dysuria. No hematuria. No suprapubic or CVA pain  Musculoskeletal: No muscle aches or pains.  No joint effusions, swelling or deformities  Hematologic: No bleeding or bruising. Neurologic: No headache, weakness, numbness, or tingling. Integument: No rash, no ulcers. Psychiatric: No depression. Endocrine: No polyuria, no polydipsia, no polyphagia. Physical Examination :     Patient Vitals for the past 8 hrs:   BP Temp Temp src Pulse Resp SpO2   11/28/20 2140 -- -- -- -- 20 --   11/28/20 1936 (!) 148/90 98 °F (36.7 °C) Oral 97 18 95 %   11/28/20 1527 (!) 146/83 98.2 °F (36.8 °C) Oral 94 18 (!) 88 %     General Appearance: Awake, alert, and in apparent respiratory distress  Head:  Normocephalic, no trauma  Eyes: Pupils equal, round, reactive to light and accommodation; extraocular movements intact; sclera anicteric; conjunctivae pink. No embolic phenomena. ENT: Oropharynx clear, without erythema, exudate, or thrush. No tenderness of sinuses. Mouth/throat: mucosa pink and moist. No lesions. Dentition in good repair. Neck:Supple, without lymphadenopathy. Thyroid normal, No bruits. Pulmonary/Chest: Coarse breath sounds   Cardiovascular: Regular rate and rhythm without murmurs, rubs, or gallops. Abdomen: Soft, non tender. Bowel sounds normal. No organomegaly  All four Extremities: No cyanosis, clubbing, edema, or effusions. Neurologic: No gross sensory or motor deficits. Skin: Warm and dry with good turgor. No signs of peripheral arterial or venous insufficiency. No ulcerations. No open wounds.     Medical Decision Making -Laboratory:   I have independently reviewed/ordered the following labs:    CBC with Differential:   Recent Labs     11/27/20 0410 11/28/20 0612   WBC 14.6* 26.4*   HGB 9.6* 10.0*   HCT 31.4* 32.4*    449   LYMPHOPCT 7* 12*   MONOPCT 4 7     BMP:   Recent Labs     11/27/20 0410 11/28/20 0612    140   K 4.6 4.0    102   CO2 26 27   BUN 12 11   CREATININE 0.48* 0.53   MG 2.6 2.0     Hepatic Function Panel:   Recent Labs     11/27/20 0410 11/28/20  0612   LABALBU 2.8* 2.8* No results for input(s): RPR in the last 72 hours. No results for input(s): HIV in the last 72 hours. No results for input(s): BC in the last 72 hours. Lab Results   Component Value Date    MUCUS NOT REPORTED 11/24/2020    RBC 3.65 11/28/2020    RBC 4.57 02/16/2012    TRICHOMONAS NOT REPORTED 11/24/2020    WBC 26.4 11/28/2020    YEAST NOT REPORTED 11/24/2020    TURBIDITY CLEAR 11/24/2020     Lab Results   Component Value Date    CREATININE 0.53 11/28/2020    GLUCOSE 172 11/28/2020       Medical Decision Making-Imaging:     EXAMINATION:    CTA OF THE CHEST 11/22/2020 4:37 pm         TECHNIQUE:    CTA of the chest was performed after the administration of intravenous    contrast.  Multiplanar reformatted images are provided for review.  MIP    images are provided for review. Dose modulation, iterative reconstruction,    and/or weight based adjustment of the mA/kV was utilized to reduce the    radiation dose to as low as reasonably achievable.         COMPARISON:    12/26/2019         HISTORY:    ORDERING SYSTEM PROVIDED HISTORY: tachycardia    TECHNOLOGIST PROVIDED HISTORY:    tachycardia    Reason for Exam: tachycardia         68-year-old female with tachycardia         FINDINGS:    Pulmonary Arteries: No obvious filling defect in the main, right main, or    left main pulmonary arteries.  Evaluation of the remainder of the pulmonary    arterial vasculature is severely limited to nondiagnostic due to respiratory    motion and suboptimal bolus timing as well as streak artifact from the    contrast bolus in the SVC.         Mediastinum: Prominent mediastinal lymph nodes without overt mediastinal,    axillary, or hilar lymphadenopathy.  Visualized thyroid gland grossly    unremarkable in appearance.  No pericardial effusion.  No periaortic or    mediastinal hemorrhage.         Lungs/pleura: Trachea and very proximal central airways appear patent.     Consolidation of the bilateral lower lobes and partial consolidation of the    lingula.  Partial consolidation of the proximal right middle lobe.  Severe    emphysema.  Respiratory motion throughout the lungs.  No pneumothorax.         Upper Abdomen: Fatty liver.         Soft Tissues/Bones: Mild diffuse degenerative changes throughout the spine.              Impression    1. No clear evidence for central pulmonary embolus.  Evaluation of the    remainder of the pulmonary arterial vasculature is severely limited to    nondiagnostic as detailed above. 2. Bilateral lower lobe consolidation likely pneumonia.  Consolidation of the    proximal right middle lobe and lingula to a lesser degree.  Findings favored    to represent multifocal pneumonia.  Follow-up recommended to document    resolution. 3. Prominent mediastinal lymph nodes. 4. Fatty liver. 5. Severe emphysema.           11/26/2020 1:22 am         COMPARISON:    November 24, 2020 November 24, 2020         HISTORY:    ORDERING SYSTEM PROVIDED HISTORY: hypoxia    TECHNOLOGIST PROVIDED HISTORY:    hypoxia    Reason for Exam: difficulty breathing   upright port         FINDINGS:    Marginal inspiration is noted.  Borderline cardiomegaly is present.  Improved    aeration is seen within the right lung base.  Airspace disease within the    left lung base is minimally improved compared to studies dating back to    November 23, 2020.  No pneumothorax is noted.  Osseous structures are stable.              Impression    1. Minimally improved aeration, right lung base, suggesting resolving    pneumonia/atelectasis    2.  Persistent left lower lobe airspace disease, minimally improved        Medical Decision Pedfqu-[id number]-Pmtap:       Medical Decision Making-Other:     Note:  Labs, medications, radiologic studies were reviewed with personal review of films  Large amounts of data were reviewed  Discussed with nursing Staff, Discharge planner  Infection Control and Prevention measures reviewed  All prior entries were reviewed  Administer medications as ordered  Prognosis: Guarded  Discharge planning reviewed  Follow up as outpatient. Thank you for allowing us to participate in the care of this patient. Please call with questions.     Harmony Hurley MD  Pager: [phone] - Office: [phone]

## 2020-11-29 NOTE — PROGRESS NOTES
[facility] Wren, [facility] Lumbar disc disease [M51.9] . Vitals:    11/29/20 0915   BP: 122/78   Pulse: 98   Resp: 15   Temp: 99.1 °F (37.3 °C)   SpO2: 94%   . Patients home meds are   Prior to Admission medications    Medication Sig Start Date End Date Taking? Authorizing Provider   carvedilol (COREG) 6.25 MG tablet TAKE 1 TABLET BY MOUTH 2 TIMES DAILY 11/18/20  Yes Sintia Henriquez MD   cetirizine (ZYRTEC) 10 MG tablet TAKE 1 TABLET BY MOUTH DAILY 11/18/20  Yes Sintia Henriquez MD   meloxicam (MOBIC) 15 MG tablet Take 15 mg by mouth daily   Yes Historical Provider, MD   HYDROcodone-acetaminophen (NORCO) 5-325 MG per tablet Take 1 tablet by mouth every 8 hours as needed for Pain for up to 30 days. Early refill due to holidays 10/30/20 11/29/20 Yes ELVIRA No - CNP   trospium (SANCTURA) 20 MG tablet TAKE 1 TABLET BY MOUTH 2 TIMES DAILY 10/27/20  Yes Sintia Henriquez MD   tiZANidine (ZANAFLEX) 4 MG tablet TAKE 1 TABLET TWICE DAILY 10/27/20  Yes Sintia Henriquez MD   blood glucose test strips (EXACTECH TEST) strip 1 each by In Vitro route daily As needed.  10/14/20  Yes Sintia Henriquez MD   gabapentin (NEURONTIN) 300 MG capsule TAKE 1 CAPSULE IN MORNING AND AFTERNOON AND 2 CAPSULES AT NIGHT 10/12/20 11/19/20 Yes Sintia Henriquez MD   atorvastatin (LIPITOR) 40 MG tablet Take 1 tablet by mouth daily 9/22/20  Yes Sintia Henriquez MD   metFORMIN (GLUCOPHAGE) 500 MG tablet Take 1 tablet by mouth 2 times daily (with meals) 9/22/20  Yes Sintia Henriquez MD   lisinopril-hydroCHLOROthiazide (PRINZIDE;ZESTORETIC) 20-25 MG per tablet TAKE 1 TABLET EVERY DAY 9/22/20  Yes Sintia Henriquez MD    MG capsule TAKE 1 CAPSULE EVERY DAY 7/7/20  Yes Sintia Henriquez MD   meloxicam [facility]) 15 MG tablet Take 1 tablet by mouth daily 5/6/20 10/29/21 Yes ELVIRA Soto - CNP   amLODIPine (NORVASC) 10 MG tablet Take 1 tablet by mouth daily 4/2/20  Yes Sintia Henriquez MD   albuterol sulfate HFA (VENTOLIN HFA) 108 (90 Base) MCG/ACT inhaler Inhale 2 puffs into the lungs every 6 hours as needed for Wheezing 4/2/20  Yes Viraj Keller MD   omeprazole (PRILOSEC) 20 MG delayed release capsule TAKE 1 CAPSULE EVERY DAY 1/10/20  Yes Viraj Keller MD   diphenhydrAMINE (BENADRYL) 25 MG tablet Take 25 mg by mouth every 6 hours as needed for Itching   Yes Historical Provider, MD   nicotine (NICODERM CQ) 21 MG/24HR Place 1 patch onto the skin every 24 hours   Yes Historical Provider, MD   potassium chloride (KLOR-CON M) 10 MEQ extended release tablet Take 2 tablets by mouth daily 12/10/19  Yes Viraj Keller MD   Glendy Amass 18 MCG inhalation capsule  10/16/19  Yes Historical Provider, MD   acetaminophen (TYLENOL) 500 MG tablet Take 1 tablet by mouth 4 times daily as needed for Pain 9/12/19  Yes Edilma Hernandez, APRN - CNP   mirtazapine (REMERON) 30 MG tablet Take 30 mg by mouth nightly 5/22/18  Yes Historical Provider, MD   mirtazapine (REMERON) 15 MG tablet Take 15 mg by mouth nightly 5/22/18  Yes Historical Provider, MD   DULoxetine (CYMBALTA) 60 MG extended release capsule Take 1 capsule by mouth daily 2/1/18  Yes Viraj Keller MD   Lancets MISC 1 each by Does not apply route daily 1/5/18  Yes Viraj Keller MD   azelastine (ASTELIN) 0.1 % nasal spray 2 sprays by Nasal route 2 times daily Use in each nostril as directed 6/15/16  Yes Viraj Keller MD   ipratropium-albuterol (DUONEB) 0.5-2.5 (3) MG/3ML SOLN nebulizer solution Inhale 3 mLs into the lungs every 6 hours as needed for Shortness of Breath 8/4/15   Viraj Keller MD   .       Assessment: Continue current treatment schedule        RR 22  Breath Sounds: Clear      Bronchodilator assessment at level  3  Hyperinflation assessment at level   Secretion Management assessment at level      [x]    Bronchodilator Assessment  BRONCHODILATOR ASSESSMENT SCORE  Score 0 1 2 3 4 5   Breath Sounds   []  Patient Baseline [x]  No Wheeze good aeration []  Faint, scattered wheezing, good aeration []  Expiratory Wheezing and or moderately diminished []  Insp/Exp wheeze and/or very diminished []  Insp/Exp and/ or marked distress   Respiratory Rate   []  Patient Baseline []  Less than 20 []  Less than 20 [x]  20-25 []  Greater than 25 []  Greater than 25   Peak flow % of Pred or PB []  NA   []  Greater than 90%  []  81-90% []  71-80% []  Less than or equal to 70%  or unable to perform []  Unable due to Respiratory Distress   Dyspnea re []  Patient Baseline []  No SOB []  No SOB [x]  SOB on exertion []  SOB min activity []  At rest/acute   e FEV% Predicted       []  NA []  Above 69%  []  Unable []  Above 60-69%  []  Unable []  Above 50-59%  []  Unable []  Above 35-49%  []  Unable []  Less than 35%  []  Unable                 []  Hyperinflation Assessment  Score 1 2 3   CXR and Breath Sounds   []  Clear []  No atelectasis  Basilar aeration []  Atelectasis or absent basilar breath sounds   Incentive Spirometry Volume  (Per IBW)   []  Greater than or equal to 15ml/Kg []  less than 15ml/Kg []  less than 15ml/Kg   Surgery within last 2 weeks []  None or general   []  Abdominal or thoracic surgery  []  Abdominal or thoracic   Chronic Pulmonary Historyre []  No []  Yes []  Yes     []  Secretion Management Assessment  Score 1 2 3   Bilateral Breath Sounds   []  Occasional Rhonchi []  Scattered Rhonchi []  Course Rhonchi and/or poor aeration   Sputum    []  Small amount of thin secretions []  Moderate amount of viscous secretions []  Copius, Viscious Yellow/ Secretions   CXR as reported by physician []  clear  []  Unavailable []  Infiltrates and/or consolidation  []  Unavailable []  Mucus Plugging and or lobar consolidation  []  Unavailable   Cough []  Strong, productive cough []  Weak productive cough []  No cough or weak non-productive cough   Evaristo Redella  3:12 PM                            FEMALE                                  MALE                            FEV1 Predicted Normal Values                        FEV1 Predicted Normal Values          Age                                     Height in Feet and Inches       Age                                     Height in Feet and Inches       4' 11\" 5' 1\" 5' 3\" 5' 5\" 5' 7\" 5' 9\" 5' 11\" 6' 1\"  4' 11\" 5' 1\" 5' 3\" 5' 5\" 5' 7\" 5' 9\" 5' 11\" 6' 1\"   42 - 45 2.49 2.66 2.84 3.03 3.22 3.42 3.62 3.83 42 - 45 2.82 3.03 3.26 3.49 3.72 3.96 4.22 4.47   46 - 49 2.40 2.57 2.76 2.94 3.14 3.33 3.54 3.75 46 - 49 2.70 2.92 3.14 3.37 3.61 3.85 4.10 4.36   50 - 53 2.31 2.48 2.66 2.85 3.04 3.24 3.45 3.66 50 - 53 2.58 2.80 3.02 3.25 3.49 3.73 3.98 4.24   54 - 57 2.21 2.38 2.57 2.75 2.95 3.14 3.35 3.56 54 - 57 2.46 2.67 2.89 3.12 3.36 3.60 3.85 4.11   58 - 61 2.10 2.28 2.46 2.65 2.84 3.04 3.24 3.45 58 - 61 2.32 2.54 2.76 2.99 3.23 3.47 3.72 3.98   62 - 65 1.99 2.17 2.35 2.54 2.73 2.93 3.13 3.34 62 - 65 2.19 2.40 2.62 2.85 3.09 3.33 3.58 3.84   66 - 69 1.88 2.05 2.23 2.42 2.61 2.81 3.02 3.23 66 - 69 2.04 2.26 2.48 2.71 2.95 3.19 3.44 3.70   70+ 1.82 1.99 2.17 2.36 2.55 2.75 2.95 3.16 70+ 1.97 2.19 2.41 2.64 2.87 3.12 3.37 3.62             Predicted Peak Expiratory Flow Rate                                       Height (in)  Female       Height (in) Male           Age 59 58 96 53 70 18 78 74 Age            20 106 135 927 237 973 017 953 414  54 94 36 16 36 87 31 82 27   25 337 352 366 381 396 411 426 441 25 447 476 [phone] [phone]   30 329 344 359 374 389 404 419 434 30 437 466 494 523 552 [phone] 667   35 322 337 351 366 381 396 411 426 35 426 455 484 512 541 [phone]   40 314 329 344 359 374 389 404 419 40 416 445 473 [phone] 617 647   45 307 322 336 351 366 381 396 411 45 405 434 463 491 520 549 577 606 636   50 299 314 329 344 359 374 389 404 50 395 424 452 481 510 538 567 596 625   55 292 307 321 336 351 366 381 396 55 384 413 442 470 499 528 556 585 615   60 284 299 314 329 344 359 374 389 60 374 403 431 460 489 517 546 575 605   65 277 292 306 321 336 351 366 381 65 363 392 421 449 478 507 535 564 594   70 269 284 299 314 329 344 359 374 70 353 382 410 439 468 [phone] 583   75 261 274 289 305 319 334 348 364 75 344 372 400 429 458 487 515 544 573   80 253 266 282 296 312 327 342 356 80 335 362 390 419 448 476 [phone]

## 2020-11-29 NOTE — PROGRESS NOTES
Pulmonary Progress Note  [facility]      Patient - Meghana Elkins,  Age - 56 y.o.    - 1960      Room Number - 8040/4080-56   MRN -  [id number]   Acct # - [de-identified]  Date of Admission -  2020  5:32 AM    SUBJECTIVE  . Remains on high flow oxygen 80% with minimal oxygen desaturations. Overall improved. Ambulates to BR. Crackles persist with bronchial breath sounds at left > right lung base. No wheezing Patient 10 days post lumbar laminectomy complicated by bacterial pneumonia superimposed on stage III COPD. Uses home oxygen. Still smoking. I/O - 2L  OBJECTIVE    VITALS    height is 5' 5\" (1.651 m) and weight is 188 lb 4.4 oz (85.4 kg). Her oral temperature is 99.1 °F (37.3 °C). Her blood pressure is 122/78 and her pulse is 98. Her respiration is 15 and oxygen saturation is 94%. Temperature Range: Temp: 99.1 °F (37.3 °C) Temp  Av.1 °F (36.7 °C)  Min: 97.5 °F (36.4 °C)  Max: 99.1 °F (37.3 °C)  BP Range:  Systolic (07IKB), KXY:606 , Min:122 , WBL:365     Diastolic (02PKC), YSX:93, Min:73, Max:90    Pulse Range: Pulse  Av.5  Min: 82  Max: 98  Respiration Range: Resp  Av.1  Min: 14  Max: 20  Current Pulse Ox[de-identified]  SpO2: 94 %  24HR Pulse Ox Range:  SpO2  Av.5 %  Min: 93 %  Max: 96 %  Oxygen Amount and Delivery:  O2 Flow Rate (L/min): 35 L/min      Wt Readings from Last 3 Encounters:   20 188 lb 4.4 oz (85.4 kg)   20 185 lb (83.9 kg)   20 188 lb (85.3 kg)       I/O (24 Hours)  No intake or output data in the 24 hours ending 20 1637  EXAM  General Appearance  Alert, Oriented, and Cooperative = 0rapid breathingoriented to person, place, time, and general circumstances  HEENT - Normal, Head is normocephalic, atraumatic.    Neck - Supple, symmetrical, while supinepositive findings: n/a N/A; measuring N/A cm by cm  Lungs - positive findings: prolonged expiration, rales L lower posterior, bronchophony L lower posterior  Cardiovascular - Cor RRR and No murmurs                                                                            Abdomen - soft, nontender, no HSM, no guarding, no rebound, no masses  Neurologic - There are no focal motor or sensory deficits  Skin - No bruising or bleeding  Extremities - No cyanosis, clubbing none    MEDS   lactobacillus  1 capsule Oral Daily with breakfast    hydroCHLOROthiazide  25 mg Oral Daily    ipratropium-albuterol  1 ampule Inhalation Q4H WA    cefOXitin  2 g Intravenous 4 times per day    predniSONE  40 mg Oral Daily    budesonide-formoterol  2 puff Inhalation BID    potassium chloride  20 mEq Oral Once    insulin lispro  0-12 Units Subcutaneous TID     insulin lispro  0-6 Units Subcutaneous Nightly    sodium chloride flush  10 mL Intravenous 2 times per day    [Held by provider] amLODIPine  10 mg Oral Daily    atorvastatin  40 mg Oral Daily    azelastine  2 spray Each Nostril BID    carvedilol  6.25 mg Oral BID    docusate sodium  100 mg Oral Daily    DULoxetine  60 mg Oral Daily    gabapentin  300 mg Oral BID    [Held by provider] lisinopril-hydroCHLOROthiazide  1 tablet Oral Daily    [Held by provider] metFORMIN  500 mg Oral BID     mirtazapine  15 mg Oral Nightly    mirtazapine  30 mg Oral Nightly    omeprazole  20 mg Oral QAM    potassium chloride  20 mEq Oral Daily    tiZANidine  4 mg Oral BID    trospium  20 mg Oral BID    polyethylene glycol  17 g Oral Daily    magnesium hydroxide  30 mL Oral Daily    enoxaparin  40 mg Subcutaneous Daily    cetirizine  10 mg Oral Daily    gabapentin  600 mg Oral Nightly    nicotine  1 patch Transdermal Daily      dextrose       albuterol, magic (miracle) mouthwash, acetaminophen, benzonatate, diphenhydrAMINE, sodium chloride flush, oxyCODONE **OR** oxyCODONE, morphine **OR** [DISCONTINUED] morphine, senna, glucose, dextrose, glucagon (rDNA), dextrose    LABS  CBC   Recent Labs     11/29/20  0717   WBC 20.3*   HGB 9.7*   HCT 32.3*   MCV 92.0   *     BMP:   Recent Labs     11/29/20  0717      K 4.1      CO2 26   BUN 12   CREATININE 0.62   GLUCOSE 71   MG 2.3   PHOS 3.6     No results found for: PH, PCO2, PO2, HCO3, O2SAT  Lab Results   Component Value Date    MODE NOT REPORTED 11/26/2020     LIVER PROFILE No results for input(s): AST, ALT, LIPASE, BILIDIR, BILITOT, ALKPHOS in the last 72 hours. Invalid input(s): AMYLASE,  ALB  INR No results for input(s): INR in the last 72 hours.   PTT No results found for: APTT  CBC:   Lab Results   Component Value Date    WBC 20.3 11/29/2020    RBC 3.51 11/29/2020    RBC 4.57 02/16/2012    HGB 9.7 11/29/2020    HCT 32.3 11/29/2020    MCV 92.0 11/29/2020    MCH 27.6 11/29/2020    MCHC 30.0 11/29/2020    RDW 14.2 11/29/2020     11/29/2020     02/16/2012    MPV 9.4 11/29/2020     BMP:    Lab Results   Component Value Date     11/29/2020    K 4.1 11/29/2020     11/29/2020    CO2 26 11/29/2020    BUN 12 11/29/2020    LABALBU 2.5 11/29/2020    CREATININE 0.62 11/29/2020    CALCIUM 8.6 11/29/2020    GFRAA >60 11/29/2020    LABGLOM >60 11/29/2020    GLUCOSE 71 11/29/2020     PT/INR:  No results found for: PROTIME, INR    CULTURES  Urine Culture:  No components found for: CURINE  Blood Culture:  No components found for: CBLOOD, CFUNGUSBL  Sputum Culture:  No components found for: CSPUTUM    RADIOLOGY  XRAY VIEW OF THE CHEST         11/26/2020 1:22 am         COMPARISON:    November 24, 2020 November 24, 2020         HISTORY:    ORDERING SYSTEM PROVIDED HISTORY: hypoxia    TECHNOLOGIST PROVIDED HISTORY:    hypoxia    Reason for Exam: difficulty breathing   upright port         FINDINGS:    Marginal inspiration is noted.  Borderline cardiomegaly is present.  Improved    aeration is seen within the right lung base.  Airspace disease within the    left lung base is minimally improved compared to studies dating back to    November 23, 2020.  No pneumothorax is noted. Henry Brandt structures are stable.              Impression    1. Minimally improved aeration, right lung base, suggesting resolving    pneumonia/atelectasis    2. Persistent left lower lobe airspace disease, minimally improved          (See actual reports for details)    ASSESSMENT  Patient Active Problem List   Diagnosis    DJD (degenerative joint disease) of knee    Osteoarthritis of spine with radiculopathy, lumbar region    GERD (gastroesophageal reflux disease)    COPD, severity to be determined ([address]    HTN (hypertension)    Allergic rhinitis    Lipoma of shoulder s/p excision right posterior 11 17 2008    History of tobacco use    DM (diabetes mellitus)    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    Primary osteoarthritis of both knees    Medication monitoring encounter    Chronic low back pain    Major depression, chronic    Chronic respiratory failure with hypoxia (HCC)    Mitral and aortic insufficiency    Pure hypercholesterolemia    Other spondylosis with radiculopathy, lumbar region    Lumbar disc disease    Alveolar hypoventilation    Obesity (BMI 30-39. 9)    Bronchiectasis ([address]    Centrilobular emphysema (HCC)    Oxygen dependent    Acute postoperative anemia due to expected blood loss    Multifocal pneumonia    Acute on chronic respiratory failure with hypoxia (HCC)       PLAN  Wean oxygen as tolerated. Keep O2 sat 90-92%  Continue antibiotics. Short course of steroids and bronchodilators. Smoking cessation. Home with oxygen. Outpatient follow-up for resolution of pneumonia and COPD treatment.       Electronically signed by Cholo Pettit DO on 11/29/2020 at 4:37 PM

## 2020-11-30 LAB
ABSOLUTE EOS #: 0.21 K/UL (ref 0–0.4)
ABSOLUTE IMMATURE GRANULOCYTE: 0.21 K/UL (ref 0–0.3)
ABSOLUTE LYMPH #: 3.14 K/UL (ref 1–4.8)
ABSOLUTE MONO #: 0.84 K/UL (ref 0.1–0.8)
ALBUMIN SERPL-MCNC: 2.6 G/DL (ref 3.5–5.2)
ANION GAP SERPL CALCULATED.3IONS-SCNC: 12 MMOL/L (ref 9–17)
BASOPHILS # BLD: 0 % (ref 0–2)
BASOPHILS ABSOLUTE: 0 K/UL (ref 0–0.2)
BUN BLDV-MCNC: 15 MG/DL (ref 8–23)
BUN/CREAT BLD: ABNORMAL (ref 9–20)
CALCIUM SERPL-MCNC: 8.6 MG/DL (ref 8.6–10.4)
CHLORIDE BLD-SCNC: 100 MMOL/L (ref 98–107)
CO2: 26 MMOL/L (ref 20–31)
CREAT SERPL-MCNC: 0.65 MG/DL (ref 0.5–0.9)
DIFFERENTIAL TYPE: ABNORMAL
EOSINOPHILS RELATIVE PERCENT: 1 % (ref 1–4)
GFR AFRICAN AMERICAN: >60 ML/MIN
GFR NON-AFRICAN AMERICAN: >60 ML/MIN
GFR SERPL CREATININE-BSD FRML MDRD: ABNORMAL ML/MIN/{1.73_M2}
GFR SERPL CREATININE-BSD FRML MDRD: ABNORMAL ML/MIN/{1.73_M2}
GLUCOSE BLD-MCNC: 104 MG/DL (ref 65–105)
GLUCOSE BLD-MCNC: 151 MG/DL (ref 65–105)
GLUCOSE BLD-MCNC: 210 MG/DL (ref 65–105)
GLUCOSE BLD-MCNC: 285 MG/DL (ref 65–105)
GLUCOSE BLD-MCNC: 88 MG/DL (ref 70–99)
HCT VFR BLD CALC: 31.8 % (ref 36.3–47.1)
HEMOGLOBIN: 9.7 G/DL (ref 11.9–15.1)
IMMATURE GRANULOCYTES: 1 %
LYMPHOCYTES # BLD: 15 % (ref 24–44)
MAGNESIUM: 2.2 MG/DL (ref 1.6–2.6)
MCH RBC QN AUTO: 27.6 PG (ref 25.2–33.5)
MCHC RBC AUTO-ENTMCNC: 30.5 G/DL (ref 28.4–34.8)
MCV RBC AUTO: 90.6 FL (ref 82.6–102.9)
MONOCYTES # BLD: 4 % (ref 1–7)
MORPHOLOGY: ABNORMAL
NRBC AUTOMATED: 0.6 PER 100 WBC
NUCLEATED RED BLOOD CELLS: 1 PER 100 WBC
PDW BLD-RTO: 14.2 % (ref 11.8–14.4)
PHOSPHORUS: 4.7 MG/DL (ref 2.6–4.5)
PLATELET # BLD: 513 K/UL (ref 138–453)
PLATELET ESTIMATE: ABNORMAL
PMV BLD AUTO: 9.5 FL (ref 8.1–13.5)
POTASSIUM SERPL-SCNC: 3.8 MMOL/L (ref 3.7–5.3)
RBC # BLD: 3.51 M/UL (ref 3.95–5.11)
RBC # BLD: ABNORMAL 10*6/UL
SEG NEUTROPHILS: 79 % (ref 36–66)
SEGMENTED NEUTROPHILS ABSOLUTE COUNT: 16.5 K/UL (ref 1.8–7.7)
SODIUM BLD-SCNC: 138 MMOL/L (ref 135–144)
WBC # BLD: 20.9 K/UL (ref 3.5–11.3)
WBC # BLD: ABNORMAL 10*3/UL

## 2020-11-30 PROCEDURE — 36415 COLL VENOUS BLD VENIPUNCTURE: CPT

## 2020-11-30 PROCEDURE — 94640 AIRWAY INHALATION TREATMENT: CPT

## 2020-11-30 PROCEDURE — 94660 CPAP INITIATION&MGMT: CPT

## 2020-11-30 PROCEDURE — 6370000000 HC RX 637 (ALT 250 FOR IP): Performed by: INTERNAL MEDICINE

## 2020-11-30 PROCEDURE — APPSS15 APP SPLIT SHARED TIME 0-15 MINUTES: Performed by: NURSE PRACTITIONER

## 2020-11-30 PROCEDURE — 82947 ASSAY GLUCOSE BLOOD QUANT: CPT

## 2020-11-30 PROCEDURE — 6360000002 HC RX W HCPCS: Performed by: NURSE PRACTITIONER

## 2020-11-30 PROCEDURE — 99232 SBSQ HOSP IP/OBS MODERATE 35: CPT | Performed by: INTERNAL MEDICINE

## 2020-11-30 PROCEDURE — 99232 SBSQ HOSP IP/OBS MODERATE 35: CPT | Performed by: PHYSICAL MEDICINE & REHABILITATION

## 2020-11-30 PROCEDURE — 97116 GAIT TRAINING THERAPY: CPT

## 2020-11-30 PROCEDURE — 97110 THERAPEUTIC EXERCISES: CPT

## 2020-11-30 PROCEDURE — 2580000003 HC RX 258: Performed by: INTERNAL MEDICINE

## 2020-11-30 PROCEDURE — 2580000003 HC RX 258: Performed by: STUDENT IN AN ORGANIZED HEALTH CARE EDUCATION/TRAINING PROGRAM

## 2020-11-30 PROCEDURE — 6370000000 HC RX 637 (ALT 250 FOR IP): Performed by: STUDENT IN AN ORGANIZED HEALTH CARE EDUCATION/TRAINING PROGRAM

## 2020-11-30 PROCEDURE — 85025 COMPLETE CBC W/AUTO DIFF WBC: CPT

## 2020-11-30 PROCEDURE — 83735 ASSAY OF MAGNESIUM: CPT

## 2020-11-30 PROCEDURE — 6360000002 HC RX W HCPCS: Performed by: STUDENT IN AN ORGANIZED HEALTH CARE EDUCATION/TRAINING PROGRAM

## 2020-11-30 PROCEDURE — 80069 RENAL FUNCTION PANEL: CPT

## 2020-11-30 PROCEDURE — 2700000000 HC OXYGEN THERAPY PER DAY

## 2020-11-30 PROCEDURE — 6370000000 HC RX 637 (ALT 250 FOR IP): Performed by: NURSE PRACTITIONER

## 2020-11-30 PROCEDURE — 99233 SBSQ HOSP IP/OBS HIGH 50: CPT | Performed by: INTERNAL MEDICINE

## 2020-11-30 PROCEDURE — 94761 N-INVAS EAR/PLS OXIMETRY MLT: CPT

## 2020-11-30 PROCEDURE — 2060000000 HC ICU INTERMEDIATE R&B

## 2020-11-30 PROCEDURE — 97535 SELF CARE MNGMENT TRAINING: CPT

## 2020-11-30 RX ORDER — PREDNISONE 20 MG/1
20 TABLET ORAL DAILY
Status: DISCONTINUED | OUTPATIENT
Start: 2020-12-01 | End: 2020-12-02 | Stop reason: HOSPADM

## 2020-11-30 RX ADMIN — TIZANIDINE 4 MG: 4 TABLET ORAL at 08:14

## 2020-11-30 RX ADMIN — IPRATROPIUM BROMIDE AND ALBUTEROL SULFATE 1 AMPULE: .5; 3 SOLUTION RESPIRATORY (INHALATION) at 19:58

## 2020-11-30 RX ADMIN — IPRATROPIUM BROMIDE AND ALBUTEROL SULFATE 1 AMPULE: .5; 3 SOLUTION RESPIRATORY (INHALATION) at 11:44

## 2020-11-30 RX ADMIN — CETIRIZINE HYDROCHLORIDE 10 MG: 10 TABLET ORAL at 08:14

## 2020-11-30 RX ADMIN — CARVEDILOL 6.25 MG: 6.25 TABLET, FILM COATED ORAL at 21:14

## 2020-11-30 RX ADMIN — POLYETHYLENE GLYCOL 3350 17 G: 17 POWDER, FOR SOLUTION ORAL at 08:19

## 2020-11-30 RX ADMIN — IPRATROPIUM BROMIDE AND ALBUTEROL SULFATE 1 AMPULE: .5; 3 SOLUTION RESPIRATORY (INHALATION) at 07:51

## 2020-11-30 RX ADMIN — INSULIN LISPRO 3 UNITS: 100 INJECTION, SOLUTION INTRAVENOUS; SUBCUTANEOUS at 21:15

## 2020-11-30 RX ADMIN — POTASSIUM CHLORIDE 20 MEQ: 1500 TABLET, EXTENDED RELEASE ORAL at 08:15

## 2020-11-30 RX ADMIN — OXYCODONE HYDROCHLORIDE 10 MG: 5 TABLET ORAL at 08:18

## 2020-11-30 RX ADMIN — DOCUSATE SODIUM 100 MG: 100 CAPSULE, LIQUID FILLED ORAL at 08:17

## 2020-11-30 RX ADMIN — OXYCODONE HYDROCHLORIDE 5 MG: 5 TABLET ORAL at 21:14

## 2020-11-30 RX ADMIN — SODIUM CHLORIDE, PRESERVATIVE FREE 10 ML: 5 INJECTION INTRAVENOUS at 21:14

## 2020-11-30 RX ADMIN — CEFOXITIN SODIUM 2 G: 2 POWDER, FOR SOLUTION INTRAVENOUS at 23:41

## 2020-11-30 RX ADMIN — TROSPIUM CHLORIDE 20 MG: 20 TABLET, FILM COATED ORAL at 11:49

## 2020-11-30 RX ADMIN — OXYCODONE HYDROCHLORIDE 10 MG: 5 TABLET ORAL at 12:24

## 2020-11-30 RX ADMIN — DESMOPRESSIN ACETATE 40 MG: 0.2 TABLET ORAL at 08:17

## 2020-11-30 RX ADMIN — PREDNISONE 40 MG: 20 TABLET ORAL at 08:14

## 2020-11-30 RX ADMIN — HYDROCHLOROTHIAZIDE 25 MG: 25 TABLET ORAL at 08:18

## 2020-11-30 RX ADMIN — GABAPENTIN 300 MG: 300 CAPSULE ORAL at 14:45

## 2020-11-30 RX ADMIN — OMEPRAZOLE 20 MG: 20 CAPSULE, DELAYED RELEASE ORAL at 08:18

## 2020-11-30 RX ADMIN — BUDESONIDE AND FORMOTEROL FUMARATE DIHYDRATE 2 PUFF: 160; 4.5 AEROSOL RESPIRATORY (INHALATION) at 19:58

## 2020-11-30 RX ADMIN — CEFOXITIN SODIUM 2 G: 2 POWDER, FOR SOLUTION INTRAVENOUS at 00:23

## 2020-11-30 RX ADMIN — MIRTAZAPINE 15 MG: 15 TABLET, FILM COATED ORAL at 21:14

## 2020-11-30 RX ADMIN — INSULIN LISPRO 4 UNITS: 100 INJECTION, SOLUTION INTRAVENOUS; SUBCUTANEOUS at 17:40

## 2020-11-30 RX ADMIN — INSULIN LISPRO 2 UNITS: 100 INJECTION, SOLUTION INTRAVENOUS; SUBCUTANEOUS at 12:32

## 2020-11-30 RX ADMIN — GABAPENTIN 600 MG: 300 CAPSULE ORAL at 21:14

## 2020-11-30 RX ADMIN — DULOXETINE HYDROCHLORIDE 60 MG: 30 CAPSULE, DELAYED RELEASE ORAL at 08:17

## 2020-11-30 RX ADMIN — CEFOXITIN SODIUM 2 G: 2 POWDER, FOR SOLUTION INTRAVENOUS at 06:09

## 2020-11-30 RX ADMIN — CEFOXITIN SODIUM 2 G: 2 POWDER, FOR SOLUTION INTRAVENOUS at 17:40

## 2020-11-30 RX ADMIN — CARVEDILOL 6.25 MG: 6.25 TABLET, FILM COATED ORAL at 08:17

## 2020-11-30 RX ADMIN — GABAPENTIN 300 MG: 300 CAPSULE ORAL at 08:18

## 2020-11-30 RX ADMIN — CEFOXITIN SODIUM 2 G: 2 POWDER, FOR SOLUTION INTRAVENOUS at 12:24

## 2020-11-30 RX ADMIN — Medication 1 CAPSULE: at 08:18

## 2020-11-30 RX ADMIN — TROSPIUM CHLORIDE 20 MG: 20 TABLET, FILM COATED ORAL at 21:14

## 2020-11-30 RX ADMIN — IPRATROPIUM BROMIDE AND ALBUTEROL SULFATE 1 AMPULE: .5; 3 SOLUTION RESPIRATORY (INHALATION) at 15:28

## 2020-11-30 RX ADMIN — BUDESONIDE AND FORMOTEROL FUMARATE DIHYDRATE 2 PUFF: 160; 4.5 AEROSOL RESPIRATORY (INHALATION) at 07:53

## 2020-11-30 RX ADMIN — MIRTAZAPINE 30 MG: 15 TABLET, FILM COATED ORAL at 21:13

## 2020-11-30 RX ADMIN — ENOXAPARIN SODIUM 40 MG: 40 INJECTION SUBCUTANEOUS at 08:18

## 2020-11-30 RX ADMIN — OXYCODONE HYDROCHLORIDE 10 MG: 5 TABLET ORAL at 17:09

## 2020-11-30 RX ADMIN — TIZANIDINE 4 MG: 4 TABLET ORAL at 21:14

## 2020-11-30 ASSESSMENT — PAIN SCALES - GENERAL
PAINLEVEL_OUTOF10: 3
PAINLEVEL_OUTOF10: 9
PAINLEVEL_OUTOF10: 8
PAINLEVEL_OUTOF10: 8
PAINLEVEL_OUTOF10: 6
PAINLEVEL_OUTOF10: 9

## 2020-11-30 ASSESSMENT — PAIN DESCRIPTION - PAIN TYPE: TYPE: CHRONIC PAIN

## 2020-11-30 ASSESSMENT — PAIN DESCRIPTION - LOCATION
LOCATION: BACK;LEG
LOCATION: BACK

## 2020-11-30 ASSESSMENT — PAIN DESCRIPTION - ORIENTATION
ORIENTATION: RIGHT;LOWER
ORIENTATION: LEFT;RIGHT;LOWER

## 2020-11-30 NOTE — PLAN OF CARE
NONINVASIVE VENTILATION    PROVIDE OPTIMAL VENTILATION/ACCEPTABLE SPO2   IMPLEMENT NONINVASIVE VENTILATION PROTOCOL   MAINTAIN ACCEPTABLE SPO2   ASSESS SKIN INTEGRITY/BREAKDOWN SCORE   PATIENT EDUCATION AS NEEDED   BIPAP AS NEEDED        BRONCHOSPASM/BRONCHOCONSTRICTION     [x]         IMPROVE AERATION/BREATH SOUNDS  [x]   ADMINISTER BRONCHODILATOR THERAPY AS APPROPRIATE  [x]   ASSESS BREATH SOUNDS  []   IMPLEMENT AEROSOL/MDI PROTOCOL  [x]   PATIENT EDUCATION AS NEEDED    PROVIDE ADEQUATE OXYGENATION WITH ACCEPTABLE SP02/ABG'S    [x]  IDENTIFY APPROPRIATE OXYGEN THERAPY  [x]   MONITOR SP02/ABG'S AS NEEDED   [x]   PATIENT EDUCATION AS NEEDED

## 2020-11-30 NOTE — PROGRESS NOTES
nicotine  1 patch Transdermal Daily     Continuous Infusions:    dextrose       PRN Meds: albuterol, magic (miracle) mouthwash, acetaminophen, benzonatate, diphenhydrAMINE, sodium chloride flush, oxyCODONE **OR** oxyCODONE, morphine **OR** [DISCONTINUED] morphine, senna, glucose, dextrose, glucagon (rDNA), dextrose    Data:     Past Medical History:   has a past medical history of Allergic rhinitis, Alveolar hypoventilation, Aortic insufficiency, Bronchiectasis (HCC), Bronchitis, Chronic back pain, COPD (chronic obstructive pulmonary disease) [facility] Depression, DM [facility]diabetes mellitus) [facility] GERD (gastroesophageal reflux disease), History of echocardiogram, Hyperlipidemia, Hypertension, Obesity, Osteoarthritis, Peripheral vascular disease ([facility] Primary osteoarthritis of both knees, Radicular pain of lumbosacral region, Spinal stenosis, lumbar region, without neurogenic claudication, Tricuspid regurgitation, Type II or unspecified type diabetes mellitus without mention of complication, not stated as uncontrolled, Unspecified sleep apnea, URI (upper respiratory infection), Wears dentures, Wears glasses, and Wellness examination. Social History:   reports that she quit smoking about 4 weeks ago. Her smoking use included cigarettes. She has a 9.00 pack-year smoking history. She has never used smokeless tobacco. She reports that she does not drink alcohol or use drugs. Family History:   Family History   Problem Relation Age of Onset    Diabetes Mother     Heart Disease Mother     Cirrhosis Father        Vitals:  BP (!) 126/96   Pulse 90   Temp 98.1 °F (36.7 °C) (Oral)   Resp 16   Ht 5' 5\" (1.651 m)   Wt 184 lb 11.9 oz (83.8 kg)   LMP 2003   SpO2 90%   BMI 30.74 kg/m²   Temp (24hrs), Av.3 °F (36.8 °C), Min:97.9 °F (36.6 °C), Max:99.6 °F (37.6 °C)    Recent Labs     20  1709 20  2036 20  0645 20  1223   POCGLU 234* 228* 104 151*       I/O (24Hr):     Intake/Output Summary (Last 24 hours) at 11/30/2020 1447  Last data filed at 11/30/2020 0949  Gross per 24 hour   Intake 240 ml   Output --   Net 240 ml       Labs:  Hematology:  Recent Labs     11/28/20  0612 11/29/20  0717 11/30/20  0624   WBC 26.4* 20.3* 20.9*   RBC 3.65* 3.51* 3.51*   HGB 10.0* 9.7* 9.7*   HCT 32.4* 32.3* 31.8*   MCV 88.8 92.0 90.6   MCH 27.4 27.6 27.6   MCHC 30.9 30.0 30.5   RDW 13.4 14.2 14.2    463* 513*   MPV 9.9 9.4 9.5     Chemistry:  Recent Labs     11/28/20  0612 11/29/20  0717 11/30/20  0624    138 138   K 4.0 4.1 3.8    102 100   CO2 27 26 26   GLUCOSE 172* 71 88   BUN 11 12 15   CREATININE 0.53 0.62 0.65   MG 2.0 2.3 2.2   ANIONGAP 11 10 12   LABGLOM >60 >60 >60   GFRAA >60 >60 >60   CALCIUM 8.5* 8.6 8.6   PHOS 1.5* 3.6 4.7*     Recent Labs     11/28/20  0612  11/29/20  0717 11/29/20  0818 11/29/20  1204 11/29/20  1709 11/29/20  2036 11/30/20  0624 11/30/20  0645 11/30/20  1223   LABALBU 2.8*  --  2.5*  --   --   --   --  2.6*  --   --    POCGLU  --    < >  --  81 112* 234* 228*  --  104 151*    < > = values in this interval not displayed. ABG:  Lab Results   Component Value Date    POCPH 7.381 11/26/2020    POCPCO2 54.0 11/26/2020    POCPO2 51.2 11/26/2020    POCHCO3 32.1 11/26/2020    NBEA NOT REPORTED 11/26/2020    PBEA 6 11/26/2020    PQT3FWA 34 11/26/2020    SSME2IGY 84 11/26/2020    FIO2 100.0 11/26/2020     Lab Results   Component Value Date/Time    SPECIAL RT AC 5ML 11/22/2020 08:33 PM     Lab Results   Component Value Date/Time    CULTURE NO GROWTH 6 DAYS 11/22/2020 08:33 PM       Radiology:  Xr Chest (single View Frontal)    Result Date: 11/24/2020  Bilateral lower lobe predominant airspace disease similar to prior study with slight improvement suspected on the left. Xr Chest Portable    Result Date: 11/26/2020  1. Minimally improved aeration, right lung base, suggesting resolving pneumonia/atelectasis 2.  Persistent left lower lobe airspace disease, minimally improved Xr Chest Portable    Result Date: 11/23/2020  Stable moderate interstitial edema and moderate left basilar opacity related to combined pleural-parenchymal process Improvement in now mild residual streaky right basilar atelectasis     Xr Chest Portable    Result Date: 11/22/2020  1. Lower zone predominant airspace disease, atelectasis and/or infiltrate. Follow-up is recommended to document resolution. 2. Stable mild cardiomegaly. Moderate pulmonary vascular congestion. Trace bilateral pleural effusions. Xr Chest Portable    Result Date: 11/21/2020  Bibasilar opacities compatible with atelectasis. In the appropriate clinical setting pneumonia is not excluded. Ct Chest Pulmonary Embolism W Contrast    Result Date: 11/22/2020  1. No clear evidence for central pulmonary embolus. Evaluation of the remainder of the pulmonary arterial vasculature is severely limited to nondiagnostic as detailed above. 2. Bilateral lower lobe consolidation likely pneumonia. Consolidation of the proximal right middle lobe and lingula to a lesser degree. Findings favored to represent multifocal pneumonia. Follow-up recommended to document resolution. 3. Prominent mediastinal lymph nodes. 4. Fatty liver. 5. Severe emphysema. Fl Modified Barium Swallow W Video    Result Date: 11/23/2020  No evidence of significant penetration or aspiration. Please see separate speech pathology report for full discussion of findings and recommendations.        Physical Examination:        General appearance:  alert, cooperative and no distress  Mental Status:  oriented to person, place and time and normal affect  Lungs: Bilateral air entry, occasional crepts  heart:  regular rate and rhythm, no murmur  Abdomen:  soft, nontender, nondistended, normal bowel sounds, no masses, hepatomegaly, splenomegaly  Extremities:  no edema, redness, tenderness in the calves  Skin:  no gross lesions, rashes, induration    Assessment:        Hospital Problems Last Modified POA    * (Principal) Acute on chronic respiratory failure with hypoxia ([address] 11/28/2020 Yes    COPD, severity to be determined ([address] 11/25/2020 Yes    HTN (hypertension) 11/20/2020 Yes    History of tobacco use 11/20/2020 Yes    GERD (gastroesophageal reflux disease) 11/20/2020 Yes    Chronic respiratory failure with hypoxia ([address] 11/20/2020 Yes    Other spondylosis with radiculopathy, lumbar region 11/28/2020 Yes    Lumbar disc disease 11/19/2020 Yes    Alveolar hypoventilation 11/20/2020 Yes    Obesity (BMI 30-39.9) 11/20/2020 Yes    Bronchiectasis ([address] 11/20/2020 Yes    Centrilobular emphysema ([address] 11/28/2020 Yes    Oxygen dependent 11/20/2020 Yes    Acute postoperative anemia due to expected blood loss 11/20/2020 Yes    Multifocal pneumonia 11/23/2020 Yes      Diabetes mellitus type 2    Plan:        Continue cefoxitin as per infectious disease,  For diarrhea continue her on probiotics, diarrhea has improved with that  Continue high flow oxygen and wean as tolerated  On steroids with prednisone, continue DuoNeb, Symbicort, decrease the dose of prednisone  Lisinopril on hold because of borderline blood pressure, continue HCTZ. Continue incentive spirometry  Status post lumbar fusion, neurosurgery following  Hold metformin and continue insulin sliding scale  Restart home HCTZ    is working on discharge planning to [place] [id number].   Can be discharged to [place] [id number] when placement has been approved    Sae King MD  11/30/2020  2:47 PM

## 2020-11-30 NOTE — CARE COORDINATION
Transitional Planning  Spoke with patient at bedside. Discussed still on HFNC and goal for placement at [facility] to wean oxygen requirements. Given choice, she is open to either [facility] in [place]. eReferrals sent to both [facility] and [facility]. POD#11 s/p L4-5 right hemilaminectomy - ok for discharge per NeuroSurg. Currently on 60% FiO2 and 35L. On IV Cefoxitin Q6hr.

## 2020-11-30 NOTE — PROGRESS NOTES
[facility] - Progress Note  Today's Date and Time: 11/30/2020, 9:10 AM    Impression :   Aspiration pneumonia  Bilateral bronchopneumonia  Acute hypoxic respiratory failure requiring high flow 02  S/P Lumbar fusion 11-19-20. Recommendations:   D/C Zosyn to avoid fluid overload  Cefoxitin  2 gm IV q 6 hr  Diuresis. Medical Decision Making/Summary/Discussion:11/30/2020       Infection Control Recommendations   Arco Precautions    Antimicrobial Stewardship Recommendations     Simplification of therapy  Targeted therapy  Coordination of Outpatient Care:   Estimated Length of IV antimicrobials: TBD  Patient will need Midline Catheter Insertion: no  Patient will need PICC line Insertion:no  Patient will need: Home IV , [place],  SNF,  LTAC: TBD  Patient will need outpatient wound care:No    Chief complaint/reason for consultation:   Pneumonia      History of Present Illness:   Duong Wolfe is a 56y.o.-year-old  female who was initially admitted on 11/19/2020. Patient seen at the request of Chang Astorga. INITIAL HISTORY:    The patient has a history of morbid obesity, COPD, JOAN, chronic hypoxic respiratory failure -on home oxygen. She was admitted because of right lower extremity pain extending from hip to foot.  She has known lumbar arthritis and had failed all conservative measures.  She underwent posterior fusion L4/5 surgery on 11/19/20.      During the postoperative period her respiratory status declined and she started spiking fevers. Her Chest x-ray and CT showed bilateral multifocal infiltrates suggestive of aspiration, areas of denser consolidation suggestive of bronchopneumonia and underlying severe emphysema. Patient received ceftriaxone, then zosyn since 12-24-20 with benefit. She is currently experiencing worsening of 02 requirements. Plan:   D/C zosyn to avoid fluid and Na overload  Start cefoxitin.     CURRENT pain of lumbosacral region     Spinal stenosis, lumbar region, without neurogenic claudication 04/30/2013    Tricuspid regurgitation 2018    mild-moderate on echo    Type II or unspecified type diabetes mellitus without mention of complication, not stated as uncontrolled     Dr. Kevan Downs Unspecified sleep apnea     no cpap used anymore    URI (upper respiratory infection)     Wears dentures     upper and lower full dentures    Wears glasses     Wellness examination     Dr. John Painting -PCP last visit in early Oct. 2020       Past Surgical  History:     Past Surgical History:   Procedure Laterality Date    COLONOSCOPY  2/12/2009    normal    DILATION AND CURETTAGE OF UTERUS      GASTRECTOMY      partial    LUMBAR DISCECTOMY  01/2015    lumbar diskectomy    LUMBAR FUSION  11/19/2020     POSTERIOR FUSION L4/5,     LUMBAR FUSION N/A 11/19/2020    POSTERIOR FUSION L4/5, [employer], Inez Merlin, FRENCHS #[id number] AMINA performed by Dayday Day DO at [address] [facility] [address] Right 4/30/13    Lumbar Diagnostic Block,  Kenalog 40 mg    NERVE BLOCK  5/23/13    Lumbar Radiofrequency, Kenalog 40mg    NERVE BLOCK  8/12/13    Lt MBNB  celestone 6mg    NERVE BLOCK Left 8-28-13    left lumbar diagnostic block #2 decadron 10 mg    NERVE BLOCK Left 9-24-13    left lumbar median branch radiofrequency    NERVE BLOCK  07-02-14    caudal, celestone 9 mg    NERVE BLOCK  7-16-14    caudal epidural #2, celestone 9mg, fentanyl 25mcg    NERVE BLOCK  7/30/14    caudal #3 decadron 10mg    NERVE BLOCK  11-6-14    duramorph epidural steroid block  duramorph 1 mg celestone 9 mg    NERVE BLOCK  11/20/15    TENS- Empi Select    NERVE BLOCK  07/20/2018    right transforminal # 1 decadron 10mg,isovue    NERVE BLOCK Bilateral 02/01/2019    bilat mbnb- no steroid    NERVE BLOCK Bilateral 02/08/2019    bilat mbnb, marcaine . 25%    [place] KNEE SCOPE,DIAGNOSTIC Right 3/24/2017    KNEE ARTHROSCOPY WITH PARTIAL MEDIAL MENISECAL DEBRIDMENT  performed by Preston Ivey MD at [address] [facility]  9 20 2007    UPPER GASTROINTESTINAL ENDOSCOPY  4 21 2009,04/2011    gastritis, esophagitis       Medications:      lactobacillus  1 capsule Oral Daily with breakfast    hydroCHLOROthiazide  25 mg Oral Daily    ipratropium-albuterol  1 ampule Inhalation Q4H WA    cefOXitin  2 g Intravenous 4 times per day    predniSONE  40 mg Oral Daily    budesonide-formoterol  2 puff Inhalation BID    potassium chloride  20 mEq Oral Once    insulin lispro  0-12 Units Subcutaneous TID WC    insulin lispro  0-6 Units Subcutaneous Nightly    sodium chloride flush  10 mL Intravenous 2 times per day    [Held by provider] amLODIPine  10 mg Oral Daily    atorvastatin  40 mg Oral Daily    azelastine  2 spray Each Nostril BID    carvedilol  6.25 mg Oral BID    docusate sodium  100 mg Oral Daily    DULoxetine  60 mg Oral Daily    gabapentin  300 mg Oral BID    [Held by provider] lisinopril-hydroCHLOROthiazide  1 tablet Oral Daily    [Held by provider] metFORMIN  500 mg Oral BID WC    mirtazapine  15 mg Oral Nightly    mirtazapine  30 mg Oral Nightly    omeprazole  20 mg Oral QAM    potassium chloride  20 mEq Oral Daily    tiZANidine  4 mg Oral BID    trospium  20 mg Oral BID    polyethylene glycol  17 g Oral Daily    magnesium hydroxide  30 mL Oral Daily    enoxaparin  40 mg Subcutaneous Daily    cetirizine  10 mg Oral Daily    gabapentin  600 mg Oral Nightly    nicotine  1 patch Transdermal Daily       Social History:     Social History     Socioeconomic History    Marital status: Single     Spouse name: Not on file    Number of children: Not on file    Years of education: Not on file    Highest education level: Not on file   Occupational History     Employer: DISABLED   Social Needs    Financial resource strain: Not very hard    Food insecurity     Worry: Never true     Inability: Never true   Alina Griselda loss, tinnitus or vertigo. Cardiovascular: No chest pain or palpitations. Shortness of breath. GOMEZ. Orthopnea  Lung: Shortness of breath, cough. No sputum production  Abdomen: No nausea, vomiting, diarrhea, or abdominal pain. Ayana Graven No cramps. Genitourinary: No increased urinary frequency, or dysuria. No hematuria. No suprapubic or CVA pain  Musculoskeletal: No muscle aches or pains. No joint effusions, swelling or deformities  Hematologic: No bleeding or bruising. Neurologic: No headache, weakness, numbness, or tingling. Integument: No rash, no ulcers. Psychiatric: No depression. Endocrine: No polyuria, no polydipsia, no polyphagia. Physical Examination :     Patient Vitals for the past 8 hrs:   BP Temp Temp src Pulse Resp SpO2 Weight   11/30/20 0751 -- -- -- -- 20 90 % --   11/30/20 0651 -- -- -- -- 19 90 % --   11/30/20 0600 -- -- -- -- -- -- 184 lb 11.9 oz (83.8 kg)   11/30/20 0412 130/70 98 °F (36.7 °C) Axillary 76 17 -- --   11/30/20 0320 -- -- -- -- 19 -- --     General Appearance: Awake, alert, and in apparent respiratory distress  Head:  Normocephalic, no trauma  Eyes: Pupils equal, round, reactive to light and accommodation; extraocular movements intact; sclera anicteric; conjunctivae pink. No embolic phenomena. ENT: Oropharynx clear, without erythema, exudate, or thrush. No tenderness of sinuses. Mouth/throat: mucosa pink and moist. No lesions. Dentition in good repair. Neck:Supple, without lymphadenopathy. Thyroid normal, No bruits. Pulmonary/Chest: Coarse breath sounds, prolonged expiration, rales L lower posterior, bronchophony L lower posterior - resolving. Cardiovascular: Regular rate and rhythm without murmurs, rubs, or gallops. Abdomen: Soft, non tender. Bowel sounds normal. No organomegaly  All four Extremities: No cyanosis, clubbing, edema, or effusions. Neurologic: No gross sensory or motor deficits. Skin: Warm and dry with good turgor. No signs of peripheral arterial or venous insufficiency. No ulcerations. No open wounds. Medical Decision Making -Laboratory:   I have independently reviewed/ordered the following labs:    CBC with Differential:   Recent Labs     11/29/20  0717 11/30/20 0624   WBC 20.3* 20.9*   HGB 9.7* 9.7*   HCT 32.3* 31.8*   * 513*   LYMPHOPCT 17* 15*   MONOPCT 9* 4     BMP:   Recent Labs     11/29/20  0717 11/30/20 0624    138   K 4.1 3.8    100   CO2 26 26   BUN 12 15   CREATININE 0.62 0.65   MG 2.3 2.2     Hepatic Function Panel:   Recent Labs     11/29/20  0717 11/30/20 0624   LABALBU 2.5* 2.6*     No results for input(s): RPR in the last 72 hours. No results for input(s): HIV in the last 72 hours. No results for input(s): BC in the last 72 hours. Lab Results   Component Value Date    MUCUS NOT REPORTED 11/24/2020    RBC 3.51 11/30/2020    RBC 4.57 02/16/2012    TRICHOMONAS NOT REPORTED 11/24/2020    WBC 20.9 11/30/2020    YEAST NOT REPORTED 11/24/2020    TURBIDITY CLEAR 11/24/2020     Lab Results   Component Value Date    CREATININE 0.65 11/30/2020    GLUCOSE 88 11/30/2020       Medical Decision Making-Imaging:     EXAMINATION:    CTA OF THE CHEST 11/22/2020 4:37 pm         TECHNIQUE:    CTA of the chest was performed after the administration of intravenous    contrast.  Multiplanar reformatted images are provided for review.  MIP    images are provided for review.  Dose modulation, iterative reconstruction,    and/or weight based adjustment of the mA/kV was utilized to reduce the    radiation dose to as low as reasonably achievable.         COMPARISON:    12/26/2019         HISTORY:    ORDERING SYSTEM PROVIDED HISTORY: tachycardia    TECHNOLOGIST PROVIDED HISTORY:    tachycardia    Reason for Exam: tachycardia         78-year-old female with tachycardia         FINDINGS:    Pulmonary Arteries: No obvious filling defect in the main, right main, or    left main pulmonary arteries.  Evaluation of the remainder of the pulmonary    arterial vasculature is severely limited to nondiagnostic due to respiratory    motion and suboptimal bolus timing as well as streak artifact from the    contrast bolus in the SVC.         Mediastinum: Prominent mediastinal lymph nodes without overt mediastinal,    axillary, or hilar lymphadenopathy.  Visualized thyroid gland grossly    unremarkable in appearance.  No pericardial effusion.  No periaortic or    mediastinal hemorrhage.         Lungs/pleura: Trachea and very proximal central airways appear patent. Consolidation of the bilateral lower lobes and partial consolidation of the    lingula.  Partial consolidation of the proximal right middle lobe.  Severe    emphysema.  Respiratory motion throughout the lungs.  No pneumothorax.         Upper Abdomen: Fatty liver.         Soft Tissues/Bones: Mild diffuse degenerative changes throughout the spine.              Impression    1. No clear evidence for central pulmonary embolus.  Evaluation of the    remainder of the pulmonary arterial vasculature is severely limited to    nondiagnostic as detailed above. 2. Bilateral lower lobe consolidation likely pneumonia.  Consolidation of the    proximal right middle lobe and lingula to a lesser degree.  Findings favored    to represent multifocal pneumonia.  Follow-up recommended to document    resolution. 3. Prominent mediastinal lymph nodes. 4. Fatty liver.     5. Severe emphysema.           11/26/2020 1:22 am         COMPARISON:    November 24, 2020 November 24, 2020         HISTORY:    ORDERING SYSTEM PROVIDED HISTORY: hypoxia    TECHNOLOGIST PROVIDED HISTORY:    hypoxia    Reason for Exam: difficulty breathing   upright port         FINDINGS:    Marginal inspiration is noted.  Borderline cardiomegaly is present.  Improved    aeration is seen within the right lung base.  Airspace disease within the    left lung base is minimally improved compared to studies dating back to    November 23, 2020.  No pneumothorax is noted.  Osseous structures are stable.              Impression    1. Minimally improved aeration, right lung base, suggesting resolving    pneumonia/atelectasis    2. Persistent left lower lobe airspace disease, minimally improved        Medical Decision [id number]:       Medical Decision Making-Other:     Note:  Labs, medications, radiologic studies were reviewed with personal review of films  Large amounts of data were reviewed  Discussed with nursing Staff, Discharge planner  Infection Control and Prevention measures reviewed  All prior entries were reviewed  Administer medications as ordered  Prognosis: Guarded  Discharge planning reviewed. Follow up as outpatient. Thank you for allowing us to participate in the care of this patient. Please call with questions. Paulina Kennedy MD     ATTESTATION:    I have discussed the case, including pertinent history and exam findings with the residents and students. I have seen and examined the patient and the key elements of the encounter have been performed by me. I was present when the student obtained his information or examined the patient. I have reviewed the laboratory data, other diagnostic studies and discussed them with the residents. I have updated the medical record where necessary. I agree with the assessment, plan and orders as documented by the resident/ student. Pankaj Burks MD.    Pager: [phone] - Office: [phone].

## 2020-11-30 NOTE — PROGRESS NOTES
Occupational Therapy  Facility/Department: [facility] 4B STEPDOWN  Daily Treatment Note  NAME: Elizabeth Stovall  : 1960  MRN: [id number]  Date of Service: 2020  Discharge Recommendations:  Patient would benefit from continued therapy after discharge  Assessment   Performance deficits / Impairments: Decreased functional mobility ; Decreased ADL status; Decreased balance;Decreased high-level IADLs;Decreased endurance  Assessment: Pt will benefit from continued therapy at discharge to address the above deficits  Prognosis: Good  Decision Making: Medium Complexity  OT Education: OT Role;Plan of Care;Transfer Training;Energy Conservation  Patient Education: Pt verbalized understanding  REQUIRES OT FOLLOW UP: Yes  Activity Tolerance  Activity Tolerance: Patient Tolerated treatment well  Safety Devices  Safety Devices in place: Yes  Type of devices: Call light within reach; All fall risk precautions in place;Nurse notified; Left in bed  Restraints  Initially in place: No     Patient Diagnosis(es): There were no encounter diagnoses. has a past medical history of Allergic rhinitis, Alveolar hypoventilation, Aortic insufficiency, Bronchiectasis (HCC), Bronchitis, Chronic back pain, COPD (chronic obstructive pulmonary disease) ([address] Depression, DM (diabetes mellitus) ([address] GERD (gastroesophageal reflux disease), History of echocardiogram, Hyperlipidemia, Hypertension, Obesity, Osteoarthritis, Peripheral vascular disease ([address] Primary osteoarthritis of both knees, Radicular pain of lumbosacral region, Spinal stenosis, lumbar region, without neurogenic claudication, Tricuspid regurgitation, Type II or unspecified type diabetes mellitus without mention of complication, not stated as uncontrolled, Unspecified sleep apnea, URI (upper respiratory infection), Wears dentures, Wears glasses, and Wellness examination. has a past surgical history that includes Dilation and curettage of uterus; gastrectomy;  Nerve Block (Right, 4/30/13); Nerve Block (5/23/13); Colonoscopy (2/12/2009); Upper gastrointestinal endoscopy (9 20 2007); Upper gastrointestinal endoscopy (4 21 2009,04/2011); Nerve Block (8/12/13); Nerve Block (Left, 8-28-13); Nerve Block (Left, 9-24-13); Nerve Block (07-02-14); Nerve Block (7-16-14); Nerve Block (7/30/14); Nerve Block (11-6-14); Nerve Block (11/20/15); pr knee scope,diagnostic (Right, 3/24/2017); Nerve Block (07/20/2018); Nerve Block (Bilateral, 02/01/2019); Nerve Block (Bilateral, 02/08/2019); lumbar discectomy (01/2015); lumbar fusion (11/19/2020); and lumbar fusion (N/A, 11/19/2020). Restrictions  Restrictions/Precautions  Restrictions/Precautions: Fall Risk, Up as Tolerated  Required Braces or Orthoses?: No  Position Activity Restriction  Subjective   General  Patient assessed for rehabilitation services?: Yes  Family / Caregiver Present: No  General Comment  Comments: RN ok'd for therapy visit this date. Pt agreeable to session, pleasent/cooperative throughout. Pain Assessment  Pain Level: 3  Pain Location: Back;Leg  Pain Orientation: Left;Right; Lower  Non-Pharmaceutical Pain Intervention(s): Emotional support; Rest  Vital Signs  Patient Currently in Pain: Yes   Orientation  Orientation  Overall Orientation Status: Within Functional Limits  Objective    ADL  Grooming: Setup;Stand by assistance  UE Bathing: Stand by assistance;Setup  LE Bathing: Stand by assistance;Setup  UE Dressing: Minimal assistance;Setup  LE Dressing: Supervision  Additional Comments: Pt. Tolerated UB bath sitting EOB at SBA level, don/doff gown min A to tie/untie back. LB bath SBA with standing portions to ensure no LOB. Using 4 figure tech with LB bathing. Pt. needing set up and encouraged to rest when SOB present. Pt. very talkative and demo increased SOB at times, pt. educated on pursed lip breathing, pt.demo G carryover.   Balance  Sitting Balance: Independent  Standing Balance: Stand by assistance  Standing Balance  Time: ~3

## 2020-11-30 NOTE — PROGRESS NOTES
Physical Medicine & Rehabilitation  Progress Note    11/30/2020 10:28 AM     CC: Ambulatory and ADL dysfunction due to back pain with right lower limb pain status post laminectomy    69-year-old female with history of lumbar stenosis of chronic back pain, type 2 diabetes, peripheral vascular disease, hypertension, hyperlipidemia, reflux, and COPD on oxygen at home admitted [facility] 11/19. Underwent L4-5 right laminectomy with skilled facetectomy of L4 and discectomy L4-5 as well as open reduction of spondylolisthesis and L4-5 fusion on 11/29/2020 by Dr. Wilson Clancy on high flow oxygen 80%, has stage III COPD continue to wean O2, continue antibiotics, short course of steroids and bronchodilators, discussed smoking cessation. ID-aspiration pneumonia, bilateral bronchopneumonia, acute hypoxic respiratory failure-DC'd Zosyn due to fluid overload, continue cefoxitin 2 g every 6 hours-questionable length    Internal medicine-probiotics for diarrhea, continues on Metformin/scale insulin, restarted home dose hydrochlorothiazide    Neurosurgery wean oxygen to she is now on 20 L,.  10 L this morning during rounds entheses note 11/30)    Subjective:   No complaints. Still on high flow O2    ROS:  Denies fevers, chills, sweats. No chest pain, palpitations, lightheadedness. Denies coughing, wheezing or shortness of breath. Denies abdominal pain, nausea, diarrhea or constipation. No new areas of joint pain. Denies new areas of numbness or weakness. Denies new anxiety or depression issues. No new skin problems. Rehabilitation:   PT:  Restrictions/Precautions: Fall Risk, Up as Tolerated  Other position/activity restrictions: Ambulate. 5L O2. Lumbar fusion 11/19/20.    Transfers  Sit to Stand: Contact guard assistance  Stand to sit: Contact guard assistance  Bed to Chair: Contact guard assistance  Comment: poor safety awarness  Ambulation 1  Surface: level tile  Device: Rolling Walker  Other Apparatus: O2  Assistance: Contact guard assistance  Quality of Gait: forward flexed posture  Gait Deviations: Slow Laura, Increased MARCOS  Distance: 75ft x2  Comments: Pt adamantly refuing further amb due to pain, despite edu on risks of decreased mobility          OT:  ADL  Feeding: Independent, Setup  Grooming: Setup, Minimal assistance, Increased time to complete  UE Bathing: Setup, Moderate assistance, Increased time to complete  LE Bathing: Setup, Maximum assistance, Increased time to complete  UE Dressing: Setup, Moderate assistance, Increased time to complete  LE Dressing: Setup, Maximum assistance, Increased time to complete  Toileting: Setup, Contact guard assistance(Pt completed toileting on bedside commode with CGA for funcitonal transfer. Pt completed clothing management and pericare.)   Instrumental ADL's  Instrumental ADLs: No     Balance  Sitting Balance: Stand by assistance(Sitting EOB and on toilet for toileting tasks ~15 minutes)  Standing Balance: Contact guard assistance   Standing Balance  Time: ~5 minutes  Activity: standing EOB, toileting tasks, functional mobility in hospital room  Functional Mobility  Functional - Mobility Device: Rolling Walker  Activity: Other  Assist Level: Contact guard assistance  Functional Mobility Comments: Pt completed functional mobility with CGA and use of RW. Respiratory therapist and RN in room to assist in monitoring o2 levels. Highflow 35% upon arrival, changed to 40% sitting EOB. Pt desaturated to low 80%. Rebounded with increased time. RN ok'd for mobility to chair. Bed mobility  Rolling to Right: Stand by assistance  Supine to Sit: Stand by assistance  Sit to Supine: Stand by assistance  Scooting: Stand by assistance  Comment: HOB rasied ~30 degrees  Transfers  Sit to stand: Stand by assistance  Stand to sit: Stand by assistance  Transfer Comments: Completed 3 sit<>stand transfers this date with CGA and use of RW.    Toilet Transfers  Toilet - Technique: Ambulating  Equipment Used: Standard bedside commode  Toilet Transfer: Contact guard assistance  Toilet Transfers Comments: Use of bilateral grab bars               ST:            Objective:  /76   Pulse 94   Temp 99.6 °F (37.6 °C) (Temporal)   Resp 20   Ht 5' 5\" (1.651 m)   Wt 184 lb 11.9 oz (83.8 kg)   LMP 2003   SpO2 90%   BMI 30.74 kg/m²  I Body mass index is 30.74 kg/m². I   Wt Readings from Last 1 Encounters:   20 184 lb 11.9 oz (83.8 kg)      Temp (24hrs), Av.4 °F (36.9 °C), Min:97.9 °F (36.6 °C), Max:99.6 °F (37.6 °C)         GEN: well developed, well nourished, no acute distress  HEENT: Normocephalic atraumatic, EOMI, mucous membranes pink and moist  CV: RRR, no murmurs, rubs or gallops  PULM: CTAB, no rales or rhonchi. Respirations WNL and unlabored  ABD: soft, NT, ND, +BS and equal  NEURO: A&O x3. Sensation intact to light touch. MSK: 4/5 upper and lower extremities  EXTREMITIES: No calf tenderness to palpation bilaterally. No edema BLEs  SKIN: warm dry and intact with good turgor  PSYCH: appropriately interactive. Affect WNL.   Good spirits        Medications   Scheduled Meds:   lactobacillus  1 capsule Oral Daily with breakfast    hydroCHLOROthiazide  25 mg Oral Daily    ipratropium-albuterol  1 ampule Inhalation Q4H WA    cefOXitin  2 g Intravenous 4 times per day    predniSONE  40 mg Oral Daily    budesonide-formoterol  2 puff Inhalation BID    potassium chloride  20 mEq Oral Once    insulin lispro  0-12 Units Subcutaneous TID WC    insulin lispro  0-6 Units Subcutaneous Nightly    sodium chloride flush  10 mL Intravenous 2 times per day    [Held by provider] amLODIPine  10 mg Oral Daily    atorvastatin  40 mg Oral Daily    azelastine  2 spray Each Nostril BID    carvedilol  6.25 mg Oral BID    docusate sodium  100 mg Oral Daily    DULoxetine  60 mg Oral Daily    gabapentin  300 mg Oral BID    [Held by provider] lisinopril-hydroCHLOROthiazide

## 2020-11-30 NOTE — PROGRESS NOTES
days)    Nutrition Interventions:   Food and/or Nutrient Delivery:  Continue Current Diet, Continue Oral Nutrition Supplement  Nutrition Education/Counseling:  No recommendation at this time   Coordination of Nutrition Care:  Continue to monitor while inpatient    Goals:  meet % of estimated nutrient needs - Progressing    Nutrition Monitoring and Evaluation:   Food/Nutrient Intake Outcomes:  Food and Nutrient Intake, Supplement Intake  Physical Signs/Symptoms Outcomes:  Biochemical Data, Weight, Chewing or Swallowing, GI Status, Nutrition Focused Physical Findings, Skin      Electronically signed by Kamar Becerril RD, LD on 11/30/20 at 10:40 AM EST    Contact: [phone]

## 2020-11-30 NOTE — PROGRESS NOTES
burning or hematuria      OBJECTIVE     VITAL SIGNS:   LAST-  BP (!) 126/96   Pulse 90   Temp 98.1 °F (36.7 °C) (Oral)   Resp 16   Ht 5' 5\" (1.651 m)   Wt 184 lb 11.9 oz (83.8 kg)   LMP 2003   SpO2 90%   BMI 30.74 kg/m²   8-24 HR RANGE-  TEMP Temp  Av.3 °F (36.8 °C)  Min: 97.9 °F (36.6 °C)  Max: 99.6 °F (64.0 °C)   BP Systolic (79PQD), KHP:883 , Min:121 , JGO:288      Diastolic (76TQI), EFB:29, Min:69, Max:96     PULSE Pulse  Av.7  Min: 76  Max: 98   RR Resp  Av.8  Min: 16  Max: 20   O2 SAT SpO2  Av.2 %  Min: 89 %  Max: 92 %   OXYGEN DELIVERY O2 Flow Rate (L/min)  Av L/min  Min: 35 L/min  Max: 35 L/min     Systemic Examination:   Head and neck atraumatic, normocephalic    Lymph nodes-no cervical, supraclavicular lymphadenopathy    Neck-no JVP elevation    Lungs - AP diameter of chest increased. Thoracic expansion and diaphragmatic excursion diminished. BS diminished and expiratory phase prolonged. No dullness to percussion or tenderness to palpation. B/l post bronchial breath sounds . And b/l rales   CVS- S1, S2 regular. No S3 no S4, no murmurs    Abdomen-nontender, nondistended. Bowel sounds are present. No organomegaly    Lower extremity-no edema    Upper extremity-no edema    Neurological-grossly normal cranial nerves.   No overt motor deficit         DATA REVIEW     Medications:  Scheduled Meds:   lactobacillus  1 capsule Oral Daily with breakfast    hydroCHLOROthiazide  25 mg Oral Daily    ipratropium-albuterol  1 ampule Inhalation Q4H WA    cefOXitin  2 g Intravenous 4 times per day    predniSONE  40 mg Oral Daily    budesonide-formoterol  2 puff Inhalation BID    potassium chloride  20 mEq Oral Once    insulin lispro  0-12 Units Subcutaneous TID WC    insulin lispro  0-6 Units Subcutaneous Nightly    sodium chloride flush  10 mL Intravenous 2 times per day    [Held by provider] amLODIPine  10 mg Oral Daily    atorvastatin  40 mg Oral Daily    azelastine 2 spray Each Nostril BID    carvedilol  6.25 mg Oral BID    docusate sodium  100 mg Oral Daily    DULoxetine  60 mg Oral Daily    gabapentin  300 mg Oral BID    [Held by provider] lisinopril-hydroCHLOROthiazide  1 tablet Oral Daily    [Held by provider] metFORMIN  500 mg Oral BID WC    mirtazapine  15 mg Oral Nightly    mirtazapine  30 mg Oral Nightly    omeprazole  20 mg Oral QAM    potassium chloride  20 mEq Oral Daily    tiZANidine  4 mg Oral BID    trospium  20 mg Oral BID    polyethylene glycol  17 g Oral Daily    magnesium hydroxide  30 mL Oral Daily    enoxaparin  40 mg Subcutaneous Daily    cetirizine  10 mg Oral Daily    gabapentin  600 mg Oral Nightly    nicotine  1 patch Transdermal Daily     Continuous Infusions:   dextrose       LABS:-  ABGs:   No results found for: PH, PCO2, PO2, HCO3, O2SAT  No results for input(s): PHART, PO2ART, PTD3HFJ, ICO8JEA, BEART, K6UYOJSF in the last 72 hours. Lab Results   Component Value Date    POCPH 7.381 11/26/2020    POCPCO2 54.0 (H) 11/26/2020    POCPO2 51.2 (L) 11/26/2020    POCHCO3 32.1 (H) 11/26/2020    XFAT2TPE 84 (L) 11/26/2020     CBC:   Recent Labs     11/28/20  0612 11/29/20  0717 11/30/20  0624   WBC 26.4* 20.3* 20.9*   HGB 10.0* 9.7* 9.7*   HCT 32.4* 32.3* 31.8*   MCV 88.8 92.0 90.6    463* 513*   LYMPHOPCT 12* 17* 15*   RBC 3.65* 3.51* 3.51*   MCH 27.4 27.6 27.6   MCHC 30.9 30.0 30.5   RDW 13.4 14.2 14.2     BMP:   Recent Labs     11/28/20  0612 11/29/20  0717 11/30/20  0624    138 138   K 4.0 4.1 3.8    102 100   CO2 27 26 26   BUN 11 12 15   CREATININE 0.53 0.62 0.65   GLUCOSE 172* 71 88   PHOS 1.5* 3.6 4.7*     Liver Function Test:   Recent Labs     11/30/20  0624   LABALBU 2.6*     Amylase/Lipase:  No results for input(s): AMYLASE, LIPASE in the last 72 hours. Coagulation Profile:   No results for input(s): INR, PROTIME, APTT in the last 72 hours.   Cardiac Enzymes:  No results for input(s): CKTOTAL, CKMB, Steven Copper in the last 72 hours. Lactic Acid:  No results found for: LACTA  BNP:   No results found for: BNP  D-Dimer:  No results found for: DDIMER  Others:   Lab Results   Component Value Date    TSH 0.94 07/19/2017     No results found for: GRACE, RHEUMFACTOR, SEDRATE, CRP  No results found for: Taia Mustafa  No results found for: IRON, TIBC, FERRITIN  No results found for: SPEP, UPEP  No results found for: PSA, CEA, , RT3526,     Input/Output:    Intake/Output Summary (Last 24 hours) at 11/30/2020 1413  Last data filed at 11/30/2020 0949  Gross per 24 hour   Intake 240 ml   Output --   Net 240 ml       Xr Chest (single View Frontal)    Result Date: 11/24/2020  Bilateral lower lobe predominant airspace disease similar to prior study with slight improvement suspected on the left. Xr Chest Portable    Result Date: 11/26/2020  1. Minimally improved aeration, right lung base, suggesting resolving pneumonia/atelectasis 2. Persistent left lower lobe airspace disease, minimally improved     Fl Modified Barium Swallow W Video    Result Date: 11/23/2020  No evidence of significant penetration or aspiration. Please see separate speech pathology report for full discussion of findings and recommendations. ASSESSMENT AND PLAN     Assessment:    //Acute on chronic hypoxic respiratory failure  secondary to aspiration pneumonia complicated by underlying severe emphysema and severe copd   Principal Problem:    Acute on chronic respiratory failure with hypoxia (HCC)  Active Problems:    COPD, severity to be determined (HCC)    HTN (hypertension)    History of tobacco use    GERD (gastroesophageal reflux disease)    Chronic respiratory failure with hypoxia (HCC)    Other spondylosis with radiculopathy, lumbar region    Lumbar disc disease    Alveolar hypoventilation    Obesity (BMI 30-39. 9)    Bronchiectasis ([address]    Centrilobular emphysema ([address]    Oxygen dependent    Acute postoperative anemia due to expected blood loss    Multifocal pneumonia  Resolved Problems:    * No resolved hospital problems.  *     Plan:       Continue antibiotics   Cont to wean high flow O2 - keep sats 88-92 %   bronchodilators  Ok to dc to [facility]

## 2020-11-30 NOTE — PLAN OF CARE
BRONCHOSPASM/BRONCHOCONSTRICTION     [x]         IMPROVE AERATION/BREATH SOUNDS  [x]   ADMINISTER BRONCHODILATOR THERAPY AS APPROPRIATE  [x]   ASSESS BREATH SOUNDS  []   IMPLEMENT AEROSOL/MDI PROTOCOL  [x]   PATIENT EDUCATION AS NEEDED   PROVIDE ADEQUATE OXYGENATION WITH ACCEPTABLE SP02/ABG'S    [x]  IDENTIFY APPROPRIATE OXYGEN THERAPY  [x]   MONITOR SP02/ABG'S AS NEEDED   [x]   PATIENT EDUCATION AS NEEDED   KULDIP BEASLEY, PPatient Assessment complete. Lumbar disc disease [M51.9] . Vitals:    11/30/20 1145   BP:    Pulse:    Resp:    Temp:    SpO2: 92%   . Patients home meds are   Prior to Admission medications    Medication Sig Start Date End Date Taking? Authorizing Provider   carvedilol (COREG) 6.25 MG tablet TAKE 1 TABLET BY MOUTH 2 TIMES DAILY 11/18/20  Yes Shannen Ordonez MD   cetirizine (ZYRTEC) 10 MG tablet TAKE 1 TABLET BY MOUTH DAILY 11/18/20  Yes Shannen Ordonez MD   meloxicam (MOBIC) 15 MG tablet Take 15 mg by mouth daily   Yes Historical Provider, MD   trospium (SANCTURA) 20 MG tablet TAKE 1 TABLET BY MOUTH 2 TIMES DAILY 10/27/20  Yes Shannen Ordonez MD   tiZANidine (ZANAFLEX) 4 MG tablet TAKE 1 TABLET TWICE DAILY 10/27/20  Yes Shannen Ordonez MD   blood glucose test strips (EXACTECH TEST) strip 1 each by In Vitro route daily As needed.  10/14/20  Yes Shannen Ordonez MD   gabapentin (NEURONTIN) 300 MG capsule TAKE 1 CAPSULE IN MORNING AND AFTERNOON AND 2 CAPSULES AT NIGHT 10/12/20 11/19/20 Yes Shannen Ordonez MD   atorvastatin (LIPITOR) 40 MG tablet Take 1 tablet by mouth daily 9/22/20  Yes Shannen Ordonez MD   metFORMIN (GLUCOPHAGE) 500 MG tablet Take 1 tablet by mouth 2 times daily (with meals) 9/22/20  Yes Shannen Ordonez MD   lisinopril-hydroCHLOROthiazide (PRINZIDE;ZESTORETIC) 20-25 MG per tablet TAKE 1 TABLET EVERY DAY 9/22/20  Yes Shannen Ordonez MD    MG capsule TAKE 1 CAPSULE EVERY DAY 7/7/20  Yes Shannen Ordonez MD   meloxicam (MOBIC) 15 MG tablet Take 1 tablet by mouth daily 5/6/20 10/29/21 Yes Cortez Resendez Johan, APRN - CNP   amLODIPine (NORVASC) 10 MG tablet Take 1 tablet by mouth daily 4/2/20  Yes Caty Leyva MD   albuterol sulfate HFA (VENTOLIN HFA) 108 (90 Base) MCG/ACT inhaler Inhale 2 puffs into the lungs every 6 hours as needed for Wheezing 4/2/20  Yes Caty Leyva MD   omeprazole (PRILOSEC) 20 MG delayed release capsule TAKE 1 CAPSULE EVERY DAY 1/10/20  Yes Caty Leyva MD   diphenhydrAMINE (BENADRYL) 25 MG tablet Take 25 mg by mouth every 6 hours as needed for Itching   Yes Historical Provider, MD   nicotine (NICODERM CQ) 21 MG/24HR Place 1 patch onto the skin every 24 hours   Yes Historical Provider, MD   potassium chloride (KLOR-CON M) 10 MEQ extended release tablet Take 2 tablets by mouth daily 12/10/19  Yes Caty Leyva MD   Yvette Fuentes 18 MCG inhalation capsule  10/16/19  Yes Historical Provider, MD   acetaminophen (TYLENOL) 500 MG tablet Take 1 tablet by mouth 4 times daily as needed for Pain 9/12/19  Yes Edilma Hernandez, APRN - CNP   mirtazapine (REMERON) 30 MG tablet Take 30 mg by mouth nightly 5/22/18  Yes Historical Provider, MD   mirtazapine (REMERON) 15 MG tablet Take 15 mg by mouth nightly 5/22/18  Yes Historical Provider, MD   DULoxetine (CYMBALTA) 60 MG extended release capsule Take 1 capsule by mouth daily 2/1/18  Yes Caty Leyva MD   Lancets MISC 1 each by Does not apply route daily 1/5/18  Yes Caty Leyva MD   azelastine (ASTELIN) 0.1 % nasal spray 2 sprays by Nasal route 2 times daily Use in each nostril as directed 6/15/16  Yes Caty Leyva MD   ipratropium-albuterol (DUONEB) 0.5-2.5 (3) MG/3ML SOLN nebulizer solution Inhale 3 mLs into the lungs every 6 hours as needed for Shortness of Breath 8/4/15   Caty Leyva MD   .      Assessment         RR 20  Breath Sounds: clear, diminished      Bronchodilator assessment at level  3  Hyperinflation assessment at level   Secretion Management assessment at level

## 2020-11-30 NOTE — PROGRESS NOTES
(Right, 4/30/13); Nerve Block (5/23/13); Colonoscopy (2/12/2009); Upper gastrointestinal endoscopy (9 20 2007); Upper gastrointestinal endoscopy (4 21 2009,04/2011); Nerve Block (8/12/13); Nerve Block (Left, 8-28-13); Nerve Block (Left, 9-24-13); Nerve Block (07-02-14); Nerve Block (7-16-14); Nerve Block (7/30/14); Nerve Block (11-6-14); Nerve Block (11/20/15); pr knee scope,diagnostic (Right, 3/24/2017); Nerve Block (07/20/2018); Nerve Block (Bilateral, 02/01/2019); Nerve Block (Bilateral, 02/08/2019); lumbar discectomy (01/2015); lumbar fusion (11/19/2020); and lumbar fusion (N/A, 11/19/2020). Restrictions  Restrictions/Precautions  Restrictions/Precautions: Fall Risk, Up as Tolerated  Required Braces or Orthoses?: No  Position Activity Restriction  Other position/activity restrictions: Ambulate. 5L O2. Lumbar fusion 11/19/20. Subjective   General  Chart Reviewed: Yes  Response To Previous Treatment: Patient with no complaints from previous session. Family / Caregiver Present: No  Subjective  Subjective: Rn and pt agreeble to PT. Pt alert in bed upon arrival, denies pain , pleasant and cooperative t/o. General Comment  Comments: Pt very talkertive. Pain Screening  Patient Currently in Pain: No  Vital Signs  Patient Currently in Pain: No       Orientation  Orientation  Overall Orientation Status: Within Functional Limits  Cognition      Objective   Bed mobility  Rolling to Right: Stand by assistance  Supine to Sit: Stand by assistance  Sit to Supine: Stand by assistance  Scooting: Stand by assistance  Transfers  Sit to Stand: Contact guard assistance  Stand to sit: Contact guard assistance  Comment: poor safety awarness, and very impulsive  Ambulation  Ambulation?: Yes  More Ambulation?: Yes  Ambulation 1  Surface: level tile  Device: Rolling Walker  Other Apparatus: O2(HI-DANIELA)  Assistance: Contact guard assistance  Quality of Gait: forward flexed posture  Gait Deviations: Slow Laura; Increased MARCOS  Distance: 5ft x6  Comments: i long seated rest break d/t SOB . SPO  Ambulation 2  Surface - 2: level tile  Device 2: Rolling Walker  Other Apparatus 2: O2(HI-DANIELA)  Assistance 2: Contact guard assistance  Gait Deviations: Slow Laura;Decreased step length  Distance: 5ft x4  Comments: distance limited by HI-Daniela line, Pt very motivated to return to PLOF but is limited by SOB and decrease endurance. Balance  Posture: Good  Sitting - Static: Good;-  Sitting - Dynamic: Fair;+  Standing - Dynamic: Fair;-  Exercises  Comments: Marching x20 reps in standing. pt continued with seated exercises d/t fatigue and decreas endurance. Seated LE exercise program: Long Arc Quads, hip abduction/adduction, heel/toe raises, and marches. Reps: x20 reps . rest as needed      Goals  Short term goals  Time Frame for Short term goals: 12 visits  Short term goal 1: pt will be independent with bed mobility  Short term goal 2: pt will perform transfers independently  Short term goal 3: pt will ambulate 200' with least restrictive AD mod (I)  Short term goal 4: pt will ascend/descend 2 steps without handrails independently  Patient Goals   Patient goals : to decrease pain    Plan    Plan  Times per week: 5-6 visits per week  Times per day: Daily  Current Treatment Recommendations: Balance Training, Strengthening, Functional Mobility Training, Transfer Training, Gait Training, Stair training, Endurance Training, Safety Education & Training  Safety Devices  Type of devices:  All fall risk precautions in place, Call light within reach, Gait belt, Nurse notified, Chair alarm in place, Left in bed  Restraints  Initially in place: No     Therapy Time   Individual Concurrent Group Co-treatment   Time In 1323         Time Out 1352         Minutes 29         Timed Code Treatment Minutes: [address], PTA

## 2020-11-30 NOTE — PROGRESS NOTES
Neurosurgery TIAN/Resident    Daily Progress Note   CC:No chief complaint on file. 11/30/2020  8:21 AM    Chart reviewed. No acute events overnight. No new complaints. On 20L high flow at this time with saturation 87% while sitting up in the bed. Afebrile. BP and HR are stable. Was up to bedside commode yesterday she reported     Vitals:    11/30/20 0412 11/30/20 0600 11/30/20 0651 11/30/20 0751   BP: 130/70      Pulse: 76      Resp: 17 19 20   Temp: 98 °F (36.7 °C)      TempSrc: Axillary      SpO2:   90% 90%   Weight:  184 lb 11.9 oz (83.8 kg)     Height:           PE:   Motor   L iliopsoas 5/5 , R iliopsoas 5/5  L quadriceps 5/5; R quadriceps 5/5  L Dorsiflexion 5/5; R dorsiflexion 5/5  L Plantarflexion 5/5; R plantarflexion 5/5  L EHL 5/5; R EHL 5/5    Sensation: intact     Incision: intact closed with staples open to air       Lab Results   Component Value Date    WBC 20.9 (H) 11/30/2020    HGB 9.7 (L) 11/30/2020    HCT 31.8 (L) 11/30/2020     (H) 11/30/2020    CHOL 201 (H) 04/21/2020    TRIG 155 (H) 04/21/2020    HDL 54 04/21/2020    ALT 21 04/21/2020    AST 18 04/21/2020     11/30/2020    K 3.8 11/30/2020     11/30/2020    CREATININE 0.65 11/30/2020    BUN 15 11/30/2020    CO2 26 11/30/2020    TSH 0.94 07/19/2017    LABA1C 5.8 04/21/2020    LABMICR CANNOT BE CALCULATED 04/21/2020     A/P  56 y.o. female who presents with Lumbar radiculopathy   POD#11 L4-5 right hemilaminectomy. - awaiting rehab placement or home with home care  - mobilize with PT and OT   - wean oxygen requirement though she is at 20L now compared to 10L this morning during rounding   - on DVT ppx   - pain well controlled     Please contact neurosurgery with any changes in patients neurologic status.        Franca Fu, CNP  11/30/20  8:21 AM

## 2020-11-30 NOTE — PLAN OF CARE
Problem: Pain:  Goal: Control of acute pain  Description: Control of acute pain  Outcome: Met This Shift  Note: Pt able to communicate needs for pain medications and states satisfaction with outcome. Problem: Falls - Risk of:  Goal: Will remain free from falls  Description: Will remain free from falls  Outcome: Met This Shift  Note: Pt remains free from falls at this time. Floor free from obstacles, and bed is locked and in lowest position. Adequate lighting provided. Call light within reach; pt encouraged to call before getting OOB for any need. Will continue to monitor needs during hourly rounding.

## 2020-12-01 ENCOUNTER — CARE COORDINATION (OUTPATIENT)
Dept: CARE COORDINATION | Age: 60
End: 2020-12-01

## 2020-12-01 ENCOUNTER — HOSPITAL ENCOUNTER (OUTPATIENT)
Dept: PAIN MANAGEMENT | Age: 60
Discharge: HOME | End: 2020-12-01
Payer: COMMERCIAL

## 2020-12-01 LAB
ABSOLUTE EOS #: 0.16 K/UL (ref 0–0.44)
ABSOLUTE IMMATURE GRANULOCYTE: 0.31 K/UL (ref 0–0.3)
ABSOLUTE LYMPH #: 3.05 K/UL (ref 1.1–3.7)
ABSOLUTE MONO #: 1.15 K/UL (ref 0.1–1.2)
ALBUMIN SERPL-MCNC: 2.8 G/DL (ref 3.5–5.2)
ANION GAP SERPL CALCULATED.3IONS-SCNC: 10 MMOL/L (ref 9–17)
BASOPHILS # BLD: 0 % (ref 0–2)
BASOPHILS ABSOLUTE: 0.05 K/UL (ref 0–0.2)
BUN BLDV-MCNC: 14 MG/DL (ref 8–23)
BUN/CREAT BLD: ABNORMAL (ref 9–20)
CALCIUM SERPL-MCNC: 8.5 MG/DL (ref 8.6–10.4)
CHLORIDE BLD-SCNC: 100 MMOL/L (ref 98–107)
CO2: 25 MMOL/L (ref 20–31)
CREAT SERPL-MCNC: 0.63 MG/DL (ref 0.5–0.9)
DIFFERENTIAL TYPE: ABNORMAL
EOSINOPHILS RELATIVE PERCENT: 1 % (ref 1–4)
GFR AFRICAN AMERICAN: >60 ML/MIN
GFR NON-AFRICAN AMERICAN: >60 ML/MIN
GFR SERPL CREATININE-BSD FRML MDRD: ABNORMAL ML/MIN/{1.73_M2}
GFR SERPL CREATININE-BSD FRML MDRD: ABNORMAL ML/MIN/{1.73_M2}
GLUCOSE BLD-MCNC: 105 MG/DL (ref 70–99)
GLUCOSE BLD-MCNC: 153 MG/DL (ref 65–105)
GLUCOSE BLD-MCNC: 155 MG/DL (ref 65–105)
GLUCOSE BLD-MCNC: 170 MG/DL (ref 65–105)
GLUCOSE BLD-MCNC: 96 MG/DL (ref 65–105)
HCT VFR BLD CALC: 32 % (ref 36.3–47.1)
HEMOGLOBIN: 9.7 G/DL (ref 11.9–15.1)
IMMATURE GRANULOCYTES: 2 %
LYMPHOCYTES # BLD: 15 % (ref 24–43)
MAGNESIUM: 2.3 MG/DL (ref 1.6–2.6)
MCH RBC QN AUTO: 27.6 PG (ref 25.2–33.5)
MCHC RBC AUTO-ENTMCNC: 30.3 G/DL (ref 28.4–34.8)
MCV RBC AUTO: 91.2 FL (ref 82.6–102.9)
MONOCYTES # BLD: 6 % (ref 3–12)
NRBC AUTOMATED: 0.2 PER 100 WBC
PDW BLD-RTO: 14.4 % (ref 11.8–14.4)
PHOSPHORUS: 3.8 MG/DL (ref 2.6–4.5)
PLATELET # BLD: 543 K/UL (ref 138–453)
PLATELET ESTIMATE: ABNORMAL
PMV BLD AUTO: 9.3 FL (ref 8.1–13.5)
POTASSIUM SERPL-SCNC: 4.1 MMOL/L (ref 3.7–5.3)
RBC # BLD: 3.51 M/UL (ref 3.95–5.11)
RBC # BLD: ABNORMAL 10*6/UL
SEG NEUTROPHILS: 76 % (ref 36–65)
SEGMENTED NEUTROPHILS ABSOLUTE COUNT: 16.17 K/UL (ref 1.5–8.1)
SODIUM BLD-SCNC: 135 MMOL/L (ref 135–144)
WBC # BLD: 20.9 K/UL (ref 3.5–11.3)
WBC # BLD: ABNORMAL 10*3/UL

## 2020-12-01 PROCEDURE — 6370000000 HC RX 637 (ALT 250 FOR IP): Performed by: INTERNAL MEDICINE

## 2020-12-01 PROCEDURE — 2580000003 HC RX 258: Performed by: INTERNAL MEDICINE

## 2020-12-01 PROCEDURE — 6370000000 HC RX 637 (ALT 250 FOR IP): Performed by: NURSE PRACTITIONER

## 2020-12-01 PROCEDURE — 99232 SBSQ HOSP IP/OBS MODERATE 35: CPT | Performed by: FAMILY MEDICINE

## 2020-12-01 PROCEDURE — 6360000002 HC RX W HCPCS: Performed by: NURSE PRACTITIONER

## 2020-12-01 PROCEDURE — 85025 COMPLETE CBC W/AUTO DIFF WBC: CPT

## 2020-12-01 PROCEDURE — 36415 COLL VENOUS BLD VENIPUNCTURE: CPT

## 2020-12-01 PROCEDURE — APPSS15 APP SPLIT SHARED TIME 0-15 MINUTES: Performed by: NURSE PRACTITIONER

## 2020-12-01 PROCEDURE — 2580000003 HC RX 258: Performed by: STUDENT IN AN ORGANIZED HEALTH CARE EDUCATION/TRAINING PROGRAM

## 2020-12-01 PROCEDURE — 99232 SBSQ HOSP IP/OBS MODERATE 35: CPT | Performed by: PHYSICAL MEDICINE & REHABILITATION

## 2020-12-01 PROCEDURE — 99233 SBSQ HOSP IP/OBS HIGH 50: CPT | Performed by: INTERNAL MEDICINE

## 2020-12-01 PROCEDURE — 6370000000 HC RX 637 (ALT 250 FOR IP): Performed by: STUDENT IN AN ORGANIZED HEALTH CARE EDUCATION/TRAINING PROGRAM

## 2020-12-01 PROCEDURE — 99024 POSTOP FOLLOW-UP VISIT: CPT | Performed by: NEUROLOGICAL SURGERY

## 2020-12-01 PROCEDURE — 6360000002 HC RX W HCPCS: Performed by: STUDENT IN AN ORGANIZED HEALTH CARE EDUCATION/TRAINING PROGRAM

## 2020-12-01 PROCEDURE — 83735 ASSAY OF MAGNESIUM: CPT

## 2020-12-01 PROCEDURE — 2060000000 HC ICU INTERMEDIATE R&B

## 2020-12-01 PROCEDURE — 2700000000 HC OXYGEN THERAPY PER DAY

## 2020-12-01 PROCEDURE — 97116 GAIT TRAINING THERAPY: CPT

## 2020-12-01 PROCEDURE — 82947 ASSAY GLUCOSE BLOOD QUANT: CPT

## 2020-12-01 PROCEDURE — 80069 RENAL FUNCTION PANEL: CPT

## 2020-12-01 PROCEDURE — 94640 AIRWAY INHALATION TREATMENT: CPT

## 2020-12-01 PROCEDURE — 99232 SBSQ HOSP IP/OBS MODERATE 35: CPT | Performed by: INTERNAL MEDICINE

## 2020-12-01 PROCEDURE — 94761 N-INVAS EAR/PLS OXIMETRY MLT: CPT

## 2020-12-01 RX ADMIN — TIZANIDINE 4 MG: 4 TABLET ORAL at 08:10

## 2020-12-01 RX ADMIN — TROSPIUM CHLORIDE 20 MG: 20 TABLET, FILM COATED ORAL at 08:09

## 2020-12-01 RX ADMIN — GABAPENTIN 300 MG: 300 CAPSULE ORAL at 08:10

## 2020-12-01 RX ADMIN — MIRTAZAPINE 15 MG: 15 TABLET, FILM COATED ORAL at 20:36

## 2020-12-01 RX ADMIN — CEFOXITIN SODIUM 2 G: 2 POWDER, FOR SOLUTION INTRAVENOUS at 23:44

## 2020-12-01 RX ADMIN — MIRTAZAPINE 30 MG: 15 TABLET, FILM COATED ORAL at 20:36

## 2020-12-01 RX ADMIN — TROSPIUM CHLORIDE 20 MG: 20 TABLET, FILM COATED ORAL at 20:35

## 2020-12-01 RX ADMIN — DULOXETINE HYDROCHLORIDE 60 MG: 30 CAPSULE, DELAYED RELEASE ORAL at 08:11

## 2020-12-01 RX ADMIN — DESMOPRESSIN ACETATE 40 MG: 0.2 TABLET ORAL at 08:11

## 2020-12-01 RX ADMIN — IPRATROPIUM BROMIDE AND ALBUTEROL SULFATE 1 AMPULE: .5; 3 SOLUTION RESPIRATORY (INHALATION) at 08:33

## 2020-12-01 RX ADMIN — OMEPRAZOLE 20 MG: 20 CAPSULE, DELAYED RELEASE ORAL at 08:10

## 2020-12-01 RX ADMIN — MAGNESIUM HYDROXIDE 30 ML: 400 SUSPENSION ORAL at 08:10

## 2020-12-01 RX ADMIN — POLYETHYLENE GLYCOL 3350 17 G: 17 POWDER, FOR SOLUTION ORAL at 10:00

## 2020-12-01 RX ADMIN — GABAPENTIN 300 MG: 300 CAPSULE ORAL at 14:45

## 2020-12-01 RX ADMIN — OXYCODONE HYDROCHLORIDE 10 MG: 5 TABLET ORAL at 20:46

## 2020-12-01 RX ADMIN — ENOXAPARIN SODIUM 40 MG: 40 INJECTION SUBCUTANEOUS at 08:11

## 2020-12-01 RX ADMIN — CEFOXITIN SODIUM 2 G: 2 POWDER, FOR SOLUTION INTRAVENOUS at 05:39

## 2020-12-01 RX ADMIN — INSULIN LISPRO 2 UNITS: 100 INJECTION, SOLUTION INTRAVENOUS; SUBCUTANEOUS at 12:26

## 2020-12-01 RX ADMIN — INSULIN LISPRO 1 UNITS: 100 INJECTION, SOLUTION INTRAVENOUS; SUBCUTANEOUS at 21:19

## 2020-12-01 RX ADMIN — SODIUM CHLORIDE, PRESERVATIVE FREE 10 ML: 5 INJECTION INTRAVENOUS at 20:36

## 2020-12-01 RX ADMIN — CARVEDILOL 6.25 MG: 6.25 TABLET, FILM COATED ORAL at 08:11

## 2020-12-01 RX ADMIN — CARVEDILOL 6.25 MG: 6.25 TABLET, FILM COATED ORAL at 20:35

## 2020-12-01 RX ADMIN — OXYCODONE HYDROCHLORIDE 10 MG: 5 TABLET ORAL at 12:13

## 2020-12-01 RX ADMIN — BUDESONIDE AND FORMOTEROL FUMARATE DIHYDRATE 2 PUFF: 160; 4.5 AEROSOL RESPIRATORY (INHALATION) at 08:44

## 2020-12-01 RX ADMIN — Medication 1 CAPSULE: at 08:10

## 2020-12-01 RX ADMIN — CEFOXITIN SODIUM 2 G: 2 POWDER, FOR SOLUTION INTRAVENOUS at 12:26

## 2020-12-01 RX ADMIN — CEFOXITIN SODIUM 2 G: 2 POWDER, FOR SOLUTION INTRAVENOUS at 17:53

## 2020-12-01 RX ADMIN — TIZANIDINE 4 MG: 4 TABLET ORAL at 20:35

## 2020-12-01 RX ADMIN — DOCUSATE SODIUM 100 MG: 100 CAPSULE, LIQUID FILLED ORAL at 08:11

## 2020-12-01 RX ADMIN — CETIRIZINE HYDROCHLORIDE 10 MG: 10 TABLET ORAL at 08:11

## 2020-12-01 RX ADMIN — GABAPENTIN 600 MG: 300 CAPSULE ORAL at 20:35

## 2020-12-01 RX ADMIN — POTASSIUM CHLORIDE 20 MEQ: 1500 TABLET, EXTENDED RELEASE ORAL at 08:10

## 2020-12-01 RX ADMIN — SODIUM CHLORIDE, PRESERVATIVE FREE 10 ML: 5 INJECTION INTRAVENOUS at 08:12

## 2020-12-01 RX ADMIN — HYDROCHLOROTHIAZIDE 25 MG: 25 TABLET ORAL at 10:00

## 2020-12-01 RX ADMIN — PREDNISONE 20 MG: 20 TABLET ORAL at 08:10

## 2020-12-01 RX ADMIN — INSULIN LISPRO 2 UNITS: 100 INJECTION, SOLUTION INTRAVENOUS; SUBCUTANEOUS at 16:54

## 2020-12-01 ASSESSMENT — PAIN DESCRIPTION - PAIN TYPE
TYPE: CHRONIC PAIN;OTHER (COMMENT)

## 2020-12-01 ASSESSMENT — PAIN DESCRIPTION - DESCRIPTORS
DESCRIPTORS: SHARP

## 2020-12-01 ASSESSMENT — PAIN DESCRIPTION - FREQUENCY
FREQUENCY: CONTINUOUS

## 2020-12-01 ASSESSMENT — PAIN DESCRIPTION - ORIENTATION
ORIENTATION: LOWER
ORIENTATION: LOWER;OTHER (COMMENT)
ORIENTATION: LOWER
ORIENTATION: LOWER;OTHER (COMMENT)
ORIENTATION: LOWER
ORIENTATION: LOWER;OTHER (COMMENT)

## 2020-12-01 ASSESSMENT — PAIN SCALES - GENERAL
PAINLEVEL_OUTOF10: 0
PAINLEVEL_OUTOF10: 6
PAINLEVEL_OUTOF10: 8
PAINLEVEL_OUTOF10: 0
PAINLEVEL_OUTOF10: 4
PAINLEVEL_OUTOF10: 6
PAINLEVEL_OUTOF10: 7
PAINLEVEL_OUTOF10: 5
PAINLEVEL_OUTOF10: 0
PAINLEVEL_OUTOF10: 5
PAINLEVEL_OUTOF10: 4
PAINLEVEL_OUTOF10: 0
PAINLEVEL_OUTOF10: 8

## 2020-12-01 ASSESSMENT — PAIN DESCRIPTION - PROGRESSION
CLINICAL_PROGRESSION: NOT CHANGED

## 2020-12-01 ASSESSMENT — PAIN DESCRIPTION - ONSET
ONSET: ON-GOING

## 2020-12-01 ASSESSMENT — PAIN DESCRIPTION - LOCATION
LOCATION: BACK;LEG
LOCATION: BACK
LOCATION: BACK;LEG
LOCATION: BACK

## 2020-12-01 ASSESSMENT — PAIN - FUNCTIONAL ASSESSMENT
PAIN_FUNCTIONAL_ASSESSMENT: ACTIVITIES ARE NOT PREVENTED

## 2020-12-01 NOTE — PROGRESS NOTES
Physical Therapy  Facility/Department: [address] STEPDOWN  Daily Treatment Note  NAME: Savita Paula  : 1960  MRN: [id number]    Date of Service: 2020    Discharge Recommendations:  Patient would benefit from continued therapy after discharge        Assessment   Body structures, Functions, Activity limitations: Decreased functional mobility ; Decreased safe awareness;Decreased endurance;Decreased balance; Increased pain;Decreased strength  Assessment: Tolerates shortness of breath and pain well. Motivated to regain independence. Patient needs further PT to regain functional independence. PT Education: Goals; General Safety;Plan of Care;Transfer Training;Precautions;Gait Training;Equipment; Functional Mobility Training  Patient Education: Avoiding spinal rotation during ADLs and turning with walker. REQUIRES PT FOLLOW UP: Yes  Activity Tolerance  Activity Tolerance: Patient limited by fatigue;Patient limited by endurance     Patient Diagnosis(es): Lumbar fusion, respiratory failure     has a past medical history of Allergic rhinitis, Alveolar hypoventilation, Aortic insufficiency, Bronchiectasis (HCC), Bronchitis, Chronic back pain, COPD (chronic obstructive pulmonary disease) ([address] Depression, DM (diabetes mellitus) ([address] GERD (gastroesophageal reflux disease), History of echocardiogram, Hyperlipidemia, Hypertension, Obesity, Osteoarthritis, Peripheral vascular disease ([address] Primary osteoarthritis of both knees, Radicular pain of lumbosacral region, Spinal stenosis, lumbar region, without neurogenic claudication, Tricuspid regurgitation, Type II or unspecified type diabetes mellitus without mention of complication, not stated as uncontrolled, Unspecified sleep apnea, URI (upper respiratory infection), Wears dentures, Wears glasses, and Wellness examination. has a past surgical history that includes Dilation and curettage of uterus; gastrectomy; Nerve Block (Right, 13);  Nerve Block (13); shoulders in order to prevent any spinal rotation. Exercises  Heelslides: x10 reps  Ankle Pumps: x10               Goals  Short term goals  Time Frame for Short term goals: 12 visits  Short term goal 1: pt will be independent with bed mobility  Short term goal 2: pt will perform transfers independently  Short term goal 3: pt will ambulate 200' with least restrictive AD mod (I)  Short term goal 4: pt will ascend/descend 2 steps without handrails independently  Patient Goals   Patient goals : to decrease pain    Plan    Plan  Times per week: 5-6 visits per week  Times per day: Daily  Current Treatment Recommendations: Balance Training, Strengthening, Functional Mobility Training, Transfer Training, Gait Training, Stair training, Endurance Training, Safety Education & Training  Safety Devices  Type of devices:  All fall risk precautions in place, Call light within reach, Gait belt, Nurse notified, Left in bed  Restraints  Initially in place: No     Therapy Time   Individual Concurrent Group Co-treatment   Time In 1106         Time Out 1131         Minutes 25         Timed Code Treatment Minutes: Soraya [address], PT

## 2020-12-01 NOTE — PLAN OF CARE
BRONCHOSPASM/BRONCHOCONSTRICTION     [x]         IMPROVE AERATION/BREATH SOUNDS  [x]   ADMINISTER BRONCHODILATOR THERAPY AS APPROPRIATE  [x]   ASSESS BREATH SOUNDS  [x]   IMPLEMENT AEROSOL/MDI PROTOCOL  [x]   PATIENT EDUCATION AS NEEDED    NONINVASIVE VENTILATION     PROVIDE OPTIMAL VENTILATION/ACCEPTABLE SPO2              IMPLEMENT NONINVASIVE VENTILATION PROTOCOL              MAINTAIN ACCEPTABLE SPO2              ASSESS SKIN INTEGRITY/BREAKDOWN SCORE              PATIENT EDUCATION AS NEEDED              BIPAP AS NEEDED

## 2020-12-01 NOTE — PROGRESS NOTES
[facility] - Progress Note  Today's Date and Time: 12/1/2020, 10:42 AM    Impression :   Aspiration pneumonia  Bilateral bronchopneumonia  Acute hypoxic respiratory failure requiring high flow 02  S/P Lumbar fusion 11-19-20. Recommendations:   D/C Zosyn to avoid fluid overload  Cefoxitin  2 gm IV q 6 hr (will consider change to PO for discharge)  Diuresis. Medical Decision Making/Summary/Discussion:12/1/2020       Infection Control Recommendations   [place] Precautions    Antimicrobial Stewardship Recommendations     Simplification of therapy  Targeted therapy  Coordination of Outpatient Care:   Estimated Length of IV antimicrobials: TBD  Patient will need Midline Catheter Insertion: no  Patient will need PICC line Insertion:no  Patient will need: Home IV , [place],  SNF,  LTAC: TBD  Patient will need outpatient wound care:No    Chief complaint/reason for consultation:   Pneumonia      History of Present Illness:   Radha Lyon is a 56y.o.-year-old  female who was initially admitted on 11/19/2020. Patient seen at the request of Kely Santos. INITIAL HISTORY:    The patient has a history of morbid obesity, COPD, JOAN, chronic hypoxic respiratory failure -on home oxygen. She was admitted because of right lower extremity pain extending from hip to foot.  She has known lumbar arthritis and had failed all conservative measures.  She underwent posterior fusion L4/5 surgery on 11/19/20.      During the postoperative period her respiratory status declined and she started spiking fevers. Her Chest x-ray and CT showed bilateral multifocal infiltrates suggestive of aspiration, areas of denser consolidation suggestive of bronchopneumonia and underlying severe emphysema. Patient received ceftriaxone, then zosyn since 11-24-20 with benefit. She is currently experiencing worsening of 02 requirements.     Plan:   D/C zosyn to avoid fluid and Na overload  Continue cefoxitin. CURRENT EVALUATION : 12/1/2020    Patient evaluated and examined in the floor  Patient reports on acute events overnight. She was using nasal cannula for oxygenation and was not desaturating during conversation. No new complaints. T-max of 97-->99.1-->98-->99.6  VS stable. Complains of mild shortness of breath like yesterday  respiratory wise BiPAP --> heated high flow 35L TO 20L  Patient  weaning down on oxygen. Or respiratory status mild improvement. Diarrhea improved with probiotics  Monitoring to discharge rehab versus home therapy. Pt want to go home instead of Nursing home. Currently on cefoxitin day 5 2 g IV q6    RR 20-->15-->20-->16  Flow rate 5-->30-->35-->35-->20-->6  FIO2 100-->40--> 60-->85-->60%  02 sat 94 -->96-->90-->90-->91    Labs, X rays reviewed: 12/1/2020    BUN: 16-->12-->15-->14  Cr: 0.62-->0.48-->0.65-->0.63    WBC: 12.9-->14.6-->26.4-->20.3-->20.9  Hb:9.4-->9.6-->9.7  Plat: 314-->339--> 513-->543    Cultures:  Urine:  11-22-20: No growth   11-19-20: No growth  Blood:  11-22-20: No growth    Sputum :    Wound:      MRSA screen: negative    Discussed with patient, RN, family. 11-26-20 11-22-20: I have personally reviewed the past medical history, past surgical history, medications, social history, and family history, and I have updated the database accordingly.   Past Medical History:     Past Medical History:   Diagnosis Date    Allergic rhinitis     Alveolar hypoventilation 11/20/2020    Aortic insufficiency 2018    mild-moderate on echo [facility]    Bronchiectasis ([facility] 11/20/2020    Bronchitis     Chronic back pain     Pain management at [facility]. COPD (chronic obstructive pulmonary disease) ([facility]     Dr. Aparicio Salts to see 11/09/2020    Depression     DM (diabetes mellitus) ([facility] 12/18/2012    GERD (gastroesophageal reflux disease)     History of echocardiogram 05/2018    EF 65%, mild-moderate AI and TR    Hyperlipidemia     Dr. Aundrea Carrero Hypertension     Dr. Aundrea Carrero Obesity     Osteoarthritis     Peripheral vascular disease ([facility]     Primary osteoarthritis of both knees     Radicular pain of lumbosacral region     Spinal stenosis, lumbar region, without neurogenic claudication 04/30/2013    Tricuspid regurgitation 2018    mild-moderate on echo    Type II or unspecified type diabetes mellitus without mention of complication, not stated as uncontrolled     Dr. Aundrea Carrero Unspecified sleep apnea     no cpap used anymore    URI (upper respiratory infection)     Wears dentures     upper and lower full dentures    Wears glasses     Wellness examination     Dr. Mert Brandon -PCP last visit in early Oct. 2020       Past Surgical  History:     Past Surgical History:   Procedure Laterality Date    COLONOSCOPY  2/12/2009    normal    DILATION AND CURETTAGE OF UTERUS      GASTRECTOMY      partial    LUMBAR DISCECTOMY  01/2015    lumbar diskectomy    LUMBAR FUSION  11/19/2020     POSTERIOR FUSION L4/5,     LUMBAR FUSION N/A 11/19/2020    POSTERIOR FUSION L4/5, [employer], Aileen Morrowara, EVOKES #[id number] AMINA performed by Courtnye Alvarez DO at [address] Right 4/30/13    Lumbar Diagnostic Block,  Kenalog 40 mg    NERVE BLOCK  5/23/13    Lumbar Radiofrequency, Kenalog 40mg    NERVE BLOCK  8/12/13    Lt MBNB  celestone 6mg    NERVE BLOCK Left 8-28-13    left lumbar diagnostic block #2 decadron 10 mg    NERVE BLOCK Left 9-24-13    left lumbar median branch radiofrequency    NERVE BLOCK  07-02-14    caudal, celestone 9 mg    NERVE BLOCK  7-16-14    caudal epidural #2, celestone 9mg, fentanyl 25mcg    NERVE BLOCK  7/30/14    caudal #3 decadron 10mg    NERVE BLOCK  11-6-14    duramorph epidural steroid block  duramorph 1 mg celestone 9 mg    NERVE BLOCK  11/20/15    TENS- Empi Select    NERVE BLOCK  07/20/2018    right transforminal # 1 decadron History     Employer: DISABLED   Social Needs    Financial resource strain: Not very hard    Food insecurity     Worry: Never true     Inability: Never true    Transportation needs     Medical: No     Non-medical: No   Tobacco Use    Smoking status: Former Smoker     Packs/day: 0.25     Years: 36.00     Pack years: 9.00     Types: Cigarettes     Last attempt to quit: 10/30/2020     Years since quittin.0    Smokeless tobacco: Never Used    Tobacco comment: pt currently using nicotine patches   5 CIGARETTES A DAY   Substance and Sexual Activity    Alcohol use: No     Alcohol/week: 0.0 standard drinks     Frequency: Never     Binge frequency: Never    Drug use: No     Comment: 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 7 yrs    Sexual activity: Never   Lifestyle    Physical activity     Days per week: 0 days     Minutes per session: 0 min    Stress: Only a little   Relationships    Social connections     Talks on phone: More than three times a week     Gets together: Once a week     Attends Jainism service: More than 4 times per year     Active member of club or organization: No     Attends meetings of clubs or organizations: Never     Relationship status: Never     Intimate partner violence     Fear of current or ex partner: Not on file     Emotionally abused: Not on file     Physically abused: Not on file     Forced sexual activity: Not on file   Other Topics Concern    Not on file   Social History Narrative    10/9/20 Patient keeping contact with others to a minimum due to [place] [facility] Pandemic. 10/9/20 Patient has difficulty with ambulation due to DJD, stenosis and fibromyalgia       Family History:     Family History   Problem Relation Age of Onset    Diabetes Mother     Heart Disease Mother     Cirrhosis Father         Allergies:   Aspirin; Claritin [loratadine]; Flonase [fluticasone propionate]; and Morphine and related     Review of Systems:   Constitutional: Fevers, no chills. obvious filling defect in the main, right main, or    left main pulmonary arteries.  Evaluation of the remainder of the pulmonary    arterial vasculature is severely limited to nondiagnostic due to respiratory    motion and suboptimal bolus timing as well as streak artifact from the    contrast bolus in the SVC.         Mediastinum: Prominent mediastinal lymph nodes without overt mediastinal,    axillary, or hilar lymphadenopathy.  Visualized thyroid gland grossly    unremarkable in appearance.  No pericardial effusion.  No periaortic or    mediastinal hemorrhage.         Lungs/pleura: Trachea and very proximal central airways appear patent. Consolidation of the bilateral lower lobes and partial consolidation of the    lingula.  Partial consolidation of the proximal right middle lobe.  Severe    emphysema.  Respiratory motion throughout the lungs.  No pneumothorax.         Upper Abdomen: Fatty liver.         Soft Tissues/Bones: Mild diffuse degenerative changes throughout the spine.              Impression    1. No clear evidence for central pulmonary embolus.  Evaluation of the    remainder of the pulmonary arterial vasculature is severely limited to    nondiagnostic as detailed above. 2. Bilateral lower lobe consolidation likely pneumonia.  Consolidation of the    proximal right middle lobe and lingula to a lesser degree.  Findings favored    to represent multifocal pneumonia.  Follow-up recommended to document    resolution. 3. Prominent mediastinal lymph nodes. 4. Fatty liver.     5. Severe emphysema.           11/26/2020 1:22 am         COMPARISON:    November 24, 2020 November 24, 2020         HISTORY:    ORDERING SYSTEM PROVIDED HISTORY: hypoxia    TECHNOLOGIST PROVIDED HISTORY:    hypoxia    Reason for Exam: difficulty breathing   upright port         FINDINGS:    Marginal inspiration is noted.  Borderline cardiomegaly is present.  Improved    aeration is seen within the right lung base. Judd Mayer

## 2020-12-01 NOTE — PROGRESS NOTES
Physical Medicine & Rehabilitation  Progress Note    12/1/2020 4:20 PM     CC: Ambulatory and ADL dysfunction due to back pain with right lower limb pain status post laminectomy    Subjective:   Feels well. Now down to 6L O2- desats with activity. ROS:  Denies fevers, chills, sweats. No chest pain, palpitations, lightheadedness. Denies coughing, wheezing or shortness of breath. Denies abdominal pain, nausea, diarrhea or constipation. No new areas of joint pain. Denies new areas of numbness or weakness. Denies new anxiety or depression issues. No new skin problems. Rehabilitation:   PT:  Restrictions/Precautions: Fall Risk, Up as Tolerated  Other position/activity restrictions: Ambulate. 6L O2. Lumbar fusion 11/19/20. Transfers  Sit to Stand: Supervision  Stand to sit: Supervision  Bed to Chair: Contact guard assistance  Comment: poor safety awarness, and very impulsive  Ambulation 1  Surface: level tile  Device: Rolling Walker  Other Apparatus: O2  Assistance: Contact guard assistance  Quality of Gait: forward flexed posture  Gait Deviations: Slow Laura, Increased MARCOS  Distance: 36' x2 with aseated rest in between  Comments: Increased respiration rate, but she tolerates this without appearing to become anxious. Demonstrates pursed lip breath. Education provided on turning in increments, feet pointing same directions as shoulders in order to prevent any spinal rotation. OT:  ADL  Feeding: Independent, Setup  Grooming: Setup, Stand by assistance  UE Bathing: Stand by assistance, Setup  LE Bathing: Stand by assistance, Setup  UE Dressing: Minimal assistance, Setup  LE Dressing: Supervision  Toileting: Setup, Contact guard assistance(Pt completed toileting on bedside commode with CGA for funcitonal transfer. Pt completed clothing management and pericare.)  Additional Comments: Pt. Tolerated UB bath sitting EOB at SBA level, don/doff gown min A to tie/untie back.  LB bath SBA with standing portions to ensure no LOB. Using 4 figure tech with LB bathing. Pt. needing set up and encouraged to rest when SOB present. Pt. very talkitive and demo increased SOB at times, pt. educated on pursed lip breathing, pt.demo G carryover. Instrumental ADL's  Instrumental ADLs: No     Balance  Sitting Balance: Independent  Standing Balance: Stand by assistance   Standing Balance  Time: ~3 minutes  Activity: dynamic standing with self care. Comment: cues for pursed lip breathing to prevent SOB. Functional Mobility  Functional - Mobility Device: Rolling Walker  Activity: Other  Assist Level: Contact guard assistance  Functional Mobility Comments: Pt completed functional mobility with CGA and use of RW. Respiratory therapist and RN in room to assist in monitoring o2 levels. Highflow 35% upon arrival, changed to 40% sitting EOB. Pt desaturated to low 80%. Rebounded with increased time. RN ok'd for mobility to chair. Bed mobility  Rolling to Right: Stand by assistance  Supine to Sit: Supervision  Sit to Supine: Supervision  Scooting: Stand by assistance  Comment: HOB rasied ~30 degrees  Transfers  Sit to stand: Stand by assistance  Stand to sit: Stand by assistance  Transfer Comments: Completed 2 sit<>stand transfers this date with SBA, no AE. Toilet Transfers  Toilet - Technique: Ambulating  Equipment Used: Standard bedside commode  Toilet Transfer: Contact guard assistance  Toilet Transfers Comments: Use of bilateral grab bars               ST:            Objective:  BP (!) 149/78   Pulse 86   Temp 97.9 °F (36.6 °C) (Oral)   Resp 19   Ht 5' 5\" (1.651 m)   Wt 186 lb 4.6 oz (84.5 kg)   LMP 2003   SpO2 92%   BMI 31.00 kg/m²  I Body mass index is 31 kg/m².  I   Wt Readings from Last 1 Encounters:   20 186 lb 4.6 oz (84.5 kg)      Temp (24hrs), Av.1 °F (36.7 °C), Min:97.4 °F (36.3 °C), Max:99.5 °F (37.5 °C)         GEN: well developed, well nourished, no acute distress  HEENT: Normocephalic atraumatic, EOMI, mucous membranes pink and moist  CV: RRR, no murmurs, rubs or gallops  PULM: CTAB, no rales or rhonchi. Respirations WNL and unlabored  ABD: soft, NT, ND, +BS and equal  NEURO: A&O x3. Sensation intact to light touch. MSK: good strength UE/LE,  EXTREMITIES: No calf tenderness to palpation bilaterally. No edema BLEs  SKIN: warm dry and intact with good turgor, incision not seen today  PSYCH: appropriately interactive. Affect WNL.   Good spirits        Medications   Scheduled Meds:   predniSONE  20 mg Oral Daily    lactobacillus  1 capsule Oral Daily with breakfast    hydroCHLOROthiazide  25 mg Oral Daily    cefOXitin  2 g Intravenous 4 times per day    budesonide-formoterol  2 puff Inhalation BID    potassium chloride  20 mEq Oral Once    insulin lispro  0-12 Units Subcutaneous TID WC    insulin lispro  0-6 Units Subcutaneous Nightly    sodium chloride flush  10 mL Intravenous 2 times per day    [Held by provider] amLODIPine  10 mg Oral Daily    atorvastatin  40 mg Oral Daily    azelastine  2 spray Each Nostril BID    carvedilol  6.25 mg Oral BID    docusate sodium  100 mg Oral Daily    DULoxetine  60 mg Oral Daily    gabapentin  300 mg Oral BID    [Held by provider] lisinopril-hydroCHLOROthiazide  1 tablet Oral Daily    [Held by provider] metFORMIN  500 mg Oral BID WC    mirtazapine  15 mg Oral Nightly    mirtazapine  30 mg Oral Nightly    omeprazole  20 mg Oral QAM    potassium chloride  20 mEq Oral Daily    tiZANidine  4 mg Oral BID    trospium  20 mg Oral BID    polyethylene glycol  17 g Oral Daily    magnesium hydroxide  30 mL Oral Daily    enoxaparin  40 mg Subcutaneous Daily    cetirizine  10 mg Oral Daily    gabapentin  600 mg Oral Nightly    nicotine  1 patch Transdermal Daily     Continuous Infusions:   dextrose       PRN Meds:.albuterol, magic (miracle) mouthwash, acetaminophen, benzonatate, diphenhydrAMINE, sodium chloride flush, oxyCODONE **OR** oxyCODONE, morphine **OR** [DISCONTINUED] morphine, senna, glucose, dextrose, glucagon (rDNA), dextrose     Diagnostics:     CBC:   Recent Labs     11/29/20  0717 11/30/20  0624 12/01/20  0642   WBC 20.3* 20.9* 20.9*   RBC 3.51* 3.51* 3.51*   HGB 9.7* 9.7* 9.7*   HCT 32.3* 31.8* 32.0*   MCV 92.0 90.6 91.2   RDW 14.2 14.2 14.4   * 513* 543*     BMP:   Recent Labs     11/29/20  0717 11/30/20  0624 12/01/20  0642    138 135   K 4.1 3.8 4.1    100 100   CO2 26 26 25   PHOS 3.6 4.7* 3.8   BUN 12 15 14   CREATININE 0.62 0.65 0.63     BNP: No results for input(s): BNP in the last 72 hours. PT/INR: No results for input(s): PROTIME, INR in the last 72 hours. APTT: No results for input(s): APTT in the last 72 hours. CARDIAC ENZYMES: No results for input(s): CKMB, CKMBINDEX, TROPONINT in the last 72 hours. Invalid input(s): CKTOTAL;3  FASTING LIPID PANEL:  Lab Results   Component Value Date    CHOL 201 (H) 04/21/2020    HDL 54 04/21/2020    TRIG 155 (H) 04/21/2020     LIVER PROFILE: No results for input(s): AST, ALT, ALB, BILIDIR, BILITOT, ALKPHOS in the last 72 hours. I/O (24Hr): Intake/Output Summary (Last 24 hours) at 12/1/2020 1620  Last data filed at 12/1/2020 0434  Gross per 24 hour   Intake 722 ml   Output 1600 ml   Net -878 ml       Glu last 24 hour  Recent Labs     11/30/20  2043 12/01/20  0706 12/01/20  1211 12/01/20  1523   POCGLU 285* 96 153* 155*       No results for input(s): CLARITYU, COLORU, PHUR, SPECGRAV, PROTEINU, RBCUA, BLOODU, BACTERIA, NITRU, WBCUA, LEUKOCYTESUR, YEAST, Jolane Maudlin in the last 72 hours. Impression:     1. Chronic low back pain with right lower limb pain s/p L4-L5 laminectomy and fusion  2. Pain-Roxicodone, Tylenol,zanaflex  3. Multifocal pneumonia-cefoxitin albuterol Tessalon, Symbicort  4. COPD-continue smoker-NicoDerm, prednisone  5. Diarrhea-lactobacillus  6. -Sanctura  7. Type 2 diabetes  8.  HTN-Coreg, hydrochlorothiazide  9. HLD-Lipitor  10. PVD  11. GERD  12. Insomnia/depression-Cymbalta, Remeron       Recommendations:     1. Diagnosis:  Chronic low back pain with right lower limb pain s/p L4-L5 laminectomy and fusion  2. Therapy: Has PT/OT needs  3. Medical Necessity: As above  4. Support: Lives alone states she has Daughter, grandkids, HHA, NSG - notes wants to go home  5. Rehab Recommendation: cont with 6L O2, desats with activity, sto;; CG amb and requires mult rests, LE dress Min assist, would still benefit from short term rehab to work on pulmonary rehab and improve to mod independent if pt agreeable, will need home O2 eval  6. DVT Prophylaxis: Lovenox    Discussed with pt and       Yves Proctor MD       This note is created with the assistance of a speech recognition program.  While intending to generate a document that actually reflects the content of the visit, the document can still have some errors including those of syntax and sound a like substitutions which may escape proof reading.   In such instances, actual meaning can be extrapolated by contextual diversion

## 2020-12-01 NOTE — TELEPHONE ENCOUNTER
Rommel Casarez from medical rehab calling about chart notes. Dr Yunior Medina needs to sign them with current date. Writer reprinted notes and placed on Dr Yunior Medina desk. Notes can be faxed to [phone].

## 2020-12-01 NOTE — FLOWSHEET NOTE
SPIRITUAL CARE PROGRESS NOTE     Spiritual Assessment: Patient was approachable and seemed calm. Patient expressed grief about many recent deaths of friends and family members. Patient seemed to be coping well with her illness, and seemed to be in good spirits.  Intervention:  provided ministry of presence and active listening. Patient requested a bible and  brought her one. Patient accepted prayer.  Outcome: Patient was engaged in conversation and seemed to be coping. Patient expressed gratitude for the visit.        12/01/20 1300   Encounter Summary   Services provided to: Patient   Referral/Consult From: [facility]   Continue Visiting   (12/1/2020)   Complexity of Encounter Low   Length of Encounter 30 minutes   Spiritual Assessment Completed Yes   Routine   Type Initial   Assessment Approachable;Calm;Grieving;Coping   Intervention Active listening;Prayer;Sustaining presence/ Ministry of presence;Provided reading materials/devotional materials   Outcome Expressed gratitude;Engaged in conversation;Coping     Electronically signed by My Linder, on 12/1/2020 at 1:23 PM.  3 [facility]  [phone]

## 2020-12-01 NOTE — PROGRESS NOTES
PULMONARY & CRITICAL CARE MEDICINE PROGRESS  NOTE     Patient:  Divya Whitt  MRN: [id number]  Admit date: 11/19/2020    SUBJECTIVE     I personally interviewed/examined the patient, reviewed interval history and interpreted all available radiographic, laboratory data at the time of service. Chief Compliant/Reason for Initial Consult:   Acute on chronic hypoxic respiratory failure secondary to aspiration pneumonia  Underlying severe emphysema. Brief Hospital Course: The patient is a 56 y.o. female with history of morbid obesity, COPD, JOAN, chronic hypoxic respiratory failure on home oxygen was admitted for right lower extremity pain from foot up to his hip. Failed all conservative measures and opted for scheduled lumbar fusion surgery. She underwent posterior fusion L4/5 surgery on 11/19. Postoperative her respiratory status continued to worsen requiring NIV intermittently. Patient also started spiking fever. Chest x-ray and CT PE chest showed bilateral multifocal pneumonia secondary to aspiration likely with underlying severe emphysema. Patient improved with ceftriaxone, intermittent diuretics and NIV support. Interval History:    No conversation dyspnea   Ambulating   On 6 l nc now   Sputum clear   Afebrile   Review of Systems -  General ROS: negative for - chills, fatigue, fever or weight loss  ENT ROS: negative for - headaches, oral lesions or sore throat  Cardiovascular ROS: no chest pain , orthopnea or pnd   Gastrointestinal ROS: no abdominal pain, change in bowel habits, or black or bloody stools  Skin - no rash   Neuro - no blurry vision , no loc .  No focal weakness   msk - no jt tenderness or swelling    Vascular - no claudication , rest completed and negative   Lymphatic - complete and negative   Hematology - oncology - complete and negative   Allergy immunology - complete and negative    no burning or hematuria OBJECTIVE     VITAL SIGNS:   LAST-  BP (!) 149/78   Pulse 86   Temp 97.9 °F (36.6 °C) (Oral)   Resp 19   Ht 5' 5\" (1.651 m)   Wt 186 lb 4.6 oz (84.5 kg)   LMP 2003   SpO2 92%   BMI 31.00 kg/m²   8-24 HR RANGE-  TEMP Temp  Av.1 °F (36.7 °C)  Min: 97.4 °F (36.3 °C)  Max: 99.5 °F (37.6 °C)   BP Systolic (91QHR), CKR:599 , Min:125 , VRD:387      Diastolic (57BGF), LFB:82, Min:71, Max:83     PULSE Pulse  Av.1  Min: 82  Max: 92   RR Resp  Av.5  Min: 14  Max: 19   O2 SAT SpO2  Av %  Min: 90 %  Max: 92 %   OXYGEN DELIVERY O2 Flow Rate (L/min)  Av L/min  Min: 6 L/min  Max: 6 L/min     Systemic Examination:   Head and neck atraumatic, normocephalic    Lymph nodes-no cervical, supraclavicular lymphadenopathy    Neck-no JVP elevation    Lungs - AP diameter of chest increased. Thoracic expansion and diaphragmatic excursion diminished. BS diminished and expiratory phase prolonged. No dullness to percussion or tenderness to palpation. No bb sounds . And b/l rales   CVS- S1, S2 regular. No S3 no S4, no murmurs    Abdomen-nontender, nondistended. Bowel sounds are present. No organomegaly    Lower extremity-no edema    Upper extremity-no edema    Neurological-grossly normal cranial nerves.   No overt motor deficit         DATA REVIEW     Medications:  Scheduled Meds:   predniSONE  20 mg Oral Daily    lactobacillus  1 capsule Oral Daily with breakfast    hydroCHLOROthiazide  25 mg Oral Daily    cefOXitin  2 g Intravenous 4 times per day    budesonide-formoterol  2 puff Inhalation BID    potassium chloride  20 mEq Oral Once    insulin lispro  0-12 Units Subcutaneous TID WC    insulin lispro  0-6 Units Subcutaneous Nightly    sodium chloride flush  10 mL Intravenous 2 times per day    [Held by provider] amLODIPine  10 mg Oral Daily    atorvastatin  40 mg Oral Daily    azelastine  2 spray Each Nostril BID    carvedilol  6.25 mg Oral BID    docusate sodium  100 mg Oral Daily    DULoxetine  60 mg Oral Daily    gabapentin  300 mg Oral BID    [Held by provider] lisinopril-hydroCHLOROthiazide  1 tablet Oral Daily    [Held by provider] metFORMIN  500 mg Oral BID WC    mirtazapine  15 mg Oral Nightly    mirtazapine  30 mg Oral Nightly    omeprazole  20 mg Oral QAM    potassium chloride  20 mEq Oral Daily    tiZANidine  4 mg Oral BID    trospium  20 mg Oral BID    polyethylene glycol  17 g Oral Daily    magnesium hydroxide  30 mL Oral Daily    enoxaparin  40 mg Subcutaneous Daily    cetirizine  10 mg Oral Daily    gabapentin  600 mg Oral Nightly    nicotine  1 patch Transdermal Daily     Continuous Infusions:   dextrose       LABS:-  ABGs:   No results found for: PH, PCO2, PO2, HCO3, O2SAT  No results for input(s): PHART, PO2ART, AVI1XMG, LFU0IAW, BEART, R3HBYYZD in the last 72 hours. Lab Results   Component Value Date    POCPH 7.381 11/26/2020    POCPCO2 54.0 (H) 11/26/2020    POCPO2 51.2 (L) 11/26/2020    POCHCO3 32.1 (H) 11/26/2020    UTDY8JYP 84 (L) 11/26/2020     CBC:   Recent Labs     11/29/20  0717 11/30/20  0624 12/01/20  0642   WBC 20.3* 20.9* 20.9*   HGB 9.7* 9.7* 9.7*   HCT 32.3* 31.8* 32.0*   MCV 92.0 90.6 91.2   * 513* 543*   LYMPHOPCT 17* 15* 15*   RBC 3.51* 3.51* 3.51*   MCH 27.6 27.6 27.6   MCHC 30.0 30.5 30.3   RDW 14.2 14.2 14.4     BMP:   Recent Labs     11/29/20  0717 11/30/20  0624 12/01/20  0642    138 135   K 4.1 3.8 4.1    100 100   CO2 26 26 25   BUN 12 15 14   CREATININE 0.62 0.65 0.63   GLUCOSE 71 88 105*   PHOS 3.6 4.7* 3.8     Liver Function Test:   Recent Labs     12/01/20  0642   LABALBU 2.8*     Amylase/Lipase:  No results for input(s): AMYLASE, LIPASE in the last 72 hours. Coagulation Profile:   No results for input(s): INR, PROTIME, APTT in the last 72 hours. Cardiac Enzymes:  No results for input(s): CKTOTAL, CKMB, CKMBINDEX, TROPONINI in the last 72 hours.   Lactic Acid:  No results found for: LACTA  BNP:   No results found for: BNP  D-Dimer:  No results found for: DDIMER  Others:   Lab Results   Component Value Date    TSH 0.94 07/19/2017     No results found for: GRACE, RHEUMFACTOR, SEDRATE, CRP  No results found for: Adah Ka  No results found for: IRON, TIBC, FERRITIN  No results found for: SPEP, UPEP  No results found for: PSA, CEA, , [id number],     Input/Output:    Intake/Output Summary (Last 24 hours) at 12/1/2020 1724  Last data filed at 12/1/2020 0434  Gross per 24 hour   Intake 250 ml   Output 1600 ml   Net -1350 ml       Xr Chest (single View Frontal)    Result Date: 11/24/2020  Bilateral lower lobe predominant airspace disease similar to prior study with slight improvement suspected on the left. Xr Chest Portable    Result Date: 11/26/2020  1. Minimally improved aeration, right lung base, suggesting resolving pneumonia/atelectasis 2. Persistent left lower lobe airspace disease, minimally improved     Fl Modified Barium Swallow W Video    Result Date: 11/23/2020  No evidence of significant penetration or aspiration. Please see separate speech pathology report for full discussion of findings and recommendations. ASSESSMENT AND PLAN     Assessment:    //Acute on chronic hypoxic respiratory failure  secondary to aspiration pneumonia complicated by underlying severe emphysema and severe copd   Principal Problem:    Acute on chronic respiratory failure with hypoxia (HCC)  Active Problems:    COPD, severity to be determined (HCC)    HTN (hypertension)    History of tobacco use    GERD (gastroesophageal reflux disease)    Chronic respiratory failure with hypoxia (HCC)    Other spondylosis with radiculopathy, lumbar region    Lumbar disc disease    Alveolar hypoventilation    Obesity (BMI 30-39. 9)    Bronchiectasis ([address]    Centrilobular emphysema ([address]    Oxygen dependent    Acute postoperative anemia due to expected blood loss    Multifocal pneumonia  Resolved Problems:    * No resolved hospital problems. *     Plan:       Off high flow o2   On nc 6 l   Continue to wean oxygen - she is on chronic 2 l at home .   Can dc once oxygen requirement is 4 l via nc   Complete antibiotics   Continue bronchodilator   On dc restart advair , spiriva and albuterol nebulized     Electronically signed by Tracy Madden MD on 12/1/2020 at 5:25 PM

## 2020-12-01 NOTE — CARE COORDINATION
Ambulatory Care Coordination Note  12/1/2020  CM Risk Score: 6  Charlson 10 Year Mortality Risk Score: 47%     ACC: Khurram Jara RN    Summary Note:patient has been transferred to SNF after spending 10 days in ICU for ARF. Will close episode for now. Care Coordination Interventions    Program Enrollment:  Complex Care  Referral from Primary Care Provider:  No  Suggested Interventions and Community Resources  Behavorial Health: In Process  Fall Risk Prevention:  Completed  Home Health Services: In Process  Occupational Therapy: In Process  Physical Therapy: In Process  Smoking Cessation: In Process  Other Therapy Services:  Declined  Zone Management Tools: In Process (Comment: COPD)  Other Services or Interventions:  Walk in clinic informaton         Goals Addressed    None         Prior to Admission medications    Medication Sig Start Date End Date Taking? Authorizing Provider   carvedilol (COREG) 6.25 MG tablet TAKE 1 TABLET BY MOUTH 2 TIMES DAILY 11/18/20   Shannen Ordonez MD   cetirizine (ZYRTEC) 10 MG tablet TAKE 1 TABLET BY MOUTH DAILY 11/18/20   Shannen Ordonez MD   meloxicam (MOBIC) 15 MG tablet Take 15 mg by mouth daily    Historical Provider, MD   trospium (SANCTURA) 20 MG tablet TAKE 1 TABLET BY MOUTH 2 TIMES DAILY 10/27/20   Shannen Ordonez MD   tiZANidine (ZANAFLEX) 4 MG tablet TAKE 1 TABLET TWICE DAILY 10/27/20   Shannen rOdonez MD   blood glucose test strips (EXACTECH TEST) strip 1 each by In Vitro route daily As needed.  10/14/20   Shannen Ordonez MD   gabapentin (NEURONTIN) 300 MG capsule TAKE 1 CAPSULE IN MORNING AND AFTERNOON AND 2 CAPSULES AT NIGHT 10/12/20 11/19/20  Shannen Ordonez MD   atorvastatin (LIPITOR) 40 MG tablet Take 1 tablet by mouth daily 9/22/20   Shannen Ordonez MD   metFORMIN (GLUCOPHAGE) 500 MG tablet Take 1 tablet by mouth 2 times daily (with meals) 9/22/20   Shannen Ordonez MD   lisinopril-hydroCHLOROthiazide (PRINZIDE;ZESTORETIC) 20-25 MG per tablet Claudy Schaeffer   12/2/2020  2:00 PM ELVIRA Colón - CNP [facility]   1/13/2021  9:50 AM Nikita Landeros DO [facility]

## 2020-12-01 NOTE — PROGRESS NOTES
sweats  Respiratory:  negative for cough, dyspnea on exertion, shortness of breath, wheezing  Cardiovascular:  negative for chest pain, chest pressure/discomfort, lower extremity edema, palpitations  Gastrointestinal:  negative for abdominal pain, constipation, diarrhea, nausea, vomiting  Neurological:  negative for dizziness, headache    Medications: Allergies:     Allergies   Allergen Reactions    Aspirin      DUE TO ULCER    Claritin [Loratadine] Other (See Comments)     coughing    Flonase [Fluticasone Propionate]     Morphine And Related      GI Upset       Current Meds:   Scheduled Meds:    predniSONE  20 mg Oral Daily    lactobacillus  1 capsule Oral Daily with breakfast    hydroCHLOROthiazide  25 mg Oral Daily    cefOXitin  2 g Intravenous 4 times per day    budesonide-formoterol  2 puff Inhalation BID    potassium chloride  20 mEq Oral Once    insulin lispro  0-12 Units Subcutaneous TID WC    insulin lispro  0-6 Units Subcutaneous Nightly    sodium chloride flush  10 mL Intravenous 2 times per day    [Held by provider] amLODIPine  10 mg Oral Daily    atorvastatin  40 mg Oral Daily    azelastine  2 spray Each Nostril BID    carvedilol  6.25 mg Oral BID    docusate sodium  100 mg Oral Daily    DULoxetine  60 mg Oral Daily    gabapentin  300 mg Oral BID    [Held by provider] lisinopril-hydroCHLOROthiazide  1 tablet Oral Daily    [Held by provider] metFORMIN  500 mg Oral BID WC    mirtazapine  15 mg Oral Nightly    mirtazapine  30 mg Oral Nightly    omeprazole  20 mg Oral QAM    potassium chloride  20 mEq Oral Daily    tiZANidine  4 mg Oral BID    trospium  20 mg Oral BID    polyethylene glycol  17 g Oral Daily    magnesium hydroxide  30 mL Oral Daily    enoxaparin  40 mg Subcutaneous Daily    cetirizine  10 mg Oral Daily    gabapentin  600 mg Oral Nightly    nicotine  1 patch Transdermal Daily     Continuous Infusions:    dextrose       PRN Meds: albuterol, magic (miracle) mouthwash, acetaminophen, benzonatate, diphenhydrAMINE, sodium chloride flush, oxyCODONE **OR** oxyCODONE, morphine **OR** [DISCONTINUED] morphine, senna, glucose, dextrose, glucagon (rDNA), dextrose    Data:     Past Medical History:   has a past medical history of Allergic rhinitis, Alveolar hypoventilation, Aortic insufficiency, Bronchiectasis (HCC), Bronchitis, Chronic back pain, COPD (chronic obstructive pulmonary disease) ([facility] Depression, DM (diabetes mellitus) ([facility] GERD (gastroesophageal reflux disease), History of echocardiogram, Hyperlipidemia, Hypertension, Obesity, Osteoarthritis, Peripheral vascular disease ([facility] [address] [facility] Primary osteoarthritis of both knees, Radicular pain of lumbosacral region, Spinal stenosis, lumbar region, without neurogenic claudication, Tricuspid regurgitation, Type II or unspecified type diabetes mellitus without mention of complication, not stated as uncontrolled, Unspecified sleep apnea, URI (upper respiratory infection), Wears dentures, Wears glasses, and Wellness examination. Social History:   reports that she quit smoking about 4 weeks ago. Her smoking use included cigarettes. She has a 9.00 pack-year smoking history. She has never used smokeless tobacco. She reports that she does not drink alcohol or use drugs. Family History:   Family History   Problem Relation Age of Onset    Diabetes Mother     Heart Disease Mother     Cirrhosis Father        Vitals:  /71   Pulse 82   Temp 99 °F (37.2 °C) (Axillary)   Resp 14   Ht 5' 5\" (1.651 m)   Wt 186 lb 4.6 oz (84.5 kg)   LMP 2003   SpO2 90%   BMI 31.00 kg/m²   Temp (24hrs), Av.1 °F (36.7 °C), Min:97.4 °F (36.3 °C), Max:99.5 °F (37.5 °C)    Recent Labs     20  1637 20  2043 20  0706 20  1211   POCGLU 210* 285* 96 153*       I/O (24Hr):     Intake/Output Summary (Last 24 hours) at 2020 1417  Last data filed at 2020 0434  Gross per 24 hour   Intake 722 ml   Output 1600 ml Net -878 ml       Labs:  Hematology:  Recent Labs     11/29/20  0717 11/30/20  0624 12/01/20  0642   WBC 20.3* 20.9* 20.9*   RBC 3.51* 3.51* 3.51*   HGB 9.7* 9.7* 9.7*   HCT 32.3* 31.8* 32.0*   MCV 92.0 90.6 91.2   MCH 27.6 27.6 27.6   MCHC 30.0 30.5 30.3   RDW 14.2 14.2 14.4   * 513* 543*   MPV 9.4 9.5 9.3     Chemistry:  Recent Labs     11/29/20  0717 11/30/20  0624 12/01/20  0642    138 135   K 4.1 3.8 4.1    100 100   CO2 26 26 25   GLUCOSE 71 88 105*   BUN 12 15 14   CREATININE 0.62 0.65 0.63   MG 2.3 2.2 2.3   ANIONGAP 10 12 10   LABGLOM >60 >60 >60   GFRAA >60 >60 >60   CALCIUM 8.6 8.6 8.5*   PHOS 3.6 4.7* 3.8     Recent Labs     11/29/20  0717  11/30/20  0624 11/30/20  0645 11/30/20  1223 11/30/20  1637 11/30/20  2043 12/01/20  0642 12/01/20  0706 12/01/20  1211   LABALBU 2.5*  --  2.6*  --   --   --   --  2.8*  --   --    POCGLU  --    < >  --  104 151* 210* 285*  --  96 153*    < > = values in this interval not displayed. ABG:  Lab Results   Component Value Date    POCPH 7.381 11/26/2020    POCPCO2 54.0 11/26/2020    POCPO2 51.2 11/26/2020    POCHCO3 32.1 11/26/2020    NBEA NOT REPORTED 11/26/2020    PBEA 6 11/26/2020    OIA2TSV 34 11/26/2020    JXGV4OLH 84 11/26/2020    FIO2 100.0 11/26/2020     Lab Results   Component Value Date/Time    SPECIAL RT AC 5ML 11/22/2020 08:33 PM     Lab Results   Component Value Date/Time    CULTURE NO GROWTH 6 DAYS 11/22/2020 08:33 PM       Radiology:  Xr Chest (single View Frontal)    Result Date: 11/24/2020  Bilateral lower lobe predominant airspace disease similar to prior study with slight improvement suspected on the left. Xr Chest Portable    Result Date: 11/26/2020  1. Minimally improved aeration, right lung base, suggesting resolving pneumonia/atelectasis 2.  Persistent left lower lobe airspace disease, minimally improved       Physical Examination:        General appearance:  alert, cooperative and no distress  Mental Status:  oriented to person, place and time and normal affect  Lungs: Bilateral air entry, occasional crepts  heart:  regular rate and rhythm, no murmur  Abdomen:  soft, nontender, nondistended, normal bowel sounds, no masses, hepatomegaly, splenomegaly  Extremities:  no edema, redness, tenderness in the calves  Skin:  no gross lesions, rashes, induration    Assessment:        Hospital Problems           Last Modified POA    * (Principal) Acute on chronic respiratory failure with hypoxia ([address] 11/28/2020 Yes    COPD, severity to be determined ([address] 11/25/2020 Yes    HTN (hypertension) 11/20/2020 Yes    History of tobacco use 11/20/2020 Yes    GERD (gastroesophageal reflux disease) 11/20/2020 Yes    Chronic respiratory failure with hypoxia ([address] 11/20/2020 Yes    Other spondylosis with radiculopathy, lumbar region 11/28/2020 Yes    Lumbar disc disease 11/19/2020 Yes    Alveolar hypoventilation 11/20/2020 Yes    Obesity (BMI 30-39.9) 11/20/2020 Yes    Bronchiectasis ([address] 11/20/2020 Yes    Centrilobular emphysema ([address] 11/28/2020 Yes    Oxygen dependent 11/20/2020 Yes    Acute postoperative anemia due to expected blood loss 11/20/2020 Yes    Multifocal pneumonia 11/23/2020 Yes          Plan:        Continue cefoxitin as per infectious disease,  For diarrhea continue her on probiotics, diarrhea has improved with that  Weaned down to baseline oxygen demand   On steroids with prednisone, continue DuoNeb, Symbicort, decrease the dose of prednisone  Lisinopril on hold because of borderline blood pressure, continue HCTZ. Continue incentive spirometry  Status post lumbar fusion, neurosurgery following  Hold metformin and continue insulin sliding scale    is working on discharge planning to [facility].   Can be discharged to [facility] when placement has been approved    No changes at this time   Will sign off, please re consult if needed       Gianna Prajapati MD  12/1/2020  2:17 PM

## 2020-12-01 NOTE — CARE COORDINATION
Transitional Planning  Patient now down to 6L nasal cannula, desats with exertion. Spoke to [place] at [place], states they have submitted for pre-cert. Plan to update insurance on chance in oxygen status and should have answer today. Will call with update when received. Anticipate discharge home with home care, likely need home oxygen evaluation versus [facility] rehab. Left  for [place] at [address] [place] [address], requested return call. 1100 - Return call from [facility], she will PS Dr Ion Christiansen, PM&R, and advise patient off HFNC now and close to discharge. Anticipate could accept today if deemed appropriate.

## 2020-12-01 NOTE — PLAN OF CARE
[facility] Lumbar disc disease [M51.9] . Vitals:    12/01/20 0705   BP: 138/83   Pulse: 85   Resp: 23   Temp: 99.5 °F (37.5 °C)   SpO2: 90%   . Patients home meds are   Prior to Admission medications    Medication Sig Start Date End Date Taking? Authorizing Provider   carvedilol (COREG) 6.25 MG tablet TAKE 1 TABLET BY MOUTH 2 TIMES DAILY 11/18/20  Yes Haritha Ray MD   cetirizine (ZYRTEC) 10 MG tablet TAKE 1 TABLET BY MOUTH DAILY 11/18/20  Yes Haritha Ray MD   meloxicam (MOBIC) 15 MG tablet Take 15 mg by mouth daily   Yes Historical Provider, MD   trospium (SANCTURA) 20 MG tablet TAKE 1 TABLET BY MOUTH 2 TIMES DAILY 10/27/20  Yes Haritha Ray MD   tiZANidine (ZANAFLEX) 4 MG tablet TAKE 1 TABLET TWICE DAILY 10/27/20  Yes Haritha Ray MD   blood glucose test strips (EXACTECH TEST) strip 1 each by In Vitro route daily As needed.  10/14/20  Yes Haritha Ray MD   gabapentin (NEURONTIN) 300 MG capsule TAKE 1 CAPSULE IN MORNING AND AFTERNOON AND 2 CAPSULES AT NIGHT 10/12/20 11/19/20 Yes Haritha Ray MD   atorvastatin (LIPITOR) 40 MG tablet Take 1 tablet by mouth daily 9/22/20  Yes Haritha Ray MD   metFORMIN (GLUCOPHAGE) 500 MG tablet Take 1 tablet by mouth 2 times daily (with meals) 9/22/20  Yes Haritha Ray MD   lisinopril-hydroCHLOROthiazide (PRINZIDE;ZESTORETIC) 20-25 MG per tablet TAKE 1 TABLET EVERY DAY 9/22/20  Yes Haritha Ray MD    MG capsule TAKE 1 CAPSULE EVERY DAY 7/7/20  Yes Haritah Ray MD   meloxicam [facility]) 15 MG tablet Take 1 tablet by mouth daily 5/6/20 10/29/21 Yes Rafe Aase Vriezelaar, APRN - CNP   amLODIPine (NORVASC) 10 MG tablet Take 1 tablet by mouth daily 4/2/20  Yes Haritha Ray MD   albuterol sulfate HFA (VENTOLIN HFA) 108 (90 Base) MCG/ACT inhaler Inhale 2 puffs into the lungs every 6 hours as needed for Wheezing 4/2/20  Yes Haritha Ray MD   omeprazole (PRILOSEC) 20 MG delayed release capsule TAKE 1 CAPSULE EVERY DAY 1/10/20  Yes Viktor Sen MD   diphenhydrAMINE (BENADRYL) 25 MG tablet Take 25 mg by mouth every 6 hours as needed for Itching   Yes Historical Provider, MD   nicotine (NICODERM CQ) 21 MG/24HR Place 1 patch onto the skin every 24 hours   Yes Historical Provider, MD   potassium chloride (KLOR-CON M) 10 MEQ extended release tablet Take 2 tablets by mouth daily 12/10/19  Yes Viktor Sen MD   Aleah Has 18 MCG inhalation capsule  10/16/19  Yes Historical Provider, MD   acetaminophen (TYLENOL) 500 MG tablet Take 1 tablet by mouth 4 times daily as needed for Pain 9/12/19  Yes Edilma Hernandez, APRN - CNP   mirtazapine (REMERON) 30 MG tablet Take 30 mg by mouth nightly 5/22/18  Yes Historical Provider, MD   mirtazapine (REMERON) 15 MG tablet Take 15 mg by mouth nightly 5/22/18  Yes Historical Provider, MD   DULoxetine (CYMBALTA) 60 MG extended release capsule Take 1 capsule by mouth daily 2/1/18  Yes Viktor Sen MD   Lancets MISC 1 each by Does not apply route daily 1/5/18  Yes Viktor Sen MD   azelastine (ASTELIN) 0.1 % nasal spray 2 sprays by Nasal route 2 times daily Use in each nostril as directed 6/15/16  Yes Viktor Sen MD   ipratropium-albuterol (DUONEB) 0.5-2.5 (3) MG/3ML SOLN nebulizer solution Inhale 3 mLs into the lungs every 6 hours as needed for Shortness of Breath 8/4/15   Viktor Sen MD   .  Recent Surgical History: None = 0     Assessment     Peak Flow (asthma only)    Predicted: 344  Personal Best: NA  PEF   % Predicted   Peak Flow :     FEV1/FVC    FEV1 Predicted 2.54      FEV1 NA    FEV1 % Predicted   FVC   IS volume   IBW 57 kg    RR 16  Breath Sounds: clear      Bronchodilator assessment at level  1  Hyperinflation assessment at level   Secretion Management assessment at level      [x]    Bronchodilator Assessment  BRONCHODILATOR ASSESSMENT SCORE  Score 0 1 2 3 4 5   Breath Sounds   []  Patient Baseline [x]  No Wheeze good aeration []  Faint, scattered wheezing, good aeration []  Expiratory Wheezing and or moderately diminished []  Insp/Exp wheeze and/or very diminished []  Insp/Exp and/ or marked distress   Respiratory Rate   []  Patient Baseline [x]  Less than 20 [x]  Less than 20 []  20-25 []  Greater than 25 []  Greater than 25   Peak flow % of Pred or PB []  NA   []  Greater than 90%  []  81-90% []  71-80% []  Less than or equal to 70%  or unable to perform []  Unable due to Respiratory Distress   Dyspnea re []  Patient Baseline [x]  No SOB [x]  No SOB []  SOB on exertion []  SOB min activity []  At rest/acute   e FEV% Predicted       []  NA []  Above 69%  []  Unable []  Above 60-69%  []  Unable []  Above 50-59%  []  Unable []  Above 35-49%  []  Unable []  Less than 35%  []  Unable                 []  Hyperinflation Assessment  Score 1 2 3   CXR and Breath Sounds   []  Clear []  No atelectasis  Basilar aeration []  Atelectasis or absent basilar breath sounds   Incentive Spirometry Volume  (Per IBW)   []  Greater than or equal to 15ml/Kg []  less than 15ml/Kg []  less than 15ml/Kg   Surgery within last 2 weeks []  None or general   []  Abdominal or thoracic surgery  []  Abdominal or thoracic   Chronic Pulmonary Historyre []  No []  Yes []  Yes     []  Secretion Management Assessment  Score 1 2 3   Bilateral Breath Sounds   []  Occasional Rhonchi []  Scattered Rhonchi []  Course Rhonchi and/or poor aeration   Sputum    []  Small amount of thin secretions []  Moderate amount of viscous secretions []  Copius, Viscious Yellow/ Secretions   CXR as reported by physician []  clear  []  Unavailable []  Infiltrates and/or consolidation  []  Unavailable []  Mucus Plugging and or lobar consolidation  []  Unavailable   Cough []  Strong, productive cough []  Weak productive cough []  No cough or weak non-productive cough   ASUNCION ROJAS  8:38 AM                            FEMALE                                  MALE FEV1 Predicted Normal Values                        FEV1 Predicted Normal Values          Age                                     Height in Feet and Inches       Age                                     Height in Feet and Inches       4' 11\" 5' 1\" 5' 3\" 5' 5\" 5' 7\" 5' 9\" 5' 11\" 6' 1\"  4' 11\" 5' 1\" 5' 3\" 5' 5\" 5' 7\" 5' 9\" 5' 11\" 6' 1\"   43 - 45 2.49 2.66 2.84 3.03 3.22 3.42 3.62 3.83 42 - 45 2.82 3.03 3.26 3.49 3.72 3.96 4.22 4.47   46 - 49 2.40 2.57 2.76 2.94 3.14 3.33 3.54 3.75 46 - 49 2.70 2.92 3.14 3.37 3.61 3.85 4.10 4.36   50 - 53 2.31 2.48 2.66 2.85 3.04 3.24 3.45 3.66 50 - 53 2.58 2.80 3.02 3.25 3.49 3.73 3.98 4.24   54 - 57 2.21 2.38 2.57 2.75 2.95 3.14 3.35 3.56 54 - 57 2.46 2.67 2.89 3.12 3.36 3.60 3.85 4.11   58 - 61 2.10 2.28 2.46 2.65 2.84 3.04 3.24 3.45 58 - 61 2.32 2.54 2.76 2.99 3.23 3.47 3.72 3.98   62 - 65 1.99 2.17 2.35 2.54 2.73 2.93 3.13 3.34 62 - 65 2.19 2.40 2.62 2.85 3.09 3.33 3.58 3.84   66 - 69 1.88 2.05 2.23 2.42 2.61 2.81 3.02 3.23 66 - 69 2.04 2.26 2.48 2.71 2.95 3.19 3.44 3.70   70+ 1.82 1.99 2.17 2.36 2.55 2.75 2.95 3.16 70+ 1.97 2.19 2.41 2.64 2.87 3.12 3.37 3.62             Predicted Peak Expiratory Flow Rate                                       Height (in)  Female       Height (in) Male           Age 51 59 06 62 18 30 78 74 Age            20 362 554 705 195 511 738 717 167  41 79 80 57 80 92 93 28 26   25 337 352 366 381 396 411 426 441 25 447 476 505 533 [phone] 677   30 329 344 359 374 389 404 419 434 30 437 466 494 523 552 580 609 638 667   35 322 337 351 366 381 396 411 426 35 426 455 484 512 541 570 598 627 657   40 314 329 344 359 374 389 404 419 40 416 445 473 502 531 559 588 617 647   45 307 322 336 351 366 381 396 411 45 405 434 463 491 520 549 577 606 636   50 299 314 329 344 359 374 389 404 50 395 424 452 481 510 538 567 596 625   55 292 307 321 336 351 366 381 396 55 384 413 442 470 499 528 556 585 615   60 284 299 314 329 344 359 374 389 60 374 403 431 460 489 517 546 575 605   65 277 292 306 321 336 351 366 381 65 381 405 025 811 320 798 164 156 980   00 350 901 341 574 976 556 171 917 72 600 232 396 098 450 823 834 200 583   75 261 274 289 305 319 334 348 364 75 344 372 400 429 458 487 515 544 573   80 253 266 282 296 312 327 342 356 80 335 362 390 419 448 476 [phone]

## 2020-12-02 VITALS
SYSTOLIC BLOOD PRESSURE: 124 MMHG | HEART RATE: 94 BPM | DIASTOLIC BLOOD PRESSURE: 72 MMHG | RESPIRATION RATE: 25 BRPM | OXYGEN SATURATION: 89 % | WEIGHT: 184.3 LBS | BODY MASS INDEX: 30.71 KG/M2 | HEIGHT: 65 IN | TEMPERATURE: 98.2 F

## 2020-12-02 LAB
ABSOLUTE EOS #: 0.29 K/UL (ref 0–0.44)
ABSOLUTE IMMATURE GRANULOCYTE: 0.26 K/UL (ref 0–0.3)
ABSOLUTE LYMPH #: 3.75 K/UL (ref 1.1–3.7)
ABSOLUTE MONO #: 1.08 K/UL (ref 0.1–1.2)
ALBUMIN SERPL-MCNC: 3.1 G/DL (ref 3.5–5.2)
ANION GAP SERPL CALCULATED.3IONS-SCNC: 11 MMOL/L (ref 9–17)
BASOPHILS # BLD: 0 % (ref 0–2)
BASOPHILS ABSOLUTE: 0.09 K/UL (ref 0–0.2)
BUN BLDV-MCNC: 15 MG/DL (ref 8–23)
BUN/CREAT BLD: ABNORMAL (ref 9–20)
CALCIUM SERPL-MCNC: 8.8 MG/DL (ref 8.6–10.4)
CHLORIDE BLD-SCNC: 99 MMOL/L (ref 98–107)
CO2: 28 MMOL/L (ref 20–31)
CREAT SERPL-MCNC: 0.68 MG/DL (ref 0.5–0.9)
DIFFERENTIAL TYPE: ABNORMAL
EOSINOPHILS RELATIVE PERCENT: 1 % (ref 1–4)
GFR AFRICAN AMERICAN: >60 ML/MIN
GFR NON-AFRICAN AMERICAN: >60 ML/MIN
GFR SERPL CREATININE-BSD FRML MDRD: ABNORMAL ML/MIN/{1.73_M2}
GFR SERPL CREATININE-BSD FRML MDRD: ABNORMAL ML/MIN/{1.73_M2}
GLUCOSE BLD-MCNC: 128 MG/DL (ref 65–105)
GLUCOSE BLD-MCNC: 172 MG/DL (ref 65–105)
GLUCOSE BLD-MCNC: 83 MG/DL (ref 65–105)
GLUCOSE BLD-MCNC: 92 MG/DL (ref 70–99)
HCT VFR BLD CALC: 33.6 % (ref 36.3–47.1)
HEMOGLOBIN: 10.2 G/DL (ref 11.9–15.1)
IMMATURE GRANULOCYTES: 1 %
LYMPHOCYTES # BLD: 19 % (ref 24–43)
MAGNESIUM: 2.4 MG/DL (ref 1.6–2.6)
MCH RBC QN AUTO: 27.4 PG (ref 25.2–33.5)
MCHC RBC AUTO-ENTMCNC: 30.4 G/DL (ref 28.4–34.8)
MCV RBC AUTO: 90.3 FL (ref 82.6–102.9)
MONOCYTES # BLD: 5 % (ref 3–12)
NRBC AUTOMATED: 0.1 PER 100 WBC
PDW BLD-RTO: 14.7 % (ref 11.8–14.4)
PHOSPHORUS: 3.6 MG/DL (ref 2.6–4.5)
PLATELET # BLD: 591 K/UL (ref 138–453)
PLATELET ESTIMATE: ABNORMAL
PMV BLD AUTO: 9.2 FL (ref 8.1–13.5)
POTASSIUM SERPL-SCNC: 4.6 MMOL/L (ref 3.7–5.3)
RBC # BLD: 3.72 M/UL (ref 3.95–5.11)
RBC # BLD: ABNORMAL 10*6/UL
SEG NEUTROPHILS: 74 % (ref 36–65)
SEGMENTED NEUTROPHILS ABSOLUTE COUNT: 14.54 K/UL (ref 1.5–8.1)
SODIUM BLD-SCNC: 138 MMOL/L (ref 135–144)
WBC # BLD: 20 K/UL (ref 3.5–11.3)
WBC # BLD: ABNORMAL 10*3/UL

## 2020-12-02 PROCEDURE — 6370000000 HC RX 637 (ALT 250 FOR IP): Performed by: STUDENT IN AN ORGANIZED HEALTH CARE EDUCATION/TRAINING PROGRAM

## 2020-12-02 PROCEDURE — 94640 AIRWAY INHALATION TREATMENT: CPT

## 2020-12-02 PROCEDURE — 6360000002 HC RX W HCPCS: Performed by: STUDENT IN AN ORGANIZED HEALTH CARE EDUCATION/TRAINING PROGRAM

## 2020-12-02 PROCEDURE — 83735 ASSAY OF MAGNESIUM: CPT

## 2020-12-02 PROCEDURE — APPSS15 APP SPLIT SHARED TIME 0-15 MINUTES: Performed by: REGISTERED NURSE

## 2020-12-02 PROCEDURE — 94618 PULMONARY STRESS TESTING: CPT

## 2020-12-02 PROCEDURE — 2700000000 HC OXYGEN THERAPY PER DAY

## 2020-12-02 PROCEDURE — 99232 SBSQ HOSP IP/OBS MODERATE 35: CPT | Performed by: INTERNAL MEDICINE

## 2020-12-02 PROCEDURE — 2580000003 HC RX 258: Performed by: STUDENT IN AN ORGANIZED HEALTH CARE EDUCATION/TRAINING PROGRAM

## 2020-12-02 PROCEDURE — 6370000000 HC RX 637 (ALT 250 FOR IP): Performed by: INTERNAL MEDICINE

## 2020-12-02 PROCEDURE — 6370000000 HC RX 637 (ALT 250 FOR IP): Performed by: NURSE PRACTITIONER

## 2020-12-02 PROCEDURE — 99024 POSTOP FOLLOW-UP VISIT: CPT | Performed by: NEUROLOGICAL SURGERY

## 2020-12-02 PROCEDURE — 85025 COMPLETE CBC W/AUTO DIFF WBC: CPT

## 2020-12-02 PROCEDURE — 94761 N-INVAS EAR/PLS OXIMETRY MLT: CPT

## 2020-12-02 PROCEDURE — 36415 COLL VENOUS BLD VENIPUNCTURE: CPT

## 2020-12-02 PROCEDURE — 80069 RENAL FUNCTION PANEL: CPT

## 2020-12-02 PROCEDURE — 97535 SELF CARE MNGMENT TRAINING: CPT

## 2020-12-02 PROCEDURE — 2580000003 HC RX 258: Performed by: INTERNAL MEDICINE

## 2020-12-02 PROCEDURE — 6360000002 HC RX W HCPCS: Performed by: NURSE PRACTITIONER

## 2020-12-02 PROCEDURE — 97116 GAIT TRAINING THERAPY: CPT

## 2020-12-02 PROCEDURE — 82947 ASSAY GLUCOSE BLOOD QUANT: CPT

## 2020-12-02 RX ORDER — TIZANIDINE 4 MG/1
4 TABLET ORAL 2 TIMES DAILY
Qty: 20 TABLET | Refills: 0 | Status: SHIPPED | OUTPATIENT
Start: 2020-12-02 | End: 2020-12-12

## 2020-12-02 RX ORDER — AMOXICILLIN AND CLAVULANATE POTASSIUM 875; 125 MG/1; MG/1
1 TABLET, FILM COATED ORAL 2 TIMES DAILY
Qty: 14 TABLET | Refills: 0 | Status: SHIPPED | OUTPATIENT
Start: 2020-12-02 | End: 2020-12-09

## 2020-12-02 RX ORDER — LACTOBACILLUS RHAMNOSUS GG 10B CELL
1 CAPSULE ORAL
Qty: 30 CAPSULE | Refills: 0 | Status: SHIPPED | OUTPATIENT
Start: 2020-12-03 | End: 2021-10-26

## 2020-12-02 RX ORDER — HYDROCHLOROTHIAZIDE 25 MG/1
25 TABLET ORAL DAILY
Qty: 30 TABLET | Refills: 3 | Status: SHIPPED | OUTPATIENT
Start: 2020-12-03 | End: 2020-12-22 | Stop reason: ALTCHOICE

## 2020-12-02 RX ORDER — PREDNISONE 10 MG/1
TABLET ORAL
Qty: 9 TABLET | Refills: 0 | Status: SHIPPED | OUTPATIENT
Start: 2020-12-02 | End: 2020-12-11 | Stop reason: ALTCHOICE

## 2020-12-02 RX ORDER — OXYCODONE HYDROCHLORIDE 5 MG/1
5 TABLET ORAL EVERY 6 HOURS PRN
Qty: 28 TABLET | Refills: 0 | Status: SHIPPED | OUTPATIENT
Start: 2020-12-02 | End: 2020-12-09

## 2020-12-02 RX ADMIN — GABAPENTIN 300 MG: 300 CAPSULE ORAL at 08:19

## 2020-12-02 RX ADMIN — GABAPENTIN 300 MG: 300 CAPSULE ORAL at 14:26

## 2020-12-02 RX ADMIN — BUDESONIDE AND FORMOTEROL FUMARATE DIHYDRATE 2 PUFF: 160; 4.5 AEROSOL RESPIRATORY (INHALATION) at 08:45

## 2020-12-02 RX ADMIN — SODIUM CHLORIDE, PRESERVATIVE FREE 10 ML: 5 INJECTION INTRAVENOUS at 08:20

## 2020-12-02 RX ADMIN — DESMOPRESSIN ACETATE 40 MG: 0.2 TABLET ORAL at 08:20

## 2020-12-02 RX ADMIN — TIZANIDINE 4 MG: 4 TABLET ORAL at 08:19

## 2020-12-02 RX ADMIN — CETIRIZINE HYDROCHLORIDE 10 MG: 10 TABLET ORAL at 08:20

## 2020-12-02 RX ADMIN — DOCUSATE SODIUM 100 MG: 100 CAPSULE, LIQUID FILLED ORAL at 08:20

## 2020-12-02 RX ADMIN — DULOXETINE HYDROCHLORIDE 60 MG: 30 CAPSULE, DELAYED RELEASE ORAL at 08:19

## 2020-12-02 RX ADMIN — OXYCODONE HYDROCHLORIDE 10 MG: 5 TABLET ORAL at 04:49

## 2020-12-02 RX ADMIN — PREDNISONE 20 MG: 20 TABLET ORAL at 08:24

## 2020-12-02 RX ADMIN — CEFOXITIN SODIUM 2 G: 2 POWDER, FOR SOLUTION INTRAVENOUS at 12:25

## 2020-12-02 RX ADMIN — ENOXAPARIN SODIUM 40 MG: 40 INJECTION SUBCUTANEOUS at 08:20

## 2020-12-02 RX ADMIN — CARVEDILOL 6.25 MG: 6.25 TABLET, FILM COATED ORAL at 08:20

## 2020-12-02 RX ADMIN — HYDROCHLOROTHIAZIDE 25 MG: 25 TABLET ORAL at 08:19

## 2020-12-02 RX ADMIN — CEFOXITIN SODIUM 2 G: 2 POWDER, FOR SOLUTION INTRAVENOUS at 05:45

## 2020-12-02 RX ADMIN — Medication 1 CAPSULE: at 08:19

## 2020-12-02 RX ADMIN — OXYCODONE HYDROCHLORIDE 10 MG: 5 TABLET ORAL at 00:46

## 2020-12-02 RX ADMIN — OMEPRAZOLE 20 MG: 20 CAPSULE, DELAYED RELEASE ORAL at 08:19

## 2020-12-02 RX ADMIN — POTASSIUM CHLORIDE 20 MEQ: 1500 TABLET, EXTENDED RELEASE ORAL at 08:19

## 2020-12-02 RX ADMIN — TROSPIUM CHLORIDE 20 MG: 20 TABLET, FILM COATED ORAL at 08:19

## 2020-12-02 RX ADMIN — INSULIN LISPRO 2 UNITS: 100 INJECTION, SOLUTION INTRAVENOUS; SUBCUTANEOUS at 16:32

## 2020-12-02 ASSESSMENT — PAIN SCALES - GENERAL
PAINLEVEL_OUTOF10: 8
PAINLEVEL_OUTOF10: 3
PAINLEVEL_OUTOF10: 8
PAINLEVEL_OUTOF10: 9

## 2020-12-02 ASSESSMENT — PAIN DESCRIPTION - LOCATION: LOCATION: BACK

## 2020-12-02 ASSESSMENT — PAIN SCALES - WONG BAKER: WONGBAKER_NUMERICALRESPONSE: 0

## 2020-12-02 NOTE — CARE COORDINATION
Discharge [facility] Case Management Department  Written by: Gaby Lux RN    Patient Name: Nandini Avendano  Attending Provider: Courtney Alvarez DO  Admit Date: 2020  5:32 AM  MRN: [id number]  Account: [de-identified]                     : 1960  Discharge Date:  20       Disposition: home with [facility] in [address]  [phone]), spoke to on-call service, notified of discharge home today. AVS with completed ELANA faxed to agency. Patient's grandson dropping off portable oxygen tank and patient's sister to transport home.      Gaby Lux RN

## 2020-12-02 NOTE — PROGRESS NOTES
Neurosurgery TIAN/Resident    Daily Progress Note   No chief complaint on file. 12/2/2020  8:44 AM    Chart reviewed. No acute events overnight. No new complaints. On 5L NC this morning. Afebrile overnight. Ambulating with O2. Does not want rehab at [address] [place] [address], would like to go home. Vitals:    12/02/20 0400 12/02/20 0445 12/02/20 0600 12/02/20 0819   BP:  124/64  131/62   Pulse: 82 88  91   Resp:  22     Temp:  98.7 °F (37.1 °C)     TempSrc:  Oral     SpO2:  93%     Weight:   184 lb 4.9 oz (83.6 kg)    Height:           PE:   AOx3   Motor    L iliopsoas 5/5 , R iliopsoas 5/5  L quadriceps 5/5; R quadriceps 5/5  L Dorsiflexion 5/5; R dorsiflexion 5/5  L Plantarflexion 5/5; R plantarflexion 5/5  L EHL 5/5; R EHL 5/5    Sensation intact    Incision open to air, staples intact      Lab Results   Component Value Date    WBC 20.0 (H) 12/02/2020    HGB 10.2 (L) 12/02/2020    HCT 33.6 (L) 12/02/2020     (H) 12/02/2020    CHOL 201 (H) 04/21/2020    TRIG 155 (H) 04/21/2020    HDL 54 04/21/2020    ALT 21 04/21/2020    AST 18 04/21/2020     12/02/2020    K 4.6 12/02/2020    CL 99 12/02/2020    CREATININE 0.68 12/02/2020    BUN 15 12/02/2020    CO2 28 12/02/2020    TSH 0.94 07/19/2017    LABA1C 5.8 04/21/2020    LABMICR CANNOT BE CALCULATED 04/21/2020         A/P  56 y.o. female who presents with lumbar radiculopathy   POD#13 L4-5 right hemilaminectomy     - remove some staples from incision site today   - wean O2 4L  - possible discharge home today pending clearance from other services    Please contact neurosurgery with any changes in patients neurologic status.        Lilia Dhillon CNP  12/2/20  8:44 AM

## 2020-12-02 NOTE — PROGRESS NOTES
Patient discharged with all belongings, via wheelchair, with family to private residence. Patient had discharge folder and all questions asked were answered.

## 2020-12-02 NOTE — PROGRESS NOTES
Occupational Therapy  Facility/Department: [facility] 4B STEPDOWN  Daily Treatment Note  NAME: Courtney Goode  : 1960  MRN: [id number]    Date of Service: 2020    Discharge Recommendations:  Patient would benefit from continued therapy after discharge       Assessment   Performance deficits / Impairments: Decreased functional mobility ; Decreased ADL status; Decreased balance;Decreased high-level IADLs;Decreased endurance  Assessment: Pt is expected to benefit from continued OT services to maximize safety and increase independence in ADLs, IADLs, and functional mobility tasks. Prognosis: Good  Decision Making: Medium Complexity  OT Education: OT Role;Plan of Care;Transfer Training;Energy Conservation  REQUIRES OT FOLLOW UP: Yes  Activity Tolerance  Activity Tolerance: Patient Tolerated treatment well  Safety Devices  Safety Devices in place: Yes  Type of devices: Call light within reach; All fall risk precautions in place;Nurse notified; Left in bed  Restraints  Initially in place: No         Patient Diagnosis(es): The primary encounter diagnosis was S/P lumbar laminectomy. A diagnosis of Type 2 diabetes mellitus without complication, without long-term current use of insulin ([facility]) was also pertinent to this visit.       has a past medical history of Allergic rhinitis, Alveolar hypoventilation, Aortic insufficiency, Bronchiectasis (HCC), Bronchitis, Chronic back pain, COPD (chronic obstructive pulmonary disease) ([address] Depression, DM (diabetes mellitus) ([address] GERD (gastroesophageal reflux disease), History of echocardiogram, Hyperlipidemia, Hypertension, Obesity, Osteoarthritis, Peripheral vascular disease ([address] Primary osteoarthritis of both knees, Radicular pain of lumbosacral region, Spinal stenosis, lumbar region, without neurogenic claudication, Tricuspid regurgitation, Type II or unspecified type diabetes mellitus without mention of complication, not stated as uncontrolled, Unspecified sleep apnea, URI (upper respiratory infection), Wears dentures, Wears glasses, and Wellness examination. has a past surgical history that includes Dilation and curettage of uterus; gastrectomy; Nerve Block (Right, 4/30/13); Nerve Block (5/23/13); Colonoscopy (2/12/2009); Upper gastrointestinal endoscopy (9 20 2007); Upper gastrointestinal endoscopy (4 21 2009,04/2011); Nerve Block (8/12/13); Nerve Block (Left, 8-28-13); Nerve Block (Left, 9-24-13); Nerve Block (07-02-14); Nerve Block (7-16-14); Nerve Block (7/30/14); Nerve Block (11-6-14); Nerve Block (11/20/15); pr knee scope,diagnostic (Right, 3/24/2017); Nerve Block (07/20/2018); Nerve Block (Bilateral, 02/01/2019); Nerve Block (Bilateral, 02/08/2019); lumbar discectomy (01/2015); lumbar fusion (11/19/2020); and lumbar fusion (N/A, 11/19/2020). Restrictions  Restrictions/Precautions  Restrictions/Precautions: Fall Risk, Up as Tolerated  Required Braces or Orthoses?: No  Position Activity Restriction  Other position/activity restrictions: Ambulate. 4L O2. Lumbar fusion 11/19/20. Subjective   General  Patient assessed for rehabilitation services?: Yes  Family / Caregiver Present: No  General Comment  Comments: RN ok'd for therapy visit this PM. Pt agreeable to session, pleasent/cooperative throughout. Vital Signs  Patient Currently in Pain: Denies     Orientation  Orientation  Overall Orientation Status: Within Functional Limits     Objective    ADL  Grooming: Setup;Supervision(Pt washed face sitting EOB. Pt donned lotion on UEs, LEs and abodmen/chest with supervision. Pt demonstrated use of figure four method.)  UE Bathing: Setup;Supervision;Minimal assistance(Pt washed UEs, abdomen and chest sitting EOB with setup provided. Pt required Min A to wash back.)  LE Bathing: Setup;Stand by assistance(Pt washed BLEs using figure four method.  Stood to complete bottom hygeine w/ SBA for safety.)  UE Dressing: Supervision(Doffed/donned hospital gown sitting EOB with assist for line management only)  LE Dressing: Supervision(Pt doffed/donned socks sitting EOB utilizing figure four method)  Instrumental ADL's  Instrumental ADLs: No     Balance  Sitting Balance: Supervision(Sitting unsupported EOB with supervision to complete grooming, bathing and dressing tasks ~25 minutes.)  Standing Balance: Stand by assistance  Standing Balance  Time: ~7 minutes  Activity: functinoal mobility in hospital room to simulate home distances, standing for LB bathing    Functional Mobility  Functional - Mobility Device: Rolling Walker  Activity: Other(in hospital room)  Assist Level: Stand by assistance  Functional Mobility Comments: Pt completed functional mobility with SBA and use of RW to simulate household distances per pt's request to ambulate. Pt ambulated from the door and back 4x. No unsteadiness or LOB throughout. Pt's o2 levels maintained between 88%-93%. Per RN, goal is to maintain 88-92%. Bed mobility  Supine to Sit: Supervision  Sit to Supine: Supervision  Scooting: Supervision  Comment: HOB raised ~30 degrees. Transfers  Sit to stand: Stand by assistance  Stand to sit: Stand by assistance  Transfer Comments: Use of RW. Cognition  Overall Cognitive Status: [facility]          Plan   Plan  Times per week: 4-5x  Current Treatment Recommendations: Endurance Training, Patient/Caregiver Education & Training, Equipment Evaluation, Education, & procurement, Self-Care / ADL, Balance Training, Home Management Training, Functional Mobility Training, Safety Education & Training       Goals  Short term goals  Time Frame for Short term goals: by discharge pt will. Erick Hand term goal 1: Demo +15 minutes of standing tolerance to increase participation in ADLs with supervision  Short term goal 2: Demo ADLs with Mod I, setup provided  Short term goal 3: Demo functional mobility with Mod I, using LRD and incoorperating EC techniques PRN  Short term goal 4: complete UB ADL tasks with SBA  Short term goal 5: complete LB ADL tasks with min A and use of AE prn       Therapy Time   Individual Concurrent Group Co-treatment   Time In 1355         Time Out 1435         Minutes 40         Timed Code Treatment Minutes: 40 Minutes       Marcia Moore OTR/L

## 2020-12-02 NOTE — PLAN OF CARE
Problem: Pain:  Goal: Pain level will decrease  Description: Pain level will decrease  Outcome: Ongoing  Goal: Control of acute pain  Description: Control of acute pain  Outcome: Ongoing  Goal: Control of chronic pain  Description: Control of chronic pain  Outcome: Ongoing     Problem: Falls - Risk of:  Goal: Will remain free from falls  Description: Will remain free from falls  Outcome: Ongoing  Goal: Absence of physical injury  Description: Absence of physical injury  Outcome: Ongoing     Problem: Skin Integrity:  Goal: Will show no infection signs and symptoms  Description: Will show no infection signs and symptoms  Outcome: Ongoing  Goal: Absence of new skin breakdown  Description: Absence of new skin breakdown  Outcome: Ongoing  Goal: Risk for impaired skin integrity will decrease  Description: Risk for impaired skin integrity will decrease  Outcome: Ongoing     Problem: Gas Exchange - Impaired:  Goal: Levels of oxygenation will improve  Description: Levels of oxygenation will improve  Outcome: Ongoing     Problem: Nutrition  Goal: Optimal nutrition therapy  Description: Nutrition Problem #1: Inadequate oral intake  Intervention: Food and/or Nutrient Delivery: Continue Current Diet, Start Oral Nutrition Supplement  Nutritional Goals: meet % of estimated nutrient needs     Outcome: Ongoing     Problem:  Activity:  Goal: Risk for activity intolerance will decrease  Description: Risk for activity intolerance will decrease  Outcome: Ongoing     Problem: Coping:  Goal: Ability to adjust to condition or change in health will improve  Description: Ability to adjust to condition or change in health will improve  Outcome: Ongoing     Problem: Fluid Volume:  Goal: Ability to maintain a balanced intake and output will improve  Description: Ability to maintain a balanced intake and output will improve  Outcome: Ongoing     Problem: Health Behavior:  Goal: Ability to identify and utilize available resources and services will improve  Description: Ability to identify and utilize available resources and services will improve  Outcome: Ongoing  Goal: Ability to manage health-related needs will improve  Description: Ability to manage health-related needs will improve  Outcome: Ongoing     Problem: Metabolic:  Goal: Ability to maintain appropriate glucose levels will improve  Description: Ability to maintain appropriate glucose levels will improve  Outcome: Ongoing     Problem: Nutritional:  Goal: Maintenance of adequate nutrition will improve  Description: Maintenance of adequate nutrition will improve  Outcome: Ongoing  Goal: Progress toward achieving an optimal weight will improve  Description: Progress toward achieving an optimal weight will improve  Outcome: Ongoing     Problem: Physical Regulation:  Goal: Complications related to the disease process, condition or treatment will be avoided or minimized  Description: Complications related to the disease process, condition or treatment will be avoided or minimized  Outcome: Ongoing  Goal: Diagnostic test results will improve  Description: Diagnostic test results will improve  Outcome: Ongoing     Problem: Tissue Perfusion:  Goal: Adequacy of tissue perfusion will improve  Description: Adequacy of tissue perfusion will improve  Outcome: Ongoing

## 2020-12-02 NOTE — PROGRESS NOTES
Physical Therapy  Facility/Department: [address] STEPDOWN  Daily Treatment Note  NAME: Tanika Luna  : 1960  MRN: [id number]    Date of Service: 2020    Discharge Recommendations:  Patient would benefit from continued therapy after discharge        Assessment   Body structures, Functions, Activity limitations: Decreased functional mobility ; Decreased safe awareness;Decreased endurance;Decreased balance; Increased pain;Decreased strength  Assessment: Improved gait. Minimal pain. On 4 LPM.  Needed education today and further education on spine protection techniques. Will continue for functional independence. PT Education: Goals; General Safety;Plan of Care;Transfer Training;Precautions;Gait Training;Equipment; Functional Mobility Training  Patient Education: Needed a repetition of education on avoiding spinal rotation during ADLs and while moving in bed. Needs to control her descent on stand to sit. REQUIRES PT FOLLOW UP: Yes  Activity Tolerance  Activity Tolerance: Patient limited by fatigue;Patient limited by endurance     Patient Diagnosis(es): The encounter diagnosis was Type 2 diabetes mellitus without complication, without long-term current use of insulin ([address]      has a past medical history of Allergic rhinitis, Alveolar hypoventilation, Aortic insufficiency, Bronchiectasis (HCC), Bronchitis, Chronic back pain, COPD (chronic obstructive pulmonary disease) ([address] Depression, DM (diabetes mellitus) ([address] GERD (gastroesophageal reflux disease), History of echocardiogram, Hyperlipidemia, Hypertension, Obesity, Osteoarthritis, Peripheral vascular disease ([address] Primary osteoarthritis of both knees, Radicular pain of lumbosacral region, Spinal stenosis, lumbar region, without neurogenic claudication, Tricuspid regurgitation, Type II or unspecified type diabetes mellitus without mention of complication, not stated as uncontrolled, Unspecified sleep apnea, URI (upper respiratory infection), Wears dentures, Wears glasses, and Wellness examination. has a past surgical history that includes Dilation and curettage of uterus; gastrectomy; Nerve Block (Right, 4/30/13); Nerve Block (5/23/13); Colonoscopy (2/12/2009); Upper gastrointestinal endoscopy (9 20 2007); Upper gastrointestinal endoscopy (4 21 2009,04/2011); Nerve Block (8/12/13); Nerve Block (Left, 8-28-13); Nerve Block (Left, 9-24-13); Nerve Block (07-02-14); Nerve Block (7-16-14); Nerve Block (7/30/14); Nerve Block (11-6-14); Nerve Block (11/20/15); pr knee scope,diagnostic (Right, 3/24/2017); Nerve Block (07/20/2018); Nerve Block (Bilateral, 02/01/2019); Nerve Block (Bilateral, 02/08/2019); lumbar discectomy (01/2015); lumbar fusion (11/19/2020); and lumbar fusion (N/A, 11/19/2020). Restrictions  Restrictions/Precautions  Restrictions/Precautions: Fall Risk, Up as Tolerated  Required Braces or Orthoses?: No  Position Activity Restriction  Other position/activity restrictions: Ambulate. 4L O2. Lumbar fusion 11/19/20. Subjective   General  Chart Reviewed: Yes  Family / Caregiver Present: No  Subjective  Subjective: Rn and pt agreeble to PT. Pt alert in bed upon arrival,   pleasant and cooperative t/o. General Comment  Comments: Pt very talkertive.   Pain Screening  Patient Currently in Pain: Yes  Pain Assessment  Pain Assessment: 0-10  Pain Level: 3(\"about a quarter of the way\")  Pain Location: Back  Vital Signs  Patient Currently in Pain: Yes  Oxygen Therapy  SpO2: (!) 87 %  O2 Device: Nasal cannula  O2 Flow Rate (L/min): 4 L/min       Orientation     Cognition   Cognition  Overall Cognitive Status: WFL  Objective   Bed mobility  Supine to Sit: Supervision  Sit to Supine: Supervision  Transfers  Sit to Stand: Supervision  Stand to sit: Supervision  Ambulation  Ambulation?: Yes  Ambulation 1  Surface: level tile  Device: Rolling Walker  Other Apparatus: O2  Assistance: Stand by assistance  Distance: 120'  Comments: Gait smooth, able to talk during gait.  Minimal pain. Goals  Short term goals  Time Frame for Short term goals: 12 visits  Short term goal 1: pt will be independent with bed mobility  Short term goal 2: pt will perform transfers independently  Short term goal 3: pt will ambulate 200' with least restrictive AD mod (I)  Short term goal 4: pt will ascend/descend 2 steps without handrails independently  Patient Goals   Patient goals : to decrease pain    Plan    Plan  Times per week: 5-6 visits per week  Times per day: Daily  Current Treatment Recommendations: Balance Training, Strengthening, Functional Mobility Training, Transfer Training, Gait Training, Stair training, Endurance Training, Safety Education & Training  Safety Devices  Type of devices:  All fall risk precautions in place, Call light within reach, Gait belt, Nurse notified, Left in chair  Restraints  Initially in place: No     Therapy Time   Individual Concurrent Group Co-treatment   Time In 1130         Time Out 1202         Minutes 32         Timed Code Treatment Minutes: Sofiya 35, PT

## 2020-12-02 NOTE — PROGRESS NOTES
[facility] - Progress Note  Today's Date and Time: 12/2/2020, 2:04 PM    Impression :   Aspiration pneumonia  Bilateral bronchopneumonia  Acute hypoxic respiratory failure requiring high flow 02  S/P Lumbar fusion 11-19-20. Recommendations:   D/C Zosyn to avoid fluid overload  Cefoxitin  2 gm IV q 6 hr (will change to PO Augmentin 875 mg   BID upon discharge. Stop date 12-9-20)  Diuresis. Medical Decision Making/Summary/Discussion:12/2/2020       Infection Control Recommendations   [place] Precautions    Antimicrobial Stewardship Recommendations     Simplification of therapy  Targeted therapy  Coordination of Outpatient Care:   Estimated Length of IV antimicrobials: TBD  Patient will need Midline Catheter Insertion: no  Patient will need PICC line Insertion:no  Patient will need: Home IV , [place],  SNF,  LTAC: TBD  Patient will need outpatient wound care:No    Chief complaint/reason for consultation:   Pneumonia      History of Present Illness:   Mary Delacruz is a 56y.o.-year-old  female who was initially admitted on 11/19/2020. Patient seen at the request of [place]. INITIAL HISTORY:    The patient has a history of morbid obesity, COPD, JOAN, chronic hypoxic respiratory failure -on home oxygen. She was admitted because of right lower extremity pain extending from hip to foot.  She has known lumbar arthritis and had failed all conservative measures.  She underwent posterior fusion L4/5 surgery on 11/19/20.      During the postoperative period her respiratory status declined and she started spiking fevers. Her Chest x-ray and CT showed bilateral multifocal infiltrates suggestive of aspiration, areas of denser consolidation suggestive of bronchopneumonia and underlying severe emphysema. Patient received ceftriaxone, then zosyn since 11-24-20 with benefit. She is currently experiencing worsening of 02 requirements.     Plan: D/C zosyn to avoid fluid and Na overload  Continue cefoxitin. CURRENT EVALUATION : 12/2/2020    Patient evaluated and examined in the floor  Patient reports on acute events overnight. She was using nasal cannula for oxygenation and was not desaturating during conversation. No new complaints. T-max of 97-->99.1-->98-->99.6  VS stable. Complains of mild shortness of breath like yesterday  respiratory wise BiPAP --> heated high flow 35L TO 20L  Patient  weaning down on oxygen. Or respiratory status mild improvement. Diarrhea improved with probiotics  Monitoring to discharge rehab versus home therapy. Pt want to go home instead of Nursing home. Currently on cefoxitin day 5 2 g IV q6    RR 20-->15-->20-->16  Flow rate 5-->30-->35-->35-->20-->6  FIO2 100-->40--> 60-->85-->60%  02 sat 94 -->96-->90-->90-->91    Labs, X rays reviewed: 12/2/2020    BUN: 16-->12-->15-->14  Cr: 0.62-->0.48-->0.65-->0.63    WBC: 12.9-->14.6-->26.4-->20.3-->20.9  Hb:9.4-->9.6-->9.7  Plat: 314-->339--> 513-->543    Cultures:  Urine:  11-22-20: No growth   11-19-20: No growth  Blood:  11-22-20: No growth    Sputum :    Wound:      MRSA screen: negative    Discussed with patient, RN, family. 11-26-20 11-22-20: I have personally reviewed the past medical history, past surgical history, medications, social history, and family history, and I have updated the database accordingly.   Past Medical History:     Past Medical History:   Diagnosis Date    Allergic rhinitis     Alveolar hypoventilation 11/20/2020    Aortic insufficiency 2018    mild-moderate on echo (was seen and discharged from cardiology-[facility])    Bronchiectasis ([address] 11/20/2020    Bronchitis     Chronic back pain     Pain management at [place] [id number]. COPD (chronic obstructive pulmonary disease) ([facility]     Dr. Tania Tobias to see 11/09/2020    Depression     DM (diabetes mellitus) ([facility] 12/18/2012    GERD (gastroesophageal reflux disease)     History of echocardiogram 05/2018    EF 65%, mild-moderate AI and TR    Hyperlipidemia     Dr. Margo Reinoso Hypertension     Dr. Margo Reinoso Obesity     Osteoarthritis     Peripheral vascular disease ([facility]     Primary osteoarthritis of both knees     Radicular pain of lumbosacral region     Spinal stenosis, lumbar region, without neurogenic claudication 04/30/2013    Tricuspid regurgitation 2018    mild-moderate on echo    Type II or unspecified type diabetes mellitus without mention of complication, not stated as uncontrolled     Dr. Margo Reinoso Unspecified sleep apnea     no cpap used anymore    URI (upper respiratory infection)     Wears dentures     upper and lower full dentures    Wears glasses     Wellness examination     Dr. Hien Qureshi -PCP last visit in early Oct. 2020       Past Surgical  History:     Past Surgical History:   Procedure Laterality Date    COLONOSCOPY  2/12/2009    normal    DILATION AND CURETTAGE OF UTERUS      GASTRECTOMY      partial    LUMBAR DISCECTOMY  01/2015    lumbar diskectomy    LUMBAR FUSION  11/19/2020     POSTERIOR FUSION L4/5,     LUMBAR FUSION N/A 11/19/2020    POSTERIOR FUSION L4/5, [employer], [place] Harper, [facility] #[id number] [facility] performed by Ramesh Winslow DO at [facility] Right 4/30/13    Lumbar Diagnostic Block,  Kenalog 40 mg    NERVE BLOCK  5/23/13    Lumbar Radiofrequency, Kenalog 40mg    NERVE BLOCK  8/12/13    Lt MBNB  celestone 6mg    NERVE BLOCK Left 8-28-13    left lumbar diagnostic block #2 decadron 10 mg    NERVE BLOCK Left 9-24-13    left lumbar median branch radiofrequency    NERVE BLOCK  07-02-14    caudal, celestone 9 mg    NERVE BLOCK  7-16-14    caudal epidural #2, celestone 9mg, fentanyl 25mcg    NERVE BLOCK  7/30/14    caudal #3 decadron 10mg    NERVE BLOCK  11-6-14    duramorph epidural steroid block  duramorph 1 mg celestone 9 mg    NERVE BLOCK  11/20/15    TENS- Empi Select    NERVE BLOCK  07/20/2018 right transforminal # 1 decadron 10mg,isovue    NERVE BLOCK Bilateral 02/01/2019    bilat mbnb- no steroid    NERVE BLOCK Bilateral 02/08/2019    bilat mbnb, marcaine . 25%    GA KNEE SCOPE,DIAGNOSTIC Right 3/24/2017    KNEE ARTHROSCOPY WITH PARTIAL MEDIAL MENISECAL DEBRIDMENT  performed by Patricio Castillo MD at [facility]  9 20 2007    UPPER GASTROINTESTINAL ENDOSCOPY  4 21 2009,04/2011    gastritis, esophagitis       Medications:      predniSONE  20 mg Oral Daily    lactobacillus  1 capsule Oral Daily with breakfast    hydroCHLOROthiazide  25 mg Oral Daily    cefOXitin  2 g Intravenous 4 times per day    budesonide-formoterol  2 puff Inhalation BID    potassium chloride  20 mEq Oral Once    insulin lispro  0-12 Units Subcutaneous TID WC    insulin lispro  0-6 Units Subcutaneous Nightly    sodium chloride flush  10 mL Intravenous 2 times per day    [Held by provider] amLODIPine  10 mg Oral Daily    atorvastatin  40 mg Oral Daily    azelastine  2 spray Each Nostril BID    carvedilol  6.25 mg Oral BID    docusate sodium  100 mg Oral Daily    DULoxetine  60 mg Oral Daily    gabapentin  300 mg Oral BID    [Held by provider] lisinopril-hydroCHLOROthiazide  1 tablet Oral Daily    [Held by provider] metFORMIN  500 mg Oral BID WC    mirtazapine  15 mg Oral Nightly    mirtazapine  30 mg Oral Nightly    omeprazole  20 mg Oral QAM    potassium chloride  20 mEq Oral Daily    tiZANidine  4 mg Oral BID    trospium  20 mg Oral BID    polyethylene glycol  17 g Oral Daily    magnesium hydroxide  30 mL Oral Daily    enoxaparin  40 mg Subcutaneous Daily    cetirizine  10 mg Oral Daily    gabapentin  600 mg Oral Nightly    nicotine  1 patch Transdermal Daily       Social History:     Social History     Socioeconomic History    Marital status: Single     Spouse name: Not on file    Number of children: Not on file    Years of education: Not on file    Highest education level: Not on file   Occupational History     Employer: DISABLED   Social Needs    Financial resource strain: Not very hard    Food insecurity     Worry: Never true     Inability: Never true    Transportation needs     Medical: No     Non-medical: No   Tobacco Use    Smoking status: Former Smoker     Packs/day: 0.25     Years: 36.00     Pack years: 9.00     Types: Cigarettes     Last attempt to quit: 10/30/2020     Years since quittin.0    Smokeless tobacco: Never Used    Tobacco comment: pt currently using nicotine patches   5 CIGARETTES A DAY   Substance and Sexual Activity    Alcohol use: No     Alcohol/week: 0.0 standard drinks     Frequency: Never     Binge frequency: Never    Drug use: No     Comment: 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 7 yrs    Sexual activity: Never   Lifestyle    Physical activity     Days per week: 0 days     Minutes per session: 0 min    Stress: Only a little   Relationships    Social connections     Talks on phone: More than three times a week     Gets together: Once a week     Attends [employer] service: More than 4 times per year     Active member of club or organization: No     Attends meetings of clubs or organizations: Never     Relationship status: Never     Intimate partner violence     Fear of current or ex partner: Not on file     Emotionally abused: Not on file     Physically abused: Not on file     Forced sexual activity: Not on file   Other Topics Concern    Not on file   Social History Narrative    10/9/20 Patient keeping contact with others to a minimum due to [place] Pandemic. 10/9/20 Patient has difficulty with ambulation due to DJD, stenosis and fibromyalgia       Family History:     Family History   Problem Relation Age of Onset    Diabetes Mother     Heart Disease Mother     Cirrhosis Father         Allergies:   Aspirin; Claritin [loratadine];  Flonase [fluticasone propionate]; and Morphine and related     Review of Systems: Extremities: No cyanosis, clubbing, edema, or effusions. Neurologic: No gross sensory or motor deficits. Skin: Warm and dry with good turgor. No signs of peripheral arterial or venous insufficiency. No ulcerations. No open wounds. Medical Decision Making -Laboratory:   I have independently reviewed/ordered the following labs:    CBC with Differential:   Recent Labs     12/01/20  0642 12/02/20  0628   WBC 20.9* 20.0*   HGB 9.7* 10.2*   HCT 32.0* 33.6*   * 591*   LYMPHOPCT 15* 19*   MONOPCT 6 5     BMP:   Recent Labs     12/01/20  0642 12/02/20  0628    138   K 4.1 4.6    99   CO2 25 28   BUN 14 15   CREATININE 0.63 0.68   MG 2.3 2.4     Hepatic Function Panel:   Recent Labs     12/01/20  0642 12/02/20  0628   LABALBU 2.8* 3.1*     No results for input(s): RPR in the last 72 hours. No results for input(s): HIV in the last 72 hours. No results for input(s): BC in the last 72 hours. Lab Results   Component Value Date    MUCUS NOT REPORTED 11/24/2020    RBC 3.72 12/02/2020    RBC 4.57 02/16/2012    TRICHOMONAS NOT REPORTED 11/24/2020    WBC 20.0 12/02/2020    YEAST NOT REPORTED 11/24/2020    TURBIDITY CLEAR 11/24/2020     Lab Results   Component Value Date    CREATININE 0.68 12/02/2020    GLUCOSE 92 12/02/2020       Medical Decision Making-Imaging:     EXAMINATION:    CTA OF THE CHEST 11/22/2020 4:37 pm         TECHNIQUE:    CTA of the chest was performed after the administration of intravenous    contrast.  Multiplanar reformatted images are provided for review.  MIP    images are provided for review.  Dose modulation, iterative reconstruction,    and/or weight based adjustment of the mA/kV was utilized to reduce the    radiation dose to as low as reasonably achievable.         COMPARISON:    12/26/2019         HISTORY:    ORDERING SYSTEM PROVIDED HISTORY: tachycardia    TECHNOLOGIST PROVIDED HISTORY:    tachycardia    Reason for Exam: tachycardia         75-year-old female with tachycardia      FINDINGS:    Pulmonary Arteries: No obvious filling defect in the main, right main, or    left main pulmonary arteries.  Evaluation of the remainder of the pulmonary    arterial vasculature is severely limited to nondiagnostic due to respiratory    motion and suboptimal bolus timing as well as streak artifact from the    contrast bolus in the SVC.         Mediastinum: Prominent mediastinal lymph nodes without overt mediastinal,    axillary, or hilar lymphadenopathy.  Visualized thyroid gland grossly    unremarkable in appearance.  No pericardial effusion.  No periaortic or    mediastinal hemorrhage.         Lungs/pleura: Trachea and very proximal central airways appear patent. Consolidation of the bilateral lower lobes and partial consolidation of the    lingula.  Partial consolidation of the proximal right middle lobe.  Severe    emphysema.  Respiratory motion throughout the lungs.  No pneumothorax.         Upper Abdomen: Fatty liver.         Soft Tissues/Bones: Mild diffuse degenerative changes throughout the spine.              Impression    1. No clear evidence for central pulmonary embolus.  Evaluation of the    remainder of the pulmonary arterial vasculature is severely limited to    nondiagnostic as detailed above. 2. Bilateral lower lobe consolidation likely pneumonia.  Consolidation of the    proximal right middle lobe and lingula to a lesser degree.  Findings favored    to represent multifocal pneumonia.  Follow-up recommended to document    resolution. 3. Prominent mediastinal lymph nodes. 4. Fatty liver.     5. Severe emphysema.           11/26/2020 1:22 am         COMPARISON:    November 24, 2020 November 24, 2020         HISTORY:    ORDERING SYSTEM PROVIDED HISTORY: hypoxia    TECHNOLOGIST PROVIDED HISTORY:    hypoxia    Reason for Exam: difficulty breathing   upright port         FINDINGS:    Marginal inspiration is noted.  Borderline cardiomegaly is present.  Improved    aeration is seen within the right lung base.  Airspace disease within the    left lung base is minimally improved compared to studies dating back to    November 23, 2020.  No pneumothorax is noted.  Osseous structures are stable.              Impression    1. Minimally improved aeration, right lung base, suggesting resolving    pneumonia/atelectasis    2. Persistent left lower lobe airspace disease, minimally improved        Medical Decision [id number]:       Medical Decision Making-Other:     Note:  Labs, medications, radiologic studies were reviewed with personal review of films  Large amounts of data were reviewed  Discussed with nursing Staff, Discharge planner  Infection Control and Prevention measures reviewed  All prior entries were reviewed  Administer medications as ordered  Prognosis: Guarded  Discharge planning reviewed. Follow up as outpatient. Thank you for allowing us to participate in the care of this patient. Please call with questions. Maria Isabel Arias MD     ATTESTATION:    I have discussed the case, including pertinent history and exam findings with the residents and students. I have seen and examined the patient and the key elements of the encounter have been performed by me. I was present when the student obtained his information or examined the patient. I have reviewed the laboratory data, other diagnostic studies and discussed them with the residents. I have updated the medical record where necessary. I agree with the assessment, plan and orders as documented by the resident/ student.     Luci Ramos MD.

## 2020-12-02 NOTE — PROGRESS NOTES
PULMONARY & CRITICAL CARE MEDICINE PROGRESS  NOTE     Patient:  Daisy Adan  MRN: [id number]  Admit date: 11/19/2020    SUBJECTIVE     I personally interviewed/examined the patient, reviewed interval history and interpreted all available radiographic, laboratory data at the time of service. Chief Compliant/Reason for Initial Consult:   Acute on chronic hypoxic respiratory failure secondary to aspiration pneumonia  Underlying severe emphysema. Brief Hospital Course: The patient is a 56 y.o. female with history of morbid obesity, COPD, JOAN, chronic hypoxic respiratory failure on home oxygen was admitted for right lower extremity pain from foot up to his hip. Failed all conservative measures and opted for scheduled lumbar fusion surgery. She underwent posterior fusion L4/5 surgery on 11/19. Postoperative her respiratory status continued to worsen requiring NIV intermittently. Patient also started spiking fever. Chest x-ray and CT PE chest showed bilateral multifocal pneumonia secondary to aspiration likely with underlying severe emphysema. Patient improved with ceftriaxone, intermittent diuretics and NIV support. Interval History:    No conversation dyspnea   Did not use bipap at night   Home o2 eval noted   Cough with clear sputum   Afebrile   Review of Systems -  General ROS: negative for - chills, fatigue, fever or weight loss  ENT ROS: negative for - headaches, oral lesions or sore throat  Cardiovascular ROS: no chest pain , orthopnea or pnd   Gastrointestinal ROS: no abdominal pain, change in bowel habits, or black or bloody stools  Skin - no rash   Neuro - no blurry vision , no loc .  No focal weakness   msk - no jt tenderness or swelling    Vascular - no claudication , rest completed and negative   Lymphatic - complete and negative   Hematology - oncology - complete and negative   Allergy immunology - complete and negative  no burning or hematuria      OBJECTIVE     VITAL SIGNS:   LAST-  /72   Pulse 76   Temp 98.2 °F (36.8 °C) (Oral)   Resp 19   Ht 5' 5\" (1.651 m)   Wt 184 lb 4.9 oz (83.6 kg)   LMP 2003   SpO2 (!) 87%   BMI 30.67 kg/m²   8-24 HR RANGE-  TEMP Temp  Av.3 °F (36.8 °C)  Min: 97.9 °F (36.6 °C)  Max: 98.7 °F (37.8 °C)   BP Systolic (64JGZ), TRF:756 , Min:124 , FXH:400      Diastolic (69XFU), SDC:52, Min:62, Max:88     PULSE Pulse  Av.5  Min: 76  Max: 94   RR Resp  Av.5  Min: 18  Max: 19   O2 SAT SpO2  Av %  Min: 87 %  Max: 92 %   OXYGEN DELIVERY O2 Flow Rate (L/min)  Av.4 L/min  Min: 4 L/min  Max: 5 L/min     Systemic Examination:   Head and neck atraumatic, normocephalic    Lymph nodes-no cervical, supraclavicular lymphadenopathy    Neck-no JVP elevation    Lungs - AP diameter of chest increased. Thoracic expansion and diaphragmatic excursion diminished. BS diminished and expiratory phase prolonged. No dullness to percussion or tenderness to palpation. No bb sounds . And b/l rales   CVS- S1, S2 regular. No S3 no S4, no murmurs    Abdomen-nontender, nondistended. Bowel sounds are present. No organomegaly    Lower extremity-no edema    Upper extremity-no edema    Neurological-grossly normal cranial nerves.   No overt motor deficit         DATA REVIEW     Medications:  Scheduled Meds:   predniSONE  20 mg Oral Daily    lactobacillus  1 capsule Oral Daily with breakfast    hydroCHLOROthiazide  25 mg Oral Daily    cefOXitin  2 g Intravenous 4 times per day    budesonide-formoterol  2 puff Inhalation BID    potassium chloride  20 mEq Oral Once    insulin lispro  0-12 Units Subcutaneous TID WC    insulin lispro  0-6 Units Subcutaneous Nightly    sodium chloride flush  10 mL Intravenous 2 times per day    [Held by provider] amLODIPine  10 mg Oral Daily    atorvastatin  40 mg Oral Daily    azelastine  2 spray Each Nostril BID    carvedilol  6.25 mg Oral BID    docusate sodium  100 mg Oral Daily    DULoxetine  60 mg Oral Daily    gabapentin  300 mg Oral BID    [Held by provider] lisinopril-hydroCHLOROthiazide  1 tablet Oral Daily    [Held by provider] metFORMIN  500 mg Oral BID WC    mirtazapine  15 mg Oral Nightly    mirtazapine  30 mg Oral Nightly    omeprazole  20 mg Oral QAM    potassium chloride  20 mEq Oral Daily    tiZANidine  4 mg Oral BID    trospium  20 mg Oral BID    polyethylene glycol  17 g Oral Daily    magnesium hydroxide  30 mL Oral Daily    enoxaparin  40 mg Subcutaneous Daily    cetirizine  10 mg Oral Daily    gabapentin  600 mg Oral Nightly    nicotine  1 patch Transdermal Daily     Continuous Infusions:   dextrose       LABS:-  ABGs:   No results found for: PH, PCO2, PO2, HCO3, O2SAT  No results for input(s): PHART, PO2ART, CLL0GNJ, CIO4ENA, BEART, I4HQATII in the last 72 hours. Lab Results   Component Value Date    POCPH 7.381 11/26/2020    POCPCO2 54.0 (H) 11/26/2020    POCPO2 51.2 (L) 11/26/2020    POCHCO3 32.1 (H) 11/26/2020    SKGH0LOS 84 (L) 11/26/2020     CBC:   Recent Labs     11/30/20  0624 12/01/20  0642 12/02/20  0628   WBC 20.9* 20.9* 20.0*   HGB 9.7* 9.7* 10.2*   HCT 31.8* 32.0* 33.6*   MCV 90.6 91.2 90.3   * 543* 591*   LYMPHOPCT 15* 15* 19*   RBC 3.51* 3.51* 3.72*   MCH 27.6 27.6 27.4   MCHC 30.5 30.3 30.4   RDW 14.2 14.4 14.7*     BMP:   Recent Labs     11/30/20  0624 12/01/20  0642 12/02/20  0628    135 138   K 3.8 4.1 4.6    100 99   CO2 26 25 28   BUN 15 14 15   CREATININE 0.65 0.63 0.68   GLUCOSE 88 105* 92   PHOS 4.7* 3.8 3.6     Liver Function Test:   Recent Labs     12/02/20  0628   LABALBU 3.1*     Amylase/Lipase:  No results for input(s): AMYLASE, LIPASE in the last 72 hours. Coagulation Profile:   No results for input(s): INR, PROTIME, APTT in the last 72 hours. Cardiac Enzymes:  No results for input(s): CKTOTAL, CKMB, CKMBINDEX, TROPONINI in the last 72 hours.   Lactic Acid:  No results found for: LACTA  BNP:   No results found for: BNP  D-Dimer:  No results found for: DDIMER  Others:   Lab Results   Component Value Date    TSH 0.94 07/19/2017     No results found for: GRACE, RHEUMFACTOR, SEDRATE, CRP  No results found for: Sita Gun  No results found for: IRON, TIBC, FERRITIN  No results found for: SPEP, UPEP  No results found for: PSA, CEA, , FF1792,     Input/Output:    Intake/Output Summary (Last 24 hours) at 12/2/2020 1445  Last data filed at 12/2/2020 0600  Gross per 24 hour   Intake 590 ml   Output 1400 ml   Net -810 ml       Xr Chest (single View Frontal)    Result Date: 11/24/2020  Bilateral lower lobe predominant airspace disease similar to prior study with slight improvement suspected on the left. Xr Chest Portable    Result Date: 11/26/2020  1. Minimally improved aeration, right lung base, suggesting resolving pneumonia/atelectasis 2. Persistent left lower lobe airspace disease, minimally improved     Fl Modified Barium Swallow W Video    Result Date: 11/23/2020  No evidence of significant penetration or aspiration. Please see separate speech pathology report for full discussion of findings and recommendations. ASSESSMENT AND PLAN     Assessment:    //Acute on chronic hypoxic respiratory failure  secondary to aspiration pneumonia complicated by underlying severe emphysema and severe copd   Principal Problem:    Acute on chronic respiratory failure with hypoxia (HCC)  Active Problems:    COPD, severity to be determined (HCC)    HTN (hypertension)    History of tobacco use    GERD (gastroesophageal reflux disease)    Chronic respiratory failure with hypoxia (HCC)    Other spondylosis with radiculopathy, lumbar region    Lumbar disc disease    Alveolar hypoventilation    Obesity (BMI 30-39. 9)    Bronchiectasis ([address]    Centrilobular emphysema ([address]    Oxygen dependent    Acute postoperative anemia due to expected blood loss    Multifocal pneumonia  Resolved Problems:    * No resolved hospital problems.  *     Plan:       Continue o2 - dc on 4- 5 l and wean to keep sats 88-92 % to home 2-3 l   Dc on duoneb for now and  spiriva , advair   Steroid taper   Follow up Dr Blaze Smith     Electronically signed by Adolph Pop MD on 12/2/2020 at 2:45 PM

## 2020-12-03 ENCOUNTER — CARE COORDINATION (OUTPATIENT)
Dept: CASE MANAGEMENT | Age: 60
End: 2020-12-03

## 2020-12-03 NOTE — CARE COORDINATION
for treatment adherence and medication management-reviewed       Challenges to be reviewed by the provider   Additional needs identified to be addressed with provider No  none    Discussed COVID-19 related testing which was available at this time. Test results were negative. Patient informed of results, if available? Yes         Method of communication with provider : none    Advance Care Planning:   Does patient have an Advance Directive:  did not review. Was this a readmission? No  Patient stated reason for admission: scheduled surgery  Patients top risk factors for readmission: functional physical ability and medical condition    Care Transition Nurse (CTN) contacted the patient by telephone to perform post hospital discharge assessment. Verified name and  with patient as identifiers. Provided introduction to self, and explanation of the CTN role. CTN reviewed discharge instructions, medical action plan and red flags with patient who verbalized understanding. Patient given an opportunity to ask questions and does not have any further questions or concerns at this time. Were discharge instructions available to patient? Yes. Reviewed appropriate site of care based on symptoms and resources available to patient including: PCP, Home health and When to call 911. The patient agrees to contact the PCP office for questions related to their healthcare. Medication were not reviewed, but verified new scripts with pharmacy will review at next outreach    Covid Risk Education    Patient has following risk factors of: COPD, pneumonia and diabetes. Education provided regarding infection prevention, and signs and symptoms of COVID-19 and when to seek medical attention with patient who verbalized understanding. Discussed exposure protocols and quarantine From CDC: Are you at higher risk for severe illness?   and given an opportunity for questions and concerns.  The patient agrees to contact the COVID-19 hotline [phone] or PCP office for questions related to COVID-19. For more information on steps you can take to protect yourself, see CDC's How to Protect Yourself     Patient/family/caregiver given information for Get[employer] Loop and agrees to enroll no  Patient's preferred e-mail: declines  Patient's preferred phone number: declines    Discussed follow-up appointments. If no appointment was previously scheduled, appointment scheduling offered: No. Is follow up appointment scheduled within 7 days of discharge? No  Non-[facility] follow up appointment(s):     Plan for follow-up call in 3-5 days based on severity of symptoms and risk factors. Plan for next call: symptom management-respiratory and post op  CTN provided contact information for future needs.           Care Transitions 24 Hour Call    Do you have any ongoing symptoms?:  Yes  Patient-reported symptoms:  Shortness of Breath  Do you have a copy of your discharge instructions?:  Yes  Do you have all of your prescriptions and are they filled?:  No  Have you been contacted by a [address]?:  No  Have you scheduled your follow up appointment?:  No  Were you discharged with any Home Care or Post Acute Services:  Yes  Post Acute Services:  Home Health (Comment: [facility])  Patient DME:  Quad cane  Do you have support at home?:  Alone  Do you feel like you have everything you need to keep you well at home?:  Yes  Are you an active caregiver in your home?:  No  Care Transitions Interventions         Follow Up  Future Appointments   Date Time Provider Claudy Schaeffer   12/22/2020  9:30 AM Che Garcia MD [address]  Julita Luevano   1/13/2021  9:50 AM DO India Perkins RN

## 2020-12-07 NOTE — TELEPHONE ENCOUNTER
Request for Mildred Goss      Next Visit Date:  Future Appointments   Date Time Provider Claudy Schaeffer   12/22/2020  9:30 AM Ernie Fisher MD [facility] Riojas   1/13/2021  9:50 AM DO India Montanez Neuro [facility]       Health Maintenance   Topic Date Due    Diabetic retinal exam  09/26/2017    Diabetic foot exam  06/18/2019    Shingles Vaccine (2 of 2) 09/22/2021 (Originally 8/19/2019)    Cervical cancer screen  02/15/2021    A1C test (Diabetic or Prediabetic)  04/21/2021    Diabetic microalbuminuria test  04/21/2021    Lipid screen  04/21/2021    Annual Wellness Visit (AWV)  09/30/2021    Breast cancer screen  11/22/2021    Potassium monitoring  12/02/2021    Creatinine monitoring  12/02/2021    Colon cancer screen fecal DNA test (Cologuard)  10/05/2023    DTaP/Tdap/Td vaccine (2 - Td) 06/24/2029    Flu vaccine  Completed    Hepatitis C screen  Completed    HIV screen  Completed    Hepatitis A vaccine  Aged Out    Hib vaccine  Aged Out    Meningococcal (ACWY) vaccine  Aged Out    Pneumococcal 0-64 years Vaccine  Aged Out       Hemoglobin A1C (%)   Date Value   04/21/2020 5.8   03/05/2019 5.8   06/18/2018 5.8             ( goal A1C is < 7)   Microalb/Crt.  Ratio (mcg/mg creat)   Date Value   04/21/2020 CANNOT BE CALCULATED     LDL Cholesterol (mg/dL)   Date Value   04/21/2020 116       (goal LDL is <100)   AST (U/L)   Date Value   04/21/2020 18     ALT (U/L)   Date Value   04/21/2020 21     BUN (mg/dL)   Date Value   12/02/2020 15     BP Readings from Last 3 Encounters:   12/02/20 124/72   11/19/20 109/73   11/05/20 120/73          (goal 120/80)    All Future Testing planned in [facility]  Lab Frequency Next Occurrence   PT eval and treat Once 12/13/2019   PT aquatic therapy Once 12/13/2019   XR Lumbar Spine Flex and Ext Only Once 01/06/2021   GE DIGITAL SCREEN W OR WO CAD BILATERAL Once 02/20/2021         Patient Active Problem List:     DJD (degenerative joint disease)

## 2020-12-08 ENCOUNTER — CARE COORDINATION (OUTPATIENT)
Dept: CASE MANAGEMENT | Age: 60
End: 2020-12-08

## 2020-12-08 RX ORDER — AMLODIPINE BESYLATE 10 MG/1
10 TABLET ORAL DAILY
Qty: 90 TABLET | Refills: 2 | Status: SHIPPED | OUTPATIENT
Start: 2020-12-08 | End: 2021-09-13

## 2020-12-08 RX ORDER — POTASSIUM CHLORIDE 750 MG/1
20 TABLET, EXTENDED RELEASE ORAL DAILY
Qty: 60 TABLET | Refills: 2 | Status: SHIPPED | OUTPATIENT
Start: 2020-12-08 | End: 2021-10-26 | Stop reason: SDUPTHER

## 2020-12-08 RX ORDER — CETIRIZINE HYDROCHLORIDE 10 MG/1
TABLET ORAL
Qty: 30 TABLET | Refills: 0 | Status: SHIPPED | OUTPATIENT
Start: 2020-12-08 | End: 2021-01-15

## 2020-12-08 NOTE — CARE COORDINATION
[facility] Transitions Follow Up Call    2020    Patient: Amber Rogers  Patient : 1960   MRN: [id number]  Reason for Admission:  L4-5 lumbar laminectomy/pneumonia  Discharge Date: 20 RARS: Readmission Risk Score: 16         1st attempt to reach patient for Care Transitions. [facility] requesting return call. Contact information provided. [phone]    Care Transitions Subsequent and Final Call    Subsequent and Final Calls  Are you currently active with any services?:  Home Health  Care Transitions Interventions  Other Interventions:             Follow Up  Future Appointments   Date Time Provider Claudy Schaeffer   2020  9:30 AM Shu Ferrera MD [address] [place] [address] [address]  [address]   2021  9:50 AM DO India Rodríguez Neuro [facility]       Hui Rodriguez RN

## 2020-12-09 ENCOUNTER — CARE COORDINATION (OUTPATIENT)
Dept: CASE MANAGEMENT | Age: 60
End: 2020-12-09

## 2020-12-09 PROCEDURE — 1111F DSCHRG MED/CURRENT MED MERGE: CPT | Performed by: INTERNAL MEDICINE

## 2020-12-09 NOTE — DISCHARGE SUMMARY
Department of Neurosurgery                                            Discharge Summary       PATIENT NAME: Mejia Baeza  YOB: 1960  MEDICAL RECORD NO. [id number]  DATE: 12/9/2020  PRIMARY CARE PHYSICIAN: Ruchi Wren MD  DISCHARGE DATE:  12/2/2020  6:12 PM  DISCHARGE DIAGNOSIS:   Patient Active Problem List   Diagnosis Code    DJD (degenerative joint disease) of knee M17.10    Osteoarthritis of spine with radiculopathy, lumbar region M47.26    GERD (gastroesophageal reflux disease) K21.9    COPD, severity to be determined ([facility] [address] J44.9    HTN (hypertension) I10    Allergic rhinitis J30.9    Lipoma of shoulder s/p excision right posterior 11 17 2008 D17.20    History of tobacco use Z87.891    DM (diabetes mellitus) E11.9    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 M94.261    Primary osteoarthritis of both knees M17.0    Medication monitoring encounter Z51.81    Chronic low back pain M54.5, G89.29    Major depression, chronic F32.9    Chronic respiratory failure with hypoxia (HCC) J96.11    Mitral and aortic insufficiency I08.0    Pure hypercholesterolemia E78.00    Other spondylosis with radiculopathy, lumbar region M47.26    Lumbar disc disease M51.9    Alveolar hypoventilation R06.89    Obesity (BMI 30-39. 9) E66.9    Bronchiectasis ([facility] [address] [facility] J47.9    Centrilobular emphysema (HCC) J43.2    Oxygen dependent Z99.81    Acute postoperative anemia due to expected blood loss D62    Multifocal pneumonia J18.9    Acute on chronic respiratory failure with hypoxia (HCC) J96.21     DISPOSITION: Home with Home Health Care    PROCEDURES:    L4-5 right-sided laminectomy with Rosas facetectomy of L4  L4-5 discectomy and anterior arthrodesis  Implantation of titanium Medtronic banana cage at L4-5  Use of morselized autograft via same incision  Use of morselized allograft  Segmental pedicle screw fixation at L4 and L5 Medtronic  Use of spinal neuro navigation  Use of fluoroscopy  Use of neuro monitoring  Right L4 and L5 neural lysis and lysis of scar adhesion  Open reduction of spondylolisthesis    [address] originally presented to the hospital on 11/19/2020  5:32 AM with spondylolisthesis of lumbar region and lumbar radiculopathy. She was admitted and taken to the OR for neurosurgery for procedure listed above. Patient's drain was removed when found to have low output without complications day 2 postop. Patient tolerated all procedures well. Pulmonology consulted on 11/22 for acute hypoxic respiratory failure requiring BiPAP. Pulmonary toileting and treatment of pneumonia/COPD. Patient was transferred to medical ICU on 11/24 for concerns of respiratory failure. Infectious disease was consulted 11/26 due to fever while on antibiotics. She was changed to cefoxitin for treatment of bilateral bronchopneumonia. She was changed to p.o. Augmentin 875 mg twice daily to stop on 12/9. Patient was on O2 nasal cannula 5 L at discharge and was to wean O2 at home as tolerated. Labs and imaging were followed daily. At time of discharge, Moo Alfaro was tolerating a regular diet, having bowel movements, ambulating on her own accord with walker and O2 and had adequate analgesia on oral pain medications, and had no signs of symptoms of complications. She is medically stable to be discharged. PHYSICAL EXAMINATION        Discharge Vitals:  height is 5' 5\" (1.651 m) and weight is 184 lb 4.9 oz (83.6 kg). Her oral temperature is 98.2 °F (36.8 °C). Her blood pressure is 124/72 and her pulse is 94. Her respiration is 25 and oxygen saturation is 89% (abnormal).      PE:   AOx3   Motor    L iliopsoas 5/5 , R iliopsoas 5/5  L quadriceps 5/5; R quadriceps 5/5  L Dorsiflexion 5/5; R dorsiflexion 5/5  L Plantarflexion 5/5; R plantarflexion 5/5  L EHL 5/5; R EHL 5/5     Sensation intact     Incision open to air, staples removed, healing well    LABS No results for input(s): WBC, HGB, HCT, PLT, NA, K, CL, CO2, BUN, CREATININE in the last 72 hours. DISCHARGE INSTRUCTIONS     Discharge Medications:        Medication List      START taking these medications    amoxicillin-clavulanate 875-125 MG per tablet  Commonly known as:  AUGMENTIN  Take 1 tablet by mouth 2 times daily for 7 days     fluticasone-salmeterol 250-50 MCG/DOSE Aepb  Commonly known as:  Advair Diskus  Inhale 1 puff into the lungs every 12 hours     hydroCHLOROthiazide 25 MG tablet  Commonly known as:  HYDRODIURIL  Take 1 tablet by mouth daily     lactobacillus capsule  Take 1 capsule by mouth daily (with breakfast)     oxyCODONE 5 MG immediate release tablet  Commonly known as:  ROXICODONE  Take 1 tablet by mouth every 6 hours as needed for Pain for up to 7 days. predniSONE 10 MG tablet  Commonly known as:  DELTASONE  2 tabs once a day for 3 days,1 tabs once a day for 3 days        CHANGE how you take these medications    Mobic 15 MG tablet  Generic drug:  meloxicam  What changed:  Another medication with the same name was removed. Continue taking this medication, and follow the directions you see here. tiZANidine 4 MG tablet  Commonly known as:  ZANAFLEX  Take 1 tablet by mouth 2 times daily for 10 days  What changed:  See the new instructions.         CONTINUE taking these medications    acetaminophen 500 MG tablet  Commonly known as:  TYLENOL  Take 1 tablet by mouth 4 times daily as needed for Pain     albuterol sulfate  (90 Base) MCG/ACT inhaler  Commonly known as:  Ventolin HFA  Inhale 2 puffs into the lungs every 6 hours as needed for Wheezing     atorvastatin 40 MG tablet  Commonly known as:  Lipitor  Take 1 tablet by mouth daily     azelastine 0.1 % nasal spray  Commonly known as:  ASTELIN  2 sprays by Nasal route 2 times daily Use in each nostril as directed     carvedilol 6.25 MG tablet  Commonly known as:  COREG  TAKE 1 TABLET BY MOUTH 2 TIMES DAILY     diphenhydrAMINE 25 MG tablet  Commonly known as:  BENADRYL      MG capsule  Generic drug:  docusate sodium  TAKE 1 CAPSULE EVERY DAY     DULoxetine 60 MG extended release capsule  Commonly known as:  CYMBALTA  Take 1 capsule by mouth daily     [employer] Test strip  Generic drug:  blood glucose test strips  1 each by In Vitro route daily As needed. gabapentin 300 MG capsule  Commonly known as:  NEURONTIN  TAKE 1 CAPSULE IN MORNING AND AFTERNOON AND 2 CAPSULES AT NIGHT     ipratropium-albuterol 0.5-2.5 (3) MG/3ML Soln nebulizer solution  Commonly known as:  DUONEB  Inhale 3 mLs into the lungs every 6 hours as needed for Shortness of Breath     Lancets Misc  1 each by Does not apply route daily     * mirtazapine 30 MG tablet  Commonly known as:  REMERON     * mirtazapine 15 MG tablet  Commonly known as:  REMERON     nicotine 21 MG/24HR  Commonly known as:  NICODERM CQ     omeprazole 20 MG delayed release capsule  Commonly known as:  PRILOSEC  TAKE 1 CAPSULE EVERY DAY     Spiriva HandiHaler 18 MCG inhalation capsule  Generic drug:  tiotropium     trospium 20 MG tablet  Commonly known as:  SANCTURA  TAKE 1 TABLET BY MOUTH 2 TIMES DAILY         * This list has 2 medication(s) that are the same as other medications prescribed for you. Read the directions carefully, and ask your doctor or other care provider to review them with you.             STOP taking these medications    amLODIPine 10 MG tablet  Commonly known as:  NORVASC     cetirizine 10 MG tablet  Commonly known as:  ZYRTEC     HYDROcodone-acetaminophen 5-325 MG per tablet  Commonly known as:  Norco     lisinopril-hydroCHLOROthiazide 20-25 MG per tablet  Commonly known as:  PRINZIDE;ZESTORETIC     metFORMIN 500 MG tablet  Commonly known as:  GLUCOPHAGE     potassium chloride 10 MEQ extended release tablet  Commonly known as:  KLOR-CON M           Where to Get Your Medications      These medications were sent to [facility], [address] [facility] [address]  [address], [place] [address]    Phone:  [phone]   amoxicillin-clavulanate 875-125 MG per tablet  hydroCHLOROthiazide 25 MG tablet  lactobacillus capsule  oxyCODONE 5 MG immediate release tablet  tiZANidine 4 MG tablet     These medications were sent to [facility] [address] [facility], [address] [place] [address]  [address], [place] [address]    Phone:  [phone]   predniSONE 10 MG tablet     Information about where to get these medications is not yet available    Ask your nurse or doctor about these medications  fluticasone-salmeterol 250-50 MCG/DOSE Aepb       Diet: No diet orders on file diet as tolerated  Activity: Provided in AVS  Wound Care: Daily and as needed  Follow-up:  in the [facility] clinic as shown in AVS   Time Spent for discharge: 30 minutes    [address] Dhillon  12/9/2020, 10:45 AM

## 2020-12-09 NOTE — CARE COORDINATION
[facility] Transitions Follow Up Call    2020    Patient: Anneliese Duke  Patient : 1960   MRN: [id number]  Reason for Admission: L4-5 lumbar laminectomy/pneumonia  Discharge Date: 20 RARS: Readmission Risk Score: 16         Spoke with: Anneliese Duke    Was able to contact [facility] for transitional outreach. She stated that she was stiff in the morning when she first wakes up, but is able to get about the house. She said that her breathing was fine, no cough and no fever/chills. She is done with her prednisone. She said that she has all her medications except the Advair inhaler and she did not have the paper script for it. Order called into [facility] for the Advair. 1111F order completed. She said that she was told that her incision was \"good\" and she said that it was itching. Nursing, therapy and aides continue. No further questions or concerns      Needs to be reviewed by the provider   Additional needs identified to be addressed with provider No  none  Discussed COVID-19 related testing which was available at this time. Test results were negative. Patient informed of results, if available? Yes         Method of communication with provider : none    Care Transition Nurse (CTN) contacted the patient by telephone to follow up after admission on 20. Verified name and  with patient as identifiers. Addressed changes since last contact: symptom management-post op/respiratory  Discharged needs reviewed: none  Follow up appointment completed? No and appt  with PCP    Advance Care Planning:   Does patient have an Advance Directive:  did not review. CTN reviewed discharge instructions, medical action plan and red flags with patient and discussed any barriers to care and/or understanding of plan of care after discharge. Discussed appropriate site of care based on symptoms and resources available to patient including: PCP, Home health and When to call 911.  The patient agrees to contact the PCP office for questions related to their healthcare. Patients top risk factors for readmission: functional physical ability and medical condition  Interventions to address risk factors: Assessment and support for treatment adherence and medication management-reviewed    Discussed follow-up appointments. If no appointment was previously scheduled, appointment scheduling offered: No Is follow up appointment scheduled within 7 days of discharge? No  Non-[facility] follow up appointment(s):     Plan for follow-up call in 7-10 days based on severity of symptoms and risk factors. Plan for next call: symptom management-post op/respiratory  CTN provided contact information for future needs. Care Transitions Subsequent and Final Call    Subsequent and Final Calls  Are you currently active with any services?:  Home Health  Care Transitions Interventions  Other Interventions:             Follow Up  Future Appointments   Date Time Provider Claudy Schaeffer   12/11/2020 11:30 AM ELVIRA Douglas - CNP [address] Nate Jamil   12/22/2020  9:30 AM Wendy Gavin MD [facility]   1/13/2021  9:50 AM DO India Ariza Neuro Geraldo Eastman RN

## 2020-12-11 ENCOUNTER — HOSPITAL ENCOUNTER (OUTPATIENT)
Dept: PAIN MANAGEMENT | Age: 60
Discharge: HOME | End: 2020-12-11
Payer: COMMERCIAL

## 2020-12-11 PROBLEM — G47.34 IDIOPATHIC SLEEP RELATED NONOBSTRUCTIVE ALVEOLAR HYPOVENTILATION: Status: ACTIVE | Noted: 2020-12-11

## 2020-12-11 PROBLEM — F17.200 TOBACCO DEPENDENCE: Status: ACTIVE | Noted: 2020-12-11

## 2020-12-11 PROBLEM — R94.2 PULMONARY FUNCTION STUDIES ABNORMAL: Status: ACTIVE | Noted: 2020-12-11

## 2020-12-11 PROCEDURE — 99442 PR PHYS/QHP TELEPHONE EVALUATION 11-20 MIN: CPT | Performed by: NURSE PRACTITIONER

## 2020-12-11 PROCEDURE — 99213 OFFICE O/P EST LOW 20 MIN: CPT

## 2020-12-11 RX ORDER — HYDROCODONE BITARTRATE AND ACETAMINOPHEN 5; 325 MG/1; MG/1
1 TABLET ORAL EVERY 8 HOURS PRN
Qty: 90 TABLET | Refills: 0 | Status: SHIPPED | OUTPATIENT
Start: 2020-12-11 | End: 2021-01-11 | Stop reason: SDUPTHER

## 2020-12-11 RX ORDER — MELOXICAM 7.5 MG/1
7.5 TABLET ORAL DAILY
Qty: 30 TABLET | Refills: 0 | Status: SHIPPED | OUTPATIENT
Start: 2020-12-11 | End: 2021-03-12 | Stop reason: SDUPTHER

## 2020-12-11 RX ORDER — NICOTINE 21 MG/24HR
1 PATCH, TRANSDERMAL 24 HOURS TRANSDERMAL EVERY 24 HOURS
Qty: 30 PATCH | Refills: 1 | Status: SHIPPED | OUTPATIENT
Start: 2020-12-11 | End: 2021-06-24 | Stop reason: ALTCHOICE

## 2020-12-11 RX ORDER — LISINOPRIL AND HYDROCHLOROTHIAZIDE 25; 20 MG/1; MG/1
TABLET ORAL
COMMUNITY
Start: 2020-12-04 | End: 2021-04-22

## 2020-12-11 ASSESSMENT — ENCOUNTER SYMPTOMS
BACK PAIN: 1
EYES NEGATIVE: 1
GASTROINTESTINAL NEGATIVE: 1

## 2020-12-11 NOTE — TELEPHONE ENCOUNTER
PC from pt requesting script for Nicoderm patches-- states she was given them in the hospital but was not given a script when she left. Med pended.

## 2020-12-11 NOTE — PROGRESS NOTES
[place] [facility] PROGRESS NOTE      Patient phone call to   review Medication Agreement    Chief Complaint: low back pain  She c/o low back pain which radiates down legs, Her pain is slightly improved, She had lumbar  fusion L4-L5 on 11-19-20202, She uses a walker at home She has home PT and PT. She is using a bone stimulator 2 hours a day, She uses oxygen at night, She has an aide to help her. Back Pain   This is a chronic problem. The problem occurs constantly. The pain is present in the lumbar spine. Quality: sharp. Radiates to: down legs. The pain is at a severity of 8/10. The pain is severe. The pain is worse during the night. The symptoms are aggravated by bending. Associated symptoms include tingling. Risk factors include menopause. She has tried analgesics for the symptoms. Patient denies any new neurological symptoms. No bowel or bladder incontinence, no weakness, and no falling.     Any new diagnostic workup: [] no  [] yes [] Xray [] CT scan [] MRI [] DEXA scan     [] Other            EXAMINATION:    THREE XRAY VIEWS OF THE LUMBAR SPINE         11/21/2020 11:03 am         COMPARISON:    Lumbar spine series October 25, 2010.         HISTORY:    ORDERING SYSTEM PROVIDED HISTORY: s/p fusion    TECHNOLOGIST PROVIDED HISTORY:    Standing AP and lateral    s/p fusion    Reason for Exam: s/p fusion         FINDINGS:    New transpedicular hardware fixation involving the level of L4-L5 with disc    spacer in place.  Skin staples and adjacent surgical drain are also seen.  No    definite radiographic evidence of complication.  Vertebral body and remaining    disc space heights appear relatively well maintained.  Mild scattered    endplate degenerative changes.  A large calcification is seen within the mid    pelvis likely related to an underlying fibroid.  SI joints appear    unremarkable.              Impression    New transpedicular hardware fixation L4-L5 without definitive radiographic    evidence of complication. Treatment goals:  Functional status: get better    Aberrancy:   Any alcoholic beverages  no     Any illegal drugs  no       Analgesia:8      Adverse  Effects :none    ADL;s :limited, recent surgery        Pill count: appropriate due today, received #28 oxycodone 5 mg post-op     Morphine equivalent dose as reported on OARRS:15     Review ofOARRS does not show any aberrant prescription behavior. Medication is helping the patient stay active. Patient denies any side effects and reports adequate analgesia. No sign of misuse/abuse. When was thelast UDS:             Was the UDS appropriate:      Record/Diagnostics Review:      As above, I did review the imaging    1/20/2019  3:29 AM - Juan, Arlenepn Incoming Lab Results From [facility]     Component  Value  Ref Range & Units  Status  Collected  Lab    6-Acetylmorphine, Ur  Not Detected   Final  01/17/2019  4:27 PM  [facility]    7-Aminoclonazepam, Urine  Not Detected   Final  01/17/2019  4:27 PM  [facility]    Alpha-OH-Alpraz, Urine  Not Detected   Final  01/17/2019  4:27 PM  [facility]    Alprazolam, Urine  Not Detected   Final  01/17/2019  4:27 PM  [facility]    Amphetamines, urine  Not Detected   Final  01/17/2019  4:27 PM  [facility]    Barbiturates, Ur  Not Detected   Final  01/17/2019  4:27 PM  [facility]    Benzoylecgonine, Ur  Not Detected   Final  01/17/2019  4:27 PM  [facility]    Buprenorphine Urine  Not Detected   Final  01/17/2019  4:27 PM  [facility]    Carisoprodol, Ur  Not Detected   Final  01/17/2019  4:27 PM  [facility]    (NOTE)   The carisoprodol immunoassay has cross-reactivity to carisoprodol   and meprobamate.     Clonazepam, Urine  Not Detected   Final  01/17/2019  4:27 PM  [facility]    Codeine, Urine  Not Detected   Final  01/17/2019  4:27 PM  [facility]    MDA, Ur  Not Detected   Final  01/17/2019  4:27 PM  [facility]    Diazepam, Urine  Not Detected   Final  01/17/2019  4:27 PM  [facility]    Ethyl Glucuronide Ur  Not Detected   Final  01/17/2019  4:27 PM  [facility] Fentanyl, Ur  Not Detected   Final  01/17/2019  4:27 PM  [facility]    Hydrocodone, Urine  Present   Final  01/17/2019  4:27 PM  [facility]    Hydromorphone, Urine  Not Detected   Final  01/17/2019  4:27 PM  [facility]    Lorazepam, Urine  Not Detected   Final  01/17/2019  4:27 PM  [facility]    Marijuana Metab, Ur  Not Detected   Final  01/17/2019  4:27 PM  [facility]    MDEA, JOSE, Ur  Not Detected   Final  01/17/2019  4:27 PM  [facility]    MDMA, Urine  Not Detected   Final  01/17/2019  4:27 PM  [facility]    Meperidine Metab, Ur  Not Detected   Final  01/17/2019  4:27 PM  [facility]    Methadone, Urine  Not Detected   Final  01/17/2019  4:27 PM  [facility]    Methamphetamine, Urine  Not Detected   Final  01/17/2019  4:27 PM  [facility]    Methylphenidate  Not Detected   Final  01/17/2019  4:27 PM  [facility]    Midazolam, Urine  Not Detected   Final  01/17/2019  4:27 PM  [facility]    Morphine Urine  Not Detected   Final  01/17/2019  4:27 PM  [facility]    Norbuprenorphine, Urine  Not Detected   Final  01/17/2019  4:27 PM  [facility]    Nordiazepam, Urine  Not Detected   Final  01/17/2019  4:27 PM  [facility]    Norfentanyl, Urine  Not Detected   Final  01/17/2019  4:27 PM  [facility]    NORHYDROCODONE, URINE  Present   Final  01/17/2019  4:27 PM  [facility]    Noroxycodone, Urine  Not Detected   Final  01/17/2019  4:27 PM  [facility]    NOROXYMORPHONE, URINE  Not Detected   Final  01/17/2019  4:27 PM  [facility]    Oxazepam, Urine  Not Detected   Final  01/17/2019  4:27 PM  [facility]    Oxycodone Urine  Not Detected   Final  01/17/2019  4:27 PM  [facility]    Oxymorphone, Urine  Not Detected   Final  01/17/2019  4:27 PM  [facility]    PCP, Urine  Not Detected   Final  01/17/2019  4:27 PM  [facility]    Phentermine, Ur  Not Detected   Final  01/17/2019  4:27 PM  [facility]    Propoxyphene, Urine  Not Detected   Final  01/17/2019  4:27 PM  [facility]    Tapentadol-O-Sulfate, Urine  Not Detected   Final  01/17/2019  4:27 PM  [facility]    Tapentadol, Urine  Not Detected   Final  01/17/2019  4:27 PM  [facility]    Temazepam, Urine  Not Detected   Final  01/17/2019  4:27 PM  [facility]    Tramadol, Urine  Not Detected   Final  01/17/2019  4:27 PM  [facility]    Zolpidem, Urine  Not Detected   Final  01/17/2019  4:27 PM  [facility]    Creatinine, Ur  235.6  20.0 - 400.0 mg/dL  Final  01/17/2019  4:27 PM  [facility]    Pain Mgt Drug Panel, Hi Res, Ur  See Below   Final  01/17/2019  4:27 PM  [facility]    (NOTE)   Methodology: Qualitative Enzyme Immunoassay and Qualitative Liquid   Chromatography-Time of Flight-Mass Spectrometry or Tandem Mass   Spectrometry, Quantitative Spectrophotometry   The absence of expected drug(s) and/or drug metabolite(s) may   indicate non-compliance, inappropriate timing of specimen   collection relative to drug administration, poor drug absorption,   diluted/adulterated urine, or limitations of testing. The   concentration must be greater than or equal to the cutoff to be   reported as present.  If specific drug concentrations are   required, contact the laboratory within two weeks of specimen   collection to request quantification by a second analytical   technique. Interpretive questions should be directed to the   laboratory. Results based on immunoassay detection that do not match clinical   expectations should be   interpreted with caution. Confirmatory testing by mass   spectrometry for immunoassay-based results is available, if   ordered within two weeks of specimen collection. Additional   charges apply. For medical purposes only; not valid for forensic use. This test was developed and its performance characteristics   determined by [employer]. The U.S. Food and Drug   Administration has not approved or cleared this test; however, FDA   clearance or approval is not currently required for clinical use. The results are not intended to be used as the sole means for   clinical diagnosis or patient management decisions.     EER Pain Mgt Drug Panel, High Res/Emit U  See Note   Final  01/17/2019  4:27 PM  [facility]    (NOTE)   Access [facility] Enhanced Report using either link below:   -Direct access: [email or web address]   -Enter Username, Password: [email or web address]   Username: [email or web address]   Password: [email or web address]   Performed by [address] [phone]   www. Kristen Lugo MD, Lab.  Director          Past Medical History:   Diagnosis Date    Allergic rhinitis     Alveolar hypoventilation 11/20/2020    Aortic insufficiency 2018    mild-moderate on echo (was seen and discharged [facility]ology[facility])    Bronchiectasis ([facility] 11/20/2020    Bronchitis     Chronic back pain     Pain management at [place] [address]. COPD (chronic obstructive pulmonary disease) ([facility]     Dr. Beata Plaacio to see 11/09/2020    Depression     DM (diabetes mellitus) ([facility] 12/18/2012    GERD (gastroesophageal reflux disease)     History of echocardiogram 05/2018    EF 65%, mild-moderate AI and TR    Hyperlipidemia     Dr. Adriano Mccoy Hypertension     Dr. Adriano Mccoy Obesity     Osteoarthritis     Peripheral vascular disease ([facility]     Primary osteoarthritis of both knees     Radicular pain of lumbosacral region     Spinal stenosis, lumbar region, without neurogenic claudication 04/30/2013    Tricuspid regurgitation 2018    mild-moderate on echo    Type II or unspecified type diabetes mellitus without mention of complication, not stated as uncontrolled     Dr. Adriano Mccoy Unspecified sleep apnea     no cpap used anymore    URI (upper respiratory infection)     Wears dentures     upper and lower full dentures    Wears glasses     Wellness examination     Dr. Ajit Terry -PCP last visit in early Oct. 2020       Past Surgical History:   Procedure Laterality Date    COLONOSCOPY  2/12/2009    normal    DILATION AND CURETTAGE OF UTERUS      GASTRECTOMY      partial    LUMBAR DISCECTOMY  01/2015    lumbar diskectomy    LUMBAR FUSION  11/19/2020     POSTERIOR FUSION L4/5,     LUMBAR FUSION N/A 11/19/2020    POSTERIOR FUSION L4/5, [employer], [place], [facility] #[id number] AMINA performed by Sharyle Bays, DO at [address] Right 4/30/13    Lumbar Diagnostic Block,  Kenalog 40 mg    NERVE BLOCK  5/23/13    Lumbar Radiofrequency, Kenalog 40mg    NERVE BLOCK  8/12/13    Lt MBNB  celestone 6mg    NERVE BLOCK Left 8-28-13    left lumbar diagnostic block #2 decadron 10 mg    NERVE BLOCK Left 9-24-13    left lumbar median branch radiofrequency    NERVE BLOCK  07-02-14    caudal, celestone 9 mg    NERVE BLOCK  7-16-14    caudal epidural #2, celestone 9mg, fentanyl 25mcg    NERVE BLOCK  7/30/14    caudal #3 decadron 10mg    NERVE BLOCK  11-6-14    duramorph epidural steroid block  duramorph 1 mg celestone 9 mg    NERVE BLOCK  11/20/15    TENS- Empi Select    NERVE BLOCK  07/20/2018    right transforminal # 1 decadron 10mg,isovue    NERVE BLOCK Bilateral 02/01/2019    bilat mbnb- no steroid    NERVE BLOCK Bilateral 02/08/2019    bilat mbnb, marcaine . 25%    OH KNEE SCOPE,DIAGNOSTIC Right 3/24/2017    KNEE ARTHROSCOPY WITH PARTIAL MEDIAL MENISECAL DEBRIDMENT  performed by Preston Ivey MD at [address]  9 20 2007    UPPER GASTROINTESTINAL ENDOSCOPY  4 21 2009,04/2011    gastritis, esophagitis       Allergies   Allergen Reactions    Aspirin      DUE TO ULCER    Claritin [Loratadine] Other (See Comments)     coughing    Flonase [Fluticasone Propionate]     Morphine And Related      GI Upset         Current Outpatient Medications:     lisinopril-hydroCHLOROthiazide (PRINZIDE;ZESTORETIC) 20-25 MG per tablet, , Disp: , Rfl:     metFORMIN (GLUCOPHAGE) 500 MG tablet, TAKE 1 TABLET BY MOUTH 2 TIMES DAILY (WITH MEALS), Disp: 180 tablet, Rfl: 0    cetirizine (ZYRTEC) 10 MG tablet, TAKE 1 TABLET BY MOUTH DAILY, Disp: 30 tablet, Rfl: 0    amLODIPine (NORVASC) 10 MG tablet, TAKE 1 TABLET BY MOUTH DAILY, Disp: 90 tablet, Rfl: 2    potassium chloride (KLOR-CON M) 10 MEQ extended release tablet, TAKE 2 TABLETS BY MOUTH DAILY, Disp: 60 tablet, Rfl: 2    fluticasone-salmeterol (ADVAIR DISKUS) 250-50 MCG/DOSE AEPB, Inhale 1 puff into the lungs every 12 hours, Disp: 60 each, Rfl: 3    predniSONE (DELTASONE) 10 MG tablet, 2 tabs once a day for 3 days,1 tabs once a day for 3 days, Disp: 9 tablet, Rfl: 0    lactobacillus (CULTURELLE) capsule, Take 1 capsule by mouth daily (with breakfast), Disp: 30 capsule, Rfl: 0    tiZANidine (ZANAFLEX) 4 MG tablet, Take 1 tablet by mouth 2 times daily for 10 days, Disp: 20 tablet, Rfl: 0    hydroCHLOROthiazide (HYDRODIURIL) 25 MG tablet, Take 1 tablet by mouth daily, Disp: 30 tablet, Rfl: 3    carvedilol (COREG) 6.25 MG tablet, TAKE 1 TABLET BY MOUTH 2 TIMES DAILY, Disp: 180 tablet, Rfl: 0    meloxicam (MOBIC) 15 MG tablet, Take 15 mg by mouth daily, Disp: , Rfl:     trospium (SANCTURA) 20 MG tablet, TAKE 1 TABLET BY MOUTH 2 TIMES DAILY, Disp: 60 tablet, Rfl: 1    blood glucose test strips (EXACTECH TEST) strip, 1 each by In Vitro route daily As needed. , Disp: 50 each, Rfl: 11    gabapentin (NEURONTIN) 300 MG capsule, TAKE 1 CAPSULE IN MORNING AND AFTERNOON AND 2 CAPSULES AT NIGHT, Disp: 120 capsule, Rfl: 1    atorvastatin (LIPITOR) 40 MG tablet, Take 1 tablet by mouth daily, Disp: 90 tablet, Rfl: 1     MG capsule, TAKE 1 CAPSULE EVERY DAY, Disp: 30 capsule, Rfl: 10    albuterol sulfate HFA (VENTOLIN HFA) 108 (90 Base) MCG/ACT inhaler, Inhale 2 puffs into the lungs every 6 hours as needed for Wheezing, Disp: 1 Inhaler, Rfl: 3    omeprazole (PRILOSEC) 20 MG delayed release capsule, TAKE 1 CAPSULE EVERY DAY, Disp: 30 capsule, Rfl: 11    diphenhydrAMINE (BENADRYL) 25 MG tablet, Take 25 mg by mouth every 6 hours as needed for Itching, Disp: , Rfl:     nicotine (NICODERM CQ) 21 MG/24HR, Place 1 patch onto the skin every 24 hours, Disp: , Rfl:     SPIRIVA HANDIHALER 18 MCG inhalation capsule, , Disp: , Rfl:     acetaminophen (TYLENOL) 500 MG tablet, Take 1 tablet by mouth 4 times daily as needed for Pain, Disp: 30 tablet, Rfl: 0    mirtazapine (REMERON) 30 MG tablet, Take 30 mg by mouth nightly, Disp: , Rfl:     mirtazapine (REMERON) 15 MG tablet, Take 15 mg by mouth nightly, Disp: , Rfl:     DULoxetine (CYMBALTA) 60 MG extended release capsule, Take 1 capsule by mouth daily, Disp: 30 capsule, Rfl: 3    Lancets MISC, 1 each by Does not apply route daily, Disp: 100 each, Rfl: 11    azelastine (ASTELIN) 0.1 % nasal spray, 2 sprays by Nasal route 2 times daily Use in each nostril as directed, Disp: 1 Bottle, Rfl: 3    ipratropium-albuterol (DUONEB) 0.5-2.5 (3) MG/3ML SOLN nebulizer solution, Inhale 3 mLs into the lungs every 6 hours as needed for Shortness of Breath, Disp: 360 mL, Rfl: 11    Family History   Problem Relation Age of Onset    Diabetes Mother     Heart Disease Mother     Cirrhosis Father        Social History     Socioeconomic History    Marital status: Single     Spouse name: Not on file    Number of children: Not on file    Years of education: Not on file    Highest education level: Not on file   Occupational History     Employer: DISABLED   Social Needs    Financial resource strain: Not very hard    Food insecurity     Worry: Never true     Inability: Never true    Transportation needs     Medical: No     Non-medical: No   Tobacco Use    Smoking status: Former Smoker     Packs/day: 0.25     Years: 36.00     Pack years: 9.00     Types: Cigarettes     Last attempt to quit: 10/30/2020     Years since quittin.1    Smokeless tobacco: Never Used    Tobacco comment: pt currently using nicotine patches   5 CIGARETTES A DAY   Substance and Sexual Activity    Alcohol use: No     Alcohol/week: 0.0 standard drinks     Frequency: Never     Binge frequency: Never    Drug use: No     Comment: 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 7 yrs    Sexual activity: Never   Lifestyle    Physical activity     Days per week: 0 days Minutes per session: 0 min    Stress: Only a little   Relationships    Social connections     Talks on phone: More than three times a week     Gets together: Once a week     Attends Congregational service: More than 4 times per year     Active member of club or organization: No     Attends meetings of clubs or organizations: Never     Relationship status: Never     Intimate partner violence     Fear of current or ex partner: Not on file     Emotionally abused: Not on file     Physically abused: Not on file     Forced sexual activity: Not on file   Other Topics Concern    Not on file   Social History Narrative    10/9/20 Patient keeping contact with others to a minimum due to [place] 19 Pandemic. 10/9/20 Patient has difficulty with ambulation due to DJD, stenosis and fibromyalgia         Review of Systems:  Review of Systems   Constitution: Negative. HENT: Negative. Eyes: Negative. Cardiovascular: Negative. States leaky heart valve   Respiratory:        Uses oxygen   Endocrine:        Diabetic: blood sugar 110 this am   Hematologic/Lymphatic: Bruises/bleeds easily. Skin: Positive for dry skin. Musculoskeletal: Positive for arthritis, back pain and joint pain. Gastrointestinal: Negative. Genitourinary: Positive for nocturia. Neurological: Positive for tingling. Uses a walker         Physical Exam:  LMP 04/19/2003     Physical Exam  Skin:         Neurological:      Mental Status: She is alert and oriented to person, place, and time. Psychiatric:         Mood and Affect: Mood normal.         Thought Content:  Thought content normal.           Assessment:    Problem List Items Addressed This Visit     Other spondylosis with radiculopathy, lumbar region - Primary    Osteoarthritis of spine with radiculopathy, lumbar region    Medication monitoring encounter    Lumbar disc disease              Treatment Plan:  DISCUSSION: Treatment options discussed withpatient and all relaxants, illegal drugs  or any medications that can depress breathing  Patient is also advised to tell us if there is any changes in their medications from other physicians. Location:  patient  at  home   ,provider working from home  Phone call:  15 minutes    1.  I asked her to call her surgeon's office to see if it is okay to resume mobic    TREATMENT OPTIONS:       Medication Agreement Requirements Met  Continue Opioid therapy  Script written for hydrocodone, mobic  Follow up appointment made

## 2020-12-15 ENCOUNTER — CARE COORDINATION (OUTPATIENT)
Dept: CASE MANAGEMENT | Age: 60
End: 2020-12-15

## 2020-12-15 NOTE — CARE COORDINATION
[facility] Transitions Follow Up Call    12/15/2020    Patient: Sharri Flor  Patient : 1960   MRN: [id number]  Reason for Admission: Respiratory Failure  Discharge Date: 20 RARS: Readmission Risk Score: 16       Unable to contact. Both numbers listed for patient attempted.   Future Appointments   Date Time Provider Claudy Schaeffer   2020  9:30 AM Brianne Diaz MD [facility] [address]   2021  8:40 AM Lorenzo Barnett MD  Nate [facility]il   2021  9:50 AM DO India Fuentes Neuro Bea Clancy RN

## 2020-12-16 ENCOUNTER — CARE COORDINATION (OUTPATIENT)
Dept: CASE MANAGEMENT | Age: 60
End: 2020-12-16

## 2020-12-16 NOTE — CARE COORDINATION
[facility] Transitions Follow Up Call    2020    Patient: Myla Mota  Patient : 1960   MRN: [id number]  Reason for Admission: L4-5 lumbar laminectomy/pneumonia  Discharge Date: 20 RARS: Readmission Risk Score: 16         2nd attempt to reach patient for Care Transitions. formerly [facility] requesting return call. Contact information provided. [phone]    Care Transitions Subsequent and Final Call    Subsequent and Final Calls  Are you currently active with any services?: Home Health  Care Transitions Interventions  Other Interventions:            Follow Up  Future Appointments   Date Time Provider Claudy Schaeffer   2020  9:30 AM Verónica Romero MD [facility] IM [address]   2021  8:40 AM Randy Pike MD  [facility]il   2021  9:50 AM DO India Jackson Neuro May Gonzalez RN

## 2020-12-17 ENCOUNTER — CARE COORDINATION (OUTPATIENT)
Dept: CASE MANAGEMENT | Age: 60
End: 2020-12-17

## 2020-12-17 ENCOUNTER — TELEPHONE (OUTPATIENT)
Dept: INTERNAL MEDICINE | Age: 60
End: 2020-12-17

## 2020-12-17 NOTE — TELEPHONE ENCOUNTER
PC from pt nurse calling from [facility] calling to report her BP from today the first one 143/97 and 5 minutes after it was 144/96, Nurse stated that took her medicine and she wants to know if she can now take her Meloxicam.

## 2020-12-17 NOTE — TELEPHONE ENCOUNTER
Has her BP been running high or is this one high number? Please ask them to call in home BP readings for at least 1 week.  Thanks

## 2020-12-17 NOTE — CARE COORDINATION
[facility] Transitions Follow Up Call    2020    Patient: Lisseth Montilla  Patient : 1960   MRN: [id number]  Reason for Admission: Respiratory Failure  Discharge Date: 20 RARS: Readmission Risk Score: 16    Third attempt to contact. Care Transitions Episode Closed.     Future Appointments   Date Time Provider Claudy Schaeffer   2020  9:30 AM Jm Weeks MD [facility]   2021  8:40 AM Raymond Drummond MD  [facility]il   2021  9:50 AM DO India Greene Neuro Jaspal Maxwell RN

## 2020-12-22 ENCOUNTER — OFFICE VISIT (OUTPATIENT)
Dept: INTERNAL MEDICINE | Age: 60
End: 2020-12-22
Payer: COMMERCIAL

## 2020-12-22 VITALS
HEART RATE: 102 BPM | DIASTOLIC BLOOD PRESSURE: 78 MMHG | WEIGHT: 181 LBS | BODY MASS INDEX: 30.12 KG/M2 | OXYGEN SATURATION: 94 % | SYSTOLIC BLOOD PRESSURE: 140 MMHG

## 2020-12-22 PROCEDURE — 3044F HG A1C LEVEL LT 7.0%: CPT | Performed by: STUDENT IN AN ORGANIZED HEALTH CARE EDUCATION/TRAINING PROGRAM

## 2020-12-22 PROCEDURE — 1111F DSCHRG MED/CURRENT MED MERGE: CPT | Performed by: STUDENT IN AN ORGANIZED HEALTH CARE EDUCATION/TRAINING PROGRAM

## 2020-12-22 PROCEDURE — 3017F COLORECTAL CA SCREEN DOC REV: CPT | Performed by: STUDENT IN AN ORGANIZED HEALTH CARE EDUCATION/TRAINING PROGRAM

## 2020-12-22 PROCEDURE — 99211 OFF/OP EST MAY X REQ PHY/QHP: CPT | Performed by: INTERNAL MEDICINE

## 2020-12-22 PROCEDURE — 1036F TOBACCO NON-USER: CPT | Performed by: STUDENT IN AN ORGANIZED HEALTH CARE EDUCATION/TRAINING PROGRAM

## 2020-12-22 PROCEDURE — 3023F SPIROM DOC REV: CPT | Performed by: STUDENT IN AN ORGANIZED HEALTH CARE EDUCATION/TRAINING PROGRAM

## 2020-12-22 PROCEDURE — G8926 SPIRO NO PERF OR DOC: HCPCS | Performed by: STUDENT IN AN ORGANIZED HEALTH CARE EDUCATION/TRAINING PROGRAM

## 2020-12-22 PROCEDURE — 99214 OFFICE O/P EST MOD 30 MIN: CPT | Performed by: STUDENT IN AN ORGANIZED HEALTH CARE EDUCATION/TRAINING PROGRAM

## 2020-12-22 PROCEDURE — G8482 FLU IMMUNIZE ORDER/ADMIN: HCPCS | Performed by: STUDENT IN AN ORGANIZED HEALTH CARE EDUCATION/TRAINING PROGRAM

## 2020-12-22 PROCEDURE — G8417 CALC BMI ABV UP PARAM F/U: HCPCS | Performed by: STUDENT IN AN ORGANIZED HEALTH CARE EDUCATION/TRAINING PROGRAM

## 2020-12-22 PROCEDURE — 2022F DILAT RTA XM EVC RTNOPTHY: CPT | Performed by: STUDENT IN AN ORGANIZED HEALTH CARE EDUCATION/TRAINING PROGRAM

## 2020-12-22 PROCEDURE — G8427 DOCREV CUR MEDS BY ELIG CLIN: HCPCS | Performed by: STUDENT IN AN ORGANIZED HEALTH CARE EDUCATION/TRAINING PROGRAM

## 2020-12-22 RX ORDER — AZELASTINE 1 MG/ML
2 SPRAY, METERED NASAL 2 TIMES DAILY
Qty: 1 BOTTLE | Refills: 0 | Status: SHIPPED | OUTPATIENT
Start: 2020-12-22 | End: 2021-10-26 | Stop reason: SDUPTHER

## 2020-12-22 RX ORDER — TIOTROPIUM BROMIDE 18 UG/1
18 CAPSULE ORAL; RESPIRATORY (INHALATION) DAILY
Qty: 30 CAPSULE | Refills: 3 | Status: SHIPPED | OUTPATIENT
Start: 2020-12-22 | End: 2022-03-30

## 2020-12-22 NOTE — PATIENT INSTRUCTIONS
Medications e-scribe to pharmacy of pt's choice. -Xray order mailed to the patient, this test does not need to be scheduled, please take order with you to registration. Patient was put on a wait list and will be contacted to schedule their next follow up appointment once the schedule is available. If the patient is in need of an appointment before their next visit please call the office at [phone]. After Visit Summary  mailed to patient.     SL

## 2020-12-22 NOTE — PROGRESS NOTES
Post-Discharge Transitional Care Management Services or Hospital Follow Up      Carlin Lockhart   YOB: 1960    Date of Office Visit:  12/22/2020  Date of Hospital Admission: 11/19/20  Date of Hospital Discharge: 12/2/20  Risk of hospital readmission (high >=14%. Medium >=10%) :Readmission Risk Score: 16      Care management risk score Rising risk (score 2-5) and Complex Care (Scores >=6): 6     Non face to face  following discharge, date last encounter closed (first attempt may have been earlier): *No documented post hospital discharge outreach found in the last 14 days    Call initiated 2 business days of discharge: *No response recorded in the last 14 days    Patient Active Problem List   Diagnosis    DJD (degenerative joint disease) of knee    Osteoarthritis of spine with radiculopathy, lumbar region    GERD (gastroesophageal reflux disease)    COPD, severity to be determined ([address]    HTN (hypertension)    Allergic rhinitis    Lipoma of shoulder s/p excision right posterior 11 17 2008    History of tobacco use    DM (diabetes mellitus)    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    Primary osteoarthritis of both knees    Medication monitoring encounter    Chronic low back pain    Major depression, chronic    Chronic respiratory failure with hypoxia ([address]    Mitral and aortic insufficiency    Pure hypercholesterolemia    Other spondylosis with radiculopathy, lumbar region    Lumbar disc disease    Alveolar hypoventilation    Obesity (BMI 30-39. 9)    Bronchiectasis ([address]    Centrilobular emphysema (HCC)    Oxygen dependent    Acute postoperative anemia due to expected blood loss    Multifocal pneumonia    Acute on chronic respiratory failure with hypoxia (HCC)    Pulmonary function studies abnormal    Tobacco dependence    Idiopathic sleep related nonobstructive alveolar hypoventilation       Allergies   Allergen Reactions    Aspirin      DUE TO ULCER    Claritin [Loratadine] Other (See Comments)     coughing    Flonase [Fluticasone Propionate]     Morphine And Related      GI Upset       Medications listed as ordered at the time of discharge from hospital   [facility] Medication Instructions [facility]:[id number]    Printed on:12/22/20 1021   Medication Information                      acetaminophen (TYLENOL) 500 MG tablet  Take 1 tablet by mouth 4 times daily as needed for Pain             albuterol sulfate HFA (VENTOLIN HFA) 108 (90 Base) MCG/ACT inhaler  Inhale 2 puffs into the lungs every 6 hours as needed for Wheezing             amLODIPine (NORVASC) 10 MG tablet  TAKE 1 TABLET BY MOUTH DAILY             atorvastatin (LIPITOR) 40 MG tablet  Take 1 tablet by mouth daily             azelastine (ASTELIN) 0.1 % nasal spray  2 sprays by Nasal route 2 times daily Use in each nostril as directed             blood glucose test strips (EXACTECH TEST) strip  1 each by In Vitro route daily As needed. carvedilol (COREG) 6.25 MG tablet  TAKE 1 TABLET BY MOUTH 2 TIMES DAILY             cetirizine (ZYRTEC) 10 MG tablet  TAKE 1 TABLET BY MOUTH DAILY             diphenhydrAMINE (BENADRYL) 25 MG tablet  Take 25 mg by mouth every 6 hours as needed for Itching              MG capsule  TAKE 1 CAPSULE EVERY DAY             DULoxetine (CYMBALTA) 60 MG extended release capsule  Take 1 capsule by mouth daily             fluticasone-salmeterol (ADVAIR DISKUS) 250-50 MCG/DOSE AEPB  Inhale 1 puff into the lungs every 12 hours             gabapentin (NEURONTIN) 300 MG capsule  TAKE 1 CAPSULE IN MORNING AND AFTERNOON AND 2 CAPSULES AT NIGHT             HYDROcodone-acetaminophen (NORCO) 5-325 MG per tablet  Take 1 tablet by mouth every 8 hours as needed for Pain for up to 30 days.  Early refill due to holidays             ipratropium-albuterol (DUONEB) 0.5-2.5 (3) MG/3ML SOLN nebulizer solution  Inhale 3 mLs into the lungs every 6 hours as needed for Shortness of Breath             lactobacillus (CULTURELLE) capsule  Take 1 capsule by mouth daily (with breakfast)             Lancets MISC  1 each by Does not apply route daily             lisinopril-hydroCHLOROthiazide (PRINZIDE;ZESTORETIC) 20-25 MG per tablet               meloxicam (MOBIC) 7.5 MG tablet  Take 1 tablet by mouth daily             metFORMIN (GLUCOPHAGE) 500 MG tablet  TAKE 1 TABLET BY MOUTH 2 TIMES DAILY (WITH MEALS)             mirtazapine (REMERON) 15 MG tablet  Take 15 mg by mouth nightly             mirtazapine (REMERON) 30 MG tablet  Take 30 mg by mouth nightly             nicotine (NICODERM CQ) 21 MG/24HR  Place 1 patch onto the skin every 24 hours             omeprazole (PRILOSEC) 20 MG delayed release capsule  TAKE 1 CAPSULE EVERY DAY             potassium chloride (KLOR-CON M) 10 MEQ extended release tablet  TAKE 2 TABLETS BY MOUTH DAILY             SPIRIVA HANDIHALER 18 MCG inhalation capsule               trospium (SANCTURA) 20 MG tablet  TAKE 1 TABLET BY MOUTH 2 TIMES DAILY                   Medications marked \"taking\" at this time  Outpatient Medications Marked as Taking for the 12/22/20 encounter (Office Visit) with Nazanin Vaz MD   Medication Sig Dispense Refill    lisinopril-hydroCHLOROthiazide (PRINZIDE;ZESTORETIC) 20-25 MG per tablet       HYDROcodone-acetaminophen (NORCO) 5-325 MG per tablet Take 1 tablet by mouth every 8 hours as needed for Pain for up to 30 days.  Early refill due to holidays 90 tablet 0    meloxicam (MOBIC) 7.5 MG tablet Take 1 tablet by mouth daily 30 tablet 0    nicotine (NICODERM CQ) 21 MG/24HR Place 1 patch onto the skin every 24 hours 30 patch 1    metFORMIN (GLUCOPHAGE) 500 MG tablet TAKE 1 TABLET BY MOUTH 2 TIMES DAILY (WITH MEALS) 180 tablet 0    cetirizine (ZYRTEC) 10 MG tablet TAKE 1 TABLET BY MOUTH DAILY 30 tablet 0    amLODIPine (NORVASC) 10 MG tablet TAKE 1 TABLET BY MOUTH DAILY 90 tablet 2    potassium chloride (KLOR-CON M) 10 MEQ extended release tablet TAKE 2 TABLETS BY MOUTH DAILY 60 tablet 2    fluticasone-salmeterol (ADVAIR DISKUS) 250-50 MCG/DOSE AEPB Inhale 1 puff into the lungs every 12 hours 60 each 3    lactobacillus (CULTURELLE) capsule Take 1 capsule by mouth daily (with breakfast) 30 capsule 0    carvedilol (COREG) 6.25 MG tablet TAKE 1 TABLET BY MOUTH 2 TIMES DAILY 180 tablet 0    blood glucose test strips (EXACTECH TEST) strip 1 each by In Vitro route daily As needed.  50 each 11    gabapentin (NEURONTIN) 300 MG capsule TAKE 1 CAPSULE IN MORNING AND AFTERNOON AND 2 CAPSULES AT NIGHT 120 capsule 1    atorvastatin (LIPITOR) 40 MG tablet Take 1 tablet by mouth daily 90 tablet 1     MG capsule TAKE 1 CAPSULE EVERY DAY 30 capsule 10    albuterol sulfate HFA (VENTOLIN HFA) 108 (90 Base) MCG/ACT inhaler Inhale 2 puffs into the lungs every 6 hours as needed for Wheezing 1 Inhaler 3    omeprazole (PRILOSEC) 20 MG delayed release capsule TAKE 1 CAPSULE EVERY DAY 30 capsule 11    diphenhydrAMINE (BENADRYL) 25 MG tablet Take 25 mg by mouth every 6 hours as needed for Itching      SPIRIVA HANDIHALER 18 MCG inhalation capsule       acetaminophen (TYLENOL) 500 MG tablet Take 1 tablet by mouth 4 times daily as needed for Pain 30 tablet 0    mirtazapine (REMERON) 30 MG tablet Take 30 mg by mouth nightly      mirtazapine (REMERON) 15 MG tablet Take 15 mg by mouth nightly      DULoxetine (CYMBALTA) 60 MG extended release capsule Take 1 capsule by mouth daily 30 capsule 3    Lancets MISC 1 each by Does not apply route daily 100 each 11    azelastine (ASTELIN) 0.1 % nasal spray 2 sprays by Nasal route 2 times daily Use in each nostril as directed 1 Bottle 3    ipratropium-albuterol (DUONEB) 0.5-2.5 (3) MG/3ML SOLN nebulizer solution Inhale 3 mLs into the lungs every 6 hours as needed for Shortness of Breath 360 mL 11        Medications patient taking as of now reconciled against medications ordered at time of hospital discharge: Yes    Chief Complaint   Patient presents with   [address] from Hospital     Follow up after hospital, needs med refills. Not much pain after having surgery. Waiting to be put back on all medications    Health Maintenance     due for dm eye/foot exam. Dm eye exam was back in June. Done at [facility]       History of Present illness - Follow up of Hospital diagnosis(es): Pneumonia, Spondylolisthesis of lumbar region, lumbar radiculopathy    Inpatient course: Discharge summary reviewed- see chart. Interval history/Current status:     She is doing well. Denied fever or chills. Still having right leg stiffness. Follows Pain Management, PT and OT. Still having pain; has not been on her pain medications as she is waiting for the okay by her orthopedic surgeon. Blood pressure usually high at home and at therapy. She was taken off lisinopril in the hospital.  Will restart    Had diabetic eye exam. Needs form to send to the insurance so she can receive her benefits    Had diabetic foot exam earlier this year with her podiatrist. It was normal according to the patient    A comprehensive review of systems was negative except for what was noted in the HPI. Vitals:    12/22/20 0933 12/22/20 0939   BP: (!) 146/102 (!) 140/78   Pulse: 102    SpO2: 94%    Weight: 181 lb (82.1 kg)      Body mass index is 30.12 kg/m².    Wt Readings from Last 3 Encounters:   12/22/20 181 lb (82.1 kg)   12/02/20 184 lb 4.9 oz (83.6 kg)   11/05/20 185 lb (83.9 kg)     BP Readings from Last 3 Encounters:   12/22/20 (!) 140/78   12/02/20 124/72   11/19/20 109/73        Physical Exam:  General Appearance: alert and oriented to person, place and time, well developed and well- nourished, in no acute distress  Skin: warm and dry, no rash or erythema  Head: normocephalic and atraumatic  Eyes: pupils equal, round, and reactive to light, extraocular eye movements intact, conjunctivae normal  ENT: tympanic membrane, external ear and ear canal normal bilaterally, nose without deformity, nasal mucosa and turbinates normal without polyps  Neck: supple and non-tender without mass, no thyromegaly or thyroid nodules, no cervical lymphadenopathy  Pulmonary/Chest: clear to auscultation bilaterally- no wheezes, rales or rhonchi, normal air movement, no respiratory distress  Cardiovascular: normal rate, regular rhythm, normal S1 and S2, no murmurs, rubs, clicks, or gallops, distal pulses intact, no carotid bruits  Abdomen: soft, non-tender, non-distended, normal bowel sounds, no masses or organomegaly  Extremities: no cyanosis, clubbing or edema  Musculoskeletal: Reduced range of motion of right lower extremity. Midline healed scar on her back  Neurologic: reflexes normal and symmetric, no cranial nerve deficit, gait, coordination and speech normal    Assessment/Plan:  1. Chronic allergic rhinitis    - azelastine (ASTELIN) 0.1 % nasal spray; 2 sprays by Nasal route 2 times daily Use in each nostril as directed  Dispense: 1 Bottle; Refill: 3    2. Essential hypertension  -Restart lisinopril-hydrochlorothiazide 20-25 daily. Discontinue hydrochlorothiazide tablets prescribed on discharge  -Continue amlodipine, Coreg  - CA DISCHARGE MEDS RECONCILED W/ CURRENT OUTPATIENT MED LIST    3. Type 2 diabetes mellitus without complication, without long-term current use of insulin ([facility])  -Restart Metformin  -Continue daily blood glucose checks    4. COPD  -Spiriva refilled      Return in about 4 weeks (around 1/19/2021) for Hypertension follow-up.      Medical Decision Making: moderate complexity        Alcon Vega MD  PGY-3, Internal medicine resident  [address] [place] [address]  12/22/2020 10:28 AM

## 2020-12-22 NOTE — TELEPHONE ENCOUNTER
Encounter addressed at visit on 12/22/2020. Patient states that she will keep track of readings and call office within 1 week.

## 2020-12-22 NOTE — PROGRESS NOTES
Patient was here for follow-up after hospitalization. She was admitted for lumbar fusion surgery but had a postoperative aspiration pneumonia and acute respiratory failure. She was in the ICU with septic shock. She is currently discharged. She was on antibiotics for 10 days for aspiration pneumonia after discharge. She is doing much better. She has severe emphysema. She is currently not smoking and is on nicotine patch. She has a follow-up with pulmonologist.  Blood pressure is slightly elevated. Her ACE inhibitor was stopped in the hospital because of hypotension. Will restart her medications. Will advise her to call in some blood pressure readings. Her renal function was normal on discharge. Attending Physician Statement  I have discussed the care of Pan Palacio, including pertinent history and exam findings,  with the resident. I have seen and examined the patient and the key elements of all parts of the encounter have been performed by me. I agree with the assessment, plan and orders as documented by the resident.   (GC Modifier)

## 2021-01-09 ASSESSMENT — ENCOUNTER SYMPTOMS
CONSTIPATION: 0
BACK PAIN: 1
NAUSEA: 0
BOWEL INCONTINENCE: 0
ALLERGIC/IMMUNOLOGIC NEGATIVE: 1
EYE REDNESS: 0
PHOTOPHOBIA: 0
COUGH: 0
SHORTNESS OF BREATH: 0
ABDOMINAL PAIN: 0
COLOR CHANGE: 0
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
## 2021-01-11 ENCOUNTER — HOSPITAL ENCOUNTER (OUTPATIENT)
Dept: PAIN MANAGEMENT | Age: 61
Discharge: HOME | End: 2021-01-11
Payer: COMMERCIAL

## 2021-01-11 DIAGNOSIS — M17.0 PRIMARY OSTEOARTHRITIS OF BOTH KNEES: ICD-10-CM

## 2021-01-11 DIAGNOSIS — M47.26 OSTEOARTHRITIS OF SPINE WITH RADICULOPATHY, LUMBAR REGION: ICD-10-CM

## 2021-01-11 DIAGNOSIS — Z51.81 MEDICATION MONITORING ENCOUNTER: ICD-10-CM

## 2021-01-11 DIAGNOSIS — M54.40 CHRONIC MIDLINE LOW BACK PAIN WITH SCIATICA, SCIATICA LATERALITY UNSPECIFIED: ICD-10-CM

## 2021-01-11 DIAGNOSIS — M51.36 DDD (DEGENERATIVE DISC DISEASE), LUMBAR: ICD-10-CM

## 2021-01-11 DIAGNOSIS — G89.29 CHRONIC MIDLINE LOW BACK PAIN WITH SCIATICA, SCIATICA LATERALITY UNSPECIFIED: ICD-10-CM

## 2021-01-11 DIAGNOSIS — M47.26 OTHER SPONDYLOSIS WITH RADICULOPATHY, LUMBAR REGION: ICD-10-CM

## 2021-01-11 DIAGNOSIS — M51.9 LUMBAR DISC DISEASE: ICD-10-CM

## 2021-01-11 DIAGNOSIS — M96.1 POSTLAMINECTOMY SYNDROME, LUMBAR REGION: Primary | ICD-10-CM

## 2021-01-11 DIAGNOSIS — J44.9 CHRONIC OBSTRUCTIVE PULMONARY DISEASE, UNSPECIFIED COPD TYPE (HCC): ICD-10-CM

## 2021-01-11 PROCEDURE — 99213 OFFICE O/P EST LOW 20 MIN: CPT

## 2021-01-11 PROCEDURE — 99214 OFFICE O/P EST MOD 30 MIN: CPT | Performed by: PAIN MEDICINE

## 2021-01-11 RX ORDER — HYDROCODONE BITARTRATE AND ACETAMINOPHEN 5; 325 MG/1; MG/1
1 TABLET ORAL EVERY 8 HOURS PRN
Qty: 90 TABLET | Refills: 0 | Status: SHIPPED | OUTPATIENT
Start: 2021-01-11 | End: 2021-02-09 | Stop reason: SDUPTHER

## 2021-01-12 ENCOUNTER — HOSPITAL ENCOUNTER (OUTPATIENT)
Age: 61
Discharge: HOME | End: 2021-01-12
Payer: COMMERCIAL

## 2021-01-12 DIAGNOSIS — M17.0 PRIMARY OSTEOARTHRITIS OF BOTH KNEES: ICD-10-CM

## 2021-01-12 PROCEDURE — 80307 DRUG TEST PRSMV CHEM ANLYZR: CPT

## 2021-01-12 ASSESSMENT — ENCOUNTER SYMPTOMS
VOICE CHANGE: 0
WHEEZING: 0
VOMITING: 0

## 2021-01-13 ENCOUNTER — OFFICE VISIT (OUTPATIENT)
Dept: NEUROSURGERY | Age: 61
End: 2021-01-13

## 2021-01-13 VITALS
SYSTOLIC BLOOD PRESSURE: 110 MMHG | WEIGHT: 181 LBS | DIASTOLIC BLOOD PRESSURE: 72 MMHG | HEIGHT: 65 IN | BODY MASS INDEX: 30.16 KG/M2 | HEART RATE: 83 BPM | OXYGEN SATURATION: 94 %

## 2021-01-13 DIAGNOSIS — M43.16 SPONDYLOLISTHESIS OF LUMBAR REGION: Primary | ICD-10-CM

## 2021-01-13 DIAGNOSIS — M54.17 LUMBOSACRAL RADICULOPATHY AT L4: ICD-10-CM

## 2021-01-13 PROCEDURE — 99024 POSTOP FOLLOW-UP VISIT: CPT | Performed by: NEUROLOGICAL SURGERY

## 2021-01-13 NOTE — PROGRESS NOTES
Joann Gandhi  [facility] [address] [address]  Dept: [phone]    Patient:  Concepcion Saldivar  YOB: 1960  Date: 1/13/21    The patient is a 56 y.o. female who presents today for consult of the following problems:     Chief Complaint   Patient presents with    Post-Op Check     L4-5 right-sided laminectomy with Rosas facetectomy of L4 ,L4-5 discectomy and anterior arthrodesis             HPI:     Concepcion Saldivar is a 56 y.o. female on whom neurosurgical consultation was requested by Becky Urena MD for management of adjacent level disease. Resolved RLE radiculopathy. Has been progressing with therapy and doing stairs currently.  C/o achiness in the LE with use of bone stimulator      History:     Past Medical History:   Diagnosis Date    Allergic rhinitis     Alveolar hypoventilation 11/20/2020    Aortic insufficiency 2018    mild-moderate on echo (was seen and discharged from cardiology-[facility])    Bronchiectasis ([facility] [address] 11/20/2020    Bronchitis     Chronic back pain     [facility] management at [facility]. COPD (chronic obstructive pulmonary disease) ([facility] [address]     Dr. Nena Becker to see 11/09/2020    Depression     DM (diabetes mellitus) ([facility] [address] 12/18/2012    GERD (gastroesophageal reflux disease)     History of echocardiogram 05/2018    EF 65%, mild-moderate AI and TR    Hyperlipidemia     Dr. Kevin Cho Hypertension     Dr. Kevin Cho Obesity     Osteoarthritis     Peripheral vascular disease ([facility] [address]     Primary osteoarthritis of both knees     Radicular pain of lumbosacral region     Spinal stenosis, lumbar region, without neurogenic claudication 04/30/2013    Tricuspid regurgitation 2018    mild-moderate on echo    Type II or unspecified type diabetes mellitus without mention of complication, not stated as uncontrolled     Dr. Kevin Cho Unspecified sleep apnea     no cpap used anymore    URI (upper respiratory infection)     Wears dentures     upper and lower full dentures    Wears glasses     Wellness examination     Dr. Caty Caldwell -PCP last visit in early Oct. 2020     Past Surgical History:   Procedure Laterality Date    COLONOSCOPY  2/12/2009    normal    DILATION AND CURETTAGE OF UTERUS      GASTRECTOMY      partial    LUMBAR DISCECTOMY  01/2015    lumbar diskectomy    LUMBAR FUSION  11/19/2020     POSTERIOR FUSION L4/5,     LUMBAR FUSION N/A 11/19/2020    POSTERIOR FUSION L4/5, [employer], Tarshae Handsome, EVOKES #[id number] AMINA performed by Tiffanie Mccoy DO at [facility] Right 4/30/13    Lumbar Diagnostic Block,  Kenalog 40 mg    NERVE BLOCK  5/23/13    Lumbar Radiofrequency, Kenalog 40mg    NERVE BLOCK  8/12/13    Lt MBNB  celestone 6mg    NERVE BLOCK Left 8-28-13    left lumbar diagnostic block #2 decadron 10 mg    NERVE BLOCK Left 9-24-13    left lumbar median branch radiofrequency    NERVE BLOCK  07-02-14    caudal, celestone 9 mg    NERVE BLOCK  7-16-14    caudal epidural #2, celestone 9mg, fentanyl 25mcg    NERVE BLOCK  7/30/14    caudal #3 decadron 10mg    NERVE BLOCK  11-6-14    duramorph epidural steroid block  duramorph 1 mg celestone 9 mg    NERVE BLOCK  11/20/15    TENS- Empi Select    NERVE BLOCK  07/20/2018    right transforminal # 1 decadron 10mg,isovue    NERVE BLOCK Bilateral 02/01/2019    bilat mbnb- no steroid    NERVE BLOCK Bilateral 02/08/2019    bilat mbnb, marcaine . 25%    [place] KNEE SCOPE,DIAGNOSTIC Right 3/24/2017    KNEE ARTHROSCOPY WITH PARTIAL MEDIAL MENISECAL DEBRIDMENT  performed by Georgia Perez MD at [facility]  9 20 2007    UPPER GASTROINTESTINAL ENDOSCOPY  4 21 2009,04/2011    gastritis, esophagitis     Family History   Problem Relation Age of Onset    Diabetes Mother     Heart Disease Mother     Cirrhosis Father      Current Outpatient Medications on File Prior to Visit   Medication Sig Dispense Refill    HYDROcodone-acetaminophen (NORCO) 5-325 MG per tablet Take 1 tablet by mouth every 8 hours as needed for Pain for up to 30 days. Early refill due to holidays 90 tablet 0    SPIRIVA HANDIHALER 18 MCG inhalation capsule Inhale 1 capsule into the lungs daily 30 capsule 3    azelastine (ASTELIN) 0.1 % nasal spray 2 sprays by Nasal route 2 times daily Use in each nostril as directed 1 Bottle 0    lisinopril-hydroCHLOROthiazide (PRINZIDE;ZESTORETIC) 20-25 MG per tablet       nicotine (NICODERM CQ) 21 MG/24HR Place 1 patch onto the skin every 24 hours 30 patch 1    metFORMIN (GLUCOPHAGE) 500 MG tablet TAKE 1 TABLET BY MOUTH 2 TIMES DAILY (WITH MEALS) 180 tablet 0    cetirizine (ZYRTEC) 10 MG tablet TAKE 1 TABLET BY MOUTH DAILY 30 tablet 0    amLODIPine (NORVASC) 10 MG tablet TAKE 1 TABLET BY MOUTH DAILY 90 tablet 2    potassium chloride (KLOR-CON M) 10 MEQ extended release tablet TAKE 2 TABLETS BY MOUTH DAILY 60 tablet 2    fluticasone-salmeterol (ADVAIR DISKUS) 250-50 MCG/DOSE AEPB Inhale 1 puff into the lungs every 12 hours 60 each 3    lactobacillus (CULTURELLE) capsule Take 1 capsule by mouth daily (with breakfast) 30 capsule 0    carvedilol (COREG) 6.25 MG tablet TAKE 1 TABLET BY MOUTH 2 TIMES DAILY 180 tablet 0    trospium (SANCTURA) 20 MG tablet TAKE 1 TABLET BY MOUTH 2 TIMES DAILY 60 tablet 1    blood glucose test strips (EXACTECH TEST) strip 1 each by In Vitro route daily As needed.  50 each 11    atorvastatin (LIPITOR) 40 MG tablet Take 1 tablet by mouth daily 90 tablet 1     MG capsule TAKE 1 CAPSULE EVERY DAY 30 capsule 10    albuterol sulfate HFA (VENTOLIN HFA) 108 (90 Base) MCG/ACT inhaler Inhale 2 puffs into the lungs every 6 hours as needed for Wheezing 1 Inhaler 3    omeprazole (PRILOSEC) 20 MG delayed release capsule TAKE 1 CAPSULE EVERY DAY 30 capsule 11    diphenhydrAMINE (BENADRYL) 25 MG tablet Take 25 mg by mouth every 6 hours as needed for Itching      Endocrine: Negative for cold intolerance and heat intolerance. Genitourinary: Negative for frequency and flank pain. Musculoskeletal: Negative for myalgias and joint swelling. Skin: Negative for rash and wound. Allergic/Immunologic: Negative for environmental allergies and food allergies. Hematological: Negative for adenopathy. Does not bruise/bleed easily. Psychiatric/Behavioral: Negative for self-injury. The patient is not nervous/anxious. Physical Exam:      /72 (Site: Right Upper Arm, Position: Sitting, Cuff Size: Medium Adult)   Pulse 83   Ht 5' 5\" (1.651 m)   Wt 181 lb (82.1 kg)   LMP 04/19/2003   SpO2 94%   BMI 30.12 kg/m²   Estimated body mass index is 30.12 kg/m² as calculated from the following:    Height as of this encounter: 5' 5\" (1.651 m). Weight as of this encounter: 181 lb (82.1 kg). General:  Sina Farris is a 56y.o. year old female who appears her stated age. HEENT: Normocephalic atraumatic. Neck supple. Chest: regular rate; pulses equal  Abdomen: Soft nontender nondistended. Normoactive bowel sounds.   Ext: DP and PT pulses 2+, good cap refill  Neuro    Mentation  Appropriate affect  Registration intact  Orientation intact  3 item recall intact  Judgement intact to situation    Cranial Nerves:   Pupils equal and reactive to light  Extraocular motion intact  Face and shrug symmetric  Tongue midline  No dysarthria  v1-3 sensation symmetric, masseter tone symmetric  Hearing symmetric and intact to finger rub    Sensation:   intact    Motor  L deltoid 5/5; R deltoid 5/5  L biceps 5/5; R biceps 5/5  L triceps 5/5; R triceps 5/5  L wrist extension 5/5; R wrist extension 5/5  L intrinsics 5/5; R intrinsics 5/5     L iliopsoas 5/5 , R iliopsoas 5/5  L quadriceps 5/5; R quadriceps 5/5  L Dorsiflexion 5/5; R dorsiflexion 5/5  L Plantarflexion 4/5; R plantarflexion 5/5  L EHL 5/5; R EHL 5/5    Reflexes  L Brachioradialis 2+/4; R brachioradialis 2+/4  L Biceps 2+/4; R Biceps 2+/4  L Triceps 2+/4; R Triceps 2+/4  L Patellar 2+/4: R Patellar 2+/4  L Achilles 2+/4; R Achilles 2+/4    hoffmans L: neg  hoffmans R: neg  Clonus L: neg  Clonus R: neg  Babinski L: up  Babinski R; up    Studies Review:     No new    Assessment and Plan:      1. Spondylolisthesis of lumbar region    2. Lumbosacral radiculopathy at L4          Plan:   Need xrays today  F/u 2mo   Continue bone stimulator  meloxicam restart in mid feb    Followup: No follow-ups on file. Prescriptions Ordered:  No orders of the defined types were placed in this encounter. Orders Placed:  No orders of the defined types were placed in this encounter. Electronically signed by Reena Mathur DO on 1/13/2021 at 10:55 AM    Please note that this chart was generated using voice recognition Dragon dictation software. Although every effort was made to ensure the accuracy of this automated transcription, some errors in transcription may have occurred.

## 2021-01-14 DIAGNOSIS — J30.9 CHRONIC ALLERGIC RHINITIS: ICD-10-CM

## 2021-01-14 DIAGNOSIS — K21.9 GASTROESOPHAGEAL REFLUX DISEASE: ICD-10-CM

## 2021-01-15 RX ORDER — CETIRIZINE HYDROCHLORIDE 10 MG/1
TABLET ORAL
Qty: 30 TABLET | Refills: 0 | Status: SHIPPED | OUTPATIENT
Start: 2021-01-15 | End: 2021-01-17

## 2021-01-15 RX ORDER — OMEPRAZOLE 20 MG/1
CAPSULE, DELAYED RELEASE ORAL
Qty: 30 CAPSULE | Refills: 10 | Status: SHIPPED | OUTPATIENT
Start: 2021-01-15 | End: 2021-01-17

## 2021-01-15 NOTE — TELEPHONE ENCOUNTER
Request for Cetirizine and Omeprazole. Next Visit Date: Patient last seen 12/22/2020 was asked to follow up in 3 months. Future Appointments   Date Time Provider Claudy Schaeffer   2/9/2021  9:30 AM ELVIRA Zafar - CNP [facility] [id number]   3/10/2021  9:50 AM Judie De La Vega [facility]       Health Maintenance   Topic Date Due    Diabetic retinal exam  09/26/2017    Diabetic foot exam  06/18/2019    Cervical cancer screen  02/15/2021    Shingles Vaccine (2 of 2) 09/22/2021 (Originally 8/19/2019)    A1C test (Diabetic or Prediabetic)  04/21/2021    Diabetic microalbuminuria test  04/21/2021    Lipid screen  04/21/2021    Annual Wellness Visit (AWV)  09/30/2021    Breast cancer screen  11/22/2021    Potassium monitoring  12/02/2021    Creatinine monitoring  12/02/2021    Colon cancer screen fecal DNA test (Cologuard)  10/05/2023    DTaP/Tdap/Td vaccine (2 - Td) 06/24/2029    Flu vaccine  Completed    Pneumococcal 0-64 years Vaccine  Completed    Hepatitis C screen  Completed    HIV screen  Completed    Hepatitis A vaccine  Aged Out    Hib vaccine  Aged Out    Meningococcal (ACWY) vaccine  Aged Out       Hemoglobin A1C (%)   Date Value   04/21/2020 5.8   03/05/2019 5.8   06/18/2018 5.8             ( goal A1C is < 7)   Microalb/Crt.  Ratio (mcg/mg creat)   Date Value   04/21/2020 CANNOT BE CALCULATED     LDL Cholesterol (mg/dL)   Date Value   04/21/2020 116       (goal LDL is <100)   AST (U/L)   Date Value   04/21/2020 18     ALT (U/L)   Date Value   04/21/2020 21     BUN (mg/dL)   Date Value   12/02/2020 15     BP Readings from Last 3 Encounters:   01/13/21 110/72   12/22/20 (!) 140/78   12/02/20 124/72          (goal 120/80)    All Future Testing planned in [facility]  Lab Frequency Next Occurrence   GE DIGITAL SCREEN W OR WO CAD BILATERAL Once 02/20/2021   XR CHEST (2 VW) Once 03/25/2021   XR LUMBAR SPINE (2-3 VIEWS) Once 03/10/2021   XR LUMBAR SPINE (2-3 VIEWS) Once 01/13/2021         Patient Active Problem List:     DJD (degenerative joint disease) of knee     Osteoarthritis of spine with radiculopathy, lumbar region     GERD (gastroesophageal reflux disease)     COPD, severity to be determined ([address]     HTN (hypertension)     Allergic rhinitis     Lipoma of shoulder s/p excision right posterior 11 17 2008     History of tobacco use     DM (diabetes mellitus)     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 9)     Bronchiectasis ([address]     Centrilobular emphysema ([address]     Oxygen dependent     Acute postoperative anemia due to expected blood loss     Multifocal pneumonia     Acute on chronic respiratory failure with hypoxia (HCC)     Pulmonary function studies abnormal     Tobacco dependence     Idiopathic sleep related nonobstructive alveolar hypoventilation     Postlaminectomy syndrome, lumbar region

## 2021-01-16 LAB
6-ACETYLMORPHINE, UR: NOT DETECTED
7-AMINOCLONAZEPAM, URINE: NOT DETECTED
ALPHA-OH-ALPRAZ, URINE: NOT DETECTED
ALPRAZOLAM, URINE: NOT DETECTED
AMPHETAMINES, URINE: NOT DETECTED
BARBITURATES, URINE: NOT DETECTED
BENZOYLECGONINE, UR: NOT DETECTED
BUPRENORPHINE URINE: NOT DETECTED
CARISOPRODOL, UR: NOT DETECTED
CLONAZEPAM, URINE: NOT DETECTED
CODEINE, URINE: NOT DETECTED
CREATININE URINE: 101.3 MG/DL (ref 20–400)
DIAZEPAM, URINE: NOT DETECTED
DRUGS EXPECTED, UR: NORMAL
EER HI RES INTERP UR: NORMAL
ETHYL GLUCURONIDE UR: PRESENT
FENTANYL URINE: NOT DETECTED
HYDROCODONE, URINE: PRESENT
HYDROMORPHONE, URINE: NOT DETECTED
LORAZEPAM, URINE: NOT DETECTED
MARIJUANA METAB, UR: NOT DETECTED
MDA, UR: NOT DETECTED
MDEA, EVE, UR: NOT DETECTED
MDMA URINE: NOT DETECTED
MEPERIDINE METAB, UR: NOT DETECTED
METHADONE, URINE: NOT DETECTED
METHAMPHETAMINE, URINE: NOT DETECTED
METHYLPHENIDATE: NOT DETECTED
MIDAZOLAM, URINE: NOT DETECTED
MORPHINE URINE: NOT DETECTED
NORBUPRENORPHINE, URINE: NOT DETECTED
NORDIAZEPAM, URINE: NOT DETECTED
NORFENTANYL, URINE: NOT DETECTED
NORHYDROCODONE, URINE: PRESENT
NOROXYCODONE, URINE: NOT DETECTED
NOROXYMORPHONE, URINE: NOT DETECTED
OXAZEPAM, URINE: NOT DETECTED
OXYCODONE URINE: NOT DETECTED
OXYMORPHONE, URINE: NOT DETECTED
PAIN MANAGEMENT DRUG PANEL INTERP, URINE: NORMAL
PAIN MGT DRUG PANEL, HI RES, UR: NORMAL
PCP,URINE: NOT DETECTED
PHENTERMINE, UR: NOT DETECTED
PROPOXYPHENE, URINE: NOT DETECTED
TAPENTADOL, URINE: NOT DETECTED
TAPENTADOL-O-SULFATE, URINE: NOT DETECTED
TEMAZEPAM, URINE: NOT DETECTED
TRAMADOL, URINE: NOT DETECTED
ZOLPIDEM, URINE: NOT DETECTED

## 2021-01-17 ENCOUNTER — TELEPHONE (OUTPATIENT)
Dept: INTERNAL MEDICINE CLINIC | Age: 61
End: 2021-01-17

## 2021-01-17 DIAGNOSIS — J30.9 CHRONIC ALLERGIC RHINITIS: Primary | ICD-10-CM

## 2021-01-17 DIAGNOSIS — K21.9 GASTROESOPHAGEAL REFLUX DISEASE: ICD-10-CM

## 2021-01-17 RX ORDER — OMEPRAZOLE 20 MG/1
CAPSULE, DELAYED RELEASE ORAL
Qty: 30 CAPSULE | Refills: 3 | Status: SHIPPED | OUTPATIENT
Start: 2021-01-17 | End: 2021-10-26 | Stop reason: SDUPTHER

## 2021-01-17 RX ORDER — CETIRIZINE HYDROCHLORIDE 10 MG/1
TABLET ORAL
Qty: 30 TABLET | Refills: 0 | Status: SHIPPED | OUTPATIENT
Start: 2021-01-17 | End: 2021-03-25

## 2021-01-17 NOTE — TELEPHONE ENCOUNTER
Received health link, stating patient having headache, sinus pressure and requesting medications ordered by Dr. Jose York to send to her Pharmacy. Medications ordered to [facility], but as per the patient/health link staff, its closed on weekends. I will reorder the scripts and send to [facility] pharmacy. Asked health [facility] staff to convey this to the patient.       Milla Maharaj MD, PGY-2  Internal Medicine Residency Program  [facility]  1/17/2021 4:09 PM

## 2021-01-18 ENCOUNTER — TELEPHONE (OUTPATIENT)
Dept: INTERNAL MEDICINE | Age: 61
End: 2021-01-18

## 2021-01-18 NOTE — TELEPHONE ENCOUNTER
PC from patient stating that she called yesterday about her Sinus pressure, cetrizine was sent over she said she needs the zpak antibiotic, cetrizine isn't doing anything. Onset: 1/14/2020    Severity: severe  Progression: gradually worsening  Relieved By:nothing  Radiation: nothing     Associated Symptoms: Headache, Spitting up green mucous, Tender under nose, Swelling in face and pain in both eyes. Have you been exposed to someone with similar symptoms?  No

## 2021-01-18 NOTE — TELEPHONE ENCOUNTER
Pt daughter is now calling asking about medication. She wants to make sure it is a zpack that is sent over.

## 2021-01-21 NOTE — TELEPHONE ENCOUNTER
Noted. If it is allergic in nature after dusting, she does not need antibiotics. Continue flonase, steaming, decongestants.

## 2021-01-29 ENCOUNTER — HOSPITAL ENCOUNTER (OUTPATIENT)
Dept: GENERAL RADIOLOGY | Age: 61
Discharge: HOME | End: 2021-01-31
Payer: COMMERCIAL

## 2021-01-29 ENCOUNTER — HOSPITAL ENCOUNTER (OUTPATIENT)
Age: 61
Discharge: HOME | End: 2021-01-31
Payer: COMMERCIAL

## 2021-01-29 DIAGNOSIS — J44.9 STAGE 3 SEVERE COPD BY GOLD CLASSIFICATION (HCC): ICD-10-CM

## 2021-01-29 DIAGNOSIS — J96.11 CHRONIC RESPIRATORY FAILURE WITH HYPOXIA (HCC): ICD-10-CM

## 2021-01-29 DIAGNOSIS — M54.17 LUMBOSACRAL RADICULOPATHY AT L4: ICD-10-CM

## 2021-01-29 DIAGNOSIS — J18.9 MULTIFOCAL PNEUMONIA: ICD-10-CM

## 2021-01-29 DIAGNOSIS — M43.16 SPONDYLOLISTHESIS OF LUMBAR REGION: ICD-10-CM

## 2021-01-29 PROCEDURE — 71046 X-RAY EXAM CHEST 2 VIEWS: CPT

## 2021-01-29 PROCEDURE — 72100 X-RAY EXAM L-S SPINE 2/3 VWS: CPT

## 2021-02-02 ENCOUNTER — TELEPHONE (OUTPATIENT)
Dept: INTERNAL MEDICINE | Age: 61
End: 2021-02-02

## 2021-02-02 ENCOUNTER — TELEPHONE (OUTPATIENT)
Dept: NEUROSURGERY | Age: 61
End: 2021-02-02

## 2021-02-02 DIAGNOSIS — M43.16 SPONDYLOLISTHESIS OF LUMBAR REGION: Primary | ICD-10-CM

## 2021-02-02 NOTE — TELEPHONE ENCOUNTER
Patient is requesting the results of      X-Ray    This was done on 1/29/21  Test were preformed at [facility]: pt had 1 xray done with you and another for Dr Miriam Thurman. Pt would like results for both of them please.

## 2021-02-02 NOTE — TELEPHONE ENCOUNTER
CXR shows resolved pneumonia. Lumbar xray regarding spine to be addressed by NS. She did have some air fluid levels in abdomen. Is she having any constipation or abdominal symptoms?

## 2021-02-02 NOTE — TELEPHONE ENCOUNTER
Impression   Posterior fusion with interbody graft at L4-L5 without radiographic evidence   of complication.       Nonspecific bowel gas pattern with some mildly dilated small bowel loops   centrally with air-fluid levels.  Stool and gas is seen distally to the level   of the rectal vault.  Correlate for any contributory enteritis.  If there is   any referable abdominal pain, CT is available to further evaluate.         Patient called wanting results due to episodes she has been having where she hasn't been able to stand straight. Patient also states she is about done with physical therapy but wanted to see if the xray warrants for further physical therapy visits?

## 2021-02-03 NOTE — TELEPHONE ENCOUNTER
Pt called back, she states she has a lot of pressure on her right side. She is going to the bathroom like she normal does. She did wake up around 4 am this morning with some sharp pain.

## 2021-02-03 NOTE — TELEPHONE ENCOUNTER
Request for Gabapentin. Next Visit Date: No future appointment on file, patient last seen 12/22/2020 was asked to follow up in 3 months. Future Appointments   Date Time Provider Claudy Schaeffer   2/9/2021  9:30 AM Lambert Mcardle, APRN - CNP [facility] [address]   3/10/2021  9:50 AM Jose Hester [facility]       Health Maintenance   Topic Date Due    Diabetic retinal exam  09/26/2017    Diabetic foot exam  06/18/2019    Cervical cancer screen  02/15/2021    Shingles Vaccine (2 of 2) 09/22/2021 (Originally 8/19/2019)    A1C test (Diabetic or Prediabetic)  04/21/2021    Diabetic microalbuminuria test  04/21/2021    Lipid screen  04/21/2021    Annual Wellness Visit (AWV)  09/30/2021    Breast cancer screen  11/22/2021    Potassium monitoring  12/02/2021    Creatinine monitoring  12/02/2021    Colon cancer screen fecal DNA test (Cologuard)  10/05/2023    DTaP/Tdap/Td vaccine (2 - Td) 06/24/2029    Flu vaccine  Completed    Pneumococcal 0-64 years Vaccine  Completed    Hepatitis C screen  Completed    HIV screen  Completed    Hepatitis A vaccine  Aged Out    Hib vaccine  Aged Out    Meningococcal (ACWY) vaccine  Aged Out       Hemoglobin A1C (%)   Date Value   04/21/2020 5.8   03/05/2019 5.8   06/18/2018 5.8             ( goal A1C is < 7)   Microalb/Crt.  Ratio (mcg/mg creat)   Date Value   04/21/2020 CANNOT BE CALCULATED     LDL Cholesterol (mg/dL)   Date Value   04/21/2020 116       (goal LDL is <100)   AST (U/L)   Date Value   04/21/2020 18     ALT (U/L)   Date Value   04/21/2020 21     BUN (mg/dL)   Date Value   12/02/2020 15     BP Readings from Last 3 Encounters:   01/13/21 110/72   12/22/20 (!) 140/78   12/02/20 124/72          (goal 120/80)    All Future Testing planned in [facility]  Lab Frequency Next Occurrence   GE DIGITAL SCREEN W OR WO CAD BILATERAL Once 02/20/2021   XR LUMBAR SPINE (2-3 VIEWS) Once 03/10/2021         Patient Active Problem List:     KULDEEPD (degenerative joint disease) of knee     Osteoarthritis of spine with radiculopathy, lumbar region     GERD (gastroesophageal reflux disease)     COPD, severity to be determined ([address]     HTN (hypertension)     Allergic rhinitis     Lipoma of shoulder s/p excision right posterior 11 17 2008     History of tobacco use     DM (diabetes mellitus)     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 9)     Bronchiectasis ([address]     Centrilobular emphysema ([address]     Oxygen dependent     Acute postoperative anemia due to expected blood loss     Multifocal pneumonia     Acute on chronic respiratory failure with hypoxia (HCC)     Pulmonary function studies abnormal     Tobacco dependence     Idiopathic sleep related nonobstructive alveolar hypoventilation     Postlaminectomy syndrome, lumbar region

## 2021-02-04 RX ORDER — GABAPENTIN 300 MG/1
CAPSULE ORAL
Qty: 120 CAPSULE | Refills: 0 | Status: SHIPPED | OUTPATIENT
Start: 2021-02-04 | End: 2021-03-04

## 2021-02-05 NOTE — TELEPHONE ENCOUNTER
Patient notified of results per Webster Agra.  Informed patient the physical therapy referral is pending

## 2021-02-09 ENCOUNTER — HOSPITAL ENCOUNTER (OUTPATIENT)
Dept: PAIN MANAGEMENT | Age: 61
Discharge: HOME | End: 2021-02-09
Payer: COMMERCIAL

## 2021-02-09 DIAGNOSIS — M54.40 CHRONIC MIDLINE LOW BACK PAIN WITH SCIATICA, SCIATICA LATERALITY UNSPECIFIED: ICD-10-CM

## 2021-02-09 DIAGNOSIS — M17.0 PRIMARY OSTEOARTHRITIS OF BOTH KNEES: ICD-10-CM

## 2021-02-09 DIAGNOSIS — G89.29 CHRONIC MIDLINE LOW BACK PAIN WITH SCIATICA, SCIATICA LATERALITY UNSPECIFIED: ICD-10-CM

## 2021-02-09 DIAGNOSIS — M96.1 POSTLAMINECTOMY SYNDROME, LUMBAR REGION: ICD-10-CM

## 2021-02-09 DIAGNOSIS — Z51.81 MEDICATION MONITORING ENCOUNTER: Primary | ICD-10-CM

## 2021-02-09 PROCEDURE — 99213 OFFICE O/P EST LOW 20 MIN: CPT

## 2021-02-09 PROCEDURE — 99212 OFFICE O/P EST SF 10 MIN: CPT | Performed by: NURSE PRACTITIONER

## 2021-02-09 RX ORDER — HYDROCODONE BITARTRATE AND ACETAMINOPHEN 5; 325 MG/1; MG/1
1 TABLET ORAL EVERY 8 HOURS PRN
Qty: 90 TABLET | Refills: 0 | Status: SHIPPED | OUTPATIENT
Start: 2021-02-10 | End: 2021-03-09 | Stop reason: SDUPTHER

## 2021-02-09 ASSESSMENT — ENCOUNTER SYMPTOMS
SHORTNESS OF BREATH: 0
BACK PAIN: 1
COUGH: 0
CONSTIPATION: 0

## 2021-02-09 NOTE — PROGRESS NOTES
Patient completed a telephone visit today to review medication contract. Chief Complaint: back pain    [facility]  Patient has had low back pain with no known injury and bilateral knee pain for many years. Melvi Pickering had a knee arthroscopy in March 2017 Genicular block was discussed but she declined. Melvi Pickering is s/p lumbar diskectomy in 2015 and lumbar fusion 11/2020. She is stable and compliant on norco 5/325 TID. Gabapentin prescribed by PCP for neuropathic pain in feet. Follows with Dr. Bryan Screen for knees.   EMG was completed and shows Abnormal study of the right lower extremity and Normal study of the left lower extremity. Natali Bruno is electrodiagnostic evidence of a very mild chronic right S1 radiculopathy without active denervation. Natali Bruno is no electrodiagnostic evidence of a generalized large fiber peripheral Polyneuropathy.   She continues to have pain following most recent surgery and is using bone stimulator but states it exacerbates her pain. Per NS, she can start taking meloxicam again next month and try PT.       Back Pain  This is a chronic problem. The current episode started more than 1 year ago. The problem occurs constantly. The problem is unchanged. The pain is present in the lumbar spine. The quality of the pain is described as stabbing and burning. The pain does not radiate. The pain is at a severity of 8/10. The pain is moderate. The symptoms are aggravated by position, standing and bending (walking). Pertinent negatives include no chest pain, fever, numbness, paresthesias or tingling. She has tried analgesics, bed rest and heat for the symptoms. The treatment provided mild relief. Patient denies any new neurological symptoms. No bowel or bladder incontinence, no weakness, and no falling.     Pill count: appropriate due 2/10    Morphine equivalent: 15    Periodic Controlled Substance Monitoring: Possible medication side effects, risk of tolerance/dependence & alternative treatments discussed., No signs of daily, Disp: 30 capsule, Rfl: 3    azelastine (ASTELIN) 0.1 % nasal spray, 2 sprays by Nasal route 2 times daily Use in each nostril as directed, Disp: 1 Bottle, Rfl: 0    lisinopril-hydroCHLOROthiazide (PRINZIDE;ZESTORETIC) 20-25 MG per tablet, , Disp: , Rfl:     meloxicam (MOBIC) 7.5 MG tablet, Take 1 tablet by mouth daily, Disp: 30 tablet, Rfl: 0    nicotine (NICODERM CQ) 21 MG/24HR, Place 1 patch onto the skin every 24 hours, Disp: 30 patch, Rfl: 1    metFORMIN (GLUCOPHAGE) 500 MG tablet, TAKE 1 TABLET BY MOUTH 2 TIMES DAILY (WITH MEALS), Disp: 180 tablet, Rfl: 0    amLODIPine (NORVASC) 10 MG tablet, TAKE 1 TABLET BY MOUTH DAILY, Disp: 90 tablet, Rfl: 2    potassium chloride (KLOR-CON M) 10 MEQ extended release tablet, TAKE 2 TABLETS BY MOUTH DAILY, Disp: 60 tablet, Rfl: 2    fluticasone-salmeterol (ADVAIR DISKUS) 250-50 MCG/DOSE AEPB, Inhale 1 puff into the lungs every 12 hours, Disp: 60 each, Rfl: 3    lactobacillus (CULTURELLE) capsule, Take 1 capsule by mouth daily (with breakfast), Disp: 30 capsule, Rfl: 0    carvedilol (COREG) 6.25 MG tablet, TAKE 1 TABLET BY MOUTH 2 TIMES DAILY, Disp: 180 tablet, Rfl: 0    trospium (SANCTURA) 20 MG tablet, TAKE 1 TABLET BY MOUTH 2 TIMES DAILY, Disp: 60 tablet, Rfl: 1    blood glucose test strips (EXACTECH TEST) strip, 1 each by In Vitro route daily As needed. , Disp: 50 each, Rfl: 11    atorvastatin (LIPITOR) 40 MG tablet, Take 1 tablet by mouth daily, Disp: 90 tablet, Rfl: 1     MG capsule, TAKE 1 CAPSULE EVERY DAY, Disp: 30 capsule, Rfl: 10    albuterol sulfate HFA (VENTOLIN HFA) 108 (90 Base) MCG/ACT inhaler, Inhale 2 puffs into the lungs every 6 hours as needed for Wheezing, Disp: 1 Inhaler, Rfl: 3    diphenhydrAMINE (BENADRYL) 25 MG tablet, Take 25 mg by mouth every 6 hours as needed for Itching, Disp: , Rfl:     acetaminophen (TYLENOL) 500 MG tablet, Take 1 tablet by mouth 4 times daily as needed for Pain, Disp: 30 tablet, Rfl: 0   Active member of club or organization: No     Attends meetings of clubs or organizations: Never     Relationship status: Never     Intimate partner violence     Fear of current or ex partner: Not on file     Emotionally abused: Not on file     Physically abused: Not on file     Forced sexual activity: Not on file   Other Topics Concern    Not on file   Social History Narrative    10/9/20 Patient keeping contact with others to a minimum due to [employer] Pandemic. 10/9/20 Patient has difficulty with ambulation due to DJD, stenosis and fibromyalgia       Review of Systems:  Review of Systems   Constitution: Negative for chills and fever. Cardiovascular: Negative for chest pain and palpitations. Respiratory: Negative for cough and shortness of breath. Musculoskeletal: Positive for back pain. Gastrointestinal: Negative for constipation. Neurological: Negative for disturbances in coordination, loss of balance, numbness, paresthesias and tingling. Physical Exam:  [facility] 04/19/2003     Physical Exam  Neurological:      Mental Status: She is alert. Psychiatric:         Mood and Affect: Mood normal.         Record/Diagnostics Review:    Last óscar 1/2021 and was NOT appropriate +ETOH - did discuss with patient and warned her of the risks of combining alcohol and opiates including respiratory depression and death. She verbalizes understanding. Assessment:  Problem List Items Addressed This Visit     Primary osteoarthritis of both knees    Relevant Medications    HYDROcodone-acetaminophen (NORCO) 5-325 MG per tablet (Start on 2/10/2021)    Medication monitoring encounter - Primary    Chronic low back pain    Relevant Medications    HYDROcodone-acetaminophen (NORCO) 5-325 MG per tablet (Start on 2/10/2021)    Postlaminectomy syndrome, lumbar region             Treatment Plan:  Patient relates current medications are helping the pain.  Patient reports taking pain medications as prescribed, denies

## 2021-02-25 ENCOUNTER — TELEPHONE (OUTPATIENT)
Dept: NEUROSURGERY | Age: 61
End: 2021-02-25

## 2021-02-25 DIAGNOSIS — M43.16 SPONDYLOLISTHESIS OF LUMBAR REGION: Primary | ICD-10-CM

## 2021-03-02 ENCOUNTER — OFFICE VISIT (OUTPATIENT)
Dept: INTERNAL MEDICINE | Age: 61
End: 2021-03-02
Payer: COMMERCIAL

## 2021-03-02 ENCOUNTER — HOSPITAL ENCOUNTER (OUTPATIENT)
Age: 61
Setting detail: SPECIMEN
Discharge: HOME | End: 2021-03-02
Payer: COMMERCIAL

## 2021-03-02 VITALS
HEART RATE: 85 BPM | SYSTOLIC BLOOD PRESSURE: 137 MMHG | WEIGHT: 188 LBS | DIASTOLIC BLOOD PRESSURE: 89 MMHG | BODY MASS INDEX: 31.28 KG/M2

## 2021-03-02 DIAGNOSIS — E11.9 TYPE 2 DIABETES MELLITUS WITHOUT COMPLICATION, WITHOUT LONG-TERM CURRENT USE OF INSULIN (HCC): ICD-10-CM

## 2021-03-02 DIAGNOSIS — Z12.31 BREAST CANCER SCREENING BY MAMMOGRAM: ICD-10-CM

## 2021-03-02 DIAGNOSIS — Z01.419 WELL WOMAN EXAM WITH ROUTINE GYNECOLOGICAL EXAM: Primary | ICD-10-CM

## 2021-03-02 LAB — HBA1C MFR BLD: 5.5 %

## 2021-03-02 PROCEDURE — 99211 OFF/OP EST MAY X REQ PHY/QHP: CPT | Performed by: INTERNAL MEDICINE

## 2021-03-02 PROCEDURE — G8482 FLU IMMUNIZE ORDER/ADMIN: HCPCS | Performed by: INTERNAL MEDICINE

## 2021-03-02 PROCEDURE — 83036 HEMOGLOBIN GLYCOSYLATED A1C: CPT | Performed by: INTERNAL MEDICINE

## 2021-03-02 RX ORDER — TIZANIDINE 4 MG/1
4 TABLET ORAL 2 TIMES DAILY
COMMUNITY
Start: 2021-02-15 | End: 2021-03-09 | Stop reason: SDUPTHER

## 2021-03-02 NOTE — PATIENT INSTRUCTIONS
-Pt due for 4 month f/u in July-- pt to call in June to set up an appt--reminder in [facility] to contact patient as well--AVS given to patient    -Your doctor has ordered an Mammogram for you, please contact our scheduling department at [phone] to set up an appointment.    -Aneesh Velasquez

## 2021-03-02 NOTE — PROGRESS NOTES
@[facility]@    [facility]ICINE [facility]    Progress Note    Date of patient's visit: 3/2/2021    Patient's Name:  Khadijah Esquivel    YOB: 1960            Patient Care Team:  Amparo Pimentel MD as PCP - General (Internal Medicine)  Amparo Pimentel MD as PCP - REHABILITATION [facility] Provider  Robby Choe DPM as Consulting Physician (Podiatry)  Charol Primrose (Jose M Hodgson)  Frankie Cannon MD as Consulting Physician (Orthopedic Surgery)  Anastasia Vasquez MD as Anesthesiologist (Pain Management)  Anthony Valle OD (Optometry)  Kulwant Jameson, RN as Nurse Navigator    REASON FOR VISIT: Routine outpatient follow     Chief Complaint   Patient presents with   [place] Gynecologic Exam    Other     med list requested          HISTORY OF PRESENT ILLNESS:    History was obtained from the patient. Khadijah Esquivel is a 56 y.o. is here for follow-up and a Pap smear. She is overall doing well. She had spinal fusion done. She is recovered from that. She has no complaints except for some right knee pain. She fell recently in the snow but currently has good range of motion and denies any swelling. She follows up with her neurosurgeon soon and has x-rays do. She has not been compliant with diet and she says she has been drinking a lot of pop and cranberry juice. We will check her A1c today. She has questions regarding Covid vaccine.     Results for POC orders placed in visit on 03/02/21   POCT glycosylated hemoglobin (Hb A1C)   Result Value Ref Range    Hemoglobin A1C 5.5 %          Past Medical History:   Diagnosis Date    Allergic rhinitis     Alveolar hypoventilation 11/20/2020    Aortic insufficiency 2018    mild-moderate on echo ([facility]    Bronchiectasis ([facility] 11/20/2020    Bronchitis     Chronic back pain     [facility] management at [facility]. COPD (chronic obstructive pulmonary disease) ([facility]     Dr. José Miguel Jean Baptiste to see 11/09/2020    Depression     DM (diabetes mellitus) ([facility] 12/18/2012    GERD (gastroesophageal reflux disease)     History of echocardiogram 05/2018    EF 65%, mild-moderate AI and TR    Hyperlipidemia     Dr. Veena Ramos Hypertension     Dr. Veena Ramos Obesity     Osteoarthritis     Peripheral vascular disease ([facility]     Primary osteoarthritis of both knees     Radicular pain of lumbosacral region     Spinal stenosis, lumbar region, without neurogenic claudication 04/30/2013    Tricuspid regurgitation 2018    mild-moderate on echo    Type II or unspecified type diabetes mellitus without mention of complication, not stated as uncontrolled     Dr. Veena Ramos Unspecified sleep apnea     no cpap used anymore    URI (upper respiratory infection)     Wears dentures     upper and lower full dentures    Wears glasses     Wellness examination     Dr. Candis Guillen -PCP last visit in early Oct. 2020       Past Surgical History:   Procedure Laterality Date    COLONOSCOPY  2/12/2009    normal    DILATION AND CURETTAGE OF UTERUS      GASTRECTOMY      partial    LUMBAR DISCECTOMY  01/2015    lumbar diskectomy    LUMBAR FUSION  11/19/2020     POSTERIOR FUSION L4/5,     LUMBAR FUSION N/A 11/19/2020    POSTERIOR FUSION L4/5, [employer], Angie Lozano, EVOKES #[id number] AMINA performed by Ollen Fothergill, DO at [facility] Right 4/30/13    Lumbar Diagnostic Block,  Kenalog 40 mg    NERVE BLOCK  5/23/13    Lumbar Radiofrequency, Kenalog 40mg    NERVE BLOCK  8/12/13    Lt MBNB  celestone 6mg    NERVE BLOCK Left 8-28-13    left lumbar diagnostic block #2 decadron 10 mg    NERVE BLOCK Left 9-24-13    left lumbar median branch radiofrequency    NERVE BLOCK  07-02-14    caudal, celestone 9 mg    NERVE BLOCK  7-16-14    caudal epidural #2, celestone 9mg, fentanyl 25mcg    NERVE BLOCK  7/30/14    caudal #3 decadron 10mg    NERVE BLOCK  11-6-14    duramorph epidural steroid block  duramorph 1 mg celestone 9 mg    NERVE BLOCK  11/20/15    TENS- Empi Select    NERVE BLOCK  07/20/2018    right transforminal # 1 decadron 10mg,isovue    NERVE BLOCK Bilateral 02/01/2019    bilat mbnb- no steroid    NERVE BLOCK Bilateral 02/08/2019    bilat mbnb, marcaine . 25%    RI KNEE SCOPE,DIAGNOSTIC Right 3/24/2017    KNEE ARTHROSCOPY WITH PARTIAL MEDIAL MENISECAL DEBRIDMENT  performed by Estefani Parker MD at [facility]  9 20 2007    UPPER GASTROINTESTINAL ENDOSCOPY  4 21 2009,04/2011    gastritis, esophagitis         ALLERGIES      Allergies   Allergen Reactions    Aspirin      DUE TO ULCER    Claritin [Loratadine] Other (See Comments)     coughing    Flonase [Fluticasone Propionate]     Morphine And Related      GI Upset       MEDICATIONS:      Current Outpatient Medications on File Prior to Visit   Medication Sig Dispense Refill    tiZANidine (ZANAFLEX) 4 MG tablet Take 4 mg by mouth 2 times daily      HYDROcodone-acetaminophen (NORCO) 5-325 MG per tablet Take 1 tablet by mouth every 8 hours as needed for Pain for up to 30 days.  Early refill due to holidays 90 tablet 0    gabapentin (NEURONTIN) 300 MG capsule TAKE 1 CAPSULE IN MORNING AND AFTERNOON AND 2 CAPSULES AT NIGHT 120 capsule 0    omeprazole (PRILOSEC) 20 MG delayed release capsule TAKE 1 CAPSULE BY MOUTH EVERY DAY 30 capsule 3    cetirizine (ZYRTEC) 10 MG tablet TAKE 1 TABLET BY MOUTH DAILY 30 tablet 0    SPIRIVA HANDIHALER 18 MCG inhalation capsule Inhale 1 capsule into the lungs daily 30 capsule 3    azelastine (ASTELIN) 0.1 % nasal spray 2 sprays by Nasal route 2 times daily Use in each nostril as directed 1 Bottle 0    lisinopril-hydroCHLOROthiazide (PRINZIDE;ZESTORETIC) 20-25 MG per tablet       meloxicam (MOBIC) 7.5 MG tablet Take 1 tablet by mouth daily 30 tablet 0    nicotine (NICODERM CQ) 21 MG/24HR Place 1 patch onto the skin every 24 hours 30 patch 1    metFORMIN (GLUCOPHAGE) 500 MG tablet TAKE 1 TABLET BY MOUTH 2 TIMES DAILY (WITH MEALS) 180 tablet 0    amLODIPine (NORVASC) 10 MG tablet TAKE 1 TABLET BY MOUTH DAILY 90 tablet 2    potassium chloride (KLOR-CON M) 10 MEQ extended release tablet TAKE 2 TABLETS BY MOUTH DAILY 60 tablet 2    fluticasone-salmeterol (ADVAIR DISKUS) 250-50 MCG/DOSE AEPB Inhale 1 puff into the lungs every 12 hours 60 each 3    lactobacillus (CULTURELLE) capsule Take 1 capsule by mouth daily (with breakfast) 30 capsule 0    carvedilol (COREG) 6.25 MG tablet TAKE 1 TABLET BY MOUTH 2 TIMES DAILY 180 tablet 0    trospium (SANCTURA) 20 MG tablet TAKE 1 TABLET BY MOUTH 2 TIMES DAILY 60 tablet 1    blood glucose test strips (EXACTECH TEST) strip 1 each by In Vitro route daily As needed. 50 each 11    atorvastatin (LIPITOR) 40 MG tablet Take 1 tablet by mouth daily 90 tablet 1     MG capsule TAKE 1 CAPSULE EVERY DAY 30 capsule 10    albuterol sulfate HFA (VENTOLIN HFA) 108 (90 Base) MCG/ACT inhaler Inhale 2 puffs into the lungs every 6 hours as needed for Wheezing 1 Inhaler 3    diphenhydrAMINE (BENADRYL) 25 MG tablet Take 25 mg by mouth every 6 hours as needed for Itching      acetaminophen (TYLENOL) 500 MG tablet Take 1 tablet by mouth 4 times daily as needed for Pain 30 tablet 0    mirtazapine (REMERON) 30 MG tablet Take 30 mg by mouth nightly      mirtazapine (REMERON) 15 MG tablet Take 15 mg by mouth nightly      DULoxetine (CYMBALTA) 60 MG extended release capsule Take 1 capsule by mouth daily 30 capsule 3    Lancets MISC 1 each by Does not apply route daily 100 each 11    ipratropium-albuterol (DUONEB) 0.5-2.5 (3) MG/3ML SOLN nebulizer solution Inhale 3 mLs into the lungs every 6 hours as needed for Shortness of Breath 360 mL 11     No current facility-administered medications on file prior to visit. SOCIAL HISTORY    Reviewed and no change from previous record. Carolina  reports that she quit smoking about 4 months ago.  Her smoking use included cigarettes. She has a 9.00 pack-year smoking history. She has never used smokeless tobacco.    FAMILY HISTORY:    Reviewed and No change from previous visit    HEALTH MAINTENANCE DUE:      Health Maintenance Due   Topic Date Due    Diabetic foot exam  06/18/2019    Cervical cancer screen  02/15/2021       REVIEW OF SYSTEMS:    12 point review of symptoms completed and found to be normal except noted in the HPI    Review of Systems   Constitutional: Negative for chills, fatigue and fever. HENT: Positive for congestion. Negative for rhinorrhea, sinus pressure and sinus pain. Respiratory: Negative for cough, shortness of breath and wheezing. Cardiovascular: Negative for chest pain, palpitations and leg swelling. Gastrointestinal: Positive for constipation. Negative for abdominal pain. Endocrine: Negative for polydipsia and polyuria. Genitourinary: Negative for dysuria and frequency. Musculoskeletal: Positive for back pain. Negative for arthralgias. Allergic/Immunologic: Positive for environmental allergies. Negative for immunocompromised state. Neurological: Positive for numbness. Negative for dizziness, syncope, weakness and headaches. Psychiatric/Behavioral: Negative for dysphoric mood. The patient is not nervous/anxious. PHYSICAL EXAM:     Vitals:    03/02/21 1539   BP: 137/89   Site: Right Upper Arm   Position: Sitting   Cuff Size: Medium Adult   Pulse: 85   Weight: 188 lb (85.3 kg)     Body mass index is 31.28 kg/m². BP Readings from Last 3 Encounters:   03/02/21 137/89   01/13/21 110/72   12/22/20 (!) 140/78        Wt Readings from Last 3 Encounters:   03/02/21 188 lb (85.3 kg)   01/13/21 181 lb (82.1 kg)   12/22/20 181 lb (82.1 kg)       Physical Exam  Vitals signs and nursing note reviewed. Constitutional:       Appearance: Normal appearance. HENT:      Head: Normocephalic and atraumatic. Eyes:      General: No scleral icterus.      Extraocular Movements: Extraocular movements intact. Conjunctiva/sclera: Conjunctivae normal.      Pupils: Pupils are equal, round, and reactive to light. Neck:      Musculoskeletal: Normal range of motion and neck supple. Cardiovascular:      Rate and Rhythm: Normal rate and regular rhythm. Pulmonary:      Effort: Pulmonary effort is normal.      Breath sounds: Normal breath sounds. Abdominal:      General: Bowel sounds are normal.      Palpations: Abdomen is soft. Tenderness: There is no abdominal tenderness. Genitourinary:     General: Normal vulva. Labia:         Right: No rash or tenderness. Left: No rash or tenderness. Vagina: Normal.      Cervix: No cervical motion tenderness, discharge or erythema. Uterus: Normal.       Adnexa: Right adnexa normal and left adnexa normal.   Musculoskeletal:      Right lower leg: No edema. Left lower leg: No edema. Neurological:      General: No focal deficit present. Mental Status: She is alert and oriented to person, place, and time.                LABORATORY FINDINGS:    CBC:  Lab Results   Component Value Date    WBC 20.0 12/02/2020    HGB 10.2 12/02/2020     12/02/2020     02/16/2012     BMP:    Lab Results   Component Value Date     12/02/2020    K 4.6 12/02/2020    CL 99 12/02/2020    CO2 28 12/02/2020    BUN 15 12/02/2020    CREATININE 0.68 12/02/2020    GLUCOSE 92 12/02/2020     HEMOGLOBIN A1C:   Lab Results   Component Value Date    LABA1C 5.8 04/21/2020     MICROALBUMIN URINE:   Lab Results   Component Value Date    MICROALBUR <12 04/21/2020     FASTING LIPID [email or web address]@[facility]  Lab Results   Component Value Date    LDLCHOLESTEROL 116 04/21/2020       LIVER PROFILE:  Lab Results   Component Value Date    ALT 21 04/21/2020    AST 18 04/21/2020    PROT 7.7 04/21/2020    BILITOT 0.31 04/21/2020    BILIDIR 0.11 06/18/2018    LABALBU 3.1 12/02/2020      THYROID FUNCTION:   Lab Results   Component Value Date    TSH 0.94 07/19/2017      URINEANALYSIS: No results found for: LABURIN  ASSESSMENT AND PLAN:    1. Well woman exam with routine gynecological exam    - PAP Smear; Future    2. Type 2 diabetes mellitus without complication, without long-term current use of insulin (HCC)    - POCT glycosylated hemoglobin (Hb A1C)  -  DIABETES FOOT EXAM    3. Breast cancer screening by mammogram    - [place] [facility] DIGITAL SCREEN W OR WO CAD BILATERAL; Future          FOLLOW UP AND INSTRUCTIONS:   Return in about 4 months (around 7/2/2021). 1. Carolina received counseling on the following healthy behaviors: nutrition, exercise and medication adherence    2. Reviewed prior labs and health maintenance. 3. Discussed use, benefit, and side effects of prescribed medications. Barriers to medication compliance addressed. All patient questions answered. Pt voiced understanding.          Huber Arana  Attending Physician, [address] [place] [address], Internal Medicine Residency Program  [address] [place] [address]  3/2/2021, 3:52 PM

## 2021-03-03 ASSESSMENT — ENCOUNTER SYMPTOMS
SINUS PRESSURE: 0
SINUS PAIN: 0
CONSTIPATION: 1
RHINORRHEA: 0
SHORTNESS OF BREATH: 0
BACK PAIN: 1
COUGH: 0
ABDOMINAL PAIN: 0
WHEEZING: 0

## 2021-03-04 LAB
HPV SAMPLE: NORMAL
HPV, GENOTYPE 16: NOT DETECTED
HPV, GENOTYPE 18: NOT DETECTED
HPV, HIGH RISK OTHER: NOT DETECTED
HPV, INTERPRETATION: NORMAL
SPECIMEN DESCRIPTION: NORMAL

## 2021-03-04 RX ORDER — GLUCOSAMINE HCL/CHONDROITIN SU 500-400 MG
1 CAPSULE ORAL 2 TIMES DAILY
Qty: 100 STRIP | Refills: 11 | Status: SHIPPED | OUTPATIENT
Start: 2021-03-04 | End: 2021-03-10

## 2021-03-04 RX ORDER — LANCETS 30 GAUGE
1 EACH MISCELLANEOUS 2 TIMES DAILY
Qty: 100 EACH | Refills: 11 | Status: SHIPPED | OUTPATIENT
Start: 2021-03-04 | End: 2022-04-29 | Stop reason: ALTCHOICE

## 2021-03-04 NOTE — TELEPHONE ENCOUNTER
Pt calling she needs refill on test strips and lancets. Medication pended. Pt is on wait list till July. Health Maintenance   Topic Date Due    COVID-19 Vaccine (1 of 2) Never done    Shingles Vaccine (2 of 2) 09/22/2021 (Originally 8/19/2019)    Diabetic microalbuminuria test  04/21/2021    Lipid screen  04/21/2021    Diabetic retinal exam  05/14/2021    Annual Wellness Visit (AWV)  09/30/2021    Breast cancer screen  11/22/2021    Potassium monitoring  12/02/2021    Creatinine monitoring  12/02/2021    Diabetic foot exam  03/02/2022    A1C test (Diabetic or Prediabetic)  03/02/2022    Colon cancer screen fecal DNA test (Cologuard)  10/05/2023    Cervical cancer screen  03/02/2024    DTaP/Tdap/Td vaccine (2 - Td) 06/24/2029    Flu vaccine  Completed    Pneumococcal 0-64 years Vaccine  Completed    Hepatitis C screen  Completed    HIV screen  Completed    Hepatitis A vaccine  Aged Out    Hib vaccine  Aged Out    Meningococcal (ACWY) vaccine  Aged Out             (applicable per patient's age: Cancer Screenings, Depression Screening, Fall Risk Screening, Immunizations)    Hemoglobin A1C (%)   Date Value   03/02/2021 5.5   04/21/2020 5.8   03/05/2019 5.8     Microalb/Crt.  Ratio (mcg/mg creat)   Date Value   04/21/2020 CANNOT BE CALCULATED     LDL Cholesterol (mg/dL)   Date Value   04/21/2020 116     AST (U/L)   Date Value   04/21/2020 18     ALT (U/L)   Date Value   04/21/2020 21     BUN (mg/dL)   Date Value   12/02/2020 15      (goal A1C is < 7)   (goal LDL is <100) need 30-50% reduction from baseline     BP Readings from Last 3 Encounters:   03/02/21 137/89   01/13/21 110/72   12/22/20 (!) 140/78    (goal /80)      All Future Testing planned in [facility]:  Lab Frequency Next Occurrence   GE DIGITAL SCREEN W OR WO CAD BILATERAL Once 02/20/2021   XR LUMBAR SPINE (2-3 VIEWS) Once 03/10/2021   PAP Smear Once 03/02/2021   GE DIGITAL SCREEN W OR WO CAD BILATERAL Once 03/02/2021       Next Visit Date:  Future Appointments   Date Time Provider Claudy Schaeffer   3/9/2021 10:00 AM Zenon Boucher, APRN - CNP [facility] [id number]   3/10/2021  9:50 AM DO India Tomlinson Neuro [facility]   4/27/2021  6:30 PM STA SCR MAMMO RM 2 [facility] MAMMO STA Radiolog            Patient Active Problem List:     DJD (degenerative joint disease) of knee     Osteoarthritis of spine with radiculopathy, lumbar region     GERD (gastroesophageal reflux disease)     COPD, severity to be determined ([address]     HTN (hypertension)     Allergic rhinitis     Lipoma of shoulder s/p excision right posterior 11 17 2008     History of tobacco use     DM (diabetes mellitus)     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 9)     Bronchiectasis ([address]     Centrilobular emphysema ([address]     Oxygen dependent     Acute postoperative anemia due to expected blood loss     Multifocal pneumonia     Acute on chronic respiratory failure with hypoxia (HCC)     Pulmonary function studies abnormal     Tobacco dependence     Idiopathic sleep related nonobstructive alveolar hypoventilation     Postlaminectomy syndrome, lumbar region

## 2021-03-05 ENCOUNTER — HOSPITAL ENCOUNTER (OUTPATIENT)
Age: 61
Discharge: HOME | End: 2021-03-07
Payer: COMMERCIAL

## 2021-03-05 ENCOUNTER — HOSPITAL ENCOUNTER (OUTPATIENT)
Age: 61
Discharge: HOME | End: 2021-03-05
Payer: COMMERCIAL

## 2021-03-05 ENCOUNTER — HOSPITAL ENCOUNTER (OUTPATIENT)
Dept: GENERAL RADIOLOGY | Age: 61
Discharge: HOME | End: 2021-03-07
Payer: COMMERCIAL

## 2021-03-05 DIAGNOSIS — M54.17 LUMBOSACRAL RADICULOPATHY AT L4: ICD-10-CM

## 2021-03-05 DIAGNOSIS — M43.16 SPONDYLOLISTHESIS OF LUMBAR REGION: ICD-10-CM

## 2021-03-05 PROCEDURE — 72100 X-RAY EXAM L-S SPINE 2/3 VWS: CPT

## 2021-03-09 ENCOUNTER — HOSPITAL ENCOUNTER (OUTPATIENT)
Dept: PAIN MANAGEMENT | Age: 61
Discharge: HOME | End: 2021-03-09
Payer: COMMERCIAL

## 2021-03-09 DIAGNOSIS — M54.40 CHRONIC MIDLINE LOW BACK PAIN WITH SCIATICA, SCIATICA LATERALITY UNSPECIFIED: ICD-10-CM

## 2021-03-09 DIAGNOSIS — M96.1 POSTLAMINECTOMY SYNDROME, LUMBAR REGION: ICD-10-CM

## 2021-03-09 DIAGNOSIS — F32.9 MAJOR DEPRESSION, CHRONIC: ICD-10-CM

## 2021-03-09 DIAGNOSIS — G89.29 CHRONIC MIDLINE LOW BACK PAIN WITH SCIATICA, SCIATICA LATERALITY UNSPECIFIED: ICD-10-CM

## 2021-03-09 DIAGNOSIS — M17.0 PRIMARY OSTEOARTHRITIS OF BOTH KNEES: ICD-10-CM

## 2021-03-09 DIAGNOSIS — Z51.81 MEDICATION MONITORING ENCOUNTER: Primary | ICD-10-CM

## 2021-03-09 LAB — CYTOLOGY REPORT: NORMAL

## 2021-03-09 PROCEDURE — 99442 PR PHYS/QHP TELEPHONE EVALUATION 11-20 MIN: CPT | Performed by: NURSE PRACTITIONER

## 2021-03-09 PROCEDURE — 99213 OFFICE O/P EST LOW 20 MIN: CPT

## 2021-03-09 RX ORDER — HYDROCODONE BITARTRATE AND ACETAMINOPHEN 5; 325 MG/1; MG/1
1 TABLET ORAL EVERY 8 HOURS PRN
Qty: 90 TABLET | Refills: 0 | Status: SHIPPED | OUTPATIENT
Start: 2021-03-12 | End: 2021-04-09 | Stop reason: SDUPTHER

## 2021-03-09 RX ORDER — TIZANIDINE 4 MG/1
4 TABLET ORAL 2 TIMES DAILY
Qty: 60 TABLET | Refills: 0 | Status: SHIPPED | OUTPATIENT
Start: 2021-03-09 | End: 2021-04-09 | Stop reason: SDUPTHER

## 2021-03-09 ASSESSMENT — ENCOUNTER SYMPTOMS
BACK PAIN: 1
SHORTNESS OF BREATH: 0
CONSTIPATION: 0
COUGH: 0

## 2021-03-09 NOTE — PROGRESS NOTES
Patient completed a telephone visit today to review medication contract. Chief Complaint: back pain    [facility] Patient has had low back pain with no known injury and bilateral knee pain for many years. Lucia Linton had a knee arthroscopy in March 2017 Genicular block was discussed but she declined. Lucia Linton is s/p lumbar diskectomy in 2015 and lumbar fusion 11/2020. She is stable and compliant on norco 5/325 TID. Gabapentin prescribed by PCP for neuropathic pain in feet. Follows with Dr. Aleksandr Tolbert for knees.   EMG was completed and shows Abnormal study of the right lower extremity and Normal study of the left lower extremity. Saran Hart is electrodiagnostic evidence of a very mild chronic right S1 radiculopathy without active denervation. Saran Hart is no electrodiagnostic evidence of a generalized large fiber peripheral Polyneuropathy.   She continues to have pain following most recent surgery and is using bone stimulator but states it exacerbates her pain. Per NS, she can start taking meloxicam again next month and try PT. Lucia Linton will see NS tomorrow. Back Pain  This is a chronic problem. The current episode started more than 1 year ago. The problem occurs constantly. The problem is unchanged. The pain is present in the lumbar spine. The quality of the pain is described as aching. The pain does not radiate. The pain is at a severity of 7/10. The pain is moderate. The symptoms are aggravated by position and standing (walking). Pertinent negatives include no chest pain, fever, numbness, paresthesias or tingling. She has tried analgesics and bed rest for the symptoms. The treatment provided mild relief. Patient denies any new neurological symptoms. No bowel or bladder incontinence, no weakness, and no falling.     Pill count: appropriate due 3/12    Morphine equivalent: 15    Periodic Controlled Substance Monitoring: Possible medication side effects, risk of tolerance/dependence & alternative treatments discussed., No signs of potential drug abuse or diversion identified., Obtaining appropriate analgesic effect of treatment.  Colby Krabbe, APRN - CNP)      Past Medical History:   Diagnosis Date    Allergic rhinitis     Alveolar hypoventilation 11/20/2020    Aortic insufficiency 2018    mild-moderate on echo ([facility]    Bronchiectasis ([facility] 11/20/2020    Bronchitis     Chronic back pain     Pain management at [place] [id number]. COPD (chronic obstructive pulmonary disease) ([facility]     Dr. Emily Vora to see 11/09/2020    Depression     [facility] mellitus[facility] 12/18/2012    GERD (gastroesophageal reflux disease)     History of echocardiogram 05/2018    EF 65%, mild-moderate AI and TR    Hyperlipidemia     Dr. Pascual Ellis Hypertension     Dr. Pascual Ellis Obesity     Osteoarthritis     Peripheral vascular disease ([facility]     Primary osteoarthritis of both knees     Radicular pain of lumbosacral region     Spinal stenosis, lumbar region, without neurogenic claudication 04/30/2013    Tricuspid regurgitation 2018    mild-moderate on echo    Type II or unspecified type diabetes mellitus without mention of complication, not stated as uncontrolled     Dr. Pascual Ellis Unspecified sleep apnea     no cpap used anymore    URI (upper respiratory infection)     Wears dentures     upper and lower full dentures    Wears glasses     Wellness examination     Dr. Denise White -PCP last visit in early Oct. 2020       Past Surgical History:   Procedure Laterality Date    COLONOSCOPY  2/12/2009    normal    DILATION AND CURETTAGE OF UTERUS      GASTRECTOMY      partial    LUMBAR DISCECTOMY  01/2015    lumbar diskectomy    LUMBAR FUSION  11/19/2020     POSTERIOR FUSION L4/5,     LUMBAR FUSION N/A 11/19/2020    POSTERIOR FUSION L4/5, MEDJANESSASYovanny, FRENCHS #[id number] AMINA performed by Suzanne Wren DO at [facility] 4/30/13    Lumbar Diagnostic Block,  Kenalog 40 mg    NERVE BLOCK  5/23/13    Lumbar Radiofrequency, Kenalog 40mg    NERVE BLOCK  8/12/13    Lt MBNB  celestone 6mg    NERVE BLOCK Left 8-28-13    left lumbar diagnostic block #2 decadron 10 mg    NERVE BLOCK Left 9-24-13    left lumbar median branch radiofrequency    NERVE BLOCK  07-02-14    caudal, celestone 9 mg    NERVE BLOCK  7-16-14    caudal epidural #2, celestone 9mg, fentanyl 25mcg    NERVE BLOCK  7/30/14    caudal #3 decadron 10mg    NERVE BLOCK  11-6-14    duramorph epidural steroid block  duramorph 1 mg celestone 9 mg    NERVE BLOCK  11/20/15    TENS- Empi Select    NERVE BLOCK  07/20/2018    right transforminal # 1 decadron 10mg,isovue    NERVE BLOCK Bilateral 02/01/2019    bilat mbnb- no steroid    NERVE BLOCK Bilateral 02/08/2019    bilat mbnb, marcaine . 25%    NM KNEE SCOPE,DIAGNOSTIC Right 3/24/2017    KNEE ARTHROSCOPY WITH PARTIAL MEDIAL MENISECAL DEBRIDMENT  performed by Carole Rasmussen MD at [address] [place] [address]  9 20 2007    UPPER GASTROINTESTINAL ENDOSCOPY  4 21 2009,04/2011    gastritis, esophagitis       Allergies   Allergen Reactions    Aspirin      DUE TO ULCER    Claritin [Loratadine] Other (See Comments)     coughing    Flonase [Fluticasone Propionate]     Morphine And Related      GI Upset         Current Outpatient Medications:     blood glucose monitor strips, 1 strip by Other route 2 times daily Test 2/day, Disp: 100 strip, Rfl: 11    Lancets MISC, 1 each by Does not apply route 2 times daily Use 2/day, Disp: 100 each, Rfl: 11    tiZANidine (ZANAFLEX) 4 MG tablet, Take 4 mg by mouth 2 times daily, Disp: , Rfl:     HYDROcodone-acetaminophen (NORCO) 5-325 MG per tablet, Take 1 tablet by mouth every 8 hours as needed for Pain for up to 30 days.  Early refill due to holidays, Disp: 90 tablet, Rfl: 0    omeprazole (PRILOSEC) 20 MG delayed release capsule, TAKE 1 CAPSULE BY MOUTH EVERY DAY, Disp: 30 capsule, Rfl: 3    cetirizine (ZYRTEC) 10 MG tablet, TAKE 1 TABLET BY MOUTH DAILY, Disp: 30 tablet, Rfl: 0    SPIRIVA HANDIHALER 18 MCG inhalation capsule, Inhale 1 capsule into the lungs daily, Disp: 30 capsule, Rfl: 3    azelastine (ASTELIN) 0.1 % nasal spray, 2 sprays by Nasal route 2 times daily Use in each nostril as directed, Disp: 1 Bottle, Rfl: 0    lisinopril-hydroCHLOROthiazide (PRINZIDE;ZESTORETIC) 20-25 MG per tablet, , Disp: , Rfl:     meloxicam (MOBIC) 7.5 MG tablet, Take 1 tablet by mouth daily, Disp: 30 tablet, Rfl: 0    nicotine (NICODERM CQ) 21 MG/24HR, Place 1 patch onto the skin every 24 hours, Disp: 30 patch, Rfl: 1    metFORMIN (GLUCOPHAGE) 500 MG tablet, TAKE 1 TABLET BY MOUTH 2 TIMES DAILY (WITH MEALS), Disp: 180 tablet, Rfl: 0    amLODIPine (NORVASC) 10 MG tablet, TAKE 1 TABLET BY MOUTH DAILY, Disp: 90 tablet, Rfl: 2    potassium chloride (KLOR-CON M) 10 MEQ extended release tablet, TAKE 2 TABLETS BY MOUTH DAILY, Disp: 60 tablet, Rfl: 2    fluticasone-salmeterol (ADVAIR DISKUS) 250-50 MCG/DOSE AEPB, Inhale 1 puff into the lungs every 12 hours, Disp: 60 each, Rfl: 3    lactobacillus (CULTURELLE) capsule, Take 1 capsule by mouth daily (with breakfast), Disp: 30 capsule, Rfl: 0    carvedilol (COREG) 6.25 MG tablet, TAKE 1 TABLET BY MOUTH 2 TIMES DAILY, Disp: 180 tablet, Rfl: 0    trospium (SANCTURA) 20 MG tablet, TAKE 1 TABLET BY MOUTH 2 TIMES DAILY, Disp: 60 tablet, Rfl: 1    blood glucose test strips (EXACTECH TEST) strip, 1 each by In Vitro route daily As needed. , Disp: 50 each, Rfl: 11    atorvastatin (LIPITOR) 40 MG tablet, Take 1 tablet by mouth daily, Disp: 90 tablet, Rfl: 1     MG capsule, TAKE 1 CAPSULE EVERY DAY, Disp: 30 capsule, Rfl: 10    albuterol sulfate HFA (VENTOLIN HFA) 108 (90 Base) MCG/ACT inhaler, Inhale 2 puffs into the lungs every 6 hours as needed for Wheezing, Disp: 1 Inhaler, Rfl: 3    diphenhydrAMINE (BENADRYL) 25 MG tablet, Take 25 mg by mouth every 6 hours as needed for Itching, Disp: , Rfl:     acetaminophen (TYLENOL) 500 MG tablet, Take 1 tablet by mouth 4 times daily as needed for Pain, Disp: 30 tablet, Rfl: 0    mirtazapine (REMERON) 30 MG tablet, Take 30 mg by mouth nightly, Disp: , Rfl:     mirtazapine (REMERON) 15 MG tablet, Take 15 mg by mouth nightly, Disp: , Rfl:     DULoxetine (CYMBALTA) 60 MG extended release capsule, Take 1 capsule by mouth daily, Disp: 30 capsule, Rfl: 3    Lancets MISC, 1 each by Does not apply route daily, Disp: 100 each, Rfl: 11    ipratropium-albuterol (DUONEB) 0.5-2.5 (3) MG/3ML SOLN nebulizer solution, Inhale 3 mLs into the lungs every 6 hours as needed for Shortness of Breath, Disp: 360 mL, Rfl: 11    Family History   Problem Relation Age of Onset    Diabetes Mother     Heart Disease Mother     Cirrhosis Father        Social History     Socioeconomic History    Marital status: Single     Spouse name: Not on file    Number of children: Not on file    Years of education: Not on file    Highest education level: Not on file   Occupational History     Employer: DISABLED   Social Needs    Financial resource strain: Not very hard    Food insecurity     Worry: Never true     Inability: Never true    Transportation needs     Medical: No     Non-medical: No   Tobacco Use    Smoking status: Former Smoker     Packs/day: 0.25     Years: 36.00     Pack years: 9.00     Types: Cigarettes     Quit date: 10/30/2020     Years since quittin.3    Smokeless tobacco: Never Used    Tobacco comment: pt currently using nicotine patches   5 CIGARETTES A DAY   Substance and Sexual Activity    Alcohol use: No     Alcohol/week: 0.0 standard drinks     Frequency: Never     Binge frequency: Never    Drug use: No     Comment: 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 7 yrs    Sexual activity: Never   Lifestyle    Physical activity     Days per week: 0 days     Minutes per session: 0 min    Stress:  Only a little   Relationships    Social connections     Talks on phone: More than three times a week     Gets together: Once a week     Attends Shinto service: More than 4 times per year     Active member of club or organization: No     Attends meetings of clubs or organizations: Never     Relationship status: Never     Intimate partner violence     Fear of current or ex partner: Not on file     Emotionally abused: Not on file     Physically abused: Not on file     Forced sexual activity: Not on file   Other Topics Concern    Not on file   Social History Narrative    10/9/20 Patient keeping contact with others to a minimum due to [place] 19 Pandemic. 10/9/20 Patient has difficulty with ambulation due to DJD, stenosis and fibromyalgia       Review of Systems:  Review of Systems   Constitution: Negative for chills and fever. Cardiovascular: Negative for chest pain and palpitations. Respiratory: Negative for cough and shortness of breath. Musculoskeletal: Positive for back pain. Gastrointestinal: Negative for constipation. Neurological: Negative for disturbances in coordination, loss of balance, numbness, paresthesias and tingling. Physical Exam:  LMP 04/19/2003     Physical Exam  Neurological:      Mental Status: She is alert.    Psychiatric:         Mood and Affect: Mood normal.         Record/Diagnostics Review:    Last óscar 1/2021 and was NOT appropriate  - +ETOH - warning given last visit    [facility] rx/pills:------------------------------------------ no  Taking  medication as prescribed: ----------- yes  Urine Drug Screen ---------------------------------  yes  Recent ER visits: -------------------------------------No  Pill count is appropriate: ---------------------------yes   Refills for prescriptions appropriate:---------- yes    Assessment:  Problem List Items Addressed This Visit     Primary osteoarthritis of both knees    Relevant Medications    HYDROcodone-acetaminophen (Tammy Gutiérrez MG per tablet (Start on 3/12/2021)    tiZANidine (ZANAFLEX) 4 MG tablet    Medication monitoring encounter - Primary    Relevant Orders    DRUG SCREEN, PAIN    Chronic low back pain    Relevant Medications    HYDROcodone-acetaminophen (NORCO) 5-325 MG per tablet (Start on 3/12/2021)    tiZANidine (ZANAFLEX) 4 MG tablet    Major depression, chronic    Postlaminectomy syndrome, lumbar region    Relevant Orders    DRUG SCREEN, PAIN             Treatment Plan:  Patient relates current medications are helping the pain. Patient reports taking pain medications as prescribed, denies obtaining medications from different sources and denies use of illegal drugs. Patient denies side effects from medications like nausea, vomiting, constipation or drowsiness. Patient reports current activities of daily living are possible due to medications and would like to continue them. As always, we encourage daily stretching and strengthening exercises, and recommend minimizing use of pain medications unless patient cannot get through daily activities due to pain. Contract requirements met. Continue opioid therapy. Script written for norco  UDS - pt instructed to go to lab  Follow up appointment made for 4 weeks    Samira Pena, was evaluated through a synchronous (real-time) audio-video encounter. The patient (or guardian if applicable) is aware that this is a billable service. Verbal consent to proceed has been obtained within the past 12 months. The visit was conducted pursuant to the emergency declaration under the [facility], [address] [place] [address] authority and the [employer] and [employer] General Act. Patient identification was verified, and a caregiver was present when appropriate. The patient was located in a state where the provider was credentialed to provide care.     Total time spent for this encounter: 15 minutes    --Lambert Mcardle, APRN - CNP on 3/9/2021 at 9:55 AM    An electronic signature was used to authenticate this note.

## 2021-03-10 ENCOUNTER — OFFICE VISIT (OUTPATIENT)
Dept: NEUROSURGERY | Age: 61
End: 2021-03-10
Payer: COMMERCIAL

## 2021-03-10 VITALS
TEMPERATURE: 98.7 F | SYSTOLIC BLOOD PRESSURE: 136 MMHG | WEIGHT: 182 LBS | HEIGHT: 65 IN | BODY MASS INDEX: 30.32 KG/M2 | HEART RATE: 79 BPM | DIASTOLIC BLOOD PRESSURE: 88 MMHG

## 2021-03-10 DIAGNOSIS — M54.16 LUMBAR RADICULOPATHY, CHRONIC: ICD-10-CM

## 2021-03-10 DIAGNOSIS — M43.16 SPONDYLOLISTHESIS, LUMBAR REGION: ICD-10-CM

## 2021-03-10 DIAGNOSIS — M54.17 LUMBOSACRAL RADICULOPATHY AT L4: Primary | ICD-10-CM

## 2021-03-10 PROCEDURE — G8482 FLU IMMUNIZE ORDER/ADMIN: HCPCS | Performed by: NEUROLOGICAL SURGERY

## 2021-03-10 PROCEDURE — 1036F TOBACCO NON-USER: CPT | Performed by: NEUROLOGICAL SURGERY

## 2021-03-10 PROCEDURE — G8427 DOCREV CUR MEDS BY ELIG CLIN: HCPCS | Performed by: NEUROLOGICAL SURGERY

## 2021-03-10 PROCEDURE — G8417 CALC BMI ABV UP PARAM F/U: HCPCS | Performed by: NEUROLOGICAL SURGERY

## 2021-03-10 PROCEDURE — 3017F COLORECTAL CA SCREEN DOC REV: CPT | Performed by: NEUROLOGICAL SURGERY

## 2021-03-10 PROCEDURE — 99213 OFFICE O/P EST LOW 20 MIN: CPT | Performed by: NEUROLOGICAL SURGERY

## 2021-03-10 NOTE — PROGRESS NOTES
Joann Gandhi  [facility] [place] [address] [address]  Dept: [phone]    Patient:  Stacy Cano  YOB: 1960  Date: 3/10/21    The patient is a 56 y.o. female who presents today for consult of the following problems:     Chief Complaint   Patient presents with    Follow-up     Results XR             HPI:     Stacy Cano is a 56 y.o. female on whom neurosurgical consultation was requested by Karthik Abarca MD for management of lumbar spondylosis with radiculopathy. Patient with significant improvement of bilateral lower extremity radiating pain numbness and tingling. She unfortunately did have a fall approximately a month or so ago with new right-sided burning proximally that has since resolved. She does state that intermittently her right knee does appear to give out and she does have some weakness. Overall no burning or ongoing lower extremity pain. No significant axial pain but generally speaking mainly stiffness. She has been able to stand for greater than 5 minutes and is ambulate now even without the use of a cane or walker for longer periods.       History:     Past Medical History:   Diagnosis Date    Allergic rhinitis     Alveolar hypoventilation 11/20/2020    Aortic insufficiency 2018    mild-moderate on echo (was [facility] and [facility])    Bronchiectasis ([facility] 11/20/2020    Bronchitis     Chronic back pain     Pain management at [facility]. COPD (chronic obstructive pulmonary disease) ([facility]     Dr. Juan Shin to see 11/09/2020    Depression     DM (diabetes mellitus) ([facility] 12/18/2012    GERD (gastroesophageal reflux disease)     History of echocardiogram 05/2018    EF 65%, mild-moderate AI and TR    Hyperlipidemia     Dr. Sen Held Hypertension     Dr. Sen Held Obesity     Osteoarthritis     Peripheral vascular disease ([facility]     Primary osteoarthritis of both knees     Radicular pain of lumbosacral region     Spinal stenosis, lumbar region, without neurogenic claudication 04/30/2013    Tricuspid regurgitation 2018    mild-moderate on echo    Type II or unspecified type diabetes mellitus without mention of complication, not stated as uncontrolled     Dr. Braden Avendano Unspecified sleep apnea     no cpap used anymore    URI (upper respiratory infection)     Wears dentures     upper and lower full dentures    Wears glasses     Wellness examination     Dr. Con Gurrola -PCP last visit in early Oct. 2020     Past Surgical History:   Procedure Laterality Date    COLONOSCOPY  2/12/2009    normal    DILATION AND CURETTAGE OF UTERUS      GASTRECTOMY      partial    LUMBAR DISCECTOMY  01/2015    lumbar diskectomy    LUMBAR FUSION  11/19/2020     POSTERIOR FUSION L4/5,     LUMBAR FUSION N/A 11/19/2020    POSTERIOR FUSION L4/5, MEDJANESSAS, Syeda Chery, FRENCHS #[id number] AMINA performed by Hood Jean DO at [address] Right 4/30/13    Lumbar Diagnostic Block,  Kenalog 40 mg    NERVE BLOCK  5/23/13    Lumbar Radiofrequency, Kenalog 40mg    NERVE BLOCK  8/12/13    Lt MBNB  celestone 6mg    NERVE BLOCK Left 8-28-13    left lumbar diagnostic block #2 decadron 10 mg    NERVE BLOCK Left 9-24-13    left lumbar median branch radiofrequency    NERVE BLOCK  07-02-14    caudal, celestone 9 mg    NERVE BLOCK  7-16-14    caudal epidural #2, celestone 9mg, fentanyl 25mcg    NERVE BLOCK  7/30/14    caudal #3 decadron 10mg    NERVE BLOCK  11-6-14    duramorph epidural steroid block  duramorph 1 mg celestone 9 mg    NERVE BLOCK  11/20/15    TENS- Empi Select    NERVE BLOCK  07/20/2018    right transforminal # 1 decadron 10mg,isovue    NERVE BLOCK Bilateral 02/01/2019    bilat mbnb- no steroid    NERVE BLOCK Bilateral 02/08/2019    bilat mbnb, marcaine . 25%    PA KNEE SCOPE,DIAGNOSTIC Right 3/24/2017    KNEE ARTHROSCOPY WITH PARTIAL MEDIAL MENISECAL DEBRIDMENT  performed by Coco Hi MD at [facility]  9 20 2007    UPPER GASTROINTESTINAL ENDOSCOPY  4 21 2009,04/2011    gastritis, esophagitis     Family History   Problem Relation Age of Onset    Diabetes Mother     Heart Disease Mother     Cirrhosis Father      Current Outpatient Medications on File Prior to Visit   Medication Sig Dispense Refill    [START ON 3/12/2021] HYDROcodone-acetaminophen (NORCO) 5-325 MG per tablet Take 1 tablet by mouth every 8 hours as needed for Pain for up to 30 days.  Early refill due to holidays 90 tablet 0    tiZANidine (ZANAFLEX) 4 MG tablet Take 1 tablet by mouth 2 times daily 60 tablet 0    Lancets MISC 1 each by Does not apply route 2 times daily Use 2/day 100 each 11    omeprazole (PRILOSEC) 20 MG delayed release capsule TAKE 1 CAPSULE BY MOUTH EVERY DAY 30 capsule 3    cetirizine (ZYRTEC) 10 MG tablet TAKE 1 TABLET BY MOUTH DAILY 30 tablet 0    SPIRIVA HANDIHALER 18 MCG inhalation capsule Inhale 1 capsule into the lungs daily 30 capsule 3    azelastine (ASTELIN) 0.1 % nasal spray 2 sprays by Nasal route 2 times daily Use in each nostril as directed 1 Bottle 0    lisinopril-hydroCHLOROthiazide (PRINZIDE;ZESTORETIC) 20-25 MG per tablet       meloxicam (MOBIC) 7.5 MG tablet Take 1 tablet by mouth daily 30 tablet 0    nicotine (NICODERM CQ) 21 MG/24HR Place 1 patch onto the skin every 24 hours 30 patch 1    metFORMIN (GLUCOPHAGE) 500 MG tablet TAKE 1 TABLET BY MOUTH 2 TIMES DAILY (WITH MEALS) 180 tablet 0    amLODIPine (NORVASC) 10 MG tablet TAKE 1 TABLET BY MOUTH DAILY 90 tablet 2    potassium chloride (KLOR-CON M) 10 MEQ extended release tablet TAKE 2 TABLETS BY MOUTH DAILY 60 tablet 2    fluticasone-salmeterol (ADVAIR DISKUS) 250-50 MCG/DOSE AEPB Inhale 1 puff into the lungs every 12 hours 60 each 3    lactobacillus (CULTURELLE) capsule Take 1 capsule by mouth daily (with breakfast) 30 capsule 0    carvedilol (COREG) 6.25 MG tablet TAKE 1 TABLET BY MOUTH 2 TIMES DAILY 180 tablet 0    trospium (SANCTURA) 20 MG tablet TAKE 1 TABLET BY MOUTH 2 TIMES DAILY 60 tablet 1    blood glucose test strips (EXACTECH TEST) strip 1 each by In Vitro route daily As needed. 50 each 11    atorvastatin (LIPITOR) 40 MG tablet Take 1 tablet by mouth daily 90 tablet 1     MG capsule TAKE 1 CAPSULE EVERY DAY 30 capsule 10    albuterol sulfate HFA (VENTOLIN HFA) 108 (90 Base) MCG/ACT inhaler Inhale 2 puffs into the lungs every 6 hours as needed for Wheezing 1 Inhaler 3    diphenhydrAMINE (BENADRYL) 25 MG tablet Take 25 mg by mouth every 6 hours as needed for Itching      acetaminophen (TYLENOL) 500 MG tablet Take 1 tablet by mouth 4 times daily as needed for Pain 30 tablet 0    mirtazapine (REMERON) 30 MG tablet Take 30 mg by mouth nightly      mirtazapine (REMERON) 15 MG tablet Take 15 mg by mouth nightly      DULoxetine (CYMBALTA) 60 MG extended release capsule Take 1 capsule by mouth daily 30 capsule 3    ipratropium-albuterol (DUONEB) 0.5-2.5 (3) MG/3ML SOLN nebulizer solution Inhale 3 mLs into the lungs every 6 hours as needed for Shortness of Breath 360 mL 11     No current facility-administered medications on file prior to visit.       Social History     Tobacco Use    Smoking status: Former Smoker     Packs/day: 0.25     Years: 36.00     Pack years: 9.00     Types: Cigarettes     Quit date: 10/30/2020     Years since quittin.3    Smokeless tobacco: Never Used    Tobacco comment: pt currently using nicotine patches   5 CIGARETTES A DAY   Substance Use Topics    Alcohol use: No     Alcohol/week: 0.0 standard drinks     Frequency: Never     Binge frequency: Never    Drug use: No     Comment: 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 7 yrs       Allergies   Allergen Reactions    Aspirin      DUE TO ULCER    Claritin [Loratadine] Other (See Comments)     coughing    Flonase [Fluticasone Propionate]     Morphine And Related      GI Upset       Review of Systems  Constitutional: Negative for activity change and appetite change. HENT: Negative for ear pain and facial swelling. Eyes: Negative for discharge and itching. Respiratory: Negative for choking and chest tightness. Cardiovascular: Negative for chest pain and leg swelling. Gastrointestinal: Negative for nausea and abdominal pain. Endocrine: Negative for cold intolerance and heat intolerance. Genitourinary: Negative for frequency and flank pain. Musculoskeletal: Negative for myalgias and joint swelling. Skin: Negative for rash and wound. Allergic/Immunologic: Negative for environmental allergies and food allergies. Hematological: Negative for adenopathy. Does not bruise/bleed easily. Psychiatric/Behavioral: Negative for self-injury. The patient is not nervous/anxious. Physical Exam:      /88 (Site: Left Upper Arm, Position: Sitting, Cuff Size: Medium Adult)   Pulse 79   Temp 98.7 °F (37.1 °C) (Temporal)   Ht 5' 5\" (1.651 m)   Wt 182 lb (82.6 kg)   LMP 04/19/2003   BMI 30.29 kg/m²   Estimated body mass index is 30.29 kg/m² as calculated from the following:    Height as of this encounter: 5' 5\" (1.651 m). Weight as of this encounter: 182 lb (82.6 kg). General:  Khadijah Esquivel is a 56y.o. year old female who appears her stated age. HEENT: Normocephalic atraumatic. Neck supple. Chest: regular rate; pulses equal  Abdomen: Soft nontender nondistended. Normoactive bowel sounds.   Ext: DP and PT pulses 2+, good cap refill  Neuro    Mentation  Appropriate affect  Registration intact  Orientation intact  3 item recall intact  Judgement intact to situation    Cranial Nerves:   Pupils equal and reactive to light  Extraocular motion intact  Face and shrug symmetric  Tongue midline  No dysarthria  v1-3 sensation symmetric, masseter tone symmetric  Hearing symmetric and intact to finger rub    Sensation:   Intact    Motor  L deltoid 5/5; R deltoid 5/5  L biceps 5/5; R biceps 5/5  L triceps 5/5; R triceps 5/5  L wrist extension 5/5; R wrist extension 5/5  L intrinsics 5/5; R intrinsics 5/5     L iliopsoas 5/5 , R iliopsoas 5/5  L quadriceps 5/5; R quadriceps 5/5  L Dorsiflexion 5/5; R dorsiflexion 5/5  L Plantarflexion 5/5; R plantarflexion 5/5  L EHL 5/5; R EHL 5/5    Reflexes  L Brachioradialis 2+/4; R brachioradialis 2+/4  L Biceps 2+/4; R Biceps 2+/4  L Triceps 2+/4; R Triceps 2+/4  L Patellar 2+/4: R Patellar 2+/4  L Achilles 2+/4; R Achilles 2+/4    hoffmans L: neg  hoffmans R: neg  Clonus L: neg  Clonus R: neg  Babinski L: up  Babinski R; up    Studies Review:     xrays stable with no hardware malfunction or loosening    Assessment and Plan:      1. Lumbosacral radiculopathy at L4    2. Spondylolisthesis, lumbar region    3. Lumbar radiculopathy, chronic          Plan: doing well. Would like to continue home PT to increase lumbar ROM and mobility. Will order. counselled on increasing activity with exercise bike, walking etc.     Home PT  F/u Brii 2-3mo for evaluation    Followup: No follow-ups on file. Prescriptions Ordered:  No orders of the defined types were placed in this encounter. Orders Placed:  No orders of the defined types were placed in this encounter. Electronically signed by Tc Amato DO on 3/10/2021 at 10:27 AM    Please note that this chart was generated using voice recognition Dragon dictation software. Although every effort was made to ensure the accuracy of this automated transcription, some errors in transcription may have occurred.

## 2021-03-11 ENCOUNTER — HOSPITAL ENCOUNTER (OUTPATIENT)
Age: 61
Discharge: HOME | End: 2021-03-11
Payer: COMMERCIAL

## 2021-03-11 DIAGNOSIS — Z51.81 MEDICATION MONITORING ENCOUNTER: ICD-10-CM

## 2021-03-11 DIAGNOSIS — M96.1 POSTLAMINECTOMY SYNDROME, LUMBAR REGION: ICD-10-CM

## 2021-03-11 PROCEDURE — 80307 DRUG TEST PRSMV CHEM ANLYZR: CPT

## 2021-03-12 RX ORDER — MELOXICAM 7.5 MG/1
7.5 TABLET ORAL DAILY
Qty: 30 TABLET | Refills: 0 | Status: SHIPPED | OUTPATIENT
Start: 2021-03-12 | End: 2021-04-12

## 2021-03-16 LAB
6-ACETYLMORPHINE, UR: NOT DETECTED
7-AMINOCLONAZEPAM, URINE: NOT DETECTED
ALPHA-OH-ALPRAZ, URINE: NOT DETECTED
ALPHA-OH-MIDAZOLAM, URINE: NOT DETECTED
ALPRAZOLAM, URINE: NOT DETECTED
AMPHETAMINES, URINE: NOT DETECTED
BARBITURATES, URINE: NOT DETECTED
BENZOYLECGONINE, UR: NOT DETECTED
BUPRENORPHINE URINE: NOT DETECTED
CARISOPRODOL, UR: NOT DETECTED
CLONAZEPAM, URINE: NOT DETECTED
CODEINE, URINE: NOT DETECTED
CREATININE URINE: 123.6 MG/DL (ref 20–400)
DIAZEPAM, URINE: NOT DETECTED
DRUGS EXPECTED, UR: NORMAL
EER HI RES INTERP UR: NORMAL
ETHYL GLUCURONIDE UR: NOT DETECTED
FENTANYL URINE: NOT DETECTED
GABAPENTIN: PRESENT
HYDROCODONE, URINE: PRESENT
HYDROMORPHONE, URINE: PRESENT
LORAZEPAM, URINE: NOT DETECTED
MARIJUANA METAB, UR: NOT DETECTED
MDA, UR: NOT DETECTED
MDEA, EVE, UR: NOT DETECTED
MDMA URINE: NOT DETECTED
MEPERIDINE METAB, UR: NOT DETECTED
METHADONE, URINE: NOT DETECTED
METHAMPHETAMINE, URINE: NOT DETECTED
METHYLPHENIDATE: NOT DETECTED
MIDAZOLAM, URINE: NOT DETECTED
MORPHINE URINE: NOT DETECTED
NALOXONE URINE: NOT DETECTED
NORBUPRENORPHINE, URINE: NOT DETECTED
NORDIAZEPAM, URINE: NOT DETECTED
NORFENTANYL, URINE: NOT DETECTED
NORHYDROCODONE, URINE: PRESENT
NOROXYCODONE, URINE: NOT DETECTED
NOROXYMORPHONE, URINE: NOT DETECTED
OXAZEPAM, URINE: NOT DETECTED
OXYCODONE URINE: NOT DETECTED
OXYMORPHONE, URINE: NOT DETECTED
PAIN MANAGEMENT DRUG PANEL INTERP, URINE: NORMAL
PAIN MGT DRUG PANEL, HI RES, UR: NORMAL
PCP,URINE: NOT DETECTED
PHENTERMINE, UR: NOT DETECTED
PREGABALIN: NOT DETECTED
TAPENTADOL, URINE: NOT DETECTED
TAPENTADOL-O-SULFATE, URINE: NOT DETECTED
TEMAZEPAM, URINE: NOT DETECTED
TRAMADOL, URINE: NOT DETECTED
ZOLPIDEM METABOLITE (ZCA), URINE: NOT DETECTED
ZOLPIDEM, URINE: NOT DETECTED

## 2021-03-24 DIAGNOSIS — J30.9 CHRONIC ALLERGIC RHINITIS: ICD-10-CM

## 2021-03-24 NOTE — TELEPHONE ENCOUNTER
Request for Zyrtec, Gabapentin. Pt on wait list for follow up in July    Next Visit Date:  Future Appointments   Date Time Provider Claudy Schaeffer   4/9/2021 11:00 AM Courtney Orozco APRN - [address] [facility]   4/27/2021  6:30 PM STA SCR MAMMO RM 2 STAZ MAMMO STA Radiolog   6/9/2021 10:15 AM ELVIRA Marquez - CNP [place] [facility] [address] Maintenance   Topic Date Due    COVID-19 Vaccine (1) Never done    Shingles Vaccine (2 of 2) 09/22/2021 (Originally 8/19/2019)    Diabetic microalbuminuria test  04/21/2021    Lipid screen  04/21/2021    Diabetic retinal exam  05/14/2021    Annual Wellness Visit (AWV)  09/30/2021    Breast cancer screen  11/22/2021    Potassium monitoring  12/02/2021    Creatinine monitoring  12/02/2021    Diabetic foot exam  03/02/2022    A1C test (Diabetic or Prediabetic)  03/02/2022    Colon cancer screen fecal DNA test (Cologuard)  10/05/2023    Cervical cancer screen  03/02/2024    DTaP/Tdap/Td vaccine (2 - Td) 06/24/2029    Flu vaccine  Completed    Pneumococcal 0-64 years Vaccine  Completed    Hepatitis C screen  Completed    HIV screen  Completed    Hepatitis A vaccine  Aged Out    Hib vaccine  Aged Out    Meningococcal (ACWY) vaccine  Aged Out       Hemoglobin A1C (%)   Date Value   03/02/2021 5.5   04/21/2020 5.8   03/05/2019 5.8             ( goal A1C is < 7)   Microalb/Crt.  Ratio (mcg/mg creat)   Date Value   04/21/2020 CANNOT BE CALCULATED     LDL Cholesterol (mg/dL)   Date Value   04/21/2020 116       (goal LDL is <100)   AST (U/L)   Date Value   04/21/2020 18     ALT (U/L)   Date Value   04/21/2020 21     BUN (mg/dL)   Date Value   12/02/2020 15     BP Readings from Last 3 Encounters:   03/10/21 136/88   03/02/21 137/89   01/13/21 110/72          (goal 120/80)    All Future Testing planned in [facility]  Lab Frequency Next Occurrence   GE DIGITAL SCREEN W OR WO CAD BILATERAL Once 03/17/2021   GE DIGITAL SCREEN W OR WO CAD BILATERAL Once 06/17/2021         Patient Active Problem List:     DJD (degenerative joint disease) of knee     Osteoarthritis of spine with radiculopathy, lumbar region     GERD (gastroesophageal reflux disease)     COPD, severity to be determined ([address]     HTN (hypertension)     Allergic rhinitis     Lipoma of shoulder s/p excision right posterior 11 17 2008     History of tobacco use     DM (diabetes mellitus)     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 9)     Bronchiectasis ([address]     Centrilobular emphysema ([address]     Oxygen dependent     Acute postoperative anemia due to expected blood loss     Multifocal pneumonia     Acute on chronic respiratory failure with hypoxia (HCC)     Pulmonary function studies abnormal     Tobacco dependence     Idiopathic sleep related nonobstructive alveolar hypoventilation     Postlaminectomy syndrome, lumbar region

## 2021-03-25 RX ORDER — CETIRIZINE HYDROCHLORIDE 10 MG/1
TABLET ORAL
Qty: 30 TABLET | Refills: 0 | Status: SHIPPED | OUTPATIENT
Start: 2021-03-25 | End: 2021-04-27

## 2021-03-25 RX ORDER — GABAPENTIN 300 MG/1
CAPSULE ORAL
Qty: 120 CAPSULE | Refills: 1 | Status: SHIPPED | OUTPATIENT
Start: 2021-03-25 | End: 2021-06-07

## 2021-04-09 ENCOUNTER — HOSPITAL ENCOUNTER (OUTPATIENT)
Dept: PAIN MANAGEMENT | Age: 61
Discharge: HOME | End: 2021-04-09
Payer: COMMERCIAL

## 2021-04-09 DIAGNOSIS — Z51.81 MEDICATION MONITORING ENCOUNTER: Primary | ICD-10-CM

## 2021-04-09 DIAGNOSIS — M47.26 OTHER SPONDYLOSIS WITH RADICULOPATHY, LUMBAR REGION: ICD-10-CM

## 2021-04-09 DIAGNOSIS — M17.0 PRIMARY OSTEOARTHRITIS OF BOTH KNEES: ICD-10-CM

## 2021-04-09 DIAGNOSIS — M96.1 POSTLAMINECTOMY SYNDROME, LUMBAR REGION: ICD-10-CM

## 2021-04-09 DIAGNOSIS — G89.29 CHRONIC MIDLINE LOW BACK PAIN WITH SCIATICA, SCIATICA LATERALITY UNSPECIFIED: ICD-10-CM

## 2021-04-09 DIAGNOSIS — M54.40 CHRONIC MIDLINE LOW BACK PAIN WITH SCIATICA, SCIATICA LATERALITY UNSPECIFIED: ICD-10-CM

## 2021-04-09 PROCEDURE — 99213 OFFICE O/P EST LOW 20 MIN: CPT

## 2021-04-09 PROCEDURE — 99442 PR PHYS/QHP TELEPHONE EVALUATION 11-20 MIN: CPT | Performed by: NURSE PRACTITIONER

## 2021-04-09 RX ORDER — HYDROCODONE BITARTRATE AND ACETAMINOPHEN 5; 325 MG/1; MG/1
1 TABLET ORAL EVERY 8 HOURS PRN
Qty: 90 TABLET | Refills: 0 | Status: SHIPPED | OUTPATIENT
Start: 2021-04-09 | End: 2021-05-10 | Stop reason: SDUPTHER

## 2021-04-09 RX ORDER — TIZANIDINE 4 MG/1
4 TABLET ORAL 2 TIMES DAILY
Qty: 60 TABLET | Refills: 0 | Status: SHIPPED | OUTPATIENT
Start: 2021-04-09 | End: 2021-05-10 | Stop reason: SDUPTHER

## 2021-04-09 ASSESSMENT — ENCOUNTER SYMPTOMS
CONSTIPATION: 0
BACK PAIN: 1
COUGH: 0
SHORTNESS OF BREATH: 0

## 2021-04-12 RX ORDER — MELOXICAM 7.5 MG/1
7.5 TABLET ORAL DAILY
Qty: 30 TABLET | Refills: 0 | Status: SHIPPED | OUTPATIENT
Start: 2021-04-12 | End: 2021-05-10 | Stop reason: SDUPTHER

## 2021-04-15 RX ORDER — POLYETHYLENE GLYCOL 3350 17 G/17G
17 POWDER, FOR SOLUTION ORAL DAILY
Qty: 510 G | Refills: 5 | Status: SHIPPED | OUTPATIENT
Start: 2021-04-15 | End: 2021-05-15

## 2021-04-15 NOTE — TELEPHONE ENCOUNTER
use     DM (diabetes mellitus)     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 9)     Bronchiectasis ([address]     Centrilobular emphysema ([address]     Oxygen dependent     Acute postoperative anemia due to expected blood loss     Multifocal pneumonia     Acute on chronic respiratory failure with hypoxia (HCC)     Pulmonary function studies abnormal     Tobacco dependence     Idiopathic sleep related nonobstructive alveolar hypoventilation     Postlaminectomy syndrome, lumbar region

## 2021-04-20 DIAGNOSIS — N32.81 OAB (OVERACTIVE BLADDER): ICD-10-CM

## 2021-04-21 NOTE — TELEPHONE ENCOUNTER
Request for Lisinopril/HCTZ, [place]     Pt on waitlist for 4 month f/u in July    Next Visit Date:  Future Appointments   Date Time Provider Claudy Schaeffer   4/27/2021  6:30 PM STA SCR MAMMO RM 2 STAZ MAMMO STA Radiolog   5/10/2021  1:00 PM Marlene Pitt, APRN - CNP [facility] [place]   6/9/2021 10:15 AM Sandra Robbins APRN - CNP [place] [facility] [address] Maintenance   Topic Date Due    COVID-19 Vaccine (1) Never done    Diabetic microalbuminuria test  04/21/2021    Lipid screen  04/21/2021    Shingles Vaccine (2 of 2) 09/22/2021 (Originally 8/19/2019)    Diabetic retinal exam  05/14/2021    Annual Wellness Visit (AWV)  09/30/2021    Breast cancer screen  11/22/2021    Potassium monitoring  12/02/2021    Creatinine monitoring  12/02/2021    Diabetic foot exam  03/02/2022    A1C test (Diabetic or Prediabetic)  03/02/2022    Colon cancer screen fecal DNA test (Cologuard)  10/05/2023    Cervical cancer screen  03/02/2024    DTaP/Tdap/Td vaccine (2 - Td) 06/24/2029    Flu vaccine  Completed    Pneumococcal 0-64 years Vaccine  Completed    Hepatitis C screen  Completed    HIV screen  Completed    Hepatitis A vaccine  Aged Out    Hib vaccine  Aged Out    Meningococcal (ACWY) vaccine  Aged Out       Hemoglobin A1C (%)   Date Value   03/02/2021 5.5   04/21/2020 5.8   03/05/2019 5.8             ( goal A1C is < 7)   Microalb/Crt.  Ratio (mcg/mg creat)   Date Value   04/21/2020 CANNOT BE CALCULATED     LDL Cholesterol (mg/dL)   Date Value   04/21/2020 116       (goal LDL is <100)   AST (U/L)   Date Value   04/21/2020 18     ALT (U/L)   Date Value   04/21/2020 21     BUN (mg/dL)   Date Value   12/02/2020 15     BP Readings from Last 3 Encounters:   03/10/21 136/88   03/02/21 137/89   01/13/21 110/72          (goal 120/80)    All Future Testing planned in [facility]  Lab Frequency Next Occurrence   GE DIGITAL SCREEN W OR WO CAD BILATERAL Once 06/01/2021   GE DIGITAL SCREEN W OR WO CAD BILATERAL Once 06/17/2021         Patient Active Problem List:     DJD (degenerative joint disease) of knee     Osteoarthritis of spine with radiculopathy, lumbar region     GERD (gastroesophageal reflux disease)     COPD, severity to be determined ([address]     HTN (hypertension)     Allergic rhinitis     Lipoma of shoulder s/p excision right posterior 11 17 2008     History of tobacco use     DM (diabetes mellitus)     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 9)     Bronchiectasis ([address]     Centrilobular emphysema ([address]     Oxygen dependent     Acute postoperative anemia due to expected blood loss     Multifocal pneumonia     Acute on chronic respiratory failure with hypoxia (HCC)     Pulmonary function studies abnormal     Tobacco dependence     Idiopathic sleep related nonobstructive alveolar hypoventilation     Postlaminectomy syndrome, lumbar region

## 2021-04-22 RX ORDER — TROSPIUM CHLORIDE 20 MG/1
20 TABLET, FILM COATED ORAL 2 TIMES DAILY
Qty: 60 TABLET | Refills: 0 | Status: SHIPPED | OUTPATIENT
Start: 2021-04-22 | End: 2021-06-07

## 2021-04-22 RX ORDER — LISINOPRIL AND HYDROCHLOROTHIAZIDE 25; 20 MG/1; MG/1
TABLET ORAL
Qty: 90 TABLET | Refills: 2 | Status: SHIPPED | OUTPATIENT
Start: 2021-04-22 | End: 2021-10-26 | Stop reason: SDUPTHER

## 2021-04-27 ENCOUNTER — HOSPITAL ENCOUNTER (OUTPATIENT)
Dept: MAMMOGRAPHY | Age: 61
Discharge: HOME | End: 2021-04-29
Payer: COMMERCIAL

## 2021-04-27 DIAGNOSIS — Z12.31 BREAST CANCER SCREENING BY MAMMOGRAM: ICD-10-CM

## 2021-04-27 DIAGNOSIS — J30.9 CHRONIC ALLERGIC RHINITIS: ICD-10-CM

## 2021-04-27 PROCEDURE — 77063 BREAST TOMOSYNTHESIS BI: CPT

## 2021-04-27 RX ORDER — CETIRIZINE HYDROCHLORIDE 10 MG/1
TABLET ORAL
Qty: 30 TABLET | Refills: 5 | Status: SHIPPED | OUTPATIENT
Start: 2021-04-27 | End: 2021-11-17

## 2021-04-27 NOTE — TELEPHONE ENCOUNTER
Request for cetirizine    Pt on wait list for 4 month f/u in July    Next Visit Date:  Future Appointments   Date Time Provider Claudy Schaeffer   4/27/2021  6:30 PM STA SCR MAMMO RM 2 STAZ MAMMO STA Radiolog   5/10/2021  1:00 PM Shalini Slainas APRN - CNP [facility] [place]   6/9/2021 10:15 AM ELVIRA Rowell - CNP [place] [facility] [address] Maintenance   Topic Date Due    COVID-19 Vaccine (1) Never done    Diabetic microalbuminuria test  04/21/2021    Lipid screen  04/21/2021    Shingles Vaccine (2 of 2) 09/22/2021 (Originally 8/19/2019)    Diabetic retinal exam  05/14/2021    Annual Wellness Visit (AWV)  09/30/2021    Breast cancer screen  11/22/2021    Potassium monitoring  12/02/2021    Creatinine monitoring  12/02/2021    Diabetic foot exam  03/02/2022    A1C test (Diabetic or Prediabetic)  03/02/2022    Colon cancer screen fecal DNA test (Cologuard)  10/05/2023    Cervical cancer screen  03/02/2024    DTaP/Tdap/Td vaccine (2 - Td) 06/24/2029    Flu vaccine  Completed    Pneumococcal 0-64 years Vaccine  Completed    Hepatitis C screen  Completed    HIV screen  Completed    Hepatitis A vaccine  Aged Out    Hib vaccine  Aged Out    Meningococcal (ACWY) vaccine  Aged Out       Hemoglobin A1C (%)   Date Value   03/02/2021 5.5   04/21/2020 5.8   03/05/2019 5.8             ( goal A1C is < 7)   Microalb/Crt.  Ratio (mcg/mg creat)   Date Value   04/21/2020 CANNOT BE CALCULATED     LDL Cholesterol (mg/dL)   Date Value   04/21/2020 116       (goal LDL is <100)   AST (U/L)   Date Value   04/21/2020 18     ALT (U/L)   Date Value   04/21/2020 21     BUN (mg/dL)   Date Value   12/02/2020 15     BP Readings from Last 3 Encounters:   03/10/21 136/88   03/02/21 137/89   01/13/21 110/72          (goal 120/80)    All Future Testing planned in [facility]  Lab Frequency Next Occurrence   GE DIGITAL SCREEN W OR WO CAD BILATERAL Once 06/01/2021   GE DIGITAL SCREEN W OR WO CAD BILATERAL Once 06/17/2021         Patient Active Problem List:     DJD (degenerative joint disease) of knee     Osteoarthritis of spine with radiculopathy, lumbar region     GERD (gastroesophageal reflux disease)     COPD, severity to be determined ([address]     HTN (hypertension)     Allergic rhinitis     Lipoma of shoulder s/p excision right posterior 11 17 2008     History of tobacco use     DM (diabetes mellitus)     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 9)     Bronchiectasis ([address]     Centrilobular emphysema ([address]     Oxygen dependent     Acute postoperative anemia due to expected blood loss     Multifocal pneumonia     Acute on chronic respiratory failure with hypoxia (HCC)     Pulmonary function studies abnormal     Tobacco dependence     Idiopathic sleep related nonobstructive alveolar hypoventilation     Postlaminectomy syndrome, lumbar region

## 2021-05-10 ENCOUNTER — HOSPITAL ENCOUNTER (OUTPATIENT)
Dept: PAIN MANAGEMENT | Age: 61
Discharge: HOME | End: 2021-05-10
Payer: COMMERCIAL

## 2021-05-10 DIAGNOSIS — M47.26 OSTEOARTHRITIS OF SPINE WITH RADICULOPATHY, LUMBAR REGION: ICD-10-CM

## 2021-05-10 DIAGNOSIS — Z51.81 MEDICATION MONITORING ENCOUNTER: ICD-10-CM

## 2021-05-10 DIAGNOSIS — M51.9 LUMBAR DISC DISEASE: Primary | ICD-10-CM

## 2021-05-10 DIAGNOSIS — M47.26 OTHER SPONDYLOSIS WITH RADICULOPATHY, LUMBAR REGION: ICD-10-CM

## 2021-05-10 DIAGNOSIS — M17.0 PRIMARY OSTEOARTHRITIS OF BOTH KNEES: ICD-10-CM

## 2021-05-10 DIAGNOSIS — M54.40 CHRONIC MIDLINE LOW BACK PAIN WITH SCIATICA, SCIATICA LATERALITY UNSPECIFIED: ICD-10-CM

## 2021-05-10 DIAGNOSIS — M96.1 POSTLAMINECTOMY SYNDROME, LUMBAR REGION: ICD-10-CM

## 2021-05-10 DIAGNOSIS — G89.29 CHRONIC MIDLINE LOW BACK PAIN WITH SCIATICA, SCIATICA LATERALITY UNSPECIFIED: ICD-10-CM

## 2021-05-10 PROCEDURE — 99213 OFFICE O/P EST LOW 20 MIN: CPT

## 2021-05-10 PROCEDURE — 99212 OFFICE O/P EST SF 10 MIN: CPT | Performed by: NURSE PRACTITIONER

## 2021-05-10 RX ORDER — MELOXICAM 7.5 MG/1
7.5 TABLET ORAL DAILY
Qty: 30 TABLET | Refills: 0 | Status: SHIPPED | OUTPATIENT
Start: 2021-05-10 | End: 2021-06-07

## 2021-05-10 RX ORDER — HYDROCODONE BITARTRATE AND ACETAMINOPHEN 5; 325 MG/1; MG/1
1 TABLET ORAL EVERY 8 HOURS PRN
Qty: 90 TABLET | Refills: 0 | Status: SHIPPED | OUTPATIENT
Start: 2021-05-10 | End: 2021-06-08 | Stop reason: SDUPTHER

## 2021-05-10 RX ORDER — TIZANIDINE 4 MG/1
4 TABLET ORAL 2 TIMES DAILY
Qty: 60 TABLET | Refills: 0 | Status: SHIPPED | OUTPATIENT
Start: 2021-05-10 | End: 2021-06-08 | Stop reason: SDUPTHER

## 2021-05-10 ASSESSMENT — ENCOUNTER SYMPTOMS
COUGH: 0
BACK PAIN: 1
SHORTNESS OF BREATH: 0
CONSTIPATION: 0

## 2021-05-10 NOTE — PROGRESS NOTES
Patient completed a telephone visit today to review medication contract. Chief Complaint: back pain    [facility]  Patient has had low back pain with no known injury and bilateral knee pain for many years. Gwen Guy had a knee arthroscopy in March 2017 Genicular block was discussed but she declined.  She is s/p lumbar diskectomy in 2015 and lumbar fusion 11/2020. She is stable and compliant on norco 5/325 TID. Gabapentin prescribed by PCP for neuropathic pain in feet. Follows with Dr. Emmy Chang for knees.   EMG was completed and shows Abnormal study of the right lower extremity and Normal study of the left lower extremity. Joni Box is electrodiagnostic evidence of a very mild chronic right S1 radiculopathy without active denervation. Joni Box is no electrodiagnostic evidence of a generalized large fiber peripheral Polyneuropathy.   Home PT has been ordered but she has not started yet. Back Pain  This is a chronic problem. The current episode started more than 1 year ago. The problem occurs constantly. The problem is unchanged. The pain is present in the lumbar spine. The quality of the pain is described as aching (stiffness). Radiates to: down both legs to the knees. The pain is at a severity of 7/10. The pain is moderate. The symptoms are aggravated by position and standing (walking). Pertinent negatives include no chest pain, fever, numbness, paresthesias or tingling. She has tried analgesics and bed rest for the symptoms. The treatment provided mild relief. Patient denies any new neurological symptoms. No bowel or bladder incontinence, no weakness, and no falling. Pill count: appropriate due today    Morphine equivalent: 15    Periodic Controlled Substance Monitoring: Possible medication side effects, risk of tolerance/dependence & alternative treatments discussed., No signs of potential drug abuse or diversion identified., Obtaining appropriate analgesic effect of treatment.  Richa Pinon, ELVIRA - CNP)      Past Medical History:   Diagnosis Date    Allergic rhinitis     Alveolar hypoventilation 11/20/2020    Aortic insufficiency 2018    mild-moderate on echo (was seen and discharged from cardiology-[facility])    Bronchiectasis ([facility] 11/20/2020    Bronchitis     Chronic back pain     [facility] management at [facility]. COPD (chronic obstructive pulmonary disease) ([facility]     Dr. Charmayne Conroy to see 11/09/2020    Depression     DM (diabetes mellitus) ([facility] 12/18/2012    GERD (gastroesophageal reflux disease)     History of echocardiogram 05/2018    EF 65%, mild-moderate AI and TR    Hyperlipidemia     Dr. Eduar Zhao Hypertension     Dr. Eduar Zhao Obesity     Osteoarthritis     Peripheral vascular disease ([facility]     Primary osteoarthritis of both knees     Radicular pain of lumbosacral region     Spinal stenosis, lumbar region, without neurogenic claudication 04/30/2013    Tricuspid regurgitation 2018    mild-moderate on echo    Type II or unspecified type diabetes mellitus without mention of complication, not stated as uncontrolled     Dr. Eduar Zhao Unspecified sleep apnea     no cpap used anymore    URI (upper respiratory infection)     Wears dentures     upper and lower full dentures    Wears glasses     Wellness examination     Dr. Jose Mendoza -PCP last visit in early Oct. 2020       Past Surgical History:   Procedure Laterality Date    COLONOSCOPY  2/12/2009    normal    DILATION AND CURETTAGE OF UTERUS      GASTRECTOMY      partial    LUMBAR DISCECTOMY  01/2015    lumbar diskectomy    LUMBAR FUSION  11/19/2020     POSTERIOR FUSION L4/5,     LUMBAR FUSION N/A 11/19/2020    POSTERIOR FUSION L4/5, [employer], Richa Leon, [facility]ES #[id number] AMINA performed by Tai Ball DO at [address] Right 4/30/13    Lumbar Diagnostic Block,  Kenalog 40 mg    NERVE BLOCK  5/23/13    Lumbar Radiofrequency, Kenalog 40mg    NERVE BLOCK  8/12/13    [facility]  dion 6mg    NERVE BLOCK Left 8-28-13    left lumbar diagnostic block #2 decadron 10 mg    NERVE BLOCK Left 9-24-13    left lumbar median branch radiofrequency    NERVE BLOCK  07-02-14    caudal, celestone 9 mg    NERVE BLOCK  7-16-14    caudal epidural #2, celestone 9mg, fentanyl 25mcg    NERVE BLOCK  7/30/14    caudal #3 decadron 10mg    NERVE BLOCK  11-6-14    duramorph epidural steroid block  duramorph 1 mg celestone 9 mg    NERVE BLOCK  11/20/15    TENS- Empi Select    NERVE BLOCK  07/20/2018    right transforminal # 1 decadron 10mg,isovue    NERVE BLOCK Bilateral 02/01/2019    bilat mbnb- no steroid    NERVE BLOCK Bilateral 02/08/2019    bilat mbnb, marcaine . 25%    NC KNEE SCOPE,DIAGNOSTIC Right 3/24/2017    KNEE ARTHROSCOPY WITH PARTIAL MEDIAL MENISECAL DEBRIDMENT  performed by Ewa Pan MD at [address] [place] [address] Rd  9 20 2007    UPPER GASTROINTESTINAL ENDOSCOPY  4 21 2009,04/2011    gastritis, esophagitis       Allergies   Allergen Reactions    Aspirin      DUE TO ULCER    Claritin [Loratadine] Other (See Comments)     coughing    Flonase [Fluticasone Propionate]     Morphine And Related      GI Upset         Current Outpatient Medications:     cetirizine (ZYRTEC) 10 MG tablet, TAKE 1 TABLET BY MOUTH DAILY, Disp: 30 tablet, Rfl: 5    trospium (SANCTURA) 20 MG tablet, TAKE 1 TABLET BY MOUTH 2 TIMES DAILY, Disp: 60 tablet, Rfl: 0    lisinopril-hydroCHLOROthiazide (PRINZIDE;ZESTORETIC) 20-25 MG per tablet, TAKE 1 TABLET EVERY DAY, Disp: 90 tablet, Rfl: 2    polyethylene glycol (MIRALAX) 17 GM/SCOOP powder, Take 17 g by mouth daily, Disp: 510 g, Rfl: 5    meloxicam (MOBIC) 7.5 MG tablet, TAKE 1 TABLET BY MOUTH DAILY, Disp: 30 tablet, Rfl: 0    tiZANidine (ZANAFLEX) 4 MG tablet, Take 1 tablet by mouth 2 times daily, Disp: 60 tablet, Rfl: 0    gabapentin (NEURONTIN) 300 MG capsule, TAKE 1 CAPSULE IN MORNING AND AFTERNOON AND 2 CAPSULES AT NIGHT, Disp: 120 capsule, Rfl: 1    Lancets MISC, 1 each by Does not apply route 2 times daily Use 2/day, Disp: 100 each, Rfl: 11    omeprazole (PRILOSEC) 20 MG delayed release capsule, TAKE 1 CAPSULE BY MOUTH EVERY DAY, Disp: 30 capsule, Rfl: 3    SPIRIVA HANDIHALER 18 MCG inhalation capsule, Inhale 1 capsule into the lungs daily, Disp: 30 capsule, Rfl: 3    azelastine (ASTELIN) 0.1 % nasal spray, 2 sprays by Nasal route 2 times daily Use in each nostril as directed, Disp: 1 Bottle, Rfl: 0    nicotine (NICODERM CQ) 21 MG/24HR, Place 1 patch onto the skin every 24 hours, Disp: 30 patch, Rfl: 1    metFORMIN (GLUCOPHAGE) 500 MG tablet, TAKE 1 TABLET BY MOUTH 2 TIMES DAILY (WITH MEALS), Disp: 180 tablet, Rfl: 0    amLODIPine (NORVASC) 10 MG tablet, TAKE 1 TABLET BY MOUTH DAILY, Disp: 90 tablet, Rfl: 2    potassium chloride (KLOR-CON M) 10 MEQ extended release tablet, TAKE 2 TABLETS BY MOUTH DAILY, Disp: 60 tablet, Rfl: 2    fluticasone-salmeterol (ADVAIR DISKUS) 250-50 MCG/DOSE AEPB, Inhale 1 puff into the lungs every 12 hours, Disp: 60 each, Rfl: 3    lactobacillus (CULTURELLE) capsule, Take 1 capsule by mouth daily (with breakfast), Disp: 30 capsule, Rfl: 0    carvedilol (COREG) 6.25 MG tablet, TAKE 1 TABLET BY MOUTH 2 TIMES DAILY, Disp: 180 tablet, Rfl: 0    blood glucose test strips (EXACTECH TEST) strip, 1 each by In Vitro route daily As needed. , Disp: 50 each, Rfl: 11    atorvastatin (LIPITOR) 40 MG tablet, Take 1 tablet by mouth daily, Disp: 90 tablet, Rfl: 1     MG capsule, TAKE 1 CAPSULE EVERY DAY, Disp: 30 capsule, Rfl: 10    albuterol sulfate HFA (VENTOLIN HFA) 108 (90 Base) MCG/ACT inhaler, Inhale 2 puffs into the lungs every 6 hours as needed for Wheezing, Disp: 1 Inhaler, Rfl: 3    diphenhydrAMINE (BENADRYL) 25 MG tablet, Take 25 mg by mouth every 6 hours as needed for Itching, Disp: , Rfl:     acetaminophen (TYLENOL) 500 MG tablet, Take 1 tablet by mouth 4 times daily as needed for Pain, Disp: 30 tablet, Rfl: 0    mirtazapine (REMERON) 30 MG tablet, Take 30 mg by mouth nightly, Disp: , Rfl:     mirtazapine (REMERON) 15 MG tablet, Take 15 mg by mouth nightly, Disp: , Rfl:     DULoxetine (CYMBALTA) 60 MG extended release capsule, Take 1 capsule by mouth daily, Disp: 30 capsule, Rfl: 3    ipratropium-albuterol (DUONEB) 0.5-2.5 (3) MG/3ML SOLN nebulizer solution, Inhale 3 mLs into the lungs every 6 hours as needed for Shortness of Breath, Disp: 360 mL, Rfl: 11    Family History   Problem Relation Age of Onset    Diabetes Mother     Heart Disease Mother     Cirrhosis Father        Social History     Socioeconomic History    Marital status: Single     Spouse name: Not on file    Number of children: Not on file    Years of education: Not on file    Highest education level: Not on file   Occupational History     Employer: DISABLED   Social Needs    Financial resource strain: Not very hard    Food insecurity     Worry: Never true     Inability: Never true    Transportation needs     Medical: No     Non-medical: No   Tobacco Use    Smoking status: Former Smoker     Packs/day: 0.25     Years: 36.00     Pack years: 9.00     Types: Cigarettes     Quit date: 10/30/2020     Years since quittin.5    Smokeless tobacco: Never Used    Tobacco comment: pt currently using nicotine patches   5 CIGARETTES A DAY   Substance and Sexual Activity    Alcohol use: No     Alcohol/week: 0.0 standard drinks     Frequency: Never     Binge frequency: Never    Drug use: No     Comment: 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 7 yrs    Sexual activity: Never   Lifestyle    Physical activity     Days per week: 0 days     Minutes per session: 0 min    Stress:  Only a little   Relationships    Social connections     Talks on phone: More than three times a week     Gets together: Once a week     Attends Protestant service: More than 4 times per year     Active member of club or organization: No     Attends meetings of clubs or organizations: Never     Relationship status: Never     Intimate partner violence     Fear of current or ex partner: Not on file     Emotionally abused: Not on file     Physically abused: Not on file     Forced sexual activity: Not on file   Other Topics Concern    Not on file   Social History Narrative    10/9/20 Patient keeping contact with others to a minimum due to [place] Pandemic. 10/9/20 Patient has difficulty with ambulation due to DJD, stenosis and fibromyalgia       Review of Systems:  Review of Systems   Constitution: Negative for chills and fever. Cardiovascular: Negative for chest pain and palpitations. Respiratory: Negative for cough and shortness of breath. Musculoskeletal: Positive for back pain. Gastrointestinal: Negative for constipation. Neurological: Negative for disturbances in coordination, loss of balance, numbness, paresthesias and tingling. Physical Exam:  LMP 04/19/2003     Physical Exam  Neurological:      Mental Status: She is alert.    Psychiatric:         Mood and Affect: Mood normal.         Record/Diagnostics Review:    Last óscar 3/2021 and was appropriate     Assessment:  Problem List Items Addressed This Visit     DJD (degenerative joint disease) of knee    Relevant Medications    HYDROcodone-acetaminophen (NORCO) 5-325 MG per tablet    meloxicam (MOBIC) 7.5 MG tablet    tiZANidine (ZANAFLEX) 4 MG tablet    Osteoarthritis of spine with radiculopathy, lumbar region    Relevant Medications    HYDROcodone-acetaminophen (NORCO) 5-325 MG per tablet    meloxicam (MOBIC) 7.5 MG tablet    tiZANidine (ZANAFLEX) 4 MG tablet    Medication monitoring encounter    Chronic low back pain    Relevant Medications    HYDROcodone-acetaminophen (NORCO) 5-325 MG per tablet    meloxicam (MOBIC) 7.5 MG tablet    tiZANidine (ZANAFLEX) 4 MG tablet    Other spondylosis with radiculopathy, lumbar region    Relevant Medications    HYDROcodone-acetaminophen (NORCO) 5-325 MG per tablet    meloxicam (MOBIC) 7.5 MG tablet    tiZANidine (ZANAFLEX) 4 MG tablet    Lumbar disc disease - Primary    Postlaminectomy syndrome, lumbar region             Treatment Plan:  Patient relates current medications are helping the pain. Patient reports taking pain medications as prescribed, denies obtaining medications from different sources and denies use of illegal drugs. Patient denies side effects from medications like nausea, vomiting, constipation or drowsiness. Patient reports current activities of daily living are possible due to medications and would like to continue them. As always, we encourage daily stretching and strengthening exercises, and recommend minimizing use of pain medications unless patient cannot get through daily activities due to pain. Contract requirements met. Continue opioid therapy. Script written for norco  Follow up appointment made for 4 weeks    Geraldine Grace, was evaluated through a synchronous (real-time) audio-video encounter. The patient (or guardian if applicable) is aware that this is a billable service. Verbal consent to proceed has been obtained within the past 12 months. The visit was conducted pursuant to the emergency declaration under the [address] [place] [address], [address] [place] [address] authority and the [employer] General Act. Patient identification was verified, and a caregiver was present when appropriate. The patient was located in a state where the provider was credentialed to provide care. Total time spent for this encounter: 15 minutes    --ELVIRA Hill CNP on 5/10/2021 at 11:30 AM    An electronic signature was used to authenticate this note.

## 2021-05-25 ENCOUNTER — TELEPHONE (OUTPATIENT)
Dept: INTERNAL MEDICINE | Age: 61
End: 2021-05-25

## 2021-05-25 DIAGNOSIS — M79.641 PAIN OF RIGHT HAND: Primary | ICD-10-CM

## 2021-05-25 NOTE — TELEPHONE ENCOUNTER
Patient called and said her thumb on her right hand keeps swelling for the past 2 weeks. She said she laid on it and when she woke up it was swollen, had some soreness, and she thinks it may be out of place. She said it makes a popping feeling when she tries to make a fist. She is going to have a CT of the chest done on Thursday so she was wondering if Dr. Christina Flynn could just order an Xray for her right hand?

## 2021-05-26 NOTE — TELEPHONE ENCOUNTER
PC to pt-- unable to contact, left vm informing order for xray of the hand has been ordered. Printed and place in  drawer.

## 2021-05-27 ENCOUNTER — HOSPITAL ENCOUNTER (OUTPATIENT)
Dept: GENERAL RADIOLOGY | Facility: CLINIC | Age: 61
Discharge: HOME | End: 2021-05-29
Payer: COMMERCIAL

## 2021-05-27 ENCOUNTER — HOSPITAL ENCOUNTER (OUTPATIENT)
Facility: CLINIC | Age: 61
Discharge: HOME | End: 2021-05-29
Payer: COMMERCIAL

## 2021-05-27 ENCOUNTER — HOSPITAL ENCOUNTER (OUTPATIENT)
Dept: CT IMAGING | Age: 61
Discharge: HOME | End: 2021-05-29
Payer: COMMERCIAL

## 2021-05-27 DIAGNOSIS — J47.9 BRONCHIOLECTASIS (HCC): ICD-10-CM

## 2021-05-27 DIAGNOSIS — M79.641 PAIN OF RIGHT HAND: ICD-10-CM

## 2021-05-27 PROCEDURE — 73130 X-RAY EXAM OF HAND: CPT

## 2021-05-27 PROCEDURE — 71250 CT THORAX DX C-: CPT

## 2021-05-27 NOTE — TELEPHONE ENCOUNTER
Tried to reach the patient to let her know the Xray was order, Andrew Walls had to [facility] for the patient letting her know.

## 2021-06-01 NOTE — TELEPHONE ENCOUNTER
pc said she was waiting on a call back to see if there is something she can do about her thumb , a splint or medication, pt said thumb swells and its very painful please advise

## 2021-06-02 NOTE — TELEPHONE ENCOUNTER
PC to pt-- spoke with her and she states that she would like script for Splint faxed to [facility] for her

## 2021-06-07 DIAGNOSIS — N32.81 OAB (OVERACTIVE BLADDER): ICD-10-CM

## 2021-06-07 DIAGNOSIS — E11.9 TYPE 2 DIABETES MELLITUS WITHOUT COMPLICATION, WITHOUT LONG-TERM CURRENT USE OF INSULIN (HCC): ICD-10-CM

## 2021-06-07 RX ORDER — MELOXICAM 7.5 MG/1
7.5 TABLET ORAL DAILY
Qty: 30 TABLET | Refills: 0 | Status: SHIPPED | OUTPATIENT
Start: 2021-06-07 | End: 2021-07-06 | Stop reason: SDUPTHER

## 2021-06-07 RX ORDER — TROSPIUM CHLORIDE 20 MG/1
20 TABLET, FILM COATED ORAL 2 TIMES DAILY
Qty: 60 TABLET | Refills: 0 | Status: SHIPPED | OUTPATIENT
Start: 2021-06-07 | End: 2021-10-26 | Stop reason: SDUPTHER

## 2021-06-07 RX ORDER — GABAPENTIN 300 MG/1
CAPSULE ORAL
Qty: 120 CAPSULE | Refills: 0 | Status: SHIPPED | OUTPATIENT
Start: 2021-06-07 | End: 2021-08-25 | Stop reason: SDUPTHER

## 2021-06-07 NOTE — TELEPHONE ENCOUNTER
Script called into pharmacy
disease) of knee     Osteoarthritis of spine with radiculopathy, lumbar region     GERD (gastroesophageal reflux disease)     COPD, severity to be determined ([address]     HTN (hypertension)     Allergic rhinitis     Lipoma of shoulder s/p excision right posterior 11 17 2008     History of tobacco use     DM (diabetes mellitus)     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 9)     Bronchiectasis ([address]     Centrilobular emphysema ([address]     Oxygen dependent     Acute postoperative anemia due to expected blood loss     Multifocal pneumonia     Acute on chronic respiratory failure with hypoxia (HCC)     Pulmonary function studies abnormal     Tobacco dependence     Idiopathic sleep related nonobstructive alveolar hypoventilation     Postlaminectomy syndrome, lumbar region

## 2021-06-08 ENCOUNTER — HOSPITAL ENCOUNTER (OUTPATIENT)
Dept: PAIN MANAGEMENT | Age: 61
Discharge: HOME | End: 2021-06-08
Payer: COMMERCIAL

## 2021-06-08 DIAGNOSIS — Z51.81 MEDICATION MONITORING ENCOUNTER: Primary | ICD-10-CM

## 2021-06-08 DIAGNOSIS — M47.26 OTHER SPONDYLOSIS WITH RADICULOPATHY, LUMBAR REGION: ICD-10-CM

## 2021-06-08 DIAGNOSIS — M47.26 OSTEOARTHRITIS OF SPINE WITH RADICULOPATHY, LUMBAR REGION: ICD-10-CM

## 2021-06-08 DIAGNOSIS — M54.40 CHRONIC MIDLINE LOW BACK PAIN WITH SCIATICA, SCIATICA LATERALITY UNSPECIFIED: ICD-10-CM

## 2021-06-08 DIAGNOSIS — G89.29 CHRONIC MIDLINE LOW BACK PAIN WITH SCIATICA, SCIATICA LATERALITY UNSPECIFIED: ICD-10-CM

## 2021-06-08 DIAGNOSIS — M51.9 LUMBAR DISC DISEASE: ICD-10-CM

## 2021-06-08 DIAGNOSIS — M17.0 PRIMARY OSTEOARTHRITIS OF BOTH KNEES: ICD-10-CM

## 2021-06-08 PROCEDURE — 99441 PR PHYS/QHP TELEPHONE EVALUATION 5-10 MIN: CPT | Performed by: NURSE PRACTITIONER

## 2021-06-08 PROCEDURE — 99213 OFFICE O/P EST LOW 20 MIN: CPT

## 2021-06-08 RX ORDER — HYDROCODONE BITARTRATE AND ACETAMINOPHEN 5; 325 MG/1; MG/1
1 TABLET ORAL EVERY 8 HOURS PRN
Qty: 90 TABLET | Refills: 0 | Status: SHIPPED | OUTPATIENT
Start: 2021-06-09 | End: 2021-07-06 | Stop reason: SDUPTHER

## 2021-06-08 RX ORDER — TIZANIDINE 4 MG/1
4 TABLET ORAL 2 TIMES DAILY
Qty: 60 TABLET | Refills: 0 | Status: SHIPPED | OUTPATIENT
Start: 2021-06-08 | End: 2021-07-06 | Stop reason: SDUPTHER

## 2021-06-08 ASSESSMENT — ENCOUNTER SYMPTOMS
BACK PAIN: 1
CONSTIPATION: 0
COUGH: 0
SHORTNESS OF BREATH: 0

## 2021-06-08 NOTE — PROGRESS NOTES
Patient completed a telephone visit today to review medication contract. Chief Complaint: back pain    [facility] Patient has had low back pain with no known injury and bilateral knee pain for many years. Radha Joseph had a knee arthroscopy in March 2017 Genicular block was discussed but she declined.  She is s/p lumbar diskectomy in 2015 and lumbar fusion 11/2020. She is stable and compliant on norco 5/325 TID. Gabapentin prescribed by PCP for neuropathic pain in feet. Follows with Dr. Chad George for knees.   EMG was completed and shows Abnormal study of the right lower extremity and Normal study of the left lower extremity. Janae Ybarra is electrodiagnostic evidence of a very mild chronic right S1 radiculopathy without active denervation. Janae Ybarra is no electrodiagnostic evidence of a generalized large fiber peripheral Polyneuropathy.   She started PT last week. Back Pain  This is a chronic problem. The current episode started more than 1 year ago. The problem occurs constantly. The problem is unchanged. The pain is present in the lumbar spine. The quality of the pain is described as aching (stiffness). The pain does not radiate. The pain is at a severity of 7/10. The pain is moderate. The symptoms are aggravated by standing and position (walking). Pertinent negatives include no chest pain, fever, numbness or paresthesias. She has tried analgesics and bed rest for the symptoms. The treatment provided mild relief. Patient denies any new neurological symptoms. No bowel or bladder incontinence, no weakness, and no falling. Pill count: appropriate due 6/9    Morphine equivalent: 15    Periodic Controlled Substance Monitoring: Possible medication side effects, risk of tolerance/dependence & alternative treatments discussed., No signs of potential drug abuse or diversion identified., Obtaining appropriate analgesic effect of treatment.  Luane Goltz, ELVIRA - CNP)      Past Medical History:   Diagnosis Date    Allergic rhinitis     Alveolar hypoventilation 11/20/2020    Aortic insufficiency 2018    mild-moderate on echo (was seen and discharged from cardiology-[facility])    Bronchiectasis ([facility] 11/20/2020    Bronchitis     Chronic back pain     Pain management at [facility]. COPD (chronic obstructive pulmonary disease) ([facility]     Dr. Funmi Goss to see 11/09/2020    Depression     DM (diabetes mellitus) ([facility] 12/18/2012    GERD (gastroesophageal reflux disease)     History of echocardiogram 05/2018    EF 65%, mild-moderate AI and TR    Hyperlipidemia     Dr. Julia Coelho Hypertension     Dr. Julia Coelho Obesity     Osteoarthritis     Peripheral vascular disease ([facility]     Primary osteoarthritis of both knees     Radicular pain of lumbosacral region     Spinal stenosis, lumbar region, without neurogenic claudication 04/30/2013    Tricuspid regurgitation 2018    mild-moderate on echo    Type II or unspecified type diabetes mellitus without mention of complication, not stated as uncontrolled     Dr. Julia Coelho Unspecified sleep apnea     no cpap used anymore    URI (upper respiratory infection)     Wears dentures     upper and lower full dentures    Wears glasses     Wellness examination     Dr. Cummings [facility] -PCP last visit in early Oct. 2020       Past Surgical History:   Procedure Laterality Date    COLONOSCOPY  2/12/2009    normal    DILATION AND CURETTAGE OF UTERUS      GASTRECTOMY      partial    LUMBAR DISCECTOMY  01/2015    lumbar diskectomy    LUMBAR FUSION  11/19/2020     POSTERIOR FUSION L4/5,     LUMBAR FUSION N/A 11/19/2020    POSTERIOR FUSION L4/5, [employer], Mari Fajardo, EVOKES #[id number] AMINA performed by Irena Gonzales DO at [facility] Right 4/30/13    Lumbar Diagnostic Block,  Kenalog 40 mg    NERVE BLOCK  5/23/13    Lumbar Radiofrequency, Kenalog 40mg    NERVE BLOCK  8/12/13    Lt MBNB  celestone 6mg    NERVE BLOCK Left 8-28-13    left lumbar diagnostic block #2 decadron 10 mg    NERVE BLOCK Left 9-24-13    left lumbar median branch radiofrequency    NERVE BLOCK  07-02-14    caudal, celestone 9 mg    NERVE BLOCK  7-16-14    caudal epidural #2, celestone 9mg, fentanyl 25mcg    NERVE BLOCK  7/30/14    caudal #3 decadron 10mg    NERVE BLOCK  11-6-14    duramorph epidural steroid block  duramorph 1 mg celestone 9 mg    NERVE BLOCK  11/20/15    TENS- Empi Select    NERVE BLOCK  07/20/2018    right transforminal # 1 decadron 10mg,isovue    NERVE BLOCK Bilateral 02/01/2019    bilat mbnb- no steroid    NERVE BLOCK Bilateral 02/08/2019    bilat mbnb, marcaine . 25%    [place] KNEE SCOPE,DIAGNOSTIC Right 3/24/2017    KNEE ARTHROSCOPY WITH PARTIAL MEDIAL MENISECAL DEBRIDMENT  performed by Oliver Benitez MD at [address]  9 20 2007    UPPER GASTROINTESTINAL ENDOSCOPY  4 21 2009,04/2011    gastritis, esophagitis       Allergies   Allergen Reactions    Aspirin      DUE TO ULCER    Claritin [Loratadine] Other (See Comments)     coughing    Flonase [Fluticasone Propionate]     Morphine And Related      GI Upset         Current Outpatient Medications:     meloxicam (MOBIC) 7.5 MG tablet, TAKE 1 TABLET BY MOUTH DAILY, Disp: 30 tablet, Rfl: 0    metFORMIN (GLUCOPHAGE) 500 MG tablet, TAKE 1 TABLET BY MOUTH 2 TIMES DAILY (WITH MEALS), Disp: 180 tablet, Rfl: 0    trospium (SANCTURA) 20 MG tablet, TAKE 1 TABLET BY MOUTH 2 TIMES DAILY, Disp: 60 tablet, Rfl: 0    gabapentin (NEURONTIN) 300 MG capsule, TAKE 1 CAPSULE IN MORNING AND AFTERNOON AND 2 CAPSULES AT NIGHT, Disp: 120 capsule, Rfl: 0    Elastic Bandages & Supports (THUMB SPLINT/RIGHT MEDIUM) MISC, Use daily, Disp: 1 each, Rfl: 0    HYDROcodone-acetaminophen (NORCO) 5-325 MG per tablet, Take 1 tablet by mouth every 8 hours as needed for Pain for up to 30 days.  Early refill due to holidays, Disp: 90 tablet, Rfl: 0    tiZANidine (ZANAFLEX) 4 MG tablet, Take 1 tablet by mouth 2 times daily, Disp: 60 tablet, Rfl: 0    cetirizine (ZYRTEC) 10 MG tablet, TAKE 1 TABLET BY MOUTH DAILY, Disp: 30 tablet, Rfl: 5    lisinopril-hydroCHLOROthiazide (PRINZIDE;ZESTORETIC) 20-25 MG per tablet, TAKE 1 TABLET EVERY DAY, Disp: 90 tablet, Rfl: 2    Lancets MISC, 1 each by Does not apply route 2 times daily Use 2/day, Disp: 100 each, Rfl: 11    omeprazole (PRILOSEC) 20 MG delayed release capsule, TAKE 1 CAPSULE BY MOUTH EVERY DAY, Disp: 30 capsule, Rfl: 3    SPIRIVA HANDIHALER 18 MCG inhalation capsule, Inhale 1 capsule into the lungs daily, Disp: 30 capsule, Rfl: 3    azelastine (ASTELIN) 0.1 % nasal spray, 2 sprays by Nasal route 2 times daily Use in each nostril as directed, Disp: 1 Bottle, Rfl: 0    nicotine (NICODERM CQ) 21 MG/24HR, Place 1 patch onto the skin every 24 hours, Disp: 30 patch, Rfl: 1    amLODIPine (NORVASC) 10 MG tablet, TAKE 1 TABLET BY MOUTH DAILY, Disp: 90 tablet, Rfl: 2    potassium chloride (KLOR-CON M) 10 MEQ extended release tablet, TAKE 2 TABLETS BY MOUTH DAILY, Disp: 60 tablet, Rfl: 2    fluticasone-salmeterol (ADVAIR DISKUS) 250-50 MCG/DOSE AEPB, Inhale 1 puff into the lungs every 12 hours, Disp: 60 each, Rfl: 3    lactobacillus (CULTURELLE) capsule, Take 1 capsule by mouth daily (with breakfast), Disp: 30 capsule, Rfl: 0    carvedilol (COREG) 6.25 MG tablet, TAKE 1 TABLET BY MOUTH 2 TIMES DAILY, Disp: 180 tablet, Rfl: 0    blood glucose test strips (EXACTECH TEST) strip, 1 each by In Vitro route daily As needed. , Disp: 50 each, Rfl: 11    atorvastatin (LIPITOR) 40 MG tablet, Take 1 tablet by mouth daily, Disp: 90 tablet, Rfl: 1     MG capsule, TAKE 1 CAPSULE EVERY DAY, Disp: 30 capsule, Rfl: 10    albuterol sulfate HFA (VENTOLIN HFA) 108 (90 Base) MCG/ACT inhaler, Inhale 2 puffs into the lungs every 6 hours as needed for Wheezing, Disp: 1 Inhaler, Rfl: 3    diphenhydrAMINE (BENADRYL) 25 MG tablet, Take 25 mg by mouth every 6 hours as needed for Itching, Disp: , Rfl:   acetaminophen (TYLENOL) 500 MG tablet, Take 1 tablet by mouth 4 times daily as needed for Pain, Disp: 30 tablet, Rfl: 0    mirtazapine (REMERON) 30 MG tablet, Take 30 mg by mouth nightly, Disp: , Rfl:     mirtazapine (REMERON) 15 MG tablet, Take 15 mg by mouth nightly, Disp: , Rfl:     DULoxetine (CYMBALTA) 60 MG extended release capsule, Take 1 capsule by mouth daily, Disp: 30 capsule, Rfl: 3    ipratropium-albuterol (DUONEB) 0.5-2.5 (3) MG/3ML SOLN nebulizer solution, Inhale 3 mLs into the lungs every 6 hours as needed for Shortness of Breath, Disp: 360 mL, Rfl: 11    Family History   Problem Relation Age of Onset    Diabetes Mother     Heart Disease Mother     Cirrhosis Father        Social History     Socioeconomic History    Marital status: Single     Spouse name: Not on file    Number of children: Not on file    Years of education: Not on file    Highest education level: Not on file   Occupational History     Employer: DISABLED   Tobacco Use    Smoking status: Former Smoker     Packs/day: 0.25     Years: 36.00     Pack years: 9.00     Types: Cigarettes     Quit date: 10/30/2020     Years since quittin.6    Smokeless tobacco: Never Used    Tobacco comment: pt currently using nicotine patches   5 CIGARETTES A DAY   Vaping Use    Vaping Use: Never used   Substance and Sexual Activity    Alcohol use: No     Alcohol/week: 0.0 standard drinks    Drug use: No     Comment: 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 7 yrs    Sexual activity: Never   Other Topics Concern    Not on file   Social History Narrative    10/9/20 Patient keeping contact with others to a minimum due to COVID 19 Pandemic.     10/9/20 Patient has difficulty with ambulation due to DJD, stenosis and fibromyalgia     Social Determinants of Health     Financial Resource Strain: Low Risk     Difficulty of Paying Living Expenses: Not very hard   Food Insecurity: No Food Insecurity    Worried About Running Out of Food in the Last Year: Never true   [address] in the Last Year: Never true   Transportation Needs: No Transportation Needs    Lack of Transportation (Medical): No    Lack of Transportation (Non-Medical): No   Physical Activity: Inactive    Days of Exercise per Week: 0 days    Minutes of Exercise per Session: 0 min   Stress: No Stress Concern Present    Feeling of Stress : Only a little   Social Connections: Moderately Isolated    Frequency of Communication with Friends and Family: More than three times a week    Frequency of Social Gatherings with Friends and Family: Once a week    Attends Protestant Services: More than 4 times per year    Active Member of [employer] Group or Organizations: No    Attends Club or Organization Meetings: Never    Marital Status: Never    Intimate Partner Violence:     Fear of Current or Ex-Partner:     Emotionally Abused:     Physically Abused:     Sexually Abused:        Review of Systems:  Review of Systems   Constitutional: Negative for chills and fever. Cardiovascular: Negative for chest pain and palpitations. Respiratory: Negative for cough and shortness of breath. Musculoskeletal: Positive for back pain and joint pain. Gastrointestinal: Negative for constipation. Neurological: Negative for disturbances in coordination, loss of balance, numbness and paresthesias. Physical Exam:  LMP 04/19/2003     Physical Exam  Neurological:      Mental Status: She is alert.    Psychiatric:         Mood and Affect: Mood normal.         Record/Diagnostics Review:    Last óscar 3/2021 and was appropriate     ABERRANT BEHAVIORS SINCE LAST VISIT  Lost rx/pills:------------------------------------------ no  Taking  medication as prescribed: ----------- yes  Urine Drug Screen ---------------------------------  yes  Recent ER visits: -------------------------------------No  Pill count is appropriate: ---------------------------yes   Refills for prescriptions appropriate:---------- yes    Assessment:  Problem List Items Addressed This Visit     Osteoarthritis of spine with radiculopathy, lumbar region    Relevant Medications    HYDROcodone-acetaminophen ([place] Meager) 5-325 MG per tablet (Start on 6/9/2021)    tiZANidine (ZANAFLEX) 4 MG tablet    Primary osteoarthritis of both knees    Relevant Medications    HYDROcodone-acetaminophen (NORCO) 5-325 MG per tablet (Start on 6/9/2021)    tiZANidine (ZANAFLEX) 4 MG tablet    Medication monitoring encounter - Primary    Chronic low back pain    Relevant Medications    HYDROcodone-acetaminophen (NORCO) 5-325 MG per tablet (Start on 6/9/2021)    tiZANidine (ZANAFLEX) 4 MG tablet    Other spondylosis with radiculopathy, lumbar region    Relevant Medications    HYDROcodone-acetaminophen (NORCO) 5-325 MG per tablet (Start on 6/9/2021)    tiZANidine (ZANAFLEX) 4 MG tablet    Lumbar disc disease             Treatment Plan:  Patient relates current medications are helping the pain. Patient reports taking pain medications as prescribed, denies obtaining medications from different sources and denies use of illegal drugs. Patient denies side effects from medications like nausea, vomiting, constipation or drowsiness. Patient reports current activities of daily living are possible due to medications and would like to continue them. As always, we encourage daily stretching and strengthening exercises, and recommend minimizing use of pain medications unless patient cannot get through daily activities due to pain. Contract requirements met. Continue opioid therapy. Script written for norco  Follow up appointment made for 4 weeks    Omar Stout, was evaluated through a synchronous (real-time) audio-video encounter. The patient (or guardian if applicable) is aware that this is a billable service. Verbal consent to proceed has been obtained within the past 12 months.  The visit was conducted pursuant to the emergency declaration under the [address] [place] [address], [address] [place] [address] authority and the [employer] General Act. Patient identification was verified, and a caregiver was present when appropriate. The patient was located in a state where the provider was credentialed to provide care. Total time spent for this encounter: 10 minutes    --ELVIRA Thomas CNP on 6/8/2021 at 1:56 PM    An electronic signature was used to authenticate this note.

## 2021-06-09 ENCOUNTER — OFFICE VISIT (OUTPATIENT)
Dept: NEUROSURGERY | Age: 61
End: 2021-06-09
Payer: COMMERCIAL

## 2021-06-09 VITALS — RESPIRATION RATE: 16 BRPM | SYSTOLIC BLOOD PRESSURE: 138 MMHG | DIASTOLIC BLOOD PRESSURE: 77 MMHG | HEART RATE: 77 BPM

## 2021-06-09 DIAGNOSIS — Z98.1 S/P LUMBAR FUSION: ICD-10-CM

## 2021-06-09 DIAGNOSIS — M43.16 SPONDYLOLISTHESIS OF LUMBAR REGION: Primary | ICD-10-CM

## 2021-06-09 PROCEDURE — G8427 DOCREV CUR MEDS BY ELIG CLIN: HCPCS | Performed by: NURSE PRACTITIONER

## 2021-06-09 PROCEDURE — 99213 OFFICE O/P EST LOW 20 MIN: CPT | Performed by: NURSE PRACTITIONER

## 2021-06-09 PROCEDURE — 1036F TOBACCO NON-USER: CPT | Performed by: NURSE PRACTITIONER

## 2021-06-09 PROCEDURE — G8417 CALC BMI ABV UP PARAM F/U: HCPCS | Performed by: NURSE PRACTITIONER

## 2021-06-09 PROCEDURE — 3017F COLORECTAL CA SCREEN DOC REV: CPT | Performed by: NURSE PRACTITIONER

## 2021-06-09 NOTE — PROGRESS NOTES
Joann Gandhi  [facility] [place] [address]  [address]  Dept: [phone]    Patient:  Geraldine Grace  YOB: 1960  Date: 6/9/21    The patient is a 56 y.o. female who presents today for consult of the following problems:     Chief Complaint   Patient presents with    Follow-up         HPI:     Geraldine Grace is a 56 y.o. female who presents for follow up of low back pain. Just started PT last week, plans for twice weekly. Has had continued improvement in ambulation. Has been able to walk around the block without a cane, tries to go a little bit further each day. Does have some intermittent pain still, however it is more tolerable than it was before. Major limitation now is typically her breathing especially with the hot, humid weather. Denies any saddle anesthesia, loss of bowel or bladder function. Previous numbness and tingling to legs has essentially resolved.     History:     Past Medical History:   Diagnosis Date    Allergic rhinitis     Alveolar hypoventilation 11/20/2020    Aortic insufficiency 2018    mild-moderate on echo (was seen and discharged [facility])    Bronchiectasis ([facility] 11/20/2020    Bronchitis     Chronic back pain     Pain management at [place] [address]. COPD (chronic obstructive pulmonary disease) ([facility]     Dr. Jacqui Bailey to see 11/09/2020    Depression     DM (diabetes mellitus) ([facility] 12/18/2012    GERD (gastroesophageal reflux disease)     History of echocardiogram 05/2018    EF 65%, mild-moderate AI and TR    Hyperlipidemia     Dr. Mayte Mace Hypertension     Dr. Mayte Mace Obesity     Osteoarthritis     Peripheral vascular disease ([facility]     Primary osteoarthritis of both knees     Radicular pain of lumbosacral region     Spinal stenosis, lumbar region, without neurogenic claudication 04/30/2013    Tricuspid regurgitation 2018    mild-moderate on echo    Type II or Problem Relation Age of Onset    Diabetes Mother     Heart Disease Mother     Cirrhosis Father      Current Outpatient Medications on File Prior to Visit   Medication Sig Dispense Refill    HYDROcodone-acetaminophen (NORCO) 5-325 MG per tablet Take 1 tablet by mouth every 8 hours as needed for Pain for up to 30 days.  Early refill due to holidays 90 tablet 0    tiZANidine (ZANAFLEX) 4 MG tablet Take 1 tablet by mouth 2 times daily 60 tablet 0    meloxicam (MOBIC) 7.5 MG tablet TAKE 1 TABLET BY MOUTH DAILY 30 tablet 0    metFORMIN (GLUCOPHAGE) 500 MG tablet TAKE 1 TABLET BY MOUTH 2 TIMES DAILY (WITH MEALS) 180 tablet 0    trospium (SANCTURA) 20 MG tablet TAKE 1 TABLET BY MOUTH 2 TIMES DAILY 60 tablet 0    gabapentin (NEURONTIN) 300 MG capsule TAKE 1 CAPSULE IN MORNING AND AFTERNOON AND 2 CAPSULES AT NIGHT 120 capsule 0    Elastic Bandages & Supports (THUMB SPLINT/RIGHT MEDIUM) MISC Use daily 1 each 0    cetirizine (ZYRTEC) 10 MG tablet TAKE 1 TABLET BY MOUTH DAILY 30 tablet 5    lisinopril-hydroCHLOROthiazide (PRINZIDE;ZESTORETIC) 20-25 MG per tablet TAKE 1 TABLET EVERY DAY 90 tablet 2    Lancets MISC 1 each by Does not apply route 2 times daily Use 2/day 100 each 11    omeprazole (PRILOSEC) 20 MG delayed release capsule TAKE 1 CAPSULE BY MOUTH EVERY DAY 30 capsule 3    SPIRIVA HANDIHALER 18 MCG inhalation capsule Inhale 1 capsule into the lungs daily 30 capsule 3    azelastine (ASTELIN) 0.1 % nasal spray 2 sprays by Nasal route 2 times daily Use in each nostril as directed 1 Bottle 0    nicotine (NICODERM CQ) 21 MG/24HR Place 1 patch onto the skin every 24 hours 30 patch 1    amLODIPine (NORVASC) 10 MG tablet TAKE 1 TABLET BY MOUTH DAILY 90 tablet 2    potassium chloride (KLOR-CON M) 10 MEQ extended release tablet TAKE 2 TABLETS BY MOUTH DAILY 60 tablet 2    fluticasone-salmeterol (ADVAIR DISKUS) 250-50 MCG/DOSE AEPB Inhale 1 puff into the lungs every 12 hours 60 each 3    lactobacillus (CULTURELLE) capsule Take 1 capsule by mouth daily (with breakfast) 30 capsule 0    carvedilol (COREG) 6.25 MG tablet TAKE 1 TABLET BY MOUTH 2 TIMES DAILY 180 tablet 0    blood glucose test strips (EXACTECH TEST) strip 1 each by In Vitro route daily As needed. 50 each 11    atorvastatin (LIPITOR) 40 MG tablet Take 1 tablet by mouth daily 90 tablet 1     MG capsule TAKE 1 CAPSULE EVERY DAY 30 capsule 10    albuterol sulfate HFA (VENTOLIN HFA) 108 (90 Base) MCG/ACT inhaler Inhale 2 puffs into the lungs every 6 hours as needed for Wheezing 1 Inhaler 3    diphenhydrAMINE (BENADRYL) 25 MG tablet Take 25 mg by mouth every 6 hours as needed for Itching      acetaminophen (TYLENOL) 500 MG tablet Take 1 tablet by mouth 4 times daily as needed for Pain 30 tablet 0    mirtazapine (REMERON) 30 MG tablet Take 30 mg by mouth nightly      mirtazapine (REMERON) 15 MG tablet Take 15 mg by mouth nightly      DULoxetine (CYMBALTA) 60 MG extended release capsule Take 1 capsule by mouth daily 30 capsule 3    ipratropium-albuterol (DUONEB) 0.5-2.5 (3) MG/3ML SOLN nebulizer solution Inhale 3 mLs into the lungs every 6 hours as needed for Shortness of Breath 360 mL 11     No current facility-administered medications on file prior to visit.      Social History     Tobacco Use    Smoking status: Former Smoker     Packs/day: 0.25     Years: 36.00     Pack years: 9.00     Types: Cigarettes     Quit date: 10/30/2020     Years since quittin.6    Smokeless tobacco: Never Used    Tobacco comment: pt currently using nicotine patches   5 CIGARETTES A DAY   Vaping Use    Vaping Use: Never used   Substance Use Topics    Alcohol use: No     Alcohol/week: 0.0 standard drinks    Drug use: No     Comment: 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 7 yrs       Allergies   Allergen Reactions    Aspirin      DUE TO ULCER    Claritin [Loratadine] Other (See Comments)     coughing    Flonase [Fluticasone Propionate]     Morphine And Related      GI Upset       Review of Systems  Constitutional: Negative for activity change and appetite change. HENT: Negative for ear pain and facial swelling. Eyes: Negative for discharge and itching. Respiratory: Negative for choking and chest tightness. Cardiovascular: Negative for chest pain and leg swelling. Gastrointestinal: Negative for nausea and abdominal pain. Endocrine: Negative for cold intolerance and heat intolerance. Genitourinary: Negative for frequency and flank pain. Musculoskeletal: Negative for myalgias and joint swelling. Skin: Negative for rash and wound. Allergic/Immunologic: Negative for environmental allergies and food allergies. Hematological: Negative for adenopathy. Does not bruise/bleed easily. Psychiatric/Behavioral: Negative for self-injury. The patient is not nervous/anxious. Physical Exam:      /77 (Site: Right Upper Arm, Position: Sitting, Cuff Size: Medium Adult)   Pulse 77   Resp 16   LMP 04/19/2003   Estimated body mass index is 30.29 kg/m² as calculated from the following:    Height as of 3/10/21: 5' 5\" (1.651 m). Weight as of 3/10/21: 182 lb (82.6 kg). General:  Michelle Jolly is a 56y.o. year old female who appears her stated age. HEENT: Normocephalic atraumatic. Neck supple. Chest: regular rate; pulses equal  Abdomen: Soft nontender nondistended.   Ext: DP and PT pulses 2+, good cap refill  Neuro    Mentation  Appropriate affect  Registration intact  Orientation intact  3 item recall intact  Judgement intact to situation    Cranial Nerves:   Pupils equal and reactive to light  Extraocular motion intact  Face and shrug symmetric  Tongue midline  No dysarthria  v1-3 sensation symmetric, masseter tone symmetric  Hearing symmetric    Sensation: Intact    Motor  L deltoid 5/5; R deltoid 5/5  L biceps 5/5; R biceps 5/5  L triceps 5/5; R triceps 5/5  L wrist extension 5/5; R wrist extension 5/5  L intrinsics 5/5; R intrinsics 5/5     L iliopsoas 5/5 , R iliopsoas 5/5  L quadriceps 5/5; R quadriceps 5/5  L Dorsiflexion 5/5; R dorsiflexion 5/5  L Plantarflexion 5/5; R plantarflexion 5/5  L EHL 5/5; R EHL 5/5    Reflexes  L Brachioradialis 2+/4; R brachioradialis 2+/4  L Biceps 2+/4; R Biceps 2+/4  L Triceps 2+/4; R Triceps 2+/4  L Patellar 2+/4: R Patellar 2+/4  L Achilles 2+/4; R Achilles 2+/4    hoffmans L: neg  hoffmans R: neg  Clonus L: neg  Clonus R: neg  Babinski L: neg  Babinski R: neg    Studies Review:     X-ray lumbar spine 3/5/2021 (which is reviewed): FINDINGS:   There is posterior fixation and artificial disc material at the L4-5 level   with stable alignment and no evidence for hardware complication.  The   vertebral body heights are maintained.  There is mild multilevel degenerative   disc disease at the remaining levels.  There is multilevel degenerative facet   hypertrophy. Dorothye Hero is no spondylolisthesis.  The visualized bony pelvis is   intact.  The SI joints are maintained.  There is a calcified fibroid. Physical therapy and pain management notes reviewed    Assessment and Plan:      1. Spondylolisthesis of lumbar region    2. S/P lumbar fusion          Plan: Patient overall continues to improve, just was able to get started in physical therapy has had a few sessions thus far with additional 1 scheduled. Has progressively been able to tolerate more physical activity in terms of standing, walking. Is now walking without the use of a cane, extending the distance each day. We will have the patient follow-up in approximately 4 months, obtain standing x-rays to evaluate fusion prior. Contact office with any new or worsening symptoms in the interim. Followup: Return in about 4 months (around 10/9/2021), or if symptoms worsen or fail to improve. Prescriptions Ordered:  No orders of the defined types were placed in this encounter.        Orders Placed:  Orders Placed This Encounter   Procedures    XR

## 2021-06-16 ENCOUNTER — TELEPHONE (OUTPATIENT)
Dept: INTERNAL MEDICINE | Age: 61
End: 2021-06-16

## 2021-06-16 DIAGNOSIS — J44.9 STAGE 3 SEVERE COPD BY GOLD CLASSIFICATION (HCC): ICD-10-CM

## 2021-06-16 NOTE — TELEPHONE ENCOUNTER
PA request received for Spiriva HandiHaler 18MCG capsules    PA processed and submitted to pt insurance, waiting for response in regards to medication coverage

## 2021-06-16 NOTE — TELEPHONE ENCOUNTER
Fax here from [employer]. The Prior Authorization Request for Spiriva HandiHaler 18MCG capsules has been denied. Denial information scanned to this encounter. Please review and advise. Denial reason: This drug is not covered on the formulary. We are denying your request because we do not show that you have tried at least 2 covered drugs that can treat your condition. Other covered drugs are: Anoro Ellipta inhalation aerosol powder breath activated 62.5-25 mcg/inh, Breo Ellipta inhalation aerosol powder breath activated (100-25 mcg/inh, 200-25 mcg/inh), Incruse Ellipta inhalation aerosol powder breath activated 62.5 mcg/inh. We may be able to make an exception to cover this drug. Your doctor will need to send us medical records showing that you tried this drug. If you cannot take the covered drug, your doctor will need to tell us why.      Please discontinue denied medication in patients medication list.

## 2021-06-24 ENCOUNTER — OFFICE VISIT (OUTPATIENT)
Dept: INTERNAL MEDICINE | Age: 61
End: 2021-06-24
Payer: COMMERCIAL

## 2021-06-24 VITALS
WEIGHT: 190 LBS | HEART RATE: 80 BPM | BODY MASS INDEX: 31.62 KG/M2 | DIASTOLIC BLOOD PRESSURE: 86 MMHG | SYSTOLIC BLOOD PRESSURE: 130 MMHG

## 2021-06-24 DIAGNOSIS — J44.9 STAGE 3 SEVERE COPD BY GOLD CLASSIFICATION (HCC): ICD-10-CM

## 2021-06-24 DIAGNOSIS — E78.00 PURE HYPERCHOLESTEROLEMIA: ICD-10-CM

## 2021-06-24 DIAGNOSIS — F17.200 SMOKER: ICD-10-CM

## 2021-06-24 DIAGNOSIS — I10 ESSENTIAL HYPERTENSION: Primary | ICD-10-CM

## 2021-06-24 DIAGNOSIS — G47.36 NOCTURNAL HYPOXEMIA DUE TO OBSTRUCTIVE CHRONIC BRONCHITIS (HCC): ICD-10-CM

## 2021-06-24 DIAGNOSIS — J44.9 NOCTURNAL HYPOXEMIA DUE TO OBSTRUCTIVE CHRONIC BRONCHITIS (HCC): ICD-10-CM

## 2021-06-24 DIAGNOSIS — M18.11 PRIMARY OSTEOARTHRITIS OF FIRST CARPOMETACARPAL JOINT OF ONE HAND, RIGHT: ICD-10-CM

## 2021-06-24 DIAGNOSIS — E11.9 TYPE 2 DIABETES MELLITUS WITHOUT COMPLICATION, WITHOUT LONG-TERM CURRENT USE OF INSULIN (HCC): ICD-10-CM

## 2021-06-24 PROCEDURE — G8926 SPIRO NO PERF OR DOC: HCPCS | Performed by: INTERNAL MEDICINE

## 2021-06-24 PROCEDURE — 2022F DILAT RTA XM EVC RTNOPTHY: CPT | Performed by: INTERNAL MEDICINE

## 2021-06-24 PROCEDURE — 1036F TOBACCO NON-USER: CPT | Performed by: INTERNAL MEDICINE

## 2021-06-24 PROCEDURE — 99214 OFFICE O/P EST MOD 30 MIN: CPT | Performed by: INTERNAL MEDICINE

## 2021-06-24 PROCEDURE — 3017F COLORECTAL CA SCREEN DOC REV: CPT | Performed by: INTERNAL MEDICINE

## 2021-06-24 PROCEDURE — 3044F HG A1C LEVEL LT 7.0%: CPT | Performed by: INTERNAL MEDICINE

## 2021-06-24 PROCEDURE — G8427 DOCREV CUR MEDS BY ELIG CLIN: HCPCS | Performed by: INTERNAL MEDICINE

## 2021-06-24 PROCEDURE — 3023F SPIROM DOC REV: CPT | Performed by: INTERNAL MEDICINE

## 2021-06-24 PROCEDURE — G8417 CALC BMI ABV UP PARAM F/U: HCPCS | Performed by: INTERNAL MEDICINE

## 2021-06-24 PROCEDURE — 99211 OFF/OP EST MAY X REQ PHY/QHP: CPT | Performed by: INTERNAL MEDICINE

## 2021-06-24 RX ORDER — ATORVASTATIN CALCIUM 40 MG/1
40 TABLET, FILM COATED ORAL DAILY
Qty: 90 TABLET | Refills: 1 | Status: SHIPPED | OUTPATIENT
Start: 2021-06-24 | End: 2021-10-26 | Stop reason: SDUPTHER

## 2021-06-24 RX ORDER — NICOTINE 21 MG/24HR
1 PATCH, TRANSDERMAL 24 HOURS TRANSDERMAL DAILY
Qty: 14 PATCH | Refills: 1 | Status: SHIPPED | OUTPATIENT
Start: 2021-06-24 | End: 2021-10-26 | Stop reason: SDUPTHER

## 2021-06-24 ASSESSMENT — ENCOUNTER SYMPTOMS
COUGH: 1
WHEEZING: 0
PHOTOPHOBIA: 0
BACK PAIN: 1
SHORTNESS OF BREATH: 0

## 2021-06-24 NOTE — PATIENT INSTRUCTIONS
-Pt due for 3 month f/u in September-- pt to call in August to set up an appt--reminder in [facility] to contact patient as well--AVS given to patient    -Bloodwork orders given to patient, they will have them done before their next visit. Referral to Ortho dropped into work queue for [employer], they will contact the patient to schedule. Phone number given to patient. [facility]  [address] [place] [address], [facility], [address] [place] [address] [address]  [facility], [facility] 22  [phone]    -Metformin dc'ed at [facility]    -JANEE Navarrete

## 2021-06-24 NOTE — PROGRESS NOTES
splint which she has been using. She had a Pap and mammogram done earlier this year and Cologuard testing. She says she is seeing ophthalmologist.  Her last few A1c's have been below 6. I will stop her Metformin and monitor her. Chest CT 2021  FINDINGS:   Mediastinum: The heart is top normal in size.  The thoracic aorta is normal   in caliber.  No mediastinal hematoma.  No pericardial effusion.  Numerous   small mediastinal and bilateral hilar lymph nodes are unchanged.  No enlarged   or suspicious axillary, hilar, or mediastinal lymphadenopathy.       Lungs/pleura: The trachea and mainstem bronchi are widely patent.  Moderate   centrilobular emphysema.  Minimal atelectasis is present at the left lung   base and within the medial right middle lobe.  The lungs are otherwise clear. No discrete pulmonary nodule or mass.  No bronchiectasis or pulmonary   fibrotic changes.  No pleural effusion or pneumothorax.       Soft Tissues/Bones: No significant osseous abnormality.       Upper Abdomen: The visualized upper abdomen is unremarkable.           Impression   Moderate emphysema.  Minimal bibasilar atelectasis.  Otherwise, clear lungs.           Echo 2020  CONCLUSIONS     Summary  Left ventricle is normal in size. Global left ventricular systolic function  is normal. Estimated ejection fraction is 60 % . Calculated EF via Hairston's method is 60 %. Normal left ventricular wall thickness. No obvious wall motion abnormality seen.   Mild tricuspid regurgitation.     Past Medical History:   Diagnosis Date    Allergic rhinitis     Alveolar hypoventilation 11/20/2020    Aortic insufficiency 2018    mild-moderate on echo (was seen and discharged from cardiology-[facility])    Bronchiectasis ([address] 11/20/2020    Bronchitis     Chronic back pain     Pain management at [place] [address]. COPD (chronic obstructive pulmonary disease) ([address]     Dr. Franny Pillai to see 11/09/2020    Depression     DM (diabetes mellitus) ([facility] 12/18/2012    GERD (gastroesophageal reflux disease)     History of echocardiogram 05/2018    EF 65%, mild-moderate AI and TR    Hyperlipidemia     Dr. Nasreen Lua Hypertension     Dr. Nasreen Lua Obesity     Osteoarthritis     [facility]     Primary osteoarthritis of both knees     Radicular pain of lumbosacral region     Spinal stenosis, lumbar region, without neurogenic claudication 04/30/2013    Tricuspid regurgitation 2018    mild-moderate on echo    Type II or unspecified type diabetes mellitus without mention of complication, not stated as uncontrolled     Dr. Nasreen Lua Unspecified sleep apnea     no cpap used anymore    URI (upper respiratory infection)     Wears dentures     upper and lower full dentures    Wears glasses     Wellness examination     Dr. Yasmin Jarrett -PCP last visit in early Oct. 2020       Past Surgical History:   Procedure Laterality Date    COLONOSCOPY  2/12/2009    normal    DILATION AND CURETTAGE OF UTERUS      GASTRECTOMY      partial    LUMBAR DISCECTOMY  01/2015    lumbar diskectomy    LUMBAR FUSION  11/19/2020     POSTERIOR FUSION L4/5,     LUMBAR FUSION N/A 11/19/2020    POSTERIOR FUSION L4/5, [employer], Snehal Schmitt, [facility]OKES #[id number] AMINA performed by Yasmin Almonte DO at [address] Right 4/30/13    Lumbar Diagnostic Block,  Kenalog 40 mg    NERVE BLOCK  5/23/13    Lumbar Radiofrequency, Kenalog 40mg    NERVE BLOCK  8/12/13    Lt MBNB  celestone 6mg    NERVE BLOCK Left 8-28-13    left lumbar diagnostic block #2 decadron 10 mg    NERVE BLOCK Left 9-24-13    left lumbar median branch radiofrequency    NERVE BLOCK  07-02-14    caudal, celestone 9 mg    NERVE BLOCK  7-16-14    caudal epidural #2, celestone 9mg, fentanyl 25mcg    NERVE BLOCK  7/30/14    caudal #3 decadron 10mg    NERVE BLOCK  11-6-14    duramorph epidural steroid block  duramorph 1 mg celestone 9 mg    NERVE BLOCK  11/20/15 TENS- [employer]pi Select    NERVE BLOCK  07/20/2018    right transforminal # 1 decadron 10mg,isovue    NERVE BLOCK Bilateral 02/01/2019    bilat mbnb- no steroid    NERVE BLOCK Bilateral 02/08/2019    bilat mbnb, marcaine . 25%    CT KNEE SCOPE,DIAGNOSTIC Right 3/24/2017    KNEE ARTHROSCOPY WITH PARTIAL MEDIAL MENISECAL DEBRIDMENT  performed by Fatimah Villalobos MD at [address] [facility] [address]  [address]    UPPER GASTROINTESTINAL ENDOSCOPY  [address] 21 2009,04/2011    gastritis, esophagitis         ALLERGIES      Allergies   Allergen Reactions    Aspirin      DUE TO ULCER    Claritin [Loratadine] Other (See Comments)     coughing    Flonase [Fluticasone Propionate]     Morphine And Related      GI Upset       MEDICATIONS:      Current Outpatient Medications on File Prior to Visit   Medication Sig Dispense Refill    HYDROcodone-acetaminophen (NORCO) 5-325 MG per tablet Take 1 tablet by mouth every 8 hours as needed for Pain for up to 30 days.  Early refill due to holidays 90 tablet 0    tiZANidine (ZANAFLEX) 4 MG tablet Take 1 tablet by mouth 2 times daily 60 tablet 0    meloxicam (MOBIC) 7.5 MG tablet TAKE 1 TABLET BY MOUTH DAILY 30 tablet 0    metFORMIN (GLUCOPHAGE) 500 MG tablet TAKE 1 TABLET BY MOUTH 2 TIMES DAILY (WITH MEALS) 180 tablet 0    trospium (SANCTURA) 20 MG tablet TAKE 1 TABLET BY MOUTH 2 TIMES DAILY 60 tablet 0    gabapentin (NEURONTIN) 300 MG capsule TAKE 1 CAPSULE IN MORNING AND AFTERNOON AND 2 CAPSULES AT NIGHT 120 capsule 0    cetirizine (ZYRTEC) 10 MG tablet TAKE 1 TABLET BY MOUTH DAILY 30 tablet 5    lisinopril-hydroCHLOROthiazide (PRINZIDE;ZESTORETIC) 20-25 MG per tablet TAKE 1 TABLET EVERY DAY 90 tablet 2    Lancets MISC 1 each by Does not apply route 2 times daily Use 2/day 100 each 11    omeprazole (PRILOSEC) 20 MG delayed release capsule TAKE 1 CAPSULE BY MOUTH EVERY DAY 30 capsule 3    SPIRIVA HANDIHALER 18 MCG inhalation capsule Inhale 1 capsule into the lungs daily 30 capsule 3    azelastine (ASTELIN) 0.1 % nasal spray 2 sprays by Nasal route 2 times daily Use in each nostril as directed 1 Bottle 0    nicotine (NICODERM CQ) 21 MG/24HR Place 1 patch onto the skin every 24 hours 30 patch 1    amLODIPine (NORVASC) 10 MG tablet TAKE 1 TABLET BY MOUTH DAILY 90 tablet 2    potassium chloride (KLOR-CON M) 10 MEQ extended release tablet TAKE 2 TABLETS BY MOUTH DAILY 60 tablet 2    fluticasone-salmeterol (ADVAIR DISKUS) 250-50 MCG/DOSE AEPB Inhale 1 puff into the lungs every 12 hours 60 each 3    lactobacillus (CULTURELLE) capsule Take 1 capsule by mouth daily (with breakfast) 30 capsule 0    carvedilol (COREG) 6.25 MG tablet TAKE 1 TABLET BY MOUTH 2 TIMES DAILY 180 tablet 0    blood glucose test strips (EXACTECH TEST) strip 1 each by In Vitro route daily As needed. 50 each 11    atorvastatin (LIPITOR) 40 MG tablet Take 1 tablet by mouth daily 90 tablet 1     MG capsule TAKE 1 CAPSULE EVERY DAY 30 capsule 10    albuterol sulfate HFA (VENTOLIN HFA) 108 (90 Base) MCG/ACT inhaler Inhale 2 puffs into the lungs every 6 hours as needed for Wheezing 1 Inhaler 3    diphenhydrAMINE (BENADRYL) 25 MG tablet Take 25 mg by mouth every 6 hours as needed for Itching      acetaminophen (TYLENOL) 500 MG tablet Take 1 tablet by mouth 4 times daily as needed for Pain 30 tablet 0    mirtazapine (REMERON) 30 MG tablet Take 30 mg by mouth nightly      mirtazapine (REMERON) 15 MG tablet Take 15 mg by mouth nightly      DULoxetine (CYMBALTA) 60 MG extended release capsule Take 1 capsule by mouth daily 30 capsule 3    ipratropium-albuterol (DUONEB) 0.5-2.5 (3) MG/3ML SOLN nebulizer solution Inhale 3 mLs into the lungs every 6 hours as needed for Shortness of Breath 360 mL 11     No current facility-administered medications on file prior to visit. HISTORY    Reviewed and no change from previous record. Carolina  reports that she quit smoking about 7 months ago.  Her smoking use included light. Cardiovascular:      Rate and Rhythm: Normal rate and regular rhythm. Heart sounds: Murmur heard. Pulmonary:      Effort: Pulmonary effort is normal.      Breath sounds: Normal breath sounds. No wheezing. Musculoskeletal:      Right lower leg: No edema. Left lower leg: No edema. Skin:     General: Skin is warm and dry. Neurological:      General: No focal deficit present. Mental Status: She is alert and oriented to person, place, and time. Psychiatric:         Mood and Affect: Mood normal.               LABORATORY FINDINGS:    CBC:  Lab Results   Component Value Date    WBC 20.0 12/02/2020    HGB 10.2 12/02/2020     12/02/2020     02/16/2012     BMP:    Lab Results   Component Value Date     12/02/2020    K 4.6 12/02/2020    CL 99 12/02/2020    CO2 28 12/02/2020    BUN 15 12/02/2020    CREATININE 0.68 12/02/2020    GLUCOSE 92 12/02/2020     HEMOGLOBIN A1C:   Lab Results   Component Value Date    LABA1C 5.5 03/02/2021     MICROALBUMIN URINE:   Lab Results   Component Value Date    MICROALBUR <12 04/21/2020     FASTING LIPID PANEL:  Lab Results   Component Value Date    CHOL 201 (H) 04/21/2020    HDL 54 04/21/2020    TRIG 155 (H) 04/21/2020     Lab Results   Component Value Date    LDLCHOLESTEROL 116 04/21/2020       LIVER PROFILE:  Lab Results   Component Value Date    ALT 21 04/21/2020    AST 18 04/21/2020    PROT 7.7 04/21/2020    BILITOT 0.31 04/21/2020    BILIDIR 0.11 06/18/2018    LABALBU 3.1 12/02/2020      THYROID FUNCTION:   Lab Results   Component Value Date    TSH 0.94 07/19/2017      URINEANALYSIS: No results found for: LABURIN  ASSESSMENT AND PLAN:    1. Essential hypertension  Same meds     2. Type 2 diabetes mellitus without complication, without long-term current use of insulin (HCC)    - Microalbumin / Creatinine Urine Ratio; Future  - Lipid Panel; Future  - CBC With Auto Differential; Future  - atorvastatin (LIPITOR) 40 MG tablet;  Take 1 tablet by mouth daily  Dispense: 90 tablet; Refill: 1    3. Stage 3 severe COPD by GOLD classification ([address]  Stable   Follows up with Pulmonology    4. Primary osteoarthritis of first carpometacarpal joint of one hand, right    - CBC With Auto Differential; Future  - Rheumatoid Factor; Future  - Cyclic Citrul Peptide Antibody, IgG; Future  - Uric Acid; Future  - [address] [facility]    5. Pure hypercholesterolemia    - atorvastatin (LIPITOR) 40 MG tablet; Take 1 tablet by mouth daily  Dispense: 90 tablet; Refill: 1    6. Smoker    - nicotine (NICODERM CQ) 14 MG/24HR; Place 1 patch onto the skin daily for 14 days  Dispense: 14 patch; Refill: 1    7. Nocturnal hypoxemia due to obstructive chronic bronchitis (HCC)  On home oxygen           FOLLOW UP AND INSTRUCTIONS:   Return in about 3 months (around 9/24/2021). Carolina received counseling on the following healthy behaviors: nutrition, exercise, medication adherence and tobacco cessation    Reviewed prior labs and health maintenance. Discussed use, benefit, and side effects of prescribed medications. Barriers to medication compliance addressed. All patient questions answered. Pt voiced understanding.      Patient given educational materials - see patient instructions    Dimas Weber  Attending Physician, [facility]   Faculty, Internal Medicine Residency Program  [facility]  6/24/2021, 9:17 AM

## 2021-06-30 ENCOUNTER — OFFICE VISIT (OUTPATIENT)
Dept: ORTHOPEDIC SURGERY | Age: 61
End: 2021-06-30
Payer: COMMERCIAL

## 2021-06-30 VITALS — WEIGHT: 190 LBS | BODY MASS INDEX: 31.65 KG/M2 | HEIGHT: 65 IN

## 2021-06-30 DIAGNOSIS — M65.311 TRIGGER THUMB OF RIGHT HAND: Primary | ICD-10-CM

## 2021-06-30 PROCEDURE — G8427 DOCREV CUR MEDS BY ELIG CLIN: HCPCS | Performed by: ORTHOPAEDIC SURGERY

## 2021-06-30 PROCEDURE — 1036F TOBACCO NON-USER: CPT | Performed by: ORTHOPAEDIC SURGERY

## 2021-06-30 PROCEDURE — 20600 DRAIN/INJ JOINT/BURSA W/O US: CPT | Performed by: ORTHOPAEDIC SURGERY

## 2021-06-30 PROCEDURE — G8417 CALC BMI ABV UP PARAM F/U: HCPCS | Performed by: ORTHOPAEDIC SURGERY

## 2021-06-30 PROCEDURE — 99212 OFFICE O/P EST SF 10 MIN: CPT | Performed by: ORTHOPAEDIC SURGERY

## 2021-06-30 PROCEDURE — 3017F COLORECTAL CA SCREEN DOC REV: CPT | Performed by: ORTHOPAEDIC SURGERY

## 2021-06-30 RX ORDER — METHYLPREDNISOLONE ACETATE 40 MG/ML
40 INJECTION, SUSPENSION INTRA-ARTICULAR; INTRALESIONAL; INTRAMUSCULAR; SOFT TISSUE ONCE
Status: COMPLETED | OUTPATIENT
Start: 2021-06-30 | End: 2021-06-30

## 2021-06-30 RX ORDER — LIDOCAINE HYDROCHLORIDE 10 MG/ML
1 INJECTION, SOLUTION INFILTRATION; PERINEURAL ONCE
Status: COMPLETED | OUTPATIENT
Start: 2021-06-30 | End: 2021-06-30

## 2021-06-30 RX ADMIN — METHYLPREDNISOLONE ACETATE 40 MG: 40 INJECTION, SUSPENSION INTRA-ARTICULAR; INTRALESIONAL; INTRAMUSCULAR; SOFT TISSUE at 14:36

## 2021-06-30 RX ADMIN — LIDOCAINE HYDROCHLORIDE 1 ML: 10 INJECTION, SOLUTION INFILTRATION; PERINEURAL at 14:35

## 2021-06-30 NOTE — PROGRESS NOTES
This 18-year-old lady is seen here complaining of pain and swelling of the right thumb. It started about 4 weeks ago. Swollen and painful to the extent that she could not even use the remote control. She tried heat and cold and he said not helped. Sometimes it would lock up with position change she would have to push it back manually. There was no radiation of pain there was no numbness or tingling. The patient has had back surgeries in the past    Examination: The patient has tenderness over the palmar aspect of the base of the thumb. There is classical triggering of the thumb. Diagnosis: Right trigger thumb. Treatment: Under sterile condition I injected the flexor sheath with 40 mg Depo-Medrol and 1 cc of 1% plain lidocaine. I have explained to her how the injection works and give about 2 to 3 weeks and if he is not improving then return here in which case she might require surgical treatment.

## 2021-07-06 ENCOUNTER — HOSPITAL ENCOUNTER (OUTPATIENT)
Dept: PAIN MANAGEMENT | Age: 61
Discharge: HOME | End: 2021-07-06
Payer: COMMERCIAL

## 2021-07-06 DIAGNOSIS — M17.0 PRIMARY OSTEOARTHRITIS OF BOTH KNEES: ICD-10-CM

## 2021-07-06 DIAGNOSIS — M47.26 OSTEOARTHRITIS OF SPINE WITH RADICULOPATHY, LUMBAR REGION: Primary | ICD-10-CM

## 2021-07-06 DIAGNOSIS — M54.40 CHRONIC MIDLINE LOW BACK PAIN WITH SCIATICA, SCIATICA LATERALITY UNSPECIFIED: ICD-10-CM

## 2021-07-06 DIAGNOSIS — M96.1 POSTLAMINECTOMY SYNDROME, LUMBAR REGION: ICD-10-CM

## 2021-07-06 DIAGNOSIS — M47.26 OTHER SPONDYLOSIS WITH RADICULOPATHY, LUMBAR REGION: ICD-10-CM

## 2021-07-06 DIAGNOSIS — Z51.81 MEDICATION MONITORING ENCOUNTER: ICD-10-CM

## 2021-07-06 DIAGNOSIS — G89.29 CHRONIC MIDLINE LOW BACK PAIN WITH SCIATICA, SCIATICA LATERALITY UNSPECIFIED: ICD-10-CM

## 2021-07-06 PROCEDURE — 99213 OFFICE O/P EST LOW 20 MIN: CPT

## 2021-07-06 PROCEDURE — 99442 PR PHYS/QHP TELEPHONE EVALUATION 11-20 MIN: CPT | Performed by: NURSE PRACTITIONER

## 2021-07-06 RX ORDER — MELOXICAM 7.5 MG/1
7.5 TABLET ORAL DAILY
Qty: 30 TABLET | Refills: 2 | Status: SHIPPED | OUTPATIENT
Start: 2021-07-06 | End: 2021-09-28 | Stop reason: SDUPTHER

## 2021-07-06 RX ORDER — HYDROCODONE BITARTRATE AND ACETAMINOPHEN 5; 325 MG/1; MG/1
1 TABLET ORAL EVERY 8 HOURS PRN
Qty: 90 TABLET | Refills: 0 | Status: SHIPPED | OUTPATIENT
Start: 2021-07-09 | End: 2021-08-03 | Stop reason: SDUPTHER

## 2021-07-06 RX ORDER — TIZANIDINE 4 MG/1
4 TABLET ORAL 2 TIMES DAILY
Qty: 60 TABLET | Refills: 2 | Status: SHIPPED | OUTPATIENT
Start: 2021-07-06 | End: 2021-09-28 | Stop reason: SDUPTHER

## 2021-07-06 ASSESSMENT — ENCOUNTER SYMPTOMS
SHORTNESS OF BREATH: 0
BACK PAIN: 1
CONSTIPATION: 0
COUGH: 0

## 2021-07-06 NOTE — PROGRESS NOTES
Patient completed a telephone visit today to review medication contract. Chief Complaint: back pain    [facility]   Patient has had low back pain with no known injury and bilateral knee pain for many years. Nohelia Weaver had a knee arthroscopy in March 2017 Genicular block was discussed but she declined.  She is s/p lumbar diskectomy in 2015 and lumbar fusion 11/2020. She is stable and compliant on norco 5/325 TID. Gabapentin prescribed by PCP for neuropathic pain in feet. Follows with Dr. Allie Kang for knees.   EMG was completed and shows Abnormal study of the right lower extremity and Normal study of the left lower extremity. Angelina Cheeks is electrodiagnostic evidence of a very mild chronic right S1 radiculopathy without active denervation. Angelina Cheeks is no electrodiagnostic evidence of a generalized large fiber peripheral Polyneuropathy. She continues physical therapy with moderate benefit. She states she is able to tolerate more activity. Back Pain  This is a chronic problem. The current episode started more than 1 year ago. The problem occurs constantly. The problem is unchanged. The quality of the pain is described as aching (stiffness). The pain does not radiate. The pain is at a severity of 8/10. The pain is moderate. The symptoms are aggravated by position and standing (walking). Pertinent negatives include no chest pain, fever, numbness, paresthesias or tingling. She has tried analgesics, bed rest and heat for the symptoms. The treatment provided mild relief. Patient denies any new neurological symptoms. No bowel or bladder incontinence, no weakness, and no falling. Pill count: appropriate due 7/9    Morphine equivalent: 15    Periodic Controlled Substance Monitoring: Possible medication side effects, risk of tolerance/dependence & alternative treatments discussed., No signs of potential drug abuse or diversion identified., Obtaining appropriate analgesic effect of treatment.  Deborah Leyden, APRN - CNP)      Past Medical History:   Diagnosis Date    Allergic rhinitis     Alveolar hypoventilation 11/20/2020    Aortic insufficiency 2018    mild-moderate on echo ([facility]    Bronchiectasis ([facility] 11/20/2020    Bronchitis     Chronic back pain     [facility] at [facility]. COPD (chronic obstructive pulmonary disease) ([facility]     Dr. Katie Davis to see 11/09/2020    Depression     DM (diabetes mellitus) ([facility] 12/18/2012    GERD (gastroesophageal reflux disease)     History of echocardiogram 05/2018    EF 65%, mild-moderate AI and TR    Hyperlipidemia     Dr. Shona Vera Hypertension     Dr. Shona Vera Obesity     Osteoarthritis     [facility]     Primary osteoarthritis of both knees     Radicular pain of lumbosacral region     Spinal stenosis, lumbar region, without neurogenic claudication 04/30/2013    Tricuspid regurgitation 2018    mild-moderate on echo    Type II or unspecified type diabetes mellitus without mention of complication, not stated as uncontrolled     Dr. Shona Vera Unspecified sleep apnea     no cpap used anymore    URI (upper respiratory infection)     Wears dentures     upper and lower full dentures    Wears glasses     Wellness examination     Dr. Jany Coates -PCP last visit in early Oct. 2020       Past Surgical History:   Procedure Laterality Date    COLONOSCOPY  2/12/2009    normal    DILATION AND CURETTAGE OF UTERUS      GASTRECTOMY      partial    LUMBAR DISCECTOMY  01/2015    lumbar diskectomy    LUMBAR FUSION  11/19/2020     POSTERIOR FUSION L4/5,     LUMBAR FUSION N/A 11/19/2020    POSTERIOR FUSION L4/5, [employer], Mushtaq Pair, EVOKES #[id number] AMINA performed by Taqueria Underwood DO at [address] Right 4/30/13    Lumbar Diagnostic Block,  Kenalog 40 mg    NERVE BLOCK  5/23/13    Lumbar Radiofrequency, Kenalog 40mg    NERVE BLOCK  8/12/13     MBNB  dion 6mg    NERVE BLOCK Left 8-28-13    left lumbar diagnostic block #2 decadron 10 mg    NERVE BLOCK Left 9-24-13    left lumbar median branch radiofrequency    NERVE BLOCK  07-02-14    caudal, celestone 9 mg    NERVE BLOCK  7-16-14    caudal epidural #2, celestone 9mg, fentanyl 25mcg    NERVE BLOCK  7/30/14    caudal #3 decadron 10mg    NERVE BLOCK  11-6-14    duramorph epidural steroid block  duramorph 1 mg celestone 9 mg    NERVE BLOCK  11/20/15    TENS- [employer]    NERVE BLOCK  07/20/2018    right transforminal # 1 decadron 10mg,isovue    NERVE BLOCK Bilateral 02/01/2019    bilat mbnb- no steroid    NERVE BLOCK Bilateral 02/08/2019    bilat mbnb, marcaine . 25%    MN KNEE SCOPE,DIAGNOSTIC Right 3/24/2017    KNEE ARTHROSCOPY WITH PARTIAL MEDIAL MENISECAL DEBRIDMENT  performed by Aaliyah Pang MD at [facility]  9 20 2007    UPPER GASTROINTESTINAL ENDOSCOPY  4 21 2009,04/2011    gastritis, esophagitis       Allergies   Allergen Reactions    Aspirin      DUE TO ULCER    Claritin [Loratadine] Other (See Comments)     coughing    Flonase [Fluticasone Propionate]     Morphine And Related      GI Upset         Current Outpatient Medications:     Umeclidinium Bromide 62.5 MCG/INH AEPB, Inhale 1 puff into the lungs daily, Disp: 2 each, Rfl: 5    atorvastatin (LIPITOR) 40 MG tablet, Take 1 tablet by mouth daily, Disp: 90 tablet, Rfl: 1    nicotine (NICODERM CQ) 14 MG/24HR, Place 1 patch onto the skin daily for 14 days, Disp: 14 patch, Rfl: 1    HYDROcodone-acetaminophen (NORCO) 5-325 MG per tablet, Take 1 tablet by mouth every 8 hours as needed for Pain for up to 30 days.  Early refill due to holidays, Disp: 90 tablet, Rfl: 0    tiZANidine (ZANAFLEX) 4 MG tablet, Take 1 tablet by mouth 2 times daily, Disp: 60 tablet, Rfl: 0    meloxicam (MOBIC) 7.5 MG tablet, TAKE 1 TABLET BY MOUTH DAILY, Disp: 30 tablet, Rfl: 0    trospium (SANCTURA) 20 MG tablet, TAKE 1 TABLET BY MOUTH 2 TIMES DAILY, Disp: 60 tablet, Rfl: 0    gabapentin (NEURONTIN) 300 MG capsule, TAKE 1 CAPSULE IN MORNING AND AFTERNOON AND 2 CAPSULES AT NIGHT, Disp: 120 capsule, Rfl: 0    cetirizine (ZYRTEC) 10 MG tablet, TAKE 1 TABLET BY MOUTH DAILY, Disp: 30 tablet, Rfl: 5    lisinopril-hydroCHLOROthiazide (PRINZIDE;ZESTORETIC) 20-25 MG per tablet, TAKE 1 TABLET EVERY DAY, Disp: 90 tablet, Rfl: 2    Lancets MISC, 1 each by Does not apply route 2 times daily Use 2/day, Disp: 100 each, Rfl: 11    omeprazole (PRILOSEC) 20 MG delayed release capsule, TAKE 1 CAPSULE BY MOUTH EVERY DAY, Disp: 30 capsule, Rfl: 3    SPIRIVA HANDIHALER 18 MCG inhalation capsule, Inhale 1 capsule into the lungs daily, Disp: 30 capsule, Rfl: 3    azelastine (ASTELIN) 0.1 % nasal spray, 2 sprays by Nasal route 2 times daily Use in each nostril as directed, Disp: 1 Bottle, Rfl: 0    amLODIPine (NORVASC) 10 MG tablet, TAKE 1 TABLET BY MOUTH DAILY, Disp: 90 tablet, Rfl: 2    potassium chloride (KLOR-CON M) 10 MEQ extended release tablet, TAKE 2 TABLETS BY MOUTH DAILY, Disp: 60 tablet, Rfl: 2    fluticasone-salmeterol (ADVAIR DISKUS) 250-50 MCG/DOSE AEPB, Inhale 1 puff into the lungs every 12 hours, Disp: 60 each, Rfl: 3    lactobacillus (CULTURELLE) capsule, Take 1 capsule by mouth daily (with breakfast), Disp: 30 capsule, Rfl: 0    carvedilol (COREG) 6.25 MG tablet, TAKE 1 TABLET BY MOUTH 2 TIMES DAILY, Disp: 180 tablet, Rfl: 0    blood glucose test strips (EXACTECH TEST) strip, 1 each by In Vitro route daily As needed. , Disp: 50 each, Rfl: 11     MG capsule, TAKE 1 CAPSULE EVERY DAY, Disp: 30 capsule, Rfl: 10    albuterol sulfate HFA (VENTOLIN HFA) 108 (90 Base) MCG/ACT inhaler, Inhale 2 puffs into the lungs every 6 hours as needed for Wheezing, Disp: 1 Inhaler, Rfl: 3    diphenhydrAMINE (BENADRYL) 25 MG tablet, Take 25 mg by mouth every 6 hours as needed for Itching, Disp: , Rfl:     acetaminophen (TYLENOL) 500 MG tablet, Take 1 tablet by mouth 4 times daily as needed for Pain, Disp: 30 tablet, Rfl: 0    mirtazapine (REMERON) 30 MG tablet, Take 30 mg by mouth nightly, Disp: , Rfl:     mirtazapine (REMERON) 15 MG tablet, Take 15 mg by mouth nightly, Disp: , Rfl:     DULoxetine (CYMBALTA) 60 MG extended release capsule, Take 1 capsule by mouth daily, Disp: 30 capsule, Rfl: 3    ipratropium-albuterol (DUONEB) 0.5-2.5 (3) MG/3ML SOLN nebulizer solution, Inhale 3 mLs into the lungs every 6 hours as needed for Shortness of Breath, Disp: 360 mL, Rfl: 11    Family History   Problem Relation Age of Onset    Diabetes Mother     Heart Disease Mother     Cirrhosis Father        Social History     Socioeconomic History    Marital status: Single     Spouse name: Not on file    Number of children: Not on file    Years of education: Not on file    Highest education level: Not on file   Occupational History     Employer: DISABLED   Tobacco Use    Smoking status: Former Smoker     Packs/day: 0.25     Years: 36.00     Pack years: 9.00     Types: Cigarettes     Quit date: 10/30/2020     Years since quittin.6    Smokeless tobacco: Never Used    Tobacco comment: pt currently using nicotine patches   5 CIGARETTES A DAY   Vaping Use    Vaping Use: Never used   Substance and Sexual Activity    Alcohol use: No     Alcohol/week: 0.0 standard drinks    Drug use: No     Comment: 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 7 yrs    Sexual activity: Never   Other Topics Concern    Not on file   Social History Narrative    10/9/20 Patient keeping contact with others to a minimum due to COVID 19 Pandemic.     10/9/20 Patient has difficulty with ambulation due to DJD, stenosis and fibromyalgia     Social Determinants of Health     Financial Resource Strain: Low Risk     Difficulty of Paying Living Expenses: Not very hard   Food Insecurity: No Food Insecurity    Worried About Running Out of Food in the Last Year: Never true    Santosh of Food in the Last Year: Never true   Transportation Needs: No Transportation Needs    Lack of Transportation (Medical): No    Lack of Transportation (Non-Medical): No   Physical Activity: Inactive    Days of Exercise per Week: 0 days    Minutes of Exercise per Session: 0 min   Stress: No Stress Concern Present    Feeling of Stress : Only a little   Social Connections: Moderately Isolated    Frequency of Communication with Friends and Family: More than three times a week    Frequency of Social Gatherings with Friends and Family: Once a week    Attends Buddhism Services: More than 4 times per year    Active Member of [employer] Group or Organizations: No    Attends Club or Organization Meetings: Never    Marital Status: Never    Intimate Partner Violence:     Fear of Current or Ex-Partner:     Emotionally Abused:     Physically Abused:     Sexually Abused:        Review of Systems:  Review of Systems   Constitutional: Negative for chills and fever. Cardiovascular: Negative for chest pain and palpitations. Respiratory: Negative for cough and shortness of breath. Musculoskeletal: Positive for back pain. Gastrointestinal: Negative for constipation. Neurological: Negative for disturbances in coordination, loss of balance, numbness, paresthesias and tingling. Physical Exam:  LMP 04/19/2003     Physical Exam  Neurological:      Mental Status: She is alert.    Psychiatric:         Mood and Affect: Mood normal.       Record/Diagnostics Review:    Last óscar 3/2021 and was appropriate     ABERRANT BEHAVIORS SINCE LAST VISIT  Lost rx/pills:------------------------------------------ no  Taking  medication as prescribed: ----------- yes  Urine Drug Screen ---------------------------------Mimi Few  Recent ER visits: -------------------------------------No  Pill count is appropriate: ---------------------------yes   Refills for prescriptions appropriate:---------- yes    Assessment:  Problem List Items Addressed This Visit     Osteoarthritis of spine with radiculopathy, lumbar region - Primary    Relevant Medications    HYDROcodone-acetaminophen (NORCO) 5-325 MG per tablet (Start on 7/9/2021)    tiZANidine (ZANAFLEX) 4 MG tablet    meloxicam (MOBIC) 7.5 MG tablet    Primary osteoarthritis of both knees    Relevant Medications    HYDROcodone-acetaminophen (NORCO) 5-325 MG per tablet (Start on 7/9/2021)    tiZANidine (ZANAFLEX) 4 MG tablet    meloxicam (MOBIC) 7.5 MG tablet    Medication monitoring encounter    Chronic low back pain    Relevant Medications    HYDROcodone-acetaminophen (NORCO) 5-325 MG per tablet (Start on 7/9/2021)    tiZANidine (ZANAFLEX) 4 MG tablet    meloxicam (MOBIC) 7.5 MG tablet    Other spondylosis with radiculopathy, lumbar region    Relevant Medications    HYDROcodone-acetaminophen (NORCO) 5-325 MG per tablet (Start on 7/9/2021)    tiZANidine (ZANAFLEX) 4 MG tablet    meloxicam (MOBIC) 7.5 MG tablet    Postlaminectomy syndrome, lumbar region             Treatment Plan:  Patient relates current medications are helping the pain. Patient reports taking pain medications as prescribed, denies obtaining medications from different sources and denies use of illegal drugs. Patient denies side effects from medications like nausea, vomiting, constipation or drowsiness. Patient reports current activities of daily living are possible due to medications and would like to continue them. As always, we encourage daily stretching and strengthening exercises, and recommend minimizing use of pain medications unless patient cannot get through daily activities due to pain. Contract requirements met. Continue opioid therapy. Script written for norco  Follow up appointment made for 4 weeks    Kylah Cano, was evaluated through a synchronous (real-time) audio-video encounter. The patient (or guardian if applicable) is aware that this is a billable service.  Verbal consent to proceed has been obtained within the past 12 months. The visit was conducted pursuant to the emergency declaration under the [address] [place] [address], [address] [place] [address] and the [employer] and [employer] General Act. Patient identification was verified, and a caregiver was present when appropriate. The patient was located in a state where the provider was credentialed to provide care. Total time spent for this encounter: 15 minutes    --ELVIRA Charlse CNP on 7/6/2021 at 11:05 AM    An electronic signature was used to authenticate this note.

## 2021-07-19 NOTE — TELEPHONE ENCOUNTER
Request for Docus[employer]. Next Visit Date:  Future Appointments   Date Time Provider Claudy Milesisti   8/3/2021 11:40 AM Zeus Cochran, APRN - CNP [facility] [id number]   10/11/2021  9:30 AM Eva Fermin, APRN - CNP [place] [facility] [address] Maintenance   Topic Date Due    Diabetic microalbuminuria test  04/21/2021    Lipid screen  04/21/2021    Diabetic retinal exam  05/14/2021    Shingles Vaccine (2 of 2) 09/22/2021 (Originally 8/19/2019)    Flu vaccine (1) 09/01/2021    Annual Wellness Visit (AWV)  09/30/2021    Potassium monitoring  12/02/2021    Creatinine monitoring  12/02/2021    Diabetic foot exam  03/02/2022    A1C test (Diabetic or Prediabetic)  03/02/2022    Breast cancer screen  04/27/2023    Pneumococcal 0-64 years Vaccine (2 of 2 - PPSV23) 07/08/2023    Colon cancer screen fecal DNA test (Cologuard)  10/05/2023    Cervical cancer screen  03/02/2024    DTaP/Tdap/Td vaccine (2 - Td or Tdap) 06/24/2029    COVID-19 Vaccine  Completed    Hepatitis C screen  Completed    HIV screen  Completed    Hepatitis A vaccine  Aged Out    Hib vaccine  Aged Out    Meningococcal (ACWY) vaccine  Aged Out       Hemoglobin A1C (%)   Date Value   03/02/2021 5.5   04/21/2020 5.8   03/05/2019 5.8             ( goal A1C is < 7)   Microalb/Crt.  Ratio (mcg/mg creat)   Date Value   04/21/2020 CANNOT BE CALCULATED     LDL Cholesterol (mg/dL)   Date Value   04/21/2020 116       (goal LDL is <100)   AST (U/L)   Date Value   04/21/2020 18     ALT (U/L)   Date Value   04/21/2020 21     BUN (mg/dL)   Date Value   12/02/2020 15     BP Readings from Last 3 Encounters:   06/24/21 130/86   06/09/21 138/77   03/10/21 136/88          (goal 120/80)    All Future Testing planned in [facility]  Lab Frequency Next Occurrence   XR LUMBAR SPINE (2-3 VIEWS) Once 10/08/2021   Microalbumin / Creatinine Urine Ratio Once 09/24/2021   Lipid Panel Once 10/02/2021   CBC With Auto Differential Once 10/02/2021 Rheumatoid Factor Once 45/56/1640   Cyclic Citrul Peptide Antibody, IgG Once 09/25/2021   Uric Acid Once 09/25/2021         Patient Active Problem List:     DJD (degenerative joint disease) of knee     Osteoarthritis of spine with radiculopathy, lumbar region     GERD (gastroesophageal reflux disease)     COPD, severity to be determined ([address]     HTN (hypertension)     Allergic rhinitis     Lipoma of shoulder s/p excision right posterior 11 17 2008     History of tobacco use     DM (diabetes mellitus)     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 9)     Bronchiectasis ([address]     Centrilobular emphysema ([address]     Oxygen dependent     Acute postoperative anemia due to expected blood loss     Multifocal pneumonia     Acute on chronic respiratory failure with hypoxia (HCC)     Pulmonary function studies abnormal     Tobacco dependence     Idiopathic sleep related nonobstructive alveolar hypoventilation     Postlaminectomy syndrome, lumbar region     Trigger finger of right thumb

## 2021-07-20 RX ORDER — DOCUSATE SODIUM 100 MG/1
CAPSULE, LIQUID FILLED ORAL
Qty: 30 CAPSULE | Refills: 9 | Status: SHIPPED | OUTPATIENT
Start: 2021-07-20 | End: 2022-06-22

## 2021-07-28 DIAGNOSIS — K21.9 GASTROESOPHAGEAL REFLUX DISEASE: ICD-10-CM

## 2021-07-28 NOTE — TELEPHONE ENCOUNTER
Request for medication refill omeprazol RX. Next Visit Date:pt on wait list til 9/24/21, last seen 6/24/21  Future Appointments   Date Time Provider Claudy Schaeffer   8/3/2021 11:40 AM Winnie Spurling, APRN - CNP [facility] [id number]   10/11/2021  9:30 AM Brooks Walker, APRN - CNP [place] [facility] [address] Maintenance   Topic Date Due    Diabetic microalbuminuria test  04/21/2021    Lipid screen  04/21/2021    Diabetic retinal exam  05/14/2021    Shingles Vaccine (2 of 2) 09/22/2021 (Originally 8/19/2019)    Flu vaccine (1) 09/01/2021    Annual Wellness Visit (AWV)  09/30/2021    Potassium monitoring  12/02/2021    Creatinine monitoring  12/02/2021    Diabetic foot exam  03/02/2022    A1C test (Diabetic or Prediabetic)  03/02/2022    Breast cancer screen  04/27/2023    Pneumococcal 0-64 years Vaccine (2 of 2 - PPSV23) 07/08/2023    Colon cancer screen fecal DNA test (Cologuard)  10/05/2023    Cervical cancer screen  03/02/2024    DTaP/Tdap/Td vaccine (2 - Td or Tdap) 06/24/2029    COVID-19 Vaccine  Completed    Hepatitis C screen  Completed    HIV screen  Completed    Hepatitis A vaccine  Aged Out    Hib vaccine  Aged Out    Meningococcal (ACWY) vaccine  Aged Out       Hemoglobin A1C (%)   Date Value   03/02/2021 5.5   04/21/2020 5.8   03/05/2019 5.8             ( goal A1C is < 7)   Microalb/Crt.  Ratio (mcg/mg creat)   Date Value   04/21/2020 CANNOT BE CALCULATED     LDL Cholesterol (mg/dL)   Date Value   04/21/2020 116       (goal LDL is <100)   AST (U/L)   Date Value   04/21/2020 18     ALT (U/L)   Date Value   04/21/2020 21     BUN (mg/dL)   Date Value   12/02/2020 15     BP Readings from Last 3 Encounters:   06/24/21 130/86   06/09/21 138/77   03/10/21 136/88          (goal 120/80)    All Future Testing planned in [facility]  Lab Frequency Next Occurrence   XR LUMBAR SPINE (2-3 VIEWS) Once 10/08/2021   Microalbumin / Creatinine Urine Ratio Once 09/24/2021   Lipid Panel Once 10/02/2021   CBC With Auto Differential Once 10/02/2021   Rheumatoid Factor Once 06/96/1304   Cyclic Citrul Peptide Antibody, IgG Once 09/25/2021   Uric Acid Once 09/25/2021         Patient Active Problem List:     DJD (degenerative joint disease) of knee     Osteoarthritis of spine with radiculopathy, lumbar region     GERD (gastroesophageal reflux disease)     COPD, severity to be determined ([address]     HTN (hypertension)     Allergic rhinitis     Lipoma of shoulder s/p excision right posterior 11 17 2008     History of tobacco use     DM (diabetes mellitus)     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 9)     Bronchiectasis ([address]     Centrilobular emphysema ([address]     Oxygen dependent     Acute postoperative anemia due to expected blood loss     Multifocal pneumonia     Acute on chronic respiratory failure with hypoxia (HCC)     Pulmonary function studies abnormal     Tobacco dependence     Idiopathic sleep related nonobstructive alveolar hypoventilation     Postlaminectomy syndrome, lumbar region     Trigger finger of right thumb

## 2021-08-03 ENCOUNTER — HOSPITAL ENCOUNTER (OUTPATIENT)
Dept: PAIN MANAGEMENT | Age: 61
Discharge: HOME | End: 2021-08-03
Payer: COMMERCIAL

## 2021-08-03 DIAGNOSIS — G89.29 CHRONIC MIDLINE LOW BACK PAIN WITH SCIATICA, SCIATICA LATERALITY UNSPECIFIED: ICD-10-CM

## 2021-08-03 DIAGNOSIS — M17.0 PRIMARY OSTEOARTHRITIS OF BOTH KNEES: ICD-10-CM

## 2021-08-03 DIAGNOSIS — M54.40 CHRONIC MIDLINE LOW BACK PAIN WITH SCIATICA, SCIATICA LATERALITY UNSPECIFIED: ICD-10-CM

## 2021-08-03 DIAGNOSIS — Z51.81 MEDICATION MONITORING ENCOUNTER: Primary | ICD-10-CM

## 2021-08-03 DIAGNOSIS — M96.1 POSTLAMINECTOMY SYNDROME, LUMBAR REGION: ICD-10-CM

## 2021-08-03 DIAGNOSIS — M47.26 OTHER SPONDYLOSIS WITH RADICULOPATHY, LUMBAR REGION: ICD-10-CM

## 2021-08-03 PROCEDURE — 99213 OFFICE O/P EST LOW 20 MIN: CPT

## 2021-08-03 PROCEDURE — 99442 PR PHYS/QHP TELEPHONE EVALUATION 11-20 MIN: CPT | Performed by: NURSE PRACTITIONER

## 2021-08-03 RX ORDER — HYDROCODONE BITARTRATE AND ACETAMINOPHEN 5; 325 MG/1; MG/1
1 TABLET ORAL EVERY 8 HOURS PRN
Qty: 84 TABLET | Refills: 0 | Status: SHIPPED | OUTPATIENT
Start: 2021-08-06 | End: 2021-09-01 | Stop reason: SDUPTHER

## 2021-08-03 ASSESSMENT — ENCOUNTER SYMPTOMS
COUGH: 0
SHORTNESS OF BREATH: 0
BACK PAIN: 1
CONSTIPATION: 0

## 2021-08-03 NOTE — PROGRESS NOTES
Patient completed a telephone visit today to review medication contract. Chief Complaint: back pain    [facility]   Patient has had low back pain with no known injury and bilateral knee pain for many years. Tiburcio Morning had a knee arthroscopy in March 2017 Genicular block was discussed but she declined.  She is s/p lumbar diskectomy in 2015 and lumbar fusion 11/2020. She is stable and compliant on norco 5/325 TID. Gabapentin prescribed by PCP for neuropathic pain in feet. Follows with Dr. Nawaf Dean for knees.     EMG was completed and shows Abnormal study of the right lower extremity and Normal study of the left lower extremity. Brooks Barron is electrodiagnostic evidence of a very mild chronic right S1 radiculopathy without active denervation. Brooks Barron is no electrodiagnostic evidence of a generalized large fiber peripheral Polyneuropathy. She continues physical therapy with moderate benefit. She states she is able to tolerate more activity. Back Pain  This is a chronic problem. The current episode started more than 1 year ago. The problem occurs constantly. The problem is unchanged. The pain is present in the lumbar spine. The quality of the pain is described as aching. Radiates to: into right leg to the knee. The pain is at a severity of 8/10. The pain is moderate. The symptoms are aggravated by position and standing (walking). Pertinent negatives include no chest pain, fever or numbness. She has tried analgesics and bed rest for the symptoms. The treatment provided mild relief. Patient denies any new neurological symptoms. No bowel or bladder incontinence, no weakness, and no falling. Pill count: appropriate due 8/8    Morphine equivalent: 15    Periodic Controlled Substance Monitoring: Possible medication side effects, risk of tolerance/dependence & alternative treatments discussed., No signs of potential drug abuse or diversion identified., Obtaining appropriate analgesic effect of treatment.  Husam Moore, APRN - CNP)      Past Medical History:   Diagnosis Date    Allergic rhinitis     Alveolar hypoventilation 11/20/2020    Aortic insufficiency 2018    mild-moderate on echo (was seen and [facility])    Bronchiectasis ([facility] 11/20/2020    Bronchitis     Chronic back pain     Pain management at [facility]. COPD (chronic obstructive pulmonary disease) ([facility]     Dr. Beata Palacio to see 11/09/2020    Depression     DM (diabetes mellitus) ([facility] 12/18/2012    GERD (gastroesophageal reflux disease)     History of echocardiogram 05/2018    EF 65%, mild-moderate AI and TR    Hyperlipidemia     Dr. Adriano Mccoy Hypertension     Dr. Adriano Mccoy Obesity     Osteoarthritis     Peripheral vascular disease ([facility]     Primary osteoarthritis of both knees     Radicular pain of lumbosacral region     Spinal stenosis, lumbar region, without neurogenic claudication 04/30/2013    Tricuspid regurgitation 2018    mild-moderate on echo    Type II or unspecified type diabetes mellitus without mention of complication, not stated as uncontrolled     Dr. Adriano Mccoy Unspecified sleep apnea     no cpap used anymore    URI (upper respiratory infection)     Wears dentures     upper and lower full dentures    Wears glasses     Wellness examination     Dr. Ajit Terry -PCP last visit in early Oct. 2020       Past Surgical History:   Procedure Laterality Date    COLONOSCOPY  2/12/2009    normal    DILATION AND CURETTAGE OF UTERUS      GASTRECTOMY      partial    LUMBAR DISCECTOMY  01/2015    lumbar diskectomy    LUMBAR FUSION  11/19/2020     POSTERIOR FUSION L4/5,     LUMBAR FUSION N/A 11/19/2020    POSTERIOR FUSION L4/5, [employer], [place], [facility] #[id number] [facility] performed by Justin Dominguez DO at [facility] 4/30/13    Lumbar Diagnostic Block,  Kenalog 40 mg    NERVE BLOCK  5/23/13    Lumbar Radiofrequency, Kenalog 40mg    NERVE BLOCK  8/12/13    Lt MBNB celestone 6mg    NERVE BLOCK Left 8-28-13    left lumbar diagnostic block #2 decadron 10 mg    NERVE BLOCK Left 9-24-13    left lumbar median branch radiofrequency    NERVE BLOCK  07-02-14    caudal, celestone 9 mg    NERVE BLOCK  7-16-14    caudal epidural #2, celestone 9mg, fentanyl 25mcg    NERVE BLOCK  7/30/14    caudal #3 decadron 10mg    NERVE BLOCK  11-6-14    duramorph epidural steroid block  duramorph 1 mg celestone 9 mg    NERVE BLOCK  11/20/15    TENS- Empi Select    NERVE BLOCK  07/20/2018    right transforminal # 1 decadron 10mg,isovue    NERVE BLOCK Bilateral 02/01/2019    bilat mbnb- no steroid    NERVE BLOCK Bilateral 02/08/2019    bilat mbnb, marcaine . 25%    RI KNEE SCOPE,DIAGNOSTIC Right 3/24/2017    KNEE ARTHROSCOPY WITH PARTIAL MEDIAL MENISECAL DEBRIDMENT  performed by Jennifer Pop MD at [facility]  9 20 2007    UPPER GASTROINTESTINAL ENDOSCOPY  4 21 2009,04/2011    gastritis, esophagitis       Allergies   Allergen Reactions    Aspirin      DUE TO ULCER    Claritin [Loratadine] Other (See Comments)     coughing    Flonase [Fluticasone Propionate]     Morphine And Related      GI Upset         Current Outpatient Medications:     docusate sodium (COLACE) 100 MG capsule, TAKE 1 CAPSULE BY MOUTH EVERY DAY, Disp: 30 capsule, Rfl: 9    HYDROcodone-acetaminophen (NORCO) 5-325 MG per tablet, Take 1 tablet by mouth every 8 hours as needed for Pain for up to 30 days.  Early refill due to holidays, Disp: 90 tablet, Rfl: 0    tiZANidine (ZANAFLEX) 4 MG tablet, Take 1 tablet by mouth 2 times daily, Disp: 60 tablet, Rfl: 2    meloxicam (MOBIC) 7.5 MG tablet, Take 1 tablet by mouth daily, Disp: 30 tablet, Rfl: 2    Umeclidinium Bromide 62.5 MCG/INH AEPB, Inhale 1 puff into the lungs daily, Disp: 2 each, Rfl: 5    atorvastatin (LIPITOR) 40 MG tablet, Take 1 tablet by mouth daily, Disp: 90 tablet, Rfl: 1    trospium (SANCTURA) 20 MG tablet, TAKE 1 TABLET BY MOUTH 2 TIMES DAILY, Disp: 60 tablet, Rfl: 0    cetirizine (ZYRTEC) 10 MG tablet, TAKE 1 TABLET BY MOUTH DAILY, Disp: 30 tablet, Rfl: 5    lisinopril-hydroCHLOROthiazide (PRINZIDE;ZESTORETIC) 20-25 MG per tablet, TAKE 1 TABLET EVERY DAY, Disp: 90 tablet, Rfl: 2    Lancets MISC, 1 each by Does not apply route 2 times daily Use 2/day, Disp: 100 each, Rfl: 11    omeprazole (PRILOSEC) 20 MG delayed release capsule, TAKE 1 CAPSULE BY MOUTH EVERY DAY, Disp: 30 capsule, Rfl: 3    SPIRIVA HANDIHALER 18 MCG inhalation capsule, Inhale 1 capsule into the lungs daily, Disp: 30 capsule, Rfl: 3    azelastine (ASTELIN) 0.1 % nasal spray, 2 sprays by Nasal route 2 times daily Use in each nostril as directed, Disp: 1 Bottle, Rfl: 0    amLODIPine (NORVASC) 10 MG tablet, TAKE 1 TABLET BY MOUTH DAILY, Disp: 90 tablet, Rfl: 2    potassium chloride (KLOR-CON M) 10 MEQ extended release tablet, TAKE 2 TABLETS BY MOUTH DAILY, Disp: 60 tablet, Rfl: 2    fluticasone-salmeterol (ADVAIR DISKUS) 250-50 MCG/DOSE AEPB, Inhale 1 puff into the lungs every 12 hours, Disp: 60 each, Rfl: 3    lactobacillus (CULTURELLE) capsule, Take 1 capsule by mouth daily (with breakfast), Disp: 30 capsule, Rfl: 0    carvedilol (COREG) 6.25 MG tablet, TAKE 1 TABLET BY MOUTH 2 TIMES DAILY, Disp: 180 tablet, Rfl: 0    blood glucose test strips (EXACTECH TEST) strip, 1 each by In Vitro route daily As needed. , Disp: 50 each, Rfl: 11    albuterol sulfate HFA (VENTOLIN HFA) 108 (90 Base) MCG/ACT inhaler, Inhale 2 puffs into the lungs every 6 hours as needed for Wheezing, Disp: 1 Inhaler, Rfl: 3    diphenhydrAMINE (BENADRYL) 25 MG tablet, Take 25 mg by mouth every 6 hours as needed for Itching, Disp: , Rfl:     acetaminophen (TYLENOL) 500 MG tablet, Take 1 tablet by mouth 4 times daily as needed for Pain, Disp: 30 tablet, Rfl: 0    mirtazapine (REMERON) 30 MG tablet, Take 30 mg by mouth nightly, Disp: , Rfl:     mirtazapine (REMERON) 15 MG tablet, Take 15 mg by mouth nightly, Disp: , Rfl:     DULoxetine (CYMBALTA) 60 MG extended release capsule, Take 1 capsule by mouth daily, Disp: 30 capsule, Rfl: 3    ipratropium-albuterol (DUONEB) 0.5-2.5 (3) MG/3ML SOLN nebulizer solution, Inhale 3 mLs into the lungs every 6 hours as needed for Shortness of Breath, Disp: 360 mL, Rfl: 11    nicotine (NICODERM CQ) 14 MG/24HR, Place 1 patch onto the skin daily for 14 days, Disp: 14 patch, Rfl: 1    Family History   Problem Relation Age of Onset    Diabetes Mother     Heart Disease Mother     Cirrhosis Father        Social History     Socioeconomic History    Marital status: Single     Spouse name: Not on file    Number of children: Not on file    Years of education: Not on file    Highest education level: Not on file   Occupational History     Employer: [employer]   Tobacco Use    Smoking status: Former Smoker     Packs/day: 0.25     Years: 36.00     Pack years: 9.00     Types: Cigarettes     Quit date: 10/30/2020     Years since quittin.7    Smokeless tobacco: Never Used    Tobacco comment: pt currently using nicotine patches   5 CIGARETTES A DAY   Vaping Use    Vaping Use: Never used   Substance and Sexual Activity    Alcohol use: No     Alcohol/week: 0.0 standard drinks    Drug use: No     Comment: 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 7 yrs    Sexual activity: Never   Other Topics Concern    Not on file   Social History Narrative    10/9/20 Patient keeping contact with others to a minimum due to COVID 19 Pandemic.     10/9/20 Patient has difficulty with ambulation due to DJD, stenosis and fibromyalgia     Social Determinants of Health     Financial Resource Strain: Low Risk     Difficulty of Paying Living Expenses: Not very hard   Food Insecurity: No Food Insecurity    Worried About Running Out of Food in the Last Year: Never true    Santosh of Food in the Last Year: Never true   Transportation Needs: No Transportation Needs    Lack of 5-325 MG per tablet (Start on 8/6/2021)    Medication monitoring encounter - Primary    Chronic low back pain    Relevant Medications    HYDROcodone-acetaminophen (NORCO) 5-325 MG per tablet (Start on 8/6/2021)    Other spondylosis with radiculopathy, lumbar region    Relevant Medications    HYDROcodone-acetaminophen (NORCO) 5-325 MG per tablet (Start on 8/6/2021)    Postlaminectomy syndrome, lumbar region             Treatment Plan:  Patient relates current medications are helping the pain. Patient reports taking pain medications as prescribed, denies obtaining medications from different sources and denies use of illegal drugs. Patient denies side effects from medications like nausea, vomiting, constipation or drowsiness. Patient reports current activities of daily living are possible due to medications and would like to continue them. As always, we encourage daily stretching and strengthening exercises, and recommend minimizing use of pain medications unless patient cannot get through daily activities due to pain. Contract requirements met. Continue opioid therapy. Script written for norco  Follow up appointment made for 4 weeks    Sharri Flor, was evaluated through a synchronous (real-time) audio-video encounter. The patient (or guardian if applicable) is aware that this is a billable service. Verbal consent to proceed has been obtained within the past 12 months. The visit was conducted pursuant to the emergency declaration under the [address] [place] [address], [address] [place] [address] authority and the [place] Act. Patient identification was verified, and a caregiver was present when appropriate. The patient was located in a state where the provider was credentialed to provide care.     Total time spent for this encounter: 15 minutes    --ELVIRA Haley CNP on 8/3/2021 at 12:08 PM    An electronic signature was used to authenticate this note.

## 2021-08-25 ENCOUNTER — TELEPHONE (OUTPATIENT)
Dept: INTERNAL MEDICINE | Age: 61
End: 2021-08-25

## 2021-08-25 RX ORDER — GABAPENTIN 300 MG/1
CAPSULE ORAL
Qty: 120 CAPSULE | Refills: 0 | Status: SHIPPED | OUTPATIENT
Start: 2021-08-25 | End: 2021-09-16 | Stop reason: SDUPTHER

## 2021-08-25 NOTE — TELEPHONE ENCOUNTER
done
CBC With Auto Differential Once 10/02/2021   Rheumatoid Factor Once 96/56/9402   Cyclic Citrul Peptide Antibody, IgG Once 09/25/2021   Uric Acid Once 09/25/2021         Patient Active Problem List:     DJD (degenerative joint disease) of knee     Osteoarthritis of spine with radiculopathy, lumbar region     GERD (gastroesophageal reflux disease)     COPD, severity to be determined ([address]     HTN (hypertension)     Allergic rhinitis     Lipoma of shoulder s/p excision right posterior 11 17 2008     History of tobacco use     DM (diabetes mellitus)     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 9)     Bronchiectasis ([address]     Centrilobular emphysema ([address]     Oxygen dependent     Acute postoperative anemia due to expected blood loss     Multifocal pneumonia     Acute on chronic respiratory failure with hypoxia (HCC)     Pulmonary function studies abnormal     Tobacco dependence     Idiopathic sleep related nonobstructive alveolar hypoventilation     Postlaminectomy syndrome, lumbar region     Trigger finger of right thumb

## 2021-09-01 ENCOUNTER — HOSPITAL ENCOUNTER (OUTPATIENT)
Dept: PAIN MANAGEMENT | Age: 61
Discharge: HOME | End: 2021-09-01
Payer: COMMERCIAL

## 2021-09-01 DIAGNOSIS — G89.29 CHRONIC MIDLINE LOW BACK PAIN WITH SCIATICA, SCIATICA LATERALITY UNSPECIFIED: ICD-10-CM

## 2021-09-01 DIAGNOSIS — M96.1 POSTLAMINECTOMY SYNDROME, LUMBAR REGION: ICD-10-CM

## 2021-09-01 DIAGNOSIS — M47.816 SPONDYLOSIS OF LUMBAR REGION WITHOUT MYELOPATHY OR RADICULOPATHY: ICD-10-CM

## 2021-09-01 DIAGNOSIS — Z51.81 MEDICATION MONITORING ENCOUNTER: ICD-10-CM

## 2021-09-01 DIAGNOSIS — M54.40 CHRONIC MIDLINE LOW BACK PAIN WITH SCIATICA, SCIATICA LATERALITY UNSPECIFIED: ICD-10-CM

## 2021-09-01 DIAGNOSIS — M17.0 PRIMARY OSTEOARTHRITIS OF BOTH KNEES: Primary | ICD-10-CM

## 2021-09-01 DIAGNOSIS — M51.9 LUMBAR DISC DISEASE: ICD-10-CM

## 2021-09-01 DIAGNOSIS — M94.261 CHONDROMALACIA OF MEDIAL CONDYLE OF RIGHT FEMUR: ICD-10-CM

## 2021-09-01 PROCEDURE — 99213 OFFICE O/P EST LOW 20 MIN: CPT

## 2021-09-01 PROCEDURE — 99214 OFFICE O/P EST MOD 30 MIN: CPT | Performed by: PAIN MEDICINE

## 2021-09-01 RX ORDER — HYDROCODONE BITARTRATE AND ACETAMINOPHEN 5; 325 MG/1; MG/1
1 TABLET ORAL EVERY 8 HOURS PRN
Qty: 84 TABLET | Refills: 0 | Status: SHIPPED | OUTPATIENT
Start: 2021-09-03 | End: 2021-09-28 | Stop reason: SDUPTHER

## 2021-09-01 RX ORDER — HYDROCODONE BITARTRATE AND ACETAMINOPHEN 5; 325 MG/1; MG/1
1 TABLET ORAL EVERY 8 HOURS PRN
Qty: 84 TABLET | Refills: 0 | Status: CANCELLED | OUTPATIENT
Start: 2021-09-03 | End: 2021-10-01

## 2021-09-01 ASSESSMENT — ENCOUNTER SYMPTOMS
CONSTIPATION: 0
VOMITING: 0
BOWEL INCONTINENCE: 0
SHORTNESS OF BREATH: 0
BACK PAIN: 1
EYE REDNESS: 0
VOICE CHANGE: 0
PHOTOPHOBIA: 0
COUGH: 0
ALLERGIC/IMMUNOLOGIC NEGATIVE: 1
WHEEZING: 0
NAUSEA: 0
ABDOMINAL PAIN: 0
COLOR CHANGE: 0

## 2021-09-01 NOTE — PROGRESS NOTES
Yuridia Mendez is a 59 y.o. female evaluated on 9/1/2021. Modality of virtual service provided -via telephone   Consent:  Patient and/or health care decision maker is aware that that patient may receive a bill for this telephone service, depending on one's insurance coverage, and has provided verbal consent to proceed: Yes    Patient identification was verified at the start of the visit: Yes    Chief complaint: Yuridia Mendez is 59 y.o., [place] American female, with  with chief complaint of pain involving low back. .    Patient is complaining of pain involving the low back area with pain radiating to both lower extremities. Patient also has a severe osteoarthritis in the knees bilaterally. Patient reports she is going to see a spine surgeon in the near future. She had a fusion done and on 1/19/2020 the lumbar region. Patient is having considerable problems. She reports she is doing home exercises and she is going to check with her primary care physician regarding referral to physical therapy and Occupational Therapy. Overall her back pain is unchanged from her previous visit. Back Pain  This is a chronic problem. The current episode started more than 1 year ago. The problem occurs constantly. The problem has been gradually worsening since onset. The pain is present in the lumbar spine, sacro-iliac and gluteal. The quality of the pain is described as aching, burning and shooting (sharp, throbbing). The pain radiates to the right foot, right thigh and right knee. The pain is at a severity of 7/10 (6-10). The pain is severe. The pain is worse during the day. The symptoms are aggravated by standing, sitting, position and bending (walking, lifting , ADLs). Pertinent negatives include no abdominal pain, bladder incontinence, bowel incontinence, chest pain, dysuria, fever, numbness, paresis, tingling or weakness. Risk factors include lack of exercise and sedentary lifestyle (smoking).  She has tried ice for the symptoms. Alleviating factors:heat, rest and topical creams OTC muscle rubs  Lifestyle changes experienced with pain: Prevents or limits ADLs, Increases w/activity.  , Increases w/prolonged sitting/standing/walking  Mood changes,none  Patient currently unemployed. Physical therapy did help the pain. Are you under psychological counseling at present: Yes  Goals for treatment include:  Decrease in pain  Enjoy daily and recreational activities, return to previous status. Patient relates current medications are helping the pain. Patient reports taking pain medications as prescribed, denies obtaining medications from different sources and denies use of illegal drugs. Patient denies side effects from medications like nausea, vomiting, constipation or drowsiness. Patient reports current activities of daily living ar possible due to medications and would like to continue them. ACTIVITY/SOCIAL/EMOTIONAL:  Sleep Pattern: 6 hours per night. generally restful sleep  Home Exercises:  walking and Stretching and strengthening  Activity:unchanged  Emotional Issues: anxiety/ nervousness.    Currently seeing a Psychiatrist or Psychologist:  Yes     ADVERSE MEDICATION EFFECTS:   Nausea and vomiting: no   Constipation: no-Undercontrol-: yes  Dizziness/drowsy/sleepy--no  Urinary Retention: no    ABERRANT BEHAVIORS SINCE LAST VISIT  Lost rx/pills:------------------------------------------ no  Taking  medication as prescribed: ----------- yes  Urine Drug Screen ---------------------------------  yes             Date-----------------------------------------------3/16/2021              Results as expected ---------------------yes    Recent ER visits: -------------------------------------No  Pill count is appropriate: ---------------------------yes   Refills for prescriptions appropriate:---------- yes      Past Medical History:   Diagnosis Date    Allergic rhinitis     Alveolar hypoventilation 11/20/2020    Aortic insufficiency 2018    mild-moderate on echo (was seen and discharged from cardiology-[facility])    Bronchiectasis ([facility] 11/20/2020    Bronchitis     Chronic back pain     [facility]. COPD (chronic obstructive pulmonary disease) ([facility]     Dr. Reid Arellano to see 11/09/2020    Depression     DM (diabetes mellitus) ([facility] 12/18/2012    GERD (gastroesophageal reflux disease)     History of echocardiogram 05/2018    EF 65%, mild-moderate AI and TR    Hyperlipidemia     Dr. Marcel Alvarez Hypertension     Dr. Marcel Alvarez Obesity     Osteoarthritis     Peripheral vascular disease ([facility]     Primary osteoarthritis of both knees     Radicular pain of lumbosacral region     Spinal stenosis, lumbar region, without neurogenic claudication 04/30/2013    Tricuspid regurgitation 2018    mild-moderate on echo    Type II or unspecified type diabetes mellitus without mention of complication, not stated as uncontrolled     Dr. Marcel Alvarez Unspecified sleep apnea     no cpap used anymore    URI (upper respiratory infection)     Wears dentures     upper and lower full dentures    Wears glasses     Wellness examination     Dr. René Bangura -PCP last visit in early Oct. 2020       Past Surgical History:   Procedure Laterality Date    COLONOSCOPY  2/12/2009    normal    DILATION AND CURETTAGE OF UTERUS      GASTRECTOMY      partial    LUMBAR DISCECTOMY  01/2015    lumbar diskectomy    LUMBAR FUSION  11/19/2020     POSTERIOR FUSION L4/5,     LUMBAR FUSION N/A 11/19/2020    POSTERIOR FUSION L4/5, [employer], Uma Moreno, EVOKES #[id number] AMINA performed by Nneka Thorpe DO at [address] Right 4/30/13    Lumbar Diagnostic Block,  Kenalog 40 mg    NERVE BLOCK  5/23/13    Lumbar Radiofrequency, Kenalog 40mg    NERVE BLOCK  8/12/13    Lt MBNB  celestone 6mg    NERVE BLOCK Left 8-28-13    left lumbar diagnostic block #2 decadron 10 mg    NERVE BLOCK Left 13    left lumbar median branch radiofrequency    NERVE BLOCK  14    caudal, celestone 9 mg    NERVE BLOCK  14    caudal epidural #2, celestone 9mg, fentanyl 25mcg    NERVE BLOCK  14    caudal #3 decadron 10mg    NERVE BLOCK  14    duramorph epidural steroid block  duramorph 1 mg celestone 9 mg    NERVE BLOCK  11/20/15    TENS- Empi Select    NERVE BLOCK  2018    right transforminal # 1 decadron 10mg,isovue    NERVE BLOCK Bilateral 2019    bilat mbnb- no steroid    NERVE BLOCK Bilateral 2019    bilat mbnb, marcaine . 25%    FL KNEE SCOPE,DIAGNOSTIC Right 3/24/2017    KNEE ARTHROSCOPY WITH PARTIAL MEDIAL MENISECAL DEBRIDMENT  performed by [facility]see Hendrickson [facility] at [facility]   9 2007    UPPER GASTROINTESTINAL ENDOSCOPY  2009,2011    gastritis, esophagitis       Family History   Problem Relation Age of Onset    Diabetes Mother     Heart Disease Mother     Cirrhosis Father        Social History     Socioeconomic History    Marital status: Single     Spouse name: Not on file    Number of children: Not on file    Years of education: Not on file    Highest education level: Not on file   Occupational History     Employer: DISABLED   Tobacco Use    Smoking status: Former Smoker     Packs/day: 0.25     Years: 36.00     Pack years: 9.00     Types: Cigarettes     Quit date: 10/30/2020     Years since quittin.8    Smokeless tobacco: Never Used    Tobacco comment: pt currently using nicotine patches   5 CIGARETTES A DAY   Vaping Use    Vaping Use: Never used   Substance and Sexual Activity    Alcohol use: No     Alcohol/week: 0.0 standard drinks    Drug use: No     Comment: 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 7 yrs    Sexual activity: Never   Other Topics Concern    Not on file   Social History Narrative    10/9/20 Patient keeping contact with others to a minimum due to Doniport 19 Pandemic.     10/9/20 Patient has difficulty with ambulation due to DJD, stenosis and fibromyalgia     Social Determinants of Health     Financial Resource Strain: Low Risk     Difficulty of Paying Living Expenses: Not very hard   Food Insecurity: No Food Insecurity    Worried About Running Out of Food in the Last Year: Never true    Santosh of Food in the Last Year: Never true   Transportation Needs: No Transportation Needs    Lack of Transportation (Medical): No    Lack of Transportation (Non-Medical): No   Physical Activity: Inactive    Days of Exercise per Week: 0 days    Minutes of Exercise per Session: 0 min   Stress: No Stress Concern Present    Feeling of Stress : Only a little   Social Connections: Moderately Isolated    Frequency of Communication with Friends and Family: More than three times a week    Frequency of Social Gatherings with Friends and Family: Once a week    Attends [employer] Services: More than 4 times per year    Active Member of Re-APP Group or Organizations: No    Attends Club or Organization Meetings: Never    Marital Status: Never    Intimate Partner Violence:     Fear of Current or Ex-Partner:     Emotionally Abused:     Physically Abused:     Sexually Abused:         Allergies   Allergen Reactions    Aspirin      DUE TO ULCER    Claritin [Loratadine] Other (See Comments)     coughing    Flonase [Fluticasone Propionate]     Morphine And Related      GI Upset       Current Outpatient Medications on File Prior to Encounter   Medication Sig Dispense Refill    gabapentin (NEURONTIN) 300 MG capsule TAKE 1 CAPSULE IN MORNING AND AFTERNOON AND 2 CAPSULES AT NIGHT 120 capsule 0    docusate sodium (COLACE) 100 MG capsule TAKE 1 CAPSULE BY MOUTH EVERY DAY 30 capsule 9    tiZANidine (ZANAFLEX) 4 MG tablet Take 1 tablet by mouth 2 times daily 60 tablet 2    meloxicam (MOBIC) 7.5 MG tablet Take 1 tablet by mouth daily 30 tablet 2    Umeclidinium Bromide 62.5 MCG/INH AEPB Inhale 1 puff into the lungs daily 2 each 5    atorvastatin (LIPITOR) 40 MG tablet Take 1 tablet by mouth daily 90 tablet 1    nicotine (NICODERM CQ) 14 MG/24HR Place 1 patch onto the skin daily for 14 days 14 patch 1    trospium (SANCTURA) 20 MG tablet TAKE 1 TABLET BY MOUTH 2 TIMES DAILY 60 tablet 0    cetirizine (ZYRTEC) 10 MG tablet TAKE 1 TABLET BY MOUTH DAILY 30 tablet 5    lisinopril-hydroCHLOROthiazide (PRINZIDE;ZESTORETIC) 20-25 MG per tablet TAKE 1 TABLET EVERY DAY 90 tablet 2    Lancets MISC 1 each by Does not apply route 2 times daily Use 2/day 100 each 11    omeprazole (PRILOSEC) 20 MG delayed release capsule TAKE 1 CAPSULE BY MOUTH EVERY DAY 30 capsule 3    SPIRIVA HANDIHALER 18 MCG inhalation capsule Inhale 1 capsule into the lungs daily 30 capsule 3    azelastine (ASTELIN) 0.1 % nasal spray 2 sprays by Nasal route 2 times daily Use in each nostril as directed 1 Bottle 0    amLODIPine (NORVASC) 10 MG tablet TAKE 1 TABLET BY MOUTH DAILY 90 tablet 2    potassium chloride (KLOR-CON M) 10 MEQ extended release tablet TAKE 2 TABLETS BY MOUTH DAILY 60 tablet 2    fluticasone-salmeterol (ADVAIR DISKUS) 250-50 MCG/DOSE AEPB Inhale 1 puff into the lungs every 12 hours 60 each 3    lactobacillus (CULTURELLE) capsule Take 1 capsule by mouth daily (with breakfast) 30 capsule 0    carvedilol (COREG) 6.25 MG tablet TAKE 1 TABLET BY MOUTH 2 TIMES DAILY 180 tablet 0    blood glucose test strips (EXACTECH TEST) strip 1 each by In Vitro route daily As needed.  50 each 11    albuterol sulfate HFA (VENTOLIN HFA) 108 (90 Base) MCG/ACT inhaler Inhale 2 puffs into the lungs every 6 hours as needed for Wheezing 1 Inhaler 3    diphenhydrAMINE (BENADRYL) 25 MG tablet Take 25 mg by mouth every 6 hours as needed for Itching      acetaminophen (TYLENOL) 500 MG tablet Take 1 tablet by mouth 4 times daily as needed for Pain 30 tablet 0    mirtazapine (REMERON) 30 MG tablet Take 30 mg by mouth nightly      mirtazapine (REMERON) 15 MG tablet Take 15 mg by mouth nightly      DULoxetine (CYMBALTA) 60 MG extended release capsule Take 1 capsule by mouth daily 30 capsule 3    ipratropium-albuterol (DUONEB) 0.5-2.5 (3) MG/3ML SOLN nebulizer solution Inhale 3 mLs into the lungs every 6 hours as needed for Shortness of Breath 360 mL 11     No current facility-administered medications on file prior to encounter. Review of Systems   Constitutional: Negative. Negative for activity change, appetite change, chills, fatigue, fever and unexpected weight change. HENT: Negative for congestion, ear pain, mouth sores and voice change. Eyes: Negative for photophobia, redness and visual disturbance. Respiratory: Negative for cough, shortness of breath and wheezing. Cardiovascular: Negative for chest pain and palpitations. Gastrointestinal: Negative for abdominal pain, bowel incontinence, constipation, nausea and vomiting. Endocrine: Negative for cold intolerance, heat intolerance and polyuria. Genitourinary: Negative for bladder incontinence, dysuria and hematuria. Musculoskeletal: Positive for arthralgias and back pain. Skin: Negative for color change and wound. Allergic/Immunologic: Negative. Neurological: Negative for tingling, weakness and numbness. Hematological: Does not bruise/bleed easily. Psychiatric/Behavioral: Negative for self-injury, sleep disturbance and suicidal ideas. The patient is not nervous/anxious and is not hyperactive. Patient denies any change in the review of systems since her last visit  Physical Exam  Skin:         Neurological:      Mental Status: She is alert and oriented to person, place, and time.    Psychiatric:         Mood and Affect: Mood normal.        Ortho Exam     DATA:  LAB.:  3/16/2021  9:54 PM - Juan, [facility] Incoming Lab Results From [employer]    Component Value Ref Range & Units Status Collected Lab   Pain Management Drug Panel Interp, Urine Inconsistent   Final 03/11/2021  1:53 PM [facility]   (NOTE) ________________________________________________________________   DRUGS EXPECTED:   NORCO (HYDROCODONE) [TODAY]   ________________________________________________________________   CONSISTENT with medications provided:   [address] (HYDROCODONE) : based on hydrocodone, norhydrocodone,   hydromorphone   ________________________________________________________________   INCONSISTENT with medications provided:   Gabapentin        X-Ray reports:  EXAMINATION:   MRI OF THE LUMBAR SPINE WITHOUT AND WITH CONTRAST  6/10/2019 12:04 pm       TECHNIQUE:   Multiplanar multisequence MRI of the lumbar spine was performed without and   with the administration of intravenous contrast.       COMPARISON:   06/12/2014       HISTORY:   ORDERING SYSTEM PROVIDED HISTORY: Postlaminectomy syndrome, lumbar region   TECHNOLOGIST PROVIDED HISTORY:   Pain, previous back surgery   Ordering Physician Provided Reason for Exam: chronic low back pain   Acuity: Chronic   Type of Exam: Subsequent/Follow-up   Additional signs and symptoms: right leg pain and numbness to r foot   Relevant Medical/Surgical History: injury in 2006       FINDINGS:   BONES/ALIGNMENT: Grade 1 anterolisthesis of L4 on L5 measures 3 mm. Lumbar   vertebral bodies are normal in height. No acute lumbar spine fracture. Minimal bone marrow edema about L4-L5. Remaining marrow signal pattern is   within normal limits. SPINAL CORD:  The conus terminates normally. SOFT TISSUES: No abnormal enhancement is seen of the lumbar spine. No   paraspinal mass identified. L1-L2: Mild DDD. Posterior disc bulge measures 3 mm with central annular   fissure. No significant spinal canal stenosis or significant neural   foraminal stenosis. L2-L3: Mild DDD. Disc bulge eccentric to the left measures 5 mm. Effacement   of the left ventral thecal sac. Mild spinal canal stenosis. Bilateral facet   joint DJD and ligamentum flavum thickening.   Mild left neural foraminal stenosis. L3-L4: Mild DDD. Disc bulge measures 4 mm. Bilateral facet joint DJD. Mild   spinal canal stenosis. Mild left neural foraminal stenosis. L4-L5: Moderate DDD. Status post right hemilaminectomy. Uncovered disc   material secondary to anterolisthesis of L4 on L5. Superimposed diffuse disc   bulge measures 3 mm. No significant spinal canal stenosis. Flattening of   the ventral thecal sac. Severe right neural foraminal stenosis with   compression of the exiting right L4 nerve root. Mild left neural foraminal   stenosis. L5-S1: Mild DDD. Central/left paracentral disc bulge measures 3 mm. Bilateral facet joint DJD. Disc bulge contacts the traversing left S1 nerve   root in the lateral recess without sally nerve root compression. Mild   bilateral neural foraminal stenosis. Impression   1. Status post interval right hemilaminectomy at L4-L5. Severe right neural   foraminal stenosis at this level with compression of the exiting right L4   nerve root. 2. At L5-S1, central/left paracentral disc bulge contacts the traversing left   S1 nerve root in the lateral recess. 3. Multilevel mild bilateral neural foraminal stenosis, as detailed above. Clinical  impression:  1. Primary osteoarthritis of both knees    2. Lumbar disc disease    3. Postlaminectomy syndrome, lumbar region    4. Spondylosis of lumbar region without myelopathy or radiculopathy    5. 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 Medication monitoring encounter    7. Chronic midline low back pain with sciatica, sciatica laterality unspecified        Plan of care: We will continue current pain medications  Current medications are being tolerated without any Adverse side effects. Orders Placed This Encounter   Medications    HYDROcodone-acetaminophen (NORCO) 5-325 MG per tablet     Sig: Take 1 tablet by mouth every 8 hours as needed for Pain for up to 28 days.  Early refill due to holidays     Dispense:  84 tablet     Refill:  0     Reduce doses taken as pain becomes manageable     Urine drug screens have been appropriate. No aberrant activity noted. Analgesia is achieved. Activities of daily living are possible because of medications. Safe use of medications explained to patient. PDMP Monitoring:    Last PDMP Sandra Cornejo as Reviewed [facility]):  Review User Review Instant Review Result   Marianna Walker 9/1/2021  5:43 AM Reviewed PDMP [1]     Counselling/Preventive measures for pain  Control:    [x]  Spine strengthening exercises are discussed with patient in detail. [x] Ill effects of being on chronic pain medications such as sleep disturbances, hormonal changes, withdrawal symptoms,  chronic opioid dependence and tolerance were discussed with patient. I had asked the patient to minimize medication use and utilize pain medications only for uncontrolled rest pain or pain with exertional activities. I advised patient not to self escalate pain medications without consulting with us. At each of patient's future visits we will try to taper pain medications, while adjusting the adjunct medications, and re-evaluating for Physical Therapy to improve spinal and joint strength. We will continue to have discussions to decrease pain medications as tolerated. I also discussed with the patient regarding the dangers of combining narcotic pain medication with tranquilizers, alcohol or illegal drugs or taking the medication any other than prescribed. The dangers including the respiratory depression and death. Patient was told to tell  to all  physicians regarding the medications he is getting from pain clinic. Patient is warned not to take any unprescribed medications over-the-counter medications that can depress breathing . Patient is advised to talk to the pharmacist or physicians if planning to take any over-the-counter medications before  takeing them.  Patient is strongly advised to avoid National Emergencies Act, [facility] waiver authority and the [employer] Act, this Virtual Visit was conducted, with patient's consent, to reduce the patient's risk of exposure to COVID-19 and provide continuity of care for an established patient. Services were provided through a video synchronous discussion virtually to substitute for in-person appointment. \"  Documentation:  I communicated with the patient and/or health care decision maker about plan of care  Details of this discussion including any medical advice provided: Total Time: minutes: 21-30 minutes    I affirm this is a Patient Initiated Episode with an Established Patient who has not had a related appointment within my department in the past 7 days or scheduled within the next 24 hours.     Electronically signed by Roberto Peacock MD on 9/1/2021 at 6:10 PM

## 2021-09-13 DIAGNOSIS — I10 ESSENTIAL HYPERTENSION: ICD-10-CM

## 2021-09-13 RX ORDER — AMLODIPINE BESYLATE 10 MG/1
10 TABLET ORAL DAILY
Qty: 90 TABLET | Refills: 1 | Status: SHIPPED | OUTPATIENT
Start: 2021-09-13 | End: 2022-04-26

## 2021-09-13 NOTE — TELEPHONE ENCOUNTER
Request for Norvasc . Next Visit Date:  Future Appointments   Date Time Provider Claudy Schaeffer   9/28/2021 11:40 AM Chanel Zuniga, APRN - CNP [facility] [id number]   10/11/2021  9:30 AM Nile Kayser, APRN - CNP [place] [facility]   10/26/2021  2:30 PM Charity Townsend MD [facility] IM [address] Maintenance   Topic Date Due    Cervical cancer screen  Never done    Diabetic microalbuminuria test  04/21/2021    Lipid screen  04/21/2021    Diabetic retinal exam  05/14/2021    Flu vaccine (1) 09/01/2021    Annual Wellness Visit (AWV)  09/30/2021    Shingles Vaccine (2 of 2) 09/22/2021 (Originally 8/19/2019)    Potassium monitoring  12/02/2021    Creatinine monitoring  12/02/2021    Diabetic foot exam  03/02/2022    A1C test (Diabetic or Prediabetic)  03/02/2022    Breast cancer screen  04/27/2023    Pneumococcal 0-64 years Vaccine (2 of 2 - PPSV23) 07/08/2023    Colon cancer screen fecal DNA test (Cologuard)  10/05/2023    DTaP/Tdap/Td vaccine (2 - Td or Tdap) 06/24/2029    COVID-19 Vaccine  Completed    Hepatitis C screen  Completed    HIV screen  Completed    Hepatitis A vaccine  Aged Out    Hib vaccine  Aged Out    Meningococcal (ACWY) vaccine  Aged Out       Hemoglobin A1C (%)   Date Value   03/02/2021 5.5   04/21/2020 5.8   03/05/2019 5.8             ( goal A1C is < 7)   Microalb/Crt.  Ratio (mcg/mg creat)   Date Value   04/21/2020 CANNOT BE CALCULATED     LDL Cholesterol (mg/dL)   Date Value   04/21/2020 116       (goal LDL is <100)   AST (U/L)   Date Value   04/21/2020 18     ALT (U/L)   Date Value   04/21/2020 21     BUN (mg/dL)   Date Value   12/02/2020 15     BP Readings from Last 3 Encounters:   06/24/21 130/86   06/09/21 138/77   03/10/21 136/88          (goal 120/80)    All Future Testing planned in [facility]  Lab Frequency Next Occurrence   XR LUMBAR SPINE (2-3 VIEWS) Once 10/08/2021   Microalbumin / Creatinine Urine Ratio Once 09/24/2021   Lipid Panel Once 10/02/2021   CBC With Auto Differential Once 10/02/2021   Rheumatoid Factor Once 10/50/4574   Cyclic Citrul Peptide Antibody, IgG Once 09/25/2021   Uric Acid Once 09/25/2021         Patient Active Problem List:     DJD (degenerative joint disease) of knee     Osteoarthritis of spine with radiculopathy, lumbar region     GERD (gastroesophageal reflux disease)     COPD, severity to be determined ([address]     HTN (hypertension)     Allergic rhinitis     Lipoma of shoulder s/p excision right posterior 11 17 2008     History of tobacco use     DM (diabetes mellitus)     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 9)     Bronchiectasis ([address]     Centrilobular emphysema ([address]     Oxygen dependent     Acute postoperative anemia due to expected blood loss     Multifocal pneumonia     Acute on chronic respiratory failure with hypoxia (HCC)     Pulmonary function studies abnormal     Tobacco dependence     Idiopathic sleep related nonobstructive alveolar hypoventilation     Postlaminectomy syndrome, lumbar region     Trigger finger of right thumb

## 2021-09-16 RX ORDER — GABAPENTIN 300 MG/1
CAPSULE ORAL
Qty: 120 CAPSULE | Refills: 0 | Status: SHIPPED | OUTPATIENT
Start: 2021-09-16 | End: 2021-10-26 | Stop reason: SDUPTHER

## 2021-09-16 NOTE — TELEPHONE ENCOUNTER
Request for medication refill, gabapentin      Next Visit Date: 10/26/21  Future Appointments   Date Time Provider Claudy Laury   9/28/2021 11:40 AM ELVIRA Bhakta CNP [facility] [id number]   10/11/2021  9:30 AM ELVIRA Acuna CNP [facility]   10/26/2021  2:30 PM Valentino Nguyen MD [facility] IM [address] Maintenance   Topic Date Due    Diabetic microalbuminuria test  04/21/2021    Lipid screen  04/21/2021    Diabetic retinal exam  05/14/2021    Flu vaccine (1) 09/01/2021    Annual Wellness Visit (AWV)  09/30/2021    Shingles Vaccine (2 of 2) 09/22/2021 (Originally 8/19/2019)    Potassium monitoring  12/02/2021    Creatinine monitoring  12/02/2021    Diabetic foot exam  03/02/2022    A1C test (Diabetic or Prediabetic)  03/02/2022    Breast cancer screen  04/27/2023    Pneumococcal 0-64 years Vaccine (2 of 2 - PPSV23) 07/08/2023    Colon cancer screen fecal DNA test (Cologuard)  10/05/2023    Cervical cancer screen  03/02/2026    DTaP/Tdap/Td vaccine (2 - Td or Tdap) 06/24/2029    COVID-19 Vaccine  Completed    Hepatitis C screen  Completed    HIV screen  Completed    Hepatitis A vaccine  Aged Out    Hib vaccine  Aged Out    Meningococcal (ACWY) vaccine  Aged Out       Hemoglobin A1C (%)   Date Value   03/02/2021 5.5   04/21/2020 5.8   03/05/2019 5.8             ( goal A1C is < 7)   Microalb/Crt.  Ratio (mcg/mg creat)   Date Value   04/21/2020 CANNOT BE CALCULATED     LDL Cholesterol (mg/dL)   Date Value   04/21/2020 116       (goal LDL is <100)   AST (U/L)   Date Value   04/21/2020 18     ALT (U/L)   Date Value   04/21/2020 21     BUN (mg/dL)   Date Value   12/02/2020 15     BP Readings from Last 3 Encounters:   06/24/21 130/86   06/09/21 138/77   03/10/21 136/88          (goal 120/80)    All Future Testing planned in [facility]  Lab Frequency Next Occurrence   XR LUMBAR SPINE (2-3 VIEWS) Once 10/08/2021   Microalbumin / Creatinine Urine Ratio Once 09/24/2021   Lipid Panel Once 10/02/2021   CBC With Auto Differential Once 10/02/2021   Rheumatoid Factor Once 01/10/8794   Cyclic Citrul Peptide Antibody, IgG Once 09/25/2021   Uric Acid Once 09/25/2021         Patient Active Problem List:     DJD (degenerative joint disease) of knee     Osteoarthritis of spine with radiculopathy, lumbar region     GERD (gastroesophageal reflux disease)     COPD, severity to be determined ([address]     HTN (hypertension)     Allergic rhinitis     Lipoma of shoulder s/p excision right posterior 11 17 2008     History of tobacco use     DM (diabetes mellitus)     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 9)     Bronchiectasis ([address]     Centrilobular emphysema ([address]     Oxygen dependent     Acute postoperative anemia due to expected blood loss     Multifocal pneumonia     Acute on chronic respiratory failure with hypoxia (HCC)     Pulmonary function studies abnormal     Tobacco dependence     Idiopathic sleep related nonobstructive alveolar hypoventilation     Postlaminectomy syndrome, lumbar region     Trigger finger of right thumb

## 2021-09-28 ENCOUNTER — HOSPITAL ENCOUNTER (OUTPATIENT)
Dept: PAIN MANAGEMENT | Age: 61
Discharge: HOME | End: 2021-09-28
Payer: COMMERCIAL

## 2021-09-28 DIAGNOSIS — G89.29 CHRONIC MIDLINE LOW BACK PAIN WITH SCIATICA, SCIATICA LATERALITY UNSPECIFIED: ICD-10-CM

## 2021-09-28 DIAGNOSIS — M54.40 CHRONIC MIDLINE LOW BACK PAIN WITH SCIATICA, SCIATICA LATERALITY UNSPECIFIED: ICD-10-CM

## 2021-09-28 DIAGNOSIS — M47.26 OTHER SPONDYLOSIS WITH RADICULOPATHY, LUMBAR REGION: Primary | ICD-10-CM

## 2021-09-28 DIAGNOSIS — Z51.81 MEDICATION MONITORING ENCOUNTER: ICD-10-CM

## 2021-09-28 DIAGNOSIS — M17.0 PRIMARY OSTEOARTHRITIS OF BOTH KNEES: ICD-10-CM

## 2021-09-28 DIAGNOSIS — M96.1 POSTLAMINECTOMY SYNDROME, LUMBAR REGION: ICD-10-CM

## 2021-09-28 PROCEDURE — 99213 OFFICE O/P EST LOW 20 MIN: CPT

## 2021-09-28 PROCEDURE — 99213 OFFICE O/P EST LOW 20 MIN: CPT | Performed by: NURSE PRACTITIONER

## 2021-09-28 RX ORDER — TIZANIDINE 4 MG/1
4 TABLET ORAL 2 TIMES DAILY
Qty: 60 TABLET | Refills: 2 | Status: SHIPPED | OUTPATIENT
Start: 2021-09-28 | End: 2022-01-03 | Stop reason: SDUPTHER

## 2021-09-28 RX ORDER — MELOXICAM 7.5 MG/1
7.5 TABLET ORAL DAILY
Qty: 30 TABLET | Refills: 2 | Status: SHIPPED | OUTPATIENT
Start: 2021-09-28 | End: 2021-12-03 | Stop reason: SDUPTHER

## 2021-09-28 RX ORDER — HYDROCODONE BITARTRATE AND ACETAMINOPHEN 5; 325 MG/1; MG/1
1 TABLET ORAL EVERY 8 HOURS PRN
Qty: 90 TABLET | Refills: 0 | Status: SHIPPED | OUTPATIENT
Start: 2021-10-01 | End: 2021-11-01 | Stop reason: SDUPTHER

## 2021-09-28 ASSESSMENT — ENCOUNTER SYMPTOMS
CONSTIPATION: 0
COUGH: 0
SHORTNESS OF BREATH: 0
BACK PAIN: 1

## 2021-09-28 NOTE — PROGRESS NOTES
Patient completed a telephone visit today to review medication contract. Chief Complaint: back pain    [facility] Patient has had low back pain with no known injury and bilateral knee pain for many years. Maximus Badillo had a knee arthroscopy in March 2017 Genicular block was discussed but she declined.  She is s/p lumbar diskectomy in 2015 and lumbar fusion 11/2020. She is stable and compliant on norco 5/325 TID. Gabapentin prescribed by PCP for neuropathic pain in feet. Follows with Dr. Frank Abdalla for knees.      EMG was completed and shows Abnormal study of the right lower extremity and Normal study of the left lower extremity. Edi Bermudez is electrodiagnostic evidence of a very mild chronic right S1 radiculopathy without active denervation. Edi Bermudez is no electrodiagnostic evidence of a generalized large fiber peripheral Polyneuropathy. She continues physical therapy with moderate benefit. She states she is able to tolerate more activity. Back Pain  This is a chronic problem. The current episode started more than 1 year ago. The problem occurs constantly. The problem is unchanged. The pain is present in the lumbar spine. The quality of the pain is described as aching. Radiates to: down right leg to knee. The pain is at a severity of 7/10. The pain is moderate. The symptoms are aggravated by position, standing and sitting (walking). Pertinent negatives include no chest pain, fever, numbness, paresthesias or tingling. She has tried heat, analgesics and bed rest for the symptoms. The treatment provided mild relief. Patient denies any new neurological symptoms. No bowel or bladder incontinence, no weakness, and no falling.     Pill count: appropriate due 10/1    Morphine equivalent: 15    Periodic Controlled Substance Monitoring: Possible medication side effects, risk of tolerance/dependence & alternative treatments discussed., No signs of potential drug abuse or diversion identified., Obtaining appropriate analgesic effect of treatment.  Delores Finley, APRN - CNP)      Past Medical History:   Diagnosis Date    Allergic rhinitis     Alveolar hypoventilation 11/20/2020    Aortic insufficiency 2018    mild-moderate on echo (was seen and [facility])    Bron[facility]ctasis [facility] 11/20/2020    Bronchitis     Chronic back pain     [facility] at [facility]. COPD (chronic obstructive pulmonary disease) ([facility]     Dr. Rocha Fast to see 11/09/2020    Depression     DM [facility] 12/18/2012    GERD (gastroesophageal reflux disease)     History of echocardiogram 05/2018    EF 65%, mild-moderate AI and TR    Hyperlipidemia     Dr. Opal Couch Hypertension     Dr. Opal Couch Obesity     Osteoarthritis     Peripheral vascular disease ([facility]     Primary osteoarthritis of both knees     Radicular pain of lumbosacral region     Spinal stenosis, lumbar region, without neurogenic claudication 04/30/2013    Tricuspid regurgitation 2018    mild-moderate on echo    Type II or unspecified type diabetes mellitus without mention of complication, not stated as uncontrolled     Dr. Opal Couch Unspecified sleep apnea     no cpap used anymore    URI (upper respiratory infection)     Wears dentures     upper and lower full dentures    Wears glasses     Wellness examination     Dr. Andrew Sanders -PCP last visit in early Oct. 2020       Past Surgical History:   Procedure Laterality Date    COLONOSCOPY  2/12/2009    normal    DILATION AND CURETTAGE OF UTERUS      GASTRECTOMY      partial    LUMBAR DISCECTOMY  01/2015    lumbar diskectomy    LUMBAR FUSION  11/19/2020     POSTERIOR FUSION L4/5,     LUMBAR FUSION N/A 11/19/2020    POSTERIOR FUSION L4/5, [employer], Paola Demarco, [facility] #[id number] [facility] performed by Andre Lainez DO at [facility] 4/30/13    Lumbar Diagnostic Block,  Kenalog 40 mg    NERVE BLOCK  5/23/13    Lumbar Radiofrequency, Kenalog 40mg    NERVE BLOCK  8/12/13    Lt MBNB  celestone 6mg    NERVE BLOCK Left 8-28-13    left lumbar diagnostic block #2 decadron 10 mg    NERVE BLOCK Left 9-24-13    left lumbar median branch radiofrequency    NERVE BLOCK  07-02-14    caudal, celestone 9 mg    NERVE BLOCK  7-16-14    caudal epidural #2, celestone 9mg, fentanyl 25mcg    NERVE BLOCK  7/30/14    caudal #3 decadron 10mg    NERVE BLOCK  11-6-14    duramorph epidural steroid block  duramorph 1 mg celestone 9 mg    NERVE BLOCK  11/20/15    TENS- Empi Select    NERVE BLOCK  07/20/2018    right transforminal # 1 decadron 10mg,isovue    NERVE BLOCK Bilateral 02/01/2019    bilat mbnb- no steroid    NERVE BLOCK Bilateral 02/08/2019    bilat mbnb, marcaine . 25%    [place] KNEE SCOPE,DIAGNOSTIC Right 3/24/2017    KNEE ARTHROSCOPY WITH PARTIAL MEDIAL MENISECAL DEBRIDMENT  performed by Nahid Mendoza MD at [address]  9 20 2007    UPPER GASTROINTESTINAL ENDOSCOPY  4 21 2009,04/2011    gastritis, esophagitis       Allergies   Allergen Reactions    Aspirin      DUE TO ULCER    Claritin [Loratadine] Other (See Comments)     coughing    Flonase [Fluticasone Propionate]     Morphine And Related      GI Upset         Current Outpatient Medications:     gabapentin (NEURONTIN) 300 MG capsule, TAKE 1 CAPSULE IN MORNING AND AFTERNOON AND 2 CAPSULES AT NIGHT, Disp: 120 capsule, Rfl: 0    amLODIPine (NORVASC) 10 MG tablet, TAKE 1 TABLET BY MOUTH DAILY, Disp: 90 tablet, Rfl: 1    HYDROcodone-acetaminophen (NORCO) 5-325 MG per tablet, Take 1 tablet by mouth every 8 hours as needed for Pain for up to 28 days.  Early refill due to holidays, Disp: 84 tablet, Rfl: 0    docusate sodium (COLACE) 100 MG capsule, TAKE 1 CAPSULE BY MOUTH EVERY DAY, Disp: 30 capsule, Rfl: 9    tiZANidine (ZANAFLEX) 4 MG tablet, Take 1 tablet by mouth 2 times daily, Disp: 60 tablet, Rfl: 2    meloxicam (MOBIC) 7.5 MG tablet, Take 1 tablet by mouth daily, Disp: 30 tablet, Rfl: 2    Umeclidinium Bromide 62.5 MCG/INH AEPB, Inhale 1 puff into the lungs daily, Disp: 2 each, Rfl: 5    atorvastatin (LIPITOR) 40 MG tablet, Take 1 tablet by mouth daily, Disp: 90 tablet, Rfl: 1    nicotine (NICODERM CQ) 14 MG/24HR, Place 1 patch onto the skin daily for 14 days, Disp: 14 patch, Rfl: 1    trospium (SANCTURA) 20 MG tablet, TAKE 1 TABLET BY MOUTH 2 TIMES DAILY, Disp: 60 tablet, Rfl: 0    cetirizine (ZYRTEC) 10 MG tablet, TAKE 1 TABLET BY MOUTH DAILY, Disp: 30 tablet, Rfl: 5    lisinopril-hydroCHLOROthiazide (PRINZIDE;ZESTORETIC) 20-25 MG per tablet, TAKE 1 TABLET EVERY DAY, Disp: 90 tablet, Rfl: 2    Lancets MISC, 1 each by Does not apply route 2 times daily Use 2/day, Disp: 100 each, Rfl: 11    omeprazole (PRILOSEC) 20 MG delayed release capsule, TAKE 1 CAPSULE BY MOUTH EVERY DAY, Disp: 30 capsule, Rfl: 3    SPIRIVA HANDIHALER 18 MCG inhalation capsule, Inhale 1 capsule into the lungs daily, Disp: 30 capsule, Rfl: 3    azelastine (ASTELIN) 0.1 % nasal spray, 2 sprays by Nasal route 2 times daily Use in each nostril as directed, Disp: 1 Bottle, Rfl: 0    potassium chloride (KLOR-CON M) 10 MEQ extended release tablet, TAKE 2 TABLETS BY MOUTH DAILY, Disp: 60 tablet, Rfl: 2    fluticasone-salmeterol (ADVAIR DISKUS) 250-50 MCG/DOSE AEPB, Inhale 1 puff into the lungs every 12 hours, Disp: 60 each, Rfl: 3    lactobacillus (CULTURELLE) capsule, Take 1 capsule by mouth daily (with breakfast), Disp: 30 capsule, Rfl: 0    carvedilol (COREG) 6.25 MG tablet, TAKE 1 TABLET BY MOUTH 2 TIMES DAILY, Disp: 180 tablet, Rfl: 0    blood glucose test strips (EXACTECH TEST) strip, 1 each by In Vitro route daily As needed. , Disp: 50 each, Rfl: 11    albuterol sulfate HFA (VENTOLIN HFA) 108 (90 Base) MCG/ACT inhaler, Inhale 2 puffs into the lungs every 6 hours as needed for Wheezing, Disp: 1 Inhaler, Rfl: 3    diphenhydrAMINE (BENADRYL) 25 MG tablet, Take 25 mg by mouth every 6 hours as needed for Itching, Disp: , Rfl:     acetaminophen (TYLENOL) 500 MG tablet, Take 1 tablet by mouth 4 times daily as needed for Pain, Disp: 30 tablet, Rfl: 0    mirtazapine (REMERON) 30 MG tablet, Take 30 mg by mouth nightly, Disp: , Rfl:     mirtazapine (REMERON) 15 MG tablet, Take 15 mg by mouth nightly, Disp: , Rfl:     DULoxetine (CYMBALTA) 60 MG extended release capsule, Take 1 capsule by mouth daily, Disp: 30 capsule, Rfl: 3    ipratropium-albuterol (DUONEB) 0.5-2.5 (3) MG/3ML SOLN nebulizer solution, Inhale 3 mLs into the lungs every 6 hours as needed for Shortness of Breath, Disp: 360 mL, Rfl: 11    Family History   Problem Relation Age of Onset    Diabetes Mother     Heart Disease Mother     Cirrhosis Father        Social History     Socioeconomic History    Marital status: Single     Spouse name: Not on file    Number of children: Not on file    Years of education: Not on file    Highest education level: Not on file   Occupational History     Employer: DISABLED   Tobacco Use    Smoking status: Former Smoker     Packs/day: 0.25     Years: 36.00     Pack years: 9.00     Types: Cigarettes     Quit date: 10/30/2020     Years since quittin.9    Smokeless tobacco: Never Used    Tobacco comment: pt currently using nicotine patches   5 CIGARETTES A DAY   Vaping Use    Vaping Use: Never used   Substance and Sexual Activity    Alcohol use: No     Alcohol/week: 0.0 standard drinks    Drug use: No     Comment: 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 7 yrs    Sexual activity: Never   Other Topics Concern    Not on file   Social History Narrative    10/9/20 Patient keeping contact with others to a minimum due to COVID 19 Pandemic.     10/9/20 Patient has difficulty with ambulation due to DJD, stenosis and fibromyalgia     Social Determinants of Health     Financial Resource Strain: Low Risk     Difficulty of Paying Living Expenses: Not very hard   Food Insecurity: No Food Insecurity    Worried About Running Out of Food in the Last Year: Never true    Ran Out of Food in the Last Year: Never true   Transportation Needs: No Transportation Needs    Lack of Transportation (Medical): No    Lack of Transportation (Non-Medical): No   Physical Activity: Inactive    Days of Exercise per Week: 0 days    Minutes of Exercise per Session: 0 min   Stress: No Stress Concern Present    Feeling of Stress : Only a little   Social Connections: Moderately Isolated    Frequency of Communication with Friends and Family: More than three times a week    Frequency of Social Gatherings with Friends and Family: Once a week    Attends Muslim Services: More than 4 times per year    Active Member of [employer] or Organizations: No    Attends Club or Organization Meetings: Never    Marital Status: Never    Intimate Partner Violence:     Fear of Current or Ex-Partner:     Emotionally Abused:     Physically Abused:     Sexually Abused:        Review of Systems:  Review of Systems   Constitutional: Negative for chills and fever. Cardiovascular: Negative for chest pain and palpitations. Respiratory: Negative for cough and shortness of breath. Musculoskeletal: Positive for back pain. Gastrointestinal: Negative for constipation. Neurological: Negative for disturbances in coordination, loss of balance, numbness, paresthesias and tingling. Physical Exam:  LMP 04/19/2003     Physical Exam  Neurological:      Mental Status: She is alert.    Psychiatric:         Mood and Affect: Mood normal.         Record/Diagnostics Review:    Last óscar 3/2021 and was appropriate    ABERRANT BEHAVIORS SINCE LAST VISIT  Lost rx/pills:------------------------------------------ no  Taking  medication as prescribed: ----------- yes  Urine Drug Screen ---------------------------------  yes             Date-----------------------------------------------3/16/2021              Results as expected ---------------------yes     Recent ER visits: -------------------------------------No  Pill count is appropriate: ---------------------------yes   Refills for prescriptions appropriate:---------- yes     Assessment:  Problem List Items Addressed This Visit     Primary osteoarthritis of both knees    Relevant Medications    HYDROcodone-acetaminophen (NORCO) 5-325 MG per tablet (Start on 10/1/2021)    tiZANidine (ZANAFLEX) 4 MG tablet    meloxicam (MOBIC) 7.5 MG tablet    Medication monitoring encounter    Chronic low back pain    Relevant Medications    HYDROcodone-acetaminophen (NORCO) 5-325 MG per tablet (Start on 10/1/2021)    tiZANidine (ZANAFLEX) 4 MG tablet    meloxicam (MOBIC) 7.5 MG tablet    Other spondylosis with radiculopathy, lumbar region - Primary    Relevant Medications    HYDROcodone-acetaminophen (NORCO) 5-325 MG per tablet (Start on 10/1/2021)    tiZANidine (ZANAFLEX) 4 MG tablet    meloxicam (MOBIC) 7.5 MG tablet    Postlaminectomy syndrome, lumbar region             Treatment Plan:  Patient relates current medications are helping the pain. Patient reports taking pain medications as prescribed, denies obtaining medications from different sources and denies use of illegal drugs. Patient denies side effects from medications like nausea, vomiting, constipation or drowsiness. Patient reports current activities of daily living are possible due to medications and would like to continue them. As always, we encourage daily stretching and strengthening exercises, and recommend minimizing use of pain medications unless patient cannot get through daily activities due to pain. Contract requirements met. Continue opioid therapy. Script written for norco  Follow up appointment made for 4 weeks    Courtney Goode, was evaluated through a synchronous (real-time) audio-video encounter. The patient (or guardian if applicable) is aware that this is a billable service. Verbal consent to proceed has been obtained within the past 12 months.  The visit was conducted pursuant to the emergency declaration under the [facility], [address] [place] [address] authority and the [employer] and [employer] General Act. Patient identification was verified, and a caregiver was present when appropriate. The patient was located in a state where the provider was credentialed to provide care. Total time spent for this encounter: 20 minutes    --ELVIRA Moncada CNP on 9/28/2021 at 12:08 PM    An electronic signature was used to authenticate this note.

## 2021-10-04 ENCOUNTER — TELEPHONE (OUTPATIENT)
Dept: INTERNAL MEDICINE | Age: 61
End: 2021-10-04

## 2021-10-04 NOTE — LETTER
[address]  [address] [address]  Phone: [phone]  Fax: [phone]    Christophe Hernandez MD        October 4, 2021    [address]  [address] [address]      Dear Colleen Gibsonist: This letter is a reminder that you may have diagnostic testing that has not been completed. It is important to your well-being that these test(s) are performed. Please find the outstanding test(s) attached. If you could please have these completed before your next appointment. You can have the test completed at any [facility] facility or Lab. Please see the order for scheduling instructions. Any testing that needs completed other than blood work or xray's please call [phone] to schedule an appointment. Otherwise can be done at any [facility]. Please call our office at Dept: [phone] for additional information on the outstanding tests or let us know if they have been completed so we may update your chart.  '    If you have any questions or concerns, please don't hesitate to call.     Sincerely,        Christophe Hernandez MD

## 2021-10-11 ENCOUNTER — HOSPITAL ENCOUNTER (OUTPATIENT)
Age: 61
Setting detail: SPECIMEN
Discharge: HOME | End: 2021-10-11
Payer: COMMERCIAL

## 2021-10-11 DIAGNOSIS — M18.11 PRIMARY OSTEOARTHRITIS OF FIRST CARPOMETACARPAL JOINT OF ONE HAND, RIGHT: ICD-10-CM

## 2021-10-11 DIAGNOSIS — E11.9 TYPE 2 DIABETES MELLITUS WITHOUT COMPLICATION, WITHOUT LONG-TERM CURRENT USE OF INSULIN (HCC): ICD-10-CM

## 2021-10-11 LAB
ABSOLUTE EOS #: 0.25 K/UL (ref 0–0.44)
ABSOLUTE IMMATURE GRANULOCYTE: <0.03 K/UL (ref 0–0.3)
ABSOLUTE LYMPH #: 1.7 K/UL (ref 1.1–3.7)
ABSOLUTE MONO #: 0.46 K/UL (ref 0.1–1.2)
BASOPHILS # BLD: 2 % (ref 0–2)
BASOPHILS ABSOLUTE: 0.11 K/UL (ref 0–0.2)
CHOLESTEROL/HDL RATIO: 4
CHOLESTEROL: 200 MG/DL
CREATININE URINE: 122.9 MG/DL (ref 28–217)
DIFFERENTIAL TYPE: ABNORMAL
EOSINOPHILS RELATIVE PERCENT: 5 % (ref 1–4)
HCT VFR BLD CALC: 48.4 % (ref 36.3–47.1)
HDLC SERPL-MCNC: 50 MG/DL
HEMOGLOBIN: 15.3 G/DL (ref 11.9–15.1)
IMMATURE GRANULOCYTES: 0 %
LDL CHOLESTEROL: 131 MG/DL (ref 0–130)
LYMPHOCYTES # BLD: 33 % (ref 24–43)
MCH RBC QN AUTO: 28.3 PG (ref 25.2–33.5)
MCHC RBC AUTO-ENTMCNC: 31.6 G/DL (ref 28.4–34.8)
MCV RBC AUTO: 89.6 FL (ref 82.6–102.9)
MICROALBUMIN/CREAT 24H UR: 14 MG/L
MICROALBUMIN/CREAT UR-RTO: 11 MCG/MG CREAT
MONOCYTES # BLD: 9 % (ref 3–12)
NRBC AUTOMATED: 0 PER 100 WBC
PDW BLD-RTO: 13.7 % (ref 11.8–14.4)
PLATELET # BLD: 253 K/UL (ref 138–453)
PLATELET ESTIMATE: ABNORMAL
PMV BLD AUTO: 10.8 FL (ref 8.1–13.5)
RBC # BLD: 5.4 M/UL (ref 3.95–5.11)
RBC # BLD: ABNORMAL 10*6/UL
RHEUMATOID FACTOR: <10 IU/ML
SEG NEUTROPHILS: 51 % (ref 36–65)
SEGMENTED NEUTROPHILS ABSOLUTE COUNT: 2.65 K/UL (ref 1.5–8.1)
TRIGL SERPL-MCNC: 94 MG/DL
URIC ACID: 3.7 MG/DL (ref 2.4–5.7)
VLDLC SERPL CALC-MCNC: ABNORMAL MG/DL (ref 1–30)
WBC # BLD: 5.2 K/UL (ref 3.5–11.3)
WBC # BLD: ABNORMAL 10*3/UL

## 2021-10-12 LAB — CCP IGG ANTIBODIES: 1.9 U/ML (ref 0–7)

## 2021-10-21 ENCOUNTER — VIRTUAL VISIT (OUTPATIENT)
Dept: INTERNAL MEDICINE | Age: 61
End: 2021-10-21
Payer: COMMERCIAL

## 2021-10-21 DIAGNOSIS — Z00.00 ROUTINE GENERAL MEDICAL EXAMINATION AT A HEALTH CARE FACILITY: Primary | ICD-10-CM

## 2021-10-21 PROCEDURE — G0439 PPPS, SUBSEQ VISIT: HCPCS | Performed by: NURSE PRACTITIONER

## 2021-10-21 PROCEDURE — 3017F COLORECTAL CA SCREEN DOC REV: CPT | Performed by: NURSE PRACTITIONER

## 2021-10-21 PROCEDURE — G0444 DEPRESSION SCREEN ANNUAL: HCPCS | Performed by: NURSE PRACTITIONER

## 2021-10-21 PROCEDURE — G8484 FLU IMMUNIZE NO ADMIN: HCPCS | Performed by: NURSE PRACTITIONER

## 2021-10-21 SDOH — ECONOMIC STABILITY: FOOD INSECURITY: WITHIN THE PAST 12 MONTHS, THE FOOD YOU BOUGHT JUST DIDN'T LAST AND YOU DIDN'T HAVE MONEY TO GET MORE.: SOMETIMES TRUE

## 2021-10-21 SDOH — ECONOMIC STABILITY: FOOD INSECURITY: WITHIN THE PAST 12 MONTHS, YOU WORRIED THAT YOUR FOOD WOULD RUN OUT BEFORE YOU GOT MONEY TO BUY MORE.: SOMETIMES TRUE

## 2021-10-21 ASSESSMENT — PATIENT HEALTH QUESTIONNAIRE - PHQ9
SUM OF ALL RESPONSES TO PHQ QUESTIONS 1-9: 1
1. LITTLE INTEREST OR PLEASURE IN DOING THINGS: 1
SUM OF ALL RESPONSES TO PHQ QUESTIONS 1-9: 1
SUM OF ALL RESPONSES TO PHQ QUESTIONS 1-9: 1

## 2021-10-21 ASSESSMENT — LIFESTYLE VARIABLES: HOW OFTEN DO YOU HAVE A DRINK CONTAINING ALCOHOL: 0

## 2021-10-21 ASSESSMENT — SOCIAL DETERMINANTS OF HEALTH (SDOH): HOW HARD IS IT FOR YOU TO PAY FOR THE VERY BASICS LIKE FOOD, HOUSING, MEDICAL CARE, AND HEATING?: SOMEWHAT HARD

## 2021-10-21 NOTE — PATIENT INSTRUCTIONS
Personalized Preventive Plan for Rehana Dailey - 10/21/2021  Medicare offers a range of preventive health benefits. Some of the tests and screenings are paid in full while other may be subject to a deductible, co-insurance, and/or copay. Some of these benefits include a comprehensive review of your medical history including lifestyle, illnesses that may run in your family, and various assessments and screenings as appropriate. After reviewing your medical record and screening and assessments performed today your provider may have ordered immunizations, labs, imaging, and/or referrals for you. A list of these orders (if applicable) as well as your Preventive Care list are included within your After Visit Summary for your review. Other Preventive Recommendations:    · A preventive eye exam performed by an eye specialist is recommended every 1-2 years to screen for glaucoma; cataracts, macular degeneration, and other eye disorders. · A preventive dental visit is recommended every 6 months. · Try to get at least 150 minutes of exercise per week or 10,000 steps per day on a pedometer . · Order or download the FREE \"Exercise & Physical Activity: Your Everyday Guide\" from The [facility]. Call 2-683.796.1323 or search The [facility] online. · You need 2820-8236 mg of calcium and 4592-3987 IU of vitamin D per day. It is possible to meet your calcium requirement with diet alone, but a vitamin D supplement is usually necessary to meet this goal.  · When exposed to the sun, use a sunscreen that protects against both UVA and UVB radiation with an SPF of 30 or greater. Reapply every 2 to 3 hours or after sweating, drying off with a towel, or swimming. · Always wear a seat belt when traveling in a car. Always wear a helmet when riding a bicycle or motorcycle.

## 2021-10-21 NOTE — PROGRESS NOTES
Medicare Annual Wellness Visit  Are Name: Grayson Rowell Date: 10/21/2021   MRN: [id number] Sex: Female   Age: 59 y.o. Ethnicity: Non- / Non    : 1960 Race: Martita Maki / Raven Mcmullen is here for Linkfluence (flu vaccine today at apartment DM eye exam done May @ vision center at [place] )    Screenings for behavioral, psychosocial and functional/safety risks, and cognitive dysfunction are all negative except as indicated below. These results, as well as other patient data from the [address] form, are documented in Flowsheets linked to this Encounter. Allergies   Allergen Reactions    Aspirin      DUE TO ULCER    Claritin [Loratadine] Other (See Comments)     coughing    Flonase [Fluticasone Propionate]     Morphine And Related      GI Upset         Prior to Visit Medications    Medication Sig Taking? Authorizing Provider   HYDROcodone-acetaminophen (NORCO) 5-325 MG per tablet Take 1 tablet by mouth every 8 hours as needed for Pain for up to 28 days.  Early refill due to holidays Yes ELVIRA Burk CNP   tiZANidine (ZANAFLEX) 4 MG tablet Take 1 tablet by mouth 2 times daily Yes ELVIRA Burk CNP   meloxicam (MOBIC) 7.5 MG tablet Take 1 tablet by mouth daily Yes ELVIRA Burk CNP   amLODIPine (NORVASC) 10 MG tablet TAKE 1 TABLET BY MOUTH DAILY Yes Best Mckay MD   docusate sodium (COLACE) 100 MG capsule TAKE 1 CAPSULE BY MOUTH EVERY DAY Yes Best Mckay MD   Umeclidinium Bromide 62.5 MCG/INH AEPB Inhale 1 puff into the lungs daily Yes Best Mckay MD   atorvastatin (LIPITOR) 40 MG tablet Take 1 tablet by mouth daily Yes Best Mckay MD   trospium (SANCTURA) 20 MG tablet TAKE 1 TABLET BY MOUTH 2 TIMES DAILY Yes Alex Cassidy MD   cetirizine (ZYRTEC) 10 MG tablet TAKE 1 TABLET BY MOUTH DAILY Yes Best Mckay MD   lisinopril-hydroCHLOROthiazide (PRINZIDE;ZESTORETIC) 20-25 MG per tablet TAKE 1 TABLET EVERY DAY Yes Eze Rosales MD   Lancets MISC 1 each by Does not apply route 2 times daily Use 2/day Yes Eze Rosales MD   omeprazole (PRILOSEC) 20 MG delayed release capsule TAKE 1 CAPSULE BY MOUTH EVERY DAY Yes Anmol De Jesus MD   Marylee Wynn 18 MCG inhalation capsule Inhale 1 capsule into the lungs daily Yes Eze Rosales MD   azelastine (ASTELIN) 0.1 % nasal spray 2 sprays by Nasal route 2 times daily Use in each nostril as directed Yes Eze Rosales MD   potassium chloride (KLOR-CON M) 10 MEQ extended release tablet TAKE 2 TABLETS BY MOUTH DAILY Yes Eze Rosales MD   fluticasone-salmeterol (ADVAIR DISKUS) 250-50 MCG/DOSE AEPB Inhale 1 puff into the lungs every 12 hours Yes Andreina Cazares MD   lactobacillus (CULTURELLE) capsule Take 1 capsule by mouth daily (with breakfast) Yes ELVIRA Gilbert CNP   carvedilol (COREG) 6.25 MG tablet TAKE 1 TABLET BY MOUTH 2 TIMES DAILY Yes Eze Rosales MD   blood glucose test strips (EXACTECH TEST) strip 1 each by In Vitro route daily As needed.  Yes Eze Rosales MD   albuterol sulfate HFA (VENTOLIN HFA) 108 (90 Base) MCG/ACT inhaler Inhale 2 puffs into the lungs every 6 hours as needed for Wheezing Yes Eze Rosales MD   diphenhydrAMINE (BENADRYL) 25 MG tablet Take 25 mg by mouth every 6 hours as needed for Itching Yes Historical Provider, MD   acetaminophen (TYLENOL) 500 MG tablet Take 1 tablet by mouth 4 times daily as needed for Pain Yes ELVIRA Allen CNP   mirtazapine (REMERON) 30 MG tablet Take 30 mg by mouth nightly Yes Historical Provider, MD   mirtazapine (REMERON) 15 MG tablet Take 15 mg by mouth nightly Yes Historical Provider, MD   DULoxetine (CYMBALTA) 60 MG extended release capsule Take 1 capsule by mouth daily Yes Eze Rosales MD   ipratropium-albuterol (DUONEB) 0.5-2.5 (3) MG/3ML SOLN nebulizer solution Inhale 3 mLs into the lungs every 6 hours as needed for Shortness of Breath Yes Vernell Garcia MD   gabapentin (NEURONTIN) 300 MG capsule TAKE 1 CAPSULE IN MORNING AND AFTERNOON AND 2 CAPSULES AT NIGHT  Vernell Garcia MD   nicotine (NICODERM CQ) 14 MG/24HR Place 1 patch onto the skin daily for 14 days  Vernell Garcia MD         Past Medical History:   Diagnosis Date    Allergic rhinitis     Alveolar hypoventilation 11/20/2020    Aortic insufficiency 2018    mild-moderate on echo (was seen and discharged [facility]ology-[facility])    Bronchiectasis ([facility] 11/20/2020    Bronchitis     Chronic back pain     [facility]. COPD (chronic obstructive pulmonary disease) ([facility]     Dr. Lin Push to see 11/09/2020    Depression     DM (diabetes mellitus) ([facility] 12/18/2012    GERD (gastroesophageal reflux disease)     History of echocardiogram 05/2018    EF 65%, mild-moderate AI and TR    Hyperlipidemia     Dr. Barbie Nichole Hypertension     Dr. Barbie Nichole Obesity     Osteoarthritis     Peripheral vascular disease ([facility]     Primary osteoarthritis of both knees     Radicular pain of lumbosacral region     Spinal stenosis, lumbar region, without neurogenic claudication 04/30/2013    Tricuspid regurgitation 2018    mild-moderate on echo    Type II or unspecified type diabetes mellitus without mention of complication, not stated as uncontrolled     Dr. Barbie Nichole Unspecified sleep apnea     no cpap used anymore    URI (upper respiratory infection)     Wears dentures     upper and lower full dentures    Wears glasses     Wellness examination     Dr. Ana Michelle -PCP last visit in early Oct. 2020       Past Surgical History:   Procedure Laterality Date    COLONOSCOPY  2/12/2009    normal    DILATION AND CURETTAGE OF UTERUS      GASTRECTOMY      partial    LUMBAR DISCECTOMY  01/2015    lumbar diskectomy    LUMBAR FUSION  11/19/2020     POSTERIOR FUSION L4/5,     LUMBAR FUSION N/A 11/19/2020    POSTERIOR FUSION L4/5, MEDTRONICS, MIRIAM, PRONE, C-ARM, O-ARM, EVOKES #[id number] AMINA performed by Pa Gutierrez DO at [address] [facility] [address] [facility] Right 4/30/13    Lumbar Diagnostic Block,  Kenalog 40 mg    NERVE BLOCK  5/23/13    Lumbar Radiofrequency, Kenalog 40mg    NERVE BLOCK  8/12/13    Lt MBNB  celestone 6mg    NERVE BLOCK Left 8-28-13    left lumbar diagnostic block #2 decadron 10 mg    NERVE BLOCK Left 9-24-13    left lumbar median branch radiofrequency    NERVE BLOCK  07-02-14    caudal, celestone 9 mg    NERVE BLOCK  7-16-14    caudal epidural #2, celestone 9mg, fentanyl 25mcg    NERVE BLOCK  7/30/14    caudal #3 decadron 10mg    NERVE BLOCK  11-6-14    duramorph epidural steroid block  duramorph 1 mg celestone 9 mg    NERVE BLOCK  11/20/15    TENS- Empi Select    NERVE BLOCK  07/20/2018    right transforminal # 1 decadron 10mg,isovue    NERVE BLOCK Bilateral 02/01/2019    bilat mbnb- no steroid    NERVE BLOCK Bilateral 02/08/2019    bilat mbnb, marcaine . 25%    [place] KNEE SCOPE,DIAGNOSTIC Right 3/24/2017    KNEE ARTHROSCOPY WITH PARTIAL MEDIAL MENISECAL DEBRIDMENT  performed by Tone Alegria MD at [address] [facility]  9 20 2007    UPPER GASTROINTESTINAL ENDOSCOPY  4 21 2009,04/2011    gastritis, esophagitis         Family History   Problem Relation Age of Onset    Diabetes Mother     Heart Disease Mother     Cirrhosis Father        CareTeam (Including outside providers/suppliers regularly involved in providing care):   Patient Care Team:  Jessica Amaya MD as PCP - General (Internal Medicine)  Jessica Amaya MD as PCP - [address] [place] [address] Provider  Delio Hollingsworth DPM as Consulting Physician (Podiatry)  Ga Gomez ([address])  Tone Alegria MD as Consulting Physician (Orthopedic Surgery)  Alka Mckee MD as Anesthesiologist (Pain Management)  Caleb Larose OD (Optometry)  Oscar Mccoy RN as Nurse Navigator    Wt Readings from Last 3 Encounters:   06/30/21 190 lb (86.2 kg)   06/24/21 190 lb (86.2 kg) 03/10/21 182 lb (82.6 kg)      Patient-Reported Vitals 10/21/2021   Patient-Reported Weight 190 lb   Patient-Reported Height 5' 5\"      There is no height or weight on file to calculate BMI. Based upon direct observation of the patient, evaluation of cognition reveals recent and remote memory intact. Wt Readings from Last 3 Encounters:   06/30/21 190 lb (86.2 kg)   06/24/21 190 lb (86.2 kg)   03/10/21 182 lb (82.6 kg)     Temp Readings from Last 3 Encounters:   03/10/21 98.7 °F (37.1 °C) (Temporal)   12/02/20 98.2 °F (36.8 °C) (Oral)   11/19/20 97.6 °F (36.4 °C)     BP Readings from Last 3 Encounters:   06/24/21 130/86   06/09/21 138/77   03/10/21 136/88     Pulse Readings from Last 3 Encounters:   06/24/21 80   06/09/21 77   03/10/21 79       Patient's complete Health Risk Assessment and screening values have been reviewed and are found in Flowsheets. The following problems were reviewed today and where indicated follow up appointments were made and/or referrals ordered. Positive Risk Factor Screenings with Interventions:     Fall Risk:  Timed Up and Go Test > 12 seconds? (Complete if either Fall Risk answers are Yes):  (deborah)  2 or more falls in past year?: (!) yes  Fall with injury in past year?: no  Fall Risk Interventions:    · Home safety tips provided    Cognitive: Words recalled: 2 Words Recalled  Clock Drawing Test (CDT) Score:  (deborah)  Total Score Interpretation: Positive Mini-Cog  Cognitive Impairment Interventions:  · Patient declines any further evaluation/treatment for cognitive impairment         General Health and ACP:  General  In general, how would you say your health is?: Fair  In the past 7 days, have you experienced any of the following?  New or Increased Pain, New or Increased Fatigue, Loneliness, Social Isolation, Stress or Anger?: None of These  Do you get the social and emotional support that you need?: Yes  Do you have a Living Will?: (!) No  Advance Directives     Power of  Living Will ACP-Advance Directive ACP-Power of     Not on File Not on File Not on File Not on File      General Health Risk Interventions:  · No Living Will: Patient declines ACP discussion/assistance    Health Habits/Nutrition:  Health Habits/Nutrition  Do you exercise for at least 20 minutes 2-3 times per week?: (!) No  Have you lost any weight without trying in the past 3 months?: No  Do you eat only one meal per day?: No  Have you seen the dentist within the past year?: N/A - wear dentures     Health Habits/Nutrition Interventions:  · Inadequate physical activity:  patient is not ready to increase his/her physical activity level at this time       Personalized Preventive Plan   Current Health Maintenance Status  Immunization History   Administered Date(s) Administered    COVID-19, Zambrano Peter, PF, 30mcg/0.3mL 04/19/2021, 05/10/2021    Influenza Vaccine, unspecified formulation 10/20/2021    Influenza, Quadv, 6 mo and older, IM (Fluzone, Flulaval) 10/02/2017    Influenza, Ilir Golas, IM, (6 mo and older Fluzone, Flulaval, Fluarix and 3 yrs and older Afluria) 10/06/2016, 09/17/2019, 11/12/2020    Influenza, Quadv, IM, PF (6 mo and older Fluzone, Flulaval, Fluarix, and 3 yrs and older Afluria) 10/29/2018    Pneumococcal Polysaccharide ([id number]) 01/26/2016    Tdap (Boostrix, Adacel) 06/24/2019    Zoster Recombinant (Shingrix) 06/24/2019        Health Maintenance   Topic Date Due    Shingles Vaccine (2 of 2) 08/19/2019    Annual Wellness Visit (AWV)  09/30/2021    Diabetic retinal exam  10/31/2022 (Originally 5/14/2021)    COVID-19 Vaccine (3 - Pfizer booster) 11/10/2021    Potassium monitoring  12/02/2021    Creatinine monitoring  12/02/2021    Diabetic foot exam  03/02/2022    A1C test (Diabetic or Prediabetic)  03/02/2022    Diabetic microalbuminuria test  10/11/2022    Lipid screen  10/11/2022    Breast cancer screen  04/27/2023    Pneumococcal 0-64 years Vaccine (2 of 2 - PPSV23) 07/08/2023    Colon cancer screen fecal DNA test (Cologuard)  10/05/2023    Cervical cancer screen  03/02/2026    DTaP/Tdap/Td vaccine (2 - Td or Tdap) 06/24/2029    Flu vaccine  Completed    Hepatitis C screen  Completed    HIV screen  Completed    Hepatitis A vaccine  Aged Out    Hib vaccine  Aged Out    Meningococcal (ACWY) vaccine  Aged Out     Recommendations for [employer] Due: see orders and patient instructions/AVS.  . Recommended screening schedule for the next 5-10 years is provided to the patient in written form: see Patient Instructions/AVS.    Veena Spivey was seen today for medicare awv. Diagnoses and all orders for this visit:    Routine general medical examination at a health care facility  -     [facility] Jenna Gutiérrez is a 59 y.o. female being evaluated by a Virtual Visit (phone) encounter to address concerns as mentioned above. A caregiver was present when appropriate. Due to this being a TeleHealth encounter ([facility]), evaluation of the following organ systems was limited: Vitals/Constitutional/EENT/Resp/CV/GI//MS/Neuro/Skin/Heme-Lymph-Imm. Pursuant to the emergency declaration under the [facility] [address] authority and the [employer] Act, this Virtual Visit was conducted with patient's (and/or legal guardian's) consent, to reduce the patient's risk of exposure to COVID-19 and provide necessary medical care. The patient (and/or legal guardian) has also been advised to contact this office for worsening conditions or problems, and seek emergency medical treatment and/or call 911 if deemed necessary. Patient identification was verified at the start of the visit: Yes    Services were provided through phone to substitute for in-person clinic visit.  Patient and provider were located at their individual homes.    --Hollie Neumann, APRN - CNP on 10/21/2021 at 1:48 PM    An electronic signature was used to authenticate this note.

## 2021-10-26 ENCOUNTER — OFFICE VISIT (OUTPATIENT)
Dept: INTERNAL MEDICINE | Age: 61
End: 2021-10-26
Payer: COMMERCIAL

## 2021-10-26 VITALS
SYSTOLIC BLOOD PRESSURE: 150 MMHG | BODY MASS INDEX: 32.15 KG/M2 | TEMPERATURE: 98.2 F | WEIGHT: 193 LBS | DIASTOLIC BLOOD PRESSURE: 90 MMHG | HEIGHT: 65 IN | HEART RATE: 97 BPM

## 2021-10-26 DIAGNOSIS — Z23 NEED FOR PROPHYLACTIC VACCINATION AND INOCULATION AGAINST VARICELLA: ICD-10-CM

## 2021-10-26 DIAGNOSIS — F17.200 SMOKER: ICD-10-CM

## 2021-10-26 DIAGNOSIS — M79.604 CHRONIC PAIN OF RIGHT LOWER EXTREMITY: ICD-10-CM

## 2021-10-26 DIAGNOSIS — I10 ESSENTIAL HYPERTENSION: Primary | ICD-10-CM

## 2021-10-26 DIAGNOSIS — J96.11 CHRONIC RESPIRATORY FAILURE WITH HYPOXIA (HCC): ICD-10-CM

## 2021-10-26 DIAGNOSIS — N32.81 OAB (OVERACTIVE BLADDER): ICD-10-CM

## 2021-10-26 DIAGNOSIS — J30.9 CHRONIC ALLERGIC RHINITIS: ICD-10-CM

## 2021-10-26 DIAGNOSIS — G89.29 CHRONIC PAIN OF RIGHT LOWER EXTREMITY: ICD-10-CM

## 2021-10-26 DIAGNOSIS — K21.9 GASTROESOPHAGEAL REFLUX DISEASE, UNSPECIFIED WHETHER ESOPHAGITIS PRESENT: ICD-10-CM

## 2021-10-26 DIAGNOSIS — J43.2 CENTRILOBULAR EMPHYSEMA (HCC): ICD-10-CM

## 2021-10-26 DIAGNOSIS — E11.9 TYPE 2 DIABETES MELLITUS WITHOUT COMPLICATION, WITHOUT LONG-TERM CURRENT USE OF INSULIN (HCC): ICD-10-CM

## 2021-10-26 DIAGNOSIS — E78.00 PURE HYPERCHOLESTEROLEMIA: ICD-10-CM

## 2021-10-26 LAB — HBA1C MFR BLD: 5.7 %

## 2021-10-26 PROCEDURE — G8484 FLU IMMUNIZE NO ADMIN: HCPCS | Performed by: INTERNAL MEDICINE

## 2021-10-26 PROCEDURE — 99211 OFF/OP EST MAY X REQ PHY/QHP: CPT | Performed by: INTERNAL MEDICINE

## 2021-10-26 PROCEDURE — G8926 SPIRO NO PERF OR DOC: HCPCS | Performed by: INTERNAL MEDICINE

## 2021-10-26 PROCEDURE — 83036 HEMOGLOBIN GLYCOSYLATED A1C: CPT | Performed by: INTERNAL MEDICINE

## 2021-10-26 PROCEDURE — G8427 DOCREV CUR MEDS BY ELIG CLIN: HCPCS | Performed by: INTERNAL MEDICINE

## 2021-10-26 PROCEDURE — 3044F HG A1C LEVEL LT 7.0%: CPT | Performed by: INTERNAL MEDICINE

## 2021-10-26 PROCEDURE — 2022F DILAT RTA XM EVC RTNOPTHY: CPT | Performed by: INTERNAL MEDICINE

## 2021-10-26 PROCEDURE — 3017F COLORECTAL CA SCREEN DOC REV: CPT | Performed by: INTERNAL MEDICINE

## 2021-10-26 PROCEDURE — 3023F SPIROM DOC REV: CPT | Performed by: INTERNAL MEDICINE

## 2021-10-26 PROCEDURE — 4004F PT TOBACCO SCREEN RCVD TLK: CPT | Performed by: INTERNAL MEDICINE

## 2021-10-26 PROCEDURE — G8417 CALC BMI ABV UP PARAM F/U: HCPCS | Performed by: INTERNAL MEDICINE

## 2021-10-26 PROCEDURE — 99214 OFFICE O/P EST MOD 30 MIN: CPT | Performed by: INTERNAL MEDICINE

## 2021-10-26 RX ORDER — OMEPRAZOLE 20 MG/1
CAPSULE, DELAYED RELEASE ORAL
Qty: 30 CAPSULE | Refills: 3 | Status: SHIPPED | OUTPATIENT
Start: 2021-10-26 | End: 2022-04-29 | Stop reason: SDUPTHER

## 2021-10-26 RX ORDER — NICOTINE 21 MG/24HR
1 PATCH, TRANSDERMAL 24 HOURS TRANSDERMAL DAILY
Qty: 30 PATCH | Refills: 1 | Status: SHIPPED | OUTPATIENT
Start: 2021-10-26 | End: 2022-01-03

## 2021-10-26 RX ORDER — ALBUTEROL SULFATE 90 UG/1
2 AEROSOL, METERED RESPIRATORY (INHALATION) EVERY 6 HOURS PRN
Qty: 1 EACH | Refills: 3 | Status: SHIPPED | OUTPATIENT
Start: 2021-10-26

## 2021-10-26 RX ORDER — CARVEDILOL 12.5 MG/1
12.5 TABLET ORAL 2 TIMES DAILY
Qty: 60 TABLET | Refills: 5 | Status: SHIPPED | OUTPATIENT
Start: 2021-10-26 | End: 2022-04-29 | Stop reason: SDUPTHER

## 2021-10-26 RX ORDER — TROSPIUM CHLORIDE 20 MG/1
20 TABLET, FILM COATED ORAL 2 TIMES DAILY
Qty: 60 TABLET | Refills: 0 | Status: SHIPPED | OUTPATIENT
Start: 2021-10-26 | End: 2022-08-05 | Stop reason: SDUPTHER

## 2021-10-26 RX ORDER — ACETAMINOPHEN 500 MG
500 TABLET ORAL 4 TIMES DAILY PRN
Qty: 30 TABLET | Refills: 0 | Status: SHIPPED | OUTPATIENT
Start: 2021-10-26 | End: 2022-04-29 | Stop reason: ALTCHOICE

## 2021-10-26 RX ORDER — LISINOPRIL AND HYDROCHLOROTHIAZIDE 25; 20 MG/1; MG/1
TABLET ORAL
Qty: 90 TABLET | Refills: 1 | Status: SHIPPED | OUTPATIENT
Start: 2021-10-26 | End: 2022-04-26

## 2021-10-26 RX ORDER — GABAPENTIN 300 MG/1
CAPSULE ORAL
Qty: 120 CAPSULE | Refills: 0 | Status: SHIPPED | OUTPATIENT
Start: 2021-10-26 | End: 2022-01-19

## 2021-10-26 RX ORDER — AZELASTINE 1 MG/ML
2 SPRAY, METERED NASAL 2 TIMES DAILY
Qty: 1 EACH | Refills: 2 | Status: SHIPPED | OUTPATIENT
Start: 2021-10-26

## 2021-10-26 RX ORDER — ATORVASTATIN CALCIUM 40 MG/1
40 TABLET, FILM COATED ORAL DAILY
Qty: 90 TABLET | Refills: 1 | Status: SHIPPED | OUTPATIENT
Start: 2021-10-26 | End: 2022-04-26

## 2021-10-26 RX ORDER — POTASSIUM CHLORIDE 750 MG/1
20 TABLET, EXTENDED RELEASE ORAL DAILY
Qty: 60 TABLET | Refills: 2 | Status: SHIPPED | OUTPATIENT
Start: 2021-10-26 | End: 2022-07-26

## 2021-10-26 RX ORDER — CETIRIZINE HYDROCHLORIDE 10 MG/1
TABLET ORAL
Qty: 30 TABLET | Refills: 5 | Status: CANCELLED | OUTPATIENT
Start: 2021-10-26

## 2021-10-26 ASSESSMENT — PATIENT HEALTH QUESTIONNAIRE - PHQ9
SUM OF ALL RESPONSES TO PHQ QUESTIONS 1-9: 2
1. LITTLE INTEREST OR PLEASURE IN DOING THINGS: 1
SUM OF ALL RESPONSES TO PHQ QUESTIONS 1-9: 2
SUM OF ALL RESPONSES TO PHQ QUESTIONS 1-9: 2
SUM OF ALL RESPONSES TO PHQ9 QUESTIONS 1 & 2: 2
2. FEELING DOWN, DEPRESSED OR HOPELESS: 1

## 2021-10-26 NOTE — PATIENT INSTRUCTIONS
Return To Clinic 1/25/2022 for 3 month follow up htn. Please bring all of your medications with you to this appointment. After Visit Summary given and reviewed. The medication list included in this document is our record of what you are currently taking, including any changes that were made at today's visit. If you find any differences when compared to your medications at home, or have any questions that were not answered at your visit, please contact the office. It is very important for your care that you keep your appointment. If for some reason you are unable to keep your appointment, it is equally important that you call our office at [phone] to cancel your appointment and reschedule. Failure to do so may result in your termination from our practice. Medications have been e-scribed to pharmacy of patients choice. Script for Shingrix was printed and given to patient.  Patient was instructed to take script(s) to pharmacy to get filled.    -LUDWIG Tesfaye

## 2021-10-26 NOTE — PROGRESS NOTES
[facility]    Progress Note    Date of patient's visit: 10/26/2021    Patient's Name:  Unique Stock    YOB: 1960            Patient Care Team:  Duane No MD as PCP - General (Internal Medicine)  Duane No MD as PCP - [place] [facility] [facility]  Erica Curiel DPM as Consulting Physician (Podiatry)  Tayla Duckworth ([address])  Maty Lainez MD as Consulting Physician (Orthopedic Surgery)  Lauro Braden MD as Anesthesiologist (Pain Management)  Kasia Michael OD (Optometry)  Hannah Worley RN as Nurse Navigator    REASON FOR VISIT: Routine outpatient follow     Chief Complaint   Patient presents with    3 Month Follow-Up    Hypertension     medication taken today    Health Maintenance     shingrix #2 pended         HISTORY OF PRESENT ILLNESS:    History was obtained from the patient. Unique Stock is a 59 y.o. is here for routine follow-up. Blood pressure is running high. She says she has still been smoking and has not quit. She was given nicotine patches last time. She has chronic cough and some hoarseness. Will increase her Coreg for now but she does have a blood pressure cuff at home advised to call me if her blood pressures are too low or she is having any symptoms of dizziness. She is taking Remeron. She is gaining weight. She says that was started for her depression. She sees psychiatry regularly. She has gained 10 pounds in the last year. Discussed discussing with the psychiatry about alternating medications. She is also on a lot of sedatives including gabapentin, Cymbalta, tizanidine, hydrocodone and she takes some Benadryl sometimes. Discussed polypharmacy and sedation from her medications and to cut back on some of these medications. She has chronic respiratory failure and uses nocturnal oxygen. She follows up with pulmonologist for her stage III severe COPD.     Results for POC orders placed in visit on 10/26/21   POCT glycosylated hemoglobin (Hb A1C)   Result Value Ref Range    Hemoglobin A1C 5.7 %         Past Medical History:   Diagnosis Date    Allergic rhinitis     Alveolar hypoventilation 11/20/2020    Aortic insufficiency 2018    mild-moderate on echo (was seen and [facility])    Bronchiectasis ([facility] [place] [address] 11/20/2020    Bronchitis     Chronic back pain     Pain management at [place] [address]. COPD (chronic obstructive pulmonary disease) ([facility]     Dr. Mono Yu to see 11/09/2020    Depression     DM (diabetes mellitus) ([facility] 12/18/2012    GERD (gastroesophageal reflux disease)     History of echocardiogram 05/2018    EF 65%, mild-moderate AI and TR    Hyperlipidemia     Dr. Adam Jones Hypertension     Dr. Adam Jones Obesity     Osteoarthritis     Peripheral vascular disease ([facility]     Primary osteoarthritis of both knees     Radicular pain of lumbosacral region     Spinal stenosis, lumbar region, without neurogenic claudication 04/30/2013    Tricuspid regurgitation 2018    mild-moderate on echo    Type II or unspecified type diabetes mellitus without mention of complication, not stated as uncontrolled     Dr. Adam Jones Unspecified sleep apnea     no cpap used anymore    URI (upper respiratory infection)     Wears dentures     upper and lower full dentures    Wears glasses     Wellness examination     Dr. Whittington [facility] last visit in early Oct. 2020       Past Surgical History:   Procedure Laterality Date    COLONOSCOPY  2/12/2009    normal    DILATION AND CURETTAGE OF UTERUS      GASTRECTOMY      partial    LUMBAR DISCECTOMY  01/2015    lumbar diskectomy    LUMBAR FUSION  11/19/2020     POSTERIOR FUSION L4/5,     LUMBAR FUSION N/A 11/19/2020    POSTERIOR FUSION L4/5, [employer], Aminah Proper, [facility] #[id number] AMINA performed by Jayjay Choudhury DO at [facility] 4/30/13    Lumbar Diagnostic Block,  Kenalog 40 mg    NERVE BLOCK  5/23/13    Lumbar Radiofrequency, Kenalog 40mg    NERVE BLOCK  8/12/13    Lt MBNB  celestone 6mg    NERVE BLOCK Left 8-28-13    left lumbar diagnostic block #2 decadron 10 mg    NERVE BLOCK Left 9-24-13    left lumbar median branch radiofrequency    NERVE BLOCK  07-02-14    caudal, celestone 9 mg    NERVE BLOCK  7-16-14    caudal epidural #2, celestone 9mg, fentanyl 25mcg    NERVE BLOCK  7/30/14    caudal #3 decadron 10mg    NERVE BLOCK  11-6-14    duramorph epidural steroid block  duramorph 1 mg celestone 9 mg    NERVE BLOCK  11/20/15    TENS- Empi Select    NERVE BLOCK  07/20/2018    right transforminal # 1 decadron 10mg,isovue    NERVE BLOCK Bilateral 02/01/2019    bilat mbnb- no steroid    NERVE BLOCK Bilateral 02/08/2019    bilat mbnb, marcaine . 25%    OK KNEE SCOPE,DIAGNOSTIC Right 3/24/2017    KNEE ARTHROSCOPY WITH PARTIAL MEDIAL MENISECAL DEBRIDMENT  performed by Sonia Osullivan MD at [address] [place] [address] 20 2007    UPPER GASTROINTESTINAL ENDOSCOPY  4 21 2009,04/2011    gastritis, esophagitis         ALLERGIES      Allergies   Allergen Reactions    Aspirin      DUE TO ULCER    Claritin [Loratadine] Other (See Comments)     coughing    Flonase [Fluticasone Propionate]     Morphine And Related      GI Upset       MEDICATIONS:      Current Outpatient Medications on File Prior to Visit   Medication Sig Dispense Refill    HYDROcodone-acetaminophen (NORCO) 5-325 MG per tablet Take 1 tablet by mouth every 8 hours as needed for Pain for up to 28 days.  Early refill due to holidays 90 tablet 0    tiZANidine (ZANAFLEX) 4 MG tablet Take 1 tablet by mouth 2 times daily 60 tablet 2    meloxicam (MOBIC) 7.5 MG tablet Take 1 tablet by mouth daily 30 tablet 2    gabapentin (NEURONTIN) 300 MG capsule TAKE 1 CAPSULE IN MORNING AND AFTERNOON AND 2 CAPSULES AT NIGHT 120 capsule 0    amLODIPine (NORVASC) 10 MG tablet TAKE 1 TABLET BY MOUTH DAILY 90 tablet 1    docusate sodium (COLACE) 100 MG capsule TAKE 1 CAPSULE BY MOUTH EVERY DAY 30 capsule 9    Umeclidinium Bromide 62.5 MCG/INH AEPB Inhale 1 puff into the lungs daily 2 each 5    atorvastatin (LIPITOR) 40 MG tablet Take 1 tablet by mouth daily 90 tablet 1    nicotine (NICODERM CQ) 14 MG/24HR Place 1 patch onto the skin daily for 14 days 14 patch 1    trospium (SANCTURA) 20 MG tablet TAKE 1 TABLET BY MOUTH 2 TIMES DAILY 60 tablet 0    cetirizine (ZYRTEC) 10 MG tablet TAKE 1 TABLET BY MOUTH DAILY 30 tablet 5    lisinopril-hydroCHLOROthiazide (PRINZIDE;ZESTORETIC) 20-25 MG per tablet TAKE 1 TABLET EVERY DAY 90 tablet 2    Lancets MISC 1 each by Does not apply route 2 times daily Use 2/day 100 each 11    omeprazole (PRILOSEC) 20 MG delayed release capsule TAKE 1 CAPSULE BY MOUTH EVERY DAY 30 capsule 3    azelastine (ASTELIN) 0.1 % nasal spray 2 sprays by Nasal route 2 times daily Use in each nostril as directed 1 Bottle 0    potassium chloride (KLOR-CON M) 10 MEQ extended release tablet TAKE 2 TABLETS BY MOUTH DAILY 60 tablet 2    carvedilol (COREG) 6.25 MG tablet TAKE 1 TABLET BY MOUTH 2 TIMES DAILY 180 tablet 0    blood glucose test strips (EXACTECH TEST) strip 1 each by In Vitro route daily As needed.  50 each 11    albuterol sulfate HFA (VENTOLIN HFA) 108 (90 Base) MCG/ACT inhaler Inhale 2 puffs into the lungs every 6 hours as needed for Wheezing 1 Inhaler 3    diphenhydrAMINE (BENADRYL) 25 MG tablet Take 25 mg by mouth every 6 hours as needed for Itching      acetaminophen (TYLENOL) 500 MG tablet Take 1 tablet by mouth 4 times daily as needed for Pain 30 tablet 0    mirtazapine (REMERON) 30 MG tablet Take 30 mg by mouth nightly      mirtazapine (REMERON) 15 MG tablet Take 15 mg by mouth nightly      DULoxetine (CYMBALTA) 60 MG extended release capsule Take 1 capsule by mouth daily 30 capsule 3    ipratropium-albuterol (DUONEB) 0.5-2.5 (3) MG/3ML SOLN nebulizer solution Inhale 3 mLs into the lungs every 6 hours as needed for Shortness of Breath 360 mL 11    SPIRIVA HANDIHALER 18 MCG inhalation capsule Inhale 1 capsule into the lungs daily (Patient not taking: Reported on 10/26/2021) 30 capsule 3     No current facility-administered medications on file prior to visit. HISTORY    Reviewed and no change from previous record. Carolina  reports that she has been smoking cigarettes. She has a 9.00 pack-year smoking history. She has never used smokeless tobacco.    FAMILY HISTORY:    Reviewed and No change from previous visit    HEALTH MAINTENANCE DUE:      Health Maintenance Due   Topic Date Due    Shingles Vaccine (2 of 2) 08/19/2019       REVIEW OF SYSTEMS:    12 point review of symptoms completed and found to be normal except noted in the HPI    Review of Systems   Constitutional: Negative for diaphoresis and fatigue. Eyes: Negative for photophobia and visual disturbance. Respiratory: Positive for cough. Negative for shortness of breath and wheezing. Cardiovascular: Negative for chest pain, palpitations and leg swelling. Endocrine: Negative for polydipsia and polyuria. Genitourinary: Negative for dysuria and frequency. Musculoskeletal: Positive for arthralgias and back pain. Neurological: Positive for numbness. Negative for dizziness, syncope, weakness and headaches. Hematological: Negative for adenopathy. Does not bruise/bleed easily. Psychiatric/Behavioral: Negative for dysphoric mood and sleep disturbance. The patient is not nervous/anxious.          PHYSICAL EXAM:     Vitals:    10/26/21 1422 10/26/21 1425   BP: (!) 153/92 (!) 150/90   Pulse: 92 97   Temp: 98.2 °F (36.8 °C)    TempSrc: Infrared    Weight: 193 lb (87.5 kg)    Height: 5' 5\" (1.651 m)      Body mass index is 32.12 kg/m².     BP Readings from Last 3 Encounters:   10/26/21 (!) 150/90   06/24/21 130/86   06/09/21 138/77        Wt Readings from Last 3 Encounters:   10/26/21 193 lb (87.5 kg)   06/30/21 190 lb (86.2 kg)   06/24/21 190 lb (86.2 kg)       Physical Exam    Vitals and nursing note reviewed. Constitutional:       Appearance: Normal appearance. HENT:      Head: Normocephalic and atraumatic. Eyes:      Extraocular Movements: Extraocular movements intact. Conjunctiva/sclera: Conjunctivae normal.      Pupils: Pupils are equal, round, and reactive to light. Cardiovascular:      Rate and Rhythm: Normal rate and regular rhythm. Heart sounds: Murmur heard. Pulmonary:      Effort: Pulmonary effort is normal.      Breath sounds: Normal breath sounds. No wheezing. Musculoskeletal:      Right lower leg: No edema. Left lower leg: No edema. Skin:     General: Skin is warm and dry. Neurological:      General: No focal deficit present. Mental Status: She is alert and oriented to person, place, and time.    Psychiatric:         Mood and Affect: Mood normal.     LABORATORY FINDINGS:    CBC:  Lab Results   Component Value Date    WBC 5.2 10/11/2021    HGB 15.3 10/11/2021     10/11/2021     02/16/2012     BMP:    Lab Results   Component Value Date     12/02/2020    K 4.6 12/02/2020    CL 99 12/02/2020    CO2 28 12/02/2020    BUN 15 12/02/2020    CREATININE 0.68 12/02/2020    GLUCOSE 92 12/02/2020     HEMOGLOBIN A1C:   Lab Results   Component Value Date    LABA1C 5.5 03/02/2021     MICROALBUMIN URINE:   Lab Results   Component Value Date    MICROALBUR 14 10/11/2021     FASTING LIPID PANEL:  Lab Results   Component Value Date    CHOL 200 (H) 10/11/2021    HDL 50 10/11/2021    TRIG 94 10/11/2021     Lab Results   Component Value Date    LDLCHOLESTEROL 131 (H) 10/11/2021       LIVER PROFILE:  Lab Results   Component Value Date    ALT 21 04/21/2020    AST 18 04/21/2020    PROT 7.7 04/21/2020    BILITOT 0.31 04/21/2020    BILIDIR 0.11 06/18/2018    LABALBU 3.1 12/02/2020      THYROID FUNCTION:   Lab Results   Component Value Date    TSH 0.94 07/19/2017      URINEANALYSIS: No results found for: LABURIN  ASSESSMENT AND PLAN:    1. Essential hypertension  Increase Coreg  Call with home BP readings  Stop smoking    - carvedilol (COREG) 12.5 MG tablet; Take 1 tablet by mouth 2 times daily  Dispense: 60 tablet; Refill: 5    2. Centrilobular emphysema (HCC)    - albuterol sulfate HFA (VENTOLIN HFA) 108 (90 Base) MCG/ACT inhaler; Inhale 2 puffs into the lungs every 6 hours as needed for Wheezing  Dispense: 1 each; Refill: 3    3. Type 2 diabetes mellitus without complication, without long-term current use of insulin (HCC)  Off Metformin  monitor    - atorvastatin (LIPITOR) 40 MG tablet; Take 1 tablet by mouth daily  Dispense: 90 tablet; Refill: 1  - POCT glycosylated hemoglobin (Hb A1C)    4. Gastroesophageal reflux disease    - omeprazole (PRILOSEC) 20 MG delayed release capsule; TAKE 1 CAPSULE BY MOUTH EVERY DAY  Dispense: 30 capsule; Refill: 3    5. Pure hypercholesterolemia    - atorvastatin (LIPITOR) 40 MG tablet; Take 1 tablet by mouth daily  Dispense: 90 tablet; Refill: 1    6. Smoker    - nicotine (NICODERM CQ) 14 MG/24HR; Place 1 patch onto the skin daily  Dispense: 30 patch; Refill: 1    7. OAB (overactive bladder)    - trospium (SANCTURA) 20 MG tablet; Take 1 tablet by mouth 2 times daily  Dispense: 60 tablet; Refill: 0    8. Chronic allergic rhinitis    - azelastine (ASTELIN) 0.1 % nasal spray; 2 sprays by Nasal route 2 times daily Use in each nostril as directed  Dispense: 1 each; Refill: 2    9. Chronic pain of right lower extremity    - acetaminophen (TYLENOL) 500 MG tablet; Take 1 tablet by mouth 4 times daily as needed for Pain  Dispense: 30 tablet; Refill: 0    10. Need for prophylactic vaccination and inoculation against varicella    - zoster recombinant adjuvanted vaccine (SHINGRIX) 50 MCG/0.5ML SUSR injection; 50 MCG IM then repeat 2-6 months. Dispense: 0.5 mL; Refill: 1    11.  Chronic respiratory failure with hypoxia (HCC)  On home oxygen        FOLLOW UP AND INSTRUCTIONS:   Return in about 3 months (around 1/26/2022). 1. Carolina received counseling on the following healthy behaviors: nutrition, exercise, medication adherence and tobacco cessation    2. Reviewed prior labs and health maintenance. 3. Discussed use, benefit, and side effects of prescribed medications. Barriers to medication compliance addressed. All patient questions answered. Pt voiced understanding.      4. Patient given educational materials - see patient instructions    Jessica Chaney  Attending Physician, [address] [place] [address], Internal Medicine Residency Program  [address] [place] [address]  10/26/2021, 3:10 PM

## 2021-10-28 ENCOUNTER — TELEPHONE (OUTPATIENT)
Dept: INTERNAL MEDICINE | Age: 61
End: 2021-10-28

## 2021-10-28 DIAGNOSIS — J20.9 ACUTE BRONCHITIS, UNSPECIFIED ORGANISM: Primary | ICD-10-CM

## 2021-10-28 NOTE — TELEPHONE ENCOUNTER
Pt calling office asking for antibiotic for her chest, pt coughing up green mucus.  Pt states she was just here on 10/26/21 for her appt Please svetlana

## 2021-10-29 RX ORDER — AZITHROMYCIN 250 MG/1
250 TABLET, FILM COATED ORAL SEE ADMIN INSTRUCTIONS
Qty: 6 TABLET | Refills: 0 | Status: SHIPPED | OUTPATIENT
Start: 2021-10-29 | End: 2021-11-01

## 2021-11-01 ENCOUNTER — HOSPITAL ENCOUNTER (OUTPATIENT)
Dept: PAIN MANAGEMENT | Age: 61
Discharge: HOME | End: 2021-11-01
Payer: COMMERCIAL

## 2021-11-01 DIAGNOSIS — G89.29 CHRONIC MIDLINE LOW BACK PAIN WITH RIGHT-SIDED SCIATICA: ICD-10-CM

## 2021-11-01 DIAGNOSIS — M96.1 POSTLAMINECTOMY SYNDROME, LUMBAR REGION: ICD-10-CM

## 2021-11-01 DIAGNOSIS — M54.41 CHRONIC MIDLINE LOW BACK PAIN WITH RIGHT-SIDED SCIATICA: ICD-10-CM

## 2021-11-01 DIAGNOSIS — M17.0 PRIMARY OSTEOARTHRITIS OF BOTH KNEES: Primary | ICD-10-CM

## 2021-11-01 DIAGNOSIS — M51.9 LUMBAR DISC DISEASE: ICD-10-CM

## 2021-11-01 DIAGNOSIS — Z51.81 MEDICATION MONITORING ENCOUNTER: ICD-10-CM

## 2021-11-01 DIAGNOSIS — M47.26 OSTEOARTHRITIS OF SPINE WITH RADICULOPATHY, LUMBAR REGION: ICD-10-CM

## 2021-11-01 DIAGNOSIS — M47.26 OTHER SPONDYLOSIS WITH RADICULOPATHY, LUMBAR REGION: ICD-10-CM

## 2021-11-01 DIAGNOSIS — G89.29 CHRONIC MIDLINE LOW BACK PAIN WITH SCIATICA, SCIATICA LATERALITY UNSPECIFIED: ICD-10-CM

## 2021-11-01 DIAGNOSIS — M54.40 CHRONIC MIDLINE LOW BACK PAIN WITH SCIATICA, SCIATICA LATERALITY UNSPECIFIED: ICD-10-CM

## 2021-11-01 PROCEDURE — 99213 OFFICE O/P EST LOW 20 MIN: CPT

## 2021-11-01 PROCEDURE — 99442 PR PHYS/QHP TELEPHONE EVALUATION 11-20 MIN: CPT | Performed by: NURSE PRACTITIONER

## 2021-11-01 RX ORDER — HYDROCODONE BITARTRATE AND ACETAMINOPHEN 5; 325 MG/1; MG/1
1 TABLET ORAL EVERY 8 HOURS PRN
Qty: 90 TABLET | Refills: 0 | Status: SHIPPED | OUTPATIENT
Start: 2021-11-01 | End: 2021-12-03 | Stop reason: SDUPTHER

## 2021-11-01 ASSESSMENT — ENCOUNTER SYMPTOMS
GASTROINTESTINAL NEGATIVE: 1
RESPIRATORY NEGATIVE: 1
EYES NEGATIVE: 1
BACK PAIN: 1

## 2021-11-01 NOTE — PROGRESS NOTES
[place] [address] PROGRESS NOTE      Patient  completed []  video visit   [x]   phone call:  11       Minutes :       [x]    to  review Medication Agreement    []  Follow up after procedure   []  Discuss treatment options      Location:  Provider:  working from    [x]    home    []   [facility] - AKASH GALVAN ,   patient at  home       Chief Complaint: low back pain    She c/o low back pain. She had lumbar discectomy   In 2015 and lumbar fusion in 2020. She c/o stiffness , she c/o joint pain. She states sometimes she toss and turns with the cold weather,  She is trying to get back in therapy. She states her activity is limited. .hpi  Back Pain  This is a chronic problem. The problem occurs constantly. The problem is unchanged. The pain is present in the lumbar spine. Quality: dull. The pain does not radiate. The pain is at a severity of 8/10. The pain is severe. The pain is worse during the night. Exacerbated by: walking. She has tried analgesics and heat for the symptoms.    Treatment goals:  Functional status:  Get rid of pain    Aberrancy:   Any alcoholic beverages    no        Any illegal drugsno        Analgesia:  8                   Adverse  Effects :no      ADL;s :not very active      Data:  3-11-2021When was thelast UDS:            Was the UDS appropriate:  [x] yes []   no      Record/Diagnostics Review:      As above, I did review the imaging     3/16/2021  9:54 PM - Juan, M[facility] Incoming Lab Results From [employer]    Component Value Ref Range & Units Status Collected Lab   Pain Management Drug Panel Interp, Urine Inconsistent   Final 03/11/2021  1:53 PM [facility]   (NOTE)   ________________________________________________________________   DRUGS EXPECTED:   [address] (HYDROCODONE) [TODAY]   ________________________________________________________________   CONSISTENT with medications provided:   NORCO (HYDROCODONE) : based on hydrocodone, norhydrocodone,   hydromorphone ________________________________________________________________   INCONSISTENT with medications provided:   Gabapentin   ________________________________________________________________   Drugs Not Included in this Assay:   Acetaminophen   ________________________________________________________________   INTERPRETIVE INFORMATION: Targeted drug profile Interp   Interpretation depends on accuracy and completeness of patient   medication information submitted by client. EXAMINATION:   THREE XRAY VIEWS OF THE LUMBAR SPINE       3/5/2021 11:49 am       COMPARISON:   Lumbar spine radiographs performed 01/29/2021.       HISTORY:   ORDERING SYSTEM PROVIDED HISTORY: Spondylolisthesis of lumbar region       FINDINGS:   There is posterior fixation and artificial disc material at the L4-5 level   with stable alignment and no evidence for hardware complication.  The   vertebral body heights are maintained.  There is mild multilevel degenerative   disc disease at the remaining levels.  There is multilevel degenerative facet   hypertrophy. Omelia Sovereign is no spondylolisthesis.  The visualized bony pelvis is   intact.  The SI joints are maintained.  There is a calcified fibroid.           Impression   Posterior fixation at L4-5 with stable alignment and no evidence for hardware   complication.       Multilevel degenerative change without acute process.                         Pill count: appropriate    fill date   10-:    Morphine equivalent dose as reported on OARRS:  15  Periodic Controlled Substance Monitoring: Possible medication side effects, risk of tolerance/dependence & alternative treatments discussed., No signs of potential drug abuse or diversion identified. , Assessed functional status., Obtaining appropriate analgesic effect of treatment. Nenita Nguyen, APRN - CNP)  Review ofOARRS does not show any aberrant prescription behavior. Medication is helping the patient stay active.  Patient denies any side effects and reports adequate analgesia. No sign of misuse/abuse.             Past Medical History:   Diagnosis Date    Allergic rhinitis     Alveolar hypoventilation 11/20/2020    Aortic insufficiency 2018    mild-moderate on echo (was seen and discharged from [facility])    Bronchiectasis ([facility] 11/20/2020    Bronchitis     Chronic back pain     [facility] management at [facility]. COPD [facility]chronic obstructive [facility]) [facility]     Dr. Gilliland Kin to see 11/09/2020    Depression     DM (diabetes mellitus) ([facility] 12/18/2012    GERD (gastroesophageal reflux disease)     History of echocardiogram 05/2018    EF 65%, mild-moderate AI and TR    Hyperlipidemia     Dr. Marilyn Abbott Hypertension     Dr. Marilyn Abbott Obesity     Osteoarthritis     Peripheral vascular disease ([facility]     Primary osteoarthritis of both knees     Radicular pain of lumbosacral region     Spinal stenosis, lumbar region, without neurogenic claudication 04/30/2013    Tricuspid regurgitation 2018    mild-moderate on echo    Type II or unspecified type diabetes mellitus without mention of complication, not stated as uncontrolled     Dr. Marilyn Abbott Unspecified sleep apnea     no cpap used anymore    URI (upper respiratory infection)     Wears dentures     upper and lower full dentures    Wears glasses     Wellness examination     Dr. Kodak Flower -PCP last visit in early Oct. 2020       Past Surgical History:   Procedure Laterality Date    COLONOSCOPY  2/12/2009    normal    DILATION AND CURETTAGE OF UTERUS      GASTRECTOMY      partial    LUMBAR DISCECTOMY  01/2015    lumbar diskectomy    LUMBAR FUSION  11/19/2020     POSTERIOR FUSION L4/5,     LUMBAR FUSION N/A 11/19/2020    POSTERIOR FUSION L4/5, [employer], Forest Burk, [place] #[id number] AMINA performed by Asim Verde DO at [facility] 4/30/13    Lumbar Diagnostic Block,  Kenalog 40 mg    NERVE BLOCK  5/23/13    Lumbar Radiofrequency, Kenalog 40mg    NERVE BLOCK  8/12/13    Lt MBNB  celestone 6mg    NERVE BLOCK Left 8-28-13    left lumbar diagnostic block #2 decadron 10 mg    NERVE BLOCK Left 9-24-13    left lumbar median branch radiofrequency    NERVE BLOCK  07-02-14    caudal, celestone 9 mg    NERVE BLOCK  7-16-14    caudal epidural #2, celestone 9mg, fentanyl 25mcg    NERVE BLOCK  7/30/14    caudal #3 decadron 10mg    NERVE BLOCK  11-6-14    duramorph epidural steroid block  duramorph 1 mg celestone 9 mg    NERVE BLOCK  11/20/15    TENS- Empi Select    NERVE BLOCK  07/20/2018    right transforminal # 1 decadron 10mg,isovue    NERVE BLOCK Bilateral 02/01/2019    bilat mbnb- no steroid    NERVE BLOCK Bilateral 02/08/2019    bilat mbnb, marcaine . 25%    AR KNEE SCOPE,DIAGNOSTIC Right 3/24/2017    KNEE ARTHROSCOPY WITH PARTIAL MEDIAL MENISECAL DEBRIDMENT  performed by Judith Crain MD at [address]  9 20 2007    UPPER GASTROINTESTINAL ENDOSCOPY  4 21 2009,04/2011    gastritis, esophagitis       Allergies   Allergen Reactions    Aspirin      DUE TO ULCER    Claritin [Loratadine] Other (See Comments)     coughing    Flonase [Fluticasone Propionate]     Morphine And Related      GI Upset         Current Outpatient Medications:     gabapentin (NEURONTIN) 300 MG capsule, TAKE 1 CAPSULE IN MORNING AND AFTERNOON AND 2 CAPSULES AT NIGHT, Disp: 120 capsule, Rfl: 0    nicotine (NICODERM CQ) 14 MG/24HR, Place 1 patch onto the skin daily, Disp: 30 patch, Rfl: 1    trospium (SANCTURA) 20 MG tablet, Take 1 tablet by mouth 2 times daily, Disp: 60 tablet, Rfl: 0    lisinopril-hydroCHLOROthiazide (PRINZIDE;ZESTORETIC) 20-25 MG per tablet, TAKE 1 TABLET EVERY DAY, Disp: 90 tablet, Rfl: 1    omeprazole (PRILOSEC) 20 MG delayed release capsule, TAKE 1 CAPSULE BY MOUTH EVERY DAY, Disp: 30 capsule, Rfl: 3    azelastine (ASTELIN) 0.1 % nasal spray, 2 sprays by Nasal route 2 times daily Use in each nostril as directed, Disp: 1 each, Rfl: 2    potassium chloride (KLOR-CON M) 10 MEQ extended release tablet, Take 2 tablets by mouth daily, Disp: 60 tablet, Rfl: 2    carvedilol (COREG) 12.5 MG tablet, Take 1 tablet by mouth 2 times daily, Disp: 60 tablet, Rfl: 5    atorvastatin (LIPITOR) 40 MG tablet, Take 1 tablet by mouth daily, Disp: 90 tablet, Rfl: 1    tiZANidine (ZANAFLEX) 4 MG tablet, Take 1 tablet by mouth 2 times daily, Disp: 60 tablet, Rfl: 2    meloxicam (MOBIC) 7.5 MG tablet, Take 1 tablet by mouth daily, Disp: 30 tablet, Rfl: 2    amLODIPine (NORVASC) 10 MG tablet, TAKE 1 TABLET BY MOUTH DAILY, Disp: 90 tablet, Rfl: 1    docusate sodium (COLACE) 100 MG capsule, TAKE 1 CAPSULE BY MOUTH EVERY DAY, Disp: 30 capsule, Rfl: 9    Umeclidinium Bromide 62.5 MCG/INH AEPB, Inhale 1 puff into the lungs daily, Disp: 2 each, Rfl: 5    cetirizine (ZYRTEC) 10 MG tablet, TAKE 1 TABLET BY MOUTH DAILY, Disp: 30 tablet, Rfl: 5    mirtazapine (REMERON) 15 MG tablet, Take 15 mg by mouth nightly, Disp: , Rfl:     DULoxetine (CYMBALTA) 60 MG extended release capsule, Take 1 capsule by mouth daily, Disp: 30 capsule, Rfl: 3    zoster recombinant adjuvanted vaccine (SHINGRIX) 50 MCG/0.5ML SUSR injection, 50 MCG IM then repeat 2-6 months., Disp: 0.5 mL, Rfl: 1    albuterol sulfate HFA (VENTOLIN HFA) 108 (90 Base) MCG/ACT inhaler, Inhale 2 puffs into the lungs every 6 hours as needed for Wheezing, Disp: 1 each, Rfl: 3    acetaminophen (TYLENOL) 500 MG tablet, Take 1 tablet by mouth 4 times daily as needed for Pain, Disp: 30 tablet, Rfl: 0    Lancets MISC, 1 each by Does not apply route 2 times daily Use 2/day, Disp: 100 each, Rfl: 11    SPIRIVA HANDIHALER 18 MCG inhalation capsule, Inhale 1 capsule into the lungs daily (Patient not taking: Reported on 10/26/2021), Disp: 30 capsule, Rfl: 3    blood glucose test strips (EXACTECH TEST) strip, 1 each by In Vitro route daily As needed. , Disp: 50 each, Rfl: 11    diphenhydrAMINE (BENADRYL) 25 MG tablet, Take 25 mg by mouth every 6 hours as needed for Itching, Disp: , Rfl:     ipratropium-albuterol (DUONEB) 0.5-2.5 (3) MG/3ML SOLN nebulizer solution, Inhale 3 mLs into the lungs every 6 hours as needed for Shortness of Breath, Disp: 360 mL, Rfl: 11    Family History   Problem Relation Age of Onset    Diabetes Mother     Heart Disease Mother     Cirrhosis Father        Social History     Socioeconomic History    Marital status: Single     Spouse name: Not on file    Number of children: Not on file    Years of education: Not on file    Highest education level: Not on file   Occupational History     Employer: DISABLED   Tobacco Use    Smoking status: Current Every Day Smoker     Packs/day: 0.25     Years: 36.00     Pack years: 9.00     Types: Cigarettes    Smokeless tobacco: Never Used    Tobacco comment: 3 cigs per day   on nicoderm patch   Vaping Use    Vaping Use: Never used   Substance and Sexual Activity    Alcohol use: No     Alcohol/week: 0.0 standard drinks    Drug use: No     Comment: 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 7 yrs    Sexual activity: Never   Other Topics Concern    Not on file   Social History Narrative    10/9/20 Patient keeping contact with others to a minimum due to COVID 19 Pandemic. 10/9/20 Patient has difficulty with ambulation due to DJD, stenosis and fibromyalgia     Social Determinants of Health     Financial Resource Strain: Medium Risk    Difficulty of Paying Living Expenses: Somewhat hard   Food Insecurity: Food Insecurity Present    Worried About Running Out of Food in the Last Year: Sometimes true    Santosh of Food in the Last Year: Sometimes true   Transportation Needs:     Lack of Transportation (Medical):      Lack of Transportation (Non-Medical):    Physical Activity:     Days of Exercise per Week:     Minutes of Exercise per Session:    Stress:     Feeling of Stress :    Social Connections:     Frequency of Communication with Friends and Family:     Frequency of Social Gatherings with Friends and Family:     Attends Protestant Services:     Active Member of Clubs or Organizations:     Attends Club or Organization Meetings:     Marital Status:    Intimate Partner Violence:     Fear of Current or Ex-Partner:     Emotionally Abused:     Physically Abused:     Sexually Abused:          Review of Systems:  Review of Systems   Constitutional: Negative. HENT: Negative. Eyes: Negative. Cardiovascular: Negative. Respiratory: Negative. Endocrine:        Diabetic   Hematologic/Lymphatic: Bruises/bleeds easily. Skin: Negative. Musculoskeletal: Positive for back pain and joint pain. Gastrointestinal: Negative. Genitourinary: Negative. Neurological: Negative. Psychiatric/Behavioral: Negative. Physical Exam:  LMP 04/19/2003     Physical Exam  Skin:         Neurological:      Mental Status: She is alert and oriented to person, place, and time. Psychiatric:         Mood and Affect: Mood normal.         Thought Content:  Thought content normal.           Assessment:    Problem List Items Addressed This Visit     Postlaminectomy syndrome, lumbar region    Relevant Orders    Ambulatory referral to Physical Therapy    Other spondylosis with radiculopathy, lumbar region    Relevant Medications    HYDROcodone-acetaminophen ([address]) 5-325 MG per tablet    Osteoarthritis of spine with radiculopathy, lumbar region    Relevant Medications    HYDROcodone-acetaminophen ([address]) 5-325 MG per tablet    Medication monitoring encounter    Lumbar disc disease    Relevant Orders    Ambulatory referral to Physical Therapy    DJD (degenerative joint disease) of knee - Primary    Relevant Medications    HYDROcodone-acetaminophen (NORCO) 5-325 MG per tablet    Chronic low back pain    Relevant Medications    HYDROcodone-acetaminophen (NORCO) 5-325 MG per tablet    Other Relevant Orders    Ambulatory referral to over-the-counter medications before  takeing them. Patient is strongly advised to avoid tranquilizers or  relaxants, illegal drugs  or any medications that can depress breathing  Patient is also advised to tell us if there is any changes in their medications from other physicians.     1. Referral for PT to help strengthen and improve mobility        TREATMENT OPTIONS:     PT  Medication Agreement Requirements Met  Continue Opioid therapy  Script written for  hydrocodone  Follow up appointment made

## 2021-11-03 ENCOUNTER — TELEPHONE (OUTPATIENT)
Dept: PAIN MANAGEMENT | Age: 61
End: 2021-11-03

## 2021-11-03 NOTE — TELEPHONE ENCOUNTER
[facility]. Writer received a VM from Yadi Fraire, [facility], regarding referral for physical therapy for pt. Yadi Fraire requested the last note from her visit [facility], and also asked if Cesar SANTANA would be signing patients orders once pt is evaluated and a plan of care is established. Pt. Informed could send a fax cover sheet with request for patient s last note for PT referral to [facility]. Writer also will route call to Tameka Mckinney, Unit Coordinator, regarding request for last note from [facility], DEANA SANTANA.

## 2021-11-12 NOTE — PROGRESS NOTES
Physical Therapy  Facility/Department: [facility] NEURO  Daily Treatment Note  NAME: Radha Lyon  : 1960  MRN: [id number]    Date of Service: 2020    Discharge Recommendations:  Patient would benefit from continued therapy after discharge   PT Equipment Recommendations  Equipment Needed: No    Assessment   Body structures, Functions, Activity limitations: Decreased functional mobility ; Decreased safe awareness;Decreased endurance;Decreased balance; Increased pain  Assessment: Pt high fall risk and unsafe to return to prior living, recommend aggressive PT after d/c to address deficits  Prognosis: Good  REQUIRES PT FOLLOW UP: Yes  Activity Tolerance  Activity Tolerance: Treatment limited secondary to medical complications (free text)(O2 to 70's while sitting up to chair on 4L. RN present donned bipap, taking several minutes to return to 88%)     Patient Diagnosis(es): There were no encounter diagnoses. has a past medical history of Allergic rhinitis, Alveolar hypoventilation, Aortic insufficiency, Bronchiectasis (HCC), Bronchitis, Chronic back pain, COPD (chronic obstructive pulmonary disease) ([address] Depression, DM (diabetes mellitus) ([address] GERD (gastroesophageal reflux disease), History of echocardiogram, Hyperlipidemia, Hypertension, Obesity, Osteoarthritis, Peripheral vascular disease ([address] Primary osteoarthritis of both knees, Radicular pain of lumbosacral region, Spinal stenosis, lumbar region, without neurogenic claudication, Tricuspid regurgitation, Type II or unspecified type diabetes mellitus without mention of complication, not stated as uncontrolled, Unspecified sleep apnea, URI (upper respiratory infection), Wears dentures, Wears glasses, and Wellness examination. has a past surgical history that includes Dilation and curettage of uterus; gastrectomy; Nerve Block (Right, 13); Nerve Block (13); Colonoscopy (2009); Upper gastrointestinal endoscopy (2007);  Upper gastrointestinal endoscopy (4 21 2009,04/2011); Nerve Block (8/12/13); Nerve Block (Left, 8-28-13); Nerve Block (Left, 9-24-13); Nerve Block (07-02-14); Nerve Block (7-16-14); Nerve Block (7/30/14); Nerve Block (11-6-14); Nerve Block (11/20/15); pr knee scope,diagnostic (Right, 3/24/2017); Nerve Block (07/20/2018); Nerve Block (Bilateral, 02/01/2019); Nerve Block (Bilateral, 02/08/2019); lumbar discectomy (01/2015); lumbar fusion (11/19/2020); and lumbar fusion (N/A, 11/19/2020). Restrictions  Restrictions/Precautions  Restrictions/Precautions: General Precautions, Fall Risk, Up as Tolerated  Required Braces or Orthoses?: No  Position Activity Restriction  Other position/activity restrictions: up with assist; JPdrain  Subjective   General  Response To Previous Treatment: Patient with no complaints from previous session. Family / Caregiver Present: No  Subjective  Subjective: Pt resting in recliner upon arrival, agreeable to PT wtih MAX encouragment  Pain Screening  Patient Currently in Pain: No  Vital Signs  Patient Currently in Pain: No       Orientation  Orientation  Overall Orientation Status: Within Functional Limits  Cognition      Objective      Transfers  Sit to Stand: Unable to assess(Attemtpted to stand wtih RW, however O2 dropped to 70's, while on 4L, RN present, placed bipap. Respiratory caleld)  Ambulation  Ambulation?: No   Exercise  Seated LE exercise program: Long Arc Quads, hip abduction/adduction, heel/toe raises, and marches.  Reps: 15x     Goals  Short term goals  Time Frame for Short term goals: 12 visits  Short term goal 1: pt will be independent with bed mobility  Short term goal 2: pt will perform transfers independently  Short term goal 3: pt will ambulate 200' with least restrictive AD mod (I)  Short term goal 4: pt will ascend/descend 2 steps without handrails independently  Patient Goals   Patient goals : to decrease pain    Plan    Plan  Times per week: 5-6 visits per week  Times per day: Daily  Current Treatment Recommendations: Balance Training, Strengthening, Functional Mobility Training, Transfer Training, Gait Training, Stair training  Safety Devices  Type of devices:  All fall risk precautions in place, Call light within reach, Gait belt, Nurse notified, Left in chair  Restraints  Initially in place: No     Therapy Time   Individual Concurrent Group Co-treatment   Time In 1140         Time Out 1205         Minutes 25         Timed Code Treatment Minutes: [address] Dominguez, PTA Tissue Cultured Epidermal Autograft Text: The defect edges were debeveled with a #15 scalpel blade.  Given the location of the defect, shape of the defect and the proximity to free margins a tissue cultured epidermal autograft was deemed most appropriate.  The graft was then trimmed to fit the size of the defect.  The graft was then placed in the primary defect and oriented appropriately.

## 2021-11-17 DIAGNOSIS — J30.9 CHRONIC ALLERGIC RHINITIS: ICD-10-CM

## 2021-11-17 RX ORDER — CETIRIZINE HYDROCHLORIDE 10 MG/1
TABLET ORAL
Qty: 30 TABLET | Refills: 4 | Status: SHIPPED | OUTPATIENT
Start: 2021-11-17 | End: 2022-05-05

## 2021-11-17 NOTE — TELEPHONE ENCOUNTER
Refill request for cetirizine (ZYRTEC) 10 MG tablet. If appropriate please send medication(s) to patients pharmacy. Next appt: 1/25/2022      Health Maintenance   Topic Date Due    Shingles Vaccine (2 of 2) 08/19/2019    COVID-19 Vaccine (3 - Booster for Pfizer series) 11/10/2021    Diabetic retinal exam  10/31/2022 (Originally 5/14/2021)    Potassium monitoring  12/02/2021    Creatinine monitoring  12/02/2021    Diabetic foot exam  03/02/2022    Diabetic microalbuminuria test  10/11/2022    Lipid screen  10/11/2022    Annual Wellness Visit (AWV)  10/22/2022    A1C test (Diabetic or Prediabetic)  10/26/2022    Breast cancer screen  04/27/2023    Pneumococcal 0-64 years Vaccine (2 of 2 - PPSV23) 07/08/2023    Colon cancer screen fecal DNA test (Cologuard)  10/05/2023    Cervical cancer screen  03/02/2026    DTaP/Tdap/Td vaccine (2 - Td or Tdap) 06/24/2029    Flu vaccine  Completed    Hepatitis C screen  Completed    HIV screen  Completed    Hepatitis A vaccine  Aged Out    Hib vaccine  Aged Out    Meningococcal (ACWY) vaccine  Aged Out       Hemoglobin A1C (%)   Date Value   10/26/2021 5.7   03/02/2021 5.5   04/21/2020 5.8             ( goal A1C is < 7)   Microalb/Crt.  Ratio (mcg/mg creat)   Date Value   10/11/2021 11     LDL Cholesterol (mg/dL)   Date Value   10/11/2021 131 (H)       (goal LDL is <100)   AST (U/L)   Date Value   04/21/2020 18     ALT (U/L)   Date Value   04/21/2020 21     BUN (mg/dL)   Date Value   12/02/2020 15     BP Readings from Last 3 Encounters:   10/26/21 (!) 150/90   06/24/21 130/86   06/09/21 138/77          (goal 120/80)          Patient Active Problem List:     DJD (degenerative joint disease) of knee     Osteoarthritis of spine with radiculopathy, lumbar region     GERD (gastroesophageal reflux disease)     COPD, severity to be determined ([address]     HTN (hypertension)     Allergic rhinitis     Lipoma of shoulder s/p excision right posterior 11 17 2008     History of tobacco use     DM (diabetes mellitus)     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 9)     Bronchiectasis ([address]     Centrilobular emphysema ([address]     Oxygen dependent     Acute postoperative anemia due to expected blood loss     Multifocal pneumonia     Acute on chronic respiratory failure with hypoxia (HCC)     Pulmonary function studies abnormal     Tobacco dependence     Idiopathic sleep related nonobstructive alveolar hypoventilation     Postlaminectomy syndrome, lumbar region     Trigger finger of right thumb

## 2021-11-18 ENCOUNTER — TELEPHONE (OUTPATIENT)
Dept: PAIN MANAGEMENT | Age: 61
End: 2021-11-18

## 2021-11-18 NOTE — TELEPHONE ENCOUNTER
Debbie Best called asking for visit notes from last visit with this patient. According to Debbie Best, patient needs to either have a video visit or an in person visit to qualify for Home Physical therapy. She will call the patient to see if she has the capability to do a video visit or she might need to have an inperson visit. She will ask the patient to call and set up the appointment.

## 2021-12-03 ENCOUNTER — HOSPITAL ENCOUNTER (OUTPATIENT)
Dept: GENERAL RADIOLOGY | Age: 61
Discharge: HOME | End: 2021-12-05
Payer: COMMERCIAL

## 2021-12-03 ENCOUNTER — HOSPITAL ENCOUNTER (OUTPATIENT)
Dept: PAIN MANAGEMENT | Age: 61
Discharge: HOME | End: 2021-12-03
Payer: COMMERCIAL

## 2021-12-03 ENCOUNTER — HOSPITAL ENCOUNTER (OUTPATIENT)
Age: 61
Discharge: HOME | End: 2021-12-05
Payer: COMMERCIAL

## 2021-12-03 VITALS
HEIGHT: 65 IN | SYSTOLIC BLOOD PRESSURE: 131 MMHG | HEART RATE: 87 BPM | TEMPERATURE: 97.5 F | BODY MASS INDEX: 30.49 KG/M2 | RESPIRATION RATE: 16 BRPM | OXYGEN SATURATION: 92 % | WEIGHT: 183 LBS | DIASTOLIC BLOOD PRESSURE: 94 MMHG

## 2021-12-03 DIAGNOSIS — M51.9 LUMBAR DISC DISEASE: Primary | ICD-10-CM

## 2021-12-03 DIAGNOSIS — M17.0 PRIMARY OSTEOARTHRITIS OF BOTH KNEES: ICD-10-CM

## 2021-12-03 DIAGNOSIS — M47.26 OTHER SPONDYLOSIS WITH RADICULOPATHY, LUMBAR REGION: ICD-10-CM

## 2021-12-03 DIAGNOSIS — Z51.81 MEDICATION MONITORING ENCOUNTER: ICD-10-CM

## 2021-12-03 DIAGNOSIS — M43.16 SPONDYLOLISTHESIS OF LUMBAR REGION: ICD-10-CM

## 2021-12-03 DIAGNOSIS — G89.29 CHRONIC MIDLINE LOW BACK PAIN WITH SCIATICA, SCIATICA LATERALITY UNSPECIFIED: ICD-10-CM

## 2021-12-03 DIAGNOSIS — M96.1 POSTLAMINECTOMY SYNDROME, LUMBAR REGION: ICD-10-CM

## 2021-12-03 DIAGNOSIS — Z98.1 S/P LUMBAR FUSION: ICD-10-CM

## 2021-12-03 DIAGNOSIS — M54.40 CHRONIC MIDLINE LOW BACK PAIN WITH SCIATICA, SCIATICA LATERALITY UNSPECIFIED: ICD-10-CM

## 2021-12-03 PROCEDURE — 72100 X-RAY EXAM L-S SPINE 2/3 VWS: CPT

## 2021-12-03 PROCEDURE — 99213 OFFICE O/P EST LOW 20 MIN: CPT

## 2021-12-03 PROCEDURE — 99213 OFFICE O/P EST LOW 20 MIN: CPT | Performed by: NURSE PRACTITIONER

## 2021-12-03 RX ORDER — MELOXICAM 7.5 MG/1
7.5 TABLET ORAL DAILY
Qty: 30 TABLET | Refills: 2 | Status: SHIPPED | OUTPATIENT
Start: 2021-12-03 | End: 2022-04-26 | Stop reason: SDUPTHER

## 2021-12-03 RX ORDER — HYDROCODONE BITARTRATE AND ACETAMINOPHEN 5; 325 MG/1; MG/1
1 TABLET ORAL EVERY 8 HOURS PRN
Qty: 90 TABLET | Refills: 0 | Status: SHIPPED | OUTPATIENT
Start: 2021-12-03 | End: 2022-01-03 | Stop reason: SDUPTHER

## 2021-12-03 ASSESSMENT — ENCOUNTER SYMPTOMS
COUGH: 0
SHORTNESS OF BREATH: 0
BACK PAIN: 1
CONSTIPATION: 0

## 2021-12-03 NOTE — PROGRESS NOTES
Patient is here today to review medication contract. Chief Complaint: back pain    [facility]  Patient has had low back pain with no known injury and bilateral knee pain for many years. Mirna Jeffery had a knee arthroscopy in March 2017 Genicular block was discussed but she declined.  She is s/p lumbar diskectomy in 2015 and lumbar fusion 11/2020. She is stable and compliant on norco 5/325 TID. Gabapentin prescribed by PCP for neuropathic pain in feet. Follows with Dr. Zana Clifford for knees.      EMG was completed and shows Abnormal study of the right lower extremity and Normal study of the left lower extremity. Kenisha Ramos is electrodiagnostic evidence of a very mild chronic right S1 radiculopathy without active denervation. Kenisha Ramos is no electrodiagnostic evidence of a generalized large fiber peripheral Polyneuropathy. She continues physical therapy with moderate benefit. She states she is able to tolerate more activity. She will see NS on 12/7     Back Pain  This is a chronic problem. The current episode started more than 1 year ago. The problem occurs constantly. The problem is unchanged. The pain is present in the lumbar spine. The quality of the pain is described as aching. Radiates to: down both legs to the knees. The pain is at a severity of 8/10. The pain is moderate. The symptoms are aggravated by position and standing (walking). Pertinent negatives include no chest pain, fever, numbness, paresthesias or tingling. She has tried analgesics and bed rest for the symptoms. The treatment provided mild relief. Patient denies any new neurological symptoms. No bowel or bladder incontinence, no weakness, and no falling.     Pill count: appropriate was due 12/1    Morphine equivalent: 15    Periodic Controlled Substance Monitoring: Possible medication side effects, risk of tolerance/dependence & alternative treatments discussed., No signs of potential drug abuse or diversion identified., Obtaining appropriate analgesic effect of treatment.  ELVIRA Ordoñez - CNP)      Past Medical History:   Diagnosis Date    Allergic rhinitis     Alveolar hypoventilation 11/20/2020    Aortic insufficiency 2018    mild-moderate on echo (was seen and [facility])    Bronchiectasis ([facility] 11/20/2020    Bronchitis     Chronic back pain     [facility] at [facility]. COPD (chronic obstructive pulmonary disease) ([facility]     Dr. Neil Hollingsworth to see 11/09/2020    Depression     DM (diabetes mellitus) ([facility] 12/18/2012    GERD (gastroesophageal reflux disease)     History of echocardiogram 05/2018    EF 65%, mild-moderate AI and TR    Hyperlipidemia     Dr. Donya Forde Hypertension     Dr. Donya Forde Obesity     Osteoarthritis     Peripheral vascular disease ([facility]     Primary osteoarthritis of both knees     Radicular pain of lumbosacral region     Spinal stenosis, lumbar region, without neurogenic claudication 04/30/2013    Tricuspid regurgitation 2018    mild-moderate on echo    Type II or unspecified type diabetes mellitus without mention of complication, not stated as uncontrolled     Dr. Donya Forde Unspecified sleep apnea     no cpap used anymore    URI (upper respiratory infection)     Wears dentures     upper and lower full dentures    Wears glasses     Wellness examination     Dr. Jacqueline Daly -PCP last visit in early Oct. 2020       Past Surgical History:   Procedure Laterality Date    COLONOSCOPY  2/12/2009    normal    DILATION AND CURETTAGE OF UTERUS      GASTRECTOMY      partial    LUMBAR DISCECTOMY  01/2015    lumbar diskectomy    LUMBAR FUSION  11/19/2020     POSTERIOR FUSION L4/5,     LUMBAR FUSION N/A 11/19/2020    POSTERIOR FUSION L4/5, [employer], Dorette Skiff, EVOKES #[id number] AMINA performed by Tamara Brar DO at [facility] 4/30/13    Lumbar Diagnostic Block,  Kenalog 40 mg    NERVE BLOCK  5/23/13    Lumbar Radiofrequency, Kenalog 40mg    NERVE BLOCK  8/12/13    Lt MBNB  celestone 6mg    NERVE BLOCK Left 8-28-13    left lumbar diagnostic block #2 decadron 10 mg    NERVE BLOCK Left 9-24-13    left lumbar median branch radiofrequency    NERVE BLOCK  07-02-14    caudal, celestone 9 mg    NERVE BLOCK  7-16-14    caudal epidural #2, celestone 9mg, fentanyl 25mcg    NERVE BLOCK  7/30/14    caudal #3 decadron 10mg    NERVE BLOCK  11-6-14    duramorph epidural steroid block  duramorph 1 mg celestone 9 mg    NERVE BLOCK  11/20/15    TENS- Empi Select    NERVE BLOCK  07/20/2018    right transforminal # 1 decadron 10mg,isovue    NERVE BLOCK Bilateral 02/01/2019    bilat mbnb- no steroid    NERVE BLOCK Bilateral 02/08/2019    bilat mbnb, marcaine . 25%    [place] KNEE SCOPE,DIAGNOSTIC Right 3/24/2017    KNEE ARTHROSCOPY WITH PARTIAL MEDIAL MENISECAL DEBRIDMENT  performed by Nyra Fothergill, MD at [address] 9 20 2007    UPPER GASTROINTESTINAL ENDOSCOPY  4 21 2009,04/2011    gastritis, esophagitis       Allergies   Allergen Reactions    Aspirin      DUE TO ULCER    Claritin [Loratadine] Other (See Comments)     coughing    Flonase [Fluticasone Propionate]     Morphine And Related      GI Upset         Current Outpatient Medications:     cetirizine (ZYRTEC) 10 MG tablet, TAKE 1 TABLET BY MOUTH DAILY, Disp: 30 tablet, Rfl: 4    zoster recombinant adjuvanted vaccine (SHINGRIX) 50 MCG/0.5ML SUSR injection, 50 MCG IM then repeat 2-6 months., Disp: 0.5 mL, Rfl: 1    gabapentin (NEURONTIN) 300 MG capsule, TAKE 1 CAPSULE IN MORNING AND AFTERNOON AND 2 CAPSULES AT NIGHT, Disp: 120 capsule, Rfl: 0    nicotine (NICODERM CQ) 14 MG/24HR, Place 1 patch onto the skin daily, Disp: 30 patch, Rfl: 1    trospium (SANCTURA) 20 MG tablet, Take 1 tablet by mouth 2 times daily, Disp: 60 tablet, Rfl: 0    lisinopril-hydroCHLOROthiazide (PRINZIDE;ZESTORETIC) 20-25 MG per tablet, TAKE 1 TABLET EVERY DAY, Disp: 90 tablet, Rfl: 1    omeprazole (PRILOSEC) 20 MG delayed release capsule, TAKE 1 CAPSULE BY MOUTH EVERY DAY, Disp: 30 capsule, Rfl: 3    azelastine (ASTELIN) 0.1 % nasal spray, 2 sprays by Nasal route 2 times daily Use in each nostril as directed, Disp: 1 each, Rfl: 2    potassium chloride (KLOR-CON M) 10 MEQ extended release tablet, Take 2 tablets by mouth daily, Disp: 60 tablet, Rfl: 2    carvedilol (COREG) 12.5 MG tablet, Take 1 tablet by mouth 2 times daily, Disp: 60 tablet, Rfl: 5    albuterol sulfate HFA (VENTOLIN HFA) 108 (90 Base) MCG/ACT inhaler, Inhale 2 puffs into the lungs every 6 hours as needed for Wheezing, Disp: 1 each, Rfl: 3    acetaminophen (TYLENOL) 500 MG tablet, Take 1 tablet by mouth 4 times daily as needed for Pain, Disp: 30 tablet, Rfl: 0    atorvastatin (LIPITOR) 40 MG tablet, Take 1 tablet by mouth daily, Disp: 90 tablet, Rfl: 1    tiZANidine (ZANAFLEX) 4 MG tablet, Take 1 tablet by mouth 2 times daily, Disp: 60 tablet, Rfl: 2    meloxicam (MOBIC) 7.5 MG tablet, Take 1 tablet by mouth daily, Disp: 30 tablet, Rfl: 2    amLODIPine (NORVASC) 10 MG tablet, TAKE 1 TABLET BY MOUTH DAILY, Disp: 90 tablet, Rfl: 1    docusate sodium (COLACE) 100 MG capsule, TAKE 1 CAPSULE BY MOUTH EVERY DAY, Disp: 30 capsule, Rfl: 9    Umeclidinium Bromide 62.5 MCG/INH AEPB, Inhale 1 puff into the lungs daily, Disp: 2 each, Rfl: 5    Lancets MISC, 1 each by Does not apply route 2 times daily Use 2/day, Disp: 100 each, Rfl: 11    SPIRIVA HANDIHALER 18 MCG inhalation capsule, Inhale 1 capsule into the lungs daily, Disp: 30 capsule, Rfl: 3    blood glucose test strips (EXACTECH TEST) strip, 1 each by In Vitro route daily As needed. , Disp: 50 each, Rfl: 11    diphenhydrAMINE (BENADRYL) 25 MG tablet, Take 25 mg by mouth every 6 hours as needed for Itching, Disp: , Rfl:     mirtazapine (REMERON) 15 MG tablet, Take 15 mg by mouth nightly, Disp: , Rfl:     DULoxetine (CYMBALTA) 60 MG extended release capsule, Take 1 capsule by mouth daily, Disp: 30 capsule, Rfl: 3    ipratropium-albuterol (DUONEB) 0.5-2.5 (3) MG/3ML SOLN nebulizer solution, Inhale 3 mLs into the lungs every 6 hours as needed for Shortness of Breath, Disp: 360 mL, Rfl: 11    Family History   Problem Relation Age of Onset    Diabetes Mother     Heart Disease Mother     Cirrhosis Father        Social History     Socioeconomic History    Marital status: Single     Spouse name: Not on file    Number of children: Not on file    Years of education: Not on file    Highest education level: Not on file   Occupational History     Employer: DISABLED   Tobacco Use    Smoking status: Current Every Day Smoker     Packs/day: 0.25     Years: 36.00     Pack years: 9.00     Types: Cigarettes    Smokeless tobacco: Never Used    Tobacco comment: 3 cigs per day   on nicoderm patch   Vaping Use    Vaping Use: Never used   Substance and Sexual Activity    Alcohol use: No     Alcohol/week: 0.0 standard drinks    Drug use: No     Comment: 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 7 yrs    Sexual activity: Never   Other Topics Concern    Not on file   Social History Narrative    10/9/20 Patient keeping contact with others to a minimum due to [place] 19 Pandemic. 10/9/20 Patient has difficulty with ambulation due to DJD, stenosis and fibromyalgia     Social Determinants of Health     Financial Resource Strain: Medium Risk    Difficulty of Paying Living Expenses: Somewhat hard   Food Insecurity: Food Insecurity Present    Worried About Running Out of Food in the Last Year: Sometimes true    Santosh of Food in the Last Year: Sometimes true   Transportation Needs:     Lack of Transportation (Medical): Not on file    Lack of Transportation (Non-Medical):  Not on file   Physical Activity:     Days of Exercise per Week: Not on file    Minutes of Exercise per Session: Not on file   Stress:     Feeling of Stress : Not on file   Social Connections:     Frequency of Communication with Friends and Family: Not on file    Frequency of Social Gatherings with Friends and Family: Not on file    Attends Quaker Services: Not on file    Active Member of Clubs or Organizations: Not on file    Attends Club or Organization Meetings: Not on file    Marital Status: Not on file   Intimate Partner Violence:     Fear of Current or Ex-Partner: Not on file    Emotionally Abused: Not on file    Physically Abused: Not on file    Sexually Abused: Not on file   Housing Stability:     Unable to Pay for Housing in the Last Year: Not on file    Number of [place] in the Last Year: Not on file    Unstable Housing in the Last Year: Not on file       Review of Systems:  Review of Systems   Constitutional: Negative for chills and fever. Cardiovascular: Negative for chest pain and palpitations. Respiratory: Negative for cough and shortness of breath. Musculoskeletal: Positive for back pain. Gastrointestinal: Negative for constipation. Neurological: Negative for disturbances in coordination, loss of balance, numbness, paresthesias and tingling. Physical Exam:  BP (!) 131/94   Pulse 87   Temp 97.5 °F (36.4 °C)   Resp 16   Ht 5' 5\" (1.651 m)   Wt 183 lb (83 kg)   LMP 04/19/2003   SpO2 92%   BMI 30.45 kg/m²     Physical Exam  HENT:      Head: Normocephalic. Pulmonary:      Effort: Pulmonary effort is normal.   Musculoskeletal:         General: Normal range of motion. Cervical back: Normal range of motion. Lumbar back: Tenderness present. Skin:     General: Skin is warm and dry. Neurological:      Mental Status: She is alert and oriented to person, place, and time.          Record/Diagnostics Review:    Last óscar 3/2021 and was appropriate     ABERRANT BEHAVIORS SINCE LAST VISIT  Lost rx/pills:------------------------------------------ no  Taking  medication as prescribed: ----------- yes  Urine Drug Screen ---------------------------------Kelsey Nathaly             Date-----------------------------------------------3/16/2021              Results as expected ---------------------yes     Recent ER visits: -------------------------------------No  Pill count is appropriate: ---------------------------yes   Refills for prescriptions appropriate:---------- yes     Assessment:  Problem List Items Addressed This Visit     Primary osteoarthritis of both knees    Relevant Medications    HYDROcodone-acetaminophen (NORCO) 5-325 MG per tablet    meloxicam (MOBIC) 7.5 MG tablet    Medication monitoring encounter    Chronic low back pain    Relevant Medications    HYDROcodone-acetaminophen (NORCO) 5-325 MG per tablet    meloxicam (MOBIC) 7.5 MG tablet    Other spondylosis with radiculopathy, lumbar region    Relevant Medications    HYDROcodone-acetaminophen (NORCO) 5-325 MG per tablet    meloxicam (MOBIC) 7.5 MG tablet    Lumbar disc disease - Primary    Postlaminectomy syndrome, lumbar region             Treatment Plan:  Patient relates current medications are helping the pain. Patient reports taking pain medications as prescribed, denies obtaining medications from different sources and denies use of illegal drugs. Patient denies side effects from medications like nausea, vomiting, constipation or drowsiness. Patient reports current activities of daily living are possible due to medications and would like to continue them. As always, we encourage daily stretching and strengthening exercises, and recommend minimizing use of pain medications unless patient cannot get through daily activities due to pain. Contract requirements met. Continue opioid therapy.  Script written for norco  Pt will see NS next week - needs to complete lumbar XR - did inform her it was ordered and she can complete [facility] at [facility] today  Follow up appointment made for 4 weeks

## 2021-12-06 ENCOUNTER — TELEPHONE (OUTPATIENT)
Dept: PAIN MANAGEMENT | Age: 61
End: 2021-12-06

## 2021-12-07 ENCOUNTER — OFFICE VISIT (OUTPATIENT)
Dept: NEUROSURGERY | Age: 61
End: 2021-12-07
Payer: COMMERCIAL

## 2021-12-07 VITALS
HEART RATE: 79 BPM | OXYGEN SATURATION: 96 % | DIASTOLIC BLOOD PRESSURE: 96 MMHG | BODY MASS INDEX: 30.49 KG/M2 | SYSTOLIC BLOOD PRESSURE: 175 MMHG | HEIGHT: 65 IN | WEIGHT: 183 LBS

## 2021-12-07 DIAGNOSIS — M43.16 SPONDYLOLISTHESIS OF LUMBAR REGION: Primary | ICD-10-CM

## 2021-12-07 DIAGNOSIS — Z98.1 S/P LUMBAR FUSION: ICD-10-CM

## 2021-12-07 PROCEDURE — G8417 CALC BMI ABV UP PARAM F/U: HCPCS | Performed by: NURSE PRACTITIONER

## 2021-12-07 PROCEDURE — 3017F COLORECTAL CA SCREEN DOC REV: CPT | Performed by: NURSE PRACTITIONER

## 2021-12-07 PROCEDURE — G8484 FLU IMMUNIZE NO ADMIN: HCPCS | Performed by: NURSE PRACTITIONER

## 2021-12-07 PROCEDURE — 99213 OFFICE O/P EST LOW 20 MIN: CPT | Performed by: NURSE PRACTITIONER

## 2021-12-07 PROCEDURE — G8427 DOCREV CUR MEDS BY ELIG CLIN: HCPCS | Performed by: NURSE PRACTITIONER

## 2021-12-07 PROCEDURE — 4004F PT TOBACCO SCREEN RCVD TLK: CPT | Performed by: NURSE PRACTITIONER

## 2021-12-07 NOTE — PROGRESS NOTES
[facility] [address] [place] , [address] [facility] [address]  Dept: [phone]    Patient:  Aminta Peterson  YOB: 1960  Date: 12/7/21    The patient is a 59 y.o. female who presents today for consult of the following problems:     Chief Complaint   Patient presents with    Follow-up         HPI:     Aminta Peterson is a 59 y.o. female presents for follow-up post L4/5 posterior fusion November of last year. Does report improvement to overall lower extremity symptoms, no longer experiencing any numbness and tingling. Right lower extremity weakness has improved, still reports some mild residual weakness. Is now able to ambulate without a cane at times. Does still have occasional axial back discomfort, particularly with bending over. Continues to follow with pain management for chronic pain needs. Did stop utilizing bone stimulator at her last appointment with Dr. Dread Hollis in approximately March. States it made her leg pain worse which improved after stopping. Does continue physical therapy for overall strength, gait stability. Was recently referred for additional course through pain management.     History:     Past Medical History:   Diagnosis Date    Allergic rhinitis     Alveolar hypoventilation 11/20/2020    Aortic insufficiency 2018    mild-moderate on echo (was seen and discharged from [facility]ology-[facility])    Bronchiectasis ([facility] 11/20/2020    Bronchitis     Chronic back pain     Pain management at [place] [id number]. COPD (chronic obstructive pulmonary disease) ([facility] [address] [address]     Dr. Yee Mayfield to see 11/09/2020    Depression     DM (diabetes mellitus) ([facility] 12/18/2012    GERD (gastroesophageal reflux disease)     History of echocardiogram 05/2018    EF 65%, mild-moderate AI and TR    Hyperlipidemia     Dr. Bea Jaimes Hypertension     Dr. Bea Jaimes Obesity     Osteoarthritis     Peripheral vascular disease ([facility]     Primary osteoarthritis of both knees     Radicular pain of lumbosacral region     Spinal stenosis, lumbar region, without neurogenic claudication 04/30/2013    Tricuspid regurgitation 2018    mild-moderate on echo    Type II or unspecified type diabetes mellitus without mention of complication, not stated as uncontrolled     Dr. Meenu Barros Unspecified sleep apnea     no cpap used anymore    URI (upper respiratory infection)     Wears dentures     upper and lower full dentures    Wears glasses     Wellness examination     Dr. Carter De La Torre -PCP last visit in early Oct. 2020     Past Surgical History:   Procedure Laterality Date    COLONOSCOPY  2/12/2009    normal    DILATION AND CURETTAGE OF UTERUS      GASTRECTOMY      partial    LUMBAR DISCECTOMY  01/2015    lumbar diskectomy    LUMBAR FUSION  11/19/2020     POSTERIOR FUSION L4/5,     LUMBAR FUSION N/A 11/19/2020    POSTERIOR FUSION L4/5, [employer], Moiz Raring, [place]ES #[id number] AMINA performed by Sal Javier,  at [address] Right 4/30/13    Lumbar Diagnostic Block,  Kenalog 40 mg    NERVE BLOCK  5/23/13    Lumbar Radiofrequency, Kenalog 40mg    NERVE BLOCK  8/12/13    Lt MBNB  celestone 6mg    NERVE BLOCK Left 8-28-13    left lumbar diagnostic block #2 decadron 10 mg    NERVE BLOCK Left 9-24-13    left lumbar median branch radiofrequency    NERVE BLOCK  07-02-14    caudal, celestone 9 mg    NERVE BLOCK  7-16-14    caudal epidural #2, celestone 9mg, fentanyl 25mcg    NERVE BLOCK  7/30/14    caudal #3 decadron 10mg    NERVE BLOCK  11-6-14    duramorph epidural steroid block  duramorph 1 mg celestone 9 mg    NERVE BLOCK  11/20/15    TENS- Empi Select    NERVE BLOCK  07/20/2018    right transforminal # 1 decadron 10mg,isovue    NERVE BLOCK Bilateral 02/01/2019    bilat mbnb- no steroid    NERVE BLOCK Bilateral 02/08/2019    bilat mbnb, marcaine . 25%    [place] KNEE SCOPE,DIAGNOSTIC Right 3/24/2017 amLODIPine (NORVASC) 10 MG tablet TAKE 1 TABLET BY MOUTH DAILY 90 tablet 1    docusate sodium (COLACE) 100 MG capsule TAKE 1 CAPSULE BY MOUTH EVERY DAY 30 capsule 9    Umeclidinium Bromide 62.5 MCG/INH AEPB Inhale 1 puff into the lungs daily 2 each 5    Lancets MISC 1 each by Does not apply route 2 times daily Use 2/day 100 each 11    SPIRIVA HANDIHALER 18 MCG inhalation capsule Inhale 1 capsule into the lungs daily 30 capsule 3    blood glucose test strips (EXACTECH TEST) strip 1 each by In Vitro route daily As needed. 50 each 11    diphenhydrAMINE (BENADRYL) 25 MG tablet Take 25 mg by mouth every 6 hours as needed for Itching      mirtazapine (REMERON) 15 MG tablet Take 15 mg by mouth nightly      DULoxetine (CYMBALTA) 60 MG extended release capsule Take 1 capsule by mouth daily 30 capsule 3    ipratropium-albuterol (DUONEB) 0.5-2.5 (3) MG/3ML SOLN nebulizer solution Inhale 3 mLs into the lungs every 6 hours as needed for Shortness of Breath 360 mL 11    gabapentin (NEURONTIN) 300 MG capsule TAKE 1 CAPSULE IN MORNING AND AFTERNOON AND 2 CAPSULES AT NIGHT 120 capsule 0    nicotine (NICODERM CQ) 14 MG/24HR Place 1 patch onto the skin daily 30 patch 1     No current facility-administered medications on file prior to visit.      Social History     Tobacco Use    Smoking status: Current Every Day Smoker     Packs/day: 0.25     Years: 36.00     Pack years: 9.00     Types: Cigarettes    Smokeless tobacco: Never Used    Tobacco comment: 3 cigs per day   on nicoderm patch   Vaping Use    Vaping Use: Never used   Substance Use Topics    Alcohol use: No     Alcohol/week: 0.0 standard drinks    Drug use: No     Comment: 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 7 yrs       Allergies   Allergen Reactions    Aspirin      DUE TO ULCER    Claritin [Loratadine] Other (See Comments)     coughing    Flonase [Fluticasone Propionate]     Morphine And Related      GI Upset       Review of Systems  Constitutional: Negative for activity change and appetite change. HENT: Negative for ear pain and facial swelling. Eyes: Negative for discharge and itching. Respiratory: Negative for choking and chest tightness. Cardiovascular: Negative for chest pain and leg swelling. Gastrointestinal: Negative for nausea and abdominal pain. Endocrine: Negative for cold intolerance and heat intolerance. Genitourinary: Negative for frequency and flank pain. Musculoskeletal: Negative for myalgias and joint swelling. Skin: Negative for rash and wound. Allergic/Immunologic: Negative for environmental allergies and food allergies. Hematological: Negative for adenopathy. Does not bruise/bleed easily. Psychiatric/Behavioral: Negative for self-injury. The patient is not nervous/anxious. Physical Exam:      BP (!) 175/96   Pulse 79   Ht 5' 5\" (1.651 m)   Wt 183 lb (83 kg)   LMP 04/19/2003   SpO2 96%   BMI 30.45 kg/m²   Estimated body mass index is 30.45 kg/m² as calculated from the following:    Height as of this encounter: 5' 5\" (1.651 m). Weight as of this encounter: 183 lb (83 kg). General:  Michaela Ashley is a 59y.o. year old female who appears her stated age. HEENT: Normocephalic atraumatic. Neck supple. Chest: regular rate; pulses equal  Abdomen: Soft nontender nondistended.   Ext: DP and PT pulses 2+, good cap refill  Neuro    Mentation  Appropriate affect  Registration intact  Orientation intact  3 item recall intact  Judgement intact to situation    Cranial Nerves:   Pupils equal and reactive to light  Extraocular motion intact  Face and shrug symmetric  Tongue midline  No dysarthria  v1-3 sensation symmetric, masseter tone symmetric  Hearing symmetric    Sensation: Intact    Motor  L deltoid 5/5; R deltoid 5/5  L biceps 5/5; R biceps 5/5  L triceps 5/5; R triceps 5/5  L wrist extension 5/5; R wrist extension 5/5  L intrinsics 5/5; R intrinsics 5/5     L iliopsoas 5/5 , R iliopsoas 5/5  L quadriceps 5/5; R quadriceps 5/5  L Dorsiflexion 5/5; R dorsiflexion 5/5  L Plantarflexion 5/5; R plantarflexion 5/5  L EHL 5/5; R EHL 5/5    Reflexes  L Brachioradialis 2+/4; R brachioradialis 2+/4  L Biceps 2+/4; R Biceps 2+/4  L Triceps 2+/4; R Triceps 2+/4  L Patellar 2+/4: R Patellar 2+/4  L Achilles 2+/4; R Achilles 2+/4    hoffmans L: neg  hoffmans R: neg  Clonus L: neg  Clonus R: neg  Babinski L: neg  Babinski R: neg    Studies Review:     X-ray lumbar spine 12/3/2021 (is reviewed): FINDINGS:   The patient is status post posterior fusion of L4 and L5.  The hardware   appears properly positioned without complication. Elgie Dowdy is also a disc   spacer device in place at L4/5.  No acute fracture or listhesis. Elgie Dowdy is a   4.7 cm calcified mass in the right lower pelvis.  This is unchanged from the   study from 29 January 2021 consistent with a fibroid. Pain management notes reviewed    Assessment and Plan:      1. Spondylolisthesis of lumbar region    2. S/P lumbar fusion          Plan: Patient with improvement to lower extremity symptoms, resolution of numbness and tingling. Ambulating well. Does continue to have some chronic axial discomfort, for which she follows with pain specialist.  Continue physical therapy. Continue to monitor symptoms. Follow-up as needed with any return of lower extremity symptoms, progressively worsening axial symptoms, new numbness tingling or weakness. Followup: Return if symptoms worsen or fail to improve. Prescriptions Ordered:  No orders of the defined types were placed in this encounter. Orders Placed:  No orders of the defined types were placed in this encounter. Electronically signed by ELVIRA Bae CNP on 12/7/2021 at 3:00 PM    Please note that this chart was generated using voice recognition Dragon dictation software.   Although every effort was made to ensure the accuracy of this automated transcription, some errors in

## 2021-12-17 ENCOUNTER — HOSPITAL ENCOUNTER (EMERGENCY)
Age: 61
Discharge: HOME | End: 2021-12-17
Attending: EMERGENCY MEDICINE
Payer: COMMERCIAL

## 2021-12-17 VITALS
HEART RATE: 98 BPM | DIASTOLIC BLOOD PRESSURE: 83 MMHG | TEMPERATURE: 97.9 F | RESPIRATION RATE: 20 BRPM | SYSTOLIC BLOOD PRESSURE: 125 MMHG

## 2021-12-17 DIAGNOSIS — R04.0 EPISTAXIS: Primary | ICD-10-CM

## 2021-12-17 PROCEDURE — 99281 EMR DPT VST MAYX REQ PHY/QHP: CPT

## 2021-12-17 RX ORDER — OXYMETAZOLINE HYDROCHLORIDE 0.05 G/100ML
2 SPRAY NASAL 2 TIMES DAILY
Qty: 30 ML | Refills: 3 | Status: SHIPPED | OUTPATIENT
Start: 2021-12-17 | End: 2022-01-16

## 2021-12-17 ASSESSMENT — ENCOUNTER SYMPTOMS
DIARRHEA: 0
RHINORRHEA: 0
EYE PAIN: 0
SORE THROAT: 0
COUGH: 0
ABDOMINAL PAIN: 0
PHOTOPHOBIA: 0
SHORTNESS OF BREATH: 0
CONSTIPATION: 0

## 2021-12-17 NOTE — ED PROVIDER NOTES
[facility] ED  Emergency Department Encounter  Emergency Medicine Resident     Pt Name: Nae Mandujano  MRN: [id number]  [place] 1960  Date of evaluation: 12/17/21  PCP:  Tessa Goss MD    [address] [place] [address]       Chief Complaint   Patient presents with    Epistaxis       H[facility]ORY [facility]  (Location/Symptom, Timing/Onset, Context/Setting, Quality, Duration, Modifying Factors,Severity.)      Nae Mandujano is a 59 y.o. female with past medical history significant for hypertension with 3 days of intermittent nosebleeds. Patient denies any trauma to the nose. Does state that she just turned the heat on her apartment and has been having nosebleeds since. Has been attempted to use nasal spray with minimal relief. States that the nose has been bleeding intermittently and does stop although today came in because she was having increased bleeding. Patient denies any trauma to the nose. Denies any use of antiplatelets or anticoagulation. States that the bleeding has been stopping intermittently although she came in because it started again today. Denies any chest pain, shortness of breath, abdominal pain, nausea, vomiting, headaches. No other acute complaints at this time.     PAST MEDICAL / SURGICAL / SOCIAL / FAMILY HISTORY      has a past medical history of Allergic rhinitis, Alveolar hypoventilation, Aortic insufficiency, Bronchiectasis (HCC), Bronchitis, Chronic back pain, COPD (chronic obstructive pulmonary disease) ([address] Depression, DM (diabetes mellitus) ([address] GERD (gastroesophageal reflux disease), History of echocardiogram, Hyperlipidemia, Hypertension, Obesity, Osteoarthritis, Peripheral vascular disease ([address] Primary osteoarthritis of both knees, Radicular pain of lumbosacral region, Spinal stenosis, lumbar region, without neurogenic claudication, Tricuspid regurgitation, Type II or unspecified type diabetes mellitus without mention of complication, not stated as uncontrolled, Unspecified sleep apnea, URI (upper respiratory infection), Wears dentures, Wears glasses, and Wellness examination. has a past surgical history that includes Dilation and curettage of uterus; gastrectomy; Nerve Block (Right, 4/30/13); Nerve Block (5/23/13); Colonoscopy (2/12/2009); Upper gastrointestinal endoscopy (9 20 2007); Upper gastrointestinal endoscopy (4 21 2009,04/2011); Nerve Block (8/12/13); Nerve Block (Left, 8-28-13); Nerve Block (Left, 9-24-13); Nerve Block (07-02-14); Nerve Block (7-16-14); Nerve Block (7/30/14); Nerve Block (11-6-14); Nerve Block (11/20/15); pr knee scope,diagnostic (Right, 3/24/2017); Nerve Block (07/20/2018); Nerve Block (Bilateral, 02/01/2019); Nerve Block (Bilateral, 02/08/2019); lumbar discectomy (01/2015); lumbar fusion (11/19/2020); and lumbar fusion (N/A, 11/19/2020). Social:  reports that she has been smoking cigarettes. She has a 9.00 pack-year smoking history. She has never used smokeless tobacco. She reports that she does not drink alcohol and does not use drugs. Family Hx:   Family History   Problem Relation Age of Onset    Diabetes Mother     Heart Disease Mother     Cirrhosis Father         Allergies:  Aspirin, Claritin [loratadine], Flonase [fluticasone propionate], and Morphine and related    Home Medications:  Prior to Admission medications    Medication Sig Start Date End Date Taking? Authorizing Provider   oxymetazoline (NASAL SPRAY EXTRA MOISTURIZING) 0.05 % nasal spray 2 sprays by Nasal route 2 times daily 12/17/21 1/16/22 Yes Haley Muñoz DO   HYDROcodone-acetaminophen (NORCO) 5-325 MG per tablet Take 1 tablet by mouth every 8 hours as needed for Pain for up to 30 days.  Early refill due to holidays 12/3/21 1/2/22  ELVIRA Kaba CNP   meloxicam (MOBIC) 7.5 MG tablet Take 1 tablet by mouth daily 12/3/21 1/2/22  Chilango Prado APRN - CNP   cetirizine (ZYRTEC) 10 MG tablet TAKE 1 TABLET BY MOUTH DAILY 11/17/21   Sharron Cisneros MD   zoster recombinant adjuvanted vaccine [facility]) 50 MCG/0.5ML SUSR injection 50 MCG IM then repeat 2-6 months.  10/26/21 10/26/22  Sharron Cisneros MD   gabapentin (NEURONTIN) 300 MG capsule TAKE 1 CAPSULE IN MORNING AND AFTERNOON AND 2 CAPSULES AT [facility] 10/26/21 12/5/21  Sharron Cisneros MD   nicotine (NICODERM CQ) 14 MG/24HR Place 1 patch onto the skin daily 10/26/21 12/3/21  Sharron Cisneros MD   [facility]) 20 MG tablet Take 1 tablet by mouth 2 times daily 10/26/21   Sharron Cisneros MD   lisinopril-hydroCHLOROthiazide (PRINZIDE;ZESTORETIC) 20-25 MG per tablet TAKE 1 TABLET EVERY DAY 10/26/21   Sharron Cisneros MD   omeprazole (PRILOSEC) 20 MG delayed release capsule TAKE 1 CAPSULE BY MOUTH EVERY DAY 10/26/21   Sharron Cisneros MD   azelastine (ASTELIN) 0.1 % nasal spray 2 sprays by Nasal route 2 times daily Use in each nostril as directed 10/26/21   Sharron Cisneros MD   potassium chloride (KLOR-CON M) 10 MEQ extended release tablet Take 2 tablets by mouth daily 10/26/21   Sharron Cisneros MD   carvedilol (COREG) 12.5 MG tablet Take 1 tablet by mouth 2 times daily 10/26/21   Sharron Cisneros MD   albuterol sulfate HFA (VENTOLIN HFA) 108 (90 Base) MCG/ACT inhaler Inhale 2 puffs into the lungs every 6 hours as needed for Wheezing 10/26/21   Sharron Cisneros MD   acetaminophen (TYLENOL) 500 MG tablet Take 1 tablet by mouth 4 times daily as needed for Pain 10/26/21   Sharron Cisneros MD   atorvastatin (LIPITOR) 40 MG tablet Take 1 tablet by mouth daily 10/26/21   Sharron Cisneros MD   tiZANidine (ZANAFLEX) 4 MG tablet Take 1 tablet by mouth 2 times daily 9/28/21   ELVIRA Mcdaniel CNP   amLODIPine (NORVASC) 10 MG tablet TAKE 1 TABLET BY MOUTH DAILY 9/13/21   Sharron Cisneros MD   docusate sodium (COLACE) 100 MG capsule TAKE 1 CAPSULE BY MOUTH EVERY DAY 7/20/21   Sharron Cisneros MD   Umeclidinium Bromide 62.5 MCG/INH AEPB Inhale 1 puff into the lungs daily 6/25/21   Vassie Holstein, MD   Lancets MISC 1 each by Does not apply route 2 times daily Use 2/day 3/4/21   Vassie Holstein, MD   [facility] 18 MCG inhalation capsule Inhale 1 capsule into the lungs daily 12/22/20   Vassie Holstein, MD   blood glucose test strips (EXACTECH TEST) strip 1 each by In Vitro route daily As needed. 10/14/20   Vassie Holstein, MD   diphenhydrAMINE (BENADRYL) 25 MG tablet Take 25 mg by mouth every 6 hours as needed for Itching    Historical Provider, MD   mirtazapine (REMERON) 15 MG tablet Take 15 mg by mouth nightly 5/22/18   Historical Provider, MD   DULoxetine (CYMBALTA) 60 MG extended release capsule Take 1 capsule by mouth daily 2/1/18   Vassie Holstein, MD   ipratropium-albuterol (DUONEB) 0.5-2.5 (3) MG/3ML SOLN nebulizer solution Inhale 3 mLs into the lungs every 6 hours as needed for Shortness of Breath 8/4/15   Vassie Holstein, MD       REVIEW OFSYSTEMS    (2-9 systems for level 4, 10 or more for level 5)      Review of Systems   Constitutional: Negative for activity change, chills and fever. HENT: Negative for congestion, rhinorrhea and sore throat. Nasal bleeding    Eyes: Negative for photophobia and pain. Respiratory: Negative for cough and shortness of breath. Cardiovascular: Negative for chest pain and palpitations. Gastrointestinal: Negative for abdominal pain, constipation and diarrhea. Skin: Negative for rash and wound. Neurological: Negative for dizziness and headaches. PHYSICAL EXAM   (up to 7 for level 4, 8 or more forlevel 5)      INITIAL VITALS:   Vitals:    12/17/21 1734   BP: 125/83   Pulse: 98   Resp: 20   Temp: 97.9 °F (36.6 °C)        Physical Exam  Constitutional:       General: She is not in acute distress. Appearance: She is not ill-appearing, toxic-appearing or diaphoretic. HENT:      Head: Normocephalic and atraumatic. Comments: Bilateral nares are hemostatic at this time. No septal hematoma noted.   No trauma, no lacerations noted     Nose: Nose normal.      Mouth/Throat:      Mouth: Mucous membranes are moist.      Pharynx: Oropharynx is clear. Eyes:      General:         Right eye: No discharge. Left eye: No discharge. Extraocular Movements: Extraocular movements intact. Pupils: Pupils are equal, round, and reactive to light. Cardiovascular:      Rate and Rhythm: Normal rate and regular rhythm. Pulses: Normal pulses. Heart sounds: Normal heart sounds. Pulmonary:      Effort: Pulmonary effort is normal. No respiratory distress. Breath sounds: Normal breath sounds. No wheezing or rales. Abdominal:      General: There is no distension. Palpations: Abdomen is soft. Tenderness: There is no abdominal tenderness. There is no guarding or rebound. Musculoskeletal:      Cervical back: Normal range of motion. No rigidity. Comments: Ambulated into the emergency department with steady gait    Skin:     General: Skin is warm and dry. Neurological:      General: No focal deficit present. Mental Status: She is alert and oriented to person, place, and time. Psychiatric:         Mood and Affect: Mood normal.         DIFFERENTIAL  DIAGNOSIS       DDX: nose bleed     Initial MDM/Plan: 59 y.o. female with 3 days of nasal bleeds intermittently. Upon my initial examination patient is resting comfortably in the cot, in no acute distress, no respiratory distress, speaking full sentences. Vital signs upon arrival are within normal limits including patient being afebrile, nontachycardic, nontachypneic, nontoxic-appearing. Patient has a GCS of 15 is alert, oriented, answering all questions appropriately. Nose is hemostatic at this point. Will provide patient with nasal clamp, nasal saline solution for preventative use. Discussed that she should place a humidifier in her house, not placed her fingers or foreign bodies within the nose. Strict return precautions provided. Patient expresses understanding of discharge instructions and is able to repeat strict return precautions back to me. Patient discharged in stable condition after remained vitally stable throughout emergency department stay.       DIAGNOSTIC RESULTS / EMERGENCYDEPARTMENT COURSE / MDM         PROCEDURES:  None    CONSULTS:  None      FINAL IMPRESSION      1. Epistaxis          DISPOSITION / PLAN     DISPOSITION Decision To Discharge 12/17/2021 05:50:51 PM      PATIENT REFERRED TO:  MD Ophelia Villagomez [address] [facility] [address] [place] [address]  [phone]    Call   As needed for post emergency department follow up    [facility]  [address] [place] [address]  [phone]    As needed, If symptoms worsen      DISCHARGE MEDICATIONS:  Discharge Medication List as of 12/17/2021  6:01 PM      START taking these medications    Details   oxymetazoline (NASAL SPRAY EXTRA MOISTURIZING) 0.05 % nasal spray 2 sprays by Nasal route 2 times daily, Disp-30 mL, R-3Print             Mae Giang DO  Emergency Medicine Resident    (Please note that portions of this note were completed with a voice recognition program.Efforts were made to edit the dictations but occasionally words are mis-transcribed.)       Mae Giang DO  Resident  12/17/21 6040

## 2021-12-17 NOTE — ED NOTES
Discharges instructions given. Verbalized understanding. All questions answered.        Deja Calderon LPN  07/55/46 7254

## 2021-12-17 NOTE — ED NOTES
Writer is extended triage nurse. Assessment and treatment for this patient was primarily performed by resident and attending with extended triage nurse performing tasks as directed. Pt seen primarily by resident and attending physicians. Nursing assessment deferred to physician assessment.        Morgan Mccormick LPN  56/31/14 0060

## 2021-12-17 NOTE — ED NOTES
Writer in to assess pt. Dr. Obi Collinsers in at this time to assess pt. Pt in no acute distress. Pt nose not bleeding at this time. Pt continues to answer and speak on phone with no distress.      Shanice Brown, LPN  25/95/58 [address] [facility], LPN  48/03/37 [id number]

## 2021-12-17 NOTE — ED PROVIDER NOTES
[facility]     Emergency Department     Faculty Attestation    I performed a history and physical examination of the patient and discussed management with the resident. I reviewed the residents note and agree with the documented findings and plan of care. Any areas of disagreement are noted on the chart. I was personally present for the key portions of any procedures. I have documented in the chart those procedures where I was not present during the key portions. I have reviewed the emergency nurses triage note. I agree with the chief complaint, past medical history, past surgical history, allergies, medications, social and family history as documented unless otherwise noted below. For Physician Assistant/ Nurse Practitioner cases/documentation I have personally evaluated this patient and have completed at least one if not all key elements of the E/M (history, physical exam, and MDM). Additional findings are as noted. I have personally seen and evaluated the patient. I find the patient's history and physical exam are consistent with the NP/PA documentation. I agree with the care provided, treatment rendered, disposition and follow-up plan. Active bleeding at this time not on anticoagulant therapy. Critical Care     Jocelynn Deal M.D.   Attending Emergency  Physician              Jeronimo Callejas MD  12/17/21 2235

## 2021-12-20 ENCOUNTER — HOSPITAL ENCOUNTER (EMERGENCY)
Age: 61
Discharge: HOME | End: 2021-12-20
Attending: EMERGENCY MEDICINE
Payer: COMMERCIAL

## 2021-12-20 VITALS
BODY MASS INDEX: 30.49 KG/M2 | WEIGHT: 183 LBS | DIASTOLIC BLOOD PRESSURE: 105 MMHG | OXYGEN SATURATION: 91 % | RESPIRATION RATE: 24 BRPM | SYSTOLIC BLOOD PRESSURE: 178 MMHG | HEIGHT: 65 IN | TEMPERATURE: 97.7 F | HEART RATE: 105 BPM

## 2021-12-20 DIAGNOSIS — R03.0 ELEVATED BLOOD PRESSURE READING: ICD-10-CM

## 2021-12-20 DIAGNOSIS — R04.0 EPISTAXIS: Primary | ICD-10-CM

## 2021-12-20 PROCEDURE — 99283 EMERGENCY DEPT VISIT LOW MDM: CPT

## 2021-12-20 PROCEDURE — 2500000003 HC RX 250 WO HCPCS: Performed by: PHYSICIAN ASSISTANT

## 2021-12-20 RX ORDER — TRANEXAMIC ACID 100 MG/ML
500 INJECTION, SOLUTION INTRAVENOUS ONCE
Status: COMPLETED | OUTPATIENT
Start: 2021-12-20 | End: 2021-12-20

## 2021-12-20 RX ADMIN — TRANEXAMIC ACID 500 MG: 1 INJECTION, SOLUTION INTRAVENOUS at 13:30

## 2021-12-20 RX ADMIN — PHENYLEPHRINE HYDROCHLORIDE 1 SPRAY: 0.5 SPRAY NASAL at 13:30

## 2021-12-20 NOTE — ED PROVIDER NOTES
[address] [place] [address] ED  [facility]      Pt Name: Aminta Peterson  MRN: [id number]  [place] 1960  Date ofevaluation: 12/20/2021  Provider: Francisco Smith Dr       Chief Complaint   Patient presents with    Epistaxis     onset last Wednesday - seen in ER Friday. restarted this morning,          HISTORY OF PRESENT ILLNESS  (Location/Symptom, Timing/Onset, Context/Setting, Quality, Duration, Modifying Factors, Severity.)   Suzanne Barnard is a 59 y.o. female who presents to the emergency department with epistaxis of the left nostril. Patient was seen in the emergency department reporting last Wednesday. There was no bleeding seen patient discharged home. Patient reports pain immediate bleeding here in the ER. Does report no bleeding for roughly 24 hours yesterday. Noticed some bleeding today decided come to the ER. Patient seems very anxious. She reports significant bleeding while the bleeding appears to be very minimal and not active here in the ER. She really touches her nose with her regular baby aggravating her symptoms. It has a hard time refraining from touching her nose and moving her head in a variety of positions when talking. Nursing Notes were reviewed. ALLERGIES     Aspirin, Claritin [loratadine], Flonase [fluticasone propionate], and Morphine and related    CURRENT MEDICATIONS       Discharge Medication List as of 12/20/2021  2:43 PM      CONTINUE these medications which have NOT CHANGED    Details   oxymetazoline (NASAL SPRAY EXTRA MOISTURIZING) 0.05 % nasal spray 2 sprays by Nasal route 2 times daily, Disp-30 mL, R-3Print      HYDROcodone-acetaminophen (NORCO) 5-325 MG per tablet Take 1 tablet by mouth every 8 hours as needed for Pain for up to 30 days.  Early refill due to holidays, Disp-90 tablet, R-0Normal      meloxicam (MOBIC) 7.5 MG tablet Take 1 tablet by mouth daily, Disp-30 tablet, R-2Normal      cetirizine (ZYRTEC) 10 MG tablet TAKE 1 TABLET BY MOUTH DAILY, Disp-30 tablet, R-4Normal      zoster recombinant adjuvanted vaccine (SHINGRIX) 50 MCG/0.5ML SUSR injection 50 MCG IM then repeat 2-6 months., Disp-0.5 mL, R-1Print      gabapentin (NEURONTIN) 300 MG capsule TAKE 1 CAPSULE IN MORNING AND AFTERNOON AND 2 CAPSULES AT NIGHT, Disp-120 capsule, R-0Normal      nicotine (NICODERM CQ) 14 MG/24HR Place 1 patch onto the skin daily, Disp-30 patch, R-1Normal      trospium (SANCTURA) 20 MG tablet Take 1 tablet by mouth 2 times daily, Disp-60 tablet, R-0Normal      lisinopril-hydroCHLOROthiazide (PRINZIDE;ZESTORETIC) 20-25 MG per tablet TAKE 1 TABLET EVERY DAY, Disp-90 tablet, R-1Normal      omeprazole (PRILOSEC) 20 MG delayed release capsule TAKE 1 CAPSULE BY MOUTH EVERY DAY, Disp-30 capsule, R-3Normal      azelastine (ASTELIN) 0.1 % nasal spray 2 sprays by Nasal route 2 times daily Use in each nostril as directed, Disp-1 each, R-2Normal      potassium chloride (KLOR-CON M) 10 MEQ extended release tablet Take 2 tablets by mouth daily, Disp-60 tablet, R-2Normal      carvedilol (COREG) 12.5 MG tablet Take 1 tablet by mouth 2 times daily, Disp-60 tablet, R-5Normal      albuterol sulfate HFA (VENTOLIN HFA) 108 (90 Base) MCG/ACT inhaler Inhale 2 puffs into the lungs every 6 hours as needed for Wheezing, Disp-1 each, R-3Normal      acetaminophen (TYLENOL) 500 MG tablet Take 1 tablet by mouth 4 times daily as needed for Pain, Disp-30 tablet, R-0Normal      atorvastatin (LIPITOR) 40 MG tablet Take 1 tablet by mouth daily, Disp-90 tablet, R-1Normal      tiZANidine (ZANAFLEX) 4 MG tablet Take 1 tablet by mouth 2 times daily, Disp-60 tablet, R-2Normal      amLODIPine (NORVASC) 10 MG tablet TAKE 1 TABLET BY MOUTH DAILY, Disp-90 tablet, R-1Normal      docusate sodium (COLACE) 100 MG capsule TAKE 1 CAPSULE BY MOUTH EVERY DAY, Disp-30 capsule, R-9Normal      Umeclidinium Bromide 62.5 MCG/INH AEPB Inhale 1 puff into the lungs daily, Disp-2 each, R-5Normal Lancets MISC 2 TIMES DAILY Starting u 3/4/2021, Disp-100 each, R-11, NormalUse 2/day      SPIRIVA HANDIHALER 18 MCG inhalation capsule Inhale 1 capsule into the lungs daily, Disp-30 capsule, R-3, DAWNormal      blood glucose test strips (EXACTECH TEST) strip 1 each by In Vitro route daily As needed. , Disp-50 each,R-11Normal      diphenhydrAMINE (BENADRYL) 25 MG tablet Take 25 mg by mouth every 6 hours as needed for ItchingHistorical Med      mirtazapine (REMERON) 15 MG tablet Take 15 mg by mouth nightlyHistorical Med      DULoxetine (CYMBALTA) 60 MG extended release capsule Take 1 capsule by mouth daily, Disp-30 capsule, R-3Normal      ipratropium-albuterol (DUONEB) 0.5-2.5 (3) MG/3ML SOLN nebulizer solution Inhale 3 mLs into the lungs every 6 hours as needed for Shortness of Breath, Disp-360 mL, R-11             PAST MEDICAL HISTORY         Diagnosis Date    Allergic rhinitis     Alveolar hypoventilation 11/20/2020    Aortic insufficiency 2018    mild-moderate on echo ([facility]    Bronchiectasis ([facility] 11/20/2020    Bronchitis     Chronic back pain     Pain management at [facility]. COPD (chronic obstructive pulmonary disease) ([facility]     Dr. Gilliland Kin to see 11/09/2020    Depression     DM (diabetes mellitus) ([facility] 12/18/2012    GERD (gastroesophageal reflux disease)     History of echocardiogram 05/2018    EF 65%, mild-moderate AI and TR    Hyperlipidemia     Dr. Marilyn Abbott Hypertension     Dr. Marilyn Abbott Obesity     Osteoarthritis     Peripheral vascular disease ([facility]     Primary osteoarthritis of both knees     Radicular pain of lumbosacral region     Spinal stenosis, lumbar region, without neurogenic claudication 04/30/2013    Tricuspid regurgitation 2018    mild-moderate on echo    Type II or unspecified type diabetes mellitus without mention of complication, not stated as uncontrolled     Dr. Marilyn Abbott Unspecified sleep apnea     no cpap used anymore    URI (upper respiratory infection)     Wears dentures     upper and lower full dentures    Wears glasses     Wellness examination     Dr. Deepa Clements -PCP last visit in early Oct. 2020       SURGICAL HISTORY           Procedure Laterality Date    COLONOSCOPY  2/12/2009    normal    DILATION AND CURETTAGE OF UTERUS      GASTRECTOMY      partial    LUMBAR DISCECTOMY  01/2015    lumbar diskectomy    LUMBAR FUSION  11/19/2020     POSTERIOR FUSION L4/5,     LUMBAR FUSION N/A 11/19/2020    POSTERIOR FUSION L4/5, [employer], Travis Lopez, [facility]OKES #[id number] AMINA performed by Essence Gomez DO at [facility] Right 4/30/13    Lumbar Diagnostic Block,  Kenalog 40 mg    NERVE BLOCK  5/23/13    Lumbar Radiofrequency, Kenalog 40mg    NERVE BLOCK  8/12/13    Lt MBNB  celestone 6mg    NERVE BLOCK Left 8-28-13    left lumbar diagnostic block #2 decadron 10 mg    NERVE BLOCK Left 9-24-13    left lumbar median branch radiofrequency    NERVE BLOCK  07-02-14    caudal, celestone 9 mg    NERVE BLOCK  7-16-14    caudal epidural #2, celestone 9mg, fentanyl 25mcg    NERVE BLOCK  7/30/14    caudal #3 decadron 10mg    NERVE BLOCK  11-6-14    duramorph epidural steroid block  duramorph 1 mg celestone 9 mg    NERVE BLOCK  11/20/15    TENS- Empi Select    NERVE BLOCK  07/20/2018    right transforminal # 1 decadron 10mg,isovue    NERVE BLOCK Bilateral 02/01/2019    bilat mbnb- no steroid    NERVE BLOCK Bilateral 02/08/2019    bilat mbnb, marcaine . 25%    NC KNEE SCOPE,DIAGNOSTIC Right 3/24/2017    KNEE ARTHROSCOPY WITH PARTIAL MEDIAL MENISECAL DEBRIDMENT  performed by Damian Ramos MD at [facility]  9 20 2007    UPPER GASTROINTESTINAL ENDOSCOPY  4 21 2009,04/2011    gastritis, esophagitis         FAMILY HISTORY           Problem Relation Age of Onset    Diabetes Mother     Heart Disease Mother     Cirrhosis Father      Family Status   Relation Name Status    Mother      Father          SOCIAL HISTORY      reports that she has been smoking cigarettes. She has a 9.00 pack-year smoking history. She has never used smokeless tobacco. She reports that she does not drink alcohol and does not use drugs. REVIEW OFSYSTEMS    (2-9 systems for level 4, 10 or more for level 5)   Review of Systems    Except as noted above the remainder of the review of systems was reviewed and negative. PHYSICAL EXAM    (up to 7 for level 4, 8 or more for level 5)     ED Triage Vitals   BP Temp Temp Source Pulse Resp SpO2 Height Weight   21 1230 21 1232 21 1230 21 1230 21 1230 21 1230 21 1230 21 1230   (!) 178/105 97.7 °F (36.5 °C) Oral 105 24 91 % 5' 5\" (1.651 m) 183 lb (83 kg)      Physical Exam  Constitutional:       Appearance: She is well-developed. HENT:      Head: Normocephalic and atraumatic. Nose:      Right Nostril: No foreign body, epistaxis, septal hematoma or occlusion. Left Nostril: Epistaxis present. No foreign body, septal hematoma or occlusion. Cardiovascular:      Rate and Rhythm: Normal rate and regular rhythm. Pulmonary:      Effort: Pulmonary effort is normal.      Breath sounds: Normal breath sounds. Abdominal:      Palpations: Abdomen is soft. Musculoskeletal:         General: Normal range of motion. Cervical back: Normal range of motion and neck supple. Skin:     General: Skin is warm. Findings: No rash. Neurological:      Mental Status: She is alert and oriented to person, place, and time.    Psychiatric:         Behavior: Behavior normal.                 DIAGNOSTIC RESULTS     EKG: All EKG's are interpreted by the Emergency Department Physician who either signs or Co-signs this chart in the absence of a cardiologist.        RADIOLOGY:   Non-plain film images such as CT, Ultrasound and MRI are read by the radiologist. Plain radiographic images arevisualized and preliminarily interpreted by the emergency physician with the below findings:        Interpretation per the Radiologist below, if available at thetime of this note:          ED BEDSIDE ULTRASOUND:   Performed by ED Physician - none    LABS:  Labs Reviewed - No data to display    All other labs were within normal range or not returned as of this dictation. EMERGENCY DEPARTMENT COURSE and DIFFERENTIAL DIAGNOSIS/MDM:   Vitals:    Vitals:    12/20/21 1230 12/20/21 1232   BP: (!) 178/105    Pulse: 105    Resp: 24    Temp:  97.7 °F (36.5 °C)   TempSrc: Oral Oral   SpO2: 91%    Weight: 183 lb (83 kg)    Height: 5' 5\" (1.651 m)      Afrin spray in both nostrils. Clip applied. No active bleeding. Next transiting ascending was placed onto cotton balls and then nasal clamp applied. This was applied for roughly 30 minutes. It was then removed and  patient was watched for another 45 minutes without any bleeding. No active bleeding noticed in the ER. Bacitracin added to edge of left nostril by myself as well    Patient agreeable to the place bacitracin around the nose 2 times a day. Referred to ENT as well. CONSULTS:  None    PROCEDURES:  Procedures    Pre-hypertension/Hypertension: The patient has been informed that they may have pre-hypertension or Hypertension based on a blood pressure reading in the emergency department. I recommend that the patient call the primary care provider listed on their discharge instructions or a physician of their choice this week to arrange follow up for further evaluation of possible pre-hypertension or Hypertension. FINAL IMPRESSION      1. Epistaxis    2.  Elevated blood pressure reading          DISPOSITION/PLAN   DISPOSITION Decision To Discharge 12/20/2021 02:41:17 PM      PATIENTREFERRED TO:   MD Ophelia [address] [place] [address]  [phone]    In 3 days      Martina Garcia MD  [address] [facility]  [phone]    In 3 days        DISCHARGE MEDICATIONS:     Discharge Medication List as of 12/20/2021  2:43 PM              (Please note that portions of this note were completed with a voice recognition program.  Efforts were made to edit thedictations but occasionally words are mis-transcribed.)    Vale Merlin, PA-C Vale Merlin, PA-C  12/20/21 [address] PA-C  12/20/21 [id number]

## 2021-12-21 ENCOUNTER — TELEPHONE (OUTPATIENT)
Dept: INTERNAL MEDICINE | Age: 61
End: 2021-12-21

## 2021-12-21 DIAGNOSIS — R04.0 EPISTAXIS: Primary | ICD-10-CM

## 2021-12-21 NOTE — TELEPHONE ENCOUNTER
PC to patient - informed patient to hold aspirin. Patient states she does not take aspirin. Patient states she already uses a humidifier and a nasal spray. Patient states that it is her left nostril that is bleeding. Patient states that the nose bleeds started last Wednesday. Patient states that she has a broken blood vessel in nose. Informed patient that referral was placed. Referral printed and mailed to patient.  Patient has appt scheduled with ENT for 12/27/2021

## 2021-12-21 NOTE — TELEPHONE ENCOUNTER
Hold aspirin for few days. Use saline nasal spray and humidifier in bedroom. Which nostril is bleeding and how much?  ENT referral placed

## 2021-12-21 NOTE — ED PROVIDER NOTES
eMERGENCY dEPARTMENT eNCOUnter   Independent Attestation     Pt Name: Zeny Jansen  MRN: [id number]  [place] 1960  Date of evaluation: 12/21/21     Zeny Jansen is a 59 y.o. female with CC: Epistaxis (onset last Wednesday - seen in ER Friday. restarted this morning, )      Based on the medical record the care appears appropriate. I was personally available for consultation in the Emergency Department. The care is provided during an unprecedented national emergency due to the novel coronavirus, COVID 19.     Lynnette Tellez MD  Attending Emergency Physician                    Lynnette Tellez MD  12/21/21 [id number]

## 2021-12-23 ENCOUNTER — HOSPITAL ENCOUNTER (EMERGENCY)
Age: 61
Discharge: HOME | End: 2021-12-23
Attending: EMERGENCY MEDICINE
Payer: COMMERCIAL

## 2021-12-23 VITALS
DIASTOLIC BLOOD PRESSURE: 86 MMHG | HEIGHT: 65 IN | OXYGEN SATURATION: 94 % | WEIGHT: 183 LBS | RESPIRATION RATE: 18 BRPM | TEMPERATURE: 97.7 F | BODY MASS INDEX: 30.49 KG/M2 | SYSTOLIC BLOOD PRESSURE: 155 MMHG | HEART RATE: 116 BPM

## 2021-12-23 VITALS
RESPIRATION RATE: 18 BRPM | BODY MASS INDEX: 30.49 KG/M2 | WEIGHT: 183 LBS | DIASTOLIC BLOOD PRESSURE: 105 MMHG | OXYGEN SATURATION: 96 % | SYSTOLIC BLOOD PRESSURE: 146 MMHG | HEART RATE: 93 BPM | TEMPERATURE: 98 F | HEIGHT: 65 IN

## 2021-12-23 DIAGNOSIS — R04.0 EPISTAXIS: Primary | ICD-10-CM

## 2021-12-23 PROCEDURE — 99282 EMERGENCY DEPT VISIT SF MDM: CPT

## 2021-12-23 PROCEDURE — 6370000000 HC RX 637 (ALT 250 FOR IP): Performed by: EMERGENCY MEDICINE

## 2021-12-23 PROCEDURE — 30903 CONTROL OF NOSEBLEED: CPT

## 2021-12-23 PROCEDURE — 2500000003 HC RX 250 WO HCPCS: Performed by: EMERGENCY MEDICINE

## 2021-12-23 RX ORDER — HYDROCODONE BITARTRATE AND ACETAMINOPHEN 5; 325 MG/1; MG/1
1 TABLET ORAL ONCE
Status: COMPLETED | OUTPATIENT
Start: 2021-12-23 | End: 2021-12-23

## 2021-12-23 RX ORDER — AMOXICILLIN 500 MG/1
500 CAPSULE ORAL 3 TIMES DAILY
Qty: 21 CAPSULE | Refills: 0 | Status: SHIPPED | OUTPATIENT
Start: 2021-12-23 | End: 2021-12-30

## 2021-12-23 RX ADMIN — PHENYLEPHRINE HYDROCHLORIDE 1 SPRAY: 0.5 SPRAY NASAL at 00:42

## 2021-12-23 RX ADMIN — HYDROCODONE BITARTRATE AND ACETAMINOPHEN 1 TABLET: 5; 325 TABLET ORAL at 12:36

## 2021-12-23 ASSESSMENT — ENCOUNTER SYMPTOMS
EYE REDNESS: 0
COLOR CHANGE: 0
COLOR CHANGE: 0
EYE DISCHARGE: 0
DIARRHEA: 0
SHORTNESS OF BREATH: 0
NAUSEA: 0
VOMITING: 0
RHINORRHEA: 0
FACIAL SWELLING: 0
EYE REDNESS: 0
COUGH: 0
SORE THROAT: 0
CONSTIPATION: 0
SHORTNESS OF BREATH: 0
ABDOMINAL PAIN: 0
DIARRHEA: 0
COUGH: 0
EYE DISCHARGE: 0
VOMITING: 0

## 2021-12-23 ASSESSMENT — PAIN SCALES - GENERAL
PAINLEVEL_OUTOF10: 7
PAINLEVEL_OUTOF10: 7

## 2021-12-23 NOTE — ED PROVIDER NOTES
EMERGENCY DEPARTMENT ENCOUNTER    Pt Name: Emily Lauren  MRN: [id number]  [place] 1960  Date of evaluation: 12/23/21  CHIEF COMPLAINT       Chief Complaint   Patient presents with    Epistaxis     been going on for a week     HISTORY OF PRESENT ILLNESS   This is a 58-year-old female who presents with complaints of epistaxis. Patient has been seen a few times over the past week or so for complaints of bleeding from her nose primarily in the left side. This evening she developed some bleeding from the right side and after using some Afrin at home she was unable to get it to stop. Patient denies any dizziness or lightheadedness, she does not use any blood thinners. She has an ENT appointment on Monday. REVIEW OF SYSTEMS     Review of Systems   Constitutional: Negative for chills and fever. HENT: Positive for nosebleeds. Negative for rhinorrhea and sore throat. Eyes: Negative for discharge, redness and visual disturbance. Respiratory: Negative for cough and shortness of breath. Cardiovascular: Negative for chest pain, palpitations and leg swelling. Gastrointestinal: Negative for diarrhea, nausea and vomiting. Musculoskeletal: Negative for arthralgias, myalgias and neck pain. Skin: Negative for color change and rash. Neurological: Negative for seizures, weakness and headaches. Psychiatric/Behavioral: Negative for hallucinations, self-injury and suicidal ideas.      PASTMEDICAL HISTORY     Past Medical History:   Diagnosis Date    Allergic rhinitis     Alveolar hypoventilation 11/20/2020    Aortic insufficiency 2018    mild-moderate on echo (was seen and [facility])    Bronchiectasis ([facility] 11/20/2020    Bronchitis     Chronic back pain     Pain management at [facility]. COPD (chronic obstructive pulmonary disease) ([facility]     Dr. Sunil Du to see 11/09/2020    Depression     DM (diabetes mellitus) ([facility] 12/18/2012    GERD (gastroesophageal reflux disease)     History of echocardiogram 05/2018    EF 65%, mild-moderate AI and TR    Hyperlipidemia     Dr. Bea Jaimes Hypertension     Dr. Bea Jaimes Obesity     Osteoarthritis     Peripheral vascular disease ([facility]     Primary osteoarthritis of both knees     Radicular pain of lumbosacral region     Spinal stenosis, lumbar region, without neurogenic claudication 04/30/2013    Tricuspid regurgitation 2018    mild-moderate on echo    Type II or unspecified type diabetes mellitus without mention of complication, not stated as uncontrolled     Dr. Bea Jaimes Unspecified sleep apnea     no cpap used anymore    URI (upper respiratory infection)     Wears dentures     upper and lower full dentures    Wears glasses     Wellness examination     Dr. Aldair Turner -PCP last visit in early Oct. 2020     Past Problem List  Patient Active Problem List   Diagnosis Code    DJD (degenerative joint disease) of knee M17.10    Osteoarthritis of spine with radiculopathy, lumbar region M47.26    GERD (gastroesophageal reflux disease) K21.9    COPD, severity to be determined ([facility] J44.9    HTN (hypertension) I10    Allergic rhinitis J30.9    Lipoma of shoulder s/p excision right posterior 11 17 2008 D17.20    History of tobacco use Z87.891    DM (diabetes mellitus) E11.9    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 M94.261    Primary osteoarthritis of both knees M17.0    Medication monitoring encounter Z51.81    Chronic low back pain M54.50, G89.29    Major depression, chronic F32.9    Chronic respiratory failure with hypoxia ([facility] J96.11    Mitral and aortic insufficiency I08.0    Pure hypercholesterolemia E78.00    Other spondylosis with radiculopathy, lumbar region M47.26    Lumbar disc disease M51.9    Alveolar hypoventilation R06.89    Obesity (BMI 30-39. 9) E66.9    Bronchiectasis (HCC) J47.9    Centrilobular emphysema (HCC) J43.2    Oxygen dependent Z99.81    Acute postoperative anemia due to expected blood loss D62    Multifocal pneumonia J18.9    Acute on chronic respiratory failure with hypoxia (HCC) J96.21    Pulmonary function studies abnormal R94.2    Tobacco dependence F17.200    Idiopathic sleep related nonobstructive alveolar hypoventilation G47.34    Postlaminectomy syndrome, lumbar region M96.1    Trigger finger of right thumb M65.311     SURGICAL HISTORY       Past Surgical History:   Procedure Laterality Date    COLONOSCOPY  2/12/2009    normal    DILATION AND CURETTAGE OF UTERUS      GASTRECTOMY      partial    LUMBAR DISCECTOMY  01/2015    lumbar diskectomy    LUMBAR FUSION  11/19/2020     POSTERIOR FUSION L4/5,     LUMBAR FUSION N/A 11/19/2020    POSTERIOR FUSION L4/5, [employer], Dorette Skiff, EVOKES #[id number] AMINA performed by Tamara Brar DO at [facility] Right 4/30/13    Lumbar Diagnostic Block,  Kenalog 40 mg    NERVE BLOCK  5/23/13    Lumbar Radiofrequency, Kenalog 40mg    NERVE BLOCK  8/12/13    Lt MBNB  celestone 6mg    NERVE BLOCK Left 8-28-13    left lumbar diagnostic block #2 decadron 10 mg    NERVE BLOCK Left 9-24-13    left lumbar median branch radiofrequency    NERVE BLOCK  07-02-14    caudal, celestone 9 mg    NERVE BLOCK  7-16-14    caudal epidural #2, celestone 9mg, fentanyl 25mcg    NERVE BLOCK  7/30/14    caudal #3 decadron 10mg    NERVE BLOCK  11-6-14    duramorph epidural steroid block  duramorph 1 mg celestone 9 mg    NERVE BLOCK  11/20/15    TENS- Empi Select    NERVE BLOCK  07/20/2018    right transforminal # 1 decadron 10mg,isovue    NERVE BLOCK Bilateral 02/01/2019    bilat mbnb- no steroid    NERVE BLOCK Bilateral 02/08/2019    bilat mbnb, marcaine . 25%    SC KNEE SCOPE,DIAGNOSTIC Right 3/24/2017    KNEE ARTHROSCOPY WITH PARTIAL MEDIAL MENISECAL DEBRIDMENT  performed by Jaylen Mancuso MD at [address]  9 20 2007   [facility] ENDOSCOPY  4 21 2009,04/2011    gastritis, esophagitis     CURRENT MEDICATIONS       Previous Medications    ACETAMINOPHEN (TYLENOL) 500 MG TABLET    Take 1 tablet by mouth 4 times daily as needed for Pain    ALBUTEROL SULFATE HFA (VENTOLIN HFA) 108 (90 BASE) MCG/ACT INHALER    Inhale 2 puffs into the lungs every 6 hours as needed for Wheezing    AMLODIPINE (NORVASC) 10 MG TABLET    TAKE 1 TABLET BY MOUTH DAILY    ATORVASTATIN (LIPITOR) 40 MG TABLET    Take 1 tablet by mouth daily    AZELASTINE (ASTELIN) 0.1 % NASAL SPRAY    2 sprays by Nasal route 2 times daily Use in each nostril as directed    BLOOD GLUCOSE TEST STRIPS (EXACTECH TEST) STRIP    1 each by In Vitro route daily As needed. CARVEDILOL (COREG) 12.5 MG TABLET    Take 1 tablet by mouth 2 times daily    CETIRIZINE (ZYRTEC) 10 MG TABLET    TAKE 1 TABLET BY MOUTH DAILY    DIPHENHYDRAMINE (BENADRYL) 25 MG TABLET    Take 25 mg by mouth every 6 hours as needed for Itching    DOCUSATE SODIUM (COLACE) 100 MG CAPSULE    TAKE 1 CAPSULE BY MOUTH EVERY DAY    DULOXETINE (CYMBALTA) 60 MG EXTENDED RELEASE CAPSULE    Take 1 capsule by mouth daily    GABAPENTIN (NEURONTIN) 300 MG CAPSULE    TAKE 1 CAPSULE IN MORNING AND AFTERNOON AND 2 CAPSULES AT NIGHT    HYDROCODONE-ACETAMINOPHEN (NORCO) 5-325 MG PER TABLET    Take 1 tablet by mouth every 8 hours as needed for Pain for up to 30 days.  Early refill due to holidays    IPRATROPIUM-ALBUTEROL (DUONEB) 0.5-2.5 (3) MG/3ML SOLN NEBULIZER SOLUTION    Inhale 3 mLs into the lungs every 6 hours as needed for Shortness of Breath    LANCETS MISC    1 each by Does not apply route 2 times daily Use 2/day    LISINOPRIL-HYDROCHLOROTHIAZIDE (PRINZIDE;ZESTORETIC) 20-25 MG PER TABLET    TAKE 1 TABLET EVERY DAY    MELOXICAM (MOBIC) 7.5 MG TABLET    Take 1 tablet by mouth daily    MIRTAZAPINE (REMERON) 15 MG TABLET    Take 15 mg by mouth nightly    NICOTINE (NICODERM CQ) 14 MG/24HR    Place 1 patch onto the skin daily    OMEPRAZOLE (PRILOSEC) 20 MG DELAYED RELEASE CAPSULE    TAKE 1 CAPSULE BY MOUTH EVERY DAY    OXYMETAZOLINE (NASAL SPRAY EXTRA MOISTURIZING) 0.05 % NASAL SPRAY    2 sprays by Nasal route 2 times daily    POTASSIUM CHLORIDE (KLOR-CON M) 10 MEQ EXTENDED RELEASE TABLET    Take 2 tablets by mouth daily    SPIRIVA HANDIHALER 18 MCG INHALATION CAPSULE    Inhale 1 capsule into the lungs daily    TIZANIDINE (ZANAFLEX) 4 MG TABLET    Take 1 tablet by mouth 2 times daily    TROSPIUM (SANCTURA) 20 MG TABLET    Take 1 tablet by mouth 2 times daily    UMECLIDINIUM BROMIDE 62.5 MCG/INH AEPB    Inhale 1 puff into the lungs daily    ZOSTER RECOMBINANT ADJUVANTED VACCINE (SHINGRIX) 50 MCG/0.5ML SUSR INJECTION    50 MCG IM then repeat 2-6 months. ALLERGIES     is allergic to aspirin, claritin [loratadine], flonase [fluticasone propionate], and morphine and related. FAMILY HISTORY     She indicated that her mother is . She indicated that her father is . SOCIAL HISTORY       Social History     Tobacco Use    Smoking status: Current Every Day Smoker     Packs/day: 0.25     Years: 36.00     Pack years: 9.00     Types: Cigarettes    Smokeless tobacco: Never Used    Tobacco comment: 3 cigs per day   on nicoderm patch   Vaping Use    Vaping Use: Never used   Substance Use Topics    Alcohol use: No     Alcohol/week: 0.0 standard drinks    Drug use: No     Comment: 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 7 yrs     PHYSICAL EXAM     INITIAL VITALS: BP (!) 155/86   Pulse 116   Temp 97.7 °F (36.5 °C) (Oral)   Resp 18   Ht 5' 5\" (1.651 m)   Wt 183 lb (83 kg)   LMP 2003   SpO2 94%   BMI 30.45 kg/m²    Physical Exam  Constitutional:       Appearance: Normal appearance. HENT:      Head: Normocephalic and atraumatic. Nose:      Right Nostril: Epistaxis present. No foreign body, septal hematoma or occlusion. Left Nostril: No foreign body, epistaxis, septal hematoma or occlusion.    Eyes: Extraocular Movements: Extraocular movements intact. Pupils: Pupils are equal, round, and reactive to light. Cardiovascular:      Rate and Rhythm: Normal rate and regular rhythm. Pulmonary:      Effort: Pulmonary effort is normal.      Breath sounds: Normal breath sounds. Abdominal:      General: Abdomen is flat. Palpations: Abdomen is soft. Tenderness: There is no abdominal tenderness. Neurological:      Mental Status: She is alert. MEDICAL DECISION MAKIN-year-old presents with complaints of pain and discomfort in the right nose with a nosebleed, patient has an anterior nosebleed, plan is Afrin, packing and discharge. CRITICAL CARE:       PROCEDURES:    Epistaxis Mgmt    Date/Time: 2021 1:44 AM  Performed by: Obi Colon MD  Authorized by: Obi Colon MD     Consent:     Consent obtained:  Verbal    Consent given by:  Patient    Risks discussed:  Bleeding, infection and nasal injury    Alternatives discussed:  No treatment  Anesthesia (see MAR for exact dosages): Anesthesia method:  None  Procedure details:     Treatment site:  L anterior    Treatment method:  Anterior pack and nasal balloon    Treatment complexity:  Limited    Treatment episode: recurring    Post-procedure details:     Assessment:  Bleeding stopped    Patient tolerance of procedure: Tolerated well, no immediate complications        DIAGNOSTIC RESULTS   EKG:All EKG's are interpreted by the Emergency Department Physician who either signs or Co-signs this chart in the absence of a cardiologist.        RADIOLOGY:All plain film, CT, MRI, and formal ultrasound images (except ED bedside ultrasound) are read by the radiologist, see reports below, unless otherwisenoted in MDM or here. No orders to display     LABS: All lab results were reviewed by myself, and all abnormals are listed below.   Labs Reviewed - No data to display    EMERGENCY DEPARTMENTCOURSE:         Vitals:    Vitals: 12/23/21 0011   BP: (!) 155/86   Pulse: 116   Resp: 18   Temp: 97.7 °F (36.5 °C)   TempSrc: Oral   SpO2: 94%   Weight: 183 lb (83 kg)   Height: 5' 5\" (1.651 m)       The patient was given the following medications while in the emergency department:  Orders Placed This Encounter   Medications    phenylephrine (DOUGLAS-SYNEPHRINE) 0.5 % nasal spray 1 spray    amoxicillin (AMOXIL) 500 MG capsule     Sig: Take 1 capsule by mouth 3 times daily for 7 days     Dispense:  21 capsule     Refill:  0     CONSULTS:  None    FINAL IMPRESSION      1. Epistaxis          DISPOSITION/PLAN   DISPOSITION Decision To Discharge 12/23/2021 01:43:02 AM      PATIENT REFERRED TO:  Jessica Amaya MD  [facility]. [address]  [phone]    Schedule an appointment as soon as possible for a visit in 2 days      DISCHARGE MEDICATIONS:  New Prescriptions    AMOXICILLIN (AMOXIL) 500 MG CAPSULE    Take 1 capsule by mouth 3 times daily for 7 days     The care is provided during an unprecedented national emergency due to the novel coronavirus, COVID 19.   Mateusz Blake MD  Attending Emergency Physician                   Mateusz Blake MD  12/23/21 [id number]

## 2021-12-23 NOTE — ED PROVIDER NOTES
[address] [place] [address] ED  EMERGENCY DEPARTMENT ENCOUNTER      Pt Name: Wendi Ta  MRN: [id number]  [place] 1960  Date of evaluation: 12/23/2021  Provider: Donny Stokes MD    CHIEF COMPLAINT       Chief Complaint   Patient presents with    Epistaxis     L side         HISTORY OF PRESENT ILLNESS  (Location/Symptom, Timing/Onset, Context/Setting, Quality, Duration, Modifying Factors, Severity.)   Wendi Ta is a 59 y.o. female who presents to the emergency department for nose bleeding. She was seen here in the early hours and had her left nares packed. She states she had some bleeding from the right side after she got home. That happened about 7:00 this morning. Its not bleeding now. She has pressure in her head and is on Norco from pain management. She complains of some pressure in her head and she rates it as a 7. Nursing Notes were reviewed. ALLERGIES     Aspirin, Claritin [loratadine], Flonase [fluticasone propionate], and Morphine and related    CURRENT MEDICATIONS       Previous Medications    ACETAMINOPHEN (TYLENOL) 500 MG TABLET    Take 1 tablet by mouth 4 times daily as needed for Pain    ALBUTEROL SULFATE HFA (VENTOLIN HFA) 108 (90 BASE) MCG/ACT INHALER    Inhale 2 puffs into the lungs every 6 hours as needed for Wheezing    AMLODIPINE (NORVASC) 10 MG TABLET    TAKE 1 TABLET BY MOUTH DAILY    AMOXICILLIN (AMOXIL) 500 MG CAPSULE    Take 1 capsule by mouth 3 times daily for 7 days    ATORVASTATIN (LIPITOR) 40 MG TABLET    Take 1 tablet by mouth daily    AZELASTINE (ASTELIN) 0.1 % NASAL SPRAY    2 sprays by Nasal route 2 times daily Use in each nostril as directed    BLOOD GLUCOSE TEST STRIPS (EXACTECH TEST) STRIP    1 each by In Vitro route daily As needed.     CARVEDILOL (COREG) 12.5 MG TABLET    Take 1 tablet by mouth 2 times daily    CETIRIZINE (ZYRTEC) 10 MG TABLET    TAKE 1 TABLET BY MOUTH DAILY    DIPHENHYDRAMINE (BENADRYL) 25 MG TABLET    Take 25 mg by mouth every 6 cardiology-[facility])    Bronchiectasis ([facility] 11/20/2020    Bronchitis     Chronic back pain     Pain management at [facility] COPD (chronic obstructive pulmonary disease) ([facility])     Dr. Yong Rangel to see 11/09/2020    Depression     DM (diabetes mellitus) ([facility] 12/18/2012    GERD (gastroesophageal reflux disease)     History of echocardiogram 05/2018    EF 65%, mild-moderate AI and TR    Hyperlipidemia     Dr. Saritha Schuler Hypertension     Dr. Saritha Schuler Obesity     Osteoarthritis     Peripheral vascular disease ([facility]     Primary osteoarthritis of both knees     Radicular pain of lumbosacral region     Spinal stenosis, lumbar region, without neurogenic claudication 04/30/2013    Tricuspid regurgitation 2018    mild-moderate on echo    Type II or unspecified type diabetes mellitus without mention of complication, not stated as uncontrolled     Dr. Saritha Schuler Unspecified sleep apnea     no cpap used anymore    URI (upper respiratory infection)     Wears dentures     upper and lower full dentures    Wears glasses     Wellness examination     Dr. Juan Doshi -PCP last visit in early Oct. 2020       SURGICAL HISTORY           Procedure Laterality Date    COLONOSCOPY  2/12/2009    normal    DILATION AND CURETTAGE OF UTERUS      GASTRECTOMY      partial    LUMBAR DISCECTOMY  01/2015    lumbar diskectomy    LUMBAR FUSION  11/19/2020     POSTERIOR FUSION L4/5,     LUMBAR FUSION N/A 11/19/2020    POSTERIOR FUSION L4/5, [employer], Dipak Bacon, EVOKES #[id number] AMINA performed by Pa Gutierrez DO at [facility] Right 4/30/13    Lumbar Diagnostic Block,  Kenalog 40 mg    NERVE BLOCK  5/23/13    Lumbar Radiofrequency, Kenalog 40mg    NERVE BLOCK  8/12/13    Lt MBNB  celestone 6mg    NERVE BLOCK Left 8-28-13    left lumbar diagnostic block #2 decadron 10 mg    NERVE BLOCK Left 9-24-13    left lumbar median branch radiofrequency    NERVE BLOCK  07-02-14    caudal, celestone 9 mg    NERVE BLOCK  14    caudal epidural #2, celestone 9mg, fentanyl 25mcg    NERVE BLOCK  14    caudal #3 decadron 10mg    NERVE BLOCK  14    duramorph epidural steroid block  duramorph 1 mg celestone 9 mg    NERVE BLOCK  11/20/15    TENS- Empi Select    NERVE BLOCK  2018    right transforminal # 1 decadron 10mg,isovue    NERVE BLOCK Bilateral 2019    bilat mbnb- no steroid    NERVE BLOCK Bilateral 2019    bilat mbnb, marcaine . 25%    CA KNEE SCOPE,DIAGNOSTIC Right 3/24/2017    KNEE ARTHROSCOPY WITH PARTIAL MEDIAL MENISECAL DEBRIDMENT  performed by Rosenda Carey MD at [address] [facility]  2007    UPPER GASTROINTESTINAL ENDOSCOPY  2009,2011    gastritis, esophagitis         FAMILY HISTORY           Problem Relation Age of Onset    Diabetes Mother     Heart Disease Mother     Cirrhosis Father      Family Status   Relation Name Status    Mother      Father          SOCIAL HISTORY      reports that she has been smoking cigarettes. She has a 9.00 pack-year smoking history. She has never used smokeless tobacco. She reports that she does not drink alcohol and does not use drugs. REVIEW OF SYSTEMS    (2-9 systems for level 4, 10 or more for level 5)     Review of Systems   Constitutional: Negative for chills, fatigue and fever. HENT: Positive for nosebleeds. Negative for congestion, ear discharge and facial swelling. Eyes: Negative for discharge and redness. Respiratory: Negative for cough and shortness of breath. Cardiovascular: Negative for chest pain. Gastrointestinal: Negative for abdominal pain, constipation, diarrhea and vomiting. Genitourinary: Negative for dysuria and hematuria. Musculoskeletal: Negative for arthralgias. Skin: Negative for color change and rash. Neurological: Positive for headaches. Negative for syncope and numbness. Hematological: Negative for adenopathy. Psychiatric/Behavioral: Negative for confusion. The patient is not nervous/anxious. Except as noted above the remainder of the review of systems was reviewed and negative. PHYSICAL EXAM    (up to 7 for level 4, 8 or more for level 5)     Vitals:    12/23/21 1157   BP: (!) 146/105   Pulse: 93   Resp: 18   Temp: 98 °F (36.7 °C)   TempSrc: Oral   SpO2: 96%   Weight: 183 lb (83 kg)   Height: 5' 5\" (1.651 m)       Physical Exam  Vitals reviewed. Constitutional:       General: She is not in acute distress. Appearance: She is well-developed. She is not diaphoretic. HENT:      Head: Normocephalic and atraumatic. Nose:      Comments: Nasal packing in place on the left nares. No bleeding from either side. Eyes:      General: No scleral icterus. Right eye: No discharge. Left eye: No discharge. Cardiovascular:      Rate and Rhythm: Normal rate and regular rhythm. Pulmonary:      Effort: Pulmonary effort is normal. No respiratory distress. Breath sounds: Normal breath sounds. No stridor. No wheezing or rales. Abdominal:      General: There is no distension. Palpations: Abdomen is soft. Tenderness: There is no abdominal tenderness. Musculoskeletal:         General: Normal range of motion. Cervical back: Neck supple. Lymphadenopathy:      Cervical: No cervical adenopathy. Skin:     General: Skin is warm and dry. Findings: No erythema or rash. Neurological:      Mental Status: She is alert and oriented to person, place, and time.    Psychiatric:         Behavior: Behavior normal.             DIAGNOSTIC RESULTS     EKG: All EKG's are interpreted by the Emergency Department Physician who either signs or Co-signs this chart in the absence of a cardiologist.    Not indicated    RADIOLOGY:   Non-plain film images such as CT, Ultrasound and MRI are read by the radiologist. Plain radiographic images are visualized and preliminarily interpreted by the emergency physician with the below findings:    Not indicated    Interpretation per the Radiologist below, if available at the time of this note:        LABS:  Labs Reviewed - No data to display    All other labs were within normal range or not returned as of this dictation. EMERGENCY DEPARTMENT COURSE and DIFFERENTIAL DIAGNOSIS/MDM:   Vitals:    Vitals:    12/23/21 1157   BP: (!) 146/105   Pulse: 93   Resp: 18   Temp: 98 °F (36.7 °C)   TempSrc: Oral   SpO2: 96%   Weight: 183 lb (83 kg)   Height: 5' 5\" (1.651 m)       Orders Placed This Encounter   Medications    HYDROcodone-acetaminophen (NORCO) 5-325 MG per tablet 1 tablet       Medical Decision Making: The patient has had no further bleeding since she has been here and she is discharged. Findings were discussed with the patient. She has a follow-up appointment with ENT. CONSULTS:  None    PROCEDURES:  None    FINAL IMPRESSION      1. Epistaxis          DISPOSITION/PLAN   DISPOSITION Decision To Discharge 12/23/2021 01:06:00 PM      PATIENT REFERRED TO:   Stephen Soni [facility]  [facility] [facility] [address] [facility] [address] [facility]  [phone]      As needed    [facility] ED  [address] [facility]  [phone]    If symptoms worsen      DISCHARGE MEDICATIONS:     New Prescriptions    No medications on file       The care is provided during an unprecedented national emergency due to the novel coronavirus, COVID-19.     (Please note that portions of this note were completed with a voice recognition program.  Efforts were made to edit the dictations but occasionally words are mis-transcribed.)    Mark Peña MD  Attending Emergency Physician           Mark Peña MD  12/23/21 5168

## 2022-01-03 ENCOUNTER — HOSPITAL ENCOUNTER (OUTPATIENT)
Dept: PAIN MANAGEMENT | Age: 62
Discharge: HOME | End: 2022-01-03
Payer: COMMERCIAL

## 2022-01-03 DIAGNOSIS — M54.40 CHRONIC MIDLINE LOW BACK PAIN WITH SCIATICA, SCIATICA LATERALITY UNSPECIFIED: ICD-10-CM

## 2022-01-03 DIAGNOSIS — M96.1 POSTLAMINECTOMY SYNDROME, LUMBAR REGION: Primary | ICD-10-CM

## 2022-01-03 DIAGNOSIS — M47.26 OSTEOARTHRITIS OF SPINE WITH RADICULOPATHY, LUMBAR REGION: ICD-10-CM

## 2022-01-03 DIAGNOSIS — M17.0 PRIMARY OSTEOARTHRITIS OF BOTH KNEES: ICD-10-CM

## 2022-01-03 DIAGNOSIS — Z51.81 MEDICATION MONITORING ENCOUNTER: ICD-10-CM

## 2022-01-03 DIAGNOSIS — G89.29 CHRONIC MIDLINE LOW BACK PAIN WITH SCIATICA, SCIATICA LATERALITY UNSPECIFIED: ICD-10-CM

## 2022-01-03 DIAGNOSIS — M47.26 OTHER SPONDYLOSIS WITH RADICULOPATHY, LUMBAR REGION: ICD-10-CM

## 2022-01-03 PROCEDURE — 99213 OFFICE O/P EST LOW 20 MIN: CPT | Performed by: NURSE PRACTITIONER

## 2022-01-03 PROCEDURE — 99213 OFFICE O/P EST LOW 20 MIN: CPT

## 2022-01-03 RX ORDER — HYDROCODONE BITARTRATE AND ACETAMINOPHEN 5; 325 MG/1; MG/1
1 TABLET ORAL EVERY 8 HOURS PRN
Qty: 90 TABLET | Refills: 0 | Status: SHIPPED | OUTPATIENT
Start: 2022-01-03 | End: 2022-01-31 | Stop reason: SDUPTHER

## 2022-01-03 RX ORDER — TIZANIDINE 4 MG/1
4 TABLET ORAL 2 TIMES DAILY
Qty: 60 TABLET | Refills: 2 | Status: SHIPPED | OUTPATIENT
Start: 2022-01-03 | End: 2022-09-19 | Stop reason: SDUPTHER

## 2022-01-03 RX ORDER — AMOXICILLIN 500 MG/1
500 CAPSULE ORAL 3 TIMES DAILY
COMMUNITY
End: 2022-03-30 | Stop reason: ALTCHOICE

## 2022-01-03 ASSESSMENT — ENCOUNTER SYMPTOMS
COUGH: 0
BACK PAIN: 1
SHORTNESS OF BREATH: 0
CONSTIPATION: 0

## 2022-01-03 NOTE — PROGRESS NOTES
Patient completed a telephone visit today to review medication contract. Chief Complaint: back pain    [facility] Patient has had low back pain with no known injury and bilateral knee pain for many years. Marlon Hartman had a knee arthroscopy in March 2017 Genicular block was discussed but she declined.  She is s/p lumbar diskectomy in 2015 and lumbar fusion 11/2020. She is stable and compliant on norco 5/325 TID. Gabapentin prescribed by PCP for neuropathic pain in feet. Follows with Dr. Oseas Fitzpatrick for knees.      EMG was completed and shows Abnormal study of the right lower extremity and Normal study of the left lower extremity. Jen Mcdermott is electrodiagnostic evidence of a very mild chronic right S1 radiculopathy without active denervation. Jen Mcdermott is no electrodiagnostic evidence of a generalized large fiber peripheral Polyneuropathy. She continues physical therapy with moderate benefit. She states she is able to tolerate more activity.        Was referred to ENT for chronic nose bleeds. Back Pain  This is a chronic problem. The current episode started more than 1 year ago. The problem occurs constantly. The problem is unchanged. The pain is present in the lumbar spine. The quality of the pain is described as aching. The pain does not radiate. The pain is at a severity of 8/10. The pain is moderate. The symptoms are aggravated by position and standing (walking). Pertinent negatives include no chest pain, fever, numbness or paresthesias. She has tried analgesics and bed rest for the symptoms. The treatment provided mild relief. Patient denies any new neurological symptoms. No bowel or bladder incontinence, no weakness, and no falling. Pill count: appropriate was due 1/2    Morphine equivalent: 15    Periodic Controlled Substance Monitoring: Possible medication side effects, risk of tolerance/dependence & alternative treatments discussed. ,No signs of potential drug abuse or diversion identified. ,Obtaining appropriate analgesic effect of treatment.  Lida Panchal, APRN - CNP)      Past Medical History:   Diagnosis Date    Allergic rhinitis     Alveolar hypoventilation 11/20/2020    Aortic insufficiency 2018    mild-moderate on echo ([facility]    Bronchiectasis ([facility] 11/20/2020    Bronchitis     Chronic back pain     [facility]. COPD (chronic obstructive pulmonary disease) ([facility]     Dr. Alyx Jeong to see 11/09/2020    Depression     DM (diabetes mellitus) ([facility] 12/18/2012    GERD (gastroesophageal reflux disease)     History of echocardiogram 05/2018    EF 65%, mild-moderate AI and TR    Hyperlipidemia     Dr. Scott Monday Hypertension     Dr. Scott Monday Obesity     Osteoarthritis     Peripheral vascular disease ([facility]     Primary osteoarthritis of both knees     Radicular pain of lumbosacral region     Spinal stenosis, lumbar region, without neurogenic claudication 04/30/2013    Tricuspid regurgitation 2018    mild-moderate on echo    Type II or unspecified type diabetes mellitus without mention of complication, not stated as uncontrolled     Dr. Scott Monday Unspecified sleep apnea     no cpap used anymore    URI (upper respiratory infection)     Wears dentures     upper and lower full dentures    Wears glasses     Wellness examination     Dr. Leavitt Imus -PCP last visit in early Oct. 2020       Past Surgical History:   Procedure Laterality Date    COLONOSCOPY  2/12/2009    normal    DILATION AND CURETTAGE OF UTERUS      GASTRECTOMY      partial    LUMBAR DISCECTOMY  01/2015    lumbar diskectomy    LUMBAR FUSION  11/19/2020     POSTERIOR FUSION L4/5,     LUMBAR FUSION N/A 11/19/2020    POSTERIOR FUSION L4/5, [employer], Kayla Soler, EVOKES #[id number] AMINA performed by Doris Payne DO at [facility] 4/30/13    Lumbar Diagnostic Block,  Kenalog 40 mg    NERVE BLOCK  5/23/13    Lumbar Radiofrequency, trospium (SANCTURA) 20 MG tablet, Take 1 tablet by mouth 2 times daily, Disp: 60 tablet, Rfl: 0    lisinopril-hydroCHLOROthiazide (PRINZIDE;ZESTORETIC) 20-25 MG per tablet, TAKE 1 TABLET EVERY DAY, Disp: 90 tablet, Rfl: 1    omeprazole (PRILOSEC) 20 MG delayed release capsule, TAKE 1 CAPSULE BY MOUTH EVERY DAY, Disp: 30 capsule, Rfl: 3    azelastine (ASTELIN) 0.1 % nasal spray, 2 sprays by Nasal route 2 times daily Use in each nostril as directed, Disp: 1 each, Rfl: 2    potassium chloride (KLOR-CON M) 10 MEQ extended release tablet, Take 2 tablets by mouth daily, Disp: 60 tablet, Rfl: 2    carvedilol (COREG) 12.5 MG tablet, Take 1 tablet by mouth 2 times daily, Disp: 60 tablet, Rfl: 5    albuterol sulfate HFA (VENTOLIN HFA) 108 (90 Base) MCG/ACT inhaler, Inhale 2 puffs into the lungs every 6 hours as needed for Wheezing, Disp: 1 each, Rfl: 3    acetaminophen (TYLENOL) 500 MG tablet, Take 1 tablet by mouth 4 times daily as needed for Pain, Disp: 30 tablet, Rfl: 0    atorvastatin (LIPITOR) 40 MG tablet, Take 1 tablet by mouth daily, Disp: 90 tablet, Rfl: 1    tiZANidine (ZANAFLEX) 4 MG tablet, Take 1 tablet by mouth 2 times daily, Disp: 60 tablet, Rfl: 2    amLODIPine (NORVASC) 10 MG tablet, TAKE 1 TABLET BY MOUTH DAILY, Disp: 90 tablet, Rfl: 1    docusate sodium (COLACE) 100 MG capsule, TAKE 1 CAPSULE BY MOUTH EVERY DAY, Disp: 30 capsule, Rfl: 9    Umeclidinium Bromide 62.5 MCG/INH AEPB, Inhale 1 puff into the lungs daily, Disp: 2 each, Rfl: 5    Lancets MISC, 1 each by Does not apply route 2 times daily Use 2/day, Disp: 100 each, Rfl: 11    SPIRIVA HANDIHALER 18 MCG inhalation capsule, Inhale 1 capsule into the lungs daily, Disp: 30 capsule, Rfl: 3    blood glucose test strips (EXACTECH TEST) strip, 1 each by In Vitro route daily As needed. , Disp: 50 each, Rfl: 11    diphenhydrAMINE (BENADRYL) 25 MG tablet, Take 25 mg by mouth every 6 hours as needed for Itching, Disp: , Rfl:     mirtazapine (REMERON) 15 MG tablet, Take 15 mg by mouth nightly, Disp: , Rfl:     DULoxetine (CYMBALTA) 60 MG extended release capsule, Take 1 capsule by mouth daily, Disp: 30 capsule, Rfl: 3    ipratropium-albuterol (DUONEB) 0.5-2.5 (3) MG/3ML SOLN nebulizer solution, Inhale 3 mLs into the lungs every 6 hours as needed for Shortness of Breath, Disp: 360 mL, Rfl: 11    Family History   Problem Relation Age of Onset    Diabetes Mother     Heart Disease Mother     Cirrhosis Father        Social History     Socioeconomic History    Marital status: Single     Spouse name: Not on file    Number of children: Not on file    Years of education: Not on file    Highest education level: Not on file   Occupational History     Employer: DISABLED   Tobacco Use    Smoking status: Current Every Day Smoker     Packs/day: 0.25     Years: 36.00     Pack years: 9.00     Types: Cigarettes    Smokeless tobacco: Never Used    Tobacco comment: 3 cigs per day   on nicoderm patch   Vaping Use    Vaping Use: Never used   Substance and Sexual Activity    Alcohol use: No     Alcohol/week: 0.0 standard drinks    Drug use: No     Comment: 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 7 yrs    Sexual activity: Never   Other Topics Concern    Not on file   Social History Narrative    10/9/20 Patient keeping contact with others to a minimum due to COVID 19 Pandemic. 10/9/20 Patient has difficulty with ambulation due to DJD, stenosis and fibromyalgia     Social Determinants of Health     Financial Resource Strain: Medium Risk    Difficulty of Paying Living Expenses: Somewhat hard   Food Insecurity: Food Insecurity Present    Worried About Running Out of Food in the Last Year: Sometimes true    Santosh of Food in the Last Year: Sometimes true   Transportation Needs:     Lack of Transportation (Medical): Not on file    Lack of Transportation (Non-Medical):  Not on file   Physical Activity:     Days of Exercise per Week: Not on file    Minutes of Exercise per Session: Not on file   Stress:     Feeling of Stress : Not on file   Social Connections:     Frequency of Communication with Friends and Family: Not on file    Frequency of Social Gatherings with Friends and Family: Not on file    Attends Roman Catholic Services: Not on file    Active Member of Clubs or Organizations: Not on file    Attends Club or Organization Meetings: Not on file    Marital Status: Not on file   Intimate Partner Violence:     Fear of Current or Ex-Partner: Not on file    Emotionally Abused: Not on file    Physically Abused: Not on file    Sexually Abused: Not on file   Housing Stability:     Unable to Pay for Housing in the Last Year: Not on file    Number of [place] in the Last Year: Not on file    Unstable Housing in the Last Year: Not on file       Review of Systems:  Review of Systems   Constitutional: Negative for chills and fever. Cardiovascular: Negative for chest pain and palpitations. Respiratory: Negative for cough and shortness of breath. Musculoskeletal: Positive for back pain. Gastrointestinal: Negative for constipation. Neurological: Negative for disturbances in coordination, loss of balance, numbness and paresthesias. Physical Exam:  [facility] 04/19/2003     Physical Exam  Neurological:      Mental Status: She is alert.    Psychiatric:         Mood and Affect: Mood normal.         Record/Diagnostics Review:    Last óscar 3/2021 and was appropriate     Assessment:  Problem List Items Addressed This Visit     Osteoarthritis of spine with radiculopathy, lumbar region    Relevant Medications    HYDROcodone-acetaminophen (NORCO) 5-325 MG per tablet    tiZANidine (ZANAFLEX) 4 MG tablet    Primary osteoarthritis of both knees    Relevant Medications    HYDROcodone-acetaminophen (NORCO) 5-325 MG per tablet    tiZANidine (ZANAFLEX) 4 MG tablet    Medication monitoring encounter    Chronic low back pain    Relevant Medications HYDROcodone-acetaminophen (NORCO) 5-325 MG per tablet    tiZANidine (ZANAFLEX) 4 MG tablet    Other spondylosis with radiculopathy, lumbar region    Relevant Medications    HYDROcodone-acetaminophen (NORCO) 5-325 MG per tablet    tiZANidine (ZANAFLEX) 4 MG tablet    Postlaminectomy syndrome, lumbar region - Primary             Treatment Plan:  Patient relates current medications are helping the pain. Patient reports taking pain medications as prescribed, denies obtaining medications from different sources and denies use of illegal drugs. Patient denies side effects from medications like nausea, vomiting, constipation or drowsiness. Patient reports current activities of daily living are possible due to medications and would like to continue them. As always, we encourage daily stretching and strengthening exercises, and recommend minimizing use of pain medications unless patient cannot get through daily activities due to pain. Contract requirements met. Continue opioid therapy. Script written for norco  She plans to start PT again this week  Follow up appointment made for 4 weeks    Omar Stout, was evaluated through a synchronous (real-time) audio-video encounter. The patient (or guardian if applicable) is aware that this is a billable service. Verbal consent to proceed has been obtained within the past 12 months. The visit was conducted pursuant to the emergency declaration under the [address] [place] [address] authority and the [employer] General Act. Patient identification was verified, and a caregiver was present when appropriate. The patient was located in a state where the provider was credentialed to provide care. Total time spent for this encounter: 20 minutes    --ELVIRA Muse CNP on 1/3/2022 at 10:58 AM    An electronic signature was used to authenticate this note.

## 2022-01-19 RX ORDER — GABAPENTIN 300 MG/1
CAPSULE ORAL
Qty: 120 CAPSULE | Refills: 2 | Status: SHIPPED | OUTPATIENT
Start: 2022-01-19 | End: 2022-02-19

## 2022-01-19 NOTE — TELEPHONE ENCOUNTER
E-scribe request for Gabapentin. Please review and e-scribe if applicable. Next Visit Date:  Future Appointments   Date Time Provider Claudy Laury   1/25/2022  2:15 PM Angelina Prabhakar MD [id number]   1/31/2022  1:20 PM ELVIRA Urbano - CNP [facility]m Maintenance   Topic Date Due    COVID-19 Vaccine (3 - Booster for Pfizer series) 11/10/2021    Potassium monitoring  12/02/2021    Creatinine monitoring  12/02/2021    Diabetic retinal exam  10/31/2022 (Originally 5/14/2021)    Diabetic microalbuminuria test  10/11/2022    Lipid screen  10/11/2022    Annual Wellness Visit (AWV)  10/22/2022    A1C test (Diabetic or Prediabetic)  10/26/2022    Depression Monitoring  10/26/2022    Diabetic foot exam  12/16/2022    Breast cancer screen  04/27/2023    Pneumococcal 0-64 years Vaccine (2 of 2 - PPSV23) 07/08/2023    Colon cancer screen fecal DNA test (Cologuard)  10/05/2023    Cervical cancer screen  03/02/2026    DTaP/Tdap/Td vaccine (2 - Td or Tdap) 06/24/2029    Flu vaccine  Completed    Shingles Vaccine  Completed    Hepatitis C screen  Completed    HIV screen  Completed    Hepatitis A vaccine  Aged Out    Hib vaccine  Aged Out    Meningococcal (ACWY) vaccine  Aged Out               (applicable per patient's age: Cancer Screenings, Depression Screening, Fall Risk Screening, Immunizations)    Hemoglobin A1C (%)   Date Value   10/26/2021 5.7   03/02/2021 5.5   04/21/2020 5.8     Microalb/Crt.  Ratio (mcg/mg creat)   Date Value   10/11/2021 11     LDL Cholesterol (mg/dL)   Date Value   10/11/2021 131 (H)     AST (U/L)   Date Value   04/21/2020 18     ALT (U/L)   Date Value   04/21/2020 21     BUN (mg/dL)   Date Value   12/02/2020 15      (goal A1C is < 7)   (goal LDL is <100) need 30-50% reduction from baseline     BP Readings from Last 3 Encounters:   12/23/21 (!) 146/105   12/23/21 (!) 155/86   12/20/21 (!) 178/105    (goal /80)      All Future Testing planned in [facility]:  Lab Frequency Next Occurrence            Patient Active Problem List:     DJD (degenerative joint disease) of knee     Osteoarthritis of spine with radiculopathy, lumbar region     GERD (gastroesophageal reflux disease)     COPD, severity to be determined ([address]     HTN (hypertension)     Allergic rhinitis     Lipoma of shoulder s/p excision right posterior 11 17 2008     History of tobacco use     DM (diabetes mellitus)     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 9)     Bronchiectasis ([address]     Centrilobular emphysema ([address]     Oxygen dependent     Acute postoperative anemia due to expected blood loss     Multifocal pneumonia     Acute on chronic respiratory failure with hypoxia (HCC)     Pulmonary function studies abnormal     Tobacco dependence     Idiopathic sleep related nonobstructive alveolar hypoventilation     Postlaminectomy syndrome, lumbar region     Trigger finger of right thumb

## 2022-01-25 ENCOUNTER — VIRTUAL VISIT (OUTPATIENT)
Dept: INTERNAL MEDICINE | Age: 62
End: 2022-01-25
Payer: COMMERCIAL

## 2022-01-25 DIAGNOSIS — R10.9 LEFT SIDED ABDOMINAL PAIN: ICD-10-CM

## 2022-01-25 DIAGNOSIS — I10 ESSENTIAL HYPERTENSION: ICD-10-CM

## 2022-01-25 DIAGNOSIS — E11.9 TYPE 2 DIABETES MELLITUS WITHOUT COMPLICATION, WITHOUT LONG-TERM CURRENT USE OF INSULIN (HCC): Primary | ICD-10-CM

## 2022-01-25 DIAGNOSIS — J43.2 CENTRILOBULAR EMPHYSEMA (HCC): ICD-10-CM

## 2022-01-25 DIAGNOSIS — M96.1 POSTLAMINECTOMY SYNDROME, LUMBAR REGION: ICD-10-CM

## 2022-01-25 PROCEDURE — 99442 PR PHYS/QHP TELEPHONE EVALUATION 11-20 MIN: CPT | Performed by: INTERNAL MEDICINE

## 2022-01-25 RX ORDER — MIRTAZAPINE 30 MG/1
30 TABLET, FILM COATED ORAL NIGHTLY
COMMUNITY
Start: 2022-01-05

## 2022-01-25 NOTE — PATIENT INSTRUCTIONS
-Pt due for 3 month f/u in April-- pt to call in March to set up an appt--reminder in [facility] to contact patient as well--AVS given to patient    -Bloodwork orders given to patient, they will have them done before their next visit.     -Henrique Agosto

## 2022-01-25 NOTE — PROGRESS NOTES
Rohit Avendano is a 59 y.o. female evaluated via telephone on 1/25/2022. Consent:  She and/or health care decision maker is aware that that she may receive a bill for this telephone service, which includes applicable co-pays, depending on her insurance coverage, and has provided verbal consent to proceed. Documentation:  I communicated with the patient and/or health care decision maker about her chronic health problems. She is overall doing well except for her chronic low back pain and stiffness. She goes to Pain management. She is on Norco and Gabapentin. She has also mild lower abdominal pain and wanted UA checked. No dysuria or hematuria. Details of this discussion including any medical advice provided:   Get labs done  Avoid constipation  Increase fiber  Continue Norco per Pain management  Follow up in office for BP check next visit      I affirm this is a Patient Initiated Episode with a Patient who has not had a related appointment within my department in the past 7 days or scheduled within the next 24 hours. Patient identification was verified at the start of the visit: Yes    Total Time: minutes: 11-20 minutes    Rohit Avendano, was evaluated through a synchronous (real-time) audio-video encounter. The patient (or guardian if applicable) is aware that this is a billable service, which includes applicable co-pays. This Virtual Visit was conducted with patient's (and/or legal guardian's) consent. The visit was conducted pursuant to the emergency declaration under the [facility], [address] [place] [address] [facility] authority and the [employer] Act. Patient identification was verified, and a caregiver was present when appropriate. The patient was located at home in a state where the provider was licensed to provide care.        Note: not billable if this call serves to triage the patient into an appointment for the relevant concern      Jason Molina MD

## 2022-01-26 ENCOUNTER — HOSPITAL ENCOUNTER (OUTPATIENT)
Age: 62
Setting detail: SPECIMEN
Discharge: HOME | End: 2022-01-26

## 2022-01-26 DIAGNOSIS — R10.9 LEFT SIDED ABDOMINAL PAIN: ICD-10-CM

## 2022-01-26 DIAGNOSIS — E11.9 TYPE 2 DIABETES MELLITUS WITHOUT COMPLICATION, WITHOUT LONG-TERM CURRENT USE OF INSULIN (HCC): ICD-10-CM

## 2022-01-26 DIAGNOSIS — I10 ESSENTIAL HYPERTENSION: ICD-10-CM

## 2022-01-26 LAB
-: ABNORMAL
ALBUMIN SERPL-MCNC: 4.1 G/DL (ref 3.5–5.2)
ALBUMIN/GLOBULIN RATIO: 1.1 (ref 1–2.5)
ALP BLD-CCNC: 81 U/L (ref 35–104)
ALT SERPL-CCNC: 10 U/L (ref 5–33)
AMORPHOUS: ABNORMAL
ANION GAP SERPL CALCULATED.3IONS-SCNC: 21 MMOL/L (ref 9–17)
AST SERPL-CCNC: 15 U/L
BACTERIA: ABNORMAL
BILIRUB SERPL-MCNC: 0.33 MG/DL (ref 0.3–1.2)
BILIRUBIN URINE: NEGATIVE
BUN BLDV-MCNC: 12 MG/DL (ref 8–23)
BUN/CREAT BLD: ABNORMAL (ref 9–20)
CALCIUM SERPL-MCNC: 9.8 MG/DL (ref 8.6–10.4)
CASTS UA: ABNORMAL /LPF (ref 0–2)
CHLORIDE BLD-SCNC: 104 MMOL/L (ref 98–107)
CO2: 22 MMOL/L (ref 20–31)
COLOR: YELLOW
COMMENT UA: ABNORMAL
CREAT SERPL-MCNC: 0.65 MG/DL (ref 0.5–0.9)
CRYSTALS, UA: ABNORMAL /HPF
EPITHELIAL CELLS UA: ABNORMAL /HPF (ref 0–5)
GFR AFRICAN AMERICAN: >60 ML/MIN
GFR NON-AFRICAN AMERICAN: >60 ML/MIN
GFR SERPL CREATININE-BSD FRML MDRD: ABNORMAL ML/MIN/{1.73_M2}
GFR SERPL CREATININE-BSD FRML MDRD: ABNORMAL ML/MIN/{1.73_M2}
GLUCOSE BLD-MCNC: 109 MG/DL (ref 70–99)
GLUCOSE URINE: NEGATIVE
KETONES, URINE: NEGATIVE
LEUKOCYTE ESTERASE, URINE: ABNORMAL
MUCUS: ABNORMAL
NITRITE, URINE: NEGATIVE
OTHER OBSERVATIONS UA: ABNORMAL
PH UA: 6 (ref 5–8)
POTASSIUM SERPL-SCNC: 3.8 MMOL/L (ref 3.7–5.3)
PROTEIN UA: NEGATIVE
RBC UA: ABNORMAL /HPF (ref 0–2)
RENAL EPITHELIAL, UA: ABNORMAL /HPF
SODIUM BLD-SCNC: 147 MMOL/L (ref 135–144)
SPECIFIC GRAVITY UA: 1.02 (ref 1–1.03)
TOTAL PROTEIN: 7.8 G/DL (ref 6.4–8.3)
TRICHOMONAS: ABNORMAL
TSH SERPL DL<=0.05 MIU/L-ACNC: 1.82 MIU/L (ref 0.3–5)
TURBIDITY: CLEAR
URINE HGB: NEGATIVE
UROBILINOGEN, URINE: NORMAL
WBC UA: ABNORMAL /HPF (ref 0–5)
YEAST: ABNORMAL

## 2022-01-27 LAB
CULTURE: NORMAL
Lab: NORMAL
SPECIMEN DESCRIPTION: NORMAL

## 2022-01-28 ENCOUNTER — CARE COORDINATION (OUTPATIENT)
Dept: CARE COORDINATION | Age: 62
End: 2022-01-28

## 2022-01-28 NOTE — CARE COORDINATION
Called, message left on voicemail with my contact information and reason for call. Will attempt 2nd call in about 1 week for 2nd attempt at phorus enrollment if no return call today.

## 2022-01-31 ENCOUNTER — HOSPITAL ENCOUNTER (OUTPATIENT)
Dept: PAIN MANAGEMENT | Age: 62
Discharge: HOME | End: 2022-01-31
Payer: COMMERCIAL

## 2022-01-31 VITALS
DIASTOLIC BLOOD PRESSURE: 93 MMHG | OXYGEN SATURATION: 94 % | HEART RATE: 96 BPM | RESPIRATION RATE: 20 BRPM | SYSTOLIC BLOOD PRESSURE: 140 MMHG | TEMPERATURE: 98.1 F

## 2022-01-31 DIAGNOSIS — M54.40 CHRONIC MIDLINE LOW BACK PAIN WITH SCIATICA, SCIATICA LATERALITY UNSPECIFIED: ICD-10-CM

## 2022-01-31 DIAGNOSIS — G89.29 CHRONIC MIDLINE LOW BACK PAIN WITH SCIATICA, SCIATICA LATERALITY UNSPECIFIED: ICD-10-CM

## 2022-01-31 DIAGNOSIS — M47.26 OSTEOARTHRITIS OF SPINE WITH RADICULOPATHY, LUMBAR REGION: ICD-10-CM

## 2022-01-31 DIAGNOSIS — M17.0 PRIMARY OSTEOARTHRITIS OF BOTH KNEES: ICD-10-CM

## 2022-01-31 DIAGNOSIS — M96.1 POSTLAMINECTOMY SYNDROME, LUMBAR REGION: Primary | ICD-10-CM

## 2022-01-31 DIAGNOSIS — Z79.891 ENCOUNTER FOR LONG-TERM OPIATE ANALGESIC USE: Chronic | ICD-10-CM

## 2022-01-31 DIAGNOSIS — M51.9 LUMBAR DISC DISEASE: ICD-10-CM

## 2022-01-31 PROCEDURE — 99213 OFFICE O/P EST LOW 20 MIN: CPT | Performed by: NURSE PRACTITIONER

## 2022-01-31 PROCEDURE — 99213 OFFICE O/P EST LOW 20 MIN: CPT

## 2022-01-31 RX ORDER — HYDROCODONE BITARTRATE AND ACETAMINOPHEN 5; 325 MG/1; MG/1
1 TABLET ORAL EVERY 8 HOURS PRN
Qty: 90 TABLET | Refills: 0 | Status: SHIPPED | OUTPATIENT
Start: 2022-02-02 | End: 2022-02-28 | Stop reason: SDUPTHER

## 2022-01-31 ASSESSMENT — ENCOUNTER SYMPTOMS
BACK PAIN: 1
COUGH: 0
CONSTIPATION: 0
SHORTNESS OF BREATH: 0

## 2022-02-03 ENCOUNTER — CARE COORDINATION (OUTPATIENT)
Dept: CARE COORDINATION | Age: 62
End: 2022-02-03

## 2022-02-03 NOTE — CARE COORDINATION
Called, message left on voicemail with my contact information and reason for call. Will attempt to contact in 1 week if no return call today.  Patient does not have My Chart    When contacted review the following  - COPD  - HTN  - Diabetes Last A1C on 10/26/21 was 5.7  - smoking cessation   - Behavioral Health Needs  - patient still O2 dependent at night  - nose bleeds

## 2022-02-10 ENCOUNTER — CARE COORDINATION (OUTPATIENT)
Dept: CARE COORDINATION | Age: 62
End: 2022-02-10

## 2022-02-10 NOTE — CARE COORDINATION
Called home and cell number, message left on voicemail with my contact information and reason for call.  This is the 3rd attempt to contactwill in 1 week for the 4th and final time if no return call today     When contacted review the following  - COPD  - HTN  - Diabetes Last A1C on 10/26/21 was 5.7  - smoking cessation   - Behavioral Health Needs  - patient still O2 dependent at night  - nose bleeds

## 2022-02-16 ENCOUNTER — CARE COORDINATION (OUTPATIENT)
Dept: CARE COORDINATION | Age: 62
End: 2022-02-16

## 2022-02-16 NOTE — CARE COORDINATION
Called, message left on voicemail with my contact information and reason for call.  Will attempt to contact in 1 week if no return call today    When contacted review the following  - COPD  - HTN  - Diabetes Last A1C on 10/26/21 was 5.7  - smoking cessation   - Behavioral Health Needs  - patient still O2 dependent at night  - nose bleeds    Patient does not have My Chart

## 2022-02-23 ENCOUNTER — CARE COORDINATION (OUTPATIENT)
Dept: CARE COORDINATION | Age: 62
End: 2022-02-23

## 2022-02-23 ASSESSMENT — ENCOUNTER SYMPTOMS: DYSPNEA ASSOCIATED WITH: EXERTION

## 2022-02-23 NOTE — CARE COORDINATION
Ambulatory Care Coordination Note  2/23/2022  CM Risk Score: 6  [place] 10 Year Mortality Risk Score: 47%     ACC: Noel Whitman, RN    Summary Note: called and spoke with patient. She states that she is a little down because her car needs to be repaired but has no money to pay for it. Patient states that she may have to cancel 2/28/22 appointment because she has no way to get there. I suggested that she use her insurance to set up transportation to any medical appointment she has. Reviewed proceduer of making appointment. Patient verbalized understanding. Plan  COPD  -Review activities that increase SOB  - rest in between activities  -do more strenuous activity in the am to reduce fatigue  - O2 use ? Diabetes  - Patient does not test daily. - continue to monitor  - report Sx of hypo or hyper glycemia  - not on any diabetic medications  - Last A1C <6  Pain  - continue with pain management  - continue OT and PT  - use pain medications as directed  Activity  - encourage increasing activity slowly  Falls  Remove obstacles that increase the risk of falls    Transportation  - will use [facility] for transportation for medical appointments  Call in 1 week  -  Ambulatory Care Coordination Assessment    Care Coordination Protocol  Referral from Primary Care Provider: No  Week 1 - Initial Assessment     Do you have all of your prescriptions and are they filled?: No  Barriers to medication adherence: None  How often do you have trouble taking your medications the way you have been told to take them?: I always take them as prescribed. Do you have Home O2 Therapy?: No      Ability to seek help/take action for Emergent Urgent situations i.e. fire, crime, inclement weather or health crisis. : Independent  Ability to ambulate to restroom: Independent  Ability handle personal hygeine needs (bathing/dressing/grooming): Needs Assistance  Ability to manage Medications:  Independent  Ability to prepare Food Preparation: Needs Assistance  Ability to maintain home (clean home, laundry): Needs Assistance  Ability to drive and/or has transportation: Independent  Ability to do shopping: Needs Assistance  Ability to manage finances: Independent  Is patient able to live independently?: Yes     Current Housing: Apartment        Per the Fall Risk Screening, did the patient have 2 or more falls or 1 fall with injury in the past year?: No     Frequent urination at night?: No  Do you use rails/bars?: Yes  Do you have a non-slip tub mat?: No     Are you experiencing loss of meaning?: No  Are you experiencing loss of hope and peace?: No     Thinking about your patient's physical health needs, are there any symptoms or problems (risk indicators) you are unsure about that require further investigation?: Mild vague physical symptoms or problems; but do not impact on daily life or are not of concern to patient   Are the patients physical health problems impacting on their mental well-being?: Mild impact on mental well-being e.g. \"\"feeling fed-up\"\", \"\"reduced enjoyment\"\"   Are there any problems with your patients lifestyle behaviors (alcohol, drugs, diet, exercise) that are impacting on physical or mental well-being?: No identified areas of concern   Do you have any other concerns about your patients mental well-being?  How would you rate their severity and impact on the patient?: Mild problems - don't interfere with function   How would you rate their home environment in terms of safety and stability (including domestic violence, insecure housing, neighbor harassment)?: Safe, stable, but with some inconsistency   How do daily activities impact on the patient's well-being? (include current or anticipated unemployment, work, caregiving, access to transportation or other): Some general dissatisfaction but no concern   How would you rate their social network (family, work, friends)?: Adequate participation with social networks   How would you rate their financial resources (including ability to afford all required medical care)?: Financially secure, some resource challenges   How wells does the patient now understand their health and well-being (symptoms, signs or risk factors) and what they need to do to manage their health?: Reasonable to good understanding and already engages in managing health or is willing to undertake better management   How well do you think your patient can engage in healthcare discussions? (Barriers include language, deafness, aphasia, alcohol or drug problems, learning difficulties, concentration): Clear and open communication, no identified barriers   Do other services need to be involved to help this patient?: Other care/services not in place and required   Are current services involved with this patient well-coordinated? (Include coordination with other services you are now recommendation): Required care/services in place and adequately coordinated   Suggested Interventions and Community Resources                  Prior to Admission medications    Medication Sig Start Date End Date Taking? Authorizing Provider   HYDROcodone-acetaminophen (NORCO) 5-325 MG per tablet Take 1 tablet by mouth every 8 hours as needed for Pain for up to 30 days.  Early refill due to holidays 2/2/22 3/4/22  Gerald Prado, APRN - CNP   mirtazapine (REMERON) 30 MG tablet Take 30 mg by mouth 3 times daily 1/5/22   Historical Provider, MD   amoxicillin (AMOXIL) 500 MG capsule Take 500 mg by mouth 3 times daily    Historical Provider, MD   tiZANidine (ZANAFLEX) 4 MG tablet Take 1 tablet by mouth 2 times daily 1/3/22   Isaiha Ren APRN - CNP   meloxicam (MOBIC) 7.5 MG tablet Take 1 tablet by mouth daily 12/3/21 1/25/23  Gerald Prado APRN - CNP   cetirizine (ZYRTEC) 10 MG tablet TAKE 1 TABLET BY MOUTH DAILY 11/17/21   Sergio Lazo MD   trospium (SANCTURA) 20 MG tablet Take 1 tablet by mouth 2 times daily 10/26/21   Sergio Lazo MD lisinopril-hydroCHLOROthiazide (PRINZIDE;ZESTORETIC) 20-25 MG per tablet TAKE 1 TABLET EVERY DAY 10/26/21   Ally Kay MD   omeprazole (PRILOSEC) 20 MG delayed release capsule TAKE 1 CAPSULE BY MOUTH EVERY DAY 10/26/21   Ally Kay MD   azelastine (ASTELIN) 0.1 % nasal spray 2 sprays by Nasal route 2 times daily Use in each nostril as directed 10/26/21   Ally Kay MD   potassium chloride (KLOR-CON M) 10 MEQ extended release tablet Take 2 tablets by mouth daily 10/26/21   Ally Kay MD   carvedilol (COREG) 12.5 MG tablet Take 1 tablet by mouth 2 times daily 10/26/21   Ally Kay MD   albuterol sulfate HFA (VENTOLIN HFA) 108 (90 Base) MCG/ACT inhaler Inhale 2 puffs into the lungs every 6 hours as needed for Wheezing 10/26/21   Ally Kay MD   acetaminophen (TYLENOL) 500 MG tablet Take 1 tablet by mouth 4 times daily as needed for Pain 10/26/21   Ally Kay MD   atorvastatin (LIPITOR) 40 MG tablet Take 1 tablet by mouth daily 10/26/21   Ally Kay MD   amLODIPine (NORVASC) 10 MG tablet TAKE 1 TABLET BY MOUTH DAILY 9/13/21   Ally Kay MD   docusate sodium (COLACE) 100 MG capsule TAKE 1 CAPSULE BY MOUTH EVERY DAY 7/20/21   Ally Kay MD   Umeclidinium Bromide 62.5 MCG/INH AEPB Inhale 1 puff into the lungs daily 6/25/21   Ally Kay MD   Lancets MISC 1 each by Does not apply route 2 times daily Use 2/day 3/4/21   Ally Kay MD   Renny Redding 18 MCG inhalation capsule Inhale 1 capsule into the lungs daily 12/22/20   Ally Kay MD   blood glucose test strips (EXACTECH TEST) strip 1 each by In Vitro route daily As needed.  10/14/20   Ally Kay MD   diphenhydrAMINE (BENADRYL) 25 MG tablet Take 25 mg by mouth every 6 hours as needed for Itching    Historical Provider, MD   mirtazapine (REMERON) 15 MG tablet Take 15 mg by mouth nightly 5/22/18   Historical Provider, MD   DULoxetine (CYMBALTA) 60 MG extended release capsule Take 1 capsule by mouth daily 2/1/18   Jorje Shin MD   ipratropium-albuterol (DUONEB) 0.5-2.5 (3) MG/3ML SOLN nebulizer solution Inhale 3 mLs into the lungs every 6 hours as needed for Shortness of Breath 8/4/15   Jorje Shin MD       Future Appointments   Date Time Provider Claudy Schaeffer   2/28/2022  2:00 PM Avani Smith, APRN - CNP [address]     ,   Diabetes Assessment    Medic Alert ID: No  Meal Planning: Avoidance of concentrated sweets   How often do you test your blood sugar?: Daily   Do you have barriers with adherence to non-pharmacologic self-management interventions?  (Nutrition/Exercise/Self-Monitoring): No   Have you ever had to go to the ED for symptoms of low blood sugar?: No           and   COPD Assessment    Does the patient understand envrionmental exposure?: Yes  Is the patient able to verbalize Rescue vs. Long Acting medications?: Yes  Does the patient have a nebulizer?: No  Does the patient use a space with inhaled medications?: No            Symptoms:  None: Yes      Symptom course: stable  Breathlessness: exertion  Increase use of rapid acting/rescue inhaled medications?: No  Change in chronic cough?: No/At Baseline  Change in sputum?: No/At Baseline  Self Monitoring - SaO2: No  Have you had a recent diagnosis of pneumonia either by PCP or at a hospital?: No

## 2022-02-28 ENCOUNTER — HOSPITAL ENCOUNTER (OUTPATIENT)
Dept: PAIN MANAGEMENT | Age: 62
Discharge: HOME | End: 2022-02-28
Payer: COMMERCIAL

## 2022-02-28 VITALS
RESPIRATION RATE: 18 BRPM | DIASTOLIC BLOOD PRESSURE: 90 MMHG | HEART RATE: 79 BPM | OXYGEN SATURATION: 94 % | SYSTOLIC BLOOD PRESSURE: 144 MMHG

## 2022-02-28 DIAGNOSIS — Z51.81 MEDICATION MONITORING ENCOUNTER: Primary | ICD-10-CM

## 2022-02-28 DIAGNOSIS — M54.40 CHRONIC MIDLINE LOW BACK PAIN WITH SCIATICA, SCIATICA LATERALITY UNSPECIFIED: ICD-10-CM

## 2022-02-28 DIAGNOSIS — M17.0 PRIMARY OSTEOARTHRITIS OF BOTH KNEES: ICD-10-CM

## 2022-02-28 DIAGNOSIS — Z79.891 ENCOUNTER FOR LONG-TERM OPIATE ANALGESIC USE: ICD-10-CM

## 2022-02-28 DIAGNOSIS — M47.26 OTHER SPONDYLOSIS WITH RADICULOPATHY, LUMBAR REGION: ICD-10-CM

## 2022-02-28 DIAGNOSIS — M51.9 LUMBAR DISC DISEASE: ICD-10-CM

## 2022-02-28 DIAGNOSIS — G89.29 CHRONIC MIDLINE LOW BACK PAIN WITH SCIATICA, SCIATICA LATERALITY UNSPECIFIED: ICD-10-CM

## 2022-02-28 PROCEDURE — 99213 OFFICE O/P EST LOW 20 MIN: CPT | Performed by: NURSE PRACTITIONER

## 2022-02-28 PROCEDURE — 80307 DRUG TEST PRSMV CHEM ANLYZR: CPT

## 2022-02-28 PROCEDURE — 99213 OFFICE O/P EST LOW 20 MIN: CPT

## 2022-02-28 RX ORDER — HYDROCODONE BITARTRATE AND ACETAMINOPHEN 5; 325 MG/1; MG/1
1 TABLET ORAL EVERY 8 HOURS PRN
Qty: 90 TABLET | Refills: 0 | Status: SHIPPED | OUTPATIENT
Start: 2022-03-04 | End: 2022-03-30 | Stop reason: SDUPTHER

## 2022-02-28 ASSESSMENT — ENCOUNTER SYMPTOMS
SHORTNESS OF BREATH: 0
COUGH: 0
BACK PAIN: 1
CONSTIPATION: 0

## 2022-02-28 NOTE — PROGRESS NOTES
Chief Complaint: back pain    [facility]  Patient has had low back pain with no known injury and bilateral knee pain for many years. Kristian Caruso had a knee arthroscopy in March 2017 Genicular block was discussed but she declined.  She is s/p lumbar diskectomy in 2015 and lumbar fusion 11/2020. She is stable and compliant on norco 5/325 TID. Gabapentin prescribed by PCP for neuropathic pain in feet. Follows with Dr. Brigette Lawler for knees.      EMG was completed and shows Abnormal study of the right lower extremity and Normal study of the left lower extremity. Serena Maverick is electrodiagnostic evidence of a very mild chronic right S1 radiculopathy without active denervation. Serena Maverick is no electrodiagnostic evidence of a generalized large fiber peripheral Polyneuropathy. She continues physical therapy with moderate benefit. She states she is able to tolerate more activity.          Back Pain  This is a chronic problem. The current episode started more than 1 year ago. The problem occurs constantly. The problem is unchanged. The pain is present in the lumbar spine. The quality of the pain is described as aching. Radiates to: down left leg to the knee. The pain is at a severity of 8/10. The pain is moderate. The symptoms are aggravated by position and standing. Pertinent negatives include no chest pain, fever, numbness, paresthesias or tingling. She has tried analgesics and bed rest for the symptoms. The treatment provided mild relief. Patient denies any new neurological symptoms. No bowel or bladder incontinence, no weakness, and no falling. Pill count: appropriate due 3/4    Morphine equivalent: 15    Controlled Substance Monitoring:    Acute and Chronic Pain Monitoring:   RX Monitoring 2/28/2022   Attestation -   Acute Pain Prescriptions -   Periodic Controlled Substance Monitoring Possible medication side effects, risk of tolerance/dependence & alternative treatments discussed. ;No signs of potential drug abuse or diversion identified.;Obtaining appropriate analgesic effect of treatment.    Chronic Pain > 50 MEDD -   Chronic Pain > 80 MEDD -           Past Medical History:   Diagnosis Date    Allergic rhinitis     Alveolar hypoventilation 11/20/2020    Aortic insufficiency 2018    mild-moderate on echo ([facility]    Bronchiectasis ([facility] 11/20/2020    Bronchitis     Chronic back pain     Pain management at [facility] COPD (chronic obstructive pulmonary disease) ([facility]     Dr. Simmons Larger to see 11/09/2020    Depression     DM [facility]diabetes mellitus) [facility] 12/18/2012    GERD (gastroesophageal reflux disease)     History of echocardiogram 05/2018    EF 65%, mild-moderate AI and TR    Hyperlipidemia     Dr. Del Azar Hypertension     Dr. Del Azar Obesity     Osteoarthritis     Peripheral vascular disease ([facility]     Primary osteoarthritis of both knees     Radicular pain of lumbosacral region     Spinal stenosis, lumbar region, without neurogenic claudication 04/30/2013    Tricuspid regurgitation 2018    mild-moderate on echo    Type II or unspecified type diabetes mellitus without mention of complication, not stated as uncontrolled     Dr. Del Azar Unspecified sleep apnea     no cpap used anymore    URI (upper respiratory infection)     Wears dentures     upper and lower full dentures    Wears glasses     Wellness examination     Dr. Imelda Castillo -PCP last visit in early Oct. 2020       Past Surgical History:   Procedure Laterality Date    COLONOSCOPY  2/12/2009    normal    DILATION AND CURETTAGE OF UTERUS      GASTRECTOMY      partial    LUMBAR DISCECTOMY  01/2015    lumbar diskectomy    LUMBAR FUSION  11/19/2020     POSTERIOR FUSION L4/5,     LUMBAR FUSION N/A 11/19/2020    POSTERIOR FUSION L4/5, MEDJANESSAS, Ольга Delvalle, FRENCHS #[id number] AMINA performed by Kirt Jade DO at [address] Right 4/30/13    Lumbar Diagnostic Block,  Kenalog 40 mg    NERVE BLOCK  5/23/13    Lumbar Radiofrequency, Kenalog 40mg    NERVE BLOCK  8/12/13    Lt MBNB  celestone 6mg    NERVE BLOCK Left 8-28-13    left lumbar diagnostic block #2 decadron 10 mg    NERVE BLOCK Left 9-24-13    left lumbar median branch radiofrequency    NERVE BLOCK  07-02-14    caudal, celestone 9 mg    NERVE BLOCK  7-16-14    caudal epidural #2, celestone 9mg, fentanyl 25mcg    NERVE BLOCK  7/30/14    caudal #3 decadron 10mg    NERVE BLOCK  11-6-14    duramorph epidural steroid block  duramorph 1 mg celestone 9 mg    NERVE BLOCK  11/20/15    TENS- Empi Select    NERVE BLOCK  07/20/2018    right transforminal # 1 decadron 10mg,isovue    NERVE BLOCK Bilateral 02/01/2019    bilat mbnb- no steroid    NERVE BLOCK Bilateral 02/08/2019    bilat mbnb, marcaine . 25%    [place] KNEE SCOPE,DIAGNOSTIC Right 3/24/2017    KNEE ARTHROSCOPY WITH PARTIAL MEDIAL MENISECAL DEBRIDMENT  performed by Ishmael Homans, MD at [address] [place] [address]  9 20 2007    UPPER GASTROINTESTINAL ENDOSCOPY  4 21 2009,04/2011    gastritis, esophagitis       Allergies   Allergen Reactions    Aspirin      DUE TO ULCER    Claritin [Loratadine] Other (See Comments)     coughing    Flonase [Fluticasone Propionate]     Morphine And Related      GI Upset         Current Outpatient Medications:     HYDROcodone-acetaminophen (NORCO) 5-325 MG per tablet, Take 1 tablet by mouth every 8 hours as needed for Pain for up to 30 days.  Early refill due to holidays, Disp: 90 tablet, Rfl: 0    mirtazapine (REMERON) 30 MG tablet, Take 30 mg by mouth 3 times daily, Disp: , Rfl:     amoxicillin (AMOXIL) 500 MG capsule, Take 500 mg by mouth 3 times daily, Disp: , Rfl:     tiZANidine (ZANAFLEX) 4 MG tablet, Take 1 tablet by mouth 2 times daily, Disp: 60 tablet, Rfl: 2    meloxicam (MOBIC) 7.5 MG tablet, Take 1 tablet by mouth daily, Disp: 30 tablet, Rfl: 2    cetirizine (ZYRTEC) 10 MG tablet, TAKE 1 TABLET BY MOUTH DAILY, Disp: 30 tablet, Rfl: 4    trospium (SANCTURA) 20 MG tablet, Take 1 tablet by mouth 2 times daily, Disp: 60 tablet, Rfl: 0    lisinopril-hydroCHLOROthiazide (PRINZIDE;ZESTORETIC) 20-25 MG per tablet, TAKE 1 TABLET EVERY DAY, Disp: 90 tablet, Rfl: 1    omeprazole (PRILOSEC) 20 MG delayed release capsule, TAKE 1 CAPSULE BY MOUTH EVERY DAY, Disp: 30 capsule, Rfl: 3    azelastine (ASTELIN) 0.1 % nasal spray, 2 sprays by Nasal route 2 times daily Use in each nostril as directed, Disp: 1 each, Rfl: 2    potassium chloride (KLOR-CON M) 10 MEQ extended release tablet, Take 2 tablets by mouth daily, Disp: 60 tablet, Rfl: 2    carvedilol (COREG) 12.5 MG tablet, Take 1 tablet by mouth 2 times daily, Disp: 60 tablet, Rfl: 5    albuterol sulfate HFA (VENTOLIN HFA) 108 (90 Base) MCG/ACT inhaler, Inhale 2 puffs into the lungs every 6 hours as needed for Wheezing, Disp: 1 each, Rfl: 3    acetaminophen (TYLENOL) 500 MG tablet, Take 1 tablet by mouth 4 times daily as needed for Pain, Disp: 30 tablet, Rfl: 0    atorvastatin (LIPITOR) 40 MG tablet, Take 1 tablet by mouth daily, Disp: 90 tablet, Rfl: 1    amLODIPine (NORVASC) 10 MG tablet, TAKE 1 TABLET BY MOUTH DAILY, Disp: 90 tablet, Rfl: 1    docusate sodium (COLACE) 100 MG capsule, TAKE 1 CAPSULE BY MOUTH EVERY DAY, Disp: 30 capsule, Rfl: 9    Umeclidinium Bromide 62.5 MCG/INH AEPB, Inhale 1 puff into the lungs daily, Disp: 2 each, Rfl: 5    Lancets MISC, 1 each by Does not apply route 2 times daily Use 2/day, Disp: 100 each, Rfl: 11    SPIRIVA HANDIHALER 18 MCG inhalation capsule, Inhale 1 capsule into the lungs daily, Disp: 30 capsule, Rfl: 3    blood glucose test strips (EXACTECH TEST) strip, 1 each by In Vitro route daily As needed. , Disp: 50 each, Rfl: 11    diphenhydrAMINE (BENADRYL) 25 MG tablet, Take 25 mg by mouth every 6 hours as needed for Itching, Disp: , Rfl:     mirtazapine (REMERON) 15 MG tablet, Take 15 mg by mouth nightly, Disp: , Rfl:    DULoxetine (CYMBALTA) 60 MG extended release capsule, Take 1 capsule by mouth daily, Disp: 30 capsule, Rfl: 3    ipratropium-albuterol (DUONEB) 0.5-2.5 (3) MG/3ML SOLN nebulizer solution, Inhale 3 mLs into the lungs every 6 hours as needed for Shortness of Breath, Disp: 360 mL, Rfl: 11    Family History   Problem Relation Age of Onset    Diabetes Mother     Heart Disease Mother     Cirrhosis Father        Social History     Socioeconomic History    Marital status: Single     Spouse name: Not on file    Number of children: Not on file    Years of education: Not on file    Highest education level: Not on file   Occupational History     Employer: DISABLED   Tobacco Use    Smoking status: Current Every Day Smoker     Packs/day: 0.25     Years: 36.00     Pack years: 9.00     Types: Cigarettes    Smokeless tobacco: Never Used    Tobacco comment: 3 cigs per day   on nicoderm patch   Vaping Use    Vaping Use: Never used   Substance and Sexual Activity    Alcohol use: No     Alcohol/week: 0.0 standard drinks    Drug use: No     Comment: 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 7 yrs    Sexual activity: Never   Other Topics Concern    Not on file   Social History Narrative    10/9/20 Patient keeping contact with others to a minimum due to [place] 19 Pandemic. 10/9/20 Patient has difficulty with ambulation due to DJD, stenosis and fibromyalgia     Social Determinants of Health     Financial Resource Strain: Medium Risk    Difficulty of Paying Living Expenses: Somewhat hard   Food Insecurity: Food Insecurity Present    Worried About Running Out of Food in the Last Year: Sometimes true    Santosh of Food in the Last Year: Sometimes true   Transportation Needs:     Lack of Transportation (Medical): Not on file    Lack of Transportation (Non-Medical):  Not on file   Physical Activity:     Days of Exercise per Week: Not on file    Minutes of Exercise per Session: Not on file   Stress:     Feeling of Stress : Not on file   Social Connections:     Frequency of Communication with Friends and Family: Not on file    Frequency of Social Gatherings with Friends and Family: Not on file    Attends Denominational Services: Not on file    Active Member of Clubs or Organizations: Not on file    Attends Club or Organization Meetings: Not on file    Marital Status: Not on file   Intimate Partner Violence:     Fear of Current or Ex-Partner: Not on file    Emotionally Abused: Not on file    Physically Abused: Not on file    Sexually Abused: Not on file   Housing Stability:     Unable to Pay for Housing in the Last Year: Not on file    Number of [place] in the Last Year: Not on file    Unstable Housing in the Last Year: Not on file       Review of Systems:  Review of Systems   Constitutional: Negative for chills and fever. Cardiovascular: Negative for chest pain and palpitations. Respiratory: Negative for cough and shortness of breath. Musculoskeletal: Positive for back pain. Gastrointestinal: Negative for constipation. Neurological: Negative for disturbances in coordination, loss of balance, numbness, paresthesias and tingling. Physical Exam:  BP (!) 144/90   Pulse 79   Resp 18   LMP 04/19/2003   SpO2 94%     Physical Exam  HENT:      Head: Normocephalic. Pulmonary:      Effort: Pulmonary effort is normal.   Musculoskeletal:         General: Normal range of motion. Cervical back: Normal range of motion. Lumbar back: Tenderness present. Skin:     General: Skin is warm and dry. Neurological:      Mental Status: She is alert and oriented to person, place, and time.          Record/Diagnostics Review:    Last óscar 3/2021 and was appropriate     Assessment:  Problem List Items Addressed This Visit     Encounter for long-term opiate analgesic use (Chronic)    Relevant Orders    DRUG SCREEN, PAIN    Primary osteoarthritis of both knees    Relevant Medications HYDROcodone-acetaminophen (NORCO) 5-325 MG per tablet (Start on 3/4/2022)    Medication monitoring encounter - Primary    Relevant Orders    DRUG SCREEN, PAIN    Chronic low back pain    Relevant Medications    HYDROcodone-acetaminophen (NORCO) 5-325 MG per tablet (Start on 3/4/2022)    Other spondylosis with radiculopathy, lumbar region    Relevant Medications    HYDROcodone-acetaminophen (NORCO) 5-325 MG per tablet (Start on 3/4/2022)    Lumbar disc disease             Treatment Plan:  Patient relates current medications are helping the pain. Patient reports taking pain medications as prescribed, denies obtaining medications from different sources and denies use of illegal drugs. Patient denies side effects from medications like nausea, vomiting, constipation or drowsiness. Patient reports current activities of daily living are possible due to medications and would like to continue them. As always, we encourage daily stretching and strengthening exercises, and recommend minimizing use of pain medications unless patient cannot get through daily activities due to pain. Contract requirements met. Continue opioid therapy.  Script written for norco  UDS for standard monitoring purposes  Follow up appointment made for 4 weeks

## 2022-03-02 ENCOUNTER — CARE COORDINATION (OUTPATIENT)
Dept: CARE COORDINATION | Age: 62
End: 2022-03-02

## 2022-03-02 NOTE — CARE COORDINATION
DM, COPD, Covid Zone Tools, Fall Precaution, My Chart, Walk In Care and Right Care information mailed to patient.

## 2022-03-04 ENCOUNTER — CARE COORDINATION (OUTPATIENT)
Dept: CARE COORDINATION | Age: 62
End: 2022-03-04

## 2022-03-04 NOTE — CARE COORDINATION
Ambulatory Care Coordination Note  3/4/2022  CM Risk Score: 6  Charlson 10 Year Mortality Risk Score: 47%     ACC: Anthony Collado, RN    Summary Note: Called, message left on voicemail with my contact information and reason for call. If no return call today will attempt to reach out in 3-5 days  Plan  COPD  -Review activities that increase SOB  - rest in between activities  -do more strenuous activity in the am to reduce fatigue  - O2 use ? Diabetes  - Patient does not test daily. - continue to monitor  - report Sx of hypo or hyper glycemia  - not on any diabetic medications  - Last A1C <6  Pain  - continue with pain management  - continue OT and PT  - use pain medications as directed  Activity  - encourage increasing activity slowly  Falls  Remove obstacles that increase the risk of falls    Transportation  - will use [employer] for transportation for medical appointments        Care Coordination Interventions    Referral from Primary Care Provider: No  Suggested Interventions and Community Resources         Goals Addressed    None         Prior to Admission medications    Medication Sig Start Date End Date Taking? Authorizing Provider   HYDROcodone-acetaminophen (NORCO) 5-325 MG per tablet Take 1 tablet by mouth every 8 hours as needed for Pain for up to 30 days.  Early refill due to holidays 3/4/22 4/3/22  ELVIRA Macedo - CNP   mirtazapine (REMERON) 30 MG tablet Take 30 mg by mouth 3 times daily 1/5/22   Historical Provider, MD   amoxicillin (AMOXIL) 500 MG capsule Take 500 mg by mouth 3 times daily    Historical Provider, MD   tiZANidine (ZANAFLEX) 4 MG tablet Take 1 tablet by mouth 2 times daily 1/3/22   ELVIRA Elizondo CNP   meloxicam (MOBIC) 7.5 MG tablet Take 1 tablet by mouth daily 12/3/21 1/25/23  ELVIRA Macedo - CNP   cetirizine (ZYRTEC) 10 MG tablet TAKE 1 TABLET BY MOUTH DAILY 11/17/21   [facility], MD   trospium (SANCTURA) 20 MG tablet Take 1 tablet by mouth 2 times daily 10/26/21   Ramos Shaver MD   lisinopril-hydroCHLOROthiazide (PRINZIDE;ZESTORETIC) 20-25 MG per tablet TAKE 1 TABLET EVERY DAY 10/26/21   Ramos Shaver MD   omeprazole (PRILOSEC) 20 MG delayed release capsule TAKE 1 CAPSULE BY MOUTH EVERY DAY 10/26/21   Ramos Shaver MD   azelastine (ASTELIN) 0.1 % nasal spray 2 sprays by Nasal route 2 times daily Use in each nostril as directed 10/26/21   Ramos Shaver MD   potassium chloride (KLOR-CON M) 10 MEQ extended release tablet Take 2 tablets by mouth daily 10/26/21   Ramos Shaver MD   carvedilol (COREG) 12.5 MG tablet Take 1 tablet by mouth 2 times daily 10/26/21   Ramos Shaver MD   albuterol sulfate HFA (VENTOLIN HFA) 108 (90 Base) MCG/ACT inhaler Inhale 2 puffs into the lungs every 6 hours as needed for Wheezing 10/26/21   Ramos Shaver MD   acetaminophen (TYLENOL) 500 MG tablet Take 1 tablet by mouth 4 times daily as needed for Pain 10/26/21   Ramos Shaver MD   atorvastatin (LIPITOR) 40 MG tablet Take 1 tablet by mouth daily 10/26/21   Ramos Shaver MD   amLODIPine (NORVASC) 10 MG tablet TAKE 1 TABLET BY MOUTH DAILY 9/13/21   Ramos Shaver MD   docusate sodium (COLACE) 100 MG capsule TAKE 1 CAPSULE BY MOUTH EVERY DAY 7/20/21   Ramos Shaver MD   Umeclidinium Bromide 62.5 MCG/INH AEPB Inhale 1 puff into the lungs daily 6/25/21   Ramos Shaver MD   Lancets MISC 1 each by Does not apply route 2 times daily Use 2/day 3/4/21   Ramos Shaver MD   Shira Hodgkin 18 MCG inhalation capsule Inhale 1 capsule into the lungs daily 12/22/20   Ramos Shaver MD   blood glucose test strips (EXACTECH TEST) strip 1 each by In Vitro route daily As needed.  10/14/20   Ramos Shaver MD   diphenhydrAMINE (BENADRYL) 25 MG tablet Take 25 mg by mouth every 6 hours as needed for Itching    Historical Provider, MD   mirtazapine (REMERON) 15 MG tablet Take 15 mg by mouth nightly 5/22/18   Historical Provider, MD   DULoxetine (CYMBALTA) 60 MG extended release capsule Take 1 capsule by mouth daily 2/1/18   Alice Resendez MD   ipratropium-albuterol (DUONEB) 0.5-2.5 (3) MG/3ML SOLN nebulizer solution Inhale 3 mLs into the lungs every 6 hours as needed for Shortness of Breath 8/4/15   Alice Resendez MD       Future Appointments   Date Time Provider [facility]   3/30/2022  3:00 PM Daniel Smalls

## 2022-03-06 LAB
6-ACETYLMORPHINE, UR: NOT DETECTED
7-AMINOCLONAZEPAM, URINE: NOT DETECTED
ALPHA-OH-ALPRAZ, URINE: NOT DETECTED
ALPHA-OH-MIDAZOLAM, URINE: NOT DETECTED
ALPRAZOLAM, URINE: NOT DETECTED
AMPHETAMINES, URINE: NOT DETECTED
BARBITURATES, URINE: NOT DETECTED
BENZOYLECGONINE, UR: NOT DETECTED
BUPRENORPHINE URINE: NOT DETECTED
CARISOPRODOL, UR: NOT DETECTED
CLONAZEPAM, URINE: NOT DETECTED
CODEINE, URINE: NOT DETECTED
CREATININE URINE: 128.2 MG/DL (ref 20–400)
DIAZEPAM, URINE: NOT DETECTED
DRUGS EXPECTED, UR: NORMAL
EER HI RES INTERP UR: NORMAL
ETHYL GLUCURONIDE UR: NOT DETECTED
FENTANYL URINE: NOT DETECTED
GABAPENTIN: PRESENT
HYDROCODONE, URINE: PRESENT
HYDROMORPHONE, URINE: PRESENT
LORAZEPAM, URINE: NOT DETECTED
MARIJUANA METAB, UR: NOT DETECTED
MDA, UR: NOT DETECTED
MDEA, EVE, UR: NOT DETECTED
MDMA URINE: NOT DETECTED
MEPERIDINE METAB, UR: NOT DETECTED
METHADONE, URINE: NOT DETECTED
METHAMPHETAMINE, URINE: NOT DETECTED
METHYLPHENIDATE: NOT DETECTED
MIDAZOLAM, URINE: NOT DETECTED
MORPHINE URINE: NOT DETECTED
NALOXONE URINE: NOT DETECTED
NORBUPRENORPHINE, URINE: NOT DETECTED
NORDIAZEPAM, URINE: NOT DETECTED
NORFENTANYL, URINE: NOT DETECTED
NORHYDROCODONE, URINE: PRESENT
NOROXYCODONE, URINE: NOT DETECTED
NOROXYMORPHONE, URINE: NOT DETECTED
OXAZEPAM, URINE: NOT DETECTED
OXYCODONE URINE: NOT DETECTED
OXYMORPHONE, URINE: NOT DETECTED
PAIN MANAGEMENT DRUG PANEL INTERP, URINE: NORMAL
PAIN MGT DRUG PANEL, HI RES, UR: NORMAL
PCP,URINE: NOT DETECTED
PHENTERMINE, UR: NOT DETECTED
PREGABALIN: NOT DETECTED
TAPENTADOL, URINE: NOT DETECTED
TAPENTADOL-O-SULFATE, URINE: NOT DETECTED
TEMAZEPAM, URINE: NOT DETECTED
TRAMADOL, URINE: NOT DETECTED
ZOLPIDEM METABOLITE (ZCA), URINE: NOT DETECTED
ZOLPIDEM, URINE: NOT DETECTED

## 2022-03-08 ENCOUNTER — CARE COORDINATION (OUTPATIENT)
Dept: CARE COORDINATION | Age: 62
End: 2022-03-08

## 2022-03-08 SDOH — HEALTH STABILITY: PHYSICAL HEALTH: ON AVERAGE, HOW MANY DAYS PER WEEK DO YOU ENGAGE IN MODERATE TO STRENUOUS EXERCISE (LIKE A BRISK WALK)?: 3 DAYS

## 2022-03-08 SDOH — ECONOMIC STABILITY: INCOME INSECURITY: IN THE LAST 12 MONTHS, WAS THERE A TIME WHEN YOU WERE NOT ABLE TO PAY THE MORTGAGE OR RENT ON TIME?: NO

## 2022-03-08 SDOH — ECONOMIC STABILITY: HOUSING INSECURITY: IN THE LAST 12 MONTHS, HOW MANY PLACES HAVE YOU LIVED?: 1

## 2022-03-08 SDOH — ECONOMIC STABILITY: HOUSING INSECURITY
IN THE LAST 12 MONTHS, WAS THERE A TIME WHEN YOU DID NOT HAVE A STEADY PLACE TO SLEEP OR SLEPT IN A SHELTER (INCLUDING NOW)?: NO

## 2022-03-08 SDOH — HEALTH STABILITY: PHYSICAL HEALTH: ON AVERAGE, HOW MANY MINUTES DO YOU ENGAGE IN EXERCISE AT THIS LEVEL?: 30 MIN

## 2022-03-08 ASSESSMENT — SOCIAL DETERMINANTS OF HEALTH (SDOH)
HOW OFTEN DO YOU ATTENT MEETINGS OF THE CLUB OR ORGANIZATION YOU BELONG TO?: 1 TO 4 TIMES PER YEAR
IN A TYPICAL WEEK, HOW MANY TIMES DO YOU TALK ON THE PHONE WITH FAMILY, FRIENDS, OR NEIGHBORS?: MORE THAN THREE TIMES A WEEK
HOW OFTEN DO YOU ATTEND CHURCH OR RELIGIOUS SERVICES?: MORE THAN 4 TIMES PER YEAR
DO YOU BELONG TO ANY CLUBS OR ORGANIZATIONS SUCH AS CHURCH GROUPS UNIONS, FRATERNAL OR ATHLETIC GROUPS, OR SCHOOL GROUPS?: YES
HOW OFTEN DO YOU GET TOGETHER WITH FRIENDS OR RELATIVES?: MORE THAN THREE TIMES A WEEK

## 2022-03-08 ASSESSMENT — LIFESTYLE VARIABLES: HOW OFTEN DO YOU HAVE A DRINK CONTAINING ALCOHOL: NEVER

## 2022-03-08 NOTE — CARE COORDINATION
Ambulatory Care Coordination Note  3/8/2022  CM Risk Score: 6  Charlson 10 Year Mortality Risk Score: 47%     ACC: Lidia Olivares, RN    Summary Note: Called, message left on voicemail with my contact information and reason for call. If no return call today, will attempt to reach in   5-7 days   Plan  COPD  -Review activities that increase SOB  - rest in between activities  -do more strenuous activity in the am to reduce fatigue  - O2 use ? Diabetes  - Patient does not test daily.   - continue to monitor  - report Sx of hypo or hyper glycemia  - not on any diabetic medications  - Last A1C <6  Pain  - continue with pain management  - continue OT and PT  - use pain medications as directed  Activity  - encourage increasing activity slowly  Falls  Remove obstacles that increase the risk of falls    Transportation  - will use [place] for transportation for medical appointments  Vaccinations  - COVID Booster    Care Coordination Interventions    Referral from Primary Care Provider: No  Suggested Interventions and Community Resources         Goals Addressed    None         Prior to Admission medications    Medication Sig Start Date End Date Taking? Authorizing Provider   HYDROcodone-acetaminophen (NORCO) 5-325 MG per tablet Take 1 tablet by mouth every 8 hours as needed for Pain for up to 30 days.  Early refill due to holidays 3/4/22 4/3/22  Jocelnyn Prado APRN - CNP   mirtazapine (REMERON) 30 MG tablet Take 30 mg by mouth 3 times daily 1/5/22   Historical Provider, MD   amoxicillin (AMOXIL) 500 MG capsule Take 500 mg by mouth 3 times daily    Historical Provider, MD   tiZANidine (ZANAFLEX) 4 MG tablet Take 1 tablet by mouth 2 times daily 1/3/22   ELVIRA Smalls - CNP   meloxicam (MOBIC) 7.5 MG tablet Take 1 tablet by mouth daily 12/3/21 1/25/23  ELVIRA Ledesma - CNP   cetirizine (ZYRTEC) 10 MG tablet TAKE 1 TABLET BY MOUTH DAILY 11/17/21   Alice Resendez MD   [facility]) 20 MG tablet Take 1 tablet by mouth 2 times daily 10/26/21   Alice Resendez MD   lisinopril-hydroCHLOROthiazide (PRINZIDE;ZESTORETIC) 20-25 MG per tablet TAKE 1 TABLET EVERY DAY 10/26/21   Alice Resendez MD   omeprazole (PRILOSEC) 20 MG delayed release capsule TAKE 1 CAPSULE BY MOUTH EVERY DAY 10/26/21   Alice Resendez MD   azelastine (ASTELIN) 0.1 % nasal spray 2 sprays by Nasal route 2 times daily Use in each nostril as directed 10/26/21   Alice Resendez MD   potassium chloride (KLOR-CON M) 10 MEQ extended release tablet Take 2 tablets by mouth daily 10/26/21   Alice Resendez MD   carvedilol (COREG) 12.5 MG tablet Take 1 tablet by mouth 2 times daily 10/26/21   Alice Resendez MD   albuterol sulfate HFA (VENTOLIN HFA) 108 (90 Base) MCG/ACT inhaler Inhale 2 puffs into the lungs every 6 hours as needed for Wheezing 10/26/21   Alice Resendez MD   acetaminophen (TYLENOL) 500 MG tablet Take 1 tablet by mouth 4 times daily as needed for Pain 10/26/21   Alice Resendez MD   atorvastatin (LIPITOR) 40 MG tablet Take 1 tablet by mouth daily 10/26/21   Alice Resendez MD   amLODIPine (NORVASC) 10 MG tablet TAKE 1 TABLET BY MOUTH DAILY 9/13/21   Alice Resendez MD   docusate sodium (COLACE) 100 MG capsule TAKE 1 CAPSULE BY MOUTH EVERY DAY 7/20/21   Alice Resendez MD   Umeclidinium Bromide 62.5 MCG/INH AEPB Inhale 1 puff into the lungs daily 6/25/21   Alice Resendez MD   Lancets MISC 1 each by Does not apply route 2 times daily Use 2/day 3/4/21   Alice Resendez MD   Thurlow Larve 18 MCG inhalation capsule Inhale 1 capsule into the lungs daily 12/22/20   Alice Resendez MD   blood glucose test strips (EXACTECH TEST) strip 1 each by In Vitro route daily As needed.  10/14/20   Alice Resendez MD   diphenhydrAMINE (BENADRYL) 25 MG tablet Take 25 mg by mouth every 6 hours as needed for Itching    Historical Provider, MD   mirtazapine (REMERON) 15 MG tablet Take 15 mg by mouth nightly 5/22/18   Historical Provider, MD   DULoxetine (CYMBALTA) 60 MG extended release capsule Take 1 capsule by mouth daily 2/1/18   Chilango Mendoza MD   ipratropium-albuterol (DUONEB) 0.5-2.5 (3) MG/3ML SOLN nebulizer solution Inhale 3 mLs into the lungs every 6 hours as needed for Shortness of Breath 8/4/15   Chilango Mendoza MD       Future Appointments   Date Time Provider Claudy Schaeffer   3/30/2022  3:00 PM ELVIRA Vang - CNP [address]     ,   Diabetes Assessment    Medic Alert ID: No  Meal Planning: Avoidance of concentrated sweets   How often do you test your blood sugar?: Daily   Do you have barriers with adherence to non-pharmacologic self-management interventions?  (Nutrition/Exercise/Self-Monitoring): No   Have you ever had to go to the ED for symptoms of low blood sugar?: No           and   COPD Assessment    Does the patient understand envrionmental exposure?: Yes  Is the patient able to verbalize Rescue vs. Long Acting medications?: Yes  Does the patient have a nebulizer?: No  Does the patient use a space with inhaled medications?: No            Symptoms:

## 2022-03-14 ENCOUNTER — CARE COORDINATION (OUTPATIENT)
Dept: CARE COORDINATION | Age: 62
End: 2022-03-14

## 2022-03-14 NOTE — CARE COORDINATION
Ambulatory Care Coordination Note  3/14/2022  CM Risk Score: 6  Charlson 10 Year Mortality Risk Score: 47%     ACC: Gissel Mckeon, RN    Summary Note: Called, message left on voicemail with my contact information and reason for call. Will attempt to contact in 3-5 days if no return call today  Plan  COPD  -Review activities that increase SOB  - rest in between activities  -do more strenuous activity in the am to reduce fatigue  - O2 use ? Diabetes  - Patient does not test daily.   - continue to monitor  - report Sx of hypo or hyper glycemia  - not on any diabetic medications  - Last A1C <6  Pain  - continue with pain management  - continue OT and PT  - use pain medications as directed  Activity  - encourage increasing activity slowly  Falls  Remove obstacles that increase the risk of falls    Transportation  - will use [employer] for transportation for medical appointments  Vaccinations  - COVID Booster        Care Coordination Interventions    Referral from Primary Care Provider: No  Suggested Interventions and Community Resources         Goals Addressed    None         Prior to Admission medications    Medication Sig Start Date End Date Taking? Authorizing Provider   HYDROcodone-acetaminophen (NORCO) 5-325 MG per tablet Take 1 tablet by mouth every 8 hours as needed for Pain for up to 30 days.  Early refill due to holidays 3/4/22 4/3/22  Gerald Prado APRN - CNP   mirtazapine (REMERON) 30 MG tablet Take 30 mg by mouth 3 times daily 1/5/22   Historical Provider, MD   amoxicillin (AMOXIL) 500 MG capsule Take 500 mg by mouth 3 times daily    Historical Provider, MD   tiZANidine (ZANAFLEX) 4 MG tablet Take 1 tablet by mouth 2 times daily 1/3/22   Isaiah Ren APRN - CNP   meloxicam (MOBIC) 7.5 MG tablet Take 1 tablet by mouth daily 12/3/21 1/25/23  Gerald Prado APRN - CNP   cetirizine (ZYRTEC) 10 MG tablet TAKE 1 TABLET BY MOUTH DAILY 11/17/21   Sergio Lazo MD   [facility]) 20 MG tablet Take 1 tablet by mouth 2 times daily 10/26/21   Bernice Orr MD   lisinopril-hydroCHLOROthiazide (PRINZIDE;ZESTORETIC) 20-25 MG per tablet TAKE 1 TABLET EVERY DAY 10/26/21   Bernice Orr MD   omeprazole (PRILOSEC) 20 MG delayed release capsule TAKE 1 CAPSULE BY MOUTH EVERY DAY 10/26/21   Bernice Orr MD   azelastine (ASTELIN) 0.1 % nasal spray 2 sprays by Nasal route 2 times daily Use in each nostril as directed 10/26/21   Bernice rOr MD   potassium chloride (KLOR-CON M) 10 MEQ extended release tablet Take 2 tablets by mouth daily 10/26/21   Bernice Orr MD   carvedilol (COREG) 12.5 MG tablet Take 1 tablet by mouth 2 times daily 10/26/21   Bernice Orr MD   albuterol sulfate HFA (VENTOLIN HFA) 108 (90 Base) MCG/ACT inhaler Inhale 2 puffs into the lungs every 6 hours as needed for Wheezing 10/26/21   Bernice Orr MD   acetaminophen (TYLENOL) 500 MG tablet Take 1 tablet by mouth 4 times daily as needed for Pain 10/26/21   Bernice Orr MD   atorvastatin (LIPITOR) 40 MG tablet Take 1 tablet by mouth daily 10/26/21   Bernice Orr MD   amLODIPine (NORVASC) 10 MG tablet TAKE 1 TABLET BY MOUTH DAILY 9/13/21   Bernice Orr MD   docusate sodium (COLACE) 100 MG capsule TAKE 1 CAPSULE BY MOUTH EVERY DAY 7/20/21   Bernice Orr MD   Umeclidinium Bromide 62.5 MCG/INH AEPB Inhale 1 puff into the lungs daily 6/25/21   Bernice Orr MD   Lancets MISC 1 each by Does not apply route 2 times daily Use 2/day 3/4/21   MD Keyonna De La Torre 18 MCG inhalation capsule Inhale 1 capsule into the lungs daily 12/22/20   Bernice Orr MD   blood glucose test strips (EXACTECH TEST) strip 1 each by In Vitro route daily As needed.  10/14/20   Bernice Orr MD   diphenhydrAMINE (BENADRYL) 25 MG tablet Take 25 mg by mouth every 6 hours as needed for Itching    Historical Provider, MD   mirtazapine (REMERON) 15 MG tablet Take 15 mg by mouth nightly 5/22/18   Historical Provider, MD   DULoxetine (CYMBALTA) 60 MG extended release capsule Take 1 capsule by mouth daily 2/1/18   Naif Cortes MD   ipratropium-albuterol (DUONEB) 0.5-2.5 (3) MG/3ML SOLN nebulizer solution Inhale 3 mLs into the lungs every 6 hours as needed for Shortness of Breath 8/4/15   Naif Cortes MD       Future Appointments   Date Time Provider Claudy Schaeffer   3/30/2022  3:00 PM Daniel Blunt [address]

## 2022-03-17 ENCOUNTER — CARE COORDINATION (OUTPATIENT)
Dept: CARE COORDINATION | Age: 62
End: 2022-03-17

## 2022-03-17 NOTE — CARE COORDINATION
Ambulatory Care Coordination Note  3/17/2022  CM Risk Score: 6  Charlson 10 Year Mortality Risk Score: 47%     ACC: Mike Se, RN    Summary Note: Called, message left on voicemail with my contact information and reason for call. Will attempt to contact in 3-5 days. Have been unable to contact patient since 3/4/22  Plan  COPD  -Review activities that increase SOB  - rest in between activities  -do more strenuous activity in the am to reduce fatigue  - O2 use ? Diabetes  - Patient does not test daily.   - continue to monitor  - report Sx of hypo or hyper glycemia  - not on any diabetic medications  - Last A1C <6  Pain  - continue with pain management  - continue OT and PT  - use pain medications as directed  Activity  - encourage increasing activity slowly  Falls  Remove obstacles that increase the risk of falls    Transportation  - will use [employer] for transportation for medical appointments        Care Coordination Interventions    Referral from Primary Care Provider: No  Suggested Interventions and Community Resources         Goals Addressed    None         Prior to Admission medications    Medication Sig Start Date End Date Taking? Authorizing Provider   HYDROcodone-acetaminophen (NORCO) 5-325 MG per tablet Take 1 tablet by mouth every 8 hours as needed for Pain for up to 30 days.  Early refill due to holidays 3/4/22 4/3/22  Felix Prado APRN - CNP   mirtazapine (REMERON) 30 MG tablet Take 30 mg by mouth 3 times daily 1/5/22   Historical Provider, MD   amoxicillin (AMOXIL) 500 MG capsule Take 500 mg by mouth 3 times daily    Historical Provider, MD   tiZANidine (ZANAFLEX) 4 MG tablet Take 1 tablet by mouth 2 times daily 1/3/22   Dale Talbert APRN - CNP   meloxicam (MOBIC) 7.5 MG tablet Take 1 tablet by mouth daily 12/3/21 1/25/23  Felix Prado APRN - CNP   cetirizine (ZYRTEC) 10 MG tablet TAKE 1 TABLET BY MOUTH DAILY 11/17/21   Ally Kay MD   trospium (SANCTURA) 20 MG tablet Take 1 tablet by mouth 2 times daily 10/26/21   Emmy Carr MD   lisinopril-hydroCHLOROthiazide (PRINZIDE;ZESTORETIC) 20-25 MG per tablet TAKE 1 TABLET EVERY DAY 10/26/21   Emmy Carr, MD   omeprazole (PRILOSEC) 20 MG delayed release capsule TAKE 1 CAPSULE BY MOUTH EVERY DAY 10/26/21   Emmy Carr, MD   azelastine (ASTELIN) 0.1 % nasal spray 2 sprays by Nasal route 2 times daily Use in each nostril as directed 10/26/21   Emmy Carr, MD   potassium chloride (KLOR-CON M) 10 MEQ extended release tablet Take 2 tablets by mouth daily 10/26/21   Emmy Carr, MD   carvedilol (COREG) 12.5 MG tablet Take 1 tablet by mouth 2 times daily 10/26/21   Emmy Carr, MD   albuterol sulfate HFA (VENTOLIN HFA) 108 (90 Base) MCG/ACT inhaler Inhale 2 puffs into the lungs every 6 hours as needed for Wheezing 10/26/21   Emmy Carr, MD   acetaminophen (TYLENOL) 500 MG tablet Take 1 tablet by mouth 4 times daily as needed for Pain 10/26/21   Emmy Carr, MD   atorvastatin (LIPITOR) 40 MG tablet Take 1 tablet by mouth daily 10/26/21   Emmy Carr, MD   amLODIPine (NORVASC) 10 MG tablet TAKE 1 TABLET BY MOUTH DAILY 9/13/21   Emmy Carr, MD   docusate sodium (COLACE) 100 MG capsule TAKE 1 CAPSULE BY MOUTH EVERY DAY 7/20/21   Emmy Carr, MD   Umeclidinium Bromide 62.5 MCG/INH AEPB Inhale 1 puff into the lungs daily 6/25/21   Emmy Carr MD   Lancets MISC 1 each by Does not apply route 2 times daily Use 2/day 3/4/21   Emmy Carr, MD Artis Jaya 18 MCG inhalation capsule Inhale 1 capsule into the lungs daily 12/22/20   Emmy Carr MD   blood glucose test strips (EXACTECH TEST) strip 1 each by In Vitro route daily As needed.  10/14/20   Emmy Carr MD   diphenhydrAMINE (BENADRYL) 25 MG tablet Take 25 mg by mouth every 6 hours as needed for Itching    Historical Provider, MD   mirtazapine (REMERON) 15 MG tablet Take 15 mg by mouth nightly 5/22/18   Historical Provider, MD   DULoxetine (CYMBALTA) 60 MG extended release capsule Take 1 capsule by mouth daily 2/1/18   Kait Pierre MD   ipratropium-albuterol (DUONEB) 0.5-2.5 (3) MG/3ML SOLN nebulizer solution Inhale 3 mLs into the lungs every 6 hours as needed for Shortness of Breath 8/4/15   Kait Pierre MD       Future Appointments   Date Time Provider Claudy Schaeffer   3/30/2022  3:00 PM Emaline Loges, APRN - CNP [address]   4/29[facility]2022  2:15 [facility]     ,   Diabetes Assessment    Medic Alert ID: No  Meal Planning: Avoidance of concentrated sweets   How often do you test your blood sugar?: Daily   Do you have barriers with adherence to non-pharmacologic self-management interventions?  (Nutrition/Exercise/Self-Monitoring): No   Have you ever had to go to the ED for symptoms of low blood sugar?: No          ,   Congestive Heart Failure Assessment           Symptoms:         and   COPD Assessment    Does the patient understand envrionmental exposure?: Yes  Is the patient able to verbalize Rescue vs. Long Acting medications?: Yes  Does the patient have a nebulizer?: No  Does the patient use a space with inhaled medications?: No            Symptoms:

## 2022-03-22 ENCOUNTER — CARE COORDINATION (OUTPATIENT)
Dept: CARE COORDINATION | Age: 62
End: 2022-03-22

## 2022-03-22 NOTE — CARE COORDINATION
Ambulatory Care Coordination Note  3/22/2022  CM Risk Score: 6  Charlson 10 Year Mortality Risk Score: 47%     ACC: Yue Matta, RN    Summary Note: Called, message left on voicemail with my contact information and reason for call. Will attempt to reach out in about 1 week for final attempt if no return call today. Plan  COPD  -Review activities that increase SOB  - rest in between activities  -do more strenuous activity in the am to reduce fatigue  - O2 use ? Diabetes  - Patient does not test daily.   - continue to monitor  - report Sx of hypo or hyper glycemia  - not on any diabetic medications  - Last A1C <6  Pain  - continue with pain management  - continue OT and PT  - use pain medications as directed  Activity  - encourage increasing activity slowly  Falls  Remove obstacles that increase the risk of falls    Transportation  - will use [place] for transportation for medical appointments    Care Coordination Interventions    Referral from Primary Care Provider: No  Suggested Interventions and Community Resources         Goals Addressed    None         Prior to Admission medications    Medication Sig Start Date End Date Taking? Authorizing Provider   HYDROcodone-acetaminophen (NORCO) 5-325 MG per tablet Take 1 tablet by mouth every 8 hours as needed for Pain for up to 30 days.  Early refill due to holidays 3/4/22 4/3/22  Aren Prado APRN - CNP   mirtazapine (REMERON) 30 MG tablet Take 30 mg by mouth 3 times daily 1/5/22   Historical Provider, MD   amoxicillin (AMOXIL) 500 MG capsule Take 500 mg by mouth 3 times daily    Historical Provider, MD   tiZANidine (ZANAFLEX) 4 MG tablet Take 1 tablet by mouth 2 times daily 1/3/22   ELVIRA Morgan - CNP   meloxicam (MOBIC) 7.5 MG tablet Take 1 tablet by mouth daily 12/3/21 1/25/23  ELVIRA Chilel - CNP   cetirizine (ZYRTEC) 10 MG tablet TAKE 1 TABLET BY MOUTH DAILY 11/17/21   Kassy Alfaro MD   trospium (SANCTURA) 20 MG tablet Take 1 tablet by mouth 2 times daily 10/26/21   Tierney Hastings MD   lisinopril-hydroCHLOROthiazide (PRINZIDE;ZESTORETIC) 20-25 MG per tablet TAKE 1 TABLET EVERY DAY 10/26/21   Tierney Hastings MD   omeprazole (PRILOSEC) 20 MG delayed release capsule TAKE 1 CAPSULE BY MOUTH EVERY DAY 10/26/21   Tierney Hastings MD   azelastine (ASTELIN) 0.1 % nasal spray 2 sprays by Nasal route 2 times daily Use in each nostril as directed 10/26/21   Tierney Hastings MD   potassium chloride (KLOR-CON M) 10 MEQ extended release tablet Take 2 tablets by mouth daily 10/26/21   Tierney Hastings MD   carvedilol (COREG) 12.5 MG tablet Take 1 tablet by mouth 2 times daily 10/26/21   Tierney Hastings MD   albuterol sulfate HFA (VENTOLIN HFA) 108 (90 Base) MCG/ACT inhaler Inhale 2 puffs into the lungs every 6 hours as needed for Wheezing 10/26/21   Tierney Hastings MD   acetaminophen (TYLENOL) 500 MG tablet Take 1 tablet by mouth 4 times daily as needed for Pain 10/26/21   Tierney Hastings MD   atorvastatin (LIPITOR) 40 MG tablet Take 1 tablet by mouth daily 10/26/21   Tierney Hastings MD   amLODIPine (NORVASC) 10 MG tablet TAKE 1 TABLET BY MOUTH DAILY 9/13/21   Tierney Hastings MD   docusate sodium (COLACE) 100 MG capsule TAKE 1 CAPSULE BY MOUTH EVERY DAY 7/20/21   Tierney Hastings MD   Umeclidinium Bromide 62.5 MCG/INH AEPB Inhale 1 puff into the lungs daily 6/25/21   Tierney Hastings MD   Lancets MISC 1 each by Does not apply route 2 times daily Use 2/day 3/4/21   MD Karlene Bledsoe 18 MCG inhalation capsule Inhale 1 capsule into the lungs daily 12/22/20   Tierney Hastings MD   blood glucose test strips (EXACTECH TEST) strip 1 each by In Vitro route daily As needed.  10/14/20   Tierney Hastings MD   diphenhydrAMINE (BENADRYL) 25 MG tablet Take 25 mg by mouth every 6 hours as needed for Itching    Historical Provider, MD   mirtazapine (REMERON) 15 MG tablet Take 15 mg by mouth nightly 5/22/18   Historical Provider, MD   DULoxetine (CYMBALTA) 60 MG extended release capsule Take 1 capsule by mouth daily 2/1/18   Adalgisa Denton MD   ipratropium-albuterol (DUONEB) 0.5-2.5 (3) MG/3ML SOLN nebulizer solution Inhale 3 mLs into the lungs every 6 hours as needed for Shortness of Breath 8/4/15   Adalgisa Denton MD       Future Appointments   Date Time Provider Claudy Schaeffer   3/30/2022  3:00 PM Simin Rodriguez, APRN - CNP [address]   4/29/2022  2:15 PM [facility]     ,   Diabetes Assessment    Medic Alert ID: No  Meal Planning: Avoidance of concentrated sweets   How often do you test your blood sugar?: Daily   Do you have barriers with adherence to non-pharmacologic self-management interventions?  (Nutrition/Exercise/Self-Monitoring): No   Have you ever had to go to the ED for symptoms of low blood sugar?: No          ,   Congestive Heart Failure Assessment           Symptoms:         and   COPD Assessment    Does the patient understand envrionmental exposure?: Yes  Is the patient able to verbalize Rescue vs. Long Acting medications?: Yes  Does the patient have a nebulizer?: No  Does the patient use a space with inhaled medications?: No            Symptoms:

## 2022-03-30 ENCOUNTER — HOSPITAL ENCOUNTER (OUTPATIENT)
Dept: PAIN MANAGEMENT | Age: 62
Discharge: HOME | End: 2022-03-30
Payer: COMMERCIAL

## 2022-03-30 VITALS
SYSTOLIC BLOOD PRESSURE: 133 MMHG | BODY MASS INDEX: 29.99 KG/M2 | HEIGHT: 65 IN | OXYGEN SATURATION: 91 % | DIASTOLIC BLOOD PRESSURE: 55 MMHG | WEIGHT: 180 LBS | HEART RATE: 82 BPM

## 2022-03-30 DIAGNOSIS — M17.0 PRIMARY OSTEOARTHRITIS OF BOTH KNEES: ICD-10-CM

## 2022-03-30 DIAGNOSIS — G89.29 CHRONIC MIDLINE LOW BACK PAIN WITH SCIATICA, SCIATICA LATERALITY UNSPECIFIED: ICD-10-CM

## 2022-03-30 DIAGNOSIS — Z79.891 ENCOUNTER FOR LONG-TERM OPIATE ANALGESIC USE: ICD-10-CM

## 2022-03-30 DIAGNOSIS — M54.40 CHRONIC MIDLINE LOW BACK PAIN WITH SCIATICA, SCIATICA LATERALITY UNSPECIFIED: ICD-10-CM

## 2022-03-30 PROCEDURE — 99213 OFFICE O/P EST LOW 20 MIN: CPT

## 2022-03-30 PROCEDURE — 99213 OFFICE O/P EST LOW 20 MIN: CPT | Performed by: NURSE PRACTITIONER

## 2022-03-30 RX ORDER — HYDROCODONE BITARTRATE AND ACETAMINOPHEN 5; 325 MG/1; MG/1
1 TABLET ORAL EVERY 8 HOURS PRN
Qty: 90 TABLET | Refills: 0 | Status: SHIPPED | OUTPATIENT
Start: 2022-04-03 | End: 2022-04-26 | Stop reason: SDUPTHER

## 2022-03-30 ASSESSMENT — PAIN DESCRIPTION - PROGRESSION: CLINICAL_PROGRESSION: GRADUALLY WORSENING

## 2022-03-30 ASSESSMENT — ENCOUNTER SYMPTOMS
CONSTIPATION: 0
COUGH: 0
SHORTNESS OF BREATH: 0
BACK PAIN: 1

## 2022-03-30 ASSESSMENT — PAIN DESCRIPTION - ORIENTATION: ORIENTATION: LOWER

## 2022-03-30 ASSESSMENT — PAIN DESCRIPTION - LOCATION: LOCATION: BACK

## 2022-03-30 ASSESSMENT — PAIN SCALES - GENERAL: PAINLEVEL_OUTOF10: 8

## 2022-03-30 ASSESSMENT — PAIN DESCRIPTION - FREQUENCY: FREQUENCY: CONTINUOUS

## 2022-03-30 ASSESSMENT — PAIN DESCRIPTION - PAIN TYPE: TYPE: CHRONIC PAIN

## 2022-03-30 NOTE — PROGRESS NOTES
Chief Complaint   Patient presents with    Back Pain    Medication Refill     Norco         [facility] Patient has had low back pain with no known injury and bilateral knee pain for many years. She is s/p lumbar diskectomy in 2015 and lumbar fusion 11/2020. She is stable and compliant on norco 5/325 TID. Gabapentin prescribed by PCP for neuropathic pain in feet. Follows with Dr. Lorenzo Murdock for [facility] had a knee arthroscopy in March 2017 Genicular block was discussed but she declined.      EMG was completed and shows Abnormal study of the right lower extremity and Normal study of the left lower extremity. Taylor Pedro is electrodiagnostic evidence of a very mild chronic right S1 radiculopathy without active denervation. Taylor Wells is no electrodiagnostic evidence of a generalized large fiber peripheral Polyneuropathy. She continues physical therapy with moderate benefit. She states she is able to tolerate more activity.      XR lumbar 2021 - The patient is status post posterior fusion of L4 and L5.  The hardware  appears properly positioned without complication. Taylor Pedro is also a disc  spacer device in place at L4/5.  No acute fracture or listhesis. Taylor Pedro is a  4.7 cm calcified mass in the right lower pelvis.      Back Pain  This is a chronic problem. The current episode started more than 1 year ago. The problem occurs constantly. The problem is unchanged. The pain is present in the lumbar spine. The quality of the pain is described as aching. Radiates to: down left leg to the knee. The pain is at a severity of 8/10. The pain is moderate. The symptoms are aggravated by position and standing (walking). Pertinent negatives include no chest pain, fever, numbness, paresthesias or tingling. She has tried analgesics and bed rest for the symptoms. The treatment provided mild relief. Patient denies any new neurological symptoms. No bowel or bladder incontinence, no weakness, and no falling.     Pill count: appropriate / Josie Brush UTERUS      GASTRECTOMY      partial    LUMBAR DISCECTOMY  01/2015    lumbar diskectomy    LUMBAR FUSION  11/19/2020     POSTERIOR FUSION L4/5,     LUMBAR FUSION N/A 11/19/2020    POSTERIOR FUSION L4/5, [employer], Lum Esther, EVOKES #[id number] AMINA performed by Raul Montelongo DO at [address] Right 4/30/13    Lumbar Diagnostic Block,  Kenalog 40 mg    NERVE BLOCK  5/23/13    Lumbar Radiofrequency, Kenalog 40mg    NERVE BLOCK  8/12/13    Lt MBNB  celestone 6mg    NERVE BLOCK Left 8-28-13    left lumbar diagnostic block #2 decadron 10 mg    NERVE BLOCK Left 9-24-13    left lumbar median branch radiofrequency    NERVE BLOCK  07-02-14    caudal, celestone 9 mg    NERVE BLOCK  7-16-14    caudal epidural #2, celestone 9mg, fentanyl 25mcg    NERVE BLOCK  7/30/14    caudal #3 decadron 10mg    NERVE BLOCK  11-6-14    duramorph epidural steroid block  duramorph 1 mg celestone 9 mg    NERVE BLOCK  11/20/15    TENS- Empi Select    NERVE BLOCK  07/20/2018    right transforminal # 1 decadron 10mg,isovue    NERVE BLOCK Bilateral 02/01/2019    bilat mbnb- no steroid    NERVE BLOCK Bilateral 02/08/2019    bilat mbnb, marcaine . 25%    SC KNEE SCOPE,DIAGNOSTIC Right 3/24/2017    KNEE ARTHROSCOPY WITH PARTIAL MEDIAL MENISECAL DEBRIDMENT  performed by Ishmael Homans, MD at [facility] 35  9 20 2007    UPPER GASTROINTESTINAL ENDOSCOPY  4 21 2009,04/2011    gastritis, esophagitis       Allergies   Allergen Reactions    Aspirin      DUE TO ULCER    Claritin [Loratadine] Other (See Comments)     coughing    Flonase [Fluticasone Propionate]     Morphine And Related      GI Upset         Current Outpatient Medications:     HYDROcodone-acetaminophen (NORCO) 5-325 MG per tablet, Take 1 tablet by mouth every 8 hours as needed for Pain for up to 30 days.  Early refill due to holidays, Disp: 90 tablet, Rfl: 0    mirtazapine (REMERON) 30 MG tablet, Take 30 mg by mouth 3 times daily, Disp: , Rfl:     tiZANidine (ZANAFLEX) 4 MG tablet, Take 1 tablet by mouth 2 times daily, Disp: 60 tablet, Rfl: 2    meloxicam (MOBIC) 7.5 MG tablet, Take 1 tablet by mouth daily, Disp: 30 tablet, Rfl: 2    cetirizine (ZYRTEC) 10 MG tablet, TAKE 1 TABLET BY MOUTH DAILY, Disp: 30 tablet, Rfl: 4    trospium (SANCTURA) 20 MG tablet, Take 1 tablet by mouth 2 times daily, Disp: 60 tablet, Rfl: 0    lisinopril-hydroCHLOROthiazide (PRINZIDE;ZESTORETIC) 20-25 MG per tablet, TAKE 1 TABLET EVERY DAY, Disp: 90 tablet, Rfl: 1    omeprazole (PRILOSEC) 20 MG delayed release capsule, TAKE 1 CAPSULE BY MOUTH EVERY DAY, Disp: 30 capsule, Rfl: 3    azelastine (ASTELIN) 0.1 % nasal spray, 2 sprays by Nasal route 2 times daily Use in each nostril as directed, Disp: 1 each, Rfl: 2    potassium chloride (KLOR-CON M) 10 MEQ extended release tablet, Take 2 tablets by mouth daily, Disp: 60 tablet, Rfl: 2    carvedilol (COREG) 12.5 MG tablet, Take 1 tablet by mouth 2 times daily, Disp: 60 tablet, Rfl: 5    albuterol sulfate HFA (VENTOLIN HFA) 108 (90 Base) MCG/ACT inhaler, Inhale 2 puffs into the lungs every 6 hours as needed for Wheezing, Disp: 1 each, Rfl: 3    acetaminophen (TYLENOL) 500 MG tablet, Take 1 tablet by mouth 4 times daily as needed for Pain, Disp: 30 tablet, Rfl: 0    atorvastatin (LIPITOR) 40 MG tablet, Take 1 tablet by mouth daily, Disp: 90 tablet, Rfl: 1    amLODIPine (NORVASC) 10 MG tablet, TAKE 1 TABLET BY MOUTH DAILY, Disp: 90 tablet, Rfl: 1    docusate sodium (COLACE) 100 MG capsule, TAKE 1 CAPSULE BY MOUTH EVERY DAY, Disp: 30 capsule, Rfl: 9    Umeclidinium Bromide 62.5 MCG/INH AEPB, Inhale 1 puff into the lungs daily, Disp: 2 each, Rfl: 5    Lancets MISC, 1 each by Does not apply route 2 times daily Use 2/day, Disp: 100 each, Rfl: 11    blood glucose test strips (EXACTECH TEST) strip, 1 each by In Vitro route daily As needed. , Disp: 50 each, Rfl: 11    diphenhydrAMINE (BENADRYL) 25 MG tablet, Take 25 mg by mouth every 6 hours as needed for Itching, Disp: , Rfl:     mirtazapine (REMERON) 15 MG tablet, Take 15 mg by mouth nightly, Disp: , Rfl:     DULoxetine (CYMBALTA) 60 MG extended release capsule, Take 1 capsule by mouth daily, Disp: 30 capsule, Rfl: 3    ipratropium-albuterol (DUONEB) 0.5-2.5 (3) MG/3ML SOLN nebulizer solution, Inhale 3 mLs into the lungs every 6 hours as needed for Shortness of Breath, Disp: 360 mL, Rfl: 11    Family History   Problem Relation Age of Onset    Diabetes Mother     Heart Disease Mother     Cirrhosis Father        Social History     Socioeconomic History    Marital status: Single     Spouse name: Not on file    Number of children: Not on file    Years of education: Not on file    Highest education level: Not on file   Occupational History     Employer: DISABLED   Tobacco Use    Smoking status: Current Every Day Smoker     Packs/day: 0.25     Years: 36.00     Pack years: 9.00     Types: Cigarettes    Smokeless tobacco: Never Used    Tobacco comment: 3 cigs per day   on nicoderm patch   Vaping Use    Vaping Use: Never used   Substance and Sexual Activity    Alcohol use: No     Alcohol/week: 0.0 standard drinks    Drug use: No     Comment: 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 7 yrs    Sexual activity: Never   Other Topics Concern    Not on file   Social History Narrative    10/9/20 Patient keeping contact with others to a minimum due to COVID 19 Pandemic. 10/9/20 Patient has difficulty with ambulation due to DJD, stenosis and fibromyalgia     Social Determinants of Health     Financial Resource Strain: Medium Risk    Difficulty of Paying Living Expenses: Somewhat hard   Food Insecurity: Food Insecurity Present    Worried About Running Out of Food in the Last Year: Sometimes true    Santosh of Food in the Last Year: Sometimes true   Transportation Needs: No Transportation Needs    Lack of Transportation (Medical):  No    Lack of Transportation (Non-Medical): No   Physical Activity: Insufficiently Active    Days of Exercise per Week: 3 days    Minutes of Exercise per Session: 30 min   Stress: Stress Concern Present    Feeling of Stress : To some extent   Social Connections: Moderately Integrated    Frequency of Communication with Friends and Family: More than three times a week    Frequency of Social Gatherings with Friends and Family: More than three times a week    Attends [employer] Services: More than 4 times per year    Active Member of [address] [place] [address] [employer] or Organizations: Yes    Attends Club or Organization Meetings: 1 to 4 times per year    Marital Status:    Intimate Partner Violence:     Fear of Current or Ex-Partner: Not on file    Emotionally Abused: Not on file    Physically Abused: Not on file    Sexually Abused: Not on file   Housing Stability: [address] Unable to Pay for Housing in the Last Year: No    Number of Places Lived in the Last Year: 1    Unstable Housing in the Last Year: No       Review of Systems:  Review of Systems   Constitutional: Negative for chills and fever. Cardiovascular: Negative for chest pain and palpitations. Respiratory: Negative for cough and shortness of breath. Musculoskeletal: Positive for back pain and joint pain. Gastrointestinal: Negative for constipation. Neurological: Negative for disturbances in coordination, loss of balance, numbness, paresthesias and tingling. Physical Exam:  BP (!) 133/55   Pulse 82   Ht 5' 5\" (1.651 m)   Wt 180 lb (81.6 kg)   LMP 04/19/2003   SpO2 91%   BMI 29.95 kg/m²     Physical Exam  HENT:      Head: Normocephalic. Pulmonary:      Effort: Pulmonary effort is normal.   Musculoskeletal:         General: Normal range of motion. Cervical back: Normal range of motion. Lumbar back: Tenderness present. Skin:     General: Skin is warm and dry.    Neurological:      Mental Status: She is alert and oriented to person, place, and time.         Record/Diagnostics Review:    Last óscar 2/2022 and was appropriate     Assessment:  Problem List Items Addressed This Visit     Encounter for long-term opiate analgesic use (Chronic)    Primary osteoarthritis of both knees    Relevant Medications    HYDROcodone-acetaminophen (NORCO) 5-325 MG per tablet (Start on 4/3/2022)    Chronic low back pain    Relevant Medications    HYDROcodone-acetaminophen (NORCO) 5-325 MG per tablet (Start on 4/3/2022)             Treatment Plan:  Patient relates current medications are helping the pain. Patient reports taking pain medications as prescribed, denies obtaining medications from different sources and denies use of illegal drugs. Patient denies side effects from medications like nausea, vomiting, constipation or drowsiness. Patient reports current activities of daily living are possible due to medications and would like to continue them. As always, we encourage daily stretching and strengthening exercises, and recommend minimizing use of pain medications unless patient cannot get through daily activities due to pain. Contract requirements met. Continue opioid therapy.  Script written for norco  Follow up appointment made for 4 weeks

## 2022-03-31 ENCOUNTER — CARE COORDINATION (OUTPATIENT)
Dept: CARE COORDINATION | Age: 62
End: 2022-03-31

## 2022-03-31 SDOH — ECONOMIC STABILITY: FOOD INSECURITY: WITHIN THE PAST 12 MONTHS, THE FOOD YOU BOUGHT JUST DIDN'T LAST AND YOU DIDN'T HAVE MONEY TO GET MORE.: NEVER TRUE

## 2022-03-31 SDOH — ECONOMIC STABILITY: FOOD INSECURITY: WITHIN THE PAST 12 MONTHS, YOU WORRIED THAT YOUR FOOD WOULD RUN OUT BEFORE YOU GOT MONEY TO BUY MORE.: NEVER TRUE

## 2022-03-31 ASSESSMENT — ENCOUNTER SYMPTOMS: DYSPNEA ASSOCIATED WITH: EXERTION

## 2022-03-31 NOTE — CARE COORDINATION
Ambulatory Care Coordination Note  3/31/2022  CM Risk Score: 6  Charlson 10 Year Mortality Risk Score: 47%     ACC: Cleopatra Kincaid, RN    Summary Note: spoke with patient and she states that her current problem is pain (8) on 1-10 scale. Pain is not always bad but it does flair up. No problems with COPD and not checking BS. She is not taking any medications. Still smoking about 3 cigarettes a day. Encouraged smoking cessation. Plan  COPD  -Review activities that increase SOB  - rest in between activities  -do more strenuous activity in the am to reduce fatigue  Diabetes  - Patient does not test daily.   - continue to monitor  - report Sx of hypo or hyper glycemia  - not on any diabetic medications  - Last A1C <6  Pain  - continue with pain management  - continue OT and PT  - use pain medications as directed  Activity  - encourage increasing activity slowly  Falls  Remove obstacles that increase the risk of falls  Smoking  - reduce the # of cigarettes to 0 by 5/31/22  - Identify triggers that lead to smoking  - request nicotine replacement as needed    Transportation  - will use [facility] for transportation for medical appointments        Care Coordination Interventions    Program Enrollment: Complex Care  Referral from Primary Care Provider: No  Suggested Interventions and Community Resources         Goals Addressed                 This Visit's Progress     Conditions and Symptoms        I will schedule office visits, as directed by my provider. I will keep my appointment or reschedule if I have to cancel. I will notify my provider of any barriers to my plan of care. I will follow my Zone Management tool to seek urgent or emergent care. I will notify my provider of any symptoms that indicate a worsening of my condition.     Barriers: impairment:  physical: Pain, lack of motivation, lack of support, and lack of education  Plan for overcoming my barriers: MARILYN, Office staff  Confidence: 8/10  Anticipated Goal Completion Date: 5/31/22         Improve COPD symtoms        Use inhalers as directed  Increase activity as tolerated  Report any increase in SOB that does not resolve       Reduce Falls         I will reduce my risk of falls by the following: Remove rugs or use non slip rugs  Use walking aids like cane or walker  Follow through on orders for PT    Barriers: impairment:  physical: Pain lack of motivation, lack of support, and lack of education  Plan for overcoming my barriers: PT - OT, Pain Management, ACM, PCP  Confidence: 7/10  Anticipated Goal Completion Date: 5/31/22       Use a nicotine replacement product or medication        I will continue to reduce cigaettes from  3 per day to 0 by 5/31/22            Prior to Admission medications    Medication Sig Start Date End Date Taking? Authorizing Provider   HYDROcodone-acetaminophen (NORCO) 5-325 MG per tablet Take 1 tablet by mouth every 8 hours as needed for Pain for up to 30 days.  Early refill due to holidays 4/3/22 5/3/22 Yes ELVIRA Riggs - CNP   mirtazapine (REMERON) 30 MG tablet Take 30 mg by mouth 3 times daily 1/5/22  Yes Historical Provider, MD   tiZANidine (ZANAFLEX) 4 MG tablet Take 1 tablet by mouth 2 times daily 1/3/22  Yes ELVIRA Ahn - CNP   cetirizine (ZYRTEC) 10 MG tablet TAKE 1 TABLET BY MOUTH DAILY 11/17/21  Yes Ashlie Perdue MD   [facility]) 20 MG tablet Take 1 tablet by mouth 2 times daily 10/26/21  Yes Ashlie Perdue MD   lisinopril-hydroCHLOROthiazide (PRINZIDE;ZESTORETIC) 20-25 MG per tablet TAKE 1 TABLET EVERY DAY 10/26/21  Yes Ashlie Perdue MD   omeprazole (PRILOSEC) 20 MG delayed release capsule TAKE 1 CAPSULE BY MOUTH EVERY DAY 10/26/21  Yes Ashlie Perdue MD   azelastine (ASTELIN) 0.1 % nasal spray 2 sprays by Nasal route 2 times daily Use in each nostril as directed 10/26/21  Yes Ashlie Perdue MD   potassium chloride (KLOR-CON M) 10 MEQ extended release tablet Take 2 tablets by mouth daily 10/26/21  Yes Mic Coto MD   carvedilol (COREG) 12.5 MG tablet Take 1 tablet by mouth 2 times daily 10/26/21  Yes Mic Coto MD   albuterol sulfate HFA (VENTOLIN HFA) 108 (90 Base) MCG/ACT inhaler Inhale 2 puffs into the lungs every 6 hours as needed for Wheezing 10/26/21  Yes Mic Coto MD   atorvastatin (LIPITOR) 40 MG tablet Take 1 tablet by mouth daily 10/26/21  Yes Mic Coto MD   amLODIPine (NORVASC) 10 MG tablet TAKE 1 TABLET BY MOUTH DAILY 9/13/21  Yes Mic Coto MD   docusate sodium (COLACE) 100 MG capsule TAKE 1 CAPSULE BY MOUTH EVERY DAY 7/20/21  Yes Mic Coto MD   Umeclidinium Bromide 62.5 MCG/INH AEPB Inhale 1 puff into the lungs daily 6/25/21  Yes Mic Coto MD   diphenhydrAMINE (BENADRYL) 25 MG tablet Take 25 mg by mouth every 6 hours as needed for Itching   Yes Historical Provider, MD   DULoxetine (CYMBALTA) 60 MG extended release capsule Take 1 capsule by mouth daily 2/1/18  Yes Mic Coto MD   ipratropium-albuterol (DUONEB) 0.5-2.5 (3) MG/3ML SOLN nebulizer solution Inhale 3 mLs into the lungs every 6 hours as needed for Shortness of Breath 8/4/15  Yes Mic Coto MD   meloxicam (MOBIC) 7.5 MG tablet Take 1 tablet by mouth daily  Patient not taking: Reported on 3/31/2022 12/3/21 1/25/23  ELVIRA Mejia - CNP   acetaminophen (TYLENOL) 500 MG tablet Take 1 tablet by mouth 4 times daily as needed for Pain  Patient not taking: Reported on 3/31/2022 10/26/21   Mic oCto MD   Lancets MISC 1 each by Does not apply route 2 times daily Use 2/day  Patient not taking: Reported on 3/31/2022 3/4/21   Mic Coto MD   blood glucose test strips (EXACTECH TEST) strip 1 each by In Vitro route daily As needed.   Patient not taking: Reported on 3/31/2022 10/14/20   Mic Coto MD   mirtazapine (REMERON) 15 MG tablet Take 15 mg by mouth nightly  Patient not taking: Reported on 3/31/2022 5/22/18   Historical Provider, MD Future Appointments   Date Time Provider Claudy Schaeffer   4/26/2022  4:40 PM ELVIRA Rodrigez - CNP [address]   4/29/2022  2:15 PM Chaim Horton MD [facility]      and   COPD Assessment    Does the patient understand envrionmental exposure?: Yes  Is the patient able to verbalize Rescue vs. Long Acting medications?: Yes  Does the patient have a nebulizer?: No  Does the patient use a space with inhaled medications?: No     No patient-reported symptoms         Symptoms:     Symptom course: stable  Breathlessness: exertion  Increase use of rapid acting/rescue inhaled medications?: No  Change in chronic cough?: No/At Baseline  Change in sputum?: No/At Baseline  Self Monitoring - SaO2: No  Have you had a recent diagnosis of pneumonia either by PCP or at a hospital?: No

## 2022-04-11 ENCOUNTER — CARE COORDINATION (OUTPATIENT)
Dept: CARE COORDINATION | Age: 62
End: 2022-04-11

## 2022-04-11 NOTE — CARE COORDINATION
Ambulatory Care Coordination Note  4/11/2022  CM Risk Score: 6  Charlson 10 Year Mortality Risk Score: 47%     ACC: Hillary Krause, RN    Summary Note: Called, message left on voicemail and home number with my contact information and reason for call. If no return call today, will attempt to contact 4/13/22. Plan  COPD  -Review activities that increase SOB  - rest in between activities  -do more strenuous activity in the am to reduce fatigue  Diabetes  - Patient does not test daily.   - continue to monitor  - report Sx of hypo or hyper glycemia  - not on any diabetic medications  - Last A1C <6  Pain  - continue with pain management  - continue OT and PT  - use pain medications as directed  Activity  - encourage increasing activity slowly  Falls  Remove obstacles that increase the risk of falls  Smoking  - reduce the # of cigarettes to 0 by 5/31/22  - Identify triggers that lead to smoking  - request nicotine replacement as needed    Transportation  - will use [place] for transportation for medical appointments  Other  - Due for COVID Booster  - Appointment with PM 4/26/22 At 4:40  - Appointment 4/29/22 at 2:15 with PCP    ACM Start 2/23/22          Care Coordination Interventions    Program Enrollment: Complex Care  Referral from Primary Care Provider: No  Suggested Interventions and Community Resources         Goals Addressed    None         Prior to Admission medications    Medication Sig Start Date End Date Taking? Authorizing Provider   HYDROcodone-acetaminophen (NORCO) 5-325 MG per tablet Take 1 tablet by mouth every 8 hours as needed for Pain for up to 30 days.  Early refill due to holidays 4/3/22 5/3/22  Arnold Prado APRN - CNP   mirtazapine (REMERON) 30 MG tablet Take 30 mg by mouth 3 times daily 1/5/22   Historical Provider, MD   tiZANidine (ZANAFLEX) 4 MG tablet Take 1 tablet by mouth 2 times daily 1/3/22   Arnold Prado APRN - CNP   meloxicam (MOBIC) 7.5 MG tablet Take 1 tablet by mouth daily  Patient not taking: Reported on 3/31/2022 12/3/21 1/25/23  Melissa Viera, APRN - CNP   cetirizine (ZYRTEC) 10 MG tablet TAKE 1 TABLET BY MOUTH DAILY 11/17/21   Delilah Puente MD   troium [facility]) 20 MG tablet Take 1 tablet by mouth 2 times daily 10/26/21   Delilah Puente MD   lisinopril-hydroCHLOROthiazide (PRINZIDE;ZESTORETIC) 20-25 MG per tablet TAKE 1 TABLET EVERY DAY 10/26/21   Delilah Puente MD   omeprazole (PRILOSEC) 20 MG delayed release capsule TAKE 1 CAPSULE BY MOUTH EVERY DAY 10/26/21   Delilah Puente MD   azelastine (ASTELIN) 0.1 % nasal spray 2 sprays by Nasal route 2 times daily Use in each nostril as directed 10/26/21   Delilah Puente MD   potassium chloride (KLOR-CON M) 10 MEQ extended release tablet Take 2 tablets by mouth daily 10/26/21   Delilah Puente MD   carvedilol (COREG) 12.5 MG tablet Take 1 tablet by mouth 2 times daily 10/26/21   Delilah Puente MD   albuterol sulfate HFA (VENTOLIN HFA) 108 (90 Base) MCG/ACT inhaler Inhale 2 puffs into the lungs every 6 hours as needed for Wheezing 10/26/21   Delilah Puente MD   acetaminophen (TYLENOL) 500 MG tablet Take 1 tablet by mouth 4 times daily as needed for Pain  Patient not taking: Reported on 3/31/2022 10/26/21   Delilah Puente MD   atorvastatin (LIPITOR) 40 MG tablet Take 1 tablet by mouth daily 10/26/21   Delilah Puente MD   amLODIPine (NORVASC) 10 MG tablet TAKE 1 TABLET BY MOUTH DAILY 9/13/21   Delilah Puente MD   docusate sodium (COLACE) 100 MG capsule TAKE 1 CAPSULE BY MOUTH EVERY DAY 7/20/21   Delilah Puente MD   Umeclidinium Bromide 62.5 MCG/INH AEPB Inhale 1 puff into the lungs daily 6/25/21   Delilah Puente MD   Lancets MISC 1 each by Does not apply route 2 times daily Use 2/day  Patient not taking: Reported on 3/31/2022 3/4/21   Delilah Puente MD   blood glucose test strips (EXACTECH TEST) strip 1 each by In Vitro route daily As needed.   Patient not taking: Reported on 3/31/2022 10/14/20   Ruchi Wren MD   diphenhydrAMINE (BENADRYL) 25 MG tablet Take 25 mg by mouth every 6 hours as needed for Itching    Historical Provider, MD   mirtazapine (REMERON) 15 MG tablet Take 15 mg by mouth nightly  Patient not taking: Reported on 3/31/2022 5/22/18   Historical Provider, MD   DULoxetine (CYMBALTA) 60 MG extended release capsule Take 1 capsule by mouth daily 2/1/18   Ruchi Wren MD   ipratropium-albuterol (DUONEB) 0.5-2.5 (3) MG/3ML SOLN nebulizer solution Inhale 3 mLs into the lungs every 6 hours as needed for Shortness of Breath 8/4/15   Ruchi Wren MD       Future Appointments   Date Time Provider Claudy Schaeffer   4/26/2022  4:40 PM ELVIRA Rodriguez - CNP [address]   4/29/2022  2:15 PM Ruchi Wren MD [address] [place] [address] [facility]     ,   Diabetes Assessment    Medic Alert ID: No  Meal Planning: Avoidance of concentrated sweets   How often do you test your blood sugar?: Daily, No Testing (Comment: No longer taking medications)   Do you have barriers with adherence to non-pharmacologic self-management interventions?  (Nutrition/Exercise/Self-Monitoring): No   Have you ever had to go to the ED for symptoms of low blood sugar?: No           and   COPD Assessment    Does the patient understand envrionmental exposure?: Yes  Is the patient able to verbalize Rescue vs. Long Acting medications?: Yes  Does the patient have a nebulizer?: No  Does the patient use a space with inhaled medications?: No            Symptoms:

## 2022-04-13 ENCOUNTER — CARE COORDINATION (OUTPATIENT)
Dept: CARE COORDINATION | Age: 62
End: 2022-04-13

## 2022-04-13 ENCOUNTER — TELEPHONE (OUTPATIENT)
Dept: INTERNAL MEDICINE | Age: 62
End: 2022-04-13

## 2022-04-13 DIAGNOSIS — J20.9 ACUTE BRONCHITIS, UNSPECIFIED ORGANISM: Primary | ICD-10-CM

## 2022-04-13 RX ORDER — AZITHROMYCIN 250 MG/1
250 TABLET, FILM COATED ORAL SEE ADMIN INSTRUCTIONS
Qty: 6 TABLET | Refills: 0 | Status: SHIPPED | OUTPATIENT
Start: 2022-04-13 | End: 2022-04-18

## 2022-04-13 NOTE — CARE COORDINATION
Ambulatory Care Coordination Note  4/13/2022  CM Risk Score: 6  Charlson 10 Year Mortality Risk Score: 47%     ACC: Tiffany Rojas RN    Summary Note: Called home and cell number, message left on voicemail with my contact information and reason for call. Patient does not have My Chart. If no return call today will attempt to reach on 4/14/22. Plan  COPD  -Review activities that increase SOB  - rest in between activities  -do more strenuous activity in the am to reduce fatigue  - Bronchitis 4/13/22 Z Pack called in  Diabetes  - Patient does not test daily.   - continue to monitor  - report Sx of hypo or hyper glycemia  - not on any diabetic medications  - Last A1C <6  Pain  - continue with pain management  - continue OT and PT  - use pain medications as directed  Activity  - encourage increasing activity slowly  Falls  Remove obstacles that increase the risk of falls  Smoking  - reduce the # of cigarettes to 0 by 5/31/22  - Identify triggers that lead to smoking  - request nicotine replacement as needed    Transportation  - will use [employer] for transportation for medical appointments  Other  - Due for COVID Booster  - Appointment with PM 4/26/22 At 4:40  - Appointment 4/29/22 at 2:15 with PCP     ACM Start 2/23/22          Care Coordination Interventions    Program Enrollment: Complex Care  Referral from Primary Care Provider: No  Suggested Interventions and Community Resources         Goals Addressed    None         Prior to Admission medications    Medication Sig Start Date End Date Taking? Authorizing Provider   azithromycin (ZITHROMAX) 250 MG tablet Take 1 tablet by mouth See Admin Instructions for 5 days 500mg on day 1 followed by 250mg on days 2 - 5 4/13/22 4/18/22  Adrián Briseno MD   HYDROcodone-acetaminophen (NORCO) 5-325 MG per tablet Take 1 tablet by mouth every 8 hours as needed for Pain for up to 30 days.  Early refill due to holidays 4/3/22 5/3/22  ELVIRA Ventura - CNP   mirtazapine (REMERON) 30 MG tablet Take 30 mg by mouth 3 times daily 1/5/22   Historical Provider, MD   tiZANidine (ZANAFLEX) 4 MG tablet Take 1 tablet by mouth 2 times daily 1/3/22   ELVIRA Hargrove CNP   meloxicam (MOBIC) 7.5 MG tablet Take 1 tablet by mouth daily  Patient not taking: Reported on 3/31/2022 12/3/21 1/25/23  Juventino Ormond Roque Grammes, APRN - CNP   cetirizine (ZYRTEC) 10 MG tablet TAKE 1 TABLET BY MOUTH DAILY 11/17/21   Shelbie Parra MD   [facility]) 20 MG tablet Take 1 tablet by mouth 2 times daily 10/26/21   Shelbie Parra MD   lisinopril-hydroCHLOROthiazide (PRINZIDE;ZESTORETIC) 20-25 MG per tablet TAKE 1 TABLET EVERY DAY 10/26/21   Shelbie aPrra MD   omeprazole (PRILOSEC) 20 MG delayed release capsule TAKE 1 CAPSULE BY MOUTH EVERY DAY 10/26/21   Shelbie Parra MD   azelastine (ASTELIN) 0.1 % nasal spray 2 sprays by Nasal route 2 times daily Use in each nostril as directed 10/26/21   Shelbie Parra MD   potassium chloride (KLOR-CON M) 10 MEQ extended release tablet Take 2 tablets by mouth daily 10/26/21   Shelbie Parra MD   carvedilol (COREG) 12.5 MG tablet Take 1 tablet by mouth 2 times daily 10/26/21   Shelbie Parra MD   albuterol sulfate HFA (VENTOLIN HFA) 108 (90 Base) MCG/ACT inhaler Inhale 2 puffs into the lungs every 6 hours as needed for Wheezing 10/26/21   Shelbie Parra MD   acetaminophen (TYLENOL) 500 MG tablet Take 1 tablet by mouth 4 times daily as needed for Pain  Patient not taking: Reported on 3/31/2022 10/26/21   Shelbie Parra MD   atorvastatin (LIPITOR) 40 MG tablet Take 1 tablet by mouth daily 10/26/21   Shelbie Parra MD   amLODIPine (NORVASC) 10 MG tablet TAKE 1 TABLET BY MOUTH DAILY 9/13/21   Shelbie Parra MD   docusate sodium (COLACE) 100 MG capsule TAKE 1 CAPSULE BY MOUTH EVERY DAY 7/20/21   Shelbie Parra MD   Umeclidinium Bromide 62.5 MCG/INH AEPB Inhale 1 puff into the lungs daily 6/25/21   Shelbie Parra MD   Lancets MISC 1 each by Does not apply route 2 times daily Use 2/day  Patient not taking: Reported on 3/31/2022 3/4/21   Juany Blankenship MD   blood glucose test strips (EXACTECH TEST) strip 1 each by In Vitro route daily As needed. Patient not taking: Reported on 3/31/2022 10/14/20   Juany Blankenship MD   diphenhydrAMINE (BENADRYL) 25 MG tablet Take 25 mg by mouth every 6 hours as needed for Itching    Historical Provider, MD   mirtazapine (REMERON) 15 MG tablet Take 15 mg by mouth nightly  Patient not taking: Reported on 3/31/2022 5/22/18   Historical Provider, MD   DULoxetine (CYMBALTA) 60 MG extended release capsule Take 1 capsule by mouth daily 2/1/18   Juany Blankenship MD   ipratropium-albuterol (DUONEB) 0.5-2.5 (3) MG/3ML SOLN nebulizer solution Inhale 3 mLs into the lungs every 6 hours as needed for Shortness of Breath 8/4/15   Juany Blankenship MD       Future Appointments   Date Time Provider Claudy Schaeffer   4/26/2022  4:40 PM ELVIRA Haley - CNP [address]   4/29/2022  2:15 PM uJany Blankenship MD [address] [place] [address]     ,   Diabetes Assessment    Medic Alert ID: No  Meal Planning: Avoidance of concentrated sweets   How often do you test your blood sugar?: Daily, No Testing (Comment: No longer taking medications)   Do you have barriers with adherence to non-pharmacologic self-management interventions?  (Nutrition/Exercise/Self-Monitoring): No   Have you ever had to go to the ED for symptoms of low blood sugar?: No           and   COPD Assessment    Does the patient understand envrionmental exposure?: Yes  Is the patient able to verbalize Rescue vs. Long Acting medications?: Yes  Does the patient have a nebulizer?: No  Does the patient use a space with inhaled medications?: No            Symptoms:

## 2022-04-13 NOTE — TELEPHONE ENCOUNTER
PC from pt stating that she her bronchitis is \"acting up\" and would like a zpack sent to pharmacy    Patient calls complaining of Cough    Onset:3 day(s) ago  Timing: night > day, barky, worse with lying down  Severity:Mild  Productive:Productive Cough   Sputum color: yellow  Triggers: lying flat and minimal activity   Associated Symptoms: change in voice and sputum production  Antibiotics:no     Did the cough change color after taking them? n/a  is on oxygen at 2 L/min per nasal cannula. Patient advised: To check pharmacy after 8 o'clock  Ok to leave a message if we call back yes

## 2022-04-14 ENCOUNTER — CARE COORDINATION (OUTPATIENT)
Dept: CARE COORDINATION | Age: 62
End: 2022-04-14

## 2022-04-14 ASSESSMENT — ENCOUNTER SYMPTOMS: DYSPNEA ASSOCIATED WITH: EXERTION

## 2022-04-14 NOTE — CARE COORDINATION
Ambulatory Care Coordination Note  4/14/2022  CM Risk Score: 6  Charlson 10 Year Mortality Risk Score: 47%     ACC: Troy Sabillon, RN    Summary Note: spoke with patient, states that she has increased cough and congestion but no increased SOB. Was Rx'd a Z-pack and started today. She states that things are not any different. Encourage her to take all of the medication even if she starts feeling better. Pain is about the same, will see pain management 4/26/22. Using pain medications as directed with some relief. Plan  COPD  -Review activities that increase SOB  - rest in between activities  -do more strenuous activity in the am to reduce fatigue  - Bronchitis 4/13/22 Z Pack called in - started 4/14/22  Diabetes  - Patient does not test daily.   - continue to monitor  - report Sx of hypo or hyper glycemia  - not on any diabetic medications  - Last A1C <6  Pain  - continue with pain management  - continue OT and PT  - use pain medications as directed  Activity  - encourage increasing activity slowly  Falls  Remove obstacles that increase the risk of falls  Smoking  - reduce the # of cigarettes to 0 by 5/31/22  - Identify triggers that lead to smoking  - request nicotine replacement as needed   - 4/14/22 has not smoked in the last 3 days   Transportation  - will use [employer] for transportation for medical appointments  Other  - Due for COVID Booster  - Appointment with Pain Management 4/26/22 At 4:40  - Appointment 4/29/22 at 2:15 with PCP     ACM Start 2/23/22        Care Coordination Interventions    Program Enrollment: Complex Care  Referral from Primary Care Provider: No  Suggested Interventions and Community Resources         Goals Addressed                 This Visit's Progress     Conditions and Symptoms   On track     I will schedule office visits, as directed by my provider. I will keep my appointment or reschedule if I have to cancel. I will notify my provider of any barriers to my plan of care.   I will follow my Zone Management tool to seek urgent or emergent care. I will notify my provider of any symptoms that indicate a worsening of my condition. Barriers: impairment:  physical: Pain, lack of motivation, lack of support, and lack of education  Plan for overcoming my barriers: ACM, Office staff  Confidence: 8/10  Anticipated Goal Completion Date: 5/31/22         Improve COPD symtoms   On track     Use inhalers as directed  Increase activity as tolerated  Report any increase in SOB that does not resolve  4/14/22 Rx'd Z-Pack for chest congestion       Reduce Falls    On track     I will reduce my risk of falls by the following: Remove rugs or use non slip rugs  Use walking aids like cane or walker  Follow through on orders for PT    Barriers: impairment:  physical: Pain lack of motivation, lack of support, and lack of education  Plan for overcoming my barriers: PT - OT, Pain Management, ACM, PCP  Confidence: 7/10  Anticipated Goal Completion Date: 5/31/22       Use a nicotine replacement product or medication   On track     I will continue to reduce cigaettes from  3 per day to 0 by 5/31/22 4/14/22 Has not smoked in the last three days to to Bronchitis             Prior to Admission medications    Medication Sig Start Date End Date Taking? Authorizing Provider   azithromycin (ZITHROMAX) 250 MG tablet Take 1 tablet by mouth See Admin Instructions for 5 days 500mg on day 1 followed by 250mg on days 2 - 5 4/13/22 4/18/22 Yes Suresh Cruz MD   HYDROcodone-acetaminophen (NORCO) 5-325 MG per tablet Take 1 tablet by mouth every 8 hours as needed for Pain for up to 30 days.  Early refill due to holidays 4/3/22 5/3/22 Yes ELVIRA Bhatt - CNP   mirtazapine (REMERON) 30 MG tablet Take 30 mg by mouth 3 times daily 1/5/22  Yes Historical Provider, MD   tiZANidine (ZANAFLEX) 4 MG tablet Take 1 tablet by mouth 2 times daily 1/3/22  Yes ELVIRA Bhatt - CNP   cetirizine (ZYRTEC) 10 MG tablet TAKE 1 TABLET BY MOUTH DAILY 11/17/21  Yes Shannen Ordonez MD   trospium METROPOLITAN [facility]) 20 MG tablet Take 1 tablet by mouth 2 times daily 10/26/21  Yes Shannen Ordonez MD   lisinopril-hydroCHLOROthiazide (PRINZIDE;ZESTORETIC) 20-25 MG per tablet TAKE 1 TABLET EVERY DAY 10/26/21  Yes Shannen Ordonez MD   omeprazole (PRILOSEC) 20 MG delayed release capsule TAKE 1 CAPSULE BY MOUTH EVERY DAY 10/26/21  Yes Shannen Ordonez MD   azelastine (ASTELIN) 0.1 % nasal spray 2 sprays by Nasal route 2 times daily Use in each nostril as directed 10/26/21  Yes Shannen Ordonez MD   potassium chloride (KLOR-CON M) 10 MEQ extended release tablet Take 2 tablets by mouth daily 10/26/21  Yes Shannen Ordonez MD   carvedilol (COREG) 12.5 MG tablet Take 1 tablet by mouth 2 times daily 10/26/21  Yes Shannen Ordonez MD   albuterol sulfate HFA (VENTOLIN HFA) 108 (90 Base) MCG/ACT inhaler Inhale 2 puffs into the lungs every 6 hours as needed for Wheezing 10/26/21  Yes Shannen Ordoenz MD   atorvastatin (LIPITOR) 40 MG tablet Take 1 tablet by mouth daily 10/26/21  Yes Shannen Ordonez MD   amLODIPine (NORVASC) 10 MG tablet TAKE 1 TABLET BY MOUTH DAILY 9/13/21  Yes Shannen Ordonez MD   docusate sodium (COLACE) 100 MG capsule TAKE 1 CAPSULE BY MOUTH EVERY DAY 7/20/21  Yes Shannen Ordonez MD   Umeclidinium Bromide 62.5 MCG/INH AEPB Inhale 1 puff into the lungs daily 6/25/21  Yes Shannen Ordonez MD   diphenhydrAMINE (BENADRYL) 25 MG tablet Take 25 mg by mouth every 6 hours as needed for Itching   Yes Jon Linder MD   DULoxetine (CYMBALTA) 60 MG extended release capsule Take 1 capsule by mouth daily 2/1/18  Yes Shannen Ordonez MD   ipratropium-albuterol (DUONEB) 0.5-2.5 (3) MG/3ML SOLN nebulizer solution Inhale 3 mLs into the lungs every 6 hours as needed for Shortness of Breath 8/4/15  Yes Shannen Ordonez MD   meloxicam (MOBIC) 7.5 MG tablet Take 1 tablet by mouth daily  Patient not taking: Reported on 3/31/2022 12/3/21 1/25/23 Joanna Prado APRN - CNP   acetaminophen (TYLENOL) 500 MG tablet Take 1 tablet by mouth 4 times daily as needed for Pain  Patient not taking: Reported on 3/31/2022 10/26/21   Juany Blankenship MD   Lancets MISC 1 each by Does not apply route 2 times daily Use 2/day  Patient not taking: Reported on 3/31/2022 3/4/21   Juany Blankenship MD   blood glucose test strips (EXACTECH TEST) strip 1 each by In Vitro route daily As needed. Patient not taking: Reported on 3/31/2022 10/14/20   Juany Blankenship MD   mirtazapine (REMERON) 15 MG tablet Take 15 mg by mouth nightly  Patient not taking: Reported on 3/31/2022 5/22/18   Historical Provider, MD       Future Appointments   Date Time Provider Claudy Schaeffer   4/26/2022  4:40 PM ELVIRA Haley - CNP [address]   4/29/2022  2:15 PM [facility]     ,   Diabetes Assessment    Medic Alert ID: No  Meal Planning: Avoidance of concentrated sweets   How often do you test your blood sugar?: Daily, No Testing (Comment: No longer taking medications)   Do you have barriers with adherence to non-pharmacologic self-management interventions?  (Nutrition/Exercise/Self-Monitoring): No   Have you ever had to go to the ED for symptoms of low blood sugar?: No       Do you have hyperglycemia symptoms?: No   Do you have hypoglycemia symptoms?: No   Blood Sugar Monitoring Regimen: Not Testing       and   COPD Assessment    Does the patient understand envrionmental exposure?: Yes  Is the patient able to verbalize Rescue vs. Long Acting medications?: Yes  Does the patient have a nebulizer?: Yes  Does the patient use a space with inhaled medications?: No     Increase in cough         Symptoms:     Symptom course: improving  Breathlessness: exertion  Increase use of rapid acting/rescue inhaled medications?: No  Change in chronic cough?: Increased  Self Monitoring - SaO2: No  Have you had a recent diagnosis of pneumonia either by PCP or at a hospital?: No

## 2022-04-22 ENCOUNTER — CARE COORDINATION (OUTPATIENT)
Dept: CARE COORDINATION | Age: 62
End: 2022-04-22

## 2022-04-22 NOTE — CARE COORDINATION
Ambulatory Care Coordination Note  4/22/2022  CM Risk Score: 6  Charlson 10 Year Mortality Risk Score: 47%     ACC: Rosie Sands RN    Summary Note: Attempted to reach [facility] KATERYNA for follow up. Message left on her voicemail with call back number. No return call. Will try to reach next week. Care Coordination Interventions    Program Enrollment: Complex Care  Referral from Primary Care Provider: No  Suggested Interventions and Community Resources         Goals Addressed    None         Prior to Admission medications    Medication Sig Start Date End Date Taking? Authorizing Provider   HYDROcodone-acetaminophen (NORCO) 5-325 MG per tablet Take 1 tablet by mouth every 8 hours as needed for Pain for up to 30 days.  Early refill due to holidays 4/3/22 5/3/22  Dhara Denver ELVIRA Lynne CNP   mirtazapine (REMERON) 30 MG tablet Take 30 mg by mouth 3 times daily 1/5/22   Historical Provider, MD   tiZANidine (ZANAFLEX) 4 MG tablet Take 1 tablet by mouth 2 times daily 1/3/22   Winnie Spurling, APRN - CNP   meloxicam (MOBIC) 7.5 MG tablet Take 1 tablet by mouth daily  Patient not taking: Reported on 3/31/2022 12/3/21 1/25/23  ELVIRA Sterling CNP   cetirizine (ZYRTEC) 10 MG tablet TAKE 1 TABLET BY MOUTH DAILY 11/17/21   Neto Ny MD   [facility]) 20 MG tablet Take 1 tablet by mouth 2 times daily 10/26/21   Neto Ny MD   lisinopril-hydroCHLOROthiazide (PRINZIDE;ZESTORETIC) 20-25 MG per tablet TAKE 1 TABLET EVERY DAY 10/26/21   Neto Ny MD   omeprazole (PRILOSEC) 20 MG delayed release capsule TAKE 1 CAPSULE BY MOUTH EVERY DAY 10/26/21   Neto Ny MD   azelastine (ASTELIN) 0.1 % nasal spray 2 sprays by Nasal route 2 times daily Use in each nostril as directed 10/26/21   Neto Ny MD   potassium chloride (KLOR-CON M) 10 MEQ extended release tablet Take 2 tablets by mouth daily 10/26/21   Neto Ny MD   carvedilol (COREG) 12.5 MG tablet Take 1 tablet by mouth 2 times daily 10/26/21   Klaus Talbert MD   albuterol sulfate HFA (VENTOLIN HFA) 108 (90 Base) MCG/ACT inhaler Inhale 2 puffs into the lungs every 6 hours as needed for Wheezing 10/26/21   Klaus Talbert MD   acetaminophen (TYLENOL) 500 MG tablet Take 1 tablet by mouth 4 times daily as needed for Pain  Patient not taking: Reported on 3/31/2022 10/26/21   Klaus Talbert MD   atorvastatin (LIPITOR) 40 MG tablet Take 1 tablet by mouth daily 10/26/21   Klaus Talbert MD   amLODIPine (NORVASC) 10 MG tablet TAKE 1 TABLET BY MOUTH DAILY 9/13/21   Klaus Talbert MD   docusate sodium (COLACE) 100 MG capsule TAKE 1 CAPSULE BY MOUTH EVERY DAY 7/20/21   Klaus Talbert MD   Umeclidinium Bromide 62.5 MCG/INH AEPB Inhale 1 puff into the lungs daily 6/25/21   Klaus Talbert MD   Lancets MISC 1 each by Does not apply route 2 times daily Use 2/day  Patient not taking: Reported on 3/31/2022 3/4/21   Klaus Talbert MD   blood glucose test strips (EXACTECH TEST) strip 1 each by In Vitro route daily As needed.   Patient not taking: Reported on 3/31/2022 10/14/20   Klaus Talbert MD   diphenhydrAMINE (BENADRYL) 25 MG tablet Take 25 mg by mouth every 6 hours as needed for Itching    Historical Provider, MD   mirtazapine (REMERON) 15 MG tablet Take 15 mg by mouth nightly  Patient not taking: Reported on 3/31/2022 5/22/18   Historical Provider, MD   DULoxetine (CYMBALTA) 60 MG extended release capsule Take 1 capsule by mouth daily 2/1/18   Klaus Talbert MD   ipratropium-albuterol (DUONEB) 0.5-2.5 (3) MG/3ML SOLN nebulizer solution Inhale 3 mLs into the lungs every 6 hours as needed for Shortness of Breath 8/4/15   Klaus Talbert MD       Future Appointments   Date Time Provider Claudy Schaeffer   4/26/2022  4:40 PM Kory Michel, APRN - CNP [address]   4/29/2022  2:15 PM Klaus Talbert MD [facility]   5/24/2022  2:30 PM [address], APRN - CNP [place] [facility]

## 2022-04-25 DIAGNOSIS — E78.00 PURE HYPERCHOLESTEROLEMIA: ICD-10-CM

## 2022-04-25 DIAGNOSIS — E11.9 TYPE 2 DIABETES MELLITUS WITHOUT COMPLICATION, WITHOUT LONG-TERM CURRENT USE OF INSULIN (HCC): ICD-10-CM

## 2022-04-25 DIAGNOSIS — I10 ESSENTIAL HYPERTENSION: ICD-10-CM

## 2022-04-25 RX ORDER — MELOXICAM 7.5 MG/1
7.5 TABLET ORAL DAILY
Qty: 30 TABLET | Refills: 1 | OUTPATIENT
Start: 2022-04-25 | End: 2022-05-25

## 2022-04-25 NOTE — TELEPHONE ENCOUNTER
E-scribe request for Atorvastatin,Amlodipine,Lisinopril-HCTZ. Please review and e-scribe if applicable. Next Visit Date:  Future Appointments   Date Time Provider Claudy Marlowi   4/26/2022  4:40 PM Deborah Leyden, APRN - CNP [address]   4/29/2022  2:15 PM Viktor Sen MD [facility]   5/24/2022  2:30 PM Franca Davis APRN - CNP [place] [facility]   Topic Date Due    Pneumococcal 0-64 years Vaccine (2 - PCV) 01/26/2017    COVID-19 Vaccine (3 - Booster for Pfizer series) 10/10/2021    Diabetic retinal exam  10/31/2022 (Originally 5/14/2021)    Diabetic microalbuminuria test  10/11/2022    Lipids  10/11/2022    Annual Wellness Visit (AWV)  10/22/2022    A1C test (Diabetic or Prediabetic)  10/26/2022    Depression Monitoring  10/26/2022    Diabetic foot exam  12/16/2022    Potassium  01/26/2023    Creatinine  01/26/2023    Breast cancer screen  04/27/2023    Colorectal Cancer Screen  10/05/2023    Cervical cancer screen  03/02/2026    DTaP/Tdap/Td vaccine (2 - Td or Tdap) 06/24/2029    Flu vaccine  Completed    Shingles Vaccine  Completed    Hepatitis C screen  Completed    HIV screen  Completed    Hepatitis A vaccine  Aged Out    Hib vaccine  Aged Out    Meningococcal (ACWY) vaccine  Aged Out               (applicable per patient's age: Cancer Screenings, Depression Screening, Fall Risk Screening, Immunizations)    Hemoglobin A1C (%)   Date Value   10/26/2021 5.7   03/02/2021 5.5   04/21/2020 5.8     Microalb/Crt.  Ratio (mcg/mg creat)   Date Value   10/11/2021 11     LDL Cholesterol (mg/dL)   Date Value   10/11/2021 131 (H)     AST (U/L)   Date Value   01/26/2022 15     ALT (U/L)   Date Value   01/26/2022 10     BUN (mg/dL)   Date Value   01/26/2022 12      (goal A1C is < 7)   (goal LDL is <100) need 30-50% reduction from baseline     BP Readings from Last 3 Encounters:   03/30/22 (!) 133/55   02/28/22 (!) 144/90   01/31/22 (!) 140/93    (goal BP 120/80)      All Future Testing planned in [facility]:  Lab Frequency Next Occurrence   DRUG SCREEN, PAIN Once 02/28/2022            Patient Active Problem List:     DJD (degenerative joint disease) of knee     Osteoarthritis of spine with radiculopathy, lumbar region     GERD (gastroesophageal reflux disease)     COPD, severity to be determined ([address]     HTN (hypertension)     Allergic rhinitis     Lipoma of shoulder s/p excision right posterior 11 17 2008     History of tobacco use     DM (diabetes mellitus)     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 9)     Bronchiectasis ([address]     Centrilobular emphysema ([address]     Oxygen dependent     Acute postoperative anemia due to expected blood loss     Multifocal pneumonia     Acute on chronic respiratory failure with hypoxia (HCC)     Pulmonary function studies abnormal     Tobacco dependence     Idiopathic sleep related nonobstructive alveolar hypoventilation     Postlaminectomy syndrome, lumbar region     Trigger finger of right thumb     Encounter for long-term opiate analgesic use

## 2022-04-26 ENCOUNTER — HOSPITAL ENCOUNTER (OUTPATIENT)
Dept: PAIN MANAGEMENT | Age: 62
Discharge: HOME | End: 2022-04-26
Payer: COMMERCIAL

## 2022-04-26 VITALS
SYSTOLIC BLOOD PRESSURE: 144 MMHG | OXYGEN SATURATION: 91 % | HEART RATE: 83 BPM | RESPIRATION RATE: 18 BRPM | BODY MASS INDEX: 29.99 KG/M2 | TEMPERATURE: 97.5 F | WEIGHT: 180 LBS | DIASTOLIC BLOOD PRESSURE: 78 MMHG | HEIGHT: 65 IN

## 2022-04-26 DIAGNOSIS — M54.40 CHRONIC MIDLINE LOW BACK PAIN WITH SCIATICA, SCIATICA LATERALITY UNSPECIFIED: ICD-10-CM

## 2022-04-26 DIAGNOSIS — G89.29 CHRONIC MIDLINE LOW BACK PAIN WITH SCIATICA, SCIATICA LATERALITY UNSPECIFIED: ICD-10-CM

## 2022-04-26 DIAGNOSIS — Z79.891 ENCOUNTER FOR LONG-TERM OPIATE ANALGESIC USE: ICD-10-CM

## 2022-04-26 DIAGNOSIS — M17.0 PRIMARY OSTEOARTHRITIS OF BOTH KNEES: ICD-10-CM

## 2022-04-26 DIAGNOSIS — M96.1 POSTLAMINECTOMY SYNDROME, LUMBAR REGION: Primary | ICD-10-CM

## 2022-04-26 PROCEDURE — 99213 OFFICE O/P EST LOW 20 MIN: CPT

## 2022-04-26 PROCEDURE — 99213 OFFICE O/P EST LOW 20 MIN: CPT | Performed by: NURSE PRACTITIONER

## 2022-04-26 RX ORDER — LISINOPRIL AND HYDROCHLOROTHIAZIDE 25; 20 MG/1; MG/1
TABLET ORAL
Qty: 90 TABLET | Refills: 0 | Status: SHIPPED | OUTPATIENT
Start: 2022-04-26 | End: 2022-08-09 | Stop reason: SDUPTHER

## 2022-04-26 RX ORDER — MELOXICAM 7.5 MG/1
7.5 TABLET ORAL DAILY
Qty: 30 TABLET | Refills: 0 | Status: SHIPPED | OUTPATIENT
Start: 2022-04-26 | End: 2022-05-26 | Stop reason: SDUPTHER

## 2022-04-26 RX ORDER — AMLODIPINE BESYLATE 10 MG/1
10 TABLET ORAL DAILY
Qty: 90 TABLET | Refills: 0 | Status: SHIPPED | OUTPATIENT
Start: 2022-04-26 | End: 2022-07-01

## 2022-04-26 RX ORDER — HYDROCODONE BITARTRATE AND ACETAMINOPHEN 5; 325 MG/1; MG/1
1 TABLET ORAL EVERY 8 HOURS PRN
Qty: 90 TABLET | Refills: 0 | Status: SHIPPED | OUTPATIENT
Start: 2022-05-03 | End: 2022-05-26 | Stop reason: SDUPTHER

## 2022-04-26 RX ORDER — ATORVASTATIN CALCIUM 40 MG/1
40 TABLET, FILM COATED ORAL DAILY
Qty: 90 TABLET | Refills: 0 | Status: SHIPPED | OUTPATIENT
Start: 2022-04-26 | End: 2022-07-26

## 2022-04-26 ASSESSMENT — ENCOUNTER SYMPTOMS
BOWEL INCONTINENCE: 0
BACK PAIN: 1
COUGH: 0
SHORTNESS OF BREATH: 0
CONSTIPATION: 0

## 2022-04-26 ASSESSMENT — PAIN SCALES - GENERAL: PAINLEVEL_OUTOF10: 9

## 2022-04-26 NOTE — PROGRESS NOTES
Chief Complaint   Patient presents with    Back Pain    Medication Refill         [facility]   Patient has had low back pain with no known injury and bilateral knee pain for many years. She is s/p lumbar diskectomy in 2015 and lumbar fusion 11/2020 but continues to have lingering pain. She feels the pain is worsening in the left leg. She has an appointment with neurosurgery next month. She is stable and compliant on norco 5/325 TID. Gabapentin prescribed by PCP for neuropathic pain in feet. Follows with Dr. Harshal Hanley for [facility] had a knee arthroscopy in March 2017. Genicular block was discussed but she declined.      EMG was completed and shows Abnormal study of the right lower extremity and Normal study of the left lower extremity. Brent Quick is electrodiagnostic evidence of a very mild chronic right S1 radiculopathy without active denervation. There is no electrodiagnostic evidence of a generalized large fiber peripheral polyneuropathy. She continues physical therapy with moderate benefit. She states she is able to tolerate more activity.        XR lumbar 2021 - The patient is status post posterior fusion of L4 and L5.  The hardware appears properly positioned without complication. There is also a disc spacer device in place at L4/5.  No acute fracture or listhesis.        Back Pain  This is a chronic problem. The current episode started more than 1 year ago. The problem occurs constantly. The problem has been gradually worsening since onset. The pain is present in the lumbar spine. The quality of the pain is described as aching and stabbing. The pain radiates to the left thigh, left knee and left foot. The pain is at a severity of 9/10. The pain is worse during the day. The symptoms are aggravated by bending, sitting and standing. Associated symptoms include leg pain, numbness and tingling. Pertinent negatives include no bladder incontinence, bowel incontinence, chest pain, fever, headaches or weakness.  She  Wellness examination     Dr. Yunior Medina -PCP last visit in early Oct. 2020       Past Surgical History:   Procedure Laterality Date    COLONOSCOPY  2/12/2009    normal    DILATION AND CURETTAGE OF UTERUS      GASTRECTOMY      partial    LUMBAR DISCECTOMY  01/2015    lumbar diskectomy    LUMBAR FUSION  11/19/2020     POSTERIOR FUSION L4/5,     LUMBAR FUSION N/A 11/19/2020    POSTERIOR FUSION L4/5, [employer], McCamey Rakes, [place] #[id number] AMINA performed by Amanda Saleh DO at [address] [facility] [address] Right 4/30/13    Lumbar Diagnostic Block,  Kenalog 40 mg    NERVE BLOCK  5/23/13    Lumbar Radiofrequency, Kenalog 40mg    NERVE BLOCK  8/12/13    Lt MBNB  celestone 6mg    NERVE BLOCK Left 8-28-13    left lumbar diagnostic block #2 decadron 10 mg    NERVE BLOCK Left 9-24-13    left lumbar median branch radiofrequency    NERVE BLOCK  07-02-14    caudal, celestone 9 mg    NERVE BLOCK  7-16-14    caudal epidural #2, celestone 9mg, fentanyl 25mcg    NERVE BLOCK  7/30/14    caudal #3 decadron 10mg    NERVE BLOCK  11-6-14    duramorph epidural steroid block  duramorph 1 mg celestone 9 mg    NERVE BLOCK  11/20/15    TENS- Empi Select    NERVE BLOCK  07/20/2018    right transforminal # 1 decadron 10mg,isovue    NERVE BLOCK Bilateral 02/01/2019    bilat mbnb- no steroid    NERVE BLOCK Bilateral 02/08/2019    bilat mbnb, marcaine . 25%    NH KNEE SCOPE,DIAGNOSTIC Right 3/24/2017    KNEE ARTHROSCOPY WITH PARTIAL MEDIAL MENISECAL DEBRIDMENT  performed by Jerson Meyer MD at [address]  9 20 2007    UPPER GASTROINTESTINAL ENDOSCOPY  4 21 2009,04/2011    gastritis, esophagitis       Allergies   Allergen Reactions    Aspirin      DUE TO ULCER    Claritin [Loratadine] Other (See Comments)     coughing    Flonase [Fluticasone Propionate]     Morphine And Related      GI Upset         Current Outpatient Medications:     atorvastatin (LIPITOR) 40 MG tablet, TAKE 1 TABLET BY MOUTH DAILY, Disp: 90 tablet, Rfl: 0    amLODIPine (NORVASC) 10 MG tablet, TAKE 1 TABLET BY MOUTH DAILY, Disp: 90 tablet, Rfl: 0    lisinopril-hydroCHLOROthiazide (PRINZIDE;ZESTORETIC) 20-25 MG per tablet, TAKE 1 TABLET EVERY DAY, Disp: 90 tablet, Rfl: 0    HYDROcodone-acetaminophen (NORCO) 5-325 MG per tablet, Take 1 tablet by mouth every 8 hours as needed for Pain for up to 30 days.  Early refill due to holidays, Disp: 90 tablet, Rfl: 0    mirtazapine (REMERON) 30 MG tablet, Take 30 mg by mouth 3 times daily, Disp: , Rfl:     tiZANidine (ZANAFLEX) 4 MG tablet, Take 1 tablet by mouth 2 times daily, Disp: 60 tablet, Rfl: 2    meloxicam (MOBIC) 7.5 MG tablet, Take 1 tablet by mouth daily (Patient not taking: Reported on 3/31/2022), Disp: 30 tablet, Rfl: 2    cetirizine (ZYRTEC) 10 MG tablet, TAKE 1 TABLET BY MOUTH DAILY, Disp: 30 tablet, Rfl: 4    trospium (SANCTURA) 20 MG tablet, Take 1 tablet by mouth 2 times daily, Disp: 60 tablet, Rfl: 0    omeprazole (PRILOSEC) 20 MG delayed release capsule, TAKE 1 CAPSULE BY MOUTH EVERY DAY, Disp: 30 capsule, Rfl: 3    azelastine (ASTELIN) 0.1 % nasal spray, 2 sprays by Nasal route 2 times daily Use in each nostril as directed, Disp: 1 each, Rfl: 2    potassium chloride (KLOR-CON M) 10 MEQ extended release tablet, Take 2 tablets by mouth daily, Disp: 60 tablet, Rfl: 2    carvedilol (COREG) 12.5 MG tablet, Take 1 tablet by mouth 2 times daily, Disp: 60 tablet, Rfl: 5    albuterol sulfate HFA (VENTOLIN HFA) 108 (90 Base) MCG/ACT inhaler, Inhale 2 puffs into the lungs every 6 hours as needed for Wheezing, Disp: 1 each, Rfl: 3    acetaminophen (TYLENOL) 500 MG tablet, Take 1 tablet by mouth 4 times daily as needed for Pain (Patient not taking: Reported on 3/31/2022), Disp: 30 tablet, Rfl: 0    docusate sodium (COLACE) 100 MG capsule, TAKE 1 CAPSULE BY MOUTH EVERY DAY, Disp: 30 capsule, Rfl: 9    Umeclidinium Bromide 62.5 MCG/INH AEPB, Inhale 1 puff into the lungs daily, Disp: 2 each, Rfl: 5    Lancets MISC, 1 each by Does not apply route 2 times daily Use 2/day (Patient not taking: Reported on 3/31/2022), Disp: 100 each, Rfl: 11    blood glucose test strips (EXACTECH TEST) strip, 1 each by In Vitro route daily As needed. (Patient not taking: Reported on 3/31/2022), Disp: 50 each, Rfl: 11    diphenhydrAMINE (BENADRYL) 25 MG tablet, Take 25 mg by mouth every 6 hours as needed for Itching, Disp: , Rfl:     mirtazapine (REMERON) 15 MG tablet, Take 15 mg by mouth nightly (Patient not taking: Reported on 3/31/2022), Disp: , Rfl:     DULoxetine (CYMBALTA) 60 MG extended release capsule, Take 1 capsule by mouth daily, Disp: 30 capsule, Rfl: 3    ipratropium-albuterol (DUONEB) 0.5-2.5 (3) MG/3ML SOLN nebulizer solution, Inhale 3 mLs into the lungs every 6 hours as needed for Shortness of Breath, Disp: 360 mL, Rfl: 11    Family History   Problem Relation Age of Onset    Diabetes Mother     Heart Disease Mother     Cirrhosis Father        Social History     Socioeconomic History    Marital status: Single     Spouse name: Not on file    Number of children: Not on file    Years of education: Not on file    Highest education level: Not on file   Occupational History     Employer: DISABLED   Tobacco Use    Smoking status: Current Every Day Smoker     Packs/day: 0.25     Years: 36.00     Pack years: 9.00     Types: Cigarettes    Smokeless tobacco: Never Used    Tobacco comment: 3 cigs per day   on nicoderm patch   Vaping Use    Vaping Use: Never used   Substance and Sexual Activity    Alcohol use: No     Alcohol/week: 0.0 standard drinks    Drug use: No     Comment: 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 7 yrs    Sexual activity: Never   Other Topics Concern    Not on file   Social History Narrative    10/9/20 Patient keeping contact with others to a minimum due to COVID 19 Pandemic.     10/9/20 Patient has difficulty with ambulation due to DJD, stenosis and fibromyalgia     Social Determinants of Health     Financial Resource Strain: Medium Risk    Difficulty of Paying Living Expenses: Somewhat hard   Food Insecurity: No Food Insecurity    Worried About Running Out of Food in the Last Year: Never true    Santosh of Food in the Last Year: Never true   Transportation Needs: No Transportation Needs    Lack of Transportation (Medical): No    Lack of Transportation (Non-Medical): No   Physical Activity: Insufficiently Active    Days of Exercise per Week: 3 days    Minutes of Exercise per Session: 30 min   Stress: Stress Concern Present    Feeling of Stress : To some extent   Social Connections: Moderately Integrated    Frequency of Communication with Friends and Family: More than three times a week    Frequency of Social Gatherings with Friends and Family: More than three times a week    Attends Hinduism Services: More than 4 times per year    Active Member of [address] [place] [address] [employer] or Organizations: Yes    Attends Club or Organization Meetings: 1 to 4 times per year    Marital Status:    Intimate Partner Violence:     Fear of Current or Ex-Partner: Not on file    Emotionally Abused: Not on file    Physically Abused: Not on file    Sexually Abused: Not on file   Housing Stability: [address] Unable to Pay for Housing in the Last Year: No    Number of Places Lived in the Last Year: 1    Unstable Housing in the Last Year: No       Review of Systems:  Review of Systems   Constitutional: Negative for chills and fever. Cardiovascular: Negative for chest pain and palpitations. Respiratory: Negative for cough and shortness of breath. Musculoskeletal: Positive for back pain. Gastrointestinal: Negative for bowel incontinence and constipation. Genitourinary: Negative for bladder incontinence. Neurological: Positive for numbness and tingling. Negative for disturbances in coordination, headaches, loss of balance and weakness.        Physical Exam:  BP (!) 144/78   Pulse 83   Temp 97.5 °F (36.4 °C)   Resp 18   Ht 5' 5\" (1.651 m)   Wt 180 lb (81.6 kg)   LMP 04/19/2003   SpO2 91%   BMI 29.95 kg/m²     Physical Exam  Constitutional:        HENT:      Head: Normocephalic. Pulmonary:      Effort: Pulmonary effort is normal.   Musculoskeletal:         General: Normal range of motion. Cervical back: Normal range of motion. Lumbar back: Tenderness present. Skin:     General: Skin is warm and dry. Neurological:      Mental Status: She is alert and oriented to person, place, and time. Record/Diagnostics Review:    Last óscar 2/2022 and was appropriate     Assessment:  Problem List Items Addressed This Visit     Encounter for long-term opiate analgesic use (Chronic)    Primary osteoarthritis of both knees    Relevant Medications    HYDROcodone-acetaminophen (NORCO) 5-325 MG per tablet (Start on 5/3/2022)    meloxicam (MOBIC) 7.5 MG tablet    Chronic low back pain    Relevant Medications    HYDROcodone-acetaminophen (NORCO) 5-325 MG per tablet (Start on 5/3/2022)    meloxicam (MOBIC) 7.5 MG tablet    Postlaminectomy syndrome, lumbar region - Primary    Relevant Orders    External Referral To Physical Therapy             Treatment Plan:  Patient relates current medications are helping the pain. Patient reports taking pain medications as prescribed, denies obtaining medications from different sources and denies use of illegal drugs. Patient denies side effects from medications like nausea, vomiting, constipation or drowsiness. Patient reports current activities of daily living are possible due to medications and would like to continue them. As always, we encourage daily stretching and strengthening exercises, and recommend minimizing use of pain medications unless patient cannot get through daily activities due to pain. Contract requirements met. Continue opioid therapy.  Script written for norco  Discussed trying injections again - she had years ago and states she had no relief - feels they made her pain worse. She declines further injections. She has an order for PT but has not started. She states she can not get a ride. Will try to order in home therapy. Pt will see NS next month  Follow up appointment made for 4 weeks    I have reviewed the chief complaint and history of present illness (including ROS and PFSH) and vital documentation by my staff and I agree with their documentation and have added where applicable.

## 2022-04-29 ENCOUNTER — OFFICE VISIT (OUTPATIENT)
Dept: INTERNAL MEDICINE | Age: 62
End: 2022-04-29
Payer: COMMERCIAL

## 2022-04-29 VITALS
SYSTOLIC BLOOD PRESSURE: 121 MMHG | HEART RATE: 77 BPM | WEIGHT: 190 LBS | BODY MASS INDEX: 31.62 KG/M2 | DIASTOLIC BLOOD PRESSURE: 82 MMHG

## 2022-04-29 DIAGNOSIS — K21.9 GASTROESOPHAGEAL REFLUX DISEASE, UNSPECIFIED WHETHER ESOPHAGITIS PRESENT: ICD-10-CM

## 2022-04-29 DIAGNOSIS — E78.00 PURE HYPERCHOLESTEROLEMIA: ICD-10-CM

## 2022-04-29 DIAGNOSIS — E11.9 TYPE 2 DIABETES MELLITUS WITHOUT COMPLICATION, WITHOUT LONG-TERM CURRENT USE OF INSULIN (HCC): Primary | ICD-10-CM

## 2022-04-29 DIAGNOSIS — F17.200 SMOKER: ICD-10-CM

## 2022-04-29 DIAGNOSIS — I10 ESSENTIAL HYPERTENSION: ICD-10-CM

## 2022-04-29 DIAGNOSIS — M96.1 POSTLAMINECTOMY SYNDROME, LUMBAR REGION: ICD-10-CM

## 2022-04-29 DIAGNOSIS — Z12.31 ENCOUNTER FOR SCREENING MAMMOGRAM FOR MALIGNANT NEOPLASM OF BREAST: ICD-10-CM

## 2022-04-29 DIAGNOSIS — J44.9 STAGE 3 SEVERE COPD BY GOLD CLASSIFICATION (HCC): ICD-10-CM

## 2022-04-29 DIAGNOSIS — Z23 NEED FOR STREPTOCOCCUS PNEUMONIAE VACCINATION: ICD-10-CM

## 2022-04-29 LAB — HBA1C MFR BLD: 5.8 %

## 2022-04-29 PROCEDURE — 99213 OFFICE O/P EST LOW 20 MIN: CPT | Performed by: INTERNAL MEDICINE

## 2022-04-29 PROCEDURE — 83036 HEMOGLOBIN GLYCOSYLATED A1C: CPT | Performed by: INTERNAL MEDICINE

## 2022-04-29 PROCEDURE — 2022F DILAT RTA XM EVC RTNOPTHY: CPT | Performed by: INTERNAL MEDICINE

## 2022-04-29 PROCEDURE — G8417 CALC BMI ABV UP PARAM F/U: HCPCS | Performed by: INTERNAL MEDICINE

## 2022-04-29 PROCEDURE — 4004F PT TOBACCO SCREEN RCVD TLK: CPT | Performed by: INTERNAL MEDICINE

## 2022-04-29 PROCEDURE — 3023F SPIROM DOC REV: CPT | Performed by: INTERNAL MEDICINE

## 2022-04-29 PROCEDURE — 3017F COLORECTAL CA SCREEN DOC REV: CPT | Performed by: INTERNAL MEDICINE

## 2022-04-29 PROCEDURE — G8427 DOCREV CUR MEDS BY ELIG CLIN: HCPCS | Performed by: INTERNAL MEDICINE

## 2022-04-29 PROCEDURE — 3044F HG A1C LEVEL LT 7.0%: CPT | Performed by: INTERNAL MEDICINE

## 2022-04-29 RX ORDER — OMEPRAZOLE 20 MG/1
CAPSULE, DELAYED RELEASE ORAL
Qty: 30 CAPSULE | Refills: 3 | Status: SHIPPED | OUTPATIENT
Start: 2022-04-29 | End: 2022-09-02

## 2022-04-29 RX ORDER — GABAPENTIN 300 MG/1
300 CAPSULE ORAL 3 TIMES DAILY
COMMUNITY
Start: 2022-04-25

## 2022-04-29 RX ORDER — CARVEDILOL 12.5 MG/1
12.5 TABLET ORAL 2 TIMES DAILY
Qty: 60 TABLET | Refills: 5 | Status: SHIPPED | OUTPATIENT
Start: 2022-04-29 | End: 2022-08-09 | Stop reason: SDUPTHER

## 2022-04-29 NOTE — PATIENT INSTRUCTIONS
-Pt due for 6 month f/u in October-- [facility] an [facility]--reminder in [facility] to contact patient as well--AVS given to patient    -Bloodwork orders given to patient, they will have them done before their next visit.     -Your doctor has ordered an Mammogram for you, please contact our scheduling department at [phone] to set up an appointment.    -Sasha Gutierrez

## 2022-04-29 NOTE — PROGRESS NOTES
History:   Diagnosis Date    Allergic rhinitis     Alveolar hypoventilation 11/20/2020    Aortic insufficiency 2018    mild-moderate on echo (was seen and discharged from cardiology-[facility])    Bronchiectasis ([facility] 11/20/2020    Bronchitis     Chronic back pain     Pain management at [facility]. COPD (chronic obstructive pulmonary disease) ([facility]     Dr. Matthew Ayala to see 11/09/2020    Depression     DM (diabetes mellitus) ([facility] 12/18/2012    GERD (gastroesophageal reflux disease)     History of echocardiogram 05/2018    EF 65%, mild-moderate AI and TR    Hyperlipidemia     Dr. Princess Pina Hypertension     Dr. Princess Pina Obesity     Osteoarthritis     Peripheral vascular disease ([facility]     Primary osteoarthritis of both knees     Radicular pain of lumbosacral region     Spinal stenosis, lumbar region, without neurogenic claudication 04/30/2013    Tricuspid regurgitation 2018    mild-moderate on echo    Type II or unspecified type diabetes mellitus without mention of complication, not stated as uncontrolled     Dr. Princess Pina Unspecified sleep apnea     no cpap used anymore    URI (upper respiratory infection)     Wears dentures     upper and lower full dentures    Wears glasses     Wellness examination     Dr. Christiano Koch -PCP last visit in early Oct. 2020       Past Surgical History:   Procedure Laterality Date    COLONOSCOPY  2/12/2009    normal    DILATION AND CURETTAGE OF UTERUS      GASTRECTOMY      partial    LUMBAR DISCECTOMY  01/2015    lumbar diskectomy    LUMBAR FUSION  11/19/2020     POSTERIOR FUSION L4/5,     LUMBAR FUSION N/A 11/19/2020    POSTERIOR FUSION L4/5, [employer], Aylin Morrissey, [place] #[id number] AMINA performed by Devonte Carroll DO at [address] Right 4/30/13    Lumbar Diagnostic Block,  Kenalog 40 mg    NERVE BLOCK  5/23/13    Lumbar Radiofrequency, Kenalog 40mg    NERVE BLOCK  8/12/13    Lt MBNB  celestone 6mg    NERVE BLOCK Left 8-28-13    left lumbar diagnostic block #2 decadron 10 mg    NERVE BLOCK Left 9-24-13    left lumbar median branch radiofrequency    NERVE BLOCK  07-02-14    caudal, celestone 9 mg    NERVE BLOCK  7-16-14    caudal epidural #2, celestone 9mg, fentanyl 25mcg    NERVE BLOCK  7/30/14    caudal #3 decadron 10mg    NERVE BLOCK  11-6-14    duramorph epidural steroid block  duramorph 1 mg celestone 9 mg    NERVE BLOCK  11/20/15    TENS- Empi Select    NERVE BLOCK  07/20/2018    right transforminal # 1 decadron 10mg,isovue    NERVE BLOCK Bilateral 02/01/2019    bilat mbnb- no steroid    NERVE BLOCK Bilateral 02/08/2019    bilat mbnb, marcaine . 25%    DE KNEE SCOPE,DIAGNOSTIC Right 3/24/2017    KNEE ARTHROSCOPY WITH PARTIAL MEDIAL MENISECAL DEBRIDMENT  performed by Jennifer Pop MD at [facility]  9 20 2007    UPPER GASTROINTESTINAL ENDOSCOPY  4 21 2009,04/2011    gastritis, esophagitis         ALLERGIES      Allergies   Allergen Reactions    Aspirin      DUE TO ULCER    Claritin [Loratadine] Other (See Comments)     coughing    Flonase [Fluticasone Propionate]     Morphine And Related      GI Upset       MEDICATIONS:      Current Outpatient Medications on File Prior to Visit   Medication Sig Dispense Refill    gabapentin (NEURONTIN) 300 MG capsule Take 300 mg by mouth in the morning, at noon, and at bedtime.  atorvastatin (LIPITOR) 40 MG tablet TAKE 1 TABLET BY MOUTH DAILY 90 tablet 0    amLODIPine (NORVASC) 10 MG tablet TAKE 1 TABLET BY MOUTH DAILY 90 tablet 0    lisinopril-hydroCHLOROthiazide (PRINZIDE;ZESTORETIC) 20-25 MG per tablet TAKE 1 TABLET EVERY DAY 90 tablet 0    [START ON 5/3/2022] HYDROcodone-acetaminophen (NORCO) 5-325 MG per tablet Take 1 tablet by mouth every 8 hours as needed for Pain for up to 30 days.  Early refill due to holidays 90 tablet 0    meloxicam (MOBIC) 7.5 MG tablet Take 1 tablet by mouth daily 30 tablet 0    mirtazapine (REMERON) 30 MG tablet Take 30 mg by mouth 3 times daily      tiZANidine (ZANAFLEX) 4 MG tablet Take 1 tablet by mouth 2 times daily 60 tablet 2    cetirizine (ZYRTEC) 10 MG tablet TAKE 1 TABLET BY MOUTH DAILY 30 tablet 4    trospium (SANCTURA) 20 MG tablet Take 1 tablet by mouth 2 times daily 60 tablet 0    omeprazole (PRILOSEC) 20 MG delayed release capsule TAKE 1 CAPSULE BY MOUTH EVERY DAY 30 capsule 3    azelastine (ASTELIN) 0.1 % nasal spray 2 sprays by Nasal route 2 times daily Use in each nostril as directed 1 each 2    potassium chloride (KLOR-CON M) 10 MEQ extended release tablet Take 2 tablets by mouth daily 60 tablet 2    carvedilol (COREG) 12.5 MG tablet Take 1 tablet by mouth 2 times daily 60 tablet 5    albuterol sulfate HFA (VENTOLIN HFA) 108 (90 Base) MCG/ACT inhaler Inhale 2 puffs into the lungs every 6 hours as needed for Wheezing 1 each 3    docusate sodium (COLACE) 100 MG capsule TAKE 1 CAPSULE BY MOUTH EVERY DAY 30 capsule 9    Umeclidinium Bromide 62.5 MCG/INH AEPB Inhale 1 puff into the lungs daily 2 each 5    diphenhydrAMINE (BENADRYL) 25 MG tablet Take 25 mg by mouth every 6 hours as needed for Itching      mirtazapine (REMERON) 15 MG tablet Take 15 mg by mouth nightly      DULoxetine (CYMBALTA) 60 MG extended release capsule Take 1 capsule by mouth daily 30 capsule 3    ipratropium-albuterol (DUONEB) 0.5-2.5 (3) MG/3ML SOLN nebulizer solution Inhale 3 mLs into the lungs every 6 hours as needed for Shortness of Breath 360 mL 11     No current facility-administered medications on file prior to visit. HISTORY    Reviewed and no change from previous record. Carolina  reports that she has been smoking cigarettes. She has a 9.00 pack-year smoking history.  She has never used smokeless tobacco.    FAMILY HISTORY:    Reviewed and No change from previous visit    HEALTH MAINTENANCE DUE:      Health Maintenance Due   Topic Date Due    Pneumococcal 0-64 years Vaccine (2 - PCV) 01/26/2017    COVID-19 Vaccine (3 - Booster for Pfizer series) 10/10/2021       REVIEW OF SYSTEMS:    12 point review of symptoms completed and found to be normal except noted in the HPI    Review of Systems   Constitutional: Negative for diaphoresis and fatigue. Eyes: Negative for photophobia and visual disturbance. Respiratory: Positive for cough. Negative for shortness of breath and wheezing.    Cardiovascular: Negative for chest pain, palpitations and leg swelling. Endocrine: Negative for polydipsia and polyuria. Genitourinary: Negative for dysuria and frequency. Musculoskeletal: Positive for arthralgias and back pain. Neurological: Positive for numbness. Negative for dizziness, syncope, weakness and headaches. Hematological: Negative for adenopathy. Does not bruise/bleed easily. Psychiatric/Behavioral: Negative for dysphoric mood and sleep disturbance. The patient is not nervous/anxious.         PHYSICAL EXAM:     Vitals:    04/29/22 1410   BP: 121/82   Site: Left Upper Arm   Position: Sitting   Cuff Size: Large Adult   Pulse: 77   Weight: 190 lb (86.2 kg)     Body mass index is 31.62 kg/m². BP Readings from Last 3 Encounters:   04/29/22 121/82   04/26/22 (!) 144/78   03/30/22 (!) 133/55        Wt Readings from Last 3 Encounters:   04/29/22 190 lb (86.2 kg)   04/26/22 180 lb (81.6 kg)   03/30/22 180 lb (81.6 kg)       Physical Exam    Vitals and nursing note reviewed. Constitutional:       Appearance: Normal appearance. HENT:      Head: Normocephalic and atraumatic. Eyes:      Extraocular Movements: Extraocular movements intact.      Conjunctiva/sclera: Conjunctivae normal.      Pupils: Pupils are equal, round, and reactive to light. Cardiovascular:      Rate and Rhythm: Normal rate and regular rhythm.      Heart sounds: Murmur heard. Pulmonary:      Effort: Pulmonary effort is normal.      Breath sounds: Normal breath sounds. No wheezing. Musculoskeletal:      Right lower leg: No edema.    Left lower leg: No edema. Skin:     General: Skin is warm and dry. Neurological:      General: No focal deficit present.      Mental Status: She is alert and oriented to person, place, and time. Psychiatric:         Mood and Affect: Mood normal.     LABORATORY FINDINGS:    CBC:  Lab Results   Component Value Date    WBC 5.2 10/11/2021    HGB 15.3 10/11/2021     10/11/2021     02/16/2012     BMP:    Lab Results   Component Value Date     01/26/2022    K 3.8 01/26/2022     01/26/2022    CO2 22 01/26/2022    BUN 12 01/26/2022    CREATININE 0.65 01/26/2022    GLUCOSE 109 01/26/2022     HEMOGLOBIN A1C:   Lab Results   Component Value Date    LABA1C 5.8 04/29/2022     MICROALBUMIN URINE:   Lab Results   Component Value Date    MICROALBUR 14 10/11/2021     FASTING LIPID PANEL:  Lab Results   Component Value Date    CHOL 200 (H) 10/11/2021    HDL 50 10/11/2021    TRIG 94 10/11/2021     Lab Results   Component Value Date    LDLCHOLESTEROL 131 (H) 10/11/2021       LIVER PROFILE:  Lab Results   Component Value Date    ALT 10 01/26/2022    AST 15 01/26/2022    PROT 7.8 01/26/2022    BILITOT 0.33 01/26/2022    BILIDIR 0.11 06/18/2018    LABALBU 4.1 01/26/2022      THYROID FUNCTION:   Lab Results   Component Value Date    TSH 1.82 01/26/2022      URINEANALYSIS: No results found for: LABURIN  ASSESSMENT AND PLAN:    1. Type 2 diabetes mellitus without complication, without long-term current use of insulin (HCC)  Continue monitoring    - POCT glycosylated hemoglobin (Hb A1C)  - Lipid Panel; Future  - Basic Metabolic Panel; Future    2. Essential hypertension  Continue Norvasc, lisinopril    - carvedilol (COREG) 12.5 MG tablet; Take 1 tablet by mouth 2 times daily  Dispense: 60 tablet; Refill: 5  - Basic Metabolic Panel; Future    3. Pure hypercholesterolemia  On statin     4. Stage 3 severe COPD by GOLD classification ([address]  Stop smoking    - Umeclidinium Bromide 62.5 MCG/INH AEPB;  Inhale 1 puff into the lungs daily  Dispense: 2 each; Refill: 5    5. Gastroesophageal reflux disease, unspecified whether esophagitis present    - omeprazole (PRILOSEC) 20 MG delayed release capsule; TAKE 1 CAPSULE BY MOUTH EVERY DAY  Dispense: 30 capsule; Refill: 3  - CBC; Future    6. Smoker  On patches. Stepdown dose sent    States she quit smoking last week    - CBC; Future    7. Postlaminectomy syndrome, lumbar region  Follows with Pain management          FOLLOW UP AND INSTRUCTIONS:   Return in about 6 months (around 10/29/2022). 1. Carolina received counseling on the following healthy behaviors: nutrition, exercise, medication adherence and tobacco cessation    2. Reviewed prior labs and health maintenance. 3. Discussed use, benefit, and side effects of prescribed medications. Barriers to medication compliance addressed. All patient questions answered. Pt voiced understanding.          Arsenio Severance  Attending Physician, [address] [place] [address], Internal Medicine Residency Program  [address] [place] [address]  4/29/2022, 2:32 PM

## 2022-05-03 ENCOUNTER — CARE COORDINATION (OUTPATIENT)
Dept: CARE COORDINATION | Age: 62
End: 2022-05-03

## 2022-05-03 NOTE — CARE COORDINATION
Ambulatory Care Coordination Note  5/3/2022  CM Risk Score: 6  Charlson 10 Year Mortality Risk Score: 47%     ACC: Suzi Rice RN    Summary Note: Called, message left on voicemail with my contact information and reason for call. If no return call today, will attempt outreach 5/4/22    Plan  COPD  -Review activities that increase SOB  - rest in between activities  -do more strenuous activity in the am to reduce fatigue  Diabetes  - Patient does not test daily.   - continue to monitor  - report Sx of hypo or hyper glycemia  - not on any diabetic medications  - Last A1C <6  Pain  - continue with pain management  - continue OT and PT  - use pain medications as directed  Activity  - encourage increasing activity slowly  Falls  Remove obstacles that increase the risk of falls  Smoking  - reduce the # of cigarettes to 0 by 5/31/22  - Identify triggers that lead to smoking  - request nicotine replacement as needed    Transportation  - will use [employer] for transportation for medical appointments  Other  - Due for COVID Booster  - Appointment with Pain Management 5/26/22 At 3:00  - Appointment with Neurology 5/24/22 at 2:30   ACM Start 2/23/22    Start discussing graduation at next visit          Care Coordination Interventions    Program Enrollment: Complex Care  Referral from Primary Care Provider: No  Suggested Interventions and Community Resources         Goals Addressed    None         Prior to Admission medications    Medication Sig Start Date End Date Taking? Authorizing Provider   gabapentin (NEURONTIN) 300 MG capsule Take 300 mg by mouth in the morning, at noon, and at bedtime.  4/25/22   Historical Provider, MD   carvedilol (COREG) 12.5 MG tablet Take 1 tablet by mouth 2 times daily 4/29/22   Valentino Nguyen MD   Umeclidinium Bromide 62.5 MCG/INH AEPB Inhale 1 puff into the lungs daily 4/29/22   Valentino Nguyen MD   omeprazole (PRILOSEC) 20 MG delayed release capsule TAKE 1 CAPSULE BY MOUTH EVERY DAY 4/29/22   Ignacio Nelson MD   nicotine (NICODERM CQ) 7 MG/24HR Place 1 patch onto the skin every 24 hours 4/29/22 4/29/23  Ignacio Nelson MD   atorvastatin (LIPITOR) 40 MG tablet TAKE 1 TABLET BY MOUTH DAILY 4/26/22   Ignacio Nelson MD   amLODIPine (NORVASC) 10 MG tablet TAKE 1 TABLET BY MOUTH DAILY 4/26/22   Ignacio Nelson MD   lisinopril-hydroCHLOROthiazide (PRINZIDE;ZESTORETIC) 20-25 MG per tablet TAKE 1 TABLET EVERY DAY 4/26/22   Ignacio Nelson MD   HYDROcodone-acetaminophen (NORCO) 5-325 MG per tablet Take 1 tablet by mouth every 8 hours as needed for Pain for up to 30 days.  Early refill due to holidays 5/3/22 6/2/22  ELVIRA Batres CNP   meloxicam (MOBIC) 7.5 MG tablet Take 1 tablet by mouth daily 4/26/22 5/26/22  ELVIRA Ferrari CNP   mirtazapine (REMERON) 30 MG tablet Take 30 mg by mouth 3 times daily 1/5/22   Historical Provider, MD   tiZANidine (ZANAFLEX) 4 MG tablet Take 1 tablet by mouth 2 times daily 1/3/22   ELVIRA Batres CNP   cetirizine (ZYRTEC) 10 MG tablet TAKE 1 TABLET BY MOUTH DAILY 11/17/21   Ignacio Nelson MD   trospium (SANCTURA) 20 MG tablet Take 1 tablet by mouth 2 times daily 10/26/21   Ignacio Nelson MD   azelastine (ASTELIN) 0.1 % nasal spray 2 sprays by Nasal route 2 times daily Use in each nostril as directed 10/26/21   Ignacio Nelson MD   potassium chloride (KLOR-CON M) 10 MEQ extended release tablet Take 2 tablets by mouth daily 10/26/21   Ignacio Nelson MD   albuterol sulfate HFA (VENTOLIN HFA) 108 (90 Base) MCG/ACT inhaler Inhale 2 puffs into the lungs every 6 hours as needed for Wheezing 10/26/21   Ignacio Nelson MD   docusate sodium (COLACE) 100 MG capsule TAKE 1 CAPSULE BY MOUTH EVERY DAY 7/20/21   Ignacio Nelson MD   diphenhydrAMINE (BENADRYL) 25 MG tablet Take 25 mg by mouth every 6 hours as needed for Itching    Historical Provider, MD   mirtazapine (REMERON) 15 MG tablet Take 15 mg by mouth nightly 5/22/18 Historical Provider, MD   DULoxetine (CYMBALTA) 60 MG extended release capsule Take 1 capsule by mouth daily 2/1/18   Delilah Puente MD   ipratropium-albuterol (DUONEB) 0.5-2.5 (3) MG/3ML SOLN nebulizer solution Inhale 3 mLs into the lungs every 6 hours as needed for Shortness of Breath 8/4/15   Delilah Puente MD       Future Appointments   Date Time Provider Claudy Schaeffer   5/24/2022  2:30 PM Vilma Silva APRN - CNP [place] [facility]   5/26/2022  3:00 PM Emma Nguyen APRN - CNP [address]     ,   Diabetes Assessment    Medic Alert ID: No  Meal Planning: Avoidance of concentrated sweets   How often do you test your blood sugar?: Daily, No Testing (Comment: No longer taking medications)   Do you have barriers with adherence to non-pharmacologic self-management interventions?  (Nutrition/Exercise/Self-Monitoring): No   Have you ever had to go to the ED for symptoms of low blood sugar?: No           and   COPD Assessment    Does the patient understand envrionmental exposure?: Yes  Is the patient able to verbalize Rescue vs. Long Acting medications?: Yes  Does the patient have a nebulizer?: Yes  Does the patient use a space with inhaled medications?: No            Symptoms:

## 2022-05-04 ENCOUNTER — CARE COORDINATION (OUTPATIENT)
Dept: CARE COORDINATION | Age: 62
End: 2022-05-04

## 2022-05-04 DIAGNOSIS — J30.9 CHRONIC ALLERGIC RHINITIS: ICD-10-CM

## 2022-05-04 NOTE — TELEPHONE ENCOUNTER
Request for Zyrtec. Next Visit Date:  Future Appointments   Date Time Provider Claudy Schaeffer   5/24/2022  2:30 PM Riki Lax, APRN - CNP [place] [facility]   5/26/2022  3:00 PM Malissa Oscar, APRN - CNP [address] Maintenance   Topic Date Due    COVID-19 Vaccine (3 - Booster for Zambrano Wilver series) 10/10/2021    Diabetic retinal exam  10/31/2022 (Originally 5/14/2021)    Diabetic microalbuminuria test  10/11/2022    Lipids  10/11/2022    Annual Wellness Visit (AWV)  10/22/2022    Depression Monitoring  10/26/2022    Diabetic foot exam  12/16/2022    Potassium  01/26/2023    Creatinine  01/26/2023    Breast cancer screen  04/27/2023    A1C test (Diabetic or Prediabetic)  04/29/2023    Colorectal Cancer Screen  10/05/2023    Cervical cancer screen  03/02/2026    DTaP/Tdap/Td vaccine (2 - Td or Tdap) 06/24/2029    Flu vaccine  Completed    Shingles vaccine  Completed    Pneumococcal 0-64 years Vaccine  Completed    Hepatitis C screen  Completed    HIV screen  Completed    Hepatitis A vaccine  Aged Out    Hib vaccine  Aged Out    Meningococcal (ACWY) vaccine  Aged Out       Hemoglobin A1C (%)   Date Value   04/29/2022 5.8   10/26/2021 5.7   03/02/2021 5.5             ( goal A1C is < 7)   Microalb/Crt.  Ratio (mcg/mg creat)   Date Value   10/11/2021 11     LDL Cholesterol (mg/dL)   Date Value   10/11/2021 131 (H)       (goal LDL is <100)   AST (U/L)   Date Value   01/26/2022 15     ALT (U/L)   Date Value   01/26/2022 10     BUN (mg/dL)   Date Value   01/26/2022 12     BP Readings from Last 3 Encounters:   04/29/22 121/82   04/26/22 (!) 144/78   03/30/22 (!) 133/55          (goal 120/80)    All Future Testing planned in [facility]  Lab Frequency Next Occurrence   DRUG SCREEN, PAIN Once 02/28/2022   Lipid Panel Once 82/24/1095   Basic Metabolic Panel Once 44/96/6487   CBC Once 07/31/2022   GE DIGITAL SCREEN W OR WO CAD BILATERAL Once 04/29/2022         Patient Active Problem List:     DJD (degenerative joint disease) of knee     Osteoarthritis of spine with radiculopathy, lumbar region     GERD (gastroesophageal reflux disease)     COPD, severity to be determined ([address]     HTN (hypertension)     Allergic rhinitis     Lipoma of shoulder s/p excision right posterior 11 17 2008     History of tobacco use     DM (diabetes mellitus)     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 9)     Bronchiectasis ([address]     Centrilobular emphysema ([address]     Oxygen dependent     Acute postoperative anemia due to expected blood loss     Multifocal pneumonia     Acute on chronic respiratory failure with hypoxia (HCC)     Pulmonary function studies abnormal     Tobacco dependence     Idiopathic sleep related nonobstructive alveolar hypoventilation     Postlaminectomy syndrome, lumbar region     Trigger finger of right thumb     Encounter for long-term opiate analgesic use

## 2022-05-04 NOTE — CARE COORDINATION
Ambulatory Care Coordination Note  5/4/2022  CM Risk Score: 6  Charlson 10 Year Mortality Risk Score: 47%     ACC: Ari Restrepo, RN    Summary Note: Called, message left on voicemail with my contact information and reason for call. Will attempt to contact 5/9/22 if no return call today  Return call from patient, states she achy today due to weather and a lot of activity yesterday. . No other complaints at this time. Just picked up Medications today. Is going to start Nicotine patches tomorrow  Plan  COPD  -Review activities that increase SOB  - rest in between activities  -do more strenuous activity in the am to reduce fatigue  Diabetes  - Patient does not test daily.   - continue to monitor  - report Sx of hypo or hyper glycemia  - not on any diabetic medications  - Last A1C <6  Pain  - continue with pain management  - continue OT and PT  - use pain medications as directed  Activity  - encourage increasing activity slowly  Falls  Remove obstacles that increase the risk of falls  Smoking  - reduce the # of cigarettes to 0 by 5/31/22  - Identify triggers that lead to smoking  - request nicotine replacement as needed   - 4/14/22 has not smoked in the last 3 days   Transportation  - will use [employer] for transportation for medical appointments  Other  - Due for COVID Booster  - Appointment with Pain Management 5/26/22 At 2:30  - Appointment 5/26/22 @ 3:00 Pain Management     ACM Start 2/23/22 - 2.5 months          Care Coordination Interventions    Program Enrollment: Complex Care  Referral from Primary Care Provider: No  Suggested Interventions and Community Resources         Goals Addressed    None         Prior to Admission medications    Medication Sig Start Date End Date Taking? Authorizing Provider   gabapentin (NEURONTIN) 300 MG capsule Take 300 mg by mouth in the morning, at noon, and at bedtime.  4/25/22   Historical Provider, MD   carvedilol (COREG) 12.5 MG tablet Take 1 tablet by mouth 2 times daily 4/29/22   Nicol Painting MD   Umeclidinium Bromide 62.5 MCG/INH AEPB Inhale 1 puff into the lungs daily 4/29/22   Nicol Painting MD   omeprazole (PRILOSEC) 20 MG delayed release capsule TAKE 1 CAPSULE BY MOUTH EVERY DAY 4/29/22   Nicol Painting MD   nicotine (NICODERM CQ) 7 MG/24HR Place 1 patch onto the skin every 24 hours 4/29/22 4/29/23  Nicol Painting MD   atorvastatin (LIPITOR) 40 MG tablet TAKE 1 TABLET BY MOUTH DAILY 4/26/22   Nicol Painting MD   amLODIPine (NORVASC) 10 MG tablet TAKE 1 TABLET BY MOUTH DAILY 4/26/22   Nicol Painting MD   lisinopril-hydroCHLOROthiazide (PRINZIDE;ZESTORETIC) 20-25 MG per tablet TAKE 1 TABLET EVERY DAY 4/26/22   Nicol Painting MD   HYDROcodone-acetaminophen (NORCO) 5-325 MG per tablet Take 1 tablet by mouth every 8 hours as needed for Pain for up to 30 days.  Early refill due to holidays 5/3/22 6/2/22  ELVIRA Charles CNP   meloxicam (MOBIC) 7.5 MG tablet Take 1 tablet by mouth daily 4/26/22 5/26/22  ELVIRA Finch CNP   mirtazapine (REMERON) 30 MG tablet Take 30 mg by mouth 3 times daily 1/5/22   Jon Provider, MD   tiZANidine (ZANAFLEX) 4 MG tablet Take 1 tablet by mouth 2 times daily 1/3/22   ELVIRA Charles CNP   cetirizine (ZYRTEC) 10 MG tablet TAKE 1 TABLET BY MOUTH DAILY 11/17/21   Nicol Painting MD   trospium (SANCTURA) 20 MG tablet Take 1 tablet by mouth 2 times daily 10/26/21   Nicol Painting MD   azelastine (ASTELIN) 0.1 % nasal spray 2 sprays by Nasal route 2 times daily Use in each nostril as directed 10/26/21   Nicol Painting MD   potassium chloride (KLOR-CON M) 10 MEQ extended release tablet Take 2 tablets by mouth daily 10/26/21   Nicol Painting MD   albuterol sulfate HFA (VENTOLIN HFA) 108 (90 Base) MCG/ACT inhaler Inhale 2 puffs into the lungs every 6 hours as needed for Wheezing 10/26/21   Nicol Painting MD   docusate sodium (COLACE) 100 MG capsule TAKE 1 CAPSULE BY MOUTH EVERY DAY 7/20/21 Terry Segovia MD   diphenhydrAMINE (BENADRYL) 25 MG tablet Take 25 mg by mouth every 6 hours as needed for Itching    Historical Provider, MD   mirtazapine (REMERON) 15 MG tablet Take 15 mg by mouth nightly 5/22/18   Historical Provider, MD   DULoxetine (CYMBALTA) 60 MG extended release capsule Take 1 capsule by mouth daily 2/1/18   Terry Segovia MD   ipratropium-albuterol (DUONEB) 0.5-2.5 (3) MG/3ML SOLN nebulizer solution Inhale 3 mLs into the lungs every 6 hours as needed for Shortness of Breath 8/4/15   Terry Segovia MD       Future Appointments   Date Time Provider Claudy Schaeffer   5/24/2022  2:30 PM ELVIRA Nicole - CNP [place] [facility]   5/26/2022  3:00 PM ELVIRA Guillen - CNP [address]      and   Diabetes Assessment    Medic Alert ID: No  Meal Planning: Avoidance of concentrated sweets   How often do you test your blood sugar?: Daily, No Testing (Comment: No longer taking medications)   Do you have barriers with adherence to non-pharmacologic self-management interventions?  (Nutrition/Exercise/Self-Monitoring): No   Have you ever had to go to the ED for symptoms of low blood sugar?: No

## 2022-05-05 RX ORDER — CETIRIZINE HYDROCHLORIDE 10 MG/1
TABLET ORAL
Qty: 30 TABLET | Refills: 3 | Status: SHIPPED | OUTPATIENT
Start: 2022-05-05 | End: 2022-09-13

## 2022-05-11 ENCOUNTER — HOSPITAL ENCOUNTER (OUTPATIENT)
Age: 62
Setting detail: SPECIMEN
Discharge: HOME | End: 2022-05-11

## 2022-05-11 DIAGNOSIS — K21.9 GASTROESOPHAGEAL REFLUX DISEASE, UNSPECIFIED WHETHER ESOPHAGITIS PRESENT: ICD-10-CM

## 2022-05-11 DIAGNOSIS — E11.9 TYPE 2 DIABETES MELLITUS WITHOUT COMPLICATION, WITHOUT LONG-TERM CURRENT USE OF INSULIN (HCC): ICD-10-CM

## 2022-05-11 DIAGNOSIS — I10 ESSENTIAL HYPERTENSION: ICD-10-CM

## 2022-05-11 DIAGNOSIS — F17.200 SMOKER: ICD-10-CM

## 2022-05-11 LAB
ANION GAP SERPL CALCULATED.3IONS-SCNC: 16 MMOL/L (ref 9–17)
BUN BLDV-MCNC: 19 MG/DL (ref 8–23)
CALCIUM SERPL-MCNC: 9.6 MG/DL (ref 8.6–10.4)
CHLORIDE BLD-SCNC: 104 MMOL/L (ref 98–107)
CHOLESTEROL/HDL RATIO: 4.3
CHOLESTEROL: 191 MG/DL
CO2: 20 MMOL/L (ref 20–31)
CREAT SERPL-MCNC: 0.75 MG/DL (ref 0.5–0.9)
GFR AFRICAN AMERICAN: >60 ML/MIN
GFR NON-AFRICAN AMERICAN: >60 ML/MIN
GFR SERPL CREATININE-BSD FRML MDRD: NORMAL ML/MIN/{1.73_M2}
GLUCOSE BLD-MCNC: 97 MG/DL (ref 70–99)
HCT VFR BLD CALC: 46.1 % (ref 36.3–47.1)
HDLC SERPL-MCNC: 44 MG/DL
HEMOGLOBIN: 14.7 G/DL (ref 11.9–15.1)
LDL CHOLESTEROL: 131 MG/DL (ref 0–130)
MCH RBC QN AUTO: 27 PG (ref 25.2–33.5)
MCHC RBC AUTO-ENTMCNC: 31.9 G/DL (ref 28.4–34.8)
MCV RBC AUTO: 84.7 FL (ref 82.6–102.9)
NRBC AUTOMATED: 0 PER 100 WBC
PDW BLD-RTO: 15.2 % (ref 11.8–14.4)
PLATELET # BLD: 241 K/UL (ref 138–453)
PMV BLD AUTO: 11 FL (ref 8.1–13.5)
POTASSIUM SERPL-SCNC: 3.7 MMOL/L (ref 3.7–5.3)
RBC # BLD: 5.44 M/UL (ref 3.95–5.11)
SODIUM BLD-SCNC: 140 MMOL/L (ref 135–144)
TRIGL SERPL-MCNC: 79 MG/DL
WBC # BLD: 6.3 K/UL (ref 3.5–11.3)

## 2022-05-16 ENCOUNTER — CARE COORDINATION (OUTPATIENT)
Dept: CARE COORDINATION | Age: 62
End: 2022-05-16

## 2022-05-16 NOTE — CARE COORDINATION
Ambulatory Care Coordination Note  5/16/2022  CM Risk Score: 6  Charlson 10 Year Mortality Risk Score: 47%     ACC: Cecilia Starr, RN    Summary Note: Called, message left on voicemail with my contact information and reason for call. Will attempt to contact 5/18/22 if no return call today. Plan  COPD  -Review activities that increase SOB  - rest in between activities  -do more strenuous activity in the am to reduce fatigue  Diabetes  - Patient does not test daily.   - continue to monitor  - report Sx of hypo or hyper glycemia  - not on any diabetic medications  - Last A1C <6  Pain  - continue with pain management  - continue OT and PT  - use pain medications as directed  Activity  - encourage increasing activity slowly  Falls  Remove obstacles that increase the risk of falls  Smoking  - reduce the # of cigarettes to 0 by 5/31/22  - Identify triggers that lead to smoking  - request nicotine replacement as needed Picked up 5/5/22  - 4/14/22 has not smoked in the last 3 days   Transportation  - will use [employer] for transportation for medical appointments  Other  - Due for COVID Booster  - Appointment with Pain Management 5/26/22 At 2:30  - Appointment 5/26/22 @ 3:00 Pain Management     ACM Start 2/23/22 - 3 months        Care Coordination Interventions    Program Enrollment: Complex Care  Referral from Primary Care Provider: No  Suggested Interventions and Community Resources         Goals Addressed    None         Prior to Admission medications    Medication Sig Start Date End Date Taking? Authorizing Provider   cetirizine (ZYRTEC) 10 MG tablet TAKE 1 TABLET BY MOUTH DAILY 5/5/22   Chris Guillen MD   gabapentin (NEURONTIN) 300 MG capsule Take 300 mg by mouth in the morning, at noon, and at bedtime.  4/25/22   Historical Provider, MD   carvedilol (COREG) 12.5 MG tablet Take 1 tablet by mouth 2 times daily 4/29/22   Chris Guillen MD   Umeclidinium Bromide 62.5 MCG/INH AEPB Inhale 1 puff into the lungs daily 4/29/22   John Roach MD   omeprazole (PRILOSEC) 20 MG delayed release capsule TAKE 1 CAPSULE BY MOUTH EVERY DAY 4/29/22   John Roach MD   nicotine (NICODERM CQ) 7 MG/24HR Place 1 patch onto the skin every 24 hours 4/29/22 4/29/23  John Roach MD   atorvastatin (LIPITOR) 40 MG tablet TAKE 1 TABLET BY MOUTH DAILY 4/26/22   John Roach MD   amLODIPine (NORVASC) 10 MG tablet TAKE 1 TABLET BY MOUTH DAILY 4/26/22   John Roach MD   lisinopril-hydroCHLOROthiazide (PRINZIDE;ZESTORETIC) 20-25 MG per tablet TAKE 1 TABLET EVERY DAY 4/26/22   John Roach MD   HYDROcodone-acetaminophen (NORCO) 5-325 MG per tablet Take 1 tablet by mouth every 8 hours as needed for Pain for up to 30 days.  Early refill due to holidays 5/3/22 6/2/22  ELVIRA Lewis CNP   meloxicam (MOBIC) 7.5 MG tablet Take 1 tablet by mouth daily 4/26/22 5/26/22  ELVIRA Lorenzo CNP   mirtazapine (REMERON) 30 MG tablet Take 30 mg by mouth 3 times daily 1/5/22   Jon Provider, MD   tiZANidine (ZANAFLEX) 4 MG tablet Take 1 tablet by mouth 2 times daily 1/3/22   ELVIRA Lewis CNP   trospium (SANCTURA) 20 MG tablet Take 1 tablet by mouth 2 times daily 10/26/21   John Roach MD   azelastine (ASTELIN) 0.1 % nasal spray 2 sprays by Nasal route 2 times daily Use in each nostril as directed 10/26/21   John Roach MD   potassium chloride (KLOR-CON M) 10 MEQ extended release tablet Take 2 tablets by mouth daily 10/26/21   John Roach MD   albuterol sulfate HFA (VENTOLIN HFA) 108 (90 Base) MCG/ACT inhaler Inhale 2 puffs into the lungs every 6 hours as needed for Wheezing 10/26/21   John Roach MD   docusate sodium (COLACE) 100 MG capsule TAKE 1 CAPSULE BY MOUTH EVERY DAY 7/20/21   John Roach MD   diphenhydrAMINE (BENADRYL) 25 MG tablet Take 25 mg by mouth every 6 hours as needed for Itching    Historical Provider, MD   mirtazapine (REMERON) 15 MG tablet Take 15 mg by mouth nightly 5/22/18   Historical Provider, MD   DULoxetine (CYMBALTA) 60 MG extended release capsule Take 1 capsule by mouth daily 2/1/18   Ignacio Nelson MD   ipratropium-albuterol (DUONEB) 0.5-2.5 (3) MG/3ML SOLN nebulizer solution Inhale 3 mLs into the lungs every 6 hours as needed for Shortness of Breath 8/4/15   Ignacio Nelson MD       Future Appointments   Date Time Provider Claudy Schaeffer   5/18/2022  3:00 PM [facility]   5/24/2022  2:30 PM ELVIRA Craig CNP [place] [facility]   5/26/2022  3:00 PM ELVIRA Merchant CNP     ,   Diabetes Assessment    Medic Alert ID: No  Meal Planning: Avoidance of concentrated sweets   How often do you test your blood sugar?: Daily, No Testing (Comment: No longer taking medications)   Do you have barriers with adherence to non-pharmacologic self-management interventions?  (Nutrition/Exercise/Self-Monitoring): No   Have you ever had to go to the ED for symptoms of low blood sugar?: No           and   COPD Assessment    Does the patient understand envrionmental exposure?: Yes  Is the patient able to verbalize Rescue vs. Long Acting medications?: Yes  Does the patient have a nebulizer?: Yes  Does the patient use a space with inhaled medications?: No            Symptoms:

## 2022-05-18 ENCOUNTER — HOSPITAL ENCOUNTER (OUTPATIENT)
Dept: MAMMOGRAPHY | Age: 62
Discharge: HOME | End: 2022-05-20
Payer: COMMERCIAL

## 2022-05-18 DIAGNOSIS — Z12.31 ENCOUNTER FOR SCREENING MAMMOGRAM FOR MALIGNANT NEOPLASM OF BREAST: ICD-10-CM

## 2022-05-18 PROCEDURE — 77063 BREAST TOMOSYNTHESIS BI: CPT

## 2022-05-20 ENCOUNTER — CARE COORDINATION (OUTPATIENT)
Dept: CARE COORDINATION | Age: 62
End: 2022-05-20

## 2022-05-20 NOTE — CARE COORDINATION
Ambulatory Care Coordination Note  5/20/2022  CM Risk Score: 6  Charlson 10 Year Mortality Risk Score: 47%     ACC: Filemon Moffett, RN    Summary Note: called home and cell number. Message with contact information and reason for call left on both numbers. Patient does not have My Chart. Will attempt to contact 5/24/22. Plan  COPD  -Review activities that increase SOB  - rest in between activities  -do more strenuous activity in the am to reduce fatigue  Diabetes  - Patient does not test daily.   - continue to monitor  - report Sx of hypo or hyper glycemia  - not on any diabetic medications  - Last A1C <6  Pain  - continue with pain management  - continue OT and PT  - use pain medications as directed  Activity  - encourage increasing activity slowly  Falls  Remove obstacles that increase the risk of falls  Smoking  - reduce the # of cigarettes to 0 by 5/31/22  - Identify triggers that lead to smoking  - request nicotine replacement as needed Picked up 5/5/22  - 4/14/22 has not smoked in the last 3 days   Transportation  - will use [employer] for transportation for medical appointments  Other  - Due for COVID Booster  - Appointment with Pain Management 5/26/22 At 2:30  - Appointment 5/26/22 @ 3:00 Pain Management     ACM Start 2/23/22 - 3 months        Care Coordination Interventions    Program Enrollment: Complex Care  Referral from Primary Care Provider: No  Suggested Interventions and Community Resources         Goals Addressed    None         Prior to Admission medications    Medication Sig Start Date End Date Taking? Authorizing Provider   cetirizine (ZYRTEC) 10 MG tablet TAKE 1 TABLET BY MOUTH DAILY 5/5/22   Delilah Puente MD   gabapentin (NEURONTIN) 300 MG capsule Take 300 mg by mouth in the morning, at noon, and at bedtime.  4/25/22   Historical Provider, MD   carvedilol (COREG) 12.5 MG tablet Take 1 tablet by mouth 2 times daily 4/29/22   Delilah Puente MD   Umeclidinium Bromide 62.5 MCG/INH AEPB (REMERON) 15 MG tablet Take 15 mg by mouth nightly 5/22/18   Historical Provider, MD   DULoxetine (CYMBALTA) 60 MG extended release capsule Take 1 capsule by mouth daily 2/1/18   Efrem Tucker MD   ipratropium-albuterol (DUONEB) 0.5-2.5 (3) MG/3ML SOLN nebulizer solution Inhale 3 mLs into the lungs every 6 hours as needed for Shortness of Breath 8/4/15   Efrem Tucker MD       Future Appointments   Date Time Provider Claudy Schaeffer   5/24/2022  2:30 PM Riki Lax, ELVIRA - CNP [place] [facility]   5/26/2022  3:00 PM Malissa Oscar APRN - [facility]     ,   Diabetes Assessment    Medic Alert ID: No  Meal Planning: Avoidance of concentrated sweets   How often do you test your blood sugar?: Daily, No Testing (Comment: No longer taking medications)   Do you have barriers with adherence to non-pharmacologic self-management interventions?  (Nutrition/Exercise/Self-Monitoring): No   Have you ever had to go to the ED for symptoms of low blood sugar?: No           and   COPD Assessment    Does the patient understand envrionmental exposure?: Yes  Is the patient able to verbalize Rescue vs. Long Acting medications?: Yes  Does the patient have a nebulizer?: Yes  Does the patient use a space with inhaled medications?: No            Symptoms:

## 2022-05-24 ENCOUNTER — HOSPITAL ENCOUNTER (OUTPATIENT)
Dept: GENERAL RADIOLOGY | Age: 62
Discharge: HOME | End: 2022-05-26
Payer: COMMERCIAL

## 2022-05-24 ENCOUNTER — OFFICE VISIT (OUTPATIENT)
Dept: NEUROSURGERY | Age: 62
End: 2022-05-24
Payer: COMMERCIAL

## 2022-05-24 ENCOUNTER — HOSPITAL ENCOUNTER (OUTPATIENT)
Age: 62
Discharge: HOME | End: 2022-05-26
Payer: COMMERCIAL

## 2022-05-24 VITALS
HEIGHT: 65 IN | TEMPERATURE: 98.2 F | DIASTOLIC BLOOD PRESSURE: 76 MMHG | OXYGEN SATURATION: 92 % | BODY MASS INDEX: 31.65 KG/M2 | HEART RATE: 72 BPM | WEIGHT: 190 LBS | SYSTOLIC BLOOD PRESSURE: 118 MMHG

## 2022-05-24 DIAGNOSIS — Z98.1 S/P LUMBAR FUSION: ICD-10-CM

## 2022-05-24 DIAGNOSIS — M43.16 SPONDYLOLISTHESIS OF LUMBAR REGION: Primary | ICD-10-CM

## 2022-05-24 DIAGNOSIS — M43.16 SPONDYLOLISTHESIS OF LUMBAR REGION: ICD-10-CM

## 2022-05-24 PROCEDURE — 72100 X-RAY EXAM L-S SPINE 2/3 VWS: CPT

## 2022-05-24 PROCEDURE — 3017F COLORECTAL CA SCREEN DOC REV: CPT | Performed by: NURSE PRACTITIONER

## 2022-05-24 PROCEDURE — 99213 OFFICE O/P EST LOW 20 MIN: CPT | Performed by: NURSE PRACTITIONER

## 2022-05-24 PROCEDURE — G8417 CALC BMI ABV UP PARAM F/U: HCPCS | Performed by: NURSE PRACTITIONER

## 2022-05-24 PROCEDURE — G8427 DOCREV CUR MEDS BY ELIG CLIN: HCPCS | Performed by: NURSE PRACTITIONER

## 2022-05-24 PROCEDURE — 4004F PT TOBACCO SCREEN RCVD TLK: CPT | Performed by: NURSE PRACTITIONER

## 2022-05-24 NOTE — PROGRESS NOTES
[address]  [facility] [address] [facility] [address]  Dept: [phone]    Patient:  Kenyatta Fowler  YOB: 1960  Date: 5/24/22    The patient is a 59 y.o. female who presents today for consult of the following problems:     Chief Complaint   Patient presents with    Follow-up         HPI:     Kenyatta Fowler is a 59 y.o. female on whom neurosurgical consultation was requested by Luanne Walker MD for management of chronic back pain. History significant for posterior L4-L5 fusion. Patient did originally have low back pain with right-sided radiculopathy. This has improved. States that she was doing well for a while until around a month ago, had recurrence of axial symptoms as well as occasional episode of left leg pain. Feels that the weather triggered her symptoms. Was able to restart home physical therapy last week. Pain today 8/10, described as sharp pain her back is being stabbed. Pain is worsened with position changes, increased activity. Denies saddle anesthesia, changes to bowels or bladder.     History:     Past Medical History:   Diagnosis Date    Allergic rhinitis     Alveolar hypoventilation 11/20/2020    Aortic insufficiency 2018    mild-moderate on echo (was seen and discharged [facility]ology-[facility])    Bronchiectasis ([facility] 11/20/2020    Bronchitis     Chronic back pain     Pain management at [place] [address]. COPD (chronic obstructive pulmonary disease) ([facility]     Dr. Ada Haynes to see 11/09/2020    Depression     DM (diabetes mellitus) ([facility] 12/18/2012    GERD (gastroesophageal reflux disease)     History of echocardiogram 05/2018    EF 65%, mild-moderate AI and TR    Hyperlipidemia     Dr. Kenyatta Akins Hypertension     Dr. Kenyatta Akins Obesity     Osteoarthritis     Peripheral vascular disease ([facility]     Primary osteoarthritis of both knees     Radicular pain of lumbosacral region     Spinal stenosis, lumbar region, without neurogenic claudication 04/30/2013    Tricuspid regurgitation 2018    mild-moderate on echo    Type II or unspecified type diabetes mellitus without mention of complication, not stated as uncontrolled     Dr. Jonny Dozier Unspecified sleep apnea     no cpap used anymore    URI (upper respiratory infection)     Wears dentures     upper and lower full dentures    Wears glasses     Wellness examination     Dr. Jennifer Tavera -PCP last visit in early Oct. 2020     Past Surgical History:   Procedure Laterality Date    COLONOSCOPY  2/12/2009    normal    DILATION AND CURETTAGE OF UTERUS      GASTRECTOMY      partial    LUMBAR DISCECTOMY  01/2015    lumbar diskectomy    LUMBAR FUSION  11/19/2020     POSTERIOR FUSION L4/5,     LUMBAR FUSION N/A 11/19/2020    POSTERIOR FUSION L4/5, [employer], Hollis Soler, [place] #[id number] AMINA performed by Delonte Singer DO at [address] [facility] [address] Right 4/30/13    Lumbar Diagnostic Block,  Kenalog 40 mg    NERVE BLOCK  5/23/13    Lumbar Radiofrequency, Kenalog 40mg    NERVE BLOCK  8/12/13    Lt MBNB  celestone 6mg    NERVE BLOCK Left 8-28-13    left lumbar diagnostic block #2 decadron 10 mg    NERVE BLOCK Left 9-24-13    left lumbar median branch radiofrequency    NERVE BLOCK  07-02-14    caudal, celestone 9 mg    NERVE BLOCK  7-16-14    caudal epidural #2, celestone 9mg, fentanyl 25mcg    NERVE BLOCK  7/30/14    caudal #3 decadron 10mg    NERVE BLOCK  11-6-14    duramorph epidural steroid block  duramorph 1 mg celestone 9 mg    NERVE BLOCK  11/20/15    TENS- Empi Select    NERVE BLOCK  07/20/2018    right transforminal # 1 decadron 10mg,isovue    NERVE BLOCK Bilateral 02/01/2019    bilat mbnb- no steroid    NERVE BLOCK Bilateral 02/08/2019    bilat mbnb, marcaine . 25%    [place] KNEE SCOPE,DIAGNOSTIC Right 3/24/2017    KNEE ARTHROSCOPY WITH PARTIAL MEDIAL MENISECAL DEBRIDMENT  performed by Jessee Hendrickson MD at [address] [facility]  9 20 2007    UPPER GASTROINTESTINAL ENDOSCOPY  4 21 2009,04/2011    gastritis, esophagitis     Family History   Problem Relation Age of Onset    Diabetes Mother     Heart Disease Mother     Cirrhosis Father      Current Outpatient Medications on File Prior to Visit   Medication Sig Dispense Refill    cetirizine (ZYRTEC) 10 MG tablet TAKE 1 TABLET BY MOUTH DAILY 30 tablet 3    gabapentin (NEURONTIN) 300 MG capsule Take 300 mg by mouth in the morning, at noon, and at bedtime.  carvedilol (COREG) 12.5 MG tablet Take 1 tablet by mouth 2 times daily 60 tablet 5    Umeclidinium Bromide 62.5 MCG/INH AEPB Inhale 1 puff into the lungs daily 2 each 5    omeprazole (PRILOSEC) 20 MG delayed release capsule TAKE 1 CAPSULE BY MOUTH EVERY DAY 30 capsule 3    nicotine (NICODERM CQ) 7 MG/24HR Place 1 patch onto the skin every 24 hours 30 patch 3    atorvastatin (LIPITOR) 40 MG tablet TAKE 1 TABLET BY MOUTH DAILY 90 tablet 0    amLODIPine (NORVASC) 10 MG tablet TAKE 1 TABLET BY MOUTH DAILY 90 tablet 0    lisinopril-hydroCHLOROthiazide (PRINZIDE;ZESTORETIC) 20-25 MG per tablet TAKE 1 TABLET EVERY DAY 90 tablet 0    HYDROcodone-acetaminophen (NORCO) 5-325 MG per tablet Take 1 tablet by mouth every 8 hours as needed for Pain for up to 30 days.  Early refill due to holidays 90 tablet 0    meloxicam (MOBIC) 7.5 MG tablet Take 1 tablet by mouth daily 30 tablet 0    mirtazapine (REMERON) 30 MG tablet Take 30 mg by mouth 3 times daily      tiZANidine (ZANAFLEX) 4 MG tablet Take 1 tablet by mouth 2 times daily 60 tablet 2    trospium (SANCTURA) 20 MG tablet Take 1 tablet by mouth 2 times daily 60 tablet 0    azelastine (ASTELIN) 0.1 % nasal spray 2 sprays by Nasal route 2 times daily Use in each nostril as directed 1 each 2    potassium chloride (KLOR-CON M) 10 MEQ extended release tablet Take 2 tablets by mouth daily 60 tablet 2  albuterol sulfate HFA (VENTOLIN HFA) 108 (90 Base) MCG/ACT inhaler Inhale 2 puffs into the lungs every 6 hours as needed for Wheezing 1 each 3    docusate sodium (COLACE) 100 MG capsule TAKE 1 CAPSULE BY MOUTH EVERY DAY 30 capsule 9    diphenhydrAMINE (BENADRYL) 25 MG tablet Take 25 mg by mouth every 6 hours as needed for Itching      mirtazapine (REMERON) 15 MG tablet Take 15 mg by mouth nightly      DULoxetine (CYMBALTA) 60 MG extended release capsule Take 1 capsule by mouth daily 30 capsule 3    ipratropium-albuterol (DUONEB) 0.5-2.5 (3) MG/3ML SOLN nebulizer solution Inhale 3 mLs into the lungs every 6 hours as needed for Shortness of Breath 360 mL 11     No current facility-administered medications on file prior to visit. Social History     Tobacco Use    Smoking status: Current Every Day Smoker     Packs/day: 0.25     Years: 36.00     Pack years: 9.00     Types: Cigarettes    Smokeless tobacco: Never Used    Tobacco comment: 3 cigs per day   on nicoderm patch   Vaping Use    Vaping Use: Never used   Substance Use Topics    Alcohol use: No     Alcohol/week: 0.0 standard drinks    Drug use: No     Comment: 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 7 yrs       Allergies   Allergen Reactions    Aspirin      DUE TO ULCER    Claritin [Loratadine] Other (See Comments)     coughing    Flonase [Fluticasone Propionate]     Morphine And Related      GI Upset       Review of Systems  Constitutional: Negative for activity change and appetite change. HENT: Negative for ear pain and facial swelling. Eyes: Negative for discharge and itching. Respiratory: Negative for choking and chest tightness. Cardiovascular: Negative for chest pain and leg swelling. Gastrointestinal: Negative for nausea and abdominal pain. Endocrine: Negative for cold intolerance and heat intolerance. Genitourinary: Negative for frequency and flank pain.    Musculoskeletal: Negative for myalgias and joint swelling. Skin: Negative for rash and wound. Allergic/Immunologic: Negative for environmental allergies and food allergies. Hematological: Negative for adenopathy. Does not bruise/bleed easily. Psychiatric/Behavioral: Negative for self-injury. The patient is not nervous/anxious. Physical Exam:      /76   Pulse 72   Temp 98.2 °F (36.8 °C) (Temporal)   Ht 5' 5\" (1.651 m)   Wt 190 lb (86.2 kg)   LMP 04/19/2003   SpO2 92%   BMI 31.62 kg/m²   Estimated body mass index is 31.62 kg/m² as calculated from the following:    Height as of this encounter: 5' 5\" (1.651 m). Weight as of this encounter: 190 lb (86.2 kg). General:  Meghana Elkins is a 59y.o. year old female who appears her stated age. HEENT: Normocephalic atraumatic. Neck supple. Chest: regular rate; pulses equal  Abdomen: Soft nontender nondistended.   Ext: DP and PT pulses 2+, good cap refill  Neuro    Mentation  Appropriate affect  Registration intact  Orientation intact  Judgement intact to situation    Cranial Nerves:   Pupils equal and reactive to light  Extraocular motion intact  Face and shrug symmetric  Tongue midline  No dysarthria  v1-3 sensation symmetric, masseter tone symmetric  Hearing symmetric    Sensation: Grossly intact    Motor  L deltoid 5/5; R deltoid 5/5  L biceps 5/5; R biceps 5/5  L triceps 5/5; R triceps 5/5  L wrist extension 5/5; R wrist extension 5/5  L intrinsics 5/5; R intrinsics 5/5     L iliopsoas 5/5 , R iliopsoas 5/5  L quadriceps 5/5; R quadriceps 5/5  L Dorsiflexion 5/5; R dorsiflexion 5/5  L Plantarflexion 5/5; R plantarflexion 5/5  L EHL 5/5; R EHL 5/5    Reflexes  L Brachioradialis 2+/4; R brachioradialis 2+/4  L Biceps 2+/4; R Biceps 2+/4  L Triceps 2+/4; R Triceps 2+/4  L Patellar 2+/4: R Patellar 2+/4  L Achilles 2+/4; R Achilles 2+/4    hoffmans L: neg  justin R: neg  Clonus L: neg  Clonus R: neg  Babinski L: neg  Babinski R: neg    Studies Review:     Lumbar x-ray 12/3/2021: FINDINGS:   The patient is status post posterior fusion of L4 and L5.  The hardware   appears properly positioned without complication. Miley Arenas is also a disc   spacer device in place at L4/5.  No acute fracture or listhesis. Miley Arenas is a   4.7 cm calcified mass in the right lower pelvis.  This is unchanged from the   study from 29 January 2021 consistent with a fibroid. Assessment and Plan:      1. Spondylolisthesis of lumbar region    2. S/P lumbar fusion          Plan: We will obtain upright x-rays to ensure no instability, hardware issues. Continue home physical therapy measures. Continue to monitor for any new or worsening symptoms. Return in approximately 8 weeks for reevaluation. Followup: Return in about 8 weeks (around 7/19/2022), or if symptoms worsen or fail to improve. Prescriptions Ordered:  No orders of the defined types were placed in this encounter. Orders Placed:  Orders Placed This Encounter   Procedures    XR LUMBAR SPINE (2-3 VIEWS)     Standing Status:   Future     Number of Occurrences:   1     Standing Expiration Date:   5/24/2023     Scheduling Instructions:      STANDING AP AND LATERAL; include both femoral heads     Order Specific Question:   Reason for exam:     Answer:   back pain; left leg pain        Electronically signed by ELVIRA Michelle CNP on 5/25/2022 at 3:58 PM    Please note that this chart was generated using voice recognition Dragon dictation software. Although every effort was made to ensure the accuracy of this automated transcription, some errors in transcription may have occurred.

## 2022-05-26 ENCOUNTER — HOSPITAL ENCOUNTER (OUTPATIENT)
Dept: PAIN MANAGEMENT | Age: 62
Discharge: HOME | End: 2022-05-26
Payer: COMMERCIAL

## 2022-05-26 VITALS — SYSTOLIC BLOOD PRESSURE: 145 MMHG | HEART RATE: 86 BPM | OXYGEN SATURATION: 90 % | DIASTOLIC BLOOD PRESSURE: 79 MMHG

## 2022-05-26 DIAGNOSIS — G89.29 CHRONIC MIDLINE LOW BACK PAIN WITH SCIATICA, SCIATICA LATERALITY UNSPECIFIED: ICD-10-CM

## 2022-05-26 DIAGNOSIS — M17.0 PRIMARY OSTEOARTHRITIS OF BOTH KNEES: ICD-10-CM

## 2022-05-26 DIAGNOSIS — Z79.891 ENCOUNTER FOR LONG-TERM OPIATE ANALGESIC USE: Primary | Chronic | ICD-10-CM

## 2022-05-26 DIAGNOSIS — M54.40 CHRONIC MIDLINE LOW BACK PAIN WITH SCIATICA, SCIATICA LATERALITY UNSPECIFIED: ICD-10-CM

## 2022-05-26 DIAGNOSIS — M96.1 POSTLAMINECTOMY SYNDROME, LUMBAR REGION: ICD-10-CM

## 2022-05-26 DIAGNOSIS — M47.26 OSTEOARTHRITIS OF SPINE WITH RADICULOPATHY, LUMBAR REGION: ICD-10-CM

## 2022-05-26 PROCEDURE — 99213 OFFICE O/P EST LOW 20 MIN: CPT | Performed by: NURSE PRACTITIONER

## 2022-05-26 PROCEDURE — 99213 OFFICE O/P EST LOW 20 MIN: CPT

## 2022-05-26 RX ORDER — MELOXICAM 7.5 MG/1
7.5 TABLET ORAL DAILY
Qty: 30 TABLET | Refills: 0 | Status: SHIPPED | OUTPATIENT
Start: 2022-05-26 | End: 2022-07-14

## 2022-05-26 RX ORDER — HYDROCODONE BITARTRATE AND ACETAMINOPHEN 5; 325 MG/1; MG/1
1 TABLET ORAL EVERY 8 HOURS PRN
Qty: 90 TABLET | Refills: 0 | Status: SHIPPED | OUTPATIENT
Start: 2022-06-02 | End: 2022-07-02

## 2022-05-26 ASSESSMENT — ENCOUNTER SYMPTOMS: BACK PAIN: 1

## 2022-05-26 NOTE — PROGRESS NOTES
relaxant for the symptoms. The treatment provided no relief. Patient denies any new neurological symptoms. No bowel or bladder incontinence, no weakness, and no falling. Pill count: 23 Norco 6/2    Morphine equivalent: 15    Controlled Substance Monitoring:    Acute and Chronic Pain Monitoring:   RX Monitoring 5/26/2022   Attestation -   Acute Pain Prescriptions -   Periodic Controlled Substance Monitoring Possible medication side effects, risk of tolerance/dependence & alternative treatments discussed. ;No signs of potential drug abuse or diversion identified. ;Assessed functional status. ;Obtaining appropriate analgesic effect of treatment. Chronic Pain > 50 MEDD -   Chronic Pain > 80 MEDD -         Periodic Controlled Substance Monitoring: Possible medication side effects, risk of tolerance/dependence & alternative treatments discussed. ,No signs of potential drug abuse or diversion identified. ,Assessed functional status. ,Obtaining appropriate analgesic effect of treatment.  Maricruz Murillo, APRN - CNP)      Past Medical History:   Diagnosis Date    Allergic rhinitis     Alveolar hypoventilation 11/20/2020    Aortic insufficiency 2018    mild-moderate on echo (was seen and discharged [facility]ology[facility])    Bronchiectasis ([facility] 11/20/2020    Bronchitis     Chronic back pain     [facility] at [facility]. COPD (chronic obstructive pulmonary disease) ([facility]     Dr. Aye Smith to see 11/09/2020    Depression     DM (diabetes mellitus) ([facility] 12/18/2012    GERD (gastroesophageal reflux disease)     History of echocardiogram 05/2018    EF 65%, mild-moderate AI and TR    Hyperlipidemia     Dr. Nette Reynaga Hypertension     Dr. Nette Reynaga Obesity     Osteoarthritis     Peripheral vascular disease ([facility]     Primary osteoarthritis of both knees     Radicular pain of lumbosacral region     Spinal stenosis, lumbar region, without neurogenic claudication 04/30/2013    Tricuspid regurgitation 2018    mild-moderate on echo    Type II or unspecified type diabetes mellitus without mention of complication, not stated as uncontrolled     Dr. Frank Chanel Unspecified sleep apnea     no cpap used anymore    URI (upper respiratory infection)     Wears dentures     upper and lower full dentures    Wears glasses     Wellness examination     Dr. Shelly Mendoza -PCP last visit in early Oct. 2020       Past Surgical History:   Procedure Laterality Date    COLONOSCOPY  2/12/2009    normal    DILATION AND CURETTAGE OF UTERUS      GASTRECTOMY      partial    LUMBAR DISCECTOMY  01/2015    lumbar diskectomy    LUMBAR FUSION  11/19/2020     POSTERIOR FUSION L4/5,     LUMBAR FUSION N/A 11/19/2020    POSTERIOR FUSION L4/5, [employer], Reino Levelock, [place] #[id number] AMINA performed by Mannie Herron DO at [address] [facility] [address] Right 4/30/13    Lumbar Diagnostic Block,  Kenalog 40 mg    NERVE BLOCK  5/23/13    Lumbar Radiofrequency, Kenalog 40mg    NERVE BLOCK  8/12/13    Lt MBNB  celestone 6mg    NERVE BLOCK Left 8-28-13    left lumbar diagnostic block #2 decadron 10 mg    NERVE BLOCK Left 9-24-13    left lumbar median branch radiofrequency    NERVE BLOCK  07-02-14    caudal, celestone 9 mg    NERVE BLOCK  7-16-14    caudal epidural #2, celestone 9mg, fentanyl 25mcg    NERVE BLOCK  7/30/14    caudal #3 decadron 10mg    NERVE BLOCK  11-6-14    duramorph epidural steroid block  duramorph 1 mg celestone 9 mg    NERVE BLOCK  11/20/15    TENS- Empi Select    NERVE BLOCK  07/20/2018    right transforminal # 1 decadron 10mg,isovue    NERVE BLOCK Bilateral 02/01/2019    bilat mbnb- no steroid    NERVE BLOCK Bilateral 02/08/2019    bilat mbnb, marcaine . 25%    [place] KNEE SCOPE,DIAGNOSTIC Right 3/24/2017    KNEE ARTHROSCOPY WITH PARTIAL MEDIAL MENISECAL DEBRIDMENT  performed by Chrissy Stanford MD at [address]  9 20 2007    UPPER GASTROINTESTINAL ENDOSCOPY  4 21 2009,04/2011    gastritis, esophagitis       Allergies   Allergen Reactions    Aspirin      DUE TO ULCER    Claritin [Loratadine] Other (See Comments)     coughing    Flonase [Fluticasone Propionate]     Morphine And Related      GI Upset         Current Outpatient Medications:     cetirizine (ZYRTEC) 10 MG tablet, TAKE 1 TABLET BY MOUTH DAILY, Disp: 30 tablet, Rfl: 3    gabapentin (NEURONTIN) 300 MG capsule, Take 300 mg by mouth in the morning, at noon, and at bedtime. , Disp: , Rfl:     carvedilol (COREG) 12.5 MG tablet, Take 1 tablet by mouth 2 times daily, Disp: 60 tablet, Rfl: 5    Umeclidinium Bromide 62.5 MCG/INH AEPB, Inhale 1 puff into the lungs daily, Disp: 2 each, Rfl: 5    omeprazole (PRILOSEC) 20 MG delayed release capsule, TAKE 1 CAPSULE BY MOUTH EVERY DAY, Disp: 30 capsule, Rfl: 3    nicotine (NICODERM CQ) 7 MG/24HR, Place 1 patch onto the skin every 24 hours, Disp: 30 patch, Rfl: 3    atorvastatin (LIPITOR) 40 MG tablet, TAKE 1 TABLET BY MOUTH DAILY, Disp: 90 tablet, Rfl: 0    amLODIPine (NORVASC) 10 MG tablet, TAKE 1 TABLET BY MOUTH DAILY, Disp: 90 tablet, Rfl: 0    lisinopril-hydroCHLOROthiazide (PRINZIDE;ZESTORETIC) 20-25 MG per tablet, TAKE 1 TABLET EVERY DAY, Disp: 90 tablet, Rfl: 0    HYDROcodone-acetaminophen (NORCO) 5-325 MG per tablet, Take 1 tablet by mouth every 8 hours as needed for Pain for up to 30 days.  Early refill due to holidays, Disp: 90 tablet, Rfl: 0    meloxicam (MOBIC) 7.5 MG tablet, Take 1 tablet by mouth daily, Disp: 30 tablet, Rfl: 0    mirtazapine (REMERON) 30 MG tablet, Take 30 mg by mouth 3 times daily, Disp: , Rfl:     tiZANidine (ZANAFLEX) 4 MG tablet, Take 1 tablet by mouth 2 times daily, Disp: 60 tablet, Rfl: 2    trospium (SANCTURA) 20 MG tablet, Take 1 tablet by mouth 2 times daily, Disp: 60 tablet, Rfl: 0    azelastine (ASTELIN) 0.1 % nasal spray, 2 sprays by Nasal route 2 times daily Use in each nostril as directed, Disp: 1 each, Rfl: 2   potassium chloride (KLOR-CON M) 10 MEQ extended release tablet, Take 2 tablets by mouth daily, Disp: 60 tablet, Rfl: 2    albuterol sulfate HFA (VENTOLIN HFA) 108 (90 Base) MCG/ACT inhaler, Inhale 2 puffs into the lungs every 6 hours as needed for Wheezing, Disp: 1 each, Rfl: 3    docusate sodium (COLACE) 100 MG capsule, TAKE 1 CAPSULE BY MOUTH EVERY DAY, Disp: 30 capsule, Rfl: 9    diphenhydrAMINE (BENADRYL) 25 MG tablet, Take 25 mg by mouth every 6 hours as needed for Itching, Disp: , Rfl:     mirtazapine (REMERON) 15 MG tablet, Take 15 mg by mouth nightly, Disp: , Rfl:     DULoxetine (CYMBALTA) 60 MG extended release capsule, Take 1 capsule by mouth daily, Disp: 30 capsule, Rfl: 3    ipratropium-albuterol (DUONEB) 0.5-2.5 (3) MG/3ML SOLN nebulizer solution, Inhale 3 mLs into the lungs every 6 hours as needed for Shortness of Breath, Disp: 360 mL, Rfl: 11    Family History   Problem Relation Age of Onset    Diabetes Mother     Heart Disease Mother     Cirrhosis Father        Social History     Socioeconomic History    Marital status: Single     Spouse name: Not on file    Number of children: Not on file    Years of education: Not on file    Highest education level: Not on file   Occupational History     Employer: DISABLED   Tobacco Use    Smoking status: Current Every Day Smoker     Packs/day: 0.25     Years: 36.00     Pack years: 9.00     Types: Cigarettes    Smokeless tobacco: Never Used    Tobacco comment: 3 cigs per day   on nicoderm patch   Vaping Use    Vaping Use: Never used   Substance and Sexual Activity    Alcohol use: No     Alcohol/week: 0.0 standard drinks    Drug use: No     Comment: 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 7 yrs    Sexual activity: Never   Other Topics Concern    Not on file   Social History Narrative    10/9/20 Patient keeping contact with others to a minimum due to COVID 19 Pandemic.     10/9/20 Patient has difficulty with ambulation due to DJD, stenosis and fibromyalgia     Social Determinants of Health     Financial Resource Strain: Medium Risk    Difficulty of Paying Living Expenses: Somewhat hard   Food Insecurity: No Food Insecurity    Worried About Running Out of Food in the Last Year: Never true    Santosh of Food in the Last Year: Never true   Transportation Needs: No Transportation Needs    Lack of Transportation (Medical): No    Lack of Transportation (Non-Medical): No   Physical Activity: Insufficiently Active    Days of Exercise per Week: 3 days    Minutes of Exercise per Session: 30 min   Stress: Stress Concern Present    Feeling of Stress : To some extent   Social Connections: Moderately Integrated    Frequency of Communication with Friends and Family: More than three times a week    Frequency of Social Gatherings with Friends and Family: More than three times a week    Attends Confucianist Services: More than 4 times per year    Active Member of [address] [place] [address] [employer] or Organizations: Yes    Attends Club or Organization Meetings: 1 to 4 times per year    Marital Status:    Intimate Partner Violence:     Fear of Current or Ex-Partner: Not on file    Emotionally Abused: Not on file    Physically Abused: Not on file    Sexually Abused: Not on file   Housing Stability: [address] Unable to Pay for Housing in the Last Year: No    Number of Places Lived in the Last Year: 1    Unstable Housing in the Last Year: No       Review of Systems:  Review of Systems   Constitutional: Negative for fever. Cardiovascular: Negative for chest pain. Musculoskeletal: Positive for back pain. Genitourinary: Negative for pelvic pain. Neurological: Negative for headaches. Physical Exam:  BP (!) 145/79   Pulse 86   LMP 04/19/2003   SpO2 90%     Physical Exam  Cardiovascular:      Rate and Rhythm: Normal rate. Pulmonary:      Effort: Pulmonary effort is normal.   Musculoskeletal:         General: Normal range of motion.    Skin:     General: Skin is warm and dry. Neurological:      Mental Status: She is alert and oriented to person, place, and time. Record/Diagnostics Review:    Last óscar 2/22 and was appropriate     Assessment:  Problem List Items Addressed This Visit     Encounter for long-term opiate analgesic use - Primary (Chronic)    Osteoarthritis of spine with radiculopathy, lumbar region    Primary osteoarthritis of both knees    Postlaminectomy syndrome, lumbar region             Treatment Plan:  Patient relates current medications are helping the pain. Patient reports taking pain medications as prescribed, denies obtaining medications from different sources and denies use of illegal drugs. Patient denies side effects from medications like nausea, vomiting, constipation or drowsiness. Patient reports current activities of daily living are possible due to medications and would like to continue them. As always, we encourage daily stretching and strengthening exercises, and recommend minimizing use of pain medications unless patient cannot get through daily activities due to pain. Contract requirements met. Continue opioid therapy. Script written for norco  Follow up appointment made for 4 weeks    I have reviewed the chief complaint and history of present illness (including ROS and PFSH) and vital documentation by my staff and I agree with their documentation and have added where applicable.

## 2022-05-27 ENCOUNTER — CARE COORDINATION (OUTPATIENT)
Dept: CARE COORDINATION | Age: 62
End: 2022-05-27

## 2022-05-27 NOTE — CARE COORDINATION
Ambulatory Care Coordination Note  5/27/2022  CM Risk Score: 6  Charlson 10 Year Mortality Risk Score: 47%     ACC: Alexis oJnes, RN    Summary Note: called left message on her cell and home phone. Called daughter (HIPAA) [address] and left message on her voicemail. Patient does not have My Chart. This the 3rd attempt to contact patient  Since 5/16/22. Will reach out 6/1/22 for final attempt If no return call today. Plan  COPD  -Review activities that increase SOB  - rest in between activities  -do more strenuous activity in the am to reduce fatigue  Diabetes  - Patient does not test daily.   - continue to monitor  - report Sx of hypo or hyper glycemia  - not on any diabetic medications  - Last A1C <6  Pain  - continue with pain management  - continue OT and PT  - use pain medications as directed  Activity  - encourage increasing activity slowly  Falls  Remove obstacles that increase the risk of falls  Smoking  - reduce the # of cigarettes to 0 by 5/31/22  - Identify triggers that lead to smoking  - request nicotine replacement as needed Picked up 5/5/22  - 4/14/22 has not smoked in the last 3 days   Transportation  - will use [employer] for transportation for medical appointments  Other  - Due for COVID Booster  - Appointment 5/26/22 @ 3:00 Pain Management  6/30/22 @ 12:20 - [facility] pain mgmt  7/19/22 @ 1:00 Neurology     ACM Start 2/23/22 - 3 months  Lab Results     None          Care Coordination Interventions    Program Enrollment: Complex Care  Referral from Primary Care Provider: No  Suggested Interventions and Community Resources         Goals Addressed    None         Prior to Admission medications    Medication Sig Start Date End Date Taking? Authorizing Provider   HYDROcodone-acetaminophen (NORCO) 5-325 MG per tablet Take 1 tablet by mouth every 8 hours as needed for Pain for up to 30 days.  Early refill due to holidays 6/2/22 7/2/22  ELVIRA Guillen - CNP   meloxicam (MOBIC) 7.5 MG tablet Take 1 tablet by mouth daily 5/26/22 6/25/22  ELVIRA Vann CNP   cetirizine (ZYRTEC) 10 MG tablet TAKE 1 TABLET BY MOUTH DAILY 5/5/22   Ignacio Nelson MD   gabapentin (NEURONTIN) 300 MG capsule Take 300 mg by mouth in the morning, at noon, and at bedtime.  4/25/22   Historical Provider, MD   carvedilol (COREG) 12.5 MG tablet Take 1 tablet by mouth 2 times daily 4/29/22   Ignacio Nelson MD   Umeclidinium Bromide 62.5 MCG/INH AEPB Inhale 1 puff into the lungs daily 4/29/22   Ignacio Nelson MD   omeprazole (PRILOSEC) 20 MG delayed release capsule TAKE 1 CAPSULE BY MOUTH EVERY DAY 4/29/22   Ignacio Nelson MD   nicotine (NICODERM CQ) 7 MG/24HR Place 1 patch onto the skin every 24 hours 4/29/22 4/29/23  Ignacio Nelson MD   atorvastatin (LIPITOR) 40 MG tablet TAKE 1 TABLET BY MOUTH DAILY 4/26/22   Ignacio Nelson MD   amLODIPine (NORVASC) 10 MG tablet TAKE 1 TABLET BY MOUTH DAILY 4/26/22   Ignacio Nelson MD   lisinopril-hydroCHLOROthiazide (PRINZIDE;ZESTORETIC) 20-25 MG per tablet TAKE 1 TABLET EVERY DAY 4/26/22   Ignacio Nelson MD   mirtazapine (REMERON) 30 MG tablet Take 30 mg by mouth 3 times daily 1/5/22   Historical Provider, MD   tiZANidine (ZANAFLEX) 4 MG tablet Take 1 tablet by mouth 2 times daily 1/3/22   ELVIRA Batres CNP   trospium (SANCTURA) 20 MG tablet Take 1 tablet by mouth 2 times daily 10/26/21   Ignacio Nelson MD   azelastine (ASTELIN) 0.1 % nasal spray 2 sprays by Nasal route 2 times daily Use in each nostril as directed 10/26/21   Ignacio Nelson MD   potassium chloride (KLOR-CON M) 10 MEQ extended release tablet Take 2 tablets by mouth daily 10/26/21   Ignacio Nelson MD   albuterol sulfate HFA (VENTOLIN HFA) 108 (90 Base) MCG/ACT inhaler Inhale 2 puffs into the lungs every 6 hours as needed for Wheezing 10/26/21   Ignacio Nelson MD   docusate sodium (COLACE) 100 MG capsule TAKE 1 CAPSULE BY MOUTH EVERY DAY 7/20/21   Ignacio Nelson MD   diphenhydrAMINE (BENADRYL) 25 MG tablet Take 25 mg by mouth every 6 hours as needed for Itching    Historical Provider, MD   mirtazapine (REMERON) 15 MG tablet Take 15 mg by mouth nightly 5/22/18   Historical Provider, MD   DULoxetine (CYMBALTA) 60 MG extended release capsule Take 1 capsule by mouth daily 2/1/18   Caty Leyva MD   ipratropium-albuterol (DUONEB) 0.5-2.5 (3) MG/3ML SOLN nebulizer solution Inhale 3 mLs into the lungs every 6 hours as needed for Shortness of Breath 8/4/15   Caty Leyva MD       Future Appointments   Date Time Provider Claudy Schaeffer   6/30/2022 12:20 PM MD Ricarda Hermosillo   7/19/2022  1:00 PM ELVIRA Haider - CNP [place] [facility]     ,   Diabetes Assessment    Medic Alert ID: No  Meal Planning: Avoidance of concentrated sweets   How often do you test your blood sugar?: Daily, No Testing (Comment: No longer taking medications)   Do you have barriers with adherence to non-pharmacologic self-management interventions?  (Nutrition/Exercise/Self-Monitoring): No   Have you ever had to go to the ED for symptoms of low blood sugar?: No           and   COPD Assessment    Does the patient understand envrionmental exposure?: Yes  Is the patient able to verbalize Rescue vs. Long Acting medications?: Yes  Does the patient have a nebulizer?: Yes  Does the patient use a space with inhaled medications?: No            Symptoms:

## 2022-06-01 ENCOUNTER — CARE COORDINATION (OUTPATIENT)
Dept: CARE COORDINATION | Age: 62
End: 2022-06-01

## 2022-06-01 NOTE — CARE COORDINATION
Ambulatory Care Coordination Note  6/1/2022  CM Risk Score: 6  Charlson 10 Year Mortality Risk Score: 47%     ACC: Alexis Jones, RN    Summary Note: attempt to contact patient and daughter Brenda Nicole. Messages left on both VM's. Patient does not have My Chart. If no return call, will remove from panel as unable to contact  Plan  COPD  -Review activities that increase SOB  - rest in between activities  -do more strenuous activity in the am to reduce fatigue  Diabetes  - Patient does not test daily.   - continue to monitor  - report Sx of hypo or hyper glycemia  - not on any diabetic medications  - Last A1C <6  Pain  - continue with pain management  - continue OT and PT  - use pain medications as directed  Activity  - encourage increasing activity slowly  Falls  Remove obstacles that increase the risk of falls  Smoking  - reduce the # of cigarettes to 0 by 5/31/22  - Identify triggers that lead to smoking  - request nicotine replacement as needed Picked up 5/5/22  - 4/14/22 has not smoked in the last 3 days   Transportation  - will use [employer] for transportation for medical appointments  Other  - Due for COVID Booster  - Appointment   6/30/22 @ 12:20 - [facility] pain mgmt  7/19/22 @ 1:00 Neurology     ACM Start 2/23/22 - 3 months    Lab Results     None          Care Coordination Interventions    Program Enrollment: Complex Care  Referral from Primary Care Provider: No  Suggested Interventions and Community Resources         Goals Addressed    None         Prior to Admission medications    Medication Sig Start Date End Date Taking? Authorizing Provider   HYDROcodone-acetaminophen (NORCO) 5-325 MG per tablet Take 1 tablet by mouth every 8 hours as needed for Pain for up to 30 days.  Early refill due to holidays 6/2/22 7/2/22  ELVIRA Guillen - CNP   meloxicam [facility]) 7.5 MG tablet Take 1 tablet by mouth daily 5/26/22 6/25/22  ELVIRA Guillen CNP   cetirizine (ZYRTEC) 10 MG tablet TAKE 1 TABLET BY MOUTH DAILY 5/5/22 Catrina Scanlon MD   gabapentin (NEURONTIN) 300 MG capsule Take 300 mg by mouth in the morning, at noon, and at bedtime.  4/25/22   Historical Provider, MD   carvedilol (COREG) 12.5 MG tablet Take 1 tablet by mouth 2 times daily 4/29/22   Catrina Scanlon MD   Umeclidinium Bromide 62.5 MCG/INH AEPB Inhale 1 puff into the lungs daily 4/29/22   Catrina Scanlon MD   omeprazole (PRILOSEC) 20 MG delayed release capsule TAKE 1 CAPSULE BY MOUTH EVERY DAY 4/29/22   Catrina Scanlon MD   nicotine (NICODERM CQ) 7 MG/24HR Place 1 patch onto the skin every 24 hours 4/29/22 4/29/23  Catrina Scanlon MD   atorvastatin (LIPITOR) 40 MG tablet TAKE 1 TABLET BY MOUTH DAILY 4/26/22   Catrina Scanlon MD   amLODIPine (NORVASC) 10 MG tablet TAKE 1 TABLET BY MOUTH DAILY 4/26/22   Catrina Scanlon MD   lisinopril-hydroCHLOROthiazide (PRINZIDE;ZESTORETIC) 20-25 MG per tablet TAKE 1 TABLET EVERY DAY 4/26/22   Catrina Scanlon MD   mirtazapine (REMERON) 30 MG tablet Take 30 mg by mouth 3 times daily 1/5/22   Historical Provider, MD   tiZANidine (ZANAFLEX) 4 MG tablet Take 1 tablet by mouth 2 times daily 1/3/22   Clearnce Dior, APRN - CNP   trospium (SANCTURA) 20 MG tablet Take 1 tablet by mouth 2 times daily 10/26/21   Catrina Scanlon MD   azelastine (ASTELIN) 0.1 % nasal spray 2 sprays by Nasal route 2 times daily Use in each nostril as directed 10/26/21   Catrina Scanlon MD   potassium chloride (KLOR-CON M) 10 MEQ extended release tablet Take 2 tablets by mouth daily 10/26/21   Catrina Scanlon MD   albuterol sulfate HFA (VENTOLIN HFA) 108 (90 Base) MCG/ACT inhaler Inhale 2 puffs into the lungs every 6 hours as needed for Wheezing 10/26/21   Catrina Scanlon MD   docusate sodium (COLACE) 100 MG capsule TAKE 1 CAPSULE BY MOUTH EVERY DAY 7/20/21   Catrina Scanlon MD   diphenhydrAMINE (BENADRYL) 25 MG tablet Take 25 mg by mouth every 6 hours as needed for Itching    Historical Provider, MD   mirtazapine (REMERON) 15 MG tablet Take 15 mg by mouth nightly 5/22/18   Historical Provider, MD   DULoxetine (CYMBALTA) 60 MG extended release capsule Take 1 capsule by mouth daily 2/1/18   Valentino Nguyen MD   ipratropium-albuterol (DUONEB) 0.5-2.5 (3) MG/3ML SOLN nebulizer solution Inhale 3 mLs into the lungs every 6 hours as needed for Shortness of Breath 8/4/15   Valentino Nguyen MD       Future Appointments   Date Time Provider Claudy Milesisti   6/30/2022 12:20 PM Lizet Danielle MD [address]   7/19/2022  1:00 PM [address] [place] [address], APRN - [facility]

## 2022-06-22 RX ORDER — DOCUSATE SODIUM 100 MG/1
CAPSULE, LIQUID FILLED ORAL
Qty: 30 CAPSULE | Refills: 8 | Status: SHIPPED | OUTPATIENT
Start: 2022-06-22

## 2022-06-22 NOTE — TELEPHONE ENCOUNTER
E-scribe request for [employer]. Please review and e-scribe if applicable. Next Visit Date:  Future Appointments   Date Time Provider Claudy Schaeffer   6/30/2022 12:20 PM Suleman Adan MD [address]   7/19/2022  1:00 PM ELVIRA Rincon - CNP [place] [facility]   Topic Date Due    COVID-19 Vaccine (3 - Booster for Zambrano Peter series) 10/10/2021    Diabetic retinal exam  10/31/2022 (Originally 5/14/2021)    Diabetic microalbuminuria test  10/11/2022    Annual Wellness Visit (AWV)  10/22/2022    Depression Monitoring  10/26/2022    Diabetic foot exam  12/16/2022    A1C test (Diabetic or Prediabetic)  04/29/2023    Lipids  05/11/2023    Colorectal Cancer Screen  10/05/2023    Breast cancer screen  05/18/2024    Cervical cancer screen  03/02/2026    DTaP/Tdap/Td vaccine (2 - Td or Tdap) 06/24/2029    Flu vaccine  Completed    Shingles vaccine  Completed    Pneumococcal 0-64 years Vaccine  Completed    Hepatitis C screen  Completed    HIV screen  Completed    Hepatitis A vaccine  Aged Out    Hib vaccine  Aged Out    Meningococcal (ACWY) vaccine  Aged Out               (applicable per patient's age: Cancer Screenings, Depression Screening, Fall Risk Screening, Immunizations)    Hemoglobin A1C (%)   Date Value   04/29/2022 5.8   10/26/2021 5.7   03/02/2021 5.5     Microalb/Crt.  Ratio (mcg/mg creat)   Date Value   10/11/2021 11     LDL Cholesterol (mg/dL)   Date Value   05/11/2022 131 (H)     AST (U/L)   Date Value   01/26/2022 15     ALT (U/L)   Date Value   01/26/2022 10     BUN (mg/dL)   Date Value   05/11/2022 19      (goal A1C is < 7)   (goal LDL is <100) need 30-50% reduction from baseline     BP Readings from Last 3 Encounters:   05/26/22 (!) 145/79   05/24/22 118/76   04/29/22 121/82    (goal /80)      All Future Testing planned in [facility]:  Lab Frequency Next Occurrence   DRUG SCREEN, PAIN Once 02/28/2022            Patient Active Problem List:     DJD (degenerative

## 2022-06-30 ENCOUNTER — HOSPITAL ENCOUNTER (OUTPATIENT)
Dept: PAIN MANAGEMENT | Age: 62
Discharge: HOME | End: 2022-06-30
Payer: COMMERCIAL

## 2022-06-30 ENCOUNTER — TELEPHONE (OUTPATIENT)
Dept: PAIN MANAGEMENT | Age: 62
End: 2022-06-30

## 2022-06-30 VITALS
WEIGHT: 180 LBS | OXYGEN SATURATION: 94 % | BODY MASS INDEX: 29.99 KG/M2 | HEART RATE: 91 BPM | HEIGHT: 65 IN | DIASTOLIC BLOOD PRESSURE: 77 MMHG | SYSTOLIC BLOOD PRESSURE: 126 MMHG

## 2022-06-30 DIAGNOSIS — M54.16 CHRONIC LUMBAR RADICULOPATHY: ICD-10-CM

## 2022-06-30 DIAGNOSIS — Z79.891 ENCOUNTER FOR LONG-TERM OPIATE ANALGESIC USE: ICD-10-CM

## 2022-06-30 DIAGNOSIS — Z79.891 CHRONIC USE OF OPIATE DRUG FOR THERAPEUTIC PURPOSE: ICD-10-CM

## 2022-06-30 DIAGNOSIS — I10 ESSENTIAL HYPERTENSION: ICD-10-CM

## 2022-06-30 DIAGNOSIS — M96.1 FAILED BACK SYNDROME: Primary | ICD-10-CM

## 2022-06-30 PROCEDURE — 99214 OFFICE O/P EST MOD 30 MIN: CPT | Performed by: ANESTHESIOLOGY

## 2022-06-30 PROCEDURE — 99213 OFFICE O/P EST LOW 20 MIN: CPT

## 2022-06-30 RX ORDER — HYDROCODONE BITARTRATE AND ACETAMINOPHEN 5; 325 MG/1; MG/1
1 TABLET ORAL EVERY 8 HOURS PRN
Qty: 90 TABLET | Refills: 0 | Status: SHIPPED | OUTPATIENT
Start: 2022-06-30 | End: 2022-07-26 | Stop reason: SDUPTHER

## 2022-06-30 ASSESSMENT — ENCOUNTER SYMPTOMS
VOMITING: 0
CONSTIPATION: 0
BACK PAIN: 1
NAUSEA: 0
DIARRHEA: 0

## 2022-06-30 NOTE — PROGRESS NOTES
The patient is a 59 y. o. Non- / non  female. Chief Complaint   Patient presents with    Back Pain    Medication Refill        HPI      Medication Refill: Norco Mobic     Pain score Today:  10  Adverse effects (Constipation / Nausea / Sedation / sexual Dysfunction / others) :  NO  Mood: poor  Sleep pattern and quality: poor  Activity level: poor    Pill count Today: Norco # 5  Last dose taken  06/30/2022  OARRS report reviewed today: yes  ER/Hospitalizations/PCP visit related to pain since last visit:no   Any legal problems e.g. DUI etc.:No  Satisfied with current management: Yes    Opioid Contract: 02/28/2022  Last Urine Dug screen dated: 02/28/2022    Lab Results   Component Value Date    LABA1C 5.8 04/29/2022     Lab Results   Component Value Date     04/21/2020       Past Medical History, Past Surgical History, Social History, Allergies and Medications reviewed and updated in EPIC as indicated    Family History reviewed and is noncontributory.         Past Medical History:   Diagnosis Date    Allergic rhinitis     Alveolar hypoventilation 11/20/2020    Aortic insufficiency 2018    mild-moderate on echo (was [facility] and [facility])    Bronchiectasis ([facility] 11/20/2020    Bronchitis     Chronic back pain     Pain management at [place] [address]. COPD (chronic obstructive pulmonary disease) ([facility]     Dr. Mcleod Samples to see 11/09/2020    Depression     DM (diabetes mellitus) ([facility] 12/18/2012    GERD (gastroesophageal reflux disease)     History of echocardiogram 05/2018    EF 65%, mild-moderate AI and TR    Hyperlipidemia     Dr. Fidelina Porter Hypertension     Dr. Fidelina Porter Obesity     Osteoarthritis     Peripheral vascular disease ([facility]     Primary osteoarthritis of both knees     Radicular pain of lumbosacral region     Spinal stenosis, lumbar region, without neurogenic claudication 04/30/2013    Tricuspid regurgitation 2018    mild-moderate on echo    Type II or unspecified type diabetes mellitus without mention of complication, not stated as uncontrolled     Dr. Meenu Barros Unspecified sleep apnea     no cpap used anymore    URI (upper respiratory infection)     Wears dentures     upper and lower full dentures    Wears glasses     Wellness examination     Dr. Carter De La Torre -PCP last visit in early Oct. 2020        Past Surgical History:   Procedure Laterality Date    COLONOSCOPY  2/12/2009    normal    DILATION AND CURETTAGE OF UTERUS      GASTRECTOMY      partial    LUMBAR DISCECTOMY  01/2015    lumbar diskectomy    LUMBAR FUSION  11/19/2020     POSTERIOR FUSION L4/5,     LUMBAR FUSION N/A 11/19/2020    POSTERIOR FUSION L4/5, [employer], Moiz Raring, EVOKES #[id number] AMINA performed by Sal Javier DO at [facility] Right 4/30/13    Lumbar Diagnostic Block,  Kenalog 40 mg    NERVE BLOCK  5/23/13    Lumbar Radiofrequency, Kenalog 40mg    NERVE BLOCK  8/12/13    Lt MBNB  celestone 6mg    NERVE BLOCK Left 8-28-13    left lumbar diagnostic block #2 decadron 10 mg    NERVE BLOCK Left 9-24-13    left lumbar median branch radiofrequency    NERVE BLOCK  07-02-14    caudal, celestone 9 mg    NERVE BLOCK  7-16-14    caudal epidural #2, celestone 9mg, fentanyl 25mcg    NERVE BLOCK  7/30/14    caudal #3 decadron 10mg    NERVE BLOCK  11-6-14    duramorph epidural steroid block  duramorph 1 mg celestone 9 mg    NERVE BLOCK  11/20/15    TENS- [employer]    NERVE BLOCK  07/20/2018    right transforminal # 1 decadron 10mg,isovue    NERVE BLOCK Bilateral 02/01/2019    bilat mbnb- no steroid    NERVE BLOCK Bilateral 02/08/2019    bilat mbnb, marcaine . 25%    MO KNEE SCOPE,DIAGNOSTIC Right 3/24/2017    KNEE ARTHROSCOPY WITH PARTIAL MEDIAL MENISECAL DEBRIDMENT  performed by Nyra Fothergill, MD at [address]  9 20 2007    UPPER GASTROINTESTINAL ENDOSCOPY  4 21 2009,04/2011    gastritis, esophagitis Social History     Socioeconomic History    Marital status: Single     Spouse name: Not on file    Number of children: Not on file    Years of education: Not on file    Highest education level: Not on file   Occupational History     Employer: DISABLED   Tobacco Use    Smoking status: Current Every Day Smoker     Packs/day: 0.25     Years: 36.00     Pack years: 9.00     Types: Cigarettes    Smokeless tobacco: Never Used    Tobacco comment: 3 cigs per day   on nicoderm patch   Vaping Use    Vaping Use: Never used   Substance and Sexual Activity    Alcohol use: No     Alcohol/week: 0.0 standard drinks    Drug use: No     Comment: 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 7 yrs    Sexual activity: Never   Other Topics Concern    Not on file   Social History Narrative    10/9/20 Patient keeping contact with others to a minimum due to [place] 19 Pandemic. 10/9/20 Patient has difficulty with ambulation due to DJD, stenosis and fibromyalgia     Social Determinants of Health     Financial Resource Strain: Medium Risk    Difficulty of Paying Living Expenses: Somewhat hard   Food Insecurity: No Food Insecurity    Worried About Running Out of Food in the Last Year: Never true    Santosh of Food in the Last Year: Never true   Transportation Needs: No Transportation Needs    Lack of Transportation (Medical): No    Lack of Transportation (Non-Medical): No   Physical Activity: Insufficiently Active    Days of Exercise per Week: 3 days    Minutes of Exercise per Session: 30 min   Stress: Stress Concern Present    Feeling of Stress : To some extent   Social Connections: Moderately Integrated    Frequency of Communication with Friends and Family: More than three times a week    Frequency of Social Gatherings with Friends and Family: More than three times a week    Attends Zoroastrianism Services: More than 4 times per year    Active Member of [address] [place] [address] [employer] or Organizations:  Yes    Attends Club or Organization Meetings: 1 to 4 times per year    Marital Status:    Intimate Partner Violence:     Fear of Current or Ex-Partner: Not on file    Emotionally Abused: Not on file    Physically Abused: Not on file    Sexually Abused: Not on file   Housing Stability: [address] Unable to Pay for Housing in the Last Year: No    Number of Places Lived in the Last Year: 1    Unstable Housing in the Last Year: No       Family History   Problem Relation Age of Onset    Diabetes Mother     Heart Disease Mother     Cirrhosis Father        Allergies   Allergen Reactions    Aspirin      DUE TO ULCER    Claritin [Loratadine] Other (See Comments)     coughing    Flonase [Fluticasone Propionate]     Morphine And Related      GI Upset       There were no vitals filed for this visit. Current Outpatient Medications   Medication Sig Dispense Refill    docusate sodium (COLACE) 100 MG capsule TAKE 1 CAPSULE BY MOUTH EVERY DAY 30 capsule 8    HYDROcodone-acetaminophen (NORCO) 5-325 MG per tablet Take 1 tablet by mouth every 8 hours as needed for Pain for up to 30 days. Early refill due to holidays 90 tablet 0    meloxicam (MOBIC) 7.5 MG tablet Take 1 tablet by mouth daily 30 tablet 0    cetirizine (ZYRTEC) 10 MG tablet TAKE 1 TABLET BY MOUTH DAILY 30 tablet 3    gabapentin (NEURONTIN) 300 MG capsule Take 300 mg by mouth in the morning, at noon, and at bedtime.       carvedilol (COREG) 12.5 MG tablet Take 1 tablet by mouth 2 times daily 60 tablet 5    Umeclidinium Bromide 62.5 MCG/INH AEPB Inhale 1 puff into the lungs daily 2 each 5    omeprazole (PRILOSEC) 20 MG delayed release capsule TAKE 1 CAPSULE BY MOUTH EVERY DAY 30 capsule 3    nicotine (NICODERM CQ) 7 MG/24HR Place 1 patch onto the skin every 24 hours 30 patch 3    atorvastatin (LIPITOR) 40 MG tablet TAKE 1 TABLET BY MOUTH DAILY 90 tablet 0    amLODIPine (NORVASC) 10 MG tablet TAKE 1 TABLET BY MOUTH DAILY 90 tablet 0    lisinopril-hydroCHLOROthiazide (PRINZIDE;ZESTORETIC) 20-25 MG per tablet TAKE 1 TABLET EVERY DAY 90 tablet 0    mirtazapine (REMERON) 30 MG tablet Take 30 mg by mouth 3 times daily      tiZANidine (ZANAFLEX) 4 MG tablet Take 1 tablet by mouth 2 times daily 60 tablet 2    trospium (SANCTURA) 20 MG tablet Take 1 tablet by mouth 2 times daily 60 tablet 0    azelastine (ASTELIN) 0.1 % nasal spray 2 sprays by Nasal route 2 times daily Use in each nostril as directed 1 each 2    potassium chloride (KLOR-CON M) 10 MEQ extended release tablet Take 2 tablets by mouth daily 60 tablet 2    albuterol sulfate HFA (VENTOLIN HFA) 108 (90 Base) MCG/ACT inhaler Inhale 2 puffs into the lungs every 6 hours as needed for Wheezing 1 each 3    diphenhydrAMINE (BENADRYL) 25 MG tablet Take 25 mg by mouth every 6 hours as needed for Itching      mirtazapine (REMERON) 15 MG tablet Take 15 mg by mouth nightly      DULoxetine (CYMBALTA) 60 MG extended release capsule Take 1 capsule by mouth daily 30 capsule 3    ipratropium-albuterol (DUONEB) 0.5-2.5 (3) MG/3ML SOLN nebulizer solution Inhale 3 mLs into the lungs every 6 hours as needed for Shortness of Breath 360 mL 11     No current facility-administered medications for this encounter. Review of Systems   Constitutional: Negative for chills, fatigue and fever. Gastrointestinal: Negative for constipation, diarrhea, nausea and vomiting. Musculoskeletal: Positive for back pain and gait problem. Negative for neck pain and neck stiffness. Neurological: Positive for weakness. Negative for dizziness, tremors, light-headedness, numbness and headaches. [address] LEGS WITH SHARP STABBING PAIN          Objective:  Vital signs: (most recent): Last menstrual period 04/19/2003, not currently breastfeeding. No fever. Assessment & Plan  1. Encounter for long-term opiate analgesic use        No orders of the defined types were placed in this encounter.      No orders of the defined types were placed in this encounter.            Electronically signed by Robbin Barrios MA on 6/30/2022 at 12:19 PM

## 2022-06-30 NOTE — PROGRESS NOTES
The patient is a 59 y. o. Non- / non  female. Chief Complaint   Patient presents with    Back Pain    Medication Refill        Back Pain  Associated symptoms include weakness. Pertinent negatives include no fever, headaches or numbness.      Pain  Chronic onset many years ago located in the lower lumbar area  Reports radiation of pain down both legs  Associated with leg numbness  Pain aggravated with routine activity  Past history significant for 2 lumbar spine surgery  Last surgery was in 2020 for right leg sciatica symptoms  Initially noticed improvement but now have pain in both legs  Had recent follow-up with neurosurgery  No further surgery is recommended at this time  Have follow-up x-ray that showed stable fusion at L4-5    Pain is severe markedly affecting constant sharp throbbing shooting sensation interfering with quality of life  Currently on multimodal medication regimen Norco and Mobic with little relief  Pain intensity 10 out of 10  Doing home exercise and physical therapy    Advised patient to complete physical therapy  Consider obtaining new MRI lumbar spine with contrast after physical therapy  We will schedule for lumbar epidural steroid injection for flareup of chronic lumbar radiculopathy    Automated prescription report reviewed  Safe use of medication discussed  Refill ordered    Medication Refill: Norco Mobic   Pain score Today:  10  Adverse effects (Constipation / Nausea / Sedation / sexual Dysfunction / others) :  NO  Mood: poor  Sleep pattern and quality: poor  Activity level: poor    Pill count Today: Norco # 5  Last dose taken  06/30/2022  OARRS report reviewed today: yes  ER/Hospitalizations/PCP visit related to pain since last visit:no   Any legal problems e.g. DUI etc.:No  Satisfied with current management: Yes    Opioid Contract: 02/28/2022  Last Urine Dug screen dated: 02/28/2022    Lab Results   Component Value Date    LABA1C 5.8 04/29/2022     Lab Results Component Value Date     04/21/2020       Past Medical History, Past Surgical History, Social History, Allergies and Medications reviewed and updated in EPIC as indicated    Family History reviewed and is noncontributory.         Past Medical History:   Diagnosis Date    Allergic rhinitis     Alveolar hypoventilation 11/20/2020    Aortic insufficiency 2018    mild-moderate on echo (was seen and [facility])    Bronchiectasis ([facility] 11/20/2020    Bronchitis     Chronic back pain     Pain management at [facility] ([place], [place]) 26. COPD (chronic obstructive pulmonary disease) ([facility]     Dr. Dykes [place] to see 11/09/2020    Depression     DM (diabetes mellitus) ([facility] 12/18/2012    GERD (gastroesophageal reflux disease)     History of echocardiogram 05/2018    EF 65%, mild-moderate AI and TR    Hyperlipidemia     Dr. Karis Corona Hypertension     Dr. Karis Corona Obesity     Osteoarthritis     Peripheral vascular disease ([facility]     Primary osteoarthritis of both knees     Radicular pain of lumbosacral region     Spinal stenosis, lumbar region, without neurogenic claudication 04/30/2013    Tricuspid regurgitation 2018    mild-moderate on echo    Type II or unspecified type diabetes mellitus without mention of complication, not stated as uncontrolled     Dr. Karis Corona Unspecified sleep apnea     no cpap used anymore    URI (upper respiratory infection)     Wears dentures     upper and lower full dentures    Wears glasses     Wellness examination     Dr. Cory Gann -PCP last visit in early Oct. 2020        Past Surgical History:   Procedure Laterality Date    COLONOSCOPY  2/12/2009    normal    DILATION AND CURETTAGE OF UTERUS      GASTRECTOMY      partial    LUMBAR DISCECTOMY  01/2015    lumbar diskectomy    LUMBAR FUSION  11/19/2020     POSTERIOR FUSION L4/5,     LUMBAR FUSION N/A 11/19/2020    POSTERIOR FUSION L4/5, [employer], Cherry Johnson, FRENCHS #[id number] AMINA performed by Anna Reza DO at [facility] Right 4/30/13    Lumbar Diagnostic Block,  Kenalog 40 mg    NERVE BLOCK  5/23/13    Lumbar Radiofrequency, Kenalog 40mg    NERVE BLOCK  8/12/13    Lt MBNB  celestone 6mg    NERVE BLOCK Left 8-28-13    left lumbar diagnostic block #2 decadron 10 mg    NERVE BLOCK Left 9-24-13    left lumbar median branch radiofrequency    NERVE BLOCK  07-02-14    caudal, celestone 9 mg    NERVE BLOCK  7-16-14    caudal epidural #2, celestone 9mg, fentanyl 25mcg    NERVE BLOCK  7/30/14    caudal #3 decadron 10mg    NERVE BLOCK  11-6-14    duramorph epidural steroid block  duramorph 1 mg celestone 9 mg    NERVE BLOCK  11/20/15    TENS- Empi Select    NERVE BLOCK  07/20/2018    right transforminal # 1 decadron 10mg,isovue    NERVE BLOCK Bilateral 02/01/2019    bilat mbnb- no steroid    NERVE BLOCK Bilateral 02/08/2019    bilat mbnb, marcaine . 25%    PA KNEE SCOPE,DIAGNOSTIC Right 3/24/2017    KNEE ARTHROSCOPY WITH PARTIAL MEDIAL MENISECAL DEBRIDMENT  performed by Jeff Tovar MD at [address] [place] [address]  9 20 2007    UPPER GASTROINTESTINAL ENDOSCOPY  4 21 2009,04/2011    gastritis, esophagitis       Social History     Socioeconomic History    Marital status: Single     Spouse name: Not on file    Number of children: Not on file    Years of education: Not on file    Highest education level: Not on file   Occupational History     Employer: DISABLED   Tobacco Use    Smoking status: Current Every Day Smoker     Packs/day: 0.25     Years: 36.00     Pack years: 9.00     Types: Cigarettes    Smokeless tobacco: Never Used    Tobacco comment: 3 cigs per day   on nicoderm patch   Vaping Use    Vaping Use: Never used   Substance and Sexual Activity    Alcohol use: No     Alcohol/week: 0.0 standard drinks    Drug use: No     Comment: 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 7 yrs    Sexual activity: Never   Other Topics Concern    Not on file   Social History Narrative    10/9/20 Patient keeping contact with others to a minimum due to [place] 19 Pandemic. 10/9/20 Patient has difficulty with ambulation due to DJD, stenosis and fibromyalgia     Social Determinants of Health     Financial Resource Strain: Medium Risk    Difficulty of Paying Living Expenses: Somewhat hard   Food Insecurity: No Food Insecurity    Worried About Running Out of Food in the Last Year: Never true    Santosh of Food in the Last Year: Never true   Transportation Needs: No Transportation Needs    Lack of Transportation (Medical): No    Lack of Transportation (Non-Medical): No   Physical Activity: Insufficiently Active    Days of Exercise per Week: 3 days    Minutes of Exercise per Session: 30 min   Stress: Stress Concern Present    Feeling of Stress : To some extent   Social Connections: Moderately Integrated    Frequency of Communication with Friends and Family: More than three times a week    Frequency of Social Gatherings with Friends and Family: More than three times a week    Attends [employer] Services: More than 4 times per year    Active Member of [address] [place] [address] [employer] or Organizations: Yes    Attends Club or Organization Meetings: 1 to 4 times per year    Marital Status:     Intimate Partner Violence:     Fear of Current or Ex-Partner: Not on file    Emotionally Abused: Not on file    Physically Abused: Not on file    Sexually Abused: Not on file   Housing Stability: [address] Unable to Pay for Housing in the Last Year: No    Number of Places Lived in the Last Year: 1    Unstable Housing in the Last Year: No       Family History   Problem Relation Age of Onset    Diabetes Mother     Heart Disease Mother     Cirrhosis Father        Allergies   Allergen Reactions    Aspirin      DUE TO ULCER    Claritin [Loratadine] Other (See Comments)     coughing    Flonase [Fluticasone Propionate]     Morphine And Related      GI Upset       Vitals:    06/30/22 1226   BP: 126/77   Pulse: 91   SpO2: 94%       Current Outpatient Medications   Medication Sig Dispense Refill    HYDROcodone-acetaminophen (NORCO) 5-325 MG per tablet Take 1 tablet by mouth every 8 hours as needed for Pain for up to 30 days. 90 tablet 0    docusate sodium (COLACE) 100 MG capsule TAKE 1 CAPSULE BY MOUTH EVERY DAY 30 capsule 8    HYDROcodone-acetaminophen (NORCO) 5-325 MG per tablet Take 1 tablet by mouth every 8 hours as needed for Pain for up to 30 days. Early refill due to holidays 90 tablet 0    meloxicam (MOBIC) 7.5 MG tablet Take 1 tablet by mouth daily 30 tablet 0    cetirizine (ZYRTEC) 10 MG tablet TAKE 1 TABLET BY MOUTH DAILY 30 tablet 3    gabapentin (NEURONTIN) 300 MG capsule Take 300 mg by mouth in the morning, at noon, and at bedtime.       carvedilol (COREG) 12.5 MG tablet Take 1 tablet by mouth 2 times daily 60 tablet 5    Umeclidinium Bromide 62.5 MCG/INH AEPB Inhale 1 puff into the lungs daily 2 each 5    omeprazole (PRILOSEC) 20 MG delayed release capsule TAKE 1 CAPSULE BY MOUTH EVERY DAY 30 capsule 3    nicotine (NICODERM CQ) 7 MG/24HR Place 1 patch onto the skin every 24 hours 30 patch 3    atorvastatin (LIPITOR) 40 MG tablet TAKE 1 TABLET BY MOUTH DAILY 90 tablet 0    amLODIPine (NORVASC) 10 MG tablet TAKE 1 TABLET BY MOUTH DAILY 90 tablet 0    lisinopril-hydroCHLOROthiazide (PRINZIDE;ZESTORETIC) 20-25 MG per tablet TAKE 1 TABLET EVERY DAY 90 tablet 0    mirtazapine (REMERON) 30 MG tablet Take 30 mg by mouth 3 times daily      tiZANidine (ZANAFLEX) 4 MG tablet Take 1 tablet by mouth 2 times daily 60 tablet 2    trospium (SANCTURA) 20 MG tablet Take 1 tablet by mouth 2 times daily 60 tablet 0    azelastine (ASTELIN) 0.1 % nasal spray 2 sprays by Nasal route 2 times daily Use in each nostril as directed 1 each 2    potassium chloride (KLOR-CON M) 10 MEQ extended release tablet Take 2 tablets by mouth daily 60 tablet 2    albuterol sulfate HFA (VENTOLIN HFA) 108 (90 Base) MCG/ACT inhaler Inhale 2 puffs into the lungs every 6 hours as needed for Wheezing 1 each 3    diphenhydrAMINE (BENADRYL) 25 MG tablet Take 25 mg by mouth every 6 hours as needed for Itching      mirtazapine (REMERON) 15 MG tablet Take 15 mg by mouth nightly      DULoxetine (CYMBALTA) 60 MG extended release capsule Take 1 capsule by mouth daily 30 capsule 3    ipratropium-albuterol (DUONEB) 0.5-2.5 (3) MG/3ML SOLN nebulizer solution Inhale 3 mLs into the lungs every 6 hours as needed for Shortness of Breath 360 mL 11     No current facility-administered medications for this encounter. Review of Systems   Constitutional: Negative for chills, fatigue and fever. Gastrointestinal: Negative for constipation, diarrhea, nausea and vomiting. Musculoskeletal: Positive for back pain and gait problem. Negative for neck pain and neck stiffness. Neurological: Positive for weakness. Negative for dizziness, tremors, light-headedness, numbness and headaches. [address] LEGS WITH SHARP STABBING PAIN          Objective:  General Appearance: In no acute distress, in pain and uncomfortable. Vital signs: (most recent): Blood pressure 126/77, pulse 91, height 5' 5\" (1.651 m), weight 180 lb (81.6 kg), last menstrual period 04/19/2003, SpO2 94 %, not currently breastfeeding. Vital signs are normal.  No fever. Output: Producing urine and producing stool. Lungs:  Normal effort. She is not in respiratory distress. Heart: Normal rate. Neurological: Patient is alert and oriented to person, place and time.     lumbar spine examination  No apparent deformity  Range of motion moderately limited  Straight leg raise positive  Gait antalgic    Assessment & Plan   Pain  Chronic onset many years ago located in the lower lumbar area  Reports radiation of pain down both legs  Associated with leg numbness  Pain aggravated with routine activity  Past history significant for 2 lumbar spine surgery  Last surgery was in 2020 for right leg sciatica symptoms  Initially noticed improvement but now have pain in both legs  Had recent follow-up with neurosurgery  No further surgery is recommended at this time  Have follow-up x-ray that showed stable fusion at L4-5    Pain is severe markedly affecting constant sharp throbbing shooting sensation interfering with quality of life  Currently on multimodal medication regimen Norco and Mobic with little relief  Pain intensity 10 out of 10  Doing home exercise and physical therapy    Advised patient to complete physical therapy  Consider obtaining new MRI lumbar spine with contrast after physical therapy  We will schedule for lumbar epidural steroid injection for flareup of chronic lumbar radiculopathy    Automated prescription report reviewed  Safe use of medication discussed  Refill ordered    1. Failed back syndrome    2. Encounter for long-term opiate analgesic use    3. Chronic lumbar radiculopathy    4. Chronic use of opiate drug for therapeutic purpose        Orders Placed This Encounter   Procedures    [place] NJX DX/THER SBST INTRLMNR LMBR/SAC W/IMG GDN      Orders Placed This Encounter   Medications    HYDROcodone-acetaminophen (NORCO) 5-325 MG per tablet     Sig: Take 1 tablet by mouth every 8 hours as needed for Pain for up to 30 days. Dispense:  90 tablet     Refill:  0     Reduce doses taken as pain becomes manageable      Controlled Substance Monitoring:    Acute and Chronic Pain Monitoring:   RX Monitoring 6/30/2022   Attestation -   Acute Pain Prescriptions -   Periodic Controlled Substance Monitoring No signs of potential drug abuse or diversion identified. ;Possible medication side effects, risk of tolerance/dependence & alternative treatments discussed. ;Assessed functional status. Chronic Pain > 50 MEDD Obtained or confirmed \"Consent for Opioid Use\" on file.    Chronic Pain > 80 MEDD -               Electronically signed by Milly Orr MD on 6/30/2022 at 1:03 PM

## 2022-07-01 RX ORDER — AMLODIPINE BESYLATE 10 MG/1
10 TABLET ORAL DAILY
Qty: 90 TABLET | Refills: 0 | Status: SHIPPED | OUTPATIENT
Start: 2022-07-01 | End: 2022-08-09 | Stop reason: SDUPTHER

## 2022-07-01 NOTE — TELEPHONE ENCOUNTER
Request for Amlodipine. Next Visit Date:  Future Appointments   Date Time Provider Claudy Marlowi   7/18/2022 10:30 AM Yunior Gunter MD [address]   7/19/2022  1:00 PM ELVIRA Salguero - CNP [place] [facility]   8/3/2022 11:00 AM ELVIRA Serna - CNP [address] Maintenance   Topic Date Due    COVID-19 Vaccine (3 - Booster for Zambrano Peter series) 10/10/2021    Diabetic retinal exam  10/31/2022 (Originally 5/14/2021)    Flu vaccine (1) 09/01/2022    Diabetic microalbuminuria test  10/11/2022    Annual Wellness Visit (AWV)  10/22/2022    Depression Monitoring  10/26/2022    Diabetic foot exam  12/16/2022    A1C test (Diabetic or Prediabetic)  04/29/2023    Lipids  05/11/2023    Colorectal Cancer Screen  10/05/2023    Breast cancer screen  05/18/2024    Cervical cancer screen  03/02/2026    DTaP/Tdap/Td vaccine (2 - Td or Tdap) 06/24/2029    Shingles vaccine  Completed    Pneumococcal 0-64 years Vaccine  Completed    Hepatitis C screen  Completed    HIV screen  Completed    Hepatitis A vaccine  Aged Out    Hib vaccine  Aged Out    Meningococcal (ACWY) vaccine  Aged Out       Hemoglobin A1C (%)   Date Value   04/29/2022 5.8   10/26/2021 5.7   03/02/2021 5.5             ( goal A1C is < 7)   Microalb/Crt.  Ratio (mcg/mg creat)   Date Value   10/11/2021 11     LDL Cholesterol (mg/dL)   Date Value   05/11/2022 131 (H)       (goal LDL is <100)   AST (U/L)   Date Value   01/26/2022 15     ALT (U/L)   Date Value   01/26/2022 10     BUN (mg/dL)   Date Value   05/11/2022 19     BP Readings from Last 3 Encounters:   06/30/22 126/77   05/26/22 (!) 145/79   05/24/22 118/76          (goal 120/80)    All Future Testing planned in [facility]  Lab Frequency Next Occurrence   DRUG SCREEN, PAIN Once 02/28/2022   [place] NJX DX/THER SBST INTRLMNR LMBR/SAC W/IMG GDN Once 07/01/2022         Patient Active Problem List:     DJD (degenerative joint disease) of knee Osteoarthritis of spine with radiculopathy, lumbar region     Chronic lumbar radiculopathy     GERD (gastroesophageal reflux disease)     COPD, severity to be determined ([address]     HTN (hypertension)     Allergic rhinitis     Lipoma of shoulder s/p excision right posterior 11 17 2008     History of tobacco use     DM (diabetes mellitus)     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 9)     Bronchiectasis ([address]     Centrilobular emphysema ([address]     Oxygen dependent     Acute postoperative anemia due to expected blood loss     Multifocal pneumonia     Acute on chronic respiratory failure with hypoxia (HCC)     Pulmonary function studies abnormal     Tobacco dependence     Idiopathic sleep related nonobstructive alveolar hypoventilation     Failed back syndrome     Trigger finger of right thumb     Chronic use of opiate drug for therapeutic purpose

## 2022-07-14 RX ORDER — MELOXICAM 7.5 MG/1
7.5 TABLET ORAL DAILY
Qty: 30 TABLET | Refills: 2 | Status: SHIPPED | OUTPATIENT
Start: 2022-07-14 | End: 2022-08-25 | Stop reason: SDUPTHER

## 2022-07-15 ENCOUNTER — TELEPHONE (OUTPATIENT)
Dept: INTERNAL MEDICINE | Age: 62
End: 2022-07-15

## 2022-07-15 DIAGNOSIS — J20.9 ACUTE BRONCHITIS, UNSPECIFIED ORGANISM: Primary | ICD-10-CM

## 2022-07-15 RX ORDER — AZITHROMYCIN 250 MG/1
250 TABLET, FILM COATED ORAL SEE ADMIN INSTRUCTIONS
Qty: 6 TABLET | Refills: 0 | Status: SHIPPED | OUTPATIENT
Start: 2022-07-15 | End: 2022-07-20

## 2022-07-15 NOTE — TELEPHONE ENCOUNTER
Pt calling in to see if there has been a response yet, informed PCP has not yet responded. Writer will touch base with EZEQUIEL Jaquez when she returns from lunch.

## 2022-07-15 NOTE — TELEPHONE ENCOUNTER
Pt c/o having sinus drainage, headache and eye pain    Onset: 2 day ago    Severity: moderate  Progression: gradually worsening  Relieved By:nothing  Radiation:  into her eyes      Associated Symptoms: Headache,cough, eye pain, congestion (green in color), runny nose  Have you been exposed to someone with similar symptoms no    Patient advised: To check pharmacy after 6 o'clock  Ok to leave a message if we call back yes

## 2022-07-26 ENCOUNTER — HOSPITAL ENCOUNTER (OUTPATIENT)
Dept: PAIN MANAGEMENT | Age: 62
Discharge: HOME | End: 2022-07-26
Payer: COMMERCIAL

## 2022-07-26 VITALS
HEART RATE: 73 BPM | SYSTOLIC BLOOD PRESSURE: 117 MMHG | TEMPERATURE: 97.8 F | OXYGEN SATURATION: 91 % | DIASTOLIC BLOOD PRESSURE: 64 MMHG

## 2022-07-26 DIAGNOSIS — E11.9 TYPE 2 DIABETES MELLITUS WITHOUT COMPLICATION, WITHOUT LONG-TERM CURRENT USE OF INSULIN (HCC): ICD-10-CM

## 2022-07-26 DIAGNOSIS — Z51.81 MEDICATION MONITORING ENCOUNTER: ICD-10-CM

## 2022-07-26 DIAGNOSIS — M96.1 FAILED BACK SYNDROME: ICD-10-CM

## 2022-07-26 DIAGNOSIS — M54.16 CHRONIC LUMBAR RADICULOPATHY: ICD-10-CM

## 2022-07-26 DIAGNOSIS — Z79.891 CHRONIC USE OF OPIATE DRUG FOR THERAPEUTIC PURPOSE: Primary | ICD-10-CM

## 2022-07-26 DIAGNOSIS — M47.26 OSTEOARTHRITIS OF SPINE WITH RADICULOPATHY, LUMBAR REGION: ICD-10-CM

## 2022-07-26 DIAGNOSIS — E78.00 PURE HYPERCHOLESTEROLEMIA: ICD-10-CM

## 2022-07-26 PROCEDURE — 99213 OFFICE O/P EST LOW 20 MIN: CPT

## 2022-07-26 PROCEDURE — 99213 OFFICE O/P EST LOW 20 MIN: CPT | Performed by: NURSE PRACTITIONER

## 2022-07-26 RX ORDER — HYDROCODONE BITARTRATE AND ACETAMINOPHEN 5; 325 MG/1; MG/1
1 TABLET ORAL EVERY 8 HOURS PRN
Qty: 87 TABLET | Refills: 0 | Status: SHIPPED | OUTPATIENT
Start: 2022-07-29 | End: 2022-08-25 | Stop reason: SDUPTHER

## 2022-07-26 RX ORDER — ATORVASTATIN CALCIUM 40 MG/1
40 TABLET, FILM COATED ORAL DAILY
Qty: 90 TABLET | Refills: 0 | Status: SHIPPED | OUTPATIENT
Start: 2022-07-26 | End: 2022-08-09 | Stop reason: SDUPTHER

## 2022-07-26 RX ORDER — POTASSIUM CHLORIDE 750 MG/1
20 TABLET, EXTENDED RELEASE ORAL DAILY
Qty: 60 TABLET | Refills: 1 | Status: SHIPPED | OUTPATIENT
Start: 2022-07-26

## 2022-07-26 ASSESSMENT — ENCOUNTER SYMPTOMS
CONSTIPATION: 0
SHORTNESS OF BREATH: 0
BOWEL INCONTINENCE: 0
BACK PAIN: 1
COUGH: 0

## 2022-07-26 NOTE — PROGRESS NOTES
Chief Complaint   Patient presents with    Back Pain    Medication Refill        [facility]   Patient has had low back pain with no known injury and bilateral knee pain for many years. She is s/p lumbar diskectomy in 2015 and lumbar fusion 11/2020 but continues to have lingering pain. She feels the pain is worsening in the left leg. She had an appointment with neurosurgery who ordered PT and XR     She is stable and compliant on norco 5/325 TID. Gabapentin prescribed by PCP for neuropathic pain in feet. Follows with Dr. Summer Henry for knees. She had a knee arthroscopy in March 2017. Genicular block was discussed but she declined. EMG was completed and shows Abnormal study of the right lower extremity and Normal study of the left lower extremity. There is electrodiagnostic evidence of a very mild chronic right S1 radiculopathy without active denervation. There is no electrodiagnostic evidence of a generalized large fiber peripheral polyneuropathy. She continues physical therapy with moderate benefit. She states she is able to tolerate more activity. XR lumbar 2021 - The patient is status post posterior fusion of L4 and L5. The hardware appears properly positioned without complication. There is also a disc spacer device in place at L4/5. No acute fracture or listhesis. She saw Dr. Heber Ayala last month and he recommended LESI. She is scheduled for LESI 8/1    She continues in home PT        Back Pain  This is a chronic problem. The current episode started more than 1 year ago. The problem occurs constantly. The problem is unchanged. The pain is present in the lumbar spine. The quality of the pain is described as aching. The pain radiates to the right thigh, left thigh, right knee and left knee. The pain is at a severity of 8/10. The pain is moderate. The pain is The same all the time. The symptoms are aggravated by standing, position and bending (walking).  Pertinent negatives include no bladder incontinence, bowel incontinence, chest pain, fever, headaches, numbness, tingling or weakness. She has tried heat (PT) for the symptoms. The treatment provided mild relief. Patient denies any new neurological symptoms. No bowel or bladder incontinence, no weakness, and no falling. Pill count: appropriate Hydrocodone #14 due 7/30    Morphine equivalent: 15    Controlled Substance Monitoring:    Acute and Chronic Pain Monitoring:   RX Monitoring 7/26/2022   Attestation -   Acute Pain Prescriptions -   Periodic Controlled Substance Monitoring Possible medication side effects, risk of tolerance/dependence & alternative treatments discussed. ;No signs of potential drug abuse or diversion identified.;Obtaining appropriate analgesic effect of treatment.    Chronic Pain > 50 MEDD -   Chronic Pain > 80 MEDD -            Past Medical History:   Diagnosis Date    Allergic rhinitis     Alveolar hypoventilation 11/20/2020    Aortic insufficiency 2018    mild-moderate on echo (was seen and discharged from cardiology-[facility])    Bronchiectasis ([facility] [address] 11/20/2020    Bronchitis     Chronic back pain     Pain management at [facility]    COPD (chronic obstructive pulmonary disease) ([facility] [address]     Dr. Dykes [place] to see 11/09/2020    Depression     DM (diabetes mellitus) ([facility] [address] 12/18/2012    GERD (gastroesophageal reflux disease)     History of echocardiogram 05/2018    EF 65%, mild-moderate AI and TR    Hyperlipidemia     Dr. Cory Gann    Hypertension     Dr. Cory Gann    Obesity     Osteoarthritis     Peripheral vascular disease ([facility] [address]     Primary osteoarthritis of both knees     Radicular pain of lumbosacral region     Spinal stenosis, lumbar region, without neurogenic claudication 04/30/2013    Tricuspid regurgitation 2018    mild-moderate on echo    Type II or unspecified type diabetes mellitus without mention of complication, not stated as uncontrolled     Dr. Cory Gann    Unspecified sleep apnea     no cpap used anymore    URI (upper respiratory infection)     Wears dentures     upper and lower full dentures    Wears glasses     Wellness examination     Dr. Jose Frazier -PCP last visit in early Oct. 2020       Past Surgical History:   Procedure Laterality Date    COLONOSCOPY  2/12/2009    normal    DILATION AND CURETTAGE OF UTERUS      GASTRECTOMY      partial    LUMBAR DISCECTOMY  01/2015    lumbar diskectomy    LUMBAR FUSION  11/19/2020     POSTERIOR FUSION L4/5,     LUMBAR FUSION N/A 11/19/2020    POSTERIOR FUSION L4/5, [employer], Shayna Zheng, [place]ES #[id number] AMINA performed by Nora Kemp DO at [place] [address] Right 4/30/13    Lumbar Diagnostic Block,  Kenalog 40 mg    NERVE BLOCK  5/23/13    Lumbar Radiofrequency, Kenalog 40mg    NERVE BLOCK  8/12/13    Lt MBNB  celestone 6mg    NERVE BLOCK Left 8-28-13    left lumbar diagnostic block #2 decadron 10 mg    NERVE BLOCK Left 9-24-13    left lumbar median branch radiofrequency    NERVE BLOCK  07-02-14    caudal, celestone 9 mg    NERVE BLOCK  7-16-14    caudal epidural #2, celestone 9mg, fentanyl 25mcg    NERVE BLOCK  7/30/14    caudal #3 decadron 10mg    NERVE BLOCK  11-6-14    duramorph epidural steroid block  duramorph 1 mg celestone 9 mg    NERVE BLOCK  11/20/15    TENS- Empi Select    NERVE BLOCK  07/20/2018    right transforminal # 1 decadron 10mg,isovue    NERVE BLOCK Bilateral 02/01/2019    bilat mbnb- no steroid    NERVE BLOCK Bilateral 02/08/2019    bilat mbnb, marcaine . 25%    ID KNEE SCOPE,DIAGNOSTIC Right 3/24/2017    KNEE ARTHROSCOPY WITH PARTIAL MEDIAL MENISECAL DEBRIDMENT  [facility]    UPPER GASTROINTESTINAL ENDOSCOPY  4 21 2009,04/2011    gastritis, esophagitis       Allergies   Allergen Reactions    Aspirin      DUE TO ULCER    Claritin [Loratadine] Other (See Comments)     coughing    Flonase [Fluticasone Propionate]     Morphine And Related      GI Upset         Current Outpatient Medications:     meloxicam (MOBIC) 7.5 MG tablet, TAKE 1 TABLET BY MOUTH DAILY, Disp: 30 tablet, Rfl: 2    amLODIPine (NORVASC) 10 MG tablet, TAKE 1 TABLET BY MOUTH DAILY, Disp: 90 tablet, Rfl: 0    HYDROcodone-acetaminophen (NORCO) 5-325 MG per tablet, Take 1 tablet by mouth every 8 hours as needed for Pain for up to 30 days. , Disp: 90 tablet, Rfl: 0    docusate sodium (COLACE) 100 MG capsule, TAKE 1 CAPSULE BY MOUTH EVERY DAY, Disp: 30 capsule, Rfl: 8    cetirizine (ZYRTEC) 10 MG tablet, TAKE 1 TABLET BY MOUTH DAILY, Disp: 30 tablet, Rfl: 3    gabapentin (NEURONTIN) 300 MG capsule, Take 300 mg by mouth in the morning, at noon, and at bedtime. , Disp: , Rfl:     carvedilol (COREG) 12.5 MG tablet, Take 1 tablet by mouth 2 times daily, Disp: 60 tablet, Rfl: 5    Umeclidinium Bromide 62.5 MCG/INH AEPB, Inhale 1 puff into the lungs daily, Disp: 2 each, Rfl: 5    omeprazole (PRILOSEC) 20 MG delayed release capsule, TAKE 1 CAPSULE BY MOUTH EVERY DAY, Disp: 30 capsule, Rfl: 3    nicotine (NICODERM CQ) 7 MG/24HR, Place 1 patch onto the skin every 24 hours, Disp: 30 patch, Rfl: 3    atorvastatin (LIPITOR) 40 MG tablet, TAKE 1 TABLET BY MOUTH DAILY, Disp: 90 tablet, Rfl: 0    lisinopril-hydroCHLOROthiazide (PRINZIDE;ZESTORETIC) 20-25 MG per tablet, TAKE 1 TABLET EVERY DAY, Disp: 90 tablet, Rfl: 0    mirtazapine (REMERON) 30 MG tablet, Take 30 mg by mouth 3 times daily, Disp: , Rfl:     tiZANidine (ZANAFLEX) 4 MG tablet, Take 1 tablet by mouth 2 times daily, Disp: 60 tablet, Rfl: 2    trospium (SANCTURA) 20 MG tablet, Take 1 tablet by mouth 2 times daily, Disp: 60 tablet, Rfl: 0    azelastine (ASTELIN) 0.1 % nasal spray, 2 sprays by Nasal route 2 times daily Use in each nostril as directed, Disp: 1 each, Rfl: 2    potassium chloride (KLOR-CON M) 10 MEQ extended release tablet, Take 2 tablets by mouth daily, Disp: 60 tablet, Rfl: 2    albuterol sulfate HFA (VENTOLIN HFA) 108 (90 Base) MCG/ACT inhaler, Inhale 2 puffs into the lungs every 6 hours as needed for Wheezing, Disp: 1 each, Rfl: 3    diphenhydrAMINE (BENADRYL) 25 MG tablet, Take 25 mg by mouth every 6 hours as needed for Itching, Disp: , Rfl:     mirtazapine (REMERON) 15 MG tablet, Take 15 mg by mouth nightly, Disp: , Rfl:     DULoxetine (CYMBALTA) 60 MG extended release capsule, Take 1 capsule by mouth daily, Disp: 30 capsule, Rfl: 3    ipratropium-albuterol (DUONEB) 0.5-2.5 (3) MG/3ML SOLN nebulizer solution, Inhale 3 mLs into the lungs every 6 hours as needed for Shortness of Breath, Disp: 360 mL, Rfl: 11    Family History   Problem Relation Age of Onset    Diabetes Mother     Heart Disease Mother     Cirrhosis Father        Social History     Socioeconomic History    Marital status: Single     Spouse name: Not on file    Number of children: Not on file    Years of education: Not on file    Highest education level: Not on file   Occupational History     Employer: DISABLED   Tobacco Use    Smoking status: Every Day     Packs/day: 0.25     Years: 36.00     Pack years: 9.00     Types: Cigarettes    Smokeless tobacco: Never    Tobacco comments:     3 cigs per day   on nicoderm patch   Vaping Use    Vaping Use: Never used   Substance and Sexual Activity    Alcohol use: No     Alcohol/week: 0.0 standard drinks    Drug use: No     Comment: 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 7 yrs    Sexual activity: Never   Other Topics Concern    Not on file   Social History Narrative    10/9/20 Patient keeping contact with others to a minimum due to COVID 19 Pandemic.     10/9/20 Patient has difficulty with ambulation due to DJD, stenosis and fibromyalgia     Social Determinants of Health     Financial Resource Strain: Medium Risk    Difficulty of Paying Living Expenses: Somewhat hard   Food Insecurity: No Food Insecurity    Worried About Running Out of Food in the Last Year: Never true    Ran Out of Food in the Last Year: Never true   Transportation Needs: No Transportation Needs    Lack of Transportation (Medical): No    Lack of Transportation (Non-Medical): No   Physical Activity: Insufficiently Active    Days of Exercise per Week: 3 days    Minutes of Exercise per Session: 30 min   Stress: Stress Concern Present    Feeling of Stress : To some extent   Social Connections: Moderately Integrated    Frequency of Communication with Friends and Family: More than three times a week    Frequency of Social Gatherings with Friends and Family: More than three times a week    Attends Anabaptist Services: More than 4 times per year    Active Member of [address] [place] [address] [place] or Organizations: Yes    Attends Club or Organization Meetings: 1 to 4 times per year    Marital Status:    Intimate Partner Violence: Not on file   Housing Stability: Low Risk     Unable to Pay for Housing in the Last Year: No    Number of Places Lived in the Last Year: 1    Unstable Housing in the Last Year: No       Review of Systems:  Review of Systems   Constitutional: Negative for chills and fever. Cardiovascular:  Negative for chest pain and palpitations. Respiratory:  Negative for cough and shortness of breath. Musculoskeletal:  Positive for back pain. Gastrointestinal:  Negative for bowel incontinence and constipation. Genitourinary:  Negative for bladder incontinence. Neurological:  Negative for disturbances in coordination, headaches, loss of balance, numbness, tingling and weakness. Physical Exam:  /64   Pulse 73   Temp 97.8 °F (36.6 °C) (Temporal)   LMP 04/19/2003   SpO2 91%     Physical Exam  HENT:      Head: Normocephalic. Pulmonary:      Effort: Pulmonary effort is normal.   Musculoskeletal:         General: Normal range of motion. Cervical back: Normal range of motion. Lumbar back: Tenderness present. Skin:     General: Skin is warm and dry. Neurological:      Mental Status: She is alert and oriented to person, place, and time.        Record/Diagnostics Review:    Last óscar 2/2022 and was appropriate     Assessment:  Problem List Items Addressed This Visit       Osteoarthritis of spine with radiculopathy, lumbar region    Relevant Medications    HYDROcodone-acetaminophen ([address]) 5-325 MG per tablet (Start on 7/29/2022)    Chronic lumbar radiculopathy    Relevant Medications    HYDROcodone-acetaminophen ([address]) 5-325 MG per tablet (Start on 7/29/2022)    Medication monitoring encounter    Failed back syndrome    Relevant Medications    HYDROcodone-acetaminophen (NORCO) 5-325 MG per tablet (Start on 7/29/2022)    Chronic use of opiate drug for therapeutic purpose - Primary    Relevant Medications    HYDROcodone-acetaminophen (NORCO) 5-325 MG per tablet (Start on 7/29/2022)          Treatment Plan:  Patient relates current medications are helping the pain. Patient reports taking pain medications as prescribed, denies obtaining medications from different sources and denies use of illegal drugs. Patient denies side effects from medications like nausea, vomiting, constipation or drowsiness. Patient reports current activities of daily living are possible due to medications and would like to continue them. As always, we encourage daily stretching and strengthening exercises, and recommend minimizing use of pain medications unless patient cannot get through daily activities due to pain. Contract requirements met. Continue opioid therapy. Script written for norco  She is scheduled for LESI next week  She continues in home PT  Follow up appointment made for 4 weeks    I have reviewed the chief complaint and history of present illness (including ROS and [address]) and vital documentation by my staff and I agree with their documentation and have added where applicable.

## 2022-07-26 NOTE — TELEPHONE ENCOUNTER
Refill request for Atorvastatin and Potassium. If appropriate please send medication(s) to patients pharmacy. Next appt: Patient is currently on wait list for provider. Last appt: 4/29/2022    Health Maintenance   Topic Date Due    COVID-19 Vaccine (3 - Booster for Pfizer series) 10/10/2021    Diabetic retinal exam  10/31/2022 (Originally 5/14/2021)    Flu vaccine (1) 09/01/2022    Diabetic microalbuminuria test  10/11/2022    Annual Wellness Visit (AWV)  10/22/2022    Depression Monitoring  10/26/2022    Diabetic foot exam  12/16/2022    A1C test (Diabetic or Prediabetic)  04/29/2023    Lipids  05/11/2023    Colorectal Cancer Screen  10/05/2023    Breast cancer screen  05/18/2024    Cervical cancer screen  03/02/2026    DTaP/Tdap/Td vaccine (2 - Td or Tdap) 06/24/2029    Shingles vaccine  Completed    Pneumococcal 0-64 years Vaccine  Completed    Hepatitis C screen  Completed    HIV screen  Completed    Hepatitis A vaccine  Aged Out    Hib vaccine  Aged Out    Meningococcal (ACWY) vaccine  Aged Out       Hemoglobin A1C (%)   Date Value   04/29/2022 5.8   10/26/2021 5.7   03/02/2021 5.5             ( goal A1C is < 7)   Microalb/Crt.  Ratio (mcg/mg creat)   Date Value   10/11/2021 11     LDL Cholesterol (mg/dL)   Date Value   05/11/2022 131 (H)       (goal LDL is <100)   AST (U/L)   Date Value   01/26/2022 15     ALT (U/L)   Date Value   01/26/2022 10     BUN (mg/dL)   Date Value   05/11/2022 19     BP Readings from Last 3 Encounters:   07/26/22 117/64   06/30/22 126/77   05/26/22 (!) 145/79          (goal 120/80)          Patient Active Problem List:     DJD (degenerative joint disease) of knee     Osteoarthritis of spine with radiculopathy, lumbar region     Chronic lumbar radiculopathy     GERD (gastroesophageal reflux disease)     COPD, severity to be determined ([address]     HTN (hypertension)     Allergic rhinitis     Lipoma of shoulder s/p excision right posterior 11 17 2008     History of tobacco use     DM (diabetes mellitus)     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 9)     Bronchiectasis ([address]     Centrilobular emphysema ([address]     Oxygen dependent     Acute postoperative anemia due to expected blood loss     Multifocal pneumonia     Acute on chronic respiratory failure with hypoxia (HCC)     Pulmonary function studies abnormal     Tobacco dependence     Idiopathic sleep related nonobstructive alveolar hypoventilation     Failed back syndrome     Trigger finger of right thumb     Chronic use of opiate drug for therapeutic purpose

## 2022-08-01 ENCOUNTER — HOSPITAL ENCOUNTER (OUTPATIENT)
Dept: PAIN MANAGEMENT | Age: 62
Discharge: HOME | End: 2022-08-01
Payer: COMMERCIAL

## 2022-08-01 ENCOUNTER — HOSPITAL ENCOUNTER (OUTPATIENT)
Dept: GENERAL RADIOLOGY | Age: 62
Discharge: HOME | End: 2022-08-03
Payer: COMMERCIAL

## 2022-08-01 VITALS
OXYGEN SATURATION: 92 % | SYSTOLIC BLOOD PRESSURE: 151 MMHG | TEMPERATURE: 98.1 F | WEIGHT: 180 LBS | HEIGHT: 65 IN | BODY MASS INDEX: 29.99 KG/M2 | RESPIRATION RATE: 14 BRPM | DIASTOLIC BLOOD PRESSURE: 94 MMHG | HEART RATE: 94 BPM

## 2022-08-01 DIAGNOSIS — M96.1 FAILED BACK SYNDROME: ICD-10-CM

## 2022-08-01 DIAGNOSIS — R52 PAIN MANAGEMENT: ICD-10-CM

## 2022-08-01 DIAGNOSIS — M54.16 CHRONIC LUMBAR RADICULOPATHY: Primary | ICD-10-CM

## 2022-08-01 PROCEDURE — 6360000004 HC RX CONTRAST MEDICATION: Performed by: ANESTHESIOLOGY

## 2022-08-01 PROCEDURE — 99152 MOD SED SAME PHYS/QHP 5/>YRS: CPT | Performed by: ANESTHESIOLOGY

## 2022-08-01 PROCEDURE — 3209999900 FLUORO FOR SURGICAL PROCEDURES

## 2022-08-01 PROCEDURE — 62323 NJX INTERLAMINAR LMBR/SAC: CPT

## 2022-08-01 PROCEDURE — 62323 NJX INTERLAMINAR LMBR/SAC: CPT | Performed by: ANESTHESIOLOGY

## 2022-08-01 PROCEDURE — 6360000002 HC RX W HCPCS: Performed by: ANESTHESIOLOGY

## 2022-08-01 PROCEDURE — 2500000003 HC RX 250 WO HCPCS: Performed by: ANESTHESIOLOGY

## 2022-08-01 RX ORDER — FENTANYL CITRATE 50 UG/ML
INJECTION, SOLUTION INTRAMUSCULAR; INTRAVENOUS
Status: COMPLETED | OUTPATIENT
Start: 2022-08-01 | End: 2022-08-01

## 2022-08-01 RX ORDER — LIDOCAINE HYDROCHLORIDE 10 MG/ML
INJECTION, SOLUTION EPIDURAL; INFILTRATION; INTRACAUDAL; PERINEURAL
Status: COMPLETED | OUTPATIENT
Start: 2022-08-01 | End: 2022-08-01

## 2022-08-01 RX ORDER — MIDAZOLAM HYDROCHLORIDE 1 MG/ML
INJECTION INTRAMUSCULAR; INTRAVENOUS
Status: COMPLETED | OUTPATIENT
Start: 2022-08-01 | End: 2022-08-01

## 2022-08-01 RX ORDER — DEXAMETHASONE SODIUM PHOSPHATE 10 MG/ML
INJECTION, SOLUTION INTRAMUSCULAR; INTRAVENOUS
Status: COMPLETED | OUTPATIENT
Start: 2022-08-01 | End: 2022-08-01

## 2022-08-01 RX ADMIN — Medication 50 MCG: at 11:06

## 2022-08-01 RX ADMIN — MIDAZOLAM 2 MG: 1 INJECTION INTRAMUSCULAR; INTRAVENOUS at 11:06

## 2022-08-01 RX ADMIN — IOHEXOL 1 ML: 180 INJECTION INTRAVENOUS at 11:10

## 2022-08-01 RX ADMIN — LIDOCAINE HYDROCHLORIDE 1 ML: 10 INJECTION, SOLUTION EPIDURAL; INFILTRATION; INTRACAUDAL; PERINEURAL at 11:07

## 2022-08-01 RX ADMIN — DEXAMETHASONE SODIUM PHOSPHATE 10 MG: 10 INJECTION, SOLUTION INTRAMUSCULAR; INTRAVENOUS at 11:07

## 2022-08-01 ASSESSMENT — PAIN DESCRIPTION - ORIENTATION: ORIENTATION: MID;LOWER;LEFT

## 2022-08-01 ASSESSMENT — PAIN DESCRIPTION - LOCATION
LOCATION: BACK
LOCATION: BACK

## 2022-08-01 ASSESSMENT — PAIN DESCRIPTION - ONSET: ONSET: AWAKENED FROM SLEEP

## 2022-08-01 ASSESSMENT — PAIN DESCRIPTION - PAIN TYPE: TYPE: CHRONIC PAIN

## 2022-08-01 ASSESSMENT — PAIN SCALES - GENERAL
PAINLEVEL_OUTOF10: 8
PAINLEVEL_OUTOF10: 9

## 2022-08-01 ASSESSMENT — PAIN DESCRIPTION - DESCRIPTORS: DESCRIPTORS: ACHING;SHARP

## 2022-08-01 ASSESSMENT — PAIN DESCRIPTION - DIRECTION: RADIATING_TOWARDS: BILAT LEGS TO KNEES

## 2022-08-01 ASSESSMENT — PAIN DESCRIPTION - FREQUENCY: FREQUENCY: CONTINUOUS

## 2022-08-01 ASSESSMENT — PAIN - FUNCTIONAL ASSESSMENT: PAIN_FUNCTIONAL_ASSESSMENT: PREVENTS OR INTERFERES WITH MANY ACTIVE NOT PASSIVE ACTIVITIES

## 2022-08-01 NOTE — OP NOTE
Patient Name: Polly Mart   YOB: 1960  Room/Bed: Room/bed info not found  Medical Record Number: [id number]  Date: 8/1/2022       Sedation/ Anesthesia Plan:   intravenous sedation   as needed. Medications Planned:   midazolam (Versed) / Fentanyl  Intravenously  as needed. Preoperative Diagnosis:    1. Chronic lumbar radiculopathy    2. Failed back syndrome        Postoperative Diagnosis:    1. Chronic lumbar radiculopathy    2. Failed back syndrome        Procedure Performed:  Lumbar epidural steroid injection under fluoroscopy guidance    Blood Loss: None    Procedure: The Patient was seen in the preop area, chart was reviewed, informed consent was obtained. Patient was taken to procedure room and was placed in prone position. Vital signs were monitored through out the  Procedure. A time out was completed. The skin over the back was prepped and draped in sterile manner. The target point was marked at The interlaminar space at L5/S1 . Skin and deep tissues were anesthetized with 1 % lidocaine. A 20-gauge Tuohy epidural needlele was advanced  under fluoroscopy guidance in AP view. Epidural space was identified u3sing BRENT technique. Position ws confirmed in Lateral view. Then after negative aspiration contrast dye Omnipaque-180 was injected with live fluoroscopy in AP views that showed  spread of the contrast in the epidural space  and no vascular runoff or intrathecal spread. Finally 5 ml of treatment solution containing 4 ml of PF NS and 1 ml of DEXAMETHASONE 10 mg / ml was injected. The needle was removed and a Band-Aid was placed over the needle  insertion site. The patient's vital signs remained stable and the patient tolerated the procedure well.       Electronically signed by Jalil Chou MD on 8/1/2022 at 11:12 AM    SEDATION NOTE:    ASA CLASSIFICATION  3  MP   CLASSIFICATION  3    Moderate intravenous conscious sedation was supervised by Dr. Magaly Larkin  The patient was independently monitored by a Registered Nurse assigned to the Procedure Room  Monitoring included automated blood pressure, continuous EKG, Capnography and continuous pulse oximetry. The detailed Conscious Record is permanently stored in the [employer].      The following is the conscious sedation record;  Start Time:  1102  End times:  1112  Duration:  10 MINUTES  MEDS GIVEN 2 MG VERSED AND 50 MCG FENTANYL

## 2022-08-01 NOTE — DISCHARGE INSTRUCTIONS
Discharge Instructions following Sedation or Anesthesia:  You have  received  a sedative/anesthetic therefore, you should not consume any alcoholic beverages for minimum of 12 hours. Do not drive or operate machinery for 24 hours. Do not sign legal documents for 24 hours. Dizziness, drowsiness, and unsteadiness may occur. Rest when need to. Increase diet as tolerated. Keep up on fluids if diet allows. General Instructions:  Do not take a tub bath for 72 hours after procedure (this includes hot tubs and swimming pools). You may shower, but avoid hot water to injection site. Avoid strenuous activity TODAY especially if you experience dizziness. Remove band-aid the next day. Wash off any residual iodine   Do not use heat, heating pad, or any other heating device over the injection site for 3 days after the procedure. If you experience pain after your procedure, you may continue with your current pain medication as prescribed. (DO NOT INCREASE YOUR PAIN MEDICATION WITHOUT TALKING TO DOCTOR)  Soreness and pain at injection site is common, may use ice to reduce soreness. What To Expect After A Steroid Injection  You should start to feel the effects of the steroid injection in about 48-72 hours  You may experience facial flushing, night sweats and irritability. Other common steroid related side effects are increased appetite, mood elevation, insomnia and fluid retention. These effects usually subside in a few days. Steroids used in epidural injections may cause muscle spasms for a few days. If you are diabetic, your blood sugar may be elevated after your procedure due to the steroids. You will need to monitor your blood sugar more closely while going through a series of injections (Check blood sugar at meals and bedtime for 5 days). You may require adjustment in your diabetic medications, contact your PCP office to discuss.     Call Ofelia Decker at [phone] if you experience:   Fever, chills or temperature over 100    Vomiting, Headache, persistent stiff neck, nausea, blurred vision   Difficulty in urinating or unable to urinate with 8 hours   Increase in weakness, numbness or loss of function   Increased redness, swelling or drainage at the injection site

## 2022-08-01 NOTE — H&P
UPDATE:  Office visit pain clinic in [facility] with all required elements of H&P dated 07/26/2022  Patient seen preop, chart reviewed,   No changes in medical history and health assessment since last evaluation. PE:  AAO x 3, in NAD, VSS,. Resp: breathing on RA, no respiratory distress  Cardiac: rate normal    Risk / Benefits explained to patient, patient agree to proceed with plan.   ASA 3  MP 3

## 2022-08-05 DIAGNOSIS — N32.81 OAB (OVERACTIVE BLADDER): ICD-10-CM

## 2022-08-05 RX ORDER — TROSPIUM CHLORIDE 20 MG/1
20 TABLET, FILM COATED ORAL 2 TIMES DAILY
Qty: 60 TABLET | Refills: 0 | Status: SHIPPED | OUTPATIENT
Start: 2022-08-05

## 2022-08-05 NOTE — TELEPHONE ENCOUNTER
Request for medication refill, trospium. Next Visit Date:8/9/22  Future Appointments   Date Time Provider Claudy Laury   8/9/2022  8:30 AM Jarocho Mcgrath MD [facility] IM [id number]   8/25/2022  9:00 AM ELVIRA Huang - CNP [facility] Maintenance   Topic Date Due    COVID-19 Vaccine (3 - Booster for Zambrano Peter series) 10/10/2021    Diabetic retinal exam  10/31/2022 (Originally 5/14/2021)    Flu vaccine (1) 09/01/2022    Diabetic microalbuminuria test  10/11/2022    Annual Wellness Visit (AWV)  10/22/2022    Depression Monitoring  10/26/2022    Diabetic foot exam  12/16/2022    A1C test (Diabetic or Prediabetic)  04/29/2023    Lipids  05/11/2023    Colorectal Cancer Screen  10/05/2023    Breast cancer screen  05/18/2024    Cervical cancer screen  03/02/2026    DTaP/Tdap/Td vaccine (2 - Td or Tdap) 06/24/2029    Shingles vaccine  Completed    Pneumococcal 0-64 years Vaccine  Completed    Hepatitis C screen  Completed    HIV screen  Completed    Hepatitis A vaccine  Aged Out    Hib vaccine  Aged Out    Meningococcal (ACWY) vaccine  Aged Out       Hemoglobin A1C (%)   Date Value   04/29/2022 5.8   10/26/2021 5.7   03/02/2021 5.5             ( goal A1C is < 7)   Microalb/Crt.  Ratio (mcg/mg creat)   Date Value   10/11/2021 11     LDL Cholesterol (mg/dL)   Date Value   05/11/2022 131 (H)       (goal LDL is <100)   AST (U/L)   Date Value   01/26/2022 15     ALT (U/L)   Date Value   01/26/2022 10     BUN (mg/dL)   Date Value   05/11/2022 19     BP Readings from Last 3 Encounters:   08/01/22 (!) 151/94   07/26/22 117/64   06/30/22 126/77          (goal 120/80)    All Future Testing planned in [facility]  Lab Frequency Next Occurrence   DRUG SCREEN, PAIN Once 02/28/2022   TN NJX DX/THER SBST INTRLMNR LMBR/SAC W/IMG GDN Once 07/01/2022         Patient Active Problem List:     DJD (degenerative joint disease) of knee     Osteoarthritis of spine with radiculopathy, lumbar region     Chronic lumbar radiculopathy     GERD (gastroesophageal reflux disease)     COPD, severity to be determined (HCC)     HTN (hypertension)     Allergic rhinitis     Lipoma of shoulder s/p excision right posterior 11 17 2008     History of tobacco use     DM (diabetes mellitus)     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 9)     Bronchiectasis ([address]     Centrilobular emphysema ([address]     Oxygen dependent     Acute postoperative anemia due to expected blood loss     Multifocal pneumonia     Acute on chronic respiratory failure with hypoxia (HCC)     Pulmonary function studies abnormal     Tobacco dependence     Idiopathic sleep related nonobstructive alveolar hypoventilation     Failed back syndrome     Trigger finger of right thumb     Chronic use of opiate drug for therapeutic purpose

## 2022-08-09 ENCOUNTER — OFFICE VISIT (OUTPATIENT)
Dept: INTERNAL MEDICINE | Age: 62
End: 2022-08-09
Payer: COMMERCIAL

## 2022-08-09 VITALS
TEMPERATURE: 97.6 F | BODY MASS INDEX: 31.78 KG/M2 | DIASTOLIC BLOOD PRESSURE: 72 MMHG | SYSTOLIC BLOOD PRESSURE: 120 MMHG | HEART RATE: 74 BPM | WEIGHT: 191 LBS | OXYGEN SATURATION: 93 %

## 2022-08-09 DIAGNOSIS — I10 ESSENTIAL HYPERTENSION: Primary | ICD-10-CM

## 2022-08-09 DIAGNOSIS — E78.00 PURE HYPERCHOLESTEROLEMIA: ICD-10-CM

## 2022-08-09 DIAGNOSIS — F17.200 SMOKER: ICD-10-CM

## 2022-08-09 DIAGNOSIS — E11.9 TYPE 2 DIABETES MELLITUS WITHOUT COMPLICATION, WITHOUT LONG-TERM CURRENT USE OF INSULIN (HCC): ICD-10-CM

## 2022-08-09 DIAGNOSIS — Z79.899 POLYPHARMACY: ICD-10-CM

## 2022-08-09 DIAGNOSIS — J43.2 CENTRILOBULAR EMPHYSEMA (HCC): ICD-10-CM

## 2022-08-09 PROCEDURE — G8427 DOCREV CUR MEDS BY ELIG CLIN: HCPCS | Performed by: INTERNAL MEDICINE

## 2022-08-09 PROCEDURE — G8417 CALC BMI ABV UP PARAM F/U: HCPCS | Performed by: INTERNAL MEDICINE

## 2022-08-09 PROCEDURE — 99211 OFF/OP EST MAY X REQ PHY/QHP: CPT | Performed by: INTERNAL MEDICINE

## 2022-08-09 PROCEDURE — 3017F COLORECTAL CA SCREEN DOC REV: CPT | Performed by: INTERNAL MEDICINE

## 2022-08-09 PROCEDURE — 3023F SPIROM DOC REV: CPT | Performed by: INTERNAL MEDICINE

## 2022-08-09 PROCEDURE — 3044F HG A1C LEVEL LT 7.0%: CPT | Performed by: INTERNAL MEDICINE

## 2022-08-09 PROCEDURE — 2022F DILAT RTA XM EVC RTNOPTHY: CPT | Performed by: INTERNAL MEDICINE

## 2022-08-09 PROCEDURE — 99213 OFFICE O/P EST LOW 20 MIN: CPT | Performed by: INTERNAL MEDICINE

## 2022-08-09 PROCEDURE — 4004F PT TOBACCO SCREEN RCVD TLK: CPT | Performed by: INTERNAL MEDICINE

## 2022-08-09 RX ORDER — AMLODIPINE BESYLATE 10 MG/1
10 TABLET ORAL DAILY
Qty: 90 TABLET | Refills: 1 | Status: SHIPPED | OUTPATIENT
Start: 2022-08-09

## 2022-08-09 RX ORDER — CARVEDILOL 12.5 MG/1
12.5 TABLET ORAL 2 TIMES DAILY
Qty: 180 TABLET | Refills: 1 | Status: SHIPPED | OUTPATIENT
Start: 2022-08-09

## 2022-08-09 RX ORDER — LISINOPRIL AND HYDROCHLOROTHIAZIDE 25; 20 MG/1; MG/1
TABLET ORAL
Qty: 90 TABLET | Refills: 1 | Status: SHIPPED | OUTPATIENT
Start: 2022-08-09

## 2022-08-09 RX ORDER — ATORVASTATIN CALCIUM 40 MG/1
40 TABLET, FILM COATED ORAL DAILY
Qty: 90 TABLET | Refills: 1 | Status: SHIPPED | OUTPATIENT
Start: 2022-08-09

## 2022-08-09 ASSESSMENT — PATIENT HEALTH QUESTIONNAIRE - PHQ9
3. TROUBLE FALLING OR STAYING ASLEEP: 1
SUM OF ALL RESPONSES TO PHQ QUESTIONS 1-9: 5
SUM OF ALL RESPONSES TO PHQ QUESTIONS 1-9: 5
8. MOVING OR SPEAKING SO SLOWLY THAT OTHER PEOPLE COULD HAVE NOTICED. OR THE OPPOSITE, BEING SO FIGETY OR RESTLESS THAT YOU HAVE BEEN MOVING AROUND A LOT MORE THAN USUAL: 0
SUM OF ALL RESPONSES TO PHQ9 QUESTIONS 1 & 2: 3
9. THOUGHTS THAT YOU WOULD BE BETTER OFF DEAD, OR OF HURTING YOURSELF: 0
2. FEELING DOWN, DEPRESSED OR HOPELESS: 3
4. FEELING TIRED OR HAVING LITTLE ENERGY: 1
7. TROUBLE CONCENTRATING ON THINGS, SUCH AS READING THE NEWSPAPER OR WATCHING TELEVISION: 0
SUM OF ALL RESPONSES TO PHQ QUESTIONS 1-9: 5
6. FEELING BAD ABOUT YOURSELF - OR THAT YOU ARE A FAILURE OR HAVE LET YOURSELF OR YOUR FAMILY DOWN: 0
1. LITTLE INTEREST OR PLEASURE IN DOING THINGS: 0
SUM OF ALL RESPONSES TO PHQ QUESTIONS 1-9: 5
10. IF YOU CHECKED OFF ANY PROBLEMS, HOW DIFFICULT HAVE THESE PROBLEMS MADE IT FOR YOU TO DO YOUR WORK, TAKE CARE OF THINGS AT HOME, OR GET ALONG WITH OTHER PEOPLE: 1
5. POOR APPETITE OR OVEREATING: 0

## 2022-08-09 NOTE — PROGRESS NOTES
[facility]    Progress Note    Date of patient's visit: 8/9/2022    Patient's Name:  Jimbo Leblanc    YOB: 1960            Patient Care Team:  Vernell Garcia MD as PCP - General (Internal Medicine)  Vernell Garcia MD as PCP - REHABILITATION [facility] Empaneled Provider  Stephie Moore DPM as Consulting Physician (Podiatry)  Reid Brady ([address])  Fabrizio Chow MD as Consulting Physician (Orthopedic Surgery)  Ryanne William MD as Anesthesiologist (Pain Management)  Samantha Mackenzie OD (Optometry)    REASON FOR VISIT: Routine outpatient follow     Chief Complaint   Patient presents with    Diabetes    Hypertension         HISTORY OF PRESENT ILLNESS:    History was obtained from the patient. Jimbo Leblanc is a 57 y.o. is here for follow-up. She does not smoke much anymore but sometimes does cigarettes from people if she sees them smoking. She says that patches do help with the craving. She has COPD. She says she is following up with her pulmonologist.  She states that she has PFTs which are pending. She denies any worsening of cough. Continues to follow-up with her pain management physician and neurosurgeon. She continues to have chronic back pain. She had spine surgery in 2015 followed by lumbar fusion in 2020. Now she is having some pain radiating down her left leg. She is working with physical therapy. She recently had another epidural injection done. She  \She takes gabapentin for pain and also tizanidine for muscle spasms along with Mobic. She also takes Norco as directed by pain management. She is also on duloxetine from her psychiatrist for pain and depression. She takes Remeron for sleep. She said she did discuss with her psychiatrist.  She is on 45 mg of Remeron. At this point have continued her on her current medications and no changes were made. She is advised she has polypharmacy.   She is also taking over-the-counter Benadryl along with all her sedative medications. She also takes cetirizine. Advised not to take anything over-the-counter and side effects of sedation with all her medications discussed with her. She needs some wheelchair repairs for her scooter. Advised her to have her company fax me as what ever forms are necessary.       Past Medical History:   Diagnosis Date    Allergic rhinitis     Alveolar hypoventilation 11/20/2020    Aortic insufficiency 2018    mild-moderate on echo (was seen and discharged from cardiology-[facility])    Bronchiectasis ([facility] 11/20/2020    Bronchitis     Chronic back pain     Pain management at [facility]    COPD (chronic obstructive pulmonary disease) ([facility]     Dr. Kamar Stanford to see 11/09/2020    Depression     DM (diabetes mellitus) ([facility] 12/18/2012    GERD (gastroesophageal reflux disease)     History of echocardiogram 05/2018    EF 65%, mild-moderate AI and TR    Hyperlipidemia     Dr. Jan Gage    Hypertension     Dr. Jan Gage    Obesity     Osteoarthritis     Peripheral vascular disease ([facility]     Primary osteoarthritis of both knees     Radicular pain of lumbosacral region     Spinal stenosis, lumbar region, without neurogenic claudication 04/30/2013    Tricuspid regurgitation 2018    mild-moderate on echo    Type II or unspecified type diabetes mellitus without mention of complication, not stated as uncontrolled     Dr. Jan Gage    Unspecified sleep apnea     no cpap used anymore    URI (upper respiratory infection)     Wears dentures     upper and lower full dentures    Wears glasses     Wellness examination     Dr. Jan Gage -PCP last visit in early Oct. 2020       Past Surgical History:   Procedure Laterality Date    COLONOSCOPY  2/12/2009    normal    DILATION AND CURETTAGE OF UTERUS      GASTRECTOMY      partial    LUMBAR DISCECTOMY  01/2015    lumbar diskectomy    LUMBAR FUSION  11/19/2020     POSTERIOR FUSION L4/5,     LUMBAR FUSION N/A 11/19/2020    POSTERIOR FUSION L4/5, release tablet TAKE 2 TABLETS BY MOUTH DAILY 60 tablet 1    meloxicam (MOBIC) 7.5 MG tablet TAKE 1 TABLET BY MOUTH DAILY 30 tablet 2    amLODIPine (NORVASC) 10 MG tablet TAKE 1 TABLET BY MOUTH DAILY 90 tablet 0    docusate sodium (COLACE) 100 MG capsule TAKE 1 CAPSULE BY MOUTH EVERY DAY 30 capsule 8    cetirizine (ZYRTEC) 10 MG tablet TAKE 1 TABLET BY MOUTH DAILY 30 tablet 3    gabapentin (NEURONTIN) 300 MG capsule Take 300 mg by mouth in the morning, at noon, and at bedtime. carvedilol (COREG) 12.5 MG tablet Take 1 tablet by mouth 2 times daily 60 tablet 5    Umeclidinium Bromide 62.5 MCG/INH AEPB Inhale 1 puff into the lungs daily 2 each 5    omeprazole (PRILOSEC) 20 MG delayed release capsule TAKE 1 CAPSULE BY MOUTH EVERY DAY 30 capsule 3    nicotine (NICODERM CQ) 7 MG/24HR Place 1 patch onto the skin every 24 hours 30 patch 3    lisinopril-hydroCHLOROthiazide (PRINZIDE;ZESTORETIC) 20-25 MG per tablet TAKE 1 TABLET EVERY DAY 90 tablet 0    mirtazapine (REMERON) 30 MG tablet Take 30 mg by mouth nightly      tiZANidine (ZANAFLEX) 4 MG tablet Take 1 tablet by mouth 2 times daily 60 tablet 2    azelastine (ASTELIN) 0.1 % nasal spray 2 sprays by Nasal route 2 times daily Use in each nostril as directed 1 each 2    albuterol sulfate HFA (VENTOLIN HFA) 108 (90 Base) MCG/ACT inhaler Inhale 2 puffs into the lungs every 6 hours as needed for Wheezing 1 each 3    diphenhydrAMINE (BENADRYL) 25 MG tablet Take 25 mg by mouth every 6 hours as needed for Itching      mirtazapine (REMERON) 15 MG tablet Take 15 mg by mouth nightly      DULoxetine (CYMBALTA) 60 MG extended release capsule Take 1 capsule by mouth daily 30 capsule 3    ipratropium-albuterol (DUONEB) 0.5-2.5 (3) MG/3ML SOLN nebulizer solution Inhale 3 mLs into the lungs every 6 hours as needed for Shortness of Breath 360 mL 11     No current facility-administered medications on file prior to visit. HISTORY    Reviewed and no change from previous record. Carolina  reports that she has been smoking cigarettes. She has a 9.00 pack-year smoking history. She has never used smokeless tobacco.    FAMILY HISTORY:    Reviewed and No change from previous visit    HEALTH MAINTENANCE DUE:      Health Maintenance Due   Topic Date Due    COVID-19 Vaccine (3 - Booster for Pf[employer] series) 10/10/2021       REVIEW OF SYSTEMS:    12 point review of symptoms completed and found to be normal except noted in the HPI    Review of Systems   Constitutional: Negative for diaphoresis and fatigue. Eyes: Negative for photophobia and visual disturbance. Respiratory: Positive for cough. Negative for shortness of breath and wheezing. Cardiovascular: Negative for chest pain, palpitations and leg swelling. Endocrine: Negative for polydipsia and polyuria. Genitourinary: Negative for dysuria and frequency. Musculoskeletal: Positive for arthralgias and back pain. Neurological: Positive for numbness. Negative for dizziness, syncope, weakness and headaches. Hematological: Negative for adenopathy. Does not bruise/bleed easily. Psychiatric/Behavioral: Negative for dysphoric mood and sleep disturbance. The patient is not nervous/anxious. PHYSICAL EXAM:     Vitals:    08/09/22 0846   BP: 120/72   Site: Left Upper Arm   Position: Sitting   Cuff Size: Medium Adult   Pulse: 74   Temp: 97.6 °F (36.4 °C)   TempSrc: Infrared   SpO2: 93%   Weight: 191 lb (86.6 kg)     Body mass index is 31.78 kg/m². BP Readings from Last 3 Encounters:   08/09/22 120/72   08/01/22 (!) 151/94   07/26/22 117/64        Wt Readings from Last 3 Encounters:   08/09/22 191 lb (86.6 kg)   08/01/22 180 lb (81.6 kg)   06/30/22 180 lb (81.6 kg)       Physical Exam      Vitals and nursing note reviewed. Constitutional:       Appearance: Normal appearance. HENT:     Head: Normocephalic and atraumatic. Eyes:     Extraocular Movements: Extraocular movements intact.       Conjunctiva/sclera: Conjunctivae normal.      Pupils: Pupils are equal, round, and reactive to light. Cardiovascular:     Rate and Rhythm: Normal rate and regular rhythm. Heart sounds: Murmur heard. Pulmonary:     Effort: Pulmonary effort is normal.      Breath sounds: Normal breath sounds. No wheezing. Musculoskeletal:      Right lower leg: No edema. Left lower leg: No edema. Skin:     General: Skin is warm and dry. Neurological:     General: No focal deficit present. Mental Status: She is alert and oriented to person, place, and time. Psychiatric:         Mood and Affect: Mood normal.     LABORATORY FINDINGS:    CBC:  Lab Results   Component Value Date/Time    WBC 6.3 05/11/2022 09:21 AM    HGB 14.7 05/11/2022 09:21 AM     05/11/2022 09:21 AM     02/16/2012 09:02 AM     BMP:    Lab Results   Component Value Date/Time     05/11/2022 09:21 AM    K 3.7 05/11/2022 09:21 AM     05/11/2022 09:21 AM    CO2 20 05/11/2022 09:21 AM    BUN 19 05/11/2022 09:21 AM    CREATININE 0.75 05/11/2022 09:21 AM    GLUCOSE 97 05/11/2022 09:21 AM     HEMOGLOBIN A1C:   Lab Results   Component Value Date/Time    LABA1C 5.8 04/29/2022 02:28 PM     MICROALBUMIN URINE:   Lab Results   Component Value Date/Time    MICROALBUR 14 10/11/2021 09:51 AM     FASTING LIPID PANEL:  Lab Results   Component Value Date    CHOL 191 05/11/2022    HDL 44 05/11/2022    TRIG 79 05/11/2022     Lab Results   Component Value Date    LDLCHOLESTEROL 131 (H) 05/11/2022       LIVER PROFILE:  Lab Results   Component Value Date/Time    ALT 10 01/26/2022 09:02 AM    AST 15 01/26/2022 09:02 AM    PROT 7.8 01/26/2022 09:02 AM    BILITOT 0.33 01/26/2022 09:02 AM    BILIDIR 0.11 06/18/2018 03:10 PM    LABALBU 4.1 01/26/2022 09:02 AM      THYROID FUNCTION:   Lab Results   Component Value Date/Time    TSH 1.82 01/26/2022 09:02 AM      URINEANALYSIS: No results found for: LABURIN  ASSESSMENT AND PLAN:    1. Essential hypertension    - carvedilol (COREG) 12.5 MG tablet;  Take 1 tablet by mouth in the morning and 1 tablet before bedtime. Dispense: 180 tablet; Refill: 1  - amLODIPine (NORVASC) 10 MG tablet; Take 1 tablet by mouth in the morning. Dispense: 90 tablet; Refill: 1    2. Type 2 diabetes mellitus without complication, without long-term current use of insulin (HCC)    - atorvastatin (LIPITOR) 40 MG tablet; Take 1 tablet by mouth in the morning. Dispense: 90 tablet; Refill: 1    3. Centrilobular emphysema ([address]  Get PFT's from Pulm  CT chest done last year    4. Pure hypercholesterolemia    - atorvastatin (LIPITOR) 40 MG tablet; Take 1 tablet by mouth in the morning. Dispense: 90 tablet; Refill: 1    5. Smoker    - nicotine (NICODERM CQ) 7 MG/24HR; Place 1 patch onto the skin every 24 hours  Dispense: 30 patch; Refill: 3    6. Polypharmacy  Warned about sedation and falls  Avoid OTC meds          FOLLOW UP AND INSTRUCTIONS:   Return in about 6 months (around 2/9/2023). Carolina received counseling on the following healthy behaviors: nutrition, exercise, medication adherence, and tobacco cessation    Reviewed prior labs and health maintenance. Discussed use, benefit, and side effects of prescribed medications. Barriers to medication compliance addressed. All patient questions answered. Pt voiced understanding.        Amada Sheffield  Attending Physician, [address] [place] [address], Internal Medicine Residency Program  [address] [place] [address]  8/9/2022, 8:50 AM

## 2022-08-24 ENCOUNTER — TELEPHONE (OUTPATIENT)
Dept: PAIN MANAGEMENT | Age: 62
End: 2022-08-24

## 2022-08-25 ENCOUNTER — HOSPITAL ENCOUNTER (OUTPATIENT)
Dept: PAIN MANAGEMENT | Age: 62
Discharge: HOME | End: 2022-08-25
Payer: COMMERCIAL

## 2022-08-25 VITALS
SYSTOLIC BLOOD PRESSURE: 127 MMHG | OXYGEN SATURATION: 89 % | DIASTOLIC BLOOD PRESSURE: 78 MMHG | BODY MASS INDEX: 31.82 KG/M2 | HEIGHT: 65 IN | WEIGHT: 191 LBS | TEMPERATURE: 97.9 F | HEART RATE: 76 BPM

## 2022-08-25 DIAGNOSIS — M47.26 OSTEOARTHRITIS OF SPINE WITH RADICULOPATHY, LUMBAR REGION: ICD-10-CM

## 2022-08-25 DIAGNOSIS — M54.16 CHRONIC LUMBAR RADICULOPATHY: ICD-10-CM

## 2022-08-25 DIAGNOSIS — Z79.891 CHRONIC USE OF OPIATE DRUG FOR THERAPEUTIC PURPOSE: Primary | ICD-10-CM

## 2022-08-25 DIAGNOSIS — M96.1 FAILED BACK SYNDROME: ICD-10-CM

## 2022-08-25 PROCEDURE — 99213 OFFICE O/P EST LOW 20 MIN: CPT | Performed by: NURSE PRACTITIONER

## 2022-08-25 PROCEDURE — 99213 OFFICE O/P EST LOW 20 MIN: CPT

## 2022-08-25 RX ORDER — HYDROCODONE BITARTRATE AND ACETAMINOPHEN 5; 325 MG/1; MG/1
1 TABLET ORAL EVERY 8 HOURS PRN
Qty: 90 TABLET | Refills: 0 | Status: SHIPPED | OUTPATIENT
Start: 2022-08-29 | End: 2022-09-27 | Stop reason: SDUPTHER

## 2022-08-25 RX ORDER — MELOXICAM 7.5 MG/1
7.5 TABLET ORAL DAILY
Qty: 30 TABLET | Refills: 2 | Status: SHIPPED | OUTPATIENT
Start: 2022-08-25 | End: 2022-09-30

## 2022-08-25 ASSESSMENT — ENCOUNTER SYMPTOMS
SHORTNESS OF BREATH: 0
CONSTIPATION: 0
BOWEL INCONTINENCE: 0
BACK PAIN: 1
COUGH: 0

## 2022-08-25 ASSESSMENT — PAIN DESCRIPTION - FREQUENCY: FREQUENCY: CONTINUOUS

## 2022-08-25 ASSESSMENT — PAIN DESCRIPTION - LOCATION: LOCATION: BACK

## 2022-08-25 ASSESSMENT — PAIN DESCRIPTION - ORIENTATION: ORIENTATION: LOWER;LEFT

## 2022-08-25 ASSESSMENT — PAIN DESCRIPTION - DIRECTION: RADIATING_TOWARDS: DOES NOT RADIATE

## 2022-08-25 ASSESSMENT — PAIN SCALES - GENERAL: PAINLEVEL_OUTOF10: 7

## 2022-08-25 ASSESSMENT — PAIN DESCRIPTION - PAIN TYPE: TYPE: CHRONIC PAIN

## 2022-08-25 ASSESSMENT — PAIN DESCRIPTION - DESCRIPTORS: DESCRIPTORS: STABBING;ACHING

## 2022-08-25 NOTE — PROGRESS NOTES
Chief Complaint   Patient presents with    Back Pain    Medication Refill         PM     Chronic onset many years ago located in the lower lumbar area  Reports radiation of pain down both legs  Associated with leg numbness  Pain aggravated with routine activity  Past history significant for 2 lumbar spine surgery  Last surgery was in 2020 for right leg sciatica symptoms  Initially noticed improvement but now have pain in both legs  Had recent follow-up with neurosurgery with no further surgery is recommended at this time  Have follow-up x-ray that showed stable fusion at L4-5  Currently on multimodal medication regimen Norco and Mobic with little relief  Pain intensity 10 out of 10 at times  Doing home exercise and physical therapy advised patient to complete physical therapy and will consider obtaining new MRI lumbar spine with contrast after physical therapy if no improvement   Here today for f/u after L5 S1 lumbar epidural steroid injection and reports significant relief for 2 days       Back Pain  This is a chronic problem. The current episode started more than 1 year ago. The problem occurs constantly. The problem is unchanged. The pain is present in the lumbar spine. The quality of the pain is described as aching and stabbing. The pain does not radiate. The pain is at a severity of 7/10. The pain is moderate. The pain is Worse during the night. The symptoms are aggravated by position, twisting, bending, lying down, standing and sitting. Stiffness is present In the morning. Pertinent negatives include no bladder incontinence, bowel incontinence, chest pain or fever. She has tried heat, ice, walking, bed rest, home exercises, muscle relaxant, chiropractic manipulation and NSAIDs for the symptoms. The treatment provided mild relief. Patient denies any new neurological symptoms. No bowel or bladder incontinence, no weakness, and no falling.     Pill count: 14 prescription adjusted appropriately 8/28    Morphine equivalent: 15    Controlled Substance Monitoring:    Acute and Chronic Pain Monitoring:   RX Monitoring 8/25/2022   Attestation -   Acute Pain Prescriptions -   Periodic Controlled Substance Monitoring Possible medication side effects, risk of tolerance/dependence & alternative treatments discussed. ;No signs of potential drug abuse or diversion identified. ;Assessed functional status. ;Obtaining appropriate analgesic effect of treatment. Chronic Pain > 50 MEDD -   Chronic Pain > 80 MEDD -          Periodic Controlled Substance Monitoring: Possible medication side effects, risk of tolerance/dependence & alternative treatments discussed., No signs of potential drug abuse or diversion identified. , Assessed functional status., Obtaining appropriate analgesic effect of treatment.  Flavia Palm, APRN - CNP)      Past Medical History:   Diagnosis Date    Allergic rhinitis     Alveolar hypoventilation 11/20/2020    Aortic insufficiency 2018    mild-moderate on echo (was seen and discharged from cardiology-[facility])    Bronchiectasis ([facility] 11/20/2020    Bronchitis     Chronic back pain     Pain management at [facility]    COPD (chronic obstructive pulmonary disease) ([facility]     Dr. Mono Yu to see 11/09/2020    Depression     DM (diabetes mellitus) ([facility] 12/18/2012    GERD (gastroesophageal reflux disease)     History of echocardiogram 05/2018    EF 65%, mild-moderate AI and TR    Hyperlipidemia     Dr. Pk Loomis    Hypertension     Dr. Pk Loomis    Obesity     Osteoarthritis     Peripheral vascular disease ([facility]     Primary osteoarthritis of both knees     Radicular pain of lumbosacral region     Spinal stenosis, lumbar region, without neurogenic claudication 04/30/2013    Tricuspid regurgitation 2018    mild-moderate on echo    Type II or unspecified type diabetes mellitus without mention of complication, not stated as uncontrolled     Dr. Pk Loomis    Unspecified sleep apnea     no cpap used anymore    URI (upper respiratory infection)     Wears dentures     upper and lower full dentures    Wears glasses     Wellness examination     Dr. Lyric Molina -PCP last visit in early Oct. 2020       Past Surgical History:   Procedure Laterality Date    COLONOSCOPY  2/12/2009    normal    DILATION AND CURETTAGE OF UTERUS      GASTRECTOMY      partial    LUMBAR DISCECTOMY  01/2015    lumbar diskectomy    LUMBAR FUSION  11/19/2020     POSTERIOR FUSION L4/5,     LUMBAR FUSION N/A 11/19/2020    POSTERIOR FUSION L4/5, [employer], Damion Byers, EVOKES #[id number] AMINA performed by Marcia Sullivan DO at [facility] Right 4/30/13    Lumbar Diagnostic Block,  Kenalog 40 mg    NERVE BLOCK  5/23/13    Lumbar Radiofrequency, Kenalog 40mg    NERVE BLOCK  8/12/13    Lt MBNB  celestone 6mg    NERVE BLOCK Left 8-28-13    left lumbar diagnostic block #2 decadron 10 mg    NERVE BLOCK Left 9-24-13    left lumbar median branch radiofrequency    NERVE BLOCK  07-02-14    caudal, celestone 9 mg    NERVE BLOCK  7-16-14    caudal epidural #2, celestone 9mg, fentanyl 25mcg    NERVE BLOCK  7/30/14    caudal #3 decadron 10mg    NERVE BLOCK  11-6-14    duramorph epidural steroid block  duramorph 1 mg celestone 9 mg    NERVE BLOCK  11/20/15    TENS- Empi Select    NERVE BLOCK  07/20/2018    right transforminal # 1 decadron 10mg,isovue    NERVE BLOCK Bilateral 02/01/2019    bilat mbnb- no steroid    NERVE BLOCK Bilateral 02/08/2019    bilat mbnb, marcaine . 25%    GA KNEE SCOPE,DIAGNOSTIC Right 3/24/2017    KNEE ARTHROSCOPY WITH PARTIAL MEDIAL MENISECAL DEBRIDMENT  performed by Anastasia Zelaya MD at [address]  9 20 2007    UPPER GASTROINTESTINAL ENDOSCOPY  4 21 2009,04/2011    gastritis, esophagitis       Allergies   Allergen Reactions    Aspirin      DUE TO ULCER    Claritin [Loratadine] Other (See Comments)     coughing    Flonase [Fluticasone Propionate]     Morphine And Related      GI Upset         Current Outpatient Medications:     [START ON 8/29/2022] HYDROcodone-acetaminophen (NORCO) 5-325 MG per tablet, Take 1 tablet by mouth every 8 hours as needed for Pain for up to 30 days. , Disp: 90 tablet, Rfl: 0    meloxicam (MOBIC) 7.5 MG tablet, Take 1 tablet by mouth daily, Disp: 30 tablet, Rfl: 2    nicotine (NICODERM CQ) 7 MG/24HR, Place 1 patch onto the skin every 24 hours, Disp: 30 patch, Rfl: 3    lisinopril-hydroCHLOROthiazide (PRINZIDE;ZESTORETIC) 20-25 MG per tablet, 1 tablet daily, Disp: 90 tablet, Rfl: 1    carvedilol (COREG) 12.5 MG tablet, Take 1 tablet by mouth in the morning and 1 tablet before bedtime. , Disp: 180 tablet, Rfl: 1    amLODIPine (NORVASC) 10 MG tablet, Take 1 tablet by mouth in the morning., Disp: 90 tablet, Rfl: 1    atorvastatin (LIPITOR) 40 MG tablet, Take 1 tablet by mouth in the morning., Disp: 90 tablet, Rfl: 1    trospium (SANCTURA) 20 MG tablet, Take 1 tablet by mouth in the morning and 1 tablet before bedtime. , Disp: 60 tablet, Rfl: 0    potassium chloride (KLOR-CON M) 10 MEQ extended release tablet, TAKE 2 TABLETS BY MOUTH DAILY, Disp: 60 tablet, Rfl: 1    docusate sodium (COLACE) 100 MG capsule, TAKE 1 CAPSULE BY MOUTH EVERY DAY, Disp: 30 capsule, Rfl: 8    cetirizine (ZYRTEC) 10 MG tablet, TAKE 1 TABLET BY MOUTH DAILY, Disp: 30 tablet, Rfl: 3    gabapentin (NEURONTIN) 300 MG capsule, Take 300 mg by mouth in the morning, at noon, and at bedtime. , Disp: , Rfl:     Umeclidinium Bromide 62.5 MCG/INH AEPB, Inhale 1 puff into the lungs daily, Disp: 2 each, Rfl: 5    omeprazole (PRILOSEC) 20 MG delayed release capsule, TAKE 1 CAPSULE BY MOUTH EVERY DAY, Disp: 30 capsule, Rfl: 3    mirtazapine (REMERON) 30 MG tablet, Take 30 mg by mouth nightly, Disp: , Rfl:     tiZANidine (ZANAFLEX) 4 MG tablet, Take 1 tablet by mouth 2 times daily, Disp: 60 tablet, Rfl: 2    azelastine (ASTELIN) 0.1 % nasal spray, 2 sprays by Nasal route 2 times daily Use in each nostril as directed, Disp: 1 each, Rfl: 2    albuterol sulfate HFA (VENTOLIN HFA) 108 (90 Base) MCG/ACT inhaler, Inhale 2 puffs into the lungs every 6 hours as needed for Wheezing, Disp: 1 each, Rfl: 3    diphenhydrAMINE (BENADRYL) 25 MG tablet, Take 25 mg by mouth every 6 hours as needed for Itching, Disp: , Rfl:     mirtazapine (REMERON) 15 MG tablet, Take 15 mg by mouth nightly, Disp: , Rfl:     DULoxetine (CYMBALTA) 60 MG extended release capsule, Take 1 capsule by mouth daily, Disp: 30 capsule, Rfl: 3    ipratropium-albuterol (DUONEB) 0.5-2.5 (3) MG/3ML SOLN nebulizer solution, Inhale 3 mLs into the lungs every 6 hours as needed for Shortness of Breath, Disp: 360 mL, Rfl: 11    Family History   Problem Relation Age of Onset    Diabetes Mother     Heart Disease Mother     Cirrhosis Father        Social History     Socioeconomic History    Marital status: Single     Spouse name: Not on file    Number of children: Not on file    Years of education: Not on file    Highest education level: Not on file   Occupational History     Employer: DISABLED   Tobacco Use    Smoking status: Every Day     Packs/day: 0.25     Years: 36.00     Pack years: 9.00     Types: Cigarettes    Smokeless tobacco: Never    Tobacco comments:     3 cigs per day   on nicoderm patch   Vaping Use    Vaping Use: Never used   Substance and Sexual Activity    Alcohol use: No     Alcohol/week: 0.0 standard drinks    Drug use: No     Comment: 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 7 yrs    Sexual activity: Never   Other Topics Concern    Not on file   Social History Narrative    10/9/20 Patient keeping contact with others to a minimum due to COVID 19 Pandemic.     10/9/20 Patient has difficulty with ambulation due to DJD, stenosis and fibromyalgia     Social Determinants of Health     Financial Resource Strain: Medium Risk    Difficulty of Paying Living Expenses: Somewhat hard   Food Insecurity: No Food Insecurity    Worried About Running Out of Food in the Last Year: Never true was appropriate     Assessment:  Problem List Items Addressed This Visit       Osteoarthritis of spine with radiculopathy, lumbar region    Relevant Medications    HYDROcodone-acetaminophen (NORCO) 5-325 MG per tablet (Start on 8/29/2022)    meloxicam (MOBIC) 7.5 MG tablet    Chronic lumbar radiculopathy    Relevant Medications    HYDROcodone-acetaminophen (NORCO) 5-325 MG per tablet (Start on 8/29/2022)    Failed back syndrome    Relevant Medications    HYDROcodone-acetaminophen (NORCO) 5-325 MG per tablet (Start on 8/29/2022)    Chronic use of opiate drug for therapeutic purpose - Primary    Relevant Medications    HYDROcodone-acetaminophen (NORCO) 5-325 MG per tablet (Start on 8/29/2022)          Treatment Plan:  Patient relates current medications are helping the pain. Patient reports taking pain medications as prescribed, denies obtaining medications from different sources and denies use of illegal drugs. Patient denies side effects from medications like nausea, vomiting, constipation or drowsiness. Patient reports current activities of daily living are possible due to medications and would like to continue them. As always, we encourage daily stretching and strengthening exercises, and recommend minimizing use of pain medications unless patient cannot get through daily activities due to pain. Contract requirements met. Continue opioid therapy. Script written for norco and mobic   Follow up appointment made for 4 weeks    I have reviewed the chief complaint and history of present illness (including ROS and PFSH) and vital documentation by my staff and I agree with their documentation and have added where applicable.

## 2022-09-01 DIAGNOSIS — K21.9 GASTROESOPHAGEAL REFLUX DISEASE, UNSPECIFIED WHETHER ESOPHAGITIS PRESENT: ICD-10-CM

## 2022-09-01 NOTE — TELEPHONE ENCOUNTER
E-scribe request for [facility] . Please review and e-scribe if applicable. Next Visit Date:  Future Appointments   Date Time Provider Claudy Schaeffer   9/27/2022 10:00 AM Noralee Mantis, APRN - CNP [place] Maintenance   Topic Date Due    COVID-19 Vaccine (3 - Booster for Pfizer series) 10/10/2021    Flu vaccine (1) 09/01/2022    Diabetic retinal exam  10/31/2022 (Originally 5/14/2021)    Diabetic microalbuminuria test  10/11/2022    Annual Wellness Visit (AWV)  10/22/2022    Diabetic foot exam  12/16/2022    A1C test (Diabetic or Prediabetic)  04/29/2023    Lipids  05/11/2023    Depression Monitoring  08/09/2023    Colorectal Cancer Screen  10/05/2023    Breast cancer screen  05/18/2024    Cervical cancer screen  03/02/2026    DTaP/Tdap/Td vaccine (2 - Td or Tdap) 06/24/2029    Shingles vaccine  Completed    Pneumococcal 0-64 years Vaccine  Completed    Hepatitis C screen  Completed    HIV screen  Completed    Hepatitis A vaccine  Aged Out    Hib vaccine  Aged Out    Meningococcal (ACWY) vaccine  Aged Out               (applicable per patient's age: Cancer Screenings, Depression Screening, Fall Risk Screening, Immunizations)    Hemoglobin A1C (%)   Date Value   04/29/2022 5.8   10/26/2021 5.7   03/02/2021 5.5     Microalb/Crt.  Ratio (mcg/mg creat)   Date Value   10/11/2021 11     LDL Cholesterol (mg/dL)   Date Value   05/11/2022 131 (H)     AST (U/L)   Date Value   01/26/2022 15     ALT (U/L)   Date Value   01/26/2022 10     BUN (mg/dL)   Date Value   05/11/2022 19      (goal A1C is < 7)   (goal LDL is <100) need 30-50% reduction from baseline     BP Readings from Last 3 Encounters:   08/25/22 127/78   08/09/22 120/72   08/01/22 (!) 151/94    (goal /80)      All Future Testing planned in [facility]:  Lab Frequency Next Occurrence   DRUG SCREEN, PAIN Once 02/28/2022   LA NJX DX/THER SBST INTRLMNR LMBR/SAC W/IMG GDN Once 07/01/2022            Patient Active Problem List:     MAHENDRA (degenerative joint disease) of knee     Osteoarthritis of spine with radiculopathy, lumbar region     Chronic lumbar radiculopathy     GERD (gastroesophageal reflux disease)     COPD, severity to be determined (HCC)     HTN (hypertension)     Allergic rhinitis     Lipoma of shoulder s/p excision right posterior 11 17 2008     History of tobacco use     DM (diabetes mellitus)     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 9)     Bronchiectasis ([address]     Centrilobular emphysema ([address]     Oxygen dependent     Acute postoperative anemia due to expected blood loss     Multifocal pneumonia     Acute on chronic respiratory failure with hypoxia (HCC)     Pulmonary function studies abnormal     Tobacco dependence     Idiopathic sleep related nonobstructive alveolar hypoventilation     Failed back syndrome     Trigger finger of right thumb     Chronic use of opiate drug for therapeutic purpose

## 2022-09-02 RX ORDER — OMEPRAZOLE 20 MG/1
CAPSULE, DELAYED RELEASE ORAL
Qty: 30 CAPSULE | Refills: 2 | Status: SHIPPED | OUTPATIENT
Start: 2022-09-02

## 2022-09-06 ENCOUNTER — TELEPHONE (OUTPATIENT)
Dept: INTERNAL MEDICINE | Age: 62
End: 2022-09-06

## 2022-09-06 DIAGNOSIS — J44.9 COPD, SEVERITY TO BE DETERMINED (HCC): Primary | ICD-10-CM

## 2022-09-06 DIAGNOSIS — J20.9 ACUTE BRONCHITIS, UNSPECIFIED ORGANISM: ICD-10-CM

## 2022-09-06 RX ORDER — AZITHROMYCIN 250 MG/1
250 TABLET, FILM COATED ORAL SEE ADMIN INSTRUCTIONS
Qty: 6 TABLET | Refills: 0 | Status: SHIPPED | OUTPATIENT
Start: 2022-09-06 | End: 2022-09-11
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
## 2022-09-06 NOTE — TELEPHONE ENCOUNTER
PC to pt to let her know medication sent in, agrees to call office if improvement not happening within a couple days.

## 2022-09-12 DIAGNOSIS — J30.9 CHRONIC ALLERGIC RHINITIS: ICD-10-CM

## 2022-09-12 NOTE — TELEPHONE ENCOUNTER
E-scribe request for Zyrtec. Please review and e-scribe if applicable. Next Visit Date:  Future Appointments   Date Time Provider Claudy Schaeffer   9/27/2022 10:00 AM Sylvia Ripple, APRN - CNP [place] Maintenance   Topic Date Due    COVID-19 Vaccine (3 - Booster for Pfizer series) 10/10/2021    Flu vaccine (1) 09/01/2022    Diabetic microalbuminuria test  10/11/2022    Diabetic retinal exam  10/31/2022 (Originally 5/14/2021)    Annual Wellness Visit (AWV)  10/22/2022    Diabetic foot exam  12/16/2022    A1C test (Diabetic or Prediabetic)  04/29/2023    Lipids  05/11/2023    Depression Monitoring  08/09/2023    Colorectal Cancer Screen  10/05/2023    Breast cancer screen  05/18/2024    Cervical cancer screen  03/02/2026    DTaP/Tdap/Td vaccine (2 - Td or Tdap) 06/24/2029    Shingles vaccine  Completed    Pneumococcal 0-64 years Vaccine  Completed    Hepatitis C screen  Completed    HIV screen  Completed    Hepatitis A vaccine  Aged Out    Hib vaccine  Aged Out    Meningococcal (ACWY) vaccine  Aged Out               (applicable per patient's age: Cancer Screenings, Depression Screening, Fall Risk Screening, Immunizations)    Hemoglobin A1C (%)   Date Value   04/29/2022 5.8   10/26/2021 5.7   03/02/2021 5.5     Microalb/Crt.  Ratio (mcg/mg creat)   Date Value   10/11/2021 11     LDL Cholesterol (mg/dL)   Date Value   05/11/2022 131 (H)     AST (U/L)   Date Value   01/26/2022 15     ALT (U/L)   Date Value   01/26/2022 10     BUN (mg/dL)   Date Value   05/11/2022 19      (goal A1C is < 7)   (goal LDL is <100) need 30-50% reduction from baseline     BP Readings from Last 3 Encounters:   08/25/22 127/78   08/09/22 120/72   08/01/22 (!) 151/94    (goal /80)      All Future Testing planned in [facility]:  Lab Frequency Next Occurrence   DRUG SCREEN, PAIN Once 02/28/2022   FL NJX DX/THER SBST INTRLMNR LMBR/SAC W/IMG GDN Once 07/01/2022   CT CHEST WO CONTRAST Once 09/03/2022

## 2022-09-13 RX ORDER — CETIRIZINE HYDROCHLORIDE 10 MG/1
TABLET ORAL
Qty: 30 TABLET | Refills: 2 | Status: SHIPPED | OUTPATIENT
Start: 2022-09-13

## 2022-09-16 RX ORDER — TIZANIDINE 4 MG/1
4 TABLET ORAL 2 TIMES DAILY
Qty: 60 TABLET | Refills: 1 | OUTPATIENT
Start: 2022-09-16

## 2022-09-16 NOTE — TELEPHONE ENCOUNTER
Zanaflex refill    Last seen 08/09/22     Health Maintenance   Topic Date Due    COVID-19 Vaccine (3 - Booster for Pfizer series) 10/10/2021    Flu vaccine (1) 09/01/2022    Diabetic microalbuminuria test  10/11/2022    Diabetic retinal exam  10/31/2022 (Originally 5/14/2021)    Annual Wellness Visit (AWV)  10/22/2022    Diabetic foot exam  12/16/2022    A1C test (Diabetic or Prediabetic)  04/29/2023    Lipids  05/11/2023    Depression Monitoring  08/09/2023    Colorectal Cancer Screen  10/05/2023    Breast cancer screen  05/18/2024    Cervical cancer screen  03/02/2026    DTaP/Tdap/Td vaccine (2 - Td or Tdap) 06/24/2029    Shingles vaccine  Completed    Pneumococcal 0-64 years Vaccine  Completed    Hepatitis C screen  Completed    HIV screen  Completed    Hepatitis A vaccine  Aged Out    Hib vaccine  Aged Out    Meningococcal (ACWY) vaccine  Aged Out             (applicable per patient's age: Cancer Screenings, Depression Screening, Fall Risk Screening, Immunizations)    Hemoglobin A1C (%)   Date Value   04/29/2022 5.8   10/26/2021 5.7   03/02/2021 5.5     Microalb/Crt.  Ratio (mcg/mg creat)   Date Value   10/11/2021 11     LDL Cholesterol (mg/dL)   Date Value   05/11/2022 131 (H)     AST (U/L)   Date Value   01/26/2022 15     ALT (U/L)   Date Value   01/26/2022 10     BUN (mg/dL)   Date Value   05/11/2022 19      (goal A1C is < 7)   (goal LDL is <100) need 30-50% reduction from baseline     BP Readings from Last 3 Encounters:   08/25/22 127/78   08/09/22 120/72   08/01/22 (!) 151/94    (goal /80)      All Future Testing planned in [facility]:  Lab Frequency Next Occurrence   DRUG SCREEN, PAIN Once 02/28/2022   FL NJX DX/THER SBST INTRLMNR LMBR/SAC W/IMG GDN Once 07/01/2022   CT CHEST WO CONTRAST Once 09/03/2022       Next Visit Date:  Future Appointments   Date Time Provider Claudy Schaeffer   9/21/2022  9:00 AM [facility]EDULE[facility]   9/27/2022 10:00 AM ELVIRA Willis - CNP [facility] [address]            Patient Active Problem List:     DJD (degenerative joint disease) of knee     Osteoarthritis of spine with radiculopathy, lumbar region     Chronic lumbar radiculopathy     GERD (gastroesophageal reflux disease)     COPD, severity to be determined ([address]     HTN (hypertension)     Allergic rhinitis     Lipoma of shoulder s/p excision right posterior 11 17 2008     History of tobacco use     DM (diabetes mellitus)     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 9)     Bronchiectasis ([address]     Centrilobular emphysema ([address]     Oxygen dependent     Acute postoperative anemia due to expected blood loss     Multifocal pneumonia     Acute on chronic respiratory failure with hypoxia (HCC)     Pulmonary function studies abnormal     Tobacco dependence     Idiopathic sleep related nonobstructive alveolar hypoventilation     Failed back syndrome     Trigger finger of right thumb     Chronic use of opiate drug for therapeutic purpose

## 2022-09-19 RX ORDER — TIZANIDINE 4 MG/1
4 TABLET ORAL 2 TIMES DAILY
Qty: 60 TABLET | Refills: 2 | Status: SHIPPED | OUTPATIENT
Start: 2022-09-19

## 2022-09-21 ENCOUNTER — NURSE ONLY (OUTPATIENT)
Dept: INTERNAL MEDICINE | Age: 62
End: 2022-09-21
Payer: COMMERCIAL

## 2022-09-21 VITALS — TEMPERATURE: 97.4 F

## 2022-09-21 DIAGNOSIS — Z23 NEEDS FLU SHOT: Primary | ICD-10-CM

## 2022-09-21 PROCEDURE — 90686 IIV4 VACC NO PRSV 0.5 ML IM: CPT | Performed by: INTERNAL MEDICINE

## 2022-09-21 PROCEDURE — 99999 PR OFFICE/OUTPT VISIT,PROCEDURE ONLY: CPT | Performed by: INTERNAL MEDICINE

## 2022-09-21 NOTE — PROGRESS NOTES
Pt is here for annual Flu vaccination. Pt presents with no complaints and is afebrile. Flu vaccine given in left deltoid. Tolerated immunization well, VIS given to patient. No s/sx of reaction at this time. Vaccine Information Sheet, \"Influenza - Inactivated\"  given to Gemma Esteves, or parent/legal guardian of  Gemma Esteves and verbalized understanding. Patient responses:    Is the person being vaccinated sick today? No    Does the person to be vaccinated have an allergy to a component of the vaccine? No    Has the person to be vaccinated ever had a reaction to the flu vaccine in the past?  No    Have you ever had Guillian [place] Syndrome? No    Flu vaccine given per order. Please see immunization tab.

## 2022-09-25 ENCOUNTER — HOSPITAL ENCOUNTER (EMERGENCY)
Age: 62
Discharge: HOME | End: 2022-09-25
Attending: EMERGENCY MEDICINE
Payer: COMMERCIAL

## 2022-09-25 ENCOUNTER — TELEPHONE (OUTPATIENT)
Dept: INTERNAL MEDICINE | Age: 62
End: 2022-09-25

## 2022-09-25 VITALS
HEART RATE: 85 BPM | TEMPERATURE: 97.8 F | WEIGHT: 187 LBS | OXYGEN SATURATION: 93 % | HEIGHT: 65 IN | BODY MASS INDEX: 31.16 KG/M2 | DIASTOLIC BLOOD PRESSURE: 98 MMHG | RESPIRATION RATE: 16 BRPM | SYSTOLIC BLOOD PRESSURE: 149 MMHG

## 2022-09-25 DIAGNOSIS — R68.84 JAW PAIN: Primary | ICD-10-CM

## 2022-09-25 LAB — TROPONIN, HIGH SENSITIVITY: 13 NG/L (ref 0–14)

## 2022-09-25 PROCEDURE — 99284 EMERGENCY DEPT VISIT MOD MDM: CPT

## 2022-09-25 PROCEDURE — 93005 ELECTROCARDIOGRAM TRACING: CPT | Performed by: STUDENT IN AN ORGANIZED HEALTH CARE EDUCATION/TRAINING PROGRAM

## 2022-09-25 PROCEDURE — 6370000000 HC RX 637 (ALT 250 FOR IP): Performed by: STUDENT IN AN ORGANIZED HEALTH CARE EDUCATION/TRAINING PROGRAM

## 2022-09-25 PROCEDURE — 84484 ASSAY OF TROPONIN QUANT: CPT

## 2022-09-25 RX ORDER — LIDOCAINE HYDROCHLORIDE 20 MG/ML
15 SOLUTION OROPHARYNGEAL ONCE
Status: COMPLETED | OUTPATIENT
Start: 2022-09-25 | End: 2022-09-25

## 2022-09-25 RX ORDER — IBUPROFEN 400 MG/1
400 TABLET ORAL ONCE
Status: DISCONTINUED | OUTPATIENT
Start: 2022-09-25 | End: 2022-09-25 | Stop reason: HOSPADM

## 2022-09-25 RX ORDER — ACETAMINOPHEN 325 MG/1
650 TABLET ORAL ONCE
Status: COMPLETED | OUTPATIENT
Start: 2022-09-25 | End: 2022-09-25

## 2022-09-25 RX ADMIN — ACETAMINOPHEN 650 MG: 325 TABLET ORAL at 10:36

## 2022-09-25 RX ADMIN — LIDOCAINE HYDROCHLORIDE 15 ML: 20 SOLUTION ORAL; TOPICAL at 10:36

## 2022-09-25 ASSESSMENT — PAIN DESCRIPTION - LOCATION: LOCATION: BACK

## 2022-09-25 ASSESSMENT — PAIN SCALES - GENERAL: PAINLEVEL_OUTOF10: 8

## 2022-09-25 NOTE — DISCHARGE INSTRUCTIONS
You have been seen in the ER today for jaw pain. Your heart enzymes were checked, there was no issue with your heart on EKG as well. Please take Tylenol for your pain symptoms. If you begin to experience any symptoms such as chest pain shortness of breath nausea vomiting dizziness drowsiness abdominal pain loss of consciousness or any other symptoms you find concerning please return to the ED for follow-up evaluation. If you have been given pain medication please take them only as prescribed for the your symptoms. Do not take more medication than recommended at any given time. Please follow-up with your primary care provider within 5 to 7 days for continued care, sooner if you have concerns.

## 2022-09-25 NOTE — ED PROVIDER NOTES
[address] [facility]     Emergency Department     Faculty Attestation    I performed a history and physical examination of the patient and discussed management with the resident. I have reviewed and agree with the residents findings including all diagnostic interpretations, and treatment plans as written at the time of my review. Any areas of disagreement are noted on the chart. I was personally present for the key portions of any procedures. I have documented in the chart those procedures where I was not present during the key portions. For Physician Assistant/ Nurse Practitioner cases/documentation I have personally evaluated this patient and have completed at least one if not all key elements of the E/M (history, physical exam, and MDM). Additional findings are as noted. Primary Care Physician: Alexandra Carlisle MD    History: This is a 57 y.o. female who presents to the Emergency Department with complaint of jaw pain. The patient complains of some right-sided jaw pain that began on Friday. Snouts it is moving into her neck and throat. She denies any chest pain or shortness of breath. Patient states nothing exacerbates his symptoms and nothing present symptoms. Physical:   height is 5' 5\" (1.651 m) and weight is 187 lb (84.8 kg). Her oral temperature is 97.8 °F (36.6 °C). Her blood pressure is 149/98 (abnormal) and her pulse is 85. Her respiration is 16 and oxygen saturation is 93%. Patient has no teeth no abscesses noted is no tenderness to palpation along the mandible. Neck is supple is no nuchal rigidity lungs are clear to auscultation bilateral, heart regular rate and rhythm    Impression: Jaw pain    Plan: Analgesia, EKG, troponin. As the patient has history of hypertension hyperlipidemia and diabetes will obtain the above test to rule out atypical angina.         EKG Interpretation    Interpreted by me  Normal sinus rhythm ventricular 79, normal ME interval, normal QRS duration, normal axis, nonspecific ST abnormality, normal QT corrected  Compared EKG of November 22, 2020, ventricular rate has decreased by 54 lateral ST and T wave changes have resolved        (Please note that portions of this note were completed with a voice recognition program.  Efforts were made to edit the dictations but occasionally words are mis-transcribed.)    Pablo Chen.  Roni Gavin MD, [address] [facility],[address]  Attending Emergency Medicine Physician        Ernst Clifton MD  09/25/22 [id number]

## 2022-09-25 NOTE — TELEPHONE ENCOUNTER
Writer made a telephone call for this patient healthy link indicating that she has recently received influenza vaccine on Wednesday. She has been perceiving symptoms of throat pain along with right-sided neck swelling over the last 3 to 5 days and she feels her nose has been clogged up and her nasal spray has not been working. She indicates she has been producing a lot of mucus from her throat. She currently denies shortness of breath, chest pain    She denies any fever but has sensation of chills along with her extremities feeling cold. She denies any lip or tongue swelling as of now. She indicates her symptoms are getting worse and wants further evaluation. She has underlying history of COPD, allergic rhinitis with history of tobacco abuse    As we cannot evaluate her currently over the phone I did advise the patient to follow-up with the ED for checking her throat given swelling and worsening pain over the last 3 to 5 days along with increased mucus production from her throat. Differential diagnosis can be : Anaphylaxis, angioedema, acute bronchitis, acute rhinosinusitis, acute pharyngitis. Patient was explained to follow-up with emergency department for checking her throat and for further management. Patient sounds understanding and she explained to the writer that she will go to ED    Jessica Singh MD  Internal Medicine Resident, PGY- [facility];  [place], [place]  9/25/2022, 9:14 AM

## 2022-09-25 NOTE — ED PROVIDER NOTES
[facility]  ED  Emergency Department Encounter  EmergencyMedicine Resident     Pt Sergey Lehman  MRN: [id number]  Ingrid 1960  Date of evaluation: 9/25/22  PCP:  Gregor Lopez MD    This patient was evaluated in the Emergency Department for symptoms described in the history of present illness. The patient was evaluated in the context of the global COVID-19 pandemic, which necessitated consideration that the patient might be at risk for infection with the SARS-CoV-2 virus that causes COVID-19. Institutional protocols and algorithms that pertain to the evaluation of patients at risk for COVID-19 are in a state of rapid change based on information released by regulatory bodies including the CDC and federal and state organizations. These policies and algorithms were followed during the patient's care in the ED. CHIEF COMPLAINT       Chief Complaint   Patient presents with    Jaw Pain     Right sided jaw pain that is radiating into her throat. Right ear feels like it is plugged. Otalgia       HISTORY OF PRESENT ILLNESS  (Location/Symptom, Timing/Onset, Context/Setting, Quality, Duration, Modifying Factors, Severity.)      Mati Garcia is a 57 y.o. female who presents with right sided jaw pain. Patient has a history of hyperlipidemia hypertension diabetes. Patient states that her pain started yesterday. Patient is currently having chest pain, no shortness of breath no dizziness drowsiness nausea vomiting or abdominal pain. Patient does not have any issues with urination. Patient is also not have any issues with bowel movements. No numbness or tingling, no weakness. Heart score for ACS = 4  1. History - 0  Slightly suspicious: 0  Moderately suspicious: 1  Highly suspicious: 2  2. EKG - 0  Normal: 0  Non-specific repolarization disturbance:1  Significant ST depression: 2  3. Age - 2  <45: 0  45-64: 1  >65: 2  4.  Risk Factors - 2  None known: 0  1-2: 1  >3: 2  5. Initial Troponin - 0  Normal limit: 0  1-3 x normal limit: 1  >3 x normal limit: 2      PAST MEDICAL / SURGICAL / SOCIAL / FAMILY HISTORY      has a past medical history of Allergic rhinitis, Alveolar hypoventilation, Aortic insufficiency, Bronchiectasis (HCC), Bronchitis, Chronic back pain, COPD (chronic obstructive pulmonary disease) ([address] Depression, DM (diabetes mellitus) ([address] GERD (gastroesophageal reflux disease), History of echocardiogram, Hyperlipidemia, Hypertension, Obesity, Osteoarthritis, Peripheral vascular disease ([address] Primary osteoarthritis of both knees, Radicular pain of lumbosacral region, Spinal stenosis, lumbar region, without neurogenic claudication, Tricuspid regurgitation, Type II or unspecified type diabetes mellitus without mention of complication, not stated as uncontrolled, Unspecified sleep apnea, URI (upper respiratory infection), Wears dentures, Wears glasses, and Wellness examination. has a past surgical history that includes Dilation and curettage of uterus; gastrectomy; Nerve Block (Right, 4/30/13); Nerve Block (5/23/13); Colonoscopy (2/12/2009); Upper gastrointestinal endoscopy (9 20 2007); Upper gastrointestinal endoscopy (4 21 2009,04/2011); Nerve Block (8/12/13); Nerve Block (Left, 8-28-13); Nerve Block (Left, 9-24-13); Nerve Block (07-02-14); Nerve Block (7-16-14); Nerve Block (7/30/14); Nerve Block (11-6-14); Nerve Block (11/20/15); pr knee scope,diagnostic (Right, 3/24/2017); Nerve Block (07/20/2018); Nerve Block (Bilateral, 02/01/2019); Nerve Block (Bilateral, 02/08/2019); lumbar discectomy (01/2015); lumbar fusion (11/19/2020); and lumbar fusion (N/A, 11/19/2020).       Social History     Socioeconomic History    Marital status: Single     Spouse name: Not on file    Number of children: Not on file    Years of education: Not on file    Highest education level: Not on file   Occupational History     Employer: DISABLED   Tobacco Use    Smoking status: Every Day Packs/day: 0.25     Years: 36.00     Pack years: 9.00     Types: Cigarettes    Smokeless tobacco: Never    Tobacco comments:     3 cigs per day   on nicoderm patch   Vaping Use    Vaping Use: Never used   Substance and Sexual Activity    Alcohol use: No     Alcohol/week: 0.0 standard drinks    Drug use: No     Comment: 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 7 yrs    Sexual activity: Never   Other Topics Concern    Not on file   Social History Narrative    10/9/20 Patient keeping contact with others to a minimum due to [place] 19 Pandemic. 10/9/20 Patient has difficulty with ambulation due to DJD, stenosis and fibromyalgia     Social Determinants of Health     Financial Resource Strain: Medium Risk    Difficulty of Paying Living Expenses: Somewhat hard   Food Insecurity: No Food Insecurity    Worried About Running Out of Food in the Last Year: Never true    Ran Out of Food in the Last Year: Never true   Transportation Needs: No Transportation Needs    Lack of Transportation (Medical): No    Lack of Transportation (Non-Medical): No   Physical Activity: Insufficiently Active    Days of Exercise per Week: 3 days    Minutes of Exercise per Session: 30 min   Stress: Stress Concern Present    Feeling of Stress : To some extent   Social Connections: Moderately Integrated    Frequency of Communication with Friends and Family: More than three times a week    Frequency of Social Gatherings with Friends and Family: More than three times a week    Attends Islam Services: More than 4 times per year    Active Member of [address] [place] [address] or Organizations: Yes    Attends Club or Organization Meetings: 1 to 4 times per year    Marital Status:     Intimate Partner Violence: Not on file   Housing Stability: Low Risk     Unable to Pay for Housing in the Last Year: No    Number of Places Lived in the Last Year: 1    Unstable Housing in the Last Year: No       Family History   Problem Relation Age of Onset    Diabetes Mother Heart Disease Mother     Cirrhosis Father        Allergies:  Aspirin, Claritin [loratadine], Flonase [fluticasone propionate], Ibuprofen, and Morphine and related    Home Medications:  Prior to Admission medications    Medication Sig Start Date End Date Taking? Authorizing Provider   tiZANidine (ZANAFLEX) 4 MG tablet Take 1 tablet by mouth 2 times daily 9/19/22   Cirilo Garcia MD   cetirizine (ZYRTEC) 10 MG tablet TAKE 1 TABLET BY MOUTH DAILY 9/13/22   Acacia Barillas MD   omeprazole (PRILOSEC) 20 MG delayed release capsule TAKE 1 CAPSULE BY MOUTH EVERY DAY 9/2/22   Cirilo Garcia MD   HYDROcodone-acetaminophen (NORCO) 5-325 MG per tablet Take 1 tablet by mouth every 8 hours as needed for Pain for up to 30 days. 8/29/22 9/28/22  Julissa Salinasgm APRRAISA - CNP   meloxicam [facility]) 7.5 MG tablet Take 1 tablet by mouth daily 8/25/22 9/24/22  ELVIRA Cheatham - CNP   nicotine (NICODERM CQ) 7 MG/24HR Place 1 patch onto the skin every 24 hours 8/9/22 8/9/23  Cirilo Garcia MD   lisinopril-hydroCHLOROthiazide [facility]) 20-25 MG per tablet 1 tablet daily 8/9/22   Cirilo Garcia MD   carvedilol (COREG) 12.5 MG tablet Take 1 tablet by mouth in the morning and 1 tablet before bedtime. 8/9/22   Cirilo Garcia MD   amLODIPine (NORVASC) 10 MG tablet Take 1 tablet by mouth in the morning. 8/9/22   Cirilo Garcia MD   atorvastatin (LIPITOR) 40 MG tablet Take 1 tablet by mouth in the morning. 8/9/22   Cirilo Garcia MD   trospium (SANCTURA) 20 MG tablet Take 1 tablet by mouth in the morning and 1 tablet before bedtime. 8/5/22   Cirilo Garcia MD   potassium chloride (KLOR-CON M) 10 MEQ extended release tablet TAKE 2 TABLETS BY MOUTH DAILY 7/26/22   Cirilo Garcia MD   docusate sodium (COLACE) 100 MG capsule TAKE 1 CAPSULE BY MOUTH EVERY DAY 6/22/22   Acacia Barillas MD   gabapentin (NEURONTIN) 300 MG capsule Take 300 mg by mouth in the morning, at noon, and at bedtime.  4/25/22   Historical Provider, MD Temp 97.8 °F (36.6 °C) (Oral)   Resp 16   Ht 5' 5\" (1.651 m)   Wt 187 lb (84.8 kg)   LMP 04/19/2003   SpO2 93%   BMI 31.12 kg/m²     Physical Exam  Constitutional:       General: She is not in acute distress. Appearance: Normal appearance. She is not ill-appearing or toxic-appearing. HENT:      Head: Normocephalic and atraumatic. Nose: No congestion. Mouth/Throat:      Mouth: Mucous membranes are moist.      Comments: Cold sore present on the upper right side of the inner cheek  Eyes:      Conjunctiva/sclera: Conjunctivae normal.   Cardiovascular:      Rate and Rhythm: Normal rate and regular rhythm. Pulses: Normal pulses. Heart sounds: Normal heart sounds. No murmur heard. No gallop. Pulmonary:      Effort: Pulmonary effort is normal. No respiratory distress. Breath sounds: Normal breath sounds. No stridor. No wheezing or rhonchi. Chest:      Chest wall: No tenderness. Abdominal:      General: Abdomen is flat. There is no distension. Palpations: There is no mass. Tenderness: There is no abdominal tenderness. There is no guarding or rebound. Musculoskeletal:         General: No swelling, tenderness or deformity. Skin:     General: Skin is warm. Coloration: Skin is not jaundiced. Findings: No bruising. Neurological:      General: No focal deficit present. Mental Status: She is alert.        DIFFERENTIAL  DIAGNOSIS     PLAN (LABS / IMAGING / EKG):  Orders Placed This Encounter   Procedures    Troponin    EKG 12 Lead       MEDICATIONS ORDERED:  Orders Placed This Encounter   Medications    acetaminophen (TYLENOL) tablet 650 mg    DISCONTD: ibuprofen (ADVIL;MOTRIN) tablet 400 mg    lidocaine viscous hcl (XYLOCAINE) 2 % solution 15 mL           DIAGNOSTIC RESULTS / EMERGENCY DEPARTMENT COURSE / MDM   LAB RESULTS:  Results for orders placed or performed during the hospital encounter of 09/25/22   Troponin   Result Value Ref Range    Troponin, High Sensitivity 13 0 - 14 ng/L   EKG 12 Lead   Result Value Ref Range    Ventricular Rate 79 BPM    Atrial Rate 79 BPM    P-R Interval 180 ms    QRS Duration 84 ms    Q-T Interval 384 ms    QTc Calculation (Bazett) 440 ms    P Axis 53 degrees    R Axis -2 degrees    T Axis 56 degrees         RADIOLOGY:  No results found. EMERGENCY DEPARTMENT COURSE:  ED Course as of 09/26/22 1917   Sun Sep 25, 2022   1036 Pt has cold sore on upper right inner cheek on exam. Will treat with tylenol motrin and viscous lidocaine [KK]   1040 Patient does not have any lower jaw pain, she does not have any chest pain no trouble breathing. Patient states that her symptoms started yesterday. [KK]   1104 EKG shows normal sinus rhythm, normal QRS intervals, normal QTC, rate of 79 CA interval of 180. No T wave inversions or flattening, normal axis. [KK]      ED Course User Index  [KK] Rimma Hughes DO         Assessment/Plan: Patient presenting with intermittent pain, patient states that her jaw was hurting, cardiac enzymes negative, EKG unremarkable, patient had a cold sore in her mouth on exam.  Patient is appropriate for discharge, symptoms marginally improved after conservative treatment in the ER. See ED course. ER return precautions given the patient. FINAL IMPRESSION      1.  Jaw pain          DISPOSITION / PLAN     DISPOSITION Decision To Discharge 09/25/2022 11:27:48 AM      PATIENT REFERRED TO:  [facility]  [address] [address]  [phone]          DISCHARGE MEDICATIONS:  Discharge Medication List as of 9/25/2022 11:47 AM          Kamar Brooks DO  Emergency Medicine Resident    (Please note that portions of thisnote were completed with a voice recognition program.  Efforts were made to edit the dictations but occasionally words are mis-transcribed.)       Rimma Hughes DO  Resident  09/26/22 1918

## 2022-09-25 NOTE — ED NOTES
The following labs were labeled with appropriate pt sticker and tubed to lab:     [x] Blue     [x] Lavender   [] on ice  [x] Green/yellow  [x] Green/black [] on ice  [] Kathleen Gunning  [] on ice  [] Yellow  [] Red  [] Pink  [] ABG  [] VBG    [] COVID-19 swab    [] Rapid  [] PCR  [] Flu swab  [] Peds Viral Panel     [] Urine Sample  [] Pelvic Cultures  [] Blood Cultures  [] X 2  [] STREP Cultures       Nadia Barron RN  09/25/22 3592

## 2022-09-25 NOTE — ED TRIAGE NOTES
Pt presents to ED from home c/o R sided jaw pain and R ear pain ongoing since yesterday. Pt denies n/v/d, SOB, c/p, LOC, fever, chills, or any other sx. Pt states she has been having the pain since she got her flu vaccine a few days ago, and is c/o cold sx- rhinitis, congestion. Pt is A&XO4, placed on monitor, changed into a gown and given warm blankets. Vitals show mild HTN but otherwise WNL.

## 2022-09-26 LAB
EKG ATRIAL RATE: 79 BPM
EKG P AXIS: 53 DEGREES
EKG P-R INTERVAL: 180 MS
EKG Q-T INTERVAL: 384 MS
EKG QRS DURATION: 84 MS
EKG QTC CALCULATION (BAZETT): 440 MS
EKG R AXIS: -2 DEGREES
EKG T AXIS: 56 DEGREES
EKG VENTRICULAR RATE: 79 BPM

## 2022-09-26 PROCEDURE — 93010 ELECTROCARDIOGRAM REPORT: CPT | Performed by: INTERNAL MEDICINE

## 2022-09-26 ASSESSMENT — ENCOUNTER SYMPTOMS
CHEST TIGHTNESS: 0
ABDOMINAL PAIN: 0
NAUSEA: 0
VOMITING: 0
TROUBLE SWALLOWING: 0
BACK PAIN: 0
SHORTNESS OF BREATH: 0
COUGH: 0
COLOR CHANGE: 0
DIARRHEA: 0

## 2022-09-27 ENCOUNTER — HOSPITAL ENCOUNTER (OUTPATIENT)
Dept: PAIN MANAGEMENT | Age: 62
Discharge: HOME | End: 2022-09-27
Payer: COMMERCIAL

## 2022-09-27 VITALS
RESPIRATION RATE: 18 BRPM | TEMPERATURE: 97.3 F | OXYGEN SATURATION: 88 % | SYSTOLIC BLOOD PRESSURE: 131 MMHG | BODY MASS INDEX: 31.82 KG/M2 | DIASTOLIC BLOOD PRESSURE: 80 MMHG | WEIGHT: 191 LBS | HEIGHT: 65 IN | HEART RATE: 81 BPM

## 2022-09-27 DIAGNOSIS — M47.26 OTHER SPONDYLOSIS WITH RADICULOPATHY, LUMBAR REGION: ICD-10-CM

## 2022-09-27 DIAGNOSIS — M96.1 FAILED BACK SYNDROME: ICD-10-CM

## 2022-09-27 DIAGNOSIS — Z79.891 CHRONIC USE OF OPIATE DRUG FOR THERAPEUTIC PURPOSE: Primary | ICD-10-CM

## 2022-09-27 DIAGNOSIS — M54.16 CHRONIC LUMBAR RADICULOPATHY: ICD-10-CM

## 2022-09-27 DIAGNOSIS — M51.9 LUMBAR DISC DISEASE: ICD-10-CM

## 2022-09-27 PROCEDURE — 99213 OFFICE O/P EST LOW 20 MIN: CPT | Performed by: NURSE PRACTITIONER

## 2022-09-27 PROCEDURE — 99213 OFFICE O/P EST LOW 20 MIN: CPT

## 2022-09-27 RX ORDER — HYDROCODONE BITARTRATE AND ACETAMINOPHEN 5; 325 MG/1; MG/1
1 TABLET ORAL EVERY 8 HOURS PRN
Qty: 90 TABLET | Refills: 0 | Status: SHIPPED | OUTPATIENT
Start: 2022-09-28 | End: 2022-10-28 | Stop reason: SDUPTHER

## 2022-09-27 ASSESSMENT — PAIN SCALES - GENERAL: PAINLEVEL_OUTOF10: 8

## 2022-09-27 ASSESSMENT — ENCOUNTER SYMPTOMS
BACK PAIN: 1
BOWEL INCONTINENCE: 0

## 2022-09-27 NOTE — PROGRESS NOTES
Chief Complaint   Patient presents with    Back Pain     Med refill         PMH     Chronic onset many years ago located in the lower lumbar area  Reports radiation of pain down both legs, associated with occ. leg numbness  Past history significant for 2 lumbar spine surgeries with last one in 2020. Initially noticed improvement but now haspain in both legs  Had recent follow-up with neurosurgery with no further surgery recommended at this time  Had a follow-up x-ray that showed stable fusion at L4-5  Currently on multimodal medication regimen Norco and Mobic with little relief  Pain intensity 10 out of 10 at times  Doing home exercise and physical therapy - advised patient to complete physical therapy and will consider obtaining new MRI lumbar spine with contrast after physical therapy if no improvement   Pt had L5 S1 lumbar epidural steroid injection 8/2021 and reported significant relief for 2 days        HPI:     Back Pain  This is a chronic problem. The current episode started more than 1 year ago. The problem occurs constantly. The problem is unchanged. The pain is present in the lumbar spine. The quality of the pain is described as aching. The pain does not radiate. The pain is at a severity of 8/10. The symptoms are aggravated by bending, sitting and standing. Associated symptoms include weakness. Pertinent negatives include no bladder incontinence, bowel incontinence, chest pain, fever, headaches, numbness or tingling. She has tried ice and heat (injections) for the symptoms. Patient denies any new neurological symptoms. No bowel or bladder incontinence, no weakness, and no falling.     Pill count: appropriate / Dorothea Lesser - 5 9/28     Morphine equivalent: 15    Controlled Substance Monitoring:    Acute and Chronic Pain Monitoring:   RX Monitoring 9/27/2022   Attestation -   Acute Pain Prescriptions -   Periodic Controlled Substance Monitoring Possible medication side effects, risk of tolerance/dependence & alternative treatments discussed. ;No signs of potential drug abuse or diversion identified. ;Assessed functional status. ;Obtaining appropriate analgesic effect of treatment. Chronic Pain > 50 MEDD -   Chronic Pain > 80 MEDD -          Periodic Controlled Substance Monitoring: Possible medication side effects, risk of tolerance/dependence & alternative treatments discussed., No signs of potential drug abuse or diversion identified. , Assessed functional status., Obtaining appropriate analgesic effect of treatment.  Júnior Perez, APRN - CNP)      Past Medical History:   Diagnosis Date    Allergic rhinitis     Alveolar hypoventilation 11/20/2020    Aortic insufficiency 2018    m[facility]-moderate on echo [facility]    Bronchiectasis ([facility] 11/20/2020    Bronchitis     Chronic back pain     [facility] management at [facility]    COPD (chronic obstructive pulmonary disease) ([facility]     Dr. Talat Méndez to see 11/09/2020    Depression     DM (diabetes mellitus) ([facility] 12/18/2012    GERD (gastroesophageal reflux disease)     History of echocardiogram 05/2018    EF 65%, mild-moderate AI and TR    Hyperlipidemia     Dr. Kodak Flower    Hypertension     Dr. Kodak Flower    Obesity     Osteoarthritis     Peripheral vascular disease ([facility]     Primary osteoarthritis of both knees     Radicular pain of lumbosacral region     Spinal stenosis, lumbar region, without neurogenic claudication 04/30/2013    Tricuspid regurgitation 2018    mild-moderate on echo    Type II or unspecified type diabetes mellitus without mention of complication, not stated as uncontrolled     Dr. Kodak Flower    Unspecified sleep apnea     no cpap used anymore    URI (upper respiratory infection)     Wears dentures     upper and lower full dentures    Wears glasses     Wellness examination     Dr. Kodak Flower -PCP last visit in early Oct. 2020       Past Surgical History:   Procedure Laterality Date    COLONOSCOPY  2/12/2009 normal    DILATION AND CURETTAGE OF UTERUS      GASTRECTOMY      partial    LUMBAR DISCECTOMY  01/2015    lumbar diskectomy    LUMBAR FUSION  11/19/2020     POSTERIOR FUSION L4/5,     LUMBAR FUSION N/A 11/19/2020    POSTERIOR FUSION L4/5, [employer], Franky Stabs, EVOKES #[id number] AMINA performed by Anna Reza DO at [address] [facility],[address] Right 4/30/13    Lumbar Diagnostic Block,  Kenalog 40 mg    NERVE BLOCK  5/23/13    Lumbar Radiofrequency, Kenalog 40mg    NERVE BLOCK  8/12/13    Lt MBNB  celestone 6mg    NERVE BLOCK Left 8-28-13    left lumbar diagnostic block #2 decadron 10 mg    NERVE BLOCK Left 9-24-13    left lumbar median branch radiofrequency    NERVE BLOCK  07-02-14    caudal, celestone 9 mg    NERVE BLOCK  7-16-14    caudal epidural #2, celestone 9mg, fentanyl 25mcg    NERVE BLOCK  7/30/14    caudal #3 decadron 10mg    NERVE BLOCK  11-6-14    duramorph epidural steroid block  duramorph 1 mg celestone 9 mg    NERVE BLOCK  11/20/15    TENS- Empi Select    NERVE BLOCK  07/20/2018    right transforminal # 1 decadron 10mg,isovue    NERVE BLOCK Bilateral 02/01/2019    bilat mbnb- no steroid    NERVE BLOCK Bilateral 02/08/2019    bilat mbnb, marcaine . 25%    UT KNEE SCOPE,DIAGNOSTIC Right 3/24/2017    KNEE ARTHROSCOPY WITH PARTIAL MEDIAL MENISECAL DEBRIDMENT  performed by Jeff Tovar MD at [address]  9 20 2007    UPPER GASTROINTESTINAL ENDOSCOPY  4 21 2009,04/2011    gastritis, esophagitis       Allergies   Allergen Reactions    Aspirin      DUE TO ULCER    Claritin [Loratadine] Other (See Comments)     coughing    Flonase [Fluticasone Propionate]     Ibuprofen Other (See Comments)     Stomach ulcers    Morphine And Related      GI Upset         Current Outpatient Medications:     [START ON 9/28/2022] HYDROcodone-acetaminophen (NORCO) 5-325 MG per tablet, Take 1 tablet by mouth every 8 hours as needed for Pain for up to 30 days. , Disp: 90 tablet, Rfl: 0 tiZANidine (ZANAFLEX) 4 MG tablet, Take 1 tablet by mouth 2 times daily, Disp: 60 tablet, Rfl: 2    cetirizine (ZYRTEC) 10 MG tablet, TAKE 1 TABLET BY MOUTH DAILY, Disp: 30 tablet, Rfl: 2    omeprazole (PRILOSEC) 20 MG delayed release capsule, TAKE 1 CAPSULE BY MOUTH EVERY DAY, Disp: 30 capsule, Rfl: 2    meloxicam (MOBIC) 7.5 MG tablet, Take 1 tablet by mouth daily, Disp: 30 tablet, Rfl: 2    nicotine (NICODERM CQ) 7 MG/24HR, Place 1 patch onto the skin every 24 hours, Disp: 30 patch, Rfl: 3    lisinopril-hydroCHLOROthiazide (PRINZIDE;ZESTORETIC) 20-25 MG per tablet, 1 tablet daily, Disp: 90 tablet, Rfl: 1    carvedilol (COREG) 12.5 MG tablet, Take 1 tablet by mouth in the morning and 1 tablet before bedtime. , Disp: 180 tablet, Rfl: 1    amLODIPine (NORVASC) 10 MG tablet, Take 1 tablet by mouth in the morning., Disp: 90 tablet, Rfl: 1    atorvastatin (LIPITOR) 40 MG tablet, Take 1 tablet by mouth in the morning., Disp: 90 tablet, Rfl: 1    trospium (SANCTURA) 20 MG tablet, Take 1 tablet by mouth in the morning and 1 tablet before bedtime. , Disp: 60 tablet, Rfl: 0    potassium chloride (KLOR-CON M) 10 MEQ extended release tablet, TAKE 2 TABLETS BY MOUTH DAILY, Disp: 60 tablet, Rfl: 1    docusate sodium (COLACE) 100 MG capsule, TAKE 1 CAPSULE BY MOUTH EVERY DAY, Disp: 30 capsule, Rfl: 8    gabapentin (NEURONTIN) 300 MG capsule, Take 300 mg by mouth in the morning, at noon, and at bedtime. , Disp: , Rfl:     Umeclidinium Bromide 62.5 MCG/INH AEPB, Inhale 1 puff into the lungs daily, Disp: 2 each, Rfl: 5    mirtazapine (REMERON) 30 MG tablet, Take 30 mg by mouth nightly, Disp: , Rfl:     azelastine (ASTELIN) 0.1 % nasal spray, 2 sprays by Nasal route 2 times daily Use in each nostril as directed, Disp: 1 each, Rfl: 2    albuterol sulfate HFA (VENTOLIN HFA) 108 (90 Base) MCG/ACT inhaler, Inhale 2 puffs into the lungs every 6 hours as needed for Wheezing, Disp: 1 each, Rfl: 3    diphenhydrAMINE (BENADRYL) 25 MG tablet, Take 25 mg by mouth every 6 hours as needed for Itching, Disp: , Rfl:     mirtazapine (REMERON) 15 MG tablet, Take 15 mg by mouth nightly, Disp: , Rfl:     DULoxetine (CYMBALTA) 60 MG extended release capsule, Take 1 capsule by mouth daily, Disp: 30 capsule, Rfl: 3    ipratropium-albuterol (DUONEB) 0.5-2.5 (3) MG/3ML SOLN nebulizer solution, Inhale 3 mLs into the lungs every 6 hours as needed for Shortness of Breath, Disp: 360 mL, Rfl: 11    Family History   Problem Relation Age of Onset    Diabetes Mother     Heart Disease Mother     Cirrhosis Father        Social History     Socioeconomic History    Marital status: Single     Spouse name: Not on file    Number of children: Not on file    Years of education: Not on file    Highest education level: Not on file   Occupational History     Employer: DISABLED   Tobacco Use    Smoking status: Every Day     Packs/day: 0.25     Years: 36.00     Pack years: 9.00     Types: Cigarettes    Smokeless tobacco: Never    Tobacco comments:     3 cigs per day   on nicoderm patch   Vaping Use    Vaping Use: Never used   Substance and Sexual Activity    Alcohol use: No     Alcohol/week: 0.0 standard drinks    Drug use: No     Comment: 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 7 yrs    Sexual activity: Never   Other Topics Concern    Not on file   Social History Narrative    10/9/20 Patient keeping contact with others to a minimum due to COVID 19 Pandemic. 10/9/20 Patient has difficulty with ambulation due to DJD, stenosis and fibromyalgia     Social Determinants of Health     Financial Resource Strain: Medium Risk    Difficulty of Paying Living Expenses: Somewhat hard   Food Insecurity: No Food Insecurity    Worried About Running Out of Food in the Last Year: Never true    Ran Out of Food in the Last Year: Never true   Transportation Needs: No Transportation Needs    Lack of Transportation (Medical): No    Lack of Transportation (Non-Medical):  No   Physical Activity: Insufficiently Active    Days of Exercise per Week: 3 days    Minutes of Exercise per Session: 30 min   Stress: Stress Concern Present    Feeling of Stress : To some extent   Social Connections: Moderately Integrated    Frequency of Communication with Friends and Family: More than three times a week    Frequency of Social Gatherings with Friends and Family: More than three times a week    Attends Orthodoxy Services: More than 4 times per year    Active Member of [address] [place] [address] [employer] or Organizations: Yes    Attends Club or Organization Meetings: 1 to 4 times per year    Marital Status:    Intimate Partner Violence: Not on file   Housing Stability: Low Risk     Unable to Pay for Housing in the Last Year: No    Number of Places Lived in the Last Year: 1    Unstable Housing in the Last Year: No       Review of Systems:  Review of Systems   Constitutional: Negative for fever. Cardiovascular:  Negative for chest pain. Musculoskeletal:  Positive for back pain. Gastrointestinal:  Negative for bowel incontinence. Genitourinary:  Negative for bladder incontinence. Neurological:  Positive for weakness. Negative for headaches, numbness and tingling. Physical Exam:  /80   Pulse 81   Temp 97.3 °F (36.3 °C)   Resp 18   Ht 5' 5\" (1.651 m)   Wt 191 lb (86.6 kg)   LMP 04/19/2003   SpO2 (!) 88%   BMI 31.78 kg/m²     Physical Exam  Cardiovascular:      Rate and Rhythm: Normal rate. Pulmonary:      Effort: Pulmonary effort is normal.   Musculoskeletal:      Lumbar back: Decreased range of motion. Comments: Antalgic gait using cane    Skin:     General: Skin is warm and dry. Neurological:      Mental Status: She is alert and oriented to person, place, and time.        Record/Diagnostics Review:    Last óscar 2/22 and was appropriate     Assessment:  Problem List Items Addressed This Visit       Chronic lumbar radiculopathy    Relevant Medications    HYDROcodone-acetaminophen (NORCO) 5-325 MG per tablet (Start on 9/28/2022)    Other Relevant Orders    MRI LUMBAR SPINE W WO CONTRAST    Other spondylosis with radiculopathy, lumbar region    Relevant Medications    HYDROcodone-acetaminophen (NORCO) 5-325 MG per tablet (Start on 9/28/2022)    Other Relevant Orders    Amb External Referral To Physical Therapy    Lumbar disc disease    Relevant Orders    Amb External Referral To Physical Therapy    Failed back syndrome    Relevant Medications    HYDROcodone-acetaminophen (NORCO) 5-325 MG per tablet (Start on 9/28/2022)    Other Relevant Orders    MRI LUMBAR SPINE W WO CONTRAST    Chronic use of opiate drug for therapeutic purpose - Primary    Relevant Medications    HYDROcodone-acetaminophen (NORCO) 5-325 MG per tablet (Start on 9/28/2022)          Treatment Plan:  Patient relates current medications are helping the pain. Patient reports taking pain medications as prescribed, denies obtaining medications from different sources and denies use of illegal drugs. Patient denies side effects from medications like nausea, vomiting, constipation or drowsiness. Patient reports current activities of daily living are possible due to medications and would like to continue them. As always, we encourage daily stretching and strengthening exercises, and recommend minimizing use of pain medications unless patient cannot get through daily activities due to pain. Contract requirements met. Continue opioid therapy. Script written for norco  Referral to PT  Lumbar  MRI ordered today to further evaluate pathology and guide treatment plan. Consider intervention and/or surgical consult based on findings  Follow up appointment made for 4 weeks    I have reviewed the chief complaint and history of present illness (including ROS and PFSH) and vital documentation by my staff and I agree with their documentation and have added where applicable.

## 2022-09-30 ENCOUNTER — OFFICE VISIT (OUTPATIENT)
Dept: INTERNAL MEDICINE | Age: 62
End: 2022-09-30
Payer: COMMERCIAL

## 2022-09-30 VITALS
DIASTOLIC BLOOD PRESSURE: 85 MMHG | HEART RATE: 77 BPM | SYSTOLIC BLOOD PRESSURE: 135 MMHG | OXYGEN SATURATION: 91 % | TEMPERATURE: 98.5 F | WEIGHT: 187 LBS | BODY MASS INDEX: 31.16 KG/M2 | HEIGHT: 65 IN

## 2022-09-30 DIAGNOSIS — J01.90 ACUTE SINUSITIS WITH SYMPTOMS > 10 DAYS: Primary | ICD-10-CM

## 2022-09-30 DIAGNOSIS — J30.89 NON-SEASONAL ALLERGIC RHINITIS DUE TO OTHER ALLERGIC TRIGGER: ICD-10-CM

## 2022-09-30 PROCEDURE — 99211 OFF/OP EST MAY X REQ PHY/QHP: CPT | Performed by: STUDENT IN AN ORGANIZED HEALTH CARE EDUCATION/TRAINING PROGRAM

## 2022-09-30 PROCEDURE — 99213 OFFICE O/P EST LOW 20 MIN: CPT | Performed by: STUDENT IN AN ORGANIZED HEALTH CARE EDUCATION/TRAINING PROGRAM

## 2022-09-30 RX ORDER — AMOXICILLIN AND CLAVULANATE POTASSIUM 875; 125 MG/1; MG/1
1 TABLET, FILM COATED ORAL 2 TIMES DAILY
Qty: 10 TABLET | Refills: 0 | Status: SHIPPED | OUTPATIENT
Start: 2022-09-30 | End: 2022-10-05

## 2022-09-30 ASSESSMENT — ENCOUNTER SYMPTOMS
BACK PAIN: 0
COLOR CHANGE: 0
VOMITING: 0
COUGH: 0
NAUSEA: 0
SHORTNESS OF BREATH: 0
CHEST TIGHTNESS: 0
SINUS PRESSURE: 1
EYE DISCHARGE: 0
SINUS PAIN: 1

## 2022-09-30 ASSESSMENT — PATIENT HEALTH QUESTIONNAIRE - PHQ9
3. TROUBLE FALLING OR STAYING ASLEEP: 1
9. THOUGHTS THAT YOU WOULD BE BETTER OFF DEAD, OR OF HURTING YOURSELF: 0
8. MOVING OR SPEAKING SO SLOWLY THAT OTHER PEOPLE COULD HAVE NOTICED. OR THE OPPOSITE, BEING SO FIGETY OR RESTLESS THAT YOU HAVE BEEN MOVING AROUND A LOT MORE THAN USUAL: 0
6. FEELING BAD ABOUT YOURSELF - OR THAT YOU ARE A FAILURE OR HAVE LET YOURSELF OR YOUR FAMILY DOWN: 0
SUM OF ALL RESPONSES TO PHQ QUESTIONS 1-9: 4
5. POOR APPETITE OR OVEREATING: 0
SUM OF ALL RESPONSES TO PHQ QUESTIONS 1-9: 4
1. LITTLE INTEREST OR PLEASURE IN DOING THINGS: 0
2. FEELING DOWN, DEPRESSED OR HOPELESS: 3
SUM OF ALL RESPONSES TO PHQ9 QUESTIONS 1 & 2: 3
SUM OF ALL RESPONSES TO PHQ QUESTIONS 1-9: 4
7. TROUBLE CONCENTRATING ON THINGS, SUCH AS READING THE NEWSPAPER OR WATCHING TELEVISION: 0
4. FEELING TIRED OR HAVING LITTLE ENERGY: 0
SUM OF ALL RESPONSES TO PHQ QUESTIONS 1-9: 4
10. IF YOU CHECKED OFF ANY PROBLEMS, HOW DIFFICULT HAVE THESE PROBLEMS MADE IT FOR YOU TO DO YOUR WORK, TAKE CARE OF THINGS AT HOME, OR GET ALONG WITH OTHER PEOPLE: 0

## 2022-09-30 NOTE — PROGRESS NOTES
[facility] [address] [address]  Dept: [phone]  Dept Fax: [phone]    Office Progress/Follow Up Note  Date ofpatient's visit: 9/30/2022  Patient's Name:  Justin Pena YOB: 1960            Patient Care Team:  Nuvia Burkett MD as PCP - General (Internal Medicine)  Nuvia Burkett MD as PCP - [facility]  Mami Man DPM as Consulting Physician (Podiatry)  Marychuy Mcguire ([address])  Fabrice Cho MD as Consulting Physician (Orthopedic Surgery)  June Ellis MD as Anesthesiologist (Pain Management)  Bee Brito OD (Optometry)  ================================================================    REASON FOR VISIT/CHIEF COMPLAINT:  Cough (Since covid and flu vaccine) and Congestion (Head congestion, feels like ears are clogged)    HISTORY OF PRESENTING ILLNESS:  History was obtained from: patient. Justin Pena a 57 y.o. is here for a same day visit. 64-year-old female with history of COPD on chronic 2 L of oxygen, smoker, centrilobular emphysema is here for shortness of breath, sinuses pressure associated with sinus pain and ear pain. Yellow-greenish sputum. No ear discharge patient denies any chest pain, fever or chills. Denies rhinorrhea and red teary eyes. She was recently seen in the ER for similar problem and jaw swelling noted to have mouth sore. Cardiac work-up was done including tropes and EKG to rule out ACS. Patient reported that she was given Z-Samuel 2 weeks ago. And it helped for a while but the symptoms did not resolve at all. Vitally stable with /85. X-ray was not done in the ER. On auscultation lung sounds are present.       Patient Active Problem List   Diagnosis    DJD (degenerative joint disease) of knee    Osteoarthritis of spine with radiculopathy, lumbar region    Chronic lumbar radiculopathy    GERD (gastroesophageal reflux disease)    COPD, severity to be determined ([facility]    HTN (hypertension)    Allergic rhinitis    Lipoma of shoulder s/p excision right posterior 11 17 2008    History of tobacco use    DM (diabetes mellitus)    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    Primary osteoarthritis of both knees    Medication monitoring encounter    Chronic low back pain    Major depression, chronic    Chronic respiratory failure with hypoxia (HCC)    Mitral and aortic insufficiency    Pure hypercholesterolemia    Other spondylosis with radiculopathy, lumbar region    Lumbar disc disease    Alveolar hypoventilation    Obesity (BMI 30-39. 9)    Bronchiectasis ([facility]    Centrilobular emphysema (HCC)    Oxygen dependent    Acute postoperative anemia due to expected blood loss    Multifocal pneumonia    Acute on chronic respiratory failure with hypoxia (HCC)    Pulmonary function studies abnormal    Tobacco dependence    Idiopathic sleep related nonobstructive alveolar hypoventilation    Failed back syndrome    Trigger finger of right thumb    Chronic use of opiate drug for therapeutic purpose       Health Maintenance Due   Topic Date Due    Diabetic microalbuminuria test  10/11/2022    Annual Wellness Visit (AWV)  10/22/2022       Allergies   Allergen Reactions    Aspirin      DUE TO ULCER    Claritin [Loratadine] Other (See Comments)     coughing    Flonase [Fluticasone Propionate]     Ibuprofen Other (See Comments)     Stomach ulcers    Morphine And Related      GI Upset         Current Outpatient Medications   Medication Sig Dispense Refill    amoxicillin-clavulanate (AUGMENTIN) 875-125 MG per tablet Take 1 tablet by mouth 2 times daily for 5 days 10 tablet 0    HYDROcodone-acetaminophen (NORCO) 5-325 MG per tablet Take 1 tablet by mouth every 8 hours as needed for Pain for up to 30 days.  90 tablet 0    tiZANidine (ZANAFLEX) 4 MG tablet Take 1 tablet by mouth 2 times daily 60 tablet 2    cetirizine (ZYRTEC) 10 MG tablet TAKE 1 TABLET BY MOUTH DAILY 30 tablet 2    omeprazole (PRILOSEC) 20 MG delayed release capsule TAKE 1 CAPSULE BY MOUTH EVERY DAY 30 capsule 2    meloxicam (MOBIC) 7.5 MG tablet Take 1 tablet by mouth daily 30 tablet 2    nicotine (NICODERM CQ) 7 MG/24HR Place 1 patch onto the skin every 24 hours 30 patch 3    lisinopril-hydroCHLOROthiazide (PRINZIDE;ZESTORETIC) 20-25 MG per tablet 1 tablet daily 90 tablet 1    carvedilol (COREG) 12.5 MG tablet Take 1 tablet by mouth in the morning and 1 tablet before bedtime. 180 tablet 1    amLODIPine (NORVASC) 10 MG tablet Take 1 tablet by mouth in the morning. 90 tablet 1    atorvastatin (LIPITOR) 40 MG tablet Take 1 tablet by mouth in the morning. 90 tablet 1    trospium (SANCTURA) 20 MG tablet Take 1 tablet by mouth in the morning and 1 tablet before bedtime. 60 tablet 0    potassium chloride (KLOR-CON M) 10 MEQ extended release tablet TAKE 2 TABLETS BY MOUTH DAILY 60 tablet 1    docusate sodium (COLACE) 100 MG capsule TAKE 1 CAPSULE BY MOUTH EVERY DAY 30 capsule 8    gabapentin (NEURONTIN) 300 MG capsule Take 300 mg by mouth in the morning, at noon, and at bedtime.       Umeclidinium Bromide 62.5 MCG/INH AEPB Inhale 1 puff into the lungs daily 2 each 5    mirtazapine (REMERON) 30 MG tablet Take 30 mg by mouth nightly      azelastine (ASTELIN) 0.1 % nasal spray 2 sprays by Nasal route 2 times daily Use in each nostril as directed 1 each 2    albuterol sulfate HFA (VENTOLIN HFA) 108 (90 Base) MCG/ACT inhaler Inhale 2 puffs into the lungs every 6 hours as needed for Wheezing 1 each 3    diphenhydrAMINE (BENADRYL) 25 MG tablet Take 25 mg by mouth every 6 hours as needed for Itching      mirtazapine (REMERON) 15 MG tablet Take 15 mg by mouth nightly      DULoxetine (CYMBALTA) 60 MG extended release capsule Take 1 capsule by mouth daily 30 capsule 3    ipratropium-albuterol (DUONEB) 0.5-2.5 (3) MG/3ML SOLN nebulizer solution Inhale 3 mLs into the lungs every 6 hours as needed for Shortness of Breath 360 mL 11 No current facility-administered medications for this visit. Social History     Tobacco Use    Smoking status: Every Day     Packs/day: 0.25     Years: 36.00     Pack years: 9.00     Types: Cigarettes    Smokeless tobacco: Never    Tobacco comments:     3 cigs per day   on nicoderm patch   Vaping Use    Vaping Use: Never used   Substance Use Topics    Alcohol use: No     Alcohol/week: 0.0 standard drinks    Drug use: No     Comment: 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 7 yrs       Family History   Problem Relation Age of Onset    Diabetes Mother     Heart Disease Mother     Cirrhosis Father         REVIEW OF SYSTEMS:  Review of Systems   Constitutional:  Negative for activity change, appetite change and fatigue. HENT:  Positive for congestion, ear pain, sinus pressure and sinus pain. Eyes:  Negative for discharge. Respiratory:  Negative for cough, chest tightness and shortness of breath. Cardiovascular:  Negative for chest pain, palpitations and leg swelling. Gastrointestinal:  Negative for nausea and vomiting. Genitourinary:  Negative for difficulty urinating. Musculoskeletal:  Negative for back pain and neck pain. Skin:  Negative for color change and rash. Neurological:  Negative for weakness and light-headedness. Psychiatric/Behavioral:  Negative for behavioral problems and decreased concentration. PHYSICAL EXAM:  Vitals:    09/30/22 1423 09/30/22 1428   BP: (!) 158/93 135/85   Pulse: 84 77   Temp: 98.5 °F (36.9 °C)    TempSrc: Infrared    SpO2: 90% 91%   Weight: 187 lb (84.8 kg)    Height: 5' 5\" (1.651 m)      BP Readings from Last 3 Encounters:   09/30/22 135/85   09/27/22 131/80   09/25/22 (!) 149/98        Physical Exam  Constitutional:       Appearance: Normal appearance. HENT:      Head: Normocephalic. Right Ear: Hearing, tympanic membrane, ear canal and external ear normal. Laceration present. No drainage. Tympanic membrane is not perforated.       Nose: Nose normal.      Mouth/Throat:      Mouth: Mucous membranes are moist.   Eyes:      Pupils: Pupils are equal, round, and reactive to light. Cardiovascular:      Rate and Rhythm: Tachycardia present. Abdominal:      General: Abdomen is flat. Musculoskeletal:         General: Normal range of motion. Cervical back: Normal range of motion. Skin:     General: Skin is warm. Neurological:      General: No focal deficit present. Mental Status: She is alert. Psychiatric:         Mood and Affect: Mood normal.         Thought Content: Thought content normal.         Judgment: Judgment normal.         DIAGNOSTIC FINDINGS:  CBC:  Lab Results   Component Value Date/Time    WBC 6.3 05/11/2022 09:21 AM    HGB 14.7 05/11/2022 09:21 AM     05/11/2022 09:21 AM     02/16/2012 09:02 AM       BMP:    Lab Results   Component Value Date/Time     05/11/2022 09:21 AM    K 3.7 05/11/2022 09:21 AM     05/11/2022 09:21 AM    CO2 20 05/11/2022 09:21 AM    BUN 19 05/11/2022 09:21 AM    CREATININE 0.75 05/11/2022 09:21 AM    GLUCOSE 97 05/11/2022 09:21 AM       HEMOGLOBIN A1C:   Lab Results   Component Value Date/Time    LABA1C 5.8 04/29/2022 02:28 PM       FASTING LIPID PANEL:  Lab Results   Component Value Date    CHOL 191 05/11/2022    HDL 44 05/11/2022    TRIG 79 05/11/2022       ASSESSMENT AND PLAN:  Curtis Washington was seen today for cough and congestion. Diagnoses and all orders for this visit:    Acute sinusitis with symptoms > 10 days  -     amoxicillin-clavulanate (AUGMENTIN) 875-125 MG per tablet; Take 1 tablet by mouth 2 times daily for 5 days  Will give her a week of antibiotic. Patient have sinus pressure examination. Ear exam was negative. No fever or chills. No rhinorrhea or watery eyes. Does report of greenish sputum. Continues to smoke. FOLLOW UP AND INSTRUCTIONS:  No follow-ups on file.     [place] received counseling on the following healthy behaviors: nutrition, exercise, and medication adherence    Discussed use, benefit, and side effects of prescribed medications. Barriers to medication compliance addressed. All patient questions answered. Pt voiced understanding. Patient given educational materials - see patient instructions    Aries Escobedo MD  Internal Medicine Resident PGY [facility] Hospital[facility]audy castellanosDorset   9/30/2022, 5:22 PM        This note is created with the assistance of a speech-recognition program. While intending to generate a document that actually reflects the content of thevisit, the document can still have some mistakes which may not have been identified and corrected by editing.

## 2022-10-03 NOTE — PROGRESS NOTES
Attending Physician Statement  I have discussed the care of Vikash Gomez  including pertinent history and exam findings,  with the resident. I have reviewed the key elements of all parts of the encounter with the resident. I agree with the assessment, plan and orders as documented by the resident.     Tresa Boxer, MD  10/3/2022

## 2022-10-08 ENCOUNTER — TRANSCRIBE ORDERS (OUTPATIENT)
Dept: ADMINISTRATIVE | Age: 62
End: 2022-10-08

## 2022-10-08 DIAGNOSIS — Z87.891 PERSONAL HISTORY OF TOBACCO USE: Primary | ICD-10-CM

## 2022-10-10 ENCOUNTER — HOSPITAL ENCOUNTER (OUTPATIENT)
Dept: MRI IMAGING | Age: 62
Discharge: HOME | End: 2022-10-12
Payer: COMMERCIAL

## 2022-10-10 DIAGNOSIS — M96.1 FAILED BACK SYNDROME: ICD-10-CM

## 2022-10-10 DIAGNOSIS — M54.16 CHRONIC LUMBAR RADICULOPATHY: ICD-10-CM

## 2022-10-10 LAB
CREAT SERPL-MCNC: 0.8 MG/DL (ref 0.5–0.9)
GFR SERPL CREATININE-BSD FRML MDRD: >60 ML/MIN/1.73M2

## 2022-10-10 PROCEDURE — 6360000004 HC RX CONTRAST MEDICATION: Performed by: NURSE PRACTITIONER

## 2022-10-10 PROCEDURE — A9579 GAD-BASE MR CONTRAST NOS,1ML: HCPCS | Performed by: NURSE PRACTITIONER

## 2022-10-10 PROCEDURE — 36415 COLL VENOUS BLD VENIPUNCTURE: CPT

## 2022-10-10 PROCEDURE — 82565 ASSAY OF CREATININE: CPT

## 2022-10-10 PROCEDURE — 72158 MRI LUMBAR SPINE W/O & W/DYE: CPT

## 2022-10-10 RX ADMIN — GADOTERIDOL 16 ML: 279.3 INJECTION, SOLUTION INTRAVENOUS at 08:54

## 2022-10-26 ENCOUNTER — TELEPHONE (OUTPATIENT)
Dept: INTERNAL MEDICINE | Age: 62
End: 2022-10-26

## 2022-10-26 DIAGNOSIS — J44.9 COPD, SEVERITY TO BE DETERMINED (HCC): Primary | ICD-10-CM

## 2022-10-26 NOTE — TELEPHONE ENCOUNTER
PC from patient for multiple questions:    1-Pt asking why CT scan criteria is not updated    CT scan was not ordered by our office so she will need to contact the doctor that ordered the CT Scan    2- Pt has been calling stating that [address] has been facing over forms for us to complete for her power chair. Writer spoke with [facility], they stated they attempted to contact patient on 9/28 to let patient know that they do not accept her current insurance so they are unable to service her power chair. Any repairs of replacements must be paid for out of pocket. 3-Pt is requesting script for nebulizer and supplies. Please sign pended DME order for Nebulizer and patient can  nebulizer machine through [address] from the office.      PC to pt-- Lm to contact office

## 2022-10-28 ENCOUNTER — HOSPITAL ENCOUNTER (OUTPATIENT)
Dept: PAIN MANAGEMENT | Age: 62
Discharge: HOME | End: 2022-10-28
Payer: COMMERCIAL

## 2022-10-28 VITALS
OXYGEN SATURATION: 90 % | RESPIRATION RATE: 18 BRPM | HEIGHT: 65 IN | DIASTOLIC BLOOD PRESSURE: 57 MMHG | HEART RATE: 72 BPM | SYSTOLIC BLOOD PRESSURE: 106 MMHG | BODY MASS INDEX: 31.16 KG/M2 | WEIGHT: 187 LBS

## 2022-10-28 DIAGNOSIS — M96.1 LUMBAR POST-LAMINECTOMY SYNDROME: ICD-10-CM

## 2022-10-28 DIAGNOSIS — M51.36 LUMBAR DEGENERATIVE DISC DISEASE: ICD-10-CM

## 2022-10-28 DIAGNOSIS — Z79.891 CHRONIC USE OF OPIATE DRUG FOR THERAPEUTIC PURPOSE: ICD-10-CM

## 2022-10-28 DIAGNOSIS — M54.16 LUMBAR RADICULOPATHY: Primary | ICD-10-CM

## 2022-10-28 PROBLEM — M51.369 LUMBAR DEGENERATIVE DISC DISEASE: Status: ACTIVE | Noted: 2020-11-19

## 2022-10-28 PROCEDURE — 99213 OFFICE O/P EST LOW 20 MIN: CPT

## 2022-10-28 PROCEDURE — 99214 OFFICE O/P EST MOD 30 MIN: CPT | Performed by: STUDENT IN AN ORGANIZED HEALTH CARE EDUCATION/TRAINING PROGRAM

## 2022-10-28 RX ORDER — HYDROCODONE BITARTRATE AND ACETAMINOPHEN 5; 325 MG/1; MG/1
1 TABLET ORAL EVERY 8 HOURS PRN
Qty: 90 TABLET | Refills: 0 | Status: SHIPPED | OUTPATIENT
Start: 2022-10-28 | End: 2022-11-29 | Stop reason: SDUPTHER

## 2022-10-28 NOTE — PROGRESS NOTES
Chronic Pain Clinic Note     Encounter Date: 10/28/2022     SUBJECTIVE:  Chief Complaint   Patient presents with    Back Pain       History of Present Illness:   Paige Olivares is a 57 y.o. female who presents with back pain    Medication Refill: Hydrocodone ran out last night    Current Complaints of Pain:   Location: L back   Radiation: sometimes down both legs to knees  Severity: severe  Pain Numerical Score - 9   Average: 8     Highest: 10  Lowest: 8  Character/Quality: Complains of pain that is dull, sharp, and shooting  Timing: Constant  Associated symptoms: stiffness when getting up from sitting or laying  Numbness: no  Weakness: no  Exacerbating factors: sitting, laying, bending  Alleviating factors: getting up and moving around  Length of time pain has been present: Started years ago  Inciting event/injury: no  Bowel/Bladder incontinence: no  Falls: no  Physical Therapy: has not started yet - waiting on insurance    History of Interventions:   Surgery: Lumbar spine  Injections: Lumbar epidural    Imaging:    Lumbar MRI 10/10/2022    Impression   Mild dextroscoliosis with tip at L2-L3       At L2-L3, mild canal stenosis and moderate left and mild right neural   foraminal narrowing. 3 mm left extraforaminal disc protrusion contacts   exiting left L2 nerve root. At L4-L5, moderate right and mild left neural foraminal narrowing and   right-sided laminectomy defect and changes related to instrumented disc space   fusion and posterior fusion.        Past Medical History:   Diagnosis Date    Allergic rhinitis     Alveolar hypoventilation 11/20/2020    Aortic insufficiency 2018    mild-moderate on echo (was seen and discharged from cardiology-[facility])    Bronchiectasis ([facility] 11/20/2020    Bronchitis     Chronic back pain     [facility]    COPD (chronic obstructive pulmonary disease) ([facility]     Dr. Sary Ovalles to see 11/09/2020    Depression     DM (diabetes mellitus) ([facility] 12/18/2012 GERD (gastroesophageal reflux disease)     History of echocardiogram 05/2018    EF 65%, mild-moderate AI and TR    Hyperlipidemia     Dr. Pamela York    Hypertension     Dr. Pamela York    Obesity     Osteoarthritis     Peripheral vascular disease ([facility]     Primary osteoarthritis of both knees     Radicular pain of lumbosacral region     Spinal stenosis, lumbar region, without neurogenic claudication 04/30/2013    Tricuspid regurgitation 2018    mild-moderate on echo    Type II or unspecified type diabetes mellitus without mention of complication, not stated as uncontrolled     Dr. Pamela York    Unspecified sleep apnea     no cpap used anymore    URI (upper respiratory infection)     Wears dentures     upper and lower full dentures    Wears glasses     Wellness examination     Dr. Pamela York -PCP last visit in early Oct. 2020       Past Surgical History:   Procedure Laterality Date    COLONOSCOPY  2/12/2009    normal    DILATION AND CURETTAGE OF UTERUS      GASTRECTOMY      partial    LUMBAR DISCECTOMY  01/2015    lumbar diskectomy    LUMBAR FUSION  11/19/2020     POSTERIOR FUSION L4/5,     LUMBAR FUSION N/A 11/19/2020    POSTERIOR FUSION L4/5, [employer], An Paul, [facility] #[id number] [facility] performed by Clifford Wade DO at [address] [place] [address],[address] Right 4/30/13    Lumbar Diagnostic Block,  Kenalog 40 mg    NERVE BLOCK  5/23/13    Lumbar Radiofrequency, Kenalog 40mg    NERVE BLOCK  8/12/13    Lt MBNB  celestone 6mg    NERVE BLOCK Left 8-28-13    left lumbar diagnostic block #2 decadron 10 mg    NERVE BLOCK Left 9-24-13    left lumbar median branch radiofrequency    NERVE BLOCK  07-02-14    caudal, celestone 9 mg    NERVE BLOCK  7-16-14    caudal epidural #2, celestone 9mg, fentanyl 25mcg    NERVE BLOCK  7/30/14    caudal #3 decadron 10mg    NERVE BLOCK  11-6-14    duramorph epidural steroid block  duramorph 1 mg celestone 9 mg    NERVE BLOCK  11/20/15    TENS- Empi Select    NERVE BLOCK  07/20/2018    right transforminal # 1 decadron 10mg,isovue    NERVE BLOCK Bilateral 02/01/2019    bilat mbnb- no steroid    NERVE BLOCK Bilateral 02/08/2019    bilat mbnb, marcaine . 25%    AL KNEE SCOPE,DIAGNOSTIC Right 3/24/2017    KNEE ARTHROSCOPY WITH PARTIAL MEDIAL MENISECAL DEBRIDMENT  performed by Elie Mart MD at [address]  9 20 2007    UPPER GASTROINTESTINAL ENDOSCOPY  4 21 2009,04/2011    gastritis, esophagitis       Family History   Problem Relation Age of Onset    Diabetes Mother     Heart Disease Mother     Cirrhosis Father        Social History     Socioeconomic History    Marital status: Single     Spouse name: Not on file    Number of children: Not on file    Years of education: Not on file    Highest education level: Not on file   Occupational History     Employer: DISABLED   Tobacco Use    Smoking status: Every Day     Packs/day: 0.25     Years: 36.00     Pack years: 9.00     Types: Cigarettes    Smokeless tobacco: Never    Tobacco comments:     3 cigs per day   on nicoderm patch   Vaping Use    Vaping Use: Never used   Substance and Sexual Activity    Alcohol use: No     Alcohol/week: 0.0 standard drinks    Drug use: No     Comment: 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 7 yrs    Sexual activity: Never   Other Topics Concern    Not on file   Social History Narrative    10/9/20 Patient keeping contact with others to a minimum due to [place] 19 Pandemic. 10/9/20 Patient has difficulty with ambulation due to DJD, stenosis and fibromyalgia     Social Determinants of Health     Financial Resource Strain: Not on file   Food Insecurity: No Food Insecurity    Worried About Running Out of Food in the Last Year: Never true    Ran Out of Food in the Last Year: Never true   Transportation Needs: No Transportation Needs    Lack of Transportation (Medical): No    Lack of Transportation (Non-Medical):  No   Physical Activity: Insufficiently Active    Days of Exercise per Week: 3 days Minutes of Exercise per Session: 30 min   Stress: Stress Concern Present    Feeling of Stress : To some extent   Social Connections: Moderately Integrated    Frequency of Communication with Friends and Family: More than three times a week    Frequency of Social Gatherings with Friends and Family: More than three times a week    Attends Christian Services: More than 4 times per year    Active Member of [employer] Group or Organizations: Yes    Attends Club or Organization Meetings: 1 to 4 times per year    Marital Status:     Intimate Partner Violence: Not on file   Housing Stability: Low Risk     Unable to Pay for Housing in the Last Year: No    Number of Places Lived in the Last Year: 1    Unstable Housing in the Last Year: No       Medications & Allergies:   Current Outpatient Medications   Medication Instructions    albuterol sulfate HFA (VENTOLIN HFA) 108 (90 Base) MCG/ACT inhaler 2 puffs, Inhalation, EVERY 6 HOURS PRN    amLODIPine (NORVASC) 10 mg, Oral, DAILY    atorvastatin (LIPITOR) 40 mg, Oral, DAILY    azelastine (ASTELIN) 0.1 % nasal spray 2 sprays, Nasal, 2 TIMES DAILY, Use in each nostril as directed    carvedilol (COREG) 12.5 mg, Oral, 2 TIMES DAILY    cetirizine (ZYRTEC) 10 MG tablet TAKE 1 TABLET BY MOUTH DAILY    diphenhydrAMINE (BENADRYL) 25 mg, Oral, EVERY 6 HOURS PRN    docusate sodium (COLACE) 100 MG capsule TAKE 1 CAPSULE BY MOUTH EVERY DAY    DULoxetine (CYMBALTA) 60 mg, Oral, DAILY    gabapentin (NEURONTIN) 300 mg, Oral, 3 times daily    HYDROcodone-acetaminophen (NORCO) 5-325 MG per tablet 1 tablet, Oral, EVERY 8 HOURS PRN    ipratropium-albuterol (DUONEB) 0.5-2.5 (3) MG/3ML SOLN nebulizer solution 3 mLs, Inhalation, EVERY 6 HOURS PRN    lisinopril-hydroCHLOROthiazide (PRINZIDE;ZESTORETIC) 20-25 MG per tablet 1 tablet daily    meloxicam (MOBIC) 7.5 mg, Oral, DAILY    mirtazapine (REMERON) 15 mg, Oral, NIGHTLY    mirtazapine (REMERON) 30 mg, Oral, NIGHTLY    nicotine (NICODERM CQ) 7 MG/24HR 1 patch, TransDERmal, EVERY 24 HOURS    omeprazole (PRILOSEC) 20 MG delayed release capsule TAKE 1 CAPSULE BY MOUTH EVERY DAY    potassium chloride (KLOR-CON M) 10 MEQ extended release tablet 20 mEq, Oral, DAILY    tiZANidine (ZANAFLEX) 4 mg, Oral, 2 TIMES DAILY    trospium (SANCTURA) 20 mg, Oral, 2 TIMES DAILY    Umeclidinium Bromide 62.5 MCG/INH AEPB 1 puff, Inhalation, DAILY       Allergies   Allergen Reactions    Aspirin      DUE TO ULCER    Claritin [Loratadine] Other (See Comments)     coughing    Flonase [Fluticasone Propionate]     Ibuprofen Other (See Comments)     Stomach ulcers    Morphine And Related      GI Upset       Review of Systems:   Constitutional: negative for weight changes or fevers  Cardiovascular: negative for chest pain, palpitations, irregular heart beat  Respiratory: negative for dyspnea, cough, wheezing  Gastrointestinal: negative for constipation, diarrhea, nausea  Genitourinary: negative for bowel/bladder incontinence   Musculoskeletal: positive for low back pain  Neurological: Positive for radicular leg pain, leg weakness or numbness/tingling  Behavioral/Psych: negative for anxiety/depression   Hematological: negative for abnormal bleeding, anticoagulation use or antiplatelet use  All other systems reviewed and are negative    OBJECTIVE:    Vitals:    10/28/22 1019   BP: (!) 106/57   Pulse: 72   Resp: 18   SpO2: 90%       PHYSICAL EXAM    GENERAL: No acute distress, pleasant, well-appearing  HEENT: Normocephalic, atraumatic, Pupils equal and round  CARDIOVASCULAR: Well perfused, No peripheral cyanosis  PULMONARY: Good chest wall excursion, breathing unlabored  PSYCH: Appropriate affect and insight, non-pressured speech  SKIN: No rashes or lesions  MUSCULOSKELETAL:  Inspection: The back and extremities are symmetric and aligned. Muscle bulk is normal in appearance. Surgical scar present in lumbar spine.   Palpation: There is tenderness to palpation along the lumbar paraspinal musculature bilaterally  Lumbar range of motion is limited in extension  NEUROMUSCULAR:  Patient ambulates unassisted  Gait is antalgic  Sensation to light touch is slightly reduced in left L2 dermatome  Strength is full with giveaway weakness bilaterally in lower extremities  No ankle clonus    Special Tests:  Lumbar facet loading is positive bilaterally  Seated straight leg raise is positive bilaterally    DIAGNOSIS:    ICD-10-CM    1. Lumbar radiculopathy  M54.16       2. Chronic use of opiate drug for therapeutic purpose  Z79.891 HYDROcodone-acetaminophen (NORCO) 5-325 MG per tablet      3. Lumbar post-laminectomy syndrome  M96.1       4. Lumbar degenerative disc disease  M51.36            ASSESSMENT:    Ariel Pitts is a 57 y. o.female who presents with chronic low back pain and leg pain. To review, patient has a history of previous lumbar spine surgeries with a lumbar fusion at L4-5. At today's visit, she reports worsening left low back pain and left anterior thigh pain. The patient's history and physical examination are consistent with lumbar radiculopathy. Additionally, the patient's most recent lumbar MRI on 10/10/2022 reveals a 3 mm left extraforaminal disc protrusion that contacts the exiting left L2 nerve root. Her symptoms appear to correlate with this irritation as she does have some reduced sensation in the left L2 dermatome. I reached out to the neurosurgical team for further evaluation as patient had a recent epidural steroid injection with relief in her pain for only several days. Neurologically, it appears the patient has full strength with some giveaway weakness. It appears she has some altered sensation of the left L2 dermatome. There is no evidence of myelopathy on examination. There are no red flags in the patient's history. The patient continues to take opioid medications to improve pain, function and quality of life.   The patient denies any side effects from the medications including constipation or respiratory depression. The patient reports adequate analgesia with the medication. There is no evidence of aberrant behavior. PLAN:  Medications: For nonopioid therapy, the following medications were prescribed:    -Continue meloxicam 7.5 mg daily    Opioid therapy:  -Continue Norco 5/325 mg 3 times daily as needed, refilled  -Pain Treatment agreement: Up to date  -Urine Drug Screen: 3/28/2022, reviewed and appropriate  -OARRS reviewed and appropriate    Interventions:  -Consider repeat epidural steroid injection in future    Imaging:  -Reviewed lumbar MRI with patient in room    Behavioral Therapies:  -Continue daily stretching and home exercise program    Referrals:  -Sent message to neurosurgical team for reevaluation of new left leg pain    Follow-up Plan:  -1 month with nurse practitioner    Patient was offered intervention where appropriate. Multi-modal Pain Therapy: The patient was explicitly considered for multimodal and interdisciplinary therapy. Non-opioid and non-pharmacological opportunities to enhance analgesia and quality of life have been and will continue to be pursued. Opioid Therapy: Education provided to patient regarding short term and long term implications of opioid medication use. Repeat opioid risk stratification today, discussion regarding functional achievements, safe storage, and optimization of non-opioid interventional, behavioral, and pharmaceutical modalities. Will continue attempt to wean off medication as appropriate. William Masters, DO  Interventional Pain Management/PM&R   [facility]    Orders Placed This Encounter    HYDROcodone-acetaminophen ([address]) 5-325 MG per tablet     Sig: Take 1 tablet by mouth every 8 hours as needed for Pain for up to 30 days.      Dispense:  90 tablet     Refill:  0     Reduce doses taken as pain becomes manageable

## 2022-10-28 NOTE — TELEPHONE ENCOUNTER
Spoke with pt she will  nebulizer on Monday. Pt will need to sign form.  Form is filled out and placed in med room waiting for

## 2022-11-10 NOTE — TELEPHONE ENCOUNTER
E-scribe request for Gabapentin . Please review and e-scribe if applicable. Next Visit Date:  Future Appointments   Date Time Provider Claudy Laury   11/22/2022 11:00 AM Citlaly Galvan MD [facility] IM [email or web address]   11/29/2022 10:00 AM [facility] Quinn[facility]   12/16/2022 11:30 AM [address], APRN - CNP [facility]   Topic Date Due    Diabetic retinal exam  05/14/2021    Diabetic microalbuminuria test  10/11/2022    Annual Wellness Visit (AWV)  10/22/2022    Diabetic foot exam  12/16/2022    A1C test (Diabetic or Prediabetic)  04/29/2023    Lipids  05/11/2023    Depression Monitoring  09/30/2023    Colorectal Cancer Screen  10/05/2023    Breast cancer screen  05/18/2024    Cervical cancer screen  03/02/2026    DTaP/Tdap/Td vaccine (2 - Td or Tdap) 06/24/2029    Flu vaccine  Completed    Shingles vaccine  Completed    Pneumococcal 0-64 years Vaccine  Completed    COVID-19 Vaccine  Completed    Hepatitis C screen  Completed    HIV screen  Completed    Hepatitis A vaccine  Aged Out    Hib vaccine  Aged Out    Meningococcal (ACWY) vaccine  Aged Out               (applicable per patient's age: Cancer Screenings, Depression Screening, Fall Risk Screening, Immunizations)    Hemoglobin A1C (%)   Date Value   04/29/2022 5.8   10/26/2021 5.7   03/02/2021 5.5     Microalb/Crt.  Ratio (mcg/mg creat)   Date Value   10/11/2021 11     LDL Cholesterol (mg/dL)   Date Value   05/11/2022 131 (H)     AST (U/L)   Date Value   01/26/2022 15     ALT (U/L)   Date Value   01/26/2022 10     BUN (mg/dL)   Date Value   05/11/2022 19      (goal A1C is < 7)   (goal LDL is <100) need 30-50% reduction from baseline     BP Readings from Last 3 Encounters:   10/28/22 (!) 106/57   09/30/22 135/85   09/27/22 131/80    (goal /80)      All Future Testing planned in [facility]:  Lab Frequency Next Occurrence   DRUG SCREEN, PAIN Once 02/28/2022   CT CHEST WO CONTRAST Once 09/03/2022   CT CHEST WO CONTRAST Once 10/08/2022            Patient Active Problem List:     DJD (degenerative joint disease) of knee     Osteoarthritis of spine with radiculopathy, lumbar region     Lumbar radiculopathy     GERD (gastroesophageal reflux disease)     COPD, severity to be determined ([address]     HTN (hypertension)     Allergic rhinitis     Lipoma of shoulder s/p excision right posterior 11 17 2008     History of tobacco use     DM (diabetes mellitus)     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     Primary osteoarthritis of both knees     Medication monitoring encounter     Chronic low back pain     Major depression, chronic     Chronic respiratory failure with hypoxia (HCC)     Mitral and aortic insufficiency     Pure hypercholesterolemia     Other spondylosis with radiculopathy, lumbar region     Lumbar degenerative disc disease     Alveolar hypoventilation     Obesity (BMI 30-39. 9)     Bronchiectasis ([address]     Centrilobular emphysema ([address]     Oxygen dependent     Acute postoperative anemia due to expected blood loss     Multifocal pneumonia     Acute on chronic respiratory failure with hypoxia (HCC)     Pulmonary function studies abnormal     Tobacco dependence     Idiopathic sleep related nonobstructive alveolar hypoventilation     Lumbar post-laminectomy syndrome     Trigger finger of right thumb     Chronic use of opiate drug for therapeutic purpose

## 2022-11-11 RX ORDER — GABAPENTIN 300 MG/1
CAPSULE ORAL
Qty: 120 CAPSULE | Refills: 1 | Status: SHIPPED | OUTPATIENT
Start: 2022-11-11 | End: 2022-12-11

## 2022-11-22 ENCOUNTER — OFFICE VISIT (OUTPATIENT)
Dept: INTERNAL MEDICINE | Age: 62
End: 2022-11-22
Payer: COMMERCIAL

## 2022-11-22 VITALS
DIASTOLIC BLOOD PRESSURE: 82 MMHG | OXYGEN SATURATION: 93 % | WEIGHT: 190 LBS | HEIGHT: 65 IN | SYSTOLIC BLOOD PRESSURE: 137 MMHG | HEART RATE: 74 BPM | BODY MASS INDEX: 31.65 KG/M2

## 2022-11-22 DIAGNOSIS — J30.9 CHRONIC ALLERGIC RHINITIS: ICD-10-CM

## 2022-11-22 DIAGNOSIS — E78.00 PURE HYPERCHOLESTEROLEMIA: ICD-10-CM

## 2022-11-22 DIAGNOSIS — F17.200 SMOKER: ICD-10-CM

## 2022-11-22 DIAGNOSIS — E11.9 TYPE 2 DIABETES MELLITUS WITHOUT COMPLICATION, WITHOUT LONG-TERM CURRENT USE OF INSULIN (HCC): Primary | ICD-10-CM

## 2022-11-22 DIAGNOSIS — I10 ESSENTIAL HYPERTENSION: ICD-10-CM

## 2022-11-22 DIAGNOSIS — K21.9 GASTROESOPHAGEAL REFLUX DISEASE, UNSPECIFIED WHETHER ESOPHAGITIS PRESENT: ICD-10-CM

## 2022-11-22 DIAGNOSIS — N32.81 OAB (OVERACTIVE BLADDER): ICD-10-CM

## 2022-11-22 DIAGNOSIS — J44.9 STAGE 3 SEVERE COPD BY GOLD CLASSIFICATION (HCC): ICD-10-CM

## 2022-11-22 DIAGNOSIS — E87.6 HYPOKALEMIA: ICD-10-CM

## 2022-11-22 LAB — HBA1C MFR BLD: 5.8 %

## 2022-11-22 PROCEDURE — 4004F PT TOBACCO SCREEN RCVD TLK: CPT | Performed by: INTERNAL MEDICINE

## 2022-11-22 PROCEDURE — 3017F COLORECTAL CA SCREEN DOC REV: CPT | Performed by: INTERNAL MEDICINE

## 2022-11-22 PROCEDURE — 99214 OFFICE O/P EST MOD 30 MIN: CPT | Performed by: INTERNAL MEDICINE

## 2022-11-22 PROCEDURE — 2022F DILAT RTA XM EVC RTNOPTHY: CPT | Performed by: INTERNAL MEDICINE

## 2022-11-22 PROCEDURE — G8427 DOCREV CUR MEDS BY ELIG CLIN: HCPCS | Performed by: INTERNAL MEDICINE

## 2022-11-22 PROCEDURE — 3074F SYST BP LT 130 MM HG: CPT | Performed by: INTERNAL MEDICINE

## 2022-11-22 PROCEDURE — G8417 CALC BMI ABV UP PARAM F/U: HCPCS | Performed by: INTERNAL MEDICINE

## 2022-11-22 PROCEDURE — 3044F HG A1C LEVEL LT 7.0%: CPT | Performed by: INTERNAL MEDICINE

## 2022-11-22 PROCEDURE — 3023F SPIROM DOC REV: CPT | Performed by: INTERNAL MEDICINE

## 2022-11-22 PROCEDURE — 99211 OFF/OP EST MAY X REQ PHY/QHP: CPT | Performed by: INTERNAL MEDICINE

## 2022-11-22 PROCEDURE — 83036 HEMOGLOBIN GLYCOSYLATED A1C: CPT | Performed by: INTERNAL MEDICINE

## 2022-11-22 PROCEDURE — 3078F DIAST BP <80 MM HG: CPT | Performed by: INTERNAL MEDICINE

## 2022-11-22 PROCEDURE — G8482 FLU IMMUNIZE ORDER/ADMIN: HCPCS | Performed by: INTERNAL MEDICINE

## 2022-11-22 RX ORDER — LISINOPRIL AND HYDROCHLOROTHIAZIDE 25; 20 MG/1; MG/1
TABLET ORAL
Qty: 90 TABLET | Refills: 1 | Status: SHIPPED | OUTPATIENT
Start: 2022-11-22

## 2022-11-22 RX ORDER — OMEPRAZOLE 20 MG/1
20 CAPSULE, DELAYED RELEASE ORAL DAILY
Qty: 30 CAPSULE | Refills: 5 | Status: SHIPPED | OUTPATIENT
Start: 2022-11-22

## 2022-11-22 RX ORDER — AMLODIPINE BESYLATE 10 MG/1
10 TABLET ORAL DAILY
Qty: 90 TABLET | Refills: 1 | Status: SHIPPED | OUTPATIENT
Start: 2022-11-22

## 2022-11-22 RX ORDER — ATORVASTATIN CALCIUM 40 MG/1
40 TABLET, FILM COATED ORAL DAILY
Qty: 90 TABLET | Refills: 1 | Status: SHIPPED | OUTPATIENT
Start: 2022-11-22

## 2022-11-22 RX ORDER — POTASSIUM CHLORIDE 750 MG/1
20 TABLET, EXTENDED RELEASE ORAL DAILY
Qty: 60 TABLET | Refills: 5 | Status: SHIPPED | OUTPATIENT
Start: 2022-11-22

## 2022-11-22 RX ORDER — DOCUSATE SODIUM 100 MG/1
CAPSULE, LIQUID FILLED ORAL
Qty: 30 CAPSULE | Refills: 5 | Status: SHIPPED | OUTPATIENT
Start: 2022-11-22

## 2022-11-22 RX ORDER — TROSPIUM CHLORIDE 20 MG/1
20 TABLET, FILM COATED ORAL 2 TIMES DAILY
Qty: 60 TABLET | Refills: 0 | Status: SHIPPED | OUTPATIENT
Start: 2022-11-22

## 2022-11-22 RX ORDER — CETIRIZINE HYDROCHLORIDE 10 MG/1
10 TABLET ORAL DAILY
Qty: 30 TABLET | Refills: 5 | Status: SHIPPED | OUTPATIENT
Start: 2022-11-22

## 2022-11-22 RX ORDER — CARVEDILOL 12.5 MG/1
12.5 TABLET ORAL 2 TIMES DAILY
Qty: 180 TABLET | Refills: 1 | Status: SHIPPED | OUTPATIENT
Start: 2022-11-22

## 2022-11-22 SDOH — ECONOMIC STABILITY: FOOD INSECURITY: WITHIN THE PAST 12 MONTHS, THE FOOD YOU BOUGHT JUST DIDN'T LAST AND YOU DIDN'T HAVE MONEY TO GET MORE.: NEVER TRUE

## 2022-11-22 SDOH — ECONOMIC STABILITY: FOOD INSECURITY: WITHIN THE PAST 12 MONTHS, YOU WORRIED THAT YOUR FOOD WOULD RUN OUT BEFORE YOU GOT MONEY TO BUY MORE.: NEVER TRUE

## 2022-11-22 ASSESSMENT — SOCIAL DETERMINANTS OF HEALTH (SDOH): HOW HARD IS IT FOR YOU TO PAY FOR THE VERY BASICS LIKE FOOD, HOUSING, MEDICAL CARE, AND HEATING?: NOT VERY HARD

## 2022-11-22 NOTE — PROGRESS NOTES
[facility]    Progress Note    Date of patient's visit: 11/22/2022    Patient's Name:  Sunny Franco    YOB: 1960            Patient Care Team:  Danae Coffman MD as PCP - General (Internal Medicine)  Danae Coffman MD as PCP - [facility] Empaneled Provider  Castro Landry DPM as Consulting Physician (Podiatry)  Kirill Sarkar (Guerry Jumper)  Nemo Alcazar MD as Consulting Physician (Orthopedic Surgery)  Arely Eden MD as Anesthesiologist (Pain Management)  Clarice Murillo OD (Optometry)    REASON FOR VISIT: Routine outpatient follow     Chief Complaint   Patient presents with    Diabetes    Hypertension    Health Maintenance     Micro pended          HISTORY OF PRESENT ILLNESS:    History was obtained from the patient. Sunny Franco is a 57 y.o. is here for routine follow-up. Overall doing well and with no complaints. She continues to follow-up with all her specialists. She still waiting for her low-dose CT to be approved by her insurance. She is changing her insurance next year. All her labs reviewed with her. She is compliant with medications. Her A1c is stable and she is in prediabetes range. She sees an ophthalmologist and podiatrist.  Her blood pressure is controlled. She received her COVID booster in September along with flu vaccine. She had 1 dose of Shingrix but does not want to get the second 1 as it was too painful.   She is up-to-date with mammogram.      Results for POC orders placed in visit on 11/22/22   POCT glycosylated hemoglobin (Hb A1C)   Result Value Ref Range    Hemoglobin A1C 5.8 %       Past Medical History:   Diagnosis Date    Allergic rhinitis     Alveolar hypoventilation 11/20/2020    Aortic insufficiency 2018    mild-moderate on echo (was seen and discharged from cardiology-[facility])    Bronchiectasis ([address] 11/20/2020    Bronchitis     Chronic back pain     [facility]    COPD (chronic obstructive pulmonary disease) ([facility])     Dr. Noemy Arnold to see 11/09/2020    Depression     DM (diabetes mellitus) ([facility] [address] 12/18/2012    GERD (gastroesophageal reflux disease)     History of echocardiogram 05/2018    EF 65%, mild-moderate AI and TR    Hyperlipidemia     Dr. Clarice Brothers    Hypertension     Dr. Clarice Brothers    Obesity     Osteoarthritis     Peripheral vascular disease ([facility] [address]     Primary osteoarthritis of both knees     Radicular pain of lumbosacral region     Spinal stenosis, lumbar region, without neurogenic claudication 04/30/2013    Tricuspid regurgitation 2018    mild-moderate on echo    Type II or unspecified type diabetes mellitus without mention of complication, not stated as uncontrolled     Dr. Clarice Brothers    Unspecified sleep apnea     no cpap used anymore    URI (upper respiratory infection)     Wears dentures     upper and lower full dentures    Wears glasses     Wellness examination     Dr. Clarice Brothers -PCP last visit in early Oct. 2020       Past Surgical History:   Procedure Laterality Date    COLONOSCOPY  2/12/2009    normal    DILATION AND CURETTAGE OF UTERUS      GASTRECTOMY      partial    LUMBAR DISCECTOMY  01/2015    lumbar diskectomy    LUMBAR FUSION  11/19/2020     POSTERIOR FUSION L4/5,     LUMBAR FUSION N/A 11/19/2020    POSTERIOR FUSION L4/5, [employer], Loki Gilford, EVOKES #[id number] AMINA performed by Viviane Gonzalez DO at [address] Right 4/30/13    Lumbar Diagnostic Block,  Kenalog 40 mg    NERVE BLOCK  5/23/13    Lumbar Radiofrequency, Kenalog 40mg    NERVE BLOCK  8/12/13    Lt MBNB  celestone 6mg    NERVE BLOCK Left 8-28-13    left lumbar diagnostic block #2 decadron 10 mg    NERVE BLOCK Left 9-24-13    left lumbar median branch radiofrequency    NERVE BLOCK  07-02-14    caudal, celestone 9 mg    NERVE BLOCK  7-16-14    caudal epidural #2, celestone 9mg, fentanyl 25mcg    NERVE BLOCK  7/30/14    caudal #3 decadron 10mg    NERVE BLOCK  11-6-14 duramorph epidural steroid block  duramorph 1 mg celestone 9 mg    NERVE BLOCK  11/20/15    TENS- Empi Select    NERVE BLOCK  07/20/2018    right transforminal # 1 decadron 10mg,isovue    NERVE BLOCK Bilateral 02/01/2019    bilat mbnb- no steroid    NERVE BLOCK Bilateral 02/08/2019    bilat mbnb, marcaine . 25%    RI KNEE SCOPE,DIAGNOSTIC Right 3/24/2017    KNEE ARTHROSCOPY WITH PARTIAL MEDIAL MENISECAL DEBRIDMENT  performed by Gerald Gage MD at [address] [place] [address] 20 2007    UPPER GASTROINTESTINAL ENDOSCOPY  4 21 2009,04/2011    gastritis, esophagitis         ALLERGIES      Allergies   Allergen Reactions    Aspirin      DUE TO ULCER    Claritin [Loratadine] Other (See Comments)     coughing    Flonase [Fluticasone Propionate]     Ibuprofen Other (See Comments)     Stomach ulcers    Morphine And Related      GI Upset       MEDICATIONS:      Current Outpatient Medications on File Prior to Visit   Medication Sig Dispense Refill    gabapentin (NEURONTIN) 300 MG capsule TAKE 1 CAPSULE IN MORNING, 1 CAPSULE IN THE AFTERNOON AND 2 CAPSULES AT NIGHT 120 capsule 1    HYDROcodone-acetaminophen (NORCO) 5-325 MG per tablet Take 1 tablet by mouth every 8 hours as needed for Pain for up to 30 days. 90 tablet 0    tiZANidine (ZANAFLEX) 4 MG tablet Take 1 tablet by mouth 2 times daily 60 tablet 2    cetirizine (ZYRTEC) 10 MG tablet TAKE 1 TABLET BY MOUTH DAILY 30 tablet 2    omeprazole (PRILOSEC) 20 MG delayed release capsule TAKE 1 CAPSULE BY MOUTH EVERY DAY 30 capsule 2    meloxicam (MOBIC) 7.5 MG tablet Take 1 tablet by mouth daily 30 tablet 2    nicotine (NICODERM CQ) 7 MG/24HR Place 1 patch onto the skin every 24 hours 30 patch 3    lisinopril-hydroCHLOROthiazide (PRINZIDE;ZESTORETIC) 20-25 MG per tablet 1 tablet daily 90 tablet 1    carvedilol (COREG) 12.5 MG tablet Take 1 tablet by mouth in the morning and 1 tablet before bedtime.  180 tablet 1    amLODIPine (NORVASC) 10 MG tablet Take 1 tablet by mouth in the morning. 90 tablet 1    atorvastatin (LIPITOR) 40 MG tablet Take 1 tablet by mouth in the morning. 90 tablet 1    trospium (SANCTURA) 20 MG tablet Take 1 tablet by mouth in the morning and 1 tablet before bedtime. 60 tablet 0    potassium chloride (KLOR-CON M) 10 MEQ extended release tablet TAKE 2 TABLETS BY MOUTH DAILY 60 tablet 1    docusate sodium (COLACE) 100 MG capsule TAKE 1 CAPSULE BY MOUTH EVERY DAY 30 capsule 8    Umeclidinium Bromide 62.5 MCG/INH AEPB Inhale 1 puff into the lungs daily 2 each 5    mirtazapine (REMERON) 30 MG tablet Take 30 mg by mouth nightly      azelastine (ASTELIN) 0.1 % nasal spray 2 sprays by Nasal route 2 times daily Use in each nostril as directed 1 each 2    albuterol sulfate HFA (VENTOLIN HFA) 108 (90 Base) MCG/ACT inhaler Inhale 2 puffs into the lungs every 6 hours as needed for Wheezing 1 each 3    diphenhydrAMINE (BENADRYL) 25 MG tablet Take 25 mg by mouth every 6 hours as needed for Itching      mirtazapine (REMERON) 15 MG tablet Take 15 mg by mouth nightly      DULoxetine (CYMBALTA) 60 MG extended release capsule Take 1 capsule by mouth daily 30 capsule 3    ipratropium-albuterol (DUONEB) 0.5-2.5 (3) MG/3ML SOLN nebulizer solution Inhale 3 mLs into the lungs every 6 hours as needed for Shortness of Breath 360 mL 11     No current facility-administered medications on file prior to visit. HISTORY    Reviewed and no change from previous record. Carolina  reports that she has been smoking cigarettes. She has a 9.00 pack-year smoking history.  She has never used smokeless tobacco.    FAMILY HISTORY:    Reviewed and No change from previous visit    HEALTH MAINTENANCE DUE:      Health Maintenance Due   Topic Date Due    Diabetic retinal exam  05/14/2021    Diabetic microalbuminuria test  10/11/2022    Annual Wellness Visit (AWV)  10/22/2022    Diabetic foot exam  12/16/2022       REVIEW OF SYSTEMS:    12 point review of symptoms completed and found to be normal except noted in the HPI    Review of Systems  Constitutional: Negative for diaphoresis and fatigue. Eyes: Negative for photophobia and visual disturbance. Respiratory: Positive for cough. Negative for shortness of breath and wheezing. Cardiovascular: Negative for chest pain, palpitations and leg swelling. Endocrine: Negative for polydipsia and polyuria. Genitourinary: Negative for dysuria and frequency. Musculoskeletal: Positive for arthralgias and back pain. Neurological: Positive for numbness. Negative for dizziness, syncope, weakness and headaches. Hematological: Negative for adenopathy. Does not bruise/bleed easily. Psychiatric/Behavioral: Negative for dysphoric mood and sleep disturbance. The patient is not nervous/anxious. PHYSICAL EXAM:     Vitals:    11/22/22 1120   BP: 137/82   Site: Left Upper Arm   Position: Sitting   Cuff Size: Medium Adult   Pulse: 74   SpO2: 93%   Weight: 190 lb (86.2 kg)   Height: 5' 5\" (1.651 m)     Body mass index is 31.62 kg/m². BP Readings from Last 3 Encounters:   11/22/22 137/82   10/28/22 (!) 106/57   09/30/22 135/85        Wt Readings from Last 3 Encounters:   11/22/22 190 lb (86.2 kg)   10/28/22 187 lb (84.8 kg)   09/30/22 187 lb (84.8 kg)       Physical Exam  Vitals and nursing note reviewed. Constitutional:       Appearance: Normal appearance. HENT:     Head: Normocephalic and atraumatic. Eyes:     Extraocular Movements: Extraocular movements intact. Conjunctiva/sclera: Conjunctivae normal.      Pupils: Pupils are equal, round, and reactive to light. Cardiovascular:     Rate and Rhythm: Normal rate and regular rhythm. Heart sounds: Murmur heard. Pulmonary:     Effort: Pulmonary effort is normal.      Breath sounds: Normal breath sounds. No wheezing. Musculoskeletal:      Right lower leg: No edema. Left lower leg: No edema. Skin:     General: Skin is warm and dry. Neurological:     General: No focal deficit present. Mental Status: She is alert and oriented to person, place, and time. Psychiatric:         Mood and Affect: Mood normal.     LABORATORY FINDINGS:    CBC:  Lab Results   Component Value Date/Time    WBC 6.3 05/11/2022 09:21 AM    HGB 14.7 05/11/2022 09:21 AM     05/11/2022 09:21 AM     02/16/2012 09:02 AM     BMP:    Lab Results   Component Value Date/Time     05/11/2022 09:21 AM    K 3.7 05/11/2022 09:21 AM     05/11/2022 09:21 AM    CO2 20 05/11/2022 09:21 AM    BUN 19 05/11/2022 09:21 AM    CREATININE 0.80 10/10/2022 08:29 AM    GLUCOSE 97 05/11/2022 09:21 AM     HEMOGLOBIN A1C:   Lab Results   Component Value Date/Time    LABA1C 5.8 11/22/2022 11:22 AM     MICROALBUMIN URINE:   Lab Results   Component Value Date/Time    MICROALBUR 14 10/11/2021 09:51 AM     FASTING LIPID PANEL:  Lab Results   Component Value Date    CHOL 191 05/11/2022    HDL 44 05/11/2022    TRIG 79 05/11/2022     Lab Results   Component Value Date    LDLCHOLESTEROL 131 (H) 05/11/2022       LIVER PROFILE:  Lab Results   Component Value Date/Time    ALT 10 01/26/2022 09:02 AM    AST 15 01/26/2022 09:02 AM    PROT 7.8 01/26/2022 09:02 AM    BILITOT 0.33 01/26/2022 09:02 AM    BILIDIR 0.11 06/18/2018 03:10 PM    LABALBU 4.1 01/26/2022 09:02 AM      THYROID FUNCTION:   Lab Results   Component Value Date/Time    TSH 1.82 01/26/2022 09:02 AM      URINEANALYSIS: No results found for: LABURIN  ASSESSMENT AND PLAN:    1. Type 2 diabetes mellitus without complication, without long-term current use of insulin (HCC)    - POCT glycosylated hemoglobin (Hb A1C)  - Microalbumin, Ur; Future  - atorvastatin (LIPITOR) 40 MG tablet; Take 1 tablet by mouth daily  Dispense: 90 tablet; Refill: 1    2. Essential hypertension    - potassium chloride (KLOR-CON M) 10 MEQ extended release tablet; Take 2 tablets by mouth daily  Dispense: 60 tablet; Refill: 5  - carvedilol (COREG) 12.5 MG tablet;  Take 1 tablet by mouth 2 times daily  Dispense: 180 tablet; Refill: 1  - amLODIPine (NORVASC) 10 MG tablet; Take 1 tablet by mouth daily  Dispense: 90 tablet; Refill: 1    3. Stage 3 severe COPD by GOLD classification ([address]  Get PFT's and notes from Pulm  LDCT     4. Pure hypercholesterolemia    - atorvastatin (LIPITOR) 40 MG tablet; Take 1 tablet by mouth daily  Dispense: 90 tablet; Refill: 1    5. Smoker    - nicotine (NICODERM CQ) 7 MG/24HR; Place 1 patch onto the skin every 24 hours  Dispense: 30 patch; Refill: 3    6. Chronic allergic rhinitis    - cetirizine (ZYRTEC) 10 MG tablet; Take 1 tablet by mouth daily  Dispense: 30 tablet; Refill: 5    7. OAB (overactive bladder)    - trospium (SANCTURA) 20 MG tablet; Take 1 tablet by mouth 2 times daily  Dispense: 60 tablet; Refill: 0    8. Gastroesophageal reflux disease, unspecified whether esophagitis present    - omeprazole (PRILOSEC) 20 MG delayed release capsule; Take 1 capsule by mouth Daily  Dispense: 30 capsule; Refill: 5    9. Hypokalemia    - potassium chloride (KLOR-CON M) 10 MEQ extended release tablet; Take 2 tablets by mouth daily  Dispense: 60 tablet; Refill: 5          FOLLOW UP AND INSTRUCTIONS:   Return in about 6 months (around 5/22/2023). Carolina received counseling on the following healthy behaviors: nutrition, exercise, and medication adherence    Reviewed prior labs and health maintenance. Discussed use, benefit, and side effects of prescribed medications. Barriers to medication compliance addressed. All patient questions answered. Pt voiced understanding.      Patient given educational materials - see patient instructions    Devan Jean-Baptiste  Attending Physician, [facility]   Faculty, Internal Medicine Residency Program  [address] [place] [address]  11/22/2022, 11:26 AM

## 2022-11-22 NOTE — PATIENT INSTRUCTIONS
-Pt due for 6 month f/u in May-- pt to call in April  to set up an appt--reminder in [facility] to contact patient as well--AVS given to patient     -Bloodwork orders given to patient, they will have them done before their next visit.      -Tanya,[facility]

## 2022-11-29 ENCOUNTER — HOSPITAL ENCOUNTER (OUTPATIENT)
Dept: PAIN MANAGEMENT | Age: 62
Discharge: HOME | End: 2022-11-29
Payer: COMMERCIAL

## 2022-11-29 ENCOUNTER — HOSPITAL ENCOUNTER (OUTPATIENT)
Age: 62
Setting detail: SPECIMEN
Discharge: HOME | End: 2022-11-29

## 2022-11-29 VITALS
RESPIRATION RATE: 20 BRPM | BODY MASS INDEX: 31.65 KG/M2 | SYSTOLIC BLOOD PRESSURE: 135 MMHG | WEIGHT: 190 LBS | HEIGHT: 65 IN | HEART RATE: 78 BPM | DIASTOLIC BLOOD PRESSURE: 77 MMHG

## 2022-11-29 DIAGNOSIS — M54.16 LUMBAR RADICULOPATHY: ICD-10-CM

## 2022-11-29 DIAGNOSIS — E11.9 TYPE 2 DIABETES MELLITUS WITHOUT COMPLICATION, WITHOUT LONG-TERM CURRENT USE OF INSULIN (HCC): ICD-10-CM

## 2022-11-29 DIAGNOSIS — Z79.891 CHRONIC USE OF OPIATE DRUG FOR THERAPEUTIC PURPOSE: Primary | ICD-10-CM

## 2022-11-29 DIAGNOSIS — M51.36 LUMBAR DEGENERATIVE DISC DISEASE: ICD-10-CM

## 2022-11-29 PROCEDURE — 99213 OFFICE O/P EST LOW 20 MIN: CPT

## 2022-11-29 RX ORDER — HYDROCODONE BITARTRATE AND ACETAMINOPHEN 5; 325 MG/1; MG/1
1 TABLET ORAL EVERY 8 HOURS PRN
Qty: 90 TABLET | Refills: 0 | Status: SHIPPED | OUTPATIENT
Start: 2022-11-29 | End: 2022-12-29

## 2022-11-29 ASSESSMENT — ENCOUNTER SYMPTOMS
BOWEL INCONTINENCE: 0
BACK PAIN: 1

## 2022-11-29 NOTE — PROGRESS NOTES
Chief Complaint   Patient presents with    Back Pain       PMH     Chronic onset many years ago located in the lower lumbar area  Reports radiation of pain down both legs, associated with occ. leg numbness  Past history significant for 2 lumbar spine surgeries with last one in 2020. Initially noticed improvement but now has pain in both legs  Had a follow-up x-ray 5/22 that showed stable fusion at L4-5  Lumbar MRI 10/22 showed  L2-L3, mild canal stenosis and moderate left and mild right neural foraminal narrowing. 3 mm left extraforaminal disc protrusion contacts exiting left L2 nerve root. At L4-L5, moderate right and mild left neural foraminal narrowing and right-sided laminectomy defect and changes related to instrumented disc space fusion and posterior fusion. Has follow-up with neurosurgery next month   Currently on multimodal medication regimen Norco and Mobic with little relief  Pain intensity 10 out of 10 at times  Doing home exercise and physical therapy  with little improvement   Pt had L5 S1 lumbar epidural steroid injection 8/2021 and reported significant relief for only 2 days         HPI:     Back Pain  This is a chronic problem. The current episode started more than 1 year ago. The problem occurs constantly. The problem is unchanged. The pain is present in the lumbar spine. The quality of the pain is described as aching. The pain does not radiate. The pain is at a severity of 10/10. The pain is severe. The pain is The same all the time. The symptoms are aggravated by bending, sitting and standing. Stiffness is present All day. Associated symptoms include weakness. Pertinent negatives include no bladder incontinence, bowel incontinence, numbness or tingling. Risk factors include menopause, obesity, poor posture, sedentary lifestyle and lack of exercise. She has tried bed rest, home exercises, heat, ice and walking for the symptoms. The treatment provided mild relief.       Patient denies any new of complication, not stated as uncontrolled     Dr. Kelly Cool    Unspecified sleep apnea     no cpap used anymore    URI (upper respiratory infection)     Wears dentures     upper and lower full dentures    Wears glasses     Wellness examination     Dr. Kelly Cool -PCP last visit in early Oct. 2020       Past Surgical History:   Procedure Laterality Date    COLONOSCOPY  2/12/2009    normal    DILATION AND CURETTAGE OF UTERUS      GASTRECTOMY      partial    LUMBAR DISCECTOMY  01/2015    lumbar diskectomy    LUMBAR FUSION  11/19/2020     POSTERIOR FUSION L4/5,     LUMBAR FUSION N/A 11/19/2020    POSTERIOR FUSION L4/5, [employer], Eliud Ports, [facility] #[id number] AMINA [facility]aut[facility] Roland [facility] [address] [facility] [address] [facility] [address] Right 4/30/13    Lumbar Diagnostic Block,  Kenalog 40 mg    NERVE BLOCK  5/23/13    Lumbar Radiofrequency, Kenalog 40mg    NERVE BLOCK  8/12/13    Lt MBNB  celestone 6mg    NERVE BLOCK Left 8-28-13    left lumbar diagnostic block #2 decadron 10 mg    NERVE BLOCK Left 9-24-13    left lumbar median branch radiofrequency    NERVE BLOCK  07-02-14    caudal, celestone 9 mg    NERVE BLOCK  7-16-14    caudal epidural #2, celestone 9mg, fentanyl 25mcg    NERVE BLOCK  7/30/14    caudal #3 decadron 10mg    NERVE BLOCK  11-6-14    duramorph epidural steroid block  duramorph 1 mg celestone 9 mg    NERVE BLOCK  11/20/15    TENS- Empi Select    NERVE BLOCK  07/20/2018    right transforminal # 1 decadron 10mg,isovue    NERVE BLOCK Bilateral 02/01/2019    bilat mbnb- no steroid    NERVE BLOCK Bilateral 02/08/2019    bilat mbnb, marcaine . 25%    SD KNEE SCOPE,DIAGNOSTIC Right 3/24/2017    KNEE ARTHROSCOPY WITH PARTIAL MEDIAL MENISECAL DEBRIDMENT  performed by Elie Mart MD at [address] [facility]  9 20 2007    UPPER GASTROINTESTINAL ENDOSCOPY  4 21 2009,04/2011    gastritis, esophagitis       Allergies   Allergen Reactions    Aspirin      DUE TO ULCER    Claritin [Loratadine] Other (See Comments)     coughing    Flonase [Fluticasone Propionate]     Ibuprofen Other (See Comments)     Stomach ulcers    Morphine And Related      GI Upset         Current Outpatient Medications:     HYDROcodone-acetaminophen (NORCO) 5-325 MG per tablet, Take 1 tablet by mouth every 8 hours as needed for Pain for up to 30 days. , Disp: 90 tablet, Rfl: 0    nicotine (NICODERM CQ) 7 MG/24HR, Place 1 patch onto the skin every 24 hours, Disp: 30 patch, Rfl: 3    cetirizine (ZYRTEC) 10 MG tablet, Take 1 tablet by mouth daily, Disp: 30 tablet, Rfl: 5    potassium chloride (KLOR-CON M) 10 MEQ extended release tablet, Take 2 tablets by mouth daily, Disp: 60 tablet, Rfl: 5    trospium (SANCTURA) 20 MG tablet, Take 1 tablet by mouth 2 times daily, Disp: 60 tablet, Rfl: 0    omeprazole (PRILOSEC) 20 MG delayed release capsule, Take 1 capsule by mouth Daily, Disp: 30 capsule, Rfl: 5    docusate sodium (COLACE) 100 MG capsule, TAKE 1 CAPSULE BY MOUTH EVERY DAY, Disp: 30 capsule, Rfl: 5    lisinopril-hydroCHLOROthiazide (PRINZIDE;ZESTORETIC) 20-25 MG per tablet, 1 tablet daily, Disp: 90 tablet, Rfl: 1    carvedilol (COREG) 12.5 MG tablet, Take 1 tablet by mouth 2 times daily, Disp: 180 tablet, Rfl: 1    amLODIPine (NORVASC) 10 MG tablet, Take 1 tablet by mouth daily, Disp: 90 tablet, Rfl: 1    atorvastatin (LIPITOR) 40 MG tablet, Take 1 tablet by mouth daily, Disp: 90 tablet, Rfl: 1    gabapentin (NEURONTIN) 300 MG capsule, TAKE 1 CAPSULE IN MORNING, 1 CAPSULE IN THE AFTERNOON AND 2 CAPSULES AT NIGHT, Disp: 120 capsule, Rfl: 1    tiZANidine (ZANAFLEX) 4 MG tablet, Take 1 tablet by mouth 2 times daily, Disp: 60 tablet, Rfl: 2    meloxicam (MOBIC) 7.5 MG tablet, Take 1 tablet by mouth daily, Disp: 30 tablet, Rfl: 2    Umeclidinium Bromide 62.5 MCG/INH AEPB, Inhale 1 puff into the lungs daily, Disp: 2 each, Rfl: 5    mirtazapine (REMERON) 30 MG tablet, Take 30 mg by mouth nightly, Disp: , Rfl:     azelastine (ASTELIN) 0.1 % nasal spray, 2 sprays by Nasal route 2 times daily Use in each nostril as directed, Disp: 1 each, Rfl: 2    albuterol sulfate HFA (VENTOLIN HFA) 108 (90 Base) MCG/ACT inhaler, Inhale 2 puffs into the lungs every 6 hours as needed for Wheezing, Disp: 1 each, Rfl: 3    diphenhydrAMINE (BENADRYL) 25 MG tablet, Take 25 mg by mouth every 6 hours as needed for Itching, Disp: , Rfl:     mirtazapine (REMERON) 15 MG tablet, Take 15 mg by mouth nightly, Disp: , Rfl:     DULoxetine (CYMBALTA) 60 MG extended release capsule, Take 1 capsule by mouth daily, Disp: 30 capsule, Rfl: 3    ipratropium-albuterol (DUONEB) 0.5-2.5 (3) MG/3ML SOLN nebulizer solution, Inhale 3 mLs into the lungs every 6 hours as needed for Shortness of Breath, Disp: 360 mL, Rfl: 11    Family History   Problem Relation Age of Onset    Diabetes Mother     Heart Disease Mother     Cirrhosis Father        Social History     Socioeconomic History    Marital status: Single     Spouse name: Not on file    Number of children: Not on file    Years of education: Not on file    Highest education level: Not on file   Occupational History     Employer: DISABLED   Tobacco Use    Smoking status: Every Day     Packs/day: 0.25     Years: 36.00     Pack years: 9.00     Types: Cigarettes    Smokeless tobacco: Never    Tobacco comments:     3 cigs per day   on nicoderm patch   Vaping Use    Vaping Use: Never used   Substance and Sexual Activity    Alcohol use: No     Alcohol/week: 0.0 standard drinks    Drug use: No     Comment: 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 7 yrs    Sexual activity: Never   Other Topics Concern    Not on file   Social History Narrative    10/9/20 Patient keeping contact with others to a minimum due to COVID 19 Pandemic.     10/9/20 Patient has difficulty with ambulation due to DJD, stenosis and fibromyalgia     Social Determinants of Health     Financial Resource Strain: Low Risk     Difficulty of Paying Living Expenses: Not very hard   Food Insecurity: No Food Insecurity    Worried About Running Out of Food in the Last Year: Never true    Ran Out of Food in the Last Year: Never true   Transportation Needs: No Transportation Needs    Lack of Transportation (Medical): No    Lack of Transportation (Non-Medical): No   Physical Activity: Insufficiently Active    Days of Exercise per Week: 3 days    Minutes of Exercise per Session: 30 min   Stress: Stress Concern Present    Feeling of Stress : To some extent   Social Connections: Moderately Integrated    Frequency of Communication with Friends and Family: More than three times a week    Frequency of Social Gatherings with Friends and Family: More than three times a week    Attends [employer] Services: More than 4 times per year    Active Member of [employer] or Organizations: Yes    Attends Club or Organization Meetings: 1 to 4 times per year    Marital Status:    Intimate Partner Violence: Not on file   Housing Stability: Low Risk     Unable to Pay for Housing in the Last Year: No    Number of Places Lived in the Last Year: 1    Unstable Housing in the Last Year: No       Review of Systems:  Review of Systems   Musculoskeletal:  Positive for back pain. Gastrointestinal:  Negative for bowel incontinence. Genitourinary:  Negative for bladder incontinence. Neurological:  Positive for weakness. Negative for numbness and tingling. Physical Exam:  /77   Pulse 78   Resp 20   Ht 5' 5\" (1.651 m)   Wt 190 lb (86.2 kg)   LMP 04/19/2003   BMI 31.62 kg/m²     Physical Exam  Cardiovascular:      Rate and Rhythm: Normal rate. Pulmonary:      Effort: Pulmonary effort is normal.   Musculoskeletal:         General: Normal range of motion. Skin:     General: Skin is warm and dry. Neurological:      Mental Status: She is alert and oriented to person, place, and time.        Record/Diagnostics Review:    Last óscar  2/22 and was appropriate     Assessment:  Problem List Items Addressed This Visit Lumbar radiculopathy    Lumbar degenerative disc disease    Relevant Medications    HYDROcodone-acetaminophen (NORCO) 5-325 MG per tablet    Chronic use of opiate drug for therapeutic purpose - Primary    Relevant Medications    HYDROcodone-acetaminophen (NORCO) 5-325 MG per tablet          Treatment Plan:  Patient relates current medications are helping the pain. Patient reports taking pain medications as prescribed, denies obtaining medications from different sources and denies use of illegal drugs. Medication risk and benefits have been discussed. Patient denies side effects from medications like nausea, vomiting, constipation or drowsiness. Patient reports current activities of daily living are possible due to medications and would like to continue them. As always, we encourage daily stretching and strengthening exercises, and recommend minimizing use of pain medications unless patient cannot get through daily activities due to pain. Due to the high risk nature of this patient's pain medication close monitoring is required. Continue current medication management, pt has been stable and compliant. Script written for norco  Follow up appointment made for 4 weeks    I have reviewed the chief complaint and history of present illness (including ROS and PFSH) and vital documentation by my staff and I agree with their documentation and have added where applicable.

## 2022-11-30 LAB
CREATININE URINE: 50.3 MG/DL (ref 28–217)
MICROALBUMIN/CREAT 24H UR: <12 MG/L
MICROALBUMIN/CREAT UR-RTO: NORMAL MCG/MG CREAT

## 2022-12-01 DIAGNOSIS — Z87.898 HISTORY OF PREDIABETES: Primary | ICD-10-CM

## 2022-12-01 RX ORDER — GLUCOSAMINE HCL/CHONDROITIN SU 500-400 MG
CAPSULE ORAL
Qty: 100 STRIP | Refills: 2 | Status: SHIPPED | OUTPATIENT
Start: 2022-12-01

## 2022-12-01 RX ORDER — PEN NEEDLE, DIABETIC 31 GX5/16"
NEEDLE, DISPOSABLE MISCELLANEOUS
Qty: 100 EACH | Refills: 2 | Status: SHIPPED | OUTPATIENT
Start: 2022-12-01

## 2022-12-01 RX ORDER — LANCETS 30 GAUGE
EACH MISCELLANEOUS
Qty: 100 EACH | Refills: 2 | Status: SHIPPED | OUTPATIENT
Start: 2022-12-01

## 2022-12-01 NOTE — TELEPHONE ENCOUNTER
Pt called in requesting new glucometer. Pt states she likes to check her sugar d/t family history of diabetes. Glucometer and supplies pended, please review.

## 2022-12-16 ENCOUNTER — OFFICE VISIT (OUTPATIENT)
Dept: NEUROSURGERY | Age: 62
End: 2022-12-16
Payer: COMMERCIAL

## 2022-12-16 VITALS
DIASTOLIC BLOOD PRESSURE: 84 MMHG | TEMPERATURE: 97.1 F | HEART RATE: 76 BPM | WEIGHT: 190 LBS | BODY MASS INDEX: 31.65 KG/M2 | HEIGHT: 65 IN | OXYGEN SATURATION: 91 % | SYSTOLIC BLOOD PRESSURE: 125 MMHG

## 2022-12-16 DIAGNOSIS — M54.16 LUMBAR RADICULOPATHY: ICD-10-CM

## 2022-12-16 DIAGNOSIS — Z98.1 S/P LUMBAR FUSION: ICD-10-CM

## 2022-12-16 DIAGNOSIS — M43.16 SPONDYLOLISTHESIS OF LUMBAR REGION: Primary | ICD-10-CM

## 2022-12-16 PROCEDURE — 3017F COLORECTAL CA SCREEN DOC REV: CPT | Performed by: NURSE PRACTITIONER

## 2022-12-16 PROCEDURE — 3078F DIAST BP <80 MM HG: CPT | Performed by: NURSE PRACTITIONER

## 2022-12-16 PROCEDURE — G8482 FLU IMMUNIZE ORDER/ADMIN: HCPCS | Performed by: NURSE PRACTITIONER

## 2022-12-16 PROCEDURE — G8417 CALC BMI ABV UP PARAM F/U: HCPCS | Performed by: NURSE PRACTITIONER

## 2022-12-16 PROCEDURE — 4004F PT TOBACCO SCREEN RCVD TLK: CPT | Performed by: NURSE PRACTITIONER

## 2022-12-16 PROCEDURE — 99214 OFFICE O/P EST MOD 30 MIN: CPT | Performed by: NURSE PRACTITIONER

## 2022-12-16 PROCEDURE — 3074F SYST BP LT 130 MM HG: CPT | Performed by: NURSE PRACTITIONER

## 2022-12-16 PROCEDURE — G8427 DOCREV CUR MEDS BY ELIG CLIN: HCPCS | Performed by: NURSE PRACTITIONER

## 2022-12-16 NOTE — PROGRESS NOTES
[address]  [facility] [address] [facility] [address]  Dept: [phone]    Patient:  Estelle Al  YOB: 1960  Date: 12/16/22    The patient is a 57 y.o. female who presents today for consult of the following problems:     Chief Complaint   Patient presents with    Other    Neurologic Problem         HPI:     Estelle Al is a 57 y.o. female who presents for follow-up of back pain. Patient has had ongoing, longstanding issues with predominantly axial low back pain. Has had multiple previous surgeries with Dr. Edison London. Has been following with pain management, did recently started having worsened pain that is now radiating into left lower extremity, whereas it was previously typically her right leg. Has had pain radiating from bilateral lower back down into left buttocks into mid posterior thigh. Denies any pain to anterior aspect of left leg. Symptoms can be quite severe at times. Did recently undergo updated MRI, was referred here for further evaluation. Did most recently undergo L5-S1 epidural injection with symptomatic improvement for only 2 days. Has been utilizing chronic narcotics to manage symptoms as well as muscle relaxer, NSAID and home stretching exercises.     History:     Past Medical History:   Diagnosis Date    Allergic rhinitis     Alveolar hypoventilation 11/20/2020    Aortic insufficiency 2018    mild-moderate on echo (was seen and discharged from cardiology-[facility])    Bronchiectasis ([facility] [address] 11/20/2020    Bronchitis     Chronic back pain     Pain management at [facility]    COPD (chronic obstructive pulmonary disease) ([facility] [address]     Dr. Timmy Perez to see 11/09/2020    Depression     DM (diabetes mellitus) ([facility] [address] 12/18/2012    GERD (gastroesophageal reflux disease)     History of echocardiogram 05/2018    EF 65%, mild-moderate AI and TR Hyperlipidemia     Dr. Vernell Downey    Hypertension     Dr. Vernell Downey    Obesity     Osteoarthritis     Peripheral vascular disease [facility])     Primary osteoarthritis of both knees     Radicular pain of lumbosacral region     Spinal stenosis, lumbar region, without neurogenic claudication 04/30/2013    Tricuspid regurgitation 2018    mild-moderate on echo    Type II or unspecified type diabetes mellitus without mention of complication, not stated as uncontrolled     Dr. Vernell Downey    Unspecified sleep apnea     no cpap used anymore    URI (upper respiratory infection)     Wears dentures     upper and lower full dentures    Wears glasses     Wellness examination     Dr. Vernell Downey -PCP last visit in early Oct. 2020     Past Surgical History:   Procedure Laterality Date    COLONOSCOPY  2/12/2009    normal    DILATION AND CURETTAGE OF UTERUS      GASTRECTOMY      partial    LUMBAR DISCECTOMY  01/2015    lumbar diskectomy    LUMBAR FUSION  11/19/2020     POSTERIOR FUSION L4/5,     LUMBAR FUSION N/A 11/19/2020    POSTERIOR FUSION L4/5, [employer], Slava Beams, EVOKES #[id number] AMINA performed by Umesh Bartholomew DO at [address] [facility] [address] Right 4/30/13    Lumbar Diagnostic Block,  Kenalog 40 mg    NERVE BLOCK  5/23/13    Lumbar Radiofrequency, Kenalog 40mg    NERVE BLOCK  8/12/13    Lt MBNB  celestone 6mg    NERVE BLOCK Left 8-28-13    left lumbar diagnostic block #2 decadron 10 mg    NERVE BLOCK Left 9-24-13    left lumbar median branch radiofrequency    NERVE BLOCK  07-02-14    caudal, celestone 9 mg    NERVE BLOCK  7-16-14    caudal epidural #2, celestone 9mg, fentanyl 25mcg    NERVE BLOCK  7/30/14    caudal #3 decadron 10mg    NERVE BLOCK  11-6-14    duramorph epidural steroid block  duramorph 1 mg celestone 9 mg    NERVE BLOCK  11/20/15    TENS- [facility]    NERVE BLOCK  07/20/2018    right transforminal # 1 decadron 10mg,isovue    NERVE BLOCK Bilateral 02/01/2019    bilat mbnb- no steroid    NERVE BLOCK Bilateral 02/08/2019    bilat mbnb, marcaine . 25%    NE KNEE SCOPE,DIAGNOSTIC Right 3/24/2017    KNEE ARTHROSCOPY WITH PARTIAL MEDIAL MENISECAL DEBRIDMENT  performed by Ashlie Curtis MD at [facility]  9 20 2007    UPPER GASTROINTESTINAL ENDOSCOPY  4 21 2009,04/2011    gastritis, esophagitis     Family History   Problem Relation Age of Onset    Diabetes Mother     Heart Disease Mother     Cirrhosis Father      Current Outpatient Medications on File Prior to Visit   Medication Sig Dispense Refill    blood glucose monitor kit and supplies Dispense sufficient amount for testing twice daily plus additional to accommodate PRN testing needs. Dispense all needed supplies to include: monitor, strips, lancing device, lancets, control solutions, alcohol swabs. 1 kit 0    blood glucose monitor strips Test 2 times a day & as needed for symptoms of irregular blood glucose. Dispense sufficient amount for indicated testing frequency plus additional to accommodate PRN testing needs. 100 strip 2    Alcohol Swabs (ALCOHOL PREP) PADS Use one alcohol swab to clean your skin before testing your blood sugar 100 each 2    Lancets MISC Use one lancet each time to test your blood sugar twice daily 100 each 2    HYDROcodone-acetaminophen (NORCO) 5-325 MG per tablet Take 1 tablet by mouth every 8 hours as needed for Pain for up to 30 days.  90 tablet 0    nicotine (NICODERM CQ) 7 MG/24HR Place 1 patch onto the skin every 24 hours 30 patch 3    cetirizine (ZYRTEC) 10 MG tablet Take 1 tablet by mouth daily 30 tablet 5    potassium chloride (KLOR-CON M) 10 MEQ extended release tablet Take 2 tablets by mouth daily 60 tablet 5    trospium (SANCTURA) 20 MG tablet Take 1 tablet by mouth 2 times daily 60 tablet 0    omeprazole (PRILOSEC) 20 MG delayed release capsule Take 1 capsule by mouth Daily 30 capsule 5    docusate sodium (COLACE) 100 MG capsule TAKE 1 CAPSULE BY MOUTH EVERY DAY 30 capsule 5 lisinopril-hydroCHLOROthiazide (PRINZIDE;ZESTORETIC) 20-25 MG per tablet 1 tablet daily 90 tablet 1    carvedilol (COREG) 12.5 MG tablet Take 1 tablet by mouth 2 times daily 180 tablet 1    amLODIPine (NORVASC) 10 MG tablet Take 1 tablet by mouth daily 90 tablet 1    atorvastatin (LIPITOR) 40 MG tablet Take 1 tablet by mouth daily 90 tablet 1    tiZANidine (ZANAFLEX) 4 MG tablet Take 1 tablet by mouth 2 times daily 60 tablet 2    meloxicam (MOBIC) 7.5 MG tablet Take 1 tablet by mouth daily 30 tablet 2    Umeclidinium Bromide 62.5 MCG/INH AEPB Inhale 1 puff into the lungs daily 2 each 5    mirtazapine (REMERON) 30 MG tablet Take 30 mg by mouth nightly      azelastine (ASTELIN) 0.1 % nasal spray 2 sprays by Nasal route 2 times daily Use in each nostril as directed 1 each 2    albuterol sulfate HFA (VENTOLIN HFA) 108 (90 Base) MCG/ACT inhaler Inhale 2 puffs into the lungs every 6 hours as needed for Wheezing 1 each 3    diphenhydrAMINE (BENADRYL) 25 MG tablet Take 25 mg by mouth every 6 hours as needed for Itching      mirtazapine (REMERON) 15 MG tablet Take 15 mg by mouth nightly      DULoxetine (CYMBALTA) 60 MG extended release capsule Take 1 capsule by mouth daily 30 capsule 3    ipratropium-albuterol (DUONEB) 0.5-2.5 (3) MG/3ML SOLN nebulizer solution Inhale 3 mLs into the lungs every 6 hours as needed for Shortness of Breath 360 mL 11     No current facility-administered medications on file prior to visit.      Social History     Tobacco Use    Smoking status: Every Day     Packs/day: 0.25     Years: 36.00     Pack years: 9.00     Types: Cigarettes    Smokeless tobacco: Never    Tobacco comments:     3 cigs per day   on nicoderm patch   Vaping Use    Vaping Use: Never used   Substance Use Topics    Alcohol use: No     Alcohol/week: 0.0 standard drinks    Drug use: No     Comment: 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 7 yrs       Allergies   Allergen Reactions    Aspirin      DUE TO ULCER    Claritin [Loratadine] Other (See Comments)     coughing    Flonase [Fluticasone Propionate]     Ibuprofen Other (See Comments)     Stomach ulcers    Morphine And Related      GI Upset       Review of Systems  Constitutional: Negative for activity change and appetite change. HENT: Negative for ear pain and facial swelling. Eyes: Negative for discharge and itching. Respiratory: Negative for choking and chest tightness. Cardiovascular: Negative for chest pain and leg swelling. Gastrointestinal: Negative for nausea and abdominal pain. Endocrine: Negative for cold intolerance and heat intolerance. Genitourinary: Negative for frequency and flank pain. Musculoskeletal: Negative for myalgias and joint swelling. Skin: Negative for rash and wound. Allergic/Immunologic: Negative for environmental allergies and food allergies. Hematological: Negative for adenopathy. Does not bruise/bleed easily. Psychiatric/Behavioral: Negative for self-injury. The patient is not nervous/anxious. Physical Exam:      /84 (Site: Right Upper Arm, Position: Sitting, Cuff Size: Large Adult)   Pulse 76   Temp 97.1 °F (36.2 °C) (Temporal)   Ht 5' 5\" (1.651 m)   Wt 190 lb (86.2 kg)   LMP 04/19/2003   SpO2 91%   BMI 31.62 kg/m²   Estimated body mass index is 31.62 kg/m² as calculated from the following:    Height as of this encounter: 5' 5\" (1.651 m). Weight as of this encounter: 190 lb (86.2 kg). General:  Felecia Chambers is a 57y.o. year old female who appears her stated age. HEENT: Normocephalic atraumatic. Neck supple. Chest: regular rate; pulses equal  Abdomen: Soft nontender nondistended.   Ext: DP and PT pulses 2+, good cap refill  Neuro    Mentation  Appropriate affect  Registration intact  Orientation intact  Judgement intact to situation    Cranial Nerves:   Pupils equal and reactive to light  Extraocular motion intact  Face and shrug symmetric  Tongue midline  No dysarthria  v1-3 sensation symmetric, masseter tone symmetric  Hearing symmetric    Sensation: Decreased posterior aspect left thigh    Motor  L deltoid 5/5; R deltoid 5/5  L biceps 5/5; R biceps 5/5  L triceps 5/5; R triceps 5/5  L wrist extension 5/5; R wrist extension 5/5  L intrinsics 5/5; R intrinsics 5/5     L iliopsoas 5/5 , R iliopsoas 5/5  L quadriceps 5/5; R quadriceps 5/5  L Dorsiflexion 5/5; R dorsiflexion 5/5  L Plantarflexion 5/5; R plantarflexion 5/5  L EHL 5/5; R EHL 5/5    Reflexes  L Brachioradialis 2+/4; R brachioradialis 2+/4  L Biceps 2+/4; R Biceps 2+/4  L Triceps 2+/4; R Triceps 2+/4  L Patellar 2+/4: R Patellar 2+/4  L Achilles 2+/4; R Achilles 2+/4    hoffmans L: neg  hoffmans R: neg  Clonus L: neg  Clonus R: neg  Babinski L: neg  Babinski R: neg    Studies Review:     MRI lumbar 10/10/2022:  FINDINGS:   BONES/ALIGNMENT: There is normal alignment of the spine. Mild   dextroscoliosis with tip at L2-L3. The vertebral body heights are   maintained. The bone marrow signal appears unremarkable. SPINAL CORD:  The conus terminates normally. SOFT TISSUES: No abnormal enhancement is seen of the lumbar spine. No   paraspinal mass identified. L1-L2: Grade 1 retrolisthesis and disc bulging with 2 mm central disc   protrusion effaces the anterior thecal sac. Otherwise unremarkable       L2-L3: Severe loss of disc height and signal with endplate degenerative   marrow edema and enhancement. Grade 1 retrolisthesis and 3 mm disc bulging. 3 mm left extraforaminal disc protrusion contacts exiting left L2 nerve root   moderate bilateral facet arthropathy. Findings resulting mild canal stenosis   and moderate left and mild right neural foraminal narrowing. L3-L4: Disc bulging with 2 mm central disc protrusion effaces the anterior   thecal sac. Moderate bilateral set arthropathy. Findings resulting in mild   canal stenosis and mild bilateral neural foraminal narrowing.        L4-L5: Changes related to instrumented disc space fusion and posterior fusion   via pedicular screws. Right-sided laminectomy defects. Mild bilateral facet   arthropathy. Moderate right and mild left neural foraminal narrowing. L5-S1: Mild loss of disc signal.  Disc bulging with 3 mm central disc   protrusion. Moderate bilateral facet arthropathy. Mild bilateral neural   foraminal narrowing     Pain management notes reviewed    Assessment and Plan:      1. Spondylolisthesis of lumbar region    2. S/P lumbar fusion    3. Lumbar radiculopathy          Plan: Recommend initiation of multimodal physical therapy, patient is unable to complete outpatient therapy, will provide referral for home care. We will also obtain lumbar CT to evaluate prior fusion. Patient to return after CT and 6 weeks of physical therapy for evaluation with Dr. Bill Tapia. Counseled heavily on the importance of smoking cessation, patient states that she has decreased quite a bit and is working on quitting. Followup: Return in about 10 weeks (around 2/24/2023), or if symptoms worsen or fail to improve. Prescriptions Ordered:  No orders of the defined types were placed in this encounter. Orders Placed:  Orders Placed This Encounter   Procedures    CT LUMBAR SPINE WO CONTRAST     Standing Status:   Future     Standing Expiration Date:   6/16/2023     Order Specific Question:   Reason for exam:     Answer:   eval prior fusion    [place] [id number] - [facility]     Referral Priority:   Routine     Referral Type:   [address] [place] [address]     Referral Reason:   Specialty Services Required     Referral Location:   [address]     Requested Specialty:   [address]     Number of Visits Requested:   1        Electronically signed by ELVIRA Locke CNP on 12/16/2022 at 4:16 PM    Please note that this chart was generated using voice recognition Dragon dictation software.   Although every effort was made to ensure the accuracy of this automated transcription, some errors in transcription may have occurred.

## 2022-12-28 ENCOUNTER — HOSPITAL ENCOUNTER (OUTPATIENT)
Dept: PAIN MANAGEMENT | Age: 62
Discharge: HOME | End: 2022-12-28
Payer: COMMERCIAL

## 2022-12-28 VITALS
WEIGHT: 190 LBS | HEIGHT: 65 IN | TEMPERATURE: 97.3 F | DIASTOLIC BLOOD PRESSURE: 85 MMHG | BODY MASS INDEX: 31.65 KG/M2 | SYSTOLIC BLOOD PRESSURE: 138 MMHG | OXYGEN SATURATION: 91 % | HEART RATE: 77 BPM

## 2022-12-28 DIAGNOSIS — M51.36 LUMBAR DEGENERATIVE DISC DISEASE: ICD-10-CM

## 2022-12-28 DIAGNOSIS — M54.16 LUMBAR RADICULOPATHY: Primary | ICD-10-CM

## 2022-12-28 DIAGNOSIS — Z79.891 CHRONIC USE OF OPIATE DRUG FOR THERAPEUTIC PURPOSE: ICD-10-CM

## 2022-12-28 DIAGNOSIS — M96.1 LUMBAR POST-LAMINECTOMY SYNDROME: ICD-10-CM

## 2022-12-28 PROCEDURE — G0481 DRUG TEST DEF 8-14 CLASSES: HCPCS

## 2022-12-28 PROCEDURE — 80307 DRUG TEST PRSMV CHEM ANLYZR: CPT

## 2022-12-28 PROCEDURE — 99213 OFFICE O/P EST LOW 20 MIN: CPT

## 2022-12-28 PROCEDURE — 99214 OFFICE O/P EST MOD 30 MIN: CPT | Performed by: STUDENT IN AN ORGANIZED HEALTH CARE EDUCATION/TRAINING PROGRAM

## 2022-12-28 RX ORDER — HYDROCODONE BITARTRATE AND ACETAMINOPHEN 5; 325 MG/1; MG/1
1 TABLET ORAL EVERY 8 HOURS PRN
Qty: 90 TABLET | Refills: 0 | Status: SHIPPED | OUTPATIENT
Start: 2022-12-29 | End: 2023-01-28

## 2022-12-28 ASSESSMENT — PAIN SCALES - GENERAL: PAINLEVEL_OUTOF10: 8

## 2022-12-28 NOTE — PROGRESS NOTES
Chronic Pain Clinic Note     Encounter Date: 12/28/2022     SUBJECTIVE:  Chief Complaint   Patient presents with    Back Pain     Med refill       Medication Refill: Norco - 2    Current Complaints of Pain:   Location: back   Radiation: both legs, worse on the Left  Severity:    Pain Numerical Score -    Average: 8     Highest: 10  Lowest: 8  Character/Quality: Complains of pain that is dull, sharp and shooting  Timing: Constant  Associated symptoms: none  Numbness: no  Weakness: no  Exacerbating factors: sitting, laying, bending  Alleviating factors: getting up and moving around  Length of time pain has been present: Started about 10 years ago  Inciting event/injury: no  Bowel/Bladder incontinence: no  Falls: no  Physical Therapy:      History of Interventions:   Surgery: No previous lumbar/cervical surgeries  Injections: None    Imaging:    Lumbar MRI 10/10/2022    Impression   Mild dextroscoliosis with tip at L2-L3       At L2-L3, mild canal stenosis and moderate left and mild right neural   foraminal narrowing. 3 mm left extraforaminal disc protrusion contacts   exiting left L2 nerve root. At L4-L5, moderate right and mild left neural foraminal narrowing and   right-sided laminectomy defect and changes related to instrumented disc space   fusion and posterior fusion.        Past Medical History:   Diagnosis Date    Allergic rhinitis     Alveolar hypoventilation 11/20/2020    Aortic insufficiency 2018    mild-moderate on echo (was seen and discharged from cardiology-[facility])    Bronchiectasis ([facility] 11/20/2020    Bronchitis     Chronic back pain     Pain management at [facility]    COPD (chronic obstructive pulmonary disease) ([facility]     Dr. Noemy Arnold to see 11/09/2020    Depression     DM (diabetes mellitus) ([facility] 12/18/2012    GERD (gastroesophageal reflux disease)     History of echocardiogram 05/2018    EF 65%, mild-moderate AI and TR    Hyperlipidemia     Dr. Clarice Brothers    Hypertension     Dr. Clarice Brothers Obesity     Osteoarthritis     Peripheral vascular disease (HCC)     Primary osteoarthritis of both knees     Radicular pain of lumbosacral region     Spinal stenosis, lumbar region, without neurogenic claudication 04/30/2013    Tricuspid regurgitation 2018    mild-moderate on echo    Type II or unspecified type diabetes mellitus without mention of complication, not stated as uncontrolled     Dr. Miki Win    Unspecified sleep apnea     no cpap used anymore    URI (upper respiratory infection)     Wears dentures     upper and lower full dentures    Wears glasses     Wellness examination     Dr. Miki Win -PCP last visit in early Oct. 2020       Past Surgical History:   Procedure Laterality Date    COLONOSCOPY  2/12/2009    normal    DILATION AND CURETTAGE OF UTERUS      GASTRECTOMY      partial    LUMBAR DISCECTOMY  01/2015    lumbar diskectomy    LUMBAR FUSION  11/19/2020     POSTERIOR FUSION L4/5,     LUMBAR FUSION N/A 11/19/2020    POSTERIOR FUSION L4/5, [employer], Mio English, EVOKES #[id number] AMINA performed by Lourdes Harper DO at [address] Right 4/30/13    Lumbar Diagnostic Block,  Kenalog 40 mg    NERVE BLOCK  5/23/13    Lumbar Radiofrequency, Kenalog 40mg    NERVE BLOCK  8/12/13    Lt MBNB  celestone 6mg    NERVE BLOCK Left 8-28-13    left lumbar diagnostic block #2 decadron 10 mg    NERVE BLOCK Left 9-24-13    left lumbar median branch radiofrequency    NERVE BLOCK  07-02-14    caudal, celestone 9 mg    NERVE BLOCK  7-16-14    caudal epidural #2, celestone 9mg, fentanyl 25mcg    NERVE BLOCK  7/30/14    caudal #3 decadron 10mg    NERVE BLOCK  11-6-14    duramorph epidural steroid block  duramorph 1 mg celestone 9 mg    NERVE BLOCK  11/20/15    TENS- Empi Select    NERVE BLOCK  07/20/2018    right transforminal # 1 decadron 10mg,isovue    NERVE BLOCK Bilateral 02/01/2019    bilat mbnb- no steroid    NERVE BLOCK Bilateral 02/08/2019    bilat mbnb, marcaine . 25%    SD KNEE SCOPE,DIAGNOSTIC Right 3/24/2017    KNEE ARTHROSCOPY WITH PARTIAL MEDIAL MENISECAL DEBRIDMENT  performed by Oralia Gordon MD at [address]  9 20 2007    UPPER GASTROINTESTINAL ENDOSCOPY  4 21 2009,04/2011    gastritis, esophagitis       Family History   Problem Relation Age of Onset    Diabetes Mother     Heart Disease Mother     Cirrhosis Father        Social History     Socioeconomic History    Marital status: Single     Spouse name: Not on file    Number of children: Not on file    Years of education: Not on file    Highest education level: Not on file   Occupational History     Employer: DISABLED   Tobacco Use    Smoking status: Every Day     Packs/day: 0.25     Years: 36.00     Pack years: 9.00     Types: Cigarettes    Smokeless tobacco: Never    Tobacco comments:     3 cigs per day   on nicoderm patch   Vaping Use    Vaping Use: Never used   Substance and Sexual Activity    Alcohol use: No     Alcohol/week: 0.0 standard drinks    Drug use: No     Comment: 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 7 yrs    Sexual activity: Never   Other Topics Concern    Not on file   Social History Narrative    10/9/20 Patient keeping contact with others to a minimum due to [place] 19 Pandemic. 10/9/20 Patient has difficulty with ambulation due to DJD, stenosis and fibromyalgia     Social Determinants of Health     Financial Resource Strain: Low Risk     Difficulty of Paying Living Expenses: Not very hard   Food Insecurity: No Food Insecurity    Worried About Running Out of Food in the Last Year: Never true    Ran Out of Food in the Last Year: Never true   Transportation Needs: No Transportation Needs    Lack of Transportation (Medical): No    Lack of Transportation (Non-Medical): No   Physical Activity: Insufficiently Active    Days of Exercise per Week: 3 days    Minutes of Exercise per Session: 30 min   Stress: Stress Concern Present    Feeling of Stress :  To some extent   Social Connections: Moderately Integrated    Frequency of Communication with Friends and Family: More than three times a week    Frequency of Social Gatherings with Friends and Family: More than three times a week    Attends Jainism Services: More than 4 times per year    Active Member of [email or web address] Group or Organizations: Yes    Attends Club or Organization Meetings: 1 to 4 times per year    Marital Status:    Intimate Partner Violence: Not on file   Housing Stability: Low Risk     Unable to Pay for Housing in the Last Year: No    Number of Places Lived in the Last Year: 1    Unstable Housing in the Last Year: No       Medications & Allergies:   Current Outpatient Medications   Medication Instructions    albuterol sulfate HFA (VENTOLIN HFA) 108 (90 Base) MCG/ACT inhaler 2 puffs, Inhalation, EVERY 6 HOURS PRN    Alcohol Swabs (ALCOHOL PREP) PADS Use one alcohol swab to clean your skin before testing your blood sugar    amLODIPine (NORVASC) 10 mg, Oral, DAILY    atorvastatin (LIPITOR) 40 mg, Oral, DAILY    azelastine (ASTELIN) 0.1 % nasal spray 2 sprays, Nasal, 2 TIMES DAILY, Use in each nostril as directed    blood glucose monitor kit and supplies Dispense sufficient amount for testing twice daily plus additional to accommodate PRN testing needs. Dispense all needed supplies to include: monitor, strips, lancing device, lancets, control solutions, alcohol swabs. blood glucose monitor strips Test 2 times a day & as needed for symptoms of irregular blood glucose. Dispense sufficient amount for indicated testing frequency plus additional to accommodate PRN testing needs.     carvedilol (COREG) 12.5 mg, Oral, 2 TIMES DAILY    cetirizine (ZYRTEC) 10 mg, Oral, DAILY    diphenhydrAMINE (BENADRYL) 25 mg, Oral, EVERY 6 HOURS PRN    docusate sodium (COLACE) 100 MG capsule TAKE 1 CAPSULE BY MOUTH EVERY DAY    DULoxetine (CYMBALTA) 60 mg, Oral, DAILY    [START ON 12/29/2022] HYDROcodone-acetaminophen (NORCO) 5-325 MG per tablet 1 tablet, Oral, EVERY 8 HOURS PRN    ipratropium-albuterol (DUONEB) 0.5-2.5 (3) MG/3ML SOLN nebulizer solution 3 mLs, Inhalation, EVERY 6 HOURS PRN    Lancets MISC Use one lancet each time to test your blood sugar twice daily    lisinopril-hydroCHLOROthiazide (PRINZIDE;ZESTORETIC) 20-25 MG per tablet 1 tablet daily    meloxicam (MOBIC) 7.5 mg, Oral, DAILY    mirtazapine (REMERON) 15 mg, Oral, NIGHTLY    mirtazapine (REMERON) 30 mg, Oral, NIGHTLY    nicotine (NICODERM CQ) 7 MG/24HR 1 patch, TransDERmal, EVERY 24 HOURS    omeprazole (PRILOSEC) 20 mg, Oral, DAILY    potassium chloride (KLOR-CON M) 10 MEQ extended release tablet 20 mEq, Oral, DAILY    tiZANidine (ZANAFLEX) 4 mg, Oral, 2 TIMES DAILY    trospium (SANCTURA) 20 mg, Oral, 2 TIMES DAILY    Umeclidinium Bromide 62.5 MCG/INH AEPB 1 puff, Inhalation, DAILY       Allergies   Allergen Reactions    Aspirin      DUE TO ULCER    Claritin [Loratadine] Other (See Comments)     coughing    Flonase [Fluticasone Propionate]     Ibuprofen Other (See Comments)     Stomach ulcers    Morphine And Related      GI Upset       Review of Systems:   Constitutional: negative for weight changes or fevers  Cardiovascular: negative for chest pain, palpitations, irregular heart beat  Respiratory: negative for dyspnea, cough, wheezing  Gastrointestinal: negative for constipation, diarrhea, nausea  Genitourinary: negative for bowel/bladder incontinence   Musculoskeletal: positive for low back pain  Neurological: Positive for radicular leg pain, leg weakness or numbness/tingling  Behavioral/Psych: negative for anxiety/depression   Hematological: negative for abnormal bleeding, anticoagulation use or antiplatelet use  All other systems reviewed and are negative    OBJECTIVE:    Vitals:    12/28/22 1101   BP: 138/85   Pulse: 77   Temp: 97.3 °F (36.3 °C)   SpO2: 91%       PHYSICAL EXAM    GENERAL: No acute distress, pleasant, well-appearing  HEENT: Normocephalic, atraumatic, Pupils equal and round  CARDIOVASCULAR: Well perfused, No peripheral cyanosis  PULMONARY: Good chest wall excursion, breathing unlabored  PSYCH: Appropriate affect and insight, non-pressured speech  SKIN: No rashes or lesions  MUSCULOSKELETAL:  Inspection: The back and extremities are symmetric and aligned. Muscle bulk is normal in appearance. Surgical scar present in lumbar spine. Palpation: There is tenderness to palpation along the lumbar paraspinal musculature bilaterally  Lumbar range of motion is limited in extension  NEUROMUSCULAR:  Patient ambulates unassisted  Gait is antalgic  Sensation to light touch is slightly reduced in left L2 dermatome  Strength is full with giveaway weakness bilaterally in lower extremities  No ankle clonus    Special Tests:  Lumbar facet loading is positive bilaterally  Seated straight leg raise is positive bilaterally    DIAGNOSIS:    ICD-10-CM    1. Lumbar radiculopathy  M54.16       2. Chronic use of opiate drug for therapeutic purpose  Z79.891 HYDROcodone-acetaminophen (NORCO) 5-325 MG per tablet      3. Lumbar degenerative disc disease  M51.36       4. Lumbar post-laminectomy syndrome  M96.1            ASSESSMENT:    Usman Larose is a 57 y. o.female who presents with chronic low back pain and leg pain. To review, patient has a history of previous lumbar spine surgeries with a lumbar fusion at L4-5. At today's visit, she reports worsening left low back pain and left anterior thigh pain. The patient's history and physical examination are consistent with lumbar radiculopathy. Additionally, the patient's most recent lumbar MRI on 10/10/2022 reveals a 3 mm left extraforaminal disc protrusion that contacts the exiting left L2 nerve root. Her symptoms appear to correlate with this irritation as she does have some reduced sensation in the left L2 dermatome. The patient recently saw neurosurgery and is obtaining an updated lumbar CT.     Neurologically, it appears the patient has full strength with some giveaway weakness. It appears she has some altered sensation of the left L2 dermatome. There is no evidence of myelopathy on examination. There are no red flags in the patient's history. The patient continues to take opioid medications to improve pain, function and quality of life. The patient denies any side effects from the medications including constipation or respiratory depression. The patient reports adequate analgesia with the medication. There is no evidence of aberrant behavior. The patient is due for a yearly urine drug screen which was ordered at today's visit to ensure the patient is taking the medication as prescribed. The patient reports taking [place] this morning. I refilled her [place] at today's visit. PLAN:  Medications: For nonopioid therapy, the following medications were prescribed:    -Continue meloxicam 7.5 mg daily    Opioid therapy:  -Continue Norco 5/325 mg 3 times daily as needed, refilled  -Pain Treatment agreement: Up to date  -Urine Drug Screen: Ordered today 12/28/2022  -OARRS reviewed and appropriate    Interventions:  -Consider repeat epidural steroid injection in future    Imaging:  -Patient has lumbar CT upcoming in near future  -Reviewed lumbar MRI with patient in room    Behavioral Therapies:  -Continue daily stretching and home exercise program    Referrals:  -None    Follow-up Plan:  -1 month    Patient was offered intervention where appropriate. Multi-modal Pain Therapy: The patient was explicitly considered for multimodal and interdisciplinary therapy. Non-opioid and non-pharmacological opportunities to enhance analgesia and quality of life have been and will continue to be pursued. Opioid Therapy: Education provided to patient regarding short term and long term implications of opioid medication use.  Repeat opioid risk stratification today, discussion regarding functional achievements, safe storage, and optimization of non-opioid interventional, behavioral, and pharmaceutical modalities. Will continue attempt to wean off medication as appropriate. Lesley Morton, DO  Interventional Pain Management/PM&R   [facility] This Encounter    DRUG SCREEN, PAIN     Norco this morning     Standing Status:   Standing     Number of Occurrences:   1    HYDROcodone-acetaminophen (NORCO) 5-325 MG per tablet     Sig: Take 1 tablet by mouth every 8 hours as needed for Pain for up to 30 days.      Dispense:  90 tablet     Refill:  0     Reduce doses taken as pain becomes manageable

## 2022-12-31 LAB
6-ACETYLMORPHINE, UR: NOT DETECTED
7-AMINOCLONAZEPAM, URINE: NOT DETECTED
ALPHA-OH-ALPRAZ, URINE: NOT DETECTED
ALPHA-OH-MIDAZOLAM, URINE: NOT DETECTED
ALPRAZOLAM, URINE: NOT DETECTED
AMPHETAMINES, URINE: NOT DETECTED
BARBITURATES, URINE: NOT DETECTED
BENZOYLECGONINE, UR: NOT DETECTED
BUPRENORPHINE URINE: NOT DETECTED
CARISOPRODOL, UR: NOT DETECTED
CLONAZEPAM, URINE: NOT DETECTED
CODEINE, URINE: NOT DETECTED
CREATININE URINE: 32.3 MG/DL (ref 20–400)
DIAZEPAM, URINE: NOT DETECTED
DRUGS EXPECTED, UR: NORMAL
EER HI RES INTERP UR: NORMAL
ETHYL GLUCURONIDE UR: NOT DETECTED
FENTANYL URINE: NOT DETECTED
GABAPENTIN: PRESENT
HYDROCODONE, URINE: PRESENT
HYDROMORPHONE, URINE: PRESENT
LORAZEPAM, URINE: NOT DETECTED
MARIJUANA METAB, UR: NOT DETECTED
MDA, UR: NOT DETECTED
MDEA, EVE, UR: NOT DETECTED
MDMA URINE: NOT DETECTED
MEPERIDINE METAB, UR: NOT DETECTED
METHADONE, URINE: NOT DETECTED
METHAMPHETAMINE, URINE: NOT DETECTED
METHYLPHENIDATE: NOT DETECTED
MIDAZOLAM, URINE: NOT DETECTED
MORPHINE URINE: NOT DETECTED
NALOXONE URINE: NOT DETECTED
NORBUPRENORPHINE, URINE: NOT DETECTED
NORDIAZEPAM, URINE: NOT DETECTED
NORFENTANYL, URINE: NOT DETECTED
NORHYDROCODONE, URINE: PRESENT
NOROXYCODONE, URINE: NOT DETECTED
NOROXYMORPHONE, URINE: NOT DETECTED
OXAZEPAM, URINE: NOT DETECTED
OXYCODONE URINE: NOT DETECTED
OXYMORPHONE, URINE: NOT DETECTED
PAIN MANAGEMENT DRUG PANEL INTERP, URINE: NORMAL
PAIN MGT DRUG PANEL, HI RES, UR: NORMAL
PCP,URINE: NOT DETECTED
PHENTERMINE, UR: NOT DETECTED
PREGABALIN: NOT DETECTED
TAPENTADOL, URINE: NOT DETECTED
TAPENTADOL-O-SULFATE, URINE: NOT DETECTED
TEMAZEPAM, URINE: NOT DETECTED
TRAMADOL, URINE: NOT DETECTED
ZOLPIDEM METABOLITE (ZCA), URINE: NOT DETECTED
ZOLPIDEM, URINE: NOT DETECTED

## 2023-01-05 ENCOUNTER — HOSPITAL ENCOUNTER (OUTPATIENT)
Dept: CT IMAGING | Age: 63
Discharge: HOME | End: 2023-01-07
Payer: MEDICAID

## 2023-01-05 DIAGNOSIS — M43.16 SPONDYLOLISTHESIS OF LUMBAR REGION: ICD-10-CM

## 2023-01-05 DIAGNOSIS — Z98.1 S/P LUMBAR FUSION: ICD-10-CM

## 2023-01-05 PROCEDURE — 72131 CT LUMBAR SPINE W/O DYE: CPT

## 2023-01-09 RX ORDER — MELOXICAM 7.5 MG/1
7.5 TABLET ORAL DAILY
Qty: 30 TABLET | Refills: 1 | OUTPATIENT
Start: 2023-01-09 | End: 2023-02-08

## 2023-01-10 ENCOUNTER — TELEPHONE (OUTPATIENT)
Dept: PAIN MANAGEMENT | Age: 63
End: 2023-01-10

## 2023-01-10 RX ORDER — MELOXICAM 7.5 MG/1
7.5 TABLET ORAL DAILY
Qty: 30 TABLET | Refills: 2 | Status: SHIPPED | OUTPATIENT
Start: 2023-01-10 | End: 2023-02-09

## 2023-01-24 ENCOUNTER — HOSPITAL ENCOUNTER (OUTPATIENT)
Dept: PAIN MANAGEMENT | Age: 63
Discharge: HOME | End: 2023-01-24
Payer: MEDICAID

## 2023-01-24 VITALS
SYSTOLIC BLOOD PRESSURE: 108 MMHG | DIASTOLIC BLOOD PRESSURE: 71 MMHG | HEIGHT: 65 IN | BODY MASS INDEX: 31.65 KG/M2 | WEIGHT: 190 LBS | OXYGEN SATURATION: 88 % | TEMPERATURE: 97.3 F | HEART RATE: 78 BPM

## 2023-01-24 DIAGNOSIS — Z79.891 CHRONIC USE OF OPIATE DRUG FOR THERAPEUTIC PURPOSE: ICD-10-CM

## 2023-01-24 DIAGNOSIS — M51.36 LUMBAR DEGENERATIVE DISC DISEASE: ICD-10-CM

## 2023-01-24 DIAGNOSIS — M54.16 LUMBAR RADICULOPATHY: Primary | ICD-10-CM

## 2023-01-24 DIAGNOSIS — M96.1 LUMBAR POST-LAMINECTOMY SYNDROME: ICD-10-CM

## 2023-01-24 PROCEDURE — 99213 OFFICE O/P EST LOW 20 MIN: CPT | Performed by: NURSE PRACTITIONER

## 2023-01-24 PROCEDURE — 99213 OFFICE O/P EST LOW 20 MIN: CPT

## 2023-01-24 RX ORDER — HYDROCODONE BITARTRATE AND ACETAMINOPHEN 5; 325 MG/1; MG/1
1 TABLET ORAL EVERY 8 HOURS PRN
Qty: 90 TABLET | Refills: 0 | Status: SHIPPED | OUTPATIENT
Start: 2023-01-27 | End: 2023-02-26

## 2023-01-24 ASSESSMENT — ENCOUNTER SYMPTOMS
BACK PAIN: 1
SHORTNESS OF BREATH: 0
BOWEL INCONTINENCE: 0
CONSTIPATION: 0
COUGH: 0

## 2023-01-24 ASSESSMENT — PAIN SCALES - GENERAL: PAINLEVEL_OUTOF10: 9

## 2023-01-24 NOTE — PROGRESS NOTES
Chief Complaint   Patient presents with    Back Pain     Med refill         PM     Chronic onset many years ago located in the lower lumbar area  Reports radiation of pain down both legs, associated with occ. leg numbness  Past history significant for 2 lumbar spine surgeries with last one in 2020. Initially noticed improvement but now has pain in both legs  Had a follow-up x-ray 5/22 that showed stable fusion at L4-5  Lumbar MRI 10/22 showed  L2-L3, mild canal stenosis and moderate left and mild right neural foraminal narrowing. 3 mm left extraforaminal disc protrusion contacts exiting left L2 nerve root. At L4-L5, moderate right and mild left neural foraminal narrowing and right-sided laminectomy defect and changes related to instrumented disc space fusion and posterior fusion. Has tried home exercise and physical therapy  with little improvement   Pt had L5 S1 lumbar epidural steroid injection 8/2021 and reported significant relief for only 2 days   Had follow-up with neurosurgery last month with CT and PT ordered but has not heard from the PT office to set up  CT done 12/22 with posterior fusion of L4 and L5 without complication and multilevel degenerative changes without significant spinal canal stenosis    Currently on multimodal medication regimen Norco and Mobic with little relief  Pain intensity 10 out of 10 at times    HPI:     Back Pain  This is a chronic problem. The current episode started more than 1 year ago. The problem occurs constantly. The problem is unchanged. The pain is present in the lumbar spine. The pain radiates to the left foot, left thigh and left knee. The pain is at a severity of 9/10. The pain is severe. The pain is The same all the time. The symptoms are aggravated by bending, sitting and standing. Pertinent negatives include no bladder incontinence, bowel incontinence, chest pain, fever, headaches, numbness, tingling or weakness.  Risk factors include menopause, obesity, poor posture, sedentary lifestyle and lack of exercise. She has tried ice, heat and analgesics (injectiond) for the symptoms. The treatment provided mild relief. Patient denies any new neurological symptoms. No bowel or bladder incontinence, no weakness, and no falling. Pill count: appropriate / Ignacio Edelson - 14 1/28    Morphine equivalent: 15    Controlled Substance Monitoring:    Acute and Chronic Pain Monitoring:   RX Monitoring 1/24/2023   Attestation -   Acute Pain Prescriptions -   Periodic Controlled Substance Monitoring No signs of potential drug abuse or diversion identified. ;Possible medication side effects, risk of tolerance/dependence & alternative treatments discussed. ;Assessed functional status. ;Obtaining appropriate analgesic effect of treatment. Chronic Pain > 50 MEDD -   Chronic Pain > 80 MEDD -          Periodic Controlled Substance Monitoring: No signs of potential drug abuse or diversion identified. , Possible medication side effects, risk of tolerance/dependence & alternative treatments discussed. , Assessed functional status., Obtaining appropriate analgesic effect of treatment.  Fenwick William, APRN - CNP)      Past Medical History:   Diagnosis Date    Allergic rhinitis     Alveolar hypoventilation 11/20/2020    Aortic insufficiency 2018    mild-moderate on echo ([facility]    [facility] 11/20/2020    Bronchitis     Chronic back pain     Pain management at [facility]    COPD (chronic obstructive pulmonary disease) ([facility]     Dr. Judith Oh to see 11/09/2020    Depression     DM (diabetes mellitus) ([facility] 12/18/2012    GERD (gastroesophageal reflux disease)     History of echocardiogram 05/2018    EF 65%, mild-moderate AI and TR    Hyperlipidemia     Dr. Viktor Boucher    Hypertension     Dr. Viktor Boucher    Obesity     Osteoarthritis     Peripheral vascular disease ([facility]     Primary osteoarthritis of both knees     Radicular pain of lumbosacral region     Spinal stenosis, lumbar region, without neurogenic claudication 04/30/2013    Tricuspid regurgitation 2018    mild-moderate on echo    Type II or unspecified type diabetes mellitus without mention of complication, not stated as uncontrolled     Dr. Allen Arenas    Unspecified sleep apnea     no cpap used anymore    URI (upper respiratory infection)     Wears dentures     upper and lower full dentures    Wears glasses     Wellness examination     Dr. Allen Arenas -PCP last visit in early Oct. 2020       Past Surgical History:   Procedure Laterality Date    COLONOSCOPY  2/12/2009    normal    DILATION AND CURETTAGE OF UTERUS      GASTRECTOMY      partial    LUMBAR DISCECTOMY  01/2015    lumbar diskectomy    LUMBAR FUSION  11/19/2020     POSTERIOR FUSION L4/5,     LUMBAR FUSION N/A 11/19/2020    POSTERIOR FUSION L4/5, [facility], Piedad Goss, [facility] #[id number] AMINA performed by Richi Peña DO at [address] Right 4/30/13    Lumbar Diagnostic Block,  Kenalog 40 mg    NERVE BLOCK  5/23/13    Lumbar Radiofrequency, Kenalog 40mg    NERVE BLOCK  8/12/13    Lt MBNB  celestone 6mg    NERVE BLOCK Left 8-28-13    left lumbar diagnostic block #2 decadron 10 mg    NERVE BLOCK Left 9-24-13    left lumbar median branch radiofrequency    NERVE BLOCK  07-02-14    caudal, celestone 9 mg    NERVE BLOCK  7-16-14    caudal epidural #2, celestone 9mg, fentanyl 25mcg    NERVE BLOCK  7/30/14    caudal #3 decadron 10mg    NERVE BLOCK  11-6-14    duramorph epidural steroid block  duramorph 1 mg celestone 9 mg    NERVE BLOCK  11/20/15    TENS- Empi Select    NERVE BLOCK  07/20/2018    right transforminal # 1 decadron 10mg,isovue    NERVE BLOCK Bilateral 02/01/2019    bilat mbnb- no steroid    NERVE BLOCK Bilateral 02/08/2019    bilat mbnb, marcaine . 25%    NH ARTHROSCOPY KNEE DIAGNOSTIC W/WO SYNOVIAL BX SPX Right 3/24/2017    KNEE ARTHROSCOPY WITH PARTIAL MEDIAL MENISECAL DEBRIDMENT  performed by Irma Phoenix, MD at [address] [facility]  9 20 2007    UPPER GASTROINTESTINAL ENDOSCOPY  4 21 2009,04/2011    gastritis, esophagitis       Allergies   Allergen Reactions    Aspirin      DUE TO ULCER    Claritin [Loratadine] Other (See Comments)     coughing    Flonase [Fluticasone Propionate]     Ibuprofen Other (See Comments)     Stomach ulcers    Morphine And Related      GI Upset         Current Outpatient Medications:     [START ON 1/27/2023] HYDROcodone-acetaminophen (NORCO) 5-325 MG per tablet, Take 1 tablet by mouth every 8 hours as needed for Pain for up to 30 days. , Disp: 90 tablet, Rfl: 0    meloxicam (MOBIC) 7.5 MG tablet, Take 1 tablet by mouth daily, Disp: 30 tablet, Rfl: 2    blood glucose monitor kit and supplies, Dispense sufficient amount for testing twice daily plus additional to accommodate PRN testing needs. Dispense all needed supplies to include: monitor, strips, lancing device, lancets, control solutions, alcohol swabs., Disp: 1 kit, Rfl: 0    blood glucose monitor strips, Test 2 times a day & as needed for symptoms of irregular blood glucose. Dispense sufficient amount for indicated testing frequency plus additional to accommodate PRN testing needs. , Disp: 100 strip, Rfl: 2    Alcohol Swabs (ALCOHOL PREP) PADS, Use one alcohol swab to clean your skin before testing your blood sugar, Disp: 100 each, Rfl: 2    Lancets MISC, Use one lancet each time to test your blood sugar twice daily, Disp: 100 each, Rfl: 2    nicotine (NICODERM CQ) 7 MG/24HR, Place 1 patch onto the skin every 24 hours, Disp: 30 patch, Rfl: 3    cetirizine (ZYRTEC) 10 MG tablet, Take 1 tablet by mouth daily, Disp: 30 tablet, Rfl: 5    potassium chloride (KLOR-CON M) 10 MEQ extended release tablet, Take 2 tablets by mouth daily, Disp: 60 tablet, Rfl: 5    trospium (SANCTURA) 20 MG tablet, Take 1 tablet by mouth 2 times daily, Disp: 60 tablet, Rfl: 0    omeprazole (PRILOSEC) 20 MG delayed release capsule, Take 1 capsule by mouth Daily, Disp: 30 capsule, Rfl: 5    docusate sodium (COLACE) 100 MG capsule, TAKE 1 CAPSULE BY MOUTH EVERY DAY, Disp: 30 capsule, Rfl: 5    lisinopril-hydroCHLOROthiazide (PRINZIDE;ZESTORETIC) 20-25 MG per tablet, 1 tablet daily, Disp: 90 tablet, Rfl: 1    carvedilol (COREG) 12.5 MG tablet, Take 1 tablet by mouth 2 times daily, Disp: 180 tablet, Rfl: 1    amLODIPine (NORVASC) 10 MG tablet, Take 1 tablet by mouth daily, Disp: 90 tablet, Rfl: 1    atorvastatin (LIPITOR) 40 MG tablet, Take 1 tablet by mouth daily, Disp: 90 tablet, Rfl: 1    tiZANidine (ZANAFLEX) 4 MG tablet, Take 1 tablet by mouth 2 times daily, Disp: 60 tablet, Rfl: 2    Umeclidinium Bromide 62.5 MCG/INH AEPB, Inhale 1 puff into the lungs daily, Disp: 2 each, Rfl: 5    mirtazapine (REMERON) 30 MG tablet, Take 30 mg by mouth nightly, Disp: , Rfl:     azelastine (ASTELIN) 0.1 % nasal spray, 2 sprays by Nasal route 2 times daily Use in each nostril as directed, Disp: 1 each, Rfl: 2    albuterol sulfate HFA (VENTOLIN HFA) 108 (90 Base) MCG/ACT inhaler, Inhale 2 puffs into the lungs every 6 hours as needed for Wheezing, Disp: 1 each, Rfl: 3    diphenhydrAMINE (BENADRYL) 25 MG tablet, Take 25 mg by mouth every 6 hours as needed for Itching, Disp: , Rfl:     mirtazapine (REMERON) 15 MG tablet, Take 15 mg by mouth nightly, Disp: , Rfl:     DULoxetine (CYMBALTA) 60 MG extended release capsule, Take 1 capsule by mouth daily, Disp: 30 capsule, Rfl: 3    ipratropium-albuterol (DUONEB) 0.5-2.5 (3) MG/3ML SOLN nebulizer solution, Inhale 3 mLs into the lungs every 6 hours as needed for Shortness of Breath, Disp: 360 mL, Rfl: 11    Family History   Problem Relation Age of Onset    Diabetes Mother     Heart Disease Mother     Cirrhosis Father        Social History     Socioeconomic History    Marital status: Single     Spouse name: Not on file    Number of children: Not on file    Years of education: Not on file    Highest education level: Not on file Occupational History     Employer: DISABLED   Tobacco Use    Smoking status: Every Day     Packs/day: 0.25     Years: 36.00     Pack years: 9.00     Types: Cigarettes    Smokeless tobacco: Never    Tobacco comments:     3 cigs per day   on nicoderm patch   Vaping Use    Vaping Use: Never used   Substance and Sexual Activity    Alcohol use: No     Alcohol/week: 0.0 standard drinks    Drug use: No     Comment: 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 7 yrs    Sexual activity: Never   Other Topics Concern    Not on file   Social History Narrative    10/9/20 Patient keeping contact with others to a minimum due to [place] 19 Pandemic. 10/9/20 Patient has difficulty with ambulation due to DJD, stenosis and fibromyalgia     Social Determinants of Health     Financial Resource Strain: Low Risk     Difficulty of Paying Living Expenses: Not very hard   Food Insecurity: No Food Insecurity    Worried About Running Out of Food in the Last Year: Never true    Ran Out of Food in the Last Year: Never true   Transportation Needs: No Transportation Needs    Lack of Transportation (Medical): No    Lack of Transportation (Non-Medical): No   Physical Activity: Insufficiently Active    Days of Exercise per Week: 3 days    Minutes of Exercise per Session: 30 min   Stress: Stress Concern Present    Feeling of Stress : To some extent   Social Connections: Moderately Integrated    Frequency of Communication with Friends and Family: More than three times a week    Frequency of Social Gatherings with Friends and Family: More than three times a week    Attends [employer] Services: More than 4 times per year    Active Member of [employer] or Organizations: Yes    Attends Club or Organization Meetings: 1 to 4 times per year    Marital Status:     Intimate Partner Violence: Not on file   Housing Stability: Low Risk     Unable to Pay for Housing in the Last Year: No    Number of Places Lived in the Last Year: 1    Unstable Housing in the Last Year: No       Review of Systems:  Review of Systems   Constitutional: Negative for chills and fever. Cardiovascular:  Negative for chest pain. Respiratory:  Negative for cough and shortness of breath. Musculoskeletal:  Positive for back pain. Gastrointestinal:  Negative for bowel incontinence and constipation. Genitourinary:  Negative for bladder incontinence. Neurological:  Negative for headaches, numbness, tingling and weakness. Physical Exam:  /71   Pulse 78   Temp 97.3 °F (36.3 °C)   Ht 5' 5\" (1.651 m)   Wt 190 lb (86.2 kg)   LMP 04/19/2003   SpO2 (!) 88%   BMI 31.62 kg/m²     Physical Exam  Cardiovascular:      Rate and Rhythm: Normal rate. Pulmonary:      Effort: Pulmonary effort is normal.   Musculoskeletal:      Lumbar back: Decreased range of motion. Skin:     General: Skin is warm and dry. Neurological:      Mental Status: She is alert and oriented to person, place, and time. Record/Diagnostics Review:    Last óscar 12/22  and was appropriate     Assessment:  Problem List Items Addressed This Visit       Lumbar radiculopathy - Primary    Lumbar degenerative disc disease    Relevant Medications    HYDROcodone-acetaminophen (NORCO) 5-325 MG per tablet (Start on 1/27/2023)    Lumbar post-laminectomy syndrome    Chronic use of opiate drug for therapeutic purpose    Relevant Medications    HYDROcodone-acetaminophen (NORCO) 5-325 MG per tablet (Start on 1/27/2023)          Treatment Plan:  Patient relates current medications are helping the pain. Patient reports taking pain medications as prescribed, denies obtaining medications from different sources and denies use of illegal drugs. Medication risk and benefits have been discussed. Patient denies side effects from medications like nausea, vomiting, constipation or drowsiness. Patient reports current activities of daily living are possible due to medications and would like to continue them.      As always, we encourage daily stretching and strengthening exercises, and recommend minimizing use of pain medications unless patient cannot get through daily activities due to pain. Due to the high risk nature of this patient's pain medication close monitoring is required. Continue current medication management, pt has been stable and compliant. Script written for norco  Plans to call PT to set up in home PT  Follow up appointment made for 4 weeks    I have reviewed the chief complaint and history of present illness (including ROS and PFSH) and vital documentation by my staff and I agree with their documentation and have added where applicable.

## 2023-02-21 ENCOUNTER — HOSPITAL ENCOUNTER (OUTPATIENT)
Dept: PAIN MANAGEMENT | Age: 63
Discharge: HOME | End: 2023-02-21
Payer: MEDICAID

## 2023-02-21 VITALS
HEIGHT: 65 IN | DIASTOLIC BLOOD PRESSURE: 77 MMHG | SYSTOLIC BLOOD PRESSURE: 158 MMHG | OXYGEN SATURATION: 84 % | HEART RATE: 84 BPM | WEIGHT: 190 LBS | BODY MASS INDEX: 31.65 KG/M2

## 2023-02-21 DIAGNOSIS — M51.36 LUMBAR DEGENERATIVE DISC DISEASE: ICD-10-CM

## 2023-02-21 DIAGNOSIS — M54.16 LUMBAR RADICULOPATHY: ICD-10-CM

## 2023-02-21 DIAGNOSIS — M96.1 LUMBAR POST-LAMINECTOMY SYNDROME: ICD-10-CM

## 2023-02-21 DIAGNOSIS — Z79.891 CHRONIC USE OF OPIATE DRUG FOR THERAPEUTIC PURPOSE: Primary | ICD-10-CM

## 2023-02-21 PROCEDURE — 99213 OFFICE O/P EST LOW 20 MIN: CPT | Performed by: NURSE PRACTITIONER

## 2023-02-21 PROCEDURE — 99213 OFFICE O/P EST LOW 20 MIN: CPT

## 2023-02-21 RX ORDER — HYDROCODONE BITARTRATE AND ACETAMINOPHEN 5; 325 MG/1; MG/1
1 TABLET ORAL EVERY 8 HOURS PRN
Qty: 90 TABLET | Refills: 0 | Status: SHIPPED | OUTPATIENT
Start: 2023-02-28 | End: 2023-03-30

## 2023-02-21 ASSESSMENT — ENCOUNTER SYMPTOMS
SHORTNESS OF BREATH: 0
COUGH: 0
BOWEL INCONTINENCE: 0
BACK PAIN: 1
CONSTIPATION: 0

## 2023-02-21 ASSESSMENT — PAIN DESCRIPTION - PAIN TYPE: TYPE: CHRONIC PAIN

## 2023-02-21 ASSESSMENT — PAIN DESCRIPTION - LOCATION: LOCATION: BACK

## 2023-02-21 ASSESSMENT — PAIN SCALES - GENERAL: PAINLEVEL_OUTOF10: 9

## 2023-02-21 ASSESSMENT — PAIN DESCRIPTION - FREQUENCY: FREQUENCY: CONTINUOUS

## 2023-02-21 NOTE — PROGRESS NOTES
Chief Complaint   Patient presents with    Back Pain     PMH  Chronic onset many years ago located in the lower lumbar area  Reports radiation of pain down both legs, associated with occ. leg numbness  Past history significant for 2 lumbar spine surgeries with last one in 2020. Initially noticed improvement but now has pain in both legs  Had a follow-up x-ray 5/22 that showed stable fusion at L4-5  Lumbar MRI 10/22 showed  L2-L3, mild canal stenosis and moderate left and mild right neural foraminal narrowing. 3 mm left extraforaminal disc protrusion contacts exiting left L2 nerve root. At L4-L5, moderate right and mild left neural foraminal narrowing and right-sided laminectomy defect and changes related to instrumented disc space fusion and posterior fusion. Pt had L5 S1 lumbar epidural steroid injection 8/2021 and reported significant relief for only 2 days   Had follow-up with neurosurgery 12/22 with CT and PT ordered. Pt started inhome PT 2 times a week with some relief  CT done 12/22 with posterior fusion of L4 and L5 without complication and multilevel degenerative changes without significant spinal canal stenosis     Currently on multimodal medication regimen Norco and Mobic with little relief  Pain intensity 10 out of 10 at times when first waking up        Back Pain  This is a chronic problem. The current episode started more than 1 year ago. The problem occurs constantly. The problem is unchanged. The pain is present in the lumbar spine. The quality of the pain is described as aching. The pain radiates to the left knee, left thigh and left foot. The pain is at a severity of 9/10. The pain is severe. The pain is The same all the time. The symptoms are aggravated by bending, standing and sitting. Pertinent negatives include no bladder incontinence, bowel incontinence, chest pain or fever. She has tried analgesics, heat, ice and home exercises for the symptoms.  The treatment provided mild relief. Patient denies any new neurological symptoms. No bowel or bladder incontinence, no weakness, and no falling. Pill count: appropriate / [facility] [facility] - 23    Morphine equivalent: 15    Controlled Substance Monitoring:    Acute and Chronic Pain Monitoring:   RX Monitoring 2/21/2023   Attestation -   Acute Pain Prescriptions -   Periodic Controlled Substance Monitoring Possible medication side effects, risk of tolerance/dependence & alternative treatments discussed. ;No signs of potential drug abuse or diversion identified. ;Assessed functional status. ;Obtaining appropriate analgesic effect of treatment. Chronic Pain > 50 MEDD -   Chronic Pain > 80 MEDD -          Periodic Controlled Substance Monitoring: Possible medication side effects, risk of tolerance/dependence & alternative treatments discussed., No signs of potential drug abuse or diversion identified. , Assessed functional status., Obtaining appropriate analgesic effect of treatment.  Brian Fisher, ELVIRA - CNP)      Past Medical History:   Diagnosis Date    Allergic rhinitis     Alveolar hypoventilation 11/20/2020    Aortic insufficiency 2018    mild-moderate on echo (was seen and discharged from cardiology-[facility])    Bronchiectasis ([facility] 11/20/2020    Bronchitis     Chronic back pain     Pain management at [facility]    COPD (chronic obstructive pulmonary disease) ([facility]     Dr. Frederick Villa to see 11/09/2020    Depression     DM (diabetes mellitus) ([facility] 12/18/2012    GERD (gastroesophageal reflux disease)     History of echocardiogram 05/2018    EF 65%, mild-moderate AI and TR    Hyperlipidemia     Dr. Latrice Neal    Hypertension     Dr. Latrice Neal    Obesity     Osteoarthritis     Peripheral vascular disease ([facility]     Primary osteoarthritis of both knees     Radicular pain of lumbosacral region     Spinal stenosis, lumbar region, without neurogenic claudication 04/30/2013    Tricuspid regurgitation 2018    mild-moderate on echo    Type II or unspecified type diabetes mellitus without mention of complication, not stated as uncontrolled     Dr. Lillie Cowden    Unspecified sleep apnea     no cpap used anymore    URI (upper respiratory infection)     Wears dentures     upper and lower full dentures    Wears glasses     Wellness examination     Dr. Lillie Cowden -PCP last visit in early Oct. 2020       Past Surgical History:   Procedure Laterality Date    COLONOSCOPY  2/12/2009    normal    DILATION AND CURETTAGE OF UTERUS      GASTRECTOMY      partial    LUMBAR DISCECTOMY  01/2015    lumbar diskectomy    LUMBAR FUSION  11/19/2020     POSTERIOR FUSION L4/5,     LUMBAR FUSION N/A 11/19/2020    POSTERIOR FUSION L4/5, [employer], Ismael Riley, EVOKES #[id number] AMINA performed by Dorisann Kehr, DO at [address] Right 4/30/13    Lumbar Diagnostic Block,  Kenalog 40 mg    NERVE BLOCK  5/23/13    Lumbar Radiofrequency, Kenalog 40mg    NERVE BLOCK  8/12/13    Lt MBNB  celestone 6mg    NERVE BLOCK Left 8-28-13    left lumbar diagnostic block #2 decadron 10 mg    NERVE BLOCK Left 9-24-13    left lumbar median branch radiofrequency    NERVE BLOCK  07-02-14    caudal, celestone 9 mg    NERVE BLOCK  7-16-14    caudal epidural #2, celestone 9mg, fentanyl 25mcg    NERVE BLOCK  7/30/14    caudal #3 decadron 10mg    NERVE BLOCK  11-6-14    duramorph epidural steroid block  duramorph 1 mg celestone 9 mg    NERVE BLOCK  11/20/15    TENS- Empi Select    NERVE BLOCK  07/20/2018    right transforminal # 1 decadron 10mg,isovue    NERVE BLOCK Bilateral 02/01/2019    bilat mbnb- no steroid    NERVE BLOCK Bilateral 02/08/2019    bilat mbnb, marcaine . 25%    OR ARTHROSCOPY KNEE DIAGNOSTIC W/WO SYNOVIAL BX SPX Right 3/24/2017    KNEE ARTHROSCOPY WITH PARTIAL MEDIAL MENISECAL DEBRIDMENT  performed by Sonia Ugarte MD at [address] [place] [address]  9 20 2007    UPPER GASTROINTESTINAL ENDOSCOPY  4 21 2009,04/2011    gastritis, esophagitis       Allergies Allergen Reactions    Aspirin      DUE TO ULCER    Claritin [Loratadine] Other (See Comments)     coughing    Flonase [Fluticasone Propionate]     Ibuprofen Other (See Comments)     Stomach ulcers    Morphine And Related      GI Upset         Current Outpatient Medications:     [START ON 2/28/2023] HYDROcodone-acetaminophen (NORCO) 5-325 MG per tablet, Take 1 tablet by mouth every 8 hours as needed for Pain for up to 30 days. , Disp: 90 tablet, Rfl: 0    meloxicam (MOBIC) 7.5 MG tablet, Take 1 tablet by mouth daily, Disp: 30 tablet, Rfl: 2    blood glucose monitor kit and supplies, Dispense sufficient amount for testing twice daily plus additional to accommodate PRN testing needs. Dispense all needed supplies to include: monitor, strips, lancing device, lancets, control solutions, alcohol swabs., Disp: 1 kit, Rfl: 0    blood glucose monitor strips, Test 2 times a day & as needed for symptoms of irregular blood glucose. Dispense sufficient amount for indicated testing frequency plus additional to accommodate PRN testing needs. , Disp: 100 strip, Rfl: 2    Alcohol Swabs (ALCOHOL PREP) PADS, Use one alcohol swab to clean your skin before testing your blood sugar, Disp: 100 each, Rfl: 2    Lancets MISC, Use one lancet each time to test your blood sugar twice daily, Disp: 100 each, Rfl: 2    nicotine (NICODERM CQ) 7 MG/24HR, Place 1 patch onto the skin every 24 hours, Disp: 30 patch, Rfl: 3    cetirizine (ZYRTEC) 10 MG tablet, Take 1 tablet by mouth daily, Disp: 30 tablet, Rfl: 5    potassium chloride (KLOR-CON M) 10 MEQ extended release tablet, Take 2 tablets by mouth daily, Disp: 60 tablet, Rfl: 5    trospium (SANCTURA) 20 MG tablet, Take 1 tablet by mouth 2 times daily, Disp: 60 tablet, Rfl: 0    omeprazole (PRILOSEC) 20 MG delayed release capsule, Take 1 capsule by mouth Daily, Disp: 30 capsule, Rfl: 5    docusate sodium (COLACE) 100 MG capsule, TAKE 1 CAPSULE BY MOUTH EVERY DAY, Disp: 30 capsule, Rfl: 5 lisinopril-hydroCHLOROthiazide (PRINZIDE;ZESTORETIC) 20-25 MG per tablet, 1 tablet daily, Disp: 90 tablet, Rfl: 1    carvedilol (COREG) 12.5 MG tablet, Take 1 tablet by mouth 2 times daily, Disp: 180 tablet, Rfl: 1    amLODIPine (NORVASC) 10 MG tablet, Take 1 tablet by mouth daily, Disp: 90 tablet, Rfl: 1    atorvastatin (LIPITOR) 40 MG tablet, Take 1 tablet by mouth daily, Disp: 90 tablet, Rfl: 1    tiZANidine (ZANAFLEX) 4 MG tablet, Take 1 tablet by mouth 2 times daily, Disp: 60 tablet, Rfl: 2    Umeclidinium Bromide 62.5 MCG/INH AEPB, Inhale 1 puff into the lungs daily, Disp: 2 each, Rfl: 5    mirtazapine (REMERON) 30 MG tablet, Take 30 mg by mouth nightly, Disp: , Rfl:     azelastine (ASTELIN) 0.1 % nasal spray, 2 sprays by Nasal route 2 times daily Use in each nostril as directed, Disp: 1 each, Rfl: 2    albuterol sulfate HFA (VENTOLIN HFA) 108 (90 Base) MCG/ACT inhaler, Inhale 2 puffs into the lungs every 6 hours as needed for Wheezing, Disp: 1 each, Rfl: 3    diphenhydrAMINE (BENADRYL) 25 MG tablet, Take 25 mg by mouth every 6 hours as needed for Itching, Disp: , Rfl:     mirtazapine (REMERON) 15 MG tablet, Take 15 mg by mouth nightly, Disp: , Rfl:     DULoxetine (CYMBALTA) 60 MG extended release capsule, Take 1 capsule by mouth daily, Disp: 30 capsule, Rfl: 3    ipratropium-albuterol (DUONEB) 0.5-2.5 (3) MG/3ML SOLN nebulizer solution, Inhale 3 mLs into the lungs every 6 hours as needed for Shortness of Breath, Disp: 360 mL, Rfl: 11    Family History   Problem Relation Age of Onset    Diabetes Mother     Heart Disease Mother     Cirrhosis Father        Social History     Socioeconomic History    Marital status: Single     Spouse name: Not on file    Number of children: Not on file    Years of education: Not on file    Highest education level: Not on file   Occupational History     Employer: DISABLED   Tobacco Use    Smoking status: Every Day     Packs/day: 0.25     Years: 36.00     Pack years: 9.00 Types: Cigarettes    Smokeless tobacco: Never    Tobacco comments:     3 cigs per day   on nicoderm patch   Vaping Use    Vaping Use: Never used   Substance and Sexual Activity    Alcohol use: No     Alcohol/week: 0.0 standard drinks    Drug use: No     Comment: 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 7 yrs    Sexual activity: Never   Other Topics Concern    Not on file   Social History Narrative    10/9/20 Patient keeping contact with others to a minimum due to [place] Pandemic. 10/9/20 Patient has difficulty with ambulation due to DJD, stenosis and fibromyalgia     Social Determinants of Health     Financial Resource Strain: Low Risk     Difficulty of Paying Living Expenses: Not very hard   Food Insecurity: No Food Insecurity    Worried About Running Out of Food in the Last Year: Never true    Ran Out of Food in the Last Year: Never true   Transportation Needs: No Transportation Needs    Lack of Transportation (Medical): No    Lack of Transportation (Non-Medical): No   Physical Activity: Insufficiently Active    Days of Exercise per Week: 3 days    Minutes of Exercise per Session: 30 min   Stress: Stress Concern Present    Feeling of Stress : To some extent   Social Connections: Moderately Integrated    Frequency of Communication with Friends and Family: More than three times a week    Frequency of Social Gatherings with Friends and Family: More than three times a week    Attends Holiness Services: More than 4 times per year    Active Member of [employer] or Organizations: Yes    Attends Club or Organization Meetings: 1 to 4 times per year    Marital Status:    Intimate Partner Violence: Not on file   Housing Stability: Low Risk     Unable to Pay for Housing in the Last Year: No    Number of Places Lived in the Last Year: 1    Unstable Housing in the Last Year: No       Review of Systems:  Review of Systems   Constitutional: Negative for chills and fever. Cardiovascular:  Negative for chest pain. Respiratory:  Negative for cough and shortness of breath. Musculoskeletal:  Positive for back pain. Gastrointestinal:  Negative for bowel incontinence and constipation. Genitourinary:  Negative for bladder incontinence. Physical Exam:  BP (!) 158/77   Pulse 84   Ht 5' 5\" (1.651 m)   Wt 190 lb (86.2 kg)   LMP 04/19/2003   SpO2 (!) 84%   BMI 31.62 kg/m²     Physical Exam  Cardiovascular:      Rate and Rhythm: Normal rate. Pulmonary:      Effort: Pulmonary effort is normal.   Musculoskeletal:         General: Normal range of motion. Skin:     General: Skin is warm and dry. Neurological:      Mental Status: She is alert and oriented to person, place, and time. Record/Diagnostics Review:    Last óscar 12/22 and was appropriate     Assessment:  Problem List Items Addressed This Visit       Lumbar radiculopathy    Lumbar degenerative disc disease    Relevant Medications    HYDROcodone-acetaminophen (Louann Lennox) 5-325 MG per tablet (Start on 2/28/2023)    Lumbar post-laminectomy syndrome    Chronic use of opiate drug for therapeutic purpose - Primary    Relevant Medications    HYDROcodone-acetaminophen (NORCO) 5-325 MG per tablet (Start on 2/28/2023)          Treatment Plan:  Patient relates current medications are helping the pain. Patient reports taking pain medications as prescribed, denies obtaining medications from different sources and denies use of illegal drugs. Medication risk and benefits have been discussed. Patient denies side effects from medications like nausea, vomiting, constipation or drowsiness. Patient reports current activities of daily living are possible due to medications and would like to continue them. As always, we encourage daily stretching and strengthening exercises, and recommend minimizing use of pain medications unless patient cannot get through daily activities due to pain.      Due to the high risk nature of this patient's pain medication close monitoring is required. Continue current medication management, pt has been stable and compliant. Script written for norco   Follow up appointment made for 4 weeks    I have reviewed the chief complaint and history of present illness (including ROS and PFSH) and vital documentation by my staff and I agree with their documentation and have added where applicable.

## 2023-02-23 DIAGNOSIS — N32.81 OAB (OVERACTIVE BLADDER): ICD-10-CM

## 2023-02-24 RX ORDER — TIZANIDINE 4 MG/1
4 TABLET ORAL 2 TIMES DAILY
Qty: 60 TABLET | Refills: 1 | Status: SHIPPED | OUTPATIENT
Start: 2023-02-24

## 2023-02-24 RX ORDER — TROSPIUM CHLORIDE 20 MG/1
20 TABLET, FILM COATED ORAL 2 TIMES DAILY
Qty: 60 TABLET | Refills: 0 | Status: SHIPPED | OUTPATIENT
Start: 2023-02-24

## 2023-02-24 NOTE — TELEPHONE ENCOUNTER
.      Next Visit Date:  Future Appointments   Date Time Provider Claudy Schaeffer   3/13/2023  9:00 AM Kiara Steel [place] [facility]   3/27/2023 11:20 AM Preston Gamma, APRN - CNP [facility]   Topic Date Due    Diabetic retinal exam  05/14/2021    Diabetic foot exam  12/16/2022    Lipids  05/11/2023    Depression Monitoring  09/30/2023    Colorectal Cancer Screen  10/05/2023    GFR test (Diabetes, CKD 3-4, OR last GFR 15-59)  10/10/2023    A1C test (Diabetic or Prediabetic)  11/22/2023    Diabetic Alb to Cr ratio (uACR) test  11/29/2023    Breast cancer screen  05/18/2024    Cervical cancer screen  03/02/2026    DTaP/Tdap/Td vaccine (2 - Td or Tdap) 06/24/2029    Flu vaccine  Completed    Shingles vaccine  Completed    Pneumococcal 0-64 years Vaccine  Completed    COVID-19 Vaccine  Completed    Hepatitis C screen  Completed    HIV screen  Completed    Hepatitis A vaccine  Aged Out    Hib vaccine  Aged Out    Meningococcal (ACWY) vaccine  Aged Out       Hemoglobin A1C (%)   Date Value   11/22/2022 5.8   04/29/2022 5.8   10/26/2021 5.7             ( goal A1C is < 7)   Microalb/Crt.  Ratio (mcg/mg creat)   Date Value   11/29/2022 Can not be calculated     LDL Cholesterol (mg/dL)   Date Value   05/11/2022 131 (H)       (goal LDL is <100)   AST (U/L)   Date Value   01/26/2022 15     ALT (U/L)   Date Value   01/26/2022 10     BUN (mg/dL)   Date Value   05/11/2022 19     BP Readings from Last 3 Encounters:   02/21/23 (!) 158/77   01/24/23 108/71   12/28/22 138/85          (goal 120/80)    All Future Testing planned in [facility]  Lab Frequency Next Occurrence   DRUG SCREEN, PAIN Once 02/28/2022   CT CHEST WO CONTRAST Once 09/03/2022   CT CHEST WO CONTRAST Once 10/08/2022         Patient Active Problem List:     DJD (degenerative joint disease) of knee     Osteoarthritis of spine with radiculopathy, lumbar region     Lumbar radiculopathy     GERD (gastroesophageal reflux disease)     COPD, severity to be determined ([address]     HTN (hypertension)     Allergic rhinitis     Lipoma of shoulder s/p excision right posterior 11 17 2008     History of tobacco use     DM (diabetes mellitus)     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     Primary osteoarthritis of both knees     Chronic low back pain     Major depression, chronic     Chronic respiratory failure with hypoxia (HCC)     Mitral and aortic insufficiency     Pure hypercholesterolemia     Other spondylosis with radiculopathy, lumbar region     Lumbar degenerative disc disease     Alveolar hypoventilation     Obesity (BMI 30-39. 9)     Bronchiectasis ([address]     Centrilobular emphysema ([address]     Oxygen dependent     Acute postoperative anemia due to expected blood loss     Multifocal pneumonia     Acute on chronic respiratory failure with hypoxia (HCC)     Pulmonary function studies abnormal     Tobacco dependence     Idiopathic sleep related nonobstructive alveolar hypoventilation     Lumbar post-laminectomy syndrome     Trigger finger of right thumb     Chronic use of opiate drug for therapeutic purpose

## 2023-02-25 RX ORDER — GABAPENTIN 300 MG/1
CAPSULE ORAL
Qty: 120 CAPSULE | Refills: 1 | OUTPATIENT
Start: 2023-02-25 | End: 2023-03-27

## 2023-02-28 RX ORDER — GABAPENTIN 300 MG/1
CAPSULE ORAL
Qty: 120 CAPSULE | Refills: 0 | OUTPATIENT
Start: 2023-02-28 | End: 2023-03-30

## 2023-03-01 RX ORDER — GABAPENTIN 300 MG/1
CAPSULE ORAL
Qty: 120 CAPSULE | Refills: 1 | Status: SHIPPED | OUTPATIENT
Start: 2023-03-01 | End: 2023-04-01

## 2023-03-01 NOTE — TELEPHONE ENCOUNTER
PC to pt to clarify again. Pt says she was told to stop metformin not Gabapentin. Pt is taking one tab in the morning and one tab in the evening and 2 tabs at bedtime of the Gabapentin. Pt requesting refill. Med is pended.

## 2023-03-13 ENCOUNTER — OFFICE VISIT (OUTPATIENT)
Dept: NEUROSURGERY | Age: 63
End: 2023-03-13
Payer: MEDICAID

## 2023-03-13 VITALS
SYSTOLIC BLOOD PRESSURE: 130 MMHG | WEIGHT: 190 LBS | TEMPERATURE: 97.4 F | DIASTOLIC BLOOD PRESSURE: 89 MMHG | BODY MASS INDEX: 31.65 KG/M2 | OXYGEN SATURATION: 90 % | HEART RATE: 93 BPM | HEIGHT: 65 IN

## 2023-03-13 DIAGNOSIS — M43.16 SPONDYLOLISTHESIS OF LUMBAR REGION: Primary | ICD-10-CM

## 2023-03-13 DIAGNOSIS — M79.605 MUSCULOSKELETAL LEG PAIN, LEFT: ICD-10-CM

## 2023-03-13 PROCEDURE — G8417 CALC BMI ABV UP PARAM F/U: HCPCS | Performed by: NEUROLOGICAL SURGERY

## 2023-03-13 PROCEDURE — G8427 DOCREV CUR MEDS BY ELIG CLIN: HCPCS | Performed by: NEUROLOGICAL SURGERY

## 2023-03-13 PROCEDURE — 4004F PT TOBACCO SCREEN RCVD TLK: CPT | Performed by: NEUROLOGICAL SURGERY

## 2023-03-13 PROCEDURE — 3017F COLORECTAL CA SCREEN DOC REV: CPT | Performed by: NEUROLOGICAL SURGERY

## 2023-03-13 PROCEDURE — G8482 FLU IMMUNIZE ORDER/ADMIN: HCPCS | Performed by: NEUROLOGICAL SURGERY

## 2023-03-13 PROCEDURE — 99214 OFFICE O/P EST MOD 30 MIN: CPT | Performed by: NEUROLOGICAL SURGERY

## 2023-03-13 PROCEDURE — 3075F SYST BP GE 130 - 139MM HG: CPT | Performed by: NEUROLOGICAL SURGERY

## 2023-03-13 PROCEDURE — 3079F DIAST BP 80-89 MM HG: CPT | Performed by: NEUROLOGICAL SURGERY

## 2023-03-13 NOTE — PROGRESS NOTES
[address]  [facility] [address] [facility] [address]  Dept: [phone]    Patient:  Crystal Lunsford  YOB: 1960  Date: 3/13/23    The patient is a 57 y.o. female who presents today for consult of the following problems:     Chief Complaint   Patient presents with    Follow-up     PT/OT and CT. HPI:     Crystal Lunsford is a 57 y.o. female on whom neurosurgical consultation was requested by Umesh Wright MD for management of left-sided hip and leg pain. The patient had surgery 2020 for severe right-sided neuropathy with weakness numbness and tingling. She states that after surgery her right leg did improve significantly and she is walking better with the use of most recently she did have a switch of symptoms in the right leg to the left leg where she is now having pain over the left-sided iliac crest region into the lateral aspect of the leg and posterior aspect of the leg S1 distribution IT band distribution to the popliteal fossa. Intermittent radiation of symptoms to the Distal numbness and tingling is absent. No bowel bladder incontinence. Mandy Danruth       History:     Past Medical History:   Diagnosis Date    Allergic rhinitis     Alveolar hypoventilation 11/20/2020    Aortic insufficiency 2018    mild-moderate on echo (was seen and discharged from cardiology-[facility])    Bronchiectasis ([address] 11/20/2020    Bronchitis     Chronic back pain     [facility] management at [facility]    COPD (chronic obstructive pulmonary disease) [facility]     Dr. Kalpesh Zuniga to see 11/09/2020    Depression     DM (diabetes mellitus) ([address] 12/18/2012    GERD (gastroesophageal reflux disease)     History of echocardiogram 05/2018    EF 65%, mild-moderate AI and TR    Hyperlipidemia     Dr. Khan Nurse    Hypertension     Dr. Khan Nurse    Obesity     Osteoarthritis     Peripheral vascular disease [facility])     Primary osteoarthritis of both knees     Radicular pain of lumbosacral region     Spinal stenosis, lumbar region, without neurogenic claudication 04/30/2013    Tricuspid regurgitation 2018    mild-moderate on echo    Type II or unspecified type diabetes mellitus without mention of complication, not stated as uncontrolled     Dr. Jayna Suarez    Unspecified sleep apnea     no cpap used anymore    URI (upper respiratory infection)     Wears dentures     upper and lower full dentures    Wears glasses     Wellness examination     Dr. Jayna Suarez -PCP last visit in early Oct. 2020     Past Surgical History:   Procedure Laterality Date    COLONOSCOPY  2/12/2009    normal    DILATION AND CURETTAGE OF UTERUS      GASTRECTOMY      partial    LUMBAR DISCECTOMY  01/2015    lumbar diskectomy    LUMBAR FUSION  11/19/2020     POSTERIOR FUSION L4/5,     LUMBAR FUSION N/A 11/19/2020    POSTERIOR FUSION L4/5, [employer], Lazaro Talbert, EVOKES #[id number] [facility] performed by Bradley Mcneill DO at [address] [facility] [address] Right 4/30/13    Lumbar Diagnostic Block,  Kenalog 40 mg    NERVE BLOCK  5/23/13    Lumbar Radiofrequency, Kenalog 40mg    NERVE BLOCK  8/12/13    Lt MBNB  celestone 6mg    NERVE BLOCK Left 8-28-13    left lumbar diagnostic block #2 decadron 10 mg    NERVE BLOCK Left 9-24-13    left lumbar median branch radiofrequency    NERVE BLOCK  07-02-14    caudal, celestone 9 mg    NERVE BLOCK  7-16-14    caudal epidural #2, celestone 9mg, fentanyl 25mcg    NERVE BLOCK  7/30/14    caudal #3 decadron 10mg    NERVE BLOCK  11-6-14    duramorph epidural steroid block  duramorph 1 mg celestone 9 mg    NERVE BLOCK  11/20/15    TENS- Empi Select    NERVE BLOCK  07/20/2018    right transforminal # 1 decadron 10mg,isovue    NERVE BLOCK Bilateral 02/01/2019    bilat mbnb- no steroid    NERVE BLOCK Bilateral 02/08/2019    bilat mbnb, marcaine . 25%    [place] ARTHROSCOPY KNEE DIAGNOSTIC W/WO SYNOVIAL BX SPX Right 3/24/2017    KNEE ARTHROSCOPY WITH PARTIAL MEDIAL MENISECAL DEBRIDMENT  performed by Charo Armando MD at [address] [place] [address]  9 20 2007    UPPER GASTROINTESTINAL ENDOSCOPY  4 21 2009,04/2011    gastritis, esophagitis     Family History   Problem Relation Age of Onset    Diabetes Mother     Heart Disease Mother     Cirrhosis Father      Current Outpatient Medications on File Prior to Visit   Medication Sig Dispense Refill    gabapentin (NEURONTIN) 300 MG capsule TAKE 1 CAPSULE IN MORNING, 1 CAPSULE IN THE AFTERNOON AND 2 CAPSULES AT NIGHT 120 capsule 1    trospium (SANCTURA) 20 MG tablet TAKE 1 TABLET BY MOUTH 2 TIMES DAILY 60 tablet 0    tiZANidine (ZANAFLEX) 4 MG tablet TAKE 1 TABLET BY MOUTH 2 TIMES DAILY 60 tablet 1    HYDROcodone-acetaminophen (NORCO) 5-325 MG per tablet Take 1 tablet by mouth every 8 hours as needed for Pain for up to 30 days. 90 tablet 0    blood glucose monitor kit and supplies Dispense sufficient amount for testing twice daily plus additional to accommodate PRN testing needs. Dispense all needed supplies to include: monitor, strips, lancing device, lancets, control solutions, alcohol swabs. 1 kit 0    blood glucose monitor strips Test 2 times a day & as needed for symptoms of irregular blood glucose. Dispense sufficient amount for indicated testing frequency plus additional to accommodate PRN testing needs.  100 strip 2    Alcohol Swabs (ALCOHOL PREP) PADS Use one alcohol swab to clean your skin before testing your blood sugar 100 each 2    Lancets MISC Use one lancet each time to test your blood sugar twice daily 100 each 2    nicotine (NICODERM CQ) 7 MG/24HR Place 1 patch onto the skin every 24 hours 30 patch 3    cetirizine (ZYRTEC) 10 MG tablet Take 1 tablet by mouth daily 30 tablet 5    potassium chloride (KLOR-CON M) 10 MEQ extended release tablet Take 2 tablets by mouth daily 60 tablet 5    omeprazole (PRILOSEC) 20 MG delayed release capsule Take 1 capsule by mouth Daily 30 capsule 5    docusate sodium (COLACE) 100 MG capsule TAKE 1 CAPSULE BY MOUTH EVERY DAY 30 capsule 5    lisinopril-hydroCHLOROthiazide (PRINZIDE;ZESTORETIC) 20-25 MG per tablet 1 tablet daily 90 tablet 1    carvedilol (COREG) 12.5 MG tablet Take 1 tablet by mouth 2 times daily 180 tablet 1    amLODIPine (NORVASC) 10 MG tablet Take 1 tablet by mouth daily 90 tablet 1    atorvastatin (LIPITOR) 40 MG tablet Take 1 tablet by mouth daily 90 tablet 1    Umeclidinium Bromide 62.5 MCG/INH AEPB Inhale 1 puff into the lungs daily 2 each 5    mirtazapine (REMERON) 30 MG tablet Take 30 mg by mouth nightly      azelastine (ASTELIN) 0.1 % nasal spray 2 sprays by Nasal route 2 times daily Use in each nostril as directed 1 each 2    albuterol sulfate HFA (VENTOLIN HFA) 108 (90 Base) MCG/ACT inhaler Inhale 2 puffs into the lungs every 6 hours as needed for Wheezing 1 each 3    diphenhydrAMINE (BENADRYL) 25 MG tablet Take 25 mg by mouth every 6 hours as needed for Itching      mirtazapine (REMERON) 15 MG tablet Take 15 mg by mouth nightly      DULoxetine (CYMBALTA) 60 MG extended release capsule Take 1 capsule by mouth daily 30 capsule 3    ipratropium-albuterol (DUONEB) 0.5-2.5 (3) MG/3ML SOLN nebulizer solution Inhale 3 mLs into the lungs every 6 hours as needed for Shortness of Breath 360 mL 11    meloxicam (MOBIC) 7.5 MG tablet Take 1 tablet by mouth daily 30 tablet 2     No current facility-administered medications on file prior to visit.      Social History     Tobacco Use    Smoking status: Every Day     Packs/day: 0.25     Years: 36.00     Pack years: 9.00     Types: Cigarettes    Smokeless tobacco: Never    Tobacco comments:     3 cigs per day   on nicoderm patch   Vaping Use    Vaping Use: Never used   Substance Use Topics    Alcohol use: No     Alcohol/week: 0.0 standard drinks    Drug use: No     Comment: 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 7 yrs       Allergies   Allergen Reactions    Aspirin      DUE TO ULCER Claritin [Loratadine] Other (See Comments)     coughing    Flonase [Fluticasone Propionate]     Ibuprofen Other (See Comments)     Stomach ulcers    Morphine And Related      GI Upset       Review of Systems  ROS: Leg pain. Physical Exam:      /89 (Site: Left Upper Arm, Position: Sitting, Cuff Size: Large Adult)   Pulse 93   Temp 97.4 °F (36.3 °C) (Temporal)   Ht 5' 5\" (1.651 m)   Wt 190 lb (86.2 kg)   LMP 04/19/2003   SpO2 90%   BMI 31.62 kg/m²   Estimated body mass index is 31.62 kg/m² as calculated from the following:    Height as of this encounter: 5' 5\" (1.651 m). Weight as of this encounter: 190 lb (86.2 kg). General:  She Real is a 57y.o. year old female who appears her stated age. HEENT: Normocephalic atraumatic. Neck supple. Chest: regular rate; pulses equal. Equal chest rise and fall  Abdomen: Soft nondistended.    Ext: DP equal with good capillary refill  Neuro    Mentation  Appropriate affect   oriented    Cranial Nerves:   Pupils equal and reactive to light  Extraocular motion intact  Face symmetric  No dysarthria  v1-3 sensation symmetric, masseter tone symmetric  Hearing symmetric and intact to finger rub    Sensation:   Intact    Motor  L deltoid 5/5; R deltoid 5/5  L biceps 5/5; R biceps 5/5  L triceps 5/5; R triceps 5/5  L wrist extension 5/5; R wrist extension 5/5  L intrinsics 5/5; R intrinsics 5/5     L iliopsoas 5/5 , R iliopsoas 5/5  L quadriceps 5/5; R quadriceps 5/5  L Dorsiflexion 5/5; R dorsiflexion 5/5  L Plantarflexion 5/5; R plantarflexion 5/5  L EHL 5/5; R EHL 5/5    Reflexes  L Brachioradialis 2+/4; R brachioradialis 2+/4  L Biceps 2+/4; R Biceps 2+/4  L Triceps 2+/4; R Triceps 2+/4  L Patellar 2+/4: R Patellar 2+/4  L Achilles 2+/4; R Achilles 2+/4    hoffmans L: neg  hoffmans R: neg  Clonus L: neg  Clonus R: neg  Babinski L: up  Babinski R; up    Studies Review:     MRI lumbar spine does reveal L2-3 and L3-4 foraminal disease along with a coronal abnormality.    CT does show pseudoarthrosis at L4-5 with haloing around the cage as well as the screws.    Assessment and Plan:      1. Spondylolisthesis of lumbar region    2. Musculoskeletal leg pain, left          Plan: Patient does have lumbar pseudoarthrosis at the L4-5 prior fused level.  In addition she does have multilevel foraminal disease on the left side greater than the right side L2-3 and L3-4.  However her overall symptoms appear to be more localized over the left iliac crest without any actual midline axial pain that radiates.  It is possible that she has had for radiculopathy but I would also entertain other etiologies such as IT band disease hip arthropathy  S!J    This point the patient is not interested in any type of further surgical intervention.  I discussed with her smoking cessation and consideration of any other extension of fusion or revision which she is not interested in any ways.  I also did discuss with her the option of neuromodulation which would still be surgery but of a smaller degree.  Can consider trigger point/SIJ per pain mgmt discretion.     Followup: No follow-ups on file.    Prescriptions Ordered:  No orders of the defined types were placed in this encounter.       Orders Placed:  No orders of the defined types were placed in this encounter.       Electronically signed by Kiara Ash DO on 3/13/2023 at 9:33 AM    Please note that this chart was generated using voice recognition Dragon dictation software.  Although every effort was made to ensure the accuracy of this automated transcription, some errors in transcription may have occurred.

## 2023-03-14 ENCOUNTER — TELEPHONE (OUTPATIENT)
Dept: INTERNAL MEDICINE | Age: 63
End: 2023-03-14

## 2023-03-14 NOTE — TELEPHONE ENCOUNTER
Patient is calling for antibiotic for sinus infection. Patient states she is bringing up green phlegm when coughing for one day starting on Monday. Headaches and eye pain and right side pain. Patient scheduled for same day for 3/15/23.

## 2023-03-15 ENCOUNTER — OFFICE VISIT (OUTPATIENT)
Dept: INTERNAL MEDICINE | Age: 63
End: 2023-03-15
Payer: MEDICAID

## 2023-03-15 VITALS
BODY MASS INDEX: 32.79 KG/M2 | HEIGHT: 65 IN | HEART RATE: 84 BPM | TEMPERATURE: 99 F | WEIGHT: 196.8 LBS | OXYGEN SATURATION: 91 % | SYSTOLIC BLOOD PRESSURE: 125 MMHG | DIASTOLIC BLOOD PRESSURE: 73 MMHG

## 2023-03-15 DIAGNOSIS — J01.10 ACUTE NON-RECURRENT FRONTAL SINUSITIS: Primary | ICD-10-CM

## 2023-03-15 DIAGNOSIS — F17.200 SMOKER: ICD-10-CM

## 2023-03-15 DIAGNOSIS — H91.91 HEARING LOSS OF RIGHT EAR, UNSPECIFIED HEARING LOSS TYPE: ICD-10-CM

## 2023-03-15 DIAGNOSIS — H61.21 EXCESSIVE EAR WAX, RIGHT: ICD-10-CM

## 2023-03-15 DIAGNOSIS — H93.8X1 EAR PRESSURE, RIGHT: ICD-10-CM

## 2023-03-15 PROCEDURE — 99211 OFF/OP EST MAY X REQ PHY/QHP: CPT | Performed by: STUDENT IN AN ORGANIZED HEALTH CARE EDUCATION/TRAINING PROGRAM

## 2023-03-15 RX ORDER — CETIRIZINE HYDROCHLORIDE, PSEUDOEPHEDRINE HYDROCHLORIDE 5; 120 MG/1; MG/1
1 TABLET, FILM COATED, EXTENDED RELEASE ORAL 2 TIMES DAILY
Qty: 14 TABLET | Refills: 0 | Status: SHIPPED | OUTPATIENT
Start: 2023-03-15 | End: 2023-03-22

## 2023-03-15 RX ORDER — AMOXICILLIN AND CLAVULANATE POTASSIUM 875; 125 MG/1; MG/1
1 TABLET, FILM COATED ORAL 2 TIMES DAILY
Qty: 10 TABLET | Refills: 0 | Status: SHIPPED | OUTPATIENT
Start: 2023-03-15 | End: 2023-03-20

## 2023-03-15 RX ORDER — AZITHROMYCIN 500 MG/1
500 TABLET, FILM COATED ORAL DAILY
Qty: 3 TABLET | Refills: 0 | Status: CANCELLED | OUTPATIENT
Start: 2023-03-15 | End: 2023-03-18

## 2023-03-15 SDOH — ECONOMIC STABILITY: FOOD INSECURITY: WITHIN THE PAST 12 MONTHS, YOU WORRIED THAT YOUR FOOD WOULD RUN OUT BEFORE YOU GOT MONEY TO BUY MORE.: SOMETIMES TRUE

## 2023-03-15 SDOH — ECONOMIC STABILITY: FOOD INSECURITY: WITHIN THE PAST 12 MONTHS, THE FOOD YOU BOUGHT JUST DIDN'T LAST AND YOU DIDN'T HAVE MONEY TO GET MORE.: SOMETIMES TRUE

## 2023-03-15 SDOH — ECONOMIC STABILITY: INCOME INSECURITY: HOW HARD IS IT FOR YOU TO PAY FOR THE VERY BASICS LIKE FOOD, HOUSING, MEDICAL CARE, AND HEATING?: NOT HARD AT ALL

## 2023-03-15 ASSESSMENT — PATIENT HEALTH QUESTIONNAIRE - PHQ9
7. TROUBLE CONCENTRATING ON THINGS, SUCH AS READING THE NEWSPAPER OR WATCHING TELEVISION: 0
9. THOUGHTS THAT YOU WOULD BE BETTER OFF DEAD, OR OF HURTING YOURSELF: 0
10. IF YOU CHECKED OFF ANY PROBLEMS, HOW DIFFICULT HAVE THESE PROBLEMS MADE IT FOR YOU TO DO YOUR WORK, TAKE CARE OF THINGS AT HOME, OR GET ALONG WITH OTHER PEOPLE: 0
1. LITTLE INTEREST OR PLEASURE IN DOING THINGS: 3
4. FEELING TIRED OR HAVING LITTLE ENERGY: 1
3. TROUBLE FALLING OR STAYING ASLEEP: 1
5. POOR APPETITE OR OVEREATING: 0
SUM OF ALL RESPONSES TO PHQ9 QUESTIONS 1 & 2: 4
SUM OF ALL RESPONSES TO PHQ QUESTIONS 1-9: 7
6. FEELING BAD ABOUT YOURSELF - OR THAT YOU ARE A FAILURE OR HAVE LET YOURSELF OR YOUR FAMILY DOWN: 1
SUM OF ALL RESPONSES TO PHQ QUESTIONS 1-9: 7
8. MOVING OR SPEAKING SO SLOWLY THAT OTHER PEOPLE COULD HAVE NOTICED. OR THE OPPOSITE, BEING SO FIGETY OR RESTLESS THAT YOU HAVE BEEN MOVING AROUND A LOT MORE THAN USUAL: 0
2. FEELING DOWN, DEPRESSED OR HOPELESS: 1

## 2023-03-15 NOTE — PROGRESS NOTES
FOOD RESOURCES      RESOURCE SERVICE PHONE [employer]   Outreach of Dawna King 442 [phone]  Administrative [email or web address]   Mustard [employer] [phone]  Amanda. [address] - and Fax N/A   Jadiel Kenton of Sal [phone] [address] [phone] 66 [employer] / Cardoz  [phone]  [facility] Hospital[facility] 7854 Assistance Programs [phone]  Damion Salazar. [address] Feed Your Neighbor [phone]  Ciara Adame [address] [phone]  [address] [facility] [address] [phone] [address] [phone]  Service-Intake www.[email or web address] 5353 [facility] Emergency Food Pantry [phone]  Service-Intake www.so. [facility] [address] [phone]  Service-Intake www.stpaulscommunitycenter. [address] [place] [address] [phone]  Administrative www. [facility] HealthcarekvngAdventHealth Parkeran. [address] Bank Back Pack Program [phone]  Willy Coleman. [address] Meals Program [phone] 450 [facility] and [facility] for men www.[facility] Southcuemission. [facility] FOR [facility]GERY for Social and 351 [facility] [phone] www. [facility] HealthcaredmitryntertoOhioHealth Pickerington Methodist Hospitalo. [address] [place] [address] [phone]  Shirley Mae's[facility] Giana Nicki Engage[employer]iences. Fly Media    Taoism Women [employer] Emergency Assistance [phone] [address] Feed Your Neighbor [phone] Administrative N/A   [facility] 65 22 [phone]  [address] / Payton Raad [phone]  112 [facility] [phone] 300 [facility] Pantry / Meals [phone]  [address]  www. [facility]. [facility] 50 Pantry / [employer] [phone]  Administrative www. alan. [facility] [phone]  [address] of [address] [facility] / Esta Stalls [phone]  [address] Wealthy  Se Pantry and Grooming Supplies [phone]  [address]  [email or web address]    [address] South Rc [address]les [phone]  1300 [facility] PASSAVANT-CRANBERRY-ER Positive Living Program [phone] ext: 57 0951 [facility] www. Your Energy. PlaceBlogger    Food for [address] 42-30-72-28 www. feedtoledo. [address] Pantry [phone]  [address] Assembly of [phone] http://www. TangentixtheFireDrillMeion. Agrican    777 [facility] [phone]  227 [facility] Family Pantry and South Dilip [phone] nightingales-harvest.org    201 [facility] [phone]  [address]  www. [email or web address]    [facility] [phone]  Administrative factoledo. [address] [place], [place] [address] Floor [phone]  Toll Free www. Oculeve    Body of 3 [facility] for [place] Pantry ([phone]) [phone] [facility] [phone] www. [facility] CampusemTustin Hospital [facility]. [employer]     Living [address] [phone]  [address] Morning Blessings [phone]  Administrative N/A   [facility]  of [facility]3 [facility] Feed Your Neighbor [phone]  100 [facility] for the Poor Food Pantry [phone]  858 [facility]. org/   [facility] of FatmataLea Regional Medical Centeramelia 30 Pantry  [phone] [address] Emergency Food and Hygiene Pantry [phone]  Administrative www. [facility]. [facility] [address] [phone]  2000 [facility] [phone]  [address] tv    Helping Hands of [address] / Tom Sor / Christina Barry [phone]  607 [facility]. [address] Pantry [facility] [phone]  [place] [address] [phone]  [address] [place] [address]  [phone]  Squabbler.pt    125 [facility] Pantry [phone] [email or web address]   400 [facility] Pantry  [phone] N/A   Food for [address] Pantry [phone] N/A     Updated 1/30/20

## 2023-03-18 NOTE — PROGRESS NOTES
Attending Physician Statement  I have discussed the care of Vikash Gomez  including pertinent history and exam findings,  with the resident. I have reviewed the key elements of all parts of the encounter with the resident. I agree with the assessment, plan and orders as documented by the resident. Pt was prescribed Augment for sinusitis, not Z yifan as documented.     Easter Cogan, MD  3/18/2023

## 2023-03-27 ENCOUNTER — HOSPITAL ENCOUNTER (OUTPATIENT)
Dept: PAIN MANAGEMENT | Age: 63
Discharge: HOME | End: 2023-03-27
Payer: MEDICARE

## 2023-03-27 VITALS
RESPIRATION RATE: 20 BRPM | HEART RATE: 85 BPM | DIASTOLIC BLOOD PRESSURE: 88 MMHG | OXYGEN SATURATION: 83 % | HEIGHT: 65 IN | SYSTOLIC BLOOD PRESSURE: 149 MMHG | TEMPERATURE: 97.3 F | BODY MASS INDEX: 32.15 KG/M2 | WEIGHT: 193 LBS

## 2023-03-27 DIAGNOSIS — M47.26 OTHER SPONDYLOSIS WITH RADICULOPATHY, LUMBAR REGION: ICD-10-CM

## 2023-03-27 DIAGNOSIS — Z79.891 CHRONIC USE OF OPIATE DRUG FOR THERAPEUTIC PURPOSE: Primary | ICD-10-CM

## 2023-03-27 DIAGNOSIS — M96.1 LUMBAR POST-LAMINECTOMY SYNDROME: ICD-10-CM

## 2023-03-27 DIAGNOSIS — M54.16 LUMBAR RADICULOPATHY: ICD-10-CM

## 2023-03-27 DIAGNOSIS — M51.36 LUMBAR DEGENERATIVE DISC DISEASE: ICD-10-CM

## 2023-03-27 DIAGNOSIS — M47.26 OSTEOARTHRITIS OF SPINE WITH RADICULOPATHY, LUMBAR REGION: ICD-10-CM

## 2023-03-27 DIAGNOSIS — M46.1 SACROILIITIS (HCC): ICD-10-CM

## 2023-03-27 PROCEDURE — 99213 OFFICE O/P EST LOW 20 MIN: CPT

## 2023-03-27 RX ORDER — HYDROCODONE BITARTRATE AND ACETAMINOPHEN 5; 325 MG/1; MG/1
1 TABLET ORAL EVERY 8 HOURS PRN
Qty: 90 TABLET | Refills: 0 | Status: SHIPPED | OUTPATIENT
Start: 2023-03-30 | End: 2023-04-29

## 2023-03-27 ASSESSMENT — ENCOUNTER SYMPTOMS
BACK PAIN: 1
COUGH: 0
SHORTNESS OF BREATH: 0
BOWEL INCONTINENCE: 0
CONSTIPATION: 0

## 2023-03-27 ASSESSMENT — PAIN SCALES - GENERAL: PAINLEVEL_OUTOF10: 8

## 2023-03-27 ASSESSMENT — PAIN DESCRIPTION - LOCATION: LOCATION: BACK

## 2023-03-27 NOTE — PROGRESS NOTES
coughing    Flonase [Fluticasone Propionate]     Ibuprofen Other (See Comments)     Stomach ulcers    Morphine And Related      GI Upset         Current Outpatient Medications:     nicotine (NICODERM CQ) 7 MG/24HR, Place 1 patch onto the skin every 24 hours, Disp: 30 patch, Rfl: 3    carbamide peroxide (DEBROX) 6.5 % otic solution, Place 5 drops in ear(s) 2 times daily, Disp: 15 mL, Rfl: 0    gabapentin (NEURONTIN) 300 MG capsule, TAKE 1 CAPSULE IN MORNING, 1 CAPSULE IN THE AFTERNOON AND 2 CAPSULES AT NIGHT, Disp: 120 capsule, Rfl: 1    trospium (SANCTURA) 20 MG tablet, TAKE 1 TABLET BY MOUTH 2 TIMES DAILY, Disp: 60 tablet, Rfl: 0    tiZANidine (ZANAFLEX) 4 MG tablet, TAKE 1 TABLET BY MOUTH 2 TIMES DAILY, Disp: 60 tablet, Rfl: 1    HYDROcodone-acetaminophen (NORCO) 5-325 MG per tablet, Take 1 tablet by mouth every 8 hours as needed for Pain for up to 30 days. , Disp: 90 tablet, Rfl: 0    meloxicam (MOBIC) 7.5 MG tablet, Take 1 tablet by mouth daily, Disp: 30 tablet, Rfl: 2    blood glucose monitor kit and supplies, Dispense sufficient amount for testing twice daily plus additional to accommodate PRN testing needs. Dispense all needed supplies to include: monitor, strips, lancing device, lancets, control solutions, alcohol swabs., Disp: 1 kit, Rfl: 0    blood glucose monitor strips, Test 2 times a day & as needed for symptoms of irregular blood glucose. Dispense sufficient amount for indicated testing frequency plus additional to accommodate PRN testing needs. , Disp: 100 strip, Rfl: 2    Alcohol Swabs (ALCOHOL PREP) PADS, Use one alcohol swab to clean your skin before testing your blood sugar, Disp: 100 each, Rfl: 2    Lancets MISC, Use one lancet each time to test your blood sugar twice daily, Disp: 100 each, Rfl: 2    cetirizine (ZYRTEC) 10 MG tablet, Take 1 tablet by mouth daily, Disp: 30 tablet, Rfl: 5    potassium chloride (KLOR-CON M) 10 MEQ extended release tablet, Take 2 tablets by mouth daily, Disp: 60
ulcers    Morphine And Related      GI Upset         Current Outpatient Medications:     nicotine (NICODERM CQ) 7 MG/24HR, Place 1 patch onto the skin every 24 hours, Disp: 30 patch, Rfl: 3    carbamide peroxide (DEBROX) 6.5 % otic solution, Place 5 drops in ear(s) 2 times daily, Disp: 15 mL, Rfl: 0    gabapentin (NEURONTIN) 300 MG capsule, TAKE 1 CAPSULE IN MORNING, 1 CAPSULE IN THE AFTERNOON AND 2 CAPSULES AT NIGHT, Disp: 120 capsule, Rfl: 1    trospium (SANCTURA) 20 MG tablet, TAKE 1 TABLET BY MOUTH 2 TIMES DAILY, Disp: 60 tablet, Rfl: 0    tiZANidine (ZANAFLEX) 4 MG tablet, TAKE 1 TABLET BY MOUTH 2 TIMES DAILY, Disp: 60 tablet, Rfl: 1    HYDROcodone-acetaminophen (NORCO) 5-325 MG per tablet, Take 1 tablet by mouth every 8 hours as needed for Pain for up to 30 days. , Disp: 90 tablet, Rfl: 0    meloxicam (MOBIC) 7.5 MG tablet, Take 1 tablet by mouth daily, Disp: 30 tablet, Rfl: 2    blood glucose monitor kit and supplies, Dispense sufficient amount for testing twice daily plus additional to accommodate PRN testing needs. Dispense all needed supplies to include: monitor, strips, lancing device, lancets, control solutions, alcohol swabs., Disp: 1 kit, Rfl: 0    blood glucose monitor strips, Test 2 times a day & as needed for symptoms of irregular blood glucose. Dispense sufficient amount for indicated testing frequency plus additional to accommodate PRN testing needs. , Disp: 100 strip, Rfl: 2    Alcohol Swabs (ALCOHOL PREP) PADS, Use one alcohol swab to clean your skin before testing your blood sugar, Disp: 100 each, Rfl: 2    Lancets MISC, Use one lancet each time to test your blood sugar twice daily, Disp: 100 each, Rfl: 2    cetirizine (ZYRTEC) 10 MG tablet, Take 1 tablet by mouth daily, Disp: 30 tablet, Rfl: 5    potassium chloride (KLOR-CON M) 10 MEQ extended release tablet, Take 2 tablets by mouth daily, Disp: 60 tablet, Rfl: 5    omeprazole (PRILOSEC) 20 MG delayed release capsule, Take 1 capsule by mouth

## 2023-04-20 ENCOUNTER — TELEPHONE (OUTPATIENT)
Dept: ONCOLOGY | Age: 63
End: 2023-04-20

## 2023-04-25 ENCOUNTER — HOSPITAL ENCOUNTER (OUTPATIENT)
Dept: PAIN MANAGEMENT | Facility: CLINIC | Age: 63
Discharge: HOME | End: 2023-04-25
Payer: MEDICARE

## 2023-04-25 VITALS
OXYGEN SATURATION: 92 % | RESPIRATION RATE: 12 BRPM | DIASTOLIC BLOOD PRESSURE: 84 MMHG | HEART RATE: 77 BPM | SYSTOLIC BLOOD PRESSURE: 153 MMHG | TEMPERATURE: 97.7 F

## 2023-04-25 DIAGNOSIS — R52 PAIN MANAGEMENT: ICD-10-CM

## 2023-04-25 LAB — GLUCOSE BLD-MCNC: 113 MG/DL (ref 65–105)

## 2023-04-25 PROCEDURE — 82947 ASSAY GLUCOSE BLOOD QUANT: CPT

## 2023-04-25 PROCEDURE — 6360000002 HC RX W HCPCS: Performed by: STUDENT IN AN ORGANIZED HEALTH CARE EDUCATION/TRAINING PROGRAM

## 2023-04-25 PROCEDURE — 2500000003 HC RX 250 WO HCPCS: Performed by: STUDENT IN AN ORGANIZED HEALTH CARE EDUCATION/TRAINING PROGRAM

## 2023-04-25 PROCEDURE — 27096 INJECT SACROILIAC JOINT: CPT | Performed by: STUDENT IN AN ORGANIZED HEALTH CARE EDUCATION/TRAINING PROGRAM

## 2023-04-25 PROCEDURE — 6360000004 HC RX CONTRAST MEDICATION: Performed by: STUDENT IN AN ORGANIZED HEALTH CARE EDUCATION/TRAINING PROGRAM

## 2023-04-25 PROCEDURE — G0260 INJ FOR SACROILIAC JT ANESTH: HCPCS

## 2023-04-25 PROCEDURE — 99152 MOD SED SAME PHYS/QHP 5/>YRS: CPT | Performed by: STUDENT IN AN ORGANIZED HEALTH CARE EDUCATION/TRAINING PROGRAM

## 2023-04-25 RX ORDER — TRIAMCINOLONE ACETONIDE 40 MG/ML
INJECTION, SUSPENSION INTRA-ARTICULAR; INTRAMUSCULAR
Status: COMPLETED | OUTPATIENT
Start: 2023-04-25 | End: 2023-04-25

## 2023-04-25 RX ORDER — MIDAZOLAM HYDROCHLORIDE 2 MG/2ML
INJECTION, SOLUTION INTRAMUSCULAR; INTRAVENOUS
Status: COMPLETED | OUTPATIENT
Start: 2023-04-25 | End: 2023-04-25

## 2023-04-25 RX ORDER — LIDOCAINE HYDROCHLORIDE 10 MG/ML
INJECTION, SOLUTION EPIDURAL; INFILTRATION; INTRACAUDAL; PERINEURAL
Status: COMPLETED | OUTPATIENT
Start: 2023-04-25 | End: 2023-04-25

## 2023-04-25 RX ADMIN — LIDOCAINE HYDROCHLORIDE 1 ML: 10 INJECTION, SOLUTION EPIDURAL; INFILTRATION; INTRACAUDAL at 13:34

## 2023-04-25 RX ADMIN — MIDAZOLAM HYDROCHLORIDE 2 MG: 1 INJECTION, SOLUTION INTRAMUSCULAR; INTRAVENOUS at 13:30

## 2023-04-25 RX ADMIN — TRIAMCINOLONE ACETONIDE 40 MG: 40 INJECTION, SUSPENSION INTRA-ARTICULAR; INTRAMUSCULAR at 13:34

## 2023-04-25 RX ADMIN — IOHEXOL 2 ML: 180 INJECTION INTRAVENOUS at 13:33

## 2023-04-25 ASSESSMENT — PAIN - FUNCTIONAL ASSESSMENT
PAIN_FUNCTIONAL_ASSESSMENT: PREVENTS OR INTERFERES SOME ACTIVE ACTIVITIES AND ADLS
PAIN_FUNCTIONAL_ASSESSMENT: NONE - DENIES PAIN
PAIN_FUNCTIONAL_ASSESSMENT: 0-10

## 2023-04-25 ASSESSMENT — PAIN DESCRIPTION - DESCRIPTORS: DESCRIPTORS: STABBING;THROBBING;DISCOMFORT

## 2023-04-25 NOTE — H&P
Pain Pre-Op H&P Note    SUBJECTIVE:  No chief complaint on file.       History of Present Illness:   Felecia Chambers is a 57 y.o. female who presents with left low back pain    Past Medical History:   Diagnosis Date    Allergic rhinitis     Alveolar hypoventilation 11/20/2020    Aortic insufficiency 2018    mild-moderate on echo (was seen and [facility])    Bron[facility]ctasis ([facility] 11/20/2020    Bronchitis     Chronic back pain     [facility] management at [facility]    COPD (chronic obstructive pulmonary disease) ([facility]     Dr. Bennett Phillip to see 11/09/2020    Depression     DM (diabetes mellitus) ([facility] 12/18/2012    GERD (gastroesophageal reflux disease)     History of echocardiogram 05/2018    EF 65%, mild-moderate AI and TR    Hyperlipidemia     Dr. Chata Mott    Hypertension     Dr. Chata Mott    Obesity     Osteoarthritis     Peripheral vascular disease ([facility]     Primary osteoarthritis of both knees     Radicular pain of lumbosacral region     Spinal stenosis, lumbar region, without neurogenic claudication 04/30/2013    Tricuspid regurgitation 2018    mild-moderate on echo    Type II or unspecified type diabetes mellitus without mention of complication, not stated as uncontrolled     Dr. Chata Mott    Unspecified sleep apnea     no cpap used anymore    URI (upper respiratory infection)     Wears dentures     upper and lower full dentures    Wears glasses     Wellness examination     Dr. Chata Mott -PCP last visit in early Oct. 2020       Past Surgical History:   Procedure Laterality Date    COLONOSCOPY  2/12/2009    normal    DILATION AND CURETTAGE OF UTERUS      GASTRECTOMY      partial    LUMBAR DISCECTOMY  01/2015    lumbar diskectomy    LUMBAR FUSION  11/19/2020     POSTERIOR FUSION L4/5,     LUMBAR FUSION N/A 11/19/2020    POSTERIOR FUSION L4/5, [employer], Jorge Sides, EVOKES #[id number] AMINA performed by Christy Fletcher DO at [address] [place] [address],[address] Right 4/30/13    Lumbar

## 2023-04-25 NOTE — OP NOTE
PROCEDURE PERFORMED: Left Sacroiliac joint injection    PREOPERATIVE DIAGNOSIS: Lumbago and sacroiliac joint pain    INDICATIONS: Chronic low back pain    The patient's history and physical exam were reviewed. The risk, benefits, and alternatives of the procedure were discussed and all questions were answered to the patient's satisfaction. The patient agreed to proceed and written informed consent was obtained. POSTOPERATIVE DIAGNOSIS: Same    PHYSICIAN:  Dr. Dmitri Moon DO    ANESTHESIA:  LOCAL and Moderate Sedation    ASSISTANT:  NONE    PATHOLOGY:  NONE    ESTIMATED BLOOD LOSS:  N/A    IMPLANTS:  NONE    PROCEDURE DESCRIPTION: Left sacroiliac joint injection using fluoroscopy    The patient was placed on the operative bed in prone position. The area was prepped with  Chlorhexidine. The area was then draped in a sterile fashion. The targeted side of the sacroiliac joint was identified and marked under AP fluoroscopy. The fluoroscopic beam was then obliqued until the anterior and posterior margins of the joint were aligned. The inferior margin of the joint was identified. A 25-gauge 3-1/2 inch Quincke needle was directed toward the identified point under fluoroscopic guidance. The joint space was then entered and negative aspiration was confirmed. Then, Omnipaque was injected. An appropriate arthrogram was noted. Then, after negative aspiration, the injectate was easily injected. The injectate consisted of 40 mg triamcinolone and 1 mL lidocaine 1%. The needle was then removed. The needle site was dressed appropriately. The patient was transferred to the postoperative care unit in stable condition. Written discharge instructions were given to the patient. COMPLICATIONS:  There were no apparent complications. The patient tolerated the procedure well.       SEDATION NOTE:     ASA CLASSIFICATION  2  MP   CLASSIFICATION  3     Moderate intravenous conscious sedation was supervised by

## 2023-04-25 NOTE — DISCHARGE INSTRUCTIONS
You have received a sedative/anesthetic therefore you should not consume any alcoholic beverages for 24 hours. Do not drive or operate machinery for 24 hours. Do not take a tub bath for 72 hours after procedure (this includes hot tubs). You may shower, but avoid hot water to injection site. Avoid strenuous activity TODAY especially if you experience dizziness. Remove band-aid the next day. Wash off any residual iodine 24 hours from today. Do not use heat, heating pad, or any other heating device over the injection site for 3 days after the procedure. If you experience pain after your procedure, you may continue with your current pain medication as prescribed. (DO NOT INCREASE YOUR PAIN MEDICATION WITHOUT TALKING TO DOCTOR)  Soreness and pain at injection site is common, may use ice to reduce soreness. What To Expect After A Steroid Injection  You should start to feel the effects of the steroid injection in about 48-72 hours  You may experience facial flushing, night sweats and irritability. Other common steroid related side effects are increased appetite, mood elevation, insomnia and fluid retention. These effects usually subside in a few days. Steroids used in epidural injections may cause muscle spasms for a few days. If you are diabetic, your blood sugar may be elevated after your procedure due to the steroids. You will need to monitor your blood sugar more closely while going through a series of injections (Check blood sugar at meals and bedtime for 5 days). You may require adjustment in your diabetic medications, contact your PCP office to discuss.     Call Ofelia Decker at [phone] if you experience:   Fever, chills or temperature over 100    Vomiting, headache, persistent stiff neck, nausea or blurred vision   Difficulty urinating or unable to urinate within 8 hours   Increase in weakness, numbness or loss of function of limbs  Increased redness, swelling or drainage at the

## 2023-04-27 ENCOUNTER — HOSPITAL ENCOUNTER (OUTPATIENT)
Dept: CT IMAGING | Age: 63
Discharge: HOME | End: 2023-04-29
Payer: MEDICARE

## 2023-04-27 ENCOUNTER — HOSPITAL ENCOUNTER (OUTPATIENT)
Dept: PAIN MANAGEMENT | Age: 63
Discharge: HOME | End: 2023-04-27
Payer: MEDICARE

## 2023-04-27 VITALS — HEIGHT: 65 IN | BODY MASS INDEX: 32.15 KG/M2 | WEIGHT: 193 LBS

## 2023-04-27 VITALS — OXYGEN SATURATION: 90 % | SYSTOLIC BLOOD PRESSURE: 144 MMHG | HEART RATE: 76 BPM | DIASTOLIC BLOOD PRESSURE: 82 MMHG

## 2023-04-27 DIAGNOSIS — Z87.891 PERSONAL HISTORY OF TOBACCO USE, PRESENTING HAZARDS TO HEALTH: ICD-10-CM

## 2023-04-27 DIAGNOSIS — Z79.891 CHRONIC USE OF OPIATE DRUG FOR THERAPEUTIC PURPOSE: Primary | ICD-10-CM

## 2023-04-27 DIAGNOSIS — M54.16 LUMBAR RADICULOPATHY: ICD-10-CM

## 2023-04-27 DIAGNOSIS — M96.1 LUMBAR POST-LAMINECTOMY SYNDROME: ICD-10-CM

## 2023-04-27 DIAGNOSIS — F17.210 CIGARETTE SMOKER: ICD-10-CM

## 2023-04-27 DIAGNOSIS — M47.26 OTHER SPONDYLOSIS WITH RADICULOPATHY, LUMBAR REGION: ICD-10-CM

## 2023-04-27 DIAGNOSIS — M47.26 OSTEOARTHRITIS OF SPINE WITH RADICULOPATHY, LUMBAR REGION: ICD-10-CM

## 2023-04-27 DIAGNOSIS — M51.36 LUMBAR DEGENERATIVE DISC DISEASE: ICD-10-CM

## 2023-04-27 PROCEDURE — 71271 CT THORAX LUNG CANCER SCR C-: CPT

## 2023-04-27 PROCEDURE — 99213 OFFICE O/P EST LOW 20 MIN: CPT

## 2023-04-27 PROCEDURE — 99213 OFFICE O/P EST LOW 20 MIN: CPT | Performed by: NURSE PRACTITIONER

## 2023-04-27 RX ORDER — HYDROCODONE BITARTRATE AND ACETAMINOPHEN 5; 325 MG/1; MG/1
1 TABLET ORAL EVERY 8 HOURS PRN
Qty: 90 TABLET | Refills: 0 | Status: SHIPPED | OUTPATIENT
Start: 2023-04-28 | End: 2023-05-28

## 2023-04-27 ASSESSMENT — PAIN SCALES - GENERAL: PAINLEVEL_OUTOF10: 7

## 2023-04-27 ASSESSMENT — ENCOUNTER SYMPTOMS
CONSTIPATION: 0
SHORTNESS OF BREATH: 0
COUGH: 0
BACK PAIN: 1
BOWEL INCONTINENCE: 0

## 2023-04-27 ASSESSMENT — PAIN DESCRIPTION - FREQUENCY: FREQUENCY: CONTINUOUS

## 2023-04-27 ASSESSMENT — PAIN DESCRIPTION - PAIN TYPE: TYPE: CHRONIC PAIN

## 2023-04-27 ASSESSMENT — PAIN DESCRIPTION - DESCRIPTORS: DESCRIPTORS: CRAMPING

## 2023-04-27 ASSESSMENT — PAIN DESCRIPTION - LOCATION: LOCATION: BACK;KNEE

## 2023-05-01 DIAGNOSIS — N32.81 OAB (OVERACTIVE BLADDER): ICD-10-CM

## 2023-05-01 DIAGNOSIS — I10 ESSENTIAL HYPERTENSION: ICD-10-CM

## 2023-05-01 NOTE — TELEPHONE ENCOUNTER
04/27/2023               Patient Active Problem List:     DJD (degenerative joint disease) of knee     Osteoarthritis of spine with radiculopathy, lumbar region     Lumbar radiculopathy     GERD (gastroesophageal reflux disease)     COPD, severity to be determined ([address]     HTN (hypertension)     Allergic rhinitis     Lipoma of shoulder s/p excision right posterior 11 17 2008     History of tobacco use     DM (diabetes mellitus)     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     Primary osteoarthritis of both knees     Chronic low back pain     Major depression, chronic     Chronic respiratory failure with hypoxia (HCC)     Mitral and aortic insufficiency     Pure hypercholesterolemia     Other spondylosis with radiculopathy, lumbar region     Lumbar degenerative disc disease     Alveolar hypoventilation     Obesity (BMI 30-39. 9)     Bronchiectasis ([address]     Centrilobular emphysema ([address]     Oxygen dependent     Acute postoperative anemia due to expected blood loss     Multifocal pneumonia     Acute on chronic respiratory failure with hypoxia (HCC)     Pulmonary function studies abnormal     Tobacco dependence     Idiopathic sleep related nonobstructive alveolar hypoventilation     Lumbar post-laminectomy syndrome     Trigger finger of right thumb     Chronic use of opiate drug for therapeutic purpose

## 2023-05-02 RX ORDER — AMLODIPINE BESYLATE 10 MG/1
TABLET ORAL
Qty: 90 TABLET | Refills: 0 | Status: SHIPPED | OUTPATIENT
Start: 2023-05-02

## 2023-05-02 RX ORDER — TROSPIUM CHLORIDE 20 MG/1
20 TABLET, FILM COATED ORAL 2 TIMES DAILY
Qty: 60 TABLET | Refills: 2 | Status: SHIPPED | OUTPATIENT
Start: 2023-05-02

## 2023-05-11 ENCOUNTER — OFFICE VISIT (OUTPATIENT)
Dept: INTERNAL MEDICINE | Age: 63
End: 2023-05-11
Payer: MEDICARE

## 2023-05-11 VITALS
OXYGEN SATURATION: 92 % | WEIGHT: 193 LBS | HEIGHT: 65 IN | SYSTOLIC BLOOD PRESSURE: 125 MMHG | DIASTOLIC BLOOD PRESSURE: 79 MMHG | TEMPERATURE: 98 F | BODY MASS INDEX: 32.15 KG/M2 | HEART RATE: 73 BPM

## 2023-05-11 DIAGNOSIS — I10 ESSENTIAL HYPERTENSION: ICD-10-CM

## 2023-05-11 DIAGNOSIS — E87.6 HYPOKALEMIA: ICD-10-CM

## 2023-05-11 DIAGNOSIS — J43.2 CENTRILOBULAR EMPHYSEMA (HCC): ICD-10-CM

## 2023-05-11 DIAGNOSIS — F17.200 SMOKER: ICD-10-CM

## 2023-05-11 DIAGNOSIS — E78.00 PURE HYPERCHOLESTEROLEMIA: ICD-10-CM

## 2023-05-11 DIAGNOSIS — E66.9 OBESITY (BMI 30-39.9): ICD-10-CM

## 2023-05-11 DIAGNOSIS — E11.9 TYPE 2 DIABETES MELLITUS WITHOUT COMPLICATION, WITHOUT LONG-TERM CURRENT USE OF INSULIN (HCC): Primary | ICD-10-CM

## 2023-05-11 DIAGNOSIS — J96.11 CHRONIC RESPIRATORY FAILURE WITH HYPOXIA (HCC): ICD-10-CM

## 2023-05-11 DIAGNOSIS — N32.81 OAB (OVERACTIVE BLADDER): ICD-10-CM

## 2023-05-11 PROBLEM — R94.2 PULMONARY FUNCTION STUDIES ABNORMAL: Status: RESOLVED | Noted: 2020-12-11 | Resolved: 2023-05-11

## 2023-05-11 PROBLEM — J96.21 ACUTE ON CHRONIC RESPIRATORY FAILURE WITH HYPOXIA (HCC): Status: RESOLVED | Noted: 2020-11-28 | Resolved: 2023-05-11

## 2023-05-11 PROBLEM — M65.311 TRIGGER FINGER OF RIGHT THUMB: Status: RESOLVED | Noted: 2021-06-30 | Resolved: 2023-05-11

## 2023-05-11 PROBLEM — D62 ACUTE POSTOPERATIVE ANEMIA DUE TO EXPECTED BLOOD LOSS: Status: RESOLVED | Noted: 2020-11-20 | Resolved: 2023-05-11

## 2023-05-11 PROBLEM — J18.9 MULTIFOCAL PNEUMONIA: Status: RESOLVED | Noted: 2020-11-23 | Resolved: 2023-05-11

## 2023-05-11 LAB — HBA1C MFR BLD: 6 %

## 2023-05-11 PROCEDURE — 83036 HEMOGLOBIN GLYCOSYLATED A1C: CPT | Performed by: INTERNAL MEDICINE

## 2023-05-11 PROCEDURE — 3074F SYST BP LT 130 MM HG: CPT | Performed by: INTERNAL MEDICINE

## 2023-05-11 PROCEDURE — 3044F HG A1C LEVEL LT 7.0%: CPT | Performed by: INTERNAL MEDICINE

## 2023-05-11 PROCEDURE — 3078F DIAST BP <80 MM HG: CPT | Performed by: INTERNAL MEDICINE

## 2023-05-11 PROCEDURE — 99214 OFFICE O/P EST MOD 30 MIN: CPT | Performed by: INTERNAL MEDICINE

## 2023-05-11 RX ORDER — AMLODIPINE BESYLATE 10 MG/1
10 TABLET ORAL EVERY MORNING
Qty: 90 TABLET | Refills: 1 | Status: SHIPPED | OUTPATIENT
Start: 2023-05-11

## 2023-05-11 RX ORDER — POLYETHYLENE GLYCOL 3350 17 G/17G
17 POWDER, FOR SOLUTION ORAL DAILY PRN
Qty: 1530 G | Refills: 1 | Status: SHIPPED | OUTPATIENT
Start: 2023-05-11 | End: 2023-06-10

## 2023-05-11 RX ORDER — ALBUTEROL SULFATE 90 UG/1
2 AEROSOL, METERED RESPIRATORY (INHALATION) EVERY 6 HOURS PRN
Qty: 1 EACH | Refills: 3 | Status: SHIPPED | OUTPATIENT
Start: 2023-05-11

## 2023-05-11 RX ORDER — ATORVASTATIN CALCIUM 40 MG/1
40 TABLET, FILM COATED ORAL DAILY
Qty: 90 TABLET | Refills: 1 | Status: SHIPPED | OUTPATIENT
Start: 2023-05-11

## 2023-05-11 RX ORDER — LISINOPRIL AND HYDROCHLOROTHIAZIDE 25; 20 MG/1; MG/1
TABLET ORAL
Qty: 90 TABLET | Refills: 1 | Status: SHIPPED | OUTPATIENT
Start: 2023-05-11

## 2023-05-11 RX ORDER — SODIUM CHLORIDE 0.65 %
AEROSOL, SPRAY (ML) NASAL
COMMUNITY
Start: 2023-04-11

## 2023-05-11 RX ORDER — CARVEDILOL 12.5 MG/1
12.5 TABLET ORAL 2 TIMES DAILY
Qty: 180 TABLET | Refills: 1 | Status: SHIPPED | OUTPATIENT
Start: 2023-05-11

## 2023-05-11 RX ORDER — POTASSIUM CHLORIDE 750 MG/1
20 TABLET, EXTENDED RELEASE ORAL DAILY
Qty: 60 TABLET | Refills: 5 | Status: SHIPPED | OUTPATIENT
Start: 2023-05-11

## 2023-05-11 SDOH — ECONOMIC STABILITY: HOUSING INSECURITY
IN THE LAST 12 MONTHS, WAS THERE A TIME WHEN YOU DID NOT HAVE A STEADY PLACE TO SLEEP OR SLEPT IN A SHELTER (INCLUDING NOW)?: YES

## 2023-05-11 SDOH — ECONOMIC STABILITY: FOOD INSECURITY: WITHIN THE PAST 12 MONTHS, THE FOOD YOU BOUGHT JUST DIDN'T LAST AND YOU DIDN'T HAVE MONEY TO GET MORE.: SOMETIMES TRUE

## 2023-05-11 SDOH — ECONOMIC STABILITY: FOOD INSECURITY: WITHIN THE PAST 12 MONTHS, YOU WORRIED THAT YOUR FOOD WOULD RUN OUT BEFORE YOU GOT MONEY TO BUY MORE.: SOMETIMES TRUE

## 2023-05-11 SDOH — ECONOMIC STABILITY: INCOME INSECURITY: HOW HARD IS IT FOR YOU TO PAY FOR THE VERY BASICS LIKE FOOD, HOUSING, MEDICAL CARE, AND HEATING?: NOT HARD AT ALL

## 2023-05-11 NOTE — PROGRESS NOTES
A1C:   Lab Results   Component Value Date/Time    LABA1C 6.0 05/11/2023 09:16 AM     MICROALBUMIN URINE:   Lab Results   Component Value Date/Time    MICROALBUR <12 11/29/2022 11:03 PM     FASTING LIPID PANEL:  Lab Results   Component Value Date    CHOL 191 05/11/2022    HDL 44 05/11/2022    TRIG 79 05/11/2022     Lab Results   Component Value Date    LDLCHOLESTEROL 131 (H) 05/11/2022       LIVER PROFILE:  Lab Results   Component Value Date/Time    ALT 10 01/26/2022 09:02 AM    AST 15 01/26/2022 09:02 AM    PROT 7.8 01/26/2022 09:02 AM    BILITOT 0.33 01/26/2022 09:02 AM    BILIDIR 0.11 06/18/2018 03:10 PM    LABALBU 4.1 01/26/2022 09:02 AM      THYROID FUNCTION:   Lab Results   Component Value Date/Time    TSH 1.82 01/26/2022 09:02 AM      URINEANALYSIS: No results found for: LABURIN  ASSESSMENT AND PLAN:    1. Type 2 diabetes mellitus without complication, without long-term current use of insulin (HCC)    - Lipid Panel; Future  -  DIABETES FOOT EXAM  - POCT glycosylated hemoglobin (Hb A1C)  - Comprehensive Metabolic Panel; Future  - atorvastatin (LIPITOR) 40 MG tablet; Take 1 tablet by mouth daily  Dispense: 90 tablet; Refill: 1    2. Essential hypertension    - Comprehensive Metabolic Panel; Future  - amLODIPine (NORVASC) 10 MG tablet; Take 1 tablet by mouth every morning  Dispense: 90 tablet; Refill: 1  - potassium chloride (KLOR-CON M) 10 MEQ extended release tablet; Take 2 tablets by mouth daily  Dispense: 60 tablet; Refill: 5  - lisinopril-hydroCHLOROthiazide (PRINZIDE;ZESTORETIC) 20-25 MG per tablet; 1 tablet daily  Dispense: 90 tablet; Refill: 1  - carvedilol (COREG) 12.5 MG tablet; Take 1 tablet by mouth 2 times daily  Dispense: 180 tablet; Refill: 1    3. Centrilobular emphysema ([address]  Follow up with Pulmonologist  Advised to quit smoking completely    - albuterol sulfate HFA (VENTOLIN HFA) 108 (90 Base) MCG/ACT inhaler;  Inhale 2 puffs into the lungs every 6 hours as needed for Wheezing  Dispense: 1

## 2023-05-17 NOTE — PROGRESS NOTES
NEUROSURGERY INPATIENT PROGRESS NOTE    11/29/2020         POD#11 L4-5 right hemilaminectomy. Chart was reviewed. Afebrile since Sunday. Remains on NC. Moving x4 extremities. RLE 4/5, LLE 5/5. Sensation intact. Patient endorses improved strength since surgery. Kong d/c yesterday. Desatting while in the room on  NC to mid 80s. Complaining of mild SOB. No current facility-administered medications on file prior to encounter.       Current Outpatient Medications on File Prior to Encounter   Medication Sig Dispense Refill    atorvastatin (LIPITOR) 40 MG tablet Take 1 tablet by mouth daily 90 tablet 1    metFORMIN (GLUCOPHAGE) 500 MG tablet Take 1 tablet by mouth 2 times daily (with meals) 180 tablet 1    lisinopril-hydroCHLOROthiazide (PRINZIDE;ZESTORETIC) 20-25 MG per tablet TAKE 1 TABLET EVERY DAY 90 tablet 3     MG capsule TAKE 1 CAPSULE EVERY DAY 30 capsule 10    meloxicam (MOBIC) 15 MG tablet Take 1 tablet by mouth daily 30 tablet 3    amLODIPine (NORVASC) 10 MG tablet Take 1 tablet by mouth daily 90 tablet 3    albuterol sulfate HFA (VENTOLIN HFA) 108 (90 Base) MCG/ACT inhaler Inhale 2 puffs into the lungs every 6 hours as needed for Wheezing 1 Inhaler 3    omeprazole (PRILOSEC) 20 MG delayed release capsule TAKE 1 CAPSULE EVERY DAY 30 capsule 11    diphenhydrAMINE (BENADRYL) 25 MG tablet Take 25 mg by mouth every 6 hours as needed for Itching      nicotine (NICODERM CQ) 21 MG/24HR Place 1 patch onto the skin every 24 hours      potassium chloride (KLOR-CON M) 10 MEQ extended release tablet Take 2 tablets by mouth daily 60 tablet 3    SPIRIVA HANDIHALER 18 MCG inhalation capsule       acetaminophen (TYLENOL) 500 MG tablet Take 1 tablet by mouth 4 times daily as needed for Pain 30 tablet 0    mirtazapine (REMERON) 30 MG tablet Take 30 mg by mouth nightly      mirtazapine (REMERON) 15 MG tablet Take 15 mg by mouth nightly      DULoxetine (CYMBALTA) 60 MG extended release capsule Take Patient called with concerns on an episode she had last night.     Patient reported that last night, she experienced dizziness and tachycardia. She also reported that recently she had an ECG and it came back normal, however, I do not see it in the chart. I attempted to call the patient to get more clarification on what's going on. NA,VM left to return call to clinic to discuss.    DILATION AND CURETTAGE OF UTERUS      GASTRECTOMY      partial    LUMBAR DISCECTOMY  01/2015    lumbar diskectomy    LUMBAR FUSION  11/19/2020     POSTERIOR FUSION L4/5,     LUMBAR FUSION N/A 11/19/2020    POSTERIOR FUSION L4/5, MEDJANESSASHollis Filemonpierrearmando, [place] #[id number] AMINA performed by Delonte Singer DO at [address] Right 4/30/13    Lumbar Diagnostic Block,  Kenalog 40 mg    NERVE BLOCK  5/23/13    Lumbar Radiofrequency, Kenalog 40mg    NERVE BLOCK  8/12/13    Lt MBNB  celestone 6mg    NERVE BLOCK Left 8-28-13    left lumbar diagnostic block #2 decadron 10 mg    NERVE BLOCK Left 9-24-13    left lumbar median branch radiofrequency    NERVE BLOCK  07-02-14    caudal, celestone 9 mg    NERVE BLOCK  7-16-14    caudal epidural #2, celestone 9mg, fentanyl 25mcg    NERVE BLOCK  7/30/14    caudal #3 decadron 10mg    NERVE BLOCK  11-6-14    duramorph epidural steroid block  duramorph 1 mg celestone 9 mg    NERVE BLOCK  11/20/15    TENS- Empi Select    NERVE BLOCK  07/20/2018    right transforminal # 1 decadron 10mg,isovue    NERVE BLOCK Bilateral 02/01/2019    bilat mbnb- no steroid    NERVE BLOCK Bilateral 02/08/2019    bilat mbnb, marcaine . 25%    NH KNEE SCOPE,DIAGNOSTIC Right 3/24/2017    KNEE ARTHROSCOPY WITH PARTIAL MEDIAL MENISECAL DEBRIDMENT  performed by Shawn Farr MD at [facility]  9 20 2007    UPPER GASTROINTESTINAL ENDOSCOPY  4 21 2009,04/2011    gastritis, esophagitis       Medications:     lactobacillus  1 capsule Oral Daily with breakfast    hydroCHLOROthiazide  25 mg Oral Daily    ipratropium-albuterol  1 ampule Inhalation Q4H WA    cefOXitin  2 g Intravenous 4 times per day    predniSONE  40 mg Oral Daily    budesonide-formoterol  2 puff Inhalation BID    potassium chloride  20 mEq Oral Once    insulin lispro  0-12 Units Subcutaneous TID     insulin lispro  0-6 Units Subcutaneous Nightly    sodium chloride flush  10 mL Intravenous 2 times per day    [Held by provider] amLODIPine  10 mg Oral Daily    atorvastatin  40 mg Oral Daily    azelastine  2 spray Each Nostril BID    carvedilol  6.25 mg Oral BID    docusate sodium  100 mg Oral Daily    DULoxetine  60 mg Oral Daily    gabapentin  300 mg Oral BID    [Held by provider] lisinopril-hydroCHLOROthiazide  1 tablet Oral Daily    [Held by provider] metFORMIN  500 mg Oral BID WC    mirtazapine  15 mg Oral Nightly    mirtazapine  30 mg Oral Nightly    omeprazole  20 mg Oral QAM    potassium chloride  20 mEq Oral Daily    tiZANidine  4 mg Oral BID    trospium  20 mg Oral BID    polyethylene glycol  17 g Oral Daily    magnesium hydroxide  30 mL Oral Daily    enoxaparin  40 mg Subcutaneous Daily    cetirizine  10 mg Oral Daily    gabapentin  600 mg Oral Nightly    nicotine  1 patch Transdermal Daily     PRN Meds include: albuterol, magic (miracle) mouthwash, acetaminophen, benzonatate, diphenhydrAMINE, sodium chloride flush, oxyCODONE **OR** oxyCODONE, morphine **OR** [DISCONTINUED] morphine, senna, glucose, dextrose, glucagon (rDNA), dextrose    Objective:   /75   Pulse 82   Temp 97.5 °F (36.4 °C) (Oral)   Resp 17   Ht 5' 5\" (1.651 m)   Wt 188 lb 4.4 oz (85.4 kg)   LMP 04/19/2003   SpO2 93%   BMI 31.33 kg/m²     Blood pressure range: Systolic (65VSG), LMP:917 , Min:130 , UKC:788   ; Diastolic (13XNG), RJY:82, Min:63, Max:90      ROS:  Review of Systems   Constitutional: Negative for activity change, appetite change, chills, diaphoresis, fatigue and fever. HENT: Negative for sore throat. Eyes: Negative for discharge and redness. Respiratory: Positive for cough and shortness of breath. Negative for apnea, chest tightness, wheezing and stridor. Cardiovascular: Negative for chest pain and leg swelling. Gastrointestinal: Negative for abdominal distention, abdominal pain, constipation, diarrhea, nausea and vomiting.    Genitourinary: Negative for difficulty urinating, dysuria, flank pain, frequency, hematuria and urgency. Musculoskeletal: Positive for back pain. Negative for arthralgias and myalgias. Neurological: Negative for dizziness, tremors, seizures, syncope, facial asymmetry, speech difficulty, weakness, light-headedness, numbness and headaches. Hematological: Negative for adenopathy. NEUROLOGIC EXAMINATION  Physical Exam  Neck:      Musculoskeletal: Normal range of motion. Pulmonary:      Effort: Respiratory distress present. Breath sounds: No stridor. Abdominal:      General: Abdomen is flat. Palpations: Abdomen is soft. Neurological:      General: No focal deficit present. Mental Status: She is alert and oriented to person, place, and time. GCS: GCS eye subscore is 4. GCS verbal subscore is 5. GCS motor subscore is 6. Cranial Nerves: Cranial nerves are intact. No cranial nerve deficit. Sensory: Sensation is intact. No sensory deficit. Motor: Weakness present. Coordination: Coordination normal.      Deep Tendon Reflexes: Reflexes normal.      Reflex Scores:       Tricep reflexes are 2+ on the right side and 2+ on the left side. Bicep reflexes are 2+ on the right side and 2+ on the left side. Brachioradialis reflexes are 2+ on the right side and 2+ on the left side. Patellar reflexes are 1+ on the right side and 1+ on the left side. Achilles reflexes are 1+ on the right side and 1+ on the left side. Comments: RLE 4/5  All other extremities 5/5.             Lab Results:   CBC:   Recent Labs     11/27/20  0410 11/28/20  0612   WBC 14.6* 26.4*   HGB 9.6* 10.0*    449     BMP:    Recent Labs     11/27/20  0410 11/28/20  0612    140   K 4.6 4.0    102   CO2 26 27   BUN 12 11   CREATININE 0.48* 0.53   GLUCOSE 196* 172*         Lab Results   Component Value Date    CHOL 201 (H) 04/21/2020    LDLCHOLESTEROL 116 04/21/2020    HDL 54 04/21/2020    TRIG 155 (H) 04/21/2020    ALT 21 04/21/2020    AST 18 04/21/2020    TSH 0.94 07/19/2017    LABA1C 5.8 04/21/2020    LABMICR CANNOT BE CALCULATED 04/21/2020       No results found for: PHENYTOIN, PHENYTOIN, VALPROATE, CBMZ    IMAGING  No new imaging studies     ASSESSMENT  Radiculopathy status post L4-L5 right-sided laminectomy, L4-5 discectomy and anterior arthrodesis POD#11  Hypoxic respiratory failure due to pneumonia, COPD exacerbation    RECOMMENDATIONS   Continue management of pneumonia  pulmonary toilet  Ambulate   Continue PT OT  Continue pain control  Rehab vs home therapy      Faiza Briseno MD, [facility]  Neurosurgery Service  11/29/2020 at 6:38 AM         This note is created with the assistance of a speech recognition program.  While intending to generate a document that actually reflects the content of the visit, the document can still have some errors including those of syntax and sound like substitutions which may escape proof reading. In such instances, actual meaning can be extrapolated by contextual diversion.

## 2023-05-22 ENCOUNTER — HOSPITAL ENCOUNTER (OUTPATIENT)
Age: 63
Setting detail: SPECIMEN
Discharge: HOME | End: 2023-05-22

## 2023-05-22 DIAGNOSIS — I10 ESSENTIAL HYPERTENSION: ICD-10-CM

## 2023-05-22 DIAGNOSIS — E78.00 PURE HYPERCHOLESTEROLEMIA: ICD-10-CM

## 2023-05-22 DIAGNOSIS — E11.9 TYPE 2 DIABETES MELLITUS WITHOUT COMPLICATION, WITHOUT LONG-TERM CURRENT USE OF INSULIN (HCC): ICD-10-CM

## 2023-05-22 LAB
ALBUMIN SERPL-MCNC: 4 G/DL (ref 3.5–5.2)
ALBUMIN/GLOB SERPL: 1.1 {RATIO} (ref 1–2.5)
ALP SERPL-CCNC: 67 U/L (ref 35–104)
ALT SERPL-CCNC: 11 U/L (ref 5–33)
ANION GAP SERPL CALCULATED.3IONS-SCNC: 14 MMOL/L (ref 9–17)
AST SERPL-CCNC: 16 U/L
BILIRUB SERPL-MCNC: 0.5 MG/DL (ref 0.3–1.2)
BUN SERPL-MCNC: 16 MG/DL (ref 8–23)
CALCIUM SERPL-MCNC: 9.2 MG/DL (ref 8.6–10.4)
CHLORIDE SERPL-SCNC: 102 MMOL/L (ref 98–107)
CHOLEST SERPL-MCNC: 202 MG/DL
CHOLESTEROL/HDL RATIO: 4.3
CO2 SERPL-SCNC: 25 MMOL/L (ref 20–31)
CREAT SERPL-MCNC: 0.76 MG/DL (ref 0.5–0.9)
GFR SERPL CREATININE-BSD FRML MDRD: >60 ML/MIN/1.73M2
GLUCOSE SERPL-MCNC: 77 MG/DL (ref 70–99)
HDLC SERPL-MCNC: 47 MG/DL
LDLC SERPL CALC-MCNC: 136 MG/DL (ref 0–130)
POTASSIUM SERPL-SCNC: 3.8 MMOL/L (ref 3.7–5.3)
PROT SERPL-MCNC: 7.6 G/DL (ref 6.4–8.3)
SODIUM SERPL-SCNC: 141 MMOL/L (ref 135–144)
TRIGL SERPL-MCNC: 97 MG/DL

## 2023-05-24 ENCOUNTER — HOSPITAL ENCOUNTER (OUTPATIENT)
Dept: PAIN MANAGEMENT | Age: 63
Discharge: HOME | End: 2023-05-24
Payer: MEDICARE

## 2023-05-24 VITALS — HEIGHT: 65 IN | BODY MASS INDEX: 32.15 KG/M2 | TEMPERATURE: 97.3 F | WEIGHT: 193 LBS

## 2023-05-24 DIAGNOSIS — M96.1 LUMBAR POST-LAMINECTOMY SYNDROME: Primary | ICD-10-CM

## 2023-05-24 DIAGNOSIS — M54.16 LUMBAR RADICULOPATHY: ICD-10-CM

## 2023-05-24 DIAGNOSIS — Z79.891 CHRONIC USE OF OPIATE DRUG FOR THERAPEUTIC PURPOSE: ICD-10-CM

## 2023-05-24 DIAGNOSIS — M51.36 LUMBAR DEGENERATIVE DISC DISEASE: ICD-10-CM

## 2023-05-24 DIAGNOSIS — M53.3 SACROILIAC JOINT PAIN: ICD-10-CM

## 2023-05-24 PROCEDURE — 99213 OFFICE O/P EST LOW 20 MIN: CPT

## 2023-05-24 PROCEDURE — 99214 OFFICE O/P EST MOD 30 MIN: CPT | Performed by: STUDENT IN AN ORGANIZED HEALTH CARE EDUCATION/TRAINING PROGRAM

## 2023-05-24 RX ORDER — HYDROCODONE BITARTRATE AND ACETAMINOPHEN 5; 325 MG/1; MG/1
1 TABLET ORAL EVERY 8 HOURS PRN
Qty: 90 TABLET | Refills: 0 | Status: SHIPPED | OUTPATIENT
Start: 2023-05-26 | End: 2023-06-25

## 2023-05-24 ASSESSMENT — PAIN SCALES - GENERAL: PAINLEVEL_OUTOF10: 8

## 2023-05-24 NOTE — PROGRESS NOTES
Chronic Pain Clinic Note     Encounter Date: 5/24/2023     SUBJECTIVE:  Chief Complaint   Patient presents with    Back Pain       Medication Refill: Norco - 17    Current Complaints of Pain:   Location: back   Radiation: both legs, worse on the Left  Severity:  Moderate  Pain Numerical Score - 8 today   Average: 8     Highest: 10  Lowest: 8  Character/Quality: Complains of pain that is dull, sharp and shooting  Timing: Constant  Associated symptoms: none  Numbness: no  Weakness: no  Exacerbating factors: sitting, laying, bending  Alleviating factors: getting up and moving around  Length of time pain has been present: Started about 10 years ago  Inciting event/injury: no  Bowel/Bladder incontinence: no  Falls: no  Physical Therapy:      History of Interventions:   Surgery: No previous lumbar/cervical surgeries  Injections: SIJ 04/25/2023    Imaging:    Lumbar MRI 10/10/2022    Impression   Mild dextroscoliosis with tip at L2-L3       At L2-L3, mild canal stenosis and moderate left and mild right neural   foraminal narrowing. 3 mm left extraforaminal disc protrusion contacts   exiting left L2 nerve root. At L4-L5, moderate right and mild left neural foraminal narrowing and   right-sided laminectomy defect and changes related to instrumented disc space   fusion and posterior fusion.        Past Medical History:   Diagnosis Date    Allergic rhinitis     Alveolar hypoventilation 11/20/2020    Aortic insufficiency 2018    mild-moderate on echo (was seen and discharged from cardiology-[facility])    Bronchiectasis ([facility] 11/20/2020    Bronchitis     Chronic back pain     [facility]    COPD (chronic obstructive pulmonary disease) ([facility]     Dr. Castro Credit to see 11/09/2020    Depression     DM (diabetes mellitus) ([facility] 12/18/2012    GERD (gastroesophageal reflux disease)     History of echocardiogram 05/2018    EF 65%, mild-moderate AI and TR    Hyperlipidemia     Dr. Ferdinand Santana    Hypertension

## 2023-05-25 DIAGNOSIS — E11.9 TYPE 2 DIABETES MELLITUS WITHOUT COMPLICATION, WITHOUT LONG-TERM CURRENT USE OF INSULIN (HCC): ICD-10-CM

## 2023-05-25 DIAGNOSIS — E78.00 PURE HYPERCHOLESTEROLEMIA: ICD-10-CM

## 2023-05-25 DIAGNOSIS — F17.200 SMOKER: ICD-10-CM

## 2023-05-25 DIAGNOSIS — J30.9 CHRONIC ALLERGIC RHINITIS: ICD-10-CM

## 2023-05-25 RX ORDER — ATORVASTATIN CALCIUM 80 MG/1
80 TABLET, FILM COATED ORAL DAILY
Qty: 90 TABLET | Refills: 1 | Status: SHIPPED | OUTPATIENT
Start: 2023-05-25

## 2023-05-30 RX ORDER — CETIRIZINE HYDROCHLORIDE 10 MG/1
10 TABLET ORAL DAILY
Qty: 30 TABLET | Refills: 4 | Status: SHIPPED | OUTPATIENT
Start: 2023-05-30

## 2023-06-06 RX ORDER — MELOXICAM 7.5 MG/1
7.5 TABLET ORAL DAILY
Qty: 30 TABLET | Refills: 5 | Status: SHIPPED | OUTPATIENT
Start: 2023-06-06

## 2023-06-26 ENCOUNTER — HOSPITAL ENCOUNTER (OUTPATIENT)
Dept: PAIN MANAGEMENT | Age: 63
Discharge: HOME | End: 2023-06-26
Payer: MEDICARE

## 2023-06-26 VITALS — BODY MASS INDEX: 32.15 KG/M2 | WEIGHT: 193 LBS | HEIGHT: 65 IN | TEMPERATURE: 97.3 F

## 2023-06-26 DIAGNOSIS — M51.36 LUMBAR DEGENERATIVE DISC DISEASE: ICD-10-CM

## 2023-06-26 DIAGNOSIS — M47.26 OSTEOARTHRITIS OF SPINE WITH RADICULOPATHY, LUMBAR REGION: ICD-10-CM

## 2023-06-26 DIAGNOSIS — Z79.891 CHRONIC USE OF OPIATE DRUG FOR THERAPEUTIC PURPOSE: Primary | ICD-10-CM

## 2023-06-26 DIAGNOSIS — M96.1 LUMBAR POST-LAMINECTOMY SYNDROME: ICD-10-CM

## 2023-06-26 DIAGNOSIS — M47.26 OTHER SPONDYLOSIS WITH RADICULOPATHY, LUMBAR REGION: ICD-10-CM

## 2023-06-26 DIAGNOSIS — M54.16 LUMBAR RADICULOPATHY: ICD-10-CM

## 2023-06-26 PROCEDURE — 99213 OFFICE O/P EST LOW 20 MIN: CPT | Performed by: NURSE PRACTITIONER

## 2023-06-26 PROCEDURE — G0481 DRUG TEST DEF 8-14 CLASSES: HCPCS

## 2023-06-26 PROCEDURE — 99213 OFFICE O/P EST LOW 20 MIN: CPT

## 2023-06-26 PROCEDURE — 80307 DRUG TEST PRSMV CHEM ANLYZR: CPT

## 2023-06-26 RX ORDER — HYDROCODONE BITARTRATE AND ACETAMINOPHEN 5; 325 MG/1; MG/1
1 TABLET ORAL EVERY 8 HOURS PRN
Qty: 90 TABLET | Refills: 0 | Status: SHIPPED | OUTPATIENT
Start: 2023-06-26 | End: 2023-07-26

## 2023-06-26 ASSESSMENT — ENCOUNTER SYMPTOMS
BOWEL INCONTINENCE: 0
BACK PAIN: 1

## 2023-06-26 ASSESSMENT — PAIN SCALES - GENERAL: PAINLEVEL_OUTOF10: 8

## 2023-06-29 LAB
6-ACETYLMORPHINE, UR: NOT DETECTED
7-AMINOCLONAZEPAM, URINE: NOT DETECTED
ALPHA-OH-ALPRAZ, URINE: NOT DETECTED
ALPHA-OH-MIDAZOLAM, URINE: NOT DETECTED
ALPRAZOLAM, URINE: NOT DETECTED
AMPHETAMINES, URINE: NOT DETECTED
BARBITURATES, URINE: NOT DETECTED
BENZOYLECGONINE, UR: NOT DETECTED
BUPRENORPHINE URINE: NOT DETECTED
CARISOPRODOL, UR: NOT DETECTED
CLONAZEPAM, URINE: NOT DETECTED
CODEINE, URINE: NOT DETECTED
CREATININE URINE: 62.5 MG/DL (ref 20–400)
DIAZEPAM, URINE: NOT DETECTED
DRUGS EXPECTED, UR: NORMAL
EER HI RES INTERP UR: NORMAL
ETHYL GLUCURONIDE UR: NOT DETECTED
FENTANYL URINE: NOT DETECTED
GABAPENTIN: NOT DETECTED
HYDROCODONE, URINE: PRESENT
HYDROMORPHONE, URINE: PRESENT
LORAZEPAM, URINE: NOT DETECTED
MARIJUANA METAB, UR: NOT DETECTED
MDA, UR: NOT DETECTED
MDEA, EVE, UR: NOT DETECTED
MDMA URINE: NOT DETECTED
MEPERIDINE METAB, UR: NOT DETECTED
METHADONE, URINE: NOT DETECTED
METHAMPHETAMINE, URINE: NOT DETECTED
METHYLPHENIDATE: NOT DETECTED
MIDAZOLAM, URINE: NOT DETECTED
MORPHINE URINE: NOT DETECTED
NALOXONE URINE: NOT DETECTED
NORBUPRENORPHINE, URINE: NOT DETECTED
NORDIAZEPAM, URINE: NOT DETECTED
NORFENTANYL, URINE: NOT DETECTED
NORHYDROCODONE, URINE: PRESENT
NOROXYCODONE, URINE: NOT DETECTED
NOROXYMORPHONE, URINE: NOT DETECTED
OXAZEPAM, URINE: NOT DETECTED
OXYCODONE URINE: NOT DETECTED
OXYMORPHONE, URINE: NOT DETECTED
PAIN MANAGEMENT DRUG PANEL INTERP, URINE: NORMAL
PAIN MGT DRUG PANEL, HI RES, UR: NORMAL
PCP,URINE: NOT DETECTED
PHENTERMINE, UR: NOT DETECTED
PREGABALIN: NOT DETECTED
TAPENTADOL, URINE: NOT DETECTED
TAPENTADOL-O-SULFATE, URINE: NOT DETECTED
TEMAZEPAM, URINE: NOT DETECTED
TRAMADOL, URINE: NOT DETECTED
ZOLPIDEM METABOLITE (ZCA), URINE: NOT DETECTED
ZOLPIDEM, URINE: NOT DETECTED

## 2023-07-07 ENCOUNTER — TELEPHONE (OUTPATIENT)
Dept: INTERNAL MEDICINE | Age: 63
End: 2023-07-07

## 2023-07-07 DIAGNOSIS — J20.9 ACUTE BRONCHITIS, UNSPECIFIED ORGANISM: Primary | ICD-10-CM

## 2023-07-07 RX ORDER — AZITHROMYCIN 250 MG/1
250 TABLET, FILM COATED ORAL SEE ADMIN INSTRUCTIONS
Qty: 6 TABLET | Refills: 0 | Status: SHIPPED | OUTPATIENT
Start: 2023-07-07 | End: 2023-07-12

## 2023-07-11 NOTE — TELEPHONE ENCOUNTER
PC to pt to make sure she picked medication up, pt states she started medication a couple days ago. Writer advised contacting office for appt if cough/phlegm is not improving over the next couple days.

## 2023-07-14 ENCOUNTER — TELEPHONE (OUTPATIENT)
Dept: INTERNAL MEDICINE | Age: 63
End: 2023-07-14

## 2023-07-14 NOTE — TELEPHONE ENCOUNTER
Pc asking for medication for nebulizer , I didn't see a current med in pt chart please advise     Last med was ipratropium albuterol from 2021

## 2023-07-17 NOTE — TELEPHONE ENCOUNTER
Frequency Next Occurrence   CT CHEST WO CONTRAST Once 09/03/2022   CT CHEST WO CONTRAST Once 10/08/2022   SI JOINT INJECTIONS / SI NERVE BLOCK Once 04/27/2023   DRUG SCREEN, PAIN Once 06/26/2023               Patient Active Problem List:     DJD (degenerative joint disease) of knee     Osteoarthritis of spine with radiculopathy, lumbar region     Lumbar radiculopathy     GERD (gastroesophageal reflux disease)     COPD, severity to be determined ([address])     HTN (hypertension)     Allergic rhinitis     Lipoma of shoulder s/p excision right posterior 11 17 2008     History of tobacco use     DM (diabetes mellitus)     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     Primary osteoarthritis of both knees     Chronic low back pain     Major depression, chronic     Chronic respiratory failure with hypoxia (HCC)     Mitral and aortic insufficiency     Pure hypercholesterolemia     Other spondylosis with radiculopathy, lumbar region     Lumbar degenerative disc disease     Alveolar hypoventilation     Obesity (BMI 30-39. 9)     Bronchiectasis ([address])     Centrilobular emphysema (HCC)     Oxygen dependent     Tobacco dependence     Idiopathic sleep related nonobstructive alveolar hypoventilation     Lumbar post-laminectomy syndrome     Chronic use of opiate drug for therapeutic purpose     Sacroiliac joint pain

## 2023-07-18 RX ORDER — ALBUTEROL SULFATE 2.5 MG/3ML
SOLUTION RESPIRATORY (INHALATION)
COMMUNITY
Start: 2023-07-14

## 2023-07-18 RX ORDER — TIZANIDINE 4 MG/1
4 TABLET ORAL 2 TIMES DAILY
Qty: 60 TABLET | Refills: 0 | Status: SHIPPED | OUTPATIENT
Start: 2023-07-18

## 2023-07-24 ENCOUNTER — HOSPITAL ENCOUNTER (OUTPATIENT)
Dept: PAIN MANAGEMENT | Age: 63
Discharge: HOME | End: 2023-07-24
Payer: MEDICARE

## 2023-07-24 VITALS
HEIGHT: 65 IN | BODY MASS INDEX: 32.15 KG/M2 | OXYGEN SATURATION: 89 % | HEART RATE: 74 BPM | SYSTOLIC BLOOD PRESSURE: 137 MMHG | TEMPERATURE: 97.3 F | DIASTOLIC BLOOD PRESSURE: 79 MMHG | RESPIRATION RATE: 12 BRPM | WEIGHT: 193 LBS

## 2023-07-24 DIAGNOSIS — M54.16 LUMBAR RADICULOPATHY: ICD-10-CM

## 2023-07-24 DIAGNOSIS — Z79.891 CHRONIC USE OF OPIATE DRUG FOR THERAPEUTIC PURPOSE: Primary | ICD-10-CM

## 2023-07-24 DIAGNOSIS — M47.26 OSTEOARTHRITIS OF SPINE WITH RADICULOPATHY, LUMBAR REGION: ICD-10-CM

## 2023-07-24 DIAGNOSIS — M51.36 LUMBAR DEGENERATIVE DISC DISEASE: ICD-10-CM

## 2023-07-24 DIAGNOSIS — M96.1 LUMBAR POST-LAMINECTOMY SYNDROME: ICD-10-CM

## 2023-07-24 DIAGNOSIS — M47.26 OTHER SPONDYLOSIS WITH RADICULOPATHY, LUMBAR REGION: ICD-10-CM

## 2023-07-24 PROCEDURE — 99213 OFFICE O/P EST LOW 20 MIN: CPT | Performed by: NURSE PRACTITIONER

## 2023-07-24 PROCEDURE — 99213 OFFICE O/P EST LOW 20 MIN: CPT

## 2023-07-24 RX ORDER — HYDROCODONE BITARTRATE AND ACETAMINOPHEN 5; 325 MG/1; MG/1
1 TABLET ORAL EVERY 8 HOURS PRN
Qty: 90 TABLET | Refills: 0 | Status: SHIPPED | OUTPATIENT
Start: 2023-07-26 | End: 2023-08-25

## 2023-07-24 ASSESSMENT — ENCOUNTER SYMPTOMS
COUGH: 0
CONSTIPATION: 0
BACK PAIN: 1
BOWEL INCONTINENCE: 0
SHORTNESS OF BREATH: 0

## 2023-07-24 ASSESSMENT — PAIN SCALES - GENERAL: PAINLEVEL_OUTOF10: 8

## 2023-07-24 ASSESSMENT — PAIN DESCRIPTION - LOCATION: LOCATION: BACK

## 2023-07-24 NOTE — PROGRESS NOTES
Chief Complaint   Patient presents with    Back Pain     Med refill        PM   Chronic onset many years ago located in the lower lumbar area  Reports radiation of pain down both legs, associated with occ. leg numbness  Past history significant for 2 lumbar spine surgeries with last one in 2020. Initially noticed improvement but now has pain in both legs  Had a follow-up x-ray 5/22 that showed stable fusion at L4-5  Lumbar MRI 10/22 showed  L2-L3, mild canal stenosis and moderate left and mild right neural foraminal narrowing. 3 mm left extraforaminal disc protrusion contacts exiting left L2 nerve root. At L4-L5, moderate right and mild left neural foraminal narrowing and right-sided laminectomy defect and changes related to instrumented disc space fusion and posterior fusion. Pt had L5 S1 lumbar epidural steroid injection 8/2021 and reported significant relief for only 2 days   Had follow-up with neurosurgery 12/22 with CT and PT ordered. Pt started in home PT 2 times a week with some relief  CT done 12/22 with posterior fusion of L4 and L5 without complication and multilevel degenerative changes without significant spinal canal stenosis     Currently on multimodal medication regimen Norco and Mobic with little relief  Pain intensity 10 out of 10 at times when first waking up       She saw Dr. Gabriela Villafana 3/13/23 with no surgery planned - SCS, TPI and SIJ were discussed. She had left SI joint injection 4/25/23 and reports about 50% relief        Back Pain  This is a chronic problem. The current episode started more than 1 year ago. The problem occurs constantly. The pain is present in the lumbar spine. Quality: throbbing. The pain does not radiate. The pain is at a severity of 8/10. The symptoms are aggravated by bending, twisting and standing. Pertinent negatives include no bladder incontinence, bowel incontinence, chest pain, fever, numbness, tingling or weakness.  She has tried heat and ice for the

## 2023-07-24 NOTE — PROGRESS NOTES
Chief Complaint   Patient presents with    Back Pain     Med refill        PMH       HPI:     Back Pain  This is a chronic problem. The current episode started more than 1 year ago. The problem occurs constantly. The pain is present in the lumbar spine. Quality: throbbing. The pain does not radiate. The pain is at a severity of 8/10. The symptoms are aggravated by bending, twisting and standing. Pertinent negatives include no bladder incontinence, bowel incontinence, chest pain, numbness, tingling or weakness. She has tried heat and ice for the symptoms. Patient denies any new neurological symptoms. No bowel or bladder incontinence, no weakness, and no falling.     Pill count:5 Norco, due 7/26, appropriate    Morphine equivalent:            Past Medical History:   Diagnosis Date    Allergic rhinitis     Alveolar hypoventilation 11/20/2020    Aortic insufficiency 2018    mild-moderate on echo (was seen and discharged from cardiology-[facility])    Bronchiectasis ([address]) 11/20/2020    Bronchitis     Chronic back pain     Pain management at [facility]    COPD (chronic obstructive pulmonary disease) ([address] [facility])     Dr. Shaina Orr to see 11/09/2020    Depression     DM (diabetes mellitus) ([address]) 12/18/2012    GERD (gastroesophageal reflux disease)     History of echocardiogram 05/2018    EF 65%, mild-moderate AI and TR    Hyperlipidemia     Dr. Stacie Lepe    Hypertension     Dr. Stacie Lepe    Obesity     Osteoarthritis     Peripheral vascular disease ([address])     Primary osteoarthritis of both knees     Radicular pain of lumbosacral region     Spinal stenosis, lumbar region, without neurogenic claudication 04/30/2013    Tricuspid regurgitation 2018    mild-moderate on echo    Type II or unspecified type diabetes mellitus without mention of complication, not stated as uncontrolled     Dr. Stacie Lepe    Unspecified sleep apnea     no cpap used anymore    URI (upper respiratory infection)     Wears dentures     upper and lower

## 2023-07-31 ENCOUNTER — HOSPITAL ENCOUNTER (OUTPATIENT)
Dept: MAMMOGRAPHY | Age: 63
Discharge: HOME | End: 2023-08-02
Payer: MEDICARE

## 2023-07-31 VITALS — HEIGHT: 65 IN | BODY MASS INDEX: 32.15 KG/M2 | WEIGHT: 193 LBS

## 2023-07-31 DIAGNOSIS — Z12.31 ENCOUNTER FOR SCREENING MAMMOGRAM FOR BREAST CANCER: ICD-10-CM

## 2023-07-31 PROCEDURE — 77063 BREAST TOMOSYNTHESIS BI: CPT

## 2023-08-11 ENCOUNTER — APPOINTMENT (OUTPATIENT)
Dept: GENERAL RADIOLOGY | Age: 63
End: 2023-08-11
Payer: MEDICARE

## 2023-08-11 ENCOUNTER — HOSPITAL ENCOUNTER (EMERGENCY)
Age: 63
Discharge: HOME | End: 2023-08-11
Attending: EMERGENCY MEDICINE
Payer: MEDICARE

## 2023-08-11 VITALS
OXYGEN SATURATION: 93 % | DIASTOLIC BLOOD PRESSURE: 89 MMHG | RESPIRATION RATE: 18 BRPM | WEIGHT: 188.49 LBS | BODY MASS INDEX: 31.4 KG/M2 | TEMPERATURE: 98.5 F | HEART RATE: 71 BPM | HEIGHT: 65 IN | SYSTOLIC BLOOD PRESSURE: 136 MMHG

## 2023-08-11 DIAGNOSIS — S83.92XA SPRAIN OF LEFT KNEE, UNSPECIFIED LIGAMENT, INITIAL ENCOUNTER: ICD-10-CM

## 2023-08-11 DIAGNOSIS — H93.8X3 CONGESTION OF BOTH EARS: Primary | ICD-10-CM

## 2023-08-11 LAB
ANION GAP SERPL CALCULATED.3IONS-SCNC: 14 MMOL/L (ref 9–17)
BASOPHILS # BLD: 0.11 K/UL (ref 0–0.2)
BASOPHILS NFR BLD: 2 % (ref 0–2)
BUN SERPL-MCNC: 12 MG/DL (ref 8–23)
CALCIUM SERPL-MCNC: 9.3 MG/DL (ref 8.6–10.4)
CHLORIDE SERPL-SCNC: 105 MMOL/L (ref 98–107)
CO2 SERPL-SCNC: 21 MMOL/L (ref 20–31)
CREAT SERPL-MCNC: 0.8 MG/DL (ref 0.5–0.9)
D DIMER PPP FEU-MCNC: 0.5 UG/ML FEU (ref 0–0.57)
EOSINOPHIL # BLD: 0.18 K/UL (ref 0–0.44)
EOSINOPHILS RELATIVE PERCENT: 4 % (ref 1–4)
ERYTHROCYTE [DISTWIDTH] IN BLOOD BY AUTOMATED COUNT: 13.7 % (ref 11.8–14.4)
GFR SERPL CREATININE-BSD FRML MDRD: >60 ML/MIN/1.73M2
GLUCOSE SERPL-MCNC: 115 MG/DL (ref 70–99)
HCT VFR BLD AUTO: 48.1 % (ref 36.3–47.1)
HGB BLD-MCNC: 15.4 G/DL (ref 11.9–15.1)
IMM GRANULOCYTES # BLD AUTO: <0.03 K/UL (ref 0–0.3)
IMM GRANULOCYTES NFR BLD: 0 %
LYMPHOCYTES NFR BLD: 1.98 K/UL (ref 1.1–3.7)
LYMPHOCYTES RELATIVE PERCENT: 39 % (ref 24–43)
MCH RBC QN AUTO: 28.3 PG (ref 25.2–33.5)
MCHC RBC AUTO-ENTMCNC: 32 G/DL (ref 28.4–34.8)
MCV RBC AUTO: 88.3 FL (ref 82.6–102.9)
MONOCYTES NFR BLD: 0.55 K/UL (ref 0.1–1.2)
MONOCYTES NFR BLD: 11 % (ref 3–12)
NEUTROPHILS NFR BLD: 44 % (ref 36–65)
NEUTS SEG NFR BLD: 2.25 K/UL (ref 1.5–8.1)
NRBC BLD-RTO: 0 PER 100 WBC
PLATELET # BLD AUTO: 239 K/UL (ref 138–453)
PMV BLD AUTO: 10.6 FL (ref 8.1–13.5)
POTASSIUM SERPL-SCNC: 3.5 MMOL/L (ref 3.7–5.3)
RBC # BLD AUTO: 5.45 M/UL (ref 3.95–5.11)
SODIUM SERPL-SCNC: 140 MMOL/L (ref 135–144)
TROPONIN I SERPL HS-MCNC: 12 NG/L (ref 0–14)
TROPONIN I SERPL HS-MCNC: 12 NG/L (ref 0–14)
WBC OTHER # BLD: 5.1 K/UL (ref 3.5–11.3)

## 2023-08-11 PROCEDURE — 85379 FIBRIN DEGRADATION QUANT: CPT

## 2023-08-11 PROCEDURE — 93005 ELECTROCARDIOGRAM TRACING: CPT

## 2023-08-11 PROCEDURE — 6370000000 HC RX 637 (ALT 250 FOR IP): Performed by: STUDENT IN AN ORGANIZED HEALTH CARE EDUCATION/TRAINING PROGRAM

## 2023-08-11 PROCEDURE — 71046 X-RAY EXAM CHEST 2 VIEWS: CPT

## 2023-08-11 PROCEDURE — 99285 EMERGENCY DEPT VISIT HI MDM: CPT

## 2023-08-11 PROCEDURE — 84484 ASSAY OF TROPONIN QUANT: CPT

## 2023-08-11 PROCEDURE — 85025 COMPLETE CBC W/AUTO DIFF WBC: CPT

## 2023-08-11 PROCEDURE — 73562 X-RAY EXAM OF KNEE 3: CPT | Performed by: RADIOLOGY

## 2023-08-11 PROCEDURE — 80048 BASIC METABOLIC PNL TOTAL CA: CPT

## 2023-08-11 RX ORDER — OXYMETAZOLINE HYDROCHLORIDE 0.05 G/100ML
1 SPRAY NASAL ONCE
Status: COMPLETED | OUTPATIENT
Start: 2023-08-11 | End: 2023-08-11

## 2023-08-11 RX ORDER — GUAIFENESIN 600 MG/1
1200 TABLET, EXTENDED RELEASE ORAL 2 TIMES DAILY
Qty: 28 TABLET | Refills: 0 | Status: SHIPPED | OUTPATIENT
Start: 2023-08-11 | End: 2023-08-18

## 2023-08-11 RX ORDER — ACETAMINOPHEN 500 MG
1000 TABLET ORAL ONCE
Status: COMPLETED | OUTPATIENT
Start: 2023-08-11 | End: 2023-08-11

## 2023-08-11 RX ORDER — METHOCARBAMOL 500 MG/1
750 TABLET, FILM COATED ORAL ONCE
Status: COMPLETED | OUTPATIENT
Start: 2023-08-11 | End: 2023-08-11

## 2023-08-11 RX ORDER — CETIRIZINE HYDROCHLORIDE 10 MG/1
10 TABLET ORAL DAILY
Status: DISCONTINUED | OUTPATIENT
Start: 2023-08-11 | End: 2023-08-11 | Stop reason: HOSPADM

## 2023-08-11 RX ADMIN — ACETAMINOPHEN 1000 MG: 500 TABLET ORAL at 13:23

## 2023-08-11 RX ADMIN — OXYMETAZOLINE HCL 1 SPRAY: 0.05 SPRAY NASAL at 13:23

## 2023-08-11 RX ADMIN — METHOCARBAMOL 750 MG: 500 TABLET ORAL at 13:23

## 2023-08-11 ASSESSMENT — ENCOUNTER SYMPTOMS
COLOR CHANGE: 0
SORE THROAT: 0
SINUS PAIN: 0
SHORTNESS OF BREATH: 0
VOMITING: 0
FACIAL SWELLING: 0
NAUSEA: 0
BACK PAIN: 0
DIARRHEA: 0
ABDOMINAL PAIN: 0
SINUS PRESSURE: 0
RHINORRHEA: 1

## 2023-08-11 ASSESSMENT — HEART SCORE: ECG: 0

## 2023-08-11 ASSESSMENT — PAIN SCALES - GENERAL
PAINLEVEL_OUTOF10: 0
PAINLEVEL_OUTOF10: 8

## 2023-08-11 ASSESSMENT — PAIN - FUNCTIONAL ASSESSMENT: PAIN_FUNCTIONAL_ASSESSMENT: 0-10

## 2023-08-11 NOTE — DISCHARGE INSTRUCTIONS
Please call and schedule appoint with your primary care provider. Please follow-up with the orthopedic surgery clinic as well. You can use your Norco as needed for pain control. Do not take them together. Do not drink alcohol with taking her medications. You can use the Afrin for 3 nights. Alternate which nose you sprayed in. Afterwards do not use this as it will cause a rebound congestion. Return to the emergency department if you develop any severe worsening or symptoms, shortness of breath or chest pain, unable to ambulate, one-sided weakness slurred speech difficulty swallowing or any other general concerns.

## 2023-08-11 NOTE — ED NOTES
Patient arrived to the ED ambulatory from home with c/o left leg pain. Patient states this is chronic but worse today. Patient also states she has been congested and feels like her right ear has an infection so would like to have that looked at as well.        Sadaf Dia RN  08/11/23 1945

## 2023-08-11 NOTE — ED PROVIDER NOTES
person, place, and time. DDX/DIAGNOSTIC RESULTS / EMERGENCY DEPARTMENT COURSE / MDM     Medical Decision Making  58-year-old female presenting with left leg pain as well as ear pain. On exam patient no acute distress, nontoxic-appearing. On initial exam patient ambulated into the room and was sitting there ox saturations at 85% on room air. Patient much more tachypneic having to break up sentences to catch breath. Once able to catch breath oxygen saturations at 94% on room air. Patient has a history of COPD and wears 2 L nasal cannula at home when she sleeps. States she feels at her baseline. No leg swelling on exam.  Given that patient endorses some shortness of breath on exertion as well as some left knee pain that is changed will obtain D-dimer, cardiac work-up and chest x-ray. Lungs are clear to auscultation. The right ear has some slight bulging to it but is not erythematous. There is no tenderness to the pinna or external auricle. Differential diagnosis of serous otitis, congestion, DVT, PE, pneumonia, ACS, arrhythmia, electro abnormality, anemia    Amount and/or Complexity of Data Reviewed  External Data Reviewed: notes. Labs: ordered. Decision-making details documented in ED Course. Radiology: ordered. ECG/medicine tests: ordered. Risk  OTC drugs. Prescription drug management. Risk Details: Work-up unremarkable. Patient supposed to wear oxygen at home. Do not feel this is cardiac in nature and more likely related to patient's chronic COPD that she wears 2 L nasal cannula for at home. Patient is lungs clear to auscultation bilaterally. Low risk Wells. No indication for CT imaging at this time. Patient feeling slightly improved. Patient given Ace bandage. Instructed patient she is to follow-up with orthopedic surgery. Patient amenable this plan discharge home with orthopedic surgery follow-up. Patient voiced understanding return precautions.   List patient for septic arthritis or gout as patient has no overlying warmth erythema of the left knee. Neuro vas intact lower extremities. No pitting edema noted. Chest x-ray shows no pneumonia. No arrhythmia on EKG. Electro abnormality. Troponins are baseline for patient. EKG  EKG Interpretation    Interpreted by me    Rhythm: normal sinus   Rate: normal  Axis: normal  Ectopy: none  Conduction: normal  ST Segments: no acute change  T Waves: no acute change  Q Waves: none    Clinical Impression: no acute changes and normal EKG    All EKG's are interpreted by the Emergency Department Physician who either signs or Co-signs this chart in the absence of a cardiologist.    EMERGENCY DEPARTMENT COURSE:      ED Course as of 08/11/23 2305   Fri Aug 11, 2023   1346 D-Dimer, Quant: 0.50 [MS]   1346 Using age-adjusted D-dimer. D-dimer 0.5. No occasion for CT PE imaging. [MS]   1410 Patient reevaluated. Awaiting imaging as well as second troponin. EKG unremarkable. Patient saturating 91% on room air. History of COPD. Everything negative will discharge home with Ace wrap [MS]   1444 Patient without chest pain. Heart score 4 but patient asymptomatic. No indication for admission at this time. Patient is low oxygen saturation secondary to COPD. Chronically in the high 80s low 90s. [MS]      ED Course User Index  [MS] Sergey King DO       PROCEDURES:      CONSULTS:  None    CRITICAL CARE:  There was significant risk of life threatening deterioration of patient's condition requiring my direct management. Critical care time  minutes, excluding any documented procedures. FINAL IMPRESSION      1. Congestion of both ears    2.  Sprain of left knee, unspecified ligament, initial encounter          DISPOSITION / PLAN     DISPOSITION Decision To Discharge 08/11/2023 03:28:18 PM      PATIENT REFERRED TO:  Sandor Moe MD  [address]  [phone]    Schedule an appointment as soon as possible

## 2023-08-14 NOTE — TELEPHONE ENCOUNTER
Last visit: 5/11/23  Last Med refill:   Does patient have enough medication for 72 hours: Yes    Next Visit Date:  Future Appointments   Date Time Provider [address]   8/15/2023  8:50 AM Unknown MD Maritza [facility] MYLES Casie Bharathi   8/24/2023 12:20 PM Tracy Prado, APRN - CNP [address] Maintenance   Topic Date Due    DEXA (modify frequency per FRAX score)  Never done    Diabetic retinal exam  05/14/2021    Annual Wellness Visit (AWV)  04/11/2023    Flu vaccine (1) 08/01/2023    Colorectal Cancer Screen  10/05/2023    Diabetic Alb to Cr ratio (uACR) test  11/29/2023    Depression Monitoring  03/15/2024    Low dose CT lung screening &/or counseling  04/27/2024    Diabetic foot exam  05/11/2024    A1C test (Diabetic or Prediabetic)  05/11/2024    Lipids  05/22/2024    GFR test (Diabetes, CKD 3-4, OR last GFR 15-59)  08/11/2024    Breast cancer screen  07/31/2025    Cervical cancer screen  03/02/2026    DTaP/Tdap/Td vaccine (2 - Td or Tdap) 06/24/2029    Shingles vaccine  Completed    Pneumococcal 65+ years Vaccine  Completed    COVID-19 Vaccine  Completed    Hepatitis C screen  Completed    HIV screen  Completed    Hepatitis A vaccine  Aged Out    Hib vaccine  Aged Out    Meningococcal (ACWY) vaccine  Aged Out    Pneumococcal 0-64 years Vaccine  Discontinued       Hemoglobin A1C (%)   Date Value   05/11/2023 6.0   11/22/2022 5.8   04/29/2022 5.8             ( goal A1C is < 7)   No components found for: LABMICR  LDL Cholesterol (mg/dL)   Date Value   05/22/2023 136 (H)   05/11/2022 131 (H)       (goal LDL is <100)   AST (U/L)   Date Value   05/22/2023 16     ALT (U/L)   Date Value   05/22/2023 11     BUN (mg/dL)   Date Value   08/11/2023 12     BP Readings from Last 3 Encounters:   08/11/23 136/89   07/24/23 137/79   05/11/23 125/79          (goal 120/80)    All Future Testing planned in [facility]  Lab Frequency Next Occurrence   CT CHEST WO CONTRAST Once 09/03/2022   CT CHEST WO

## 2023-08-15 ENCOUNTER — OFFICE VISIT (OUTPATIENT)
Dept: ORTHOPEDIC SURGERY | Age: 63
End: 2023-08-15
Payer: MEDICARE

## 2023-08-15 VITALS — HEIGHT: 65 IN | BODY MASS INDEX: 31.32 KG/M2 | WEIGHT: 188 LBS

## 2023-08-15 DIAGNOSIS — M17.12 PRIMARY OSTEOARTHRITIS OF LEFT KNEE: Primary | ICD-10-CM

## 2023-08-15 PROBLEM — F11.20 OPIOID DEPENDENCE WITH CURRENT USE (HCC): Status: ACTIVE | Noted: 2023-08-15

## 2023-08-15 LAB
EKG ATRIAL RATE: 72 BPM
EKG P AXIS: 53 DEGREES
EKG P-R INTERVAL: 192 MS
EKG Q-T INTERVAL: 410 MS
EKG QRS DURATION: 84 MS
EKG QTC CALCULATION (BAZETT): 448 MS
EKG R AXIS: -13 DEGREES
EKG T AXIS: 52 DEGREES
EKG VENTRICULAR RATE: 72 BPM

## 2023-08-15 PROCEDURE — G8400 PT W/DXA NO RESULTS DOC: HCPCS | Performed by: ORTHOPAEDIC SURGERY

## 2023-08-15 PROCEDURE — G8427 DOCREV CUR MEDS BY ELIG CLIN: HCPCS | Performed by: ORTHOPAEDIC SURGERY

## 2023-08-15 PROCEDURE — G8417 CALC BMI ABV UP PARAM F/U: HCPCS | Performed by: ORTHOPAEDIC SURGERY

## 2023-08-15 PROCEDURE — 3017F COLORECTAL CA SCREEN DOC REV: CPT | Performed by: ORTHOPAEDIC SURGERY

## 2023-08-15 PROCEDURE — 4004F PT TOBACCO SCREEN RCVD TLK: CPT | Performed by: ORTHOPAEDIC SURGERY

## 2023-08-15 PROCEDURE — 99213 OFFICE O/P EST LOW 20 MIN: CPT | Performed by: ORTHOPAEDIC SURGERY

## 2023-08-15 PROCEDURE — 1090F PRES/ABSN URINE INCON ASSESS: CPT | Performed by: ORTHOPAEDIC SURGERY

## 2023-08-15 PROCEDURE — 1123F ACP DISCUSS/DSCN MKR DOCD: CPT | Performed by: ORTHOPAEDIC SURGERY

## 2023-08-15 RX ORDER — TIZANIDINE 4 MG/1
4 TABLET ORAL 2 TIMES DAILY
Qty: 60 TABLET | Refills: 0 | Status: SHIPPED | OUTPATIENT
Start: 2023-08-15

## 2023-08-15 NOTE — PROGRESS NOTES
This patient is seen here today complaining of pain in the left knee. She had visited emergency room on 8/11/2023. At that time she had straighten her knee out and had felt sudden pain in the back of the left knee on the lateral side. He says she did not hear any pop but felt like it pulled of the muscle. Since then she has continued to have pain and feels a bump on the back of the knee. No history of instability or locking episode. In the emergency room she was told that she had sprained knee. She has not had any previous injury or symptoms in the left knee. Examination: Her gait and stance were normal.  In supine position hip examination showed excellent normal range of motion. Knee examination shows definite fullness in the popliteal fossa compared to the other side. I palpated his a long time over the lateral side and I thought that there may be some tenderness over the biceps but the biceps tendon itself was intact. There was minor effusion in the knee joint. X-rays: X-rays showed that she did have some effusion in the joint as well as moderate tricompartmental osteoarthrosis. Particularly affecting the medial joint. Diagnosis: Primary osteoarthritis left knee. Possible tear lateral meniscus.     Treatment: Under sterile condition I injected 40 mg Depo-Medrol and 5 cc of 1% plain lidocaine without any complications through anterolateral portal.

## 2023-08-23 RX ORDER — DOCUSATE SODIUM 100 MG/1
CAPSULE, LIQUID FILLED ORAL
Qty: 30 CAPSULE | Refills: 4 | Status: SHIPPED | OUTPATIENT
Start: 2023-08-23

## 2023-08-23 NOTE — TELEPHONE ENCOUNTER
Pharmacy requesting refills for Docusate Sodium 100mg capsules. Please review and e-scribe to pharmacy listed in chart if appropriate. Thank you.       Next Visit Date: 12/20/23  Last Visit Date: 5/11/23    Future Appointments   Date Time Provider [address]   8/24/2023 12:20 PM Bard Nguyen, APRN - CNP [facility] [address]   8/29/2023  8:50 AM My Piedra MD [address] [facility] Maintenance   Topic Date Due    DEXA (modify frequency per FRAX score)  Never done    Diabetic retinal exam  05/14/2021    Annual Wellness Visit (AWV)  04/11/2023    Flu vaccine (1) 08/01/2023    Colorectal Cancer Screen  10/05/2023    Diabetic Alb to Cr ratio (uACR) test  11/29/2023    Depression Monitoring  03/15/2024    Low dose CT lung screening &/or counseling  04/27/2024    Diabetic foot exam  05/11/2024    A1C test (Diabetic or Prediabetic)  05/11/2024    Lipids  05/22/2024    GFR test (Diabetes, CKD 3-4, OR last GFR 15-59)  08/11/2024    Breast cancer screen  07/31/2025    Cervical cancer screen  03/02/2026    DTaP/Tdap/Td vaccine (2 - Td or Tdap) 06/24/2029    Shingles vaccine  Completed    Pneumococcal 65+ years Vaccine  Completed    COVID-19 Vaccine  Completed    Hepatitis C screen  Completed    HIV screen  Completed    Hepatitis A vaccine  Aged Out    Hib vaccine  Aged Out    Meningococcal (ACWY) vaccine  Aged Out    Pneumococcal 0-64 years Vaccine  Discontinued       Hemoglobin A1C (%)   Date Value   05/11/2023 6.0   11/22/2022 5.8   04/29/2022 5.8             ( goal A1C is < 7)   No components found for: LABMICR  LDL Cholesterol (mg/dL)   Date Value   05/22/2023 136 (H)       (goal LDL is <100)   AST (U/L)   Date Value   05/22/2023 16     ALT (U/L)   Date Value   05/22/2023 11     BUN (mg/dL)   Date Value   08/11/2023 12     BP Readings from Last 3 Encounters:   08/11/23 136/89   07/24/23 137/79   05/11/23 125/79          (goal 120/80)    All Future Testing planned in [facility]  Lab Frequency

## 2023-08-24 ENCOUNTER — HOSPITAL ENCOUNTER (OUTPATIENT)
Dept: PAIN MANAGEMENT | Age: 63
Discharge: HOME | End: 2023-08-24
Payer: MEDICARE

## 2023-08-24 VITALS — WEIGHT: 188 LBS | HEIGHT: 65 IN | BODY MASS INDEX: 31.32 KG/M2 | TEMPERATURE: 97.3 F

## 2023-08-24 DIAGNOSIS — M17.0 PRIMARY OSTEOARTHRITIS OF BOTH KNEES: ICD-10-CM

## 2023-08-24 DIAGNOSIS — M51.36 LUMBAR DEGENERATIVE DISC DISEASE: ICD-10-CM

## 2023-08-24 DIAGNOSIS — Z79.891 CHRONIC USE OF OPIATE DRUG FOR THERAPEUTIC PURPOSE: Primary | ICD-10-CM

## 2023-08-24 DIAGNOSIS — M53.3 SACROILIAC JOINT PAIN: ICD-10-CM

## 2023-08-24 DIAGNOSIS — M96.1 LUMBAR POST-LAMINECTOMY SYNDROME: ICD-10-CM

## 2023-08-24 DIAGNOSIS — M54.16 LUMBAR RADICULOPATHY: ICD-10-CM

## 2023-08-24 PROCEDURE — 99213 OFFICE O/P EST LOW 20 MIN: CPT

## 2023-08-24 RX ORDER — HYDROCODONE BITARTRATE AND ACETAMINOPHEN 5; 325 MG/1; MG/1
1 TABLET ORAL EVERY 8 HOURS PRN
Qty: 90 TABLET | Refills: 0 | Status: SHIPPED | OUTPATIENT
Start: 2023-08-25 | End: 2023-09-24

## 2023-08-24 ASSESSMENT — ENCOUNTER SYMPTOMS
BOWEL INCONTINENCE: 0
CONSTIPATION: 0
SHORTNESS OF BREATH: 0
COUGH: 0
BACK PAIN: 1

## 2023-08-24 ASSESSMENT — PAIN SCALES - GENERAL: PAINLEVEL_OUTOF10: 8

## 2023-08-24 NOTE — PROGRESS NOTES
glucose monitor kit and supplies, Dispense sufficient amount for testing twice daily plus additional to accommodate PRN testing needs. Dispense all needed supplies to include: monitor, strips, lancing device, lancets, control solutions, alcohol swabs., Disp: 1 kit, Rfl: 0    blood glucose monitor strips, Test 2 times a day & as needed for symptoms of irregular blood glucose. Dispense sufficient amount for indicated testing frequency plus additional to accommodate PRN testing needs. , Disp: 100 strip, Rfl: 2    Alcohol Swabs (ALCOHOL PREP) PADS, Use one alcohol swab to clean your skin before testing your blood sugar, Disp: 100 each, Rfl: 2    Lancets MISC, Use one lancet each time to test your blood sugar twice daily, Disp: 100 each, Rfl: 2    omeprazole (PRILOSEC) 20 MG delayed release capsule, Take 1 capsule by mouth Daily, Disp: 30 capsule, Rfl: 5    Umeclidinium Bromide 62.5 MCG/INH AEPB, Inhale 1 puff into the lungs daily, Disp: 2 each, Rfl: 5    mirtazapine (REMERON) 30 MG tablet, Take 1 tablet by mouth nightly, Disp: , Rfl:     diphenhydrAMINE (BENADRYL) 25 MG tablet, Take 1 tablet by mouth every 6 hours as needed for Itching, Disp: , Rfl:     mirtazapine (REMERON) 15 MG tablet, Take 1 tablet by mouth nightly, Disp: , Rfl:     DULoxetine (CYMBALTA) 60 MG extended release capsule, Take 1 capsule by mouth daily, Disp: 30 capsule, Rfl: 3    ipratropium-albuterol (DUONEB) 0.5-2.5 (3) MG/3ML SOLN nebulizer solution, Inhale 3 mLs into the lungs every 6 hours as needed for Shortness of Breath, Disp: 360 mL, Rfl: 11    Family History   Problem Relation Age of Onset    Diabetes Mother     Heart Disease Mother     Cirrhosis Father        Social History     Socioeconomic History    Marital status: Single     Spouse name: Not on file    Number of children: Not on file    Years of education: Not on file    Highest education level: Not on file   Occupational History     Employer: DISABLED   Tobacco Use    Smoking status:

## 2023-08-29 ENCOUNTER — OFFICE VISIT (OUTPATIENT)
Dept: ORTHOPEDIC SURGERY | Age: 63
End: 2023-08-29
Payer: MEDICARE

## 2023-08-29 VITALS — WEIGHT: 188 LBS | BODY MASS INDEX: 31.32 KG/M2 | HEIGHT: 65 IN

## 2023-08-29 DIAGNOSIS — M25.562 ACUTE PAIN OF LEFT KNEE: Primary | ICD-10-CM

## 2023-08-29 DIAGNOSIS — S83.242A ACUTE MEDIAL MENISCAL TEAR, LEFT, INITIAL ENCOUNTER: ICD-10-CM

## 2023-08-29 PROCEDURE — 1123F ACP DISCUSS/DSCN MKR DOCD: CPT | Performed by: ORTHOPAEDIC SURGERY

## 2023-08-29 PROCEDURE — G8400 PT W/DXA NO RESULTS DOC: HCPCS | Performed by: ORTHOPAEDIC SURGERY

## 2023-08-29 PROCEDURE — 3017F COLORECTAL CA SCREEN DOC REV: CPT | Performed by: ORTHOPAEDIC SURGERY

## 2023-08-29 PROCEDURE — 99213 OFFICE O/P EST LOW 20 MIN: CPT | Performed by: ORTHOPAEDIC SURGERY

## 2023-08-29 PROCEDURE — G8417 CALC BMI ABV UP PARAM F/U: HCPCS | Performed by: ORTHOPAEDIC SURGERY

## 2023-08-29 PROCEDURE — 1090F PRES/ABSN URINE INCON ASSESS: CPT | Performed by: ORTHOPAEDIC SURGERY

## 2023-08-29 PROCEDURE — G8427 DOCREV CUR MEDS BY ELIG CLIN: HCPCS | Performed by: ORTHOPAEDIC SURGERY

## 2023-08-29 PROCEDURE — 4004F PT TOBACCO SCREEN RCVD TLK: CPT | Performed by: ORTHOPAEDIC SURGERY

## 2023-08-29 RX ORDER — ZOLPIDEM TARTRATE 5 MG/1
5 TABLET ORAL NIGHTLY PRN
Qty: 14 TABLET | Refills: 0 | Status: SHIPPED | OUTPATIENT
Start: 2023-08-29 | End: 2023-09-12

## 2023-08-29 NOTE — PROGRESS NOTES
This patient who had previously been seen for primary osteoarthritis of the left knee and possible lateral meniscus tear and had corticosteroid injection is seen here in follow-up. He says the injection did not really help him. He still continues to complain of pain but the pain is now on the anterior and medial side. The popliteal pain is now better. Patient does take Norco for his back pain and also takes Cymbalta and Remeron at night. He is not able to take Motrin because of stomach problems. Patient is using a cane for ambulation but this is for his back. He has had several surgeries in his back. He says he was not able to move his right leg and therefore underwent 2 surgeries. Since then he has continued to use a cane. Examination: He was tender over the medial joint line. Yadiel sign was painful. There is mild effusion in the knee joint. Diagnosis: Possible medial meniscal tear. Treatment: Arranging for him to have an MRI of the knee and will see him again after that. Did inform him that he should call up to make appointment to be seen here as soon as he moves when he is going to have his MRI.

## 2023-09-12 ENCOUNTER — HOSPITAL ENCOUNTER (OUTPATIENT)
Dept: MRI IMAGING | Age: 63
Discharge: HOME | End: 2023-09-14
Attending: ORTHOPAEDIC SURGERY
Payer: MEDICARE

## 2023-09-12 DIAGNOSIS — S83.242A ACUTE MEDIAL MENISCAL TEAR, LEFT, INITIAL ENCOUNTER: ICD-10-CM

## 2023-09-12 PROCEDURE — 73721 MRI JNT OF LWR EXTRE W/O DYE: CPT

## 2023-09-19 ENCOUNTER — OFFICE VISIT (OUTPATIENT)
Dept: ORTHOPEDIC SURGERY | Age: 63
End: 2023-09-19
Payer: MEDICARE

## 2023-09-19 ENCOUNTER — TELEPHONE (OUTPATIENT)
Dept: INTERNAL MEDICINE | Age: 63
End: 2023-09-19

## 2023-09-19 VITALS — HEIGHT: 65 IN | BODY MASS INDEX: 31.49 KG/M2 | WEIGHT: 189 LBS

## 2023-09-19 DIAGNOSIS — M17.12 PRIMARY OSTEOARTHRITIS OF LEFT KNEE: ICD-10-CM

## 2023-09-19 DIAGNOSIS — J43.2 CENTRILOBULAR EMPHYSEMA (HCC): Primary | ICD-10-CM

## 2023-09-19 DIAGNOSIS — S83.242A ACUTE MEDIAL MENISCAL TEAR, LEFT, INITIAL ENCOUNTER: Primary | ICD-10-CM

## 2023-09-19 PROCEDURE — 1123F ACP DISCUSS/DSCN MKR DOCD: CPT | Performed by: ORTHOPAEDIC SURGERY

## 2023-09-19 PROCEDURE — 1090F PRES/ABSN URINE INCON ASSESS: CPT | Performed by: ORTHOPAEDIC SURGERY

## 2023-09-19 PROCEDURE — G8417 CALC BMI ABV UP PARAM F/U: HCPCS | Performed by: ORTHOPAEDIC SURGERY

## 2023-09-19 PROCEDURE — 99213 OFFICE O/P EST LOW 20 MIN: CPT | Performed by: ORTHOPAEDIC SURGERY

## 2023-09-19 PROCEDURE — 3017F COLORECTAL CA SCREEN DOC REV: CPT | Performed by: ORTHOPAEDIC SURGERY

## 2023-09-19 PROCEDURE — 4004F PT TOBACCO SCREEN RCVD TLK: CPT | Performed by: ORTHOPAEDIC SURGERY

## 2023-09-19 PROCEDURE — G8400 PT W/DXA NO RESULTS DOC: HCPCS | Performed by: ORTHOPAEDIC SURGERY

## 2023-09-19 PROCEDURE — G8427 DOCREV CUR MEDS BY ELIG CLIN: HCPCS | Performed by: ORTHOPAEDIC SURGERY

## 2023-09-19 RX ORDER — AZITHROMYCIN 250 MG/1
250 TABLET, FILM COATED ORAL SEE ADMIN INSTRUCTIONS
Qty: 6 TABLET | Refills: 0 | Status: SHIPPED | OUTPATIENT
Start: 2023-09-19 | End: 2023-09-24

## 2023-09-19 NOTE — TELEPHONE ENCOUNTER
Patient came into office  requesting for Salud Tran to send in a Zpak to the pharmacy for her bronchitis, I advised patient that she may need an appointment for this but patient states Salud Tran knows about her bronchitis and conditions.

## 2023-09-20 ENCOUNTER — TELEPHONE (OUTPATIENT)
Dept: ORTHOPEDIC SURGERY | Age: 63
End: 2023-09-20

## 2023-09-20 ENCOUNTER — HOSPITAL ENCOUNTER (OUTPATIENT)
Dept: PAIN MANAGEMENT | Age: 63
Discharge: HOME | End: 2023-09-20
Payer: MEDICARE

## 2023-09-20 VITALS — WEIGHT: 189 LBS | BODY MASS INDEX: 31.49 KG/M2 | TEMPERATURE: 97.3 F | RESPIRATION RATE: 20 BRPM | HEIGHT: 65 IN

## 2023-09-20 DIAGNOSIS — Z79.891 CHRONIC USE OF OPIATE DRUG FOR THERAPEUTIC PURPOSE: ICD-10-CM

## 2023-09-20 DIAGNOSIS — M51.36 LUMBAR DEGENERATIVE DISC DISEASE: ICD-10-CM

## 2023-09-20 DIAGNOSIS — M54.16 LUMBAR RADICULOPATHY: Primary | ICD-10-CM

## 2023-09-20 DIAGNOSIS — M96.1 LUMBAR POST-LAMINECTOMY SYNDROME: ICD-10-CM

## 2023-09-20 DIAGNOSIS — M53.3 SACROILIAC JOINT PAIN: ICD-10-CM

## 2023-09-20 DIAGNOSIS — M17.0 PRIMARY OSTEOARTHRITIS OF BOTH KNEES: ICD-10-CM

## 2023-09-20 PROCEDURE — 99213 OFFICE O/P EST LOW 20 MIN: CPT

## 2023-09-20 PROCEDURE — 99214 OFFICE O/P EST MOD 30 MIN: CPT | Performed by: STUDENT IN AN ORGANIZED HEALTH CARE EDUCATION/TRAINING PROGRAM

## 2023-09-20 RX ORDER — HYDROCODONE BITARTRATE AND ACETAMINOPHEN 5; 325 MG/1; MG/1
1 TABLET ORAL EVERY 8 HOURS PRN
Qty: 90 TABLET | Refills: 0 | Status: SHIPPED | OUTPATIENT
Start: 2023-09-22 | End: 2023-10-22

## 2023-09-20 NOTE — PROGRESS NOTES
Chronic Pain Clinic Note     Encounter Date: 9/20/2023     SUBJECTIVE:  Chief Complaint   Patient presents with    Back Pain    Medication Refill     Norco due 9/24       Medication Refill: Norco - 14    Current Complaints of Pain:   Location: back   Radiation: both legs, worse on the Left  Severity:  Moderate  Pain Numerical Score - 8   Average: 8     Highest: 10  Lowest: 8  Character/Quality: Complains of pain that is dull, sharp and shooting  Timing: Constant  Associated symptoms: none  Numbness: no  Weakness: no  Exacerbating factors: sitting, laying, bending  Alleviating factors: getting up and moving around  Length of time pain has been present: Started about 10 years ago  Inciting event/injury: no  Bowel/Bladder incontinence: no  Falls: no  Physical Therapy:      History of Interventions:   Surgery: No previous lumbar/cervical surgeries  Injections: SIJ 04/25/2023    Imaging:    Lumbar MRI 10/10/2022    Impression   Mild dextroscoliosis with tip at L2-L3       At L2-L3, mild canal stenosis and moderate left and mild right neural   foraminal narrowing. 3 mm left extraforaminal disc protrusion contacts   exiting left L2 nerve root. At L4-L5, moderate right and mild left neural foraminal narrowing and   right-sided laminectomy defect and changes related to instrumented disc space   fusion and posterior fusion.        Past Medical History:   Diagnosis Date    Allergic rhinitis     Alveolar hypoventilation 11/20/2020    Aortic insufficiency 2018    mild-moderate on echo (was seen and discharged from cardiology-[facility])    Bronchiectasis ([address]) 11/20/2020    Bronchitis     Chronic back pain     Pain management at [facility]    COPD (chronic obstructive pulmonary disease) ([address] [facility])     Dr. Juan Lacy to see 11/09/2020    Depression     DM (diabetes mellitus) ([address] [facility]) 12/18/2012    GERD (gastroesophageal reflux disease)     History of echocardiogram 05/2018    EF 65%, mild-moderate AI and TR    Hyperlipidemia

## 2023-09-20 NOTE — PROGRESS NOTES
This patient still continues to have pain in her left knee. She also noticed that there is some increased swelling of the knee. She only had corticosteroid injection which did not help. The pain is increased by activities including going up the stairs. No history of instability or locking. The pain is located over the medial side. On initial presentation her pain was over the lateral side. Examination: She is tender over the medial joint line. There is effusion. She has a flexion from 5 degrees to 90 degrees. There is crepitation in all the compartments. X-rays: The MRI shows complex tearing of the medial meniscus and moderate tricompartmental osteoarthritis but more so in the medial compartment. Diagnosis: #1 medial meniscal tear. #2 tricompartmental osteoarthritis. Treatment: Since she did not respond to any nonoperative measure we will proceed with arthroscopy. However I have warned her that all her pain may not dissipate after the meniscectomy since she already has some degenerative changes. The patient does have significant cardiac history and is diabetic and therefore will require medical clearance.

## 2023-10-02 RX ORDER — SODIUM CHLORIDE, SODIUM LACTATE, POTASSIUM CHLORIDE, CALCIUM CHLORIDE 600; 310; 30; 20 MG/100ML; MG/100ML; MG/100ML; MG/100ML
INJECTION, SOLUTION INTRAVENOUS CONTINUOUS
Status: CANCELLED | OUTPATIENT
Start: 2023-10-02

## 2023-10-02 NOTE — DISCHARGE INSTRUCTIONS
Preoperative Instructions:    Stop eating solid foods at midnight the night prior to surgery. Stop drinking clear liquids at midnight the night prior to surgery. Arrive at the surgery center (Entrance B) by 8:40 on 10/13/2023  (or as directed by your surgeon's office). Please stop any blood thinning medications as directed by your surgeon or prescribing physician. Failure to stop certain medications may interfere with your scheduled surgery. These may include:  Aspirin, Warfarin (Coumadin), Clopidogrel (Plavix), Ibuprofen (Motrin, Advil), Naproxen (Aleve), Meloxicam (Mobic), Celecoxib (Celebrex), Eliquis, Pradaxa, Xarelto, Effient, Fish Oil, Herbal supplements. You may continue the rest of your medications through the night before surgery unless instructed otherwise. Please take only the following medication(s) the day of surgery with a small sip of water:  Norvasc/amlodipine, coreg/carvedilol, norco if needed, prilosec/omeprazole, cymbalta    (Hold AM lisinopril day of surgery)    Please use and bring inhalers the day of surgery. Please bring CPAP the day of surgery. PLEASE NOTE:  THE ABOVE (IF ANY) DISCONTINUED MEDS MAY ONLY BE FROM   \"CLEANING UP\" THE MED LIST AND WERE NOT ACTUALLY CANCELLED; SEE CHART FOR DETAILS AND ALWAYS CHECK WITH PRESCRIBING PROVIDER BEFORE DISCONTINUING ANY MEDICATIONS          ____________________________  ____________________________  Signature (Patient)           Signature/date(Provider)      REMINDERS:  ** If you are going home the day of your procedure, you will need a friend or family member to drive you home after your procedure. Your  must be 25years of age or older and able to sign off on your discharge instructions. Taxi cabs or any form of public transportation is not acceptable. ** It is preferable that the friend or family member stay at the hospital throughout your procedure.   ** If you are going home the same day as your procedure, someone scrubbing your skin too hard. Turn the water back on and rinse your body thoroughly. Pat yourself dry with a clean, soft towel. Do not apply lotion, cream or powder. Dress with clean freshly washed clothes. The morning of surgery:    Repeat shower following steps above  - using remaining half of CHG soap in bottle. If you have any questions, call the Pre-Admission Testing Unit at [phone]. Day of Surgery/Procedure    As a patient at [address] [facility] you can expect quality medical and nursing care that is centered on your individual needs. Our goal is to make your surgical experience as comfortable as possible  . Directions to the [address] [facility] is located at [address] [facility]. Please pull into the Emergency [facility] parking lot (Entrance B) and park in that lot. We also have additional parking across the street. You will enter the facility following the [address] sign. Please stop at the reception desk where you will be checked in by the staff. If you have any questions please call [phone]. Transportation after your procedure. You will need a friend or family member to drive you home after your procedure. Your  must be 25years of age or older and able to sign off on your discharge instructions. Taxi cabs or any form of public transportation is not acceptable. It is preferable that the friend or family member stay at the hospital throughout your procedure. Someone must remain at home with you for the first 24 hours after your surgery if you receive anesthesia or sedation. If you do not have someone to stay with you, your procedure may be cancelled. Patient Instructions    If you are having any type of anesthesia you are to have nothing to eat or drink after midnight the night before your surgery.   This includes gum, mints, water or smoking or chewing tobacco.  The only

## 2023-10-03 DIAGNOSIS — Z01.818 PREOP TESTING: Primary | ICD-10-CM

## 2023-10-03 DIAGNOSIS — M17.12 PRIMARY OSTEOARTHRITIS OF LEFT KNEE: ICD-10-CM

## 2023-10-05 ENCOUNTER — HOSPITAL ENCOUNTER (OUTPATIENT)
Dept: PREADMISSION TESTING | Age: 63
Discharge: HOME | End: 2023-10-05
Payer: MEDICARE

## 2023-10-05 ENCOUNTER — HOSPITAL ENCOUNTER (OUTPATIENT)
Dept: GENERAL RADIOLOGY | Age: 63
Discharge: HOME | End: 2023-10-07
Payer: MEDICARE

## 2023-10-05 VITALS
RESPIRATION RATE: 18 BRPM | HEIGHT: 65 IN | WEIGHT: 184 LBS | TEMPERATURE: 97 F | DIASTOLIC BLOOD PRESSURE: 76 MMHG | OXYGEN SATURATION: 96 % | SYSTOLIC BLOOD PRESSURE: 122 MMHG | HEART RATE: 79 BPM | BODY MASS INDEX: 30.66 KG/M2

## 2023-10-05 DIAGNOSIS — Z01.818 PREOP TESTING: ICD-10-CM

## 2023-10-05 DIAGNOSIS — M17.12 PRIMARY OSTEOARTHRITIS OF LEFT KNEE: ICD-10-CM

## 2023-10-05 LAB
ALBUMIN SERPL-MCNC: 4 G/DL (ref 3.5–5.2)
ALBUMIN/GLOB SERPL: 1.1 {RATIO} (ref 1–2.5)
ALP SERPL-CCNC: 74 U/L (ref 35–104)
ALT SERPL-CCNC: 6 U/L (ref 5–33)
ANION GAP SERPL CALCULATED.3IONS-SCNC: 11 MMOL/L (ref 9–17)
AST SERPL-CCNC: 13 U/L
BILIRUB SERPL-MCNC: 0.3 MG/DL (ref 0.3–1.2)
BUN SERPL-MCNC: 16 MG/DL (ref 8–23)
CALCIUM SERPL-MCNC: 9.1 MG/DL (ref 8.6–10.4)
CHLORIDE SERPL-SCNC: 103 MMOL/L (ref 98–107)
CO2 SERPL-SCNC: 26 MMOL/L (ref 20–31)
CREAT SERPL-MCNC: 1.2 MG/DL (ref 0.5–0.9)
ERYTHROCYTE [DISTWIDTH] IN BLOOD BY AUTOMATED COUNT: 13.7 % (ref 11.8–14.4)
GFR SERPL CREATININE-BSD FRML MDRD: 50 ML/MIN/1.73M2
GLUCOSE SERPL-MCNC: 84 MG/DL (ref 70–99)
HCT VFR BLD AUTO: 44.4 % (ref 36.3–47.1)
HGB BLD-MCNC: 14.8 G/DL (ref 11.9–15.1)
MCH RBC QN AUTO: 29.2 PG (ref 25.2–33.5)
MCHC RBC AUTO-ENTMCNC: 33.3 G/DL (ref 28.4–34.8)
MCV RBC AUTO: 87.7 FL (ref 82.6–102.9)
NRBC BLD-RTO: 0 PER 100 WBC
PLATELET # BLD AUTO: 268 K/UL (ref 138–453)
PMV BLD AUTO: 10.2 FL (ref 8.1–13.5)
POTASSIUM SERPL-SCNC: 3.6 MMOL/L (ref 3.7–5.3)
PROT SERPL-MCNC: 7.5 G/DL (ref 6.4–8.3)
RBC # BLD AUTO: 5.06 M/UL (ref 3.95–5.11)
SODIUM SERPL-SCNC: 140 MMOL/L (ref 135–144)
WBC OTHER # BLD: 5.7 K/UL (ref 3.5–11.3)

## 2023-10-05 PROCEDURE — 36415 COLL VENOUS BLD VENIPUNCTURE: CPT

## 2023-10-05 PROCEDURE — 87641 MR-STAPH DNA AMP PROBE: CPT

## 2023-10-05 PROCEDURE — 87086 URINE CULTURE/COLONY COUNT: CPT

## 2023-10-05 PROCEDURE — 87186 SC STD MICRODIL/AGAR DIL: CPT

## 2023-10-05 PROCEDURE — 85027 COMPLETE CBC AUTOMATED: CPT

## 2023-10-05 PROCEDURE — 71046 X-RAY EXAM CHEST 2 VIEWS: CPT

## 2023-10-05 PROCEDURE — 80053 COMPREHEN METABOLIC PANEL: CPT

## 2023-10-05 PROCEDURE — 87077 CULTURE AEROBIC IDENTIFY: CPT

## 2023-10-05 ASSESSMENT — PAIN DESCRIPTION - DESCRIPTORS: DESCRIPTORS: THROBBING

## 2023-10-05 ASSESSMENT — PAIN DESCRIPTION - LOCATION: LOCATION: KNEE

## 2023-10-05 ASSESSMENT — PAIN DESCRIPTION - ORIENTATION: ORIENTATION: LEFT

## 2023-10-05 ASSESSMENT — PAIN DESCRIPTION - FREQUENCY: FREQUENCY: CONTINUOUS

## 2023-10-05 ASSESSMENT — PAIN DESCRIPTION - PAIN TYPE: TYPE: ACUTE PAIN;CHRONIC PAIN

## 2023-10-05 ASSESSMENT — PAIN DESCRIPTION - ONSET: ONSET: ON-GOING

## 2023-10-05 ASSESSMENT — PAIN SCALES - GENERAL: PAINLEVEL_OUTOF10: 9

## 2023-10-05 NOTE — PROGRESS NOTES
Anesthesia Focused Assessment    STOP-BANG Sleep Apnea Questionnaire    SNORE loudly (heard through closed doors)? Yes  TIRED, fatigued, sleepy during daytime? Yes  OBSERVED stopping breathing during sleep? Yes  High blood PRESSURE being treated? Yes    BMI over 35? No  AGE over 48? Yes  NECK circumference over 16\"? No  GENDER (male)? No             Total 5  High risk 5-8  Intermediate risk 3-4  Low risk 0-2    Obstructive Sleep Apnea: yes  If YES, machine used: does not use her cpap     Type 1 DM:   no  T2DM:  yes    Coronary Artery Disease:  no  Hypertension:  yes    Active smoker:  1 ppd for 36 years  Drinks Alcohol:  no    Dentition: no teeth, no dentures    Defib / AICD / Pacemaker: no      Renal Failure/dialysis:  no    Patient was evaluated in PAT & anesthesia guidelines were applied. NPO guidelines, medication instructions and scheduled arrival time were reviewed with patient. I advised patient to please contact the surgeon's office, ahead of time if possible, if any new signs or symptoms of illness, infection, rash, etc    Hx of anesthesia complications:  no  Family hx of anesthesia complications:  no                                                                                                                     PCP clearance appointment upcoming, will request copy for chart. She denies any current cardiac or pulmonary complaints.       Rodri Gibbons PA-C  10/5/23  10:45 AM

## 2023-10-05 NOTE — H&P (VIEW-ONLY)
History and Physical    Pt Name: Pebbles Rodriguez  MRN: [id number]  YOB: 1960  Date of evaluation: 10/5/2023    SUBJECTIVE:   History of Chief Complaint:    Patient presents for PAT appointment. She reports a month history of left knee pain and swelling. She says that she had an incident where she felt a pop and then felt a lump posterior knee. She was seen in the ER, followed up with ortho. She has had an injection in the knee, says that it helped to alleviate her pain for 1 week or so but symptoms returned. She has a suspected meniscus tear, osteoarthritis. She currently is unable to fully bend her knee. She has been scheduled for KNEE ARTHROSCOPY, MENISCUS REPAIR. Past Medical History    has a past medical history of Allergic rhinitis, Alveolar hypoventilation, Aortic insufficiency, Bronchiectasis (HCC), Bronchitis, Chronic back pain, COPD (chronic obstructive pulmonary disease) ([address]), Depression, DM (diabetes mellitus) ([address]), GERD (gastroesophageal reflux disease), History of echocardiogram, Hyperlipidemia, Hypertension, Obesity, Osteoarthritis, Peripheral vascular disease ([address]), Primary osteoarthritis of both knees, Radicular pain of lumbosacral region, Spinal stenosis, lumbar region, without neurogenic claudication, Tricuspid regurgitation, Type II or unspecified type diabetes mellitus without mention of complication, not stated as uncontrolled, Under care of service provider, Unspecified sleep apnea, URI (upper respiratory infection), Wears dentures, Wears glasses, and Wellness examination. Past Surgical History   has a past surgical history that includes Dilation and curettage of uterus; gastrectomy; Nerve Block (Right, 04/30/2013); Nerve Block (05/23/2013); Colonoscopy (02/12/2009); Upper gastrointestinal endoscopy (09/20/2007); Upper gastrointestinal endoscopy (4 21 2009,04/2011); Nerve Block (08/12/2013); Nerve Block (Left, 08/28/2013); Nerve Block (Left, 09/24/2013);  Nerve Block tablet Take 1 tablet by mouth 2 times daily 5/11/23   Charley Levy MD   [facility]) 20 MG tablet TAKE 1 TABLET BY MOUTH 2 TIMES DAILY 5/2/23   Charley Levy MD   azelastine (ASTELIN) 0.1 % nasal spray 2 sprays by Nasal route 2 times daily Use in each nostril as directed 4/12/23   Charley Levy MD   nicotine (NICODERM CQ) 7 MG/24HR Place 1 patch onto the skin every 24 hours 3/15/23 3/14/24  Atiya Hancock MD   blood glucose monitor kit and supplies Dispense sufficient amount for testing twice daily plus additional to accommodate PRN testing needs. Dispense all needed supplies to include: monitor, strips, lancing device, lancets, control solutions, alcohol swabs. 12/1/22   Charley Levy MD   blood glucose monitor strips Test 2 times a day & as needed for symptoms of irregular blood glucose. Dispense sufficient amount for indicated testing frequency plus additional to accommodate PRN testing needs.  12/1/22   Charley Levy MD   Alcohol Swabs (ALCOHOL PREP) PADS Use one alcohol swab to clean your skin before testing your blood sugar 12/1/22   Charley Levy MD   Lancets MISC Use one lancet each time to test your blood sugar twice daily 12/1/22   Charley Leyv MD   omeprazole (PRILOSEC) 20 MG delayed release capsule Take 1 capsule by mouth Daily 11/22/22   Charley Levy MD   Umeclidinium Bromide 62.5 MCG/INH AEPB Inhale 1 puff into the lungs daily 4/29/22   Charley Levy MD   mirtazapine (REMERON) 30 MG tablet Take 1 tablet by mouth nightly Takes with 15 mg to equal 45 mg dose 1/5/22   Jon Linder MD   diphenhydrAMINE (BENADRYL) 25 MG tablet Take 1 tablet by mouth every 6 hours as needed for Itching    Jon Linder MD   mirtazapine (REMERON) 15 MG tablet Take 1 tablet by mouth nightly Takes with 30 mg to equal 45 mg dose 5/22/18   Jon Linder MD   DULoxetine (CYMBALTA) 60 MG extended release capsule Take 1 capsule by mouth daily 2/1/18   Shiva Vazquez,

## 2023-10-05 NOTE — H&P
History and Physical    Pt Name: Shen Bowling  MRN: [id number]  YOB: 1960  Date of evaluation: 10/5/2023    SUBJECTIVE:   History of Chief Complaint:    Patient presents for PAT appointment. She reports a month history of left knee pain and swelling. She says that she had an incident where she felt a pop and then felt a lump posterior knee. She was seen in the ER, followed up with ortho. She has had an injection in the knee, says that it helped to alleviate her pain for 1 week or so but symptoms returned. She has a suspected meniscus tear, osteoarthritis. She currently is unable to fully bend her knee. She has been scheduled for KNEE ARTHROSCOPY, MENISCUS REPAIR. Past Medical History    has a past medical history of Allergic rhinitis, Alveolar hypoventilation, Aortic insufficiency, Bronchiectasis (HCC), Bronchitis, Chronic back pain, COPD (chronic obstructive pulmonary disease) ([address]), Depression, DM (diabetes mellitus) ([address]), GERD (gastroesophageal reflux disease), History of echocardiogram, Hyperlipidemia, Hypertension, Obesity, Osteoarthritis, Peripheral vascular disease ([address]), Primary osteoarthritis of both knees, Radicular pain of lumbosacral region, Spinal stenosis, lumbar region, without neurogenic claudication, Tricuspid regurgitation, Type II or unspecified type diabetes mellitus without mention of complication, not stated as uncontrolled, Under care of service provider, Unspecified sleep apnea, URI (upper respiratory infection), Wears dentures, Wears glasses, and Wellness examination. Past Surgical History   has a past surgical history that includes Dilation and curettage of uterus; gastrectomy; Nerve Block (Right, 04/30/2013); Nerve Block (05/23/2013); Colonoscopy (02/12/2009); Upper gastrointestinal endoscopy (09/20/2007); Upper gastrointestinal endoscopy (4 21 2009,04/2011); Nerve Block (08/12/2013); Nerve Block (Left, 08/28/2013); Nerve Block (Left, 09/24/2013);  Nerve Block

## 2023-10-06 ENCOUNTER — CLINICAL DOCUMENTATION (OUTPATIENT)
Dept: ORTHOPEDIC SURGERY | Age: 63
End: 2023-10-06

## 2023-10-06 DIAGNOSIS — N39.0 BACTERIAL URINARY INFECTION: Primary | ICD-10-CM

## 2023-10-06 DIAGNOSIS — A49.9 BACTERIAL URINARY INFECTION: Primary | ICD-10-CM

## 2023-10-06 LAB
MRSA, DNA, NASAL: NEGATIVE
SPECIMEN DESCRIPTION: NORMAL

## 2023-10-06 RX ORDER — SULFAMETHOXAZOLE AND TRIMETHOPRIM 800; 160 MG/1; MG/1
1 TABLET ORAL 2 TIMES DAILY
Qty: 20 TABLET | Refills: 0 | Status: SHIPPED | OUTPATIENT
Start: 2023-10-06 | End: 2023-10-16

## 2023-10-07 LAB
MICROORGANISM SPEC CULT: ABNORMAL
SPECIMEN DESCRIPTION: ABNORMAL

## 2023-10-10 ENCOUNTER — OFFICE VISIT (OUTPATIENT)
Dept: INTERNAL MEDICINE | Age: 63
End: 2023-10-10
Payer: MEDICARE

## 2023-10-10 ENCOUNTER — TELEPHONE (OUTPATIENT)
Dept: ORTHOPEDIC SURGERY | Age: 63
End: 2023-10-10

## 2023-10-10 VITALS
BODY MASS INDEX: 30.99 KG/M2 | HEART RATE: 80 BPM | HEIGHT: 65 IN | WEIGHT: 186 LBS | DIASTOLIC BLOOD PRESSURE: 65 MMHG | TEMPERATURE: 97.7 F | SYSTOLIC BLOOD PRESSURE: 110 MMHG | OXYGEN SATURATION: 92 %

## 2023-10-10 DIAGNOSIS — N30.00 ACUTE CYSTITIS WITHOUT HEMATURIA: ICD-10-CM

## 2023-10-10 DIAGNOSIS — M25.561 RIGHT KNEE PAIN, UNSPECIFIED CHRONICITY: Primary | ICD-10-CM

## 2023-10-10 DIAGNOSIS — Z87.898 HISTORY OF PREDIABETES: ICD-10-CM

## 2023-10-10 DIAGNOSIS — I10 ESSENTIAL HYPERTENSION: ICD-10-CM

## 2023-10-10 DIAGNOSIS — E87.6 HYPOKALEMIA: ICD-10-CM

## 2023-10-10 PROCEDURE — G8484 FLU IMMUNIZE NO ADMIN: HCPCS | Performed by: INTERNAL MEDICINE

## 2023-10-10 PROCEDURE — 4004F PT TOBACCO SCREEN RCVD TLK: CPT | Performed by: INTERNAL MEDICINE

## 2023-10-10 PROCEDURE — G8400 PT W/DXA NO RESULTS DOC: HCPCS | Performed by: INTERNAL MEDICINE

## 2023-10-10 PROCEDURE — 1123F ACP DISCUSS/DSCN MKR DOCD: CPT | Performed by: INTERNAL MEDICINE

## 2023-10-10 PROCEDURE — G8427 DOCREV CUR MEDS BY ELIG CLIN: HCPCS | Performed by: INTERNAL MEDICINE

## 2023-10-10 PROCEDURE — 3074F SYST BP LT 130 MM HG: CPT | Performed by: INTERNAL MEDICINE

## 2023-10-10 PROCEDURE — G8417 CALC BMI ABV UP PARAM F/U: HCPCS | Performed by: INTERNAL MEDICINE

## 2023-10-10 PROCEDURE — 1090F PRES/ABSN URINE INCON ASSESS: CPT | Performed by: INTERNAL MEDICINE

## 2023-10-10 PROCEDURE — 99213 OFFICE O/P EST LOW 20 MIN: CPT | Performed by: INTERNAL MEDICINE

## 2023-10-10 PROCEDURE — 3078F DIAST BP <80 MM HG: CPT | Performed by: INTERNAL MEDICINE

## 2023-10-10 PROCEDURE — 3017F COLORECTAL CA SCREEN DOC REV: CPT | Performed by: INTERNAL MEDICINE

## 2023-10-10 RX ORDER — GLUCOSAMINE HCL/CHONDROITIN SU 500-400 MG
CAPSULE ORAL
Qty: 100 STRIP | Refills: 2 | Status: CANCELLED | OUTPATIENT
Start: 2023-10-10

## 2023-10-10 ASSESSMENT — ENCOUNTER SYMPTOMS
EYES NEGATIVE: 1
GASTROINTESTINAL NEGATIVE: 1
RESPIRATORY NEGATIVE: 1
ALLERGIC/IMMUNOLOGIC NEGATIVE: 1

## 2023-10-10 NOTE — TELEPHONE ENCOUNTER
Patient was ordered bactrim on Friday. She is on Lisinopril. Package instructions say both can not be taken together. Please advise.

## 2023-10-11 ENCOUNTER — CLINICAL DOCUMENTATION (OUTPATIENT)
Dept: ORTHOPEDIC SURGERY | Age: 63
End: 2023-10-11

## 2023-10-11 DIAGNOSIS — N30.00 ACUTE CYSTITIS WITHOUT HEMATURIA: Primary | ICD-10-CM

## 2023-10-12 ENCOUNTER — CLINICAL DOCUMENTATION (OUTPATIENT)
Dept: ORTHOPEDIC SURGERY | Age: 63
End: 2023-10-12

## 2023-10-12 ENCOUNTER — ANESTHESIA EVENT (OUTPATIENT)
Dept: OPERATING ROOM | Age: 63
End: 2023-10-12
Payer: MEDICARE

## 2023-10-12 ENCOUNTER — HOSPITAL ENCOUNTER (OUTPATIENT)
Age: 63
Setting detail: SPECIMEN
Discharge: HOME | End: 2023-10-12

## 2023-10-12 DIAGNOSIS — E87.6 CHRONIC HYPOKALEMIA: Primary | ICD-10-CM

## 2023-10-12 DIAGNOSIS — N30.00 ACUTE CYSTITIS WITHOUT HEMATURIA: ICD-10-CM

## 2023-10-12 LAB
ANION GAP SERPL CALCULATED.3IONS-SCNC: 11 MMOL/L (ref 9–16)
CHLORIDE SERPL-SCNC: 103 MMOL/L (ref 98–107)
CO2 SERPL-SCNC: 24 MMOL/L (ref 20–31)
POTASSIUM SERPL-SCNC: 4.7 MMOL/L (ref 3.7–5.3)
SODIUM SERPL-SCNC: 138 MMOL/L (ref 136–145)

## 2023-10-13 ENCOUNTER — ANESTHESIA (OUTPATIENT)
Dept: OPERATING ROOM | Age: 63
End: 2023-10-13
Payer: MEDICARE

## 2023-10-13 ENCOUNTER — HOSPITAL ENCOUNTER (OUTPATIENT)
Age: 63
Setting detail: OUTPATIENT SURGERY
Discharge: HOME | End: 2023-10-13
Attending: ORTHOPAEDIC SURGERY | Admitting: ORTHOPAEDIC SURGERY
Payer: MEDICARE

## 2023-10-13 VITALS
TEMPERATURE: 97.2 F | RESPIRATION RATE: 13 BRPM | WEIGHT: 180 LBS | OXYGEN SATURATION: 92 % | SYSTOLIC BLOOD PRESSURE: 109 MMHG | DIASTOLIC BLOOD PRESSURE: 70 MMHG | HEIGHT: 65 IN | HEART RATE: 54 BPM | BODY MASS INDEX: 29.99 KG/M2

## 2023-10-13 DIAGNOSIS — G89.18 POST-OP PAIN: Primary | ICD-10-CM

## 2023-10-13 LAB
GLUCOSE BLD-MCNC: 105 MG/DL (ref 74–100)
POTASSIUM BLD-SCNC: 4.4 MMOL/L (ref 3.5–4.5)

## 2023-10-13 PROCEDURE — 7100000010 HC PHASE II RECOVERY - FIRST 15 MIN: Performed by: ORTHOPAEDIC SURGERY

## 2023-10-13 PROCEDURE — 6370000000 HC RX 637 (ALT 250 FOR IP): Performed by: STUDENT IN AN ORGANIZED HEALTH CARE EDUCATION/TRAINING PROGRAM

## 2023-10-13 PROCEDURE — 6360000002 HC RX W HCPCS: Performed by: ORTHOPAEDIC SURGERY

## 2023-10-13 PROCEDURE — 2709999900 HC NON-CHARGEABLE SUPPLY: Performed by: ORTHOPAEDIC SURGERY

## 2023-10-13 PROCEDURE — 3700000001 HC ADD 15 MINUTES (ANESTHESIA): Performed by: ORTHOPAEDIC SURGERY

## 2023-10-13 PROCEDURE — 3600000004 HC SURGERY LEVEL 4 BASE: Performed by: ORTHOPAEDIC SURGERY

## 2023-10-13 PROCEDURE — 6360000002 HC RX W HCPCS: Performed by: STUDENT IN AN ORGANIZED HEALTH CARE EDUCATION/TRAINING PROGRAM

## 2023-10-13 PROCEDURE — 7100000000 HC PACU RECOVERY - FIRST 15 MIN: Performed by: ORTHOPAEDIC SURGERY

## 2023-10-13 PROCEDURE — 3600000014 HC SURGERY LEVEL 4 ADDTL 15MIN: Performed by: ORTHOPAEDIC SURGERY

## 2023-10-13 PROCEDURE — 3700000000 HC ANESTHESIA ATTENDED CARE: Performed by: ORTHOPAEDIC SURGERY

## 2023-10-13 PROCEDURE — 2580000003 HC RX 258: Performed by: ORTHOPAEDIC SURGERY

## 2023-10-13 PROCEDURE — 6360000002 HC RX W HCPCS

## 2023-10-13 PROCEDURE — 2580000003 HC RX 258: Performed by: ANESTHESIOLOGY

## 2023-10-13 PROCEDURE — 84132 ASSAY OF SERUM POTASSIUM: CPT

## 2023-10-13 PROCEDURE — 2500000003 HC RX 250 WO HCPCS: Performed by: ORTHOPAEDIC SURGERY

## 2023-10-13 PROCEDURE — 7100000001 HC PACU RECOVERY - ADDTL 15 MIN: Performed by: ORTHOPAEDIC SURGERY

## 2023-10-13 PROCEDURE — 82947 ASSAY GLUCOSE BLOOD QUANT: CPT

## 2023-10-13 PROCEDURE — 64447 NJX AA&/STRD FEMORAL NRV IMG: CPT | Performed by: STUDENT IN AN ORGANIZED HEALTH CARE EDUCATION/TRAINING PROGRAM

## 2023-10-13 PROCEDURE — 2500000003 HC RX 250 WO HCPCS

## 2023-10-13 RX ORDER — PROPOFOL 10 MG/ML
INJECTION, EMULSION INTRAVENOUS PRN
Status: DISCONTINUED | OUTPATIENT
Start: 2023-10-13 | End: 2023-10-13 | Stop reason: SDUPTHER

## 2023-10-13 RX ORDER — SODIUM CHLORIDE, SODIUM LACTATE, POTASSIUM CHLORIDE, CALCIUM CHLORIDE 600; 310; 30; 20 MG/100ML; MG/100ML; MG/100ML; MG/100ML
INJECTION, SOLUTION INTRAVENOUS CONTINUOUS
Status: DISCONTINUED | OUTPATIENT
Start: 2023-10-13 | End: 2023-10-13 | Stop reason: HOSPADM

## 2023-10-13 RX ORDER — OXYCODONE HYDROCHLORIDE AND ACETAMINOPHEN 5; 325 MG/1; MG/1
1 TABLET ORAL ONCE
Status: COMPLETED | OUTPATIENT
Start: 2023-10-13 | End: 2023-10-13

## 2023-10-13 RX ORDER — SODIUM CHLORIDE 0.9 % (FLUSH) 0.9 %
5-40 SYRINGE (ML) INJECTION EVERY 12 HOURS SCHEDULED
Status: DISCONTINUED | OUTPATIENT
Start: 2023-10-13 | End: 2023-10-13 | Stop reason: HOSPADM

## 2023-10-13 RX ORDER — LIDOCAINE HYDROCHLORIDE 10 MG/ML
INJECTION, SOLUTION EPIDURAL; INFILTRATION; INTRACAUDAL; PERINEURAL PRN
Status: DISCONTINUED | OUTPATIENT
Start: 2023-10-13 | End: 2023-10-13 | Stop reason: SDUPTHER

## 2023-10-13 RX ORDER — FENTANYL CITRATE 50 UG/ML
INJECTION, SOLUTION INTRAMUSCULAR; INTRAVENOUS PRN
Status: DISCONTINUED | OUTPATIENT
Start: 2023-10-13 | End: 2023-10-13 | Stop reason: SDUPTHER

## 2023-10-13 RX ORDER — BUPIVACAINE HYDROCHLORIDE 5 MG/ML
INJECTION, SOLUTION EPIDURAL; INTRACAUDAL
Status: COMPLETED | OUTPATIENT
Start: 2023-10-13 | End: 2023-10-13

## 2023-10-13 RX ORDER — MAGNESIUM HYDROXIDE 1200 MG/15ML
LIQUID ORAL CONTINUOUS PRN
Status: DISCONTINUED | OUTPATIENT
Start: 2023-10-13 | End: 2023-10-13 | Stop reason: HOSPADM

## 2023-10-13 RX ORDER — OXYCODONE HYDROCHLORIDE AND ACETAMINOPHEN 5; 325 MG/1; MG/1
1 TABLET ORAL EVERY 6 HOURS PRN
Qty: 28 TABLET | Refills: 0 | Status: SHIPPED | OUTPATIENT
Start: 2023-10-13 | End: 2023-10-19 | Stop reason: ALTCHOICE

## 2023-10-13 RX ORDER — FENTANYL CITRATE 50 UG/ML
25 INJECTION, SOLUTION INTRAMUSCULAR; INTRAVENOUS EVERY 5 MIN PRN
Status: DISCONTINUED | OUTPATIENT
Start: 2023-10-13 | End: 2023-10-13 | Stop reason: HOSPADM

## 2023-10-13 RX ORDER — DEXAMETHASONE SODIUM PHOSPHATE 10 MG/ML
INJECTION INTRAMUSCULAR; INTRAVENOUS PRN
Status: DISCONTINUED | OUTPATIENT
Start: 2023-10-13 | End: 2023-10-13 | Stop reason: SDUPTHER

## 2023-10-13 RX ORDER — MIDAZOLAM HYDROCHLORIDE 2 MG/2ML
2 INJECTION, SOLUTION INTRAMUSCULAR; INTRAVENOUS ONCE
Status: COMPLETED | OUTPATIENT
Start: 2023-10-13 | End: 2023-10-13

## 2023-10-13 RX ORDER — METHYLPREDNISOLONE ACETATE 80 MG/ML
INJECTION, SUSPENSION INTRA-ARTICULAR; INTRALESIONAL; INTRAMUSCULAR; SOFT TISSUE PRN
Status: DISCONTINUED | OUTPATIENT
Start: 2023-10-13 | End: 2023-10-13 | Stop reason: HOSPADM

## 2023-10-13 RX ORDER — SODIUM CHLORIDE 9 MG/ML
INJECTION, SOLUTION INTRAVENOUS PRN
Status: DISCONTINUED | OUTPATIENT
Start: 2023-10-13 | End: 2023-10-13 | Stop reason: HOSPADM

## 2023-10-13 RX ORDER — FENTANYL CITRATE 50 UG/ML
100 INJECTION, SOLUTION INTRAMUSCULAR; INTRAVENOUS ONCE
Status: COMPLETED | OUTPATIENT
Start: 2023-10-13 | End: 2023-10-13

## 2023-10-13 RX ORDER — SODIUM CHLORIDE 0.9 % (FLUSH) 0.9 %
5-40 SYRINGE (ML) INJECTION PRN
Status: DISCONTINUED | OUTPATIENT
Start: 2023-10-13 | End: 2023-10-13 | Stop reason: HOSPADM

## 2023-10-13 RX ORDER — DOCUSATE SODIUM 100 MG/1
100 CAPSULE, LIQUID FILLED ORAL 2 TIMES DAILY PRN
Qty: 20 CAPSULE | Refills: 0 | Status: SHIPPED | OUTPATIENT
Start: 2023-10-13

## 2023-10-13 RX ORDER — LIDOCAINE HYDROCHLORIDE AND EPINEPHRINE 10; 10 MG/ML; UG/ML
INJECTION, SOLUTION INFILTRATION; PERINEURAL PRN
Status: DISCONTINUED | OUTPATIENT
Start: 2023-10-13 | End: 2023-10-13 | Stop reason: HOSPADM

## 2023-10-13 RX ORDER — METOCLOPRAMIDE HYDROCHLORIDE 5 MG/ML
10 INJECTION INTRAMUSCULAR; INTRAVENOUS EVERY 6 HOURS
Status: DISCONTINUED | OUTPATIENT
Start: 2023-10-13 | End: 2023-10-13 | Stop reason: HOSPADM

## 2023-10-13 RX ORDER — BUPIVACAINE HYDROCHLORIDE 5 MG/ML
20 INJECTION, SOLUTION EPIDURAL; INTRACAUDAL ONCE
Status: COMPLETED | OUTPATIENT
Start: 2023-10-13 | End: 2023-10-13

## 2023-10-13 RX ORDER — ONDANSETRON 2 MG/ML
INJECTION INTRAMUSCULAR; INTRAVENOUS PRN
Status: DISCONTINUED | OUTPATIENT
Start: 2023-10-13 | End: 2023-10-13 | Stop reason: SDUPTHER

## 2023-10-13 RX ADMIN — FENTANYL CITRATE 25 MCG: 50 INJECTION, SOLUTION INTRAMUSCULAR; INTRAVENOUS at 11:11

## 2023-10-13 RX ADMIN — OXYCODONE AND ACETAMINOPHEN 1 TABLET: 5; 325 TABLET ORAL at 13:01

## 2023-10-13 RX ADMIN — METOCLOPRAMIDE 10 MG: 5 INJECTION, SOLUTION INTRAMUSCULAR; INTRAVENOUS at 13:00

## 2023-10-13 RX ADMIN — HYDROMORPHONE HYDROCHLORIDE 0.5 MG: 1 INJECTION, SOLUTION INTRAMUSCULAR; INTRAVENOUS; SUBCUTANEOUS at 12:28

## 2023-10-13 RX ADMIN — BUPIVACAINE HYDROCHLORIDE 100 MG: 5 INJECTION, SOLUTION EPIDURAL; INTRACAUDAL; PERINEURAL at 10:14

## 2023-10-13 RX ADMIN — FENTANYL CITRATE 50 MCG: 50 INJECTION, SOLUTION INTRAMUSCULAR; INTRAVENOUS at 10:42

## 2023-10-13 RX ADMIN — BUPIVACAINE HYDROCHLORIDE 20 ML: 5 INJECTION, SOLUTION EPIDURAL; INTRACAUDAL; PERINEURAL at 10:14

## 2023-10-13 RX ADMIN — DEXAMETHASONE SODIUM PHOSPHATE 4 MG: 10 INJECTION INTRAMUSCULAR; INTRAVENOUS at 11:25

## 2023-10-13 RX ADMIN — ONDANSETRON 4 MG: 2 INJECTION INTRAMUSCULAR; INTRAVENOUS at 11:35

## 2023-10-13 RX ADMIN — PROPOFOL 160 MG: 10 INJECTION, EMULSION INTRAVENOUS at 10:42

## 2023-10-13 RX ADMIN — SODIUM CHLORIDE, POTASSIUM CHLORIDE, SODIUM LACTATE AND CALCIUM CHLORIDE: 600; 310; 30; 20 INJECTION, SOLUTION INTRAVENOUS at 10:05

## 2023-10-13 RX ADMIN — LIDOCAINE HYDROCHLORIDE 50 MG: 10 INJECTION, SOLUTION EPIDURAL; INFILTRATION; INTRACAUDAL; PERINEURAL at 10:42

## 2023-10-13 RX ADMIN — MIDAZOLAM HYDROCHLORIDE 1 MG: 1 INJECTION, SOLUTION INTRAMUSCULAR; INTRAVENOUS at 10:14

## 2023-10-13 RX ADMIN — FENTANYL CITRATE 50 MCG: 50 INJECTION INTRAMUSCULAR; INTRAVENOUS at 10:14

## 2023-10-13 RX ADMIN — HYDROMORPHONE HYDROCHLORIDE 0.5 MG: 1 INJECTION, SOLUTION INTRAMUSCULAR; INTRAVENOUS; SUBCUTANEOUS at 12:03

## 2023-10-13 ASSESSMENT — PAIN SCALES - GENERAL
PAINLEVEL_OUTOF10: 9
PAINLEVEL_OUTOF10: 6
PAINLEVEL_OUTOF10: 6
PAINLEVEL_OUTOF10: 4
PAINLEVEL_OUTOF10: 8
PAINLEVEL_OUTOF10: 7
PAINLEVEL_OUTOF10: 9
PAINLEVEL_OUTOF10: 9

## 2023-10-13 ASSESSMENT — PAIN DESCRIPTION - ORIENTATION
ORIENTATION: LEFT

## 2023-10-13 ASSESSMENT — PAIN DESCRIPTION - LOCATION
LOCATION: KNEE

## 2023-10-13 ASSESSMENT — PAIN DESCRIPTION - DESCRIPTORS
DESCRIPTORS: SHARP
DESCRIPTORS: ACHING
DESCRIPTORS: ACHING;CRUSHING;DISCOMFORT
DESCRIPTORS: ACHING;CRUSHING
DESCRIPTORS: BURNING;CRUSHING;DISCOMFORT

## 2023-10-13 ASSESSMENT — PAIN DESCRIPTION - PAIN TYPE: TYPE: ACUTE PAIN

## 2023-10-13 ASSESSMENT — PAIN - FUNCTIONAL ASSESSMENT: PAIN_FUNCTIONAL_ASSESSMENT: NONE - DENIES PAIN

## 2023-10-13 NOTE — ANESTHESIA PROCEDURE NOTES
Peripheral Block    Patient location during procedure: pre-op  Reason for block: post-op pain management  Start time: 10/13/2023 10:14 AM  End time: 10/13/2023 10:18 AM  Staffing  Performed: anesthesiologist   Anesthesiologist: Westley Munguia MD  Performed by: Westley Munguia MD  Authorized by: Westley Munguia MD    Preanesthetic Checklist  Completed: patient identified, IV checked, site marked, risks and benefits discussed, surgical/procedural consents, equipment checked, pre-op evaluation, timeout performed, anesthesia consent given, oxygen available, monitors applied/VS acknowledged, fire risk safety assessment completed and verbalized and blood product R/B/A discussed and consented  Peripheral Block   Patient position: supine  Prep: ChloraPrep  Provider prep: mask and sterile gloves  Patient monitoring: cardiac monitor, continuous pulse ox, frequent blood pressure checks, IV access, oxygen and responsive to questions  Block type: Femoral  Adductor canal  Laterality: left  Injection technique: single-shot  Guidance: ultrasound guided  Local infiltration: lidocaine  Local infiltration: lidocaine    Needle   Needle type: insulated echogenic nerve stimulator needle   Needle localization: ultrasound guidance  Assessment   Injection assessment: negative aspiration for heme, no paresthesia on injection, local visualized surrounding nerve on ultrasound and no intravascular symptoms  Paresthesia pain: none  Slow fractionated injection: yes  Hemodynamics: stable  Outcomes: uncomplicated and patient tolerated procedure well    Medications Administered  bupivacaine (MARCAINE) PF injection 0.5% - Perineural   20 mL - 10/13/2023 10:14:00 AM

## 2023-10-13 NOTE — INTERVAL H&P NOTE
Pt Name: Laurel Maya  MRN: [id number]  YOB: 1960  Date of evaluation: 10/13/2023    I have reviewed the patient's history and physical examination completed in pre-admission testing.     Changes to history or on examination, if any, are as follows:  none    Maggie Arriola PA-C  10/13/23  10:12 AM

## 2023-10-13 NOTE — DISCHARGE INSTRUCTIONS
your doctor    -Call the office or come to Emergency Room if signs of infection appear (hot, swollen, red, draining pus, fever)  -Take medications as prescribed.  -Wean off narcotics (percocet/norco) as soon as possible.  Do not take tylenol if still taking narcotics.  -Follow up with Dr. Duane Nieves in his office 14 days after surgery    Call your doctor for the following:   Chills   Temperature greater than 101   Pain that is not tolerable despite taking pain medicine as ordered   There is increased swelling, redness or warmth at surgical site   There is increased drainage or bleeding from surgical site   Do not remove surgical dressing unless instructed to do so by your surgeon

## 2023-10-13 NOTE — PROGRESS NOTES
Discharge instructions discuss with patient and sister, all concerns and questions were answered. All belongings given to patient. Discharge per wheelchair in a stable condition.

## 2023-10-13 NOTE — OP NOTE
Operative Note      Patient: Koffi Bella  YOB: 1960  MRN: [id number]    Date of Procedure: 10/13/2023    Pre-Op Diagnosis Codes:  Left knee medial meniscus tear    Post-Op Diagnosis:   Left knee medial meniscus tear  Grade 4 chondromalacia medial compartment  Grade 3/4 chondromalacia lateral compartment       Procedure(s):  Left knee arthroscopy  Left knee partial medial meniscectomy    Surgeon(s):  Kristal aSlgado MD    Assistant:   Resident: Carlos A Mills DO; Mee Gonsales DO    Anesthesia: General    Estimated Blood Loss (mL): 5cc    IVF: 1000cc crystalloid    TT: 41 minutes    Complications: None    Specimens:   * No specimens in log *    Implants:  * No implants in log *      Drains: * No LDAs found *    Findings:   Left knee medial meniscus tear  Grade 4 chondromalacia medial compartment  Grade 3/4 chondromalacia lateral compartment      Detailed Description of Procedure: On the day of surgery the patient was met in the pre-operative unit where written consent was obtained and the operative site, left knee, was marked with permanent marker. The patient was wheeled back to the operating suite and laid in the supine position on the OR table. Patient underwent induction of general anesthesia. A timeout was held and after all members were in agreement we proceeded forward with surgery. The leg was placed into a well padded arthroscopic leg guillen and a pillow was placed under the non-operative leg to protect the femoral neurovascular structures. After standard sterile preparation and draping of the left lower extremity was complete, anatomical landmarks were drawn out, and a #11 blade was used to make the lateral and medial portal incisions. The blunt obturator and trochar were then inserted into the joint via lateral incision in flexion and the knee was taken into extension to enter the suprapatellar pouch. Obturator was removed and the arthroscope was inserted.  We then began the diagnostic

## 2023-10-16 ENCOUNTER — TELEPHONE (OUTPATIENT)
Dept: ORTHOPEDIC SURGERY | Age: 63
End: 2023-10-16

## 2023-10-16 NOTE — TELEPHONE ENCOUNTER
Pt called requesting clarification of instructions post surgery with Dr. Waleska Brandt 10/13/23. Please call pt.

## 2023-10-16 NOTE — TELEPHONE ENCOUNTER
Reviewed d/c instructions   Orthopaedic Instructions:  -Weight bearing status: Weight bearing as tolerated with the left leg  -Starting three days after surgery, okay for daily dressing changes until wound/surgical incision site is dry. Dressing  changes can be performed with simple Band-aids or gauze pads secured with tape/ace bandages. Once you no longer  see drainage from your wounds on your dressings, it is okay to shower. Do not scrub vigorously, just let water run over  wound/surgical sites. Additionally, at this point one no longer needs to change dressings daily. It is important that you  do not soak the wound/incision site underwater, though. This includes baths, hot tubs, swimming pools, etc. Do not  apply lotions or creams over the incision sites.  -Always look for signs of compartment syndrome: pain out of proportion to the injury, pain not controlled with pain  medication, numbness in digits, changing of color of digits (paleness). If these signs occur return to ED immediately for  reassessment  -Always work on ankle. -Should urinate within 8 hours of surgery. Patient states understanding.

## 2023-10-19 ENCOUNTER — HOSPITAL ENCOUNTER (OUTPATIENT)
Dept: PAIN MANAGEMENT | Age: 63
Discharge: HOME | End: 2023-10-19
Payer: MEDICARE

## 2023-10-19 VITALS
HEIGHT: 65 IN | DIASTOLIC BLOOD PRESSURE: 81 MMHG | OXYGEN SATURATION: 94 % | SYSTOLIC BLOOD PRESSURE: 143 MMHG | WEIGHT: 180 LBS | HEART RATE: 88 BPM | BODY MASS INDEX: 29.99 KG/M2

## 2023-10-19 DIAGNOSIS — M96.1 LUMBAR POST-LAMINECTOMY SYNDROME: ICD-10-CM

## 2023-10-19 DIAGNOSIS — M47.26 OSTEOARTHRITIS OF SPINE WITH RADICULOPATHY, LUMBAR REGION: ICD-10-CM

## 2023-10-19 DIAGNOSIS — Z79.891 CHRONIC USE OF OPIATE DRUG FOR THERAPEUTIC PURPOSE: Primary | ICD-10-CM

## 2023-10-19 DIAGNOSIS — M51.36 LUMBAR DEGENERATIVE DISC DISEASE: ICD-10-CM

## 2023-10-19 DIAGNOSIS — M47.26 OTHER SPONDYLOSIS WITH RADICULOPATHY, LUMBAR REGION: ICD-10-CM

## 2023-10-19 DIAGNOSIS — M53.3 SACROILIAC JOINT PAIN: ICD-10-CM

## 2023-10-19 PROCEDURE — 99213 OFFICE O/P EST LOW 20 MIN: CPT

## 2023-10-19 PROCEDURE — 99213 OFFICE O/P EST LOW 20 MIN: CPT | Performed by: NURSE PRACTITIONER

## 2023-10-19 RX ORDER — HYDROCODONE BITARTRATE AND ACETAMINOPHEN 5; 325 MG/1; MG/1
1 TABLET ORAL EVERY 8 HOURS PRN
Qty: 90 TABLET | Refills: 0 | Status: SHIPPED | OUTPATIENT
Start: 2023-10-29 | End: 2023-11-28

## 2023-10-19 ASSESSMENT — ENCOUNTER SYMPTOMS
BACK PAIN: 1
CONSTIPATION: 0
COUGH: 0
SHORTNESS OF BREATH: 0
BOWEL INCONTINENCE: 0

## 2023-10-19 ASSESSMENT — PAIN DESCRIPTION - PAIN TYPE: TYPE: CHRONIC PAIN

## 2023-10-19 ASSESSMENT — PAIN DESCRIPTION - LOCATION: LOCATION: BACK

## 2023-10-19 ASSESSMENT — PAIN DESCRIPTION - FREQUENCY: FREQUENCY: CONTINUOUS

## 2023-10-19 ASSESSMENT — PAIN SCALES - GENERAL: PAINLEVEL_OUTOF10: 8

## 2023-10-19 NOTE — PROGRESS NOTES
02/08/2019    bernadette berumen, marcaine . 25%    MT ARTHROSCOPY KNEE DIAGNOSTIC W/WO SYNOVIAL BX SPX Right 03/24/2017    KNEE ARTHROSCOPY WITH PARTIAL MEDIAL MENISECAL DEBRIDMENT  performed by Cielo Salamanca MD at [address] [place] [address] [address]  09/20/2007    UPPER GASTROINTESTINAL ENDOSCOPY  4 21 2009,04/2011    gastritis, esophagitis       Allergies   Allergen Reactions    Aspirin      DUE TO ULCER    Claritin [Loratadine] Other (See Comments)     coughing    Flonase [Fluticasone Propionate] Rash    Ibuprofen Other (See Comments)     Stomach ulcers    Morphine And Related      GI Upset         Current Outpatient Medications:     oxyCODONE-acetaminophen (PERCOCET) 5-325 MG per tablet, Take 1 tablet by mouth every 6 hours as needed for Pain for up to 7 days. Intended supply: 7 days. Take lowest dose possible to manage pain Max Daily Amount: 4 tablets, Disp: 28 tablet, Rfl: 0    docusate sodium (COLACE) 100 MG capsule, Take 1 capsule by mouth 2 times daily as needed for Constipation, Disp: 20 capsule, Rfl: 0    HYDROcodone-acetaminophen (NORCO) 5-325 MG per tablet, Take 1 tablet by mouth every 8 hours as needed for Pain for up to 30 days.  Max Daily Amount: 3 tablets, Disp: 90 tablet, Rfl: 0    docusate sodium (COLACE) 100 MG capsule, TAKE 1 CAPSULE BY MOUTH EVERY DAY, Disp: 30 capsule, Rfl: 4    tiZANidine (ZANAFLEX) 4 MG tablet, TAKE 1 TABLET BY MOUTH 2 TIMES DAILY, Disp: 60 tablet, Rfl: 0    albuterol (PROVENTIL) (2.5 MG/3ML) 0.083% nebulizer solution, , Disp: , Rfl:     meloxicam (MOBIC) 7.5 MG tablet, Take 1 tablet by mouth daily, Disp: 30 tablet, Rfl: 5    cetirizine (ZYRTEC) 10 MG tablet, TAKE 1 TABLET BY MOUTH DAILY, Disp: 30 tablet, Rfl: 4    atorvastatin (LIPITOR) 80 MG tablet, Take 1 tablet by mouth daily Cancel 40 mg prescription, Disp: 90 tablet, Rfl: 1    DEEP SEA NASAL SPRAY 0.65 % nasal spray, , Disp: , Rfl:     albuterol sulfate HFA (VENTOLIN HFA) 108 (90 Base) MCG/ACT inhaler, Inhale 2 puffs into

## 2023-10-24 ENCOUNTER — OFFICE VISIT (OUTPATIENT)
Dept: ORTHOPEDIC SURGERY | Age: 63
End: 2023-10-24

## 2023-10-24 VITALS — HEIGHT: 65 IN | BODY MASS INDEX: 29.99 KG/M2 | WEIGHT: 180 LBS

## 2023-10-24 DIAGNOSIS — S83.242D ACUTE MEDIAL MENISCUS TEAR OF LEFT KNEE, SUBSEQUENT ENCOUNTER: Primary | ICD-10-CM

## 2023-10-24 PROCEDURE — 99024 POSTOP FOLLOW-UP VISIT: CPT | Performed by: ORTHOPAEDIC SURGERY

## 2023-10-24 NOTE — PROGRESS NOTES
This patient who had undergone arthroscopic meniscectomy left knee on 10/13/2023 is seen here in follow-up. Patient says that his symptoms have definitely improved. He still finds difficulty going long distance. Examination: Incisions are healing satisfactorily. There is minimal effusion in the joint. Her gait was normal.  She able to almost fully extend the knee and flexes up to about 90 degrees which is short than the other side. Diagnosis: Status post medial meniscectomy left knee. Treatment: The sutures have been removed she will continue mobilization I will see her again in 2 weeks time and if not improving then will refer her to physical therapy.

## 2023-11-01 DIAGNOSIS — K21.9 GASTROESOPHAGEAL REFLUX DISEASE, UNSPECIFIED WHETHER ESOPHAGITIS PRESENT: ICD-10-CM

## 2023-11-01 RX ORDER — TIZANIDINE 4 MG/1
4 TABLET ORAL 2 TIMES DAILY
Qty: 60 TABLET | Refills: 0 | Status: SHIPPED | OUTPATIENT
Start: 2023-11-01

## 2023-11-01 RX ORDER — OMEPRAZOLE 20 MG/1
20 CAPSULE, DELAYED RELEASE ORAL DAILY
Qty: 30 CAPSULE | Refills: 5 | Status: SHIPPED | OUTPATIENT
Start: 2023-11-01

## 2023-11-01 NOTE — TELEPHONE ENCOUNTER
Request for   Requested Prescriptions     Pending Prescriptions Disp Refills    tiZANidine (ZANAFLEX) 4 MG tablet [Pharmacy Med Name: TIZANIDINE 4MG] 60 tablet 0     Sig: TAKE 1 TABLET BY MOUTH 2 TIMES DAILY    . Please review and e-scribe to pharmacy listed in chart if appropriate. Thank you.       Last Visit Date: 10/10/2023  Next Visit Date: 11/1/2023    Future Appointments   Date Time Provider [address]   11/14/2023  9:30 AM Danny Brody MD [facility]   11/21/2023 10:20 AM Stephanie Segovia APRN - CNP [facility] [place]   12/20/2023  1:20 PM Noel Kay MD [address] [facility] Maintenance   Topic Date Due    DEXA (modify frequency per FRAX score)  Never done    Hepatitis B vaccine (1 of 3 - Risk 3-dose series) Never done    Diabetic retinal exam  05/14/2021    Annual Wellness Visit (AWV)  04/11/2023    COVID-19 Vaccine (4 - Pfizer series) 07/08/2023    Flu vaccine (1) 08/01/2023    Colorectal Cancer Screen  10/05/2023    Diabetic Alb to Cr ratio (uACR) test  11/29/2023    Depression Monitoring  03/15/2024    Low dose CT lung screening &/or counseling  04/27/2024    A1C test (Diabetic or Prediabetic)  05/11/2024    Lipids  05/22/2024    GFR test (Diabetes, CKD 3-4, OR last GFR 15-59)  10/05/2024    Diabetic foot exam  10/12/2024    Breast cancer screen  07/31/2025    Cervical cancer screen  03/02/2026    DTaP/Tdap/Td vaccine (2 - Td or Tdap) 06/24/2029    Shingles vaccine  Completed    Pneumococcal 65+ years Vaccine  Completed    Hepatitis C screen  Completed    HIV screen  Completed    Hepatitis A vaccine  Aged Out    Hib vaccine  Aged Out    Meningococcal (ACWY) vaccine  Aged Out    Pneumococcal 0-64 years Vaccine  Discontinued       Hemoglobin A1C (%)   Date Value   05/11/2023 6.0   11/22/2022 5.8   04/29/2022 5.8             ( goal A1C is < 7)   No components found for: \"LABMICR\"  LDL Cholesterol (mg/dL)   Date Value   05/22/2023 136 (H)       (goal LDL is <100)   AST

## 2023-11-01 NOTE — TELEPHONE ENCOUNTER
Request for   Requested Prescriptions     Pending Prescriptions Disp Refills    omeprazole (PRILOSEC) 20 MG delayed release capsule [Pharmacy Med Name: OMEPRAZOLE 20MG CAPS] 30 capsule 4     Sig: TAKE 1 CAPSULE BY MOUTH DAILY    . Please review and e-scribe to pharmacy listed in chart if appropriate. Thank you.       Last Visit Date: 10/10/2023  Next Visit Date: 12/20/2023    Future Appointments   Date Time Provider [address]   11/14/2023  9:30 AM Corby Miranda MD [facility]   11/21/2023 10:20 AM Jackson Orourke APRN - CNP [facility] [place]   12/20/2023  1:20 PM Gab[facility]ela Eli [facility] [address] [facility] Maintenance   Topic Date Due    DEXA (modify frequency per FRAX score)  Never done    Hepatitis B vaccine (1 of 3 - Risk 3-dose series) Never done    Diabetic retinal exam  05/14/2021    Annual Wellness Visit (AWV)  04/11/2023    COVID-19 Vaccine (4 - Pfizer series) 07/08/2023    Flu vaccine (1) 08/01/2023    Colorectal Cancer Screen  10/05/2023    Diabetic Alb to Cr ratio (uACR) test  11/29/2023    Depression Monitoring  03/15/2024    Low dose CT lung screening &/or counseling  04/27/2024    A1C test (Diabetic or Prediabetic)  05/11/2024    Lipids  05/22/2024    GFR test (Diabetes, CKD 3-4, OR last GFR 15-59)  10/05/2024    Diabetic foot exam  10/12/2024    Breast cancer screen  07/31/2025    Cervical cancer screen  03/02/2026    DTaP/Tdap/Td vaccine (2 - Td or Tdap) 06/24/2029    Shingles vaccine  Completed    Pneumococcal 65+ years Vaccine  Completed    Hepatitis C screen  Completed    HIV screen  Completed    Hepatitis A vaccine  Aged Out    Hib vaccine  Aged Out    Meningococcal (ACWY) vaccine  Aged Out    Pneumococcal 0-64 years Vaccine  Discontinued       Hemoglobin A1C (%)   Date Value   05/11/2023 6.0   11/22/2022 5.8   04/29/2022 5.8             ( goal A1C is < 7)   No components found for: \"LABMICR\"  LDL Cholesterol (mg/dL)   Date Value   05/22/2023 136 (H)       (goal LDL

## 2023-11-14 ENCOUNTER — OFFICE VISIT (OUTPATIENT)
Dept: ORTHOPEDIC SURGERY | Age: 63
End: 2023-11-14

## 2023-11-14 VITALS — HEIGHT: 65 IN | BODY MASS INDEX: 29.99 KG/M2 | WEIGHT: 180 LBS

## 2023-11-14 DIAGNOSIS — S83.242D ACUTE MEDIAL MENISCUS TEAR OF LEFT KNEE, SUBSEQUENT ENCOUNTER: Primary | ICD-10-CM

## 2023-11-14 PROCEDURE — 99024 POSTOP FOLLOW-UP VISIT: CPT | Performed by: ORTHOPAEDIC SURGERY

## 2023-11-14 NOTE — PROGRESS NOTES
This pleasant 68-year-old lady who had undergone arthroscopic partial left knee meniscectomy is seen here in follow-up. Patient says that she was progressing well but yesterday did have a setback but it is getting better. She says she is starting to walk more now. The pain is getting better. Examination: There was still slight effusion. Incisions are all well-healed. She has excellent motion. There is no deformity. Diagnosis: Status post medial meniscectomy left knee. Treatment: Continue normal activities and we will see as needed.

## 2023-11-21 ENCOUNTER — HOSPITAL ENCOUNTER (OUTPATIENT)
Dept: PAIN MANAGEMENT | Age: 63
Discharge: HOME | End: 2023-11-21
Payer: MEDICARE

## 2023-11-21 VITALS — TEMPERATURE: 97.3 F | HEIGHT: 65 IN | WEIGHT: 180 LBS | BODY MASS INDEX: 29.99 KG/M2

## 2023-11-21 DIAGNOSIS — M51.36 LUMBAR DEGENERATIVE DISC DISEASE: ICD-10-CM

## 2023-11-21 DIAGNOSIS — M54.16 LUMBAR RADICULOPATHY: Primary | ICD-10-CM

## 2023-11-21 DIAGNOSIS — Z79.891 CHRONIC USE OF OPIATE DRUG FOR THERAPEUTIC PURPOSE: ICD-10-CM

## 2023-11-21 DIAGNOSIS — M17.0 PRIMARY OSTEOARTHRITIS OF BOTH KNEES: ICD-10-CM

## 2023-11-21 PROCEDURE — 99213 OFFICE O/P EST LOW 20 MIN: CPT

## 2023-11-21 PROCEDURE — 99213 OFFICE O/P EST LOW 20 MIN: CPT | Performed by: NURSE PRACTITIONER

## 2023-11-21 RX ORDER — HYDROCODONE BITARTRATE AND ACETAMINOPHEN 5; 325 MG/1; MG/1
1 TABLET ORAL EVERY 8 HOURS PRN
Qty: 90 TABLET | Refills: 0 | Status: SHIPPED | OUTPATIENT
Start: 2023-11-28 | End: 2023-12-28

## 2023-11-21 ASSESSMENT — ENCOUNTER SYMPTOMS
BOWEL INCONTINENCE: 0
BACK PAIN: 1
SHORTNESS OF BREATH: 0
CONSTIPATION: 0
COUGH: 0

## 2023-11-21 ASSESSMENT — PAIN SCALES - GENERAL: PAINLEVEL_OUTOF10: 8

## 2023-11-21 NOTE — PROGRESS NOTES
Chief Complaint   Patient presents with    Back Pain     Med refill         [facility]     Chronic onset many years ago located in the lower lumbar area  Reports radiation of pain down both legs, associated with occ. leg numbness  Past history significant for 2 lumbar spine surgeries with last one in 2020. Initially noticed improvement but now has pain in both legs  Had a follow-up x-ray 5/22 that showed stable fusion at L4-5  Lumbar MRI 10/22 showed  L2-L3, mild canal stenosis and moderate left and mild right neural foraminal narrowing. 3 mm left extraforaminal disc protrusion contacts exiting left L2 nerve root. At L4-L5, moderate right and mild left neural foraminal narrowing and right-sided laminectomy defect and changes related to instrumented disc space fusion and posterior fusion. Pt had L5 S1 lumbar epidural steroid injection 8/2021 and reported significant relief for only 2 days   Had follow-up with neurosurgery 12/22 with CT and PT ordered. Pt started in home PT 2 times a week with some relief  CT done 12/22 with posterior fusion of L4 and L5 without complication and multilevel degenerative changes without significant spinal canal stenosis    She saw Dr. Haskins Born 3/13/23 for f/u with no surgery planned - smoking cessation, SCS, TPI and SIJ were discussed. She had left SI joint injection 4/25/23 and reports about 50% relief - not ready to repeat at this time      She is s/p Left knee arthroscopy with Dr. Valorie Varela 10/13/23 reports no need for PT just cont with walking    HPI:     Back Pain  This is a chronic problem. The current episode started more than 1 year ago. The problem occurs constantly. The pain is present in the lumbar spine and gluteal. Quality: sharp. The pain radiates to the left knee. The pain is at a severity of 8/10. The pain is The same all the time. The symptoms are aggravated by bending, sitting and standing. Associated symptoms include numbness, tingling and weakness.  Pertinent

## 2023-12-05 DIAGNOSIS — J30.9 CHRONIC ALLERGIC RHINITIS: ICD-10-CM

## 2023-12-05 NOTE — TELEPHONE ENCOUNTER
A Refill Has Been Requested for [employer]    Medication Requested  Requested Prescriptions     Pending Prescriptions Disp Refills    cetirizine (ZYRTEC) 10 MG tablet [Pharmacy Med Name: CETIRIZINE 10MG TAB] 30 tablet 3     Sig: TAKE 1 TABLET BY MOUTH DAILY       Last Visit Date (If Applicable)  7/89/1708    Next Visit Date (If Applicable)  20/78/6470

## 2023-12-06 NOTE — TELEPHONE ENCOUNTER
----- Message from Mariana Lainez sent at 12/6/2023  2:08 PM EST -----  Subject: Refill Request    QUESTIONS  Name of Medication? cetirizine (ZYRTEC) 10 MG tablet  Patient-reported dosage and instructions? NA / one a day  How many days do you have left? 0  Preferred Pharmacy? [facility] phone number (if available)? [phone]  ---------------------------------------------------------------------------  --------------  CALL BACK INFO  What is the best way for the office to contact you? OK to leave message on   voicemail  Preferred Call Back Phone Number? [phone]  ---------------------------------------------------------------------------  --------------  SCRIPT ANSWERS  Relationship to Patient?  Self

## 2023-12-07 RX ORDER — CETIRIZINE HYDROCHLORIDE 10 MG/1
10 TABLET ORAL DAILY
Qty: 30 TABLET | Refills: 3 | Status: SHIPPED | OUTPATIENT
Start: 2023-12-07

## 2023-12-14 ENCOUNTER — OFFICE VISIT (OUTPATIENT)
Dept: INTERNAL MEDICINE | Age: 63
End: 2023-12-14
Payer: MEDICARE

## 2023-12-14 ENCOUNTER — HOSPITAL ENCOUNTER (OUTPATIENT)
Age: 63
Setting detail: SPECIMEN
Discharge: HOME | End: 2023-12-14

## 2023-12-14 VITALS
WEIGHT: 184.6 LBS | DIASTOLIC BLOOD PRESSURE: 82 MMHG | SYSTOLIC BLOOD PRESSURE: 132 MMHG | BODY MASS INDEX: 30.75 KG/M2 | HEIGHT: 65 IN

## 2023-12-14 DIAGNOSIS — Z23 NEED FOR INFLUENZA VACCINATION: ICD-10-CM

## 2023-12-14 DIAGNOSIS — J43.2 CENTRILOBULAR EMPHYSEMA (HCC): ICD-10-CM

## 2023-12-14 DIAGNOSIS — Z78.0 POSTMENOPAUSE: ICD-10-CM

## 2023-12-14 DIAGNOSIS — E87.6 HYPOKALEMIA: ICD-10-CM

## 2023-12-14 DIAGNOSIS — I10 ESSENTIAL HYPERTENSION: ICD-10-CM

## 2023-12-14 DIAGNOSIS — Z00.00 MEDICARE ANNUAL WELLNESS VISIT, SUBSEQUENT: Primary | ICD-10-CM

## 2023-12-14 DIAGNOSIS — E78.00 PURE HYPERCHOLESTEROLEMIA: ICD-10-CM

## 2023-12-14 DIAGNOSIS — R73.03 PREDIABETES: ICD-10-CM

## 2023-12-14 DIAGNOSIS — F11.20 OPIOID DEPENDENCE WITH CURRENT USE (HCC): ICD-10-CM

## 2023-12-14 LAB
ANION GAP SERPL CALCULATED.3IONS-SCNC: 9 MMOL/L (ref 9–16)
BUN SERPL-MCNC: 13 MG/DL (ref 8–23)
CALCIUM SERPL-MCNC: 9.3 MG/DL (ref 8.6–10.4)
CHLORIDE SERPL-SCNC: 102 MMOL/L (ref 98–107)
CHOLEST SERPL-MCNC: 183 MG/DL (ref 0–199)
CHOLESTEROL/HDL RATIO: 4
CO2 SERPL-SCNC: 25 MMOL/L (ref 20–31)
CREAT SERPL-MCNC: 0.8 MG/DL (ref 0.5–0.9)
CREAT UR-MCNC: 20.8 MG/DL (ref 28–217)
EST. AVERAGE GLUCOSE BLD GHB EST-MCNC: 114 MG/DL
GFR SERPL CREATININE-BSD FRML MDRD: >60 ML/MIN/1.73M2
GLUCOSE SERPL-MCNC: 79 MG/DL (ref 74–99)
HBA1C MFR BLD: 5.6 % (ref 4–6)
HDLC SERPL-MCNC: 43 MG/DL
LDLC SERPL CALC-MCNC: 117 MG/DL (ref 0–100)
MICROALBUMIN UR-MCNC: <12 MG/L
MICROALBUMIN/CREAT UR-RTO: ABNORMAL MCG/MG CREAT
POTASSIUM SERPL-SCNC: 3.8 MMOL/L (ref 3.7–5.3)
SODIUM SERPL-SCNC: 136 MMOL/L (ref 136–145)
TRIGL SERPL-MCNC: 115 MG/DL (ref 0–149)
VLDLC SERPL CALC-MCNC: 23 MG/DL

## 2023-12-14 PROCEDURE — G0008 ADMIN INFLUENZA VIRUS VAC: HCPCS | Performed by: INTERNAL MEDICINE

## 2023-12-14 PROCEDURE — G8484 FLU IMMUNIZE NO ADMIN: HCPCS | Performed by: INTERNAL MEDICINE

## 2023-12-14 PROCEDURE — 3017F COLORECTAL CA SCREEN DOC REV: CPT | Performed by: INTERNAL MEDICINE

## 2023-12-14 PROCEDURE — 3075F SYST BP GE 130 - 139MM HG: CPT | Performed by: INTERNAL MEDICINE

## 2023-12-14 PROCEDURE — 3079F DIAST BP 80-89 MM HG: CPT | Performed by: INTERNAL MEDICINE

## 2023-12-14 PROCEDURE — G0439 PPPS, SUBSEQ VISIT: HCPCS | Performed by: INTERNAL MEDICINE

## 2023-12-14 PROCEDURE — 1123F ACP DISCUSS/DSCN MKR DOCD: CPT | Performed by: INTERNAL MEDICINE

## 2023-12-14 PROCEDURE — 99213 OFFICE O/P EST LOW 20 MIN: CPT | Performed by: INTERNAL MEDICINE

## 2023-12-14 RX ORDER — POTASSIUM CHLORIDE 750 MG/1
20 TABLET, EXTENDED RELEASE ORAL DAILY
Qty: 60 TABLET | Refills: 5 | Status: SHIPPED | OUTPATIENT
Start: 2023-12-14

## 2023-12-14 RX ORDER — ATORVASTATIN CALCIUM 80 MG/1
80 TABLET, FILM COATED ORAL DAILY
Qty: 90 TABLET | Refills: 1 | Status: SHIPPED | OUTPATIENT
Start: 2023-12-14

## 2023-12-14 RX ORDER — ALBUTEROL SULFATE 90 UG/1
2 AEROSOL, METERED RESPIRATORY (INHALATION) EVERY 6 HOURS PRN
Qty: 1 EACH | Refills: 3 | Status: SHIPPED | OUTPATIENT
Start: 2023-12-14

## 2023-12-14 RX ORDER — PEN NEEDLE, DIABETIC 31 GX5/16"
NEEDLE, DISPOSABLE MISCELLANEOUS
Qty: 100 EACH | Refills: 2 | Status: SHIPPED | OUTPATIENT
Start: 2023-12-14

## 2023-12-14 RX ORDER — LISINOPRIL AND HYDROCHLOROTHIAZIDE 25; 20 MG/1; MG/1
TABLET ORAL
Qty: 90 TABLET | Refills: 1 | Status: SHIPPED | OUTPATIENT
Start: 2023-12-14

## 2023-12-14 RX ORDER — GLUCOSAMINE HCL/CHONDROITIN SU 500-400 MG
CAPSULE ORAL
Qty: 100 STRIP | Refills: 2 | Status: SHIPPED | OUTPATIENT
Start: 2023-12-14

## 2023-12-14 RX ORDER — CARVEDILOL 12.5 MG/1
12.5 TABLET ORAL 2 TIMES DAILY
Qty: 180 TABLET | Refills: 1 | Status: SHIPPED | OUTPATIENT
Start: 2023-12-14

## 2023-12-14 RX ORDER — ALBUTEROL SULFATE 2.5 MG/3ML
2.5 SOLUTION RESPIRATORY (INHALATION) EVERY 6 HOURS PRN
Qty: 120 EACH | Refills: 1 | Status: SHIPPED | OUTPATIENT
Start: 2023-12-14

## 2023-12-14 RX ORDER — DOCUSATE SODIUM 100 MG/1
100 CAPSULE, LIQUID FILLED ORAL DAILY
Qty: 30 CAPSULE | Refills: 5 | Status: SHIPPED | OUTPATIENT
Start: 2023-12-14

## 2023-12-14 RX ORDER — AMLODIPINE BESYLATE 10 MG/1
10 TABLET ORAL EVERY MORNING
Qty: 90 TABLET | Refills: 1 | Status: SHIPPED | OUTPATIENT
Start: 2023-12-14

## 2023-12-14 ASSESSMENT — PATIENT HEALTH QUESTIONNAIRE - PHQ9
SUM OF ALL RESPONSES TO PHQ9 QUESTIONS 1 & 2: 2
8. MOVING OR SPEAKING SO SLOWLY THAT OTHER PEOPLE COULD HAVE NOTICED. OR THE OPPOSITE, BEING SO FIGETY OR RESTLESS THAT YOU HAVE BEEN MOVING AROUND A LOT MORE THAN USUAL: 0
SUM OF ALL RESPONSES TO PHQ QUESTIONS 1-9: 3
6. FEELING BAD ABOUT YOURSELF - OR THAT YOU ARE A FAILURE OR HAVE LET YOURSELF OR YOUR FAMILY DOWN: 1
9. THOUGHTS THAT YOU WOULD BE BETTER OFF DEAD, OR OF HURTING YOURSELF: 0
7. TROUBLE CONCENTRATING ON THINGS, SUCH AS READING THE NEWSPAPER OR WATCHING TELEVISION: 0
4. FEELING TIRED OR HAVING LITTLE ENERGY: 0
5. POOR APPETITE OR OVEREATING: 0
1. LITTLE INTEREST OR PLEASURE IN DOING THINGS: 1
2. FEELING DOWN, DEPRESSED OR HOPELESS: 1
SUM OF ALL RESPONSES TO PHQ QUESTIONS 1-9: 3
10. IF YOU CHECKED OFF ANY PROBLEMS, HOW DIFFICULT HAVE THESE PROBLEMS MADE IT FOR YOU TO DO YOUR WORK, TAKE CARE OF THINGS AT HOME, OR GET ALONG WITH OTHER PEOPLE: 1
3. TROUBLE FALLING OR STAYING ASLEEP: 0
SUM OF ALL RESPONSES TO PHQ QUESTIONS 1-9: 3
SUM OF ALL RESPONSES TO PHQ QUESTIONS 1-9: 3

## 2023-12-14 ASSESSMENT — LIFESTYLE VARIABLES
HOW OFTEN DO YOU HAVE A DRINK CONTAINING ALCOHOL: NEVER
HOW MANY STANDARD DRINKS CONTAINING ALCOHOL DO YOU HAVE ON A TYPICAL DAY: PATIENT DOES NOT DRINK

## 2023-12-14 ASSESSMENT — COLUMBIA-SUICIDE SEVERITY RATING SCALE - C-SSRS
4. HAVE YOU HAD THESE THOUGHTS AND HAD SOME INTENTION OF ACTING ON THEM?: NO
7. DID THIS OCCUR IN THE LAST THREE MONTHS: NO
3. HAVE YOU BEEN THINKING ABOUT HOW YOU MIGHT KILL YOURSELF?: NO
5. HAVE YOU STARTED TO WORK OUT OR WORKED OUT THE DETAILS OF HOW TO KILL YOURSELF? DO YOU INTEND TO CARRY OUT THIS PLAN?: NO

## 2023-12-14 NOTE — PROGRESS NOTES
Medicare Annual Wellness Visit    [facility] is here for Medicare AWV (Last AWV 10.21.2021) and Hypertension (6 month f/u, needs refills)    Assessment & Plan   Medicare annual wellness visit, subsequent  Essential hypertension  The following orders have not been finalized:  -     amLODIPine (NORVASC) 10 MG tablet  -     carvedilol (COREG) 12.5 MG tablet  -     lisinopril-hydroCHLOROthiazide (PRINZIDE;ZESTORETIC) 20-25 MG per tablet  -     potassium chloride (KLOR-CON M) 10 MEQ extended release tablet  Hypokalemia  The following orders have not been finalized:  -     potassium chloride (KLOR-CON M) 10 MEQ extended release tablet  Type 2 diabetes mellitus without complication, without long-term current use of insulin ([address])  The following orders have not been finalized:  -     atorvastatin (LIPITOR) 80 MG tablet  Pure hypercholesterolemia  The following orders have not been finalized:  -     atorvastatin (LIPITOR) 80 MG tablet  Centrilobular emphysema ([address])  The following orders have not been finalized:  -     albuterol (PROVENTIL) (2.5 MG/3ML) 0.083% nebulizer solution  -     albuterol sulfate HFA (VENTOLIN HFA) 108 (90 Base) MCG/ACT inhaler  History of prediabetes  The following orders have not been finalized:  -     blood glucose monitor strips  -     Alcohol Swabs (ALCOHOL PREP) PADS    Recommendations for Preventive Services Due: see orders and patient instructions/AVS.  Recommended screening schedule for the next 5-10 years is provided to the patient in written form: see Patient Instructions/AVS.     No follow-ups on file. Subjective       Patient's complete Health Risk Assessment and screening values have been reviewed and are found in Flowsheets. The following problems were reviewed today and where indicated follow up appointments were made and/or referrals ordered.     Positive Risk Factor Screenings with Interventions:    Fall Risk:  Do you feel unsteady or are you worried about falling? : (!)

## 2023-12-14 NOTE — PROGRESS NOTES
[facility]    Progress Note    Date of patient's visit: 12/14/2023    Patient's Name:  Mari Gillis    YOB: 1960            Patient Care Team:  Lilli Kang MD as PCP - General (Internal Medicine)  Lilli Kang [facility]  Dev Yusuf DPM as Consulting Physician (Podiatry)  Cory Schaffer ([facility])  Tushar Louis MD as Consulting Physician (Orthopedic Surgery)  Sissy Ferreira MD as Anesthesiologist (Pain Management)  Oma Torres OD (Optometry)    REASON FOR VISIT: Routine outpatient follow     Chief Complaint   Patient presents with    Medicare AWV     Last AWV 10.21.2021    Hypertension     6 month f/u, needs refills         HISTORY OF PRESENT ILLNESS:    History was obtained from the patient. Mari Gillis is a 70 y.o. is here for annual wellness visit and follow-up on her chronic medical problems. No acute complaints. Low back pain is better than before. She is compliant with all her medications. She continues to follow-up with psychiatrist.  Blood pressure is controlled. Labs are reviewed with her. She needs refills on her medications. She agrees to vaccinations. She is due for a DEXA scan.   She had a recent mammogram.    Past Medical History:   Diagnosis Date    Allergic rhinitis     Alveolar hypoventilation 11/20/2020    Aortic insufficiency 2018    mild-moderate on echo (was seen and discharged from cardiology-[facility])    Bronchiectasis ([address] [facility]) 11/20/2020    Bronchitis     Chronic back pain     [facility] management at [facility]    COPD (chronic obstructive pulmonary disease) ([address] [facility])     Dr. Zhane Montes De Oca to see 11/09/2020    Depression     DM (diabetes mellitus) ([address] [facility]) 12/18/2012    GERD (gastroesophageal reflux disease)     History of echocardiogram 05/2018    EF 65%, mild-moderate AI and TR    Hyperlipidemia     Dr. Scherer    Hypertension     Dr. Namrata Stafford

## 2023-12-14 NOTE — PATIENT INSTRUCTIONS
other may be subject to a deductible, co-insurance, and/or copay. Some of these benefits include a comprehensive review of your medical history including lifestyle, illnesses that may run in your family, and various assessments and screenings as appropriate. After reviewing your medical record and screening and assessments performed today your provider may have ordered immunizations, labs, imaging, and/or referrals for you. A list of these orders (if applicable) as well as your Preventive Care list are included within your After Visit Summary for your review. Other Preventive Recommendations:    A preventive eye exam performed by an eye specialist is recommended every 1-2 years to screen for glaucoma; cataracts, macular degeneration, and other eye disorders. A preventive dental visit is recommended every 6 months. Try to get at least 150 minutes of exercise per week or 10,000 steps per day on a pedometer . Order or download the FREE \"Exercise & Physical Activity: Your Everyday Guide\" from [employer]. Call 8-689.448.5509 or search [employer] online. You need 9726-0499 mg of calcium and 0263-0676 IU of vitamin D per day. It is possible to meet your calcium requirement with diet alone, but a vitamin D supplement is usually necessary to meet this goal.  When exposed to the sun, use a sunscreen that protects against both UVA and UVB radiation with an SPF of 30 or greater. Reapply every 2 to 3 hours or after sweating, drying off with a towel, or swimming. Always wear a seat belt when traveling in a car. Always wear a helmet when riding a bicycle or motorcycle.

## 2023-12-15 ENCOUNTER — TELEPHONE (OUTPATIENT)
Dept: INTERNAL MEDICINE | Age: 63
End: 2023-12-15

## 2023-12-15 NOTE — TELEPHONE ENCOUNTER
Patient called regarding Gabapentin medication. Patient states Dr. Santina Dubin was to continue the medication, however, the pharmacy told the patient it was discontinued. The patient uses [facility]. Please advise.     Electronically Signed by  Sage Patterson MBA  [facility] Practice Manager

## 2023-12-28 ENCOUNTER — HOSPITAL ENCOUNTER (OUTPATIENT)
Dept: PAIN MANAGEMENT | Age: 63
Discharge: HOME | End: 2023-12-28
Payer: MEDICARE

## 2023-12-28 VITALS — WEIGHT: 184 LBS | BODY MASS INDEX: 31.41 KG/M2 | HEIGHT: 64 IN

## 2023-12-28 DIAGNOSIS — M47.26 OSTEOARTHRITIS OF SPINE WITH RADICULOPATHY, LUMBAR REGION: Primary | ICD-10-CM

## 2023-12-28 DIAGNOSIS — M53.3 SACROILIAC JOINT PAIN: ICD-10-CM

## 2023-12-28 DIAGNOSIS — M51.36 LUMBAR DEGENERATIVE DISC DISEASE: ICD-10-CM

## 2023-12-28 DIAGNOSIS — M17.0 PRIMARY OSTEOARTHRITIS OF BOTH KNEES: ICD-10-CM

## 2023-12-28 DIAGNOSIS — M96.1 LUMBAR POST-LAMINECTOMY SYNDROME: ICD-10-CM

## 2023-12-28 DIAGNOSIS — M54.16 LUMBAR RADICULOPATHY: ICD-10-CM

## 2023-12-28 DIAGNOSIS — Z79.891 CHRONIC USE OF OPIATE DRUG FOR THERAPEUTIC PURPOSE: ICD-10-CM

## 2023-12-28 PROCEDURE — 99214 OFFICE O/P EST MOD 30 MIN: CPT | Performed by: NURSE PRACTITIONER

## 2023-12-28 RX ORDER — MELOXICAM 7.5 MG/1
7.5 TABLET ORAL DAILY
Qty: 30 TABLET | Refills: 5 | Status: SHIPPED | OUTPATIENT
Start: 2023-12-28

## 2023-12-28 RX ORDER — HYDROCODONE BITARTRATE AND ACETAMINOPHEN 5; 325 MG/1; MG/1
1 TABLET ORAL EVERY 8 HOURS PRN
Qty: 90 TABLET | Refills: 0 | Status: SHIPPED | OUTPATIENT
Start: 2023-12-28 | End: 2024-01-27

## 2023-12-28 NOTE — PROGRESS NOTES
reports taking pain medications as prescribed, denies obtaining medications from different sources and denies use of illegal drugs. Medication risk and benefits have been discussed. Patient denies side effects from medications like nausea, vomiting, constipation or drowsiness. Patient reports current activities of daily living are possible due to medications and would like to continue them. As always, we encourage daily stretching and strengthening exercises, and recommend minimizing use of pain medications unless patient cannot get through daily activities due to pain. Due to the high risk nature of this patient's pain medication close monitoring is required. Continue current medication management, pt has been stable and compliant. Script written for norco and mobic  Follow up appointment made for 4 weeks    I have reviewed the chief complaint and history of present illness (including ROS and PFSH) and vital documentation by my staff and I agree with their documentation and have added where applicable.

## 2023-12-30 NOTE — PROGRESS NOTES
Normal results please call patient.  - CNP)      Past Medical History:   Diagnosis Date    Allergic rhinitis     Alveolar hypoventilation 11/20/2020    Aortic insufficiency 2018    mild-moderate on echo [facility]    Bronchiectasis ([facility] [address] 11/20/2020    Bronchitis     Chronic back pain     Pain management at [facility]. COPD (chronic obstructive pulmonary disease) ([facility] [address]     Dr. Johns [facility] to see 11/09/2020    Depression     DM (diabetes mellitus) ([facility] 12/18/2012    GERD (gastroesophageal reflux disease)     History of echocardiogram 05/2018    EF 65%, mild-moderate AI and TR    Hyperlipidemia     Dr. Reina Phan Hypertension     Dr. Reina Phan Obesity     Osteoarthritis     Peripheral vascular disease ([facility]     Primary osteoarthritis of both knees     Radicular pain of lumbosacral region     Spinal stenosis, lumbar region, without neurogenic claudication 04/30/2013    Tricuspid regurgitation 2018    mild-moderate on echo    Type II or unspecified type diabetes mellitus without mention of complication, not stated as uncontrolled     Dr. Reina Phan Unspecified sleep apnea     no cpap used anymore    URI (upper respiratory infection)     Wears dentures     upper and lower full dentures    Wears glasses     Wellness examination     Dr. Antwon Hall -PCP last visit in early Oct. 2020       Past Surgical History:   Procedure Laterality Date    COLONOSCOPY  2/12/2009    normal    DILATION AND CURETTAGE OF UTERUS      GASTRECTOMY      partial    LUMBAR DISCECTOMY  01/2015    lumbar diskectomy    LUMBAR FUSION  11/19/2020     POSTERIOR FUSION L4/5,     LUMBAR FUSION N/A 11/19/2020    POSTERIOR FUSION L4/5, [employer], Danita Quiñones, EVOKES #[id number] [facility] performed by Steven Gonzalez DO at [facility] 4/30/13    Lumbar Diagnostic Block,  Kenalog 40 mg    NERVE BLOCK  5/23/13    Lumbar Radiofrequency, Kenalog 40mg    NERVE BLOCK  8/12/13    Lt DANIELLA ashley 6mg    NERVE BLOCK Left 8-28-13    left lumbar diagnostic block #2 decadron 10 mg    NERVE BLOCK Left 9-24-13    left lumbar median branch radiofrequency    NERVE BLOCK  07-02-14    caudal, celestone 9 mg    NERVE BLOCK  7-16-14    caudal epidural #2, celestone 9mg, fentanyl 25mcg    NERVE BLOCK  7/30/14    caudal #3 decadron 10mg    NERVE BLOCK  11-6-14    duramorph epidural steroid block  duramorph 1 mg celestone 9 mg    NERVE BLOCK  11/20/15    TENS- Em[employer] Select    NERVE BLOCK  07/20/2018    right transforminal # 1 decadron 10mg,isovue    NERVE BLOCK Bilateral 02/01/2019    bilat mbnb- no steroid    NERVE BLOCK Bilateral 02/08/2019    bilat mbnb, marcaine . 25%    GA KNEE SCOPE,DIAGNOSTIC Right 3/24/2017    KNEE ARTHROSCOPY WITH PARTIAL MEDIAL MENISECAL DEBRIDMENT  performed by Rossy Madden MD at [facility]  9 20 2007    UPPER GASTROINTESTINAL ENDOSCOPY  4 21 2009,04/2011    gastritis, esophagitis       Allergies   Allergen Reactions    Aspirin      DUE TO ULCER    Claritin [Loratadine] Other (See Comments)     coughing    Flonase [Fluticasone Propionate]     Morphine And Related      GI Upset         Current Outpatient Medications:     mirtazapine (REMERON) 30 MG tablet, Take 30 mg by mouth 3 times daily, Disp: , Rfl:     gabapentin (NEURONTIN) 300 MG capsule, TAKE 1 CAPSULE IN MORNING AND AFTERNOON AND 2 CAPSULES AT NIGHT, Disp: 120 capsule, Rfl: 2    amoxicillin (AMOXIL) 500 MG capsule, Take 500 mg by mouth 3 times daily, Disp: , Rfl:     HYDROcodone-acetaminophen (NORCO) 5-325 MG per tablet, Take 1 tablet by mouth every 8 hours as needed for Pain for up to 30 days.  Early refill due to holidays, Disp: 90 tablet, Rfl: 0    tiZANidine (ZANAFLEX) 4 MG tablet, Take 1 tablet by mouth 2 times daily, Disp: 60 tablet, Rfl: 2    meloxicam (MOBIC) 7.5 MG tablet, Take 1 tablet by mouth daily, Disp: 30 tablet, Rfl: 2    cetirizine (ZYRTEC) 10 MG tablet, TAKE 1 TABLET BY MOUTH DAILY, Disp: 30 tablet, Rfl: 4    trospium (SANCTURA) 20 MG tablet, Take 1 tablet by mouth 2 times daily, Disp: 60 tablet, Rfl: 0    lisinopril-hydroCHLOROthiazide (PRINZIDE;ZESTORETIC) 20-25 MG per tablet, TAKE 1 TABLET EVERY DAY, Disp: 90 tablet, Rfl: 1    omeprazole (PRILOSEC) 20 MG delayed release capsule, TAKE 1 CAPSULE BY MOUTH EVERY DAY, Disp: 30 capsule, Rfl: 3    azelastine (ASTELIN) 0.1 % nasal spray, 2 sprays by Nasal route 2 times daily Use in each nostril as directed, Disp: 1 each, Rfl: 2    potassium chloride (KLOR-CON M) 10 MEQ extended release tablet, Take 2 tablets by mouth daily, Disp: 60 tablet, Rfl: 2    carvedilol (COREG) 12.5 MG tablet, Take 1 tablet by mouth 2 times daily, Disp: 60 tablet, Rfl: 5    albuterol sulfate HFA (VENTOLIN HFA) 108 (90 Base) MCG/ACT inhaler, Inhale 2 puffs into the lungs every 6 hours as needed for Wheezing, Disp: 1 each, Rfl: 3    acetaminophen (TYLENOL) 500 MG tablet, Take 1 tablet by mouth 4 times daily as needed for Pain, Disp: 30 tablet, Rfl: 0    atorvastatin (LIPITOR) 40 MG tablet, Take 1 tablet by mouth daily, Disp: 90 tablet, Rfl: 1    amLODIPine (NORVASC) 10 MG tablet, TAKE 1 TABLET BY MOUTH DAILY, Disp: 90 tablet, Rfl: 1    docusate sodium (COLACE) 100 MG capsule, TAKE 1 CAPSULE BY MOUTH EVERY DAY, Disp: 30 capsule, Rfl: 9    Umeclidinium Bromide 62.5 MCG/INH AEPB, Inhale 1 puff into the lungs daily, Disp: 2 each, Rfl: 5    Lancets MISC, 1 each by Does not apply route 2 times daily Use 2/day, Disp: 100 each, Rfl: 11    SPIRIVA HANDIHALER 18 MCG inhalation capsule, Inhale 1 capsule into the lungs daily, Disp: 30 capsule, Rfl: 3    blood glucose test strips (EXACTECH TEST) strip, 1 each by In Vitro route daily As needed. , Disp: 50 each, Rfl: 11    diphenhydrAMINE (BENADRYL) 25 MG tablet, Take 25 mg by mouth every 6 hours as needed for Itching, Disp: , Rfl:     mirtazapine (REMERON) 15 MG tablet, Take 15 mg by mouth nightly, Disp: , Rfl:     DULoxetine (CYMBALTA) 60 MG extended release capsule, Take 1 capsule by mouth daily, Disp: 30 capsule, Rfl: 3    ipratropium-albuterol (DUONEB) 0.5-2.5 (3) MG/3ML SOLN nebulizer solution, Inhale 3 mLs into the lungs every 6 hours as needed for Shortness of Breath, Disp: 360 mL, Rfl: 11    Family History   Problem Relation Age of Onset    Diabetes Mother     Heart Disease Mother     Cirrhosis Father        Social History     Socioeconomic History    Marital status: Single     Spouse name: Not on file    Number of children: Not on file    Years of education: Not on file    Highest education level: Not on file   Occupational History     Employer: DISABLED   Tobacco Use    Smoking status: Current Every Day Smoker     Packs/day: 0.25     Years: 36.00     Pack years: 9.00     Types: Cigarettes    Smokeless tobacco: Never Used    Tobacco comment: 3 cigs per day   on nicoderm patch   Vaping Use    Vaping Use: Never used   Substance and Sexual Activity    Alcohol use: No     Alcohol/week: 0.0 standard drinks    Drug use: No     Comment: 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 7 yrs    Sexual activity: Never   Other Topics Concern    Not on file   Social History Narrative    10/9/20 Patient keeping contact with others to a minimum due to COVID 19 Pandemic. 10/9/20 Patient has difficulty with ambulation due to DJD, stenosis and fibromyalgia     Social Determinants of Health     Financial Resource Strain: Medium Risk    Difficulty of Paying Living Expenses: Somewhat hard   Food Insecurity: Food Insecurity Present    Worried About Running Out of Food in the Last Year: Sometimes true    Santosh of Food in the Last Year: Sometimes true   Transportation Needs:     Lack of Transportation (Medical): Not on file    Lack of Transportation (Non-Medical):  Not on file   Physical Activity:     Days of Exercise per Week: Not on file    Minutes of Exercise per Session: Not on file   Stress:     Feeling of Stress : Not on file   Social Connections:     Frequency of Communication with Friends and Family: Not on file    Frequency of Social Gatherings with Friends and Family: Not on file    Attends Islam Services: Not on file    Active Member of Clubs or Organizations: Not on file    Attends Club or Organization Meetings: Not on file    Marital Status: Not on file   Intimate Partner Violence:     Fear of Current or Ex-Partner: Not on file    Emotionally Abused: Not on file    Physically Abused: Not on file    Sexually Abused: Not on file   Housing Stability:     Unable to Pay for Housing in the Last Year: Not on file    Number of [place] in the Last Year: Not on file    Unstable Housing in the Last Year: Not on file       Review of Systems:  Review of Systems   Constitutional: Negative for chills and fever. Cardiovascular: Negative for chest pain and palpitations. Respiratory: Negative for cough and shortness of breath. Musculoskeletal: Positive for back pain. Gastrointestinal: Negative for constipation. Neurological: Negative for disturbances in coordination, loss of balance, numbness, paresthesias and tingling. Physical Exam:  BP (!) 140/93   Pulse 96   Temp 98.1 °F (36.7 °C) (Skin)   Resp 20   LMP 04/19/2003   SpO2 94%     Physical Exam  HENT:      Head: Normocephalic. Pulmonary:      Effort: Pulmonary effort is normal.   Musculoskeletal:         General: Normal range of motion. Cervical back: Normal range of motion. Lumbar back: Tenderness present. Skin:     General: Skin is warm and dry. Neurological:      Mental Status: She is alert and oriented to person, place, and time.          Record/Diagnostics Review:    Last óscar 3/2021 and was appropriate     Assessment:  Problem List Items Addressed This Visit     Encounter for long-term opiate analgesic use (Chronic)    Osteoarthritis of spine with radiculopathy, lumbar region    Relevant Medications HYDROcodone-acetaminophen (NORCO) 5-325 MG per tablet (Start on 2/2/2022)    Primary osteoarthritis of both knees    Relevant Medications    HYDROcodone-acetaminophen (NORCO) 5-325 MG per tablet (Start on 2/2/2022)    Chronic low back pain    Relevant Medications    HYDROcodone-acetaminophen (NORCO) 5-325 MG per tablet (Start on 2/2/2022)    Other Relevant Orders    [facility]    Lumbar disc disease    Postlaminectomy syndrome, lumbar region - Primary             Treatment Plan:  Patient relates current medications are helping the pain. Patient reports taking pain medications as prescribed, denies obtaining medications from different sources and denies use of illegal drugs. Patient denies side effects from medications like nausea, vomiting, constipation or drowsiness. Patient reports current activities of daily living are possible due to medications and would like to continue them. As always, we encourage daily stretching and strengthening exercises, and recommend minimizing use of pain medications unless patient cannot get through daily activities due to pain. Contract requirements met. Continue opioid therapy.  Script written for norco  Pt needs a new referral for PT - had to put on hold due to nosebleeds  Follow up appointment made for 4 weeks

## 2024-01-03 RX ORDER — TIZANIDINE 4 MG/1
4 TABLET ORAL 2 TIMES DAILY
Qty: 60 TABLET | Refills: 0 | Status: SHIPPED | OUTPATIENT
Start: 2024-01-03

## 2024-01-03 NOTE — TELEPHONE ENCOUNTER
..Request for   Requested Prescriptions     Pending Prescriptions Disp Refills    tiZANidine (ZANAFLEX) 4 MG tablet [Pharmacy Med Name: TIZANIDINE 4MG] 60 tablet 0     Sig: TAKE 1 TABLET BY MOUTH 2 TIMES DAILY    .      Please review and e-scribe to pharmacy listed in chart if appropriate. Thank you.      Last Visit Date: 12/14/2023  Next Visit Date: Visit date not found    Future Appointments   Date Time Provider Department Center   1/15/2024  6:00 PM STA DEXA RM [facility] Radiolog   1/24/2024  9:45 AM Tee Alfaro DO [facility]   Topic Date Due    DEXA (modify frequency per FRAX score)  Never done    Respiratory Syncytial Virus (RSV) Pregnant or age 60 yrs+ (1 - 1-dose 60+ series) Never done    Diabetic retinal exam  05/14/2021    COVID-19 Vaccine (4 - 2023-24 season) 09/01/2023    Colorectal Cancer Screen  10/05/2023    Annual Wellness Visit (Medicare Advantage)  01/01/2024    Low dose CT lung screening &/or counseling  04/27/2024    Diabetic foot exam  10/12/2024    A1C test (Diabetic or Prediabetic)  12/14/2024    Diabetic Alb to Cr ratio (uACR) test  12/14/2024    Lipids  12/14/2024    Depression Monitoring  12/14/2024    GFR test (Diabetes, CKD 3-4, OR last GFR 15-59)  12/14/2024    Breast cancer screen  07/31/2025    Cervical cancer screen  03/02/2026    DTaP/Tdap/Td vaccine (2 - Td or Tdap) 06/24/2029    Flu vaccine  Completed    Shingles vaccine  Completed    Pneumococcal 65+ years Vaccine  Completed    Hepatitis C screen  Completed    HIV screen  Completed    Hepatitis A vaccine  Aged Out    Hepatitis B vaccine  Aged Out    Hib vaccine  Aged Out    Polio vaccine  Aged Out    Meningococcal (ACWY) vaccine  Aged Out    Pneumococcal 0-64 years Vaccine  Discontinued       Hemoglobin A1C (%)   Date Value   12/14/2023 5.6   05/11/2023 6.0   11/22/2022 5.8             ( goal A1C is < 7)   No components found for: \"LABMICR\"  LDL Cholesterol (mg/dL)   Date Value

## 2024-01-15 ENCOUNTER — HOSPITAL ENCOUNTER (OUTPATIENT)
Dept: MAMMOGRAPHY | Age: 64
Discharge: HOME | End: 2024-01-17
Attending: INTERNAL MEDICINE
Payer: MEDICARE

## 2024-01-15 DIAGNOSIS — Z78.0 POSTMENOPAUSE: ICD-10-CM

## 2024-01-15 PROCEDURE — 77080 DXA BONE DENSITY AXIAL: CPT

## 2024-01-24 ENCOUNTER — HOSPITAL ENCOUNTER (OUTPATIENT)
Dept: PAIN MANAGEMENT | Age: 64
Discharge: HOME | End: 2024-01-24
Payer: MEDICARE

## 2024-01-24 VITALS — HEIGHT: 64 IN | WEIGHT: 184 LBS | BODY MASS INDEX: 31.41 KG/M2

## 2024-01-24 DIAGNOSIS — M17.0 PRIMARY OSTEOARTHRITIS OF BOTH KNEES: ICD-10-CM

## 2024-01-24 DIAGNOSIS — M51.36 LUMBAR DEGENERATIVE DISC DISEASE: ICD-10-CM

## 2024-01-24 DIAGNOSIS — M53.3 SACROILIAC JOINT PAIN: ICD-10-CM

## 2024-01-24 DIAGNOSIS — Z79.891 CHRONIC USE OF OPIATE DRUG FOR THERAPEUTIC PURPOSE: ICD-10-CM

## 2024-01-24 DIAGNOSIS — M96.1 LUMBAR POST-LAMINECTOMY SYNDROME: Primary | ICD-10-CM

## 2024-01-24 PROCEDURE — 99213 OFFICE O/P EST LOW 20 MIN: CPT

## 2024-01-24 PROCEDURE — 99214 OFFICE O/P EST MOD 30 MIN: CPT | Performed by: STUDENT IN AN ORGANIZED HEALTH CARE EDUCATION/TRAINING PROGRAM

## 2024-01-24 RX ORDER — HYDROCODONE BITARTRATE AND ACETAMINOPHEN 5; 325 MG/1; MG/1
1 TABLET ORAL EVERY 8 HOURS PRN
Qty: 90 TABLET | Refills: 0 | Status: SHIPPED | OUTPATIENT
Start: 2024-01-26 | End: 2024-02-25

## 2024-01-24 ASSESSMENT — PAIN SCALES - GENERAL: PAINLEVEL_OUTOF10: 8

## 2024-01-24 NOTE — PROGRESS NOTES
Dr. Mcleod    Hypertension     Dr. Mcleod    Obesity     Osteoarthritis     Peripheral vascular disease (HCC)     Primary osteoarthritis of both knees     Radicular pain of lumbosacral region     Spinal stenosis, lumbar region, without neurogenic claudication 04/30/2013    Tricuspid regurgitation 2018    mild-moderate on echo    Type II or unspecified type diabetes mellitus without mention of complication, not stated as uncontrolled     Dr. Mcleod    Under care of service provider 10/05/2023    lullguhpzj-qpznrghme-cjiqxj-last visit may 2023    Unspecified sleep apnea     no cpap used anymore    URI (upper respiratory infection)     Wears dentures     upper and lower full dentures    Wears glasses     Wellness examination 10/05/2023    mao-rxilegug-xefatdlj-last visit july 2023       Past Surgical History:   Procedure Laterality Date    COLONOSCOPY  02/12/2009    normal    DILATION AND CURETTAGE OF UTERUS      GASTRECTOMY      partial    KNEE ARTHROSCOPY Left 10/13/2023    KNEE ARTHROSCOPY, MEDIAL MENISCECTOMY    KNEE ARTHROSCOPY Left 10/13/2023    KNEE ARTHROSCOPY, MEDIAL MENISCECTOMY performed by Milan Paiz MD at [facility] OR    LUMBAR DISCECTOMY  01/2015    lumbar diskectomy    LUMBAR FUSION N/A 11/19/2020    POSTERIOR FUSION L4/5, [employer], MIRIAM, PRONE, C-ARM, O-ARM, EVOKES #[id number] AMINA performed by Kiara Ash DO at [facility] OR    NERVE BLOCK Right 04/30/2013    Lumbar Diagnostic Block,  Kenalog 40 mg    NERVE BLOCK  05/23/2013    Lumbar Radiofrequency, Kenalog 40mg    NERVE BLOCK  08/12/2013    Lt MBNB  celestone 6mg    NERVE BLOCK Left 08/28/2013    left lumbar diagnostic block #2 decadron 10 mg    NERVE BLOCK Left 09/24/2013    left lumbar median branch radiofrequency    NERVE BLOCK  07/02/2014    caudal, celestone 9 mg    NERVE BLOCK  07/16/2014    caudal epidural #2, celestone 9mg, fentanyl 25mcg    NERVE BLOCK  07/30/2014    caudal #3 decadron 10mg    NERVE BLOCK  11/06/2014    duramorph epidural

## 2024-02-08 ENCOUNTER — OFFICE VISIT (OUTPATIENT)
Dept: INTERNAL MEDICINE | Age: 64
End: 2024-02-08
Payer: MEDICAID

## 2024-02-08 VITALS
BODY MASS INDEX: 31.32 KG/M2 | TEMPERATURE: 97.7 F | SYSTOLIC BLOOD PRESSURE: 139 MMHG | WEIGHT: 188 LBS | HEIGHT: 65 IN | DIASTOLIC BLOOD PRESSURE: 75 MMHG | HEART RATE: 84 BPM | OXYGEN SATURATION: 90 %

## 2024-02-08 DIAGNOSIS — J20.9 ACUTE BRONCHITIS, UNSPECIFIED ORGANISM: Primary | ICD-10-CM

## 2024-02-08 LAB
INFLUENZA A ANTIBODY: NORMAL
INFLUENZA B ANTIBODY: NORMAL

## 2024-02-08 PROCEDURE — G8484 FLU IMMUNIZE NO ADMIN: HCPCS

## 2024-02-08 PROCEDURE — 99213 OFFICE O/P EST LOW 20 MIN: CPT

## 2024-02-08 PROCEDURE — 3078F DIAST BP <80 MM HG: CPT

## 2024-02-08 PROCEDURE — 4004F PT TOBACCO SCREEN RCVD TLK: CPT

## 2024-02-08 PROCEDURE — 3075F SYST BP GE 130 - 139MM HG: CPT

## 2024-02-08 PROCEDURE — G8427 DOCREV CUR MEDS BY ELIG CLIN: HCPCS

## 2024-02-08 PROCEDURE — 1123F ACP DISCUSS/DSCN MKR DOCD: CPT

## 2024-02-08 PROCEDURE — G8417 CALC BMI ABV UP PARAM F/U: HCPCS

## 2024-02-08 PROCEDURE — G8399 PT W/DXA RESULTS DOCUMENT: HCPCS

## 2024-02-08 PROCEDURE — 1090F PRES/ABSN URINE INCON ASSESS: CPT

## 2024-02-08 PROCEDURE — 3017F COLORECTAL CA SCREEN DOC REV: CPT

## 2024-02-08 RX ORDER — GUAIFENESIN 600 MG/1
600 TABLET, EXTENDED RELEASE ORAL 2 TIMES DAILY
Qty: 30 TABLET | Refills: 0 | Status: SHIPPED | OUTPATIENT
Start: 2024-02-08 | End: 2024-02-23

## 2024-02-08 RX ORDER — BENZONATATE 100 MG/1
100 CAPSULE ORAL 3 TIMES DAILY PRN
Qty: 30 CAPSULE | Refills: 1 | Status: SHIPPED | OUTPATIENT
Start: 2024-02-08 | End: 2024-02-28

## 2024-02-08 RX ORDER — AZITHROMYCIN 250 MG/1
250 TABLET, FILM COATED ORAL SEE ADMIN INSTRUCTIONS
Qty: 6 TABLET | Refills: 0 | Status: CANCELLED | OUTPATIENT
Start: 2024-02-08 | End: 2024-02-13

## 2024-02-08 ASSESSMENT — ENCOUNTER SYMPTOMS
COUGH: 1
CHEST TIGHTNESS: 0
SHORTNESS OF BREATH: 0
WHEEZING: 0

## 2024-02-08 NOTE — PROGRESS NOTES
Attending Physician Statement  I have discussed the care of Carolina Schaefer, including pertinent history and exam findings with the resident. I have reviewed the key elements of all parts of the encounter with the resident.I agree with the assessment, and status of the problem list as documented and this was also documented by the resident.     Complaining of productive cough, denies any CP, SOB and fever/chills.  Seen  by pulmonology yesterday and advised to continue oxygen and inhalers  She is asking for z-pack  There is no indication for Z-pack , no concern for COPD exacerbation or PNA  Symptomatic management     Diagnosis Orders   1. Acute bronchitis, unspecified organism  POCT Influenza A/B    COVID-19    benzonatate (TESSALON) 100 MG capsule    guaiFENesin (MUCINEX) 600 MG extended release tablet           Pura Walsh MD   Attending Physician, [facility]   Faculty, Internal Medicine Residency Program  [facility]

## 2024-02-08 NOTE — PROGRESS NOTES
[facility]  [facility]  [address] [place] [address]  Dept: [phone]  Dept Fax: [phone]    Office Progress/Follow Up Note  Date ofpatient's visit: 2/9/2024  Patient's Name:  Carolina Schaefer YOB: 1960            Patient Care Team:  Soha Mcleod MD as PCP - General (Internal Medicine)  Soha Mcleod MD as PCP - Empaneled Provider  Dash Piña DPM as Consulting Physician (Podiatry)  [facility] ([facility] Health Services)  Milan Paiz MD as Consulting Physician (Orthopedic Surgery)  Alessio Solis MD as Anesthesiologist (Pain Management)  Yohan Leyva OD (Optometry)  ================================================================    REASON FOR VISIT/CHIEF COMPLAINT:  URI (POSSIBLE PT COUGHING UP GREEN FLEM AND LOSS OF VOICE , PT GETS BRONCHITIS YEARLY NO FEVER )    HISTORY OF PRESENTING ILLNESS:  History was obtained from: patient, electronic medical record. Carolina Nolen a 63 y.o. is here for a slight headache, 3 days ago. Cough from 3 days with green phlegm in the morning, nose running, 2 L at night, no fever or chills. No chest pain. Patient went to dr Bhakta yesterday and was recommended to keep using oxygen  and breathing treatments and inhalers. Vaccinated against flu vaccine and covid last November per patient. Use albuterol nebulizer, duoneb and umeclidinium. No increased shortness in breath.      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

## 2024-02-09 ENCOUNTER — APPOINTMENT (OUTPATIENT)
Dept: GENERAL RADIOLOGY | Age: 64
End: 2024-02-09
Payer: MEDICAID

## 2024-02-09 ENCOUNTER — HOSPITAL ENCOUNTER (EMERGENCY)
Age: 64
Discharge: HOME | End: 2024-02-09
Attending: EMERGENCY MEDICINE
Payer: MEDICAID

## 2024-02-09 VITALS
BODY MASS INDEX: 31.65 KG/M2 | RESPIRATION RATE: 22 BRPM | SYSTOLIC BLOOD PRESSURE: 137 MMHG | HEIGHT: 65 IN | OXYGEN SATURATION: 94 % | DIASTOLIC BLOOD PRESSURE: 84 MMHG | WEIGHT: 190 LBS | HEART RATE: 82 BPM | TEMPERATURE: 98 F

## 2024-02-09 DIAGNOSIS — J06.9 ACUTE UPPER RESPIRATORY INFECTION: Primary | ICD-10-CM

## 2024-02-09 LAB
FLUAV RNA RESP QL NAA+PROBE: NOT DETECTED
FLUBV RNA RESP QL NAA+PROBE: NOT DETECTED
SARS-COV-2 RNA RESP QL NAA+PROBE: NOT DETECTED
SOURCE: NORMAL
SPECIMEN DESCRIPTION: NORMAL

## 2024-02-09 PROCEDURE — 71045 X-RAY EXAM CHEST 1 VIEW: CPT

## 2024-02-09 PROCEDURE — 94761 N-INVAS EAR/PLS OXIMETRY MLT: CPT

## 2024-02-09 PROCEDURE — 6370000000 HC RX 637 (ALT 250 FOR IP)

## 2024-02-09 PROCEDURE — 2700000000 HC OXYGEN THERAPY PER DAY

## 2024-02-09 PROCEDURE — 87636 SARSCOV2 & INF A&B AMP PRB: CPT

## 2024-02-09 PROCEDURE — 99284 EMERGENCY DEPT VISIT MOD MDM: CPT

## 2024-02-09 PROCEDURE — 94640 AIRWAY INHALATION TREATMENT: CPT

## 2024-02-09 RX ORDER — PREDNISONE 20 MG/1
60 TABLET ORAL ONCE
Status: COMPLETED | OUTPATIENT
Start: 2024-02-09 | End: 2024-02-09

## 2024-02-09 RX ORDER — AZITHROMYCIN 250 MG/1
250 TABLET, FILM COATED ORAL SEE ADMIN INSTRUCTIONS
Qty: 6 TABLET | Refills: 0 | Status: SHIPPED | OUTPATIENT
Start: 2024-02-09 | End: 2024-02-14

## 2024-02-09 RX ORDER — GUAIFENESIN 600 MG/1
600 TABLET, EXTENDED RELEASE ORAL ONCE
Status: COMPLETED | OUTPATIENT
Start: 2024-02-09 | End: 2024-02-09

## 2024-02-09 RX ORDER — PREDNISONE 50 MG/1
50 TABLET ORAL DAILY
Qty: 4 TABLET | Refills: 0 | Status: SHIPPED | OUTPATIENT
Start: 2024-02-09 | End: 2024-02-13

## 2024-02-09 RX ORDER — IPRATROPIUM BROMIDE AND ALBUTEROL SULFATE 2.5; .5 MG/3ML; MG/3ML
1 SOLUTION RESPIRATORY (INHALATION) ONCE
Status: COMPLETED | OUTPATIENT
Start: 2024-02-09 | End: 2024-02-09

## 2024-02-09 RX ADMIN — PREDNISONE 60 MG: 20 TABLET ORAL at 10:04

## 2024-02-09 RX ADMIN — IPRATROPIUM BROMIDE AND ALBUTEROL SULFATE 1 DOSE: .5; 2.5 SOLUTION RESPIRATORY (INHALATION) at 10:45

## 2024-02-09 RX ADMIN — GUAIFENESIN 600 MG: 600 TABLET ORAL at 10:03

## 2024-02-09 ASSESSMENT — ENCOUNTER SYMPTOMS
DIARRHEA: 0
SHORTNESS OF BREATH: 0
SINUS PRESSURE: 0
PHOTOPHOBIA: 0
CHEST TIGHTNESS: 0
NAUSEA: 0
ABDOMINAL PAIN: 0
SINUS PAIN: 0
TROUBLE SWALLOWING: 0
VOMITING: 0
VOICE CHANGE: 0
CONSTIPATION: 0
SORE THROAT: 1
COUGH: 1

## 2024-02-09 ASSESSMENT — PAIN - FUNCTIONAL ASSESSMENT: PAIN_FUNCTIONAL_ASSESSMENT: 0-10

## 2024-02-09 ASSESSMENT — PAIN SCALES - GENERAL: PAINLEVEL_OUTOF10: 0

## 2024-02-09 NOTE — ED PROVIDER NOTES
eMERGENCY dEPARTMENT eNCOUnter   Independent Attestation     Pt Name: Carolina Schaefer  MRN: [id number]  Birthdate 1960  Date of evaluation: 2/9/24     Carolina Schaefer is a 63 y.o. female with CC: Cough (For 4 days )      Based on the medical record the care appears appropriate.  I was personally available for consultation in the Emergency Department.    Raymond Ortiz DO  Attending Emergency Physician                  Raymond Ortiz DO  02/09/24 1982

## 2024-02-09 NOTE — ED PROVIDER NOTES
HYDROcodone-acetaminophen (NORCO) 5-325 MG per tablet Take 1 tablet by mouth every 8 hours as needed for Pain for up to 30 days. Max Daily Amount: 3 tablets, Disp-90 tablet, R-0Normal      tiZANidine (ZANAFLEX) 4 MG tablet TAKE 1 TABLET BY MOUTH 2 TIMES DAILY, Disp-60 tablet, R-0Normal      meloxicam (MOBIC) 7.5 MG tablet Take 1 tablet by mouth daily, Disp-30 tablet, R-5Normal      potassium chloride (KLOR-CON M) 10 MEQ extended release tablet Take 2 tablets by mouth daily, Disp-60 tablet, R-5Normal      lisinopril-hydroCHLOROthiazide (PRINZIDE;ZESTORETIC) 20-25 MG per tablet 1 tablet daily, Disp-90 tablet, R-1* * N O T I C E * * Last quantity doesn't match original quantityNormal      !! docusate sodium (COLACE) 100 MG capsule Take 1 capsule by mouth daily, Disp-30 capsule, R-5Normal      carvedilol (COREG) 12.5 MG tablet Take 1 tablet by mouth 2 times daily, Disp-180 tablet, R-1Normal      atorvastatin (LIPITOR) 80 MG tablet Take 1 tablet by mouth daily Cancel 40 mg prescription, Disp-90 tablet, R-1Normal      amLODIPine (NORVASC) 10 MG tablet Take 1 tablet by mouth every morning, Disp-90 tablet, R-1Normal      albuterol sulfate HFA (VENTOLIN HFA) 108 (90 Base) MCG/ACT inhaler Inhale 2 puffs into the lungs every 6 hours as needed for Wheezing, Disp-1 each, R-3Normal      Alcohol Swabs (ALCOHOL PREP) PADS Disp-100 each, R-2, NormalUse one alcohol swab to clean your skin before testing your blood sugar      blood glucose monitor strips Test 2 times a day & as needed for symptoms of irregular blood glucose. Dispense sufficient amount for indicated testing frequency plus additional to accommodate PRN testing needs., Disp-100 strip, R-2, Normal      albuterol (PROVENTIL) (2.5 MG/3ML) 0.083% nebulizer solution Take 3 mLs by nebulization every 6 hours as needed for Wheezing, Disp-120 each, R-1Normal      cetirizine (ZYRTEC) 10 MG tablet TAKE 1 TABLET BY MOUTH DAILY, Disp-30 tablet, R-3Normal      omeprazole (PRILOSEC)  trouble swallowing and voice change.    Eyes:  Negative for photophobia and visual disturbance.   Respiratory:  Positive for cough. Negative for chest tightness and shortness of breath.    Cardiovascular:  Negative for chest pain, palpitations and leg swelling.   Gastrointestinal:  Negative for abdominal pain, constipation, diarrhea, nausea and vomiting.   Genitourinary:  Negative for dysuria and flank pain.   Musculoskeletal:  Negative for myalgias and neck pain.   Skin:  Negative for pallor and rash.   Neurological:  Positive for headaches. Negative for dizziness and light-headedness.     Except as noted above the remainder of the review of systems was reviewed and negative.     PHYSICAL EXAM    (up to 7 for level 4, 8 or more for level 5)     ED Triage Vitals   BP Temp Temp src Pulse Respirations SpO2 Height Weight - Scale   02/09/24 0940 02/09/24 0939 -- 02/09/24 0939 02/09/24 0939 02/09/24 0939 02/09/24 0939 02/09/24 0939   137/84 98 °F (36.7 °C)  82 22 (!) 86 % 1.651 m (5' 5\") 86.2 kg (190 lb)     Physical Exam  Vitals and nursing note reviewed.   Constitutional:       General: She is not in acute distress.     Appearance: Normal appearance. She is well-developed and overweight. She is not ill-appearing, toxic-appearing or diaphoretic.   HENT:      Head: Normocephalic and atraumatic.      Right Ear: External ear normal.      Left Ear: External ear normal.      Nose: Nose normal. No congestion or rhinorrhea.      Right Sinus: No maxillary sinus tenderness or frontal sinus tenderness.      Left Sinus: No maxillary sinus tenderness or frontal sinus tenderness.      Mouth/Throat:      Mouth: Mucous membranes are moist. No angioedema.      Palate: No lesions.      Pharynx: Oropharynx is clear. Uvula midline. No pharyngeal swelling, oropharyngeal exudate, posterior oropharyngeal erythema or uvula swelling.   Eyes:      General: No scleral icterus.        Right eye: No discharge.         Left eye: No discharge.

## 2024-02-09 NOTE — ED NOTES
When pt walked into triage room, SpO2 was 86% pt states she wears O2 NC 2L at night. Pt SpO2 raised to 92% with pt not talking and sitting in chair. Pt placed in room and placed on 2L NC.

## 2024-02-09 NOTE — DISCHARGE INSTRUCTIONS
Take your medication as indicated and prescribed.  If you were given a prescription for prednisone or any other steroid then, take Pepcid (famotidine - over the counter) every day while you are taking the steroids.  If you are a diabetic, you should check your blood sugar more frequently while taking prednisone.  Use your inhaler or nebulizer as prescribed, or at minimum every 4 hours while you are having an asthma attack.    If you are given an antibiotic, then make sure you get the prescription filled and take the antibiotics until finished.  Drink plenty of water while taking the antibiotics.  Avoid drinking alcohol or drinks that have caffeine in it while taking antibiotics.       Being around people that smoke, conner houses, weather change can cause an asthma exacerbation.  If you smoke, then you should discuss with your physician about ways to quit smoking.    PLEASE RETURN TO THE EMERGENCY DEPARTMENT IMMEDIATELY for worsening symptoms of shortness of breath, wheezing, change in the amount of sputum that you cough up or a change in the color of your sputum, using your inhaler more frequently or if your inhaler only lasts up to 2 hours, or if you develop any concerning symptoms such as: high fever not relieved by acetaminophen (Tylenol) and/or ibuprofen (Motrin / Advil), chills, shortness of breath, chest pain, feeling of your heart fluttering or racing, persistent nausea and/or vomiting, vomiting up blood, blood in your stool, loss of consciousness, numbness, weakness or tingling in the arms or legs or change in color of the extremities, changes in mental status, persistent headache, blurry vision, loss of bladder / bowel control, unable to follow up with your physician, or other any other care or concern.

## 2024-02-14 RX ORDER — GABAPENTIN 300 MG/1
CAPSULE ORAL
COMMUNITY
Start: 2023-12-19

## 2024-02-23 ENCOUNTER — HOSPITAL ENCOUNTER (OUTPATIENT)
Dept: PAIN MANAGEMENT | Age: 64
Discharge: HOME | End: 2024-02-23
Payer: MEDICAID

## 2024-02-23 VITALS — WEIGHT: 190 LBS | BODY MASS INDEX: 31.65 KG/M2 | HEIGHT: 65 IN

## 2024-02-23 DIAGNOSIS — M53.3 SACROILIAC JOINT PAIN: ICD-10-CM

## 2024-02-23 DIAGNOSIS — M54.16 LUMBAR RADICULOPATHY: ICD-10-CM

## 2024-02-23 DIAGNOSIS — F11.20 OPIOID DEPENDENCE WITH CURRENT USE (HCC): Primary | ICD-10-CM

## 2024-02-23 DIAGNOSIS — Z79.891 CHRONIC USE OF OPIATE DRUG FOR THERAPEUTIC PURPOSE: ICD-10-CM

## 2024-02-23 DIAGNOSIS — M51.36 LUMBAR DEGENERATIVE DISC DISEASE: ICD-10-CM

## 2024-02-23 DIAGNOSIS — M47.26 OSTEOARTHRITIS OF SPINE WITH RADICULOPATHY, LUMBAR REGION: ICD-10-CM

## 2024-02-23 DIAGNOSIS — M47.26 OTHER SPONDYLOSIS WITH RADICULOPATHY, LUMBAR REGION: ICD-10-CM

## 2024-02-23 DIAGNOSIS — M96.1 LUMBAR POST-LAMINECTOMY SYNDROME: ICD-10-CM

## 2024-02-23 PROCEDURE — G0481 DRUG TEST DEF 8-14 CLASSES: HCPCS

## 2024-02-23 PROCEDURE — 99214 OFFICE O/P EST MOD 30 MIN: CPT | Performed by: NURSE PRACTITIONER

## 2024-02-23 PROCEDURE — 99213 OFFICE O/P EST LOW 20 MIN: CPT

## 2024-02-23 PROCEDURE — 80307 DRUG TEST PRSMV CHEM ANLYZR: CPT

## 2024-02-23 RX ORDER — HYDROCODONE BITARTRATE AND ACETAMINOPHEN 5; 325 MG/1; MG/1
1 TABLET ORAL EVERY 8 HOURS PRN
Qty: 90 TABLET | Refills: 0 | Status: SHIPPED | OUTPATIENT
Start: 2024-02-25 | End: 2024-03-26

## 2024-02-23 ASSESSMENT — ENCOUNTER SYMPTOMS
CONSTIPATION: 0
SHORTNESS OF BREATH: 0
BOWEL INCONTINENCE: 0
BACK PAIN: 1
COUGH: 0

## 2024-02-23 NOTE — PROGRESS NOTES
medications unless patient cannot get through daily activities due to pain.     Due to the high risk nature of this patient's pain medication close monitoring is required.   Continue current medication management, pt has been stable and compliant.  Script written for norco  Discussed repeating SI joint injections and she declines  UDS for standard monitoring purposes  Follow up appointment made for 4 weeks    I have reviewed the chief complaint and history of present illness (including ROS and PFSH) and vital documentation by my staff and I agree with their documentation and have added where applicable.

## 2024-02-27 LAB
6-ACETYLMORPHINE, UR: NOT DETECTED
7-AMINOCLONAZEPAM, URINE: NOT DETECTED
ALPHA-OH-ALPRAZ, URINE: NOT DETECTED
ALPHA-OH-MIDAZOLAM, URINE: NOT DETECTED
ALPRAZOLAM, URINE: NOT DETECTED
AMPHETAMINES, URINE: NOT DETECTED
BARBITURATES, URINE: NEGATIVE
BENZOYLECGONINE, UR: NEGATIVE
BUPRENORPHINE URINE: NOT DETECTED
CARISOPRODOL, UR: NEGATIVE
CLONAZEPAM, URINE: NOT DETECTED
CODEINE, URINE: NOT DETECTED
CREAT UR-MCNC: 85.7 MG/DL (ref 20–400)
DIAZEPAM, URINE: NOT DETECTED
DRUGS EXPECTED, UR: NORMAL
EER HI RES INTERP UR: NORMAL
ETHYL GLUCURONIDE UR: NEGATIVE
FENTANYL URINE: NOT DETECTED
GABAPENTIN: PRESENT
HYDROCODONE, URINE: PRESENT
HYDROMORPHONE, URINE: PRESENT
LORAZEPAM, URINE: NOT DETECTED
MARIJUANA METAB, UR: NEGATIVE
MDA, UR: NOT DETECTED
MDEA, EVE, UR: NOT DETECTED
MDMA URINE: NOT DETECTED
MEPERIDINE METAB, UR: NOT DETECTED
METHADONE, URINE: NEGATIVE
METHAMPHETAMINE, URINE: NOT DETECTED
METHYLPHENIDATE: NOT DETECTED
MIDAZOLAM, URINE: NOT DETECTED
MORPHINE, OPI1M: NOT DETECTED
NALOXONE URINE: NOT DETECTED
NORBUPRENORPHINE, URINE: NOT DETECTED
NORDIAZEPAM, URINE: NOT DETECTED
NORFENTANYL, URINE: NOT DETECTED
NORHYDROCODONE, URINE: PRESENT
NOROXYCODONE, URINE: NOT DETECTED
NOROXYMORPHONE, URINE: NOT DETECTED
OXAZEPAM, URINE: NOT DETECTED
OXYCODONE URINE: NOT DETECTED
OXYMORPHONE, URINE: NOT DETECTED
PAIN MANAGEMENT DRUG PANEL INTERP, URINE: NORMAL
PAIN MGT DRUG PANEL, HI RES, UR: NORMAL
PCP,URINE: NEGATIVE
PHENTERMINE, UR: NOT DETECTED
PREGABALIN: NOT DETECTED
TAPENTADOL, URINE: NOT DETECTED
TAPENTADOL-O-SULFATE, URINE: NOT DETECTED
TEMAZEPAM, URINE: NOT DETECTED
TRAMADOL, URINE: NEGATIVE
ZOLPIDEM METABOLITE (ZCA), URINE: NOT DETECTED
ZOLPIDEM, URINE: NOT DETECTED

## 2024-03-05 NOTE — TELEPHONE ENCOUNTER
..Request for   Requested Prescriptions     Pending Prescriptions Disp Refills    tiZANidine (ZANAFLEX) 4 MG tablet [Pharmacy Med Name: TIZANIDINE 4MG] 60 tablet 0     Sig: TAKE 1 TABLET BY MOUTH 2 TIMES DAILY    .      Please review and e-scribe to pharmacy listed in chart if appropriate. Thank you.      Last Visit Date: 2/8/2024  Next Visit Date: Visit date not found    Future Appointments   Date Time Provider Department Center   3/22/2024  9:40 AM Clari Prado, APRN - CNP [facility]   Topic Date Due    Respiratory Syncytial Virus (RSV) Pregnant or age 60 yrs+ (1 - 1-dose 60+ series) Never done    Diabetic retinal exam  05/14/2021    COVID-19 Vaccine (4 - 2023-24 season) 09/01/2023    Colorectal Cancer Screen  10/05/2023    Low dose CT lung screening &/or counseling  04/27/2024    Diabetic foot exam  10/12/2024    A1C test (Diabetic or Prediabetic)  12/14/2024    Diabetic Alb to Cr ratio (uACR) test  12/14/2024    Lipids  12/14/2024    Depression Monitoring  12/14/2024    GFR test (Diabetes, CKD 3-4, OR last GFR 15-59)  12/14/2024    Breast cancer screen  07/31/2025    Cervical cancer screen  03/02/2026    DTaP/Tdap/Td vaccine (2 - Td or Tdap) 06/24/2029    DEXA (modify frequency per FRAX score)  Completed    Flu vaccine  Completed    Shingles vaccine  Completed    Pneumococcal 65+ years Vaccine  Completed    Hepatitis C screen  Completed    HIV screen  Completed    Hepatitis A vaccine  Aged Out    Hepatitis B vaccine  Aged Out    Hib vaccine  Aged Out    Polio vaccine  Aged Out    Meningococcal (ACWY) vaccine  Aged Out    Pneumococcal 0-64 years Vaccine  Discontinued       Hemoglobin A1C (%)   Date Value   12/14/2023 5.6   05/11/2023 6.0   11/22/2022 5.8             ( goal A1C is < 7)   No components found for: \"LABMICR\"  LDL Cholesterol (mg/dL)   Date Value   12/14/2023 117 (H)       (goal LDL is <100)   AST (U/L)   Date Value   10/05/2023 13     ALT (U/L)   Date

## 2024-03-06 RX ORDER — TIZANIDINE 4 MG/1
4 TABLET ORAL 2 TIMES DAILY
Qty: 60 TABLET | Refills: 0 | Status: SHIPPED | OUTPATIENT
Start: 2024-03-06

## 2024-03-18 ENCOUNTER — OFFICE VISIT (OUTPATIENT)
Dept: ORTHOPEDIC SURGERY | Age: 64
End: 2024-03-18
Payer: MEDICAID

## 2024-03-18 VITALS — WEIGHT: 193 LBS | HEIGHT: 65 IN | BODY MASS INDEX: 32.15 KG/M2

## 2024-03-18 DIAGNOSIS — M17.12 PRIMARY OSTEOARTHRITIS OF LEFT KNEE: Primary | ICD-10-CM

## 2024-03-18 PROCEDURE — 99213 OFFICE O/P EST LOW 20 MIN: CPT | Performed by: ORTHOPAEDIC SURGERY

## 2024-03-18 PROCEDURE — 3017F COLORECTAL CA SCREEN DOC REV: CPT | Performed by: ORTHOPAEDIC SURGERY

## 2024-03-18 PROCEDURE — 20610 DRAIN/INJ JOINT/BURSA W/O US: CPT | Performed by: ORTHOPAEDIC SURGERY

## 2024-03-18 PROCEDURE — G8427 DOCREV CUR MEDS BY ELIG CLIN: HCPCS | Performed by: ORTHOPAEDIC SURGERY

## 2024-03-18 PROCEDURE — 4004F PT TOBACCO SCREEN RCVD TLK: CPT | Performed by: ORTHOPAEDIC SURGERY

## 2024-03-18 PROCEDURE — 1123F ACP DISCUSS/DSCN MKR DOCD: CPT | Performed by: ORTHOPAEDIC SURGERY

## 2024-03-18 PROCEDURE — G8399 PT W/DXA RESULTS DOCUMENT: HCPCS | Performed by: ORTHOPAEDIC SURGERY

## 2024-03-18 PROCEDURE — G8417 CALC BMI ABV UP PARAM F/U: HCPCS | Performed by: ORTHOPAEDIC SURGERY

## 2024-03-18 PROCEDURE — G8484 FLU IMMUNIZE NO ADMIN: HCPCS | Performed by: ORTHOPAEDIC SURGERY

## 2024-03-18 PROCEDURE — 1090F PRES/ABSN URINE INCON ASSESS: CPT | Performed by: ORTHOPAEDIC SURGERY

## 2024-03-18 RX ORDER — METHYLPREDNISOLONE ACETATE 40 MG/ML
40 INJECTION, SUSPENSION INTRA-ARTICULAR; INTRALESIONAL; INTRAMUSCULAR; SOFT TISSUE ONCE
Status: COMPLETED | OUTPATIENT
Start: 2024-03-18 | End: 2024-03-18

## 2024-03-18 RX ORDER — LIDOCAINE HYDROCHLORIDE 10 MG/ML
5 INJECTION, SOLUTION INFILTRATION; PERINEURAL ONCE
Status: COMPLETED | OUTPATIENT
Start: 2024-03-18 | End: 2024-03-18

## 2024-03-18 RX ADMIN — LIDOCAINE HYDROCHLORIDE 5 ML: 10 INJECTION, SOLUTION INFILTRATION; PERINEURAL at 09:57

## 2024-03-18 RX ADMIN — METHYLPREDNISOLONE ACETATE 40 MG: 40 INJECTION, SUSPENSION INTRA-ARTICULAR; INTRALESIONAL; INTRAMUSCULAR; SOFT TISSUE at 09:58

## 2024-03-18 NOTE — PROGRESS NOTES
This patient who had undergone arthroscopic partial medial meniscectomy of the left knee on 10/13/2023 is returning here today complaining of pain in the knee.  She is also attending pain management for back and it was proposed to her to get epidural injection.  However she stated to them that her knee was the main problem.    The patient complains of pain mostly in the popliteal area but also on the medial and lateral joint line.  No history of instability.    Examination: There was no effusion.  She has excellent range of motion.  She was tender over both sides of the joint.    I reviewed her operative notes and it does show significantly chondromalacia in both the compartments.    Diagnosis: Primary degenerative changes left knee.    Treatment: Under sterile condition I injected 40 mg Depo-Medrol and 5 cc of 1% plain lidocaine.  Will see her as needed.  She does use ice on the knee and she says it helps her and she may continue with that.

## 2024-03-20 DIAGNOSIS — R73.03 PREDIABETES: ICD-10-CM

## 2024-03-20 NOTE — TELEPHONE ENCOUNTER
..Request for   Requested Prescriptions     Pending Prescriptions Disp Refills    blood glucose test strips (ONETOUCH VERIO) strip [Pharmacy Med Name: TRUEMETRIX STRIP-RX SOLUTIONS] 100 each 10     Si STRIP BY OTHER ROUTE 2 TIMES DAILY TEST 2/DAY    .      Please review and e-scribe to pharmacy listed in chart if appropriate. Thank you.      Last Visit Date: 2024  Next Visit Date: Visit date not found    Future Appointments   Date Time Provider Department Center   3/22/2024  9:40 AM Clari Prado, APRN - CNP [facility] [place]   2024  1:00 PM Kellie Julian, PT [facility]   4/15/2024  8:10 AM Milan Paiz MD ORTHO SPECIA TOP       Health Maintenance   Topic Date Due    Respiratory Syncytial Virus (RSV) Pregnant or age 60 yrs+ (1 - 1-dose 60+ series) Never done    Diabetic retinal exam  2021    COVID-19 Vaccine (2023- season) 2023    Colorectal Cancer Screen  10/05/2023    Low dose CT lung screening &/or counseling  2024    Diabetic foot exam  10/12/2024    A1C test (Diabetic or Prediabetic)  2024    Diabetic Alb to Cr ratio (uACR) test  2024    Lipids  2024    Depression Monitoring  2024    GFR test (Diabetes, CKD 3-4, OR last GFR 15-59)  2024    Breast cancer screen  2025    Cervical cancer screen  2026    DTaP/Tdap/Td vaccine (2 - Td or Tdap) 2029    DEXA (modify frequency per FRAX score)  Completed    Flu vaccine  Completed    Shingles vaccine  Completed    Pneumococcal 65+ years Vaccine  Completed    Hepatitis C screen  Completed    HIV screen  Completed    Hepatitis A vaccine  Aged Out    Hepatitis B vaccine  Aged Out    Hib vaccine  Aged Out    Polio vaccine  Aged Out    Meningococcal (ACWY) vaccine  Aged Out    Pneumococcal 0-64 years Vaccine  Discontinued       Hemoglobin A1C (%)   Date Value   2023 5.6   2023 6.0   2022 5.8             ( goal A1C is < 7)   No components

## 2024-03-21 RX ORDER — BLOOD SUGAR DIAGNOSTIC
STRIP MISCELLANEOUS
Qty: 100 EACH | Refills: 10 | Status: SHIPPED | OUTPATIENT
Start: 2024-03-21

## 2024-03-22 ENCOUNTER — HOSPITAL ENCOUNTER (OUTPATIENT)
Dept: PAIN MANAGEMENT | Age: 64
Discharge: HOME | End: 2024-03-22
Payer: MEDICAID

## 2024-03-22 VITALS — BODY MASS INDEX: 32.15 KG/M2 | WEIGHT: 193 LBS | HEIGHT: 65 IN

## 2024-03-22 DIAGNOSIS — M96.1 LUMBAR POST-LAMINECTOMY SYNDROME: ICD-10-CM

## 2024-03-22 DIAGNOSIS — M47.26 OSTEOARTHRITIS OF SPINE WITH RADICULOPATHY, LUMBAR REGION: ICD-10-CM

## 2024-03-22 DIAGNOSIS — M47.26 OTHER SPONDYLOSIS WITH RADICULOPATHY, LUMBAR REGION: ICD-10-CM

## 2024-03-22 DIAGNOSIS — Z79.891 CHRONIC USE OF OPIATE DRUG FOR THERAPEUTIC PURPOSE: Primary | ICD-10-CM

## 2024-03-22 DIAGNOSIS — M54.16 LUMBAR RADICULOPATHY: ICD-10-CM

## 2024-03-22 PROCEDURE — 99213 OFFICE O/P EST LOW 20 MIN: CPT

## 2024-03-22 PROCEDURE — 99213 OFFICE O/P EST LOW 20 MIN: CPT | Performed by: NURSE PRACTITIONER

## 2024-03-22 RX ORDER — HYDROCODONE BITARTRATE AND ACETAMINOPHEN 5; 325 MG/1; MG/1
1 TABLET ORAL EVERY 8 HOURS PRN
Qty: 90 TABLET | Refills: 0 | Status: SHIPPED | OUTPATIENT
Start: 2024-03-27 | End: 2024-04-26

## 2024-03-22 ASSESSMENT — ENCOUNTER SYMPTOMS
BACK PAIN: 1
BOWEL INCONTINENCE: 0
CONSTIPATION: 0
COUGH: 0
SHORTNESS OF BREATH: 0

## 2024-03-22 NOTE — PROGRESS NOTES
the morning. Associated symptoms include leg pain, numbness and tingling. Pertinent negatives include no bladder incontinence, bowel incontinence, chest pain, fever or weakness. Risk factors include history of osteoporosis. She has tried heat, ice, NSAIDs and muscle relaxant for the symptoms. The treatment provided mild relief.   Medication Refill  This is a chronic problem. The current episode started more than 1 year ago. The problem occurs constantly. The problem has been unchanged. Associated symptoms include joint swelling and numbness. Pertinent negatives include no chest pain, chills, coughing, fever or weakness. The symptoms are aggravated by standing, twisting, bending and coughing. She has tried heat, NSAIDs and oral narcotics for the symptoms. The treatment provided mild relief.     Patient denies any new neurological symptoms. No bowel or bladder incontinence, no weakness, and no falling.    Pill count: appropriate Norco # 11 - due 3/27    Morphine equivalent: 22.5    Controlled Substance Monitoring:    Acute and Chronic Pain Monitoring:   RX Monitoring Periodic Controlled Substance Monitoring   3/22/2024   9:49 AM Possible medication side effects, risk of tolerance/dependence & alternative treatments discussed.;No signs of potential drug abuse or diversion identified.;Assessed functional status (ability to engage in work or other purposeful activities, the pain intensity and its interference with activities of daily living, quality of family life and social activities, and the physical activity);Obtaining appropriate analgesic effect of treatment.            Past Medical History:   Diagnosis Date    Allergic rhinitis     Alveolar hypoventilation 11/20/2020    Aortic insufficiency 2018    mild-moderate on echo (was seen and discharged from [facility]ology-[facility])    Bronchiectasis (HCC) 11/20/2020    Bronchitis     Chronic back pain     Pain management at [place]    COPD (chronic obstructive pulmonary

## 2024-03-28 ENCOUNTER — TELEPHONE (OUTPATIENT)
Dept: ONCOLOGY | Age: 64
End: 2024-03-28

## 2024-04-02 ENCOUNTER — HOSPITAL ENCOUNTER (OUTPATIENT)
Dept: PHYSICAL THERAPY | Facility: CLINIC | Age: 64
Setting detail: THERAPIES SERIES
Discharge: HOME | End: 2024-04-02
Payer: MEDICAID

## 2024-04-02 PROCEDURE — 97161 PT EVAL LOW COMPLEX 20 MIN: CPT

## 2024-04-02 PROCEDURE — 97110 THERAPEUTIC EXERCISES: CPT

## 2024-04-02 NOTE — CONSULTS
[] [facility]  Outpatient Rehabilitation &  Therapy  [address]  [phone]  F: [phone] [x] [facility]  Outpatient Rehabilitation &  Therapy  [address]   [address]  P: [phone]  F: [phone] [] [facility]  Outpatient Rehabilitation &  Therapy  [address]  P: [phone]  F: [phone] [] [facility]  Outpatient Rehabilitation &  Therapy  [address]  P: [phone]  F: [phone] [] [facility]  Outpatient Rehabilitation &  Therapy  [address]   [address]   P: [phone]  F: [phone]    Physical Therapy Lower Extremity Evaluation    Date:  2024  Patient: Carolina Schaefer  : 1960  MRN: [id number]  Physician: Milan Paiz MD    Insurance: [employer] (auth after 12v)  Medical Diagnosis: M17.12 (ICD-10-CM) - Primary osteoarthritis of left knee  Rehab Codes: M17.12, M62.81, R26.89, M25.562, M25.462  Onset date: DOS 10/13/23   Next 's appt.: 4/15/24    Subjective:   CC/HPI: 63 y.o. female presents to physical therapy with chronic L knee pain.    Pt describes history of B knee pain for years, had R knee scope over 5 years ago and noticed increased L knee pain following. Managed symptoms with cortisone injections that help temporarily, had L knee arthroscopic surgery on 10/13/23. Has not had therapy following this. Pt describes feeling of L knee locking up, pain and difficulty bending knee, sitting prolonged, driving prolonged. Feels that swelling in knee increased over the past 2 weeks and is unsure of cause. Pt has history of back pain that limits mobility and causes intermittent numbness and tingling especially R side. Pt has a cane and power wheelchair. Pt does admit to falling often. Pt denies L knee buckling or giving out. Tries not to walk much by herself, does have AD but doesn't use frequently.       Past Medical History:   Diagnosis Date

## 2024-04-02 NOTE — PLAN OF CARE
gait, impaired balance         Goals  MET NOT MET ON-  GOING  Details   Date Addressed:            STG: To be met in 6 treatments            1. ? Pain: Decrease L knee pain levels to 7/10 or less to ease ADL progression. []  []  []      2. ? ROM: Increase L knee AROM limitations throughout to equal bilat to reduce difficulty with ADLs. []  []  []      3. ? Strength: Increase L hip and knee strength to 4+/5 throughout to ease functional limitations and mobility  []  []  []      4. Independent with Home Exercise Programs with ability to demonstrate exercises without cueing for technique.  []  []  []      5. Demonstrate knowledge of fall risk prevention  [x]  []  []                  Date Addressed:            LTG: To be met in 12 treatments           1. Improve score on assessment tool LEFI from 80% impairment to less than 65% impairment to demonstrate improved functional mobility []  []  []      2. Reduce L knee pain levels to 5/10 or less to improve independence with all daily activities including gait. []  []  []      3. Pt to demonstrate ability to ambulate at least 150 with least restrictive AD demonstrating appropriate safety awareness and without knee pain to ease household access. []  []  []      4. Pt to demonstrate ability to maintain tandem stance for 20\" with appropriate postural stability and without increased knee pain for improved safety with gait. []  []  []                           Patient goals: reduce pain, improve knee bend     Rehab Potential:  [] Good  [x] Fair  [] Poor   Suggested Professional Referral:  [x] No  [] Yes:  Barriers to Goal Achievement::  [] No  [x] Yes: chronic lumbar radiculopathy  Domestic Concerns:  [x] No  [] Yes:      Treatment Plan:  [x] Therapeutic Exercise   79457  [] Iontophoresis: 4 mg/mL Dexamethasone Sodium Phosphate  mAmin  84823   [x] Therapeutic Activity  06248 [x] Vasopneumatic cold with compression  62167    [x] Gait Training   34921 [] Ultrasound   65033

## 2024-04-03 DIAGNOSIS — J30.9 CHRONIC ALLERGIC RHINITIS: ICD-10-CM

## 2024-04-03 NOTE — TELEPHONE ENCOUNTER
A Refill Has Been Requested for Carolina BEN Olive    Medication Requested  Requested Prescriptions     Pending Prescriptions Disp Refills    cetirizine (ZYRTEC) 10 MG tablet [Pharmacy Med Name: CETIRIZINE 10MG TAB] 30 tablet 2     Sig: TAKE 1 TABLET BY MOUTH DAILY       Last Visit Date (If Applicable)  12/14/2023    Next Visit Date (If Applicable)  Visit date not found

## 2024-04-04 RX ORDER — CETIRIZINE HYDROCHLORIDE 10 MG/1
10 TABLET ORAL DAILY
Qty: 30 TABLET | Refills: 2 | Status: SHIPPED | OUTPATIENT
Start: 2024-04-04

## 2024-04-09 ENCOUNTER — HOSPITAL ENCOUNTER (OUTPATIENT)
Dept: PHYSICAL THERAPY | Facility: CLINIC | Age: 64
Setting detail: THERAPIES SERIES
Discharge: HOME | End: 2024-04-09
Payer: COMMERCIAL

## 2024-04-09 PROCEDURE — 97110 THERAPEUTIC EXERCISES: CPT

## 2024-04-09 NOTE — FLOWSHEET NOTE
[] [facility]  Outpatient Rehabilitation &  Therapy  [address]  [phone]  [phone] [x] [facility]  Outpatient Rehabilitation &  Therapy  [address]  P: [phone]  F: [phone] [] [facility]  Outpatient Rehabilitation &  Therapy  [address]  P: [phone]  F: [phone] [] [facility]  Outpatient Rehabilitation &  Therapy  [address]  [phone]  [phone] [] [facility]  Outpatient Rehabilitation &  Therapy  [address]   P: [phone]  F: [phone]  [] [facility]  Outpatient Rehabilitation &  Therapy  [address]  P: [phone]  F: [phone] [] [facility]  Outpatient Rehabilitation &  Therapy  [address]  P: [phone]  F: [phone] [] [facility]  Outpatient Rehabilitation &  Therapy  [address]  P: [phone]  F: [phone] [] [facility]  Outpatient Rehabilitation &  Therapy  [address]  P: [phone]  F: [phone]  [] [facility] Outpatient Rehabilitation &  Therapy  [address]  P: [phone]  F: [phone]     Physical Therapy Daily Treatment Note    Date:  2024  Patient Name:  Carolina Schaefer    :  1960  MRN: [id number]  Physician: Milan Paiz MD                                        Insurance: [employer] (auth after 12v)  Medical Diagnosis: M17.12 (ICD-10-CM) - Primary osteoarthritis of left knee       Rehab Codes: M17.12, M62.81, R26.89, M25.562, M25.462  Onset date: DOS 10/13/23                            Next 's appt.: 4/15/24  Visit# / total visits:      Cancels/No Shows: 0    Subjective:    Pain:  [] Yes  [x] No Location: L knee Pain Rating: (0-10 scale) 0/10  Pain altered Tx:  [x] No  [] Yes  Action:    Comments: Pt

## 2024-04-11 ENCOUNTER — APPOINTMENT (OUTPATIENT)
Dept: CT IMAGING | Age: 64
End: 2024-04-11
Payer: OTHER MISCELLANEOUS

## 2024-04-11 ENCOUNTER — HOSPITAL ENCOUNTER (EMERGENCY)
Age: 64
Discharge: HOME | End: 2024-04-11
Attending: EMERGENCY MEDICINE
Payer: OTHER MISCELLANEOUS

## 2024-04-11 VITALS
BODY MASS INDEX: 31.96 KG/M2 | HEIGHT: 65 IN | HEART RATE: 73 BPM | DIASTOLIC BLOOD PRESSURE: 90 MMHG | SYSTOLIC BLOOD PRESSURE: 132 MMHG | WEIGHT: 191.8 LBS | RESPIRATION RATE: 17 BRPM | TEMPERATURE: 98.1 F | OXYGEN SATURATION: 95 %
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
## 2024-04-11 DIAGNOSIS — M54.50 MIDLINE LOW BACK PAIN WITHOUT SCIATICA, UNSPECIFIED CHRONICITY: ICD-10-CM

## 2024-04-11 DIAGNOSIS — S16.1XXA STRAIN OF NECK MUSCLE, INITIAL ENCOUNTER: ICD-10-CM

## 2024-04-11 DIAGNOSIS — S39.012A LUMBAR STRAIN, INITIAL ENCOUNTER: Primary | ICD-10-CM

## 2024-04-11 PROCEDURE — 72128 CT CHEST SPINE W/O DYE: CPT

## 2024-04-11 PROCEDURE — 72125 CT NECK SPINE W/O DYE: CPT

## 2024-04-11 PROCEDURE — 72131 CT LUMBAR SPINE W/O DYE: CPT

## 2024-04-11 PROCEDURE — 6370000000 HC RX 637 (ALT 250 FOR IP)

## 2024-04-11 PROCEDURE — 99284 EMERGENCY DEPT VISIT MOD MDM: CPT | Performed by: EMERGENCY MEDICINE

## 2024-04-11 RX ORDER — ACETAMINOPHEN 325 MG/1
650 TABLET ORAL ONCE
Status: COMPLETED | OUTPATIENT
Start: 2024-04-11 | End: 2024-04-11

## 2024-04-11 RX ORDER — LIDOCAINE 4 G/G
1 PATCH TOPICAL DAILY
Status: DISCONTINUED | OUTPATIENT
Start: 2024-04-11 | End: 2024-04-11 | Stop reason: HOSPADM

## 2024-04-11 RX ADMIN — ACETAMINOPHEN 650 MG: 325 TABLET ORAL at 11:05

## 2024-04-11 ASSESSMENT — PAIN SCALES - GENERAL
PAINLEVEL_OUTOF10: 6
PAINLEVEL_OUTOF10: 8

## 2024-04-11 ASSESSMENT — ENCOUNTER SYMPTOMS
BACK PAIN: 1
EYES NEGATIVE: 1
GASTROINTESTINAL NEGATIVE: 1
RESPIRATORY NEGATIVE: 1
ALLERGIC/IMMUNOLOGIC NEGATIVE: 1

## 2024-04-11 ASSESSMENT — PAIN DESCRIPTION - DESCRIPTORS
DESCRIPTORS: DISCOMFORT
DESCRIPTORS: PRESSURE

## 2024-04-11 ASSESSMENT — LIFESTYLE VARIABLES
HOW MANY STANDARD DRINKS CONTAINING ALCOHOL DO YOU HAVE ON A TYPICAL DAY: PATIENT DOES NOT DRINK
HOW OFTEN DO YOU HAVE A DRINK CONTAINING ALCOHOL: NEVER

## 2024-04-11 ASSESSMENT — PAIN DESCRIPTION - LOCATION
LOCATION: NECK;BACK
LOCATION: NECK;BACK
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
## 2024-04-15 ENCOUNTER — OFFICE VISIT (OUTPATIENT)
Dept: ORTHOPEDIC SURGERY | Age: 64
End: 2024-04-15
Payer: COMMERCIAL

## 2024-04-15 VITALS — WEIGHT: 191 LBS | BODY MASS INDEX: 31.82 KG/M2 | HEIGHT: 65 IN

## 2024-04-15 DIAGNOSIS — M17.12 PRIMARY OSTEOARTHRITIS OF LEFT KNEE: Primary | ICD-10-CM

## 2024-04-15 PROCEDURE — 1123F ACP DISCUSS/DSCN MKR DOCD: CPT | Performed by: ORTHOPAEDIC SURGERY

## 2024-04-15 PROCEDURE — G8399 PT W/DXA RESULTS DOCUMENT: HCPCS | Performed by: ORTHOPAEDIC SURGERY

## 2024-04-15 PROCEDURE — 1090F PRES/ABSN URINE INCON ASSESS: CPT | Performed by: ORTHOPAEDIC SURGERY

## 2024-04-15 PROCEDURE — 3017F COLORECTAL CA SCREEN DOC REV: CPT | Performed by: ORTHOPAEDIC SURGERY

## 2024-04-15 PROCEDURE — 4004F PT TOBACCO SCREEN RCVD TLK: CPT | Performed by: ORTHOPAEDIC SURGERY

## 2024-04-15 PROCEDURE — G8417 CALC BMI ABV UP PARAM F/U: HCPCS | Performed by: ORTHOPAEDIC SURGERY

## 2024-04-15 PROCEDURE — 99212 OFFICE O/P EST SF 10 MIN: CPT | Performed by: ORTHOPAEDIC SURGERY

## 2024-04-15 PROCEDURE — G8427 DOCREV CUR MEDS BY ELIG CLIN: HCPCS | Performed by: ORTHOPAEDIC SURGERY

## 2024-04-15 NOTE — PROGRESS NOTES
This patient with known primary osteoarthritis of the left knee is seen here because of recurrence of pain.  She had the last injection on March 18.  It worked for quite a while.  But now it is painful again.  The knee has not given out on her and has not no control.  She describes the pain generally across the whole knee joint.    Examination: Her gait and stance were excellent.  Knee examination shows mild effusion and tenderness over the medial and lateral joint line and crepitation in all the compartments.    Diagnosis: Primary osteoarthritis left knee.    Treatment: Since the corticosteroid injection did not last very long as suggested that we apply for gel injection authorization from the insurance company.  She is agreeable to the plan.

## 2024-04-16 ENCOUNTER — HOSPITAL ENCOUNTER (OUTPATIENT)
Dept: PHYSICAL THERAPY | Facility: CLINIC | Age: 64
Setting detail: THERAPIES SERIES
Discharge: HOME | End: 2024-04-16
Payer: COMMERCIAL

## 2024-04-16 PROCEDURE — 97110 THERAPEUTIC EXERCISES: CPT

## 2024-04-16 NOTE — FLOWSHEET NOTE
[] [facility]  Outpatient Rehabilitation &  Therapy  [address]  [phone]  [phone] [x] [facility]  Outpatient Rehabilitation &  Therapy  [address]  P: [phone]  F: [phone] [] [facility]  Outpatient Rehabilitation &  Therapy  [address]  P: [phone]  F: [phone] [] [facility]  Outpatient Rehabilitation &  Therapy  [address]  [phone]  [phone] [] [facility]  Outpatient Rehabilitation &  Therapy  [address]   P: [phone]  F: [phone]  [] [facility]  Outpatient Rehabilitation &  Therapy  [address]  P: [phone]  F: [phone] [] [facility]  Outpatient Rehabilitation &  Therapy  [address]  P: [phone]  F: [phone] [] [facility]  Outpatient Rehabilitation &  Therapy  [address]  P: [phone]  F: [phone] [] [facility]  Outpatient Rehabilitation &  Therapy  [address]  P: [phone]  F: [phone]  [] [facility] Outpatient Rehabilitation &  Therapy  [address]  P: [phone]  F: [phone]     Physical Therapy Daily Treatment Note    Date:  2024  Patient Name:  Carolina Schaefer    :  1960  MRN: [id number]  Physician: Milan Paiz MD                                        Insurance: Cheryl Ashley (auth after 12v)  Medical Diagnosis: M17.12 (ICD-10-CM) - Primary osteoarthritis of left knee       Rehab Codes: M17.12, M62.81, R26.89, M25.562, M25.462  Onset date: DOS 10/13/23                            Next 's appt.: 4/15/24  Visit# / total visits: 3/12     Cancels/No Shows: 0    Subjective:    Pain:  [] Yes  [x] No Location: L knee Pain Rating: (0-10 scale) 8/10  Pain altered Tx:  [x] No  [] Yes  Action:    Comments: Pt

## 2024-04-17 ENCOUNTER — OFFICE VISIT (OUTPATIENT)
Dept: INTERNAL MEDICINE | Age: 64
End: 2024-04-17
Payer: COMMERCIAL

## 2024-04-17 VITALS
HEIGHT: 65 IN | TEMPERATURE: 98 F | DIASTOLIC BLOOD PRESSURE: 82 MMHG | RESPIRATION RATE: 16 BRPM | BODY MASS INDEX: 31.65 KG/M2 | SYSTOLIC BLOOD PRESSURE: 128 MMHG | OXYGEN SATURATION: 93 % | WEIGHT: 190 LBS | HEART RATE: 84 BPM

## 2024-04-17 DIAGNOSIS — M47.26 OSTEOARTHRITIS OF SPINE WITH RADICULOPATHY, LUMBAR REGION: ICD-10-CM

## 2024-04-17 DIAGNOSIS — M62.830 MUSCLE SPASM OF BACK: Primary | ICD-10-CM

## 2024-04-17 DIAGNOSIS — M96.1 LUMBAR POST-LAMINECTOMY SYNDROME: ICD-10-CM

## 2024-04-17 DIAGNOSIS — M46.1 SACROILIITIS (HCC): ICD-10-CM

## 2024-04-17 PROCEDURE — 3079F DIAST BP 80-89 MM HG: CPT

## 2024-04-17 PROCEDURE — 4004F PT TOBACCO SCREEN RCVD TLK: CPT

## 2024-04-17 PROCEDURE — 3017F COLORECTAL CA SCREEN DOC REV: CPT

## 2024-04-17 PROCEDURE — 99213 OFFICE O/P EST LOW 20 MIN: CPT

## 2024-04-17 PROCEDURE — G8417 CALC BMI ABV UP PARAM F/U: HCPCS

## 2024-04-17 PROCEDURE — 1123F ACP DISCUSS/DSCN MKR DOCD: CPT

## 2024-04-17 PROCEDURE — 3074F SYST BP LT 130 MM HG: CPT

## 2024-04-17 PROCEDURE — 99211 OFF/OP EST MAY X REQ PHY/QHP: CPT | Performed by: INTERNAL MEDICINE

## 2024-04-17 PROCEDURE — 1090F PRES/ABSN URINE INCON ASSESS: CPT

## 2024-04-17 PROCEDURE — G8427 DOCREV CUR MEDS BY ELIG CLIN: HCPCS

## 2024-04-17 PROCEDURE — G8399 PT W/DXA RESULTS DOCUMENT: HCPCS

## 2024-04-17 RX ORDER — TIZANIDINE 4 MG/1
4 TABLET ORAL 2 TIMES DAILY
Qty: 60 TABLET | Refills: 0 | Status: SHIPPED | OUTPATIENT
Start: 2024-04-17

## 2024-04-17 ASSESSMENT — PATIENT HEALTH QUESTIONNAIRE - PHQ9
8. MOVING OR SPEAKING SO SLOWLY THAT OTHER PEOPLE COULD HAVE NOTICED. OR THE OPPOSITE, BEING SO FIGETY OR RESTLESS THAT YOU HAVE BEEN MOVING AROUND A LOT MORE THAN USUAL: NOT AT ALL
SUM OF ALL RESPONSES TO PHQ QUESTIONS 1-9: 2
10. IF YOU CHECKED OFF ANY PROBLEMS, HOW DIFFICULT HAVE THESE PROBLEMS MADE IT FOR YOU TO DO YOUR WORK, TAKE CARE OF THINGS AT HOME, OR GET ALONG WITH OTHER PEOPLE: NOT DIFFICULT AT ALL
SUM OF ALL RESPONSES TO PHQ QUESTIONS 1-9: 2
SUM OF ALL RESPONSES TO PHQ9 QUESTIONS 1 & 2: 1
SUM OF ALL RESPONSES TO PHQ QUESTIONS 1-9: 2
7. TROUBLE CONCENTRATING ON THINGS, SUCH AS READING THE NEWSPAPER OR WATCHING TELEVISION: NOT AT ALL
5. POOR APPETITE OR OVEREATING: NOT AT ALL
6. FEELING BAD ABOUT YOURSELF - OR THAT YOU ARE A FAILURE OR HAVE LET YOURSELF OR YOUR FAMILY DOWN: NOT AT ALL
4. FEELING TIRED OR HAVING LITTLE ENERGY: SEVERAL DAYS
3. TROUBLE FALLING OR STAYING ASLEEP: NOT AT ALL
SUM OF ALL RESPONSES TO PHQ QUESTIONS 1-9: 2
9. THOUGHTS THAT YOU WOULD BE BETTER OFF DEAD, OR OF HURTING YOURSELF: NOT AT ALL
1. LITTLE INTEREST OR PLEASURE IN DOING THINGS: NOT AT ALL
2. FEELING DOWN, DEPRESSED OR HOPELESS: SEVERAL DAYS

## 2024-04-17 ASSESSMENT — ENCOUNTER SYMPTOMS: BACK PAIN: 1

## 2024-04-17 NOTE — PROGRESS NOTES
Attending Physician Statement  I have discussed the care of Carolina Schaefer,  including pertinent history and exam findings,  with the resident. I have seen and examined the patient and the key elements of all parts of the encounter have been performed by me.  I agree with the assessment, plan and orders as documented by the resident.  (GC Modifier)    MD MARYA Dang  Attending Physician  Internal Medicine Residency Program, Nephrology   [facility]  4/17/2024, 1:49 PM  
  Constitutional:  Negative for chills, fatigue and fever.   Genitourinary:  Negative for enuresis.   Musculoskeletal:  Positive for arthralgias, back pain and myalgias. Negative for gait problem, joint swelling, neck pain and neck stiffness.        No weakness  No focal neurologic deficit  No bowel or bladder incontinence  No genital paresthesia   Neurological:  Negative for weakness.       PHYSICAL EXAM:  Vitals:    04/17/24 1325   BP: 128/82   Site: Left Upper Arm   Position: Sitting   Pulse: 84   Resp: 16   Temp: 98 °F (36.7 °C)   SpO2: 93%   Weight: 86.2 kg (190 lb)   Height: 1.651 m (5' 5\")     BP Readings from Last 3 Encounters:   04/17/24 128/82   04/11/24 (!) 132/90   02/09/24 137/84        Physical Exam  Constitutional:       General: She is not in acute distress.     Appearance: She is not ill-appearing.   Musculoskeletal:         General: Tenderness present. No swelling or signs of injury.      Cervical back: No spasms, tenderness or bony tenderness.      Thoracic back: No deformity, signs of trauma or spasms.      Lumbar back: Deformity and tenderness present. No signs of trauma, spasms or bony tenderness. No scoliosis.      Comments: Surgical scar, mild tenderness to lower back.   Neurological:      General: No focal deficit present.      Mental Status: She is oriented to person, place, and time.      Cranial Nerves: No cranial nerve deficit.      Sensory: No sensory deficit.      Motor: No weakness.      Coordination: Coordination normal.      Gait: Gait normal.      Deep Tendon Reflexes: Reflexes normal.           DIAGNOSTIC FINDINGS:  CBC:  Lab Results   Component Value Date/Time    WBC 5.7 10/05/2023 11:20 AM    HGB 14.8 10/05/2023 11:20 AM     10/05/2023 11:20 AM     02/16/2012 09:02 AM       BMP:    Lab Results   Component Value Date/Time     12/14/2023 11:10 AM    K 3.8 12/14/2023 11:10 AM     12/14/2023 11:10 AM    CO2 25 12/14/2023 11:10 AM    BUN 13 12/14/2023 11:10

## 2024-04-19 ENCOUNTER — HOSPITAL ENCOUNTER (OUTPATIENT)
Dept: PHYSICAL THERAPY | Facility: CLINIC | Age: 64
Setting detail: THERAPIES SERIES
Discharge: HOME | End: 2024-04-19
Payer: COMMERCIAL

## 2024-04-19 NOTE — FLOWSHEET NOTE
[] [facility]  Outpatient Rehabilitation &  Therapy  [address]  [phone]  [phone] [x] [facility]  Outpatient Rehabilitation &  Therapy  [address]  P: [phone]  F: [phone] [] [facility]  Outpatient Rehabilitation &  Therapy  [address]  P: [phone]  F: [phone] [] [facility]  Outpatient Rehabilitation &  Therapy  [address]  [phone]  [phone] [] [facility]  Outpatient Rehabilitation &  Therapy  [address]   P: [phone]  F: [phone]  [] [facility]  Outpatient Rehabilitation &  Therapy  [address]  P: [phone]  F: [phone] [] [facility]  Outpatient Rehabilitation &  Therapy  [address]  P: [phone]  F: [phone] [] [facility]  Outpatient Rehabilitation &  Therapy  [address]  P: [phone]  F: [phone] [] [facility]  Outpatient Rehabilitation &  Therapy  [address]  P: [phone]  F: [phone]  [] [facility] Outpatient Rehabilitation &  Therapy  [address]  P: [phone]  F: [phone]     Therapy Cancel/No Show note    Date: 2024  Patient: Carolina Schaefre  : 1960  MRN: [id number]    Cancels/No Shows to date: 0    For today's appointment patient:    [x]  Cancelled    [] Rescheduled appointment    [] No-show     Reason given by patient:    []  Patient ill    []  Conflicting appointment    [] No transportation      [] Conflict with work    [] No reason given    [] Weather related    [] COVID-19    [x] Other:   knee swollen and painful   Comments:        [x] Next appointment was confirmed    Electronically signed by: VALENCIA SANTILLAN PTA

## 2024-04-23 ENCOUNTER — HOSPITAL ENCOUNTER (OUTPATIENT)
Dept: PHYSICAL THERAPY | Facility: CLINIC | Age: 64
Setting detail: THERAPIES SERIES
Discharge: HOME | End: 2024-04-23
Payer: COMMERCIAL

## 2024-04-23 ENCOUNTER — HOSPITAL ENCOUNTER (OUTPATIENT)
Dept: PAIN MANAGEMENT | Age: 64
Discharge: HOME | End: 2024-04-23
Payer: COMMERCIAL

## 2024-04-23 VITALS — WEIGHT: 190 LBS | HEIGHT: 65 IN | BODY MASS INDEX: 31.65 KG/M2

## 2024-04-23 DIAGNOSIS — M47.26 OSTEOARTHRITIS OF SPINE WITH RADICULOPATHY, LUMBAR REGION: ICD-10-CM

## 2024-04-23 DIAGNOSIS — M53.3 SACROILIAC JOINT PAIN: ICD-10-CM

## 2024-04-23 DIAGNOSIS — M54.41 CHRONIC MIDLINE LOW BACK PAIN WITH RIGHT-SIDED SCIATICA: ICD-10-CM

## 2024-04-23 DIAGNOSIS — Z79.891 CHRONIC USE OF OPIATE DRUG FOR THERAPEUTIC PURPOSE: ICD-10-CM

## 2024-04-23 DIAGNOSIS — G89.29 CHRONIC MIDLINE LOW BACK PAIN WITH RIGHT-SIDED SCIATICA: ICD-10-CM

## 2024-04-23 DIAGNOSIS — M96.1 LUMBAR POST-LAMINECTOMY SYNDROME: ICD-10-CM

## 2024-04-23 DIAGNOSIS — M54.16 LUMBAR RADICULOPATHY: ICD-10-CM

## 2024-04-23 DIAGNOSIS — M17.0 PRIMARY OSTEOARTHRITIS OF BOTH KNEES: Primary | ICD-10-CM

## 2024-04-23 DIAGNOSIS — M51.36 LUMBAR DEGENERATIVE DISC DISEASE: ICD-10-CM

## 2024-04-23 PROCEDURE — 99214 OFFICE O/P EST MOD 30 MIN: CPT | Performed by: NURSE PRACTITIONER

## 2024-04-23 PROCEDURE — 99213 OFFICE O/P EST LOW 20 MIN: CPT

## 2024-04-23 PROCEDURE — 97110 THERAPEUTIC EXERCISES: CPT

## 2024-04-23 RX ORDER — HYDROCODONE BITARTRATE AND ACETAMINOPHEN 5; 325 MG/1; MG/1
1 TABLET ORAL EVERY 8 HOURS PRN
Qty: 90 TABLET | Refills: 0 | Status: SHIPPED | OUTPATIENT
Start: 2024-04-26 | End: 2024-05-26

## 2024-04-23 ASSESSMENT — ENCOUNTER SYMPTOMS
CONSTIPATION: 0
BOWEL INCONTINENCE: 0
COUGH: 0
BACK PAIN: 1
SHORTNESS OF BREATH: 0

## 2024-04-23 ASSESSMENT — PAIN SCALES - GENERAL: PAINLEVEL_OUTOF10: 7

## 2024-04-23 NOTE — PROGRESS NOTES
left lumbar median branch radiofrequency    NERVE BLOCK  07/02/2014    caudal, celestone 9 mg    NERVE BLOCK  07/16/2014    caudal epidural #2, celestone 9mg, fentanyl 25mcg    NERVE BLOCK  07/30/2014    caudal #3 decadron 10mg    NERVE BLOCK  11/06/2014    duramorph epidural steroid block  duramorph 1 mg celestone 9 mg    NERVE BLOCK  11/20/2015    TENS- Empi Select    NERVE BLOCK  07/20/2018    right transforminal # 1 decadron 10mg,isovue    NERVE BLOCK Bilateral 02/01/2019    bilat mbnb- no steroid    NERVE BLOCK Bilateral 02/08/2019    bilat mbnb, marcaine .25%    WA ARTHROSCOPY KNEE DIAGNOSTIC W/WO SYNOVIAL BX SPX Right 03/24/2017    KNEE ARTHROSCOPY WITH PARTIAL MEDIAL MENISECAL DEBRIDMENT  performed by Milan Paiz MD at [facility] OR    UPPER GASTROINTESTINAL ENDOSCOPY  09/20/2007    UPPER GASTROINTESTINAL ENDOSCOPY  4 21 2009,04/2011    gastritis, esophagitis       Allergies   Allergen Reactions    Aspirin      DUE TO ULCER    Claritin [Loratadine] Other (See Comments)     coughing    Flonase [Fluticasone Propionate] Rash    Ibuprofen Other (See Comments)     Stomach ulcers    Morphine And Related      GI Upset         Current Outpatient Medications:     [START ON 4/26/2024] HYDROcodone-acetaminophen (NORCO) 5-325 MG per tablet, Take 1 tablet by mouth every 8 hours as needed for Pain for up to 30 days. Max Daily Amount: 3 tablets, Disp: 90 tablet, Rfl: 0    tiZANidine (ZANAFLEX) 4 MG tablet, Take 1 tablet by mouth 2 times daily, Disp: 60 tablet, Rfl: 0    cetirizine (ZYRTEC) 10 MG tablet, TAKE 1 TABLET BY MOUTH DAILY, Disp: 30 tablet, Rfl: 2    blood glucose test strips (ONETOUCH VERIO) strip, 1 STRIP BY OTHER ROUTE 2 TIMES DAILY TEST 2/DAY, Disp: 100 each, Rfl: 10    gabapentin (NEURONTIN) 300 MG capsule, , Disp: , Rfl:     meloxicam (MOBIC) 7.5 MG tablet, Take 1 tablet by mouth daily, Disp: 30 tablet, Rfl: 5    potassium chloride (KLOR-CON M) 10 MEQ extended release tablet, Take 2 tablets by mouth daily, Disp:

## 2024-04-23 NOTE — FLOWSHEET NOTE
[] [facility]  Outpatient Rehabilitation &  Therapy  [address]  [phone]  [phone] [x] [facility]  Outpatient Rehabilitation &  Therapy  [address]  P: [phone]  F: [phone] [] [facility]  Outpatient Rehabilitation &  Therapy  [address]  P: [phone]  F: [phone] [] [facility]  Outpatient Rehabilitation &  Therapy  [address]  [phone]  [phone] [] [facility]  Outpatient Rehabilitation &  Therapy  [address]   P: [phone]  F: [phone]  [] [facility]  Outpatient Rehabilitation &  Therapy  [address]  P: [phone]  F: [phone] [] [facility]  Outpatient Rehabilitation &  Therapy  [address]  P: [phone]  F: [phone] [] [facility]  Outpatient Rehabilitation &  Therapy  [address]  P: [phone]  F: [phone] [] [facility]  Outpatient Rehabilitation &  Therapy  [address]  P: [phone]  F: [phone]  [] [facility] Outpatient Rehabilitation &  Therapy  [address]  P: [phone]  F: [phone]     Physical Therapy Daily Treatment Note    Date:  2024  Patient Name:  Carolina Schaefer    :  1960  MRN: [id number]  Physician: Milan Paiz MD                                        Insurance: Cheryl Ashley (auth after 12v)  Medical Diagnosis: M17.12 (ICD-10-CM) - Primary osteoarthritis of left knee       Rehab Codes: M17.12, M62.81, R26.89, M25.562, M25.462  Onset date: DOS 10/13/23                            Next 's appt.: 4/15/24  Visit# / total visits:      Cancels/No Shows: 1/0    Subjective:    Pain:  [] Yes  [x] No Location: L knee Pain Rating: (0-10 scale) 8-9/10  Pain altered Tx:  [x] No  [] Yes

## 2024-04-24 DIAGNOSIS — F17.200 SMOKER: ICD-10-CM

## 2024-04-25 NOTE — TELEPHONE ENCOUNTER
Left a voice mail asking the patient to return the call to the office.  
Pt called back and states she smokes 5 cigarettes a week now.   
Pneumococcal 0-64 years Vaccine  Discontinued       Hemoglobin A1C (%)   Date Value   12/14/2023 5.6   05/11/2023 6.0   11/22/2022 5.8             ( goal A1C is < 7)   No components found for: \"LABMICR\"  LDL Cholesterol (mg/dL)   Date Value   12/14/2023 117 (H)       (goal LDL is <100)   AST (U/L)   Date Value   10/05/2023 13     ALT (U/L)   Date Value   10/05/2023 6     BUN (mg/dL)   Date Value   12/14/2023 13     BP Readings from Last 3 Encounters:   04/17/24 128/82   04/11/24 (!) 132/90   02/09/24 137/84          (goal 120/80)    All Future Testing planned in [facility]  Lab Frequency Next Occurrence   Electrolyte Panel Once 10/16/2023   COVID-19 Once 02/08/2024   DRUG SCREEN, PAIN Once 02/23/2024         Patient Active Problem List:     DJD (degenerative joint disease) of knee     Osteoarthritis of spine with radiculopathy, lumbar region     Lumbar radiculopathy     GERD (gastroesophageal reflux disease)     COPD, severity to be determined (HCC)     HTN (hypertension)     Allergic rhinitis     Lipoma of shoulder s/p excision right posterior 11 17 2008     History of tobacco use     DM (diabetes mellitus)     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     Chronic low back pain     Major depression, chronic     Chronic respiratory failure with hypoxia (HCC)     Mitral and aortic insufficiency     Pure hypercholesterolemia     Other spondylosis with radiculopathy, lumbar region     Lumbar degenerative disc disease     Alveolar hypoventilation     Obesity (BMI 30-39.9)     Bronchiectasis (HCC)     Centrilobular emphysema (HCC)     Oxygen dependent     Tobacco dependence     Idiopathic sleep related nonobstructive alveolar hypoventilation     Lumbar post-laminectomy syndrome     Chronic use of opiate drug for therapeutic purpose     Sacroiliac joint pain     Post-op pain     Sacroiliitis (HCC)     Muscle spasm of back

## 2024-04-25 NOTE — PROGRESS NOTES
Health Maintenance       Shingles Vaccine (2 of 2)  Overdue since 3/22/2024    Depression Screening (Yearly)  Due soon on 10/19/2024           Following review of the above:  Patient is not proceeding with: Shingles    Note: Refer to final orders and clinician documentation.       appropriate analgesic effect of treatment.  ELVIRA Pennington - CNP)      Past Medical History:   Diagnosis Date    Allergic rhinitis     Alveolar hypoventilation 11/20/2020    Aortic insufficiency 2018    mild-moderate on echo (was seen and discharged from cardiology-[facility])    Bronchiectasis ([facility] 11/20/2020    Bronchitis     Chronic back pain     [facility] management at [facility]. COPD (chronic obstructive pulmonary disease) ([facility]     Dr. Sandra Mendoza to see 11/09/2020    Depression     DM (diabetes mellitus) ([facility] 12/18/2012    GERD (gastroesophageal reflux disease)     History of echocardiogram 05/2018    EF 65%, mild-moderate AI and TR    Hyperlipidemia     Dr. Kuldip Osullivan Hypertension     Dr. Kuldip Osullivan Obesity     Osteoarthritis     Peripheral vascular disease ([facility]     Primary osteoarthritis of both knees     Radicular pain of lumbosacral region     Spinal stenosis, lumbar region, without neurogenic claudication 04/30/2013    Tricuspid regurgitation 2018    mild-moderate on echo    Type II or unspecified type diabetes mellitus without mention of complication, not stated as uncontrolled     Dr. Kuldip Osullivan Unspecified sleep apnea     no cpap used anymore    URI (upper respiratory infection)     Wears dentures     upper and lower full dentures    Wears glasses     Wellness examination     Dr. [facility] -PCP last visit in early Oct. 2020       Past Surgical History:   Procedure Laterality Date    COLONOSCOPY  2/12/2009    normal    DILATION AND CURETTAGE OF UTERUS      GASTRECTOMY      partial    LUMBAR DISCECTOMY  01/2015    lumbar diskectomy    LUMBAR FUSION  11/19/2020     POSTERIOR FUSION L4/5,     LUMBAR FUSION N/A 11/19/2020    POSTERIOR FUSION L4/5, MEDJANESSASDuane, EVOKES #[id number] AMINA performed by Hal Mendosa DO at [address] Right 4/30/13    Lumbar Diagnostic Block,  Kenalog 40 mg    NERVE BLOCK  5/23/13    Lumbar Radiofrequency, Kenalog 40mg    NERVE BLOCK  8/12/13    Lt MBNB  celestone 6mg    NERVE BLOCK Left 8-28-13    left lumbar diagnostic block #2 decadron 10 mg    NERVE BLOCK Left 9-24-13    left lumbar median branch radiofrequency    NERVE BLOCK  07-02-14    caudal, celestone 9 mg    NERVE BLOCK  7-16-14    caudal epidural #2, celestone 9mg, fentanyl 25mcg    NERVE BLOCK  7/30/14    caudal #3 decadron 10mg    NERVE BLOCK  11-6-14    duramorph epidural steroid block  duramorph 1 mg celestone 9 mg    NERVE BLOCK  11/20/15    TENS- Empi Select    NERVE BLOCK  07/20/2018    right transforminal # 1 decadron 10mg,isovue    NERVE BLOCK Bilateral 02/01/2019    bilat mbnb- no steroid    NERVE BLOCK Bilateral 02/08/2019    bilat mbnb, marcaine . 25%    OK KNEE SCOPE,DIAGNOSTIC Right 3/24/2017    KNEE ARTHROSCOPY WITH PARTIAL MEDIAL MENISECAL DEBRIDMENT  performed by Chantel Reaves MD at [address] [place] [address]  9 20 2007    UPPER GASTROINTESTINAL ENDOSCOPY  4 21 2009,04/2011    gastritis, esophagitis       Allergies   Allergen Reactions    Aspirin      DUE TO ULCER    Claritin [Loratadine] Other (See Comments)     coughing    Flonase [Fluticasone Propionate]     Morphine And Related      GI Upset         Current Outpatient Medications:     cetirizine (ZYRTEC) 10 MG tablet, TAKE 1 TABLET BY MOUTH DAILY, Disp: 30 tablet, Rfl: 0    gabapentin (NEURONTIN) 300 MG capsule, TAKE 1 CAPSULE IN MORNING AND AFTERNOON AND 2 CAPSULES AT NIGHT, Disp: 120 capsule, Rfl: 1    meloxicam (MOBIC) 7.5 MG tablet, Take 1 tablet by mouth daily, Disp: 30 tablet, Rfl: 0    HYDROcodone-acetaminophen (NORCO) 5-325 MG per tablet, Take 1 tablet by mouth every 8 hours as needed for Pain for up to 30 days.  Early refill due to holidays, Disp: 90 tablet, Rfl: 0    tiZANidine (ZANAFLEX) 4 MG tablet, Take 1 tablet by mouth 2 times daily, Disp: 60 tablet, Rfl: 0    Lancets MISC, 1 each by Does not apply route 2 times daily Use 2/day, Disp: 100 each, Rfl: 11    omeprazole (PRILOSEC) 20 MG delayed release capsule, TAKE 1 CAPSULE BY MOUTH EVERY DAY, Disp: 30 capsule, Rfl: 3    SPIRIVA HANDIHALER 18 MCG inhalation capsule, Inhale 1 capsule into the lungs daily, Disp: 30 capsule, Rfl: 3    azelastine (ASTELIN) 0.1 % nasal spray, 2 sprays by Nasal route 2 times daily Use in each nostril as directed, Disp: 1 Bottle, Rfl: 0    lisinopril-hydroCHLOROthiazide (PRINZIDE;ZESTORETIC) 20-25 MG per tablet, , Disp: , Rfl:     nicotine (NICODERM CQ) 21 MG/24HR, Place 1 patch onto the skin every 24 hours, Disp: 30 patch, Rfl: 1    metFORMIN (GLUCOPHAGE) 500 MG tablet, TAKE 1 TABLET BY MOUTH 2 TIMES DAILY (WITH MEALS), Disp: 180 tablet, Rfl: 0    amLODIPine (NORVASC) 10 MG tablet, TAKE 1 TABLET BY MOUTH DAILY, Disp: 90 tablet, Rfl: 2    potassium chloride (KLOR-CON M) 10 MEQ extended release tablet, TAKE 2 TABLETS BY MOUTH DAILY, Disp: 60 tablet, Rfl: 2    fluticasone-salmeterol (ADVAIR DISKUS) 250-50 MCG/DOSE AEPB, Inhale 1 puff into the lungs every 12 hours, Disp: 60 each, Rfl: 3    lactobacillus (CULTURELLE) capsule, Take 1 capsule by mouth daily (with breakfast), Disp: 30 capsule, Rfl: 0    carvedilol (COREG) 6.25 MG tablet, TAKE 1 TABLET BY MOUTH 2 TIMES DAILY, Disp: 180 tablet, Rfl: 0    trospium (SANCTURA) 20 MG tablet, TAKE 1 TABLET BY MOUTH 2 TIMES DAILY, Disp: 60 tablet, Rfl: 1    blood glucose test strips (EXACTECH TEST) strip, 1 each by In Vitro route daily As needed. , Disp: 50 each, Rfl: 11    atorvastatin (LIPITOR) 40 MG tablet, Take 1 tablet by mouth daily, Disp: 90 tablet, Rfl: 1     MG capsule, TAKE 1 CAPSULE EVERY DAY, Disp: 30 capsule, Rfl: 10    albuterol sulfate HFA (VENTOLIN HFA) 108 (90 Base) MCG/ACT inhaler, Inhale 2 puffs into the lungs every 6 hours as needed for Wheezing, Disp: 1 Inhaler, Rfl: 3    diphenhydrAMINE (BENADRYL) 25 MG tablet, Take 25 mg by mouth every 6 hours as needed for Itching, Disp: , Rfl:     acetaminophen (TYLENOL) 500 MG tablet, Take 1 tablet by mouth 4 times daily as needed for Pain, Disp: 30 tablet, Rfl: 0    mirtazapine (REMERON) 30 MG tablet, Take 30 mg by mouth nightly, Disp: , Rfl:     mirtazapine (REMERON) 15 MG tablet, Take 15 mg by mouth nightly, Disp: , Rfl:     DULoxetine (CYMBALTA) 60 MG extended release capsule, Take 1 capsule by mouth daily, Disp: 30 capsule, Rfl: 3    ipratropium-albuterol (DUONEB) 0.5-2.5 (3) MG/3ML SOLN nebulizer solution, Inhale 3 mLs into the lungs every 6 hours as needed for Shortness of Breath, Disp: 360 mL, Rfl: 11    Family History   Problem Relation Age of Onset    Diabetes Mother     Heart Disease Mother     Cirrhosis Father        Social History     Socioeconomic History    Marital status: Single     Spouse name: Not on file    Number of children: Not on file    Years of education: Not on file    Highest education level: Not on file   Occupational History     Employer: DISABLED   Social Needs    Financial resource strain: Not very hard    Food insecurity     Worry: Never true     Inability: Never true    Transportation needs     Medical: No     Non-medical: No   Tobacco Use    Smoking status: Former Smoker     Packs/day: 0.25     Years: 36.00     Pack years: 9.00     Types: Cigarettes     Quit date: 10/30/2020     Years since quittin.4    Smokeless tobacco: Never Used    Tobacco comment: pt currently using nicotine patches   5 CIGARETTES A DAY   Substance and Sexual Activity    Alcohol use: No     Alcohol/week: 0.0 standard drinks     Frequency: Never     Binge frequency: Never    Drug use: No     Comment: 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 7 yrs    Sexual activity: Never   Lifestyle    Physical activity     Days per week: 0 days     Minutes per session: 0 min    Stress:  Only a little   Relationships    Social connections     Talks on phone: More than three times a week     Gets together: Once a week     Attends Jainism service: More than 4 times per year     Active member of club or organization: No     Attends meetings of clubs or organizations: Never     Relationship status: Never     Intimate partner violence     Fear of current or ex partner: Not on file     Emotionally abused: Not on file     Physically abused: Not on file     Forced sexual activity: Not on file   Other Topics Concern    Not on file   Social History Narrative    10/9/20 Patient keeping contact with others to a minimum due to [place] 19 Pandemic. 10/9/20 Patient has difficulty with ambulation due to DJD, stenosis and fibromyalgia       Review of Systems:  Review of Systems   Constitution: Negative for chills and fever. Cardiovascular: Negative for chest pain and palpitations. Respiratory: Negative for cough and shortness of breath. Musculoskeletal: Positive for back pain. Gastrointestinal: Negative for constipation. Neurological: Negative for disturbances in coordination, loss of balance, numbness, paresthesias and tingling. Physical Exam:  [facility] 04/19/2003     Physical Exam  Neurological:      Mental Status: She is alert.    Psychiatric:         Mood and Affect: Mood normal.         Record/Diagnostics Review:    Last óscar 3/2021 and was appropriate     Assessment:  Problem List Items Addressed This Visit     Primary osteoarthritis of both knees    Relevant Medications    HYDROcodone-acetaminophen (NORCO) 5-325 MG per tablet    tiZANidine (ZANAFLEX) 4 MG tablet    Medication monitoring encounter - Primary    Chronic low back pain    Relevant Medications    HYDROcodone-acetaminophen (NORCO) 5-325 MG per tablet    tiZANidine (ZANAFLEX) 4 MG tablet    Other spondylosis with radiculopathy, lumbar region    Relevant Medications    HYDROcodone-acetaminophen (NORCO) 5-325 MG per tablet    tiZANidine (ZANAFLEX) 4 MG tablet    Postlaminectomy syndrome, lumbar region             Treatment Plan:  Patient relates current medications are helping the pain. Patient reports taking pain medications as prescribed, denies obtaining medications from different sources and denies use of illegal drugs. Patient denies side effects from medications like nausea, vomiting, constipation or drowsiness. Patient reports current activities of daily living are possible due to medications and would like to continue them. As always, we encourage daily stretching and strengthening exercises, and recommend minimizing use of pain medications unless patient cannot get through daily activities due to pain. Contract requirements met. Continue opioid therapy. Script written for norco - have to fill today due to pharmacy being closed on the weekend - next refill 5/11  Follow up appointment made for 4 weeks    Saadia Yates, was evaluated through a synchronous (real-time) audio-video encounter. The patient (or guardian if applicable) is aware that this is a billable service. Verbal consent to proceed has been obtained within the past 12 months. The visit was conducted pursuant to the emergency declaration under the [address] [place] [address], [address] [place] [address] authority and the [employer] and [employer] General Act. Patient identification was verified, and a caregiver was present when appropriate. The patient was located in a state where the provider was credentialed to provide care. Total time spent for this encounter: 15 minutes    --ELVIRA Magana CNP on 4/9/2021 at 10:33 AM    An electronic signature was used to authenticate this note.

## 2024-04-26 ENCOUNTER — HOSPITAL ENCOUNTER (OUTPATIENT)
Dept: PHYSICAL THERAPY | Facility: CLINIC | Age: 64
Setting detail: THERAPIES SERIES
Discharge: HOME | End: 2024-04-26
Payer: COMMERCIAL

## 2024-04-26 PROCEDURE — 97110 THERAPEUTIC EXERCISES: CPT

## 2024-04-26 NOTE — FLOWSHEET NOTE
Action:    Comments: Pt arrives 15 mins late to therapy this date d/y stating she had to go to pharmacy to get her pain medication. Pt arrives stating she is having high levels of pain all over and \"is still feeling it\" from her tx on Tuesday. Pt states she is compliant with HEP.      Objective:  Modalities:   Precautions: fall risk   Exercises:  Exercise       Reps/ Time Weight/ Level Comments   Scifit/Nustep  6' Lv 4 Warm up, inc resistance 4/23          Seated         Knee flexion stretch 5x10\"       Heel slides 10x       LAQ 2x10x5\"       HS stretch 3x20\"       March  10x2 Lime      Hip abd 10x2 plum     Hip add 15x 5\" Ball squeeze   HS curls  10x2 Lime  seated   bridging 10x 5\" New 4/16- not today                Side: clamshell 15x  New 4/16- not today                          Standing:         Slant board calf stretch 3x30\"      Heel raises  20x       3 way hip-bilaterally  20x ea A    Marching  2x10 ea  New 4/23   HS curls  2x10 ea   New 4/23    Forward step ups  2x10 ea  6\" New 4/23    Lateral step ups 2x10 ea 6\" Inc height 4/23    HS stretch on step 2x30\"ea     Sit to stand- standard chair  10x  Progressed 4/23 No UE assist, no foam on chair- SBA for safety 4/23          Balance       Tandem stance on foam  3x30\"     NBOS, eyes closed on foam  3x30\"                 Other:    Next sessions:   -restore knee ROM and LLE flexibility  -global BLE strengthening   -balance/gait training  -vaso/CP prn        Treatment Charges: Mins Units   []  Modalities     [x]  Ther Exercise 40 3   []  Manual Therapy     []  Ther Activities     []  Neuro Re-ed     []  Vasocompression     [] Gait     []  Other     Total Billable time 40 3       Assessment: [x] Progressing toward goals. Began tx with nustep for warmup and inc blood flow followed by stretches prior to therex. Continued with exercises per log above, minimal progression made this date d/t high levels of pain and pt stating she is sore from last tx. Pt tolerated all

## 2024-04-29 ENCOUNTER — TRANSCRIBE ORDERS (OUTPATIENT)
Dept: ADMINISTRATIVE | Age: 64
End: 2024-04-29

## 2024-04-29 DIAGNOSIS — Z87.891 PERSONAL HISTORY OF TOBACCO USE, PRESENTING HAZARDS TO HEALTH: Primary | ICD-10-CM

## 2024-04-29 DIAGNOSIS — F17.210 CIGARETTE SMOKER: ICD-10-CM

## 2024-05-01 ENCOUNTER — HOSPITAL ENCOUNTER (OUTPATIENT)
Dept: PHYSICAL THERAPY | Facility: CLINIC | Age: 64
Setting detail: THERAPIES SERIES
Discharge: HOME | End: 2024-05-01
Payer: COMMERCIAL

## 2024-05-01 NOTE — FLOWSHEET NOTE
[] [facility]  Outpatient Rehabilitation &  Therapy  [address]  [phone]  [phone] [x] [facility]  Outpatient Rehabilitation &  Therapy  [address]  P: [phone]  F: [phone] [] [facility]  Outpatient Rehabilitation &  Therapy  [address]  P: [phone]  F: [phone] [] [facility]  Outpatient Rehabilitation &  Therapy  [address]  [phone]  [phone] [] [facility]  Outpatient Rehabilitation &  Therapy  [address]   P: [phone]  F: [phone]  [] [facility]  Outpatient Rehabilitation &  Therapy  [address]  P: [phone]  F: [phone] [] [facility]  Outpatient Rehabilitation &  Therapy  [address]  P: [phone]  F: [phone] [] [facility]  Outpatient Rehabilitation &  Therapy  [address]  P: [phone]  F: [phone] [] [facility]  Outpatient Rehabilitation &  Therapy  [address]  P: [phone]  F: [phone]  [] [facility] Outpatient Rehabilitation &  Therapy  [address]  P: [phone]  F: [phone]     Therapy Cancel/No Show note    Date: 2024  Patient: Carolina Schaefer  : 1960  MRN: [id number]    Cancels/No Shows to date: 20    For today's appointment patient:    [x]  Cancelled    [] Rescheduled appointment    [] No-show     Reason given by patient:    []  Patient ill    []  Conflicting appointment    [] No transportation      [] Conflict with work    [] No reason given    [] Weather related    [] COVID-19    [] Other:      Comments:        [x] Next appointment was confirmed    Electronically signed by: TREY PRICE, PTA

## 2024-05-03 ENCOUNTER — HOSPITAL ENCOUNTER (OUTPATIENT)
Dept: PHYSICAL THERAPY | Facility: CLINIC | Age: 64
Setting detail: THERAPIES SERIES
Discharge: HOME | End: 2024-05-03
Payer: COMMERCIAL

## 2024-05-03 PROCEDURE — 97110 THERAPEUTIC EXERCISES: CPT

## 2024-05-03 NOTE — FLOWSHEET NOTE
[] [facility]  Outpatient Rehabilitation &  Therapy  [address]  [phone]  [phone] [x] [facility]  Outpatient Rehabilitation &  Therapy  [address]  P: [phone]  F: [phone] [] [facility]  Outpatient Rehabilitation &  Therapy  [address]  P: [phone]  F: [phone] [] [facility]  Outpatient Rehabilitation &  Therapy  [address]  [phone]  [phone] [] [facility]  Outpatient Rehabilitation &  Therapy  [address]   P: [phone]  F: [phone]  [] [facility]  Outpatient Rehabilitation &  Therapy  [address]  P: [phone]  F: [phone] [] [facility]  Outpatient Rehabilitation &  Therapy  [address]  P: [phone]  F: [phone] [] [facility]  Outpatient Rehabilitation &  Therapy  [address]  P: [phone]  F: [phone] [] [facility]  Outpatient Rehabilitation &  Therapy  [address]  P: [phone]  F: [phone]  [] [facility] Outpatient Rehabilitation &  Therapy  [address]  P: [phone]  F: [phone]     Physical Therapy Daily Treatment Note    Date:  5/3/2024  Patient Name:  Carolina Schaefer    :  1960  MRN: [id number]  Physician: Milan Paiz MD                                        Insurance: Cheryl Ashley (auth after 12v)  Medical Diagnosis: M17.12 (ICD-10-CM) - Primary osteoarthritis of left knee       Rehab Codes: M17.12, M62.81, R26.89, M25.562, M25.462  Onset date: DOS 10/13/23                            Next 's appt.: 4/15/24  Visit# / total visits: - MALA's next- therapist error on 5/3       Cancels/No Shows: 2/0    Subjective:    Pain:  [] Yes  [x] No Location: L knee Pain Rating: (0-10 scale) 0/10 \"stiff\"   Pain

## 2024-05-06 ENCOUNTER — HOSPITAL ENCOUNTER (OUTPATIENT)
Dept: PHYSICAL THERAPY | Facility: CLINIC | Age: 64
Setting detail: THERAPIES SERIES
Discharge: HOME | End: 2024-05-06
Payer: COMMERCIAL

## 2024-05-06 PROCEDURE — 97110 THERAPEUTIC EXERCISES: CPT

## 2024-05-06 NOTE — FLOWSHEET NOTE
Demonstrates understanding.  [] Needs review.  [] Demonstrates/verbalizes HEP/Ed previously given.      Plan:   [x] Continue current frequency toward long and short term goals.    [x] Specific Instructions for subsequent treatments:   -restore knee ROM and LLE flexibility  -global BLE strengthening   -balance/gait training  -vaso/CP prn      Time In: 1:00 pm          Time Out: 1:45 pm     Electronically signed by:  VALENCIA SANTILLAN PTA

## 2024-05-10 ENCOUNTER — HOSPITAL ENCOUNTER (OUTPATIENT)
Dept: PHYSICAL THERAPY | Facility: CLINIC | Age: 64
Setting detail: THERAPIES SERIES
Discharge: HOME | End: 2024-05-10
Payer: COMMERCIAL

## 2024-05-10 PROCEDURE — 97110 THERAPEUTIC EXERCISES: CPT

## 2024-05-10 NOTE — FLOWSHEET NOTE
followed by mat table interventions. Implemented weight with marches then continued with standing interventions. Added SLS, pt challenged by this requiring intermittent UE assist to // bars and describes a burn from her foot to her knee. Fair tolerance throughout remainder of session. Cueing throughout to encourage slow controlled movements with minimal carryover.     [] No change.     [] Other:  [x] Patient would continue to benefit from skilled physical therapy services in order to:  reduce L knee pain and effusion, restore knee ROM, improve global LE strength and stability, to improve safety and independence with gait and all ADLs.     Problems indicated at eval:    [x] ? Pain 7-10/10 pain L knee  [x] ? ROM reduced L knee ROM globally  [x] ? Strength impaired BLE strength globally   [x] ? Function impaired function per LEFI score of 80%  [x] Other impaired gait, impaired balance         Goals- therapist error on 5/3 , checked 5/6 MET NOT MET ON-  GOING  Details   Date Addressed:            STG: To be met in 6 treatments            1. ? Pain: Decrease L knee pain levels to 7/10 or less to ease ADL progression. [x]  []  []      2. ? ROM: Increase L knee AROM limitations throughout to equal bilat to reduce difficulty with ADLs. [x]  []  []      3. ? Strength: Increase L hip and knee strength to 4+/5 throughout to ease functional limitations and mobility  [x]  []  []      4. Independent with Home Exercise Programs with ability to demonstrate exercises without cueing for technique.  [x]  []  []      5. Demonstrate knowledge of fall risk prevention  [x]  []  []                  Date Addressed:            LTG: To be met in 12 treatments           1. Improve score on assessment tool LEFI from 80% impairment to less than 65% impairment to demonstrate improved functional mobility []  []  []      2. Reduce L knee pain levels to 5/10 or less to improve independence with all daily activities including gait. []  []  []      3.

## 2024-05-13 ENCOUNTER — HOSPITAL ENCOUNTER (OUTPATIENT)
Dept: PHYSICAL THERAPY | Facility: CLINIC | Age: 64
Setting detail: THERAPIES SERIES
Discharge: HOME | End: 2024-05-13
Payer: COMMERCIAL

## 2024-05-13 PROCEDURE — 97110 THERAPEUTIC EXERCISES: CPT

## 2024-05-13 NOTE — FLOWSHEET NOTE
[] [facility]  Outpatient Rehabilitation &  Therapy  [address]  [phone]  [phone] [x] [facility]  Outpatient Rehabilitation &  Therapy  [address]  P: [phone]  F: [phone] [] [facility]  Outpatient Rehabilitation &  Therapy  [address]  P: [phone]  F: [phone] [] [facility]  Outpatient Rehabilitation &  Therapy  [address]  [phone]  [phone] [] [facility]  Outpatient Rehabilitation &  Therapy  [address]   P: [phone]  F: [phone]  [] [facility]  Outpatient Rehabilitation &  Therapy  [address]  P: [phone]  F: [phone] [] [facility]  Outpatient Rehabilitation &  Therapy  [address]  P: [phone]  F: [phone] [] [facility]  Outpatient Rehabilitation &  Therapy  [address]  P: [phone]  F: [phone] [] [facility]  Outpatient Rehabilitation &  Therapy  [address]  P: [phone]  F: [phone]  [] [facility] Outpatient Rehabilitation &  Therapy  [address]  P: [phone]  F: [phone]     Physical Therapy Daily Treatment Note    Date:  2024  Patient Name:  Carolina Schaefer    :  1960  MRN: [id number]  Physician: Milan Paiz MD                                        Insurance: Cheryl Ashley (auth after 12v)  Medical Diagnosis: M17.12 (ICD-10-CM) - Primary osteoarthritis of left knee       Rehab Codes: M17.12, M62.81, R26.89, M25.562, M25.462  Onset date: DOS 10/13/23                            Next 's appt.:tbd  Visit# / total visits:      Cancels/No Shows: 2/0    Subjective:    Pain:  [] Yes  [x] No Location: L knee Pain Rating: (0-10 scale) 0/10   Pain altered Tx:  [x] No  [] Yes  Action:    Comments: Pt

## 2024-05-15 DIAGNOSIS — K21.9 GASTROESOPHAGEAL REFLUX DISEASE, UNSPECIFIED WHETHER ESOPHAGITIS PRESENT: ICD-10-CM

## 2024-05-15 RX ORDER — OMEPRAZOLE 20 MG/1
20 CAPSULE, DELAYED RELEASE ORAL DAILY
Qty: 30 CAPSULE | Refills: 4 | Status: SHIPPED | OUTPATIENT
Start: 2024-05-15

## 2024-05-15 NOTE — TELEPHONE ENCOUNTER
Carolina Schaefer is calling to request a refill on the following medication(s):    Medication Request:  Requested Prescriptions     Pending Prescriptions Disp Refills    omeprazole (PRILOSEC) 20 MG delayed release capsule [Pharmacy Med Name: OMEPRAZOLE 20MG CAPS] 30 capsule 4     Sig: TAKE 1 CAPSULE BY MOUTH DAILY       Last Visit Date (If Applicable):  4/17/2024    Next Visit Date:    Visit date not found

## 2024-05-17 ENCOUNTER — HOSPITAL ENCOUNTER (OUTPATIENT)
Dept: PHYSICAL THERAPY | Facility: CLINIC | Age: 64
Setting detail: THERAPIES SERIES
Discharge: HOME | End: 2024-05-17
Payer: COMMERCIAL

## 2024-05-17 PROCEDURE — 97110 THERAPEUTIC EXERCISES: CPT

## 2024-05-17 NOTE — FLOWSHEET NOTE
[] [facility]  Outpatient Rehabilitation &  Therapy  [address]  [phone]  [phone] [x] [facility]  Outpatient Rehabilitation &  Therapy  [address]  P: [phone]  F: [phone] [] [facility]  Outpatient Rehabilitation &  Therapy  [address]  P: [phone]  F: [phone] [] [facility]  Outpatient Rehabilitation &  Therapy  [address]  [phone]  [phone] [] [facility]  Outpatient Rehabilitation &  Therapy  [address]   P: [phone]  F: [phone]  [] [facility]  Outpatient Rehabilitation &  Therapy  [address]  P: [phone]  F: [phone] [] [facility]  Outpatient Rehabilitation &  Therapy  [address]  P: [phone]  F: [phone] [] [facility]  Outpatient Rehabilitation &  Therapy  [address]  P: [phone]  F: [phone] [] [facility]  Outpatient Rehabilitation &  Therapy  [address]  P: [phone]  F: [phone]  [] [facility] Outpatient Rehabilitation &  Therapy  [address]  P: [phone]  F: [phone]     Physical Therapy Daily Treatment Note    Date:  2024  Patient Name:  Carolina Schaefer    :  1960  MRN: [id number]  Physician: Milan Paiz MD                                        Insurance: Cheryl Ashley (auth after 12v)  Medical Diagnosis: M17.12 (ICD-10-CM) - Primary osteoarthritis of left knee       Rehab Codes: M17.12, M62.81, R26.89, M25.562, M25.462  Onset date: DOS 10/13/23                            Next 's appt.:tbd  Visit# / total visits: 10/12     Cancels/No Shows: 2/0    Subjective:    Pain:  [] Yes  [x] No Location: L knee Pain Rating: (0-10 scale) 0/10   Pain altered Tx:  [x] No  [] Yes  Action:    Comments: Pt

## 2024-05-20 ENCOUNTER — HOSPITAL ENCOUNTER (OUTPATIENT)
Dept: PHYSICAL THERAPY | Facility: CLINIC | Age: 64
Setting detail: THERAPIES SERIES
Discharge: HOME | End: 2024-05-20
Payer: COMMERCIAL

## 2024-05-20 NOTE — FLOWSHEET NOTE
[] [facility]  Outpatient Rehabilitation &  Therapy  [address]  [phone]  [phone] [x] [facility]  Outpatient Rehabilitation &  Therapy  [address]  P: [phone]  F: [phone] [] [facility]  Outpatient Rehabilitation &  Therapy  [address]  P: [phone]  F: [phone] [] [facility]  Outpatient Rehabilitation &  Therapy  [address]  [phone]  [phone] [] [facility]  Outpatient Rehabilitation &  Therapy  [address]   P: [phone]  F: [phone]  [] [facility]  Outpatient Rehabilitation &  Therapy  [address]  P: [phone]  F: [phone] [] [facility]  Outpatient Rehabilitation &  Therapy  [address]  P: [phone]  F: [phone] [] [facility]  Outpatient Rehabilitation &  Therapy  [address]  P: [phone]  F: [phone] [] [facility]  Outpatient Rehabilitation &  Therapy  [address]  P: [phone]  F: [phone]  [] [facility] Outpatient Rehabilitation &  Therapy  [address]  P: [phone]  F: [phone]     Therapy Cancel/No Show note    Date: 2024  Patient: Carolina Schaefer  : 1960  MRN: [id number]    Cancels/No Shows to date: 3/0    For today's appointment patient:    [x]  Cancelled    [] Rescheduled appointment    [] No-show     Reason given by patient:    []  Patient ill    []  Conflicting appointment    [] No transportation      [] Conflict with work    [] No reason given    [] Weather related    [] COVID-19    [x] Other:      Comments:  sister in hospital      [x] Next appointment was confirmed    Electronically signed by: Kellie Julian, PT

## 2024-05-23 ENCOUNTER — HOSPITAL ENCOUNTER (OUTPATIENT)
Dept: PAIN MANAGEMENT | Age: 64
Discharge: HOME | End: 2024-05-23
Payer: COMMERCIAL

## 2024-05-23 VITALS — BODY MASS INDEX: 31.65 KG/M2 | WEIGHT: 190 LBS | HEIGHT: 65 IN

## 2024-05-23 DIAGNOSIS — M54.16 LUMBAR RADICULOPATHY: ICD-10-CM

## 2024-05-23 DIAGNOSIS — G89.29 CHRONIC MIDLINE LOW BACK PAIN WITH RIGHT-SIDED SCIATICA: ICD-10-CM

## 2024-05-23 DIAGNOSIS — M17.0 PRIMARY OSTEOARTHRITIS OF BOTH KNEES: Primary | ICD-10-CM

## 2024-05-23 DIAGNOSIS — Z79.891 CHRONIC USE OF OPIATE DRUG FOR THERAPEUTIC PURPOSE: ICD-10-CM

## 2024-05-23 DIAGNOSIS — M96.1 LUMBAR POST-LAMINECTOMY SYNDROME: ICD-10-CM

## 2024-05-23 DIAGNOSIS — M51.36 LUMBAR DEGENERATIVE DISC DISEASE: ICD-10-CM

## 2024-05-23 DIAGNOSIS — M47.26 OSTEOARTHRITIS OF SPINE WITH RADICULOPATHY, LUMBAR REGION: ICD-10-CM

## 2024-05-23 DIAGNOSIS — M54.41 CHRONIC MIDLINE LOW BACK PAIN WITH RIGHT-SIDED SCIATICA: ICD-10-CM

## 2024-05-23 PROBLEM — G89.18 POST-OP PAIN: Status: RESOLVED | Noted: 2023-10-13 | Resolved: 2024-05-23

## 2024-05-23 PROCEDURE — 99213 OFFICE O/P EST LOW 20 MIN: CPT

## 2024-05-23 PROCEDURE — 99214 OFFICE O/P EST MOD 30 MIN: CPT | Performed by: NURSE PRACTITIONER

## 2024-05-23 RX ORDER — HYDROCODONE BITARTRATE AND ACETAMINOPHEN 5; 325 MG/1; MG/1
1 TABLET ORAL EVERY 8 HOURS PRN
Qty: 84 TABLET | Refills: 0 | Status: SHIPPED | OUTPATIENT
Start: 2024-05-25 | End: 2024-06-22

## 2024-05-23 ASSESSMENT — ENCOUNTER SYMPTOMS
CONSTIPATION: 0
SHORTNESS OF BREATH: 0
BOWEL INCONTINENCE: 0
COUGH: 0
BACK PAIN: 1

## 2024-05-23 NOTE — PROGRESS NOTES
Chief Complaint   Patient presents with    Back Pain     Med refill         PMH     Chronic onset many years ago located in the lower lumbar area  Reports radiation of pain down both legs, associated with occ. leg numbness  Past history significant for 2 lumbar spine surgeries with last one in 2020.  Initially noticed improvement but now has pain in lumbar area radiating down her legs  CT done 4/2024  with posterior fusion of L4 and L5 without complication and multilevel degenerative changes without significant spinal canal stenosis  She saw Dr. Ash 3/13/23 with no surgery planned - SCS, TPI and SIJ were discussed.   Pt had L5 S1 lumbar epidural steroid injection 8/2022 and reported no significant relief   Pt had left SI joint injection 4/25/23 and reported over 50% relief and not ready to repeat     Pt also c/o chronic left knee pain She is s/p Left knee arthroscopy with Dr. Paiz 10/13/23 She had steroid injection 3/18/24. She has started PT with plan for gel injection if no change in pain. Reports swelling is better but still having pain with ROM      HPI:     Back Pain  This is a chronic problem. The current episode started more than 1 year ago. The problem occurs constantly. The problem is unchanged. The pain is present in the lumbar spine. Quality: stiff. The pain radiates to the left knee. The pain is at a severity of 8/10. The pain is moderate. The pain is Worse during the night. The symptoms are aggravated by bending. Stiffness is present In the morning. Associated symptoms include leg pain, tingling and weakness. Pertinent negatives include no bladder incontinence, bowel incontinence, chest pain, fever, headaches or numbness. She has tried heat (pain meds) for the symptoms. The treatment provided mild relief.   Knee Pain   There was no injury mechanism. The pain is present in the left knee. The quality of the pain is described as stabbing, shooting and aching. The pain is at a severity of

## 2024-05-24 ENCOUNTER — HOSPITAL ENCOUNTER (OUTPATIENT)
Dept: PHYSICAL THERAPY | Facility: CLINIC | Age: 64
Setting detail: THERAPIES SERIES
Discharge: HOME | End: 2024-05-24
Payer: COMMERCIAL

## 2024-05-24 PROCEDURE — 97110 THERAPEUTIC EXERCISES: CPT

## 2024-05-24 NOTE — FLOWSHEET NOTE
1 x daily - 7 x weekly - 2 sets - 10 reps  - Standing Hip Extension with Counter Support  - 1 x daily - 7 x weekly - 2 sets - 10 reps  - Standing Hip Flexion with Counter Support  - 1 x daily - 7 x weekly - 2 sets - 10 reps  - Standing Tandem Balance with Counter Support  - 1 x daily - 7 x weekly - 2 reps - 20 seconds hold    Date: 05/24/2024  Prepared by: Kellie Jaramillo    Exercises  - Sit to Stand  - 1 x daily - 7 x weekly - 3 sets - 10 reps  - Standing March with Counter Support  - 1 x daily - 7 x weekly - 3 sets - 10 reps  - Mini Lunge  - 1 x daily - 7 x weekly - 3 sets - 10 reps  - Step Up  - 1 x daily - 7 x weekly - 2 sets - 10 reps  - Lateral Step Up  - 1 x daily - 7 x weekly - 2 sets - 10 reps  - Backward Step Up  - 1 x daily - 7 x weekly - 2 sets - 10 reps  - Sidestepping  - 1 x daily - 7 x weekly - 2 sets - 10 reps  Comprehension of Education:  [x] Verbalizes understanding.  [x] Demonstrates understanding.  [] Needs review.  [x] Demonstrates/verbalizes HEP/Ed previously given.      Plan:   [] Continue current frequency toward long and short term goals.    [x] Specific Instructions for subsequent treatments: pt to be discharged to independent HEP       Time In: 2:20 pm             Time Out:  3:06 pm     Electronically signed by:  Kellie Julian, PT

## 2024-05-31 ENCOUNTER — TELEPHONE (OUTPATIENT)
Dept: INTERNAL MEDICINE | Age: 64
End: 2024-05-31

## 2024-06-03 ENCOUNTER — OFFICE VISIT (OUTPATIENT)
Dept: ORTHOPEDIC SURGERY | Age: 64
End: 2024-06-03

## 2024-06-03 VITALS — HEIGHT: 65 IN | WEIGHT: 190 LBS | BODY MASS INDEX: 31.65 KG/M2

## 2024-06-03 DIAGNOSIS — M17.12 PRIMARY OSTEOARTHRITIS OF LEFT KNEE: Primary | ICD-10-CM

## 2024-06-03 NOTE — PROGRESS NOTES
Chief Complaint   Patient presents with    Follow-up     Left knee : post pt : discuss gel injection   This patient with known primary osteoarthritis of the left knee is returning here today because of recurrence of pain.    She walks with a slightly antalgic gait.  She has a flexion contracture of less than 5 degrees and that has full flexion.  She has significant crepitation in all the compartments but the patellofemoral compartment shows more than the tibiofemoral compartment.    Diagnosis: Tricompartmental osteoarthritis left knee.    Treatment: Under sterile condition I injected Synvisc 1 through anterolateral portal without any complications will see her as needed.

## 2024-06-04 ENCOUNTER — OFFICE VISIT (OUTPATIENT)
Dept: INTERNAL MEDICINE | Age: 64
End: 2024-06-04
Payer: COMMERCIAL

## 2024-06-04 VITALS
OXYGEN SATURATION: 93 % | DIASTOLIC BLOOD PRESSURE: 80 MMHG | HEART RATE: 84 BPM | SYSTOLIC BLOOD PRESSURE: 107 MMHG | BODY MASS INDEX: 31.82 KG/M2 | HEIGHT: 65 IN | WEIGHT: 191 LBS

## 2024-06-04 DIAGNOSIS — M17.0 PRIMARY OSTEOARTHRITIS OF BOTH KNEES: ICD-10-CM

## 2024-06-04 DIAGNOSIS — E87.6 HYPOKALEMIA: ICD-10-CM

## 2024-06-04 DIAGNOSIS — H61.21 RIGHT EAR IMPACTED CERUMEN: ICD-10-CM

## 2024-06-04 DIAGNOSIS — E78.00 PURE HYPERCHOLESTEROLEMIA: ICD-10-CM

## 2024-06-04 DIAGNOSIS — J43.2 CENTRILOBULAR EMPHYSEMA (HCC): ICD-10-CM

## 2024-06-04 DIAGNOSIS — I10 ESSENTIAL HYPERTENSION: Primary | ICD-10-CM

## 2024-06-04 DIAGNOSIS — J30.9 CHRONIC ALLERGIC RHINITIS: ICD-10-CM

## 2024-06-04 DIAGNOSIS — R73.03 PREDIABETES: ICD-10-CM

## 2024-06-04 DIAGNOSIS — F11.20 OPIOID DEPENDENCE WITH CURRENT USE (HCC): ICD-10-CM

## 2024-06-04 PROBLEM — J96.11 CHRONIC RESPIRATORY FAILURE WITH HYPOXIA (HCC): Status: RESOLVED | Noted: 2018-06-18 | Resolved: 2024-06-04

## 2024-06-04 LAB — HBA1C MFR BLD: 5.7 %

## 2024-06-04 PROCEDURE — 99214 OFFICE O/P EST MOD 30 MIN: CPT | Performed by: INTERNAL MEDICINE

## 2024-06-04 PROCEDURE — 3023F SPIROM DOC REV: CPT | Performed by: INTERNAL MEDICINE

## 2024-06-04 PROCEDURE — G8399 PT W/DXA RESULTS DOCUMENT: HCPCS | Performed by: INTERNAL MEDICINE

## 2024-06-04 PROCEDURE — 1123F ACP DISCUSS/DSCN MKR DOCD: CPT | Performed by: INTERNAL MEDICINE

## 2024-06-04 PROCEDURE — 83036 HEMOGLOBIN GLYCOSYLATED A1C: CPT | Performed by: INTERNAL MEDICINE

## 2024-06-04 PROCEDURE — 3074F SYST BP LT 130 MM HG: CPT | Performed by: INTERNAL MEDICINE

## 2024-06-04 PROCEDURE — 99213 OFFICE O/P EST LOW 20 MIN: CPT | Performed by: INTERNAL MEDICINE

## 2024-06-04 PROCEDURE — G8427 DOCREV CUR MEDS BY ELIG CLIN: HCPCS | Performed by: INTERNAL MEDICINE

## 2024-06-04 PROCEDURE — G8417 CALC BMI ABV UP PARAM F/U: HCPCS | Performed by: INTERNAL MEDICINE

## 2024-06-04 PROCEDURE — 4004F PT TOBACCO SCREEN RCVD TLK: CPT | Performed by: INTERNAL MEDICINE

## 2024-06-04 PROCEDURE — 3079F DIAST BP 80-89 MM HG: CPT | Performed by: INTERNAL MEDICINE

## 2024-06-04 PROCEDURE — 1090F PRES/ABSN URINE INCON ASSESS: CPT | Performed by: INTERNAL MEDICINE

## 2024-06-04 PROCEDURE — 3017F COLORECTAL CA SCREEN DOC REV: CPT | Performed by: INTERNAL MEDICINE

## 2024-06-04 RX ORDER — LANCETS 30 GAUGE
EACH MISCELLANEOUS
Qty: 100 EACH | Refills: 2 | Status: SHIPPED | OUTPATIENT
Start: 2024-06-04

## 2024-06-04 RX ORDER — AMLODIPINE BESYLATE 10 MG/1
10 TABLET ORAL EVERY MORNING
Qty: 90 TABLET | Refills: 1 | Status: SHIPPED | OUTPATIENT
Start: 2024-06-04

## 2024-06-04 RX ORDER — AZELASTINE 1 MG/ML
2 SPRAY, METERED NASAL 2 TIMES DAILY
Qty: 1 EACH | Refills: 2 | Status: SHIPPED | OUTPATIENT
Start: 2024-06-04

## 2024-06-04 RX ORDER — ATORVASTATIN CALCIUM 80 MG/1
80 TABLET, FILM COATED ORAL DAILY
Qty: 90 TABLET | Refills: 1 | Status: SHIPPED | OUTPATIENT
Start: 2024-06-04

## 2024-06-04 RX ORDER — CARVEDILOL 12.5 MG/1
12.5 TABLET ORAL 2 TIMES DAILY
Qty: 180 TABLET | Refills: 1 | Status: SHIPPED | OUTPATIENT
Start: 2024-06-04

## 2024-06-04 RX ORDER — LISINOPRIL AND HYDROCHLOROTHIAZIDE 25; 20 MG/1; MG/1
TABLET ORAL
Qty: 90 TABLET | Refills: 1 | Status: SHIPPED | OUTPATIENT
Start: 2024-06-04

## 2024-06-04 RX ORDER — DOCUSATE SODIUM 100 MG/1
100 CAPSULE, LIQUID FILLED ORAL DAILY
Qty: 30 CAPSULE | Refills: 5 | Status: SHIPPED | OUTPATIENT
Start: 2024-06-04

## 2024-06-04 RX ORDER — ALBUTEROL SULFATE 90 UG/1
2 AEROSOL, METERED RESPIRATORY (INHALATION) EVERY 6 HOURS PRN
Qty: 1 EACH | Refills: 3 | Status: SHIPPED | OUTPATIENT
Start: 2024-06-04

## 2024-06-04 RX ORDER — POTASSIUM CHLORIDE 750 MG/1
20 TABLET, EXTENDED RELEASE ORAL DAILY
Qty: 60 TABLET | Refills: 5 | Status: SHIPPED | OUTPATIENT
Start: 2024-06-04

## 2024-06-04 RX ORDER — PEN NEEDLE, DIABETIC 31 GX5/16"
NEEDLE, DISPOSABLE MISCELLANEOUS
Qty: 100 EACH | Refills: 2 | Status: SHIPPED | OUTPATIENT
Start: 2024-06-04

## 2024-06-04 RX ORDER — CETIRIZINE HYDROCHLORIDE 10 MG/1
10 TABLET ORAL DAILY
Qty: 30 TABLET | Refills: 2 | Status: SHIPPED | OUTPATIENT
Start: 2024-06-04

## 2024-06-04 RX ORDER — ALBUTEROL SULFATE 2.5 MG/3ML
2.5 SOLUTION RESPIRATORY (INHALATION) EVERY 6 HOURS PRN
Qty: 120 EACH | Refills: 1 | Status: SHIPPED | OUTPATIENT
Start: 2024-06-04

## 2024-06-04 SDOH — ECONOMIC STABILITY: FOOD INSECURITY: WITHIN THE PAST 12 MONTHS, THE FOOD YOU BOUGHT JUST DIDN'T LAST AND YOU DIDN'T HAVE MONEY TO GET MORE.: NEVER TRUE

## 2024-06-04 SDOH — ECONOMIC STABILITY: INCOME INSECURITY: HOW HARD IS IT FOR YOU TO PAY FOR THE VERY BASICS LIKE FOOD, HOUSING, MEDICAL CARE, AND HEATING?: NOT VERY HARD

## 2024-06-04 SDOH — ECONOMIC STABILITY: FOOD INSECURITY: WITHIN THE PAST 12 MONTHS, YOU WORRIED THAT YOUR FOOD WOULD RUN OUT BEFORE YOU GOT MONEY TO BUY MORE.: NEVER TRUE

## 2024-06-04 ASSESSMENT — ENCOUNTER SYMPTOMS
COUGH: 1
WHEEZING: 0
SHORTNESS OF BREATH: 0

## 2024-06-04 NOTE — PROGRESS NOTES
[facility]    Progress Note    Date of patient's visit: 6/4/2024    Patient's Name:  Carolina Schaefer    YOB: 1960            Patient Care Team:  Soha Mcleod MD as PCP - General (Internal Medicine)  Soha Mcleod MD as PCP - Empaneled Provider  Dash Piña DPM as Consulting Physician (Podiatry)  [facility] ([facility] Health Services)  Milan Paiz MD as Consulting Physician (Orthopedic Surgery)  Alessio Solis MD as Anesthesiologist (Pain Management)  Yohan Leyva OD (Optometry)    REASON FOR VISIT: Routine outpatient follow     Chief Complaint   Patient presents with    Hypertension     6 month f/u     Ear Fullness     Pt wanting ear checked     Medication Refill     Meds pended    Prediabetes     POCT a1c         HISTORY OF PRESENT ILLNESS:    History was obtained from the patient. Carolina Schaefer is a 63 y.o. is here for follow-up on her chronic medical problems.  Overall she is doing well.  She continues to follow-up with pulmonologist for her COPD.  No recent exacerbations.  She has a low-dose CT scheduled for later this month.  She had a DEXA scan last year which was normal and a mammogram in July.  Blood sugars have been doing well.  Main concerns are all her aches and pains in several joints including spine and knees.  She is following up with orthopedics and pain management.  She is on Norco.  She also gets Cymbalta from her psychiatrist.  Since she is on gabapentin have advised her to stop gabapentin and continue only with Cymbalta.  Discussed drug interactions especially as she is getting older and has other chronic issues.  She is complaining of difficulty hearing and some pressure in her right ear.  She does have cerumen impaction.    Results for POC orders placed in visit on 06/04/24   POCT glycosylated hemoglobin (Hb A1C)   Result Value Ref Range    Hemoglobin A1C 5.7 %         Past Medical History:   Diagnosis

## 2024-06-10 ENCOUNTER — HOSPITAL ENCOUNTER (OUTPATIENT)
Age: 64
Setting detail: SPECIMEN
Discharge: HOME | End: 2024-06-10

## 2024-06-10 DIAGNOSIS — I10 ESSENTIAL HYPERTENSION: ICD-10-CM

## 2024-06-10 DIAGNOSIS — E87.6 HYPOKALEMIA: ICD-10-CM

## 2024-06-10 LAB
ANION GAP SERPL CALCULATED.3IONS-SCNC: 12 MMOL/L (ref 9–16)
BUN SERPL-MCNC: 14 MG/DL (ref 8–23)
CALCIUM SERPL-MCNC: 9.1 MG/DL (ref 8.6–10.4)
CHLORIDE SERPL-SCNC: 103 MMOL/L (ref 98–107)
CO2 SERPL-SCNC: 24 MMOL/L (ref 20–31)
CREAT SERPL-MCNC: 0.9 MG/DL (ref 0.5–0.9)
GFR, ESTIMATED: 69 ML/MIN/1.73M2
GLUCOSE SERPL-MCNC: 121 MG/DL (ref 74–99)
POTASSIUM SERPL-SCNC: 3.4 MMOL/L (ref 3.7–5.3)
SODIUM SERPL-SCNC: 139 MMOL/L (ref 136–145)

## 2024-06-12 ENCOUNTER — NURSE ONLY (OUTPATIENT)
Dept: INTERNAL MEDICINE | Age: 64
End: 2024-06-12

## 2024-06-12 DIAGNOSIS — H61.21 IMPACTED CERUMEN OF RIGHT EAR: Primary | ICD-10-CM

## 2024-06-12 NOTE — PROGRESS NOTES
Pt here for impacted cerumen removal. Right tympanic membrane is completely blocked by dark, oily cerumen ball. Pt states she has been using Debrox since last visit in office. Writer able to visualize left tympanic membrane with nothing obstructing.    Warm solution of hydrogen peroxide and water used to irrigate right ear canal. Writer able to easily remove impacted cerumen with little effort. Pt tolerated procedure well.

## 2024-06-14 LAB
CHOLEST SERPL-MCNC: 151 MG/DL (ref 0–199)
CHOLESTEROL/HDL RATIO: 4
HDLC SERPL-MCNC: 38 MG/DL
TRIGL SERPL-MCNC: 88 MG/DL
VLDLC SERPL CALC-MCNC: 18 MG/DL

## 2024-06-19 NOTE — TELEPHONE ENCOUNTER
Signed notes faxed to [facility] [No Acute Distress] : no acute distress [Well Nourished] : well nourished [Well Developed] : well developed [Well-Appearing] : well-appearing [Normal Sclera/Conjunctiva] : normal sclera/conjunctiva [PERRL] : pupils equal round and reactive to light [EOMI] : extraocular movements intact [Normal Outer Ear/Nose] : the outer ears and nose were normal in appearance [Normal Oropharynx] : the oropharynx was normal [No JVD] : no jugular venous distention [No Lymphadenopathy] : no lymphadenopathy [Supple] : supple [Thyroid Normal, No Nodules] : the thyroid was normal and there were no nodules present [No Respiratory Distress] : no respiratory distress  [No Accessory Muscle Use] : no accessory muscle use [Clear to Auscultation] : lungs were clear to auscultation bilaterally [Normal Rate] : normal rate  [Regular Rhythm] : with a regular rhythm [Normal S1, S2] : normal S1 and S2 [No Murmur] : no murmur heard [No Carotid Bruits] : no carotid bruits [No Abdominal Bruit] : a ~M bruit was not heard ~T in the abdomen [No Varicosities] : no varicosities [Pedal Pulses Present] : the pedal pulses are present [No Edema] : there was no peripheral edema [No Palpable Aorta] : no palpable aorta [No Extremity Clubbing/Cyanosis] : no extremity clubbing/cyanosis [Soft] : abdomen soft [Non Tender] : non-tender [Non-distended] : non-distended [No Masses] : no abdominal mass palpated [No HSM] : no HSM [Normal Bowel Sounds] : normal bowel sounds [Normal Posterior Cervical Nodes] : no posterior cervical lymphadenopathy [Normal Anterior Cervical Nodes] : no anterior cervical lymphadenopathy [No CVA Tenderness] : no CVA  tenderness [No Spinal Tenderness] : no spinal tenderness [No Joint Swelling] : no joint swelling [Grossly Normal Strength/Tone] : grossly normal strength/tone [No Rash] : no rash [Coordination Grossly Intact] : coordination grossly intact [No Focal Deficits] : no focal deficits [Normal Gait] : normal gait [Deep Tendon Reflexes (DTR)] : deep tendon reflexes were 2+ and symmetric [Normal Affect] : the affect was normal [Normal Insight/Judgement] : insight and judgment were intact

## 2024-06-21 ENCOUNTER — HOSPITAL ENCOUNTER (OUTPATIENT)
Dept: PAIN MANAGEMENT | Age: 64
Discharge: HOME | End: 2024-06-21
Payer: COMMERCIAL

## 2024-06-21 ENCOUNTER — HOSPITAL ENCOUNTER (OUTPATIENT)
Dept: CT IMAGING | Age: 64
End: 2024-06-21
Attending: INTERNAL MEDICINE
Payer: COMMERCIAL

## 2024-06-21 VITALS — HEIGHT: 65 IN | BODY MASS INDEX: 31.82 KG/M2 | WEIGHT: 191 LBS

## 2024-06-21 DIAGNOSIS — Z87.891 PERSONAL HISTORY OF TOBACCO USE, PRESENTING HAZARDS TO HEALTH: ICD-10-CM

## 2024-06-21 DIAGNOSIS — M51.36 LUMBAR DEGENERATIVE DISC DISEASE: ICD-10-CM

## 2024-06-21 DIAGNOSIS — F17.210 CIGARETTE SMOKER: ICD-10-CM

## 2024-06-21 DIAGNOSIS — M96.1 LUMBAR POST-LAMINECTOMY SYNDROME: ICD-10-CM

## 2024-06-21 DIAGNOSIS — Z79.891 CHRONIC USE OF OPIATE DRUG FOR THERAPEUTIC PURPOSE: Primary | ICD-10-CM

## 2024-06-21 DIAGNOSIS — M54.16 LUMBAR RADICULOPATHY: ICD-10-CM

## 2024-06-21 PROCEDURE — 71271 CT THORAX LUNG CANCER SCR C-: CPT

## 2024-06-21 PROCEDURE — 99213 OFFICE O/P EST LOW 20 MIN: CPT

## 2024-06-21 RX ORDER — HYDROCODONE BITARTRATE AND ACETAMINOPHEN 5; 325 MG/1; MG/1
1 TABLET ORAL EVERY 8 HOURS PRN
Qty: 84 TABLET | Refills: 0 | Status: SHIPPED | OUTPATIENT
Start: 2024-06-25 | End: 2024-07-25

## 2024-06-21 ASSESSMENT — ENCOUNTER SYMPTOMS
COUGH: 0
BOWEL INCONTINENCE: 0
CONSTIPATION: 0
SHORTNESS OF BREATH: 0
BACK PAIN: 1

## 2024-06-21 NOTE — PROGRESS NOTES
inability to bear weight and tingling. Pertinent negatives include no loss of motion, loss of sensation, muscle weakness or numbness. She reports no foreign bodies present. The symptoms are aggravated by movement and weight bearing. She has tried heat and ice (pain meds) for the symptoms.     Patient denies any new neurological symptoms. No bowel or bladder incontinence, no weakness, and no falling.    Pill count: appropriate Norco-14 - due 6/22 - will fill 6/25    Morphine equivalent: 15    Controlled Substance Monitoring:    Acute and Chronic Pain Monitoring:   RX Monitoring Periodic Controlled Substance Monitoring   6/21/2024   2:03 PM Possible medication side effects, risk of tolerance/dependence & alternative treatments discussed.;No signs of potential drug abuse or diversion identified.;Assessed functional status (ability to engage in work or other purposeful activities, the pain intensity and its interference with activities of daily living, quality of family life and social activities, and the physical activity);Obtaining appropriate analgesic effect of treatment.            Past Medical History:   Diagnosis Date    Allergic rhinitis     Alveolar hypoventilation 11/20/2020    Aortic insufficiency 2018    mild-moderate on echo (was seen and discharged from [facility]ology-[facility])    Bronchiectasis ([facility]) 11/20/2020    Bronchitis     Chronic back pain     Pain management at [place]    COPD (chronic obstructive pulmonary disease) ([facility])     Dr. Zarate to see 11/09/2020    Depression     DM (diabetes mellitus) ([facility]) 12/18/2012    GERD (gastroesophageal reflux disease)     History of echocardiogram 05/2018    EF 65%, mild-moderate AI and TR    Hyperlipidemia     Dr. Mcleod    Hypertension     Dr. Mcleod    Lipoma of shoulder s/p excision right posterior 11 17 2008 09/24/2011    Obesity     Osteoarthritis     Peripheral vascular disease (HCC)     Post-op pain 10/13/2023    Primary osteoarthritis of both

## 2024-06-24 ENCOUNTER — TELEPHONE (OUTPATIENT)
Dept: INTERNAL MEDICINE | Age: 64
End: 2024-06-24

## 2024-06-25 RX ORDER — MELOXICAM 7.5 MG/1
7.5 TABLET ORAL DAILY
Qty: 30 TABLET | Refills: 1 | Status: SHIPPED | OUTPATIENT
Start: 2024-06-25

## 2024-06-25 NOTE — TELEPHONE ENCOUNTER
Carolina Schaefer is calling to request a refill on the following medication(s):    Medication Request:  Requested Prescriptions     Pending Prescriptions Disp Refills    meloxicam (MOBIC) 7.5 MG tablet [Pharmacy Med Name: MELOXICAM 7.5MG] 30 tablet 4     Sig: TAKE 1 TABLET BY MOUTH DAILY       Last Visit Date (If Applicable):  6/4/2024    Next Visit Date:    Visit date not found

## 2024-07-18 DIAGNOSIS — M62.830 MUSCLE SPASM OF BACK: ICD-10-CM

## 2024-07-18 DIAGNOSIS — M96.1 LUMBAR POST-LAMINECTOMY SYNDROME: ICD-10-CM

## 2024-07-18 NOTE — TELEPHONE ENCOUNTER
Carolina Schaefer is calling to request a refill on the following medication(s):    Medication Request:  Requested Prescriptions     Pending Prescriptions Disp Refills    tiZANidine (ZANAFLEX) 4 MG tablet [Pharmacy Med Name: TIZANIDINE 4MG] 60 tablet 0     Sig: TAKE 1 TABLET BY MOUTH 2 TIMES DAILY       Last Visit Date (If Applicable):  6/4/2024    Next Visit Date:    Visit date not found

## 2024-07-19 RX ORDER — TIZANIDINE 4 MG/1
4 TABLET ORAL 2 TIMES DAILY
Qty: 60 TABLET | Refills: 0 | Status: SHIPPED | OUTPATIENT
Start: 2024-07-19

## 2024-07-23 ENCOUNTER — HOSPITAL ENCOUNTER (OUTPATIENT)
Dept: PAIN MANAGEMENT | Age: 64
Discharge: HOME | End: 2024-07-23
Payer: COMMERCIAL

## 2024-07-23 VITALS — BODY MASS INDEX: 31.82 KG/M2 | WEIGHT: 191 LBS | HEIGHT: 65 IN

## 2024-07-23 DIAGNOSIS — M53.3 SACROILIAC JOINT PAIN: Primary | ICD-10-CM

## 2024-07-23 DIAGNOSIS — M96.1 LUMBAR POST-LAMINECTOMY SYNDROME: ICD-10-CM

## 2024-07-23 DIAGNOSIS — Z79.891 CHRONIC USE OF OPIATE DRUG FOR THERAPEUTIC PURPOSE: ICD-10-CM

## 2024-07-23 DIAGNOSIS — M54.16 LUMBAR RADICULOPATHY: ICD-10-CM

## 2024-07-23 DIAGNOSIS — M51.36 LUMBAR DEGENERATIVE DISC DISEASE: ICD-10-CM

## 2024-07-23 PROCEDURE — 99214 OFFICE O/P EST MOD 30 MIN: CPT | Performed by: NURSE PRACTITIONER

## 2024-07-23 PROCEDURE — 99213 OFFICE O/P EST LOW 20 MIN: CPT

## 2024-07-23 RX ORDER — HYDROCODONE BITARTRATE AND ACETAMINOPHEN 5; 325 MG/1; MG/1
1 TABLET ORAL EVERY 8 HOURS PRN
Qty: 90 TABLET | Refills: 0 | Status: SHIPPED | OUTPATIENT
Start: 2024-07-24 | End: 2024-08-23

## 2024-07-23 ASSESSMENT — ENCOUNTER SYMPTOMS
BOWEL INCONTINENCE: 0
CONSTIPATION: 0
COUGH: 0
BACK PAIN: 1
SHORTNESS OF BREATH: 0

## 2024-07-23 NOTE — PROGRESS NOTES
The pain is at a severity of 4/10. The pain is moderate. The pain has been Constant since onset. Pertinent negatives include no numbness or tingling. Nothing aggravates the symptoms. She has tried elevation, ice and rest for the symptoms. The treatment provided mild relief.       Patient denies any new neurological symptoms. No bowel or bladder incontinence, no weakness, and no falling.    Pill count: appropriate / Norco - 5 7/24    Morphine equivalent: 15    Controlled Substance Monitoring:    Acute and Chronic Pain Monitoring:   RX Monitoring Periodic Controlled Substance Monitoring   7/23/2024  10:29 AM Possible medication side effects, risk of tolerance/dependence & alternative treatments discussed.;No signs of potential drug abuse or diversion identified.;Assessed functional status (ability to engage in work or other purposeful activities, the pain intensity and its interference with activities of daily living, quality of family life and social activities, and the physical activity);Obtaining appropriate analgesic effect of treatment.          Periodic Controlled Substance Monitoring: Possible medication side effects, risk of tolerance/dependence & alternative treatments discussed., No signs of potential drug abuse or diversion identified., Assessed functional status (ability to engage in work or other purposeful activities, the pain intensity and its interference with activities of daily living, quality of family life and social activities, and the physical activity), Obtaining appropriate analgesic effect of treatment. (Deandra Blount, APRN - CNP)      Past Medical History:   Diagnosis Date    Allergic rhinitis     Alveolar hypoventilation 11/20/2020    Aortic insufficiency 2018    mild-moderate on echo ([facility]    Bronchiectasis ([facility]) 11/20/2020    Bronchitis     Chronic back pain     Pain management at [place]    COPD (chronic obstructive pulmonary disease) ([facility])

## 2024-08-01 ENCOUNTER — HOSPITAL ENCOUNTER (OUTPATIENT)
Dept: MAMMOGRAPHY | Age: 64
Discharge: HOME | End: 2024-08-03
Payer: COMMERCIAL

## 2024-08-01 DIAGNOSIS — Z12.31 ENCOUNTER FOR SCREENING MAMMOGRAM FOR MALIGNANT NEOPLASM OF BREAST: ICD-10-CM

## 2024-08-01 PROCEDURE — 77063 BREAST TOMOSYNTHESIS BI: CPT

## 2024-08-22 ENCOUNTER — HOSPITAL ENCOUNTER (OUTPATIENT)
Dept: PAIN MANAGEMENT | Age: 64
Discharge: HOME | End: 2024-08-22
Payer: COMMERCIAL

## 2024-08-22 VITALS — BODY MASS INDEX: 31.82 KG/M2 | HEIGHT: 65 IN | WEIGHT: 191 LBS

## 2024-08-22 DIAGNOSIS — G89.29 CHRONIC MIDLINE LOW BACK PAIN WITH RIGHT-SIDED SCIATICA: ICD-10-CM

## 2024-08-22 DIAGNOSIS — M54.16 LUMBAR RADICULOPATHY: ICD-10-CM

## 2024-08-22 DIAGNOSIS — M17.0 PRIMARY OSTEOARTHRITIS OF BOTH KNEES: Primary | ICD-10-CM

## 2024-08-22 DIAGNOSIS — M47.26 OSTEOARTHRITIS OF SPINE WITH RADICULOPATHY, LUMBAR REGION: ICD-10-CM

## 2024-08-22 DIAGNOSIS — M54.41 CHRONIC MIDLINE LOW BACK PAIN WITH RIGHT-SIDED SCIATICA: ICD-10-CM

## 2024-08-22 DIAGNOSIS — M51.36 LUMBAR DEGENERATIVE DISC DISEASE: ICD-10-CM

## 2024-08-22 DIAGNOSIS — M96.1 LUMBAR POST-LAMINECTOMY SYNDROME: ICD-10-CM

## 2024-08-22 DIAGNOSIS — Z79.891 CHRONIC USE OF OPIATE DRUG FOR THERAPEUTIC PURPOSE: ICD-10-CM

## 2024-08-22 PROBLEM — J47.9 BRONCHIECTASIS (HCC): Status: RESOLVED | Noted: 2020-11-20 | Resolved: 2024-08-22

## 2024-08-22 PROBLEM — F11.20 OPIOID DEPENDENCE WITH CURRENT USE (HCC): Status: RESOLVED | Noted: 2024-06-04 | Resolved: 2024-08-22

## 2024-08-22 PROBLEM — M62.830 MUSCLE SPASM OF BACK: Status: RESOLVED | Noted: 2024-04-17 | Resolved: 2024-08-22

## 2024-08-22 PROCEDURE — 99213 OFFICE O/P EST LOW 20 MIN: CPT

## 2024-08-22 RX ORDER — HYDROCODONE BITARTRATE AND ACETAMINOPHEN 5; 325 MG/1; MG/1
1 TABLET ORAL EVERY 8 HOURS PRN
Qty: 90 TABLET | Refills: 0 | Status: SHIPPED | OUTPATIENT
Start: 2024-08-23 | End: 2024-09-22

## 2024-08-22 ASSESSMENT — ENCOUNTER SYMPTOMS
COUGH: 0
CONSTIPATION: 0
BACK PAIN: 1
SHORTNESS OF BREATH: 0

## 2024-08-22 NOTE — PROGRESS NOTES
Lumbar Radiofrequency, Kenalog 40mg    NERVE BLOCK  08/12/2013    Lt MBNB  celestone 6mg    NERVE BLOCK Left 08/28/2013    left lumbar diagnostic block #2 decadron 10 mg    NERVE BLOCK Left 09/24/2013    left lumbar median branch radiofrequency    NERVE BLOCK  07/02/2014    caudal, celestone 9 mg    NERVE BLOCK  07/16/2014    caudal epidural #2, celestone 9mg, fentanyl 25mcg    NERVE BLOCK  07/30/2014    caudal #3 decadron 10mg    NERVE BLOCK  11/06/2014    duramorph epidural steroid block  duramorph 1 mg celestone 9 mg    NERVE BLOCK  11/20/2015    TENS- Empi Select    NERVE BLOCK  07/20/2018    right transforminal # 1 decadron 10mg,isovue    NERVE BLOCK Bilateral 02/01/2019    bilat mbnb- no steroid    NERVE BLOCK Bilateral 02/08/2019    bilat mbnb, marcaine .25%    AZ ARTHROSCOPY KNEE DIAGNOSTIC W/WO SYNOVIAL BX SPX Right 03/24/2017    KNEE ARTHROSCOPY WITH PARTIAL MEDIAL MENISECAL DEBRIDMENT  performed by Milan Paiz MD at [facility] OR    UPPER GASTROINTESTINAL ENDOSCOPY  09/20/2007    UPPER GASTROINTESTINAL ENDOSCOPY  4 21 2009,04/2011    gastritis, esophagitis       Allergies   Allergen Reactions    Aspirin      DUE TO ULCER    Claritin [Loratadine] Other (See Comments)     coughing    Flonase [Fluticasone Propionate] Rash    Ibuprofen Other (See Comments)     Stomach ulcers    Morphine And Codeine      GI Upset         Current Outpatient Medications:     [START ON 8/23/2024] HYDROcodone-acetaminophen (NORCO) 5-325 MG per tablet, Take 1 tablet by mouth every 8 hours as needed for Pain for up to 30 days. Max Daily Amount: 3 tablets, Disp: 90 tablet, Rfl: 0    tiZANidine (ZANAFLEX) 4 MG tablet, TAKE 1 TABLET BY MOUTH 2 TIMES DAILY, Disp: 60 tablet, Rfl: 0    meloxicam (MOBIC) 7.5 MG tablet, TAKE 1 TABLET BY MOUTH DAILY, Disp: 30 tablet, Rfl: 1    albuterol sulfate HFA (VENTOLIN HFA) 108 (90 Base) MCG/ACT inhaler, Inhale 2 puffs into the lungs every 6 hours as needed for Wheezing, Disp: 1 each, Rfl: 3    amLODIPine

## 2024-09-03 RX ORDER — MELOXICAM 7.5 MG/1
7.5 TABLET ORAL DAILY
Qty: 30 TABLET | Refills: 0 | Status: SHIPPED | OUTPATIENT
Start: 2024-09-03

## 2024-09-03 NOTE — TELEPHONE ENCOUNTER
Carolina Schaefer is calling to request a refill on the following medication(s):    Medication Request:  Requested Prescriptions     Pending Prescriptions Disp Refills    meloxicam (MOBIC) 7.5 MG tablet [Pharmacy Med Name: MELOXICAM 7.5MG] 30 tablet 0     Sig: TAKE 1 TABLET BY MOUTH DAILY       Last Visit Date (If Applicable):  6/4/2024    Next Visit Date:    Visit date not found

## 2024-09-16 DIAGNOSIS — M62.830 MUSCLE SPASM OF BACK: ICD-10-CM

## 2024-09-16 DIAGNOSIS — M96.1 LUMBAR POST-LAMINECTOMY SYNDROME: ICD-10-CM

## 2024-09-16 RX ORDER — GABAPENTIN 300 MG/1
CAPSULE ORAL
Qty: 120 CAPSULE | Refills: 0 | OUTPATIENT
Start: 2024-09-16

## 2024-09-17 ENCOUNTER — HOSPITAL ENCOUNTER (OUTPATIENT)
Dept: PAIN MANAGEMENT | Age: 64
Discharge: HOME | End: 2024-09-17
Payer: COMMERCIAL

## 2024-09-17 VITALS — BODY MASS INDEX: 31.82 KG/M2 | HEIGHT: 65 IN | WEIGHT: 191 LBS

## 2024-09-17 DIAGNOSIS — M17.0 PRIMARY OSTEOARTHRITIS OF BOTH KNEES: Primary | ICD-10-CM

## 2024-09-17 DIAGNOSIS — Z79.891 CHRONIC USE OF OPIATE DRUG FOR THERAPEUTIC PURPOSE: ICD-10-CM

## 2024-09-17 DIAGNOSIS — M53.3 SACROILIAC JOINT PAIN: ICD-10-CM

## 2024-09-17 DIAGNOSIS — M96.1 LUMBAR POST-LAMINECTOMY SYNDROME: ICD-10-CM

## 2024-09-17 DIAGNOSIS — M51.36 LUMBAR DEGENERATIVE DISC DISEASE: ICD-10-CM

## 2024-09-17 DIAGNOSIS — M54.16 LUMBAR RADICULOPATHY: ICD-10-CM

## 2024-09-17 PROCEDURE — 99214 OFFICE O/P EST MOD 30 MIN: CPT | Performed by: NURSE PRACTITIONER

## 2024-09-17 PROCEDURE — G0481 DRUG TEST DEF 8-14 CLASSES: HCPCS

## 2024-09-17 PROCEDURE — 80307 DRUG TEST PRSMV CHEM ANLYZR: CPT

## 2024-09-17 PROCEDURE — 99213 OFFICE O/P EST LOW 20 MIN: CPT

## 2024-09-17 RX ORDER — HYDROCODONE BITARTRATE AND ACETAMINOPHEN 5; 325 MG/1; MG/1
1 TABLET ORAL EVERY 8 HOURS PRN
Qty: 90 TABLET | Refills: 0 | Status: SHIPPED | OUTPATIENT
Start: 2024-09-20 | End: 2024-10-20

## 2024-09-17 ASSESSMENT — ENCOUNTER SYMPTOMS
COUGH: 0
CONSTIPATION: 0
BACK PAIN: 1
SHORTNESS OF BREATH: 0

## 2024-09-21 LAB
6-ACETYLMORPHINE, UR: NOT DETECTED
7-AMINOCLONAZEPAM, URINE: NOT DETECTED
ALPHA-OH-ALPRAZ, URINE: NOT DETECTED
ALPHA-OH-MIDAZOLAM, URINE: NOT DETECTED
ALPRAZOLAM, URINE: NOT DETECTED
AMPHETAMINE, URINE: NOT DETECTED
BARBITURATES, URINE: NEGATIVE
BENZOYLECGONINE, UR: NEGATIVE
BUPRENORPHINE URINE: NOT DETECTED
CARISOPRODOL, UR: NEGATIVE
CLONAZEPAM, URINE: NOT DETECTED
CODEINE, URINE: NOT DETECTED
CREAT UR-MCNC: 63.1 MG/DL (ref 20–400)
DIAZEPAM, URINE: NOT DETECTED
DRUGS EXPECTED, UR: NORMAL
EER HI RES INTERP UR: NORMAL
ETHYL GLUCURONIDE UR: NEGATIVE
FENTANYL URINE: NOT DETECTED
GABAPENTIN: PRESENT
HYDROCODONE, URINE: PRESENT
HYDROMORPHONE, URINE: PRESENT
LORAZEPAM, URINE: NOT DETECTED
MARIJUANA METAB, UR: NEGATIVE
MDA, URINE: NOT DETECTED
MDEA, EVE, UR: NOT DETECTED
MDMA, URINE: NOT DETECTED
MEPERIDINE METAB, UR: NOT DETECTED
METHADONE, URINE: NEGATIVE
METHAMPHETAMINE, URINE: NOT DETECTED
METHYLPHENIDATE: NOT DETECTED
MIDAZOLAM, URINE: NOT DETECTED
MORPHINE, OPI1M: NOT DETECTED
NALOXONE URINE: NOT DETECTED
NORBUPRENORPHINE, URINE: NOT DETECTED
NORDIAZEPAM, URINE: NOT DETECTED
NORFENTANYL, URINE: NOT DETECTED
NORHYDROCODONE, URINE: PRESENT
NOROXYCODONE, URINE: NOT DETECTED
NOROXYMORPHONE, URINE: NOT DETECTED
OXAZEPAM, URINE: NOT DETECTED
OXYCODONE URINE: NOT DETECTED
OXYMORPHONE, URINE: NOT DETECTED
PAIN MANAGEMENT DRUG PANEL INTERP, URINE: NORMAL
PAIN MGT DRUG PANEL, HI RES, UR: NORMAL
PCP,URINE: NEGATIVE
PHENTERMINE, UR: NOT DETECTED
PREGABALIN: NOT DETECTED
TAPENTADOL, URINE: NOT DETECTED
TAPENTADOL-O-SULFATE, URINE: NOT DETECTED
TEMAZEPAM, URINE: NOT DETECTED
TRAMADOL, URINE: NEGATIVE
ZOLPIDEM METABOLITE (ZCA), URINE: NOT DETECTED
ZOLPIDEM, URINE: NOT DETECTED

## 2024-09-30 RX ORDER — MELOXICAM 7.5 MG/1
7.5 TABLET ORAL DAILY
Qty: 30 TABLET | Refills: 0 | Status: SHIPPED | OUTPATIENT
Start: 2024-09-30

## 2024-10-07 DIAGNOSIS — J30.9 CHRONIC ALLERGIC RHINITIS: ICD-10-CM

## 2024-10-07 RX ORDER — CETIRIZINE HYDROCHLORIDE 10 MG/1
10 TABLET ORAL DAILY
Qty: 30 TABLET | Refills: 1 | Status: SHIPPED | OUTPATIENT
Start: 2024-10-07

## 2024-10-07 NOTE — TELEPHONE ENCOUNTER
Carolina Schaefer is calling to request a refill on the following medication(s):    Medication Request:  Requested Prescriptions     Pending Prescriptions Disp Refills    cetirizine (ZYRTEC) 10 MG tablet [Pharmacy Med Name: CETIRIZINE 10MG TAB] 30 tablet 1     Sig: TAKE 1 TABLET BY MOUTH DAILY       Last Visit Date (If Applicable):  6/4/2024    Next Visit Date:    Visit date not found

## 2024-10-11 ENCOUNTER — NURSE ONLY (OUTPATIENT)
Dept: INTERNAL MEDICINE | Age: 64
End: 2024-10-11
Payer: COMMERCIAL

## 2024-10-11 DIAGNOSIS — Z23 FLU VACCINE NEED: Primary | ICD-10-CM

## 2024-10-11 DIAGNOSIS — M96.1 LUMBAR POST-LAMINECTOMY SYNDROME: Primary | ICD-10-CM

## 2024-10-11 PROCEDURE — 90653 IIV ADJUVANT VACCINE IM: CPT | Performed by: INTERNAL MEDICINE

## 2024-10-11 RX ORDER — GABAPENTIN 300 MG/1
300 CAPSULE ORAL 3 TIMES DAILY
Qty: 90 CAPSULE | Refills: 2 | Status: CANCELLED | OUTPATIENT
Start: 2024-10-11 | End: 2025-01-09

## 2024-10-11 NOTE — TELEPHONE ENCOUNTER
Pt here for flu shot, states she needs to get her gabapentin back. Pt states she was only on Cymbalta to help her sleep. She would much rather be on the gabapentin for nerve pain. Pt has f/u appt with you on 1/8/2025, script pended to cover until that time, please advise.

## 2024-10-11 NOTE — PROGRESS NOTES
Pt is here for annual Flu vaccination.     Pt presents with no complaints and is afebrile.    Flu vaccine given in left deltoid.     Tolerated immunization well, VIS given to patient. No s/sx of reaction at this time.    Vaccine Information Sheet, \"Influenza - Inactivated\"  given to Carolina R [place], or parent/legal guardian of  Carolina CONTRERAS [place] and verbalized understanding.    Patient responses:    Is the person being vaccinated sick today? No    Does the person to be vaccinated have an allergy to a component of the vaccine?  No    Has the person to be vaccinated ever had a reaction to the flu vaccine in the past?  No    Have you ever had Guillian Effie Syndrome?  No    Flu vaccine given per order. Please see immunization tab.

## 2024-10-14 DIAGNOSIS — K21.9 GASTROESOPHAGEAL REFLUX DISEASE, UNSPECIFIED WHETHER ESOPHAGITIS PRESENT: ICD-10-CM

## 2024-10-14 NOTE — TELEPHONE ENCOUNTER
Both Duloxetine and Gabapentin are used for neuropathic pain. He is on high dose Duloxetine and it is prescribed by a different provider. He should discuss with ordering physician regarding med changes. Also she is getting Mirtazapine and other meds for sleep.

## 2024-10-15 NOTE — TELEPHONE ENCOUNTER
Carolina Schaefer is calling to request a refill on the following medication(s):    Medication Request:  Requested Prescriptions     Pending Prescriptions Disp Refills    omeprazole (PRILOSEC) 20 MG delayed release capsule [Pharmacy Med Name: OMEPRAZOLE 20MG CAPS] 30 capsule 3     Sig: TAKE 1 CAPSULE BY MOUTH DAILY       Last Visit Date (If Applicable):  6/4/2024    Next Visit Date:    1/8/2025

## 2024-10-15 NOTE — TELEPHONE ENCOUNTER
PC to pt to advise discussing medication changes for neuropathic pain with provider that prescribes duloxetine, pt continues to state duloxetine was given for sleep. Writer educated pt that duloxetine and gabapentin are both used for neuropathic pain and that pt needs to discuss stopping duloxetine with the provider that orders it. Pt verbalized understanding to follow up with duloxetine prescriber re: medication efficacy.

## 2024-10-18 ENCOUNTER — HOSPITAL ENCOUNTER (OUTPATIENT)
Dept: PAIN MANAGEMENT | Age: 64
Discharge: HOME | End: 2024-10-18
Payer: COMMERCIAL

## 2024-10-18 VITALS — HEIGHT: 65 IN | WEIGHT: 183 LBS | BODY MASS INDEX: 30.49 KG/M2

## 2024-10-18 DIAGNOSIS — M96.1 LUMBAR POST-LAMINECTOMY SYNDROME: ICD-10-CM

## 2024-10-18 DIAGNOSIS — M54.16 LUMBAR RADICULOPATHY: ICD-10-CM

## 2024-10-18 DIAGNOSIS — M47.26 OSTEOARTHRITIS OF SPINE WITH RADICULOPATHY, LUMBAR REGION: ICD-10-CM

## 2024-10-18 DIAGNOSIS — M53.3 SACROILIAC JOINT PAIN: ICD-10-CM

## 2024-10-18 DIAGNOSIS — Z79.891 CHRONIC USE OF OPIATE DRUG FOR THERAPEUTIC PURPOSE: Primary | ICD-10-CM

## 2024-10-18 PROCEDURE — 99213 OFFICE O/P EST LOW 20 MIN: CPT

## 2024-10-18 RX ORDER — HYDROCODONE BITARTRATE AND ACETAMINOPHEN 5; 325 MG/1; MG/1
1 TABLET ORAL EVERY 8 HOURS PRN
Qty: 90 TABLET | Refills: 0 | Status: SHIPPED | OUTPATIENT
Start: 2024-10-20 | End: 2024-11-19

## 2024-10-18 ASSESSMENT — ENCOUNTER SYMPTOMS
BOWEL INCONTINENCE: 0
BACK PAIN: 1
CONSTIPATION: 0
SHORTNESS OF BREATH: 0
COUGH: 0

## 2024-10-18 ASSESSMENT — PAIN SCALES - GENERAL: PAINLEVEL_OUTOF10: 9

## 2024-10-18 NOTE — PROGRESS NOTES
(ALCOHOL PREP) PADS, Use one alcohol swab to clean your skin before testing your blood sugar, Disp: 100 each, Rfl: 2    albuterol (PROVENTIL) (2.5 MG/3ML) 0.083% nebulizer solution, Take 3 mLs by nebulization every 6 hours as needed for Wheezing, Disp: 120 each, Rfl: 1    nicotine (NICODERM CQ) 7 MG/24HR, Place 1 patch onto the skin every 24 hours, Disp: 30 patch, Rfl: 0    blood glucose test strips (ONETOUCH VERIO) strip, 1 STRIP BY OTHER ROUTE 2 TIMES DAILY TEST 2/DAY, Disp: 100 each, Rfl: 10    DEEP SEA NASAL SPRAY 0.65 % nasal spray, , Disp: , Rfl:     trospium (SANCTURA) 20 MG tablet, TAKE 1 TABLET BY MOUTH 2 TIMES DAILY, Disp: 60 tablet, Rfl: 2    blood glucose monitor kit and supplies, Dispense sufficient amount for testing twice daily plus additional to accommodate PRN testing needs. Dispense all needed supplies to include: monitor, strips, lancing device, lancets, control solutions, alcohol swabs., Disp: 1 kit, Rfl: 0    Umeclidinium Bromide 62.5 MCG/INH AEPB, Inhale 1 puff into the lungs daily, Disp: 2 each, Rfl: 5    mirtazapine (REMERON) 30 MG tablet, Take 1 tablet by mouth nightly Takes with 15 mg to equal 45 mg dose, Disp: , Rfl:     diphenhydrAMINE (BENADRYL) 25 MG tablet, Take 1 tablet by mouth every 6 hours as needed for Itching, Disp: , Rfl:     DULoxetine (CYMBALTA) 60 MG extended release capsule, Take 1 capsule by mouth daily, Disp: 30 capsule, Rfl: 3    ipratropium-albuterol (DUONEB) 0.5-2.5 (3) MG/3ML SOLN nebulizer solution, Inhale 3 mLs into the lungs every 6 hours as needed for Shortness of Breath, Disp: 360 mL, Rfl: 11    Family History   Problem Relation Age of Onset    Diabetes Mother     Heart Disease Mother     Cirrhosis Father     Kidney Disease Sister         dialysis    Kidney Disease Sister         dialysis    Diabetes Brother     Diabetes Maternal Grandmother        Social History     Socioeconomic History    Marital status: Single     Spouse name: Not on file    Number of

## 2024-11-04 DIAGNOSIS — J30.9 CHRONIC ALLERGIC RHINITIS: ICD-10-CM

## 2024-11-04 NOTE — TELEPHONE ENCOUNTER
Last visit:  06/24   Last Med refill:   Does patient have enough medication for 72 hours: No:     Next Visit Date:  Future Appointments   Date Time Provider Department Center   11/20/2024  9:30 AM Tee Alfaro DO [facility] [place]   1/8/2025  3:00 PM Soha Mcleod MD [facility] DE[facility]       Health Maintenance   Topic Date Due    Respiratory Syncytial Virus (RSV) Pregnant or age 60 yrs+ (1 - 1-dose 60+ series) Never done    Diabetic retinal exam  05/14/2021    Annual Wellness Visit (Medicare Advantage)  01/01/2024    COVID-19 Vaccine (4 - 2023-24 season) 09/01/2024    Diabetic Alb to Cr ratio (uACR) test  12/14/2024    Depression Monitoring  04/17/2025    A1C test (Diabetic or Prediabetic)  06/04/2025    Lipids  06/10/2025    GFR test (Diabetes, CKD 3-4, OR last GFR 15-59)  06/10/2025    Lung Cancer Screening &/or Counseling  06/21/2025    Diabetic foot exam  09/05/2025    Breast cancer screen  08/01/2026    Colorectal Cancer Screen  06/05/2027    DTaP/Tdap/Td vaccine (2 - Td or Tdap) 06/24/2029    DEXA (modify frequency per FRAX score)  Completed    Flu vaccine  Completed    Shingles vaccine  Completed    Pneumococcal 65+ years Vaccine  Completed    Hepatitis C screen  Completed    Hepatitis A vaccine  Aged Out    Hepatitis B vaccine  Aged Out    Hib vaccine  Aged Out    Polio vaccine  Aged Out    Meningococcal (ACWY) vaccine  Aged Out    Pneumococcal 0-64 years Vaccine  Discontinued    HIV screen  Discontinued    Cervical cancer screen  Discontinued       Hemoglobin A1C (%)   Date Value   06/04/2024 5.7   12/14/2023 5.6   05/11/2023 6.0             ( goal A1C is < 7)   No components found for: \"LABMICR\"  No components found for: \"LDLCHOLESTEROL\", \"LDLCALC\"    (goal LDL is <100)   AST (U/L)   Date Value   10/05/2023 13     ALT (U/L)   Date Value   10/05/2023 6     BUN (mg/dL)   Date Value   06/10/2024 14     BP Readings from Last 3 Encounters:   06/04/24 107/80   04/17/24 128/82   04/11/24 (!)

## 2024-11-05 RX ORDER — MELOXICAM 7.5 MG/1
7.5 TABLET ORAL DAILY
Qty: 30 TABLET | Refills: 2 | Status: SHIPPED | OUTPATIENT
Start: 2024-11-05

## 2024-11-05 RX ORDER — MELOXICAM 7.5 MG/1
7.5 TABLET ORAL DAILY
Qty: 30 TABLET | Refills: 0 | Status: SHIPPED | OUTPATIENT
Start: 2024-11-05

## 2024-11-05 RX ORDER — CETIRIZINE HYDROCHLORIDE 10 MG/1
10 TABLET ORAL DAILY
Qty: 30 TABLET | Refills: 1 | Status: SHIPPED | OUTPATIENT
Start: 2024-11-05

## 2024-11-05 NOTE — TELEPHONE ENCOUNTER
Carolina Schaefer is calling to request a refill on the following medication(s):    Medication Request:  Requested Prescriptions     Pending Prescriptions Disp Refills    meloxicam (MOBIC) 7.5 MG tablet [Pharmacy Med Name: MELOXICAM 7.5MG] 30 tablet 0     Sig: TAKE 1 TABLET BY MOUTH DAILY       Last Visit Date (If Applicable):  6/4/2024    Next Visit Date:    1/8/2025

## 2024-11-20 ENCOUNTER — TELEPHONE (OUTPATIENT)
Dept: PAIN MANAGEMENT | Age: 64
End: 2024-11-20

## 2024-11-20 ENCOUNTER — HOSPITAL ENCOUNTER (OUTPATIENT)
Dept: PAIN MANAGEMENT | Age: 64
Discharge: HOME | End: 2024-11-20
Payer: COMMERCIAL

## 2024-11-20 VITALS — WEIGHT: 183 LBS | HEIGHT: 65 IN | BODY MASS INDEX: 30.49 KG/M2

## 2024-11-20 DIAGNOSIS — M17.0 PRIMARY OSTEOARTHRITIS OF BOTH KNEES: ICD-10-CM

## 2024-11-20 DIAGNOSIS — M53.3 SACROILIAC JOINT PAIN: ICD-10-CM

## 2024-11-20 DIAGNOSIS — M54.16 LUMBAR RADICULOPATHY: ICD-10-CM

## 2024-11-20 DIAGNOSIS — M51.369 DEGENERATION OF INTERVERTEBRAL DISC OF LUMBAR REGION, UNSPECIFIED WHETHER PAIN PRESENT: ICD-10-CM

## 2024-11-20 DIAGNOSIS — M96.1 LUMBAR POST-LAMINECTOMY SYNDROME: ICD-10-CM

## 2024-11-20 DIAGNOSIS — M62.830 MUSCLE SPASM OF BACK: ICD-10-CM

## 2024-11-20 DIAGNOSIS — Z79.891 CHRONIC USE OF OPIATE DRUG FOR THERAPEUTIC PURPOSE: ICD-10-CM

## 2024-11-20 DIAGNOSIS — M96.1 LUMBAR POST-LAMINECTOMY SYNDROME: Primary | ICD-10-CM

## 2024-11-20 PROCEDURE — 99213 OFFICE O/P EST LOW 20 MIN: CPT

## 2024-11-20 PROCEDURE — 99214 OFFICE O/P EST MOD 30 MIN: CPT | Performed by: STUDENT IN AN ORGANIZED HEALTH CARE EDUCATION/TRAINING PROGRAM

## 2024-11-20 RX ORDER — HYDROCODONE BITARTRATE AND ACETAMINOPHEN 5; 325 MG/1; MG/1
1 TABLET ORAL EVERY 8 HOURS PRN
Qty: 45 TABLET | Refills: 0 | OUTPATIENT
Start: 2024-11-20 | End: 2024-11-20 | Stop reason: SDUPTHER

## 2024-11-20 RX ORDER — HYDROCODONE BITARTRATE AND ACETAMINOPHEN 5; 325 MG/1; MG/1
1 TABLET ORAL EVERY 8 HOURS PRN
Qty: 90 TABLET | Refills: 0 | Status: SHIPPED | OUTPATIENT
Start: 2024-11-20 | End: 2024-12-20

## 2024-11-20 NOTE — PROGRESS NOTES
Chronic Pain Clinic Note     Encounter Date: 11/20/2024     SUBJECTIVE:  Chief Complaint   Patient presents with    Back Pain     Med refill     Medication Refill: Norco - 2    Current Complaints of Pain:   Location: back   Radiation: both legs, worse on the Left  Severity:  Moderate  Pain Numerical Score - 8 today   Average: 8     Highest: 10  Lowest: 8  Character/Quality: Complains of pain that is dull, sharp and shooting  Timing: Constant  Associated symptoms: none  Numbness: no  Weakness: no  Exacerbating factors: sitting, laying, bending  Alleviating factors: getting up and moving around  Length of time pain has been present: Started about 10 years ago  Inciting event/injury: no  Bowel/Bladder incontinence: no  Falls: yes   Physical Therapy:      History of Interventions:   Surgery: No previous lumbar/cervical surgeries  Injections: SIJ 04/25/2023    Imaging:    Lumbar MRI 10/10/2022    Impression   Mild dextroscoliosis with tip at L2-L3       At L2-L3, mild canal stenosis and moderate left and mild right neural   foraminal narrowing.  3 mm left extraforaminal disc protrusion contacts   exiting left L2 nerve root.       At L4-L5, moderate right and mild left neural foraminal narrowing and   right-sided laminectomy defect and changes related to instrumented disc space   fusion and posterior fusion.       Past Medical History:   Diagnosis Date    Allergic rhinitis     Alveolar hypoventilation 11/20/2020    Aortic insufficiency 2018    mild-moderate on echo (was seen and discharged from cardiology-[facility])    Bronchiectasis ([facility]) 11/20/2020    Bronchitis     Chronic back pain     Pain management at [place]    COPD (chronic obstructive pulmonary disease) ([facility])     Dr. Zarate to see 11/09/2020    Depression     DM (diabetes mellitus) ([facility]) 12/18/2012    GERD (gastroesophageal reflux disease)     History of echocardiogram 05/2018    EF 65%, mild-moderate AI and TR    Hyperlipidemia     Dr. Mcleod

## 2024-11-21 NOTE — TELEPHONE ENCOUNTER
Carolina Schaefer is calling to request a refill on the following medication(s):    Medication Request:  Requested Prescriptions     Pending Prescriptions Disp Refills    tiZANidine (ZANAFLEX) 4 MG tablet [Pharmacy Med Name: *TIZANIDINE 4MG] 60 tablet 0     Sig: TAKE 1 TABLET BY MOUTH 2 TIMES DAILY       Last Visit Date (If Applicable):  6/4/2024    Next Visit Date:    1/8/2025

## 2024-11-22 ENCOUNTER — OFFICE VISIT (OUTPATIENT)
Dept: ORTHOPEDIC SURGERY | Age: 64
End: 2024-11-22

## 2024-11-22 VITALS — HEIGHT: 65 IN | WEIGHT: 183 LBS | BODY MASS INDEX: 30.49 KG/M2

## 2024-11-22 DIAGNOSIS — M17.12 ARTHRITIS OF LEFT KNEE: ICD-10-CM

## 2024-11-22 DIAGNOSIS — M25.562 LEFT KNEE PAIN, UNSPECIFIED CHRONICITY: Primary | ICD-10-CM

## 2024-11-22 RX ORDER — LIDOCAINE HYDROCHLORIDE 10 MG/ML
3 INJECTION, SOLUTION INFILTRATION; PERINEURAL ONCE
Status: COMPLETED | OUTPATIENT
Start: 2024-11-22 | End: 2024-11-22

## 2024-11-22 RX ORDER — METHYLPREDNISOLONE ACETATE 80 MG/ML
80 INJECTION, SUSPENSION INTRA-ARTICULAR; INTRALESIONAL; INTRAMUSCULAR; SOFT TISSUE ONCE
Status: COMPLETED | OUTPATIENT
Start: 2024-11-22 | End: 2024-11-22

## 2024-11-22 RX ORDER — GABAPENTIN 300 MG/1
300 CAPSULE ORAL 3 TIMES DAILY
COMMUNITY

## 2024-11-22 RX ORDER — BUPIVACAINE HYDROCHLORIDE 2.5 MG/ML
2 INJECTION, SOLUTION INFILTRATION; PERINEURAL ONCE
Status: COMPLETED | OUTPATIENT
Start: 2024-11-22 | End: 2024-11-22

## 2024-11-22 RX ADMIN — LIDOCAINE HYDROCHLORIDE 3 ML: 10 INJECTION, SOLUTION INFILTRATION; PERINEURAL at 14:18

## 2024-11-22 RX ADMIN — BUPIVACAINE HYDROCHLORIDE 5 MG: 2.5 INJECTION, SOLUTION INFILTRATION; PERINEURAL at 14:18

## 2024-11-22 RX ADMIN — METHYLPREDNISOLONE ACETATE 80 MG: 80 INJECTION, SUSPENSION INTRA-ARTICULAR; INTRALESIONAL; INTRAMUSCULAR; SOFT TISSUE at 14:18

## 2024-11-22 ASSESSMENT — ENCOUNTER SYMPTOMS
COLOR CHANGE: 0
BACK PAIN: 1

## 2024-11-22 NOTE — PROGRESS NOTES
TECHNIQUE:  Multiplanar multisequence MRI of the left knee was performed without the  administration of intravenous contrast.     COMPARISON:  Left knee plain radiographs from 08/11/2023     HISTORY:  ORDERING SYSTEM PROVIDED HISTORY: Acute medial meniscal tear, left, initial  encounter  TECHNOLOGIST PROVIDED HISTORY:  monitor  What is the sedation requirement?->None  Reason for Exam: Left knee pain and swelling x 3 weeks. Felt a pop     63-year-old female with left knee pain and swelling for 3 weeks; patient felt  a pop;?  Acute medial meniscus tear.     FINDINGS:  MENISCI: Complex tearing and maceration of the posterior horn and body of the  medial meniscus with outward extrusion.     Lateral meniscus demonstrates normal morphology and signal characteristics.  No lateral meniscus tear.     CRUCIATE LIGAMENTS: Anterior and posterior cruciate ligaments appear  continuous/intact.  Mild ACL ganglion formation.     EXTENSOR MECHANISM: Distal quadriceps tendon, patellar tendon, and patellar  retinacula appear intact.     LATERAL COLLATERAL LIGAMENT COMPLEX: Popliteus muscle/tendon, iliotibial  band, lateral collateral ligament, and biceps femoris appear intact.     MEDIAL COLLATERAL LIGAMENT COMPLEX: Medial collateral ligament complex  appears continuous/intact.     KNEE JOINT: Small to moderate joint effusion.     Osseous alignment is normal.     No acute fracture or dislocation.     Moderate tricompartmental osteophyte spurring.     Severe grade 4 medial compartment chondromalacia with underlying subchondral  reactive marrow and subcortical cystic changes.     Articular cartilage of the lateral compartment appears grossly intact without  focal chondral defect.     Diffuse grade 3-4 patellar apex and medial patellar facet chondromalacia.     Grade 2-3 femoral trochlear chondromalacia.     BONE MARROW: Bone marrow signal intensity otherwise grossly unremarkable.     SOFT TISSUES: Small Baker's cyst.  Visualized

## 2024-12-06 ENCOUNTER — TELEPHONE (OUTPATIENT)
Dept: ORTHOPEDIC SURGERY | Age: 64
End: 2024-12-06

## 2024-12-06 NOTE — TELEPHONE ENCOUNTER
Rubio from [facility] is requesting referral for physical therapy and face sheet as well as office note be faxed to [phone]   [phone] ex [phone]

## 2024-12-10 ENCOUNTER — TELEPHONE (OUTPATIENT)
Dept: INTERNAL MEDICINE | Age: 64
End: 2024-12-10

## 2024-12-16 ENCOUNTER — TELEPHONE (OUTPATIENT)
Dept: INTERNAL MEDICINE | Age: 64
End: 2024-12-16

## 2024-12-16 NOTE — TELEPHONE ENCOUNTER
[facility] is requesting if it ok with adding skilled nursing service in addition to PT, [facility] have a referral form needed completed, writer put it in the your mail box to be completed    Scanned in pt chart

## 2024-12-18 ENCOUNTER — HOSPITAL ENCOUNTER (OUTPATIENT)
Dept: PAIN MANAGEMENT | Age: 64
Discharge: HOME | End: 2024-12-18
Payer: COMMERCIAL

## 2024-12-18 VITALS — BODY MASS INDEX: 30.49 KG/M2 | HEIGHT: 65 IN | WEIGHT: 183 LBS

## 2024-12-18 DIAGNOSIS — Z79.891 CHRONIC USE OF OPIATE DRUG FOR THERAPEUTIC PURPOSE: Primary | ICD-10-CM

## 2024-12-18 DIAGNOSIS — M96.1 LUMBAR POST-LAMINECTOMY SYNDROME: ICD-10-CM

## 2024-12-18 DIAGNOSIS — M53.3 SACROILIAC JOINT PAIN: ICD-10-CM

## 2024-12-18 DIAGNOSIS — M54.16 LUMBAR RADICULOPATHY: ICD-10-CM

## 2024-12-18 DIAGNOSIS — M51.369 DEGENERATION OF INTERVERTEBRAL DISC OF LUMBAR REGION, UNSPECIFIED WHETHER PAIN PRESENT: ICD-10-CM

## 2024-12-18 PROCEDURE — 99214 OFFICE O/P EST MOD 30 MIN: CPT | Performed by: STUDENT IN AN ORGANIZED HEALTH CARE EDUCATION/TRAINING PROGRAM

## 2024-12-18 PROCEDURE — 99213 OFFICE O/P EST LOW 20 MIN: CPT

## 2024-12-18 RX ORDER — HYDROCODONE BITARTRATE AND ACETAMINOPHEN 5; 325 MG/1; MG/1
1 TABLET ORAL EVERY 8 HOURS PRN
Qty: 90 TABLET | Refills: 0 | Status: SHIPPED | OUTPATIENT
Start: 2024-12-20 | End: 2025-01-19

## 2024-12-18 NOTE — PROGRESS NOTES
Umeclidinium Bromide 62.5 MCG/INH AEPB 1 puff, Inhalation, DAILY       Allergies   Allergen Reactions    Aspirin      DUE TO ULCER    Claritin [Loratadine] Other (See Comments)     coughing    Flonase [Fluticasone Propionate] Rash    Ibuprofen Other (See Comments)     Stomach ulcers    Morphine And Codeine      GI Upset       Review of Systems:   Musculoskeletal: positive for low back pain  Neurological: Positive for radicular leg pain, leg weakness or numbness/tingling    OBJECTIVE:    There were no vitals filed for this visit.    PHYSICAL EXAM    GENERAL: No acute distress, pleasant, well-appearing  HEENT: Normocephalic, atraumatic, Pupils equal and round  CARDIOVASCULAR: Well perfused, No peripheral cyanosis  PULMONARY: Good chest wall excursion, breathing unlabored  PSYCH: Appropriate affect and insight, non-pressured speech  SKIN: No rashes or lesions  MUSCULOSKELETAL:  Inspection: The back and extremities are symmetric and aligned. Muscle bulk is normal in appearance.  Surgical scar present in lumbar spine.  Palpation: There is tenderness to palpation along the lumbar paraspinal musculature bilaterally  Lumbar range of motion is limited in extension  NEUROMUSCULAR:  Patient ambulates unassisted  Gait is antalgic  Sensation to light touch is slightly reduced in left L2 dermatome  Strength is full with giveaway weakness bilaterally in lower extremities  No ankle clonus    DIAGNOSIS:    ICD-10-CM    1. Chronic use of opiate drug for therapeutic purpose  Z79.891       2. Sacroiliac joint pain  M53.3 HYDROcodone-acetaminophen (NORCO) 5-325 MG per tablet      3. Lumbar post-laminectomy syndrome  M96.1 HYDROcodone-acetaminophen (NORCO) 5-325 MG per tablet      4. Lumbar radiculopathy  M54.16       5. Degeneration of intervertebral disc of lumbar region, unspecified whether pain present  M51.369           ASSESSMENT:    Carolina Schaefer is a 64 y.o.female who presents with chronic low back pain and leg pain as well

## 2025-01-08 ENCOUNTER — OFFICE VISIT (OUTPATIENT)
Dept: INTERNAL MEDICINE | Age: 65
End: 2025-01-08
Payer: COMMERCIAL

## 2025-01-08 VITALS
WEIGHT: 185 LBS | SYSTOLIC BLOOD PRESSURE: 130 MMHG | DIASTOLIC BLOOD PRESSURE: 82 MMHG | BODY MASS INDEX: 30.82 KG/M2 | HEIGHT: 65 IN

## 2025-01-08 DIAGNOSIS — J43.2 CENTRILOBULAR EMPHYSEMA (HCC): ICD-10-CM

## 2025-01-08 DIAGNOSIS — I10 ESSENTIAL HYPERTENSION: ICD-10-CM

## 2025-01-08 DIAGNOSIS — J30.9 CHRONIC ALLERGIC RHINITIS: ICD-10-CM

## 2025-01-08 DIAGNOSIS — E87.6 HYPOKALEMIA: ICD-10-CM

## 2025-01-08 DIAGNOSIS — R73.03 PREDIABETES: ICD-10-CM

## 2025-01-08 DIAGNOSIS — E78.00 PURE HYPERCHOLESTEROLEMIA: ICD-10-CM

## 2025-01-08 DIAGNOSIS — Z00.00 MEDICARE ANNUAL WELLNESS VISIT, SUBSEQUENT: Primary | ICD-10-CM

## 2025-01-08 PROCEDURE — 99213 OFFICE O/P EST LOW 20 MIN: CPT | Performed by: INTERNAL MEDICINE

## 2025-01-08 RX ORDER — CETIRIZINE HYDROCHLORIDE 10 MG/1
10 TABLET ORAL DAILY
Qty: 30 TABLET | Refills: 1 | Status: SHIPPED | OUTPATIENT
Start: 2025-01-08

## 2025-01-08 RX ORDER — AMLODIPINE BESYLATE 10 MG/1
10 TABLET ORAL EVERY MORNING
Qty: 90 TABLET | Refills: 1 | Status: SHIPPED | OUTPATIENT
Start: 2025-01-08

## 2025-01-08 RX ORDER — POTASSIUM CHLORIDE 750 MG/1
20 TABLET, EXTENDED RELEASE ORAL DAILY
Qty: 60 TABLET | Refills: 5 | Status: SHIPPED | OUTPATIENT
Start: 2025-01-08

## 2025-01-08 RX ORDER — LISINOPRIL AND HYDROCHLOROTHIAZIDE 20; 25 MG/1; MG/1
TABLET ORAL
Qty: 90 TABLET | Refills: 1 | Status: SHIPPED | OUTPATIENT
Start: 2025-01-08

## 2025-01-08 RX ORDER — DOCUSATE SODIUM 100 MG/1
100 CAPSULE, LIQUID FILLED ORAL DAILY
Qty: 30 CAPSULE | Refills: 5 | Status: SHIPPED | OUTPATIENT
Start: 2025-01-08

## 2025-01-08 RX ORDER — ATORVASTATIN CALCIUM 80 MG/1
80 TABLET, FILM COATED ORAL DAILY
Qty: 90 TABLET | Refills: 1 | Status: SHIPPED | OUTPATIENT
Start: 2025-01-08

## 2025-01-08 RX ORDER — CARVEDILOL 12.5 MG/1
12.5 TABLET ORAL 2 TIMES DAILY
Qty: 180 TABLET | Refills: 1 | Status: SHIPPED | OUTPATIENT
Start: 2025-01-08

## 2025-01-08 SDOH — ECONOMIC STABILITY: FOOD INSECURITY: WITHIN THE PAST 12 MONTHS, THE FOOD YOU BOUGHT JUST DIDN'T LAST AND YOU DIDN'T HAVE MONEY TO GET MORE.: NEVER TRUE

## 2025-01-08 SDOH — ECONOMIC STABILITY: FOOD INSECURITY: WITHIN THE PAST 12 MONTHS, YOU WORRIED THAT YOUR FOOD WOULD RUN OUT BEFORE YOU GOT MONEY TO BUY MORE.: NEVER TRUE

## 2025-01-08 ASSESSMENT — PATIENT HEALTH QUESTIONNAIRE - PHQ9
9. THOUGHTS THAT YOU WOULD BE BETTER OFF DEAD, OR OF HURTING YOURSELF: NOT AT ALL
SUM OF ALL RESPONSES TO PHQ QUESTIONS 1-9: 1
1. LITTLE INTEREST OR PLEASURE IN DOING THINGS: SEVERAL DAYS
SUM OF ALL RESPONSES TO PHQ QUESTIONS 1-9: 1
6. FEELING BAD ABOUT YOURSELF - OR THAT YOU ARE A FAILURE OR HAVE LET YOURSELF OR YOUR FAMILY DOWN: NOT AT ALL
SUM OF ALL RESPONSES TO PHQ QUESTIONS 1-9: 1
4. FEELING TIRED OR HAVING LITTLE ENERGY: NOT AT ALL
3. TROUBLE FALLING OR STAYING ASLEEP: NOT AT ALL
SUM OF ALL RESPONSES TO PHQ9 QUESTIONS 1 & 2: 1
8. MOVING OR SPEAKING SO SLOWLY THAT OTHER PEOPLE COULD HAVE NOTICED. OR THE OPPOSITE, BEING SO FIGETY OR RESTLESS THAT YOU HAVE BEEN MOVING AROUND A LOT MORE THAN USUAL: NOT AT ALL
5. POOR APPETITE OR OVEREATING: NOT AT ALL
7. TROUBLE CONCENTRATING ON THINGS, SUCH AS READING THE NEWSPAPER OR WATCHING TELEVISION: NOT AT ALL
SUM OF ALL RESPONSES TO PHQ QUESTIONS 1-9: 1
2. FEELING DOWN, DEPRESSED OR HOPELESS: NOT AT ALL
10. IF YOU CHECKED OFF ANY PROBLEMS, HOW DIFFICULT HAVE THESE PROBLEMS MADE IT FOR YOU TO DO YOUR WORK, TAKE CARE OF THINGS AT HOME, OR GET ALONG WITH OTHER PEOPLE: NOT DIFFICULT AT ALL

## 2025-01-08 ASSESSMENT — ENCOUNTER SYMPTOMS
COUGH: 1
SHORTNESS OF BREATH: 0
WHEEZING: 0
BACK PAIN: 0
CONSTIPATION: 0
ABDOMINAL PAIN: 0

## 2025-01-08 ASSESSMENT — LIFESTYLE VARIABLES
HOW OFTEN DO YOU HAVE A DRINK CONTAINING ALCOHOL: MONTHLY OR LESS
HOW MANY STANDARD DRINKS CONTAINING ALCOHOL DO YOU HAVE ON A TYPICAL DAY: 1 OR 2

## 2025-01-08 NOTE — PROGRESS NOTES
Medicare Annual Wellness Visit    Carolina Schaefer is here for Medicare AWV (Last AWV 12.14.2023), Discuss Medications (Pt's doctor at [facility] put pt back on gabapentin), and Health Maintenance (Albumin/cr ratio pended)    Assessment & Plan   Medicare annual wellness visit, subsequent  Essential hypertension  -     Albumin/Creatinine Ratio, Urine; Future  -     amLODIPine (NORVASC) 10 MG tablet; Take 1 tablet by mouth every morning, Disp-90 tablet, R-1Normal  -     carvedilol (COREG) 12.5 MG tablet; Take 1 tablet by mouth 2 times daily, Disp-180 tablet, R-1Normal  -     lisinopril-hydroCHLOROthiazide (PRINZIDE;ZESTORETIC) 20-25 MG per tablet; 1 tablet daily, Disp-90 tablet, R-1* * N O T I C E * * Last quantity doesn't match original quantityNormal  -     Basic Metabolic Panel; Future  -     potassium chloride (KLOR-CON M) 10 MEQ extended release tablet; Take 2 tablets by mouth daily, Disp-60 tablet, R-5Normal  Centrilobular emphysema (HCC)  Pure hypercholesterolemia  -     atorvastatin (LIPITOR) 80 MG tablet; Take 1 tablet by mouth daily, Disp-90 tablet, R-1Normal  Prediabetes  -     Albumin/Creatinine Ratio, Urine; Future  Chronic allergic rhinitis  -     cetirizine (ZYRTEC) 10 MG tablet; Take 1 tablet by mouth daily, Disp-30 tablet, R-1Normal  Hypokalemia  -     potassium chloride (KLOR-CON M) 10 MEQ extended release tablet; Take 2 tablets by mouth daily, Disp-60 tablet, R-5Normal       Return in about 6 months (around 7/8/2025).     Subjective       Patient's complete Health Risk Assessment and screening values have been reviewed and are found in Flowsheets. The following problems were reviewed today and where indicated follow up appointments were made and/or referrals ordered.    Positive Risk Factor Screenings with Interventions:    Fall Risk:  Do you feel unsteady or are you worried about falling? : (!) yes (Sometimes unsteady but does not worry about falling)  2 or more falls in past year?: (!) yes (Fell 
Date    CHOL 151 06/10/2024    HDL 38 (L) 06/10/2024    TRIG 88 06/10/2024     No results found for: \"LDLDIRECT\"    LIVER PROFILE:  Lab Results   Component Value Date/Time    ALT 6 10/05/2023 11:20 AM    AST 13 10/05/2023 11:20 AM    BILITOT 0.3 10/05/2023 11:20 AM    BILIDIR 0.11 06/18/2018 03:10 PM      THYROID FUNCTION:   Lab Results   Component Value Date/Time    TSH 1.82 01/26/2022 09:02 AM      URINEANALYSIS: No results found for: \"LABURIN\"  ASSESSMENT AND PLAN:    1. Medicare annual wellness visit, subsequent  See notes    2. Essential hypertension  Controlled    - Albumin/Creatinine Ratio, Urine; Future  - amLODIPine (NORVASC) 10 MG tablet; Take 1 tablet by mouth every morning  Dispense: 90 tablet; Refill: 1  - carvedilol (COREG) 12.5 MG tablet; Take 1 tablet by mouth 2 times daily  Dispense: 180 tablet; Refill: 1  - lisinopril-hydroCHLOROthiazide (PRINZIDE;ZESTORETIC) 20-25 MG per tablet; 1 tablet daily  Dispense: 90 tablet; Refill: 1  - Basic Metabolic Panel; Future  - potassium chloride (KLOR-CON M) 10 MEQ extended release tablet; Take 2 tablets by mouth daily  Dispense: 60 tablet; Refill: 5    3. Centrilobular emphysema (HCC)  Stable  Follows with Pulm  LDCT reviewed  vaccines    4. Pure hypercholesterolemia    - atorvastatin (LIPITOR) 80 MG tablet; Take 1 tablet by mouth daily  Dispense: 90 tablet; Refill: 1    5. Prediabetes    - Albumin/Creatinine Ratio, Urine; Future    6. Hypokalemia    - potassium chloride (KLOR-CON M) 10 MEQ extended release tablet; Take 2 tablets by mouth daily  Dispense: 60 tablet; Refill: 5    7. Chronic allergic rhinitis    - cetirizine (ZYRTEC) 10 MG tablet; Take 1 tablet by mouth daily  Dispense: 30 tablet; Refill: 1          FOLLOW UP AND INSTRUCTIONS:   Return in about 6 months (around 7/8/2025).    Carolina received counseling on the following healthy behaviors: nutrition, exercise, and medication adherence    Reviewed prior labs and health maintenance.      Discussed use,

## 2025-01-08 NOTE — PATIENT INSTRUCTIONS
Sweating.     Shortness of breath.     Pain, pressure, or a strange feeling in the back, neck, jaw, or upper belly or in one or both shoulders or arms.     Lightheadedness or sudden weakness.     A fast or irregular heartbeat.   After you call 911, the  may tell you to chew 1 adult-strength or 2 to 4 low-dose aspirin. Wait for an ambulance. Do not try to drive yourself.  Watch closely for changes in your health, and be sure to contact your doctor if you have any problems.  Where can you learn more?  Go to https://www.[employer].net/patientEd and enter F075 to learn more about \"A Healthy Heart: Care Instructions.\"  Current as of: July 31, 2024  Content Version: 14.3  © 2024 [employer].   Care instructions adapted under license by [email or web address]. If you have questions about a medical condition or this instruction, always ask your healthcare professional. [employer] disclaims any warranty or liability for your use of this information.    Personalized Preventive Plan for Carolina Schaefer - 1/8/2025  Medicare offers a range of preventive health benefits. Some of the tests and screenings are paid in full while other may be subject to a deductible, co-insurance, and/or copay.  Some of these benefits include a comprehensive review of your medical history including lifestyle, illnesses that may run in your family, and various assessments and screenings as appropriate.  After reviewing your medical record and screening and assessments performed today your provider may have ordered immunizations, labs, imaging, and/or referrals for you.  A list of these orders (if applicable) as well as your Preventive Care list are included within your After Visit Summary for your review.

## 2025-01-14 ENCOUNTER — HOSPITAL ENCOUNTER (OUTPATIENT)
Age: 65
Setting detail: SPECIMEN
Discharge: HOME | End: 2025-01-14

## 2025-01-14 DIAGNOSIS — I10 ESSENTIAL HYPERTENSION: ICD-10-CM

## 2025-01-14 DIAGNOSIS — R73.03 PREDIABETES: ICD-10-CM

## 2025-01-14 LAB
ANION GAP SERPL CALCULATED.3IONS-SCNC: 11 MMOL/L (ref 9–16)
BUN SERPL-MCNC: 13 MG/DL (ref 8–23)
CALCIUM SERPL-MCNC: 9.7 MG/DL (ref 8.6–10.4)
CHLORIDE SERPL-SCNC: 101 MMOL/L (ref 98–107)
CO2 SERPL-SCNC: 28 MMOL/L (ref 20–31)
CREAT SERPL-MCNC: 0.8 MG/DL (ref 0.6–0.9)
CREAT UR-MCNC: 188 MG/DL (ref 28–217)
GFR, ESTIMATED: 81 ML/MIN/1.73M2
GLUCOSE SERPL-MCNC: 138 MG/DL (ref 74–99)
MICROALBUMIN UR-MCNC: 16 MG/L (ref 0–20)
MICROALBUMIN/CREAT UR-RTO: 9 MCG/MG CREAT (ref 0–25)
POTASSIUM SERPL-SCNC: 3.7 MMOL/L (ref 3.7–5.3)
SODIUM SERPL-SCNC: 140 MMOL/L (ref 136–145)

## 2025-01-20 ENCOUNTER — HOSPITAL ENCOUNTER (OUTPATIENT)
Dept: PAIN MANAGEMENT | Age: 65
Discharge: HOME | End: 2025-01-20
Payer: MEDICARE

## 2025-01-20 ENCOUNTER — TELEPHONE (OUTPATIENT)
Dept: PAIN MANAGEMENT | Age: 65
End: 2025-01-20

## 2025-01-20 VITALS — WEIGHT: 183 LBS | BODY MASS INDEX: 30.49 KG/M2 | HEIGHT: 65 IN

## 2025-01-20 DIAGNOSIS — M96.1 LUMBAR POST-LAMINECTOMY SYNDROME: ICD-10-CM

## 2025-01-20 DIAGNOSIS — M54.16 LUMBAR RADICULOPATHY: ICD-10-CM

## 2025-01-20 DIAGNOSIS — M53.3 SACROILIAC JOINT PAIN: ICD-10-CM

## 2025-01-20 DIAGNOSIS — Z79.891 CHRONIC USE OF OPIATE DRUG FOR THERAPEUTIC PURPOSE: Primary | ICD-10-CM

## 2025-01-20 PROCEDURE — 99213 OFFICE O/P EST LOW 20 MIN: CPT | Performed by: NURSE PRACTITIONER

## 2025-01-20 PROCEDURE — 99213 OFFICE O/P EST LOW 20 MIN: CPT

## 2025-01-20 RX ORDER — HYDROCODONE BITARTRATE AND ACETAMINOPHEN 5; 325 MG/1; MG/1
1 TABLET ORAL EVERY 8 HOURS PRN
Qty: 90 TABLET | Refills: 0 | Status: SHIPPED | OUTPATIENT
Start: 2025-01-20 | End: 2025-02-19

## 2025-01-20 RX ORDER — HYDROCODONE BITARTRATE AND ACETAMINOPHEN 5; 325 MG/1; MG/1
1 TABLET ORAL EVERY 8 HOURS PRN
Qty: 90 TABLET | Refills: 0 | Status: SHIPPED | OUTPATIENT
Start: 2025-01-20 | End: 2025-01-20 | Stop reason: SDUPTHER

## 2025-01-20 RX ORDER — HYDROCODONE BITARTRATE AND ACETAMINOPHEN 5; 325 MG/1; MG/1
1 TABLET ORAL EVERY 8 HOURS PRN
Qty: 90 TABLET | Refills: 0 | Status: CANCELLED | OUTPATIENT
Start: 2025-01-20 | End: 2025-02-19

## 2025-01-20 ASSESSMENT — ENCOUNTER SYMPTOMS
BOWEL INCONTINENCE: 0
SHORTNESS OF BREATH: 0
COUGH: 0
CONSTIPATION: 0
BACK PAIN: 1

## 2025-01-20 ASSESSMENT — PAIN DESCRIPTION - LOCATION: LOCATION: BACK

## 2025-01-20 ASSESSMENT — PAIN SCALES - GENERAL: PAINLEVEL_OUTOF10: 8

## 2025-01-20 NOTE — PROGRESS NOTES
benefits have been discussed. Patient denies side effects from medications like nausea, vomiting, constipation or drowsiness. Patient reports current activities of daily living are possible due to medications and would like to continue them.     As always, we encourage daily stretching and strengthening exercises, and recommend minimizing use of pain medications unless patient cannot get through daily activities due to pain.    We have discussed with the patient the effect the patient’s medical condition and opioid medication may have on the patient’s ability to safely operate a vehicle. Pt verbalized understanding.     Due to the high risk nature of this patient's pain medication close monitoring is required.   Continue current medication management, pt has been stable and compliant.  Script written for norco  Follow up appointment made for 4 weeks    I have reviewed the chief complaint and history of present illness (including ROS and PFSH) and vital documentation by my staff and I agree with their documentation and have added where applicable.

## 2025-01-20 NOTE — TELEPHONE ENCOUNTER
Pt calls to report that her prescription NORCO was not received by her pharmacy post appointment this date. Please Advise.

## 2025-01-21 ENCOUNTER — TELEPHONE (OUTPATIENT)
Dept: INTERNAL MEDICINE | Age: 65
End: 2025-01-21

## 2025-01-21 DIAGNOSIS — J43.2 CENTRILOBULAR EMPHYSEMA (HCC): Primary | ICD-10-CM

## 2025-01-21 NOTE — TELEPHONE ENCOUNTER
Patient called the office requesting a Z-Samuel. Patient states she discussed it with you but never received anything. Patient states she is having issues with her bronchitis. Please advise.

## 2025-01-22 RX ORDER — AZITHROMYCIN 250 MG/1
TABLET, FILM COATED ORAL
Qty: 6 TABLET | Refills: 0 | Status: SHIPPED | OUTPATIENT
Start: 2025-01-22 | End: 2025-02-01

## 2025-01-23 ENCOUNTER — OFFICE VISIT (OUTPATIENT)
Dept: ORTHOPEDIC SURGERY | Age: 65
End: 2025-01-23
Payer: MEDICARE

## 2025-01-23 VITALS — BODY MASS INDEX: 30.45 KG/M2 | HEIGHT: 65 IN

## 2025-01-23 DIAGNOSIS — M62.830 MUSCLE SPASM OF BACK: ICD-10-CM

## 2025-01-23 DIAGNOSIS — M96.1 LUMBAR POST-LAMINECTOMY SYNDROME: ICD-10-CM

## 2025-01-23 DIAGNOSIS — F17.200 SMOKER: ICD-10-CM

## 2025-01-23 DIAGNOSIS — M17.12 ARTHRITIS OF LEFT KNEE: Primary | ICD-10-CM

## 2025-01-23 PROCEDURE — G8427 DOCREV CUR MEDS BY ELIG CLIN: HCPCS | Performed by: PHYSICIAN ASSISTANT

## 2025-01-23 PROCEDURE — 1090F PRES/ABSN URINE INCON ASSESS: CPT | Performed by: PHYSICIAN ASSISTANT

## 2025-01-23 PROCEDURE — 99214 OFFICE O/P EST MOD 30 MIN: CPT | Performed by: PHYSICIAN ASSISTANT

## 2025-01-23 PROCEDURE — 1123F ACP DISCUSS/DSCN MKR DOCD: CPT | Performed by: PHYSICIAN ASSISTANT

## 2025-01-23 PROCEDURE — G8417 CALC BMI ABV UP PARAM F/U: HCPCS | Performed by: PHYSICIAN ASSISTANT

## 2025-01-23 PROCEDURE — 4004F PT TOBACCO SCREEN RCVD TLK: CPT | Performed by: PHYSICIAN ASSISTANT

## 2025-01-23 PROCEDURE — G8399 PT W/DXA RESULTS DOCUMENT: HCPCS | Performed by: PHYSICIAN ASSISTANT

## 2025-01-23 PROCEDURE — 3017F COLORECTAL CA SCREEN DOC REV: CPT | Performed by: PHYSICIAN ASSISTANT

## 2025-01-23 NOTE — PROGRESS NOTES
[facility]  [address]  [place] [address]  Dept Phone: [phone]  Dept Fax: [phone]      Orthopa[facility]uma Clinic Follow Up      Subjective:       Carolina Schaefer is a 64 y.o. year old female who presents to the clinic today for follow up chronic left knee pain.  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  She was eval by another provider in our office last on 11/22/2024. During that visit with Debra Marquez PA-C she elected to undergo corticosteroid injection.  Patient reports that that injection provided significant relief of pain and she actually continues to do better today than she was at last visit.    Patient also with history of Synvisc 1 injection on 6/3/2024 feels that the cortisone did provide even greater relief than the Synvisc injection.    Surgical history includes left knee arthroscopy partial medial meniscectomy in October 2023 with Dr. Paiz.    Review of Systems  Gen: no fever, chills, malaise  CV: no chest pain or palpitations  Resp: no cough or shortness of breath  GI: no nausea, vomiting, diarrhea, or constipation  Neuro: no seizures, vertigo, or headache  Msk: See HPI  10 remaining systems reviewed and negative    Objective :   There were no vitals filed for this visit.Body mass index is 30.45 kg/m².  General: No acute distress, resting comfortably in the clinic  Neuro: alert. oriented  Eyes: Extra-ocular muscles intact  Pulm: Respirations unlabored and regular.  Skin: warm, well perfused  Psych:   Patient has good fund of knowledge and displays understanding of exam, diagnosis, and plan.  Left Lower Extremity: Mature scar formation to the anterior portal sites from previous arthroscopy.  Active knee range of motion from 3 to 120 degrees.  Trace suprapatellar joint effusion present.  Ligamentously knee is stable to varus valgus stress at 0 30 degrees.  Negative anterior drawer, posterior drawer and Lachman's.. Compartments

## 2025-01-23 NOTE — TELEPHONE ENCOUNTER
Carolina Schaefer is calling to request a refill on the following medication(s):    Medication Request:  Requested Prescriptions     Pending Prescriptions Disp Refills    tiZANidine (ZANAFLEX) 4 MG tablet [Pharmacy Med Name: TIZANIDINE HYDROCHLORIDE 4 MG TABLET] 60 tablet 0     Sig: TAKE 1 TABLET BY MOUTH 2 TIMES DAILY       Last Visit Date (If Applicable):  1/8/2025    Next Visit Date:    Visit date not found

## 2025-01-24 ENCOUNTER — TELEPHONE (OUTPATIENT)
Dept: INTERNAL MEDICINE | Age: 65
End: 2025-01-24

## 2025-01-24 DIAGNOSIS — J44.9 COPD, SEVERITY TO BE DETERMINED (HCC): Primary | ICD-10-CM

## 2025-01-24 DIAGNOSIS — R73.03 PREDIABETES: ICD-10-CM

## 2025-01-24 DIAGNOSIS — Z87.898 HISTORY OF PREDIABETES: ICD-10-CM

## 2025-01-24 RX ORDER — PEN NEEDLE, DIABETIC 31 GX5/16"
NEEDLE, DISPOSABLE MISCELLANEOUS
Qty: 100 EACH | Refills: 2 | Status: CANCELLED | OUTPATIENT
Start: 2025-01-24

## 2025-01-24 RX ORDER — LANCETS 30 GAUGE
1 EACH MISCELLANEOUS
Qty: 100 EACH | Refills: 2 | Status: CANCELLED | OUTPATIENT
Start: 2025-01-24

## 2025-01-24 RX ORDER — MELATONIN/LEMON BALM LEAF EXTR 5MG-500MCG
1 TABLET ORAL EVERY 24 HOURS
Qty: 30 PATCH | Refills: 0 | Status: SHIPPED | OUTPATIENT
Start: 2025-01-24

## 2025-01-24 RX ORDER — GLUCOSAMINE HCL/CHONDROITIN SU 500-400 MG
CAPSULE ORAL
Qty: 100 STRIP | Refills: 3 | Status: CANCELLED | OUTPATIENT
Start: 2025-01-24

## 2025-01-24 NOTE — TELEPHONE ENCOUNTER
Scripts pended, provider cannot sign off on diabetic testing supplies until she is in the office as they must be printed. Nebulizers are stocked in office.    PC to pt to let her know printed scripts will need to be obtained from PCP next week with r/t testing supplies. Pt states [facility] has already sent us paperwork and they just need it signed and faxed back.    Writer advised pt can stop in to office to get nebulizer or order can be sent to [facility]. Pt states she has nebulizer, but her nurse is telling her she needs new tubing. Writer also stocks tubing in office, pt to stop and  new tubing for nebulizer.    No paperwork from [facility] has been received for pt. PC to [facility] at [phone], they will fax paperwork again to office.    Addendum 1306: Fax received, form completed and placed in PCP mailbox for signing.

## 2025-01-24 NOTE — TELEPHONE ENCOUNTER
..Request for   Requested Prescriptions     Pending Prescriptions Disp Refills    NICOTINE STEP 3 7 MG/24HR [Pharmacy Med Name: NICOTINE TRANSDERMAL SYSTSTEM STEP 3/CLEAR 7 MG/24HR]  0     Sig: PLACE 1 PATCH ONTO THE SKIN EVERY 24 HOURS    .      Please review and e-scribe to pharmacy listed in chart if appropriate. Thank you.      Last Visit Date: 1/8/2025  Next Visit Date: 1/24/2025    Future Appointments   Date Time Provider Department Center   2/19/2025 10:30 AM Tee Alfaro DO [facility] [place]   3/6/2025  1:00 PM Allen Avelar PA [facility]       Health Maintenance   Topic Date Due    Respiratory Syncytial Virus (RSV) Pregnant or age 60 yrs+ (1 - Risk 60-74 years 1-dose series) Never done    COVID-19 Vaccine (4 - 2023-24 season) 09/01/2024    A1C test (Diabetic or Prediabetic)  06/04/2025    Lipids  06/10/2025    Lung Cancer Screening &/or Counseling  06/21/2025    Depression Monitoring  01/08/2026    Diabetic Alb to Cr ratio (uACR) test  01/14/2026    GFR test (Diabetes, CKD 3-4, OR last GFR 15-59)  01/14/2026    Breast cancer screen  08/01/2026    Colorectal Cancer Screen  06/05/2027    DTaP/Tdap/Td vaccine (2 - Td or Tdap) 06/24/2029    DEXA (modify frequency per FRAX score)  Completed    Annual Wellness Visit (Medicare Advantage)  Completed    Flu vaccine  Completed    Shingles vaccine  Completed    Pneumococcal 65+ years Vaccine  Completed    Hepatitis C screen  Completed    Hepatitis A vaccine  Aged Out    Hepatitis B vaccine  Aged Out    Hib vaccine  Aged Out    Polio vaccine  Aged Out    Meningococcal (ACWY) vaccine  Aged Out    Diabetic foot exam  Discontinued    Diabetic retinal exam  Discontinued    Pneumococcal 0-64 years Vaccine  Discontinued    HIV screen  Discontinued    Cervical cancer screen  Discontinued       Hemoglobin A1C (%)   Date Value   06/04/2024 5.7   12/14/2023 5.6   05/11/2023 6.0             ( goal A1C is < 7)   No components found for: \"LABMICR\"  No

## 2025-01-24 NOTE — TELEPHONE ENCOUNTER
Received call from home care nurse, Ragini, with patient on the line.    Patient is requesting the below.    All diabetic supplies be sent to [facility]. Patient  is Princess and fax number is [phone].    All nebulizer supplies, including mask and tubing, be sent to [facility].    Routing to PCP MA and office RN to assist with pending appropriate orders.    Patient also requested that their home care nurse, Ragini, be added as an emergency contact for future needs. Added per patient request.    Electronically signed by Tej Godoy MA on 1/24/2025 at 10:51 AM

## 2025-02-06 DIAGNOSIS — M96.1 LUMBAR POST-LAMINECTOMY SYNDROME: ICD-10-CM

## 2025-02-06 DIAGNOSIS — M62.830 MUSCLE SPASM OF BACK: ICD-10-CM

## 2025-02-07 DIAGNOSIS — K21.9 GASTROESOPHAGEAL REFLUX DISEASE, UNSPECIFIED WHETHER ESOPHAGITIS PRESENT: ICD-10-CM

## 2025-02-07 NOTE — TELEPHONE ENCOUNTER
Carolina Schaefer is calling to request a refill on the following medication(s):    Medication Request:  Requested Prescriptions     Pending Prescriptions Disp Refills    omeprazole (PRILOSEC) 20 MG delayed release capsule [Pharmacy Med Name: OMEPRAZOLE 20 MG CAPSULE] 30 capsule 2     Sig: TAKE 1 CAPSULE BY MOUTH DAILY       Last Visit Date (If Applicable):  1/8/2025    Next Visit Date:    Visit date not found

## 2025-02-14 DIAGNOSIS — F17.200 SMOKER: ICD-10-CM

## 2025-02-14 RX ORDER — MELATONIN/LEMON BALM LEAF EXTR 5MG-500MCG
1 TABLET ORAL EVERY 24 HOURS
Qty: 30 PATCH | Refills: 3 | Status: SHIPPED | OUTPATIENT
Start: 2025-02-14

## 2025-02-14 NOTE — TELEPHONE ENCOUNTER
Carolina Schaefer is calling to request a refill on the following medication(s):    Medication Request:  Requested Prescriptions     Pending Prescriptions Disp Refills    NICOTINE STEP 3 7 MG/24HR [Pharmacy Med Name: NICOTINE TRANSDERMAL SYSTSTEM STEP 3/CLEAR 7 MG/24HR]  0     Sig: PLACE 1 PATCH ONTO THE SKIN EVERY 24 HOURS       Last Visit Date (If Applicable):  1/8/2025    Next Visit Date:    Visit date not found

## 2025-02-19 ENCOUNTER — HOSPITAL ENCOUNTER (OUTPATIENT)
Dept: PAIN MANAGEMENT | Age: 65
Discharge: HOME | End: 2025-02-19
Payer: MEDICARE

## 2025-02-19 VITALS — WEIGHT: 183 LBS | BODY MASS INDEX: 30.49 KG/M2 | HEIGHT: 65 IN

## 2025-02-19 DIAGNOSIS — M51.369 DEGENERATION OF INTERVERTEBRAL DISC OF LUMBAR REGION, UNSPECIFIED WHETHER PAIN PRESENT: ICD-10-CM

## 2025-02-19 DIAGNOSIS — M54.16 LUMBAR RADICULOPATHY: ICD-10-CM

## 2025-02-19 DIAGNOSIS — M96.1 LUMBAR POST-LAMINECTOMY SYNDROME: ICD-10-CM

## 2025-02-19 DIAGNOSIS — M53.3 SACROILIAC JOINT PAIN: ICD-10-CM

## 2025-02-19 DIAGNOSIS — Z79.891 CHRONIC USE OF OPIATE DRUG FOR THERAPEUTIC PURPOSE: Primary | ICD-10-CM

## 2025-02-19 PROCEDURE — 99213 OFFICE O/P EST LOW 20 MIN: CPT

## 2025-02-19 PROCEDURE — 99214 OFFICE O/P EST MOD 30 MIN: CPT | Performed by: STUDENT IN AN ORGANIZED HEALTH CARE EDUCATION/TRAINING PROGRAM

## 2025-02-19 RX ORDER — HYDROCODONE BITARTRATE AND ACETAMINOPHEN 5; 325 MG/1; MG/1
1 TABLET ORAL EVERY 8 HOURS PRN
Qty: 90 TABLET | Refills: 0 | Status: SHIPPED | OUTPATIENT
Start: 2025-02-19 | End: 2025-03-21

## 2025-02-19 NOTE — PROGRESS NOTES
Chronic Pain Clinic Note     Encounter Date: 2/19/2025     SUBJECTIVE:  Chief Complaint   Patient presents with    Back Pain     Med refill     Medication Refill: Norco - 2    Current Complaints of Pain:   Location: back   Radiation: both legs, worse on the Left  Severity:  Moderate  Pain Numerical Score - 8.5 today   Average: 8     Highest: 10  Lowest: 8  Character/Quality: Complains of pain that is dull, sharp and shooting  Timing: Constant  Associated symptoms: none  Numbness: no  Weakness: no  Exacerbating factors: sitting, laying, bending  Alleviating factors: getting up and moving around  Length of time pain has been present: Started about 10 years ago  Inciting event/injury: no  Bowel/Bladder incontinence: no  Falls: no  Physical Therapy:      History of Interventions:   Surgery: No previous lumbar/cervical surgeries  Injections: SIJ 04/25/2023    Imaging:    Lumbar MRI 10/10/2022    Past Medical History:   Diagnosis Date    Allergic rhinitis     Alveolar hypoventilation 11/20/2020    Aortic insufficiency 2018    mild-moderate on echo (was seen and discharged from cardiology-[facility])    Bronchiectasis ([facility]) 11/20/2020    Bronchitis     Chronic back pain     Pain management at [place]    COPD (chronic obstructive pulmonary disease) (Formerly [facility])     Dr. Zarate to see 11/09/2020    Depression     DM (diabetes mellitus) (Formerly [facility]) 12/18/2012    GERD (gastroesophageal reflux disease)     History of echocardiogram 05/2018    EF 65%, mild-moderate AI and TR    Hyperlipidemia     Dr. Mcleod    Hypertension     Dr. Mcleod    Lipoma of shoulder s/p excision right posterior 11 17 2008 09/24/2011    Muscle spasm of back 04/17/2024    Obesity     Opioid dependence with current use (Formerly [facility]) 06/04/2024    Osteoarthritis     Peripheral vascular disease     Post-op pain 10/13/2023    Primary osteoarthritis of both knees     Radicular pain of lumbosacral region     Spinal stenosis, lumbar region, without neurogenic claudication

## 2025-03-05 ENCOUNTER — HOSPITAL ENCOUNTER (OUTPATIENT)
Dept: MRI IMAGING | Age: 65
Discharge: HOME | End: 2025-03-07
Attending: STUDENT IN AN ORGANIZED HEALTH CARE EDUCATION/TRAINING PROGRAM
Payer: MEDICARE

## 2025-03-05 DIAGNOSIS — M51.369 DEGENERATION OF INTERVERTEBRAL DISC OF LUMBAR REGION, UNSPECIFIED WHETHER PAIN PRESENT: ICD-10-CM

## 2025-03-05 DIAGNOSIS — M96.1 LUMBAR POST-LAMINECTOMY SYNDROME: ICD-10-CM

## 2025-03-05 PROCEDURE — 72148 MRI LUMBAR SPINE W/O DYE: CPT

## 2025-03-06 ENCOUNTER — OFFICE VISIT (OUTPATIENT)
Dept: ORTHOPEDIC SURGERY | Age: 65
End: 2025-03-06

## 2025-03-06 VITALS — HEIGHT: 65 IN | BODY MASS INDEX: 30.49 KG/M2 | WEIGHT: 183 LBS

## 2025-03-06 DIAGNOSIS — M17.12 ARTHRITIS OF LEFT KNEE: Primary | ICD-10-CM

## 2025-03-06 RX ORDER — BUPIVACAINE HYDROCHLORIDE 2.5 MG/ML
2 INJECTION, SOLUTION INFILTRATION; PERINEURAL ONCE
Status: SHIPPED | OUTPATIENT
Start: 2025-03-06

## 2025-03-06 RX ORDER — METHYLPREDNISOLONE ACETATE 80 MG/ML
80 INJECTION, SUSPENSION INTRA-ARTICULAR; INTRALESIONAL; INTRAMUSCULAR; SOFT TISSUE ONCE
Status: SHIPPED | OUTPATIENT
Start: 2025-03-06

## 2025-03-06 NOTE — PROGRESS NOTES
Trace suprapatellar joint effusion present.  Ligamentously knee is stable to varus valgus stress at 0 30 degrees.  Negative anterior drawer, posterior drawer and Lachman's.. Compartments soft/compressible. Extremity warm/well perfused. EHL/FHL/TA/GSC motor intact. Patient expresses normal sensation L3-S1 distribution     Radiology:  Imaging studies from today were independently reviewed and read as listed below. Any relevant images obtained prior to today's visit were also independently interpreted.      XR KNEE LEFT (MIN 4 VIEWS)  Order: [id number]  Status: Final result       Visible to patient: No (not released)       Next appt: 02/19/2025 at 10:30 AM in Pain Management (Tee Alfaro DO)       Dx: Left knee pain, unspecified chronicity    0 Result Notes  Details    Reading Physician Reading Date Result Priority   Debra Marquez PA-C  [phone] 11/25/2024 Routine   Lexa Pichardo DO  [phone] 11/26/2024      Narrative & Impression  History: Left knee pain.     Comparison: 8/11/23     Findings:   Standing AP/Lateral/Tunnel/Merchant view xrays of the Left knee done in the office today shows severe  medial joint space narrowing, tricompartmental osteophytosis, joint line sclerosis medially.  No evidence of fracture, subluxation, dislocation, radioopaque foreign body/tumor is noted.  Lateral subluxation of the tibia is appreciated.     Impression:   Left knee severe  degenerative changes as described above.         Assessment:   64 y.o. year old female with left knee OA  Plan:   Lengthy discussion had with patient about current clinical state.     Carolina Schaefer is a 64 y.o. old female with left knee osteoarthritis. I had a discussion with the patient with regards to the nature and extent of her problem. We also discussed treatment options available to her including non-operative and operative intervention. To this end we discussed use of NSAIDs, cortisone and viscosupplementation injections,

## 2025-03-07 RX ORDER — MELOXICAM 7.5 MG/1
7.5 TABLET ORAL DAILY
Qty: 30 TABLET | Refills: 1 | Status: SHIPPED | OUTPATIENT
Start: 2025-03-07

## 2025-03-07 NOTE — TELEPHONE ENCOUNTER
..Request for   Requested Prescriptions     Pending Prescriptions Disp Refills    meloxicam (MOBIC) 7.5 MG tablet [Pharmacy Med Name: MELOXICAM 7.500 MG TABLET] 30 tablet 1     Sig: TAKE 1 TABLET BY MOUTH DAILY    .      Please review and e-scribe to pharmacy listed in chart if appropriate. Thank you.      Last Visit Date: 1/8/2025  Next Visit Date: Visit date not found    Future Appointments   Date Time Provider Department Center   3/18/2025 10:00 AM Niecy Nunez, APRN - CNP [facility]   Topic Date Due    Respiratory Syncytial Virus (RSV) Pregnant or age 60 yrs+ (1 - Risk 60-74 years 1-dose series) Never done    COVID-19 Vaccine (4 - 2024-25 season) 09/01/2024    A1C test (Diabetic or Prediabetic)  06/04/2025    Lipids  06/10/2025    Lung Cancer Screening &/or Counseling  06/21/2025    Depression Monitoring  01/08/2026    Diabetic Alb to Cr ratio (uACR) test  01/14/2026    GFR test (Diabetes, CKD 3-4, OR last GFR 15-59)  01/14/2026    Breast cancer screen  08/01/2026    Colorectal Cancer Screen  06/05/2027    DTaP/Tdap/Td vaccine (2 - Td or Tdap) 06/24/2029    DEXA (modify frequency per FRAX score)  Completed    Annual Wellness Visit (Medicare Advantage)  Completed    Flu vaccine  Completed    Shingles vaccine  Completed    Pneumococcal 50+ years Vaccine  Completed    Hepatitis C screen  Completed    Hepatitis A vaccine  Aged Out    Hepatitis B vaccine  Aged Out    Hib vaccine  Aged Out    Polio vaccine  Aged Out    Meningococcal (ACWY) vaccine  Aged Out    Diabetic foot exam  Discontinued    Diabetic retinal exam  Discontinued    Pneumococcal 0-49 years Vaccine  Discontinued    HIV screen  Discontinued    Cervical cancer screen  Discontinued       Hemoglobin A1C (%)   Date Value   06/04/2024 5.7   12/14/2023 5.6   05/11/2023 6.0             ( goal A1C is < 7)   No components found for: \"LABMICR\"  No components found for: \"LDLCHOLESTEROL\", \"LDLCALC\"    (goal LDL is <100)

## 2025-03-18 ENCOUNTER — HOSPITAL ENCOUNTER (OUTPATIENT)
Dept: PAIN MANAGEMENT | Age: 65
Discharge: HOME | End: 2025-03-18
Payer: MEDICARE

## 2025-03-18 VITALS — HEIGHT: 65 IN | WEIGHT: 183 LBS | BODY MASS INDEX: 30.49 KG/M2

## 2025-03-18 DIAGNOSIS — M53.3 SACROILIAC JOINT PAIN: ICD-10-CM

## 2025-03-18 DIAGNOSIS — M51.362 DEGENERATION OF INTERVERTEBRAL DISC OF LUMBAR REGION WITH DISCOGENIC BACK PAIN AND LOWER EXTREMITY PAIN: ICD-10-CM

## 2025-03-18 DIAGNOSIS — Z79.891 CHRONIC USE OF OPIATE DRUG FOR THERAPEUTIC PURPOSE: Primary | ICD-10-CM

## 2025-03-18 DIAGNOSIS — M96.1 LUMBAR POST-LAMINECTOMY SYNDROME: ICD-10-CM

## 2025-03-18 DIAGNOSIS — M54.16 LUMBAR RADICULOPATHY: ICD-10-CM

## 2025-03-18 PROCEDURE — G0480 DRUG TEST DEF 1-7 CLASSES: HCPCS

## 2025-03-18 PROCEDURE — 99213 OFFICE O/P EST LOW 20 MIN: CPT

## 2025-03-18 PROCEDURE — 80307 DRUG TEST PRSMV CHEM ANLYZR: CPT

## 2025-03-18 PROCEDURE — 99214 OFFICE O/P EST MOD 30 MIN: CPT | Performed by: NURSE PRACTITIONER

## 2025-03-18 ASSESSMENT — PAIN SCALES - GENERAL: PAINLEVEL_OUTOF10: 8

## 2025-03-18 ASSESSMENT — ENCOUNTER SYMPTOMS
BOWEL INCONTINENCE: 0
BACK PAIN: 1

## 2025-03-18 NOTE — PROGRESS NOTES
Chief Complaint   Patient presents with    Back Pain     Medication Refill      PMH     Pt is a 64-year-old female with chronic lumbar pain.  Reports radiation of pain down both legs, associated with occ. leg numbness  Past history significant for 2 lumbar spine surgeries with last one in 2020.  Initially noticed improvement but now has pain in lumbar area radiating down her legs    She saw Dr. Ash 3/13/23 with no surgery planned - SCS, TPI and SIJ were discussed.   Pt had L5 S1 lumbar epidural steroid injection 8/2022 and reported no significant relief   Pt had left SI joint injection 4/25/23 and reported over 50% relief and not ready to repeat    Pt also c/o chronic left knee pain She is s/p Left knee arthroscopy with Dr. Paiz 10/13/23   She had steroid injection 3/18/24 that did not help.   She had PT and continues with HEP  She had gel injection 6/3/24 with significant relief.    Recent lumbar spine MRI 3/2025 reveals multilevel degenerative changes and posterior fusion at L4 and L5. At L2-L3 Severe left and mild right foraminal stenosis. Moderate spinal canal stenosis. At L3-4 Moderate canal stenosis and moderate bilateral foraminal stenosis.    Tells me she is not interested in another surgery. Last neurosurgery evaluation 3/2023.     Patient continues Norco 5-325mg. She reports she does take medication three times daily as prescribed. Denies side effects from medication.     Back Pain  This is a chronic problem. The current episode started more than 1 year ago. The problem occurs constantly. The problem has been gradually improving since onset. The pain is present in the lumbar spine. The quality of the pain is described as burning. The pain radiates to the right thigh and left thigh. The pain is at a severity of 8/10. The pain is moderate. The pain is Worse during the day. The symptoms are aggravated by bending (weather (cold)). Stiffness is present In the morning. Pertinent negatives include no

## 2025-03-21 ENCOUNTER — TELEPHONE (OUTPATIENT)
Dept: PAIN MANAGEMENT | Age: 65
End: 2025-03-21

## 2025-03-21 RX ORDER — HYDROCODONE BITARTRATE AND ACETAMINOPHEN 5; 325 MG/1; MG/1
1 TABLET ORAL EVERY 8 HOURS PRN
Qty: 90 TABLET | Refills: 0 | Status: SHIPPED | OUTPATIENT
Start: 2025-03-21 | End: 2025-04-20

## 2025-03-21 NOTE — TELEPHONE ENCOUNTER
Pt called into office asking if prescription NORCO is going to be sent into pharmacy. LOV 3/18/25. Please advise.

## 2025-03-22 LAB
6-ACETYLMORPHINE, UR: NOT DETECTED
7-AMINOCLONAZEPAM, URINE: NOT DETECTED
ALPHA-OH-ALPRAZ, URINE: NOT DETECTED
ALPHA-OH-MIDAZOLAM, URINE: NOT DETECTED
ALPRAZOLAM, URINE: NOT DETECTED
AMPHETAMINE, URINE: NOT DETECTED
BARBITURATES, URINE: NEGATIVE
BENZOYLECGONINE, UR: NEGATIVE
BUPRENORPHINE URINE: NOT DETECTED
CARISOPRODOL, UR: NEGATIVE
CLONAZEPAM, URINE: NOT DETECTED
CODEINE, URINE: NOT DETECTED
CREAT UR-MCNC: 69.9 MG/DL (ref 20–400)
DIAZEPAM, URINE: NOT DETECTED
DRUGS EXPECTED, UR: NORMAL
EER HI RES INTERP UR: NORMAL
ETHYL GLUCURONIDE UR: NEGATIVE
FENTANYL URINE: NOT DETECTED
GABAPENTIN: PRESENT
HYDROCODONE, URINE: NOT DETECTED
HYDROMORPHONE, URINE: NOT DETECTED
LORAZEPAM, URINE: NOT DETECTED
MARIJUANA METAB, UR: NEGATIVE
MDA, URINE: NOT DETECTED
MDEA, EVE, UR: NOT DETECTED
MDMA, URINE: NOT DETECTED
MEPERIDINE METAB, UR: NOT DETECTED
METHADONE, URINE: NEGATIVE
METHAMPHETAMINE, URINE: NOT DETECTED
METHYLPHENIDATE: NOT DETECTED
MIDAZOLAM, URINE: NOT DETECTED
MORPHINE, OPI1M: NOT DETECTED
NALOXONE URINE: NOT DETECTED
NORBUPRENORPHINE, URINE: NOT DETECTED
NORDIAZEPAM, URINE: NOT DETECTED
NORFENTANYL, URINE: NOT DETECTED
NORHYDROCODONE, URINE: NOT DETECTED
NOROXYCODONE, URINE: NOT DETECTED
NOROXYMORPHONE, URINE: NOT DETECTED
OXAZEPAM, URINE: NOT DETECTED
OXYCODONE URINE: NOT DETECTED
OXYMORPHONE, URINE: NOT DETECTED
PAIN MANAGEMENT DRUG PANEL INTERP, URINE: NORMAL
PAIN MGT DRUG PANEL, HI RES, UR: NORMAL
PCP,URINE: NEGATIVE
PHENTERMINE, UR: NOT DETECTED
PREGABALIN: NOT DETECTED
TAPENTADOL, URINE: NOT DETECTED
TAPENTADOL-O-SULFATE, URINE: NOT DETECTED
TEMAZEPAM, URINE: NOT DETECTED
TRAMADOL, URINE: NEGATIVE
ZOLPIDEM METABOLITE (ZCA), URINE: NOT DETECTED
ZOLPIDEM, URINE: NOT DETECTED

## 2025-03-24 ENCOUNTER — RESULTS FOLLOW-UP (OUTPATIENT)
Dept: PAIN MANAGEMENT | Age: 65
End: 2025-03-24

## 2025-03-25 ENCOUNTER — TELEPHONE (OUTPATIENT)
Dept: PAIN MANAGEMENT | Age: 65
End: 2025-03-25

## 2025-04-02 ENCOUNTER — TELEPHONE (OUTPATIENT)
Dept: INTERNAL MEDICINE | Age: 65
End: 2025-04-02

## 2025-04-02 NOTE — TELEPHONE ENCOUNTER
Placed call to patient to inform of Dr Mcleod leaving practice and new pcp will be Dr Wynn starting April 16th. No Answer Lvm pcp switched to Dr Wynn starting April 16th

## 2025-04-11 DIAGNOSIS — J30.9 CHRONIC ALLERGIC RHINITIS: ICD-10-CM

## 2025-04-11 RX ORDER — CETIRIZINE HYDROCHLORIDE 10 MG/1
10 TABLET ORAL DAILY
Qty: 30 TABLET | Refills: 0 | Status: SHIPPED | OUTPATIENT
Start: 2025-04-11

## 2025-04-11 NOTE — TELEPHONE ENCOUNTER
Carolina Schaefer is calling to request a refill on the following medication(s):    Medication Request:  Requested Prescriptions     Pending Prescriptions Disp Refills    cetirizine (ZYRTEC) 10 MG tablet [Pharmacy Med Name: CETIRIZINE HYDROCHLORIDE 10 MG TABLET] 30 tablet 0     Sig: TAKE 1 TABLET BY MOUTH DAILY       Last Visit Date (If Applicable):  1/8/2025    Next Visit Date:    4/15/2025

## 2025-04-14 ENCOUNTER — TELEPHONE (OUTPATIENT)
Dept: INTERNAL MEDICINE | Age: 65
End: 2025-04-14

## 2025-04-15 ENCOUNTER — OFFICE VISIT (OUTPATIENT)
Dept: INTERNAL MEDICINE | Age: 65
End: 2025-04-15
Payer: MEDICARE

## 2025-04-15 VITALS
DIASTOLIC BLOOD PRESSURE: 82 MMHG | TEMPERATURE: 97.2 F | WEIGHT: 188 LBS | OXYGEN SATURATION: 96 % | HEIGHT: 65 IN | BODY MASS INDEX: 31.32 KG/M2 | HEART RATE: 85 BPM | SYSTOLIC BLOOD PRESSURE: 132 MMHG

## 2025-04-15 DIAGNOSIS — R73.03 PREDIABETES: ICD-10-CM

## 2025-04-15 DIAGNOSIS — M96.1 LUMBAR POST-LAMINECTOMY SYNDROME: ICD-10-CM

## 2025-04-15 DIAGNOSIS — J43.2 CENTRILOBULAR EMPHYSEMA (HCC): ICD-10-CM

## 2025-04-15 DIAGNOSIS — Z87.891 PERSONAL HISTORY OF TOBACCO USE: ICD-10-CM

## 2025-04-15 DIAGNOSIS — I10 ESSENTIAL HYPERTENSION: Primary | ICD-10-CM

## 2025-04-15 DIAGNOSIS — J30.9 CHRONIC ALLERGIC RHINITIS: ICD-10-CM

## 2025-04-15 LAB — HBA1C MFR BLD: 5.6 %

## 2025-04-15 PROCEDURE — G8417 CALC BMI ABV UP PARAM F/U: HCPCS | Performed by: INTERNAL MEDICINE

## 2025-04-15 PROCEDURE — 99214 OFFICE O/P EST MOD 30 MIN: CPT | Performed by: INTERNAL MEDICINE

## 2025-04-15 PROCEDURE — G8399 PT W/DXA RESULTS DOCUMENT: HCPCS | Performed by: INTERNAL MEDICINE

## 2025-04-15 PROCEDURE — 4004F PT TOBACCO SCREEN RCVD TLK: CPT | Performed by: INTERNAL MEDICINE

## 2025-04-15 PROCEDURE — G8427 DOCREV CUR MEDS BY ELIG CLIN: HCPCS | Performed by: INTERNAL MEDICINE

## 2025-04-15 PROCEDURE — 99213 OFFICE O/P EST LOW 20 MIN: CPT | Performed by: INTERNAL MEDICINE

## 2025-04-15 PROCEDURE — 1090F PRES/ABSN URINE INCON ASSESS: CPT | Performed by: INTERNAL MEDICINE

## 2025-04-15 PROCEDURE — 3075F SYST BP GE 130 - 139MM HG: CPT | Performed by: INTERNAL MEDICINE

## 2025-04-15 PROCEDURE — G0296 VISIT TO DETERM LDCT ELIG: HCPCS | Performed by: INTERNAL MEDICINE

## 2025-04-15 PROCEDURE — 83036 HEMOGLOBIN GLYCOSYLATED A1C: CPT | Performed by: INTERNAL MEDICINE

## 2025-04-15 PROCEDURE — 3023F SPIROM DOC REV: CPT | Performed by: INTERNAL MEDICINE

## 2025-04-15 PROCEDURE — 3079F DIAST BP 80-89 MM HG: CPT | Performed by: INTERNAL MEDICINE

## 2025-04-15 PROCEDURE — 3017F COLORECTAL CA SCREEN DOC REV: CPT | Performed by: INTERNAL MEDICINE

## 2025-04-15 PROCEDURE — 1123F ACP DISCUSS/DSCN MKR DOCD: CPT | Performed by: INTERNAL MEDICINE

## 2025-04-15 RX ORDER — CETIRIZINE HYDROCHLORIDE 10 MG/1
10 TABLET ORAL DAILY
Qty: 30 TABLET | Refills: 0 | Status: SHIPPED | OUTPATIENT
Start: 2025-04-15

## 2025-04-15 RX ORDER — AMLODIPINE BESYLATE 10 MG/1
10 TABLET ORAL EVERY MORNING
Qty: 90 TABLET | Refills: 1 | Status: SHIPPED | OUTPATIENT
Start: 2025-04-15

## 2025-04-15 RX ORDER — LISINOPRIL AND HYDROCHLOROTHIAZIDE 20; 25 MG/1; MG/1
TABLET ORAL
Qty: 90 TABLET | Refills: 1 | Status: SHIPPED | OUTPATIENT
Start: 2025-04-15

## 2025-04-15 RX ORDER — MELOXICAM 7.5 MG/1
7.5 TABLET ORAL DAILY
Qty: 30 TABLET | Refills: 1 | Status: SHIPPED | OUTPATIENT
Start: 2025-04-15

## 2025-04-15 RX ORDER — AZITHROMYCIN 250 MG/1
TABLET, FILM COATED ORAL
Qty: 6 TABLET | Refills: 0 | Status: SHIPPED | OUTPATIENT
Start: 2025-04-15 | End: 2025-04-25

## 2025-04-15 RX ORDER — FLUTICASONE PROPIONATE 50 MCG
1 SPRAY, SUSPENSION (ML) NASAL DAILY
Qty: 32 G | Refills: 1 | Status: SHIPPED | OUTPATIENT
Start: 2025-04-15

## 2025-04-15 RX ORDER — CARVEDILOL 12.5 MG/1
12.5 TABLET ORAL 2 TIMES DAILY
Qty: 180 TABLET | Refills: 1 | Status: SHIPPED | OUTPATIENT
Start: 2025-04-15

## 2025-04-15 RX ORDER — ALBUTEROL SULFATE 0.83 MG/ML
2.5 SOLUTION RESPIRATORY (INHALATION) EVERY 6 HOURS PRN
Qty: 120 EACH | Refills: 1 | Status: SHIPPED | OUTPATIENT
Start: 2025-04-15

## 2025-04-15 ASSESSMENT — ENCOUNTER SYMPTOMS
EYE REDNESS: 0
BACK PAIN: 1
SHORTNESS OF BREATH: 0
COUGH: 1
WHEEZING: 0
SINUS PAIN: 0
SINUS PRESSURE: 1
RHINORRHEA: 0
EYE ITCHING: 1
FACIAL SWELLING: 0
ABDOMINAL PAIN: 0
SORE THROAT: 0
CONSTIPATION: 0

## 2025-04-15 ASSESSMENT — PATIENT HEALTH QUESTIONNAIRE - PHQ9
SUM OF ALL RESPONSES TO PHQ QUESTIONS 1-9: 1
1. LITTLE INTEREST OR PLEASURE IN DOING THINGS: SEVERAL DAYS
7. TROUBLE CONCENTRATING ON THINGS, SUCH AS READING THE NEWSPAPER OR WATCHING TELEVISION: NOT AT ALL
SUM OF ALL RESPONSES TO PHQ QUESTIONS 1-9: 1
10. IF YOU CHECKED OFF ANY PROBLEMS, HOW DIFFICULT HAVE THESE PROBLEMS MADE IT FOR YOU TO DO YOUR WORK, TAKE CARE OF THINGS AT HOME, OR GET ALONG WITH OTHER PEOPLE: NOT DIFFICULT AT ALL
9. THOUGHTS THAT YOU WOULD BE BETTER OFF DEAD, OR OF HURTING YOURSELF: NOT AT ALL
5. POOR APPETITE OR OVEREATING: NOT AT ALL
2. FEELING DOWN, DEPRESSED OR HOPELESS: NOT AT ALL
SUM OF ALL RESPONSES TO PHQ QUESTIONS 1-9: 1
3. TROUBLE FALLING OR STAYING ASLEEP: NOT AT ALL
8. MOVING OR SPEAKING SO SLOWLY THAT OTHER PEOPLE COULD HAVE NOTICED. OR THE OPPOSITE, BEING SO FIGETY OR RESTLESS THAT YOU HAVE BEEN MOVING AROUND A LOT MORE THAN USUAL: NOT AT ALL
4. FEELING TIRED OR HAVING LITTLE ENERGY: NOT AT ALL
SUM OF ALL RESPONSES TO PHQ QUESTIONS 1-9: 1
6. FEELING BAD ABOUT YOURSELF - OR THAT YOU ARE A FAILURE OR HAVE LET YOURSELF OR YOUR FAMILY DOWN: NOT AT ALL

## 2025-04-15 NOTE — PROGRESS NOTES
cancer with low-dose computed tomography (LDCT) every year.  Stop screening once a person has not smoked for 15 years or has a health problem that limits life expectancy or the ability to have lung surgery.    The patient  reports that she has been smoking cigarettes. She has a 36 pack-year smoking history. She has never used smokeless tobacco.. Discussed with patient the risks and benefits of screening, including over-diagnosis, false positive rate, and total radiation exposure.  The patient currently exhibits no signs or symptoms suggestive of lung cancer.  Discussed with patient the importance of compliance with yearly annual lung cancer screenings and willingness to undergo diagnosis and treatment if screening scan is positive.  In addition, the patient was counseled regarding the importance of remaining smoke free and/or total smoking cessation.    Also reviewed the following if the patient has Medicare that as of February 10, 2022, Medicare only covers LDCT screening in patients aged 50-77 with at least a 20 pack-year smoking history who currently smoke or have quit in the last 15 years

## 2025-04-16 ENCOUNTER — HOSPITAL ENCOUNTER (OUTPATIENT)
Dept: PAIN MANAGEMENT | Age: 65
Discharge: HOME | End: 2025-04-16
Payer: MEDICARE

## 2025-04-16 VITALS — HEIGHT: 65 IN | WEIGHT: 188 LBS | BODY MASS INDEX: 31.32 KG/M2

## 2025-04-16 DIAGNOSIS — M96.1 LUMBAR POST-LAMINECTOMY SYNDROME: Primary | ICD-10-CM

## 2025-04-16 DIAGNOSIS — M51.362 DEGENERATION OF INTERVERTEBRAL DISC OF LUMBAR REGION WITH DISCOGENIC BACK PAIN AND LOWER EXTREMITY PAIN: ICD-10-CM

## 2025-04-16 DIAGNOSIS — M53.3 SACROILIAC JOINT PAIN: ICD-10-CM

## 2025-04-16 DIAGNOSIS — M47.817 LUMBOSACRAL SPONDYLOSIS WITHOUT MYELOPATHY: ICD-10-CM

## 2025-04-16 PROCEDURE — 99215 OFFICE O/P EST HI 40 MIN: CPT | Performed by: STUDENT IN AN ORGANIZED HEALTH CARE EDUCATION/TRAINING PROGRAM

## 2025-04-16 PROCEDURE — 99213 OFFICE O/P EST LOW 20 MIN: CPT

## 2025-04-16 ASSESSMENT — PAIN SCALES - GENERAL: PAINLEVEL_OUTOF10: 9

## 2025-04-16 NOTE — PROGRESS NOTES
lumbar region, without neurogenic claudication 04/30/2013    Tricuspid regurgitation 2018    mild-moderate on echo    Type II or unspecified type diabetes mellitus without mention of complication, not stated as uncontrolled     Dr. Mcleod    Under care of service provider 10/05/2023    uehbimzqfp-tlecgcvja-gdtcyn-last visit may 2023    Unspecified sleep apnea     no cpap used anymore    URI (upper respiratory infection)     Wears dentures     upper and lower full dentures    Wears glasses     Wellness examination 10/05/2023    ijh-vruhoqnb-atqhualb-last visit july 2023       Past Surgical History:   Procedure Laterality Date    COLONOSCOPY  02/12/2009    normal    DILATION AND CURETTAGE OF UTERUS      GASTRECTOMY      partial    KNEE ARTHROSCOPY Left 10/13/2023    KNEE ARTHROSCOPY, MEDIAL MENISCECTOMY    KNEE ARTHROSCOPY Left 10/13/2023    KNEE ARTHROSCOPY, MEDIAL MENISCECTOMY performed by Milan Paiz MD at [facility] OR    LUMBAR DISCECTOMY  01/2015    lumbar diskectomy    LUMBAR FUSION N/A 11/19/2020    POSTERIOR FUSION L4/5, MEDTRONICS, MIRIAM, PRONE, C-ARM, O-ARM, EVOKES #[id number] AMINA performed by Kiara Ash DO at [facility] OR    NERVE BLOCK Right 04/30/2013    Lumbar Diagnostic Block,  Kenalog 40 mg    NERVE BLOCK  05/23/2013    Lumbar Radiofrequency, Kenalog 40mg    NERVE BLOCK  08/12/2013    Lt MBNB  celestone 6mg    NERVE BLOCK Left 08/28/2013    left lumbar diagnostic block #2 decadron 10 mg    NERVE BLOCK Left 09/24/2013    left lumbar median branch radiofrequency    NERVE BLOCK  07/02/2014    caudal, celestone 9 mg    NERVE BLOCK  07/16/2014    caudal epidural #2, celestone 9mg, fentanyl 25mcg    NERVE BLOCK  07/30/2014    caudal #3 decadron 10mg    NERVE BLOCK  11/06/2014    duramorph epidural steroid block  duramorph 1 mg celestone 9 mg    NERVE BLOCK  11/20/2015    TENS- Empi Select    NERVE BLOCK  07/20/2018    right transforminal # 1 decadron 10mg,isovue    NERVE BLOCK Bilateral 02/01/2019    bilat

## 2025-04-22 ENCOUNTER — HOSPITAL ENCOUNTER (OUTPATIENT)
Dept: PULMONOLOGY | Age: 65
Discharge: HOME | End: 2025-04-22
Attending: INTERNAL MEDICINE
Payer: MEDICARE

## 2025-04-22 DIAGNOSIS — J43.2 CENTRILOBULAR EMPHYSEMA (HCC): ICD-10-CM

## 2025-04-22 PROCEDURE — 94060 EVALUATION OF WHEEZING: CPT

## 2025-04-22 PROCEDURE — 94726 PLETHYSMOGRAPHY LUNG VOLUMES: CPT

## 2025-04-22 PROCEDURE — 6370000000 HC RX 637 (ALT 250 FOR IP): Performed by: INTERNAL MEDICINE

## 2025-04-22 PROCEDURE — 94729 DIFFUSING CAPACITY: CPT

## 2025-04-22 RX ORDER — ALBUTEROL SULFATE 90 UG/1
2 INHALANT RESPIRATORY (INHALATION) ONCE
Status: COMPLETED | OUTPATIENT
Start: 2025-04-22 | End: 2025-04-22

## 2025-04-22 RX ADMIN — ALBUTEROL SULFATE 2 PUFF: 90 AEROSOL, METERED RESPIRATORY (INHALATION) at 17:40

## 2025-04-25 ENCOUNTER — OFFICE VISIT (OUTPATIENT)
Dept: INTERNAL MEDICINE | Age: 65
End: 2025-04-25
Payer: MEDICARE

## 2025-04-25 VITALS
HEART RATE: 65 BPM | DIASTOLIC BLOOD PRESSURE: 62 MMHG | SYSTOLIC BLOOD PRESSURE: 112 MMHG | HEIGHT: 65 IN | OXYGEN SATURATION: 92 % | BODY MASS INDEX: 30.82 KG/M2 | WEIGHT: 185 LBS

## 2025-04-25 DIAGNOSIS — M17.0 PRIMARY OSTEOARTHRITIS OF BOTH KNEES: ICD-10-CM

## 2025-04-25 DIAGNOSIS — M96.1 LUMBAR POST-LAMINECTOMY SYNDROME: ICD-10-CM

## 2025-04-25 DIAGNOSIS — Z12.11 ENCOUNTER FOR SCREENING COLONOSCOPY: Primary | ICD-10-CM

## 2025-04-25 DIAGNOSIS — F17.200 TOBACCO DEPENDENCE: ICD-10-CM

## 2025-04-25 DIAGNOSIS — J44.9 COPD, SEVERITY TO BE DETERMINED (HCC): ICD-10-CM

## 2025-04-25 DIAGNOSIS — Z79.891 CHRONIC USE OF OPIATE DRUG FOR THERAPEUTIC PURPOSE: ICD-10-CM

## 2025-04-25 DIAGNOSIS — Z99.81 OXYGEN DEPENDENT: ICD-10-CM

## 2025-04-25 DIAGNOSIS — I10 PRIMARY HYPERTENSION: ICD-10-CM

## 2025-04-25 DIAGNOSIS — E66.9 OBESITY (BMI 30-39.9): ICD-10-CM

## 2025-04-25 DIAGNOSIS — F32.9 MAJOR DEPRESSION, CHRONIC: ICD-10-CM

## 2025-04-25 DIAGNOSIS — J43.2 CENTRILOBULAR EMPHYSEMA (HCC): ICD-10-CM

## 2025-04-25 DIAGNOSIS — G47.34 IDIOPATHIC SLEEP RELATED NONOBSTRUCTIVE ALVEOLAR HYPOVENTILATION: ICD-10-CM

## 2025-04-25 PROCEDURE — G8399 PT W/DXA RESULTS DOCUMENT: HCPCS

## 2025-04-25 PROCEDURE — 99213 OFFICE O/P EST LOW 20 MIN: CPT | Performed by: INTERNAL MEDICINE

## 2025-04-25 PROCEDURE — 3023F SPIROM DOC REV: CPT

## 2025-04-25 PROCEDURE — G8427 DOCREV CUR MEDS BY ELIG CLIN: HCPCS

## 2025-04-25 PROCEDURE — 1090F PRES/ABSN URINE INCON ASSESS: CPT

## 2025-04-25 PROCEDURE — 3078F DIAST BP <80 MM HG: CPT

## 2025-04-25 PROCEDURE — 1123F ACP DISCUSS/DSCN MKR DOCD: CPT

## 2025-04-25 PROCEDURE — G8417 CALC BMI ABV UP PARAM F/U: HCPCS

## 2025-04-25 PROCEDURE — 4004F PT TOBACCO SCREEN RCVD TLK: CPT

## 2025-04-25 PROCEDURE — 99213 OFFICE O/P EST LOW 20 MIN: CPT

## 2025-04-25 PROCEDURE — 3074F SYST BP LT 130 MM HG: CPT

## 2025-04-25 PROCEDURE — 3017F COLORECTAL CA SCREEN DOC REV: CPT

## 2025-04-25 ASSESSMENT — PATIENT HEALTH QUESTIONNAIRE - PHQ9
SUM OF ALL RESPONSES TO PHQ QUESTIONS 1-9: 0
1. LITTLE INTEREST OR PLEASURE IN DOING THINGS: NOT AT ALL
SUM OF ALL RESPONSES TO PHQ QUESTIONS 1-9: 0
SUM OF ALL RESPONSES TO PHQ QUESTIONS 1-9: 0
2. FEELING DOWN, DEPRESSED OR HOPELESS: NOT AT ALL
SUM OF ALL RESPONSES TO PHQ QUESTIONS 1-9: 0

## 2025-04-25 NOTE — PROGRESS NOTES
Attending Physician Statement  I have discussed the care of Carolina Schaefer including pertinent history and exam findings, with the resident. I have reviewed the key elements of all parts of the encounter with the resident.  I agree with the assessment, plan and orders as documented by the resident.  (GE Modifier)    MD MARYA Dang  Attending Physician, [facility]   Faculty, Internal Medicine Residency Program  [facility]  4/25/2025, 1:40 PM

## 2025-04-25 NOTE — PROGRESS NOTES
[facility] [place] [address] [place] [address]  Dept: [phone]  Dept Fax: [phone]    FOLLOW UP Visit Note  Date of patient's visit: 4/25/2025  Patient's Name:  Carolina Schaefer YOB: 1960            Patient Care Team:  Michelle Garcia MD as PCP - General (Internal Medicine)  Soha Mcleod MD as PCP - Empaneled Provider  Dash Piña DPM as Consulting Physician (Podiatry)  [facility] ([facility])  Milan Paiz MD as Consulting Physician (Orthopedic Surgery)  Alessio Solis MD as Anesthesiologist (Pain Management)  Yohan Leyva OD (Optometry)  ______________________________________________________________________    Reason for visit: Establish care/Preventative care  ______________________________________________________________________  Chief Compliant   Knee Pain, Hypertension (Follow up/Checks at home is always under 140 last check was 117/80), and Established New Doctor (Isael mcleod)      ______________________________________________________________________  History of Presenting Illness:  History was obtained from the patient. Carolina Schaefer is a 64 y.o. is here to establish care.     Patient has hypertension. It is well controlled on amlodipine 10 MG OD, lisinopril hydrochlorothiazide 20-25 mg OD, Coreg 12.5 mg BID. She denied any chest pain, shortness of breath, lightheadedness, palpitations, leg swelling    Patient has chronic back pain secondary to lumbar postevent ectomy syndrome. She is following with Dr. Lindsey - pain management clinic. She reported she is currently on Vicodin and plan is for nerve ablation on/30th.    Patient also has left sided knee osteoarthritis and follows with orthopedics. She is currently working with physical therapy. No recent flareups. She reported she got a steroid injection in March.    Patient has COPD - emphysema. She uses oxygen during the night 2 L. PFTs are done but

## 2025-05-01 ENCOUNTER — TELEPHONE (OUTPATIENT)
Dept: GASTROENTEROLOGY | Age: 65
End: 2025-05-01

## 2025-05-01 NOTE — TELEPHONE ENCOUNTER
Received call from patient requesting to schedule colonoscopy.    Referral in epic.    Please advise.    Call back #: [phone]

## 2025-05-15 ENCOUNTER — RESULTS FOLLOW-UP (OUTPATIENT)
Age: 65
End: 2025-05-15

## 2025-05-21 ENCOUNTER — OFFICE VISIT (OUTPATIENT)
Dept: GASTROENTEROLOGY | Age: 65
End: 2025-05-21
Payer: MEDICARE

## 2025-05-21 ENCOUNTER — PREP FOR PROCEDURE (OUTPATIENT)
Dept: GASTROENTEROLOGY | Age: 65
End: 2025-05-21

## 2025-05-21 VITALS
BODY MASS INDEX: 31.95 KG/M2 | WEIGHT: 192 LBS | RESPIRATION RATE: 16 BRPM | SYSTOLIC BLOOD PRESSURE: 135 MMHG | OXYGEN SATURATION: 92 % | TEMPERATURE: 97.1 F | DIASTOLIC BLOOD PRESSURE: 78 MMHG | HEART RATE: 77 BPM

## 2025-05-21 DIAGNOSIS — K21.9 CHRONIC GASTROESOPHAGEAL REFLUX DISEASE WITHOUT ESOPHAGITIS: ICD-10-CM

## 2025-05-21 DIAGNOSIS — K27.9 PUD (PEPTIC ULCER DISEASE): ICD-10-CM

## 2025-05-21 DIAGNOSIS — K21.9 GASTROESOPHAGEAL REFLUX DISEASE, UNSPECIFIED WHETHER ESOPHAGITIS PRESENT: Primary | ICD-10-CM

## 2025-05-21 DIAGNOSIS — Z12.11 SPECIAL SCREENING FOR MALIGNANT NEOPLASMS, COLON: ICD-10-CM

## 2025-05-21 DIAGNOSIS — Z12.11 COLON CANCER SCREENING: ICD-10-CM

## 2025-05-21 PROCEDURE — 1126F AMNT PAIN NOTED NONE PRSNT: CPT | Performed by: INTERNAL MEDICINE

## 2025-05-21 PROCEDURE — 1090F PRES/ABSN URINE INCON ASSESS: CPT | Performed by: INTERNAL MEDICINE

## 2025-05-21 PROCEDURE — G8427 DOCREV CUR MEDS BY ELIG CLIN: HCPCS | Performed by: INTERNAL MEDICINE

## 2025-05-21 PROCEDURE — G8399 PT W/DXA RESULTS DOCUMENT: HCPCS | Performed by: INTERNAL MEDICINE

## 2025-05-21 PROCEDURE — G8417 CALC BMI ABV UP PARAM F/U: HCPCS | Performed by: INTERNAL MEDICINE

## 2025-05-21 PROCEDURE — 3078F DIAST BP <80 MM HG: CPT | Performed by: INTERNAL MEDICINE

## 2025-05-21 PROCEDURE — 3075F SYST BP GE 130 - 139MM HG: CPT | Performed by: INTERNAL MEDICINE

## 2025-05-21 PROCEDURE — 1123F ACP DISCUSS/DSCN MKR DOCD: CPT | Performed by: INTERNAL MEDICINE

## 2025-05-21 PROCEDURE — 1159F MED LIST DOCD IN RCRD: CPT | Performed by: INTERNAL MEDICINE

## 2025-05-21 PROCEDURE — 3017F COLORECTAL CA SCREEN DOC REV: CPT | Performed by: INTERNAL MEDICINE

## 2025-05-21 PROCEDURE — 4004F PT TOBACCO SCREEN RCVD TLK: CPT | Performed by: INTERNAL MEDICINE

## 2025-05-21 PROCEDURE — 99204 OFFICE O/P NEW MOD 45 MIN: CPT | Performed by: INTERNAL MEDICINE

## 2025-05-21 RX ORDER — HYDROCODONE BITARTRATE AND ACETAMINOPHEN 5; 325 MG/1; MG/1
1 TABLET ORAL EVERY 6 HOURS PRN
COMMUNITY
Start: 2025-05-14

## 2025-05-21 ASSESSMENT — ENCOUNTER SYMPTOMS
BLOOD IN STOOL: 0
ABDOMINAL DISTENTION: 0
ANAL BLEEDING: 0
RECTAL PAIN: 0
CHOKING: 0
CONSTIPATION: 0
WHEEZING: 0
TROUBLE SWALLOWING: 0
DIARRHEA: 0
ABDOMINAL PAIN: 0
VOICE CHANGE: 0
SORE THROAT: 0
COUGH: 0
VOMITING: 0
NAUSEA: 0

## 2025-05-21 NOTE — PROGRESS NOTES
any questions.  This note is created with the assistance of the speech recognition program. While intending to generate a document that actually reflects the content of the visit, it can still have some errors including those of syntax and sound-a-like substitutions which may escape proof reading. Actual meaning can be extrapolated by contextual inference.  Sandra Gregg MD  [facility]  O: #[phone]

## 2025-05-22 ENCOUNTER — OFFICE VISIT (OUTPATIENT)
Dept: ORTHOPEDIC SURGERY | Age: 65
End: 2025-05-22
Payer: MEDICARE

## 2025-05-22 VITALS — BODY MASS INDEX: 31.95 KG/M2 | HEIGHT: 65 IN

## 2025-05-22 DIAGNOSIS — M17.12 ARTHRITIS OF LEFT KNEE: Primary | ICD-10-CM

## 2025-05-22 PROCEDURE — G8399 PT W/DXA RESULTS DOCUMENT: HCPCS | Performed by: PHYSICIAN ASSISTANT

## 2025-05-22 PROCEDURE — G8417 CALC BMI ABV UP PARAM F/U: HCPCS | Performed by: PHYSICIAN ASSISTANT

## 2025-05-22 PROCEDURE — 1159F MED LIST DOCD IN RCRD: CPT | Performed by: PHYSICIAN ASSISTANT

## 2025-05-22 PROCEDURE — G8427 DOCREV CUR MEDS BY ELIG CLIN: HCPCS | Performed by: PHYSICIAN ASSISTANT

## 2025-05-22 PROCEDURE — 1123F ACP DISCUSS/DSCN MKR DOCD: CPT | Performed by: PHYSICIAN ASSISTANT

## 2025-05-22 PROCEDURE — 1090F PRES/ABSN URINE INCON ASSESS: CPT | Performed by: PHYSICIAN ASSISTANT

## 2025-05-22 PROCEDURE — 3017F COLORECTAL CA SCREEN DOC REV: CPT | Performed by: PHYSICIAN ASSISTANT

## 2025-05-22 PROCEDURE — 99213 OFFICE O/P EST LOW 20 MIN: CPT | Performed by: PHYSICIAN ASSISTANT

## 2025-05-22 PROCEDURE — 4004F PT TOBACCO SCREEN RCVD TLK: CPT | Performed by: PHYSICIAN ASSISTANT

## 2025-05-22 PROCEDURE — 1125F AMNT PAIN NOTED PAIN PRSNT: CPT | Performed by: PHYSICIAN ASSISTANT

## 2025-05-22 NOTE — PROGRESS NOTES
[facility]  [address]  [place] [address]  Dept Phone: [phone]  Dept Fax: [phone]      Orthopaedic Trauma Clinic Follow Up      Subjective:       Carolina Schaefer is a 64 y.o. year old female who presents to the clinic today for follow up chronic left knee pain.  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  She was eval by another provider in our office last on 3/6/2025 and underwent a corticosteroid injection.  Patient reports that the injection did help significantly with her swelling feels that her range of motion continues to improve with physical therapy but with the recent cold weather we have had in the area she has noted some increasing pain.  She is ambulating with assistance of a cane today states she is able to walk short distances in her house without any assistance.  Patient denies any interval injury, trauma or fall.      Patient also with history of Synvisc 1 injection on 6/3/2024 feels that the cortisone did provide even greater relief than the Synvisc injection.    Surgical history includes left knee arthroscopy partial medial meniscectomy in October 2023 with Dr. Paiz.    Review of Systems  Gen: no fever, chills, malaise  CV: no chest pain or palpitations  Resp: no cough or shortness of breath  GI: no nausea, vomiting, diarrhea, or constipation  Neuro: no seizures, vertigo, or headache  Msk: See HPI  10 remaining systems reviewed and negative    Objective :   There were no vitals filed for this visit.Body mass index is 31.95 kg/m².  General: No acute distress, resting comfortably in the clinic  Neuro: alert. oriented  Eyes: Extra-ocular muscles intact  Pulm: Respirations unlabored and regular.  Skin: warm, well perfused  Psych:   Patient has good fund of knowledge and displays understanding of exam, diagnosis, and plan.  Left Lower Extremity: Mature scar formation to the anterior portal sites from previous arthroscopy.  Active knee range of

## 2025-05-22 NOTE — TELEPHONE ENCOUNTER
Procedure scheduled/Dr Gregg  Procedure:colonoscopy/EGD  Dx:screening colon-astroesophageal reflux disease, -  PUD (peptic ulcer disease)           Date:11/7/25  Time: 10:45 am arriving at 8:45 am  Hospital:[facility] phone call:tbd  Bowel Prep instructions given: Golyte-dulc  In office/via phone: OFFICE  Clearance needed:none          GLP-1: none

## 2025-05-23 ENCOUNTER — TELEPHONE (OUTPATIENT)
Age: 65
End: 2025-05-23

## 2025-05-23 RX ORDER — BISACODYL 5 MG
TABLET, DELAYED RELEASE (ENTERIC COATED) ORAL
Qty: 4 TABLET | Refills: 0 | Status: SHIPPED | OUTPATIENT
Start: 2025-05-23

## 2025-05-23 RX ORDER — POLYETHYLENE GLYCOL 3350, SODIUM SULFATE ANHYDROUS, SODIUM BICARBONATE, SODIUM CHLORIDE, POTASSIUM CHLORIDE 236; 22.74; 6.74; 5.86; 2.97 G/4L; G/4L; G/4L; G/4L; G/4L
4 POWDER, FOR SOLUTION ORAL ONCE
Qty: 1 ML | Refills: 0 | Status: SHIPPED | OUTPATIENT
Start: 2025-05-23 | End: 2025-05-23

## 2025-05-23 NOTE — TELEPHONE ENCOUNTER
Patient came into the clinic this morning for a note/letter for the company of her medical alert device. The previous order was cancelled due to change of insurance. Pt stated it just needs to state that it is needed due to her being a fall risk.    Electronically signed by Dona Sanchez on 5/23/2025 at 9:41 AM    Medical [employer]'s Phone # [phone] ([facility])

## 2025-05-30 DIAGNOSIS — K21.9 GASTROESOPHAGEAL REFLUX DISEASE, UNSPECIFIED WHETHER ESOPHAGITIS PRESENT: ICD-10-CM

## 2025-05-30 RX ORDER — OMEPRAZOLE 20 MG/1
20 CAPSULE, DELAYED RELEASE ORAL DAILY
Qty: 30 CAPSULE | Refills: 2 | Status: SHIPPED | OUTPATIENT
Start: 2025-05-30

## 2025-05-30 NOTE — TELEPHONE ENCOUNTER
..E-scribe request for Prilosec. Please review and e-scribe if applicable.     Last Visit Date: 04/25/2025  Next Visit Date:  10/29/2025    Hemoglobin A1C (%)   Date Value   04/15/2025 5.6   06/04/2024 5.7   12/14/2023 5.6             ( goal A1C is < 7)   No components found for: \"LABMICR\"  No components found for: \"LDLCHOLESTEROL\", \"LDLCALC\"    (goal LDL is <100)   AST (U/L)   Date Value   10/05/2023 13     ALT (U/L)   Date Value   10/05/2023 6     BUN (mg/dL)   Date Value   01/14/2025 13     BP Readings from Last 3 Encounters:   05/21/25 135/78   04/25/25 112/62   04/15/25 132/82          (goal 120/80)        Patient Active Problem List:     DJD (degenerative joint disease) of knee     Osteoarthritis of spine with radiculopathy, lumbar region     Lumbar radiculopathy     GERD (gastroesophageal reflux disease)     COPD, severity to be determined (HCC)     HTN (hypertension)     Allergic rhinitis     History of tobacco use     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     Chronic low back pain     Major depression, chronic     Mitral and aortic insufficiency     Pure hypercholesterolemia     Lumbar degenerative disc disease     Alveolar hypoventilation     Obesity (BMI 30-39.9)     Centrilobular emphysema (HCC)     Oxygen dependent     Tobacco dependence     Idiopathic sleep related nonobstructive alveolar hypoventilation     Lumbar post-laminectomy syndrome     Chronic use of opiate drug for therapeutic purpose     Sacroiliac joint pain     Lumbosacral spondylosis without myelopathy     Chronic gastroesophageal reflux disease without esophagitis     PUD (peptic ulcer disease)     Special screening for malignant neoplasms, colon      MA

## 2025-06-18 DIAGNOSIS — N32.81 OAB (OVERACTIVE BLADDER): ICD-10-CM

## 2025-06-18 DIAGNOSIS — J30.9 CHRONIC ALLERGIC RHINITIS: ICD-10-CM

## 2025-06-18 NOTE — TELEPHONE ENCOUNTER
Request for   Requested Prescriptions     Pending Prescriptions Disp Refills    cetirizine (ZYRTEC) 10 MG tablet 30 tablet 0     Sig: Take 1 tablet by mouth daily    trospium (SANCTURA) 20 MG tablet 60 tablet 2     Sig: Take 1 tablet by mouth 2 times daily    .      Please review and e-scribe to pharmacy listed in chart if appropriate. Thank you.      Last Visit Date: Visit date not found  Next Visit Date: 10/29/2025    Future Appointments   Date Time Provider Department Center   7/3/2025  9:15 AM Allen Avelar PA [facility]   7/23/2025  9:30 AM Kiara Ash DO [place] [facility]   10/1/2025  1:15 PM Sandra Gregg MD [place] [facility]   10/29/2025 11:10 AM Michelle Garcia MD [facility]       Health Maintenance   Topic Date Due    Respiratory Syncytial Virus (RSV) Pregnant or age 60 yrs+ (1 - Risk 60-74 years 1-dose series) Never done    COVID-19 Vaccine (4 - 2024-25 season) 09/01/2024    Lipids  06/10/2025    Lung Cancer Screening &/or Counseling  06/21/2025    Depression Monitoring  04/25/2026    Breast cancer screen  08/01/2026    Colorectal Cancer Screen  06/05/2027    Diabetes screen  04/15/2028    DTaP/Tdap/Td vaccine (2 - Td or Tdap) 06/24/2029    DEXA (modify frequency per FRAX score)  Completed    Annual Wellness Visit (Medicare Advantage)  Completed    Flu vaccine  Completed    Shingles vaccine  Completed    Pneumococcal 50+ years Vaccine  Completed    Hepatitis C screen  Completed    Hepatitis A vaccine  Aged Out    Hepatitis B vaccine  Aged Out    Hib vaccine  Aged Out    Polio vaccine  Aged Out    Meningococcal (ACWY) vaccine  Aged Out    Meningococcal B vaccine  Aged Out    Diabetic foot exam  Discontinued    A1C test (Diabetic or Prediabetic)  Discontinued    Diabetic Alb to Cr ratio (uACR) test  Discontinued    Diabetic retinal exam  Discontinued    GFR test (Diabetes, CKD 3-4, OR last GFR 15-59)  Discontinued    Pneumococcal 0-49 years Vaccine  Discontinued    HIV screen

## 2025-06-19 RX ORDER — TROSPIUM CHLORIDE 20 MG/1
20 TABLET, FILM COATED ORAL 2 TIMES DAILY
Qty: 60 TABLET | Refills: 2 | Status: SHIPPED | OUTPATIENT
Start: 2025-06-19

## 2025-06-19 RX ORDER — CETIRIZINE HYDROCHLORIDE 10 MG/1
10 TABLET ORAL DAILY
Qty: 30 TABLET | Refills: 0 | Status: SHIPPED | OUTPATIENT
Start: 2025-06-19

## 2025-06-20 PROBLEM — Z12.11 SPECIAL SCREENING FOR MALIGNANT NEOPLASMS, COLON: Status: RESOLVED | Noted: 2025-05-21 | Resolved: 2025-06-20

## 2025-07-03 ENCOUNTER — TELEPHONE (OUTPATIENT)
Dept: ORTHOPEDIC SURGERY | Age: 65
End: 2025-07-03

## 2025-07-03 ENCOUNTER — OFFICE VISIT (OUTPATIENT)
Dept: ORTHOPEDIC SURGERY | Age: 65
End: 2025-07-03
Payer: MEDICARE

## 2025-07-03 VITALS — BODY MASS INDEX: 31.99 KG/M2 | HEIGHT: 65 IN | WEIGHT: 192 LBS

## 2025-07-03 DIAGNOSIS — M17.12 PRIMARY OSTEOARTHRITIS OF LEFT KNEE: Primary | ICD-10-CM

## 2025-07-03 PROCEDURE — G8399 PT W/DXA RESULTS DOCUMENT: HCPCS | Performed by: PHYSICIAN ASSISTANT

## 2025-07-03 PROCEDURE — 1090F PRES/ABSN URINE INCON ASSESS: CPT | Performed by: PHYSICIAN ASSISTANT

## 2025-07-03 PROCEDURE — 1125F AMNT PAIN NOTED PAIN PRSNT: CPT | Performed by: PHYSICIAN ASSISTANT

## 2025-07-03 PROCEDURE — G8427 DOCREV CUR MEDS BY ELIG CLIN: HCPCS | Performed by: PHYSICIAN ASSISTANT

## 2025-07-03 PROCEDURE — 3017F COLORECTAL CA SCREEN DOC REV: CPT | Performed by: PHYSICIAN ASSISTANT

## 2025-07-03 PROCEDURE — G8417 CALC BMI ABV UP PARAM F/U: HCPCS | Performed by: PHYSICIAN ASSISTANT

## 2025-07-03 PROCEDURE — 1123F ACP DISCUSS/DSCN MKR DOCD: CPT | Performed by: PHYSICIAN ASSISTANT

## 2025-07-03 PROCEDURE — 4004F PT TOBACCO SCREEN RCVD TLK: CPT | Performed by: PHYSICIAN ASSISTANT

## 2025-07-03 PROCEDURE — 1159F MED LIST DOCD IN RCRD: CPT | Performed by: PHYSICIAN ASSISTANT

## 2025-07-03 PROCEDURE — 99213 OFFICE O/P EST LOW 20 MIN: CPT | Performed by: PHYSICIAN ASSISTANT

## 2025-07-03 NOTE — TELEPHONE ENCOUNTER
Attempted to contact pt in regards to Synvisc one approval received 7/3/25. Attempted to schedule with Vargas Avelar PAC at [facility] location, item in stock approval on file. No answer VM full

## 2025-07-08 DIAGNOSIS — E78.00 PURE HYPERCHOLESTEROLEMIA: ICD-10-CM

## 2025-07-08 RX ORDER — ATORVASTATIN CALCIUM 80 MG/1
80 TABLET, FILM COATED ORAL DAILY
Qty: 90 TABLET | Refills: 1 | Status: SHIPPED | OUTPATIENT
Start: 2025-07-08

## 2025-07-08 NOTE — TELEPHONE ENCOUNTER
Carolina Schaefer is calling to request a refill on the following medication(s):    Medication Request:  Requested Prescriptions     Pending Prescriptions Disp Refills    atorvastatin (LIPITOR) 80 MG tablet 90 tablet 1     Sig: Take 1 tablet by mouth daily       Last Visit Date (If Applicable):  Visit date not found    Next Visit Date:    10/29/2025

## 2025-07-16 DIAGNOSIS — J30.9 CHRONIC ALLERGIC RHINITIS: ICD-10-CM

## 2025-07-16 NOTE — TELEPHONE ENCOUNTER
Carolina Schaefer is calling to request a refill on the following medication(s):    Medication Request:  Requested Prescriptions     Pending Prescriptions Disp Refills    cetirizine (ZYRTEC) 10 MG tablet [Pharmacy Med Name: CETIRIZINE HYDROCHLORIDE 10 MG TABLET] 30 tablet 0     Sig: TAKE 1 TABLET BY MOUTH DAILY       Last Visit Date (If Applicable):  Visit date not found    Next Visit Date:    10/29/2025

## 2025-07-18 RX ORDER — CETIRIZINE HYDROCHLORIDE 10 MG/1
10 TABLET ORAL DAILY
Qty: 30 TABLET | Refills: 0 | Status: SHIPPED | OUTPATIENT
Start: 2025-07-18

## 2025-07-23 ENCOUNTER — OFFICE VISIT (OUTPATIENT)
Dept: NEUROSURGERY | Age: 65
End: 2025-07-23
Payer: MEDICARE

## 2025-07-23 VITALS
HEART RATE: 72 BPM | HEIGHT: 65 IN | SYSTOLIC BLOOD PRESSURE: 117 MMHG | WEIGHT: 185.3 LBS | BODY MASS INDEX: 30.87 KG/M2 | DIASTOLIC BLOOD PRESSURE: 86 MMHG

## 2025-07-23 DIAGNOSIS — Z98.1 HISTORY OF LUMBAR FUSION: ICD-10-CM

## 2025-07-23 DIAGNOSIS — Z72.0 TOBACCO USE: ICD-10-CM

## 2025-07-23 DIAGNOSIS — M47.816 LUMBAR SPONDYLOSIS: Primary | ICD-10-CM

## 2025-07-23 PROCEDURE — 1160F RVW MEDS BY RX/DR IN RCRD: CPT | Performed by: NURSE PRACTITIONER

## 2025-07-23 PROCEDURE — 3079F DIAST BP 80-89 MM HG: CPT | Performed by: NURSE PRACTITIONER

## 2025-07-23 PROCEDURE — 3074F SYST BP LT 130 MM HG: CPT | Performed by: NURSE PRACTITIONER

## 2025-07-23 PROCEDURE — G8399 PT W/DXA RESULTS DOCUMENT: HCPCS | Performed by: NURSE PRACTITIONER

## 2025-07-23 PROCEDURE — G8427 DOCREV CUR MEDS BY ELIG CLIN: HCPCS | Performed by: NURSE PRACTITIONER

## 2025-07-23 PROCEDURE — 4004F PT TOBACCO SCREEN RCVD TLK: CPT | Performed by: NURSE PRACTITIONER

## 2025-07-23 PROCEDURE — 1123F ACP DISCUSS/DSCN MKR DOCD: CPT | Performed by: NURSE PRACTITIONER

## 2025-07-23 PROCEDURE — 1090F PRES/ABSN URINE INCON ASSESS: CPT | Performed by: NURSE PRACTITIONER

## 2025-07-23 PROCEDURE — 99214 OFFICE O/P EST MOD 30 MIN: CPT | Performed by: NURSE PRACTITIONER

## 2025-07-23 PROCEDURE — 1159F MED LIST DOCD IN RCRD: CPT | Performed by: NURSE PRACTITIONER

## 2025-07-23 PROCEDURE — G8417 CALC BMI ABV UP PARAM F/U: HCPCS | Performed by: NURSE PRACTITIONER

## 2025-07-23 PROCEDURE — 3017F COLORECTAL CA SCREEN DOC REV: CPT | Performed by: NURSE PRACTITIONER

## 2025-07-23 NOTE — PROGRESS NOTES
[facility] NEUROSURGERY [facility]  [address]  [id number]  [address], [address]  [facility] [address]  Dept: [phone]    Patient:  Carolina Schaefer  YOB: 1960  Date: 7/23/25    The patient is a 65 y.o. female who presents today for consult of the following problems:     Chief Complaint   Patient presents with    Follow-up     Last seen in 2023  MRI lumbar spine in 3/5/2025    Back Pain     Lumbar spine down into bilateral buttocks and thigh         HPI:     Carolina Schaefer is a 65 y.o. female who presents for follow up of back pain.  History significant for posterior L4/5 fusion in November 2020.  Patient has had ongoing issues with predominantly axial low back pain since.  Does report that recently had 2 falls in October, and has had recurrent back and bilateral buttocks pain since.  She states that her pain will be 10/10 when she first wakes up in the morning, described as an axial stiffness.  When she has been moving around for little while it would decrease to approximately 7/10.  Denies any new numbness, tingling or distinct lower extremity radiation.  Patient does have known issues with her left knee, states that she does need replacement but is electing not to do it right now.  Feels that this did contribute to her falls.  Has been routinely following with pain management, did recently have \"nerve burning\" in April that did provide some relief, follows with Dr. Lindsey.  Has been maintained on long term pain medication to manage her symptoms.    History:     Past Medical History:   Diagnosis Date    Allergic rhinitis     Alveolar hypoventilation 11/20/2020    Aortic insufficiency 2018    mild-moderate on echo (was seen and discharged from cardiology-[facility])    Bronchiectasis (HCC) 11/20/2020    Bronchitis     Chronic back pain     Pain management at [facility]    COPD (chronic obstructive pulmonary disease)

## 2025-08-01 DIAGNOSIS — M96.1 LUMBAR POST-LAMINECTOMY SYNDROME: ICD-10-CM

## 2025-08-01 NOTE — TELEPHONE ENCOUNTER
Request for   Requested Prescriptions     Pending Prescriptions Disp Refills    tiZANidine (ZANAFLEX) 4 MG tablet 60 tablet 0     Sig: Take 1 tablet by mouth 2 times daily    .      Please review and e-scribe to pharmacy listed in chart if appropriate. Thank you.      Last Visit Date: Visit date not found  Next Visit Date: 10/29/2025    Future Appointments   Date Time Provider Department Center   8/13/2025  2:30 PM STA CT SCAN RM 2 [facility] CT SCAN STA Radiolog   8/18/2025  9:45 AM STA DIAG MAMMO RM 3 [facility] MAMMO STA Radiolog   10/1/2025  1:15 PM Sandra Gregg MD [facility]   10/22/2025 10:00 AM Kiara Ash DO [facility]   10/29/2025 11:10 AM Michelle Garcia MD [facility] DEP       Health Maintenance   Topic Date Due    Respiratory Syncytial Virus (RSV) Pregnant or age 60 yrs+ (1 - Risk 60-74 years 1-dose series) Never done    COVID-19 Vaccine (4 - 2024-25 season) 09/01/2024    Lipids  06/10/2025    Lung Cancer Screening &/or Counseling  06/21/2025    Flu vaccine (1) 08/01/2025    Depression Monitoring  04/25/2026    Breast cancer screen  08/01/2026    Colorectal Cancer Screen  06/05/2027    Diabetes screen  04/15/2028    DTaP/Tdap/Td vaccine (2 - Td or Tdap) 06/24/2029    DEXA (modify frequency per FRAX score)  Completed    Annual Wellness Visit (Medicare Advantage)  Completed    Shingles vaccine  Completed    Pneumococcal 50+ years Vaccine  Completed    Hepatitis C screen  Completed    Hepatitis A vaccine  Aged Out    Hepatitis B vaccine  Aged Out    Hib vaccine  Aged Out    Polio vaccine  Aged Out    Meningococcal (ACWY) vaccine  Aged Out    Meningococcal B vaccine  Aged Out    Diabetic foot exam  Discontinued    A1C test (Diabetic or Prediabetic)  Discontinued    Diabetic Alb to Cr ratio (uACR) test  Discontinued    Diabetic retinal exam  Discontinued    GFR test (Diabetes, CKD 3-4, OR last GFR 15-59)  Discontinued    Pneumococcal 0-49 years Vaccine  Discontinued    HIV screen  Discontinued

## 2025-08-13 ENCOUNTER — HOSPITAL ENCOUNTER (OUTPATIENT)
Dept: CT IMAGING | Age: 65
Discharge: HOME | End: 2025-08-15
Payer: MEDICARE

## 2025-08-13 DIAGNOSIS — Z98.1 HISTORY OF LUMBAR FUSION: ICD-10-CM

## 2025-08-13 DIAGNOSIS — M47.816 LUMBAR SPONDYLOSIS: ICD-10-CM

## 2025-08-13 PROCEDURE — 72131 CT LUMBAR SPINE W/O DYE: CPT

## 2025-08-14 ENCOUNTER — OFFICE VISIT (OUTPATIENT)
Dept: ORTHOPEDIC SURGERY | Age: 65
End: 2025-08-14

## 2025-08-14 VITALS — BODY MASS INDEX: 30.82 KG/M2 | WEIGHT: 185 LBS | HEIGHT: 65 IN

## 2025-08-14 DIAGNOSIS — M17.12 PRIMARY OSTEOARTHRITIS OF LEFT KNEE: Primary | ICD-10-CM

## 2025-08-14 RX ORDER — BUPIVACAINE HYDROCHLORIDE 2.5 MG/ML
2 INJECTION, SOLUTION INFILTRATION; PERINEURAL ONCE
Status: COMPLETED | OUTPATIENT
Start: 2025-08-14 | End: 2025-08-14

## 2025-08-14 RX ADMIN — BUPIVACAINE HYDROCHLORIDE 5 MG: 2.5 INJECTION, SOLUTION INFILTRATION; PERINEURAL at 10:38

## 2025-08-15 ENCOUNTER — TELEPHONE (OUTPATIENT)
Age: 65
End: 2025-08-15

## 2025-08-17 DIAGNOSIS — G89.29 CHRONIC MIDLINE LOW BACK PAIN WITH RIGHT-SIDED SCIATICA: ICD-10-CM

## 2025-08-17 DIAGNOSIS — K59.00 CONSTIPATION, UNSPECIFIED CONSTIPATION TYPE: Primary | ICD-10-CM

## 2025-08-17 DIAGNOSIS — M54.41 CHRONIC MIDLINE LOW BACK PAIN WITH RIGHT-SIDED SCIATICA: ICD-10-CM

## 2025-08-17 DIAGNOSIS — K21.9 GASTROESOPHAGEAL REFLUX DISEASE, UNSPECIFIED WHETHER ESOPHAGITIS PRESENT: ICD-10-CM

## 2025-08-17 DIAGNOSIS — J30.9 CHRONIC ALLERGIC RHINITIS: ICD-10-CM

## 2025-08-17 RX ORDER — OMEPRAZOLE 20 MG/1
20 CAPSULE, DELAYED RELEASE ORAL DAILY
Qty: 30 CAPSULE | Refills: 2 | Status: SHIPPED | OUTPATIENT
Start: 2025-08-17 | End: 2025-08-18 | Stop reason: SDUPTHER

## 2025-08-17 RX ORDER — CETIRIZINE HYDROCHLORIDE 10 MG/1
10 TABLET ORAL DAILY
Qty: 30 TABLET | Refills: 0 | Status: SHIPPED | OUTPATIENT
Start: 2025-08-17 | End: 2025-08-18 | Stop reason: SDUPTHER

## 2025-08-17 RX ORDER — MELOXICAM 7.5 MG/1
7.5 TABLET ORAL DAILY
Qty: 30 TABLET | Refills: 1 | Status: SHIPPED | OUTPATIENT
Start: 2025-08-17 | End: 2025-08-18 | Stop reason: SDUPTHER

## 2025-08-17 RX ORDER — DOCUSATE SODIUM 100 MG/1
100 CAPSULE, LIQUID FILLED ORAL DAILY
Qty: 30 CAPSULE | Refills: 5 | Status: SHIPPED | OUTPATIENT
Start: 2025-08-17 | End: 2025-08-18 | Stop reason: SDUPTHER

## 2025-08-18 ENCOUNTER — HOSPITAL ENCOUNTER (OUTPATIENT)
Dept: MAMMOGRAPHY | Age: 65
Discharge: HOME | End: 2025-08-20
Payer: MEDICARE

## 2025-08-18 VITALS — WEIGHT: 185 LBS | HEIGHT: 65 IN | BODY MASS INDEX: 30.82 KG/M2

## 2025-08-18 DIAGNOSIS — M54.41 CHRONIC MIDLINE LOW BACK PAIN WITH RIGHT-SIDED SCIATICA: ICD-10-CM

## 2025-08-18 DIAGNOSIS — K59.00 CONSTIPATION, UNSPECIFIED CONSTIPATION TYPE: ICD-10-CM

## 2025-08-18 DIAGNOSIS — J30.9 CHRONIC ALLERGIC RHINITIS: ICD-10-CM

## 2025-08-18 DIAGNOSIS — G89.29 CHRONIC MIDLINE LOW BACK PAIN WITH RIGHT-SIDED SCIATICA: ICD-10-CM

## 2025-08-18 DIAGNOSIS — K21.9 GASTROESOPHAGEAL REFLUX DISEASE, UNSPECIFIED WHETHER ESOPHAGITIS PRESENT: ICD-10-CM

## 2025-08-18 DIAGNOSIS — Z12.31 VISIT FOR SCREENING MAMMOGRAM: ICD-10-CM

## 2025-08-18 PROCEDURE — 77063 BREAST TOMOSYNTHESIS BI: CPT

## 2025-08-26 RX ORDER — OMEPRAZOLE 20 MG/1
20 CAPSULE, DELAYED RELEASE ORAL DAILY
Qty: 30 CAPSULE | Refills: 2 | Status: SHIPPED | OUTPATIENT
Start: 2025-08-26

## 2025-08-26 RX ORDER — MELOXICAM 7.5 MG/1
7.5 TABLET ORAL DAILY
Qty: 30 TABLET | Refills: 1 | Status: SHIPPED | OUTPATIENT
Start: 2025-08-26

## 2025-08-26 RX ORDER — DOCUSATE SODIUM 100 MG/1
100 CAPSULE, LIQUID FILLED ORAL DAILY
Qty: 30 CAPSULE | Refills: 5 | Status: SHIPPED | OUTPATIENT
Start: 2025-08-26

## 2025-08-26 RX ORDER — CETIRIZINE HYDROCHLORIDE 10 MG/1
10 TABLET ORAL DAILY
Qty: 30 TABLET | Refills: 0 | Status: SHIPPED | OUTPATIENT
Start: 2025-08-26

## 2025-08-28 ENCOUNTER — HOSPITAL ENCOUNTER (OUTPATIENT)
Age: 65
Setting detail: THERAPIES SERIES
Discharge: HOME | End: 2025-08-28
Payer: MEDICARE

## 2025-08-28 PROCEDURE — 97161 PT EVAL LOW COMPLEX 20 MIN: CPT

## 2025-08-28 PROCEDURE — 97110 THERAPEUTIC EXERCISES: CPT

## 2025-09-05 ENCOUNTER — OFFICE VISIT (OUTPATIENT)
Age: 65
End: 2025-09-05

## 2025-09-05 VITALS
DIASTOLIC BLOOD PRESSURE: 68 MMHG | OXYGEN SATURATION: 74 % | HEART RATE: 70 BPM | HEIGHT: 65 IN | BODY MASS INDEX: 30.35 KG/M2 | SYSTOLIC BLOOD PRESSURE: 118 MMHG | TEMPERATURE: 97.4 F | WEIGHT: 182.2 LBS

## 2025-09-05 DIAGNOSIS — J44.1 COPD EXACERBATION (HCC): Primary | ICD-10-CM

## 2025-09-05 DIAGNOSIS — J06.9 URTI (ACUTE UPPER RESPIRATORY INFECTION): ICD-10-CM

## 2025-09-05 RX ORDER — PREDNISONE 20 MG/1
20 TABLET ORAL DAILY
Qty: 5 TABLET | Refills: 0 | Status: SHIPPED | OUTPATIENT
Start: 2025-09-05 | End: 2025-09-10

## 2025-09-05 RX ORDER — AZITHROMYCIN 500 MG/1
500 TABLET, FILM COATED ORAL DAILY
Qty: 3 TABLET | Refills: 0 | Status: SHIPPED | OUTPATIENT
Start: 2025-09-05 | End: 2025-09-08

## 2025-09-05 ASSESSMENT — ENCOUNTER SYMPTOMS
WHEEZING: 1
ABDOMINAL DISTENTION: 0
ABDOMINAL PAIN: 0
SHORTNESS OF BREATH: 1
COUGH: 1

## (undated) DEVICE — APPLICATOR MEDICATED 26 CC SOLUTION HI LT ORNG CHLORAPREP

## (undated) DEVICE — CODMAN® SURGICAL PATTIES 1/2" X 1/2" (1.27CM X 1.27CM): Brand: CODMAN®

## (undated) DEVICE — BLADE,CARBON-STEEL,15,STRL,DISPOSABLE,TB: Brand: MEDLINE

## (undated) DEVICE — SUTURE VIC + UD PS-2 3-0 18IN VCP497G

## (undated) DEVICE — GOWN,AURORA,NONRNF,XL,30/CS: Brand: MEDLINE

## (undated) DEVICE — GLOVE ORANGE PI 7 1/2   MSG9075

## (undated) DEVICE — STERILE LATEX POWDER-FREE SURGICAL GLOVESWITH NITRILE COATING: Brand: PROTEXIS

## (undated) DEVICE — 1000 S-DRAPE TOWEL DRAPE 10/BX: Brand: STERI-DRAPE™

## (undated) DEVICE — SOLUTION PREP POVIDONE IOD FOR SKIN MUCOUS MEM PRIOR TO

## (undated) DEVICE — GLOVE SURG SZ 8 L11.77IN FNGR THK9.8MIL STRW LTX POLYMER

## (undated) DEVICE — GLOVE EXAM SM L95IN FNGR THK35MIL PALM THK24MIL OFF WHT

## (undated) DEVICE — DRAPE,REIN 53X77,STERILE: Brand: MEDLINE

## (undated) DEVICE — ZIMMER® STERILE DISPOSABLE TOURNIQUET CUFF, DUAL PORT, SINGLE BLADDER, 34 IN. (86 CM)

## (undated) DEVICE — INTENDED FOR TISSUE SEPARATION, AND OTHER PROCEDURES THAT REQUIRE A SHARP SURGICAL BLADE TO PUNCTURE OR CUT.: Brand: BARD-PARKER ® CARBON RIB-BACK BLADES

## (undated) DEVICE — GLOVE,EXAM,NITRILE,RESTORE,OAT SENSE,L: Brand: MEDLINE

## (undated) DEVICE — ZIMMER® STERILE DISPOSABLE TOURNIQUET CUFF WITH PROTECTIVE SLEEVE AND PLC, DUAL PORT, SINGLE BLADDER, 34 IN. (86 CM)

## (undated) DEVICE — ELECTRODE PT RET AD L9FT HI MOIST COND ADH HYDRGEL CORDED

## (undated) DEVICE — CONNECTOR,TUBING,5-IN-1,NON-STERILE: Brand: MEDLINE INDUSTRIES, INC.

## (undated) DEVICE — 3M™ IOBAN™ 2 ANTIMICROBIAL INCISE DRAPE 6650EZ: Brand: IOBAN™ 2

## (undated) DEVICE — GLOVE ORANGE PI 7   MSG9070

## (undated) DEVICE — STERLING XTRASHARP SHAVER GREAT WHITE SHAVER BLADE, 3.5 MM: Brand: STERLING XTRASHARP SHAVER GREAT WHITE

## (undated) DEVICE — GLOVE ORANGE PI 8   MSG9080

## (undated) DEVICE — GOWN,SIRUS,NONRNF,SETINSLV,XL,20/CS: Brand: MEDLINE

## (undated) DEVICE — Device

## (undated) DEVICE — SPONGE LAP W18XL18IN WHT COT 4 PLY FLD STRUNG RADPQ DISP ST

## (undated) DEVICE — GOWN,AURORA,NONREINFORCED,LARGE: Brand: MEDLINE

## (undated) DEVICE — GLOVE EXAM M L95IN FNGR THK35MIL PALM THK24MIL OFF WHT

## (undated) DEVICE — THE STERILE LIGHT HANDLE COVER IS USED WITH STERIS SURGICAL LIGHTING AND VISUALIZATION SYSTEMS.

## (undated) DEVICE — BLADE ES L4IN INSUL EDGE

## (undated) DEVICE — SUTURE VCRL SZ 2-0 L18IN ABSRB UD CT-1 L36MM 1/2 CIR J839D

## (undated) DEVICE — DRESSING BORDERED ADH GZ UNIV GEN USE 5IN 4IN AND 2 1/2IN

## (undated) DEVICE — GARMENT,MEDLINE,DVT,INT,CALF,MED, GEN2: Brand: MEDLINE

## (undated) DEVICE — CODMAN® SURGICAL PATTIES 1/2" X 3" (1.27CM X 7.62CM): Brand: CODMAN®

## (undated) DEVICE — DRESSING BORDERED ADH GZ UNIV GEN USE 8INX4IN AND 6INX2IN

## (undated) DEVICE — GOWN,SIRUS,NONRNF,SETINSLV,2XL,18/CS: Brand: MEDLINE

## (undated) DEVICE — DRESSING TRNSPAR W2XL2.75IN FLM SHT SEMIPERMEABLE WIND

## (undated) DEVICE — KIT DRN FLAT W/ 100CC EVAC 7MM FULL PERF

## (undated) DEVICE — YANKAUER,FLEXIBLE HANDLE,REGLR CAPACITY: Brand: MEDLINE INDUSTRIES, INC.

## (undated) DEVICE — TOTAL TRAY, 16FR 10ML SIL FOLEY, URN: Brand: MEDLINE

## (undated) DEVICE — ZIMMER® STERILE DISPOSABLE TOURNIQUET CUFF WITH PROTECTIVE SLEEVE AND PLC, DUAL PORT, SINGLE BLADDER, 24 IN. (61 CM)

## (undated) DEVICE — SYRINGE MED 10ML LUERLOCK TIP W/O SFTY DISP

## (undated) DEVICE — SUTURE NONABSORBABLE MONOFILAMENT 3-0 PS-1 18 IN BLK ETHILON 1663H

## (undated) DEVICE — GAUZE,SPONGE,FLUFF,6"X6.75",STRL,5/TRAY: Brand: MEDLINE

## (undated) DEVICE — GLOVE ORANGE PI 8 1/2   MSG9085

## (undated) DEVICE — COVER,MAYO STAND,STERILE: Brand: MEDLINE

## (undated) DEVICE — CUFF TOURNIQUET 34 SNG BLADDER DUAL PORT

## (undated) DEVICE — NEEDLE SPNL 18GA L3.5IN W/ QNCKE SHARPER BVL DURA CLICK

## (undated) DEVICE — SYRINGE IRRIG 60ML SFT PLIABLE BLB EZ TO GRP 1 HND USE W/

## (undated) DEVICE — NEEDLE HYPO 25GA L1.5IN BLU POLYPR HUB S STL REG BVL STR

## (undated) DEVICE — MARKER,SKIN,WI/RULER AND LABELS: Brand: MEDLINE

## (undated) DEVICE — STERLING XTRASHARP SHAVER GREAT WHITE SHAVER BLADE, 4.2 MM: Brand: STERLING XTRASHARP SHAVER GREAT WHITE

## (undated) DEVICE — SYRINGE MED 10ML TRNSLUC BRL PLUNG BLK MRK POLYPR CTRL

## (undated) DEVICE — AGENT HEMOSTATIC SURGIFLOW MATRIX KIT W/THROMBIN

## (undated) DEVICE — 3.0MM PRECISION NEURO (MATCH HEAD)

## (undated) DEVICE — MAT ABSRB W28XL48IN C6L FLR ULT W POLY BK

## (undated) DEVICE — DRAPE C ARM UNIV W41XL74IN CLR PLAS XR VELC CLSR POLY STRP

## (undated) DEVICE — SPONGE GZ W3XL3IN 4 PLY RAYON POLY STD NONWOVEN

## (undated) DEVICE — SOLUTION IRRIG 3000ML 0.9% SOD CHL USP UROMATIC PLAS CONT

## (undated) DEVICE — BLADE ES ELASTOMERIC COAT INSUL DURABLE BEND UPTO 90DEG

## (undated) DEVICE — BIPOLAR SEALER 23-113-1 AQM 2.3 OM NEURO: Brand: AQUAMANTYS ®

## (undated) DEVICE — 60-7070-104 TRNQT,DPSB,PLC GREEN: Brand: MEDLINE RENEWAL

## (undated) DEVICE — THE MILL DISPOSABLE - MEDIUM

## (undated) DEVICE — CHLORAPREP 26ML ORANGE

## (undated) DEVICE — TOOL 14MH30 LEGEND 14CM 3MM: Brand: MIDAS REX ™

## (undated) DEVICE — Device: Brand: SNAP ON SPHERZ

## (undated) DEVICE — PACK PROCEDURE SURG LUMBAR SPINE SVMMC

## (undated) DEVICE — PATIENT RETURN ELECTRODE, SINGLE-USE, CONTACT QUALITY MONITORING, ADULT, WITH 9FT CORD, FOR PATIENTS WEIGING OVER 33LBS. (15KG): Brand: MEGADYNE

## (undated) DEVICE — CONNECTOR TBNG WHT PLAS SUCT STR 5IN1 LTWT W/ M CONN

## (undated) DEVICE — HYPODERMIC SAFETY NEEDLE: Brand: MAGELLAN

## (undated) DEVICE — SUTURE VCRL SZ 0 L18IN ABSRB UD L36MM CT-1 1/2 CIR J840D

## (undated) DEVICE — STRAP,POSITIONING,KNEE/BODY,FOAM,4X60": Brand: MEDLINE

## (undated) DEVICE — GLOVE SURG SZ 7 CRM LTX FREE POLYISOPRENE POLYMER BEAD ANTI

## (undated) DEVICE — SHEET, T, LAPAROTOMY, STERILE: Brand: MEDLINE